# Patient Record
Sex: FEMALE | Race: WHITE | NOT HISPANIC OR LATINO | Employment: UNEMPLOYED | URBAN - METROPOLITAN AREA
[De-identification: names, ages, dates, MRNs, and addresses within clinical notes are randomized per-mention and may not be internally consistent; named-entity substitution may affect disease eponyms.]

---

## 2017-04-13 ENCOUNTER — OFFICE VISIT (OUTPATIENT)
Dept: MEDICAL GROUP | Facility: MEDICAL CENTER | Age: 32
End: 2017-04-13
Attending: FAMILY MEDICINE
Payer: MEDICAID

## 2017-04-13 VITALS
HEART RATE: 100 BPM | WEIGHT: 105 LBS | OXYGEN SATURATION: 99 % | DIASTOLIC BLOOD PRESSURE: 88 MMHG | RESPIRATION RATE: 14 BRPM | SYSTOLIC BLOOD PRESSURE: 128 MMHG | TEMPERATURE: 98.1 F | BODY MASS INDEX: 16.88 KG/M2 | HEIGHT: 66 IN

## 2017-04-13 DIAGNOSIS — F41.9 ANXIETY: ICD-10-CM

## 2017-04-13 DIAGNOSIS — R55 SYNCOPE, UNSPECIFIED SYNCOPE TYPE: ICD-10-CM

## 2017-04-13 DIAGNOSIS — M25.50 ARTHRALGIA, UNSPECIFIED JOINT: ICD-10-CM

## 2017-04-13 DIAGNOSIS — R63.4 WEIGHT LOSS: ICD-10-CM

## 2017-04-13 DIAGNOSIS — K85.90 PANCREATITIS, UNSPECIFIED PANCREATITIS TYPE: ICD-10-CM

## 2017-04-13 DIAGNOSIS — G89.4 CHRONIC PAIN SYNDROME: ICD-10-CM

## 2017-04-13 DIAGNOSIS — F10.29 ALCOHOL DEPENDENCE WITH UNSPECIFIED ALCOHOL-INDUCED DISORDER (HCC): ICD-10-CM

## 2017-04-13 PROBLEM — F10.20 ALCOHOL DEPENDENCE (HCC): Status: ACTIVE | Noted: 2017-04-13

## 2017-04-13 PROCEDURE — 99214 OFFICE O/P EST MOD 30 MIN: CPT | Performed by: FAMILY MEDICINE

## 2017-04-13 PROCEDURE — 99204 OFFICE O/P NEW MOD 45 MIN: CPT | Performed by: FAMILY MEDICINE

## 2017-04-13 RX ORDER — FLUOXETINE 10 MG/1
10 CAPSULE ORAL DAILY
Qty: 30 CAP | Refills: 3 | Status: SHIPPED | OUTPATIENT
Start: 2017-04-13 | End: 2017-11-22

## 2017-04-13 RX ORDER — GABAPENTIN 100 MG/1
100 CAPSULE ORAL 3 TIMES DAILY
Qty: 90 CAP | Refills: 3 | Status: SHIPPED | OUTPATIENT
Start: 2017-04-13 | End: 2017-10-16 | Stop reason: SDUPTHER

## 2017-04-13 ASSESSMENT — ENCOUNTER SYMPTOMS
NAUSEA: 0
FEVER: 0
ABDOMINAL PAIN: 0
HEADACHES: 0
CHILLS: 0
SHORTNESS OF BREATH: 0
PALPITATIONS: 0
VOMITING: 0

## 2017-04-13 ASSESSMENT — PAIN SCALES - GENERAL: PAINLEVEL: 4=SLIGHT-MODERATE PAIN

## 2017-04-13 NOTE — PROGRESS NOTES
Subjective:      Rhiannon Tracey is a 31 y.o. female who presents with [facility]            HPI Comments: Patient here to establish with the clinic today, she has a history of pancreatitis, alcohol dependence, anxiety disorder, syncopal episodes and chronic pain.    She reports having episodes of syncope and passing out for 5 to 10 mins at a time. Prior to losing consciousness, She states she hears ringing in her ears and everything starts going black. She had been working as a  prior to having to stop working due to the syncopal events becoming more frequent. She states the most recent syncopal event happened about a week ago. She does not have any period of confusion following regaining her consciousness. She states that she has not been seen at the emergency room for these episodes, and may have hit her head while passing out. She is not having any headaches or dizziness any changes in vision or other neurologic changes when she is not having her syncopal episodes. To further assess her syncopal episodes will refer to cardiology for a possible event monitor as well as further evaluation of her heart, an MRI of her brain and also recommendation to go to the emergency room for a further evaluation if the event should recur.    She has episodes of severe anxiety, she states when she walks into any medical facility. She also has difficulty being around people sometimes. She has in the past been on Prozac for her issues which she states may have helped her with her problem with anxiety. We'll have her restart the Prozac at 10 mg and a referral to a psychiatrist as well as a psychologist will be made. She does not have any urges to harm herself or others. She has been advised if her symptoms become severe or she has urges to harm herself or others she should once again go to the emergency room for a further assessment as well as management.    She has multiple episodes of pancreatitis that has resulted  in hospitalization for 3 days. She also reports having issues with alcohol use drinking on a daily basis. Patient states that she is drinking less alcohol now, since her bout with pancreatitis. We will order blood work to further assess her pancreas as well as her liver and gallbladder. Since she is also been having issues with losing weight, will order a chest and abdomen CT scan as well for a further evaluation. Will also check a complete metabolic panel once again to check her liver as well as kidney function. We'll also check a thyroid function to see if she is having any problems with hyperthyroidism. She has also been prescribed nutritional supplements to use with each meal to assist in weight gain. We'll continue to follow.    She also has problems with pains in all of her joints. The pains do come and go and she has not had any recent trauma that she is aware of. We'll have her try Neurontin to see if it will help alleviate some of the joint pains she is suffering from. We'll also order a sedimentation rate as well to further assess.    Her past surgical history includes multiple knee surgeries, and a tonsillectomy.    Her family medical history includes arthritis and mental health issues per patient.    She is currently  but in a relationship. She was working as a  but due to her episodes of syncope she was forced to stop working for now.    She drinks alcohol on a daily basis, smokes a pack a day but denies any other substance use.      Review of Systems   Constitutional: Negative for fever and chills.   HENT: Negative for hearing loss.    Respiratory: Negative for shortness of breath.    Cardiovascular: Negative for chest pain and palpitations.   Gastrointestinal: Negative for nausea, vomiting and abdominal pain.   Musculoskeletal: Positive for joint pain.   Neurological: Positive for dizziness, tingling and loss of consciousness. Negative for tremors, sensory change, speech change,  "focal weakness, seizures and headaches.   Psychiatric/Behavioral: Positive for depression. Negative for suicidal ideas, hallucinations and substance abuse. The patient is nervous/anxious and has insomnia.           Objective:     /88 mmHg  Pulse 100  Temp(Src) 36.7 °C (98.1 °F)  Resp 14  Ht 1.689 m (5' 6.5\")  Wt 47.628 kg (105 lb)  BMI 16.70 kg/m2  SpO2 99%  LMP 02/27/2017  Breastfeeding? No     Physical Exam   Constitutional:   BMI < 17   HENT:   Right Ear: External ear normal.   Left Ear: External ear normal.   Nose: Nose normal.   Mouth/Throat: Oropharynx is clear and moist.   Eyes: EOM are normal. Pupils are equal, round, and reactive to light.   Neck: Normal range of motion.   Cardiovascular: Normal rate, regular rhythm and normal heart sounds.  Exam reveals no friction rub.    No murmur heard.  Pulmonary/Chest: Breath sounds normal. No respiratory distress. She has no wheezes. She has no rales.   Abdominal: Soft. Bowel sounds are normal.   Musculoskeletal:   Decreased ROM of affected extremities   Neurological: She is alert.   Skin: Skin is warm and dry.   Psychiatric: Her behavior is normal.               Assessment/Plan:     1. Anxiety  We'll restart her Prozac 10 mg daily, a referral to psychiatry and psychology have been made for patient we'll also check her thyroid function to see if she is hyperthyroid secondary to her uncontrolled anxiety as well as weight loss. She is not having any SI or HI symptoms, but ear precautions have been given to patient.  - fluoxetine (PROZAC) 10 MG Cap; Take 1 Cap by mouth every day.  Dispense: 30 Cap; Refill: 3  - REFERRAL TO PSYCHIATRY  - TSH WITH REFLEX TO FT4; Future  - COMP METABOLIC PANEL; Future  - CBC WITH DIFFERENTIAL; Future  - VITAMIN D,25 HYDROXY; Future  - REFERRAL TO PSYCHOLOGY    2. Alcohol dependence with unspecified alcohol-induced disorder (CMS-HCC)  Discussed her history of alcohol use, patient states that since she has had her pancreatitis " she has decreased her alcohol consumption although she still drinks on a daily basis. Discussed possibly tapering even further. Patient will be referred to psychiatry as well as psychology for additional assistance with her anxiety as well as her alcohol dependence.    3. Pancreatitis, unspecified pancreatitis type  She has not had any recent bouts of pancreatitis and has no current left upper quadrant pain. But due to her history will check blood work as well as a abdominal CT to further assess her pancreatitis as well as weight loss.  - COMP METABOLIC PANEL; Future    4. Chronic pain syndrome  Will have her start gabapentin at 100 mg 3 times a day. Will check a sedimentation rate as well as B12 and folate levels. We'll continue to follow.  - gabapentin (NEURONTIN) 100 MG Cap; Take 1 Cap by mouth 3 times a day.  Dispense: 90 Cap; Refill: 3  - LIPASE; Future  - VITAMIN B12; Future  - FOLATE; Future    5. Syncope, unspecified syncope type  Since she has bouts of loss of consciousness possible syncope, will refer to cardiology as well as get an MRI of her brain to look for any possible brain issues. She has been advised to go to the emergency room if she continues to have her syncopal episodes.  - MR-BRAIN-W/O; Future  - REFERRAL TO CARDIOLOGY    6. Weight loss  Due to her weight loss will have her start nutritional supplements along with trying to eat normal meals. Will order a CT abdomen as well as a CT chest to look for other potential etiologies for her weight loss. Blood work will also be ordered to further assess her metabolic function as well as thyroid function.  - CT-ABDOMEN W/O; Future  - CT-CHEST (THORAX) W/O; Future    7. Arthralgia, unspecified joint  See above plan.  - WESTERGREN SED RATE; Future

## 2017-04-13 NOTE — MR AVS SNAPSHOT
"        Rhiannon Batista Tracey   2017 1:10 PM   Office Visit   MRN: [id number]    [facility]   Dept Phone:  [phone]    Description:  Female : 1985   Provider:  Yvan Maxwell M.D.           Reason for Visit     Establish Care vomiting,shaking, pain in all joints       Allergies as of 2017     Allergen Noted Reactions    Promethazine Hcl 2008   Anxiety      You were diagnosed with     Anxiety   [266227]       Alcohol dependence with unspecified alcohol-induced disorder (CMS-HCC)   [2080928]       Pancreatitis, unspecified pancreatitis type   [0666676]       Chronic pain syndrome   [338.4.ICD-9-CM]       Syncope, unspecified syncope type   [1786169]       Weight loss   [923129]       Arthralgia, unspecified joint   [4297706]         Vital Signs     Blood Pressure Pulse Temperature Respirations Height Weight    128/88 mmHg 100 36.7 °C (98.1 °F) 14 1.689 m (5' 6.5\") 47.628 kg (105 lb)    Body Mass Index Oxygen Saturation Last Menstrual Period Breastfeeding? Smoking Status       16.70 kg/m2 99% 2017 No Current Every Day Smoker       Basic Information     Date Of Birth Sex Race Ethnicity Preferred Language    1985 Female White Non- English      Your appointments     2017 12:50 PM   Established Patient with Yvan Maxwell M.D.   [facility] ([facility]    [address] [place] [id number]   [phone]           You will be receiving a confirmation call a few days before your appointment from our automated call confirmation system.            May 03, 2017 11:00 AM   MR BRAIN W/O 30 with [facility]   [facility]    [address] [facility]  [place] [id number] [phone]            May 03, 2017 12:00 PM   CT GEN60 with VISTA CT 1   [facility]    [address] [facility] [id number] [phone]              Problem List              ICD-10-CM Priority Class Noted - Resolved    Hypertrophy of tonsils alone J35.1   2013 " - Present    Anxiety F41.9   4/13/2017 - Present    Alcohol dependence (CMS-[facility]) F10.20   4/13/2017 - Present    Pancreatitis K85.90   4/13/2017 - Present    Syncope R55   4/13/2017 - Present      Health Maintenance        Date Due Completion Dates    IMM DTaP/Tdap/Td Vaccine (1 - Tdap) 4/30/2006 ---    IMM PNEUMOCOCCAL 19-64 (ADULT) MEDIUM RISK SERIES (1 of 1 - PPSV23) 4/30/2006 ---    PAP SMEAR 4/30/2008 ---            Current Immunizations     No immunizations on file.      Below and/or attached are the medications your provider expects you to take. Review all of your home medications and newly ordered medications with your provider and/or pharmacist. Follow medication instructions as directed by your provider and/or pharmacist. Please keep your medication list with you and share with your provider. Update the information when medications are discontinued, doses are changed, or new medications (including over-the-counter products) are added; and carry medication information at all times in the event of emergency situations     Allergies:  PROMETHAZINE HCL - Anxiety               Medications  Valid as of: April 17, 2017 -  7:46 AM    Generic Name Brand Name Tablet Size Instructions for use    FLUoxetine HCl (Cap) PROZAC 10 MG Take 1 Cap by mouth every day.        Gabapentin (Cap) NEURONTIN 100 MG Take 1 Cap by mouth 3 times a day.        Misc. Devices (Misc) Misc. Devices  Nutritional supplements for weight gain # 90        .                 Medicines prescribed today were sent to:     [id number] [address]    Phone: [phone] Fax: [phone]    Open 24 Hours?: No      Medication refill instructions:       If your prescription bottle indicates you have medication refills left, it is not necessary to call your provider’s office. Please contact your pharmacy and they will refill your medication.    If your prescription bottle  indicates you do not have any refills left, you may request refills at any time through one of the following ways: The online [employer] system (except Urgent Care), by calling your provider’s office, or by asking your pharmacy to contact your provider’s office with a refill request. Medication refills are processed only during regular business hours and may not be available until the next business day. Your provider may request additional information or to have a follow-up visit with you prior to refilling your medication.   *Please Note: Medication refills are assigned a new Rx number when refilled electronically. Your pharmacy may indicate that no refills were authorized even though a new prescription for the same medication is available at the pharmacy. Please request the medicine by name with the pharmacy before contacting your provider for a refill.        Your To Do List     Future Labs/Procedures Complete By Expires    CBC WITH DIFFERENTIAL  As directed 4/13/2018    COMP METABOLIC PANEL  As directed 4/13/2018    CT-CHEST (THORAX) W/O  As directed 4/13/2018    CT-CHEST AND ABDOMEN W/O  As directed 4/14/2018    FOLATE  As directed 4/13/2018    LIPASE  As directed 4/13/2018    MR-BRAIN-W/O  As directed 4/13/2018    TSH WITH REFLEX TO FT4  As directed 4/13/2018    VITAMIN B12  As directed 4/13/2018    VITAMIN D,25 HYDROXY  As directed 4/13/2018    WESTERGREN SED RATE  As directed 4/14/2018      Referral     A referral request has been sent to our patient care coordination department. Please allow 3-5 business days for us to process this request and contact you either by phone or mail. If you do not hear from us by the 5th business day, please call us at [phone].        Instructions    Pt advised to go to the er for worsened sxs.             MyChart Status: Patient Declined        Quit Tobacco Information     Do you want to quit using tobacco?    Quitting tobacco decreases risks of cancer, heart and lung  disease, increases life expectancy, improves sense of taste and smell, and increases spending money, among other benefits.    If you are thinking about quitting, we can help.  • [facility] Quit Tobacco Program: 729.560.1699  o Program occurs weekly for four weeks and includes pharmacist consultation on products to support quitting smoking or chewing tobacco. A provider referral is needed for pharmacist consultation.  • Tobacco Users Help Hotline: 6-662-QUIT-NOW (107-3531) or https://[place].quitlogix.org/  o Free, confidential telephone and online coaching for [place] residents. Sessions are designed on a schedule that is convenient for you. Eligible clients receive free nicotine replacement therapy.  • Nationally: www.smokefree.gov  o Information and professional assistance to support both immediate and long-term needs as you become, and remain, a non-smoker. Smokefree.gov allows you to choose the help that best fits your needs.

## 2017-04-14 ASSESSMENT — ENCOUNTER SYMPTOMS
DIZZINESS: 1
NERVOUS/ANXIOUS: 1
SENSORY CHANGE: 0
DEPRESSION: 1
SEIZURES: 0
LOSS OF CONSCIOUSNESS: 1
SPEECH CHANGE: 0
FOCAL WEAKNESS: 0
HALLUCINATIONS: 0
TREMORS: 0
INSOMNIA: 1
TINGLING: 1

## 2017-04-14 ASSESSMENT — LIFESTYLE VARIABLES: SUBSTANCE_ABUSE: 0

## 2017-05-17 ENCOUNTER — APPOINTMENT (OUTPATIENT)
Dept: RADIOLOGY | Facility: IMAGING CENTER | Age: 32
End: 2017-05-17
Attending: PHYSICIAN ASSISTANT
Payer: MEDICAID

## 2017-05-17 ENCOUNTER — OFFICE VISIT (OUTPATIENT)
Dept: URGENT CARE | Facility: PHYSICIAN GROUP | Age: 32
End: 2017-05-17
Payer: MEDICAID

## 2017-05-17 VITALS
RESPIRATION RATE: 14 BRPM | HEART RATE: 90 BPM | BODY MASS INDEX: 17.33 KG/M2 | HEIGHT: 67 IN | DIASTOLIC BLOOD PRESSURE: 62 MMHG | SYSTOLIC BLOOD PRESSURE: 114 MMHG | OXYGEN SATURATION: 99 % | TEMPERATURE: 97.5 F | WEIGHT: 110.4 LBS

## 2017-05-17 DIAGNOSIS — S39.92XA BACK INJURY, INITIAL ENCOUNTER: ICD-10-CM

## 2017-05-17 PROCEDURE — 99203 OFFICE O/P NEW LOW 30 MIN: CPT | Performed by: PHYSICIAN ASSISTANT

## 2017-05-17 PROCEDURE — 72080 X-RAY EXAM THORACOLMB 2/> VW: CPT | Performed by: EMERGENCY MEDICINE

## 2017-05-17 RX ORDER — TRAMADOL HYDROCHLORIDE 50 MG/1
50 TABLET ORAL EVERY 4 HOURS PRN
Qty: 15 TAB | Refills: 0 | Status: SHIPPED | OUTPATIENT
Start: 2017-05-17 | End: 2017-09-11

## 2017-05-17 ASSESSMENT — PAIN SCALES - GENERAL: PAINLEVEL: 7=MODERATE-SEVERE PAIN

## 2017-05-17 NOTE — MR AVS SNAPSHOT
"Rhiannon Batista Tracey   2017 4:35 PM   Office Visit   MRN: [id number]    Department:  [facility]   Dept Phone:  [phone]    Description:  Female : 1985   Provider:  Abdiaziz Seth PA-C           Reason for Visit     Back Pain ffell in bath, vertigo      Allergies as of 2017     Allergen Noted Reactions    Promethazine Hcl 2008   Anxiety      You were diagnosed with     Back injury, initial encounter   [375241]         Vital Signs     Blood Pressure Pulse Temperature Respirations Height Weight    114/62 mmHg 90 36.4 °C (97.5 °F) 14 1.689 m (5' 6.5\") 50.077 kg (110 lb 6.4 oz)    Body Mass Index Oxygen Saturation Smoking Status             17.55 kg/m2 99% Current Every Day Smoker         Basic Information     Date Of Birth Sex Race Ethnicity Preferred Language    1985 Female White Non- English      Problem List              ICD-10-CM Priority Class Noted - Resolved    Hypertrophy of tonsils alone J35.1   2013 - Present    Anxiety F41.9   2017 - Present    Alcohol dependence (CMS-[facility]) F10.20   2017 - Present    Pancreatitis K85.90   2017 - Present    Syncope R55   2017 - Present      Health Maintenance        Date Due Completion Dates    IMM DTaP/Tdap/Td Vaccine (1 - Tdap) 2006 ---    IMM PNEUMOCOCCAL 19-64 (ADULT) MEDIUM RISK SERIES (1 of 1 - PPSV23) 2006 ---    PAP SMEAR 2008 ---            Current Immunizations     No immunizations on file.      Below and/or attached are the medications your provider expects you to take. Review all of your home medications and newly ordered medications with your provider and/or pharmacist. Follow medication instructions as directed by your provider and/or pharmacist. Please keep your medication list with you and share with your provider. Update the information when medications are discontinued, doses are changed, or new medications (including over-the-counter products) are added; and carry " medication information at all times in the event of emergency situations     Allergies:  PROMETHAZINE HCL - Anxiety               Medications  Valid as of: May 17, 2017 -  5:32 PM    Generic Name Brand Name Tablet Size Instructions for use    FLUoxetine HCl (Cap) PROZAC 10 MG Take 1 Cap by mouth every day.        Gabapentin (Cap) NEURONTIN 100 MG Take 1 Cap by mouth 3 times a day.        Misc. Devices (Misc) Misc. Devices  Nutritional supplements for weight gain # 90        TraMADol HCl (Tab) ULTRAM 50 MG Take 1 Tab by mouth every four hours as needed.        .                 Medicines prescribed today were sent to:     [id number] - [place], [place] - [address]    [address] [place] [address]    Phone: [phone] Fax: [phone]    Open 24 Hours?: No      Medication refill instructions:       If your prescription bottle indicates you have medication refills left, it is not necessary to call your provider’s office. Please contact your pharmacy and they will refill your medication.    If your prescription bottle indicates you do not have any refills left, you may request refills at any time through one of the following ways: The online [employer] system (except Urgent Care), by calling your provider’s office, or by asking your pharmacy to contact your provider’s office with a refill request. Medication refills are processed only during regular business hours and may not be available until the next business day. Your provider may request additional information or to have a follow-up visit with you prior to refilling your medication.   *Please Note: Medication refills are assigned a new Rx number when refilled electronically. Your pharmacy may indicate that no refills were authorized even though a new prescription for the same medication is available at the pharmacy. Please request the medicine by name with the pharmacy before contacting your provider for a refill.        Your To Do List        Future Labs/Procedures Complete By Expires    DX-THORACOLUMBAR SPINE-2 VIEWS  As directed 5/17/2018         Nict Status: Patient Declined        Quit Tobacco Information     Do you want to quit using tobacco?    Quitting tobacco decreases risks of cancer, heart and lung disease, increases life expectancy, improves sense of taste and smell, and increases spending money, among other benefits.    If you are thinking about quitting, we can help.  • [facility] Quit Tobacco Program: [phone]  o Program occurs weekly for four weeks and includes pharmacist consultation on products to support quitting smoking or chewing tobacco. A provider referral is needed for pharmacist consultation.  • Tobacco Users Help Hotline: 7-097-QUIT-NOW (794-7808) or https://[place].quitlogix.org/  o Free, confidential telephone and online coaching for [place] residents. Sessions are designed on a schedule that is convenient for you. Eligible clients receive free nicotine replacement therapy.  • Nationally: www.smokefree.gov  o Information and professional assistance to support both immediate and long-term needs as you become, and remain, a non-smoker. Smokefree.gov allows you to choose the help that best fits your needs.

## 2017-05-18 NOTE — PROGRESS NOTES
Chief Complaint   Patient presents with   • Back Pain     ffell in bath, vertigo       HISTORY OF PRESENT ILLNESS: Patient is a 32 y.o. female who presents today because she fell in the bathtub today, causing pain to her middle back. This just happened a couple hours ago. She has not been taking any specific medications for her. She denies any distal paresthesias. She is currently being evaluated by her primary care provider for chronic dizziness, syncopal episodes. She has just gotten out of the hospital a few weeks ago, was in there for several weeks for the same and to include electrolyte imbalances as well. Nonetheless, she has middle thoracolumbar back pain    Patient Active Problem List    Diagnosis Date Noted   • Anxiety 04/13/2017   • Alcohol dependence (CMS-HCC) 04/13/2017   • Pancreatitis 04/13/2017   • Syncope 04/13/2017   • Hypertrophy of tonsils alone 04/11/2013       Allergies:Promethazine hcl    Current Outpatient Prescriptions Ordered in [facility]   Medication Sig Dispense Refill   • tramadol (ULTRAM) 50 MG Tab Take 1 Tab by mouth every four hours as needed. 15 Tab 0   • gabapentin (NEURONTIN) 100 MG Cap Take 1 Cap by mouth 3 times a day. 90 Cap 3   • fluoxetine (PROZAC) 10 MG Cap Take 1 Cap by mouth every day. 30 Cap 3   • Misc. Devices Misc Nutritional supplements for weight gain # 90 90 Device 11     No current [facility]-ordered facility-administered medications on file.       Past Medical History   Diagnosis Date   • Hernia of unspecified site of abdominal cavity without mention of obstruction or gangrene    • Heart burn    • Indigestion    • Cold    • Pneumonia 2011   • Bronchitis 8/2012   • Alcohol dependence (CMS-HCC) 4/13/2017   • Pancreatitis        Social History   Substance Use Topics   • Smoking status: Current Every Day Smoker -- 0.75 packs/day     Types: Cigarettes   • Smokeless tobacco: None   • Alcohol Use: Yes      Comment: daily       Family Status   Relation Status Death Age   • Mother Alive  "   • Father Alive    • Maternal Grandfather       Family History   Problem Relation Age of Onset   • Cancer Neg Hx    • Diabetes Neg Hx    • Hypertension Neg Hx    • Psychiatry Mother    • Stroke Mother    • Arthritis Mother    • Heart Disease Maternal Grandfather        ROS:  Review of Systems   Constitutional: Negative for fever, chills, weight loss and malaise/fatigue.   HENT: Negative for ear pain, nosebleeds, congestion, sore throat and neck pain.    Eyes: Negative for blurred vision.   Respiratory: Negative for cough, sputum production, shortness of breath and wheezing.    Cardiovascular: Negative for chest pain, palpitations, orthopnea and leg swelling.   Gastrointestinal: Negative for heartburn, nausea, vomiting and abdominal pain.       Exam:  Blood pressure 114/62, pulse 90, temperature 36.4 °C (97.5 °F), resp. rate 14, height 1.689 m (5' 6.5\"), weight 50.077 kg (110 lb 6.4 oz), SpO2 99 %.  General:  Well nourished, well developed female in NAD  Head:Normocephalic, atraumatic  Eyes: PERRLA, EOM within normal limits, no conjunctival injection, no scleral icterus, visual fields and acuity grossly intact.  Extremities: no clubbing, cyanosis, or edema.  Musculoskeletal: Thoracolumbar area has some visible superficial abrasion without any bleeding, just left of midline and she has tenderness in the area of the same as well as midline tenderness of the thoracolumbar vertebrae    Thoracolumbar spine by radiology interpretation:    No fracture or malalignment of the thoracolumbar spine    Please note that this dictation was created using voice recognition software. I have made every reasonable attempt to correct obvious errors, but I expect that there are errors of grammar and possibly content that I did not discover before finalizing the note.    Assessment/Plan:  1. Back injury, initial encounter  DX-THORACOLUMBAR SPINE-2 VIEWS    tramadol (ULTRAM) 50 MG Tab    recommended over-the-counter Tylenol or " ibuprofen, if not significantly improving go to the emergency room.    Followup with primary care in the next 7-10 days if not significantly improving, return to the urgent care or go to the emergency room sooner for any worsening of symptoms.

## 2017-09-11 ENCOUNTER — OFFICE VISIT (OUTPATIENT)
Dept: URGENT CARE | Facility: PHYSICIAN GROUP | Age: 32
End: 2017-09-11
Payer: MEDICAID

## 2017-09-11 VITALS
BODY MASS INDEX: 17.89 KG/M2 | TEMPERATURE: 98.4 F | OXYGEN SATURATION: 94 % | WEIGHT: 114 LBS | SYSTOLIC BLOOD PRESSURE: 110 MMHG | HEART RATE: 98 BPM | DIASTOLIC BLOOD PRESSURE: 80 MMHG | HEIGHT: 67 IN | RESPIRATION RATE: 18 BRPM

## 2017-09-11 DIAGNOSIS — Z87.19 HISTORY OF PANCREATITIS: ICD-10-CM

## 2017-09-11 DIAGNOSIS — R10.10 PAIN OF UPPER ABDOMEN: ICD-10-CM

## 2017-09-11 PROCEDURE — 99214 OFFICE O/P EST MOD 30 MIN: CPT | Performed by: PHYSICIAN ASSISTANT

## 2017-09-11 PROCEDURE — 99999 PR NO CHARGE: CPT | Performed by: PHYSICIAN ASSISTANT

## 2017-09-11 RX ORDER — ONDANSETRON 4 MG/1
4 TABLET, ORALLY DISINTEGRATING ORAL ONCE
Status: COMPLETED | OUTPATIENT
Start: 2017-09-11 | End: 2017-09-11

## 2017-09-11 RX ORDER — ONDANSETRON 4 MG/1
4 TABLET, ORALLY DISINTEGRATING ORAL EVERY 8 HOURS PRN
Qty: 10 TAB | Refills: 0 | Status: SHIPPED | OUTPATIENT
Start: 2017-09-11 | End: 2017-10-16 | Stop reason: SDUPTHER

## 2017-09-11 RX ORDER — TRAMADOL HYDROCHLORIDE 50 MG/1
50-100 TABLET ORAL EVERY 4 HOURS PRN
Qty: 20 TAB | Refills: 0 | Status: SHIPPED | OUTPATIENT
Start: 2017-09-11 | End: 2017-10-16 | Stop reason: SDUPTHER

## 2017-09-11 RX ORDER — KETOROLAC TROMETHAMINE 30 MG/ML
30 INJECTION, SOLUTION INTRAMUSCULAR; INTRAVENOUS ONCE
Status: COMPLETED | OUTPATIENT
Start: 2017-09-11 | End: 2017-09-11

## 2017-09-11 RX ORDER — HYDROCODONE BITARTRATE AND ACETAMINOPHEN 5; 325 MG/1; MG/1
1-2 TABLET ORAL EVERY 6 HOURS PRN
Qty: 12 TAB | Refills: 0 | Status: SHIPPED | OUTPATIENT
Start: 2017-09-11 | End: 2017-09-11

## 2017-09-11 RX ADMIN — ONDANSETRON 4 MG: 4 TABLET, ORALLY DISINTEGRATING ORAL at 16:49

## 2017-09-11 RX ADMIN — KETOROLAC TROMETHAMINE 30 MG: 30 INJECTION, SOLUTION INTRAMUSCULAR; INTRAVENOUS at 16:48

## 2017-09-11 ASSESSMENT — ENCOUNTER SYMPTOMS
NAUSEA: 1
VOMITING: 1
CONSTIPATION: 0
DIARRHEA: 1
ANOREXIA: 1
ABDOMINAL PAIN: 1
CHILLS: 0
FEVER: 0

## 2017-09-11 NOTE — PROGRESS NOTES
Subjective:      Rhiannon Tracey is a 32 y.o. female who presents with Abdominal Pain (Pt Hx of pancreatitis, Pt states she is having same Sx)            Severe abdominal pain for the last couple of days which seems to be worsening. Pain worse in the upper abdomen and consistent with prior episodes of pancreatitis. She reports that she has had pancreatitis multiple times in the last year, usually due to alcohol abuse. She has been sober for 23 days. She would like something to help with the pain and nausea to hold her over until her symptoms improve.       Abdominal Pain   This is a recurrent problem. The current episode started in the past 7 days. The problem occurs constantly. The problem has been gradually worsening. The pain is located in the LUQ, RUQ and epigastric region. The pain is severe. The quality of the pain is sharp. The abdominal pain does not radiate. Associated symptoms include anorexia, diarrhea, nausea and vomiting. Pertinent negatives include no constipation or fever. The pain is aggravated by eating. The pain is relieved by nothing. She has tried nothing for the symptoms. The treatment provided no relief. Her past medical history is significant for pancreatitis.       Review of Systems   Constitutional: Negative for chills and fever.   Gastrointestinal: Positive for abdominal pain, anorexia, diarrhea, nausea and vomiting. Negative for constipation.     Allergies:Lorazepam and Promethazine hcl    Current Outpatient Prescriptions Ordered in [facility]   Medication Sig Dispense Refill   • ondansetron (ZOFRAN ODT) 4 MG TABLET DISPERSIBLE Take 1 Tab by mouth every 8 hours as needed for Nausea/Vomiting. 10 Tab 0   • tramadol (ULTRAM) 50 MG Tab Take 1-2 Tabs by mouth every four hours as needed for Moderate Pain. 20 Tab 0   • gabapentin (NEURONTIN) 100 MG Cap Take 1 Cap by mouth 3 times a day. 90 Cap 3   • fluoxetine (PROZAC) 10 MG Cap Take 1 Cap by mouth every day. 30 Cap 3   • Misc. Devices Misc  "Nutritional supplements for weight gain # 90 90 Device 11     Current Facility-Administered Medications Ordered in Epic   Medication Dose Route Frequency Provider Last Rate Last Dose   • ketorolac (TORADOL) injection 30 mg  30 mg Intramuscular Once Kwasi Pagan, P.A.       • ondansetron (ZOFRAN ODT) dispertab 4 mg  4 mg Oral Once Kwasi Pagan, P.A.           Past Medical History:   Diagnosis Date   • Alcohol dependence (CMS-HCC) 2017   • Bronchitis 2012   • Pneumonia    • Cold    • Heart burn    • Hernia of unspecified site of abdominal cavity without mention of obstruction or gangrene    • Indigestion    • Pancreatitis        Social History   Substance Use Topics   • Smoking status: Current Every Day Smoker     Packs/day: 0.75     Types: Cigarettes   • Smokeless tobacco: Never Used   • Alcohol use No      Comment: Sober since        Family Status   Relation Status   • Mother Alive   • Father Alive   • Maternal Grandfather    • Neg Hx      Family History   Problem Relation Age of Onset   • Psychiatry Mother    • Stroke Mother    • Arthritis Mother    • Heart Disease Maternal Grandfather    • Cancer Neg Hx    • Diabetes Neg Hx    • Hypertension Neg Hx           Objective:     /80   Pulse 98   Temp 36.9 °C (98.4 °F)   Resp 18   Ht 1.689 m (5' 6.5\")   Wt 51.7 kg (114 lb)   SpO2 94%   BMI 18.12 kg/m²      Physical Exam   Constitutional: She is oriented to person, place, and time. She appears well-developed and well-nourished.   Appears quite uncomfortable   HENT:   Head: Normocephalic and atraumatic.   Eyes: Right eye exhibits no discharge. Left eye exhibits no discharge.   Neck: Normal range of motion. Neck supple.   Cardiovascular: Normal rate and regular rhythm.    Pulmonary/Chest: Effort normal and breath sounds normal. She has no wheezes. She has no rales.   Abdominal: Soft. There is tenderness (diffuse, worse in the upper abdomen).   Neurological: She is alert and oriented to " person, place, and time.   Skin: Skin is warm and dry. She is not diaphoretic.   Psychiatric: She has a normal mood and affect. Her behavior is normal. Judgment and thought content normal.   Nursing note and vitals reviewed.              Assessment/Plan:     1. Pain of upper abdomen  ondansetron (ZOFRAN ODT) 4 MG TABLET DISPERSIBLE    ketorolac (TORADOL) injection 30 mg    ondansetron (ZOFRAN ODT) dispertab 4 mg    tramadol (ULTRAM) 50 MG Tab    DISCONTINUED: hydrocodone-acetaminophen (NORCO) 5-325 MG Tab per tablet    Ongoing for several days and worsening. History of pancreatitis. Discussed options. Given Toradol, tramadol, and Zofran. Follow up PCP. ER if worsening   2. History of pancreatitis  ondansetron (ZOFRAN ODT) 4 MG TABLET DISPERSIBLE    ketorolac (TORADOL) injection 30 mg    ondansetron (ZOFRAN ODT) dispertab 4 mg    tramadol (ULTRAM) 50 MG Tab    DISCONTINUED: hydrocodone-acetaminophen (NORCO) 5-325 MG Tab per tablet    Chronic problem with frequent exacerbations.  follow-up with PCP. ER if worsening       PMDP reviewed. No narcotics on report.    [employer] Patient Education given:Abdominal pain, pancreatitis    Please note that this dictation was created using voice recognition software. I have made every reasonable attempt to correct obvious errors, but I expect that there are errors of grammar and possibly content that I did not discover before finalizing the note.

## 2017-09-11 NOTE — PATIENT INSTRUCTIONS
Abdominal Pain (Nonspecific)  Your exam might not show the exact reason you have abdominal pain. Since there are many different causes of abdominal pain, another checkup and more tests may be needed. It is very important to follow up for lasting (persistent) or worsening symptoms. A possible cause of abdominal pain in any person who still has his or her appendix is acute appendicitis. Appendicitis is often hard to diagnose. Normal blood tests, urine tests, ultrasound, and CT scans do not completely rule out early appendicitis or other causes of abdominal pain. Sometimes, only the changes that happen over time will allow appendicitis and other causes of abdominal pain to be determined. Other potential problems that may require surgery may also take time to become more apparent. Because of this, it is important that you follow all of the instructions below.  HOME CARE INSTRUCTIONS   · Rest as much as possible.   · Do not eat solid food until your pain is gone.   · While adults or children have pain: A diet of water, weak decaffeinated tea, broth or bouillon, gelatin, oral rehydration solutions (ORS), frozen ice pops, or ice chips may be helpful.   · When pain is gone in adults or children: Start a light diet (dry toast, crackers, applesauce, or white rice). Increase the diet slowly as long as it does not bother you. Eat no dairy products (including cheese and eggs) and no spicy, fatty, fried, or high-fiber foods.   · Use no alcohol, caffeine, or cigarettes.   · Take your regular medicines unless your caregiver told you not to.   · Take any prescribed medicine as directed.   · Only take over-the-counter or prescription medicines for pain, discomfort, or fever as directed by your caregiver. Do not give aspirin to children.   If your caregiver has given you a follow-up appointment, it is very important to keep that appointment. Not keeping the appointment could result in a permanent injury and/or lasting (chronic) pain  and/or disability. If there is any problem keeping the appointment, you must call to reschedule.   SEEK IMMEDIATE MEDICAL CARE IF:   · Your pain is not gone in 24 hours.   · Your pain becomes worse, changes location, or feels different.   · You or your child has an oral temperature above 102° F (38.9° C), not controlled by medicine.   · Your baby is older than 3 months with a rectal temperature of 102° F (38.9° C) or higher.   · Your baby is 3 months old or younger with a rectal temperature of 100.4° F (38° C) or higher.   · You have shaking chills.   · You keep throwing up (vomiting) or cannot drink liquids.   · There is blood in your vomit or you see blood in your bowel movements.   · Your bowel movements become dark or black.   · You have frequent bowel movements.   · Your bowel movements stop (become blocked) or you cannot pass gas.   · You have bloody, frequent, or painful urination.   · You have yellow discoloration in the skin or whites of the eyes.   · Your stomach becomes bloated or bigger.   · You have dizziness or fainting.   · You have chest or back pain.   MAKE SURE YOU:   · Understand these instructions.   · Will watch your condition.   · Will get help right away if you are not doing well or get worse.   Document Released: 12/18/2006 Document Revised: 03/11/2013 Document Reviewed: 11/15/2010  ExitCare® Patient Information ©2013 [employer].    Acute Pancreatitis  Acute pancreatitis is a disease in which the pancreas becomes suddenly inflamed. The pancreas is a large gland located behind your stomach. The pancreas produces enzymes that help digest food. The pancreas also releases the hormones glucagon and insulin that help regulate blood sugar. Damage to the pancreas occurs when the digestive enzymes from the pancreas are activated and begin attacking the pancreas before being released into the intestine. Most acute attacks last a couple of days and can cause serious complications. Some people become  dehydrated and develop low blood pressure. In severe cases, bleeding into the pancreas can lead to shock and can be life-threatening. The lungs, heart, and kidneys may fail.  CAUSES   Pancreatitis can happen to anyone. In some cases, the cause is unknown. Most cases are caused by:  · Alcohol abuse.  · Gallstones.  Other less common causes are:  · Certain medicines.  · Exposure to certain chemicals.  · Infection.  · Damage caused by an accident (trauma).  · Abdominal surgery.  SYMPTOMS   · Pain in the upper abdomen that may radiate to the back.  · Tenderness and swelling of the abdomen.  · Nausea and vomiting.  DIAGNOSIS   Your caregiver will perform a physical exam. Blood and stool tests may be done to confirm the diagnosis. Imaging tests may also be done, such as X-rays, CT scans, or an ultrasound of the abdomen.  TREATMENT   Treatment usually requires a stay in the hospital. Treatment may include:  · Pain medicine.  · Fluid replacement through an intravenous line (IV).  · Placing a tube in the stomach to remove stomach contents and control vomiting.  · Not eating for 3 or 4 days. This gives your pancreas a rest, because enzymes are not being produced that can cause further damage.  · Antibiotic medicines if your condition is caused by an infection.  · Surgery of the pancreas or gallbladder.  HOME CARE INSTRUCTIONS   · Follow the diet advised by your caregiver. This may involve avoiding alcohol and decreasing the amount of fat in your diet.  · Eat smaller, more frequent meals. This reduces the amount of digestive juices the pancreas produces.  · Drink enough fluids to keep your urine clear or pale yellow.  · Only take over-the-counter or prescription medicines as directed by your caregiver.  · Avoid drinking alcohol if it caused your condition.  · Do not smoke.  · Get plenty of rest.  · Check your blood sugar at home as directed by your caregiver.  · Keep all follow-up appointments as directed by your  caregiver.  SEEK MEDICAL CARE IF:   · You do not recover as quickly as expected.  · You develop new or worsening symptoms.  · You have persistent pain, weakness, or nausea.  · You recover and then have another episode of pain.  SEEK IMMEDIATE MEDICAL CARE IF:   · You are unable to eat or keep fluids down.  · Your pain becomes severe.  · You have a fever or persistent symptoms for more than 2 to 3 days.  · You have a fever and your symptoms suddenly get worse.  · Your skin or the white part of your eyes turn yellow (jaundice).  · You develop vomiting.  · You feel dizzy, or you faint.  · Your blood sugar is high (over 300 mg/dL).  MAKE SURE YOU:   · Understand these instructions.  · Will watch your condition.  · Will get help right away if you are not doing well or get worse.     This information is not intended to replace advice given to you by your health care provider. Make sure you discuss any questions you have with your health care provider.     Document Released: 12/18/2006 Document Revised: 06/18/2013 Document Reviewed: 03/28/2013  [employer] Interactive Patient Education ©2016 [employer].      Smoking Cessation, Tips for Success  If you are ready to quit smoking, congratulations! You have chosen to help yourself be healthier. Cigarettes bring nicotine, tar, carbon monoxide, and other irritants into your body. Your lungs, heart, and blood vessels will be able to work better without these poisons. There are many different ways to quit smoking. Nicotine gum, nicotine patches, a nicotine inhaler, or nicotine nasal spray can help with physical craving. Hypnosis, support groups, and medicines help break the habit of smoking.  WHAT THINGS CAN I DO TO MAKE QUITTING EASIER?   Here are some tips to help you quit for good:  · Pick a date when you will quit smoking completely. Tell all of your friends and family about your plan to quit on that date.  · Do not try to slowly cut down on the number of cigarettes you are  "smoking. Pick a quit date and quit smoking completely starting on that day.  · Throw away all cigarettes.    · Clean and remove all ashtrays from your home, work, and car.  · On a card, write down your reasons for quitting. Carry the card with you and read it when you get the urge to smoke.  · Cleanse your body of nicotine. Drink enough water and fluids to keep your urine clear or pale yellow. Do this after quitting to flush the nicotine from your body.  · Learn to predict your moods. Do not let a bad situation be your excuse to have a cigarette. Some situations in your life might tempt you into wanting a cigarette.  · Never have \"just one\" cigarette. It leads to wanting another and another. Remind yourself of your decision to quit.  · Change habits associated with smoking. If you smoked while driving or when feeling stressed, try other activities to replace smoking. Stand up when drinking your coffee. Brush your teeth after eating. Sit in a different chair when you read the paper. Avoid alcohol while trying to quit, and try to drink fewer caffeinated beverages. Alcohol and caffeine may urge you to smoke.  · Avoid foods and drinks that can trigger a desire to smoke, such as sugary or spicy foods and alcohol.  · Ask people who smoke not to smoke around you.  · Have something planned to do right after eating or having a cup of coffee. For example, plan to take a walk or exercise.  · Try a relaxation exercise to calm you down and decrease your stress. Remember, you may be tense and nervous for the first 2 weeks after you quit, but this will pass.  · Find new activities to keep your hands busy. Play with a pen, coin, or rubber band. Doodle or draw things on paper.  · Brush your teeth right after eating. This will help cut down on the craving for the taste of tobacco after meals. You can also try mouthwash.    · Use oral substitutes in place of cigarettes. Try using lemon drops, carrots, cinnamon sticks, or chewing gum. " "Keep them handy so they are available when you have the urge to smoke.  · When you have the urge to smoke, try deep breathing.  · Designate your home as a nonsmoking area.  · If you are a heavy smoker, ask your health care provider about a prescription for nicotine chewing gum. It can ease your withdrawal from nicotine.  · Reward yourself. Set aside the cigarette money you save and buy yourself something nice.  · Look for support from others. Join a support group or smoking cessation program. Ask someone at home or at work to help you with your plan to quit smoking.  · Always ask yourself, \"Do I need this cigarette or is this just a reflex?\" Tell yourself, \"Today, I choose not to smoke,\" or \"I do not want to smoke.\" You are reminding yourself of your decision to quit.  · Do not replace cigarette smoking with electronic cigarettes (commonly called e-cigarettes). The safety of e-cigarettes is unknown, and some may contain harmful chemicals.  · If you relapse, do not give up! Plan ahead and think about what you will do the next time you get the urge to smoke.  HOW WILL I FEEL WHEN I QUIT SMOKING?  You may have symptoms of withdrawal because your body is used to nicotine (the addictive substance in cigarettes). You may crave cigarettes, be irritable, feel very hungry, cough often, get headaches, or have difficulty concentrating. The withdrawal symptoms are only temporary. They are strongest when you first quit but will go away within 10-14 days. When withdrawal symptoms occur, stay in control. Think about your reasons for quitting. Remind yourself that these are signs that your body is healing and getting used to being without cigarettes. Remember that withdrawal symptoms are easier to treat than the major diseases that smoking can cause.   Even after the withdrawal is over, expect periodic urges to smoke. However, these cravings are generally short lived and will go away whether you smoke or not. Do not smoke!  WHAT " RESOURCES ARE AVAILABLE TO HELP ME QUIT SMOKING?  Your health care provider can direct you to community resources or hospitals for support, which may include:  · Group support.  · Education.  · Hypnosis.  · Therapy.     This information is not intended to replace advice given to you by your health care provider. Make sure you discuss any questions you have with your health care provider.     Document Released: 09/15/2005 Document Revised: 01/08/2016 Document Reviewed: 06/05/2014  Elsevier Interactive Patient Education ©2016 [employer] Inc.

## 2017-10-16 ENCOUNTER — OFFICE VISIT (OUTPATIENT)
Dept: MEDICAL GROUP | Facility: MEDICAL CENTER | Age: 32
End: 2017-10-16
Attending: FAMILY MEDICINE
Payer: MEDICAID

## 2017-10-16 VITALS
HEIGHT: 66 IN | DIASTOLIC BLOOD PRESSURE: 70 MMHG | SYSTOLIC BLOOD PRESSURE: 132 MMHG | HEART RATE: 104 BPM | RESPIRATION RATE: 16 BRPM | OXYGEN SATURATION: 100 % | WEIGHT: 121 LBS | BODY MASS INDEX: 19.44 KG/M2 | TEMPERATURE: 97.7 F

## 2017-10-16 DIAGNOSIS — G47.00 INSOMNIA, UNSPECIFIED TYPE: ICD-10-CM

## 2017-10-16 DIAGNOSIS — Z87.19 HISTORY OF PANCREATITIS: ICD-10-CM

## 2017-10-16 DIAGNOSIS — G89.4 CHRONIC PAIN SYNDROME: ICD-10-CM

## 2017-10-16 DIAGNOSIS — R10.10 PAIN OF UPPER ABDOMEN: ICD-10-CM

## 2017-10-16 PROCEDURE — 99214 OFFICE O/P EST MOD 30 MIN: CPT | Performed by: FAMILY MEDICINE

## 2017-10-16 PROCEDURE — 99213 OFFICE O/P EST LOW 20 MIN: CPT | Performed by: FAMILY MEDICINE

## 2017-10-16 RX ORDER — GABAPENTIN 300 MG/1
300 CAPSULE ORAL 3 TIMES DAILY
Qty: 90 CAP | Refills: 3 | Status: SHIPPED | OUTPATIENT
Start: 2017-10-16 | End: 2017-11-22

## 2017-10-16 RX ORDER — ONDANSETRON 4 MG/1
4 TABLET, ORALLY DISINTEGRATING ORAL EVERY 8 HOURS PRN
Qty: 10 TAB | Refills: 0 | Status: SHIPPED | OUTPATIENT
Start: 2017-10-16 | End: 2019-01-22

## 2017-10-16 RX ORDER — ONDANSETRON 4 MG/1
4 TABLET, ORALLY DISINTEGRATING ORAL EVERY 8 HOURS PRN
Qty: 10 TAB | Refills: 0 | Status: SHIPPED | OUTPATIENT
Start: 2017-10-16 | End: 2017-10-16 | Stop reason: SDUPTHER

## 2017-10-16 RX ORDER — GABAPENTIN 100 MG/1
100 CAPSULE ORAL 3 TIMES DAILY
Qty: 90 CAP | Refills: 3 | Status: SHIPPED | OUTPATIENT
Start: 2017-10-16 | End: 2017-10-16

## 2017-10-16 RX ORDER — TRAMADOL HYDROCHLORIDE 50 MG/1
50-100 TABLET ORAL EVERY 4 HOURS PRN
Qty: 40 TAB | Refills: 0 | Status: SHIPPED | OUTPATIENT
Start: 2017-10-16 | End: 2017-10-25 | Stop reason: SDUPTHER

## 2017-10-16 RX ORDER — GABAPENTIN 300 MG/1
300 CAPSULE ORAL 3 TIMES DAILY
Qty: 90 CAP | Refills: 3 | Status: SHIPPED | OUTPATIENT
Start: 2017-10-16 | End: 2017-10-16 | Stop reason: SDUPTHER

## 2017-10-16 RX ORDER — ONDANSETRON 4 MG/1
4 TABLET, ORALLY DISINTEGRATING ORAL EVERY 8 HOURS PRN
Qty: 10 TAB | Refills: 0 | Status: SHIPPED | OUTPATIENT
Start: 2017-10-16 | End: 2017-10-16

## 2017-10-16 RX ORDER — TRAZODONE HYDROCHLORIDE 50 MG/1
50 TABLET ORAL NIGHTLY PRN
Qty: 30 TAB | Refills: 3 | Status: SHIPPED | OUTPATIENT
Start: 2017-10-16 | End: 2017-11-22

## 2017-10-16 ASSESSMENT — ENCOUNTER SYMPTOMS
PALPITATIONS: 0
BACK PAIN: 0
WEIGHT LOSS: 0
TREMORS: 0
ANOREXIA: 0
COUGH: 0
SPUTUM PRODUCTION: 0
HEMATOCHEZIA: 0
SHORTNESS OF BREATH: 0
FEVER: 0
BELCHING: 0
ABDOMINAL PAIN: 1
TINGLING: 0
CHILLS: 0
VOMITING: 0
HEADACHES: 0
ARTHRALGIAS: 1
MYALGIAS: 0
CONSTIPATION: 0
DIARRHEA: 0
NECK PAIN: 0
NAUSEA: 1
DIZZINESS: 0
FLATUS: 0

## 2017-10-16 ASSESSMENT — PAIN SCALES - GENERAL: PAINLEVEL: 7=MODERATE-SEVERE PAIN

## 2017-10-16 NOTE — PROGRESS NOTES
Subjective:      Rhiannon Marrero is a 32 y.o. female who presents with GI Problem            Will have her continue to take her medications as directed for her anxiety/depression. Discussed continuing to follow up with her mental health counselor as recommended. She is also having trouble falling asleep at night, so will have her try trazodone for sleep as needed. She is not having any urges to harm herself or others. She has also been abstinent of alcohol for over a month now and has no intention of drinking again.       LUQ Pain   This is a recurrent problem. The current episode started more than 1 month ago. The onset quality is undetermined. The problem occurs intermittently. The problem has been unchanged. The pain is located in the LUQ. The quality of the pain is colicky and sharp. The abdominal pain does not radiate. Associated symptoms include arthralgias and nausea. Pertinent negatives include no anorexia, belching, constipation, diarrhea, dysuria, fever, flatus, frequency, headaches, hematochezia, hematuria, melena, myalgias, vomiting or weight loss. The pain is aggravated by certain positions, eating, palpation and deep breathing. The pain is relieved by nothing. Treatments tried: will have her continue to use her pain medications and antiemetics as directed. Prior workup: will need to get records from [facility].       Review of Systems   Constitutional: Negative for chills, fever and weight loss.   HENT: Negative for hearing loss.    Respiratory: Negative for cough, sputum production and shortness of breath.    Cardiovascular: Negative for chest pain and palpitations.   Gastrointestinal: Positive for abdominal pain and nausea. Negative for anorexia, constipation, diarrhea, flatus, hematochezia, melena and vomiting.   Genitourinary: Negative for dysuria, frequency and hematuria.   Musculoskeletal: Positive for arthralgias and joint pain. Negative for back pain, myalgias and neck pain.   Neurological:  "Negative for dizziness, tingling, tremors and headaches.          Objective:     /70   Pulse (!) 104   Temp 36.5 °C (97.7 °F)   Resp 16   Ht 1.689 m (5' 6.5\")   Wt 54.9 kg (121 lb)   SpO2 100%   BMI 19.24 kg/m²      Physical Exam   Constitutional: She is oriented to person, place, and time. She appears well-developed and well-nourished.   HENT:   Head: Normocephalic and atraumatic.   Cardiovascular: Normal rate, regular rhythm and normal heart sounds.  Exam reveals no friction rub.    No murmur heard.  Pulmonary/Chest: Effort normal and breath sounds normal. No respiratory distress. She has no wheezes. She has no rales.   Abdominal: Soft. She exhibits no distension. There is tenderness.   Neurological: She is alert and oriented to person, place, and time.   Skin: Skin is warm and dry.   Psychiatric: Her behavior is normal.   tearful   Nursing note and vitals reviewed.              Assessment/Plan:     1. Pain of upper abdomen  Will have her continue to use her medications as directed. Will have her get blood work and an ultrasound done. ER precautions have been given to pt.   - tramadol (ULTRAM) 50 MG Tab; Take 1-2 Tabs by mouth every four hours as needed for Moderate Pain.  Dispense: 40 Tab; Refill: 0  - ondansetron (ZOFRAN ODT) 4 MG TABLET DISPERSIBLE; Take 1 Tab by mouth every 8 hours as needed for Nausea/Vomiting.  Dispense: 10 Tab; Refill: 0    2. History of pancreatitis  Will have her see GI for a further evaluation and assistance in management of her pancreatitis. Advised pt to not drink alcohol, she has not drank alcohol in > 1month.   - tramadol (ULTRAM) 50 MG Tab; Take 1-2 Tabs by mouth every four hours as needed for Moderate Pain.  Dispense: 40 Tab; Refill: 0  - ondansetron (ZOFRAN ODT) 4 MG TABLET DISPERSIBLE; Take 1 Tab by mouth every 8 hours as needed for Nausea/Vomiting.  Dispense: 10 Tab; Refill: 0    3. Chronic pain syndrome  See above plan.    4. Insomnia, unspecified type  Will have " her continue to take her antidepressant medication as directed and will add trazodone. Will continue to follow.   - US-ABDOMEN COMPLETE SURVEY; Future

## 2017-10-17 ENCOUNTER — HOSPITAL ENCOUNTER (OUTPATIENT)
Dept: LAB | Facility: MEDICAL CENTER | Age: 32
End: 2017-10-17
Attending: FAMILY MEDICINE
Payer: MEDICAID

## 2017-10-17 DIAGNOSIS — F41.9 ANXIETY: ICD-10-CM

## 2017-10-17 DIAGNOSIS — M25.50 ARTHRALGIA, UNSPECIFIED JOINT: ICD-10-CM

## 2017-10-17 DIAGNOSIS — K85.90 PANCREATITIS, UNSPECIFIED PANCREATITIS TYPE: ICD-10-CM

## 2017-10-17 DIAGNOSIS — G89.4 CHRONIC PAIN SYNDROME: ICD-10-CM

## 2017-10-17 LAB
25(OH)D3 SERPL-MCNC: 23 NG/ML (ref 30–100)
ALBUMIN SERPL BCP-MCNC: 3.8 G/DL (ref 3.2–4.9)
ALBUMIN/GLOB SERPL: 1.4 G/DL
ALP SERPL-CCNC: 61 U/L (ref 30–99)
ALT SERPL-CCNC: 10 U/L (ref 2–50)
ANION GAP SERPL CALC-SCNC: 7 MMOL/L (ref 0–11.9)
AST SERPL-CCNC: 15 U/L (ref 12–45)
BASOPHILS # BLD AUTO: 0.8 % (ref 0–1.8)
BASOPHILS # BLD: 0.07 K/UL (ref 0–0.12)
BILIRUB SERPL-MCNC: 0.8 MG/DL (ref 0.1–1.5)
BUN SERPL-MCNC: 3 MG/DL (ref 8–22)
CALCIUM SERPL-MCNC: 9.2 MG/DL (ref 8.5–10.5)
CHLORIDE SERPL-SCNC: 102 MMOL/L (ref 96–112)
CO2 SERPL-SCNC: 27 MMOL/L (ref 20–33)
CREAT SERPL-MCNC: 0.6 MG/DL (ref 0.5–1.4)
EOSINOPHIL # BLD AUTO: 0.29 K/UL (ref 0–0.51)
EOSINOPHIL NFR BLD: 3.5 % (ref 0–6.9)
ERYTHROCYTE [DISTWIDTH] IN BLOOD BY AUTOMATED COUNT: 54.4 FL (ref 35.9–50)
ERYTHROCYTE [SEDIMENTATION RATE] IN BLOOD BY WESTERGREN METHOD: 23 MM/HOUR (ref 0–20)
FOLATE SERPL-MCNC: 8.8 NG/ML
GFR SERPL CREATININE-BSD FRML MDRD: >60 ML/MIN/1.73 M 2
GLOBULIN SER CALC-MCNC: 2.8 G/DL (ref 1.9–3.5)
GLUCOSE SERPL-MCNC: 85 MG/DL (ref 65–99)
HCT VFR BLD AUTO: 30.9 % (ref 37–47)
HGB BLD-MCNC: 9.8 G/DL (ref 12–16)
IMM GRANULOCYTES # BLD AUTO: 0.02 K/UL (ref 0–0.11)
IMM GRANULOCYTES NFR BLD AUTO: 0.2 % (ref 0–0.9)
LIPASE SERPL-CCNC: 12 U/L (ref 11–82)
LYMPHOCYTES # BLD AUTO: 1.19 K/UL (ref 1–4.8)
LYMPHOCYTES NFR BLD: 14.4 % (ref 22–41)
MCH RBC QN AUTO: 26.8 PG (ref 27–33)
MCHC RBC AUTO-ENTMCNC: 31.7 G/DL (ref 33.6–35)
MCV RBC AUTO: 84.4 FL (ref 81.4–97.8)
MONOCYTES # BLD AUTO: 0.83 K/UL (ref 0–0.85)
MONOCYTES NFR BLD AUTO: 10 % (ref 0–13.4)
NEUTROPHILS # BLD AUTO: 5.87 K/UL (ref 2–7.15)
NEUTROPHILS NFR BLD: 71.1 % (ref 44–72)
NRBC # BLD AUTO: 0 K/UL
NRBC BLD AUTO-RTO: 0 /100 WBC
PLATELET # BLD AUTO: 311 K/UL (ref 164–446)
PMV BLD AUTO: 10.5 FL (ref 9–12.9)
POTASSIUM SERPL-SCNC: 4.2 MMOL/L (ref 3.6–5.5)
PROT SERPL-MCNC: 6.6 G/DL (ref 6–8.2)
RBC # BLD AUTO: 3.66 M/UL (ref 4.2–5.4)
SODIUM SERPL-SCNC: 136 MMOL/L (ref 135–145)
TSH SERPL DL<=0.005 MIU/L-ACNC: 1.8 UIU/ML (ref 0.3–3.7)
VIT B12 SERPL-MCNC: 178 PG/ML (ref 211–911)
WBC # BLD AUTO: 8.3 K/UL (ref 4.8–10.8)

## 2017-10-17 PROCEDURE — 82306 VITAMIN D 25 HYDROXY: CPT

## 2017-10-17 PROCEDURE — 82746 ASSAY OF FOLIC ACID SERUM: CPT

## 2017-10-17 PROCEDURE — 84443 ASSAY THYROID STIM HORMONE: CPT

## 2017-10-17 PROCEDURE — 80053 COMPREHEN METABOLIC PANEL: CPT

## 2017-10-17 PROCEDURE — 83690 ASSAY OF LIPASE: CPT

## 2017-10-17 PROCEDURE — 85025 COMPLETE CBC W/AUTO DIFF WBC: CPT

## 2017-10-17 PROCEDURE — 36415 COLL VENOUS BLD VENIPUNCTURE: CPT

## 2017-10-17 PROCEDURE — 85652 RBC SED RATE AUTOMATED: CPT

## 2017-10-17 PROCEDURE — 82607 VITAMIN B-12: CPT

## 2017-10-25 DIAGNOSIS — Z87.19 HISTORY OF PANCREATITIS: ICD-10-CM

## 2017-10-25 DIAGNOSIS — R10.10 PAIN OF UPPER ABDOMEN: ICD-10-CM

## 2017-10-25 RX ORDER — TRAMADOL HYDROCHLORIDE 50 MG/1
50-100 TABLET ORAL EVERY 4 HOURS PRN
Qty: 40 TAB | Refills: 0 | Status: SHIPPED | OUTPATIENT
Start: 2017-10-25 | End: 2017-11-01 | Stop reason: SDUPTHER

## 2017-10-25 NOTE — TELEPHONE ENCOUNTER
Was the patient seen in the last year in this department? Yes     Does patient have an active prescription for medications requested? Yes     Received Request Via: Pharmacy         Phone Number Called: [phone] (home)     Message: patient requesting lab results. Please advise    Left Message for patient to call back: no

## 2017-11-01 DIAGNOSIS — Z87.19 HISTORY OF PANCREATITIS: ICD-10-CM

## 2017-11-01 DIAGNOSIS — R10.10 PAIN OF UPPER ABDOMEN: ICD-10-CM

## 2017-11-01 RX ORDER — TRAMADOL HYDROCHLORIDE 50 MG/1
50-100 TABLET ORAL EVERY 4 HOURS PRN
Qty: 40 TAB | Refills: 0 | Status: SHIPPED | OUTPATIENT
Start: 2017-11-01 | End: 2017-11-06 | Stop reason: SDUPTHER

## 2017-11-01 NOTE — TELEPHONE ENCOUNTER
Prescription faxed to:    [id number] - [place], [place] - [address] AT [facility] [address]  [address] [address]  Phone: [phone] Fax: [phone]  .

## 2017-11-06 DIAGNOSIS — Z87.19 HISTORY OF PANCREATITIS: ICD-10-CM

## 2017-11-06 DIAGNOSIS — R10.10 PAIN OF UPPER ABDOMEN: ICD-10-CM

## 2017-11-06 RX ORDER — TRAMADOL HYDROCHLORIDE 50 MG/1
50-100 TABLET ORAL EVERY 4 HOURS PRN
Qty: 40 TAB | Refills: 0 | Status: SHIPPED | OUTPATIENT
Start: 2017-11-06 | End: 2017-11-13 | Stop reason: SDUPTHER

## 2017-11-06 NOTE — TELEPHONE ENCOUNTER
Was the patient seen in the last year in this department? Yes     Does patient have an active prescription for medications requested? Yes     Received Request Via: Patient   Future Appointments       Provider Department [place]    11/9/2017 11:00 AM [facility]    11/15/2017 12:50 PM Yvan Maxwell M.D. [facility]

## 2017-11-13 DIAGNOSIS — R10.10 PAIN OF UPPER ABDOMEN: ICD-10-CM

## 2017-11-13 DIAGNOSIS — Z87.19 HISTORY OF PANCREATITIS: ICD-10-CM

## 2017-11-13 RX ORDER — TRAMADOL HYDROCHLORIDE 50 MG/1
50-100 TABLET ORAL EVERY 4 HOURS PRN
Qty: 40 TAB | Refills: 0 | Status: SHIPPED | OUTPATIENT
Start: 2017-11-13 | End: 2017-11-16 | Stop reason: SDUPTHER

## 2017-11-13 NOTE — TELEPHONE ENCOUNTER
Was the patient seen in the last year in this department? Yes     Does patient have an active prescription for medications requested? Yes     Received Request Via: Patient   Future Appointments       Provider Department [place]    11/15/2017 12:50 PM Yvan Maxwell M.D. [facility]

## 2017-11-16 DIAGNOSIS — Z87.19 HISTORY OF PANCREATITIS: ICD-10-CM

## 2017-11-16 DIAGNOSIS — R10.10 PAIN OF UPPER ABDOMEN: ICD-10-CM

## 2017-11-16 RX ORDER — TRAMADOL HYDROCHLORIDE 50 MG/1
50-100 TABLET ORAL EVERY 4 HOURS PRN
Qty: 40 TAB | Refills: 0 | Status: SHIPPED | OUTPATIENT
Start: 2017-11-16 | End: 2017-11-22 | Stop reason: SDUPTHER

## 2017-11-17 NOTE — TELEPHONE ENCOUNTER
Was the patient seen in the last year in this department? Yes     Does patient have an active prescription for medications requested? Yes     Received Request Via: Patient   Future Appointments       Provider Department [place]    11/22/2017 10:10 AM Yvan Maxwell M.D. [facility]

## 2017-11-22 ENCOUNTER — OFFICE VISIT (OUTPATIENT)
Dept: MEDICAL GROUP | Facility: MEDICAL CENTER | Age: 32
End: 2017-11-22
Attending: FAMILY MEDICINE
Payer: MEDICAID

## 2017-11-22 VITALS
DIASTOLIC BLOOD PRESSURE: 70 MMHG | HEART RATE: 88 BPM | BODY MASS INDEX: 19.61 KG/M2 | OXYGEN SATURATION: 95 % | TEMPERATURE: 97.5 F | WEIGHT: 122 LBS | HEIGHT: 66 IN | SYSTOLIC BLOOD PRESSURE: 115 MMHG | RESPIRATION RATE: 20 BRPM

## 2017-11-22 DIAGNOSIS — F41.9 ANXIETY: ICD-10-CM

## 2017-11-22 DIAGNOSIS — G89.4 CHRONIC PAIN SYNDROME: ICD-10-CM

## 2017-11-22 DIAGNOSIS — M25.50 ARTHRALGIA, UNSPECIFIED JOINT: ICD-10-CM

## 2017-11-22 PROCEDURE — 99213 OFFICE O/P EST LOW 20 MIN: CPT | Performed by: FAMILY MEDICINE

## 2017-11-22 PROCEDURE — 99214 OFFICE O/P EST MOD 30 MIN: CPT | Performed by: FAMILY MEDICINE

## 2017-11-22 RX ORDER — TRAMADOL HYDROCHLORIDE 50 MG/1
50-100 TABLET ORAL EVERY 4 HOURS PRN
Qty: 40 TAB | Refills: 0 | Status: SHIPPED | OUTPATIENT
Start: 2017-11-22 | End: 2017-11-29 | Stop reason: SDUPTHER

## 2017-11-22 RX ORDER — GABAPENTIN 400 MG/1
400 CAPSULE ORAL 3 TIMES DAILY
Qty: 90 CAP | Refills: 3 | Status: SHIPPED | OUTPATIENT
Start: 2017-11-22 | End: 2018-01-02 | Stop reason: SDUPTHER

## 2017-11-22 RX ORDER — FLUOXETINE HYDROCHLORIDE 20 MG/1
20 CAPSULE ORAL DAILY
Qty: 30 CAP | Refills: 3 | Status: SHIPPED | OUTPATIENT
Start: 2017-11-22 | End: 2018-01-22

## 2017-11-22 ASSESSMENT — ENCOUNTER SYMPTOMS
TINGLING: 1
BACK PAIN: 1
FEVER: 0
HALLUCINATIONS: 0
ABDOMINAL PAIN: 1
NERVOUS/ANXIOUS: 1
DEPRESSION: 0
SHORTNESS OF BREATH: 0
SPUTUM PRODUCTION: 0
DEPRESSION: 1
COUGH: 0
ROS GI COMMENTS: DIFFUSE ABDOMINAL PAIN
NAUSEA: 0
PALPITATIONS: 0
VOMITING: 0
CHILLS: 0

## 2017-11-22 ASSESSMENT — LIFESTYLE VARIABLES
HISTORY_ALCOHOL_USE: 1
SUBSTANCE_ABUSE: 0

## 2017-11-22 ASSESSMENT — PAIN SCALES - GENERAL: PAINLEVEL: 7=MODERATE-SEVERE PAIN

## 2017-11-22 NOTE — PROGRESS NOTES
Subjective:      Rhiannon Marrero is a 32 y.o. female who presents with Medication Refill              Chronic pain recheck:   Last dose of controlled substance: yesterday  Chronic pain treated with tramadol taken 1-2 times a day    She  reports that she does not drink alcohol.  She  reports that she does not use drugs.    Consequences of Chronic Opiate therapy:  (5 A's)  Analgesia: Compared to no treatment or prior treatment, pain is currently improved  Activity: improved  Adverse Events: She denies constipation, dry mouth, itchy skin, nausea and sedation  Aberrant Behaviors: She reports she is taking medication as prescribed and is not veering from agreed treatment regimen. There have been no inappropriate refills or lost/stolen meds reported.   Affect/Mood: Pain is not impacting patient's mood.  Patient denies depression/anxiety.    Nonnarcotic treatments that are being used: Gabapentin increased to 400 mg tid and will also increase her prozac to 20 mg qd. Will start oral vitamin B12 since her levels are slightly low.   Treatment goals discussed.    Opioid Risk Score: 5    Interpretation of Opioid Risk Score   Score 0-3 = Low risk of abuse. Do UDS at least once per year.  Score 4-7 = Moderate risk of abuse. Do UDS 1-4 times per year.  Score 8+ = High risk of abuse. Refer to specialist.    No order of CONTROLLED SUBSTANCE TREATMENT AGREEMENT is found.  UDS Summary     Patient has no health maintenance due at this time      UDS obtained today    I have reviewed the medical records, the Prescription Monitoring Program and I have determined that controlled substance treatment is medically indicated.            Review of Systems   Constitutional: Negative for chills and fever.   HENT: Negative for hearing loss and tinnitus.    Respiratory: Negative for cough, sputum production and shortness of breath.    Cardiovascular: Negative for chest pain and palpitations.   Gastrointestinal: Positive for abdominal pain.  "Negative for nausea and vomiting.        Diffuse abdominal pain   Musculoskeletal: Positive for back pain and joint pain.   Skin: Negative for rash.   Neurological: Positive for tingling.   Psychiatric/Behavioral: Negative for depression, hallucinations, substance abuse and suicidal ideas. The patient is nervous/anxious.           Objective:     /70   Pulse 88   Temp 36.4 °C (97.5 °F)   Resp 20   Ht 1.676 m (5' 6\")   Wt 55.3 kg (122 lb)   SpO2 95%   BMI 19.69 kg/m²      Physical Exam   Constitutional: She appears well-developed and well-nourished.   HENT:   Head: Normocephalic and atraumatic.   Cardiovascular: Normal rate, regular rhythm and normal heart sounds.  Exam reveals no friction rub.    No murmur heard.  Pulmonary/Chest: Effort normal and breath sounds normal. No respiratory distress. She has no wheezes.   Abdominal: Soft. Bowel sounds are normal. She exhibits no distension. There is no tenderness. There is no guarding.   Musculoskeletal:   Decreased ROM of affected extremities in flexion   Skin: Skin is warm and dry.   Psychiatric: She has a normal mood and affect. Her behavior is normal.   Nursing note and vitals reviewed.              Assessment/Plan:     1. Chronic pain syndrome  Will have her increase her neurontin to 400 mg tid, will have her continue to use her other medications as directed. A referral to pain management was also made today. Will continue to follow.  - gabapentin (NEURONTIN) 400 MG Cap; Take 1 Cap by mouth 3 times a day.  Dispense: 90 Cap; Refill: 3  - REFERRAL TO RHEUMATOLOGY  - REFERRAL TO PAIN CLINIC    2. Arthralgia, unspecified joint  Will have her referred to rheumatology, she did have an increased sed rate. Will continue to follow.  - gabapentin (NEURONTIN) 400 MG Cap; Take 1 Cap by mouth 3 times a day.  Dispense: 90 Cap; Refill: 3  - REFERRAL TO RHEUMATOLOGY  - REFERRAL TO PAIN CLINIC    3. Anxiety  Will have her increase her prozac to 20 mg and continue to " follow up with mental health. Will continue to follow.

## 2017-11-29 RX ORDER — TRAMADOL HYDROCHLORIDE 50 MG/1
50-100 TABLET ORAL EVERY 4 HOURS PRN
Qty: 40 TAB | Refills: 0 | Status: SHIPPED | OUTPATIENT
Start: 2017-11-29 | End: 2017-12-04 | Stop reason: SDUPTHER

## 2017-11-30 NOTE — TELEPHONE ENCOUNTER
Was the patient seen in the last year in this department? Yes     Does patient have an active prescription for medications requested? Yes     Received Request Via: Patient

## 2017-12-04 RX ORDER — TRAMADOL HYDROCHLORIDE 50 MG/1
50-100 TABLET ORAL EVERY 4 HOURS PRN
Qty: 40 TAB | Refills: 0 | Status: SHIPPED | OUTPATIENT
Start: 2017-12-04 | End: 2017-12-11 | Stop reason: SDUPTHER

## 2017-12-11 RX ORDER — TRAMADOL HYDROCHLORIDE 50 MG/1
50-100 TABLET ORAL EVERY 4 HOURS PRN
Qty: 40 TAB | Refills: 0 | Status: SHIPPED | OUTPATIENT
Start: 2017-12-11 | End: 2017-12-14 | Stop reason: SDUPTHER

## 2017-12-14 DIAGNOSIS — G89.29 OTHER CHRONIC PAIN: ICD-10-CM

## 2017-12-14 RX ORDER — TRAMADOL HYDROCHLORIDE 50 MG/1
50-100 TABLET ORAL EVERY 4 HOURS PRN
Qty: 40 TAB | Refills: 0 | Status: SHIPPED | OUTPATIENT
Start: 2017-12-14 | End: 2017-12-21 | Stop reason: SDUPTHER

## 2017-12-21 ENCOUNTER — TELEPHONE (OUTPATIENT)
Dept: MEDICAL GROUP | Facility: MEDICAL CENTER | Age: 32
End: 2017-12-21

## 2017-12-21 DIAGNOSIS — G89.29 OTHER CHRONIC PAIN: ICD-10-CM

## 2017-12-21 RX ORDER — TRAMADOL HYDROCHLORIDE 50 MG/1
50-100 TABLET ORAL EVERY 4 HOURS PRN
Qty: 40 TAB | Refills: 0 | Status: SHIPPED | OUTPATIENT
Start: 2017-12-21 | End: 2017-12-26 | Stop reason: SDUPTHER

## 2017-12-21 NOTE — TELEPHONE ENCOUNTER
MEDICATION PRIOR AUTHORIZATION NEEDED:    1. Name of Medication: tramadol    2. Requested By (Name of Pharmacy): [facility]     3. Is insurance on file current? yes    4. What is the name & phone number of the 3rd party payor?

## 2017-12-26 DIAGNOSIS — G89.29 OTHER CHRONIC PAIN: ICD-10-CM

## 2017-12-27 RX ORDER — TRAMADOL HYDROCHLORIDE 50 MG/1
50-100 TABLET ORAL EVERY 4 HOURS PRN
Qty: 40 TAB | Refills: 0 | Status: SHIPPED | OUTPATIENT
Start: 2017-12-27 | End: 2018-01-02 | Stop reason: SDUPTHER

## 2018-01-02 DIAGNOSIS — G89.4 CHRONIC PAIN SYNDROME: ICD-10-CM

## 2018-01-02 DIAGNOSIS — M25.50 ARTHRALGIA, UNSPECIFIED JOINT: ICD-10-CM

## 2018-01-02 DIAGNOSIS — G89.29 OTHER CHRONIC PAIN: ICD-10-CM

## 2018-01-03 DIAGNOSIS — M25.50 ARTHRALGIA, UNSPECIFIED JOINT: ICD-10-CM

## 2018-01-03 DIAGNOSIS — G89.4 CHRONIC PAIN SYNDROME: ICD-10-CM

## 2018-01-03 RX ORDER — GABAPENTIN 400 MG/1
400 CAPSULE ORAL 3 TIMES DAILY
Qty: 90 CAP | Refills: 3 | Status: SHIPPED | OUTPATIENT
Start: 2018-01-03 | End: 2018-01-03 | Stop reason: SDUPTHER

## 2018-01-03 RX ORDER — TRAMADOL HYDROCHLORIDE 50 MG/1
50-100 TABLET ORAL EVERY 4 HOURS PRN
Qty: 40 TAB | Refills: 0 | Status: SHIPPED | OUTPATIENT
Start: 2018-01-03 | End: 2018-01-08 | Stop reason: SDUPTHER

## 2018-01-03 RX ORDER — GABAPENTIN 400 MG/1
400 CAPSULE ORAL 3 TIMES DAILY
Qty: 90 CAP | Refills: 3 | Status: ON HOLD | OUTPATIENT
Start: 2018-01-03 | End: 2018-10-11

## 2018-01-08 DIAGNOSIS — G89.29 OTHER CHRONIC PAIN: ICD-10-CM

## 2018-01-08 RX ORDER — TRAMADOL HYDROCHLORIDE 50 MG/1
50-100 TABLET ORAL EVERY 4 HOURS PRN
Qty: 40 TAB | Refills: 0 | Status: SHIPPED | OUTPATIENT
Start: 2018-01-08 | End: 2018-01-11 | Stop reason: SDUPTHER

## 2018-01-11 ENCOUNTER — TELEPHONE (OUTPATIENT)
Dept: MEDICAL GROUP | Facility: MEDICAL CENTER | Age: 33
End: 2018-01-11

## 2018-01-11 DIAGNOSIS — G89.29 OTHER CHRONIC PAIN: ICD-10-CM

## 2018-01-11 RX ORDER — TRAMADOL HYDROCHLORIDE 50 MG/1
50-100 TABLET ORAL EVERY 4 HOURS PRN
Qty: 40 TAB | Refills: 0 | Status: SHIPPED | OUTPATIENT
Start: 2018-01-11 | End: 2018-01-11

## 2018-01-11 RX ORDER — TRAMADOL HYDROCHLORIDE 50 MG/1
50-100 TABLET ORAL EVERY 8 HOURS PRN
Qty: 40 TAB | Refills: 0 | Status: SHIPPED | OUTPATIENT
Start: 2018-01-11 | End: 2018-01-22 | Stop reason: SDUPTHER

## 2018-01-22 ENCOUNTER — HOSPITAL ENCOUNTER (OUTPATIENT)
Facility: MEDICAL CENTER | Age: 33
End: 2018-01-22
Attending: FAMILY MEDICINE
Payer: MEDICAID

## 2018-01-22 ENCOUNTER — OFFICE VISIT (OUTPATIENT)
Dept: MEDICAL GROUP | Facility: MEDICAL CENTER | Age: 33
End: 2018-01-22
Attending: FAMILY MEDICINE
Payer: MEDICAID

## 2018-01-22 VITALS
BODY MASS INDEX: 19.61 KG/M2 | RESPIRATION RATE: 16 BRPM | DIASTOLIC BLOOD PRESSURE: 80 MMHG | SYSTOLIC BLOOD PRESSURE: 120 MMHG | HEART RATE: 80 BPM | HEIGHT: 66 IN | WEIGHT: 122 LBS | TEMPERATURE: 97.1 F | OXYGEN SATURATION: 100 %

## 2018-01-22 DIAGNOSIS — G89.29 OTHER CHRONIC PAIN: ICD-10-CM

## 2018-01-22 DIAGNOSIS — F41.9 ANXIETY: ICD-10-CM

## 2018-01-22 PROCEDURE — 80307 DRUG TEST PRSMV CHEM ANLYZR: CPT

## 2018-01-22 PROCEDURE — 99213 OFFICE O/P EST LOW 20 MIN: CPT | Performed by: FAMILY MEDICINE

## 2018-01-22 PROCEDURE — 80299 QUANTITATIVE ASSAY DRUG: CPT

## 2018-01-22 PROCEDURE — 99214 OFFICE O/P EST MOD 30 MIN: CPT | Performed by: FAMILY MEDICINE

## 2018-01-22 RX ORDER — TRAMADOL HYDROCHLORIDE 50 MG/1
50-100 TABLET ORAL EVERY 8 HOURS PRN
Qty: 40 TAB | Refills: 0 | Status: SHIPPED | OUTPATIENT
Start: 2018-01-22 | End: 2018-01-25 | Stop reason: SDUPTHER

## 2018-01-22 RX ORDER — TRAZODONE HYDROCHLORIDE 50 MG/1
50 TABLET ORAL NIGHTLY PRN
Qty: 30 TAB | Refills: 3 | Status: SHIPPED | OUTPATIENT
Start: 2018-01-22 | End: 2019-01-08

## 2018-01-22 RX ORDER — FLUOXETINE HYDROCHLORIDE 40 MG/1
40 CAPSULE ORAL DAILY
Qty: 30 CAP | Refills: 3 | Status: ON HOLD | OUTPATIENT
Start: 2018-01-22 | End: 2018-10-11

## 2018-01-22 ASSESSMENT — ENCOUNTER SYMPTOMS
TREMORS: 0
NECK PAIN: 1
NAUSEA: 0
SENSORY CHANGE: 0
CHILLS: 0
TINGLING: 1
SHORTNESS OF BREATH: 0
VOMITING: 0
FOCAL WEAKNESS: 0
PALPITATIONS: 0
ABDOMINAL PAIN: 0
MYALGIAS: 1
COUGH: 0
SPEECH CHANGE: 0
FEVER: 0
BACK PAIN: 1
SPUTUM PRODUCTION: 0

## 2018-01-22 NOTE — PROGRESS NOTES
Subjective:      Rhiannon Marrero is a 32 y.o. female who presents with Medication Refill            Chronic pain recheck:   Last dose of controlled substance: yesterday  Chronic pain treated with tramadol taken 1-2 times a day    She  reports that she does not drink alcohol.  She  reports that she does not use drugs.    Consequences of Chronic Opiate therapy:  (5 A's)  Analgesia: Compared to no treatment or prior treatment, pain is currently improved  Activity: improved  Adverse Events: She denies constipation, dry mouth, itchy skin, nausea and sedation  Aberrant Behaviors: She reports she is taking medication as prescribed and is not veering from agreed treatment regimen. There have been no inappropriate refills or lost/stolen meds reported.   Affect/Mood: Pain is not impacting patient's mood.  Patient denies depression/anxiety.    Nonnarcotic treatments that are being used: Gabapentin. Pt also states that prozac seems to help some of her chronic pain. She will increase her prozac to 40 mg and discussed potential adverse effects of the medication. She has not scheduled an appt with pain management yet due to office in [place]. Will have her continue to follow up with rheumatology and if necessary will refer to psych.  Treatment goals discussed.    Opioid Risk Score: 5    Interpretation of Opioid Risk Score   Score 0-3 = Low risk of abuse. Do UDS at least once per year.  Score 4-7 = Moderate risk of abuse. Do UDS 1-4 times per year.  Score 8+ = High risk of abuse. Refer to specialist.    No order of CONTROLLED SUBSTANCE TREATMENT AGREEMENT is found.  UDS Summary     Patient has no health maintenance due at this time      UDS ordered today.    I have reviewed the medical records, the Prescription Monitoring Program and I have determined that controlled substance treatment is medically indicated.            Review of Systems   Constitutional: Negative for chills and fever.   HENT: Negative for hearing loss and  "tinnitus.    Respiratory: Negative for cough, sputum production and shortness of breath.    Cardiovascular: Negative for chest pain and palpitations.   Gastrointestinal: Negative for abdominal pain, nausea and vomiting.   Musculoskeletal: Positive for back pain, joint pain, myalgias and neck pain.   Neurological: Positive for tingling. Negative for tremors, sensory change, speech change and focal weakness.          Objective:     /80   Pulse 80   Temp 36.2 °C (97.1 °F)   Resp 16   Ht 1.676 m (5' 6\")   Wt 55.3 kg (122 lb)   SpO2 100%   BMI 19.69 kg/m²      Physical Exam   Constitutional: She is oriented to person, place, and time. She appears well-developed and well-nourished.   HENT:   Head: Normocephalic and atraumatic.   Cardiovascular: Normal rate, regular rhythm and normal heart sounds.  Exam reveals no friction rub.    No murmur heard.  Pulmonary/Chest: Effort normal and breath sounds normal. No respiratory distress. She has no wheezes. She has no rales.   Abdominal: Soft. Bowel sounds are normal. She exhibits no distension. There is tenderness.   Musculoskeletal:   Decreased ROM of affected extremities   Neurological: She is alert and oriented to person, place, and time.   Skin: Skin is warm and dry.   Psychiatric: She has a normal mood and affect. Her behavior is normal.   Nursing note and vitals reviewed.              Assessment/Plan:     1. Other chronic pain  Will have her continue to take her medication as directed. Will have her schedule with pain management when she is able to. Will continue to follow.  - tramadol (ULTRAM) 50 MG Tab; Take 1-2 Tabs by mouth every 8 hours as needed for Moderate Pain for up to 15 days.  Dispense: 40 Tab; Refill: 0  - CONSENT FOR OPIATE PRESCRIPTION    2. Anxiety  Will increase her prozac to 40 mg and will continue to follow. Discussed psych if she continues to have worsened sxs. Will continue to follow. ER precautions given to pt.         "

## 2018-01-24 LAB
AMPHET CTO UR CFM-MCNC: NEGATIVE NG/ML
BARBITURATES CTO UR CFM-MCNC: NEGATIVE NG/ML
BENZODIAZ CTO UR CFM-MCNC: NEGATIVE NG/ML
BUPRENORPHINE UR-MCNC: NEGATIVE NG/ML
CANNABINOIDS CTO UR CFM-MCNC: NEGATIVE NG/ML
CARISOPRODOL UR-MCNC: NEGATIVE NG/ML
COCAINE CTO UR CFM-MCNC: NEGATIVE NG/ML
DRUG SCREEN COMMENT UR-IMP: NORMAL
ETHYL GLUCURONIDE UR QL SCN: NEGATIVE NG/ML
FENTANYL UR-MCNC: NEGATIVE NG/ML
MEPERIDINE CTO UR SCN-MCNC: NEGATIVE NG/ML
METHADONE CTO UR CFM-MCNC: NEGATIVE NG/ML
OPIATES UR QL SCN: NEGATIVE NG/ML
OXYCDOXYM URSCRN 2005102: NEGATIVE NG/ML
PCP CTO UR CFM-MCNC: NEGATIVE NG/ML
PROPOXYPH CTO UR CFM-MCNC: NEGATIVE NG/ML
TAPENTADOL UR-MCNC: NEGATIVE NG/ML
TRAMADOL CTO UR SCN-MCNC: POSITIVE NG/ML
ZOLPIDEM UR-MCNC: NEGATIVE NG/ML

## 2018-01-25 ENCOUNTER — TELEPHONE (OUTPATIENT)
Dept: MEDICAL GROUP | Facility: MEDICAL CENTER | Age: 33
End: 2018-01-25

## 2018-01-25 DIAGNOSIS — G89.4 CHRONIC PAIN SYNDROME: ICD-10-CM

## 2018-01-25 PROBLEM — R55 SYNCOPE: Status: RESOLVED | Noted: 2017-04-13 | Resolved: 2018-01-25

## 2018-01-25 PROBLEM — F10.20 ALCOHOL DEPENDENCE (HCC): Status: RESOLVED | Noted: 2017-04-13 | Resolved: 2018-01-25

## 2018-01-25 PROBLEM — K85.90 PANCREATITIS: Status: RESOLVED | Noted: 2017-04-13 | Resolved: 2018-01-25

## 2018-01-25 RX ORDER — TRAMADOL HYDROCHLORIDE 50 MG/1
50-100 TABLET ORAL EVERY 8 HOURS PRN
Qty: 40 TAB | Refills: 0 | Status: SHIPPED | OUTPATIENT
Start: 2018-01-25 | End: 2018-02-22 | Stop reason: SDUPTHER

## 2018-01-26 LAB
NORTRAMADOL UR-MCNC: POSITIVE UG/ML
TRAMADOL UR-MCNC: >5000 NG/ML

## 2018-02-16 ENCOUNTER — HOSPITAL ENCOUNTER (OUTPATIENT)
Facility: MEDICAL CENTER | Age: 33
End: 2018-02-16
Attending: PHYSICIAN ASSISTANT
Payer: MEDICAID

## 2018-02-16 ENCOUNTER — OFFICE VISIT (OUTPATIENT)
Dept: URGENT CARE | Facility: PHYSICIAN GROUP | Age: 33
End: 2018-02-16
Payer: MEDICAID

## 2018-02-16 VITALS
DIASTOLIC BLOOD PRESSURE: 66 MMHG | TEMPERATURE: 99 F | OXYGEN SATURATION: 96 % | RESPIRATION RATE: 16 BRPM | HEART RATE: 95 BPM | SYSTOLIC BLOOD PRESSURE: 118 MMHG | HEIGHT: 66 IN | WEIGHT: 102 LBS | BODY MASS INDEX: 16.39 KG/M2

## 2018-02-16 DIAGNOSIS — B00.1 HERPES LABIALIS: ICD-10-CM

## 2018-02-16 DIAGNOSIS — R10.2 VAGINAL PAIN: ICD-10-CM

## 2018-02-16 PROCEDURE — 87529 HSV DNA AMP PROBE: CPT

## 2018-02-16 PROCEDURE — 99214 OFFICE O/P EST MOD 30 MIN: CPT | Performed by: PHYSICIAN ASSISTANT

## 2018-02-16 RX ORDER — VALACYCLOVIR HYDROCHLORIDE 1 G/1
1000 TABLET, FILM COATED ORAL 2 TIMES DAILY
Qty: 20 TAB | Refills: 0 | Status: ON HOLD | OUTPATIENT
Start: 2018-02-16 | End: 2018-10-11

## 2018-02-16 RX ORDER — LIDOCAINE 50 MG/G
1 OINTMENT TOPICAL EVERY 4 HOURS PRN
Qty: 1 TUBE | Refills: 0 | Status: ON HOLD | OUTPATIENT
Start: 2018-02-16 | End: 2018-10-11

## 2018-02-16 NOTE — PROGRESS NOTES
Chief Complaint   Patient presents with   • Vaginal Pain     x1wk Pt states she was seen at [place] x2days with sores in groin with no pain       HISTORY OF PRESENT ILLNESS: Patient is a 32 y.o. female who presents today because she was washing her genitalia Several days ago, noticed a nodule that was very tender and sore and has had several . She went to a local emergency room 2 days ago, she states that she was told to follow-up with an OB/GYN, but she has not.    Patient Active Problem List    Diagnosis Date Noted   • Chronic pain syndrome 01/25/2018   • Anxiety 04/13/2017   • Hypertrophy of tonsils alone 04/11/2013       Allergies:Lorazepam and Promethazine hcl    Current Outpatient Prescriptions Ordered in [facility]   Medication Sig Dispense Refill   • valacyclovir (VALTREX) 1 GM Tab Take 1 Tab by mouth 2 times a day. 20 Tab 0   • lidocaine (XYLOCAINE) 5 % Ointment Apply 1 g to affected area(s) every four hours as needed. 1 Tube 0   • trazodone (DESYREL) 50 MG Tab Take 1 Tab by mouth at bedtime as needed for Sleep. 30 Tab 3   • fluoxetine (PROZAC) 40 MG capsule Take 1 Cap by mouth every day. 30 Cap 3   • gabapentin (NEURONTIN) 400 MG Cap Take 1 Cap by mouth 3 times a day. 90 Cap 3   • cyanocobalamin (VITAMIN B12) 1000 MCG Tab Take 1 Tab by mouth every day. 30 Tab 3   • ondansetron (ZOFRAN ODT) 4 MG TABLET DISPERSIBLE Take 1 Tab by mouth every 8 hours as needed for Nausea/Vomiting. 10 Tab 0   • Misc. Devices Misc Nutritional supplements for weight gain # 90 90 Device 11     No current [facility]-ordered facility-administered medications on file.        Past Medical History:   Diagnosis Date   • Alcohol dependence ([facility]) 4/13/2017   • Bronchitis 8/2012   • Cold    • Heart burn    • Hernia of unspecified site of abdominal cavity without mention of obstruction or gangrene    • Indigestion    • Pancreatitis    • Pneumonia 2011       Social History   Substance Use Topics   • Smoking status: Current Every Day Smoker      "Packs/day: 0.75     Types: Cigarettes   • Smokeless tobacco: Never Used   • Alcohol use No      Comment: Sober since [id number]       Family Status   Relation Status   • Mother Alive   • Father Alive   • Maternal Grandfather    • Neg Hx      Family History   Problem Relation Age of Onset   • Psychiatry Mother    • Stroke Mother    • Arthritis Mother    • Heart Disease Maternal Grandfather    • Cancer Neg Hx    • Diabetes Neg Hx    • Hypertension Neg Hx        ROS:  Review of Systems   Constitutional: Negative for fever, chills, weight loss and malaise/fatigue.   HENT: Negative for ear pain, nosebleeds, congestion, sore throat and neck pain.    Eyes: Negative for blurred vision.   Respiratory: Negative for cough, sputum production, shortness of breath and wheezing.    Cardiovascular: Negative for chest pain, palpitations, orthopnea and leg swelling.   Gastrointestinal: Negative for heartburn, nausea, vomiting and abdominal pain.   Genitourinary: Negative for dysuria, urgency and frequency. . Positive for genital complaints and as needed.    Exam:  Blood pressure 118/66, pulse 95, temperature 37.2 °C (99 °F), resp. rate 16, height 1.676 m (5' 5.98\"), weight 46.3 kg (102 lb), SpO2 96 %, not currently breastfeeding.  General:  Well nourished, well developed female in NAD  Head:Normocephalic, atraumatic  Eyes: PERRLA, EOM within normal limits, no conjunctival injection, no scleral icterus, visual fields and acuity grossly intact.  Extremities: no clubbing, cyanosis, or edema.  Genitalia: Examination done with medical assistant Desire in the room, chaperone. External genitalia reveals small ulcerated lesions in the inferior portion of the left labia majora. Viral culture obtained    Please note that this dictation was created using voice recognition software. I have made every reasonable attempt to correct obvious errors, but I expect that there are errors of grammar and possibly content that I did not discover " before finalizing the note.    Assessment/Plan:  1. Vaginal pain  HERPES SCREEN VIRAL CULTURE   2. Herpes labialis  valacyclovir (VALTREX) 1 GM Tab    lidocaine (XYLOCAINE) 5 % Ointment    HERPES SCREEN VIRAL CULTURE       Followup with primary care in the next 7-10 days if not significantly improving, return to the urgent care or go to the emergency room sooner for any worsening of symptoms.

## 2018-02-16 NOTE — PATIENT INSTRUCTIONS
"Smoking Cessation, Tips for Success  If you are ready to quit smoking, congratulations! You have chosen to help yourself be healthier. Cigarettes bring nicotine, tar, carbon monoxide, and other irritants into your body. Your lungs, heart, and blood vessels will be able to work better without these poisons. There are many different ways to quit smoking. Nicotine gum, nicotine patches, a nicotine inhaler, or nicotine nasal spray can help with physical craving. Hypnosis, support groups, and medicines help break the habit of smoking.  WHAT THINGS CAN I DO TO MAKE QUITTING EASIER?   Here are some tips to help you quit for good:  · Pick a date when you will quit smoking completely. Tell all of your friends and family about your plan to quit on that date.  · Do not try to slowly cut down on the number of cigarettes you are smoking. Pick a quit date and quit smoking completely starting on that day.  · Throw away all cigarettes.    · Clean and remove all ashtrays from your home, work, and car.  · On a card, write down your reasons for quitting. Carry the card with you and read it when you get the urge to smoke.  · Cleanse your body of nicotine. Drink enough water and fluids to keep your urine clear or pale yellow. Do this after quitting to flush the nicotine from your body.  · Learn to predict your moods. Do not let a bad situation be your excuse to have a cigarette. Some situations in your life might tempt you into wanting a cigarette.  · Never have \"just one\" cigarette. It leads to wanting another and another. Remind yourself of your decision to quit.  · Change habits associated with smoking. If you smoked while driving or when feeling stressed, try other activities to replace smoking. Stand up when drinking your coffee. Brush your teeth after eating. Sit in a different chair when you read the paper. Avoid alcohol while trying to quit, and try to drink fewer caffeinated beverages. Alcohol and caffeine may urge you to " "smoke.  · Avoid foods and drinks that can trigger a desire to smoke, such as sugary or spicy foods and alcohol.  · Ask people who smoke not to smoke around you.  · Have something planned to do right after eating or having a cup of coffee. For example, plan to take a walk or exercise.  · Try a relaxation exercise to calm you down and decrease your stress. Remember, you may be tense and nervous for the first 2 weeks after you quit, but this will pass.  · Find new activities to keep your hands busy. Play with a pen, coin, or rubber band. Doodle or draw things on paper.  · Brush your teeth right after eating. This will help cut down on the craving for the taste of tobacco after meals. You can also try mouthwash.    · Use oral substitutes in place of cigarettes. Try using lemon drops, carrots, cinnamon sticks, or chewing gum. Keep them handy so they are available when you have the urge to smoke.  · When you have the urge to smoke, try deep breathing.  · Designate your home as a nonsmoking area.  · If you are a heavy smoker, ask your health care provider about a prescription for nicotine chewing gum. It can ease your withdrawal from nicotine.  · Reward yourself. Set aside the cigarette money you save and buy yourself something nice.  · Look for support from others. Join a support group or smoking cessation program. Ask someone at home or at work to help you with your plan to quit smoking.  · Always ask yourself, \"Do I need this cigarette or is this just a reflex?\" Tell yourself, \"Today, I choose not to smoke,\" or \"I do not want to smoke.\" You are reminding yourself of your decision to quit.  · Do not replace cigarette smoking with electronic cigarettes (commonly called e-cigarettes). The safety of e-cigarettes is unknown, and some may contain harmful chemicals.  · If you relapse, do not give up! Plan ahead and think about what you will do the next time you get the urge to smoke.  HOW WILL I FEEL WHEN I QUIT SMOKING?  You " may have symptoms of withdrawal because your body is used to nicotine (the addictive substance in cigarettes). You may crave cigarettes, be irritable, feel very hungry, cough often, get headaches, or have difficulty concentrating. The withdrawal symptoms are only temporary. They are strongest when you first quit but will go away within 10-14 days. When withdrawal symptoms occur, stay in control. Think about your reasons for quitting. Remind yourself that these are signs that your body is healing and getting used to being without cigarettes. Remember that withdrawal symptoms are easier to treat than the major diseases that smoking can cause.   Even after the withdrawal is over, expect periodic urges to smoke. However, these cravings are generally short lived and will go away whether you smoke or not. Do not smoke!  WHAT RESOURCES ARE AVAILABLE TO HELP ME QUIT SMOKING?  Your health care provider can direct you to community resources or hospitals for support, which may include:  · Group support.  · Education.  · Hypnosis.  · Therapy.     This information is not intended to replace advice given to you by your health care provider. Make sure you discuss any questions you have with your health care provider.     Document Released: 09/15/2005 Document Revised: 01/08/2016 Document Reviewed: 06/05/2014  [employer] Interactive Patient Education ©2016 [employer]

## 2018-02-18 LAB
HSV1+2 DNA SPEC QL NAA+PROBE: NORMAL
SIGNIFICANT IND 70042: NORMAL
SITE SITE: NORMAL
SOURCE SOURCE: NORMAL

## 2018-02-20 ENCOUNTER — TELEPHONE (OUTPATIENT)
Dept: URGENT CARE | Facility: PHYSICIAN GROUP | Age: 33
End: 2018-02-20

## 2018-02-22 DIAGNOSIS — G89.4 CHRONIC PAIN SYNDROME: ICD-10-CM

## 2018-02-22 RX ORDER — TRAMADOL HYDROCHLORIDE 50 MG/1
50-100 TABLET ORAL EVERY 8 HOURS PRN
Qty: 40 TAB | Refills: 0 | Status: SHIPPED | OUTPATIENT
Start: 2018-02-22 | End: 2018-02-27 | Stop reason: SDUPTHER

## 2018-02-22 NOTE — TELEPHONE ENCOUNTER
Was the patient seen in the last year in this department? Yes     Does patient have an active prescription for medications requested? Yes     Received Request Via: Patient   Future Appointments       Provider Department [place]    2/27/2018 12:50 PM Yvan Maxwell M.D. [facility]

## 2018-02-27 ENCOUNTER — TELEPHONE (OUTPATIENT)
Dept: MEDICAL GROUP | Facility: MEDICAL CENTER | Age: 33
End: 2018-02-27

## 2018-02-27 DIAGNOSIS — G89.4 CHRONIC PAIN SYNDROME: ICD-10-CM

## 2018-02-27 RX ORDER — TRAMADOL HYDROCHLORIDE 50 MG/1
50-100 TABLET ORAL EVERY 8 HOURS PRN
Qty: 80 TAB | Refills: 0 | Status: SHIPPED | OUTPATIENT
Start: 2018-02-27 | End: 2018-04-10 | Stop reason: SDUPTHER

## 2018-04-10 DIAGNOSIS — G89.4 CHRONIC PAIN SYNDROME: ICD-10-CM

## 2018-04-10 RX ORDER — TRAMADOL HYDROCHLORIDE 50 MG/1
50-100 TABLET ORAL EVERY 8 HOURS PRN
Qty: 80 TAB | Refills: 0 | Status: SHIPPED | OUTPATIENT
Start: 2018-04-10 | End: 2018-05-10

## 2018-05-09 ENCOUNTER — TELEPHONE (OUTPATIENT)
Dept: MEDICAL GROUP | Facility: MEDICAL CENTER | Age: 33
End: 2018-05-09

## 2018-05-09 DIAGNOSIS — G89.4 CHRONIC PAIN SYNDROME: ICD-10-CM

## 2018-05-09 RX ORDER — TRAMADOL HYDROCHLORIDE 50 MG/1
50-100 TABLET ORAL EVERY 8 HOURS PRN
Qty: 80 TAB | Refills: 0 | OUTPATIENT
Start: 2018-05-09 | End: 2018-06-08

## 2018-05-09 NOTE — TELEPHONE ENCOUNTER
Pt called and stated she [facility] [place], it went to [facility]. I called and cancelled the rx at [facility] and [facility]'s request

## 2018-05-10 ENCOUNTER — TELEPHONE (OUTPATIENT)
Dept: MEDICAL GROUP | Facility: MEDICAL CENTER | Age: 33
End: 2018-05-10

## 2018-05-10 NOTE — TELEPHONE ENCOUNTER
1. Caller Name: kamran                                         Call Back Number: [phone] (home)         Patient approves a detailed voicemail message: N\A    Patient is requesting a refill on tramadol. She states that her insurance has been canceled and she will not be able to come in for a month until she qualifies again for medicaid.

## 2018-05-15 ENCOUNTER — TELEPHONE (OUTPATIENT)
Dept: MEDICAL GROUP | Facility: MEDICAL CENTER | Age: 33
End: 2018-05-15

## 2018-05-15 DIAGNOSIS — G89.29 OTHER CHRONIC PAIN: ICD-10-CM

## 2018-05-15 RX ORDER — TRAMADOL HYDROCHLORIDE 50 MG/1
50-100 TABLET ORAL EVERY 8 HOURS PRN
Qty: 40 TAB | Refills: 0 | Status: SHIPPED | OUTPATIENT
Start: 2018-05-15 | End: 2018-05-30

## 2018-05-30 DIAGNOSIS — G89.29 OTHER CHRONIC PAIN: ICD-10-CM

## 2018-05-30 RX ORDER — TRAMADOL HYDROCHLORIDE 50 MG/1
50-100 TABLET ORAL EVERY 8 HOURS PRN
Qty: 40 TAB | Refills: 0 | OUTPATIENT
Start: 2018-05-30 | End: 2018-06-14

## 2018-05-30 NOTE — TELEPHONE ENCOUNTER
Was the patient seen in the last year in this department? Yes     Does patient have an active prescription for medications requested? Yes     Received Request Via: Pharmacy   Future Appointments       Provider Department [place]    5/30/2018 4:30 PM Yvan Maxwell M.D. [facility]

## 2018-06-06 ENCOUNTER — TELEPHONE (OUTPATIENT)
Dept: MEDICAL GROUP | Facility: MEDICAL CENTER | Age: 33
End: 2018-06-06

## 2018-06-06 NOTE — TELEPHONE ENCOUNTER
1. Caller Name: Rhiannon                                         Call Back Number: [phone] (home)         Patient approves a detailed voicemail message: N\A    Patient called requesting refill on tramadol. I attempted to call her back twice to inform her that Dr. Maxwell would like her to make an appointment with him before he refills rx. I was unable to leave a voicemail because her mailbox was full.

## 2018-09-06 ENCOUNTER — OFFICE VISIT (OUTPATIENT)
Dept: URGENT CARE | Facility: PHYSICIAN GROUP | Age: 33
End: 2018-09-06
Payer: MEDICAID

## 2018-09-06 VITALS
WEIGHT: 129 LBS | DIASTOLIC BLOOD PRESSURE: 68 MMHG | OXYGEN SATURATION: 98 % | SYSTOLIC BLOOD PRESSURE: 122 MMHG | RESPIRATION RATE: 16 BRPM | BODY MASS INDEX: 20.83 KG/M2 | HEART RATE: 104 BPM | TEMPERATURE: 98.7 F

## 2018-09-06 DIAGNOSIS — R05.9 COUGH: ICD-10-CM

## 2018-09-06 DIAGNOSIS — R06.2 WHEEZE: ICD-10-CM

## 2018-09-06 DIAGNOSIS — J01.90 ACUTE BACTERIAL SINUSITIS: ICD-10-CM

## 2018-09-06 DIAGNOSIS — B96.89 ACUTE BACTERIAL SINUSITIS: ICD-10-CM

## 2018-09-06 PROCEDURE — 99214 OFFICE O/P EST MOD 30 MIN: CPT | Performed by: PHYSICIAN ASSISTANT

## 2018-09-06 RX ORDER — DOXYCYCLINE HYCLATE 100 MG
100 TABLET ORAL 2 TIMES DAILY
Qty: 20 TAB | Refills: 0 | Status: SHIPPED | OUTPATIENT
Start: 2018-09-06 | End: 2018-09-16

## 2018-09-06 RX ORDER — BENZONATATE 200 MG/1
200 CAPSULE ORAL 3 TIMES DAILY PRN
Qty: 60 CAP | Refills: 0 | Status: ON HOLD | OUTPATIENT
Start: 2018-09-06 | End: 2018-10-11

## 2018-09-06 RX ORDER — TRAMADOL HYDROCHLORIDE 50 MG/1
50 TABLET ORAL EVERY 4 HOURS PRN
COMMUNITY
End: 2019-01-08

## 2018-09-06 RX ORDER — ALBUTEROL SULFATE 90 UG/1
2 AEROSOL, METERED RESPIRATORY (INHALATION) EVERY 4 HOURS PRN
Qty: 1 INHALER | Refills: 0 | Status: ON HOLD | OUTPATIENT
Start: 2018-09-06 | End: 2018-10-11

## 2018-09-06 NOTE — PROGRESS NOTES
Chief Complaint   Patient presents with   • URI       HISTORY OF PRESENT ILLNESS: Patient is a 33 y.o. female who presents today because she has a one-week history of worsening cough, nasal congestion, sinus pain and pressure and drainage, sore throat.  She has tried some over-the-counter medications and allergy medications without improvement.    Patient Active Problem List    Diagnosis Date Noted   • Chronic pain syndrome 01/25/2018   • Anxiety 04/13/2017   • Hypertrophy of tonsils alone 04/11/2013       Allergies:Lorazepam and Promethazine hcl    Current Outpatient Prescriptions Ordered in [facility]   Medication Sig Dispense Refill   • tramadol (ULTRAM) 50 MG Tab Take 50 mg by mouth every four hours as needed.     • benzonatate (TESSALON) 200 MG capsule Take 1 Cap by mouth 3 times a day as needed for Cough. 60 Cap 0   • doxycycline (VIBRAMYCIN) 100 MG Tab Take 1 Tab by mouth 2 times a day for 10 days. 20 Tab 0   • albuterol 108 (90 Base) MCG/ACT Aero Soln inhalation aerosol Inhale 2 Puffs by mouth every four hours as needed. 1 Inhaler 0   • valacyclovir (VALTREX) 1 GM Tab Take 1 Tab by mouth 2 times a day. 20 Tab 0   • lidocaine (XYLOCAINE) 5 % Ointment Apply 1 g to affected area(s) every four hours as needed. 1 Tube 0   • trazodone (DESYREL) 50 MG Tab Take 1 Tab by mouth at bedtime as needed for Sleep. 30 Tab 3   • fluoxetine (PROZAC) 40 MG capsule Take 1 Cap by mouth every day. 30 Cap 3   • gabapentin (NEURONTIN) 400 MG Cap Take 1 Cap by mouth 3 times a day. 90 Cap 3   • cyanocobalamin (VITAMIN B12) 1000 MCG Tab Take 1 Tab by mouth every day. 30 Tab 3   • ondansetron (ZOFRAN ODT) 4 MG TABLET DISPERSIBLE Take 1 Tab by mouth every 8 hours as needed for Nausea/Vomiting. 10 Tab 0   • Misc. Devices Misc Nutritional supplements for weight gain # 90 90 Device 11     No current [facility]-ordered facility-administered medications on file.        Past Medical History:   Diagnosis Date   • Alcohol dependence (HCC) 4/13/2017   •  Bronchitis 2012   • Cold    • Heart burn    • Hernia of unspecified site of abdominal cavity without mention of obstruction or gangrene    • Indigestion    • Pancreatitis    • Pneumonia        Social History   Substance Use Topics   • Smoking status: Current Every Day Smoker     Packs/day: 0.75     Types: Cigarettes   • Smokeless tobacco: Never Used   • Alcohol use No      Comment: Sober since [id number]       Family Status   Relation Status   • Mo Alive   • Fa Alive   • MGFa    • Neg Hx (Not Specified)     Family History   Problem Relation Age of Onset   • Psychiatry Mother    • Stroke Mother    • Arthritis Mother    • Heart Disease Maternal Grandfather    • Cancer Neg Hx    • Diabetes Neg Hx    • Hypertension Neg Hx        ROS:  Review of Systems   Constitutional: Negative for fever, chills, weight loss and positive for malaise and fatigue  HENT: Negative for ear pain, nosebleeds, positive for nasal and sinus pain, pressure, drainage and congestion, sore throat and no neck pain.    Eyes: Negative for blurred vision.   Respiratory: Positive for frequent dry cough, no sputum production, shortness of breath and wheezing.    Cardiovascular: Negative for chest pain, palpitations, orthopnea and leg swelling.   Gastrointestinal: Negative for heartburn, nausea, vomiting and abdominal pain.   Genitourinary: Negative for dysuria, urgency and frequency.     Exam:  Blood pressure 122/68, pulse (!) 104, temperature 37.1 °C (98.7 °F), resp. rate 16, weight 58.5 kg (129 lb), SpO2 98 %.  General:  Well nourished, well developed female in NAD nasal vocal tone frequent dry cough,  Head:Normocephalic, atraumatic  Eyes: PERRLA, EOM within normal limits, no conjunctival injection, no scleral icterus, visual fields and acuity grossly intact.  Ears: Normal shape and symmetry, no tenderness, no discharge. External canals are without any significant edema or erythema. Tympanic membranes are without any inflammation, no  effusion. Gross auditory acuity is intact  Nose: Symmetrical without tenderness, no discharge.  Nasal mucosa is edematous and erythematous on the right with posterior nasal cavity exudate  Mouth: reasonable hygiene, no erythema exudates or tonsillar enlargement.  Neck: no masses, range of motion within normal limits, no tracheal deviation. No obvious thyroid enlargement.  Pulmonary: chest is symmetrical with respiration, rare wheeze, no rales or rhonchi   cardiovascular: regular rate and rhythm without murmurs, rubs, or gallops.  Extremities: no clubbing, cyanosis, or edema.    Please note that this dictation was created using voice recognition software. I have made every reasonable attempt to correct obvious errors, but I expect that there are errors of grammar and possibly content that I did not discover before finalizing the note.    Assessment/Plan:  1. Acute bacterial sinusitis  doxycycline (VIBRAMYCIN) 100 MG Tab   2. Cough  benzonatate (TESSALON) 200 MG capsule   3. Wheeze  albuterol 108 (90 Base) MCG/ACT Aero Soln inhalation aerosol   Over-the-counter decongestants as tolerated     Followup with primary care in the next 7-10 days if not significantly improving, return to the urgent care or go to the emergency room sooner for any worsening of symptoms.

## 2018-09-28 ENCOUNTER — HOSPITAL ENCOUNTER (OUTPATIENT)
Dept: LAB | Facility: MEDICAL CENTER | Age: 33
End: 2018-09-28
Attending: SPECIALIST
Payer: MEDICAID

## 2018-09-28 LAB
ALBUMIN SERPL BCP-MCNC: 4.4 G/DL (ref 3.2–4.9)
ALBUMIN/GLOB SERPL: 1.6 G/DL
ALP SERPL-CCNC: 65 U/L (ref 30–99)
ALT SERPL-CCNC: 26 U/L (ref 2–50)
ANION GAP SERPL CALC-SCNC: 9 MMOL/L (ref 0–11.9)
APTT PPP: 30.1 SEC (ref 24.7–36)
AST SERPL-CCNC: 20 U/L (ref 12–45)
BASOPHILS # BLD AUTO: 1.3 % (ref 0–1.8)
BASOPHILS # BLD: 0.1 K/UL (ref 0–0.12)
BILIRUB SERPL-MCNC: 0.7 MG/DL (ref 0.1–1.5)
BUN SERPL-MCNC: 13 MG/DL (ref 8–22)
CALCIUM SERPL-MCNC: 9.2 MG/DL (ref 8.5–10.5)
CHLORIDE SERPL-SCNC: 106 MMOL/L (ref 96–112)
CO2 SERPL-SCNC: 23 MMOL/L (ref 20–33)
CREAT SERPL-MCNC: 0.75 MG/DL (ref 0.5–1.4)
EOSINOPHIL # BLD AUTO: 0.69 K/UL (ref 0–0.51)
EOSINOPHIL NFR BLD: 8.9 % (ref 0–6.9)
ERYTHROCYTE [DISTWIDTH] IN BLOOD BY AUTOMATED COUNT: 51.1 FL (ref 35.9–50)
GLOBULIN SER CALC-MCNC: 2.8 G/DL (ref 1.9–3.5)
GLUCOSE SERPL-MCNC: 100 MG/DL (ref 65–99)
HCG SERPL QL: NEGATIVE
HCT VFR BLD AUTO: 43.2 % (ref 37–47)
HGB BLD-MCNC: 13.9 G/DL (ref 12–16)
IMM GRANULOCYTES # BLD AUTO: 0.01 K/UL (ref 0–0.11)
IMM GRANULOCYTES NFR BLD AUTO: 0.1 % (ref 0–0.9)
INR PPP: 1.05 (ref 0.87–1.13)
LYMPHOCYTES # BLD AUTO: 2.32 K/UL (ref 1–4.8)
LYMPHOCYTES NFR BLD: 30.1 % (ref 22–41)
MCH RBC QN AUTO: 26.9 PG (ref 27–33)
MCHC RBC AUTO-ENTMCNC: 32.2 G/DL (ref 33.6–35)
MCV RBC AUTO: 83.7 FL (ref 81.4–97.8)
MONOCYTES # BLD AUTO: 0.5 K/UL (ref 0–0.85)
MONOCYTES NFR BLD AUTO: 6.5 % (ref 0–13.4)
NEUTROPHILS # BLD AUTO: 4.1 K/UL (ref 2–7.15)
NEUTROPHILS NFR BLD: 53.1 % (ref 44–72)
NRBC # BLD AUTO: 0 K/UL
NRBC BLD-RTO: 0 /100 WBC
PLATELET # BLD AUTO: 344 K/UL (ref 164–446)
PMV BLD AUTO: 9.6 FL (ref 9–12.9)
POTASSIUM SERPL-SCNC: 4.1 MMOL/L (ref 3.6–5.5)
PROT SERPL-MCNC: 7.2 G/DL (ref 6–8.2)
PROTHROMBIN TIME: 13.8 SEC (ref 12–14.6)
RBC # BLD AUTO: 5.16 M/UL (ref 4.2–5.4)
SODIUM SERPL-SCNC: 138 MMOL/L (ref 135–145)
WBC # BLD AUTO: 7.7 K/UL (ref 4.8–10.8)

## 2018-09-28 PROCEDURE — 36415 COLL VENOUS BLD VENIPUNCTURE: CPT

## 2018-09-28 PROCEDURE — 85025 COMPLETE CBC W/AUTO DIFF WBC: CPT

## 2018-09-28 PROCEDURE — 85610 PROTHROMBIN TIME: CPT

## 2018-09-28 PROCEDURE — 84703 CHORIONIC GONADOTROPIN ASSAY: CPT

## 2018-09-28 PROCEDURE — 85730 THROMBOPLASTIN TIME PARTIAL: CPT

## 2018-09-28 PROCEDURE — 86900 BLOOD TYPING SEROLOGIC ABO: CPT

## 2018-09-28 PROCEDURE — 80053 COMPREHEN METABOLIC PANEL: CPT

## 2018-09-28 PROCEDURE — 86901 BLOOD TYPING SEROLOGIC RH(D): CPT

## 2018-09-28 PROCEDURE — 86850 RBC ANTIBODY SCREEN: CPT

## 2018-10-01 LAB
ABO GROUP BLD: NORMAL
BLD GP AB SCN SERPL QL: NORMAL
RH BLD: NORMAL

## 2018-10-01 PROCEDURE — 86900 BLOOD TYPING SEROLOGIC ABO: CPT

## 2018-10-01 PROCEDURE — 86850 RBC ANTIBODY SCREEN: CPT

## 2018-10-01 PROCEDURE — 86901 BLOOD TYPING SEROLOGIC RH(D): CPT

## 2018-10-11 ENCOUNTER — HOSPITAL ENCOUNTER (OUTPATIENT)
Facility: MEDICAL CENTER | Age: 33
End: 2018-10-11
Attending: SPECIALIST | Admitting: SPECIALIST
Payer: MEDICAID

## 2018-10-11 VITALS
HEART RATE: 90 BPM | BODY MASS INDEX: 20.12 KG/M2 | WEIGHT: 125.22 LBS | RESPIRATION RATE: 14 BRPM | TEMPERATURE: 99.5 F | SYSTOLIC BLOOD PRESSURE: 117 MMHG | OXYGEN SATURATION: 93 % | HEIGHT: 66 IN | DIASTOLIC BLOOD PRESSURE: 75 MMHG

## 2018-10-11 DIAGNOSIS — G89.18 POST-OP PAIN: ICD-10-CM

## 2018-10-11 LAB
ABO GROUP BLD: NORMAL
BLD GP AB SCN SERPL QL: NORMAL
HCG UR QL: NEGATIVE
PATHOLOGY CONSULT NOTE: NORMAL
RH BLD: NORMAL
SP GR UR REFRACTOMETRY: <=1.005

## 2018-10-11 PROCEDURE — 700102 HCHG RX REV CODE 250 W/ 637 OVERRIDE(OP): Performed by: ANESTHESIOLOGY

## 2018-10-11 PROCEDURE — 700111 HCHG RX REV CODE 636 W/ 250 OVERRIDE (IP): Performed by: SPECIALIST

## 2018-10-11 PROCEDURE — 160047 HCHG PACU  - EA ADDL 30 MINS PHASE II: Performed by: SPECIALIST

## 2018-10-11 PROCEDURE — 501838 HCHG SUTURE GENERAL: Performed by: SPECIALIST

## 2018-10-11 PROCEDURE — 700102 HCHG RX REV CODE 250 W/ 637 OVERRIDE(OP): Performed by: SPECIALIST

## 2018-10-11 PROCEDURE — 81025 URINE PREGNANCY TEST: CPT

## 2018-10-11 PROCEDURE — 160002 HCHG RECOVERY MINUTES (STAT): Performed by: SPECIALIST

## 2018-10-11 PROCEDURE — 501572 HCHG TROCAR, SHIELD OBTU 5X100: Performed by: SPECIALIST

## 2018-10-11 PROCEDURE — 500864 HCHG NEEDLE, SPINAL 18G: Performed by: SPECIALIST

## 2018-10-11 PROCEDURE — 700101 HCHG RX REV CODE 250: Performed by: SPECIALIST

## 2018-10-11 PROCEDURE — 160048 HCHG OR STATISTICAL LEVEL 1-5: Performed by: SPECIALIST

## 2018-10-11 PROCEDURE — 86901 BLOOD TYPING SEROLOGIC RH(D): CPT

## 2018-10-11 PROCEDURE — 700101 HCHG RX REV CODE 250

## 2018-10-11 PROCEDURE — 502779 HCHG SUTURE, QUILL: Performed by: SPECIALIST

## 2018-10-11 PROCEDURE — 700111 HCHG RX REV CODE 636 W/ 250 OVERRIDE (IP): Performed by: ANESTHESIOLOGY

## 2018-10-11 PROCEDURE — 160009 HCHG ANES TIME/MIN: Performed by: SPECIALIST

## 2018-10-11 PROCEDURE — 502714 HCHG ROBOTIC SURGERY SERVICES: Performed by: SPECIALIST

## 2018-10-11 PROCEDURE — 160035 HCHG PACU - 1ST 60 MINS PHASE I: Performed by: SPECIALIST

## 2018-10-11 PROCEDURE — 500854 HCHG NEEDLE, INSUFFLATION FOR STEP: Performed by: SPECIALIST

## 2018-10-11 PROCEDURE — 501399 HCHG SPECIMAN BAG, ENDO CATC: Performed by: SPECIALIST

## 2018-10-11 PROCEDURE — 160036 HCHG PACU - EA ADDL 30 MINS PHASE I: Performed by: SPECIALIST

## 2018-10-11 PROCEDURE — A9270 NON-COVERED ITEM OR SERVICE: HCPCS | Performed by: SPECIALIST

## 2018-10-11 PROCEDURE — 160046 HCHG PACU - 1ST 60 MINS PHASE II: Performed by: SPECIALIST

## 2018-10-11 PROCEDURE — 700111 HCHG RX REV CODE 636 W/ 250 OVERRIDE (IP)

## 2018-10-11 PROCEDURE — 501582 HCHG TROCAR, THRD BLADED: Performed by: SPECIALIST

## 2018-10-11 PROCEDURE — A9270 NON-COVERED ITEM OR SERVICE: HCPCS | Performed by: ANESTHESIOLOGY

## 2018-10-11 PROCEDURE — 86850 RBC ANTIBODY SCREEN: CPT

## 2018-10-11 PROCEDURE — 160042 HCHG SURGERY MINUTES - EA ADDL 1 MIN LEVEL 5: Performed by: SPECIALIST

## 2018-10-11 PROCEDURE — 88307 TISSUE EXAM BY PATHOLOGIST: CPT

## 2018-10-11 PROCEDURE — 160031 HCHG SURGERY MINUTES - 1ST 30 MINS LEVEL 5: Performed by: SPECIALIST

## 2018-10-11 PROCEDURE — 160025 RECOVERY II MINUTES (STATS): Performed by: SPECIALIST

## 2018-10-11 PROCEDURE — 86900 BLOOD TYPING SEROLOGIC ABO: CPT

## 2018-10-11 PROCEDURE — 700105 HCHG RX REV CODE 258: Performed by: SPECIALIST

## 2018-10-11 RX ORDER — OXYCODONE HCL 5 MG/5 ML
5 SOLUTION, ORAL ORAL
Status: COMPLETED | OUTPATIENT
Start: 2018-10-11 | End: 2018-10-11

## 2018-10-11 RX ORDER — OXYCODONE AND ACETAMINOPHEN 7.5; 325 MG/1; MG/1
1 TABLET ORAL EVERY 6 HOURS PRN
Qty: 56 TAB | Refills: 0 | Status: SHIPPED | OUTPATIENT
Start: 2018-10-11 | End: 2018-10-25

## 2018-10-11 RX ORDER — SODIUM CHLORIDE, SODIUM LACTATE, POTASSIUM CHLORIDE, CALCIUM CHLORIDE 600; 310; 30; 20 MG/100ML; MG/100ML; MG/100ML; MG/100ML
INJECTION, SOLUTION INTRAVENOUS ONCE
Status: COMPLETED | OUTPATIENT
Start: 2018-10-11 | End: 2018-10-11

## 2018-10-11 RX ORDER — IPRATROPIUM BROMIDE AND ALBUTEROL SULFATE 2.5; .5 MG/3ML; MG/3ML
3 SOLUTION RESPIRATORY (INHALATION)
Status: DISCONTINUED | OUTPATIENT
Start: 2018-10-11 | End: 2018-10-11 | Stop reason: HOSPADM

## 2018-10-11 RX ORDER — HALOPERIDOL 5 MG/ML
1 INJECTION INTRAMUSCULAR
Status: DISCONTINUED | OUTPATIENT
Start: 2018-10-11 | End: 2018-10-11 | Stop reason: HOSPADM

## 2018-10-11 RX ORDER — MIDAZOLAM HYDROCHLORIDE 1 MG/ML
1 INJECTION INTRAMUSCULAR; INTRAVENOUS
Status: DISCONTINUED | OUTPATIENT
Start: 2018-10-11 | End: 2018-10-11 | Stop reason: HOSPADM

## 2018-10-11 RX ORDER — MEPERIDINE HYDROCHLORIDE 25 MG/ML
12.5 INJECTION INTRAMUSCULAR; INTRAVENOUS; SUBCUTANEOUS
Status: DISCONTINUED | OUTPATIENT
Start: 2018-10-11 | End: 2018-10-11 | Stop reason: HOSPADM

## 2018-10-11 RX ORDER — OXYCODONE HYDROCHLORIDE 5 MG/1
10 TABLET ORAL ONCE
Status: DISCONTINUED | OUTPATIENT
Start: 2018-10-11 | End: 2018-10-12 | Stop reason: HOSPADM

## 2018-10-11 RX ORDER — SODIUM CHLORIDE, SODIUM GLUCONATE, SODIUM ACETATE, POTASSIUM CHLORIDE AND MAGNESIUM CHLORIDE 526; 502; 368; 37; 30 MG/100ML; MG/100ML; MG/100ML; MG/100ML; MG/100ML
500 INJECTION, SOLUTION INTRAVENOUS CONTINUOUS
Status: DISCONTINUED | OUTPATIENT
Start: 2018-10-11 | End: 2018-10-11 | Stop reason: HOSPADM

## 2018-10-11 RX ORDER — OXYCODONE HYDROCHLORIDE AND ACETAMINOPHEN 5; 325 MG/1; MG/1
1 TABLET ORAL EVERY 6 HOURS PRN
Status: DISCONTINUED | OUTPATIENT
Start: 2018-10-11 | End: 2018-10-12 | Stop reason: HOSPADM

## 2018-10-11 RX ORDER — LIDOCAINE HYDROCHLORIDE 10 MG/ML
INJECTION, SOLUTION INFILTRATION; PERINEURAL
Status: COMPLETED
Start: 2018-10-11 | End: 2018-10-11

## 2018-10-11 RX ORDER — ONDANSETRON 2 MG/ML
4 INJECTION INTRAMUSCULAR; INTRAVENOUS EVERY 6 HOURS PRN
Status: DISCONTINUED | OUTPATIENT
Start: 2018-10-11 | End: 2018-10-11

## 2018-10-11 RX ORDER — OXYCODONE HYDROCHLORIDE AND ACETAMINOPHEN 5; 325 MG/1; MG/1
1 TABLET ORAL EVERY 6 HOURS PRN
Qty: 56 TAB | Refills: 0 | Status: SHIPPED | OUTPATIENT
Start: 2018-10-11 | End: 2019-01-08 | Stop reason: SDUPTHER

## 2018-10-11 RX ORDER — BUPIVACAINE HYDROCHLORIDE AND EPINEPHRINE 2.5; 5 MG/ML; UG/ML
INJECTION, SOLUTION EPIDURAL; INFILTRATION; INTRACAUDAL; PERINEURAL
Status: DISCONTINUED | OUTPATIENT
Start: 2018-10-11 | End: 2018-10-11 | Stop reason: HOSPADM

## 2018-10-11 RX ORDER — OXYCODONE HYDROCHLORIDE 5 MG/1
10 TABLET ORAL
Status: DISCONTINUED | OUTPATIENT
Start: 2018-10-11 | End: 2018-10-11 | Stop reason: HOSPADM

## 2018-10-11 RX ORDER — METOCLOPRAMIDE HYDROCHLORIDE 5 MG/ML
10 INJECTION INTRAMUSCULAR; INTRAVENOUS EVERY 6 HOURS
Status: DISCONTINUED | OUTPATIENT
Start: 2018-10-11 | End: 2018-10-12 | Stop reason: HOSPADM

## 2018-10-11 RX ORDER — ONDANSETRON 2 MG/ML
4 INJECTION INTRAMUSCULAR; INTRAVENOUS
Status: DISCONTINUED | OUTPATIENT
Start: 2018-10-11 | End: 2018-10-11 | Stop reason: HOSPADM

## 2018-10-11 RX ORDER — MORPHINE SULFATE 4 MG/ML
2 INJECTION, SOLUTION INTRAMUSCULAR; INTRAVENOUS
Status: DISCONTINUED | OUTPATIENT
Start: 2018-10-11 | End: 2018-10-12 | Stop reason: HOSPADM

## 2018-10-11 RX ORDER — HYDROMORPHONE HYDROCHLORIDE 2 MG/ML
0.1 INJECTION, SOLUTION INTRAMUSCULAR; INTRAVENOUS; SUBCUTANEOUS
Status: DISCONTINUED | OUTPATIENT
Start: 2018-10-11 | End: 2018-10-11 | Stop reason: HOSPADM

## 2018-10-11 RX ORDER — TRAMADOL HYDROCHLORIDE 50 MG/1
50 TABLET ORAL ONCE
Status: COMPLETED | OUTPATIENT
Start: 2018-10-11 | End: 2018-10-11

## 2018-10-11 RX ORDER — DIPHENHYDRAMINE HYDROCHLORIDE 50 MG/ML
12.5 INJECTION INTRAMUSCULAR; INTRAVENOUS
Status: DISCONTINUED | OUTPATIENT
Start: 2018-10-11 | End: 2018-10-11 | Stop reason: HOSPADM

## 2018-10-11 RX ORDER — ONDANSETRON 2 MG/ML
4 INJECTION INTRAMUSCULAR; INTRAVENOUS EVERY 6 HOURS PRN
Status: DISCONTINUED | OUTPATIENT
Start: 2018-10-11 | End: 2018-10-12 | Stop reason: HOSPADM

## 2018-10-11 RX ORDER — OXYCODONE HYDROCHLORIDE 5 MG/1
5 TABLET ORAL
Status: DISCONTINUED | OUTPATIENT
Start: 2018-10-11 | End: 2018-10-11 | Stop reason: HOSPADM

## 2018-10-11 RX ORDER — HYDROMORPHONE HYDROCHLORIDE 2 MG/ML
0.4 INJECTION, SOLUTION INTRAMUSCULAR; INTRAVENOUS; SUBCUTANEOUS
Status: DISCONTINUED | OUTPATIENT
Start: 2018-10-11 | End: 2018-10-11 | Stop reason: HOSPADM

## 2018-10-11 RX ORDER — MIDAZOLAM HYDROCHLORIDE 1 MG/ML
INJECTION INTRAMUSCULAR; INTRAVENOUS
Status: COMPLETED
Start: 2018-10-11 | End: 2018-10-11

## 2018-10-11 RX ORDER — OXYCODONE AND ACETAMINOPHEN 7.5; 325 MG/1; MG/1
1-2 TABLET ORAL EVERY 6 HOURS PRN
Status: DISCONTINUED | OUTPATIENT
Start: 2018-10-11 | End: 2018-10-12 | Stop reason: HOSPADM

## 2018-10-11 RX ORDER — ONDANSETRON 4 MG/1
4 TABLET, FILM COATED ORAL EVERY 6 HOURS PRN
Qty: 30 TAB | Refills: 0 | Status: ON HOLD | OUTPATIENT
Start: 2018-10-11 | End: 2019-04-24

## 2018-10-11 RX ORDER — KETOROLAC TROMETHAMINE 30 MG/ML
15 INJECTION, SOLUTION INTRAMUSCULAR; INTRAVENOUS EVERY 6 HOURS PRN
Status: DISCONTINUED | OUTPATIENT
Start: 2018-10-11 | End: 2018-10-12 | Stop reason: HOSPADM

## 2018-10-11 RX ORDER — OXYCODONE HCL 5 MG/5 ML
10 SOLUTION, ORAL ORAL
Status: COMPLETED | OUTPATIENT
Start: 2018-10-11 | End: 2018-10-11

## 2018-10-11 RX ORDER — HYDROMORPHONE HYDROCHLORIDE 2 MG/ML
0.2 INJECTION, SOLUTION INTRAMUSCULAR; INTRAVENOUS; SUBCUTANEOUS
Status: DISCONTINUED | OUTPATIENT
Start: 2018-10-11 | End: 2018-10-11 | Stop reason: HOSPADM

## 2018-10-11 RX ADMIN — ATROPA BELLADONNA AND OPIUM 30 MG: 16.2; 3 SUPPOSITORY RECTAL at 14:20

## 2018-10-11 RX ADMIN — HYDROMORPHONE HYDROCHLORIDE 0.4 MG: 2 INJECTION INTRAMUSCULAR; INTRAVENOUS; SUBCUTANEOUS at 11:29

## 2018-10-11 RX ADMIN — Medication 0.5 ML: at 06:40

## 2018-10-11 RX ADMIN — FENTANYL CITRATE 50 MCG: 50 INJECTION, SOLUTION INTRAMUSCULAR; INTRAVENOUS at 10:55

## 2018-10-11 RX ADMIN — MIDAZOLAM HYDROCHLORIDE 1 MG: 1 INJECTION INTRAMUSCULAR; INTRAVENOUS at 10:36

## 2018-10-11 RX ADMIN — FENTANYL CITRATE 50 MCG: 50 INJECTION, SOLUTION INTRAMUSCULAR; INTRAVENOUS at 10:42

## 2018-10-11 RX ADMIN — HYDROMORPHONE HYDROCHLORIDE 0.4 MG: 2 INJECTION INTRAMUSCULAR; INTRAVENOUS; SUBCUTANEOUS at 11:43

## 2018-10-11 RX ADMIN — LIDOCAINE HYDROCHLORIDE 0.5 ML: 10 INJECTION, SOLUTION INFILTRATION; PERINEURAL at 06:40

## 2018-10-11 RX ADMIN — TRAMADOL HYDROCHLORIDE 50 MG: 50 TABLET, FILM COATED ORAL at 14:44

## 2018-10-11 RX ADMIN — MORPHINE SULFATE 2 MG: 4 INJECTION INTRAVENOUS at 13:22

## 2018-10-11 RX ADMIN — MIDAZOLAM 1 MG: 1 INJECTION INTRAMUSCULAR; INTRAVENOUS at 10:36

## 2018-10-11 RX ADMIN — SODIUM CHLORIDE, SODIUM LACTATE, POTASSIUM CHLORIDE, CALCIUM CHLORIDE: 600; 310; 30; 20 INJECTION, SOLUTION INTRAVENOUS at 06:39

## 2018-10-11 RX ADMIN — MIDAZOLAM HYDROCHLORIDE 1 MG: 1 INJECTION INTRAMUSCULAR; INTRAVENOUS at 10:54

## 2018-10-11 RX ADMIN — OXYCODONE HYDROCHLORIDE 10 MG: 5 SOLUTION ORAL at 11:10

## 2018-10-11 RX ADMIN — MIDAZOLAM 1 MG: 1 INJECTION INTRAMUSCULAR; INTRAVENOUS at 10:54

## 2018-10-11 RX ADMIN — HYDROMORPHONE HYDROCHLORIDE 0.2 MG: 2 INJECTION, SOLUTION INTRAMUSCULAR; INTRAVENOUS; SUBCUTANEOUS at 12:06

## 2018-10-11 ASSESSMENT — PAIN SCALES - GENERAL
PAINLEVEL_OUTOF10: 7
PAINLEVEL_OUTOF10: 8
PAINLEVEL_OUTOF10: 8
PAINLEVEL_OUTOF10: ASSUMED PAIN PRESENT
PAINLEVEL_OUTOF10: 8
PAINLEVEL_OUTOF10: 7
PAINLEVEL_OUTOF10: 8
PAINLEVEL_OUTOF10: 7
PAINLEVEL_OUTOF10: 8
PAINLEVEL_OUTOF10: 10
PAINLEVEL_OUTOF10: 9
PAINLEVEL_OUTOF10: 7
PAINLEVEL_OUTOF10: 8
PAINLEVEL_OUTOF10: 7
PAINLEVEL_OUTOF10: 8

## 2018-10-11 NOTE — OR NURSING
Discharge instructions were given to pt and SO. SO verbalized understanding and f/u care. SO states that pt is stable and safe to go home, he will be with her. Dr. Reeder saw pt again prior to discharge. MICAH Rainey also came to evaluate pt.

## 2018-10-11 NOTE — OR NURSING
SO expressed c/o about pain management for pt and expressed he was upset he was not brought into PACU phase 1 to be with pt. RN explained privacy and not our practice to have family in PACU, with exception of pediatric patients. He requested that RN notify manager and he wanted to speak with manager.

## 2018-10-11 NOTE — OR NURSING
Pt dc'd to phase 2 pacu, pt rating pain 7/10 much pain medication-see mar-spoke w/anesthesia md and he states we have done every thing we can do and be safe sending pt home, rr regular,even,spontaneous, vss, abdominal incisions clean dry and intact, a&0x4

## 2018-10-11 NOTE — PROGRESS NOTES
Gyn Onc    Rx to d/c home only Percocet 7.5/325 and rx for Percocet 5/325 shredded and pt given higher dose of Percocet for pain to d/c home. Pt vitals stable and able to verbalize complaints and denies chronic narcotic usage however has been on narcotics for two years for pancreatitis and  reviewed and noted to have  norco on 10/1/18 and percocet on 9/4/18 tramadol on 10/5/18 tramadol on 9/4/18, 8/20/18, 7/31/18, 7/23/18, 7/16/18, 7/9/18, Norco 6/22/18, Norco 6/17/18, Tramadol 5/15/18, Norco 5/14/18 and pls see [facility] for further dates. Discussed the risk of abuse and that her body and to alternate with Ibuprofen and pt also has Tramadol as well at home.    Case d/w Dr. Reeder

## 2018-10-11 NOTE — OR NURSING
Discharge instructions were given to pt and SO. SO verbalized understanding and f/u care. SO states that pt is stable and safe to go home, he will be with her.

## 2018-10-11 NOTE — OR NURSING
Pt arrived to phase 2. VSS. Pt tearful. Emotional support given. SO to bedside. Discussed POC for phase 2 and discharge. Micheline CDI. Ice pack to abd. IS in use.

## 2018-10-11 NOTE — DISCHARGE INSTRUCTIONS
ACTIVITY: Rest and take it easy for the first 24 hours.  A responsible adult is recommended to remain with you during that time.  It is normal to feel sleepy.  We encourage you to not do anything that requires balance, judgment or coordination.    MILD FLU-LIKE SYMPTOMS ARE NORMAL. YOU MAY EXPERIENCE GENERALIZED MUSCLE ACHES, THROAT IRRITATION, HEADACHE AND/OR SOME NAUSEA.    FOR 24 HOURS DO NOT:  Drive, operate machinery or run household appliances.  Drink beer or alcoholic beverages.   Make important decisions or sign legal documents.    SPECIAL INSTRUCTIONS:   1. No heavy lifting greater than 10 pounds for minimum 6 weeks  2. Nothing in vagina (ie no tampons, douching, intercourse) for minimum of 6 weeks  3. No driving while taking narcotics   4. Return to our office as directed and call to confirm appointment  Call our office [phone] if you develop any fevers, chills, nausea/vomiting, heavy vaginal bleeding, or redness, tenderness, and/or drainage from your wound, if you have persistent watery discharge while ambulating or stool draining from the vagina .  5. Showering is ok after shower make sure wound is dry.   6. You may keep the wound dressing and change everyday. After 2 weeks from surgery you may keep the wound dressing off.   7. You may have vaginal spotting or light bleeding which is normal.  8. If you have not had a bowel movement for 2 days, please take over the counter Milk of Magnesium, 1 tablespoon every 4 hours. After 4 doses and if you still have not had a bowel movement, please call your doctor.   9. You may eat soft diet, such as soup, liquid, for day #1 and if tolerating you may resume your regular diet.    DIET: To avoid nausea, slowly advance diet as tolerated, avoiding spicy or greasy foods for the first day.  Add more substantial food to your diet according to your physician's instructions. INCREASE FLUIDS AND FIBER TO AVOID CONSTIPATION.    SURGICAL DRESSING/BATHING:   Showering is  ok after shower make sure wound is dry.   You may keep the wound dressing and change everyday. After 2 weeks from surgery you may keep the wound dressing off.     FOLLOW-UP APPOINTMENT:  A follow-up appointment should be arranged with your doctor in 1-2 weeks; call to schedule.    You should CALL YOUR PHYSICIAN if you develop:  Fever greater than 101 degrees F.  Pain not relieved by medication, or persistent nausea or vomiting.  Excessive bleeding (blood soaking through dressing) or unexpected drainage from the wound.  Extreme redness or swelling around the incision site, drainage of pus or foul smelling drainage.  Inability to urinate or empty your bladder within 8 hours.  Problems with breathing or chest pain.    You should call 911 if you develop problems with breathing or chest pain.  If you are unable to contact your doctor or surgical center, you should go to the nearest emergency room or urgent care center.  Physician's telephone #: Dr. Reeder [phone]    If any questions arise, call your doctor.  If your doctor is not available, please feel free to call the Surgical Center at [phone].  The Center is open Monday through Friday from 7AM to 7PM.  You can also call the [employer] HOTLINE open 24 hours/day, 7 days/week and speak to a nurse at [phone], or toll free at [phone].    A registered nurse may call you a few days after your surgery to see how you are doing after your procedure.    MEDICATIONS: Resume taking daily medication.  Take prescribed pain medication with food.  If no medication is prescribed, you may take non-aspirin pain medication if needed.  PAIN MEDICATION CAN BE VERY CONSTIPATING.  Take a stool softener or laxative such as senokot, pericolace, or milk of magnesia if needed.    Prescription given for Zofran (nausea), Percocet (pain).  Last pain medication given at 11:10 am. Next dose can be taken at 5:10pm.   If your physician has prescribed pain medication that includes  Acetaminophen (Tylenol), do not take additional Acetaminophen (Tylenol) while taking the prescribed medication.    Depression / Suicide Risk    As you are discharged from this [facility] facility, it is important to learn how to keep safe from harming yourself.    Recognize the warning signs:  · Abrupt changes in personality, positive or negative- including increase in energy   · Giving away possessions  · Change in eating patterns- significant weight changes-  positive or negative  · Change in sleeping patterns- unable to sleep or sleeping all the time   · Unwillingness or inability to communicate  · Depression  · Unusual sadness, discouragement and loneliness  · Talk of wanting to die  · Neglect of personal appearance   · Rebelliousness- reckless behavior  · Withdrawal from people/activities they love  · Confusion- inability to concentrate     If you or a loved one observes any of these behaviors or has concerns about self-harm, here's what you can do:  · Talk about it- your feelings and reasons for harming yourself  · Remove any means that you might use to hurt yourself (examples: pills, rope, extension cords, firearm)  · Get professional help from the community (Mental Health, Substance Abuse, psychological counseling)  · Do not be alone:Call your Safe Contact- someone whom you trust who will be there for you.  · Call your local CRISIS HOTLINE 585-3691 or 980-764-1657  · Call your local Children's Mobile Crisis Response Team [place] (466) 369-0358 or www.[employer]  · Call the toll free National Suicide Prevention Hotlines   · National Suicide Prevention Lifeline 548-122-ZCZN (2649)  · National Hope Line Network 800-SUICIDE (128-7289)

## 2018-10-11 NOTE — PROGRESS NOTES
"Called to evaluate for inadequate pain control. Pt is now about 4 hours post RH/BSO/right ureterolysis for 16 weeks size fibroid uterus and endometriosis.    Per the nurse she is complaining of pain \"10/10\". Patient seen and she is lying in bed appears completely comfortable. She is alert and awake and oriented. She does not appear distressed.  VSS pulse in 70's /60-70's RR 16     Meds reviewed. She has received   Roxicodone 10mg po @ 1110.  100 ug Fetanyl @ 1042   Dilaudid 1mg @ 1130  Toradol 30 mg  In OR  Morphine 2 mg @ 1322.  02 sat     She was seen by me in Stage II.     My clinical impression is she is not in acute pain distress.   I have told her that she is 4 hours post surgery and it is expected that she will have some pain. I think she is requiring more pain meds than usual bc of her prolong hx of pain meds use.     I will order tramdol 50 mg for her to take now and she is to take percocet as prescribed and alternate with tramadol.   "

## 2018-10-11 NOTE — OR NURSING
Dr. Reeder was just at bedside discussing care with pt and SO. Per Dr. Reeder, do not give oxycodone. New order for tramadol received.

## 2018-10-11 NOTE — OR NURSING
Consulted with pharmacy regarding active medications. Patient 10/10 pain. Morphine was given with no relief, no change. Called and spoke with Dr. Lockett who is also in a case with Dr. Reeder at this time. Received new orders for B&O suppository and roxicodone 10mg from Dr. Lockett. Requested providers come and reassess pt. Received a call back after taking orders. Dr. Reeder requested that nursing wait to give the roxicodone until they are able to reassess pt.

## 2018-10-12 NOTE — OR SURGEON
Immediate Post OP Note    PreOp Diagnosis: dysmenorrhea, 16 weeks fibroid uterus    PostOp Diagnosis: same, endometriosis stage II right ureteral fibrosis    Procedure(s):  HYSTERECTOMY ROBOTIC XI- RIGHT URETEROLYSIS - Wound Class: Clean Contaminated  SALPINGECTOMY - Wound Class: Clean Contaminated    Surgeon(s):  Marquis Reeder M.D.    Anesthesiologist/Type of Anesthesia:  Anesthesiologist: Paramjit Lockett III, M.D./General    Surgical Staff:  Assistant: OWEN Ingram  Circulator: Alison Payton RYOHANA; Ana M Lopez RYOHANA  Relief Circulator: John Simeon RYOHANA  Scrub Person: Gina Chris; Margarita Guzman    Specimens removed if any:  ID Type Source Tests Collected by Time Destination   A : uterus, bilateral tubes, cervix Tissue Vaginal PATHOLOGY SPECIMEN Marquis Reeder M.D. 10/11/2018  8:57 AM        Estimated Blood Loss: 100cc    Findings: enlarged fibroid uterus , endometriosis with puckering of right ureter     Complications: none        10/11/2018 5:54 PM Marquis Reeder M.D.

## 2018-10-12 NOTE — OR NURSING
Pt c/o 10/10 pain, has received multiple medications in PACU, see MAR. Attempted to encourage pt to reposition, use ice, distraction with music or TV, food and drink and consider ambulation. Pt declines all alternative avenues of pain management. Pt remains emotional. Vital signs all WNL. Encouraged deep breathing and SO provide support.

## 2018-10-12 NOTE — OP REPORT
DATE OF SERVICE:  10/11/2018    PREOPERATIVE DIAGNOSES:  1.  A 16 weeks size symptomatic leiomyomatous uterus.  2.  Menometrorrhagia  3.  Dysmenorrhea.    POSTOPERATIVE DIAGNOSES:  1.  A 16 weeks size symptomatic leiomyomatous uterus.  2.  Menometrorrhagia  3.  Dysmenorrhea.  4.  Stage III endometriosis.    PROCEDURES PERFORMED:  1.  Robotic-assisted hysterectomy with bilateral salpingo-oophorectomy.  2.  Right ureterolysis.  3.  Robotic-assisted right ureterolysis.    SURGEON:  Marquis Reeder MD    ASSISTANT:  Brigid Bowen PA-C    ANESTHESIA:  General.    ANESTHESIOLOGIST:  Paramjit Lockett MD    ESTIMATED BLOOD LOSS:  50 mL    FLUIDS:  Per Dr. Lockett.    URINE OUTPUT:  As per Dr. Lockett.    COMPLICATIONS:  None.    COUNTS:  Final sponge and needle counts correct.    INDICATIONS FOR SURGERY:  The patient is a 33-year-old female with prolonged   history of chronic pain and on significant narcotics in the past.  The patient   was referred to me by Dr. Cesar because of an enlarged uterus with severe   dysmenorrhea.  She was referred to me for consideration for robotic   hysterectomy with bilateral salpingectomy.  Patient does not desire future   fertility.  Patient has been on fairly substantial narcotics while prior to   seeing me for nearly 2 years.  She has received multiple Percocet and tramadol   prior to her surgery.  Because of the severe dysmenorrhea and her desires for   no further fertility in the future, patient was advised to undergo   hysterectomy.  Risks, benefits, and rationale of the procedures were reviewed   with the patient in detail.  Patient is understanding of these risks and   wished to proceed with the surgery as planned.    INTRAOPERATIVE FINDINGS:  1.  No evidence of ascites.  2.  Normal right and left diaphragm.  Liver capsule smooth.  Stomach appeared   grossly normal.  Abdominal peritoneal surfaces were unremarkable.  Omentum   appeared grossly normal.  3.  In the pelvis, uterus  was approximately 16 weeks in size.  It was about   the size of a general size ____ and filled the entire cul-de-sac cavity.    There was also endometriosis noted in the right uterosacral ligament resulting   in a puckering of the right ureter necessitating a ureterolysis.  Radical   dissection of the parametria was performed on the right side in order to   safely resect all the disease.  We did also take the right pelvic sidewall   peritoneum where there was endometriosis implants to ensure that she will be   rendered with no evidence of endometriosis.  The ovaries did not contain   endometriosis.  There was no other endometriosis implant on the right   uterosacral ligament.  There was ureteral stricture as a result of the   scarring.    PROCEDURE NOTE:  Patient was given IV antibiotics prior to procedure.  Patient   was prepped and draped and placed in modified dorsal lithotomy position.  We   proceeded along with the robotic portion of the procedure.    A 1 cm supraumbilical incision was made.  A Veress needle was inserted without   difficulty.  Pneumoperitoneum was achieved to the abdominal pressure of 15   mmHg.  A 12 mm trocar was inserted without difficulty.  After completion of   this, a 12 mm trocar was placed in the left lower quadrant two fingerbreadths   medial to the anterior superior iliac spine under direct laparoscopic   visualization.  After completion of this, a laparoscope was then placed in the   left lower quadrant port to assist in the placement of the remainder of the   da Kevon ports.  Two 8 mm ports were placed in the right upper quadrant 8 cm   apart while one 8 mm port was placed in the left upper quadrant 8 cm apart.    After completion of this, the patient was placed in steep Trendelenburg   position.  The robotic system was then docked and after docking the robotic   system, the instrumentation was inserted under direct laparoscopic   visualization to insure that there was no injury to  the abdominal contents.    Once this was completed, the robotic camera was then docked.  We then   proceeded with our da Kevon portion of the procedure.    I proceeded initially on with bilateral salpingectomy.  The fallopian tubes   were suspended.  Mesosalpinx was exposed.  Bipolar cautery was used to   cauterize and coagulate the mesosalpinx to achieve hemostasis.  This was then   divided.  The right and left uteroovarian ligament was then cauterized and the   ovary was detached from the uteroovarian ligament.  The ovaries were   preserved.  I then divided the right and left round ligament followed by the   anterior broad leaf ligament.  The dissection was somewhat difficult secondary   to the enlarged uterus and there was minimal space working.  Using the uterus   provided in the countertraction to the left region I was able to expose the   right parametria.  The endometriosis was causing the ureter to be pulled into,   thus I opened up the right posterior broad leaf ligament.  The right ureter   was identified above the pelvic brim.  The course of the ureter was followed   into the ureteric tunnel.  The endometriosis was involving the right ureter   again resulting in a puckering of the ureter, I had to skeletonize and isolate   the superior vesicle artery along with the uterine artery and vein along with   the deep uterine vessels.  This was skeletonized and isolated lateral to   where the ureter at its origin.  Hem-o-Ethan clips were applied along here to   secure the vessels.  After completion of this, I then follow the course of the   ureter into the ureteric tunnel.  The anterior uterovesical ligament was   unroofed.  After this was unroofed I then medial to where the course of the   ureter was, the uterine vessels were skeletonized and isolated, and bipolar   cautery was used to cauterize the uterine vessels followed by resection of the   right pelvic sidewall peritoneum where the endometriosis implant down  to   level of the uterosacral ligament.  The bladder peritoneum was subsequently   taken down without any difficulty.  After completion of this, the left round   ligament was divided.  The left posterior broad leaf ligament was incised   followed by the left anterior broad leaf ligament.  The uterine vessels were   then subsequently skeletonized and isolated in the left side while providing   countertraction of the enlarged uterus on the right pelvic sidewall.  This   exposed left parametrial cardinal ligament.  Uterine vessel was skeletonized   and isolated juxtapositioned to the fundus of the uterus and bipolar cautery   was used to cauterize the uterine vessels down to level of the uterosacral   ligament.  After completion of this, I then used a sponge stick to push the   cervix up cephalad pushing the bladder away.  I further mobilized the bladder   and dissected the bladder away until we were assured that the bladder was well   clear from our anterior colpotomy.  An anterior colpotomy was then made.    Vagina was circumferentially incised to complete the hysterectomy with   bilateral salpingectomy.  After completion of this, a 15 cm Endobag was then   delivered through the vaginal canal.  The entire uterus was subsequently   placed in the Endobag and zip locked.  This was then delivered through the   vaginal canal.  I then turned my focus towards the perineal phase.    Initially, I placed the long weighted speculum within the Endobag.  The cervix   was brought to view.  I then went ahead and debulked the enlarged uterus.    The first initially the cervix was grasped at 3 and 9 o'clock position with   double tooth Batista tenaculum.  The cervix was then bivalved.  After   completion of this, I then wedged the uterus and decompress the cyst all   within the Endobag to ensure that there was no evidence of cells being   flickered intraperitoneally.  After the appropriate decompression of the   enlarged uterus, which  took us approximately about 45 minutes to decompress,   entire uterus was removed within the Endobag without spillage.  After   completion of this, I then closed the vaginal cuff with 0 Quill PDS suture in   a 2-layer standard closure fashion.  After completion of this, the pelvis was   then copiously irrigated with water.  I then monitored the ureter.  The right   ureter had been completely dissected out.  The ureter did not appear   compromised, thus, I did not like to place a ureteral stent.  The left ureter   was clearly identified.  The left ureteric tunnel was not compromised.  After   completion of this, we counted for sponges, needles and instrument counts.    Once this was accounted for, robotic instrumentation was removed.  Robotic   system was then de-docked.  Pneumoperitoneum was allowed to escape through the   8 mm port.  Subcutaneous fat was copiously irrigated with water.  The skin   was reapproximated with 3-0 Monocryl sutures.    Patient tolerated the procedure well without any difficulties and was   subsequently transferred to the PACU in stable condition, extubated.       ____________________________________     MCKENNA CEJA MD    PCL / NTS    DD:  10/12/2018 11:43:05  DT:  10/12/2018 12:39:52    D#:  [id number]  Job#:  [id number]    cc: FRANCISCO JAVIER CASTILLO MD, RICHARD SHIN MD

## 2019-01-08 ENCOUNTER — OFFICE VISIT (OUTPATIENT)
Dept: MEDICAL GROUP | Facility: MEDICAL CENTER | Age: 34
End: 2019-01-08
Attending: FAMILY MEDICINE
Payer: MEDICAID

## 2019-01-08 VITALS
TEMPERATURE: 98.8 F | HEIGHT: 66 IN | WEIGHT: 122 LBS | HEART RATE: 76 BPM | RESPIRATION RATE: 14 BRPM | DIASTOLIC BLOOD PRESSURE: 80 MMHG | SYSTOLIC BLOOD PRESSURE: 118 MMHG | BODY MASS INDEX: 19.61 KG/M2 | OXYGEN SATURATION: 99 %

## 2019-01-08 DIAGNOSIS — Z90.710 S/P HYSTERECTOMY: ICD-10-CM

## 2019-01-08 DIAGNOSIS — N94.19 DYSPAREUNIA DUE TO MEDICAL CONDITION IN FEMALE: ICD-10-CM

## 2019-01-08 DIAGNOSIS — G89.4 CHRONIC PAIN SYNDROME: ICD-10-CM

## 2019-01-08 DIAGNOSIS — M25.50 ARTHRALGIA, UNSPECIFIED JOINT: ICD-10-CM

## 2019-01-08 DIAGNOSIS — M54.40 LOW BACK PAIN WITH SCIATICA, SCIATICA LATERALITY UNSPECIFIED, UNSPECIFIED BACK PAIN LATERALITY, UNSPECIFIED CHRONICITY: ICD-10-CM

## 2019-01-08 PROBLEM — G89.18 POST-OP PAIN: Status: ACTIVE | Noted: 2019-01-08

## 2019-01-08 PROCEDURE — 99214 OFFICE O/P EST MOD 30 MIN: CPT | Performed by: FAMILY MEDICINE

## 2019-01-08 PROCEDURE — 99213 OFFICE O/P EST LOW 20 MIN: CPT | Performed by: FAMILY MEDICINE

## 2019-01-08 RX ORDER — OXYCODONE HYDROCHLORIDE AND ACETAMINOPHEN 5; 325 MG/1; MG/1
1 TABLET ORAL EVERY 6 HOURS PRN
Qty: 20 TAB | Refills: 0 | Status: SHIPPED | OUTPATIENT
Start: 2019-01-15 | End: 2019-01-22 | Stop reason: SDUPTHER

## 2019-01-08 RX ORDER — OXYCODONE HYDROCHLORIDE AND ACETAMINOPHEN 5; 325 MG/1; MG/1
1 TABLET ORAL EVERY 6 HOURS PRN
Qty: 40 TAB | Refills: 0 | Status: SHIPPED | OUTPATIENT
Start: 2019-01-08 | End: 2019-01-08 | Stop reason: SDUPTHER

## 2019-01-08 RX ORDER — DULOXETIN HYDROCHLORIDE 20 MG/1
20 CAPSULE, DELAYED RELEASE ORAL DAILY
Qty: 30 CAP | Refills: 3 | Status: SHIPPED | OUTPATIENT
Start: 2019-01-08 | End: 2019-01-22 | Stop reason: SDUPTHER

## 2019-01-08 RX ORDER — OXYCODONE HYDROCHLORIDE AND ACETAMINOPHEN 5; 325 MG/1; MG/1
1 TABLET ORAL EVERY 6 HOURS PRN
Qty: 20 TAB | Refills: 0 | Status: SHIPPED | OUTPATIENT
Start: 2019-01-08 | End: 2019-01-08 | Stop reason: SDUPTHER

## 2019-01-08 ASSESSMENT — ENCOUNTER SYMPTOMS
DEPRESSION: 1
SPEECH CHANGE: 0
TINGLING: 1
BACK PAIN: 1
SENSORY CHANGE: 0
INSOMNIA: 0
VOMITING: 0
SPUTUM PRODUCTION: 0
CHILLS: 0
TREMORS: 0
EYES NEGATIVE: 1
COUGH: 0
FEVER: 0
HALLUCINATIONS: 0
ABDOMINAL PAIN: 1
SHORTNESS OF BREATH: 0
FOCAL WEAKNESS: 0
HEADACHES: 0
NAUSEA: 1
PALPITATIONS: 0
NECK PAIN: 0
NERVOUS/ANXIOUS: 1

## 2019-01-08 ASSESSMENT — LIFESTYLE VARIABLES: SUBSTANCE_ABUSE: 0

## 2019-01-08 NOTE — PROGRESS NOTES
Subjective:      Rhiannon Marrero is a 33 y.o. female who presents with GI Problem            Acute pain   This is a recurrent problem.   Patient is complaining of pain x several year(s) located in her abdomen, back and legs.  Pain is constant, described as aching, throbbing, cramping and a 9/10 on the pain scale.   Treatments tried include:gabapentin, referred to pain management and pain psychology     Is the pain medication improving the patient's symptoms: Yes  Any adverse effects: Yes  Alcohol or illicit drug use:   She  reports that she does not drink alcohol.  She  reports that she does not use drugs.     History of controlled substance used in a way other than prescribed? No     Any early refills of a controlled substance: No  History of lost or stolen controlled substance prescription: No  Any aberrant behavior or intoxication while on a controlled substance: No  Has the patient self-modified their dose or frequency of the medication :No  Compliant with treatment recommendations and plan: Yes  Any major health change to the patient: Yes recently had hysterectomy  Concerns for misuse, abuse or addiction: No  /NarxCheck report reviewed: Yes  History of abnormal drug screening: No  I have assessed the patient's risk for abuse, dependency, and addiction using the validated Opioid Risk Tool.     Opioid Risk Score: 5       Interpretation of Opioid Risk Score   Score 0-3 = Low risk of abuse. Do UDS at least once per year.  Score 4-7 = Moderate risk of abuse. Do UDS 1-4 times per year.  Score 8+ = High risk of abuse. Refer to specialist.     I have conducted a physical exam and documented findings.     I certify that I have obtained and reviewed her medical history. I have also made a good nick effort to obtain applicable records from other providers who have treated the patient.  I have reviewed the patient's prescription history as maintained by the [place] Prescription Monitoring Program.      Given the  "above, I believe the benefits of controlled substance therapy outweigh the risks. The reasons for prescribing controlled substances include non-narcotic, oral analgesic alternatives have been inadequate for pain control. Accordingly, I have discussed the risk and benefits, treatment plan, and alternative therapies with the patient. Patient was advised that this medicine is intended for short term (no more than 14 days)/intermittent use only and not intended to be an ongoing prescription.        Review of Systems   Constitutional: Negative for chills and fever.   HENT: Negative for hearing loss and tinnitus.    Eyes: Negative.    Respiratory: Negative for cough, sputum production and shortness of breath.    Cardiovascular: Negative for chest pain and palpitations.   Gastrointestinal: Positive for abdominal pain and nausea. Negative for vomiting.   Genitourinary: Negative.    Musculoskeletal: Positive for back pain and joint pain. Negative for neck pain.   Skin: Negative for rash.   Neurological: Positive for tingling. Negative for tremors, sensory change, speech change, focal weakness and headaches.   Psychiatric/Behavioral: Positive for depression. Negative for hallucinations, substance abuse and suicidal ideas. The patient is nervous/anxious. The patient does not have insomnia.           Objective:     /80 (BP Location: Left arm, Patient Position: Sitting)   Pulse 76   Temp 37.1 °C (98.8 °F)   Resp 14   Ht 1.676 m (5' 6\")   Wt 55.3 kg (122 lb)   SpO2 99%   BMI 19.69 kg/m²      Physical Exam   Constitutional: She is oriented to person, place, and time. She appears well-developed and well-nourished.   HENT:   Head: Normocephalic and atraumatic.   Cardiovascular: Normal rate, regular rhythm and normal heart sounds.  Exam reveals no friction rub.    No murmur heard.  Pulmonary/Chest: Effort normal and breath sounds normal. No respiratory distress. She has no wheezes. She has no rales.   Abdominal: Soft. " Bowel sounds are normal. She exhibits no distension. There is tenderness. There is no guarding.   Musculoskeletal: She exhibits tenderness. She exhibits no edema.   Decreased ROM of back in flexion   Neurological: She is alert and oriented to person, place, and time.   Skin: Skin is warm and dry.   Psychiatric: She has a normal mood and affect. Her behavior is normal.   Nursing note and vitals reviewed.              Assessment/Plan:     1. Dyspareunia due to medical condition in female  We will have her continue to use her medications as previously directed.  We will increase her Neurontin to 600 mg 3 times daily and a referral to pain management has also been made.  We will also refer to pain psychology for additional assistance in management of her anxiety as well as depression.  We will also start Cymbalta at 20 mg daily.  ER precautions also given to patient today.  - REFERRAL TO PAIN CLINIC  - Consent for Opiate Prescription  - REFERRAL TO PSYCHOLOGY  - oxyCODONE-acetaminophen (PERCOCET) 5-325 MG Tab; Take 1 Tab by mouth every 6 hours as needed for up to 7 days.  Dispense: 20 Tab; Refill: 0    2. Low back pain with sciatica, sciatica laterality unspecified, unspecified back pain laterality, unspecified chronicity  See above plan.  - REFERRAL TO PAIN CLINIC  - Consent for Opiate Prescription  - REFERRAL TO PSYCHOLOGY  - oxyCODONE-acetaminophen (PERCOCET) 5-325 MG Tab; Take 1 Tab by mouth every 6 hours as needed for up to 7 days.  Dispense: 20 Tab; Refill: 0    3. S/P hysterectomy  See above plan.    4. Chronic pain syndrome  See above plan.  - Consent for Opiate Prescription  - REFERRAL TO PSYCHOLOGY  - oxyCODONE-acetaminophen (PERCOCET) 5-325 MG Tab; Take 1 Tab by mouth every 6 hours as needed for up to 7 days.  Dispense: 20 Tab; Refill: 0    5. Arthralgia, unspecified joint  See above plan.  - Consent for Opiate Prescription  - oxyCODONE-acetaminophen (PERCOCET) 5-325 MG Tab; Take 1 Tab by mouth every 6 hours  as needed for up to 7 days.  Dispense: 20 Tab; Refill: 0

## 2019-01-22 ENCOUNTER — OFFICE VISIT (OUTPATIENT)
Dept: MEDICAL GROUP | Facility: MEDICAL CENTER | Age: 34
End: 2019-01-22
Attending: FAMILY MEDICINE
Payer: MEDICAID

## 2019-01-22 VITALS
RESPIRATION RATE: 14 BRPM | DIASTOLIC BLOOD PRESSURE: 80 MMHG | OXYGEN SATURATION: 95 % | SYSTOLIC BLOOD PRESSURE: 112 MMHG | WEIGHT: 120 LBS | BODY MASS INDEX: 19.99 KG/M2 | HEIGHT: 65 IN | TEMPERATURE: 98 F | HEART RATE: 88 BPM

## 2019-01-22 DIAGNOSIS — N94.19 DYSPAREUNIA DUE TO MEDICAL CONDITION IN FEMALE: ICD-10-CM

## 2019-01-22 DIAGNOSIS — M54.40 LOW BACK PAIN WITH SCIATICA, SCIATICA LATERALITY UNSPECIFIED, UNSPECIFIED BACK PAIN LATERALITY, UNSPECIFIED CHRONICITY: ICD-10-CM

## 2019-01-22 DIAGNOSIS — G89.4 CHRONIC PAIN SYNDROME: ICD-10-CM

## 2019-01-22 DIAGNOSIS — M25.50 ARTHRALGIA, UNSPECIFIED JOINT: ICD-10-CM

## 2019-01-22 PROCEDURE — 99213 OFFICE O/P EST LOW 20 MIN: CPT | Performed by: FAMILY MEDICINE

## 2019-01-22 PROCEDURE — 99214 OFFICE O/P EST MOD 30 MIN: CPT | Performed by: FAMILY MEDICINE

## 2019-01-22 RX ORDER — DULOXETIN HYDROCHLORIDE 20 MG/1
20 CAPSULE, DELAYED RELEASE ORAL DAILY
Qty: 30 CAP | Refills: 3 | Status: SHIPPED | OUTPATIENT
Start: 2019-01-22 | End: 2019-01-22

## 2019-01-22 RX ORDER — OXYCODONE HYDROCHLORIDE AND ACETAMINOPHEN 5; 325 MG/1; MG/1
1 TABLET ORAL EVERY 6 HOURS PRN
Qty: 20 TAB | Refills: 0 | Status: SHIPPED | OUTPATIENT
Start: 2019-01-22 | End: 2019-01-22 | Stop reason: SDUPTHER

## 2019-01-22 RX ORDER — OXYCODONE HYDROCHLORIDE AND ACETAMINOPHEN 5; 325 MG/1; MG/1
1 TABLET ORAL EVERY 6 HOURS PRN
Qty: 20 TAB | Refills: 0 | Status: SHIPPED | OUTPATIENT
Start: 2019-01-29 | End: 2019-01-22 | Stop reason: SDUPTHER

## 2019-01-22 RX ORDER — GABAPENTIN 400 MG/1
400 CAPSULE ORAL 3 TIMES DAILY
Qty: 90 CAP | Refills: 3 | Status: SHIPPED | OUTPATIENT
Start: 2019-01-22 | End: 2019-02-13

## 2019-01-22 RX ORDER — DULOXETIN HYDROCHLORIDE 30 MG/1
30 CAPSULE, DELAYED RELEASE ORAL DAILY
Qty: 30 CAP | Refills: 3 | Status: SHIPPED | OUTPATIENT
Start: 2019-01-22 | End: 2019-04-02

## 2019-01-22 RX ORDER — OXYCODONE HYDROCHLORIDE AND ACETAMINOPHEN 5; 325 MG/1; MG/1
1 TABLET ORAL EVERY 6 HOURS PRN
Qty: 20 TAB | Refills: 0 | Status: SHIPPED | OUTPATIENT
Start: 2019-02-05 | End: 2019-02-13 | Stop reason: SDUPTHER

## 2019-01-22 ASSESSMENT — ENCOUNTER SYMPTOMS
COUGH: 0
PALPITATIONS: 0
CHILLS: 0
ABDOMINAL PAIN: 1
NAUSEA: 1
DEPRESSION: 1
VOMITING: 0
BACK PAIN: 1
SHORTNESS OF BREATH: 0
INSOMNIA: 0
NERVOUS/ANXIOUS: 1
NEUROLOGICAL NEGATIVE: 1
NECK PAIN: 0
FEVER: 0
HALLUCINATIONS: 0
SPUTUM PRODUCTION: 0

## 2019-01-22 ASSESSMENT — PATIENT HEALTH QUESTIONNAIRE - PHQ9: CLINICAL INTERPRETATION OF PHQ2 SCORE: 0

## 2019-01-22 ASSESSMENT — LIFESTYLE VARIABLES: SUBSTANCE_ABUSE: 0

## 2019-01-22 NOTE — PROGRESS NOTES
Subjective:      Rhiannon Marrero is a 33 y.o. female who presents with Follow-Up (referrals) and Medication Refill            Chronic pain recheck for: abominal and pelvic  Last dose of controlled substance: yesterday  Chronic pain treated with oxycodone taken 1-2 times a day    She  reports that she does not drink alcohol.  She  reports that she does not use drugs.    Interval history: same  Any major change in health since last appointment? No    Consequences of Chronic Opiate therapy:  (5 A's)  Analgesia: Compared to no treatment or prior treatment, pain is currently improved  Activity: improved  Adverse Events: She denies constipation, dry mouth, itchy skin, nausea and sedation  Aberrant Behaviors: She reports she is taking medication as prescribed and is not veering from agreed treatment regimen or provider recommendations. There have been no inappropriate refills or lost/stolen meds reported.  Affect/Mood: Pain is not impacting patient's mood.  Patient reports depression/anxiety. Will be seeing a therapist    Nonnarcotic treatments that are being used: Gabapentin. Referral made to pain management.    Last imagin/17    Opioid Risk Score: 5    Interpretation of Opioid Risk Score   Score 0-3 = Low risk of abuse. Do UDS at least once per year.  Score 4-7 = Moderate risk of abuse. Do UDS 1-4 times per year.  Score 8+ = High risk of abuse. Refer to specialist.    No order of CONTROLLED SUBSTANCE TREATMENT AGREEMENT is found.  UDS Summary     Patient has no health maintenance due at this time      Most recent UDS reviewed today and is consistent with prescribed medications.     I have reviewed the medical records, the Prescription Monitoring Program and I have determined that controlled substance treatment is medically indicated.           Review of Systems   Constitutional: Negative for chills and fever.   HENT: Negative for hearing loss and tinnitus.    Respiratory: Negative for cough, sputum production and  "shortness of breath.    Cardiovascular: Negative for chest pain and palpitations.   Gastrointestinal: Positive for abdominal pain and nausea. Negative for vomiting.   Musculoskeletal: Positive for back pain and joint pain. Negative for neck pain.   Skin: Negative for rash.   Neurological: Negative.    Psychiatric/Behavioral: Positive for depression. Negative for hallucinations, substance abuse and suicidal ideas. The patient is nervous/anxious. The patient does not have insomnia.           Objective:     /80 (BP Location: Left arm, Patient Position: Sitting)   Pulse 88   Temp 36.7 °C (98 °F)   Resp 14   Ht 1.651 m (5' 5\")   Wt 54.4 kg (120 lb)   SpO2 95%   BMI 19.97 kg/m²      Physical Exam   Constitutional: She is oriented to person, place, and time. She appears well-developed and well-nourished.   HENT:   Head: Normocephalic and atraumatic.   Cardiovascular: Normal rate, regular rhythm and normal heart sounds.  Exam reveals no friction rub.    No murmur heard.  Pulmonary/Chest: Effort normal and breath sounds normal. No respiratory distress. She has no wheezes. She has no rales.   Abdominal: She exhibits no distension. There is tenderness. There is no guarding.   Musculoskeletal: Normal range of motion.   Neurological: She is alert and oriented to person, place, and time.   Skin: Skin is warm and dry.   Psychiatric: She has a normal mood and affect. Her behavior is normal.   Nursing note and vitals reviewed.              Assessment/Plan:     1. Chronic pain syndrome  We will have her continue to use her medications as previously directed.  She has also been referred to pain management as well as pain psychology.  She has any worsened signs or symptoms she has been advised to go to the emergency room for further assist in management.  - gabapentin (NEURONTIN) 400 MG Cap; Take 1 Cap by mouth 3 times a day.  Dispense: 90 Cap; Refill: 3  - Consent for Opiate Prescription  - oxyCODONE-acetaminophen " (PERCOCET) 5-325 MG Tab; Take 1 Tab by mouth every 6 hours as needed for up to 7 days.  Dispense: 20 Tab; Refill: 0    2. Arthralgia, unspecified joint  See above plan.  - gabapentin (NEURONTIN) 400 MG Cap; Take 1 Cap by mouth 3 times a day.  Dispense: 90 Cap; Refill: 3  - Consent for Opiate Prescription  - oxyCODONE-acetaminophen (PERCOCET) 5-325 MG Tab; Take 1 Tab by mouth every 6 hours as needed for up to 7 days.  Dispense: 20 Tab; Refill: 0    3. Dyspareunia due to medical condition in female  A referral to GYN has been made for a further evaluation of her pain with intercourse.  We will continue to follow.  - oxyCODONE-acetaminophen (PERCOCET) 5-325 MG Tab; Take 1 Tab by mouth every 6 hours as needed for up to 7 days.  Dispense: 20 Tab; Refill: 0  - REFERRAL TO GYNECOLOGY    4. Low back pain with sciatica, sciatica laterality unspecified, unspecified back pain laterality, unspecified chronicity  See above plan.  - Consent for Opiate Prescription  - oxyCODONE-acetaminophen (PERCOCET) 5-325 MG Tab; Take 1 Tab by mouth every 6 hours as needed for up to 7 days.  Dispense: 20 Tab; Refill: 0

## 2019-02-13 ENCOUNTER — OFFICE VISIT (OUTPATIENT)
Dept: MEDICAL GROUP | Facility: MEDICAL CENTER | Age: 34
End: 2019-02-13
Attending: FAMILY MEDICINE
Payer: MEDICAID

## 2019-02-13 ENCOUNTER — HOSPITAL ENCOUNTER (OUTPATIENT)
Facility: MEDICAL CENTER | Age: 34
End: 2019-02-13
Attending: FAMILY MEDICINE
Payer: MEDICAID

## 2019-02-13 VITALS
WEIGHT: 118 LBS | TEMPERATURE: 99.8 F | HEART RATE: 100 BPM | SYSTOLIC BLOOD PRESSURE: 118 MMHG | RESPIRATION RATE: 14 BRPM | BODY MASS INDEX: 18.96 KG/M2 | DIASTOLIC BLOOD PRESSURE: 80 MMHG | OXYGEN SATURATION: 98 % | HEIGHT: 66 IN

## 2019-02-13 DIAGNOSIS — M25.50 ARTHRALGIA, UNSPECIFIED JOINT: ICD-10-CM

## 2019-02-13 DIAGNOSIS — N94.19 DYSPAREUNIA DUE TO MEDICAL CONDITION IN FEMALE: ICD-10-CM

## 2019-02-13 DIAGNOSIS — M54.40 LOW BACK PAIN WITH SCIATICA, SCIATICA LATERALITY UNSPECIFIED, UNSPECIFIED BACK PAIN LATERALITY, UNSPECIFIED CHRONICITY: ICD-10-CM

## 2019-02-13 DIAGNOSIS — Z90.710 S/P HYSTERECTOMY: ICD-10-CM

## 2019-02-13 DIAGNOSIS — N94.10 DYSPAREUNIA, FEMALE: ICD-10-CM

## 2019-02-13 DIAGNOSIS — G89.4 CHRONIC PAIN SYNDROME: ICD-10-CM

## 2019-02-13 DIAGNOSIS — R10.2 PELVIC PAIN: ICD-10-CM

## 2019-02-13 PROCEDURE — G0480 DRUG TEST DEF 1-7 CLASSES: HCPCS

## 2019-02-13 PROCEDURE — 99213 OFFICE O/P EST LOW 20 MIN: CPT | Performed by: FAMILY MEDICINE

## 2019-02-13 PROCEDURE — 80307 DRUG TEST PRSMV CHEM ANLYZR: CPT

## 2019-02-13 PROCEDURE — 99214 OFFICE O/P EST MOD 30 MIN: CPT | Performed by: FAMILY MEDICINE

## 2019-02-13 RX ORDER — OXYCODONE HYDROCHLORIDE AND ACETAMINOPHEN 5; 325 MG/1; MG/1
1 TABLET ORAL EVERY 6 HOURS PRN
Qty: 20 TAB | Refills: 0 | Status: SHIPPED | OUTPATIENT
Start: 2019-02-13 | End: 2019-02-13 | Stop reason: SDUPTHER

## 2019-02-13 RX ORDER — OXYCODONE HYDROCHLORIDE AND ACETAMINOPHEN 5; 325 MG/1; MG/1
1 TABLET ORAL EVERY 6 HOURS PRN
Qty: 20 TAB | Refills: 0 | Status: SHIPPED | OUTPATIENT
Start: 2019-02-20 | End: 2019-02-13 | Stop reason: SDUPTHER

## 2019-02-13 RX ORDER — GABAPENTIN 600 MG/1
600 TABLET ORAL 3 TIMES DAILY
Qty: 90 TAB | Refills: 3 | Status: ON HOLD | OUTPATIENT
Start: 2019-02-13 | End: 2019-04-24

## 2019-02-13 RX ORDER — FLUOXETINE HYDROCHLORIDE 40 MG/1
CAPSULE ORAL
Refills: 2 | COMMUNITY
Start: 2018-11-20 | End: 2019-02-13

## 2019-02-13 RX ORDER — OXYCODONE HYDROCHLORIDE AND ACETAMINOPHEN 5; 325 MG/1; MG/1
1 TABLET ORAL EVERY 6 HOURS PRN
Qty: 20 TAB | Refills: 0 | Status: SHIPPED | OUTPATIENT
Start: 2019-02-27 | End: 2019-03-05 | Stop reason: SDUPTHER

## 2019-02-13 ASSESSMENT — ENCOUNTER SYMPTOMS
SHORTNESS OF BREATH: 0
CHILLS: 0
HALLUCINATIONS: 0
BACK PAIN: 1
ABDOMINAL PAIN: 1
COUGH: 0
VOMITING: 0
SENSORY CHANGE: 0
INSOMNIA: 0
SPEECH CHANGE: 0
HEADACHES: 0
NERVOUS/ANXIOUS: 1
FOCAL WEAKNESS: 0
TREMORS: 0
SPUTUM PRODUCTION: 0
DEPRESSION: 1
TINGLING: 1
PALPITATIONS: 0
NAUSEA: 0
EYES NEGATIVE: 1
FEVER: 0

## 2019-02-13 ASSESSMENT — LIFESTYLE VARIABLES: SUBSTANCE_ABUSE: 0

## 2019-02-13 NOTE — PROGRESS NOTES
Subjective:      Rhiannon Marrero is a 33 y.o. female who presents with Follow-Up (referrals)            Chronic pain recheck for: Pelvic pain  Last dose of controlled substance: Yesterday  Chronic pain treated with oxycodone taken 1-2 times a day as needed    She  reports that she does not drink alcohol.  She  reports that she does not use drugs.    Interval history: Persistent pelvic pain despite medications, she has not yet scheduled an appointment with pain management due to her case still being reviewed by them.  We will have her schedule an appointment with psychology for possible counseling in regards to her pelvic pain as well as other chronic pain issues  Any major change in health since last appointment? No    Consequences of Chronic Opiate therapy:  (5 A's)  Analgesia: Compared to no treatment or prior treatment, pain is currently improved  Activity: improved  Adverse Events: She denies constipation, dry mouth, itchy skin, nausea and sedation  Aberrant Behaviors: She reports she is taking medication as prescribed and is not veering from agreed treatment regimen or provider recommendations. There have been no inappropriate refills or lost/stolen meds reported.  Affect/Mood: Pain is not impacting patient's mood.  Patient denies depression/anxiety.    Nonnarcotic treatments that are being used: Gabapentin.  Her Neurontin has been increased to 600 mg 3 times daily to be used along with her NSAIDs and pain medication.    Last imagin/17    Opioid Risk Score: 5    Interpretation of Opioid Risk Score   Score 0-3 = Low risk of abuse. Do UDS at least once per year.  Score 4-7 = Moderate risk of abuse. Do UDS 1-4 times per year.  Score 8+ = High risk of abuse. Refer to specialist.    No order of CONTROLLED SUBSTANCE TREATMENT AGREEMENT is found.  UDS Summary     Patient has no health maintenance due at this time      Most recent UDS reviewed today and is consistent with prescribed medications.     I have  "reviewed the medical records, the Prescription Monitoring Program and I have determined that controlled substance treatment is medically indicated.           Review of Systems   Constitutional: Negative for chills and fever.   HENT: Negative for hearing loss and tinnitus.    Eyes: Negative.    Respiratory: Negative for cough, sputum production and shortness of breath.    Cardiovascular: Negative for chest pain and palpitations.   Gastrointestinal: Positive for abdominal pain. Negative for nausea and vomiting.   Genitourinary: Negative.    Musculoskeletal: Positive for back pain and joint pain.   Skin: Negative for rash.   Neurological: Positive for tingling. Negative for tremors, sensory change, speech change, focal weakness and headaches.   Psychiatric/Behavioral: Positive for depression. Negative for hallucinations, substance abuse and suicidal ideas. The patient is nervous/anxious. The patient does not have insomnia.           Objective:     /80 (BP Location: Left arm, Patient Position: Sitting)   Pulse 100   Temp 37.7 °C (99.8 °F)   Resp 14   Ht 1.676 m (5' 6\")   Wt 53.5 kg (118 lb)   SpO2 98%   BMI 19.05 kg/m²      Physical Exam   Constitutional: She is oriented to person, place, and time. She appears well-developed and well-nourished.   HENT:   Head: Normocephalic and atraumatic.   Nose: Nose normal.   Mouth/Throat: Oropharynx is clear and moist.   Cardiovascular: Normal rate, regular rhythm and normal heart sounds.  Exam reveals no friction rub.    No murmur heard.  Pulmonary/Chest: Effort normal and breath sounds normal. No respiratory distress. She has no wheezes. She has no rales.   Abdominal: Soft. Bowel sounds are normal. She exhibits no distension and no mass. There is tenderness. There is no guarding.   Musculoskeletal: She exhibits tenderness. She exhibits no edema.   Neurological: She is alert and oriented to person, place, and time.   Skin: Skin is warm and dry.   Psychiatric: She has a " normal mood and affect. Her behavior is normal.   Nursing note and vitals reviewed.              Assessment/Plan:     1. Dyspareunia, female  Patient information given on dyspareunia and bladder pain syndromes.  She has been referred to gynecology as well as urology for further assessment of her chronic pelvic pain.  She will also try to schedule an appointment with psychology for possible pain psychology management as well as a pain specialist for additional assistance in managing her chronic pain issues.  We will continue to follow.    2. S/P hysterectomy  See above plan.    3. Chronic pain syndrome  See above plan.  - REFERRAL TO UROLOGY  - oxyCODONE-acetaminophen (PERCOCET) 5-325 MG Tab; Take 1 Tab by mouth every 6 hours as needed for up to 7 days.  Dispense: 20 Tab; Refill: 0  - Consent for Opiate Prescription    4. Pelvic pain  See above plan.  - REFERRAL TO UROLOGY    5. Dyspareunia due to medical condition in female  See above plan.  - oxyCODONE-acetaminophen (PERCOCET) 5-325 MG Tab; Take 1 Tab by mouth every 6 hours as needed for up to 7 days.  Dispense: 20 Tab; Refill: 0  - Consent for Opiate Prescription    6. Low back pain with sciatica, sciatica laterality unspecified, unspecified back pain laterality, unspecified chronicity  See above plan.  - oxyCODONE-acetaminophen (PERCOCET) 5-325 MG Tab; Take 1 Tab by mouth every 6 hours as needed for up to 7 days.  Dispense: 20 Tab; Refill: 0  - Consent for Opiate Prescription    7. Arthralgia, unspecified joint  See above plan.   - oxyCODONE-acetaminophen (PERCOCET) 5-325 MG Tab; Take 1 Tab by mouth every 6 hours as needed for up to 7 days.  Dispense: 20 Tab; Refill: 0  - Consent for Opiate Prescription

## 2019-02-15 LAB
AMPHET CTO UR CFM-MCNC: NEGATIVE NG/ML
BARBITURATES CTO UR CFM-MCNC: NEGATIVE NG/ML
BENZODIAZ CTO UR CFM-MCNC: NEGATIVE NG/ML
BUPRENORPHINE UR-MCNC: NEGATIVE NG/ML
CANNABINOIDS CTO UR CFM-MCNC: POSITIVE NG/ML
CARISOPRODOL UR-MCNC: NEGATIVE NG/ML
COCAINE CTO UR CFM-MCNC: NEGATIVE NG/ML
DRUG SCREEN COMMENT UR-IMP: NORMAL
ETHYL GLUCURONIDE UR QL SCN: NEGATIVE NG/ML
FENTANYL UR-MCNC: NEGATIVE NG/ML
MEPERIDINE CTO UR SCN-MCNC: NEGATIVE NG/ML
METHADONE CTO UR CFM-MCNC: NEGATIVE NG/ML
OPIATES UR QL SCN: POSITIVE NG/ML
OXYCDOXYM URSCRN 2005102: POSITIVE NG/ML
PCP CTO UR CFM-MCNC: NEGATIVE NG/ML
PROPOXYPH CTO UR CFM-MCNC: NEGATIVE NG/ML
TAPENTADOL UR-MCNC: NEGATIVE NG/ML
TRAMADOL CTO UR SCN-MCNC: NEGATIVE NG/ML
ZOLPIDEM UR-MCNC: POSITIVE NG/ML

## 2019-02-18 LAB
6MAM UR CFM-MCNC: <10 NG/ML
CODEINE UR CFM-MCNC: <20 NG/ML
HYDROCODONE UR CFM-MCNC: 151 NG/ML
HYDROMORPHONE UR CFM-MCNC: <20 NG/ML
MORPHINE UR CFM-MCNC: <20 NG/ML
NORHYDROCODONE UR CFM-MCNC: 660 NG/ML
NOROXYCODONE UR CFM-MCNC: 798 NG/ML
OPIATES UR NOROXYM Q0836: 57 NG/ML
OXYCODONE UR CFM-MCNC: 69 NG/ML
OXYMORPHONE UR CFM-MCNC: <20 NG/ML
ZOLPIDEM UR CFM-MCNC: <20 NG/ML

## 2019-02-19 LAB — THC UR CFM-MCNC: 50 NG/ML

## 2019-02-21 ENCOUNTER — TELEPHONE (OUTPATIENT)
Dept: MEDICAL GROUP | Facility: MEDICAL CENTER | Age: 34
End: 2019-02-21

## 2019-03-05 DIAGNOSIS — M54.40 LOW BACK PAIN WITH SCIATICA, SCIATICA LATERALITY UNSPECIFIED, UNSPECIFIED BACK PAIN LATERALITY, UNSPECIFIED CHRONICITY: ICD-10-CM

## 2019-03-05 DIAGNOSIS — N94.19 DYSPAREUNIA DUE TO MEDICAL CONDITION IN FEMALE: ICD-10-CM

## 2019-03-05 DIAGNOSIS — G89.4 CHRONIC PAIN SYNDROME: ICD-10-CM

## 2019-03-05 DIAGNOSIS — M25.50 ARTHRALGIA, UNSPECIFIED JOINT: ICD-10-CM

## 2019-03-06 RX ORDER — OXYCODONE HYDROCHLORIDE AND ACETAMINOPHEN 5; 325 MG/1; MG/1
1 TABLET ORAL EVERY 6 HOURS PRN
Qty: 20 TAB | Refills: 0 | Status: SHIPPED | OUTPATIENT
Start: 2019-03-06 | End: 2019-03-13 | Stop reason: SDUPTHER

## 2019-03-06 NOTE — TELEPHONE ENCOUNTER
Called patient to inform her Rx is ready for . No answer, and VM not set up. Could not leave a message.    Rx for Oxycodone at .

## 2019-03-13 ENCOUNTER — OFFICE VISIT (OUTPATIENT)
Dept: MEDICAL GROUP | Facility: MEDICAL CENTER | Age: 34
End: 2019-03-13
Attending: FAMILY MEDICINE
Payer: MEDICAID

## 2019-03-13 VITALS
HEART RATE: 96 BPM | WEIGHT: 124 LBS | HEIGHT: 66 IN | TEMPERATURE: 98.2 F | SYSTOLIC BLOOD PRESSURE: 120 MMHG | DIASTOLIC BLOOD PRESSURE: 80 MMHG | OXYGEN SATURATION: 99 % | RESPIRATION RATE: 14 BRPM | BODY MASS INDEX: 19.93 KG/M2

## 2019-03-13 DIAGNOSIS — M25.50 ARTHRALGIA, UNSPECIFIED JOINT: ICD-10-CM

## 2019-03-13 DIAGNOSIS — N94.19 DYSPAREUNIA DUE TO MEDICAL CONDITION IN FEMALE: ICD-10-CM

## 2019-03-13 DIAGNOSIS — M54.40 LOW BACK PAIN WITH SCIATICA, SCIATICA LATERALITY UNSPECIFIED, UNSPECIFIED BACK PAIN LATERALITY, UNSPECIFIED CHRONICITY: ICD-10-CM

## 2019-03-13 DIAGNOSIS — G89.4 CHRONIC PAIN SYNDROME: ICD-10-CM

## 2019-03-13 PROCEDURE — 99212 OFFICE O/P EST SF 10 MIN: CPT | Performed by: FAMILY MEDICINE

## 2019-03-13 PROCEDURE — 99214 OFFICE O/P EST MOD 30 MIN: CPT | Performed by: FAMILY MEDICINE

## 2019-03-13 RX ORDER — OXYCODONE HYDROCHLORIDE AND ACETAMINOPHEN 5; 325 MG/1; MG/1
1 TABLET ORAL EVERY 6 HOURS PRN
Qty: 20 TAB | Refills: 0 | Status: SHIPPED | OUTPATIENT
Start: 2019-03-13 | End: 2019-03-13 | Stop reason: SDUPTHER

## 2019-03-13 RX ORDER — OXYCODONE HYDROCHLORIDE AND ACETAMINOPHEN 5; 325 MG/1; MG/1
1 TABLET ORAL EVERY 6 HOURS PRN
Qty: 20 TAB | Refills: 0 | Status: SHIPPED | OUTPATIENT
Start: 2019-03-20 | End: 2019-03-13 | Stop reason: SDUPTHER

## 2019-03-13 RX ORDER — OXYCODONE HYDROCHLORIDE AND ACETAMINOPHEN 5; 325 MG/1; MG/1
1 TABLET ORAL EVERY 6 HOURS PRN
Qty: 20 TAB | Refills: 0 | Status: SHIPPED | OUTPATIENT
Start: 2019-03-27 | End: 2019-04-02 | Stop reason: SDUPTHER

## 2019-03-13 ASSESSMENT — ENCOUNTER SYMPTOMS
DEPRESSION: 0
VOMITING: 0
TINGLING: 1
ROS GI COMMENTS: PELVIC
FEVER: 0
ABDOMINAL PAIN: 1
NAUSEA: 0
NERVOUS/ANXIOUS: 1
PALPITATIONS: 0
BACK PAIN: 1
NECK PAIN: 1
CHILLS: 0
SPUTUM PRODUCTION: 0
SHORTNESS OF BREATH: 0
COUGH: 0

## 2019-03-13 NOTE — PROGRESS NOTES
Subjective:      Rhiannon Marrero is a 33 y.o. female who presents with Medication Refill and Follow-Up (referrals)            Chronic pain recheck for: hip, pelvis and low back  Last dose of controlled substance: yesterday  Chronic pain treated with oxycodone taken 1-2 times a day as needed    She  reports that she does not drink alcohol.  She  reports that she does not use drugs.    Interval history: about the same  Any major change in health since last appointment? no    Consequences of Chronic Opiate therapy:  (5 A's)  Analgesia: Compared to no treatment or prior treatment, pain is currently improved  Activity: improved  Adverse Events: She denies constipation, dry mouth, itchy skin, nausea and sedation  Aberrant Behaviors: She reports she is taking medication as prescribed and is not veering from agreed treatment regimen or provider recommendations. There have been no inappropriate refills or lost/stolen meds reported.  Affect/Mood: Pain is not impacting patient's mood.  Patient denies depression/anxiety.    Nonnarcotic treatments that are being used: Gabapentin 600 tid.     Last imagin/17, lumbar xray ordered today    Opioid Risk Score: 5    Interpretation of Opioid Risk Score   Score 0-3 = Low risk of abuse. Do UDS at least once per year.  Score 4-7 = Moderate risk of abuse. Do UDS 1-4 times per year.  Score 8+ = High risk of abuse. Refer to specialist.    No order of CONTROLLED SUBSTANCE TREATMENT AGREEMENT is found.  UDS Summary     Patient has no health maintenance due at this time      Most recent UDS reviewed today and is consistent with prescribed medications.     I have reviewed the medical records, the Prescription Monitoring Program and I have determined that controlled substance treatment is medically indicated.         Review of Systems   Constitutional: Negative for chills and fever.   HENT: Negative for hearing loss and tinnitus.    Respiratory: Negative for cough, sputum production and  "shortness of breath.    Cardiovascular: Negative for chest pain and palpitations.   Gastrointestinal: Positive for abdominal pain. Negative for nausea and vomiting.        Pelvic   Musculoskeletal: Positive for back pain, joint pain and neck pain.   Skin: Negative for rash.   Neurological: Positive for tingling.   Psychiatric/Behavioral: Negative for depression. The patient is nervous/anxious.           Objective:     /80 (BP Location: Left arm, Patient Position: Sitting)   Pulse 96   Temp 36.8 °C (98.2 °F)   Resp 14   Ht 1.676 m (5' 6\")   Wt 56.2 kg (124 lb)   SpO2 99%   BMI 20.01 kg/m²      Physical Exam   Constitutional: She is oriented to person, place, and time. She appears well-developed and well-nourished.   HENT:   Head: Normocephalic and atraumatic.   Cardiovascular: Normal rate, regular rhythm and normal heart sounds.  Exam reveals no friction rub.    No murmur heard.  Pulmonary/Chest: Effort normal and breath sounds normal. No respiratory distress. She has no wheezes. She has no rales.   Abdominal: Soft. Bowel sounds are normal. She exhibits no distension. There is no tenderness.   Neurological: She is alert and oriented to person, place, and time.   Skin: Skin is warm and dry.   Psychiatric: She has a normal mood and affect. Her behavior is normal.   Nursing note and vitals reviewed.              Assessment/Plan:     1. Dyspareunia due to medical condition in female  Will refer to urology for additional assistance in management. Pt has been seen by gyn, will continue to follow.  - Consent for Opiate Prescription  - oxyCODONE-acetaminophen (PERCOCET) 5-325 MG Tab; Take 1 Tab by mouth every 6 hours as needed for up to 7 days.  Dispense: 20 Tab; Refill: 0    2. Low back pain with sciatica, sciatica laterality unspecified, unspecified back pain laterality, unspecified chronicity  Will get an xray of her back and will have her continue to use her medications as directed.   - Consent for Opiate " Prescription  - oxyCODONE-acetaminophen (PERCOCET) 5-325 MG Tab; Take 1 Tab by mouth every 6 hours as needed for up to 7 days.  Dispense: 20 Tab; Refill: 0  - DX-LUMBAR SPINE-2 OR 3 VIEWS; Future  - DX-THORACIC SPINE-2 VIEWS; Future    3. Chronic pain syndrome  See above plan.  - Consent for Opiate Prescription  - oxyCODONE-acetaminophen (PERCOCET) 5-325 MG Tab; Take 1 Tab by mouth every 6 hours as needed for up to 7 days.  Dispense: 20 Tab; Refill: 0    4. Arthralgia, unspecified joint  See above plan.   - Consent for Opiate Prescription  - oxyCODONE-acetaminophen (PERCOCET) 5-325 MG Tab; Take 1 Tab by mouth every 6 hours as needed for up to 7 days.  Dispense: 20 Tab; Refill: 0

## 2019-04-02 ENCOUNTER — OFFICE VISIT (OUTPATIENT)
Dept: MEDICAL GROUP | Facility: MEDICAL CENTER | Age: 34
End: 2019-04-02
Attending: FAMILY MEDICINE
Payer: MEDICAID

## 2019-04-02 VITALS
RESPIRATION RATE: 14 BRPM | WEIGHT: 120 LBS | TEMPERATURE: 98.5 F | DIASTOLIC BLOOD PRESSURE: 80 MMHG | SYSTOLIC BLOOD PRESSURE: 120 MMHG | BODY MASS INDEX: 19.99 KG/M2 | HEART RATE: 80 BPM | HEIGHT: 65 IN | OXYGEN SATURATION: 97 %

## 2019-04-02 DIAGNOSIS — M25.552 HIP PAIN, LEFT: ICD-10-CM

## 2019-04-02 DIAGNOSIS — N94.19 DYSPAREUNIA DUE TO MEDICAL CONDITION IN FEMALE: ICD-10-CM

## 2019-04-02 DIAGNOSIS — F39 MOOD DISORDER (HCC): ICD-10-CM

## 2019-04-02 DIAGNOSIS — M54.40 LOW BACK PAIN WITH SCIATICA, SCIATICA LATERALITY UNSPECIFIED, UNSPECIFIED BACK PAIN LATERALITY, UNSPECIFIED CHRONICITY: ICD-10-CM

## 2019-04-02 DIAGNOSIS — M79.7 FIBROMYALGIA: ICD-10-CM

## 2019-04-02 DIAGNOSIS — N30.10 CYSTITIS, INTERSTITIAL: ICD-10-CM

## 2019-04-02 DIAGNOSIS — R10.2 PELVIC PAIN: ICD-10-CM

## 2019-04-02 PROCEDURE — 99214 OFFICE O/P EST MOD 30 MIN: CPT | Performed by: FAMILY MEDICINE

## 2019-04-02 PROCEDURE — 99213 OFFICE O/P EST LOW 20 MIN: CPT | Performed by: FAMILY MEDICINE

## 2019-04-02 RX ORDER — MELOXICAM 15 MG/1
15 TABLET ORAL DAILY
Status: ON HOLD | COMMUNITY
Start: 2019-03-25 | End: 2019-04-24

## 2019-04-02 RX ORDER — TIZANIDINE 4 MG/1
2-4 TABLET ORAL EVERY 6 HOURS PRN
Qty: 30 TAB | Refills: 3 | Status: SHIPPED | OUTPATIENT
Start: 2019-04-02 | End: 2019-04-04 | Stop reason: SINTOL

## 2019-04-02 RX ORDER — OXYCODONE HYDROCHLORIDE AND ACETAMINOPHEN 5; 325 MG/1; MG/1
1 TABLET ORAL EVERY 6 HOURS PRN
Qty: 20 TAB | Refills: 0 | Status: SHIPPED | OUTPATIENT
Start: 2019-04-16 | End: 2019-04-23

## 2019-04-02 RX ORDER — OXYCODONE HYDROCHLORIDE AND ACETAMINOPHEN 5; 325 MG/1; MG/1
1 TABLET ORAL EVERY 6 HOURS PRN
Qty: 20 TAB | Refills: 0 | Status: SHIPPED | OUTPATIENT
Start: 2019-04-09 | End: 2019-04-02 | Stop reason: SDUPTHER

## 2019-04-02 RX ORDER — PENTOSAN POLYSULFATE SODIUM 100 MG/1
100 CAPSULE, GELATIN COATED ORAL 3 TIMES DAILY
COMMUNITY
Start: 2019-03-27 | End: 2019-11-26 | Stop reason: SDUPTHER

## 2019-04-02 RX ORDER — OXYCODONE HYDROCHLORIDE AND ACETAMINOPHEN 5; 325 MG/1; MG/1
1 TABLET ORAL EVERY 6 HOURS PRN
Qty: 20 TAB | Refills: 0 | Status: SHIPPED | OUTPATIENT
Start: 2019-04-02 | End: 2019-04-02 | Stop reason: SDUPTHER

## 2019-04-02 RX ORDER — METHOCARBAMOL 500 MG/1
TABLET, FILM COATED ORAL
COMMUNITY
Start: 2019-03-25 | End: 2019-04-02

## 2019-04-02 RX ORDER — DULOXETIN HYDROCHLORIDE 60 MG/1
60 CAPSULE, DELAYED RELEASE ORAL DAILY
Qty: 30 CAP | Refills: 2 | Status: SHIPPED | OUTPATIENT
Start: 2019-04-02 | End: 2019-06-11

## 2019-04-02 ASSESSMENT — ENCOUNTER SYMPTOMS
SPUTUM PRODUCTION: 0
HALLUCINATIONS: 0
DIARRHEA: 0
TREMORS: 0
FEVER: 0
SEIZURES: 0
COUGH: 0
NAUSEA: 1
DEPRESSION: 1
FOCAL WEAKNESS: 0
VOMITING: 0
ABDOMINAL PAIN: 1
SHORTNESS OF BREATH: 0
PALPITATIONS: 0
BACK PAIN: 1
CHILLS: 0
NERVOUS/ANXIOUS: 1
INSOMNIA: 0
NECK PAIN: 0
SENSORY CHANGE: 0
HEADACHES: 0
SPEECH CHANGE: 0
MYALGIAS: 1
TINGLING: 1

## 2019-04-02 ASSESSMENT — LIFESTYLE VARIABLES: SUBSTANCE_ABUSE: 0

## 2019-04-02 NOTE — PROGRESS NOTES
Subjective:      Rhiannon Marrero is a 33 y.o. female who presents with Spasms (muscle)            Chronic pain recheck for: intersitial cystitis, fibromyalgia, back pain with sciatica, hip and pelvic pain.  Last dose of controlled substance: yesterday  Chronic pain treated with percocet taken 1-2 times a day    She  reports that she does not drink alcohol.  She  reports that she does not use drugs.    Interval history: recently saw urology and found out that she has interstitial cystitis that may be contributing to some of her pelvic pain.  Any major change in health since last appointment? No    Consequences of Chronic Opiate therapy:  (5 A's)  Analgesia: Compared to no treatment or prior treatment, pain is currently improved  Activity: improved  Adverse Events: She denies constipation, dry mouth, itchy skin, nausea and sedation  Aberrant Behaviors: She reports she is taking medication as prescribed and is not veering from agreed treatment regimen or provider recommendations. There have been no inappropriate refills or lost/stolen meds reported.  Affect/Mood: Pain is not impacting patient's mood.  Patient reports depression/anxiety.    Nonnarcotic treatments that are being used: NSAIDs/VASQUEZ-2, Gabapentin, ice and heat.  We will also have her increase her Cymbalta to 60 mg daily and a referral to psychiatry has also been made for additional assistance in managing her underlying depression and anxiety.  We will also add a muscle relaxer to see if we can relax some of the muscle spasms she is also been experiencing.  Pain management has also been referred.  We will continue to follow.    Last imaging: May 2017    Opioid Risk Score: 5    Interpretation of Opioid Risk Score   Score 0-3 = Low risk of abuse. Do UDS at least once per year.  Score 4-7 = Moderate risk of abuse. Do UDS 1-4 times per year.  Score 8+ = High risk of abuse. Refer to specialist.    No order of CONTROLLED SUBSTANCE TREATMENT AGREEMENT is  "found.  UDS Summary     Patient has no health maintenance due at this time      Most recent UDS reviewed today and is consistent with prescribed medications.     I have reviewed the medical records, the Prescription Monitoring Program and I have determined that controlled substance treatment is medically indicated.           Review of Systems   Constitutional: Negative for chills and fever.   HENT: Negative for hearing loss and tinnitus.    Respiratory: Negative for cough, sputum production and shortness of breath.    Cardiovascular: Negative for chest pain and palpitations.   Gastrointestinal: Positive for abdominal pain and nausea. Negative for diarrhea and vomiting.   Musculoskeletal: Positive for back pain and myalgias. Negative for joint pain and neck pain.   Skin: Negative for rash.   Neurological: Positive for tingling. Negative for tremors, sensory change, speech change, focal weakness, seizures and headaches.   Psychiatric/Behavioral: Positive for depression. Negative for hallucinations, substance abuse and suicidal ideas. The patient is nervous/anxious. The patient does not have insomnia.           Objective:     /80 (BP Location: Left arm, Patient Position: Sitting)   Pulse 80   Temp 36.9 °C (98.5 °F)   Resp 14   Ht 1.651 m (5' 5\")   Wt 54.4 kg (120 lb)   SpO2 97%   BMI 19.97 kg/m²      Physical Exam   Constitutional: She is oriented to person, place, and time.   BMI 19.97   HENT:   Head: Normocephalic and atraumatic.   Neck: Normal range of motion. Neck supple.   Cardiovascular: Normal rate, regular rhythm and normal heart sounds.  Exam reveals no friction rub.    No murmur heard.  Pulmonary/Chest: Effort normal and breath sounds normal. No respiratory distress. She has no wheezes. She has no rales.   Abdominal: Soft. She exhibits no distension. There is tenderness.   Musculoskeletal: She exhibits tenderness. She exhibits no edema.   Decreased range of flexion and back and affected " extremities   Neurological: She is alert and oriented to person, place, and time.   Skin: Skin is warm and dry.   Psychiatric: Her behavior is normal.   Patient appears distressed by her symptoms   Nursing note and vitals reviewed.              Assessment/Plan:     1. Fibromyalgia  We will have her continue to use her medications as previously directed.  She will also be referred to pain management again since the previous pain specialist she had been referred to was not excepting new patients.  We will continue to follow.  - REFERRAL TO PAIN CLINIC  - DX-HIP-UNILATERAL-W/O PELVIS-2/3 VIEWS LEFT; Future    2. Low back pain with sciatica, sciatica laterality unspecified, unspecified back pain laterality, unspecified chronicity  See above plan.  - REFERRAL TO PAIN CLINIC  - DX-HIP-UNILATERAL-W/O PELVIS-2/3 VIEWS LEFT; Future  - Consent for Opiate Prescription  - oxyCODONE-acetaminophen (PERCOCET) 5-325 MG Tab; Take 1 Tab by mouth every 6 hours as needed for up to 7 days.  Dispense: 20 Tab; Refill: 0    3. Cystitis, interstitial  We will have her continue to use her medications as directed and follow-up with her urologist as directed.  We will continue to follow.  - REFERRAL TO PAIN CLINIC    4. Pelvic pain  See above plan.  - REFERRAL TO PAIN CLINIC    5. Hip pain, left  We will have her get an x-ray of her hip as well as continue to take her medications as directed.  We will continue to follow.    6. Mood disorder (HCC)  We will have her increase her Cymbalta to 60 mg.  We will also refer to psychiatry for additional assistance in management.  We will continue to follow.  - REFERRAL TO PSYCHIATRY    7. Dyspareunia due to medical condition in female  See above plan.  - Consent for Opiate Prescription  - oxyCODONE-acetaminophen (PERCOCET) 5-325 MG Tab; Take 1 Tab by mouth every 6 hours as needed for up to 7 days.  Dispense: 20 Tab; Refill: 0

## 2019-04-04 ENCOUNTER — TELEPHONE (OUTPATIENT)
Dept: MEDICAL GROUP | Facility: MEDICAL CENTER | Age: 34
End: 2019-04-04

## 2019-04-04 RX ORDER — BACLOFEN 10 MG/1
10 TABLET ORAL 3 TIMES DAILY PRN
Qty: 90 TAB | Refills: 3 | Status: ON HOLD | OUTPATIENT
Start: 2019-04-04 | End: 2019-04-24

## 2019-04-04 NOTE — TELEPHONE ENCOUNTER
1. Caller Name: Rhiannon Marrero                                           Call Back Number: [phone] (home)         Patient approves a detailed voicemail message: N\A    Patient states the muscle relaxer that was prescribed is causing gi pain and is requesting a different rx be sent. Please advise.

## 2019-04-23 ENCOUNTER — TELEPHONE (OUTPATIENT)
Dept: MEDICAL GROUP | Facility: MEDICAL CENTER | Age: 34
End: 2019-04-23

## 2019-04-23 NOTE — TELEPHONE ENCOUNTER
Patient answered and then hung up on me.  We will send letter about first no show to appointment today 4/23/19.

## 2019-04-24 ENCOUNTER — APPOINTMENT (OUTPATIENT)
Dept: RADIOLOGY | Facility: MEDICAL CENTER | Age: 34
End: 2019-04-24
Attending: ORTHOPAEDIC SURGERY
Payer: MEDICAID

## 2019-04-24 ENCOUNTER — ANESTHESIA (OUTPATIENT)
Dept: SURGERY | Facility: MEDICAL CENTER | Age: 34
End: 2019-04-24
Payer: MEDICAID

## 2019-04-24 ENCOUNTER — ANESTHESIA EVENT (OUTPATIENT)
Dept: SURGERY | Facility: MEDICAL CENTER | Age: 34
End: 2019-04-24
Payer: MEDICAID

## 2019-04-24 ENCOUNTER — HOSPITAL ENCOUNTER (OUTPATIENT)
Facility: MEDICAL CENTER | Age: 34
End: 2019-04-24
Attending: ORTHOPAEDIC SURGERY | Admitting: ORTHOPAEDIC SURGERY
Payer: MEDICAID

## 2019-04-24 VITALS
SYSTOLIC BLOOD PRESSURE: 111 MMHG | HEART RATE: 84 BPM | WEIGHT: 127.21 LBS | BODY MASS INDEX: 20.44 KG/M2 | TEMPERATURE: 98.5 F | HEIGHT: 66 IN | OXYGEN SATURATION: 96 % | RESPIRATION RATE: 17 BRPM | DIASTOLIC BLOOD PRESSURE: 66 MMHG

## 2019-04-24 DIAGNOSIS — G89.18 POST-OP PAIN: ICD-10-CM

## 2019-04-24 LAB
HCG UR QL: NEGATIVE
SP GR UR REFRACTOMETRY: 1.01

## 2019-04-24 PROCEDURE — 160046 HCHG PACU - 1ST 60 MINS PHASE II: Performed by: ORTHOPAEDIC SURGERY

## 2019-04-24 PROCEDURE — 160036 HCHG PACU - EA ADDL 30 MINS PHASE I: Performed by: ORTHOPAEDIC SURGERY

## 2019-04-24 PROCEDURE — 160035 HCHG PACU - 1ST 60 MINS PHASE I: Performed by: ORTHOPAEDIC SURGERY

## 2019-04-24 PROCEDURE — 160028 HCHG SURGERY MINUTES - 1ST 30 MINS LEVEL 3: Performed by: ORTHOPAEDIC SURGERY

## 2019-04-24 PROCEDURE — 700101 HCHG RX REV CODE 250: Performed by: ANESTHESIOLOGY

## 2019-04-24 PROCEDURE — 160025 RECOVERY II MINUTES (STATS): Performed by: ORTHOPAEDIC SURGERY

## 2019-04-24 PROCEDURE — 700111 HCHG RX REV CODE 636 W/ 250 OVERRIDE (IP): Performed by: ANESTHESIOLOGY

## 2019-04-24 PROCEDURE — C1713 ANCHOR/SCREW BN/BN,TIS/BN: HCPCS | Performed by: ORTHOPAEDIC SURGERY

## 2019-04-24 PROCEDURE — 500881 HCHG PACK, EXTREMITY: Performed by: ORTHOPAEDIC SURGERY

## 2019-04-24 PROCEDURE — A9270 NON-COVERED ITEM OR SERVICE: HCPCS | Performed by: ANESTHESIOLOGY

## 2019-04-24 PROCEDURE — 160039 HCHG SURGERY MINUTES - EA ADDL 1 MIN LEVEL 3: Performed by: ORTHOPAEDIC SURGERY

## 2019-04-24 PROCEDURE — 700102 HCHG RX REV CODE 250 W/ 637 OVERRIDE(OP): Performed by: ANESTHESIOLOGY

## 2019-04-24 PROCEDURE — 700105 HCHG RX REV CODE 258: Performed by: ANESTHESIOLOGY

## 2019-04-24 PROCEDURE — 501838 HCHG SUTURE GENERAL: Performed by: ORTHOPAEDIC SURGERY

## 2019-04-24 PROCEDURE — 160048 HCHG OR STATISTICAL LEVEL 1-5: Performed by: ORTHOPAEDIC SURGERY

## 2019-04-24 PROCEDURE — 700105 HCHG RX REV CODE 258: Performed by: ORTHOPAEDIC SURGERY

## 2019-04-24 PROCEDURE — 160002 HCHG RECOVERY MINUTES (STAT): Performed by: ORTHOPAEDIC SURGERY

## 2019-04-24 PROCEDURE — 73100 X-RAY EXAM OF WRIST: CPT | Mod: RT

## 2019-04-24 PROCEDURE — 81025 URINE PREGNANCY TEST: CPT

## 2019-04-24 PROCEDURE — 160009 HCHG ANES TIME/MIN: Performed by: ORTHOPAEDIC SURGERY

## 2019-04-24 DEVICE — SCREW 3.5 MM NON-LOCKING TI X 12MM LONG (6TX8+2TX5=58): Type: IMPLANTABLE DEVICE | Site: WRIST | Status: FUNCTIONAL

## 2019-04-24 DEVICE — SCREW 2.5 MM LOCKING TI X 18MM LONG (6TX8=48): Type: IMPLANTABLE DEVICE | Site: WRIST | Status: FUNCTIONAL

## 2019-04-24 DEVICE — SCREW 3.5 MM LOCKING TI X 12MM LONG (6TX8+2TX5=58): Type: IMPLANTABLE DEVICE | Site: WRIST | Status: FUNCTIONAL

## 2019-04-24 DEVICE — SCREW 3.5 MM LOCKING TI X 14MM LONG (6TX8+2TX5=58): Type: IMPLANTABLE DEVICE | Site: WRIST | Status: FUNCTIONAL

## 2019-04-24 DEVICE — SCREW 2.5 MM LOCKING TI X 14MM LONG (6TX8=48): Type: IMPLANTABLE DEVICE | Site: WRIST | Status: FUNCTIONAL

## 2019-04-24 DEVICE — PLATE VOLAR NARROW RIGHT 3HX20X44MM (2TX2=4): Type: IMPLANTABLE DEVICE | Site: WRIST | Status: FUNCTIONAL

## 2019-04-24 RX ORDER — DIPHENHYDRAMINE HYDROCHLORIDE 50 MG/ML
12.5 INJECTION INTRAMUSCULAR; INTRAVENOUS
Status: DISCONTINUED | OUTPATIENT
Start: 2019-04-24 | End: 2019-04-24 | Stop reason: HOSPADM

## 2019-04-24 RX ORDER — HYDROMORPHONE HYDROCHLORIDE 1 MG/ML
0.1 INJECTION, SOLUTION INTRAMUSCULAR; INTRAVENOUS; SUBCUTANEOUS
Status: DISCONTINUED | OUTPATIENT
Start: 2019-04-24 | End: 2019-04-24 | Stop reason: HOSPADM

## 2019-04-24 RX ORDER — BUPIVACAINE HYDROCHLORIDE AND EPINEPHRINE 5; 5 MG/ML; UG/ML
INJECTION, SOLUTION EPIDURAL; INTRACAUDAL; PERINEURAL
Status: DISCONTINUED | OUTPATIENT
Start: 2019-04-24 | End: 2019-04-24 | Stop reason: HOSPADM

## 2019-04-24 RX ORDER — MIDAZOLAM HYDROCHLORIDE 1 MG/ML
1 INJECTION INTRAMUSCULAR; INTRAVENOUS
Status: DISCONTINUED | OUTPATIENT
Start: 2019-04-24 | End: 2019-04-24 | Stop reason: HOSPADM

## 2019-04-24 RX ORDER — OXYCODONE HYDROCHLORIDE 5 MG/1
5 TABLET ORAL EVERY 4 HOURS PRN
COMMUNITY
End: 2019-06-11 | Stop reason: SDUPTHER

## 2019-04-24 RX ORDER — MAGNESIUM HYDROXIDE 1200 MG/15ML
LIQUID ORAL
Status: COMPLETED | OUTPATIENT
Start: 2019-04-24 | End: 2019-04-24

## 2019-04-24 RX ORDER — PREGABALIN 150 MG/1
150 CAPSULE ORAL 3 TIMES DAILY
COMMUNITY
End: 2019-06-11

## 2019-04-24 RX ORDER — CELECOXIB 200 MG/1
200 CAPSULE ORAL ONCE
Status: COMPLETED | OUTPATIENT
Start: 2019-04-24 | End: 2019-04-24

## 2019-04-24 RX ORDER — ONDANSETRON 2 MG/ML
4 INJECTION INTRAMUSCULAR; INTRAVENOUS
Status: DISCONTINUED | OUTPATIENT
Start: 2019-04-24 | End: 2019-04-24 | Stop reason: HOSPADM

## 2019-04-24 RX ORDER — HYDROCODONE BITARTRATE AND ACETAMINOPHEN 10; 325 MG/1; MG/1
1-2 TABLET ORAL EVERY 6 HOURS PRN
Qty: 20 TAB | Refills: 0 | Status: SHIPPED | OUTPATIENT
Start: 2019-04-24 | End: 2019-04-29

## 2019-04-24 RX ORDER — M-VIT,TX,IRON,MINS/CALC/FOLIC 27MG-0.4MG
1 TABLET ORAL DAILY
COMMUNITY
End: 2020-07-13

## 2019-04-24 RX ORDER — SODIUM CHLORIDE, SODIUM LACTATE, POTASSIUM CHLORIDE, CALCIUM CHLORIDE 600; 310; 30; 20 MG/100ML; MG/100ML; MG/100ML; MG/100ML
INJECTION, SOLUTION INTRAVENOUS CONTINUOUS
Status: DISCONTINUED | OUTPATIENT
Start: 2019-04-24 | End: 2019-04-24 | Stop reason: HOSPADM

## 2019-04-24 RX ORDER — MEPERIDINE HYDROCHLORIDE 25 MG/ML
12.5 INJECTION INTRAMUSCULAR; INTRAVENOUS; SUBCUTANEOUS
Status: DISCONTINUED | OUTPATIENT
Start: 2019-04-24 | End: 2019-04-24 | Stop reason: HOSPADM

## 2019-04-24 RX ORDER — HYDROMORPHONE HYDROCHLORIDE 2 MG/ML
INJECTION, SOLUTION INTRAMUSCULAR; INTRAVENOUS; SUBCUTANEOUS PRN
Status: DISCONTINUED | OUTPATIENT
Start: 2019-04-24 | End: 2019-04-24 | Stop reason: SURG

## 2019-04-24 RX ORDER — GABAPENTIN 300 MG/1
300 CAPSULE ORAL ONCE
Status: COMPLETED | OUTPATIENT
Start: 2019-04-24 | End: 2019-04-24

## 2019-04-24 RX ORDER — OXYCODONE HCL 5 MG/5 ML
5 SOLUTION, ORAL ORAL
Status: COMPLETED | OUTPATIENT
Start: 2019-04-24 | End: 2019-04-24

## 2019-04-24 RX ORDER — DEXAMETHASONE SODIUM PHOSPHATE 4 MG/ML
INJECTION, SOLUTION INTRA-ARTICULAR; INTRALESIONAL; INTRAMUSCULAR; INTRAVENOUS; SOFT TISSUE PRN
Status: DISCONTINUED | OUTPATIENT
Start: 2019-04-24 | End: 2019-04-24 | Stop reason: SURG

## 2019-04-24 RX ORDER — ACETAMINOPHEN 500 MG
1000 TABLET ORAL ONCE
Status: COMPLETED | OUTPATIENT
Start: 2019-04-24 | End: 2019-04-24

## 2019-04-24 RX ORDER — HYDROMORPHONE HYDROCHLORIDE 1 MG/ML
0.2 INJECTION, SOLUTION INTRAMUSCULAR; INTRAVENOUS; SUBCUTANEOUS
Status: DISCONTINUED | OUTPATIENT
Start: 2019-04-24 | End: 2019-04-24 | Stop reason: HOSPADM

## 2019-04-24 RX ORDER — HYDROMORPHONE HYDROCHLORIDE 1 MG/ML
0.4 INJECTION, SOLUTION INTRAMUSCULAR; INTRAVENOUS; SUBCUTANEOUS
Status: DISCONTINUED | OUTPATIENT
Start: 2019-04-24 | End: 2019-04-24 | Stop reason: HOSPADM

## 2019-04-24 RX ORDER — SODIUM CHLORIDE, SODIUM LACTATE, POTASSIUM CHLORIDE, CALCIUM CHLORIDE 600; 310; 30; 20 MG/100ML; MG/100ML; MG/100ML; MG/100ML
INJECTION, SOLUTION INTRAVENOUS
Status: DISCONTINUED | OUTPATIENT
Start: 2019-04-24 | End: 2019-04-24 | Stop reason: SURG

## 2019-04-24 RX ORDER — BACLOFEN 10 MG/1
10 TABLET ORAL 3 TIMES DAILY PRN
COMMUNITY
End: 2019-07-16 | Stop reason: SDUPTHER

## 2019-04-24 RX ORDER — OXYCODONE HYDROCHLORIDE 5 MG/1
5 TABLET ORAL
Status: COMPLETED | OUTPATIENT
Start: 2019-04-24 | End: 2019-04-24

## 2019-04-24 RX ORDER — OXYCODONE HCL 5 MG/5 ML
10 SOLUTION, ORAL ORAL
Status: COMPLETED | OUTPATIENT
Start: 2019-04-24 | End: 2019-04-24

## 2019-04-24 RX ORDER — CEFAZOLIN SODIUM 1 G/3ML
INJECTION, POWDER, FOR SOLUTION INTRAMUSCULAR; INTRAVENOUS PRN
Status: DISCONTINUED | OUTPATIENT
Start: 2019-04-24 | End: 2019-04-24 | Stop reason: SURG

## 2019-04-24 RX ORDER — IBUPROFEN 200 MG
800 TABLET ORAL EVERY 6 HOURS PRN
COMMUNITY
End: 2020-07-13

## 2019-04-24 RX ADMIN — HYDROMORPHONE HYDROCHLORIDE 0.4 MG: 1 INJECTION, SOLUTION INTRAMUSCULAR; INTRAVENOUS; SUBCUTANEOUS at 16:59

## 2019-04-24 RX ADMIN — CELECOXIB 200 MG: 200 CAPSULE ORAL at 14:29

## 2019-04-24 RX ADMIN — FENTANYL CITRATE 25 MCG: 50 INJECTION, SOLUTION INTRAMUSCULAR; INTRAVENOUS at 16:17

## 2019-04-24 RX ADMIN — PROPOFOL 200 MG: 10 INJECTION, EMULSION INTRAVENOUS at 15:25

## 2019-04-24 RX ADMIN — MIDAZOLAM HYDROCHLORIDE 1 MG: 1 INJECTION, SOLUTION INTRAMUSCULAR; INTRAVENOUS at 16:19

## 2019-04-24 RX ADMIN — LIDOCAINE HYDROCHLORIDE 100 MG: 20 INJECTION, SOLUTION INTRAVENOUS at 15:25

## 2019-04-24 RX ADMIN — OXYCODONE HYDROCHLORIDE 10 MG: 5 SOLUTION ORAL at 16:13

## 2019-04-24 RX ADMIN — FENTANYL CITRATE 25 MCG: 50 INJECTION, SOLUTION INTRAMUSCULAR; INTRAVENOUS at 16:39

## 2019-04-24 RX ADMIN — MIDAZOLAM HYDROCHLORIDE 1 MG: 1 INJECTION, SOLUTION INTRAMUSCULAR; INTRAVENOUS at 16:38

## 2019-04-24 RX ADMIN — GABAPENTIN 300 MG: 300 CAPSULE ORAL at 14:30

## 2019-04-24 RX ADMIN — CEFAZOLIN 2 G: 330 INJECTION, POWDER, FOR SOLUTION INTRAMUSCULAR; INTRAVENOUS at 15:21

## 2019-04-24 RX ADMIN — OXYCODONE HYDROCHLORIDE 5 MG: 5 TABLET ORAL at 14:52

## 2019-04-24 RX ADMIN — PROPOFOL 100 MG: 10 INJECTION, EMULSION INTRAVENOUS at 15:34

## 2019-04-24 RX ADMIN — FENTANYL CITRATE 25 MCG: 50 INJECTION, SOLUTION INTRAMUSCULAR; INTRAVENOUS at 16:12

## 2019-04-24 RX ADMIN — FENTANYL CITRATE 25 MCG: 50 INJECTION, SOLUTION INTRAMUSCULAR; INTRAVENOUS at 16:34

## 2019-04-24 RX ADMIN — ACETAMINOPHEN 1000 MG: 500 TABLET ORAL at 14:30

## 2019-04-24 RX ADMIN — HYDROMORPHONE HYDROCHLORIDE 2 MG: 2 INJECTION, SOLUTION INTRAMUSCULAR; INTRAVENOUS; SUBCUTANEOUS at 15:35

## 2019-04-24 RX ADMIN — MIDAZOLAM HYDROCHLORIDE 2 MG: 1 INJECTION, SOLUTION INTRAMUSCULAR; INTRAVENOUS at 15:22

## 2019-04-24 RX ADMIN — SODIUM CHLORIDE, POTASSIUM CHLORIDE, SODIUM LACTATE AND CALCIUM CHLORIDE: 600; 310; 30; 20 INJECTION, SOLUTION INTRAVENOUS at 14:29

## 2019-04-24 RX ADMIN — DEXAMETHASONE SODIUM PHOSPHATE 8 MG: 4 INJECTION, SOLUTION INTRA-ARTICULAR; INTRALESIONAL; INTRAMUSCULAR; INTRAVENOUS; SOFT TISSUE at 15:22

## 2019-04-24 RX ADMIN — SODIUM CHLORIDE, POTASSIUM CHLORIDE, SODIUM LACTATE AND CALCIUM CHLORIDE: 600; 310; 30; 20 INJECTION, SOLUTION INTRAVENOUS at 15:21

## 2019-04-24 ASSESSMENT — PAIN SCALES - GENERAL: PAIN_LEVEL: 9

## 2019-04-24 NOTE — ANESTHESIA QCDR
2019 [facility] Clinical Data Registry (for Quality Improvement)     Postoperative nausea/vomiting risk protocol (Adult = 18 yrs and Pediatric 3-17 yrs)- (430 and 463)  General inhalation anesthetic (NOT TIVA) with PONV risk factors: Yes  Provision of anti-emetic therapy with at least 2 different classes of agents: Yes   Patient DID NOT receive anti-emetic therapy and reason is documented in Medical Record:  N/A    Multimodal Pain Management- (AQI59)  Patient undergoing Elective Surgery (i.e. Outpatient, or ASC, or Prescheduled Surgery prior to Hospital Admission): Yes  Use of Multimodal Pain Management, two or more drugs and/or interventions, NOT including systemic opioids: Yes   Exception: Documented allergy to multiple classes of analgesics:  N/A    PACU assessment of acute postoperative pain prior to Anesthesia Care End- Applies to Patients Age = 18- (ABG7)  Initial PACU pain score is which of the following: >= 7/10  Patient unable to report pain score: N/A    Post-anesthetic transfer of care checklist/protocol to PACU/ICU- (426 and 427)  Upon conclusion of case, patient transferred to which of the following locations: PACU/Non-ICU  Use of transfer checklist/protocol: Yes  Exclusion: Service Performed in Patient Hospital Room (and thus did not require transfer): N/A    PACU Reintubation- (AQI31)  General anesthesia requiring endotracheal intubation (ETT) along with subsequent extubation in OR or PACU: No  Required reintubation in the PACU: N/A  Extubation was a planned trial documented in the medical record prior to removal of the original airway device: N/A    Unplanned admission to ICU related to anesthesia service up through end of PACU care- (MD51)  Unplanned admission to ICU (not initially anticipated at anesthesia start time): No

## 2019-04-24 NOTE — ANESTHESIA POSTPROCEDURE EVALUATION
Patient: Rhiannon Marrero    Procedure Summary     Date:  04/24/19 Room / Location:  [place] [address] / SURGERY [facility]    Anesthesia Start:  1521 Anesthesia Stop:  1600    Procedure:  ORIF, WRIST (Right Wrist) Diagnosis:  (right distal radius fracture )    Surgeon:  Marquis Bell M.D. Responsible Provider:  Tobey Gansert, M.D.    Anesthesia Type:  general ASA Status:  1          Final Anesthesia Type: general  Last vitals  BP   Blood Pressure: 111/66, NIBP: 100/49    Temp   36.2 °C (97.2 °F)    Pulse   Pulse: 86, Heart Rate (Monitored): 87   Resp   (!) 25    SpO2   97 %      Anesthesia Post Evaluation    Patient location during evaluation: PACU  Patient participation: complete - patient participated  Level of consciousness: awake and alert  Pain score: 9  Chronic pain, opioid dependence and surgery  Airway patency: patent  Anesthetic complications: no  Cardiovascular status: hemodynamically stable  Respiratory status: acceptable  Hydration status: euvolemic    PONV: none

## 2019-04-24 NOTE — ANESTHESIA PREPROCEDURE EVALUATION
Relevant Problems   No relevant active problems       Physical Exam    Airway   Mallampati: II  TM distance: >3 FB  Neck ROM: full       Cardiovascular - normal exam  Rhythm: regular  Rate: normal  (-) murmur     Dental - normal exam         Pulmonary - normal exam  Breath sounds clear to auscultation     Abdominal    Neurological - normal exam                 Anesthesia Plan    ASA 1       Plan - general       Airway plan will be LMA        Induction: intravenous    Postoperative Plan: Postoperative administration of opioids is intended.    Pertinent diagnostic labs and testing reviewed    Informed Consent:    Anesthetic plan and risks discussed with patient.

## 2019-04-24 NOTE — ANESTHESIA TIME REPORT
Anesthesia Start and Stop Event Times     Date Time Event    4/24/2019 1521 Anesthesia Start     1600 Anesthesia Stop        Responsible Staff  04/24/19    Name Role Begin End    Tobey Gansert, M.D. Anesth 1521 1600        Preop Diagnosis (Free Text):  Pre-op Diagnosis     right distal radius fracture         Preop Diagnosis (Codes):  Diagnosis Information     Diagnosis Code(s):         Post op Diagnosis  Wrist fracture      Premium Reason  A. 3PM - 7AM    Comments:

## 2019-04-24 NOTE — ANESTHESIA PROCEDURE NOTES
Airway  Date/Time: 4/24/2019 3:28 PM  Performed by: GANSERT, TOBEY B  Authorized by: GANSERT, TOBEY B     Location:  OR  Urgency:  Elective  Indications for Airway Management:  Anesthesia  Spontaneous Ventilation: absent    Sedation Level:  Deep  Preoxygenated: Yes    Final Airway Type:  Supraglottic airway  Final Supraglottic Airway:  Standard LMA  SGA Size:  3  Number of Attempts at Approach:  1
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
## 2019-04-24 NOTE — OR NURSING
"When asked if pt has had recent thoughts of harming herself or others, pt states \"I do not wish to be dead, I am not suicidal. I do self harm to take away the pain that I have in my pelvis.\" Pt also states that cutting helps her cope with the pain that she has. Self harm cutting marks seen on left forearm. Asked pt if she wishes to speak to a  or other services about this, and she stated that she has plans and meetings set up with a psychiatrist after this surgery already.      "
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
## 2019-04-24 NOTE — OR NURSING
1558: received to PACU via gurney with LMA. Respirations even and unlabored. Dressing to right wrist CDI. Elevated and iced.  1605: patient awaken. LMA dc'd without problem or difficulty.  1612: c/o pain. Medicated. See MAR  1617: still c/o pain. Medicated. See MAR  1634: pain scale 7/10. Medicated. See MAR  1639: still c/o pain. Medicated. See MAR.  1659: pain scale 6/10. Medicated. See MAR  1715: pain scale 4/10. Tolerable. Patient very emotional. Wanting to see family in the discharge area as soon as possible.  1752: report called to Fabienne NELSON  1754: transported via gurney to PACU 2. Stable.

## 2019-04-25 NOTE — DISCHARGE INSTRUCTIONS
"  ACTIVITY: Rest and take it easy for the first 24 hours.  A responsible adult is recommended to remain with you during that time.  It is normal to feel sleepy.  We encourage you to not do anything that requires balance, judgment or coordination.    MILD FLU-LIKE SYMPTOMS ARE NORMAL. YOU MAY EXPERIENCE GENERALIZED MUSCLE ACHES, THROAT IRRITATION, HEADACHE AND/OR SOME NAUSEA.    FOR 24 HOURS DO NOT:  Drive, operate machinery or run household appliances.  Drink beer or alcoholic beverages.   Make important decisions or sign legal documents.    SPECIAL INSTRUCTIONS:     Peripheral Nerve Block Discharge Instructions from Same Day Surgery and Inpatient :    What to Expect - Upper Extremity  · You may experience numbness and weakness in arm  on the same side as your surgery  · This is normal. For some people, this may be an unpleasant sensation. Be very careful with your numb limb  · Ask for help when you need it  Shoulder Surgery Side Effects  · In addition to numbness and weakness you may experience other symptoms  · Other nerves that are close to those nerves injected can also be affected by local anesthesia  · You may experience a hoarseness in your voice  · Your breathing may feel different  · You may also notice drooping of your eyelid, pupil constriction, and decreased sweating, on the side of your surgery  · All of these side effects are normal and will resolve when the local anesthetic wears off   Prevent Injury  · Protect the limb like a baby  · Beware of exposing your limb to extreme heat or cold or trauma  · The limb may be injured without you noticing because it is numb  · Keep the limb elevated whenever possible  · Do not sleep on the limb  · Change the position of the limb regularly  · Avoid putting pressure on your surgical limb  Pain Control  · The initial block on the day of surgery will make your extremity feel \"numb\"  · Any consecutive injection including prior to discharge from the hospital will make " "your extremity feel \"numb\"  · You may feel an aching or burning when the local anesthesia starts to wear off  · Take pain pills as prescribed by your surgeon  · Call your surgeon or anesthesiologist if you do not have adequate pain control      DIET: To avoid nausea, slowly advance diet as tolerated, avoiding spicy or greasy foods for the first day.  Add more substantial food to your diet according to your physician's instructions.  Babies can be fed formula or breast milk as soon as they are hungry.  INCREASE FLUIDS AND FIBER TO AVOID CONSTIPATION.    SURGICAL DRESSING/BATHING: Keep dressing clean and dry. Follow MD's instructions.    FOLLOW-UP APPOINTMENT:  A follow-up appointment should be arranged with your doctor in 2 weeks ; call to schedule.    You should CALL YOUR PHYSICIAN if you develop:  Fever greater than 101 degrees F.  Pain not relieved by medication, or persistent nausea or vomiting.  Excessive bleeding (blood soaking through dressing) or unexpected drainage from the wound.  Extreme redness or swelling around the incision site, drainage of pus or foul smelling drainage.  Inability to urinate or empty your bladder within 8 hours.  Problems with breathing or chest pain.    You should call 911 if you develop problems with breathing or chest pain.  If you are unable to contact your doctor or surgical center, you should go to the nearest emergency room or urgent care center.  Physician's telephone #: [phone]    If any questions arise, call your doctor.  If your doctor is not available, please feel free to call the Surgical Center at [phone].  The Center is open Monday through Friday from 7AM to 7PM.  You can also call the Rapid Pathogen Screening HOTLINE open 24 hours/day, 7 days/week and speak to a nurse at [phone], or toll free at [phone].    A registered nurse may call you a few days after your surgery to see how you are doing after your procedure.    MEDICATIONS: Resume taking daily medication.  " Take prescribed pain medication with food.  If no medication is prescribed, you may take non-aspirin pain medication if needed.  PAIN MEDICATION CAN BE VERY CONSTIPATING.  Take a stool softener or laxative such as senokot, pericolace, or milk of magnesia if needed.    Prescription given for Norco.  Last pain medication given at 4:13 PM.    If your physician has prescribed pain medication that includes Acetaminophen (Tylenol), do not take additional Acetaminophen (Tylenol) while taking the prescribed medication.    Depression / Suicide Risk    As you are discharged from this [facility] facility, it is important to learn how to keep safe from harming yourself.    Recognize the warning signs:  · Abrupt changes in personality, positive or negative- including increase in energy   · Giving away possessions  · Change in eating patterns- significant weight changes-  positive or negative  · Change in sleeping patterns- unable to sleep or sleeping all the time   · Unwillingness or inability to communicate  · Depression  · Unusual sadness, discouragement and loneliness  · Talk of wanting to die  · Neglect of personal appearance   · Rebelliousness- reckless behavior  · Withdrawal from people/activities they love  · Confusion- inability to concentrate     If you or a loved one observes any of these behaviors or has concerns about self-harm, here's what you can do:  · Talk about it- your feelings and reasons for harming yourself  · Remove any means that you might use to hurt yourself (examples: pills, rope, extension cords, firearm)  · Get professional help from the community (Mental Health, Substance Abuse, psychological counseling)  · Do not be alone:Call your Safe Contact- someone whom you trust who will be there for you.  · Call your local CRISIS HOTLINE 090-0561 or 389-348-0872  · Call your local Children's Mobile Crisis Response Team [place] (165) 540-0750 or www.[employer]  · Call the toll free Anytime DD  Suicide Prevention Hotlines   · National Suicide Prevention Lifeline 105-688-VRGP (7999)  · National Hope Line Network 800-SUICIDE (552-2580)

## 2019-04-25 NOTE — OR NURSING
1756: Assumed care of patient from PACU, see flow sheets for vital signs. Patient alert and oriented times four. Assessment completed. Surgical incision assessed, dressing clean, dry and intact. Pain is controlled and tolerable for patient. Patient updated of plan of care for discharge. Family/friend at bedside with patient. Discharge instructions reviewed and all questions answered. All needs met, patient dressed and safe to discharge home.   Patient tolerating fluids and food with no nausea.  Awaiting sling from OR.    1830: Patient ready to go, last vital signs in flow sheet. PIV removed. Patient taken to car in wheelchair. Patient safe to discharge.

## 2019-05-08 ENCOUNTER — APPOINTMENT (OUTPATIENT)
Dept: SCHEDULING | Facility: IMAGING CENTER | Age: 34
End: 2019-05-08
Payer: MEDICAID

## 2019-05-08 ENCOUNTER — HOSPITAL ENCOUNTER (OUTPATIENT)
Facility: MEDICAL CENTER | Age: 34
End: 2019-05-08
Attending: PSYCHIATRY & NEUROLOGY
Payer: MEDICAID

## 2019-05-08 PROCEDURE — 99244 OFF/OP CNSLTJ NEW/EST MOD 40: CPT | Performed by: PSYCHIATRY & NEUROLOGY

## 2019-05-08 ASSESSMENT — ENCOUNTER SYMPTOMS
DEPRESSION: 1
EYES NEGATIVE: 1
RESPIRATORY NEGATIVE: 1
CARDIOVASCULAR NEGATIVE: 1
CONSTITUTIONAL NEGATIVE: 1
MUSCULOSKELETAL NEGATIVE: 1
NERVOUS/ANXIOUS: 1
NEUROLOGICAL NEGATIVE: 1
GASTROINTESTINAL NEGATIVE: 1

## 2019-05-08 ASSESSMENT — LIFESTYLE VARIABLES: SUBSTANCE_ABUSE: 1
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
## 2019-06-11 ENCOUNTER — OFFICE VISIT (OUTPATIENT)
Dept: MEDICAL GROUP | Facility: MEDICAL CENTER | Age: 34
End: 2019-06-11
Attending: FAMILY MEDICINE
Payer: MEDICAID

## 2019-06-11 VITALS
BODY MASS INDEX: 20.57 KG/M2 | SYSTOLIC BLOOD PRESSURE: 120 MMHG | RESPIRATION RATE: 16 BRPM | HEIGHT: 66 IN | TEMPERATURE: 98.5 F | DIASTOLIC BLOOD PRESSURE: 80 MMHG | OXYGEN SATURATION: 99 % | HEART RATE: 80 BPM | WEIGHT: 128 LBS

## 2019-06-11 DIAGNOSIS — M25.50 ARTHRALGIA, UNSPECIFIED JOINT: ICD-10-CM

## 2019-06-11 DIAGNOSIS — F31.0 BIPOLAR AFFECTIVE DISORDER, CURRENT EPISODE HYPOMANIC (HCC): ICD-10-CM

## 2019-06-11 DIAGNOSIS — N30.10 IC (INTERSTITIAL CYSTITIS): ICD-10-CM

## 2019-06-11 DIAGNOSIS — R10.822 LEFT UPPER QUADRANT ABDOMINAL TENDERNESS WITH REBOUND TENDERNESS: ICD-10-CM

## 2019-06-11 DIAGNOSIS — G89.4 CHRONIC PAIN SYNDROME: ICD-10-CM

## 2019-06-11 PROCEDURE — 99214 OFFICE O/P EST MOD 30 MIN: CPT | Performed by: FAMILY MEDICINE

## 2019-06-11 PROCEDURE — 99213 OFFICE O/P EST LOW 20 MIN: CPT | Performed by: FAMILY MEDICINE

## 2019-06-11 RX ORDER — GABAPENTIN 400 MG/1
400 CAPSULE ORAL 3 TIMES DAILY
Qty: 90 CAP | Refills: 3 | Status: SHIPPED | OUTPATIENT
Start: 2019-06-11 | End: 2019-06-20

## 2019-06-11 RX ORDER — QUETIAPINE FUMARATE 25 MG/1
25 TABLET, FILM COATED ORAL 2 TIMES DAILY
Qty: 60 TAB | Refills: 3 | Status: SHIPPED | OUTPATIENT
Start: 2019-06-11 | End: 2019-07-09

## 2019-06-11 RX ORDER — OXYCODONE HYDROCHLORIDE 5 MG/1
5-10 TABLET ORAL EVERY 6 HOURS PRN
Qty: 30 TAB | Refills: 0 | Status: SHIPPED | OUTPATIENT
Start: 2019-06-11 | End: 2019-06-19 | Stop reason: SDUPTHER

## 2019-06-11 RX ORDER — ESCITALOPRAM OXALATE 20 MG/1
20 TABLET ORAL DAILY
Qty: 30 TAB | Refills: 3 | Status: SHIPPED | OUTPATIENT
Start: 2019-06-11 | End: 2019-07-09

## 2019-06-11 ASSESSMENT — ENCOUNTER SYMPTOMS
VOMITING: 0
DIARRHEA: 0
CONSTIPATION: 0
NAUSEA: 1
ANOREXIA: 1
FLATUS: 0
HEMATOCHEZIA: 0
HEADACHES: 0
WEIGHT LOSS: 0
ARTHRALGIAS: 0
MYALGIAS: 0
FEVER: 0
BELCHING: 0
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
## 2019-06-11 NOTE — LETTER
[facility]  Yvan Maxwell M.D.  [address]  [place] [address]  Fax: [phone]   Authorization for Release/Disclosure of   Protected Health Information   Name: RHIANNON MARRERO : 1985 SSN: [id number]   Address: [address] [place] [address] Phone:    [phone] (home)    I authorize the entity listed below to release/disclose the PHI below to:   [facility]/Yvan Maxwell M.D. and Yvan Maxwell M.D.   Provider or Entity Name: [facility]     Address   City, State, [facility]   Phone:      Fax:     Reason for request: continuity of care   Information to be released:    [  ] LAST COLONOSCOPY,  including any PATH REPORT and follow-up  [  ] LAST FIT/COLOGUARD RESULT [  ] LAST DEXA  [  ] LAST MAMMOGRAM  [  ] LAST PAP  [  ] LAST LABS [  ] RETINA EXAM REPORT  [  ] IMMUNIZATION RECORDS  [ x ] Release all info      [  ] Check here and initial the line next to each item to release ALL health information INCLUDING  _____ Care and treatment for drug and / or alcohol abuse  _____ HIV testing, infection status, or AIDS  _____ Genetic Testing    DATES OF SERVICE OR TIME PERIOD TO BE DISCLOSED: _____________  I understand and acknowledge that:  * This Authorization may be revoked at any time by you in writing, except if your health information has already been used or disclosed.  * Your health information that will be used or disclosed as a result of you signing this authorization could be re-disclosed by the recipient. If this occurs, your re-disclosed health information may no longer be protected by State or Federal laws.  * You may refuse to sign this Authorization. Your refusal will not affect your ability to obtain treatment.  * This Authorization becomes effective upon signing and will  on (date) __________.      If no date is indicated, this Authorization will  one (1) year from the signature date.    Name: Rhiannon Marrero    Signature:   Date:     2019       PLEASE FAX REQUESTED  RECORDS BACK TO: [phone]

## 2019-06-13 ENCOUNTER — HOSPITAL ENCOUNTER (OUTPATIENT)
Dept: LAB | Facility: MEDICAL CENTER | Age: 34
End: 2019-06-13
Attending: FAMILY MEDICINE
Payer: MEDICAID

## 2019-06-13 DIAGNOSIS — R10.822 LEFT UPPER QUADRANT ABDOMINAL TENDERNESS WITH REBOUND TENDERNESS: ICD-10-CM

## 2019-06-13 LAB
ALBUMIN SERPL BCP-MCNC: 4 G/DL (ref 3.2–4.9)
ALBUMIN/GLOB SERPL: 1.3 G/DL
ALP SERPL-CCNC: 64 U/L (ref 30–99)
ALT SERPL-CCNC: 11 U/L (ref 2–50)
ANION GAP SERPL CALC-SCNC: 10 MMOL/L (ref 0–11.9)
AST SERPL-CCNC: 19 U/L (ref 12–45)
BASOPHILS # BLD AUTO: 1.6 % (ref 0–1.8)
BASOPHILS # BLD: 0.12 K/UL (ref 0–0.12)
BILIRUB SERPL-MCNC: 0.4 MG/DL (ref 0.1–1.5)
BUN SERPL-MCNC: 6 MG/DL (ref 8–22)
CALCIUM SERPL-MCNC: 9 MG/DL (ref 8.5–10.5)
CHLORIDE SERPL-SCNC: 103 MMOL/L (ref 96–112)
CO2 SERPL-SCNC: 28 MMOL/L (ref 20–33)
CREAT SERPL-MCNC: 0.92 MG/DL (ref 0.5–1.4)
EOSINOPHIL # BLD AUTO: 0.25 K/UL (ref 0–0.51)
EOSINOPHIL NFR BLD: 3.3 % (ref 0–6.9)
ERYTHROCYTE [DISTWIDTH] IN BLOOD BY AUTOMATED COUNT: 49.6 FL (ref 35.9–50)
GLOBULIN SER CALC-MCNC: 3 G/DL (ref 1.9–3.5)
GLUCOSE SERPL-MCNC: 88 MG/DL (ref 65–99)
HCT VFR BLD AUTO: 45.7 % (ref 37–47)
HGB BLD-MCNC: 14.3 G/DL (ref 12–16)
IMM GRANULOCYTES # BLD AUTO: 0.01 K/UL (ref 0–0.11)
IMM GRANULOCYTES NFR BLD AUTO: 0.1 % (ref 0–0.9)
LIPASE SERPL-CCNC: 65 U/L (ref 11–82)
LYMPHOCYTES # BLD AUTO: 2.85 K/UL (ref 1–4.8)
LYMPHOCYTES NFR BLD: 37.6 % (ref 22–41)
MCH RBC QN AUTO: 30.4 PG (ref 27–33)
MCHC RBC AUTO-ENTMCNC: 31.3 G/DL (ref 33.6–35)
MCV RBC AUTO: 97 FL (ref 81.4–97.8)
MONOCYTES # BLD AUTO: 0.68 K/UL (ref 0–0.85)
MONOCYTES NFR BLD AUTO: 9 % (ref 0–13.4)
NEUTROPHILS # BLD AUTO: 3.66 K/UL (ref 2–7.15)
NEUTROPHILS NFR BLD: 48.4 % (ref 44–72)
NRBC # BLD AUTO: 0 K/UL
NRBC BLD-RTO: 0 /100 WBC
PLATELET # BLD AUTO: 398 K/UL (ref 164–446)
PMV BLD AUTO: 9.8 FL (ref 9–12.9)
POTASSIUM SERPL-SCNC: 4.5 MMOL/L (ref 3.6–5.5)
PROT SERPL-MCNC: 7 G/DL (ref 6–8.2)
RBC # BLD AUTO: 4.71 M/UL (ref 4.2–5.4)
SODIUM SERPL-SCNC: 141 MMOL/L (ref 135–145)
WBC # BLD AUTO: 7.6 K/UL (ref 4.8–10.8)

## 2019-06-13 PROCEDURE — 83690 ASSAY OF LIPASE: CPT

## 2019-06-13 PROCEDURE — 85025 COMPLETE CBC W/AUTO DIFF WBC: CPT

## 2019-06-13 PROCEDURE — 80053 COMPREHEN METABOLIC PANEL: CPT

## 2019-06-13 PROCEDURE — 36415 COLL VENOUS BLD VENIPUNCTURE: CPT

## 2019-06-19 DIAGNOSIS — F31.0 BIPOLAR AFFECTIVE DISORDER, CURRENT EPISODE HYPOMANIC (HCC): ICD-10-CM

## 2019-06-19 DIAGNOSIS — G89.4 CHRONIC PAIN SYNDROME: ICD-10-CM

## 2019-06-19 DIAGNOSIS — N30.10 IC (INTERSTITIAL CYSTITIS): ICD-10-CM

## 2019-06-19 RX ORDER — OXYCODONE HYDROCHLORIDE 5 MG/1
5-10 TABLET ORAL EVERY 6 HOURS PRN
Qty: 30 TAB | Refills: 0 | Status: SHIPPED | OUTPATIENT
Start: 2019-06-19 | End: 2019-06-20 | Stop reason: SDUPTHER

## 2019-06-20 ENCOUNTER — OFFICE VISIT (OUTPATIENT)
Dept: MEDICAL GROUP | Facility: MEDICAL CENTER | Age: 34
End: 2019-06-20
Attending: FAMILY MEDICINE
Payer: MEDICAID

## 2019-06-20 VITALS
HEIGHT: 66 IN | SYSTOLIC BLOOD PRESSURE: 120 MMHG | RESPIRATION RATE: 14 BRPM | OXYGEN SATURATION: 100 % | BODY MASS INDEX: 20.89 KG/M2 | DIASTOLIC BLOOD PRESSURE: 80 MMHG | WEIGHT: 130 LBS | TEMPERATURE: 98.8 F | HEART RATE: 96 BPM

## 2019-06-20 DIAGNOSIS — F31.0 BIPOLAR AFFECTIVE DISORDER, CURRENT EPISODE HYPOMANIC (HCC): ICD-10-CM

## 2019-06-20 DIAGNOSIS — N30.10 IC (INTERSTITIAL CYSTITIS): ICD-10-CM

## 2019-06-20 DIAGNOSIS — G89.4 CHRONIC PAIN SYNDROME: ICD-10-CM

## 2019-06-20 PROCEDURE — 99214 OFFICE O/P EST MOD 30 MIN: CPT | Performed by: FAMILY MEDICINE

## 2019-06-20 PROCEDURE — 99212 OFFICE O/P EST SF 10 MIN: CPT | Performed by: FAMILY MEDICINE

## 2019-06-20 RX ORDER — DISULFIRAM 250 MG/1
TABLET ORAL
Refills: 0 | COMMUNITY
Start: 2019-06-13 | End: 2020-07-13

## 2019-06-20 RX ORDER — OXYCODONE HYDROCHLORIDE AND ACETAMINOPHEN 5; 325 MG/1; MG/1
1-2 TABLET ORAL EVERY 6 HOURS PRN
Qty: 30 TAB | Refills: 0 | Status: SHIPPED | OUTPATIENT
Start: 2019-07-04 | End: 2019-07-09 | Stop reason: SDUPTHER

## 2019-06-20 RX ORDER — GABAPENTIN 600 MG/1
600 TABLET ORAL 3 TIMES DAILY
Qty: 270 TAB | Refills: 1 | Status: SHIPPED | OUTPATIENT
Start: 2019-06-20 | End: 2019-11-26 | Stop reason: SDUPTHER

## 2019-06-20 RX ORDER — OXYCODONE HYDROCHLORIDE 5 MG/1
5-10 TABLET ORAL EVERY 6 HOURS PRN
Qty: 30 TAB | Refills: 0 | Status: SHIPPED | OUTPATIENT
Start: 2019-06-27 | End: 2019-06-20

## 2019-06-20 ASSESSMENT — ENCOUNTER SYMPTOMS
INSOMNIA: 1
DEPRESSION: 1
TINGLING: 1
FEVER: 0
PALPITATIONS: 0
BACK PAIN: 1
TREMORS: 0
NECK PAIN: 0
HEADACHES: 0
SPUTUM PRODUCTION: 0
HALLUCINATIONS: 0
NERVOUS/ANXIOUS: 1
ABDOMINAL PAIN: 1
CHILLS: 0
SPEECH CHANGE: 0
COUGH: 0
SENSORY CHANGE: 0
SHORTNESS OF BREATH: 0

## 2019-06-20 ASSESSMENT — LIFESTYLE VARIABLES: SUBSTANCE_ABUSE: 0

## 2019-06-20 NOTE — PROGRESS NOTES
Subjective:      Rhiannon Marrero is a 34 y.o. female who presents with Results (labs)            Chronic pain recheck for: interstitial cystitis and chronic pain  Last dose of controlled substance: 2 days ago  Chronic pain treated with percocet taken 1-2 times a day as needed    She  reports that she does not drink alcohol.  She  reports that she does not use drugs.    Interval history: about the same  Any major change in health since last appointment? No    Consequences of Chronic Opiate therapy:  (5 A's)  Analgesia: Compared to no treatment or prior treatment, pain is currently improved  Activity: improved  Adverse Events: She denies constipation, dry mouth, itchy skin, nausea and sedation  Aberrant Behaviors: She reports she is taking medication as prescribed and is not veering from agreed treatment regimen or provider recommendations. There have been no inappropriate refills or lost/stolen meds reported.  Affect/Mood: Pain is not impacting patient's mood.  Patient reports depression/anxiety.    Nonnarcotic treatments that are being used: Tylenol, NSAIDs/VASQUEZ-2 and Gabapentin.     Last imaging: Wrist x-ray on 4/24    Opioid Risk Score: 6    Interpretation of Opioid Risk Score   Score 0-3 = Low risk of abuse. Do UDS at least once per year.  Score 4-7 = Moderate risk of abuse. Do UDS 1-4 times per year.  Score 8+ = High risk of abuse. Refer to specialist.    No order of CONTROLLED SUBSTANCE TREATMENT AGREEMENT is found.  UDS Summary     Patient has no health maintenance due at this time      Most recent UDS reviewed today and is consistent with prescribed medications.     I have reviewed the medical records, the Prescription Monitoring Program and I have determined that controlled substance treatment is medically indicated.           Review of Systems   Constitutional: Negative for chills and fever.   HENT: Negative for hearing loss and tinnitus.    Respiratory: Negative for cough, sputum production and shortness  "of breath.    Cardiovascular: Negative for chest pain and palpitations.   Gastrointestinal: Positive for abdominal pain.   Genitourinary:        Pelvic pain   Musculoskeletal: Positive for back pain and joint pain. Negative for neck pain.   Neurological: Positive for tingling. Negative for tremors, sensory change, speech change and headaches.   Psychiatric/Behavioral: Positive for depression. Negative for hallucinations, substance abuse and suicidal ideas. The patient is nervous/anxious and has insomnia.           Objective:     /80 (BP Location: Left arm, Patient Position: Sitting)   Pulse 96   Temp 37.1 °C (98.8 °F)   Resp 14   Ht 1.676 m (5' 6\")   Wt 59 kg (130 lb)   LMP 10/30/2018   SpO2 100%   BMI 20.98 kg/m²      Physical Exam   Constitutional: She is oriented to person, place, and time. She appears well-developed and well-nourished.   HENT:   Head: Normocephalic and atraumatic.   Nose: Nose normal.   Mouth/Throat: Oropharynx is clear and moist.   Neck: Normal range of motion. Neck supple.   Cardiovascular: Normal rate, regular rhythm and normal heart sounds.  Exam reveals no friction rub.    No murmur heard.  Pulmonary/Chest: Effort normal and breath sounds normal. No respiratory distress. She has no wheezes. She has no rales.   Abdominal: Soft. Bowel sounds are normal. She exhibits no distension. There is no tenderness.   Musculoskeletal: She exhibits tenderness. She exhibits no edema.   Decreased range of motion in flexion   Neurological: She is alert and oriented to person, place, and time.   Skin: Skin is warm and dry.   Psychiatric: She has a normal mood and affect. Her behavior is normal.   Nursing note and vitals reviewed.              Assessment/Plan:     1. Bipolar affective disorder, current episode hypomanic (HCC)  We will have her continue to use her medication as previously directed.  She will continue to follow-up with her therapist as needed.  We will continue to follow.    2. IC " (interstitial cystitis)  We will have her continue to follow-up with urology as needed.  We will have her continue her medications as directed.  We will continue to follow.    3. Chronic pain syndrome  We will have her continue her medications as needed.  Discussed pain management.  AMANUEL X check done.  We will continue to follow.

## 2019-07-02 ENCOUNTER — HOSPITAL ENCOUNTER (OUTPATIENT)
Dept: RADIOLOGY | Facility: MEDICAL CENTER | Age: 34
End: 2019-07-02
Attending: FAMILY MEDICINE
Payer: MEDICAID

## 2019-07-02 DIAGNOSIS — R10.822 LEFT UPPER QUADRANT ABDOMINAL TENDERNESS WITH REBOUND TENDERNESS: ICD-10-CM

## 2019-07-02 PROCEDURE — 76700 US EXAM ABDOM COMPLETE: CPT

## 2019-07-09 ENCOUNTER — OFFICE VISIT (OUTPATIENT)
Dept: MEDICAL GROUP | Facility: MEDICAL CENTER | Age: 34
End: 2019-07-09
Attending: FAMILY MEDICINE
Payer: MEDICAID

## 2019-07-09 VITALS
HEIGHT: 66 IN | SYSTOLIC BLOOD PRESSURE: 120 MMHG | DIASTOLIC BLOOD PRESSURE: 70 MMHG | HEART RATE: 86 BPM | TEMPERATURE: 99 F | OXYGEN SATURATION: 95 % | RESPIRATION RATE: 16 BRPM | WEIGHT: 130 LBS | BODY MASS INDEX: 20.89 KG/M2

## 2019-07-09 DIAGNOSIS — N30.10 IC (INTERSTITIAL CYSTITIS): ICD-10-CM

## 2019-07-09 DIAGNOSIS — F41.9 ANXIETY: ICD-10-CM

## 2019-07-09 DIAGNOSIS — F32.1 CURRENT MODERATE EPISODE OF MAJOR DEPRESSIVE DISORDER WITHOUT PRIOR EPISODE (HCC): ICD-10-CM

## 2019-07-09 DIAGNOSIS — G89.4 CHRONIC PAIN SYNDROME: ICD-10-CM

## 2019-07-09 PROCEDURE — 99214 OFFICE O/P EST MOD 30 MIN: CPT | Performed by: FAMILY MEDICINE

## 2019-07-09 RX ORDER — HYDROXYZINE HYDROCHLORIDE 25 MG/1
25 TABLET, FILM COATED ORAL 3 TIMES DAILY PRN
Qty: 60 TAB | Refills: 0 | Status: SHIPPED | OUTPATIENT
Start: 2019-07-09 | End: 2019-11-26 | Stop reason: SDUPTHER

## 2019-07-09 RX ORDER — OXYCODONE HYDROCHLORIDE AND ACETAMINOPHEN 5; 325 MG/1; MG/1
1-2 TABLET ORAL EVERY 6 HOURS PRN
Qty: 30 TAB | Refills: 0 | Status: SHIPPED | OUTPATIENT
Start: 2019-07-09 | End: 2019-07-16 | Stop reason: SDUPTHER

## 2019-07-09 RX ORDER — VENLAFAXINE HYDROCHLORIDE 37.5 MG/1
37.5 CAPSULE, EXTENDED RELEASE ORAL DAILY
Qty: 30 CAP | Refills: 3 | Status: SHIPPED | OUTPATIENT
Start: 2019-07-09 | End: 2019-07-16

## 2019-07-09 RX ORDER — TRIAMCINOLONE ACETONIDE 1 MG/G
1 CREAM TOPICAL 2 TIMES DAILY PRN
Qty: 1 TUBE | Refills: 3 | Status: SHIPPED | OUTPATIENT
Start: 2019-07-09 | End: 2020-07-13

## 2019-07-09 RX ORDER — CYCLOBENZAPRINE HCL 10 MG
5-10 TABLET ORAL 3 TIMES DAILY PRN
Qty: 60 TAB | Refills: 0 | Status: SHIPPED | OUTPATIENT
Start: 2019-07-09 | End: 2019-08-08 | Stop reason: SDUPTHER

## 2019-07-09 RX ORDER — OXYCODONE HYDROCHLORIDE 5 MG/1
TABLET ORAL
Refills: 0 | COMMUNITY
Start: 2019-06-26 | End: 2019-08-12

## 2019-07-09 RX ORDER — AMMONIUM LACTATE 12 G/100G
1 LOTION TOPICAL PRN
Qty: 1 BOTTLE | Refills: 3 | Status: SHIPPED | OUTPATIENT
Start: 2019-07-09 | End: 2020-07-13

## 2019-07-09 ASSESSMENT — ENCOUNTER SYMPTOMS
HEADACHES: 0
FEVER: 0
CHILLS: 0
SPUTUM PRODUCTION: 0
DEPRESSION: 1
DOUBLE VISION: 0
ABDOMINAL PAIN: 1
DIARRHEA: 0
NAIL CHANGES: 0
RHINORRHEA: 0
EYE PAIN: 0
INSOMNIA: 1
TINGLING: 1
PALPITATIONS: 0
BACK PAIN: 1
NERVOUS/ANXIOUS: 1
SHORTNESS OF BREATH: 0
ANOREXIA: 0
COUGH: 0
PHOTOPHOBIA: 0
HALLUCINATIONS: 0
VOMITING: 0
SPEECH CHANGE: 0
NAUSEA: 0
FOCAL WEAKNESS: 0
FATIGUE: 0
SORE THROAT: 0
TREMORS: 0
SENSORY CHANGE: 0

## 2019-07-09 ASSESSMENT — LIFESTYLE VARIABLES: SUBSTANCE_ABUSE: 0

## 2019-07-09 NOTE — PROGRESS NOTES
Subjective:      Rhiannon Marrero is a 34 y.o. female who presents with Results (us) and Medication Reaction (seriquil- dizziness, tremmors, spots in vision; bacofen- not working)            Patient 34-year-old female here for follow-up of her depression and anxiety, and also a rash on her right thigh that has been present for the last 2 days.    She is currently on Seroquel at 25 mg twice daily, but she feels that her medications have not been working for her and she has been having tremors as well as worsening anxiety and depression.  Patient would like to start getting off of the medication, because she feels medications may be causing her worsening symptoms.  We will have her start tapering down her Seroquel from twice daily to once daily, then to half a pill and eventually stopping her medication.  In place of her cervical we will have her start Effexor at 37.5 mg.  She has an appointment coming up with a psychologist in the next 2 days.  From there she will be referred to psychiatry for additional assistance in management of her anxiety and depression symptoms.  She is not having any urges to harm self or others.  But she is very tearful and shaking today.  If she has any sudden worsening of her current symptoms we will have her go to the emergency room for further assistance and management.    Chronic pain recheck for: Interstitial cystitis as well as chronic abdominal pain  Last dose of controlled substance: Yesterday  Chronic pain treated with oxycodone taken 1-2 times a day as needed    She  reports that she does not drink alcohol.  She  reports that she does not use drugs.    Interval history: About the same  Any major change in health since last appointment? No    Consequences of Chronic Opiate therapy:  (5 A's)  Analgesia: Compared to no treatment or prior treatment, pain is currently improved  Activity: improved  Adverse Events: She denies constipation, dry mouth, itchy skin, nausea and  sedation  Aberrant Behaviors: She reports she is taking medication as prescribed and is not veering from agreed treatment regimen or provider recommendations. There have been no inappropriate refills or lost/stolen meds reported.  Affect/Mood: Pain is impacting patient's mood.  Patient reports depression/anxiety.    Nonnarcotic treatments that are being used: Gabapentin, muscle relaxers and ibuprofen as directed.     Last imaging: Ultrasound in July 2019    Opioid Risk Score: 6    Interpretation of Opioid Risk Score   Score 0-3 = Low risk of abuse. Do UDS at least once per year.  Score 4-7 = Moderate risk of abuse. Do UDS 1-4 times per year.  Score 8+ = High risk of abuse. Refer to specialist.    No order of CONTROLLED SUBSTANCE TREATMENT AGREEMENT is found.  UDS Summary     Patient has no health maintenance due at this time      Most recent UDS reviewed today and is consistent with prescribed medications.     I have reviewed the medical records, the Prescription Monitoring Program and I have determined that controlled substance treatment is medically indicated.         Rash   This is a new problem. The current episode started in the past 7 days. The problem has been gradually worsening since onset. The affected locations include the right upper leg. The rash is characterized by redness, itchiness and dryness. She was exposed to nothing. Pertinent negatives include no anorexia, congestion, cough, diarrhea, eye pain, facial edema, fatigue, fever, joint pain, nail changes, rhinorrhea, shortness of breath, sore throat or vomiting. Past treatments include nothing (We will have her start using a moisturizer, along with a topical steroid as needed.  We will also have her use an antihistamine as needed for her itching.  We will continue to follow.).       Review of Systems   Constitutional: Negative for chills, fatigue and fever.   HENT: Negative for congestion, hearing loss, rhinorrhea, sore throat and tinnitus.    Eyes:  "Negative for double vision, photophobia and pain.   Respiratory: Negative for cough, sputum production and shortness of breath.    Cardiovascular: Negative for chest pain and palpitations.   Gastrointestinal: Positive for abdominal pain. Negative for anorexia, diarrhea, nausea and vomiting.   Musculoskeletal: Positive for back pain. Negative for joint pain.   Skin: Positive for itching and rash. Negative for nail changes.   Neurological: Positive for tingling. Negative for tremors, sensory change, speech change, focal weakness and headaches.   Psychiatric/Behavioral: Positive for depression. Negative for hallucinations, substance abuse and suicidal ideas. The patient is nervous/anxious and has insomnia.           Objective:     /70 (BP Location: Left arm, Patient Position: Sitting, BP Cuff Size: Adult)   Pulse 86   Temp 37.2 °C (99 °F) (Temporal)   Resp 16   Ht 1.676 m (5' 6\")   Wt 59 kg (130 lb)   LMP 10/30/2018   SpO2 95%   BMI 20.98 kg/m²      Physical Exam   Constitutional: She is oriented to person, place, and time. She appears well-developed and well-nourished.   HENT:   Head: Normocephalic and atraumatic.   Nose: Nose normal.   Mouth/Throat: Oropharynx is clear and moist.   Neck: Normal range of motion. Neck supple.   Cardiovascular: Normal rate, regular rhythm and normal heart sounds.  Exam reveals no friction rub.    No murmur heard.  Pulmonary/Chest: Effort normal and breath sounds normal. No respiratory distress. She has no wheezes. She has no rales.   Abdominal: Soft. Bowel sounds are normal. She exhibits no distension. There is no tenderness.   Neurological: She is alert and oriented to person, place, and time.   Skin: Skin is warm and dry. Rash noted. There is erythema.   Linear rash on right anterior thigh   Psychiatric: Her behavior is normal.   Nursing note and vitals reviewed.              Assessment/Plan:     1. Anxiety  We will have patient wean herself off of Seroquel and start " Effexor in place of her Seroquel.  She has an appointment with a psychologist coming up in 2 days.  She is not having any urges to harm himself or others.  But she has been advised if she does have any sudden worsening or the urge to harm himself or others she should go to the emergency room for further assistance and management.  - oxyCODONE immediate-release (ROXICODONE) 5 MG Tab; TAKE 1 TO 2 TS PO Q 6 H PRF SEVERE PAIN.  OK TO FILL 6/26/19; Refill: 0  - venlafaxine XR (EFFEXOR XR) 37.5 MG CAPSULE SR 24 HR; Take 1 Cap by mouth every day.  Dispense: 30 Cap; Refill: 3    2. Current moderate episode of major depressive disorder without prior episode (HCC)  See above plan.  - oxyCODONE immediate-release (ROXICODONE) 5 MG Tab; TAKE 1 TO 2 TS PO Q 6 H PRF SEVERE PAIN.  OK TO FILL 6/26/19; Refill: 0  - venlafaxine XR (EFFEXOR XR) 37.5 MG CAPSULE SR 24 HR; Take 1 Cap by mouth every day.  Dispense: 30 Cap; Refill: 3    3. IC (interstitial cystitis)  We will have her continue to take her medications as previously directed.  She will continue follow-up with urology as directed.  We will continue to follow.  - oxyCODONE-acetaminophen (PERCOCET) 5-325 MG Tab; Take 1-2 Tabs by mouth every 6 hours as needed for up to 7 days.  Dispense: 30 Tab; Refill: 0    4. Chronic pain syndrome  We will have her continue to take her medication as previously directed.  Discussed pain management if she has any continued worsening of her chronic pain issues.  We will continue to follow.  - oxyCODONE-acetaminophen (PERCOCET) 5-325 MG Tab; Take 1-2 Tabs by mouth every 6 hours as needed for up to 7 days.  Dispense: 30 Tab; Refill: 0

## 2019-07-16 ENCOUNTER — OFFICE VISIT (OUTPATIENT)
Dept: MEDICAL GROUP | Facility: MEDICAL CENTER | Age: 34
End: 2019-07-16
Attending: FAMILY MEDICINE
Payer: MEDICAID

## 2019-07-16 VITALS
RESPIRATION RATE: 16 BRPM | WEIGHT: 142 LBS | SYSTOLIC BLOOD PRESSURE: 100 MMHG | TEMPERATURE: 97.7 F | HEART RATE: 78 BPM | DIASTOLIC BLOOD PRESSURE: 60 MMHG | OXYGEN SATURATION: 96 % | BODY MASS INDEX: 22.82 KG/M2 | HEIGHT: 66 IN

## 2019-07-16 DIAGNOSIS — F41.9 ANXIETY: ICD-10-CM

## 2019-07-16 DIAGNOSIS — G89.4 CHRONIC PAIN SYNDROME: ICD-10-CM

## 2019-07-16 DIAGNOSIS — N30.10 IC (INTERSTITIAL CYSTITIS): ICD-10-CM

## 2019-07-16 DIAGNOSIS — F31.0 BIPOLAR AFFECTIVE DISORDER, CURRENT EPISODE HYPOMANIC (HCC): ICD-10-CM

## 2019-07-16 PROCEDURE — 99214 OFFICE O/P EST MOD 30 MIN: CPT | Performed by: FAMILY MEDICINE

## 2019-07-16 PROCEDURE — 99212 OFFICE O/P EST SF 10 MIN: CPT | Performed by: FAMILY MEDICINE

## 2019-07-16 RX ORDER — VENLAFAXINE HYDROCHLORIDE 75 MG/1
75 CAPSULE, EXTENDED RELEASE ORAL DAILY
Qty: 30 CAP | Refills: 3 | Status: SHIPPED | OUTPATIENT
Start: 2019-07-16 | End: 2019-08-26

## 2019-07-16 RX ORDER — BACLOFEN 10 MG/1
10 TABLET ORAL 3 TIMES DAILY PRN
Qty: 90 TAB | Refills: 2 | Status: SHIPPED | OUTPATIENT
Start: 2019-07-16 | End: 2019-08-08

## 2019-07-16 RX ORDER — OXYCODONE HYDROCHLORIDE AND ACETAMINOPHEN 5; 325 MG/1; MG/1
1-2 TABLET ORAL EVERY 6 HOURS PRN
Qty: 30 TAB | Refills: 0 | Status: SHIPPED | OUTPATIENT
Start: 2019-07-23 | End: 2019-07-29 | Stop reason: SDUPTHER

## 2019-07-16 RX ORDER — OXYCODONE HYDROCHLORIDE AND ACETAMINOPHEN 5; 325 MG/1; MG/1
1-2 TABLET ORAL EVERY 6 HOURS PRN
Qty: 30 TAB | Refills: 0 | Status: SHIPPED | OUTPATIENT
Start: 2019-07-16 | End: 2019-07-16 | Stop reason: SDUPTHER

## 2019-07-16 ASSESSMENT — ENCOUNTER SYMPTOMS
HALLUCINATIONS: 0
HEADACHES: 0
SPEECH CHANGE: 0
BACK PAIN: 1
TINGLING: 1
SPUTUM PRODUCTION: 0
SHORTNESS OF BREATH: 0
COUGH: 0
SENSORY CHANGE: 0
VOMITING: 0
INSOMNIA: 0
NERVOUS/ANXIOUS: 1
NAUSEA: 1
CHILLS: 0
PALPITATIONS: 0
TREMORS: 0
FOCAL WEAKNESS: 0
NECK PAIN: 0
FEVER: 0
ABDOMINAL PAIN: 1
DEPRESSION: 1

## 2019-07-16 ASSESSMENT — LIFESTYLE VARIABLES: SUBSTANCE_ABUSE: 0

## 2019-07-16 NOTE — PROGRESS NOTES
Subjective:      Rhiannon Marrero is a 34 y.o. female who presents with Medication Management (follow up )            Patient 34-year-old female here for follow-up of her depression, anxiety and chronic pain issues.    She is following up on a recent medication change to Effexor.  She had been placed on a 37.5 mg dose of Effexor, and has been doing well with that dose.  She feels that she is still having some breakthrough anxiety as well as depression so we will increase her Effexor dose to 75 mg daily.  On the 24th of this month she has a tele-conference with psychiatry, so will see if she is able to be further managed by them for her depression and anxiety.  Otherwise she has been seeing a psychologist who is also working with her on her anxiety and depression issues.  She is not having any urges to harm himself or others.  If she has any sudden worsening of her current symptoms she has been advised to go to the emergency room for further assistance and management.  We will continue to follow.    She will need her pain medication refilled today.  She is still having difficulty getting into a pain management office.  I will contact the referral department to see if they can forward her information to a pain management specialist so she can establish.  We will continue to follow.    Chronic pain recheck for: Abdomen and back  Last dose of controlled substance: Yesterday  Chronic pain treated with oxycodone taken 1-2 times a day as needed    She  reports that she does not drink alcohol.  She  reports that she does not use drugs.    Interval history: Doing about the same  Any major change in health since last appointment? No    Consequences of Chronic Opiate therapy:  (5 A's)  Analgesia: Compared to no treatment or prior treatment, pain is currently improved  Activity: improved  Adverse Events: She denies constipation, dry mouth, itchy skin, nausea and sedation  Aberrant Behaviors: She reports she is taking  medication as prescribed and is not veering from agreed treatment regimen or provider recommendations. There have been no inappropriate refills or lost/stolen meds reported.  Affect/Mood: Pain is not impacting patient's mood.  Patient reports depression/anxiety.    Nonnarcotic treatments that are being used: Gabapentin.     Last imaging: Ultrasound on 7/2/2019    Opioid Risk Score: 6    Interpretation of Opioid Risk Score   Score 0-3 = Low risk of abuse. Do UDS at least once per year.  Score 4-7 = Moderate risk of abuse. Do UDS 1-4 times per year.  Score 8+ = High risk of abuse. Refer to specialist.    No order of CONTROLLED SUBSTANCE TREATMENT AGREEMENT is found.  UDS Summary     Patient has no health maintenance due at this time      Most recent UDS reviewed today and is consistent with prescribed medications.     I have reviewed the medical records, the Prescription Monitoring Program and I have determined that controlled substance treatment is medically indicated.           Review of Systems   Constitutional: Negative for chills and fever.   HENT: Negative for hearing loss and tinnitus.    Respiratory: Negative for cough, sputum production and shortness of breath.    Cardiovascular: Negative for chest pain and palpitations.   Gastrointestinal: Positive for abdominal pain and nausea. Negative for vomiting.   Genitourinary: Positive for dysuria.        Pelvic pain   Musculoskeletal: Positive for back pain. Negative for joint pain and neck pain.   Neurological: Positive for tingling. Negative for tremors, sensory change, speech change, focal weakness and headaches.   Psychiatric/Behavioral: Positive for depression. Negative for hallucinations, substance abuse and suicidal ideas. The patient is nervous/anxious. The patient does not have insomnia.           Objective:     /60 (BP Location: Left arm, Patient Position: Sitting, BP Cuff Size: Adult)   Pulse 78   Temp 36.5 °C (97.7 °F) (Temporal)   Resp 16   Ht  "1.676 m (5' 6\")   Wt 64.4 kg (142 lb)   LMP 10/30/2018   SpO2 96%   BMI 22.92 kg/m²      Physical Exam   Constitutional: She is oriented to person, place, and time.   BMI 22.92   HENT:   Head: Normocephalic and atraumatic.   Neck: Normal range of motion. Neck supple.   Cardiovascular: Normal rate, regular rhythm and normal heart sounds.  Exam reveals no friction rub.    No murmur heard.  Pulmonary/Chest: Effort normal and breath sounds normal. No respiratory distress. She has no wheezes. She has no rales.   Abdominal: Soft. Bowel sounds are normal. She exhibits no distension. There is tenderness. There is no guarding.   Musculoskeletal:   Decreased range of motion in flexion of back   Neurological: She is alert and oriented to person, place, and time.   Skin: Skin is dry.   Psychiatric: She has a normal mood and affect. Her behavior is normal.   Nursing note and vitals reviewed.              Assessment/Plan:     1. Anxiety  We will have her continue to use her Effexor as directed.  We will increase to 75 mg daily.  She will be setting up soon with psychiatry for additional evaluations as well as additional assistance in management of her medication.  ER precautions have been given to patient.    2. Bipolar affective disorder, current episode hypomanic (HCC)  See above plan.    3. IC (interstitial cystitis)  She will continue to follow-up with urology, as in the process of looking for a pain specialist.  We will have her continue taking her medications as directed.  We will continue to follow.  - oxyCODONE-acetaminophen (PERCOCET) 5-325 MG Tab; Take 1-2 Tabs by mouth every 6 hours as needed for up to 7 days.  Dispense: 30 Tab; Refill: 0    4. Chronic pain syndrome  See above plan.  - oxyCODONE-acetaminophen (PERCOCET) 5-325 MG Tab; Take 1-2 Tabs by mouth every 6 hours as needed for up to 7 days.  Dispense: 30 Tab; Refill: 0      "

## 2019-07-29 ENCOUNTER — OFFICE VISIT (OUTPATIENT)
Dept: MEDICAL GROUP | Facility: MEDICAL CENTER | Age: 34
End: 2019-07-29
Attending: FAMILY MEDICINE
Payer: MEDICAID

## 2019-07-29 VITALS
TEMPERATURE: 98.5 F | OXYGEN SATURATION: 98 % | DIASTOLIC BLOOD PRESSURE: 60 MMHG | HEIGHT: 66 IN | SYSTOLIC BLOOD PRESSURE: 112 MMHG | HEART RATE: 80 BPM | WEIGHT: 143 LBS | BODY MASS INDEX: 22.98 KG/M2 | RESPIRATION RATE: 16 BRPM

## 2019-07-29 DIAGNOSIS — J01.00 ACUTE MAXILLARY SINUSITIS, RECURRENCE NOT SPECIFIED: ICD-10-CM

## 2019-07-29 DIAGNOSIS — G89.4 CHRONIC PAIN SYNDROME: ICD-10-CM

## 2019-07-29 PROCEDURE — 99214 OFFICE O/P EST MOD 30 MIN: CPT | Performed by: FAMILY MEDICINE

## 2019-07-29 RX ORDER — OXYCODONE HYDROCHLORIDE AND ACETAMINOPHEN 5; 325 MG/1; MG/1
1-2 TABLET ORAL EVERY 6 HOURS PRN
Qty: 30 TAB | Refills: 0 | Status: SHIPPED | OUTPATIENT
Start: 2019-08-05 | End: 2019-08-12 | Stop reason: SDUPTHER

## 2019-07-29 RX ORDER — AMOXICILLIN 500 MG/1
500 CAPSULE ORAL 3 TIMES DAILY
Qty: 30 CAP | Refills: 1 | Status: SHIPPED | OUTPATIENT
Start: 2019-07-29 | End: 2019-08-12

## 2019-07-29 RX ORDER — OXYCODONE HYDROCHLORIDE AND ACETAMINOPHEN 5; 325 MG/1; MG/1
1-2 TABLET ORAL EVERY 6 HOURS PRN
Qty: 30 TAB | Refills: 0 | Status: SHIPPED | OUTPATIENT
Start: 2019-07-29 | End: 2019-07-29 | Stop reason: SDUPTHER

## 2019-07-29 RX ORDER — FLUTICASONE PROPIONATE 50 MCG
2 SPRAY, SUSPENSION (ML) NASAL DAILY
Qty: 16 G | Refills: 11 | Status: SHIPPED | OUTPATIENT
Start: 2019-07-29 | End: 2020-07-13

## 2019-07-29 ASSESSMENT — ENCOUNTER SYMPTOMS
NECK PAIN: 0
CHILLS: 0
SORE THROAT: 1
COUGH: 1
VOMITING: 0
RHINORRHEA: 1
FEVER: 0
HEADACHES: 1
ABDOMINAL PAIN: 0
DIARRHEA: 0

## 2019-07-29 NOTE — PROGRESS NOTES
"Subjective:      Rhiannon Marrero is a 34 y.o. female who presents with Otalgia (sinus issues, pain in right ear)            Otalgia    There is pain in both ears. This is a new problem. The current episode started in the past 7 days. The problem occurs constantly. The problem has been gradually worsening. Maximum temperature: feels feverish. Associated symptoms include coughing, headaches, rhinorrhea and a sore throat. Pertinent negatives include no abdominal pain, diarrhea, ear discharge, hearing loss, neck pain, rash or vomiting. She has tried nothing (will have her start a nasal steroid and saline, if worsend sxs in 7-10 days will have her start amoxicillin) for the symptoms.       Review of Systems   Constitutional: Negative for chills and fever.   HENT: Positive for ear pain, rhinorrhea and sore throat. Negative for ear discharge and hearing loss.    Respiratory: Positive for cough.    Gastrointestinal: Negative for abdominal pain, diarrhea and vomiting.   Musculoskeletal: Negative for neck pain.   Skin: Negative for rash.   Neurological: Positive for headaches.          Objective:     /60 (BP Location: Left arm, Patient Position: Sitting, BP Cuff Size: Adult)   Pulse 80   Temp 36.9 °C (98.5 °F) (Temporal)   Resp 16   Ht 1.676 m (5' 6\")   Wt 64.9 kg (143 lb)   LMP 10/30/2018   SpO2 98%   BMI 23.08 kg/m²      Physical Exam   Constitutional: She is oriented to person, place, and time.   BMI 23.08   HENT:   Head: Normocephalic and atraumatic.   Tenderness over maxillary sinus bilaterally, dull TMs bilaterally   Cardiovascular: Normal rate, regular rhythm and normal heart sounds.  Exam reveals no friction rub.    No murmur heard.  Pulmonary/Chest: Effort normal and breath sounds normal. No respiratory distress. She has no wheezes. She has no rales.   Abdominal: Soft. Bowel sounds are normal. She exhibits no distension. There is no tenderness.   Neurological: She is alert and oriented to person, " place, and time.   Skin: Skin is warm and dry.   Psychiatric: She has a normal mood and affect. Her behavior is normal.   Nursing note and vitals reviewed.              Assessment/Plan:     1. Acute maxillary sinusitis, recurrence not specified  We will have her continue to use her nasal steroid spray as well as nasal saline as directed.  We will also have her start amoxicillin if her symptoms persist.  We will continue to follow.    2. Chronic pain syndrome  We will have her continue to use her medication as previously directed.  Discussed eventually referring her to pain management if her symptoms continue to persist or worsen.  She will also continue to follow-up with urology for her interstitial cystitis.  A AMANUEL X check has been made today.  We will continue to follow.  - oxyCODONE-acetaminophen (PERCOCET) 5-325 MG Tab; Take 1-2 Tabs by mouth every 6 hours as needed for up to 7 days.  Dispense: 30 Tab; Refill: 0

## 2019-08-08 DIAGNOSIS — M54.40 LOW BACK PAIN WITH SCIATICA, SCIATICA LATERALITY UNSPECIFIED, UNSPECIFIED BACK PAIN LATERALITY, UNSPECIFIED CHRONICITY: ICD-10-CM

## 2019-08-08 RX ORDER — CYCLOBENZAPRINE HCL 10 MG
5-10 TABLET ORAL 3 TIMES DAILY PRN
Qty: 60 TAB | Refills: 0 | Status: SHIPPED | OUTPATIENT
Start: 2019-08-08 | End: 2019-08-21 | Stop reason: SDUPTHER

## 2019-08-12 ENCOUNTER — OFFICE VISIT (OUTPATIENT)
Dept: MEDICAL GROUP | Facility: MEDICAL CENTER | Age: 34
End: 2019-08-12
Attending: FAMILY MEDICINE
Payer: MEDICAID

## 2019-08-12 VITALS
DIASTOLIC BLOOD PRESSURE: 68 MMHG | SYSTOLIC BLOOD PRESSURE: 108 MMHG | OXYGEN SATURATION: 98 % | HEIGHT: 66 IN | RESPIRATION RATE: 16 BRPM | BODY MASS INDEX: 23.63 KG/M2 | HEART RATE: 140 BPM | TEMPERATURE: 98.7 F | WEIGHT: 147 LBS

## 2019-08-12 DIAGNOSIS — G89.4 CHRONIC PAIN SYNDROME: ICD-10-CM

## 2019-08-12 DIAGNOSIS — J30.2 SEASONAL ALLERGIC RHINITIS, UNSPECIFIED TRIGGER: ICD-10-CM

## 2019-08-12 DIAGNOSIS — Z72.0 TOBACCO ABUSE: ICD-10-CM

## 2019-08-12 DIAGNOSIS — N30.10 PAIN DUE TO INTERSTITIAL CYSTITIS: ICD-10-CM

## 2019-08-12 PROCEDURE — 99214 OFFICE O/P EST MOD 30 MIN: CPT | Performed by: FAMILY MEDICINE

## 2019-08-12 RX ORDER — TIZANIDINE 4 MG/1
TABLET ORAL
COMMUNITY
Start: 2019-06-27 | End: 2019-09-09 | Stop reason: SDUPTHER

## 2019-08-12 RX ORDER — OXYCODONE HYDROCHLORIDE AND ACETAMINOPHEN 5; 325 MG/1; MG/1
1-2 TABLET ORAL EVERY 6 HOURS PRN
Qty: 30 TAB | Refills: 0 | Status: SHIPPED | OUTPATIENT
Start: 2019-08-12 | End: 2019-08-12 | Stop reason: SDUPTHER

## 2019-08-12 RX ORDER — ALBUTEROL SULFATE 90 MCG
HFA AEROSOL WITH ADAPTER (GRAM) INHALATION
COMMUNITY
Start: 2019-05-27 | End: 2020-07-13

## 2019-08-12 RX ORDER — LORATADINE 10 MG/1
10 TABLET ORAL DAILY
Qty: 30 TAB | Refills: 3 | Status: SHIPPED | OUTPATIENT
Start: 2019-08-12 | End: 2020-07-13

## 2019-08-12 RX ORDER — OXYCODONE HYDROCHLORIDE AND ACETAMINOPHEN 5; 325 MG/1; MG/1
1-2 TABLET ORAL EVERY 6 HOURS PRN
Qty: 30 TAB | Refills: 0 | Status: SHIPPED | OUTPATIENT
Start: 2019-08-19 | End: 2019-08-26 | Stop reason: SDUPTHER

## 2019-08-12 RX ORDER — DOXYCYCLINE HYCLATE 100 MG/1
CAPSULE ORAL
COMMUNITY
Start: 2019-05-27 | End: 2019-08-12

## 2019-08-12 RX ORDER — NICOTINE 21 MG/24HR
1 PATCH, TRANSDERMAL 24 HOURS TRANSDERMAL EVERY 24 HOURS
Qty: 30 PATCH | Refills: 3 | Status: SHIPPED | OUTPATIENT
Start: 2019-08-12 | End: 2020-07-13

## 2019-08-12 ASSESSMENT — ENCOUNTER SYMPTOMS
SEIZURES: 0
SENSORY CHANGE: 0
BACK PAIN: 1
FOCAL WEAKNESS: 0
HEADACHES: 0
CHILLS: 0
VOMITING: 0
INSOMNIA: 0
SPUTUM PRODUCTION: 0
COUGH: 0
SORE THROAT: 0
SINUS PAIN: 0
NERVOUS/ANXIOUS: 1
DEPRESSION: 1
NECK PAIN: 0
HALLUCINATIONS: 0
PALPITATIONS: 0
WEAKNESS: 0
TINGLING: 1
NAUSEA: 0
SHORTNESS OF BREATH: 0
TREMORS: 1
ABDOMINAL PAIN: 1
FEVER: 0
SPEECH CHANGE: 0

## 2019-08-12 ASSESSMENT — LIFESTYLE VARIABLES: SUBSTANCE_ABUSE: 0

## 2019-08-12 NOTE — PROGRESS NOTES
Subjective:      Rhiannon Marrero is a 34 y.o. female who presents with Otalgia (f/u)            Patient 34-year-old female here for follow-up of her chronic pain, recent otalgia which has since resolved, but patient now has congestion that has been worsening, and anxiety.    She had recently been treated for an otalgia and had been given amoxicillin since taking the antibiotic her ear pain has resolved.  But she is still having some sinus issues with primarily rhinitis.  She has been using her Flonase which has helped alleviate some of her symptoms, but she is requesting an antihistamine today.  We will have her try Claritin as directed.  We will also have her use nasal saline in addition to her nasal steroid.  I will continue to follow.    She has also been taking her antidepressant medication as directed and has been doing well with it.  She states that she has been less anxious but still has occasional panic attacks.  Today she states prior to coming to the clinic she drank a red bull and has been having some anxiety today.  Upon examination her heart rate is approximately 140.  Although she is not having any chest pain or shortness of breath we will order an EKG to further assess her tachycardia.  Patient states that often after drinking red bull she does have this degree of tachycardia.  ER precautions have been given if she develops any syncope, chest pain, shortness of breath or other cardiac symptoms.    She also is interested in quitting smoking.  We will have her try nicotine patches at 14 mg.  She states she is currently also using a vape which has also helped her cut her smoking back to approximately 10 cigarettes a day.  We will continue to follow.    Chronic pain recheck for: Pelvic and interstitial cystitis pain.  Last dose of controlled substance: Yesterday.  Chronic pain treated with Percocet taken 1-2 times a day as needed    She  reports that she does not drink alcohol.  She  reports that she  does not use drugs.    Interval history: NoDoing about the same  Any major change in health since last appointment? No    Consequences of Chronic Opiate therapy:  (5 A's)  Analgesia: Compared to no treatment or prior treatment, pain is currently improved  Activity: improved  Adverse Events: She denies constipation, dry mouth, itchy skin, nausea and sedation  Aberrant Behaviors: She reports she is taking medication as prescribed and is not veering from agreed treatment regimen or provider recommendations. There have been no inappropriate refills or lost/stolen meds reported.  Affect/Mood: Pain is not impacting patient's mood.  Patient reports depression/anxiety.    Nonnarcotic treatments that are being used: NSAIDs/VASQUEZ-2 and Gabapentin.     Last imaging: She is being followed by urology with cystoscopies    Opioid Risk Score: 6    Interpretation of Opioid Risk Score   Score 0-3 = Low risk of abuse. Do UDS at least once per year.  Score 4-7 = Moderate risk of abuse. Do UDS 1-4 times per year.  Score 8+ = High risk of abuse. Refer to specialist.    No order of CONTROLLED SUBSTANCE TREATMENT AGREEMENT is found.  UDS Summary     Patient has no health maintenance due at this time      Most recent UDS reviewed today and is consistent with prescribed medications.     I have reviewed the medical records, the Prescription Monitoring Program and I have determined that controlled substance treatment is medically indicated.       Review of Systems   Constitutional: Negative for chills and fever.   HENT: Positive for congestion. Negative for ear pain, hearing loss, sinus pain, sore throat and tinnitus.    Respiratory: Negative for cough, sputum production and shortness of breath.    Cardiovascular: Negative for chest pain, palpitations and leg swelling.   Gastrointestinal: Positive for abdominal pain. Negative for nausea and vomiting.   Genitourinary:        Pelvic pain   Musculoskeletal: Positive for back pain. Negative for  "joint pain and neck pain.   Skin: Negative for rash.   Neurological: Positive for tingling and tremors. Negative for sensory change, speech change, focal weakness, seizures, weakness and headaches.   Psychiatric/Behavioral: Positive for depression. Negative for hallucinations, substance abuse and suicidal ideas. The patient is nervous/anxious. The patient does not have insomnia.           Objective:     /68 (BP Location: Left arm, Patient Position: Sitting, BP Cuff Size: Adult)   Pulse (!) 140   Temp 37.1 °C (98.7 °F) (Temporal)   Resp 16   Ht 1.676 m (5' 6\")   Wt 66.7 kg (147 lb)   LMP 10/30/2018   SpO2 98%   BMI 23.73 kg/m²      Physical Exam   Constitutional: She is oriented to person, place, and time.   BMI 23.73   HENT:   Head: Normocephalic and atraumatic.   Cardiovascular: Normal rate, regular rhythm and normal heart sounds. Exam reveals no friction rub.   No murmur heard.  Pulmonary/Chest: Effort normal and breath sounds normal. No stridor. No respiratory distress. She has no wheezes.   Abdominal: Soft. Bowel sounds are normal. She exhibits no distension. There is no tenderness.   Neurological: She is alert and oriented to person, place, and time.   Skin: Skin is warm and dry.   Psychiatric: She has a normal mood and affect. Her behavior is normal.   Nursing note and vitals reviewed.              Assessment/Plan:     1. Seasonal allergic rhinitis, unspecified trigger  We will have her continue using her Flonase as directed we will also recommend using nasal saline.  We will have her try Claritin as directed.  We will continue to follow.  - loratadine (CLARITIN) 10 MG Tab; Take 1 Tab by mouth every day.  Dispense: 30 Tab; Refill: 3    2. Chronic pain syndrome  We will have her continue to use her medication as directed until she is able to be seen by pain management.  And AMANUEL X check has been made.  We will continue to follow.  - oxyCODONE-acetaminophen (PERCOCET) 5-325 MG Tab; Take 1-2 Tabs by " mouth every 6 hours as needed for up to 7 days.  Dispense: 30 Tab; Refill: 0    3. Pain due to interstitial cystitis  She will continue to follow-up with urology for continued management of her pain due to interstitial cystitis.  We will continue to follow.    4. Tobacco abuse  We will have her try nicotine patches and continue using her vape as directed.  Also recommended smoking cessation support groups and classes in the community.

## 2019-08-21 DIAGNOSIS — M54.40 LOW BACK PAIN WITH SCIATICA, SCIATICA LATERALITY UNSPECIFIED, UNSPECIFIED BACK PAIN LATERALITY, UNSPECIFIED CHRONICITY: ICD-10-CM

## 2019-08-26 ENCOUNTER — OFFICE VISIT (OUTPATIENT)
Dept: MEDICAL GROUP | Facility: MEDICAL CENTER | Age: 34
End: 2019-08-26
Attending: FAMILY MEDICINE
Payer: MEDICAID

## 2019-08-26 VITALS
HEIGHT: 66 IN | TEMPERATURE: 98.4 F | HEART RATE: 136 BPM | OXYGEN SATURATION: 97 % | DIASTOLIC BLOOD PRESSURE: 70 MMHG | RESPIRATION RATE: 20 BRPM | BODY MASS INDEX: 22.98 KG/M2 | SYSTOLIC BLOOD PRESSURE: 130 MMHG | WEIGHT: 143 LBS

## 2019-08-26 DIAGNOSIS — F41.9 ANXIETY: ICD-10-CM

## 2019-08-26 DIAGNOSIS — G47.00 INSOMNIA, UNSPECIFIED TYPE: ICD-10-CM

## 2019-08-26 DIAGNOSIS — M54.40 LOW BACK PAIN WITH SCIATICA, SCIATICA LATERALITY UNSPECIFIED, UNSPECIFIED BACK PAIN LATERALITY, UNSPECIFIED CHRONICITY: ICD-10-CM

## 2019-08-26 DIAGNOSIS — F32.1 CURRENT MODERATE EPISODE OF MAJOR DEPRESSIVE DISORDER WITHOUT PRIOR EPISODE (HCC): ICD-10-CM

## 2019-08-26 DIAGNOSIS — G89.4 CHRONIC PAIN SYNDROME: ICD-10-CM

## 2019-08-26 PROCEDURE — 99213 OFFICE O/P EST LOW 20 MIN: CPT | Performed by: FAMILY MEDICINE

## 2019-08-26 PROCEDURE — 99214 OFFICE O/P EST MOD 30 MIN: CPT | Performed by: FAMILY MEDICINE

## 2019-08-26 RX ORDER — CYCLOBENZAPRINE HCL 10 MG
TABLET ORAL
Qty: 60 TAB | Refills: 0 | Status: SHIPPED | OUTPATIENT
Start: 2019-08-26 | End: 2019-08-26 | Stop reason: SDUPTHER

## 2019-08-26 RX ORDER — ZOLPIDEM TARTRATE 5 MG/1
5 TABLET ORAL NIGHTLY PRN
Qty: 30 TAB | Refills: 0 | Status: SHIPPED | OUTPATIENT
Start: 2019-08-26 | End: 2019-09-25

## 2019-08-26 RX ORDER — VENLAFAXINE HYDROCHLORIDE 37.5 MG/1
37.5 CAPSULE, EXTENDED RELEASE ORAL DAILY
Qty: 30 CAP | Refills: 3 | Status: SHIPPED | OUTPATIENT
Start: 2019-08-26 | End: 2020-07-13

## 2019-08-26 RX ORDER — OXYCODONE HYDROCHLORIDE AND ACETAMINOPHEN 5; 325 MG/1; MG/1
1-2 TABLET ORAL EVERY 6 HOURS PRN
Qty: 30 TAB | Refills: 0 | Status: SHIPPED | OUTPATIENT
Start: 2019-08-26 | End: 2019-09-04 | Stop reason: SDUPTHER

## 2019-08-26 RX ORDER — VENLAFAXINE HYDROCHLORIDE 150 MG/1
150 CAPSULE, EXTENDED RELEASE ORAL DAILY
Qty: 30 CAP | Refills: 3 | Status: SHIPPED | OUTPATIENT
Start: 2019-08-26 | End: 2019-08-26

## 2019-08-26 RX ORDER — CYCLOBENZAPRINE HCL 10 MG
TABLET ORAL
Qty: 60 TAB | Refills: 1 | Status: SHIPPED | OUTPATIENT
Start: 2019-08-26 | End: 2019-09-09

## 2019-08-26 RX ORDER — CLONIDINE HYDROCHLORIDE 0.1 MG/1
0.1 TABLET ORAL 2 TIMES DAILY
Qty: 60 TAB | Refills: 2 | Status: SHIPPED | OUTPATIENT
Start: 2019-08-26 | End: 2020-01-13

## 2019-08-26 ASSESSMENT — ENCOUNTER SYMPTOMS
NECK PAIN: 0
TREMORS: 0
SENSORY CHANGE: 0
VOMITING: 0
PALPITATIONS: 0
NAUSEA: 1
SPEECH CHANGE: 0
SPUTUM PRODUCTION: 0
NERVOUS/ANXIOUS: 1
WEAKNESS: 0
BACK PAIN: 1
SHORTNESS OF BREATH: 0
FEVER: 0
HEADACHES: 0
COUGH: 0
CHILLS: 0
FOCAL WEAKNESS: 0
TINGLING: 1
ABDOMINAL PAIN: 1

## 2019-08-26 NOTE — PROGRESS NOTES
Subjective:      Rhiannon Marrero is a 34 y.o. female who presents with Anxiety; Allergic Reaction (Allergies); and Follow-Up            Patient 34-year-old female here for follow-up of her anxiety, chronic pain and insomnia.    She has been taking her Effexor as directed, but has been having a persistent tachycardia.  Her tachycardia may have started after her elevated dose of Effexor so we will decrease her Effexor dose down to 37.5.  She feels that her Effexor does help but she has been very anxious recently despite taking her Effexor as directed.  Since she has persistent tachycardia a referral to cardiology will also be made for possible Holter monitor if her tachycardia should be persistent even after lowering her Effexor dose.  She has been following up with a psychologist for her anxiety issues.  She also has an upcoming appointment with psychiatry for additional assistance and management of her anxiety.  She is not having any urges to harm himself or others.  We will continue to follow.    Chronic pain recheck for: Pelvic and back pain  Last dose of controlled substance: Yesterday  Chronic pain treated with hydrocodone taken 1-2 times a day as needed    She  reports that she does not drink alcohol.  She  reports that she does not use drugs.    Interval history: Having more pelvic discomfort is radiating down her thighs  Any major change in health since last appointment? No    Consequences of Chronic Opiate therapy:  (5 A's)  Analgesia: Compared to no treatment or prior treatment, pain is currently improved  Activity: improved  Adverse Events: She denies constipation, dry mouth, itchy skin, nausea and sedation  Aberrant Behaviors: She reports she is taking medication as prescribed and is not veering from agreed treatment regimen or provider recommendations. There have been no inappropriate refills or lost/stolen meds reported.  Affect/Mood: Pain is not impacting patient's mood.  Patient reports  "depression/anxiety.    Nonnarcotic treatments that are being used: Gabapentin.     Last imagin19 ultrasound of her abdomen    Opioid Risk Score: 6    Interpretation of Opioid Risk Score   Score 0-3 = Low risk of abuse. Do UDS at least once per year.  Score 4-7 = Moderate risk of abuse. Do UDS 1-4 times per year.  Score 8+ = High risk of abuse. Refer to specialist.    No order of CONTROLLED SUBSTANCE TREATMENT AGREEMENT is found.  UDS Summary     Patient has no health maintenance due at this time      Most recent UDS reviewed today and is consistent with prescribed medications.     I have reviewed the medical records, the Prescription Monitoring Program and I have determined that controlled substance treatment is medically indicated.       Review of Systems   Constitutional: Negative for chills and fever.   HENT: Negative for hearing loss and tinnitus.    Respiratory: Negative for cough, sputum production and shortness of breath.    Cardiovascular: Negative for chest pain and palpitations.   Gastrointestinal: Positive for abdominal pain and nausea. Negative for vomiting.   Musculoskeletal: Positive for back pain. Negative for joint pain and neck pain.   Skin: Negative for rash.   Neurological: Positive for tingling. Negative for tremors, sensory change, speech change, focal weakness, weakness and headaches.   Psychiatric/Behavioral: The patient is nervous/anxious.           Objective:     /70 (BP Location: Left arm, Patient Position: Sitting, BP Cuff Size: Adult)   Pulse (!) 136   Temp 36.9 °C (98.4 °F) (Temporal)   Resp 20   Ht 1.676 m (5' 6\")   Wt 64.9 kg (143 lb)   LMP 10/30/2018   SpO2 97%   Breastfeeding? No   BMI 23.08 kg/m²      Physical Exam   Constitutional: She is oriented to person, place, and time.   BMI 23.08   HENT:   Head: Normocephalic and atraumatic.   Cardiovascular: Regular rhythm and normal heart sounds. Exam reveals no friction rub.   No murmur heard.  Tachycardia "   Pulmonary/Chest: Effort normal and breath sounds normal. No stridor. No respiratory distress. She has no wheezes.   Abdominal: Soft. Bowel sounds are normal. She exhibits no distension. There is tenderness. There is no guarding.   Neurological: She is alert and oriented to person, place, and time.   Skin: Skin is warm and dry.   Psychiatric: Her behavior is normal.   Very anxious appearance   Nursing note and vitals reviewed.              Assessment/Plan:     1. Insomnia, unspecified type  We will have her try Ambien at 5 mg nightly as needed.  She will also continue working with her psychologist for her anxiety issues which is probably worsening her insomnia.  We will continue to follow.  - zolpidem (AMBIEN) 5 MG Tab; Take 1 Tab by mouth at bedtime as needed for Sleep for up to 30 days.  Dispense: 30 Tab; Refill: 0    2. Low back pain with sciatica, sciatica laterality unspecified, unspecified back pain laterality, unspecified chronicity  We will also have her continue to use her medications as directed.  Discussed having her establish with pain management for additional assistance in management of her chronic pain issues.  We will continue to follow.  - cyclobenzaprine (FLEXERIL) 10 MG Tab; TAKE 1/2 TO 1 TABLET BY MOUTH THREE TIMES DAILY AS NEEDED  Dispense: 60 Tab; Refill: 1    3. Anxiety  We will have her continue her Effexor, but her dose will be decreased to 37.5 mg daily due to her persistent tachycardia.  She has been referred to a cardiologist for a possible Holter or event monitor and also further evaluation of her tachycardia.  We will continue to follow.  - REFERRAL TO CARDIOLOGY  - venlafaxine XR (EFFEXOR XR) 37.5 MG CAPSULE SR 24 HR; Take 1 Cap by mouth every day.  Dispense: 30 Cap; Refill: 3    4. Current moderate episode of major depressive disorder without prior episode (HCC)  See above plan.  - venlafaxine XR (EFFEXOR XR) 37.5 MG CAPSULE SR 24 HR; Take 1 Cap by mouth every day.  Dispense: 30 Cap;  Refill: 3    5. Chronic pain syndrome  See above plan.  - oxyCODONE-acetaminophen (PERCOCET) 5-325 MG Tab; Take 1-2 Tabs by mouth every 6 hours as needed for up to 7 days.  Dispense: 30 Tab; Refill: 0

## 2019-08-31 ENCOUNTER — OFFICE VISIT (OUTPATIENT)
Dept: URGENT CARE | Facility: PHYSICIAN GROUP | Age: 34
End: 2019-08-31
Payer: MEDICAID

## 2019-08-31 VITALS
HEIGHT: 66 IN | OXYGEN SATURATION: 97 % | SYSTOLIC BLOOD PRESSURE: 118 MMHG | BODY MASS INDEX: 22.98 KG/M2 | HEART RATE: 84 BPM | WEIGHT: 143 LBS | DIASTOLIC BLOOD PRESSURE: 56 MMHG | TEMPERATURE: 97.5 F | RESPIRATION RATE: 14 BRPM

## 2019-08-31 DIAGNOSIS — H01.113 ALLERGIC BLEPHARITIS, RIGHT: Primary | ICD-10-CM

## 2019-08-31 DIAGNOSIS — R51.9 NONINTRACTABLE HEADACHE, UNSPECIFIED CHRONICITY PATTERN, UNSPECIFIED HEADACHE TYPE: ICD-10-CM

## 2019-08-31 PROCEDURE — 99214 OFFICE O/P EST MOD 30 MIN: CPT | Performed by: PHYSICIAN ASSISTANT

## 2019-08-31 RX ORDER — IBUPROFEN 800 MG/1
800 TABLET ORAL EVERY 8 HOURS PRN
Qty: 30 TAB | Refills: 0 | Status: SHIPPED | OUTPATIENT
Start: 2019-08-31 | End: 2019-11-26

## 2019-08-31 RX ORDER — METHYLPREDNISOLONE 4 MG/1
4 TABLET ORAL SEE ADMIN INSTRUCTIONS
Qty: 21 TAB | Refills: 0 | Status: SHIPPED | OUTPATIENT
Start: 2019-08-31 | End: 2020-07-13

## 2019-08-31 RX ORDER — CETIRIZINE HYDROCHLORIDE 10 MG/1
10 TABLET ORAL DAILY
Qty: 30 TAB | Refills: 0 | Status: SHIPPED | OUTPATIENT
Start: 2019-08-31 | End: 2020-07-13

## 2019-08-31 NOTE — PATIENT INSTRUCTIONS
Steps to Quit Smoking  Smoking tobacco can be harmful to your health and can affect almost every organ in your body. Smoking puts you, and those around you, at risk for developing many serious chronic diseases. Quitting smoking is difficult, but it is one of the best things that you can do for your health. It is never too late to quit.  What are the benefits of quitting smoking?  When you quit smoking, you lower your risk of developing serious diseases and conditions, such as:  · Lung cancer or lung disease, such as COPD.  · Heart disease.  · Stroke.  · Heart attack.  · Infertility.  · Osteoporosis and bone fractures.  Additionally, symptoms such as coughing, wheezing, and shortness of breath may get better when you quit. You may also find that you get sick less often because your body is stronger at fighting off colds and infections. If you are pregnant, quitting smoking can help to reduce your chances of having a baby of low birth weight.  How do I get ready to quit?  When you decide to quit smoking, create a plan to make sure that you are successful. Before you quit:  · Pick a date to quit. Set a date within the next two weeks to give you time to prepare.  · Write down the reasons why you are quitting. Keep this list in places where you will see it often, such as on your bathroom mirror or in your car or wallet.  · Identify the people, places, things, and activities that make you want to smoke (triggers) and avoid them. Make sure to take these actions:  ¨ Throw away all cigarettes at home, at work, and in your car.  ¨ Throw away smoking accessories, such as ashtrays and lighters.  ¨ Clean your car and make sure to empty the ashtray.  ¨ Clean your home, including curtains and carpets.  · Tell your family, friends, and coworkers that you are quitting. Support from your loved ones can make quitting easier.  · Talk with your health care provider about your options for quitting smoking.  · Find out what treatment  options are covered by your health insurance.  What strategies can I use to quit smoking?  Talk with your healthcare provider about different strategies to quit smoking. Some strategies include:  · Quitting smoking altogether instead of gradually lessening how much you smoke over a period of time. Research shows that quitting “cold turkey” is more successful than gradually quitting.  · Attending in-person counseling to help you build problem-solving skills. You are more likely to have success in quitting if you attend several counseling sessions. Even short sessions of 10 minutes can be effective.  · Finding resources and support systems that can help you to quit smoking and remain smoke-free after you quit. These resources are most helpful when you use them often. They can include:  ¨ Online chats with a counselor.  ¨ Telephone quitlines.  ¨ Printed self-help materials.  ¨ Support groups or group counseling.  ¨ Text messaging programs.  ¨ Mobile phone applications.  · Taking medicines to help you quit smoking. (If you are pregnant or breastfeeding, talk with your health care provider first.) Some medicines contain nicotine and some do not. Both types of medicines help with cravings, but the medicines that include nicotine help to relieve withdrawal symptoms. Your health care provider may recommend:  ¨ Nicotine patches, gum, or lozenges.  ¨ Nicotine inhalers or sprays.  ¨ Non-nicotine medicine that is taken by mouth.  Talk with your health care provider about combining strategies, such as taking medicines while you are also receiving in-person counseling. Using these two strategies together makes you more likely to succeed in quitting than if you used either strategy on its own.  If you are pregnant or breastfeeding, talk with your health care provider about finding counseling or other support strategies to quit smoking. Do not take medicine to help you quit smoking unless told to do so by your health care  provider.  What things can I do to make it easier to quit?  Quitting smoking might feel overwhelming at first, but there is a lot that you can do to make it easier. Take these important actions:  · Reach out to your family and friends and ask that they support and encourage you during this time. Call telephone quitlines, reach out to support groups, or work with a counselor for support.  · Ask people who smoke to avoid smoking around you.  · Avoid places that trigger you to smoke, such as bars, parties, or smoke-break areas at work.  · Spend time around people who do not smoke.  · Lessen stress in your life, because stress can be a smoking trigger for some people. To lessen stress, try:  ¨ Exercising regularly.  ¨ Deep-breathing exercises.  ¨ Yoga.  ¨ Meditating.  ¨ Performing a body scan. This involves closing your eyes, scanning your body from head to toe, and noticing which parts of your body are particularly tense. Purposefully relax the muscles in those areas.  · Download or purchase mobile phone or tablet apps (applications) that can help you stick to your quit plan by providing reminders, tips, and encouragement. There are many free apps, such as QuitGuide from the CDC (Centers for Disease Control and Prevention). You can find other support for quitting smoking (smoking cessation) through smokefree.gov and other websites.  How will I feel when I quit smoking?  Within the first 24 hours of quitting smoking, you may start to feel some withdrawal symptoms. These symptoms are usually most noticeable 2-3 days after quitting, but they usually do not last beyond 2-3 weeks. Changes or symptoms that you might experience include:  · Mood swings.  · Restlessness, anxiety, or irritation.  · Difficulty concentrating.  · Dizziness.  · Strong cravings for sugary foods in addition to nicotine.  · Mild weight gain.  · Constipation.  · Nausea.  · Coughing or a sore throat.  · Changes in how your medicines work in your  body.  · A depressed mood.  · Difficulty sleeping (insomnia).  After the first 2-3 weeks of quitting, you may start to notice more positive results, such as:  · Improved sense of smell and taste.  · Decreased coughing and sore throat.  · Slower heart rate.  · Lower blood pressure.  · Clearer skin.  · The ability to breathe more easily.  · Fewer sick days.  Quitting smoking is very challenging for most people. Do not get discouraged if you are not successful the first time. Some people need to make many attempts to quit before they achieve long-term success. Do your best to stick to your quit plan, and talk with your health care provider if you have any questions or concerns.  This information is not intended to replace advice given to you by your health care provider. Make sure you discuss any questions you have with your health care provider.  Document Released: 12/12/2002 Document Revised: 08/15/2017 Document Reviewed: 05/03/2016  [employer] Interactive Patient Education © 2017 [employer]

## 2019-08-31 NOTE — PROGRESS NOTES
Chief Complaint   Patient presents with   • Eye Swelling       HISTORY OF PRESENT ILLNESS: Patient is a 34 y.o. female who presents today because Her eyes are very itchy yesterday and she woke up this morning with a very swollen right upper lid.  She has not been taking any medications for her symptoms.  She is complaining of a headache.    Patient Active Problem List    Diagnosis Date Noted   • Pain due to interstitial cystitis 08/12/2019   • Seasonal allergic rhinitis 08/12/2019   • Tobacco abuse 08/12/2019   • Bipolar affective disorder, current episode hypomanic (HCC) 06/11/2019   • Low back pain with sciatica 01/08/2019   • S/P hysterectomy 01/08/2019   • Post-op pain 01/08/2019   • Chronic pain syndrome 01/25/2018   • Anxiety 04/13/2017       Allergies:Lorazepam and Promethazine hcl    Current Outpatient Medications Ordered in Epic   Medication Sig Dispense Refill   • ibuprofen (MOTRIN) 800 MG Tab Take 1 Tab by mouth every 8 hours as needed. 30 Tab 0   • methylPREDNISolone (MEDROL DOSEPAK) 4 MG Tablet Therapy Pack Take 1 Tab by mouth See Admin Instructions. 21 Tab 0   • cetirizine (ZYRTEC ALLERGY) 10 MG Tab Take 1 Tab by mouth every day. 30 Tab 0   • cloNIDine (CATAPRES) 0.1 MG Tab Take 1 Tab by mouth 2 times a day. 60 Tab 2   • zolpidem (AMBIEN) 5 MG Tab Take 1 Tab by mouth at bedtime as needed for Sleep for up to 30 days. 30 Tab 0   • cyclobenzaprine (FLEXERIL) 10 MG Tab TAKE 1/2 TO 1 TABLET BY MOUTH THREE TIMES DAILY AS NEEDED 60 Tab 1   • venlafaxine XR (EFFEXOR XR) 37.5 MG CAPSULE SR 24 HR Take 1 Cap by mouth every day. 30 Cap 3   • oxyCODONE-acetaminophen (PERCOCET) 5-325 MG Tab Take 1-2 Tabs by mouth every 6 hours as needed for up to 7 days. 30 Tab 0   • PROVENTIL  (90 Base) MCG/ACT Aero Soln inhalation aerosol      • tizanidine (ZANAFLEX) 4 MG Tab      • nicotine (NICODERM) 14 MG/24HR PATCH 24 HR Apply 1 Patch to skin as directed every 24 hours. 30 Patch 3   • loratadine (CLARITIN) 10 MG Tab  Take 1 Tab by mouth every day. 30 Tab 3   • fluticasone (FLONASE) 50 MCG/ACT nasal spray Spray 2 Sprays in nose every day. 16 g 11   • hydrOXYzine HCl (ATARAX) 25 MG Tab Take 1 Tab by mouth 3 times a day as needed for Itching. 60 Tab 0   • ammonium lactate (LAC-HYDRIN) 12 % Lotion Apply 1 Applicator to affected area(s) as needed. 1 Bottle 3   • triamcinolone acetonide (KENALOG) 0.1 % Cream Apply 1 Application to affected area(s) 2 times a day as needed. 1 Tube 3   • gabapentin (NEURONTIN) 600 MG tablet Take 1 Tab by mouth 3 times a day. 270 Tab 1   • disulfiram (ANTABUSE) 250 MG Tab TK 1 T PO QD  0   • therapeutic multivitamin-minerals (THERAGRAN-M) Tab Take 1 Tab by mouth every day.     • ibuprofen (MOTRIN) 200 MG Tab Take 800 mg by mouth every 6 hours as needed for Mild Pain.     • ELMIRON 100 MG Cap Take 100 mg by mouth 3 times a day.       No current [facility]-ordered facility-administered medications on file.        Past Medical History:   Diagnosis Date   • Alcohol dependence (HCC) 2017   • Bronchitis 2012   • Cold     2018   • Heart burn    • Hernia of unspecified site of abdominal cavity without mention of obstruction or gangrene    • Indigestion    • Pancreatitis    • Pneumonia        Social History     Tobacco Use   • Smoking status: Current Every Day Smoker     Packs/day: 0.75     Years: 10.00     Pack years: 7.50     Types: Cigarettes   • Smokeless tobacco: Never Used   Substance Use Topics   • Alcohol use: No     Comment: Sober since    • Drug use: No       Family Status   Relation Name Status   • Mo  Alive   • Fa alcohol Alive   • MGFa     • Neg Hx  (Not Specified)     Family History   Problem Relation Age of Onset   • Psychiatric Illness Mother    • Stroke Mother    • Arthritis Mother    • Heart Disease Maternal Grandfather    • Cancer Neg Hx    • Diabetes Neg Hx    • Hypertension Neg Hx        ROS:  Review of Systems   Constitutional: Negative for fever, chills, weight loss  "and malaise/fatigue.   HENT: Negative for ear pain, nosebleeds, congestion, sore throat and neck pain.    Eyes: Negative for blurred vision.  Positive for eye complaints as in HPI  Respiratory: Negative for cough, sputum production, shortness of breath and wheezing.    Cardiovascular: Negative for chest pain, palpitations, orthopnea and leg swelling.   Gastrointestinal: Negative for heartburn, nausea, vomiting and abdominal pain.   Genitourinary: Negative for dysuria, urgency and frequency.     Exam:  /56 (BP Location: Right arm, Patient Position: Sitting, BP Cuff Size: Adult)   Pulse 84   Temp 36.4 °C (97.5 °F) (Temporal)   Resp 14   Ht 1.676 m (5' 6\")   Wt 64.9 kg (143 lb)   SpO2 97%   General:  Well nourished, well developed female in NAD  Head:Normocephalic, atraumatic  Eyes: PERRLA, EOM within normal limits, no conjunctival injection, no scleral icterus, visual fields and acuity grossly intact.  Right upper lid is nonerythematous but very edematous.  Ears: Normal shape and symmetry, no tenderness, no discharge. External canals are without any significant edema or erythema. Tympanic membranes are without any inflammation, no effusion. Gross auditory acuity is intact  Nose: Symmetrical without tenderness, no discharge.  Mouth: reasonable hygiene, no erythema exudates or tonsillar enlargement.  Neck: no masses, range of motion within normal limits, no tracheal deviation. No obvious thyroid enlargement.  Extremities: no clubbing, cyanosis, or edema.    Please note that this dictation was created using voice recognition software. I have made every reasonable attempt to correct obvious errors, but I expect that there are errors of grammar and possibly content that I did not discover before finalizing the note.    Assessment/Plan:  1. Allergic blepharitis, right  methylPREDNISolone (MEDROL DOSEPAK) 4 MG Tablet Therapy Pack    cetirizine (ZYRTEC ALLERGY) 10 MG Tab   2. Nonintractable headache, unspecified " chronicity pattern, unspecified headache type  ibuprofen (MOTRIN) 800 MG Tab       Followup with primary care in the next 7-10 days if not significantly improving, return to the urgent care or go to the emergency room sooner for any worsening of symptoms.

## 2019-09-03 ENCOUNTER — TELEPHONE (OUTPATIENT)
Dept: MEDICAL GROUP | Facility: MEDICAL CENTER | Age: 34
End: 2019-09-03

## 2019-09-04 DIAGNOSIS — G89.4 CHRONIC PAIN SYNDROME: ICD-10-CM

## 2019-09-04 RX ORDER — OXYCODONE HYDROCHLORIDE AND ACETAMINOPHEN 5; 325 MG/1; MG/1
1-2 TABLET ORAL EVERY 6 HOURS PRN
Qty: 30 TAB | Refills: 0 | Status: SHIPPED | OUTPATIENT
Start: 2019-09-04 | End: 2019-09-09 | Stop reason: SDUPTHER

## 2019-09-04 NOTE — TELEPHONE ENCOUNTER
1. Name: Rhiannon Marrero  Call Back Number: [phone] (home)     Patient approves a detailed voicemail message: yes    2. Which medication(s) is being requested? Percocet     3. What is the preferred Pharmacy?     Patient was informed they may receive a return phone call from our office with any additional questions before processing this request.    Patient requesting written RX for percocet, please call when ready for .

## 2019-09-04 NOTE — TELEPHONE ENCOUNTER
Patient called and asked about her medication refill. I alerted patient that as soon as the refill was put through she would get a call back alerting her. Patient requested to be transferred over to the MA for her PCP. Patient was made aware that the MA may not be at her desk but she can leave a voicemail and will get a call back.

## 2019-09-09 ENCOUNTER — OFFICE VISIT (OUTPATIENT)
Dept: MEDICAL GROUP | Facility: MEDICAL CENTER | Age: 34
End: 2019-09-09
Attending: FAMILY MEDICINE
Payer: MEDICAID

## 2019-09-09 VITALS
TEMPERATURE: 97.7 F | BODY MASS INDEX: 23.95 KG/M2 | HEIGHT: 66 IN | DIASTOLIC BLOOD PRESSURE: 60 MMHG | HEART RATE: 75 BPM | WEIGHT: 149 LBS | SYSTOLIC BLOOD PRESSURE: 110 MMHG | OXYGEN SATURATION: 98 % | RESPIRATION RATE: 18 BRPM

## 2019-09-09 DIAGNOSIS — G89.4 CHRONIC PAIN SYNDROME: ICD-10-CM

## 2019-09-09 DIAGNOSIS — K21.9 GASTROESOPHAGEAL REFLUX DISEASE, ESOPHAGITIS PRESENCE NOT SPECIFIED: ICD-10-CM

## 2019-09-09 DIAGNOSIS — S89.91XD INJURY OF RIGHT KNEE, SUBSEQUENT ENCOUNTER: ICD-10-CM

## 2019-09-09 DIAGNOSIS — T14.8XXA ABRASION: ICD-10-CM

## 2019-09-09 PROCEDURE — 99212 OFFICE O/P EST SF 10 MIN: CPT | Performed by: FAMILY MEDICINE

## 2019-09-09 PROCEDURE — 99214 OFFICE O/P EST MOD 30 MIN: CPT | Performed by: FAMILY MEDICINE

## 2019-09-09 RX ORDER — LIDOCAINE 50 MG/G
1-2 OINTMENT TOPICAL PRN
Qty: 1 TUBE | Refills: 0 | Status: SHIPPED | OUTPATIENT
Start: 2019-09-09 | End: 2020-07-13

## 2019-09-09 RX ORDER — OMEPRAZOLE 20 MG/1
20 CAPSULE, DELAYED RELEASE ORAL DAILY
Qty: 30 CAP | Refills: 3 | Status: SHIPPED | OUTPATIENT
Start: 2019-09-09 | End: 2020-07-13

## 2019-09-09 RX ORDER — OXYCODONE HYDROCHLORIDE AND ACETAMINOPHEN 5; 325 MG/1; MG/1
1-2 TABLET ORAL EVERY 6 HOURS PRN
Qty: 30 TAB | Refills: 0 | Status: SHIPPED | OUTPATIENT
Start: 2019-09-09 | End: 2019-09-09 | Stop reason: SDUPTHER

## 2019-09-09 RX ORDER — TIZANIDINE 4 MG/1
4 TABLET ORAL EVERY 6 HOURS PRN
Qty: 30 TAB | Refills: 3 | Status: SHIPPED | OUTPATIENT
Start: 2019-09-09 | End: 2020-02-14

## 2019-09-09 RX ORDER — OXYCODONE HYDROCHLORIDE AND ACETAMINOPHEN 5; 325 MG/1; MG/1
1-2 TABLET ORAL EVERY 6 HOURS PRN
Qty: 30 TAB | Refills: 0 | Status: SHIPPED | OUTPATIENT
Start: 2019-09-16 | End: 2019-09-23 | Stop reason: SDUPTHER

## 2019-09-09 ASSESSMENT — ENCOUNTER SYMPTOMS
COUGH: 0
ABDOMINAL PAIN: 1
BACK PAIN: 1
TINGLING: 1
FEVER: 0
WEAKNESS: 0
HEADACHES: 0
SPUTUM PRODUCTION: 0
MYALGIAS: 1
NAUSEA: 1
SPEECH CHANGE: 0
SENSORY CHANGE: 0
VOMITING: 0
TREMORS: 0
CHILLS: 0
SHORTNESS OF BREATH: 0
PALPITATIONS: 0
FOCAL WEAKNESS: 0
NERVOUS/ANXIOUS: 1
NECK PAIN: 0

## 2019-09-10 NOTE — PROGRESS NOTES
Subjective:      Rhiannon Marrero is a 34 y.o. female who presents with Follow-Up            Chronic pain recheck for: abdominal and pelvic pain, recently fell on right knee secondary to trip and fall.  Having swelling and tenderness over the anterior aspect of her knee.  Last dose of controlled substance: yesterday  Chronic pain treated with oxycodone taken 1-2 times a day as needed    She  reports that she does not drink alcohol.  She  reports that she does not use drugs.    Interval history: doing about the same  Any major change in health since last appointment? No    Consequences of Chronic Opiate therapy:  (5 A's)  Analgesia: Compared to no treatment or prior treatment, pain is currently improved  Activity: improved  Adverse Events: She denies constipation, dry mouth, itchy skin, nausea and sedation  Aberrant Behaviors: She reports she is taking medication as prescribed and is not veering from agreed treatment regimen or provider recommendations. There have been no inappropriate refills or lost/stolen meds reported.  Affect/Mood: Pain is not impacting patient's mood.  Patient reports depression/anxiety.  She is currently on Effexor as well as seeing a psychologist and has an appointment with psychiatry coming up this week.    Nonnarcotic treatments that are being used: Tylenol, NSAIDs/VASQUEZ-2 and Gabapentin.     Last imaging: ultrasound abdomen on 7/2/19 an x-ray of her injury knee has also been ordered.    Opioid Risk Score: 6    Interpretation of Opioid Risk Score   Score 0-3 = Low risk of abuse. Do UDS at least once per year.  Score 4-7 = Moderate risk of abuse. Do UDS 1-4 times per year.  Score 8+ = High risk of abuse. Refer to specialist.    No order of CONTROLLED SUBSTANCE TREATMENT AGREEMENT is found.  UDS Summary     Patient has no health maintenance due at this time      Most recent UDS reviewed today and is consistent with prescribed medications.     I have reviewed the medical records, the  "Prescription Monitoring Program and I have determined that controlled substance treatment is medically indicated.         Review of Systems   Constitutional: Negative for chills and fever.   HENT: Negative for hearing loss and tinnitus.    Respiratory: Negative for cough, sputum production and shortness of breath.    Cardiovascular: Negative for chest pain and palpitations.   Gastrointestinal: Positive for abdominal pain and nausea. Negative for vomiting.   Musculoskeletal: Positive for back pain and myalgias. Negative for joint pain and neck pain.   Skin: Negative for rash.   Neurological: Positive for tingling. Negative for tremors, sensory change, speech change, focal weakness, weakness and headaches.   Psychiatric/Behavioral: The patient is nervous/anxious.           Objective:     /60   Pulse 75   Temp 36.5 °C (97.7 °F) (Temporal)   Resp 18   Ht 1.676 m (5' 6\")   Wt 67.6 kg (149 lb)   LMP 10/30/2018   SpO2 98%   BMI 24.05 kg/m²      Physical Exam   Constitutional: She is oriented to person, place, and time.   BMI 24.05   HENT:   Head: Normocephalic and atraumatic.   Cardiovascular: Normal rate, regular rhythm and normal heart sounds. Exam reveals no friction rub.   No murmur heard.  Pulmonary/Chest: Effort normal and breath sounds normal. No stridor. No respiratory distress. She has no wheezes.   Abdominal: Soft. Bowel sounds are normal. She exhibits no distension. There is no tenderness.   Musculoskeletal: She exhibits tenderness. She exhibits no edema.   R knee swelling with abrasion to anterior aspect, no erythema   Neurological: She is alert and oriented to person, place, and time.   Skin: Skin is warm and dry.   Psychiatric: Her behavior is normal.   Anxious appearance   Nursing note and vitals reviewed.              Assessment/Plan:     1. Chronic pain syndrome  We will have her continue to use her medications as directed.  Once again discussed pain management, and also discussed the " possibility that her anxiety may be exacerbating her chronic pain.  She has an appointment with a psychiatrist coming up this week.  We will have her continue using her Effexor as well.  We will continue to follow.  - oxyCODONE-acetaminophen (PERCOCET) 5-325 MG Tab; Take 1-2 Tabs by mouth every 6 hours as needed for up to 7 days.  Dispense: 30 Tab; Refill: 0    2. Abrasion  She has sensitivity over her recent abrasion on her knee secondary to fall her knee.  We will have her use a lidocaine ointment to help dull her discomfort.  We will continue to follow.  - lidocaine (XYLOCAINE) 5 % Ointment; Apply 1-2 g to affected area(s) as needed.  Dispense: 1 Tube; Refill: 0    3. Gastroesophageal reflux disease, esophagitis presence not specified  We will have her use Prilosec as directed for her gastroesophageal reflux disease.  - omeprazole (PRILOSEC) 20 MG delayed-release capsule; Take 1 Cap by mouth every day.  Dispense: 30 Cap; Refill: 3    4. Injury of right knee, subsequent encounter  Will order an x-ray of her knee.  Discussed physical therapy if her knee continues to bother her.  We will continue to follow.  - DX-KNEE 3 VIEWS RIGHT; Future

## 2019-09-23 ENCOUNTER — OFFICE VISIT (OUTPATIENT)
Dept: MEDICAL GROUP | Facility: MEDICAL CENTER | Age: 34
End: 2019-09-23
Attending: FAMILY MEDICINE
Payer: MEDICAID

## 2019-09-23 VITALS
OXYGEN SATURATION: 96 % | BODY MASS INDEX: 23.45 KG/M2 | SYSTOLIC BLOOD PRESSURE: 118 MMHG | TEMPERATURE: 97.9 F | HEIGHT: 66 IN | DIASTOLIC BLOOD PRESSURE: 68 MMHG | HEART RATE: 121 BPM | WEIGHT: 145.9 LBS

## 2019-09-23 DIAGNOSIS — G89.4 CHRONIC PAIN SYNDROME: ICD-10-CM

## 2019-09-23 DIAGNOSIS — R55 TRANSIENT LOC (LOSS OF CONSCIOUSNESS): ICD-10-CM

## 2019-09-23 PROCEDURE — 99213 OFFICE O/P EST LOW 20 MIN: CPT | Performed by: FAMILY MEDICINE

## 2019-09-23 PROCEDURE — 99214 OFFICE O/P EST MOD 30 MIN: CPT | Performed by: FAMILY MEDICINE

## 2019-09-23 RX ORDER — OXYCODONE HYDROCHLORIDE AND ACETAMINOPHEN 5; 325 MG/1; MG/1
1-2 TABLET ORAL EVERY 6 HOURS PRN
Qty: 30 TAB | Refills: 0 | Status: SHIPPED | OUTPATIENT
Start: 2019-09-30 | End: 2019-11-26 | Stop reason: SDUPTHER

## 2019-09-23 RX ORDER — OXYCODONE HYDROCHLORIDE AND ACETAMINOPHEN 5; 325 MG/1; MG/1
1-2 TABLET ORAL EVERY 6 HOURS PRN
Qty: 30 TAB | Refills: 0 | Status: SHIPPED | OUTPATIENT
Start: 2019-09-23 | End: 2019-09-30

## 2019-09-23 RX ORDER — OXYCODONE HYDROCHLORIDE AND ACETAMINOPHEN 5; 325 MG/1; MG/1
1-2 TABLET ORAL EVERY 6 HOURS PRN
Qty: 30 TAB | Refills: 0 | Status: SHIPPED | OUTPATIENT
Start: 2019-09-30 | End: 2019-09-23 | Stop reason: SDUPTHER

## 2019-09-23 RX ORDER — OXYCODONE HYDROCHLORIDE AND ACETAMINOPHEN 5; 325 MG/1; MG/1
1-2 TABLET ORAL EVERY 6 HOURS PRN
Qty: 30 TAB | Refills: 0 | Status: SHIPPED | OUTPATIENT
Start: 2019-10-14 | End: 2019-09-23 | Stop reason: SDUPTHER

## 2019-09-23 RX ORDER — OXYCODONE HYDROCHLORIDE AND ACETAMINOPHEN 5; 325 MG/1; MG/1
1-2 TABLET ORAL EVERY 6 HOURS PRN
Qty: 30 TAB | Refills: 0 | Status: SHIPPED | OUTPATIENT
Start: 2019-10-07 | End: 2019-09-23 | Stop reason: SDUPTHER

## 2019-09-23 ASSESSMENT — ENCOUNTER SYMPTOMS
HALLUCINATIONS: 0
TINGLING: 1
VOMITING: 0
FOCAL WEAKNESS: 0
DEPRESSION: 1
HEADACHES: 0
FEVER: 0
NERVOUS/ANXIOUS: 1
TREMORS: 0
BACK PAIN: 1
SPUTUM PRODUCTION: 0
CHILLS: 0
SPEECH CHANGE: 0
NECK PAIN: 0
PALPITATIONS: 0
ABDOMINAL PAIN: 1
SHORTNESS OF BREATH: 0
COUGH: 0
NAUSEA: 1
SENSORY CHANGE: 0

## 2019-09-23 ASSESSMENT — LIFESTYLE VARIABLES: SUBSTANCE_ABUSE: 0

## 2019-09-23 NOTE — PROGRESS NOTES
Subjective:      Rhiannon Marrero is a 34 y.o. female who presents with Pain (Follow up) and Seizure (Discuss Fainted)            Chronic pain recheck for: Chronic abdominal pelvic pain  Last dose of controlled substance: Yesterday  Chronic pain treated with oxycodone taken 1-2 times a day as needed    She  reports that she does not drink alcohol.  She  reports that she does not use drugs.    Interval history: doing about the same  Any major change in health since last appointment? No    Consequences of Chronic Opiate therapy:  (5 A's)  Analgesia: Compared to no treatment or prior treatment, pain is currently improved  Activity: improved  Adverse Events: She denies constipation, dry mouth, itchy skin, nausea and sedation  Aberrant Behaviors: She reports she is taking medication as prescribed and is not veering from agreed treatment regimen or provider recommendations. There have been no inappropriate refills or lost/stolen meds reported.  Affect/Mood: Pain is not impacting patient's mood.  Patient reports depression/anxiety.  She has been following up with mental health.    Nonnarcotic treatments that are being used: Gabapentin and NSAIDs.    Last imaging: Renal ultrasound was done on 7/2/2019    Opioid Risk Score: 6    Interpretation of Opioid Risk Score   Score 0-3 = Low risk of abuse. Do UDS at least once per year.  Score 4-7 = Moderate risk of abuse. Do UDS 1-4 times per year.  Score 8+ = High risk of abuse. Refer to specialist.    Last order of CONTROLLED SUBSTANCE TREATMENT AGREEMENT was found on 9/23/2019 from Office Visit on 9/23/2019  UDS Summary     Patient has no health maintenance due at this time      Most recent UDS reviewed today and is consistent with prescribed medications.     I have reviewed the medical records, the Prescription Monitoring Program and I have determined that controlled substance treatment is medically indicated.     She also recently had a possible fainting spell or seizure, and  "had been seen at [facility].  She states that she had a work-up but did not see a neurologist and did not have a CT or MRI of her brain done.  She was told that it was more likely a syncopal episode.  We will have her follow-up with cardiology for a further evaluation, but a referral to neurology will also be made in case she does have possible seizures.  ER precautions once again have been given to patient if she should develop any further episodes similar to this last episode.  We will continue to follow.     Current medications, allergies, and problem list reviewed with patient and updated in EPIC.        Review of Systems   Constitutional: Negative for chills and fever.   HENT: Negative for hearing loss and tinnitus.    Respiratory: Negative for cough, sputum production and shortness of breath.    Cardiovascular: Negative for chest pain and palpitations.   Gastrointestinal: Positive for abdominal pain and nausea. Negative for vomiting.   Musculoskeletal: Positive for back pain. Negative for joint pain and neck pain.   Neurological: Positive for tingling. Negative for tremors, sensory change, speech change, focal weakness and headaches.   Psychiatric/Behavioral: Positive for depression. Negative for hallucinations, substance abuse and suicidal ideas. The patient is nervous/anxious.           Objective:     /68 (BP Location: Right arm, Patient Position: Sitting, BP Cuff Size: Adult)   Pulse (!) 121   Temp 36.6 °C (97.9 °F) (Temporal)   Ht 1.676 m (5' 6\")   Wt 66.2 kg (145 lb 14.4 oz)   LMP 10/30/2018   SpO2 96%   BMI 23.55 kg/m²      Physical Exam   Constitutional: She is oriented to person, place, and time.   BMI 23.55   HENT:   Head: Normocephalic and atraumatic.   Nose: Nose normal.   Mouth/Throat: Oropharynx is clear and moist.   Eyes: Pupils are equal, round, and reactive to light. Conjunctivae and EOM are normal.   Neck: Normal range of motion. Neck supple.   Cardiovascular: Normal rate, " regular rhythm and normal heart sounds. Exam reveals no friction rub.   No murmur heard.  Pulmonary/Chest: Effort normal and breath sounds normal. No stridor. No respiratory distress. She has no wheezes.   Abdominal: Soft. Bowel sounds are normal. She exhibits no distension. There is tenderness.   Lower abdomen and pelvis   Musculoskeletal:   Decreased ROM of back in flexion   Neurological: She is alert and oriented to person, place, and time.   Skin: Skin is warm and dry.   Psychiatric: She has a normal mood and affect. Her behavior is normal.   Nursing note and vitals reviewed.              Assessment/Plan:     1. Transient LOC (loss of consciousness)  Patient recently had a transient episode of loss of consciousness and had gone to the emergency room for it.  She states that not much of a work-up had been done, so she would like to see a neurologist in case she may be having seizure activity.  Neurology referral has been made.  We will also have her contact cardiology to set up an appointment for possible work-up of syncope.  ER precautions have been given to patient as well.  - REFERRAL TO NEUROLOGY    2. Chronic pain syndrome  We will have her continue using her pain medications as directed.  Postdated scripts have been written for patient so she has them on file at her pharmacy.  We will have her continue using them as directed until she is able to follow-up with her next primary care provider.  We will continue to follow.  A pain agreement was also we printed for patient today.  And AMANUEL X was also done.  - oxyCODONE-acetaminophen (PERCOCET) 5-325 MG Tab; Take 1-2 Tabs by mouth every 6 hours as needed for up to 7 days.  Dispense: 30 Tab; Refill: 0  - Controlled Substance Treatment Agreement  - oxyCODONE-acetaminophen (PERCOCET) 5-325 MG Tab; Take 1-2 Tabs by mouth every 6 hours as needed for up to 7 days.  Dispense: 30 Tab; Refill: 0

## 2019-11-26 ENCOUNTER — OFFICE VISIT (OUTPATIENT)
Dept: MEDICAL GROUP | Facility: MEDICAL CENTER | Age: 34
End: 2019-11-26
Attending: FAMILY MEDICINE
Payer: MEDICAID

## 2019-11-26 VITALS
HEART RATE: 98 BPM | HEIGHT: 66 IN | OXYGEN SATURATION: 100 % | RESPIRATION RATE: 16 BRPM | DIASTOLIC BLOOD PRESSURE: 82 MMHG | WEIGHT: 135 LBS | TEMPERATURE: 99.6 F | BODY MASS INDEX: 21.69 KG/M2 | SYSTOLIC BLOOD PRESSURE: 122 MMHG

## 2019-11-26 DIAGNOSIS — F31.0 BIPOLAR AFFECTIVE DISORDER, CURRENT EPISODE HYPOMANIC (HCC): ICD-10-CM

## 2019-11-26 DIAGNOSIS — F10.10 ALCOHOL ABUSE: ICD-10-CM

## 2019-11-26 DIAGNOSIS — G89.4 CHRONIC PAIN SYNDROME: ICD-10-CM

## 2019-11-26 DIAGNOSIS — N30.10 PAIN DUE TO INTERSTITIAL CYSTITIS: ICD-10-CM

## 2019-11-26 PROCEDURE — 99214 OFFICE O/P EST MOD 30 MIN: CPT | Performed by: FAMILY MEDICINE

## 2019-11-26 PROCEDURE — 99213 OFFICE O/P EST LOW 20 MIN: CPT | Performed by: FAMILY MEDICINE

## 2019-11-26 RX ORDER — GABAPENTIN 600 MG/1
600 TABLET ORAL 3 TIMES DAILY
Qty: 270 TAB | Refills: 6 | Status: SHIPPED | OUTPATIENT
Start: 2019-11-26 | End: 2021-01-18

## 2019-11-26 RX ORDER — PENTOSAN POLYSULFATE SODIUM 100 MG/1
100 CAPSULE, GELATIN COATED ORAL 3 TIMES DAILY
Qty: 90 CAP | Refills: 6 | Status: SHIPPED | OUTPATIENT
Start: 2019-11-26 | End: 2020-08-18

## 2019-11-26 RX ORDER — HYDROXYZINE HYDROCHLORIDE 25 MG/1
25 TABLET, FILM COATED ORAL 3 TIMES DAILY PRN
Qty: 60 TAB | Refills: 6 | Status: SHIPPED | OUTPATIENT
Start: 2019-11-26 | End: 2020-06-29

## 2019-11-26 RX ORDER — OXYCODONE HYDROCHLORIDE AND ACETAMINOPHEN 5; 325 MG/1; MG/1
1 TABLET ORAL EVERY 6 HOURS PRN
Qty: 120 TAB | Refills: 0 | Status: SHIPPED | OUTPATIENT
Start: 2019-11-26 | End: 2019-12-26 | Stop reason: SDUPTHER

## 2019-11-26 NOTE — ASSESSMENT & PLAN NOTE
Patient reports current pain generators are a severe sharp burning pain from her pelvis presumably the interstitial cystitis, and scattered myalgias over her trunk and extremities associated with her fibromyalgia.  Denies any focal rashes or fever

## 2019-11-26 NOTE — ASSESSMENT & PLAN NOTE
Patient is also seen apparently by psychiatry.  She denies current suicidal ideation but is extremely frustrated by her multiple pain issues and recent lack of urology follow-up.  She is taking Effexor 37.5 mg.

## 2019-11-26 NOTE — ASSESSMENT & PLAN NOTE
Patient is transferring care after her previous PCP has moved away.  Patient reports she was diagnosed with interstitial cystitis approximately April 2018.  She was started on Elmiron.  She reports that she was seen at [facility] [place] and that they were unable to get authorization for cystoscopy or instillation of intravesical therapy.  It is unclear when this occurred and I cannot find any urology notes in patient's epic chart.  Patient reports daily severe pelvic pain worse with urination and frequent urination.  She is not reporting hematuria.  She reports rare urinary incontinence when just out walking.  She also reports severe dyspareunia actually preventing intercourse due to pelvic pain.  Denies any vaginal discharge.

## 2019-11-26 NOTE — ASSESSMENT & PLAN NOTE
Patient reports a history of repeated periods of alcohol abuse.  She most recently quit drinking 2 weeks ago.  She currently is abstinent.  She denies any history of seizures or jaundice.

## 2019-11-27 NOTE — PROGRESS NOTES
Chief Complaint   Patient presents with   • Establish Care       HISTORY OF PRESENT ILLNESS: Patient is a 34 y.o. female established patient who presents today to transfer care and discuss the problems below        Pain due to interstitial cystitis  Patient is transferring care after her previous PCP has moved away.  Patient reports she was diagnosed with interstitial cystitis approximately April 2018.  She was started on Elmiron.  She reports that she was seen at [facility] [place] and that they were unable to get authorization for cystoscopy or instillation of intravesical therapy.  It is unclear when this occurred and I cannot find any urology notes in patient's epic chart.  Patient reports daily severe pelvic pain worse with urination and frequent urination.  She is not reporting hematuria.  She reports rare urinary incontinence when just out walking.  She also reports severe dyspareunia actually preventing intercourse due to pelvic pain.  Denies any vaginal discharge.    Bipolar affective disorder, current episode hypomanic (HCC)  Patient is also seen apparently by psychiatry.  She denies current suicidal ideation but is extremely frustrated by her multiple pain issues and recent lack of urology follow-up.  She is taking Effexor 37.5 mg.    Alcohol abuse  Patient reports a history of repeated periods of alcohol abuse.  She most recently quit drinking 2 weeks ago.  She currently is abstinent.  She denies any history of seizures or jaundice.    Chronic pain syndrome  Patient reports current pain generators are a severe sharp burning pain from her pelvis presumably the interstitial cystitis, and scattered myalgias over her trunk and extremities associated with her fibromyalgia.  Denies any focal rashes or fever      Patient Active Problem List    Diagnosis Date Noted   • Alcohol abuse 11/26/2019   • Pain due to interstitial cystitis 08/12/2019   • Seasonal allergic rhinitis 08/12/2019   • Tobacco abuse 08/12/2019    • Bipolar affective disorder, current episode hypomanic (HCC) 06/11/2019   • Low back pain with sciatica 01/08/2019   • S/P hysterectomy 01/08/2019   • Post-op pain 01/08/2019   • Chronic pain syndrome 01/25/2018   • Anxiety 04/13/2017       Allergies:Lorazepam and Promethazine hcl    Current Outpatient Medications   Medication Sig Dispense Refill   • gabapentin (NEURONTIN) 600 MG tablet Take 1 Tab by mouth 3 times a day. 270 Tab 6   • ELMIRON 100 MG Cap Take 1 Cap by mouth 3 times a day. 90 Cap 6   • hydrOXYzine HCl (ATARAX) 25 MG Tab Take 1 Tab by mouth 3 times a day as needed for Itching. 60 Tab 6   • oxyCODONE-acetaminophen (PERCOCET) 5-325 MG Tab Take 1 Tab by mouth every 6 hours as needed for up to 30 days. 120 Tab 0   • tizanidine (ZANAFLEX) 4 MG Tab Take 1 Tab by mouth every 6 hours as needed. 30 Tab 3   • cetirizine (ZYRTEC ALLERGY) 10 MG Tab Take 1 Tab by mouth every day. 30 Tab 0   • cloNIDine (CATAPRES) 0.1 MG Tab Take 1 Tab by mouth 2 times a day. 60 Tab 2   • lidocaine (XYLOCAINE) 5 % Ointment Apply 1-2 g to affected area(s) as needed. 1 Tube 0   • omeprazole (PRILOSEC) 20 MG delayed-release capsule Take 1 Cap by mouth every day. 30 Cap 3   • methylPREDNISolone (MEDROL DOSEPAK) 4 MG Tablet Therapy Pack Take 1 Tab by mouth See Admin Instructions. 21 Tab 0   • venlafaxine XR (EFFEXOR XR) 37.5 MG CAPSULE SR 24 HR Take 1 Cap by mouth every day. 30 Cap 3   • PROVENTIL  (90 Base) MCG/ACT Aero Soln inhalation aerosol      • nicotine (NICODERM) 14 MG/24HR PATCH 24 HR Apply 1 Patch to skin as directed every 24 hours. (Patient not taking: Reported on 9/23/2019) 30 Patch 3   • loratadine (CLARITIN) 10 MG Tab Take 1 Tab by mouth every day. 30 Tab 3   • fluticasone (FLONASE) 50 MCG/ACT nasal spray Spray 2 Sprays in nose every day. 16 g 11   • ammonium lactate (LAC-HYDRIN) 12 % Lotion Apply 1 Applicator to affected area(s) as needed. 1 Bottle 3   • triamcinolone acetonide (KENALOG) 0.1 % Cream Apply 1  "Application to affected area(s) 2 times a day as needed. (Patient not taking: Reported on 11/26/2019) 1 Tube 3   • disulfiram (ANTABUSE) 250 MG Tab TK 1 T PO QD  0   • therapeutic multivitamin-minerals (THERAGRAN-M) Tab Take 1 Tab by mouth every day.     • ibuprofen (MOTRIN) 200 MG Tab Take 800 mg by mouth every 6 hours as needed for Mild Pain.       No current facility-administered medications for this visit.        Social History     Tobacco Use   • Smoking status: Current Every Day Smoker     Packs/day: 0.75     Years: 10.00     Pack years: 7.50     Types: Cigarettes   • Smokeless tobacco: Never Used   Substance Use Topics   • Alcohol use: No     Comment: Sober since [id number]   • Drug use: Yes     Types: Marijuana     Comment: edibles       Family History   Problem Relation Age of Onset   • Psychiatric Illness Mother    • Stroke Mother    • Arthritis Mother    • Heart Disease Maternal Grandfather    • Cancer Neg Hx    • Diabetes Neg Hx    • Hypertension Neg Hx        ROS:  Review of Systems   Constitutional: Negative for fever, chills, weight loss and malaise/fatigue.   Eyes: Negative for blurred vision.   Respiratory: Negative for cough, sputum production, shortness of breath and wheezing.    Cardiovascular: Negative for chest pain, palpitations, orthopnea and leg swelling.   Gastrointestinal: Negative for heartburn, nausea, vomiting and abdominal pain.   Endo/Heme/Allergies: Does not bruise/bleed easily.               Exam:  /82 (BP Location: Left arm, Patient Position: Sitting, BP Cuff Size: Adult)   Pulse 98   Temp 37.6 °C (99.6 °F) (Temporal)   Resp 16   Ht 1.676 m (5' 5.98\")   Wt 61.2 kg (135 lb)   SpO2 100%   General:  Well nourished, well developed female in NAD  Head is grossly normal.  Neck: Supple without JVD or bruit. Thyroid is not enlarged. Trachea is midline.  Pulmonary: Clear to ausculation .  Normal effort. No rales, ronchi, or wheezing.  Cardiovascular: Regular rate and rhythm without " murmur.  Abdomen-Abdomen is soft, normal bowel sounds, no masses, guarding, ororganomegaly, or tenderness.  Psych-normal affect with good eye contact. Normal grooming. Oriented x4.        Please note that this dictation was created using voice recognition software. I have made every reasonable attempt to correct obvious errors, but I expect that there are errors of grammar and possibly content that I did not discover before finalizing the note.    Assessment/Plan:  1. Chronic pain syndrome  REFERRAL TO UROLOGY    oxyCODONE-acetaminophen (PERCOCET) 5-325 MG Tab   2. Pain due to interstitial cystitis     3. Bipolar affective disorder, current episode hypomanic (HCC)     4. Alcohol abuse       Plan: 1.  Update urology referral for further assistance managing her interstitial cystitis  2.  Renew low-dose Percocet 5/325 4 times daily-1 month prescription written  3.  Renew Elmiron, gabapentin, hydroxyzine  4.  Revisit 1 month

## 2019-12-26 DIAGNOSIS — G89.4 CHRONIC PAIN SYNDROME: ICD-10-CM

## 2019-12-26 RX ORDER — OXYCODONE HYDROCHLORIDE AND ACETAMINOPHEN 5; 325 MG/1; MG/1
1 TABLET ORAL EVERY 6 HOURS PRN
Qty: 120 TAB | Refills: 0 | Status: SHIPPED | OUTPATIENT
Start: 2019-12-26 | End: 2019-12-31 | Stop reason: SDUPTHER

## 2019-12-31 ENCOUNTER — OFFICE VISIT (OUTPATIENT)
Dept: MEDICAL GROUP | Facility: MEDICAL CENTER | Age: 34
End: 2019-12-31
Attending: FAMILY MEDICINE
Payer: MEDICAID

## 2019-12-31 VITALS
DIASTOLIC BLOOD PRESSURE: 76 MMHG | SYSTOLIC BLOOD PRESSURE: 118 MMHG | HEART RATE: 110 BPM | TEMPERATURE: 99.2 F | HEIGHT: 66 IN | OXYGEN SATURATION: 97 % | WEIGHT: 145 LBS | BODY MASS INDEX: 23.3 KG/M2 | RESPIRATION RATE: 16 BRPM

## 2019-12-31 DIAGNOSIS — N30.10 INTERSTITIAL CYSTITIS (CHRONIC) WITHOUT HEMATURIA: ICD-10-CM

## 2019-12-31 DIAGNOSIS — G89.4 CHRONIC PAIN SYNDROME: ICD-10-CM

## 2019-12-31 PROCEDURE — 99213 OFFICE O/P EST LOW 20 MIN: CPT | Performed by: FAMILY MEDICINE

## 2019-12-31 RX ORDER — CLONIDINE 0.1 MG/24H
PATCH, EXTENDED RELEASE TRANSDERMAL
COMMUNITY
End: 2020-01-31

## 2019-12-31 RX ORDER — LEVETIRACETAM 500 MG/1
TABLET ORAL
COMMUNITY
Start: 2019-12-26 | End: 2020-03-13

## 2019-12-31 RX ORDER — OXYCODONE HYDROCHLORIDE AND ACETAMINOPHEN 5; 325 MG/1; MG/1
1 TABLET ORAL EVERY 6 HOURS PRN
Qty: 120 TAB | Refills: 0 | Status: SHIPPED | OUTPATIENT
Start: 2019-12-31 | End: 2020-01-31 | Stop reason: SDUPTHER

## 2019-12-31 RX ORDER — ESCITALOPRAM OXALATE 20 MG/1
TABLET ORAL
COMMUNITY
End: 2020-01-31

## 2019-12-31 RX ORDER — FAMOTIDINE 20 MG/1
TABLET, FILM COATED ORAL
COMMUNITY
End: 2020-01-31

## 2019-12-31 RX ORDER — AMITRIPTYLINE HYDROCHLORIDE 25 MG/1
25 TABLET, FILM COATED ORAL NIGHTLY PRN
Qty: 30 TAB | Refills: 11 | Status: SHIPPED | OUTPATIENT
Start: 2019-12-31 | End: 2020-07-13

## 2019-12-31 RX ORDER — DULOXETIN HYDROCHLORIDE 60 MG/1
CAPSULE, DELAYED RELEASE ORAL
COMMUNITY
End: 2020-01-31

## 2019-12-31 RX ORDER — DULOXETIN HYDROCHLORIDE 20 MG/1
CAPSULE, DELAYED RELEASE ORAL
COMMUNITY
End: 2020-01-31

## 2019-12-31 RX ORDER — CYCLOBENZAPRINE HCL 10 MG
TABLET ORAL
COMMUNITY
End: 2020-07-13

## 2019-12-31 NOTE — PROGRESS NOTES
"Subjective:      Rhiannon Marrero is a 34 y.o. female who presents with Cystitis (interstitial )            HPI 1.  Interstitial cystitis-patient underwent confirmatory cystoscopy on 12/26/2019 with [facility] [place].  They reported that she did have reddened irritated bladder wall consistent with IC.  Patient is already been started on Elmiron which she reports is partially helpful.  She did have a physical therapy consult a few months back for pelvic floor strengthening which she tried and reported that it dramatically worsened her pain so she only did that on a single occasion.  She has not done any subsequent pelvic floor exercises.  Patient has been taking Percocet 5/325 4 times daily to help her manage pain.    ROS negative for current gross hematuria, fever.  Positive for mild urinary frequency       Objective:     /76 (BP Location: Left arm, Patient Position: Sitting, BP Cuff Size: Adult)   Pulse (!) 110   Temp 37.3 °C (99.2 °F) (Temporal)   Resp 16   Ht 1.676 m (5' 5.98\")   Wt 65.8 kg (145 lb)   LMP 10/30/2018   SpO2 97%   BMI 23.41 kg/m²      Physical Exam  Gen.- alert, cooperative, in no acute distress  Neck- midline trachea, thyroid not enlarged or tender,supple, no cervical adenopathy  Chest-clear to auscultation and percussion with normal breath sounds. No retractions. Chest wall nontender  Cardiac- regular rhythm and rate. No murmur, thrill, or heave  Back-nontender to palpation over the bony midline and paraspinous areas  Abdomen- normal bowel sounds, soft without guarding. Liver and spleen not enlarged, no palpable masses.  1-2+ tender broadly over the suprapubic area          Assessment/Plan:       1. Chronic pain syndrome    - oxyCODONE-acetaminophen (PERCOCET) 5-325 MG Tab; Take 1 Tab by mouth every 6 hours as needed for up to 30 days.  Dispense: 120 Tab; Refill: 0    2. Interstitial cystitis (chronic) without hematuria    Plan: 1.  Continue Elmiron 100 mg 3 times daily  2.  Continue " gabapentin  3.  Trial of amitriptyline 25 mg at bedtime  4.  Renew Percocet 5/325 2-4 times daily  5.  Revisit 1 month, sent for previous urology records

## 2019-12-31 NOTE — LETTER
[facility]  Mamadou Leigh M.D.  [address]  [place] [address]  Fax: [phone]   Authorization for Release/Disclosure of   Protected Health Information   Name: JAKE MARRERO : 1985 SSN: [id number]   Address: [address] [place], [place] [address]  [facility] [address] Phone:    [phone] (home)    I authorize the entity listed below to release/disclose the PHI below to:   [facility]/Mamadou Leigh M.D. and Mamadou Leigh M.D.   Provider or Entity Name:     Address   City, State, [facility]   Phone:      Fax:     Reason for request: continuity of care   Information to be released:    [  ] LAST COLONOSCOPY,  including any PATH REPORT and follow-up  [  ] LAST FIT/COLOGUARD RESULT [  ] LAST DEXA  [  ] LAST MAMMOGRAM  [  ] LAST PAP  [  ] LAST LABS [  ] RETINA EXAM REPORT  [  ] IMMUNIZATION RECORDS  [  ] Release all info      [  ] Check here and initial the line next to each item to release ALL health information INCLUDING  _____ Care and treatment for drug and / or alcohol abuse  _____ HIV testing, infection status, or AIDS  _____ Genetic Testing    DATES OF SERVICE OR TIME PERIOD TO BE DISCLOSED: _____________  I understand and acknowledge that:  * This Authorization may be revoked at any time by you in writing, except if your health information has already been used or disclosed.  * Your health information that will be used or disclosed as a result of you signing this authorization could be re-disclosed by the recipient. If this occurs, your re-disclosed health information may no longer be protected by State or Federal laws.  * You may refuse to sign this Authorization. Your refusal will not affect your ability to obtain treatment.  * This Authorization becomes effective upon signing and will  on (date) __________.      If no date is indicated, this Authorization will  one (1) year from the signature date.    Name: Jake Marrero    Signature:   Date:     2019       PLEASE FAX REQUESTED  RECORDS BACK TO: [phone]

## 2020-01-13 RX ORDER — CLONIDINE HYDROCHLORIDE 0.1 MG/1
0.1 TABLET ORAL 2 TIMES DAILY
Qty: 60 TAB | Refills: 2 | Status: SHIPPED | OUTPATIENT
Start: 2020-01-13 | End: 2020-04-09

## 2020-01-31 ENCOUNTER — OFFICE VISIT (OUTPATIENT)
Dept: MEDICAL GROUP | Facility: MEDICAL CENTER | Age: 35
End: 2020-01-31
Attending: FAMILY MEDICINE
Payer: MEDICAID

## 2020-01-31 VITALS
OXYGEN SATURATION: 98 % | HEIGHT: 66 IN | DIASTOLIC BLOOD PRESSURE: 80 MMHG | RESPIRATION RATE: 16 BRPM | HEART RATE: 128 BPM | TEMPERATURE: 98.2 F | BODY MASS INDEX: 23.46 KG/M2 | WEIGHT: 146 LBS | SYSTOLIC BLOOD PRESSURE: 140 MMHG

## 2020-01-31 DIAGNOSIS — R55 SYNCOPE, UNSPECIFIED SYNCOPE TYPE: ICD-10-CM

## 2020-01-31 DIAGNOSIS — F32.1 CURRENT MODERATE EPISODE OF MAJOR DEPRESSIVE DISORDER WITHOUT PRIOR EPISODE (HCC): ICD-10-CM

## 2020-01-31 DIAGNOSIS — G89.4 CHRONIC PAIN SYNDROME: ICD-10-CM

## 2020-01-31 DIAGNOSIS — N30.10 INTERSTITIAL CYSTITIS (CHRONIC) WITHOUT HEMATURIA: ICD-10-CM

## 2020-01-31 PROCEDURE — 99214 OFFICE O/P EST MOD 30 MIN: CPT | Performed by: FAMILY MEDICINE

## 2020-01-31 PROCEDURE — 99213 OFFICE O/P EST LOW 20 MIN: CPT | Performed by: FAMILY MEDICINE

## 2020-01-31 RX ORDER — NALOXONE HYDROCHLORIDE 4 MG/.1ML
SPRAY NASAL
Qty: 1 PACKAGE | Refills: 0 | Status: SHIPPED | OUTPATIENT
Start: 2020-01-31 | End: 2020-07-13

## 2020-01-31 RX ORDER — CYCLOBENZAPRINE HCL 10 MG
10 TABLET ORAL 3 TIMES DAILY PRN
Qty: 90 TAB | Refills: 6 | Status: SHIPPED | OUTPATIENT
Start: 2020-01-31 | End: 2021-01-18

## 2020-01-31 RX ORDER — OXYCODONE HYDROCHLORIDE AND ACETAMINOPHEN 5; 325 MG/1; MG/1
1 TABLET ORAL EVERY 6 HOURS PRN
Qty: 120 TAB | Refills: 0 | Status: SHIPPED | OUTPATIENT
Start: 2020-01-31 | End: 2020-02-28 | Stop reason: SDUPTHER

## 2020-01-31 NOTE — PROGRESS NOTES
Subjective:      Rhiannon Marrero is a 34 y.o. female who presents with No chief complaint on file.            HPI 1.  Chronic pain/interstitial cystitis-patient reports she continues to get only partial relief taking the Percocet 5/325 4 times daily, and the EOMI run 3 times daily.  She is not reporting any current bloody urine.  She notes significant pelvic pain.  No current emesis.  2.  Syncopal episode-patient reports she had a syncopal episode possibly accompanied by tonic-clonic movements witnessed by her boyfriend on 12/25/2019.  She was evaluated after the event at [place] emergency room.  She was offered to start taking Keppra 500 mg twice daily which she has done.  She has not had any further syncope or seizure episodes.  She has no prior history of seizure disorder.  She reports that she had not slept for about 2 days prior to that syncopal episode.  3.  Anxiety/depression-patient reports she has been followed by a psychiatrist over at [facility].  She currently is having difficulty getting into [place] to see that provider on a regular basis.  She anticipates moving back into [place] in about 1 month.  She has been recently switched to Vraylar 4.5 mg (previously failed Cymbalta, Prozac).  She feels that this has been moderately effective and like to continue that medication.  Patient ports sleep has been more solid approximately 6 hours per night since starting the at bedtime amitriptyline  ROS negative for current fever, urinary incontinence, abdominal bloating  Social history-, working ([employer])  Current medication-  Prior to Admission medications    Medication Sig Start Date End Date Taking? Authorizing Provider   Cariprazine HCl (VRAYLAR) 4.5 MG Cap Take 1 Cap by mouth every day. 1/31/20  Yes Mamadou Leigh M.D.   cyclobenzaprine (FLEXERIL) 10 MG Tab Take 1 Tab by mouth 3 times a day as needed. 1/31/20  Yes Mamadou Leigh M.D.   oxyCODONE-acetaminophen (PERCOCET) 5-325 MG Tab Take 1 Tab by  mouth every 6 hours as needed for up to 30 days. 1/31/20 3/1/20 Yes Mamadou Leigh M.D.   Naloxone (NARCAN) 4 MG/0.1ML Liquid One spray in one nostril for overdose and call 911. 1/31/20  Yes Mamadou Leigh M.D.   cloNIDine (CATAPRES) 0.1 MG Tab Take 1 Tab by mouth 2 times a day. 1/13/20   Mamadou Leigh M.D.   amitriptyline (ELAVIL) 25 MG Tab Take 1 Tab by mouth at bedtime as needed. 12/31/19   Mamadou Leigh M.D.   Cariprazine HCl (VRAYLAR PO) Vraylar    Physician Outpatient   cyclobenzaprine (FLEXERIL) 10 MG Tab cyclobenzaprine 10 mg tablet    Physician Outpatient   levETIRAcetam (KEPPRA) 500 MG Tab TK 1 T PO BID FOR 30 DAYS 12/26/19   Physician Outpatient   baclofen (LIORESAL) 10 MG Tab TAKE 1 TABLET BY MOUTH THREE TIMES DAILY AS NEEDED 12/23/19   Mamadou Leigh M.D.   gabapentin (NEURONTIN) 600 MG tablet Take 1 Tab by mouth 3 times a day. 11/26/19   Mamadou Leigh M.D.   ELMIRON 100 MG Cap Take 1 Cap by mouth 3 times a day. 11/26/19   Mamadou Leigh M.D.   hydrOXYzine HCl (ATARAX) 25 MG Tab Take 1 Tab by mouth 3 times a day as needed for Itching. 11/26/19   Mamadou Leigh M.D.   tizanidine (ZANAFLEX) 4 MG Tab Take 1 Tab by mouth every 6 hours as needed. 9/9/19   Yvan Maxwell M.D.   lidocaine (XYLOCAINE) 5 % Ointment Apply 1-2 g to affected area(s) as needed. 9/9/19   Yvan Maxwell M.D.   omeprazole (PRILOSEC) 20 MG delayed-release capsule Take 1 Cap by mouth every day. 9/9/19   Yvan Maxwell M.D.   methylPREDNISolone (MEDROL DOSEPAK) 4 MG Tablet Therapy Pack Take 1 Tab by mouth See Admin Instructions. 8/31/19   Abdiaziz Seth P.A.-C.   cetirizine (ZYRTEC ALLERGY) 10 MG Tab Take 1 Tab by mouth every day. 8/31/19   Abdiaziz Seth P.A.-C.   venlafaxine XR (EFFEXOR XR) 37.5 MG CAPSULE SR 24 HR Take 1 Cap by mouth every day. 8/26/19   Yvan Maxwell M.D.   PROVENTIL  (90 Base) MCG/ACT Aero Soln inhalation aerosol  5/27/19   Physician Outpatient   nicotine (NICODERM) 14 MG/24HR PATCH  "24 HR Apply 1 Patch to skin as directed every 24 hours.  Patient not taking: Reported on 9/23/2019 8/12/19   Yvan Maxwell M.D.   loratadine (CLARITIN) 10 MG Tab Take 1 Tab by mouth every day. 8/12/19   Yvan Maxwell M.D.   fluticasone (FLONASE) 50 MCG/ACT nasal spray Spray 2 Sprays in nose every day. 7/29/19   Yvan Maxwell M.D.   ammonium lactate (LAC-HYDRIN) 12 % Lotion Apply 1 Applicator to affected area(s) as needed. 7/9/19   Yvan aMxwell M.D.   triamcinolone acetonide (KENALOG) 0.1 % Cream Apply 1 Application to affected area(s) 2 times a day as needed.  Patient not taking: Reported on 11/26/2019 7/9/19   Yvan Maxwell M.D.   disulfiram (ANTABUSE) 250 MG Tab TK 1 T PO QD 6/13/19   Physician Outpatient   therapeutic multivitamin-minerals (THERAGRAN-M) Tab Take 1 Tab by mouth every day.    Physician Outpatient   ibuprofen (MOTRIN) 200 MG Tab Take 800 mg by mouth every 6 hours as needed for Mild Pain.    Physician Outpatient          Objective:     /80 (BP Location: Left arm, Patient Position: Sitting, BP Cuff Size: Adult)   Pulse (!) 128   Temp 36.8 °C (98.2 °F) (Temporal)   Resp 16   Ht 1.676 m (5' 5.98\")   Wt 66.2 kg (146 lb)   LMP 10/30/2018   SpO2 98%   BMI 23.58 kg/m²      Physical Exam  Gen.- alert, cooperative, in no acute distress  Neck- midline trachea, thyroid not enlarged or tender,supple, no cervical adenopathy  Chest-clear to auscultation and percussion with normal breath sounds. No retractions. Chest wall nontender  Cardiac- regular rhythm and rate. No murmur, thrill, or heave  Abdomen-2+ tender over the suprapubic area.  Normal bowel sounds.  CVAs nontender    Psych-normal affect with good eye contact. Normal grooming. Oriented x4.         Assessment/Plan:       1. Chronic pain syndrome      2. Interstitial cystitis (chronic) without hematuria      3. Current moderate episode of major depressive disorder without prior episode (HCC)    4.  Syncopal episode  Plan: 1.  Patient may go ahead and " taper off the Keppra down to 1/day x 7 days and then discontinue  2.  Will revisit with me in a month and we will collect a EEG at that time  3.  Renew Percocet  4.  We will temporarily provide her with 1 to 2 months of her antidepressant  5.  Continue bedtime amitriptyline  6.  Will substitute Flexeril up to 3 times daily in place of baclofen at patient's request

## 2020-02-28 ENCOUNTER — OFFICE VISIT (OUTPATIENT)
Dept: MEDICAL GROUP | Facility: MEDICAL CENTER | Age: 35
End: 2020-02-28
Attending: FAMILY MEDICINE
Payer: MEDICAID

## 2020-02-28 VITALS
DIASTOLIC BLOOD PRESSURE: 62 MMHG | BODY MASS INDEX: 23.63 KG/M2 | HEART RATE: 108 BPM | WEIGHT: 147 LBS | OXYGEN SATURATION: 99 % | SYSTOLIC BLOOD PRESSURE: 110 MMHG | RESPIRATION RATE: 16 BRPM | TEMPERATURE: 98.3 F | HEIGHT: 66 IN

## 2020-02-28 DIAGNOSIS — G89.4 CHRONIC PAIN SYNDROME: ICD-10-CM

## 2020-02-28 DIAGNOSIS — F31.0 BIPOLAR AFFECTIVE DISORDER, CURRENT EPISODE HYPOMANIC (HCC): ICD-10-CM

## 2020-02-28 DIAGNOSIS — R56.9 SEIZURES (HCC): ICD-10-CM

## 2020-02-28 DIAGNOSIS — F41.0 PANIC ATTACK: ICD-10-CM

## 2020-02-28 PROCEDURE — 99213 OFFICE O/P EST LOW 20 MIN: CPT | Performed by: FAMILY MEDICINE

## 2020-02-28 PROCEDURE — 99214 OFFICE O/P EST MOD 30 MIN: CPT | Performed by: FAMILY MEDICINE

## 2020-02-28 RX ORDER — ALPRAZOLAM 0.25 MG/1
0.25 TABLET ORAL NIGHTLY PRN
Qty: 30 TAB | Refills: 0 | Status: SHIPPED | OUTPATIENT
Start: 2020-02-28 | End: 2020-03-29

## 2020-02-28 RX ORDER — OXYCODONE HYDROCHLORIDE AND ACETAMINOPHEN 5; 325 MG/1; MG/1
1 TABLET ORAL EVERY 6 HOURS PRN
Qty: 120 TAB | Refills: 0 | Status: SHIPPED | OUTPATIENT
Start: 2020-02-28 | End: 2020-03-29

## 2020-02-28 ASSESSMENT — FIBROSIS 4 INDEX: FIB4 SCORE: 0.46

## 2020-02-28 NOTE — PROGRESS NOTES
"Subjective:      Rhiannon Marrero is a 34 y.o. female who presents with No chief complaint on file.            HPI 1.  Seizure disorder-patient had an initial tonic-clonic syncopal episode on 12/25/2019.  At that time she was started empirically on Keppra 500 mg twice daily.  After discussing pros and cons of the medication we decided to let her taper.  After taking a single pill for 3 days she discontinued the medication entirely.  About 5 days after that she had a reported seizure episode at work in [place].  She was taken to [facility] for a ER encounter.  She was started back on Keppra 500 mg twice daily now about 2 weeks ago.  She has not had any further episodes.  She also has not felt \"twitchy\" which she reports she felt the day of both of her probable seizure episodes.  She also had been started on Vraylar replacing citalopram and Prozac previously, which is much more helpful for her with regard to depression.    2.  Panic episodes-patient reports that she has had 3 separate 45-minute panic attacks in the past 10 days.  They have just moved back into [place].  She is now no longer working as a  in [place].  She does note increased current levels of anxiety.  She has taken up to 50 mg of hydroxyzine to treat these with no benefit.  ROS negative for current palpitations, chest pain, chest pressure  Social history-, not currently working  Current medications-  Prior to Admission medications    Medication Sig Start Date End Date Taking? Authorizing Provider   ALPRAZolam (XANAX) 0.25 MG Tab Take 1 Tab by mouth at bedtime as needed for Sleep for up to 30 days. 2/28/20 3/29/20 Yes Mamadou Leigh M.D.   oxyCODONE-acetaminophen (PERCOCET) 5-325 MG Tab Take 1 Tab by mouth every 6 hours as needed for up to 30 days. 2/28/20 3/29/20 Yes Mamadou Leigh M.D.   tizanidine (ZANAFLEX) 4 MG Tab TAKE 1 TABLET BY MOUTH EVERY 6 HOURS AS NEEDED 2/14/20   Mamadou Leigh M.D.   Cariprazine HCl (VRAYLAR) " 4.5 MG Cap Take 1 Cap by mouth every day. 1/31/20   Mamadou Leigh M.D.   cyclobenzaprine (FLEXERIL) 10 MG Tab Take 1 Tab by mouth 3 times a day as needed. 1/31/20   Mamadou Leigh M.D.   Naloxone (NARCAN) 4 MG/0.1ML Liquid One spray in one nostril for overdose and call 911. 1/31/20   Mamadou Leigh M.D.   cloNIDine (CATAPRES) 0.1 MG Tab Take 1 Tab by mouth 2 times a day. 1/13/20   Mamadou Leigh M.D.   amitriptyline (ELAVIL) 25 MG Tab Take 1 Tab by mouth at bedtime as needed. 12/31/19   Mamadou Leigh M.D.   Cariprazine HCl (VRAYLAR PO) Vraylar    Physician Outpatient   cyclobenzaprine (FLEXERIL) 10 MG Tab cyclobenzaprine 10 mg tablet    Physician Outpatient   levETIRAcetam (KEPPRA) 500 MG Tab TK 1 T PO BID FOR 30 DAYS 12/26/19   Physician Outpatient   baclofen (LIORESAL) 10 MG Tab TAKE 1 TABLET BY MOUTH THREE TIMES DAILY AS NEEDED 12/23/19   Mamadou Leigh M.D.   gabapentin (NEURONTIN) 600 MG tablet Take 1 Tab by mouth 3 times a day. 11/26/19   Mamadou Leigh M.D.   ELMIRON 100 MG Cap Take 1 Cap by mouth 3 times a day. 11/26/19   Mamadou Leigh M.D.   hydrOXYzine HCl (ATARAX) 25 MG Tab Take 1 Tab by mouth 3 times a day as needed for Itching. 11/26/19   Mamadou Leigh M.D.   lidocaine (XYLOCAINE) 5 % Ointment Apply 1-2 g to affected area(s) as needed. 9/9/19   Yvan Maxwell M.D.   omeprazole (PRILOSEC) 20 MG delayed-release capsule Take 1 Cap by mouth every day. 9/9/19   Yvan Maxwell M.D.   methylPREDNISolone (MEDROL DOSEPAK) 4 MG Tablet Therapy Pack Take 1 Tab by mouth See Admin Instructions. 8/31/19   OWEN Carlson-C.   cetirizine (ZYRTEC ALLERGY) 10 MG Tab Take 1 Tab by mouth every day. 8/31/19   Abdiaziz Seth P.A.-C.   venlafaxine XR (EFFEXOR XR) 37.5 MG CAPSULE SR 24 HR Take 1 Cap by mouth every day. 8/26/19   Yvan Maxwell M.D.   PROVENTIL  (90 Base) MCG/ACT Aero Soln inhalation aerosol  5/27/19   Physician Outpatient   nicotine (NICODERM) 14 MG/24HR PATCH 24  "HR Apply 1 Patch to skin as directed every 24 hours.  Patient not taking: Reported on 9/23/2019 8/12/19   Yvan Maxwell M.D.   loratadine (CLARITIN) 10 MG Tab Take 1 Tab by mouth every day. 8/12/19   Yvan Maxwell M.D.   fluticasone (FLONASE) 50 MCG/ACT nasal spray Spray 2 Sprays in nose every day. 7/29/19   Yvan Maxwell M.D.   ammonium lactate (LAC-HYDRIN) 12 % Lotion Apply 1 Applicator to affected area(s) as needed. 7/9/19   Yvan Maxwell M.D.   triamcinolone acetonide (KENALOG) 0.1 % Cream Apply 1 Application to affected area(s) 2 times a day as needed.  Patient not taking: Reported on 11/26/2019 7/9/19   Yvan Maxwell M.D.   disulfiram (ANTABUSE) 250 MG Tab TK 1 T PO QD 6/13/19   Physician Outpatient   therapeutic multivitamin-minerals (THERAGRAN-M) Tab Take 1 Tab by mouth every day.    Physician Outpatient   ibuprofen (MOTRIN) 200 MG Tab Take 800 mg by mouth every 6 hours as needed for Mild Pain.    Physician Outpatient          Objective:     /62 (BP Location: Left arm, Patient Position: Sitting, BP Cuff Size: Adult)   Pulse (!) 108   Temp 36.8 °C (98.3 °F) (Temporal)   Resp 16   Ht 1.676 m (5' 5.98\")   Wt 66.7 kg (147 lb)   LMP 10/30/2018   SpO2 99%   BMI 23.74 kg/m²      Physical Exam  Gen.- alert, cooperative, in no acute distress  Neck- midline trachea, thyroid not enlarged or tender,supple, no cervical adenopathy  Chest-clear to auscultation and percussion with normal breath sounds. No retractions. Chest wall nontender  Cardiac- regular rhythm and rate. No murmur, thrill, or heave  Neuro- intact light touch. Intact strength bilaterally. Normal gait. No tremor. Normal speech  Psych-normal affect with good eye contact. Normal grooming. Oriented x4.            Assessment/Plan:       1. Seizures (HCC)      2. Panic attack    Plan: 1.  Limited use of Xanax 0.25 mg as needed for an acute panic episode  2.  New psychiatry referral (patient would like to try someone other than who she saw at [facility]  3.  " Patient is reminded that she needs to avoid driving for 3 months after her most recent seizure  4.  Neurology referral  5.  Patient is counseled to avoid direct combination of low-dose Xanax and her low-dose Percocet within 4 to 6 hours of one another  6.  Follow-up with me in 1 month

## 2020-02-28 NOTE — LETTER
[facility]  Mamadou Leigh M.D.  [address]  [place] [address]  Fax: [phone]   Authorization for Release/Disclosure of   Protected Health Information   Name: JAKE MARRERO : 1985 SSN: [id number]   Address: [address] [place] [address] [address]  [place] [address] Phone:    [phone] (home)    I authorize the entity listed below to release/disclose the PHI below to:   [facility]/Mamdaou Leigh M.D. and Mamadou Leigh M.D.   Provider or Entity Name:     Address   City, State, Zip   Phone:      Fax:     Reason for request: continuity of care   Information to be released:    [  ] LAST COLONOSCOPY,  including any PATH REPORT and follow-up  [  ] LAST FIT/COLOGUARD RESULT [  ] LAST DEXA  [  ] LAST MAMMOGRAM  [  ] LAST PAP  [  ] LAST LABS [  ] RETINA EXAM REPORT  [  ] IMMUNIZATION RECORDS  [  ] Release all info      [  ] Check here and initial the line next to each item to release ALL health information INCLUDING  _____ Care and treatment for drug and / or alcohol abuse  _____ HIV testing, infection status, or AIDS  _____ Genetic Testing    DATES OF SERVICE OR TIME PERIOD TO BE DISCLOSED: _____________  I understand and acknowledge that:  * This Authorization may be revoked at any time by you in writing, except if your health information has already been used or disclosed.  * Your health information that will be used or disclosed as a result of you signing this authorization could be re-disclosed by the recipient. If this occurs, your re-disclosed health information may no longer be protected by State or Federal laws.  * You may refuse to sign this Authorization. Your refusal will not affect your ability to obtain treatment.  * This Authorization becomes effective upon signing and will  on (date) __________.      If no date is indicated, this Authorization will  one (1) year from the signature date.    Name: Jake Marrero    Signature:   Date:     2020       PLEASE FAX  REQUESTED RECORDS BACK TO: [phone]

## 2020-03-09 NOTE — PROGRESS NOTES
No chief complaint on file.      Problem List Items Addressed This Visit     None          History of present illness:  Rhiannon Marrero 34 y.o. female presents today for ***      Seizure onset: ***    Seizure semiology: ***    Seizure types: ***    Last seizure: ***    Past ASM’s: ***    Current ASM regimen: ***    Prior neurologists: ***    Prior EEG’s: ***    Prior brain imaging: ***    Driving status: ***    School/work status: ***      Past medical history:   Past Medical History:   Diagnosis Date   • Alcohol dependence (HCC) 4/13/2017   • Bronchitis 8/2012   • Cold     sept 2018   • Heart burn    • Hernia of unspecified site of abdominal cavity without mention of obstruction or gangrene    • Indigestion    • Pancreatitis    • Pneumonia 2011       Past surgical history:   Past Surgical History:   Procedure Laterality Date   • WRIST ORIF Right 4/24/2019    Procedure: ORIF, WRIST;  Surgeon: Marquis Bell M.D.;  Location: SURGERY [facility];  Service: Orthopedics   • HYSTERECTOMY ROBOTIC XI  10/11/2018    Procedure: HYSTERECTOMY ROBOTIC XI- RIGHT URETEROLYSIS;  Surgeon: Marquis Reeder M.D.;  Location: SURGERY [facility];  Service: Gynecology   • SALPINGECTOMY Bilateral 10/11/2018    Procedure: SALPINGECTOMY;  Surgeon: Marquis Reeder M.D.;  Location: SURGERY [facility];  Service: Gynecology   • TONSILLECTOMY  4/11/2013    Performed by Rock Rothman M.D. at SURGERY SAME DAY [facility]   • ARTHROSCOPY, KNEE     • GYN SURGERY      miscarriage May 2006   • OTHER ORTHOPEDIC SURGERY      knee surgeries       Family history:   Family History   Problem Relation Age of Onset   • Psychiatric Illness Mother    • Stroke Mother    • Arthritis Mother    • Heart Disease Maternal Grandfather    • Cancer Neg Hx    • Diabetes Neg Hx    • Hypertension Neg Hx        Social history:   Social History     Socioeconomic History   • Marital status:      Spouse name: Not on file   • Number of children: Not on  file   • Years of education: Not on file   • Highest education level: Not on file   Occupational History   • Not on file   Social Needs   • Financial resource strain: Not on file   • Food insecurity     Worry: Not on file     Inability: Not on file   • Transportation needs     Medical: Not on file     Non-medical: Not on file   Tobacco Use   • Smoking status: Current Every Day Smoker     Packs/day: 0.75     Years: 10.00     Pack years: 7.50     Types: Cigarettes   • Smokeless tobacco: Never Used   Substance and Sexual Activity   • Alcohol use: No     Comment: Sober since [id number]   • Drug use: Yes     Types: Marijuana     Comment: edibles   • Sexual activity: Not on file   Lifestyle   • Physical activity     Days per week: Not on file     Minutes per session: Not on file   • Stress: Not on file   Relationships   • Social connections     Talks on phone: Not on file     Gets together: Not on file     Attends Holiness service: Not on file     Active member of club or organization: Not on file     Attends meetings of clubs or organizations: Not on file     Relationship status: Not on file   • Intimate partner violence     Fear of current or ex partner: Not on file     Emotionally abused: Not on file     Physically abused: Not on file     Forced sexual activity: Not on file   Other Topics Concern   • Not on file   Social History Narrative   • Not on file       Current medications:   Current Outpatient Medications   Medication   • ALPRAZolam (XANAX) 0.25 MG Tab   • oxyCODONE-acetaminophen (PERCOCET) 5-325 MG Tab   • tizanidine (ZANAFLEX) 4 MG Tab   • Cariprazine HCl (VRAYLAR) 4.5 MG Cap   • cyclobenzaprine (FLEXERIL) 10 MG Tab   • Naloxone (NARCAN) 4 MG/0.1ML Liquid   • cloNIDine (CATAPRES) 0.1 MG Tab   • amitriptyline (ELAVIL) 25 MG Tab   • Cariprazine HCl (VRAYLAR PO)   • cyclobenzaprine (FLEXERIL) 10 MG Tab   • levETIRAcetam (KEPPRA) 500 MG Tab   • baclofen (LIORESAL) 10 MG Tab   • gabapentin (NEURONTIN) 600 MG tablet    • ELMIRON 100 MG Cap   • hydrOXYzine HCl (ATARAX) 25 MG Tab   • lidocaine (XYLOCAINE) 5 % Ointment   • omeprazole (PRILOSEC) 20 MG delayed-release capsule   • methylPREDNISolone (MEDROL DOSEPAK) 4 MG Tablet Therapy Pack   • cetirizine (ZYRTEC ALLERGY) 10 MG Tab   • venlafaxine XR (EFFEXOR XR) 37.5 MG CAPSULE SR 24 HR   • PROVENTIL  (90 Base) MCG/ACT Aero Soln inhalation aerosol   • nicotine (NICODERM) 14 MG/24HR PATCH 24 HR   • loratadine (CLARITIN) 10 MG Tab   • fluticasone (FLONASE) 50 MCG/ACT nasal spray   • ammonium lactate (LAC-HYDRIN) 12 % Lotion   • triamcinolone acetonide (KENALOG) 0.1 % Cream   • disulfiram (ANTABUSE) 250 MG Tab   • therapeutic multivitamin-minerals (THERAGRAN-M) Tab   • ibuprofen (MOTRIN) 200 MG Tab     No current facility-administered medications for this visit.        Medication Allergy:  Allergies   Allergen Reactions   • Lorazepam Anxiety   • Promethazine Hcl Anxiety         Review of systems:     General: Denies fevers or chills, or nightsweats, or generalized fatigue.    Head: Denies headaches or dizziness or lightheadedness  EENT: Denies vision changes, vision loss or pain, nasal secretion, nasal bleeding, difficulty swallowing, hearing loss, tinnitus, vertigo, ear pain  Endocdrinologic: Denies sweating, cold or heat intolerance. No polyuria or polydipsia.   Respiratory: Denies shortness of breath, cough, sputum, or wheezing  Cardiac: Denies chest pain, palpitations, edema or syncope  Gastrointestinal: Denies nausea, vomiting, no abdominal pain or change in bowel habits, no melena or hematochezia  Urinary: Denies dysuria, frequency, hesitancy, or incontinence.  Dermatologic:  Denies new rash  Musculoskeletal: Denies muscle pain or swelling, no atrophy, no neck and back pain or stiffness.   Neurologic: Denies facial droopiness, muscle weakness (focal or generalized), paresthesias, ataxia, change in speech or language, memory loss, abnormal movements, seizures, loss of  consciousness, or episodes of confusion.   Psychiatric: Denies anxiety, depression, mood swings, suicidal or homicidal thoughts       Physical examination:   There were no vitals filed for this visit.  General: Patient in no acute distress, pleasant and cooperative.  HEENT: Normocephalic, no signs of acute trauma.   moist conjunctivae. Nares are patent. Oropharynx clear without lesions and normal  hard and soft palates.   Neck: Supple. There is normal range of motion.   Resp: clear to auscultation bilaterally. No wheezes or crackles.   CV: RRR, no murmurs.   Abdomen: normoactive bowel sounds, soft, non distended or tender.   Skin: no signs of acute rashes or trauma.   Musculoskeletal: joints exhibit full range of motion, without any pain to palpation. There are no signs of joint or muscle swelling. There is no tenderness to deep palpation of muscles.   Psychiatric: No hallucinatory behavior. No symptoms of depression or suicidal ideation. Mood and affect appear normal on exam.     NEUROLOGICAL EXAM:   Mental status, orientation: Awake, alert and fully oriented.   Speech and language: speech is clear and fluent. The patient is able to name, repeat and comprehend.   Memory: There is intact recollection of recent and remote events.   Cranial nerve exam:   CN I: Not examined   CN II: PERRL. Fundoscopic exam was unremarkable.  CN III, IV, VI: EOMI; no nystagmus   CN V: Facial sensation intact bilaterally   CN VII: face symmetric   CN VIII: hearing intact to finger rub bilaterally   CN IX, X: palate elevates symmetrically   CN XI: Symmetric shoulder shrug  CN XII: tongue midline. No signs of tongue biting or fasciculations   Motor exam: Strength is 5/5 in all extremities. Tone is normal. No abnormal movements were seen on exam.   Sensory exam reveals normal sense of light touch, proprioception, vibration and pinprick in all extremities.   Deep tendon reflexes:  2+ throughout. Plantar responses are flexor. There is no  clonus.   Coordination: shows a normal finger-nose-finger. Normal rapidly alternating movements.   Gait: The patient was able to get up from seated position on first attempt without requiring assistance. Found to be steady when walking. Movements were fluid with normal arm swing. The patient was able to turn without difficulties or tendency to fall. Romberg exam ***      ANCILLARY DATA REVIEWED:       Lab Data Review:  Reviewed in chart. No results found for this or any previous visit (from the past 24 hour(s)).      Records reviewed:   Reviewed in chart.    Imaging:   N/A    EEG:  N/A        ASSESSMENT AND PLAN:    There are no diagnoses linked to this encounter.        CLINICAL DISCUSSION:    Mood is ***. No suicidal or homicidal thoughts. Pt referred to psychiatrist or psychologist as requested.     Past ASM's:    Current ASM's:      Plan:  -  - Discussed avoidance of spell/sz triggers: alcohol, sleep deprivation, and stress.    - Discussed Vit D supplementation. Recommended taking 2000-5000u daily.    - Discussed driving restrictions. Pt/family aware of no driving for at least 3 months after a spell per [place] law. Will need to be released to drive by a medical provider or a neurologist. Pt is cleared to drive on     - Discussed driving restrictions. Okay to drive but aware to stop driving immediately if a spell occurs and report to us.      -Labs to be checked for next appointment:               FOLLOW-UP:   No follow-ups on file.      EDUCATION AND COUNSELING:  -Education was provided to the patient and/or family regarding diagnosis and prognosis. The chronic and unpredictable nature of the condition were discussed. There is increased risk for additional events, which may carry potential for significant injuries and death. Discussed frequent seizure triggers: sleep deprivation, medication non-compliance, use of illegal drugs/alcohol, stress, and others.   -We reviewed in detail the current antiepileptic regimen.  Potential side effects of antiepileptics were discussed at length, including but no limited to: hypersensitivity reactions (rash and others, some of which can be fatal), visual field changes (some of which may be irreversible), glaucoma, diplopia, kidney stones, osteopenia/osteoporosis/bone fractures, hyperthermia/anhydrosis, hyponatremia, tremors/abnormal movements, ataxia, dizziness, fatigue, increased risk for falls, risk for cardiac arrhythmias/syncope, gastrointestinal side effects(hepatitis, pancreatitis, gastritis, ulcers), gingival hypertrophy/bleeding, drowsiness, sedation, anxiety/nervousness, increased risk for suicide, increased risk for depression, and psychosis.   -We also reviewed drug-drug interactions and their potential effect on seizure control and medication side effects.    -We also discussed in detail potential effects of seizures, epilepsy, and medications during pregnancy, including but not limited to fetal malformations, child developmental/intellectual disability, fetal/ risk for hemorrhages, stillbirth, maternal death, premature birth, and others. The patient/family aware that pregnancy should be avoided, unless desired, in which case we recommend discussing with us at least a year prior to planned conception. To avoid undesired pregnancy while on antiepileptics, we recommend dual contraception.   -Folic acid 2 mg is recommended for all females in childbearing age (12-44 years of age).   -Recommend chronic vitamin D supplementation and regular exercise (if not contraindicated).   -Patient/family educated on risk for SUDEP (Sudden Death in Epilepsy). Counseling was provided on the importance of strict medication and follow up compliance. The patient/family understand the risks associated with non-adherence with the medical plan as outlined, including but not limited to an increased risk for breakthrough seizures, which may contribute to injuries, disability, status epilepticus, and  even death.   -Counseling was also provided on potential effects of alcohol and other drugs, which may lower seizure threshold and/or affect the metabolism of antiepileptic drugs. We recommend avoidance of alcohol and illegal drugs.  -Avoid sleep deprivation.   -We extensively discussed the aspects related to safety in drivers who suffer from epilepsy. The patient is encourage to report to the Division of Motor Vehicles of any condition and/or spells related to confusion, disorientation, and/or loss of awareness and/or loss of consciousness; as these may pose a safety issue if they occur while operating a motor vehicle. The patient and/or family are ultimately responsible for exercising caution and abiding to regulations in place.   -Other seizure precautions were discussed at length, including no diving, no skydiving, no climbing or exposure to unprotected heights, no unsupervised swimming, no Jacuzzi or bathing in bathtubs or deep bodies of water. The patient/family have been advised about risks for operating any machinery while suffering from seizures / syncope / epilepsy and/or while taking antiepileptic drugs.   -The patient understands and agrees that due to the complexity of his/her diagnosis, results of any testing and further recommendations will typically be discussed/made during a face to face encounter in my office. The patient and/or family further understands it is their responsibility to keep proper follow up.     Patient/family agree with plan, as outlined.         Doris Ny, MSN, APRN, FNP-C  [facility]  Office: [phone]  Fax: [phone]

## 2020-03-18 ENCOUNTER — APPOINTMENT (OUTPATIENT)
Dept: NEUROLOGY | Facility: MEDICAL CENTER | Age: 35
End: 2020-03-18

## 2020-03-26 ENCOUNTER — OFFICE VISIT (OUTPATIENT)
Dept: MEDICAL GROUP | Facility: MEDICAL CENTER | Age: 35
End: 2020-03-26
Attending: FAMILY MEDICINE
Payer: MEDICAID

## 2020-03-26 VITALS
RESPIRATION RATE: 16 BRPM | SYSTOLIC BLOOD PRESSURE: 108 MMHG | HEART RATE: 112 BPM | HEIGHT: 68 IN | OXYGEN SATURATION: 98 % | BODY MASS INDEX: 23.9 KG/M2 | TEMPERATURE: 98.2 F | DIASTOLIC BLOOD PRESSURE: 66 MMHG | WEIGHT: 157.7 LBS

## 2020-03-26 DIAGNOSIS — N30.10 INTERSTITIAL CYSTITIS: ICD-10-CM

## 2020-03-26 DIAGNOSIS — F41.9 ANXIETY: ICD-10-CM

## 2020-03-26 PROCEDURE — 99213 OFFICE O/P EST LOW 20 MIN: CPT | Performed by: FAMILY MEDICINE

## 2020-03-26 RX ORDER — OXYCODONE AND ACETAMINOPHEN 7.5; 325 MG/1; MG/1
1 TABLET ORAL EVERY 4 HOURS PRN
Qty: 120 TAB | Refills: 0 | Status: SHIPPED | OUTPATIENT
Start: 2020-03-26 | End: 2020-04-24 | Stop reason: SDUPTHER

## 2020-03-26 ASSESSMENT — FIBROSIS 4 INDEX: FIB4 SCORE: 0.46

## 2020-03-26 NOTE — PROGRESS NOTES
"Subjective:      Rhiannon Marrero is a 34 y.o. female who presents with No chief complaint on file.            HPI 1.  Anxiety-patient reports chronic history of anxiety that continues to be active.  She did try our cautious suggestion of alprazolam 0.25 mg and reports that it was really ineffective and she discontinued after 1 or 2 doses.  She has not continued to take any benzodiazepine at this time.  Psychiatry referral is pending with an appointment scheduled for next week.  Notes a resting tachycardia but no chest pain or diaphoresis.  2.  Interstitial cystitis-approximately 3-year history of interstitial cystitis with pelvic pain.  Patient reports that she is gradually lost all benefit for taking the Percocet 5/325 4 times daily.  Negative for constipation, hematuria.    ROS negative for suicidal ideation, urinary incontinence, flank pain       Objective:     /66 (BP Location: Left arm, Patient Position: Sitting, BP Cuff Size: Adult)   Pulse (!) 112   Temp 36.8 °C (98.2 °F) (Temporal)   Resp 16   Ht 1.715 m (5' 7.5\")   Wt 71.5 kg (157 lb 11.2 oz)   LMP 10/30/2018   SpO2 98%   BMI 24.33 kg/m²      Physical Exam  Gen.- alert, cooperative, in no acute distress  Neck- midline trachea, thyroid not enlarged or tender,supple, no cervical adenopathy  Chest-clear to auscultation and percussion with normal breath sounds. No retractions. Chest wall nontender  Cardiac- regular rhythm and rate. No murmur, thrill, or heave  Abdomen-1+ tenderness over the suprapubic area          Assessment/Plan:       1. Interstitial cystitis    - oxyCODONE-acetaminophen (PERCOCET) 7.5-325 MG per tablet; Take 1 Tab by mouth every four hours as needed for up to 30 days.  Dispense: 120 Tab; Refill: 0    2. Anxiety    Plan: 1.  Will increase strength of Percocet to 7.5/325 4 times daily, #120  2.  Await psychiatry evaluation and recommendations-patient is cautioned to avoid all benzodiazepines  3.  Revisit with me in 1 month  "

## 2020-04-09 ENCOUNTER — TELEPHONE (OUTPATIENT)
Dept: MEDICAL GROUP | Facility: MEDICAL CENTER | Age: 35
End: 2020-04-09

## 2020-04-09 DIAGNOSIS — G47.9 SLEEP DISORDER: ICD-10-CM

## 2020-04-09 RX ORDER — TRAZODONE HYDROCHLORIDE 50 MG/1
50 TABLET ORAL
Qty: 30 TAB | Refills: 3 | Status: SHIPPED | OUTPATIENT
Start: 2020-04-09 | End: 2020-07-13

## 2020-04-09 NOTE — TELEPHONE ENCOUNTER
1. Caller Name: Rhiannon                        Call Back Number: [phone] (home)       How would the patient prefer to be contacted with a response: Phone call OK to leave a detailed message    2. What are the patient's symptoms (location & severity)? Pt called stating she is having increased anxiety, panic attacks and trouble sleeping. She has a rapid pulse and is requesting medication for sleep and something to help with the anxiety and panic attacks    3. Is this a new symptom Yes    4. When did it start? Last few weeks    5. Action taken per Active Symptom Guide: Pt would like a medication for increased anxiety and panic attacks, and something to help with sleep    6. Patient agrees to recommended action per Active Symptom Escalation Protocol.

## 2020-04-09 NOTE — TELEPHONE ENCOUNTER
Please let patient note that current events are generating high levels of anxiety in many people especially people that have had some dealings with anxiety in the past.  Will write a prescription for trazodone 50 mg at bedtime.  Could take 2 at bedtime if needed to assist with sleep.  Previous trial of Xanax was not effective which also was a benzodiazepine and is contraindicated with the use of the opiates for the bladder pain.

## 2020-04-24 ENCOUNTER — OFFICE VISIT (OUTPATIENT)
Dept: MEDICAL GROUP | Facility: MEDICAL CENTER | Age: 35
End: 2020-04-24
Attending: FAMILY MEDICINE
Payer: MEDICAID

## 2020-04-24 VITALS
BODY MASS INDEX: 23.49 KG/M2 | HEIGHT: 68 IN | TEMPERATURE: 98.3 F | WEIGHT: 155 LBS | DIASTOLIC BLOOD PRESSURE: 62 MMHG | HEART RATE: 140 BPM | SYSTOLIC BLOOD PRESSURE: 114 MMHG | RESPIRATION RATE: 16 BRPM | OXYGEN SATURATION: 96 %

## 2020-04-24 DIAGNOSIS — F31.0 BIPOLAR AFFECTIVE DISORDER, CURRENT EPISODE HYPOMANIC (HCC): ICD-10-CM

## 2020-04-24 DIAGNOSIS — N30.10 INTERSTITIAL CYSTITIS: ICD-10-CM

## 2020-04-24 DIAGNOSIS — F41.9 ANXIETY: ICD-10-CM

## 2020-04-24 DIAGNOSIS — G47.9 SLEEP DISORDER: ICD-10-CM

## 2020-04-24 PROCEDURE — 99214 OFFICE O/P EST MOD 30 MIN: CPT | Performed by: FAMILY MEDICINE

## 2020-04-24 PROCEDURE — 99213 OFFICE O/P EST LOW 20 MIN: CPT | Performed by: FAMILY MEDICINE

## 2020-04-24 RX ORDER — OXYCODONE AND ACETAMINOPHEN 7.5; 325 MG/1; MG/1
1 TABLET ORAL EVERY 4 HOURS PRN
Qty: 120 TAB | Refills: 0 | Status: SHIPPED | OUTPATIENT
Start: 2020-04-25 | End: 2020-04-24 | Stop reason: SDUPTHER

## 2020-04-24 RX ORDER — BUPROPION HYDROCHLORIDE 200 MG/1
200 TABLET, EXTENDED RELEASE ORAL 2 TIMES DAILY
COMMUNITY
End: 2022-05-23

## 2020-04-24 RX ORDER — LEVETIRACETAM 500 MG/5ML
INJECTION, SOLUTION, CONCENTRATE INTRAVENOUS
COMMUNITY
Start: 2020-02-05 | End: 2020-07-13

## 2020-04-24 RX ORDER — OXYCODONE AND ACETAMINOPHEN 7.5; 325 MG/1; MG/1
1 TABLET ORAL EVERY 4 HOURS PRN
Qty: 120 TAB | Refills: 0 | Status: SHIPPED | OUTPATIENT
Start: 2020-05-25 | End: 2020-06-25 | Stop reason: SDUPTHER

## 2020-04-24 RX ORDER — HYDROXYZINE PAMOATE 25 MG/1
CAPSULE ORAL
COMMUNITY
Start: 2020-04-20 | End: 2020-07-13

## 2020-04-24 RX ORDER — OXYBUTYNIN CHLORIDE 5 MG/1
TABLET ORAL DAILY
COMMUNITY
Start: 2020-03-27 | End: 2020-07-13

## 2020-04-24 RX ORDER — LORAZEPAM 1 MG/1
1 TABLET ORAL
COMMUNITY
End: 2022-05-23

## 2020-04-24 RX ORDER — AMITRIPTYLINE HYDROCHLORIDE 75 MG/1
TABLET ORAL
COMMUNITY
Start: 2020-03-17 | End: 2020-07-13

## 2020-04-24 ASSESSMENT — FIBROSIS 4 INDEX: FIB4 SCORE: 0.46

## 2020-04-24 NOTE — PROGRESS NOTES
Subjective:      Rhiannon Marrero is a 34 y.o. female who presents with Cystitis            HPI 1.  Interstitial cystitis-patient notes ongoing anterior pelvic pain.  She reports significant improvement in her level of pain control for increasing Percocet from 5mg to 7.5/325 mg 4 times daily.  She is not reporting any constipation, gross hematuria  2.  Anxiety-patient is being followed by psychiatry.  She reports that her psychiatrist recently about a week ago discontinued the Vraylar, and said he was going to put her on bupropion but she thinks she actually got put on buspirone possibly accidentally.  She also was placed back on Ativan 1 mg twice daily which is helped with her intense daily levels of anxiety and rapid heart rate.  3.  Sleep disorder-patient reports she is in bed for 5 to 7 hours per night but has restless fragmented sleep.  Does not feel rested during the day.  She has failed trazodone in the past and reports hydroxyzine is not effective.  She has taken Ambien with better results in the past.  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 Authorizing Provider   oxyCODONE-acetaminophen (PERCOCET) 7.5-325 MG per tablet Take 1 Tab by mouth every four hours as needed for up to 30 days. 5/25/20 6/24/20 Yes Mamadou Leigh M.D.   cloNIDine (CATAPRES) 0.1 MG Tab TAKE 1 TABLET BY MOUTH TWICE DAILY 4/9/20   Mamadou Leigh M.D.   traZODone (DESYREL) 50 MG Tab Take 1 Tab by mouth at bedtime as needed for Sleep. 4/9/20   Mamadou Leigh M.D.   levETIRAcetam (KEPPRA) 500 MG Tab TAKE 1 TABLET BY MOUTH TWICE DAILY FOR 30 DAYS 3/13/20   Mamadou Leigh M.D.   tizanidine (ZANAFLEX) 4 MG Tab TAKE 1 TABLET BY MOUTH EVERY 6 HOURS AS NEEDED 2/14/20   Mamadou Leigh M.D.   cyclobenzaprine (FLEXERIL) 10 MG Tab Take 1 Tab by mouth 3 times a day as  needed. 1/31/20   Mamadou Leigh M.D.   Naloxone (NARCAN) 4 MG/0.1ML Liquid One spray in one nostril for overdose and call 911. 1/31/20   Mamadou Leigh M.D.   amitriptyline (ELAVIL) 25 MG Tab Take 1 Tab by mouth at bedtime as needed. 12/31/19   Mamadou Leigh M.D.   Cariprazine HCl (VRAYLAR PO) Vraylar    Physician Outpatient   cyclobenzaprine (FLEXERIL) 10 MG Tab cyclobenzaprine 10 mg tablet    Physician Outpatient   baclofen (LIORESAL) 10 MG Tab TAKE 1 TABLET BY MOUTH THREE TIMES DAILY AS NEEDED 12/23/19   Mamadou Leigh M.D.   gabapentin (NEURONTIN) 600 MG tablet Take 1 Tab by mouth 3 times a day. 11/26/19   Mamadou Leigh M.D.   ELMIRON 100 MG Cap Take 1 Cap by mouth 3 times a day. 11/26/19   Mamadou Leigh M.D.   hydrOXYzine HCl (ATARAX) 25 MG Tab Take 1 Tab by mouth 3 times a day as needed for Itching. 11/26/19   Mamadou Leigh M.D.   lidocaine (XYLOCAINE) 5 % Ointment Apply 1-2 g to affected area(s) as needed. 9/9/19   Yvan Maxwell M.D.   omeprazole (PRILOSEC) 20 MG delayed-release capsule Take 1 Cap by mouth every day. 9/9/19   Yvan Maxwell M.D.   methylPREDNISolone (MEDROL DOSEPAK) 4 MG Tablet Therapy Pack Take 1 Tab by mouth See Admin Instructions. 8/31/19   Abdiaziz Seth P.A.-C.   cetirizine (ZYRTEC ALLERGY) 10 MG Tab Take 1 Tab by mouth every day. 8/31/19   JEREL Carlson M.D.   PROVENTIL  (90 Base) MCG/ACT Aero Soln inhalation aerosol  5/27/19   Physician Outpatient   nicotine (NICODERM) 14 MG/24HR PATCH 24 HR Apply 1 Patch to skin as directed every 24 hours.  Patient not taking: Reported on 9/23/2019 8/12/19   Yvan Maxwell M.D.   loratadine (CLARITIN) 10 MG Tab Take 1 Tab by mouth every day. 8/12/19   Yvan Maxwell M.D.   fluticasone (FLONASE) 50 MCG/ACT nasal spray Spray 2 Sprays in nose every day. 7/29/19   Yvan Maxwell M.D.   ammonium lactate (LAC-HYDRIN) 12 % Lotion Apply 1 Applicator to affected area(s) as needed. 7/9/19   Yvan Maxwell  "M.D.   triamcinolone acetonide (KENALOG) 0.1 % Cream Apply 1 Application to affected area(s) 2 times a day as needed.  Patient not taking: Reported on 11/26/2019 7/9/19   Yvan Maxwell M.D.        Physician Outpatient   therapeutic multivitamin-minerals (THERAGRAN-M) Tab Take 1 Tab by mouth every day.    Physician Outpatient   ibuprofen (MOTRIN) 200 MG Tab Take 800 mg by mouth every 6 hours as needed for Mild Pain.    Physician Outpatient          Objective:     /62 (BP Location: Left arm, Patient Position: Sitting, BP Cuff Size: Adult)   Pulse (!) 140   Temp 36.8 °C (98.3 °F) (Temporal)   Resp 16   Ht 1.715 m (5' 7.52\")   Wt 70.3 kg (155 lb)   LMP 10/30/2018   SpO2 96%   BMI 23.90 kg/m²      Physical Exam  Gen.- alert, cooperative, in no acute distress  Neck- midline trachea, thyroid not enlarged or tender,supple, no cervical adenopathy  Chest-clear to auscultation and percussion with normal breath sounds. No retractions. Chest wall nontender  Cardiac- regular rhythm and rate.  (120 on recheck) no murmur, thrill, or heave  Abdomen-1+ tender in the suprapubic area.  Upper abdomen nontender to palpation.  No palpable masses    Psych-normal affect with good eye contact. Normal grooming. Oriented x4.         Assessment/Plan:       1. Interstitial cystitis-reviewed with patient the combined risk of taking opiates and benzodiazepines together with regard to respiratory depression and possible death.    - oxyCODONE-acetaminophen (PERCOCET) 7.5-325 MG per tablet; Take 1 Tab by mouth every four hours as needed for up to 30 days.  Dispense: 120 Tab; Refill: 0    2. Bipolar affective disorder, current episode hypomanic (HCC)      3. Sleep disorder patient encouraged to see if her psychiatry provider will be comfortable adding Ambien to her current medication regimen      4. Anxiety partially improved but still active    Plan: 1 renew Percocet 7.5/325, #120, 2 months written  2.  Follow-up with me in 2 months or " sooner if needed

## 2020-05-14 ENCOUNTER — OFFICE VISIT (OUTPATIENT)
Dept: URGENT CARE | Facility: CLINIC | Age: 35
End: 2020-05-14
Payer: MEDICAID

## 2020-05-14 ENCOUNTER — HOSPITAL ENCOUNTER (OUTPATIENT)
Facility: MEDICAL CENTER | Age: 35
End: 2020-05-14
Attending: PHYSICIAN ASSISTANT
Payer: MEDICAID

## 2020-05-14 VITALS
RESPIRATION RATE: 18 BRPM | BODY MASS INDEX: 24.75 KG/M2 | SYSTOLIC BLOOD PRESSURE: 116 MMHG | TEMPERATURE: 97.6 F | HEIGHT: 66 IN | OXYGEN SATURATION: 100 % | WEIGHT: 154 LBS | DIASTOLIC BLOOD PRESSURE: 78 MMHG | HEART RATE: 94 BPM

## 2020-05-14 DIAGNOSIS — R30.0 DYSURIA: ICD-10-CM

## 2020-05-14 DIAGNOSIS — R35.0 URINARY FREQUENCY: ICD-10-CM

## 2020-05-14 PROCEDURE — 87086 URINE CULTURE/COLONY COUNT: CPT

## 2020-05-14 PROCEDURE — 99214 OFFICE O/P EST MOD 30 MIN: CPT | Performed by: PHYSICIAN ASSISTANT

## 2020-05-14 RX ORDER — SULFAMETHOXAZOLE AND TRIMETHOPRIM 800; 160 MG/1; MG/1
1 TABLET ORAL 2 TIMES DAILY
Qty: 10 TAB | Refills: 0 | Status: SHIPPED | OUTPATIENT
Start: 2020-05-14 | End: 2020-05-19

## 2020-05-14 ASSESSMENT — ENCOUNTER SYMPTOMS
ABDOMINAL PAIN: 1
CHILLS: 0
FLANK PAIN: 0
BACK PAIN: 0
DIARRHEA: 0
VOMITING: 0
CARDIOVASCULAR NEGATIVE: 1
FEVER: 0
NEUROLOGICAL NEGATIVE: 1

## 2020-05-14 ASSESSMENT — FIBROSIS 4 INDEX: FIB4 SCORE: 0.47

## 2020-05-14 NOTE — LETTER
[facility]  [address]  [place] [address]     May 14, 2020    Patient: Rhiannon Marrero   YOB: 1985   Date of Visit: 5/14/2020       To Whom It May Concern:    Rhiannon Marrero was seen and treated in our department on 5/14/2020.   Please excuse from work today.     Sincerely,     Darrick Andrews P.A.-C.
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
## 2020-05-17 ENCOUNTER — TELEPHONE (OUTPATIENT)
Dept: URGENT CARE | Facility: CLINIC | Age: 35
End: 2020-05-17

## 2020-05-17 LAB
BACTERIA UR CULT: NORMAL
SIGNIFICANT IND 70042: NORMAL
SITE SITE: NORMAL
SOURCE SOURCE: NORMAL

## 2020-05-17 NOTE — TELEPHONE ENCOUNTER
Attempted to call patient regarding urine culture results and to check on patient's symptoms.  Left voicemail that we have results back.  Instructed to call [facility] today otherwise, I will call back later today or tomorrow morning.

## 2020-05-27 ENCOUNTER — TELEPHONE (OUTPATIENT)
Dept: URGENT CARE | Facility: CLINIC | Age: 35
End: 2020-05-27

## 2020-05-27 NOTE — TELEPHONE ENCOUNTER
Alexsander Hollingsworth  Patient came in to check on her results and said she still has symptoms that the problem has not resolve.    Please Advice

## 2020-05-28 NOTE — TELEPHONE ENCOUNTER
provider Cathy Hamman  reviewed lab results and patient was notified negative results for Urine culture, also provider recommended for patient if still having symptoms to get re-evaluated again, patient was notified and she made a reservation to come in tomorrow.

## 2020-06-25 ENCOUNTER — OFFICE VISIT (OUTPATIENT)
Dept: MEDICAL GROUP | Facility: MEDICAL CENTER | Age: 35
End: 2020-06-25
Attending: FAMILY MEDICINE
Payer: MEDICAID

## 2020-06-25 VITALS
SYSTOLIC BLOOD PRESSURE: 110 MMHG | WEIGHT: 152.7 LBS | TEMPERATURE: 98.5 F | RESPIRATION RATE: 16 BRPM | HEIGHT: 66 IN | HEART RATE: 112 BPM | BODY MASS INDEX: 24.54 KG/M2 | DIASTOLIC BLOOD PRESSURE: 62 MMHG | OXYGEN SATURATION: 93 %

## 2020-06-25 DIAGNOSIS — N30.10 INTERSTITIAL CYSTITIS: ICD-10-CM

## 2020-06-25 DIAGNOSIS — M54.12 CERVICAL RADICULOPATHY AT C6: ICD-10-CM

## 2020-06-25 PROCEDURE — 99213 OFFICE O/P EST LOW 20 MIN: CPT | Performed by: FAMILY MEDICINE

## 2020-06-25 RX ORDER — OXYCODONE AND ACETAMINOPHEN 7.5; 325 MG/1; MG/1
1 TABLET ORAL EVERY 4 HOURS PRN
Qty: 120 TAB | Refills: 0 | Status: SHIPPED | OUTPATIENT
Start: 2020-06-25 | End: 2020-06-25 | Stop reason: SDUPTHER

## 2020-06-25 RX ORDER — OXYCODONE AND ACETAMINOPHEN 7.5; 325 MG/1; MG/1
1 TABLET ORAL EVERY 6 HOURS PRN
Qty: 120 TAB | Refills: 0 | Status: SHIPPED | OUTPATIENT
Start: 2020-07-25 | End: 2020-08-24

## 2020-06-25 ASSESSMENT — FIBROSIS 4 INDEX: FIB4 SCORE: 0.47
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
## 2020-06-29 ENCOUNTER — TELEPHONE (OUTPATIENT)
Dept: MEDICAL GROUP | Facility: MEDICAL CENTER | Age: 35
End: 2020-06-29

## 2020-06-29 NOTE — TELEPHONE ENCOUNTER
DOCUMENTATION OF PAR STATUS:    1. Name of Medication & Dose: percocet 7.5-325mg tabs     2. Name of Prescription Coverage Company & phone #: medicaid ffs    3. Date Prior Auth Submitted: 06/29/2020    4. What information was given to obtain insurance decision? [email or web address] form     5. Prior Auth Status? Pending    6. Patient Notified: yes

## 2020-07-12 ENCOUNTER — HOSPITAL ENCOUNTER (EMERGENCY)
Facility: MEDICAL CENTER | Age: 35
End: 2020-07-14
Attending: EMERGENCY MEDICINE
Payer: MEDICAID

## 2020-07-12 DIAGNOSIS — R45.851 SUICIDAL IDEATION: ICD-10-CM

## 2020-07-12 DIAGNOSIS — F10.10 ALCOHOL ABUSE: ICD-10-CM

## 2020-07-12 LAB
AMPHET UR QL SCN: NEGATIVE
BARBITURATES UR QL SCN: NEGATIVE
BENZODIAZ UR QL SCN: NEGATIVE
BZE UR QL SCN: NEGATIVE
CANNABINOIDS UR QL SCN: NEGATIVE
METHADONE UR QL SCN: NEGATIVE
OPIATES UR QL SCN: NEGATIVE
OXYCODONE UR QL SCN: POSITIVE
PCP UR QL SCN: NEGATIVE
POC BREATHALIZER: 0.31 PERCENT (ref 0–0.01)
PROPOXYPH UR QL SCN: NEGATIVE

## 2020-07-12 PROCEDURE — 80307 DRUG TEST PRSMV CHEM ANLYZR: CPT

## 2020-07-12 PROCEDURE — 90471 IMMUNIZATION ADMIN: CPT

## 2020-07-12 PROCEDURE — 90715 TDAP VACCINE 7 YRS/> IM: CPT | Performed by: EMERGENCY MEDICINE

## 2020-07-12 PROCEDURE — 700101 HCHG RX REV CODE 250: Performed by: EMERGENCY MEDICINE

## 2020-07-12 PROCEDURE — 99285 EMERGENCY DEPT VISIT HI MDM: CPT

## 2020-07-12 PROCEDURE — 304999 HCHG REPAIR-SIMPLE/INTERMED LEVEL 1

## 2020-07-12 PROCEDURE — 700111 HCHG RX REV CODE 636 W/ 250 OVERRIDE (IP): Performed by: EMERGENCY MEDICINE

## 2020-07-12 PROCEDURE — 302970 POC BREATHALIZER: Performed by: EMERGENCY MEDICINE

## 2020-07-12 RX ADMIN — TETRACAINE HCL 3 ML: 10 INJECTION SUBARACHNOID at 21:15

## 2020-07-12 RX ADMIN — CLOSTRIDIUM TETANI TOXOID ANTIGEN (FORMALDEHYDE INACTIVATED), CORYNEBACTERIUM DIPHTHERIAE TOXOID ANTIGEN (FORMALDEHYDE INACTIVATED), BORDETELLA PERTUSSIS TOXOID ANTIGEN (GLUTARALDEHYDE INACTIVATED), BORDETELLA PERTUSSIS FILAMENTOUS HEMAGGLUTININ ANTIGEN (FORMALDEHYDE INACTIVATED), BORDETELLA PERTUSSIS PERTACTIN ANTIGEN, AND BORDETELLA PERTUSSIS FIMBRIAE 2/3 ANTIGEN 0.5 ML: 5; 2; 2.5; 5; 3; 5 INJECTION, SUSPENSION INTRAMUSCULAR at 21:32

## 2020-07-12 ASSESSMENT — FIBROSIS 4 INDEX: FIB4 SCORE: 0.47

## 2020-07-13 LAB — POC BREATHALIZER: 0.23 PERCENT (ref 0–0.01)

## 2020-07-13 PROCEDURE — A9270 NON-COVERED ITEM OR SERVICE: HCPCS | Performed by: EMERGENCY MEDICINE

## 2020-07-13 PROCEDURE — 700102 HCHG RX REV CODE 250 W/ 637 OVERRIDE(OP): Performed by: EMERGENCY MEDICINE

## 2020-07-13 PROCEDURE — 302970 POC BREATHALIZER: Performed by: EMERGENCY MEDICINE

## 2020-07-13 PROCEDURE — 90791 PSYCH DIAGNOSTIC EVALUATION: CPT

## 2020-07-13 RX ORDER — CYCLOBENZAPRINE HCL 10 MG
10 TABLET ORAL 3 TIMES DAILY PRN
Status: DISCONTINUED | OUTPATIENT
Start: 2020-07-13 | End: 2020-07-14 | Stop reason: HOSPADM

## 2020-07-13 RX ORDER — ZOLPIDEM TARTRATE 5 MG/1
10 TABLET ORAL
Status: DISCONTINUED | OUTPATIENT
Start: 2020-07-13 | End: 2020-07-14 | Stop reason: HOSPADM

## 2020-07-13 RX ORDER — HYDROXYZINE HYDROCHLORIDE 25 MG/1
25 TABLET, FILM COATED ORAL 3 TIMES DAILY PRN
Status: DISCONTINUED | OUTPATIENT
Start: 2020-07-13 | End: 2020-07-14 | Stop reason: HOSPADM

## 2020-07-13 RX ORDER — NICOTINE 21 MG/24HR
1 PATCH, TRANSDERMAL 24 HOURS TRANSDERMAL ONCE
Status: COMPLETED | OUTPATIENT
Start: 2020-07-13 | End: 2020-07-14

## 2020-07-13 RX ORDER — GABAPENTIN 300 MG/1
600 CAPSULE ORAL 3 TIMES DAILY
Status: DISCONTINUED | OUTPATIENT
Start: 2020-07-13 | End: 2020-07-14 | Stop reason: HOSPADM

## 2020-07-13 RX ORDER — OXYCODONE HYDROCHLORIDE AND ACETAMINOPHEN 5; 325 MG/1; MG/1
1 TABLET ORAL EVERY 6 HOURS PRN
Status: DISCONTINUED | OUTPATIENT
Start: 2020-07-13 | End: 2020-07-14 | Stop reason: HOSPADM

## 2020-07-13 RX ORDER — LEVETIRACETAM 500 MG/1
500 TABLET ORAL 2 TIMES DAILY
Status: DISCONTINUED | OUTPATIENT
Start: 2020-07-13 | End: 2020-07-14 | Stop reason: HOSPADM

## 2020-07-13 RX ORDER — BUPROPION HYDROCHLORIDE 100 MG/1
200 TABLET, EXTENDED RELEASE ORAL 2 TIMES DAILY
Status: DISCONTINUED | OUTPATIENT
Start: 2020-07-13 | End: 2020-07-14 | Stop reason: HOSPADM

## 2020-07-13 RX ORDER — CLONIDINE HYDROCHLORIDE 0.1 MG/1
0.1 TABLET ORAL TWICE DAILY
Status: DISCONTINUED | OUTPATIENT
Start: 2020-07-13 | End: 2020-07-14 | Stop reason: HOSPADM

## 2020-07-13 RX ORDER — LORAZEPAM 1 MG/1
1 TABLET ORAL
Status: DISCONTINUED | OUTPATIENT
Start: 2020-07-13 | End: 2020-07-14 | Stop reason: HOSPADM

## 2020-07-13 RX ORDER — TIZANIDINE 4 MG/1
4 TABLET ORAL EVERY 6 HOURS PRN
Status: DISCONTINUED | OUTPATIENT
Start: 2020-07-13 | End: 2020-07-14 | Stop reason: HOSPADM

## 2020-07-13 RX ORDER — ZOLPIDEM TARTRATE 10 MG/1
10 TABLET ORAL
Status: SHIPPED | COMMUNITY
End: 2021-09-16

## 2020-07-13 RX ADMIN — GABAPENTIN 600 MG: 300 CAPSULE ORAL at 18:59

## 2020-07-13 RX ADMIN — CLONIDINE HYDROCHLORIDE 0.1 MG: 0.1 TABLET ORAL at 15:41

## 2020-07-13 RX ADMIN — BUPROPION HYDROCHLORIDE 200 MG: 100 TABLET, EXTENDED RELEASE ORAL at 18:58

## 2020-07-13 RX ADMIN — PENTOSAN POLYSULFATE SODIUM 100 MG: 100 CAPSULE, GELATIN COATED ORAL at 15:42

## 2020-07-13 RX ADMIN — LORAZEPAM 1 MG: 1 TABLET ORAL at 15:47

## 2020-07-13 RX ADMIN — GABAPENTIN 600 MG: 300 CAPSULE ORAL at 15:42

## 2020-07-13 RX ADMIN — LEVETIRACETAM 500 MG: 500 TABLET, FILM COATED ORAL at 18:59

## 2020-07-13 RX ADMIN — PENTOSAN POLYSULFATE SODIUM 100 MG: 100 CAPSULE, GELATIN COATED ORAL at 18:59

## 2020-07-13 RX ADMIN — OXYCODONE AND ACETAMINOPHEN 1 TABLET: 5; 325 TABLET ORAL at 16:23

## 2020-07-13 RX ADMIN — NICOTINE 14 MG: 14 PATCH TRANSDERMAL at 19:45
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
## 2020-07-13 NOTE — ED TRIAGE NOTES
Pt BIB PD on legal hold for verbalizing SI and attempting suicide by cutting wrist with a knife.  Pt uncooperative upon arrival requiring security at bedside.

## 2020-07-13 NOTE — ED NOTES
RN assist: Pt resting on gurney; pt in NAD, pt respirations are equal and non labored.     1:1 sitter in place

## 2020-07-13 NOTE — ED NOTES
Scheduled medications given per MAR along with PRN Ativan for anxiety.  Patient offered boxed lunch, declined at this time.  Updated on POC.

## 2020-07-13 NOTE — DISCHARGE PLANNING
LATE ENTRY:    LSW received copy of legal hold from Alert Team. To complete the referral, pt is pending completion of pregnancy test results and psych assessment note. Once obtained, LSW will fax referral to mental health facilities.

## 2020-07-13 NOTE — ED NOTES
Report from night shift RN, patient resting in bed with eyes closed even and unlabored respirations, awakens easily to voice, denies SI at this time, 1:1 sitter in place.
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
## 2020-07-13 NOTE — ED NOTES
Pt not cooperating given up her belongings after thorough explanation given.  Security contacted to be at bedside for belongings.  Pt ripped off clothes and threw them without any contact from staff.  Pt given gown, socks, underwear, and blanket.  Pt reluctant to put gown on
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
## 2020-07-13 NOTE — ED PROVIDER NOTES
ED Provider Note    Patient signed out to me awaiting medical clearance.  Upon my reassessment patient is clinically sober, patient attempted suicide by lacerating her left forearm.  Laceration was repaired.  Patient legal hold was completed by Dr. Coles.  Patient will be transferred to psychiatric facility when available and will be evaluated by our alert team later this morning.

## 2020-07-13 NOTE — ED NOTES
Patient continues resting in bed with eyes closed, visible rise and fall of chest, awakens easily to voice, breathalyzer 0.109, 1:1 sitter remains in place.

## 2020-07-13 NOTE — ED NOTES
Med Rec completed per patient and home pharmacy ([facility])    Allergies reviewed  No ORAL antibiotics in last 14 days

## 2020-07-14 VITALS
BODY MASS INDEX: 24.11 KG/M2 | SYSTOLIC BLOOD PRESSURE: 102 MMHG | HEIGHT: 66 IN | HEART RATE: 79 BPM | TEMPERATURE: 98.9 F | WEIGHT: 150 LBS | RESPIRATION RATE: 16 BRPM | DIASTOLIC BLOOD PRESSURE: 75 MMHG | OXYGEN SATURATION: 96 %

## 2020-07-14 LAB
APPEARANCE UR: ABNORMAL
COLOR UR AUTO: YELLOW
GLUCOSE UR QL STRIP.AUTO: NEGATIVE MG/DL
HCG UR QL: NEGATIVE
KETONES UR QL STRIP.AUTO: NEGATIVE MG/DL
LEUKOCYTE ESTERASE UR QL STRIP.AUTO: ABNORMAL
NITRITE UR QL STRIP.AUTO: NEGATIVE
PH UR STRIP.AUTO: 6 [PH] (ref 5–8)
PROT UR QL STRIP: 30 MG/DL
RBC UR QL AUTO: NEGATIVE
SP GR UR STRIP.AUTO: 1.02 (ref 1–1.03)

## 2020-07-14 PROCEDURE — 700102 HCHG RX REV CODE 250 W/ 637 OVERRIDE(OP): Performed by: EMERGENCY MEDICINE

## 2020-07-14 PROCEDURE — 81025 URINE PREGNANCY TEST: CPT

## 2020-07-14 PROCEDURE — A9270 NON-COVERED ITEM OR SERVICE: HCPCS | Performed by: EMERGENCY MEDICINE

## 2020-07-14 PROCEDURE — 81002 URINALYSIS NONAUTO W/O SCOPE: CPT

## 2020-07-14 RX ORDER — IBUPROFEN 200 MG
400 TABLET ORAL ONCE
Status: COMPLETED | OUTPATIENT
Start: 2020-07-14 | End: 2020-07-14

## 2020-07-14 RX ORDER — NITROFURANTOIN 25; 75 MG/1; MG/1
100 CAPSULE ORAL 2 TIMES DAILY WITH MEALS
Status: DISCONTINUED | OUTPATIENT
Start: 2020-07-14 | End: 2020-07-14 | Stop reason: HOSPADM

## 2020-07-14 RX ADMIN — OXYCODONE AND ACETAMINOPHEN 1 TABLET: 5; 325 TABLET ORAL at 06:13

## 2020-07-14 RX ADMIN — LEVETIRACETAM 500 MG: 500 TABLET, FILM COATED ORAL at 06:00

## 2020-07-14 RX ADMIN — BUPROPION HYDROCHLORIDE 200 MG: 100 TABLET, EXTENDED RELEASE ORAL at 09:21

## 2020-07-14 RX ADMIN — LORAZEPAM 1 MG: 1 TABLET ORAL at 09:22

## 2020-07-14 RX ADMIN — GABAPENTIN 600 MG: 300 CAPSULE ORAL at 06:00

## 2020-07-14 RX ADMIN — CLONIDINE HYDROCHLORIDE 0.1 MG: 0.1 TABLET ORAL at 06:00

## 2020-07-14 RX ADMIN — IBUPROFEN 400 MG: 200 TABLET, FILM COATED ORAL at 09:29

## 2020-07-14 RX ADMIN — PENTOSAN POLYSULFATE SODIUM 100 MG: 100 CAPSULE, GELATIN COATED ORAL at 09:20

## 2020-07-14 NOTE — ED NOTES
Assumed pt care. Pt has 1:1 sitter at bedside. NAD noted. Pt is resting with visible rise and fall of chest noted. All medical equipment has been removed from room at this time.

## 2020-07-14 NOTE — DISCHARGE PLANNING
Medical Social Work    Referral: Legal Hold    Intervention: Legal Hold Paperwork given to SW by [employer] [facility] RN: Josefina     Legal Hold Initiated: Date: 07/12/2020  Time: 2000    Legal Hold faxed: Date: 07/14/2020  Time: 0134    Patient’s Insurance Listed on Face Sheet: Medicaid FFS    Referrals sent to: [facility], [facility] and [facility]    Plan: Patient will transfer to mental health facility once acceptance is obtained.

## 2020-07-14 NOTE — DISCHARGE PLANNING
Pt accepted at [facility], accepting MD is Dr. BALA MENDOZA form completed and faxed    Called [facility] to obtain authorization for transport     1200 transport time confirmed with Enrique at [facility]    Transfer packet completed. Original hold placed in packet and left on pt's chart

## 2020-07-14 NOTE — DISCHARGE PLANNING
Medical Social Work    Unable to send mental health referrals as pregnancy test was not completed; no order.  Bedside RN aware.    SW will send referrals once pregnancy test is resulted.

## 2020-07-14 NOTE — DISCHARGE PLANNING
Medical Social Work    MSW received a call from Lydia with [facility] who states that they received pt's referral; however, they do not have a bed for pt at this time.  Lydia states that they should have discharges this morning and will follow up if they are able to take pt.

## 2020-07-14 NOTE — ED NOTES
Patient resting comfortably on gurney with eyes closed, awakens to voice, pain relief reported, sitter remains in place.

## 2020-07-14 NOTE — ED PROVIDER NOTES
ED Provider Note    Patient was reassessed.  She had no immediate complaints, but states that she has dysuria which is chronic and has been evaluated multiple times.  She requested ibuprofen.  I ordered a urinalysis.  This is currently pending.  She denies vaginal discharge or abnormal bleeding.  Denies abdominal pain, nausea or vomiting.  Her vital signs been stable.  We will proceed with plan per the psychiatry team.

## 2020-07-14 NOTE — ED PROVIDER NOTES
ER Physician Addendum    6:06 AM - Care of patient assumed, suicidal, on legal hold.  Seen at the bedside with patient's bedside nurse. Was intoxicated now clinically sober no medical complaints.  Pending transfer to [facility] facility.  Vital signs stable medically cleared will be under constant observation still she may safely be transferred to mental health facility.    FINAL IMPRESSION  1. Suicidal ideation    2. Alcohol abuse          -Pending transfer to psychiatric facility for higher level of mental health care-      Electronically signed by Hao Sousa M.D. on 7/14/2020 at 5:01 PM.

## 2020-07-22 ENCOUNTER — TELEPHONE (OUTPATIENT)
Dept: MEDICAL GROUP | Facility: MEDICAL CENTER | Age: 35
End: 2020-07-22

## 2020-07-23 NOTE — TELEPHONE ENCOUNTER
[facility] pharmacist called and states that pt Rx:  oxyCODONE-acetaminophen (PERCOCET) 7.5-325 MG per tablet, needs to change for start day to 07/24/2020. They are close on 07/25/2020, they did not need Rx paper, just need approval from Doc and call to pharmacy.

## 2020-08-15 DIAGNOSIS — N30.10 INTERSTITIAL CYSTITIS (CHRONIC) WITHOUT HEMATURIA: ICD-10-CM

## 2020-08-18 RX ORDER — PENTOSAN POLYSULFATE SODIUM 100 MG/1
CAPSULE, GELATIN COATED ORAL
Qty: 90 CAP | Refills: 6 | Status: SHIPPED | OUTPATIENT
Start: 2020-08-18 | End: 2021-06-10 | Stop reason: SDUPTHER

## 2020-08-21 ENCOUNTER — HOSPITAL ENCOUNTER (OUTPATIENT)
Facility: MEDICAL CENTER | Age: 35
End: 2020-08-21
Attending: FAMILY MEDICINE
Payer: MEDICAID

## 2020-08-21 ENCOUNTER — OFFICE VISIT (OUTPATIENT)
Dept: MEDICAL GROUP | Facility: MEDICAL CENTER | Age: 35
End: 2020-08-21
Attending: FAMILY MEDICINE
Payer: MEDICAID

## 2020-08-21 VITALS
WEIGHT: 153.6 LBS | RESPIRATION RATE: 16 BRPM | SYSTOLIC BLOOD PRESSURE: 120 MMHG | TEMPERATURE: 97 F | HEART RATE: 97 BPM | BODY MASS INDEX: 24.68 KG/M2 | OXYGEN SATURATION: 98 % | HEIGHT: 66 IN | DIASTOLIC BLOOD PRESSURE: 88 MMHG

## 2020-08-21 DIAGNOSIS — N30.10 INTERSTITIAL CYSTITIS: ICD-10-CM

## 2020-08-21 DIAGNOSIS — F11.20 UNCOMPLICATED OPIOID DEPENDENCE (HCC): ICD-10-CM

## 2020-08-21 DIAGNOSIS — Z76.0 PRESCRIPTION REFILL: ICD-10-CM

## 2020-08-21 PROCEDURE — 99214 OFFICE O/P EST MOD 30 MIN: CPT | Performed by: FAMILY MEDICINE

## 2020-08-21 PROCEDURE — 99213 OFFICE O/P EST LOW 20 MIN: CPT | Performed by: FAMILY MEDICINE

## 2020-08-21 PROCEDURE — 80307 DRUG TEST PRSMV CHEM ANLYZR: CPT

## 2020-08-21 RX ORDER — OXYCODONE AND ACETAMINOPHEN 7.5; 325 MG/1; MG/1
1 TABLET ORAL EVERY 4 HOURS PRN
Qty: 60 TAB | Refills: 0 | Status: SHIPPED | OUTPATIENT
Start: 2020-08-21 | End: 2020-09-09 | Stop reason: SDUPTHER

## 2020-08-21 ASSESSMENT — FIBROSIS 4 INDEX: FIB4 SCORE: 0.47

## 2020-08-21 NOTE — PROGRESS NOTES
Chief Complaint:   Chief Complaint   Patient presents with   • Follow-Up       HPI: Established patient of Dr Reese Pennosman Marrero is a 35 y.o. female who presents for refill of her pain medication           Uncomplicated opioid dependence     Patient on chronic pain medication use for treatment of pain related to interstitial interstitial cystitis.  She is requesting her pain medications to be refilled today, denies side effects from medications,  reviewed today.  .[email or web address]    Past medical history, family history, social history and medications reviewed and updated in the record. Today   Current medications, problem list and allergies reviewed in Epic today   Health maintenance topics are reviewed and updated.    Patient Active Problem List    Diagnosis Date Noted   • Interstitial cystitis (chronic) without hematuria 01/31/2020   • Current moderate episode of major depressive disorder without prior episode (HCC) 01/31/2020   • Alcohol abuse 11/26/2019   • Pain due to interstitial cystitis 08/12/2019   • Seasonal allergic rhinitis 08/12/2019   • Tobacco abuse 08/12/2019   • Bipolar affective disorder, current episode hypomanic (HCC) 06/11/2019   • Low back pain with sciatica 01/08/2019   • S/P hysterectomy 01/08/2019   • Post-op pain 01/08/2019   • Chronic pain syndrome 01/25/2018   • Anxiety 04/13/2017   • Syncope 04/13/2017     Family History   Problem Relation Age of Onset   • Psychiatric Illness Mother    • Stroke Mother    • Arthritis Mother    • Heart Disease Maternal Grandfather    • Cancer Neg Hx    • Diabetes Neg Hx    • Hypertension Neg Hx      Social History     Socioeconomic History   • Marital status:      Spouse name: Not on file   • Number of children: Not on file   • Years of education: Not on file   • Highest education level: Not on file   Occupational History   • Not on file   Social Needs   • Financial resource strain: Not on file   • Food insecurity     Worry: Not on file      Inability: Not on file   • Transportation needs     Medical: Not on file     Non-medical: Not on file   Tobacco Use   • Smoking status: Current Every Day Smoker     Packs/day: 0.75     Years: 10.00     Pack years: 7.50     Types: Cigarettes   • Smokeless tobacco: Never Used   Substance and Sexual Activity   • Alcohol use: No     Comment: Sober since [id number]   • Drug use: Yes     Types: Marijuana     Comment: edibles   • Sexual activity: Not on file   Lifestyle   • Physical activity     Days per week: Not on file     Minutes per session: Not on file   • Stress: Not on file   Relationships   • Social connections     Talks on phone: Not on file     Gets together: Not on file     Attends [employer] service: Not on file     Active member of club or organization: Not on file     Attends meetings of clubs or organizations: Not on file     Relationship status: Not on file   • Intimate partner violence     Fear of current or ex partner: Not on file     Emotionally abused: Not on file     Physically abused: Not on file     Forced sexual activity: Not on file   Other Topics Concern   • Not on file   Social History Narrative   • Not on file     Current Outpatient Medications   Medication Sig Dispense Refill   • oxyCODONE-acetaminophen (PERCOCET) 7.5-325 MG per tablet Take 1 Tab by mouth every four hours as needed for up to 15 days. 60 Tab 0   • ELMIRON 100 MG Cap TAKE 1 CAPSULE BY MOUTH THREE TIMES DAILY 90 Cap 6   • traZODone (DESYREL) 50 MG Tab TAKE 1 TABLET BY MOUTH EVERY NIGHT AT BEDTIME AS NEEDED FOR SLEEP 30 Tab 5   • zolpidem (AMBIEN) 10 MG Tab Take 10 mg by mouth every bedtime.     • hydrOXYzine HCl (ATARAX) 25 MG Tab TAKE 1 TABLET BY MOUTH THREE TIMES DAILY AS NEEDED FOR ITCHING 60 Tab 6   • cloNIDine (CATAPRES) 0.1 MG Tab TAKE 1 TABLET BY MOUTH TWICE DAILY 60 Tab 2   • oxyCODONE-acetaminophen (PERCOCET) 7.5-325 MG per tablet Take 1 Tab by mouth every 6 hours as needed for up to 30 days. 120 Tab 0   • buPROPion  "(WELLBUTRIN SR) 200 MG SR tablet Take 200 mg by mouth 2 times a day.     • LORazepam (ATIVAN) 1 MG Tab Take 1 mg by mouth 1 time daily as needed.     • levETIRAcetam (KEPPRA) 500 MG Tab TAKE 1 TABLET BY MOUTH TWICE DAILY FOR 30 DAYS 60 Tab 6   • tizanidine (ZANAFLEX) 4 MG Tab TAKE 1 TABLET BY MOUTH EVERY 6 HOURS AS NEEDED 30 Tab 3   • cyclobenzaprine (FLEXERIL) 10 MG Tab Take 1 Tab by mouth 3 times a day as needed. 90 Tab 6   • gabapentin (NEURONTIN) 600 MG tablet Take 1 Tab by mouth 3 times a day. 270 Tab 6     No current facility-administered medications for this visit.              Review Of Systems  As documented in HPI above  PHYSICAL EXAMINATION:    /88 (BP Location: Left arm, Patient Position: Sitting, BP Cuff Size: Adult)   Pulse 97   Temp 36.1 °C (97 °F) (Temporal)   Resp 16   Ht 1.676 m (5' 6\")   Wt 69.7 kg (153 lb 9.6 oz)   LMP 10/30/2018   SpO2 98%   BMI 24.79 kg/m²   Gen.: Well-developed, well-nourished, no apparent distress, pleasant and cooperative with the examination  HEENT: Normocephalic/atraumatic,   Neck: No JVD or bruits, no adenopathy  Cor: Regular rate and rhythm without murmur gallop or rub    Extremities: No cyanosis, clubbing or edema          ASSESSMENT/Plan:  1. Interstitial cystitis   chronic problem ,   patient follows up with urology and gets pain medication from Dr. Troy, she is for refill today.  oxyCODONE-acetaminophen (PERCOCET) 7.5-325 MG per tablet    PAIN MANAGEMENT SCRN, UR   2. Prescription refill  PAIN MANAGEMENT SCRN, UR   3. Uncomplicated opioid dependence (HCC)   refilled patient medication today, patient is clinically stable no concerns about side effects.     Obtained and reviewed patient utilization report from state pharmacy data base on .08/21/2020., and Based on assessment  Of report , the Prescription for Percocet 7.5/325 mg ., is medically necessary .  Please note that this dictation was created using voice recognition software. I have made every " reasonable attempt to correct obvious errors but there may be errors of grammar and content that I may have overlooked prior to finalization of this note.

## 2020-08-25 LAB
6MAM UR QL: NOT DETECTED
7AMINOCLONAZEPAM UR QL: NOT DETECTED
A-OH ALPRAZ UR QL: NOT DETECTED
ALPRAZ UR QL: NOT DETECTED
AMPHET UR QL SCN: NOT DETECTED
ANNOTATION COMMENT IMP: NORMAL
ANNOTATION COMMENT IMP: NORMAL
BARBITURATES UR QL: NOT DETECTED
BUPRENORPHINE UR QL: NOT DETECTED
BZE UR QL: NOT DETECTED
CARBOXYTHC UR QL: PRESENT
CARISOPRODOL UR QL: NOT DETECTED
CLONAZEPAM UR QL: NOT DETECTED
CODEINE UR QL: NOT DETECTED
DIAZEPAM UR QL: NOT DETECTED
ETHYL GLUCURONIDE UR QL: NOT DETECTED
FENTANYL UR QL: NOT DETECTED
HYDROCODONE UR QL: NOT DETECTED
HYDROMORPHONE UR QL: NOT DETECTED
LORAZEPAM UR QL: NOT DETECTED
MDA UR QL: NOT DETECTED
MDEA UR QL: NOT DETECTED
MDMA UR QL: NOT DETECTED
MEPERIDINE UR QL: NOT DETECTED
METHADONE UR QL: NOT DETECTED
METHAMPHET UR QL: NOT DETECTED
MIDAZOLAM UR QL SCN: NOT DETECTED
MORPHINE UR QL: NOT DETECTED
NORBUPRENORPHINE UR QL CFM: NOT DETECTED
NORDIAZEPAM UR QL: NOT DETECTED
NORFENTANYL UR QL: NOT DETECTED
NORHYDROCODONE UR QL CFM: NOT DETECTED
NOROXYCODONE UR QL CFM: PRESENT
NOROXYMORPH CO100 Q0458: NOT DETECTED
OXAZEPAM UR QL: NOT DETECTED
OXYCODONE UR QL: PRESENT
OXYMORPHONE UR QL: NOT DETECTED
PATHOLOGY STUDY: NORMAL
PCP UR QL: NOT DETECTED
PHENTERMINE UR QL: NOT DETECTED
PPAA UR QL: NOT DETECTED
PROPOXYPH UR QL: NOT DETECTED
SERVICE CMNT-IMP: NORMAL
TAPENADOL OSULF CO200 Q0473: NOT DETECTED
TAPENTADOL UR QL SCN: NOT DETECTED
TEMAZEPAM UR QL: NOT DETECTED
TRAMADOL UR QL: NOT DETECTED
ZOLPIDEM UR QL: NOT DETECTED

## 2020-09-09 DIAGNOSIS — N30.10 INTERSTITIAL CYSTITIS: ICD-10-CM

## 2020-09-10 RX ORDER — OXYCODONE AND ACETAMINOPHEN 7.5; 325 MG/1; MG/1
1 TABLET ORAL EVERY 6 HOURS PRN
Qty: 120 TAB | Refills: 0 | Status: SHIPPED | OUTPATIENT
Start: 2020-09-10 | End: 2020-10-10

## 2020-09-14 ENCOUNTER — TELEMEDICINE (OUTPATIENT)
Dept: MEDICAL GROUP | Facility: MEDICAL CENTER | Age: 35
End: 2020-09-14
Attending: FAMILY MEDICINE
Payer: MEDICAID

## 2020-09-14 DIAGNOSIS — R56.9 SEIZURES (HCC): ICD-10-CM

## 2020-09-14 DIAGNOSIS — N30.10 INTERSTITIAL CYSTITIS (CHRONIC) WITHOUT HEMATURIA: ICD-10-CM

## 2020-09-14 DIAGNOSIS — G89.4 CHRONIC PAIN SYNDROME: ICD-10-CM

## 2020-09-14 PROCEDURE — 99213 OFFICE O/P EST LOW 20 MIN: CPT | Mod: CR | Performed by: FAMILY MEDICINE

## 2020-09-14 RX ORDER — METHOCARBAMOL 750 MG/1
750 TABLET, FILM COATED ORAL 3 TIMES DAILY
Qty: 90 TAB | Refills: 6 | Status: SHIPPED | OUTPATIENT
Start: 2020-09-14 | End: 2021-01-18

## 2020-09-14 NOTE — PROGRESS NOTES
Subjective:      Rhiannon Marrero is a 35 y.o. female who presents with No chief complaint on file.            HPI 1.  Seizures-patient regional experienced onset of seizures in December 2019.  She was evaluated at [facility] in [facility].  She reports she had an unremarkable CT scan at that time.  Was started on Keppra 500 mg twice daily.  She had at least 1 seizure episode in February 2020.  10 days ago patient reported a witnessed grand mal seizure while at a coffee shop.  He had lasted about 1 minute and then she was initially extremely confused over the next 10 minutes consistent with postictal period.  She reports she has been compliant taking her Keppra.  She has remained on that dose over the past 10 days.  2.  Chronic pain/interstitial cystitis-patient is currently taking Percocet 4 times daily.  She is interested in a new pain clinic evaluation possibly utilizing something like Suboxone if it would give her greater relief.  She currently has severe pain on a daily basis.    ROS       Objective:     [facility] 10/30/2018      Physical Exam            Assessment/Plan:        1. Seizures (HCC)    - KEPPRA; Future  - REFERRAL TO NEUROLOGY  - MR-BRAIN-WITH; Future    2. Interstitial cystitis (chronic) without hematuria    - methocarbamol (ROBAXIN) 750 MG Tab; Take 1 Tab by mouth 3 times a day.  Dispense: 90 Tab; Refill: 6  - REFERRAL TO PAIN CLINIC    3. Chronic pain syndrome    - REFERRAL TO PAIN CLINIC

## 2020-09-14 NOTE — PROGRESS NOTES
Telemedicine Video Visit: Established Patient   This Remote Face to Face encounter was conducted via Zoom. Given the importance of social distancing and other strategies recommended to reduce the risk of COVID-19 transmission, I am providing medical care to this patient via audio/video visit in place of an in person visit at the request of the patient. Verbal consent to telehealth, risks, benefits, and consequences were discussed. Patient retains the right to withdraw at any time. All existing confidentiality protections apply. The patient has access to all transmitted medical information. No dissemination of any patient images or information to other entities without further written consent.  Subjective:   No chief complaint on file.      Rhiannon Marrero is a 35 y.o. female presenting for evaluation and management of:    1.  1.  Seizure disorder-patient was initially diagnosed with grand mal seizures in December 2019.  She was evaluated at [facility] in [place], had an unremarkable CT scan of the head at that time.  She was started on Keppra 500 mg twice daily.  She had a recurrent single seizure approximately February and then did well until 10 days ago.  That morning while in a coffee shop she had grand mal seizure including rigidity, shaking, neck extended eyes rolled back.  That lasted about 1 minute then patient was quite confused for 5 minutes and still moderately confused for another 5 minutes consistent with a post ictal period.  Has continued on her twice daily dose of Keppra and has not had any further seizure episodes.  2.  Interstitial cystitis-patient is having very poor control of her pain levels with her interstitial cystitis.  She currently is taking short acting Percocet 7.5/325.  She is interested in a pain management referral (previously did not get along with the provider at [facility] on an initial visit) to see if Suboxone might be an option.  He has had urology evaluation and is on  Kartik on.  Her insurance will not pay for bladder instillation of medication.    ROS   Denies any recent fevers or chills. No nausea or vomiting. No chest pains or shortness of breath.     Allergies   Allergen Reactions   • Promethazine Hcl Anxiety       Current medicines (including changes today)  Current Outpatient Medications   Medication Sig Dispense Refill   • methocarbamol (ROBAXIN) 750 MG Tab Take 1 Tab by mouth 3 times a day. 90 Tab 6   • oxyCODONE-acetaminophen (PERCOCET) 7.5-325 MG per tablet Take 1 Tab by mouth every 6 hours as needed for up to 30 days. 120 Tab 0   • ELMIRON 100 MG Cap TAKE 1 CAPSULE BY MOUTH THREE TIMES DAILY 90 Cap 6   • traZODone (DESYREL) 50 MG Tab TAKE 1 TABLET BY MOUTH EVERY NIGHT AT BEDTIME AS NEEDED FOR SLEEP 30 Tab 5   • zolpidem (AMBIEN) 10 MG Tab Take 10 mg by mouth every bedtime.     • hydrOXYzine HCl (ATARAX) 25 MG Tab TAKE 1 TABLET BY MOUTH THREE TIMES DAILY AS NEEDED FOR ITCHING 60 Tab 6   • cloNIDine (CATAPRES) 0.1 MG Tab TAKE 1 TABLET BY MOUTH TWICE DAILY 60 Tab 2   • buPROPion (WELLBUTRIN SR) 200 MG SR tablet Take 200 mg by mouth 2 times a day.     • LORazepam (ATIVAN) 1 MG Tab Take 1 mg by mouth 1 time daily as needed.     • levETIRAcetam (KEPPRA) 500 MG Tab TAKE 1 TABLET BY MOUTH TWICE DAILY FOR 30 DAYS 60 Tab 6   • tizanidine (ZANAFLEX) 4 MG Tab TAKE 1 TABLET BY MOUTH EVERY 6 HOURS AS NEEDED 30 Tab 3   • cyclobenzaprine (FLEXERIL) 10 MG Tab Take 1 Tab by mouth 3 times a day as needed. 90 Tab 6   • gabapentin (NEURONTIN) 600 MG tablet Take 1 Tab by mouth 3 times a day. 270 Tab 6     No current facility-administered medications for this visit.        Patient Active Problem List    Diagnosis Date Noted   • Interstitial cystitis (chronic) without hematuria 01/31/2020   • Current moderate episode of major depressive disorder without prior episode ([facility]) 01/31/2020   • Alcohol abuse 11/26/2019   • Pain due to interstitial cystitis 08/12/2019   • Seasonal allergic rhinitis  08/12/2019   • Tobacco abuse 08/12/2019   • Bipolar affective disorder, current episode hypomanic (HCC) 06/11/2019   • Low back pain with sciatica 01/08/2019   • S/P hysterectomy 01/08/2019   • Post-op pain 01/08/2019   • Chronic pain syndrome 01/25/2018   • Anxiety 04/13/2017   • Syncope 04/13/2017       Family History   Problem Relation Age of Onset   • Psychiatric Illness Mother    • Stroke Mother    • Arthritis Mother    • Heart Disease Maternal Grandfather    • Cancer Neg Hx    • Diabetes Neg Hx    • Hypertension Neg Hx        She  has a past medical history of Alcohol dependence (HCC) (4/13/2017), Bronchitis (8/2012), Cold, Heart burn, Hernia of unspecified site of abdominal cavity without mention of obstruction or gangrene, Indigestion, Pancreatitis, and Pneumonia (2011). She also has no past medical history of CAD (coronary artery disease), COPD, Liver disease, or Seizure disorder (HCC).  She  has a past surgical history that includes other orthopedic surgery; gyn surgery; tonsillectomy (4/11/2013); arthroscopy, knee; hysterectomy robotic xi (10/11/2018); salpingectomy (Bilateral, 10/11/2018); and wrist orif (Right, 4/24/2019).       Objective:   Vitals obtained by patient:  LMP 10/30/2018     Physical Exam:  Constitutional: Alert, no distress, well-groomed.  Skin: No rashes in visible areas.  Eye: Round. Conjunctiva clear, lids normal. No icterus.   ENMT: Lips pink without lesions, good dentition, moist mucous membranes. Phonation normal.  Neck: No masses, no thyromegaly. Moves freely without pain.  CV: Pulse as reported by patient  Respiratory: Unlabored respiratory effort, no cough or audible wheeze  Psych: Alert and oriented x3, normal affect and mood.       Assessment and Plan:   The following treatment plan was discussed:     1. Seizures (HCC)  - KEPPRA; Future  - REFERRAL TO NEUROLOGY  - MR-BRAIN-WITH; Future    2. Interstitial cystitis (chronic) without hematuria  - methocarbamol (ROBAXIN) 750 MG  Tab; Take 1 Tab by mouth 3 times a day.  Dispense: 90 Tab; Refill: 6  - REFERRAL TO PAIN CLINIC    3. Chronic pain syndrome  - REFERRAL TO PAIN CLINIC        Follow-up: 1.  New pain clinic referral  2.  Trial of Robaxin 750 mg 3 times daily for bladder spasms  3.  Neurology referral for review of seizure disorder  4.  Keppra serum level  5.  MRI of the brain with contrast to evaluate seizure disorder    Face to Face Video Visit:   I spent 20 minutes with patient/guardian and I conducted this visit with audio and video present.  Mamadou Leigh M.D.

## 2020-09-18 DIAGNOSIS — F41.9 ANXIETY: ICD-10-CM

## 2020-09-20 RX ORDER — CLONIDINE HYDROCHLORIDE 0.1 MG/1
TABLET ORAL
Qty: 60 TAB | Refills: 2 | Status: SHIPPED | OUTPATIENT
Start: 2020-09-20 | End: 2020-12-14 | Stop reason: SDUPTHER

## 2020-09-21 ENCOUNTER — TELEPHONE (OUTPATIENT)
Dept: MEDICAL GROUP | Facility: MEDICAL CENTER | Age: 35
End: 2020-09-21

## 2020-09-21 DIAGNOSIS — G40.419: ICD-10-CM

## 2020-10-07 ENCOUNTER — HOSPITAL ENCOUNTER (OUTPATIENT)
Facility: MEDICAL CENTER | Age: 35
End: 2020-10-07
Attending: UROLOGY
Payer: COMMERCIAL

## 2020-10-07 PROCEDURE — 87086 URINE CULTURE/COLONY COUNT: CPT

## 2020-10-07 PROCEDURE — 87077 CULTURE AEROBIC IDENTIFY: CPT

## 2020-10-07 PROCEDURE — 87186 SC STD MICRODIL/AGAR DIL: CPT

## 2020-10-08 LAB
AMBIGUOUS DTTM AMBI4: NORMAL
SIGNIFICANT IND 70042: NORMAL
SITE SITE: NORMAL
SOURCE SOURCE: NORMAL

## 2020-10-10 LAB
BACTERIA UR CULT: ABNORMAL
BACTERIA UR CULT: ABNORMAL
SIGNIFICANT IND 70042: ABNORMAL
SITE SITE: ABNORMAL
SOURCE SOURCE: ABNORMAL

## 2020-11-03 ENCOUNTER — PATIENT MESSAGE (OUTPATIENT)
Dept: MEDICAL GROUP | Facility: MEDICAL CENTER | Age: 35
End: 2020-11-03

## 2020-11-03 DIAGNOSIS — N30.10 INTERSTITIAL CYSTITIS (CHRONIC) WITHOUT HEMATURIA: ICD-10-CM

## 2020-11-04 ENCOUNTER — HOSPITAL ENCOUNTER (OUTPATIENT)
Dept: LAB | Facility: MEDICAL CENTER | Age: 35
End: 2020-11-04
Attending: FAMILY MEDICINE
Payer: COMMERCIAL

## 2020-11-04 ENCOUNTER — HOSPITAL ENCOUNTER (OUTPATIENT)
Dept: LAB | Facility: MEDICAL CENTER | Age: 35
End: 2020-11-04
Attending: UROLOGY
Payer: COMMERCIAL

## 2020-11-04 DIAGNOSIS — R56.9 SEIZURES (HCC): ICD-10-CM

## 2020-11-04 PROCEDURE — 87086 URINE CULTURE/COLONY COUNT: CPT

## 2020-11-04 PROCEDURE — 80177 DRUG SCRN QUAN LEVETIRACETAM: CPT

## 2020-11-04 PROCEDURE — 36415 COLL VENOUS BLD VENIPUNCTURE: CPT

## 2020-11-05 RX ORDER — OXYCODONE AND ACETAMINOPHEN 7.5; 325 MG/1; MG/1
1 TABLET ORAL EVERY 8 HOURS PRN
Qty: 90 TAB | Refills: 0 | Status: SHIPPED | OUTPATIENT
Start: 2020-11-05 | End: 2020-12-05

## 2020-11-06 LAB — LEVETIRACETAM SERPL-MCNC: 9 UG/ML (ref 12–46)

## 2020-11-07 LAB
BACTERIA UR CULT: NORMAL
SIGNIFICANT IND 70042: NORMAL
SITE SITE: NORMAL
SOURCE SOURCE: NORMAL

## 2020-11-17 ENCOUNTER — TELEPHONE (OUTPATIENT)
Dept: MEDICAL GROUP | Facility: MEDICAL CENTER | Age: 35
End: 2020-11-17

## 2020-11-17 NOTE — TELEPHONE ENCOUNTER
Phone Number Called: [phone] (home)       Call outcome: Left detailed message for patient. Informed to call back with any additional questions.    Message: Left vm for pt to call us back to go over results

## 2020-11-17 NOTE — TELEPHONE ENCOUNTER
----- Message from Mamadou Leigh M.D. sent at 11/14/2020  1:04 PM PST -----  Keppra level collected on November 6 did return low at 9 (range is 12-46).  Is patient currently taking 500 mg twice daily?

## 2020-12-14 DIAGNOSIS — F41.9 ANXIETY: ICD-10-CM

## 2020-12-14 RX ORDER — CLONIDINE HYDROCHLORIDE 0.1 MG/1
TABLET ORAL
Qty: 60 TAB | Refills: 2 | Status: SHIPPED | OUTPATIENT
Start: 2020-12-14 | End: 2021-06-10 | Stop reason: SDUPTHER

## 2020-12-15 ENCOUNTER — PATIENT MESSAGE (OUTPATIENT)
Dept: MEDICAL GROUP | Facility: MEDICAL CENTER | Age: 35
End: 2020-12-15

## 2020-12-16 ENCOUNTER — PATIENT MESSAGE (OUTPATIENT)
Dept: MEDICAL GROUP | Facility: MEDICAL CENTER | Age: 35
End: 2020-12-16

## 2020-12-16 DIAGNOSIS — F41.9 ANXIETY: ICD-10-CM

## 2020-12-17 ENCOUNTER — PATIENT MESSAGE (OUTPATIENT)
Dept: MEDICAL GROUP | Facility: MEDICAL CENTER | Age: 35
End: 2020-12-17

## 2020-12-17 DIAGNOSIS — F10.10 ALCOHOL ABUSE: ICD-10-CM

## 2020-12-17 RX ORDER — HYDROXYZINE HYDROCHLORIDE 25 MG/1
TABLET, FILM COATED ORAL
Qty: 60 TAB | Refills: 2 | Status: SHIPPED | OUTPATIENT
Start: 2020-12-17 | End: 2021-06-10 | Stop reason: SDUPTHER

## 2020-12-17 RX ORDER — NALTREXONE HYDROCHLORIDE 50 MG/1
50 TABLET, FILM COATED ORAL DAILY
Qty: 30 TAB | Refills: 6 | Status: SHIPPED | OUTPATIENT
Start: 2020-12-17 | End: 2021-11-29

## 2020-12-17 NOTE — TELEPHONE ENCOUNTER
From: Rhiannon Marrero  To: aMmadou Leigh M.D.  Sent: 12/16/2020 11:52 AM PST  Subject: Prescription Question    Thank you. Do you think a refill of hydroxizine and prescription of a nitro-to line would be possible? Just trying to think of anything that’s helped even a little. If you feel these would not be inline with my treatment then I will not ask again. Thank you for your time.    P.S. [facility] if approved      ----- Message -----   From:Mamadou Leigh M.D.   Sent:12/15/2020 5:19 PM PST   To:Rhiannon Batista Elida   Subject:RE: Prescription Question    Rhiannon, I think your strategy is the correct one, Dr. Hadley      ----- Message -----   From:Rhiannon Batista Elida   Sent:12/15/2020 1:51 PM PST   To:Mamadou Leigh M.D.   Subject:Prescription Question    Good afternoon, I hope that you are well. I just wanted to explain why I cancelled my appointment concerning the Percocet. When I was on it before my tolerance was very elevated so it didn’t really make me feel too altered. Going back to it this last time I realized a few days in that it was getting me quite high. I made the earliest appointment at the Suboxone clinic to change back. As you know I am a recovering alcoholic and the high feeling from the Percocet wasn’t good for my sobriety. I really appreciate your willing ness to help me give this another try. The pain is pretty rough without the pain killer, but nothing is really worth endangering my sobriety. Thank you again!

## 2020-12-18 NOTE — TELEPHONE ENCOUNTER
From: Rhiannon Marrero  To: Mamadou Leigh M.D.  Sent: 12/17/2020 5:13 PM PST  Subject: Prescription Question    Absolutely. Did the other things get called in? Just so I know if I can pick them.      ----- Message -----   From:Mamadou Leigh M.D.   Sent:12/17/2020 4:45 PM PST   To:Rhiannon Marrero   Subject:RE: Prescription Question    Vivitrol is once a month injected naltrexone.  You also can take naltrexone 50 mg by mouth on a daily basis. That drug tends to decrease cravings for alcohol. Do you think you would be compliant taking the pill on a daily basis.?      ----- Message -----   From:Rhiannon Sloano   Sent:12/17/2020 2:49 PM PST   To:Mamadou Leigh M.D.   Subject:Prescription Question    So sorry I should have checked the spelling it’s, Amitriptyline. We were using it in conjunction with the Percocet. If any of these are not appropriate for my treatment I will very much understand. The last I wanted to ask about was the use of vivitrol in recovering alcoholics to help when the craving hits or you feel your sobriety falling. I heard this from a girl at  though so I’m not sure if it’s something worth trying or not, but I figured you were the right person to ask. Thank you for your time. Sorry if I’m being irritating!      ----- Message -----   From:Mamadou Leigh M.D.   Sent:12/17/2020 8:42 AM PST   To:Rhiannon Sloano   Subject:RE: Prescription Question    Rhiannon, can easily refill the hydroxyzine. Can you retyped the name of the second drug you had a question about. I do not recognize what was written. Dr. Hadley      ----- Message -----   From:Rhiannonyennifer Beardengeovanni Marrero   Sent:12/16/2020 11:52 AM PST   To:Mamadou Leigh M.D.   Subject:Prescription Question    Thank you. Do you think a refill of hydroxizine and prescription of a nitro-to line would be possible? Just trying to think of anything that’s helped even a little. If you feel these would not be inline with my treatment then I will not  ask again. Thank you for your time.    [facility] if approved      ----- Message -----   From:Mamadou Leigh M.D.   Sent:12/15/2020 5:19 PM PST   To:Rhiannon Marrero   Subject:RE: Prescription Question    Rhiannon, I think your strategy is the correct one, Dr. Hadley      ----- Message -----   From:Rhiannon Marrero   Sent:12/15/2020 1:51 PM PST   To:Mamadou Leigh M.D.   Subject:Prescription Question    Good afternoon, I hope that you are well. I just wanted to explain why I cancelled my appointment concerning the Percocet. When I was on it before my tolerance was very elevated so it didn’t really make me feel too altered. Going back to it this last time I realized a few days in that it was getting me quite high. I made the earliest appointment at the Suboxone clinic to change back. As you know I am a recovering alcoholic and the high feeling from the Percocet wasn’t good for my sobriety. I really appreciate your willing ness to help me give this another try. The pain is pretty rough without the pain killer, but nothing is really worth endangering my sobriety. Thank you again!

## 2020-12-18 NOTE — TELEPHONE ENCOUNTER
From: Rhiannon Marrero  To: Mamadou Leigh M.D.  Sent: 12/17/2020 2:49 PM PST  Subject: Prescription Question    So sorry I should have checked the spelling it’s, Amitriptyline. We were using it in conjunction with the Percocet. If any of these are not appropriate for my treatment I will very much understand. The last I wanted to ask about was the use of vivitrol in recovering alcoholics to help when the craving hits or you feel your sobriety falling. I heard this from a girl at  though so I’m not sure if it’s something worth trying or not, but I figured you were the right person to ask. Thank you for your time. Sorry if I’m being irritating!      ----- Message -----   From:Mamadou Leigh M.D.   Sent:12/17/2020 8:42 AM PST   To:Rhiannon Marrero   Subject:RE: Prescription Question    Rhiannon, can easily refill the hydroxyzine. Can you retyped the name of the second drug you had a question about. I do not recognize what was written. Dr. Hadley      ----- Message -----   From:Rhiannon Marrero   Sent:12/16/2020 11:52 AM PST   To:Mamadou Leigh M.D.   Subject:Prescription Question    Thank you. Do you think a refill of hydroxizine and prescription of a nitro-to line would be possible? Just trying to think of anything that’s helped even a little. If you feel these would not be inline with my treatment then I will not ask again. Thank you for your time.    P.S. [facility] if approved      ----- Message -----   From:Mamadou Leigh M.D.   Sent:12/15/2020 5:19 PM PST   To:Rhiannon Marrero   Subject:RE: Prescription Question    Rhiannon, I think your strategy is the correct one, Dr. Hadley      ----- Message -----   From:Rhiannon Marrero   Sent:12/15/2020 1:51 PM PST   To:Mamadou Leigh M.D.   Subject:Prescription Question    Good afternoon, I hope that you are well. I just wanted to explain why I cancelled my appointment concerning the Percocet. When I was on it before my tolerance was very elevated so it  didn’t really make me feel too altered. Going back to it this last time I realized a few days in that it was getting me quite high. I made the earliest appointment at the Suboxone clinic to change back. As you know I am a recovering alcoholic and the high feeling from the Percocet wasn’t good for my sobriety. I really appreciate your willing ness to help me give this another try. The pain is pretty rough without the pain killer, but nothing is really worth endangering my sobriety. Thank you again!

## 2020-12-21 ENCOUNTER — PATIENT MESSAGE (OUTPATIENT)
Dept: MEDICAL GROUP | Facility: MEDICAL CENTER | Age: 35
End: 2020-12-21

## 2020-12-21 DIAGNOSIS — N30.10 INTERSTITIAL CYSTITIS (CHRONIC) WITHOUT HEMATURIA: ICD-10-CM

## 2020-12-22 RX ORDER — AMITRIPTYLINE HYDROCHLORIDE 10 MG/1
10 TABLET, FILM COATED ORAL
Qty: 30 TAB | Refills: 6 | Status: SHIPPED | OUTPATIENT
Start: 2020-12-22 | End: 2021-11-29

## 2020-12-22 NOTE — TELEPHONE ENCOUNTER
From: Rhiannon Marrero  To: Mamadou Leigh M.D.  Sent: 12/21/2020 9:38 PM PST  Subject: Prescription Question    Thank you! Is it possible to do the amnitriptolene? Or if you can think of anything else that will help the pain I would be so grateful. The burning, the cramping, the UTI feelings, on top of the difficulty urinating and yet also experiencing increasing episodes of incontinence... it’s overwhelming. I am seeing a psychiatrist but there is not much that can be done about this until I get surgery. I will do whatever you think will help at this point, change up my muscle relaxer... anything. I am taking Ibuprofen and Tylenol. The Suboxone doesn’t cover like the meds do, but the Percocet just isn’t worth it. Especially when my anxiety is high and my depression has me low. Sorry I know I babble when feeling nervous. Thank you in advance.      ----- Message -----   From:Mamadou Leigh M.D.   Sent:12/21/2020 5:58 PM PST   To:Rhiannon Marrero   Subject:RE: Prescription Question    Rhiannon, your absolutely right. The naltrexone would completely blunt the effect of any narcotic that you are taking including the Suboxone.      ----- Message -----   From:Rhiannon Marrero   Sent:12/17/2020 11:16 PM PST   To:Mamadou Leigh M.D.   Subject:Prescription Question    Hi again. So it turns out that vivitrol should not be used with Suboxone. I’m sorry I wouldn’t have asked for it if I had known. I’m not sure if you have called it in or not yet, but if not feel free to cancel it. If possible instead can we just do the amnitriptoline and steer clear of medications I hear from AA friends. So sorry, and thank you so much!      ----- Message -----   From:Mamadou Leigh M.D.   Sent:12/17/2020 5:24 PM PST   To:Rhiannon Marrero   Subject:RE: Prescription Question    Rhiannon, the hydroxyzine has been called in and I will call in the naltrexone. I would hold off on adding the amitriptyline and on top of that so were not  using too much medication. I would like to have a visit with you in 4 weeks to monitor your progress. Dr. Hadley      ----- Message -----   From:Rhiannon Marrero   Sent:12/17/2020 5:13 PM PST   To:Mamadou Leigh M.D.   Subject:Prescription Question    Absolutely. Did the other things get called in? Just so I know if I can pick them.      ----- Message -----   From:Mamadou Leigh M.D.   Sent:12/17/2020 4:45 PM PST   To:Rhiannon Marrero   Subject:RE: Prescription Question    Vivitrol is once a month injected naltrexone. You also can take naltrexone 50 mg by mouth on a daily basis. That drug tends to decrease cravings for alcohol. Do you think you would be compliant taking the pill on a daily basis.?      ----- Message -----   From:Rhiannon Marrero   Sent:12/17/2020 2:49 PM PST   To:Mamadou Leigh M.D.   Subject:Prescription Question    So sorry I should have checked the spelling it’s, Amitriptyline. We were using it in conjunction with the Percocet. If any of these are not appropriate for my treatment I will very much understand. The last I wanted to ask about was the use of vivitrol in recovering alcoholics to help when the craving hits or you feel your sobriety falling. I heard this from a girl at  though so I’m not sure if it’s something worth trying or not, but I figured you were the right person to ask. Thank you for your time. Sorry if I’m being irritating!      ----- Message -----   From:Mamadou Leigh M.D.   Sent:12/17/2020 8:42 AM PST   To:Rhiannon Marrero   Subject:RE: Prescription Question    Rhiannon, can easily refill the hydroxyzine. Can you retyped the name of the second drug you had a question about. I do not recognize what was written. Dr. Hadley      ----- Message -----   From:Rhiannon Marrero   Sent:12/16/2020 11:52 AM PST   To:Mamadou Leigh M.D.   Subject:Prescription Question    Thank you. Do you think a refill of hydroxizine and prescription of a nitro-to line would be possible?  Just trying to think of anything that’s helped even a little. If you feel these would not be inline with my treatment then I will not ask again. Thank you for your time.    .[facility] if approved      ----- Message -----   From:Mamadou Leigh M.D.   Sent:12/15/2020 5:19 PM PST   To:Rhiannon Marrero   Subject:RE: Prescription Question    Rhiannon, I think your strategy is the correct one, Dr. Hadley      ----- Message -----   From:Rhiannon Marrero   Sent:12/15/2020 1:51 PM PST   To:Mamadou Leigh M.D.   Subject:Prescription Question    Good afternoon, I hope that you are well. I just wanted to explain why I cancelled my appointment concerning the Percocet. When I was on it before my tolerance was very elevated so it didn’t really make me feel too altered. Going back to it this last time I realized a few days in that it was getting me quite high. I made the earliest appointment at the Suboxone clinic to change back. As you know I am a recovering alcoholic and the high feeling from the Percocet wasn’t good for my sobriety. I really appreciate your willing ness to help me give this another try. The pain is pretty rough without the pain killer, but nothing is really worth endangering my sobriety. Thank you again!

## 2020-12-22 NOTE — TELEPHONE ENCOUNTER
From: Rhiannon Marrero  To: Mamadou Leigh M.D.  Sent: 12/17/2020 11:16 PM PST  Subject: Prescription Question    Hi again. So it turns out that vivitrol should not be used with Suboxone. I’m sorry I wouldn’t have asked for it if I had known. I’m not sure if you have called it in or not yet, but if not feel free to cancel it. If possible instead can we just do the amnitriptoline and steer clear of medications I hear from AA friends. So sorry, and thank you so much!      ----- Message -----   From:Mamadou Leigh M.D.   Sent:12/17/2020 5:24 PM PST   To:Rhiannon Pennosman Marrero   Subject:RE: Prescription Question    Rhiannon, the hydroxyzine has been called in and I will call in the naltrexone. I would hold off on adding the amitriptyline and on top of that so were not using too much medication. I would like to have a visit with you in 4 weeks to monitor your progress. Dr. Hadley      ----- Message -----   From:Rhiannon Marrero   Sent:12/17/2020 5:13 PM PST   To:Mamadou Leigh M.D.   Subject:Prescription Question    Absolutely. Did the other things get called in? Just so I know if I can pick them.      ----- Message -----   From:Mamadou Leigh M.D.   Sent:12/17/2020 4:45 PM PST   To:Rhiannon Marrero   Subject:RE: Prescription Question    Vivitrol is once a month injected naltrexone. You also can take naltrexone 50 mg by mouth on a daily basis. That drug tends to decrease cravings for alcohol. Do you think you would be compliant taking the pill on a daily basis.?      ----- Message -----   From:Rhiannon Marrero   Sent:12/17/2020 2:49 PM PST   To:Mamadou Leigh M.D.   Subject:Prescription Question    So sorry I should have checked the spelling it’s, Amitriptyline. We were using it in conjunction with the Percocet. If any of these are not appropriate for my treatment I will very much understand. The last I wanted to ask about was the use of vivitrol in recovering alcoholics to help when the craving hits or you  feel your sobriety falling. I heard this from a girl at  though so I’m not sure if it’s something worth trying or not, but I figured you were the right person to ask. Thank you for your time. Sorry if I’m being irritating!      ----- Message -----   From:Mamadou Leigh M.D.   Sent:12/17/2020 8:42 AM PST   To:Rhiannon Marrero   Subject:RE: Prescription Question    Rhiannon, can easily refill the hydroxyzine. Can you retyped the name of the second drug you had a question about. I do not recognize what was written. Dr. Hadley      ----- Message -----   From:Rhiannon Marrero   Sent:12/16/2020 11:52 AM PST   To:Mamadou Leigh M.D.   Subject:Prescription Question    Thank you. Do you think a refill of hydroxizine and prescription of a nitro-to line would be possible? Just trying to think of anything that’s helped even a little. If you feel these would not be inline with my treatment then I will not ask again. Thank you for your time.    P.S. [facility] if approved      ----- Message -----   From:Mamadou Leigh M.D.   Sent:12/15/2020 5:19 PM PST   To:Rhiannon Marrero   Subject:RE: Prescription Question    Rhiannon, I think your strategy is the correct one, Dr. Hadley      ----- Message -----   From:Rhinanon Marrero   Sent:12/15/2020 1:51 PM PST   To:Mamadou Leigh M.D.   Subject:Prescription Question    Good afternoon, I hope that you are well. I just wanted to explain why I cancelled my appointment concerning the Percocet. When I was on it before my tolerance was very elevated so it didn’t really make me feel too altered. Going back to it this last time I realized a few days in that it was getting me quite high. I made the earliest appointment at the Suboxone clinic to change back. As you know I am a recovering alcoholic and the high feeling from the Percocet wasn’t good for my sobriety. I really appreciate your willing ness to help me give this another try. The pain is pretty rough without the pain killer,  but nothing is really worth endangering my sobriety. Thank you again!

## 2020-12-24 ENCOUNTER — TELEPHONE (OUTPATIENT)
Dept: MEDICAL GROUP | Facility: MEDICAL CENTER | Age: 35
End: 2020-12-24

## 2021-01-18 ENCOUNTER — OFFICE VISIT (OUTPATIENT)
Dept: MEDICAL GROUP | Facility: MEDICAL CENTER | Age: 36
End: 2021-01-18
Attending: FAMILY MEDICINE
Payer: COMMERCIAL

## 2021-01-18 VITALS
BODY MASS INDEX: 26.52 KG/M2 | SYSTOLIC BLOOD PRESSURE: 102 MMHG | OXYGEN SATURATION: 96 % | WEIGHT: 165 LBS | HEIGHT: 66 IN | TEMPERATURE: 97.9 F | HEART RATE: 92 BPM | DIASTOLIC BLOOD PRESSURE: 66 MMHG | RESPIRATION RATE: 16 BRPM

## 2021-01-18 DIAGNOSIS — N30.10 INTERSTITIAL CYSTITIS (CHRONIC) WITHOUT HEMATURIA: ICD-10-CM

## 2021-01-18 DIAGNOSIS — R56.9 SEIZURES (HCC): ICD-10-CM

## 2021-01-18 PROCEDURE — 99213 OFFICE O/P EST LOW 20 MIN: CPT | Performed by: FAMILY MEDICINE

## 2021-01-18 PROCEDURE — 99214 OFFICE O/P EST MOD 30 MIN: CPT | Performed by: FAMILY MEDICINE

## 2021-01-18 RX ORDER — CARISOPRODOL 350 MG/1
350 TABLET ORAL 2 TIMES DAILY PRN
Qty: 60 TAB | Refills: 0 | Status: SHIPPED | OUTPATIENT
Start: 2021-01-18 | End: 2021-02-17

## 2021-01-18 RX ORDER — LEVETIRACETAM 1000 MG/1
1000 TABLET ORAL 2 TIMES DAILY
Qty: 60 TAB | Refills: 1 | Status: SHIPPED | OUTPATIENT
Start: 2021-01-18 | End: 2022-07-08 | Stop reason: SDUPTHER

## 2021-01-18 ASSESSMENT — FIBROSIS 4 INDEX: FIB4 SCORE: 0.47

## 2021-01-19 NOTE — PROGRESS NOTES
"Subjective:      Rhiannon Marrero is a 35 y.o. female who presents with Follow-Up            HPI 1.  Breakthrough seizure-patient reports she had a tonic-clonic seizure on 1/2/2021.  Previous seizure had been in October 2020.  She had been taking Keppra 750 mg twice a day.  Preceding her recent seizure she had gone about 19 hours from her previous dose of Keppra.  She also had a Keppra blood level collected on 11/4/2020 which was diminished at 9.  Patient has been formally taking at 1000 mg of Keppra twice daily since January 2/seizure.  She has not had any further seizure activity.  She is followed by Dr. Ashley, [facility] neurology and sees him in 1 week.  Original onset of her seizure disorder was December 2019.  2.  Interstitial cystitis-patient reports that she has taken herself off Percocet because she felt that it was making her feel \"high\", and she is trying to avoid that feeling and behavior.  She is now tapering down off Suboxone decreasing from 24 mg recently to 4 mg currently.  She has failed Flexeril, Robaxin, Zanaflex tried for bladder spasms and pelvic pain.  She wonders if Soma might be helpful.  She has never used this medication in the past.  Patient does remain currently on Elmiron.  Denies any current hematuria  ROS negative for altered gait, positive for mild disfluency in speaking recently per patient.  Negative for tremor, double vision  Social history-single, lives with her boyfriend, not working  Current medication-  Prior to Admission medications    Medication Sig Start Date End Date Taking? Authorizing Provider   levetiracetam (KEPPRA) 1000 MG tablet Take 1 Tab by mouth 2 Times a Day. 1/18/21  Yes Mamadou Leigh M.D.   carisoprodol (SOMA) 350 MG Tab Take 1 Tab by mouth 2 times a day as needed for Muscle Spasms for up to 30 days. 1/18/21 2/17/21 Yes Mamadou Leigh M.D.   amitriptyline (ELAVIL) 10 MG Tab Take 1 Tab by mouth every bedtime. 12/22/20   Mamadou Leigh M.D. " "  hydrOXYzine HCl (ATARAX) 25 MG Tab TAKE 1 TABLET BY MOUTH THREE TIMES DAILY AS NEEDED FOR ITCHING 12/17/20   Mamadou Leigh M.D.   naltrexone (DEPADE) 50 MG Tab Take 1 Tab by mouth every day. 12/17/20   Mamadou Leigh M.D.   cloNIDine (CATAPRES) 0.1 MG Tab TAKE 1 TABLET BY MOUTH TWICE DAILY 12/14/20   Mamadou Leigh M.D.   methocarbamol (ROBAXIN) 750 MG Tab Take 1 Tab by mouth 3 times a day. 9/14/20   Mamadou Leigh M.D.   ELMIRON 100 MG Cap TAKE 1 CAPSULE BY MOUTH THREE TIMES DAILY 8/18/20   Lauren Can P.A.-C.   traZODone (DESYREL) 50 MG Tab TAKE 1 TABLET BY MOUTH EVERY NIGHT AT BEDTIME AS NEEDED FOR SLEEP 8/3/20   Mamadou Leigh M.D.   zolpidem (AMBIEN) 10 MG Tab Take 10 mg by mouth every bedtime.    Physician Outpatient   buPROPion (WELLBUTRIN SR) 200 MG SR tablet Take 200 mg by mouth 2 times a day.    Physician Outpatient   LORazepam (ATIVAN) 1 MG Tab Take 1 mg by mouth 1 time daily as needed.    Physician Outpatient                           gabapentin (NEURONTIN) 600 MG tablet Take 1 Tab by mouth 3 times a day. 11/26/19   Mamadou Leigh M.D.          Objective:     /66 (BP Location: Left arm, Patient Position: Sitting, BP Cuff Size: Adult)   Pulse 92   Temp 36.6 °C (97.9 °F) (Temporal)   Resp 16   Ht 1.676 m (5' 5.98\")   Wt 74.8 kg (165 lb)   LMP 10/30/2018   SpO2 96%   BMI 26.64 kg/m²      Physical Exam  Gen.- alert, cooperative, in no acute distress  Neck- midline trachea, thyroid not enlarged or tender,supple, no cervical adenopathy  Chest-clear to auscultation and percussion with normal breath sounds. No retractions. Chest wall nontender  Cardiac- regular rhythm and rate. No murmur, thrill, or heave  Abdomen-normal contour, 1+ tender in the suprapubic midline area to palpation.  No palpable masses.  Normal bowel sounds    Psych-normal affect with good eye contact. Normal grooming. Oriented x4.         Assessment/Plan:        1. Seizures (HCC)    - levetiracetam " (KEPPRA) 1000 MG tablet; Take 1 Tab by mouth 2 Times a Day.  Dispense: 60 Tab; Refill: 1  - carisoprodol (SOMA) 350 MG Tab; Take 1 Tab by mouth 2 times a day as needed for Muscle Spasms for up to 30 days.  Dispense: 60 Tab; Refill: 0    2. Interstitial cystitis (chronic) without hematuria    Plan: 1.  Keppra prescription increased to 1000 mg twice daily until she consults with her neurologist next week  2.  Trial of Soma 350 mg once or twice daily  3.  Revisit in 1 month

## 2021-02-04 ENCOUNTER — PATIENT MESSAGE (OUTPATIENT)
Dept: MEDICAL GROUP | Facility: MEDICAL CENTER | Age: 36
End: 2021-02-04

## 2021-02-04 DIAGNOSIS — M62.838 MUSCLE SPASM: ICD-10-CM

## 2021-02-04 RX ORDER — CARISOPRODOL 350 MG/1
350 TABLET ORAL EVERY 8 HOURS PRN
Qty: 90 TAB | Refills: 0 | Status: SHIPPED | OUTPATIENT
Start: 2021-02-04 | End: 2021-03-06

## 2021-02-05 ENCOUNTER — TELEPHONE (OUTPATIENT)
Dept: MEDICAL GROUP | Facility: MEDICAL CENTER | Age: 36
End: 2021-02-05

## 2021-02-05 NOTE — TELEPHONE ENCOUNTER
DOCUMENTATION OF PAR STATUS:    1. Name of Medication & Dose: CRISOPRADOL     2. Name of Prescription Coverage Company & phone #: [email or web address]    3. Date Prior Auth Submitted: 2/5/2021    4. What information was given to obtain insurance decision? [id number]    5. Prior Auth Status? Pending    6. Patient Notified: N\A

## 2021-02-13 NOTE — TELEPHONE ENCOUNTER
PA denied: Member must try drug alternatives for a specific timeframe per criteria number [2,3]: baclofen, cyclobenzaprine, tizanidine, methocarbamol.

## 2021-02-19 ENCOUNTER — TELEPHONE (OUTPATIENT)
Dept: MEDICAL GROUP | Facility: MEDICAL CENTER | Age: 36
End: 2021-02-19

## 2021-02-19 NOTE — TELEPHONE ENCOUNTER
DOCUMENTATION OF PAR STATUS:    1. Name of Medication & Dose: carisoprodol 350 mg     2. Name of Prescription Coverage Company & phone #: [employer]    3. Date Prior Auth Submitted: 02/19/2021    4. What information was given to obtain insurance decision? Chart notes, dx codes, med history    5. Prior Auth Status? Pending    6. Patient Notified: yes

## 2021-02-23 ENCOUNTER — OFFICE VISIT (OUTPATIENT)
Dept: MEDICAL GROUP | Facility: MEDICAL CENTER | Age: 36
End: 2021-02-23
Attending: FAMILY MEDICINE
Payer: COMMERCIAL

## 2021-02-23 VITALS
OXYGEN SATURATION: 98 % | SYSTOLIC BLOOD PRESSURE: 108 MMHG | HEART RATE: 105 BPM | WEIGHT: 164.1 LBS | BODY MASS INDEX: 26.37 KG/M2 | RESPIRATION RATE: 24 BRPM | TEMPERATURE: 97.2 F | HEIGHT: 66 IN | DIASTOLIC BLOOD PRESSURE: 72 MMHG

## 2021-02-23 DIAGNOSIS — R56.9 SEIZURES (HCC): ICD-10-CM

## 2021-02-23 DIAGNOSIS — N30.10 INTERSTITIAL CYSTITIS: ICD-10-CM

## 2021-02-23 PROCEDURE — 99213 OFFICE O/P EST LOW 20 MIN: CPT | Performed by: FAMILY MEDICINE

## 2021-02-23 PROCEDURE — 99214 OFFICE O/P EST MOD 30 MIN: CPT | Performed by: FAMILY MEDICINE

## 2021-02-23 RX ORDER — HYDROXYZINE PAMOATE 25 MG/1
CAPSULE ORAL
COMMUNITY
End: 2022-05-23

## 2021-02-23 RX ORDER — AMITRIPTYLINE HYDROCHLORIDE 25 MG/1
TABLET, FILM COATED ORAL
COMMUNITY
End: 2022-05-23

## 2021-02-23 RX ORDER — ESCITALOPRAM OXALATE 20 MG/1
TABLET ORAL
COMMUNITY
Start: 2021-02-19 | End: 2021-09-16

## 2021-02-23 RX ORDER — BUPRENORPHINE AND NALOXONE 8; 2 MG/1; MG/1
FILM, SOLUBLE BUCCAL; SUBLINGUAL
COMMUNITY
Start: 2020-12-29 | End: 2021-02-23

## 2021-02-23 RX ORDER — CARIPRAZINE 4.5 MG/1
CAPSULE, GELATIN COATED ORAL
COMMUNITY
End: 2022-05-23

## 2021-02-23 RX ORDER — CETIRIZINE HYDROCHLORIDE 10 MG/1
TABLET ORAL
COMMUNITY
End: 2022-05-23

## 2021-02-23 RX ORDER — LEVETIRACETAM 500 MG/5ML
INJECTION, SOLUTION, CONCENTRATE INTRAVENOUS
COMMUNITY
End: 2021-03-23

## 2021-02-23 RX ORDER — SULFAMETHOXAZOLE AND TRIMETHOPRIM 800; 160 MG/1; MG/1
TABLET ORAL
COMMUNITY
End: 2021-03-23

## 2021-02-23 RX ORDER — OXYCODONE AND ACETAMINOPHEN 7.5; 325 MG/1; MG/1
1 TABLET ORAL EVERY 6 HOURS PRN
Qty: 120 TABLET | Refills: 0 | Status: SHIPPED | OUTPATIENT
Start: 2021-02-23 | End: 2021-03-23 | Stop reason: SDUPTHER

## 2021-02-23 RX ORDER — SODIUM CHLORIDE, SODIUM LACTATE, POTASSIUM CHLORIDE, CALCIUM CHLORIDE 600; 310; 30; 20 MG/100ML; MG/100ML; MG/100ML; MG/100ML
INJECTION, SOLUTION INTRAVENOUS
COMMUNITY
End: 2021-03-23

## 2021-02-23 RX ORDER — CLONIDINE HYDROCHLORIDE 0.2 MG/1
TABLET ORAL
COMMUNITY
Start: 2021-02-22 | End: 2022-05-23

## 2021-02-23 RX ORDER — BUPROPION HYDROCHLORIDE 100 MG/1
TABLET, EXTENDED RELEASE ORAL
COMMUNITY
End: 2021-09-16

## 2021-02-23 RX ORDER — AZITHROMYCIN 250 MG/1
TABLET, FILM COATED ORAL
COMMUNITY
End: 2021-02-23

## 2021-02-23 RX ORDER — KETOROLAC TROMETHAMINE 30 MG/ML
INJECTION, SOLUTION INTRAMUSCULAR; INTRAVENOUS
COMMUNITY
End: 2021-09-16

## 2021-02-23 RX ORDER — MORPHINE SULFATE 4 MG/ML
INJECTION, SOLUTION INTRAMUSCULAR; INTRAVENOUS
COMMUNITY
End: 2021-03-23

## 2021-02-23 RX ORDER — CLOSTRIDIUM TETANI TOXOID ANTIGEN (FORMALDEHYDE INACTIVATED), CORYNEBACTERIUM DIPHTHERIAE TOXOID ANTIGEN (FORMALDEHYDE INACTIVATED), BORDETELLA PERTUSSIS TOXOID ANTIGEN (GLUTARALDEHYDE INACTIVATED), BORDETELLA PERTUSSIS FILAMENTOUS HEMAGGLUTININ ANTIGEN (FORMALDEHYDE INACTIVATED), BORDETELLA PERTUSSIS PERTACTIN ANTIGEN, AND BORDETELLA PERTUSSIS FIMBRIAE 2/3 ANTIGEN 5; 2; 2.5; 5; 3; 5 [LF]/.5ML; [LF]/.5ML; UG/.5ML; UG/.5ML; UG/.5ML; UG/.5ML
INJECTION, SUSPENSION INTRAMUSCULAR
COMMUNITY
End: 2021-03-23

## 2021-02-23 RX ORDER — LEVETIRACETAM 500 MG/1
500 TABLET ORAL
COMMUNITY
Start: 2021-01-25 | End: 2022-05-23

## 2021-02-23 ASSESSMENT — FIBROSIS 4 INDEX: FIB4 SCORE: 0.47

## 2021-02-24 NOTE — PROGRESS NOTES
Subjective:      Rhiannon Marrero is a 35 y.o. female who presents with Follow-Up            HPI 1.  Interstitial cystitis-patient reports that she is experiencing severe anterior pelvic pain associated with her IC.  She would like to restart Percocet 7.5 mg 4 times daily.  She has been tapered off Suboxone which was not very effective for her pelvic pain.  She also has tried Soma along with Robaxin, tizanidine, and Flexeril for the spasm component of pain in the pelvic area.  Flexeril was perhaps the most effective but still only mildly beneficial.  She is being followed and treated by urology as well.  They are petitioning her insurance to allow installation of a steroid/lidocaine solution inside her bladder but so far that has been denied.  Patient has used Percocet carefully without lost prescriptions or any evidence of abuse when she was on that medication last year.  Not reporting any fevers, gross hematuria  2.  Seizure disorder-patient was asked by Dr. Ashley, her neurologist to switch from 1000 mg of Keppra twice daily to 5 mg 3 times daily.  This was about 1 month ago and within about 5 to 6 days she reported she had another generalized seizure.  She returned back to 1000 mg of Keppra twice daily and has been seizure-free subsequently.  She denies any severe sedation, nausea or other side effects on the Keppra.  She does have a brain MRI scheduled for tomorrow with a follow-up visit with neurology in several weeks.  ROS negative for diarrhea, nausea, dysphagia, chest pain  Social history-single, not working  Current medication-  Prior to Admission medications    Medication Sig Start Date End Date Taking? Authorizing Provider   Cariprazine HCl (VRAYLAR) 4.5 MG Cap Vraylar 4.5 mg capsule   Yes Physician Outpatient   cetirizine (ZYRTEC) 10 MG Tab cetirizine 10 mg tablet   Yes Physician Outpatient   cloNIDine (CATAPRES) 0.2 MG Tab  2/22/21  Yes Physician Outpatient   escitalopram (LEXAPRO) 20 MG tablet TAKE 1  TABLET BY MOUTH ONCE A DAY START ON THE MORNING OF 12/30/20 2/19/21  Yes Physician Outpatient   hydrOXYzine pamoate (VISTARIL) 25 MG Cap hydroxyzine pamoate 25 mg capsule   Yes Physician Outpatient   ketorolac (TORADOL) 30 MG/ML Solution ketorolac 30 mg/mL (1 mL) injection solution   Yes Physician Outpatient   Lactated Ringers (LR) Solution lactated Ringers intravenous solution   Yes Physician Outpatient   levETIRAcetam (KEPPRA) 500 MG Tab Take 500 mg by mouth. 1/25/21  Yes Physician Outpatient   morphine 4 MG/ML injection morphine 4 mg/mL intravenous solution   Yes Physician Outpatient   sulfamethoxazole-trimethoprim (BACTRIM DS) 800-160 MG tablet sulfamethoxazole 800 mg-trimethoprim 160 mg tablet   Yes Physician Outpatient   tetanus-dipth-acell pertussis (ADACEL) 5-2-15.5 LF-MCG/0.5 Suspension Adacel (Tdap Adolesn/Adult)(PF)2 Lf-(2.5-5-3-5)-5 Lf/0.5 mL IM syringe   Yes Physician Outpatient   amitriptyline (ELAVIL) 25 MG Tab amitriptyline 25 mg tablet   Yes Physician Outpatient   buPROPion SR (WELLBUTRIN-SR) 100 MG TABLET SR 12 HR bupropion HCl  mg tablet,12 hr sustained-release   Yes Physician Outpatient   levETIRAcetam (KEPPRA) 500 MG/5ML Solution levetiracetam 500 mg/5 mL intravenous solution   Yes Physician Outpatient   pentosan (ELMIRON) 100 MG Cap Elmiron 100 mg capsule   Yes Physician Outpatient   oxyCODONE-acetaminophen (PERCOCET) 7.5-325 MG per tablet Take 1 tablet by mouth every 6 hours as needed for up to 30 days. 2/23/21 3/25/21 Yes Mamadou Leigh M.D.   levetiracetam (KEPPRA) 1000 MG tablet Take 1 Tab by mouth 2 Times a Day. 1/18/21  Yes Mamadou Leigh M.D.   amitriptyline (ELAVIL) 10 MG Tab Take 1 Tab by mouth every bedtime. 12/22/20  Yes Mamadou Leigh M.D.   hydrOXYzine HCl (ATARAX) 25 MG Tab TAKE 1 TABLET BY MOUTH THREE TIMES DAILY AS NEEDED FOR ITCHING 12/17/20  Yes Mamadou Leigh M.D.   naltrexone (DEPADE) 50 MG Tab Take 1 Tab by mouth every day. 12/17/20  Yes Mamadou SOLO  "MERI Leigh   cloNIDine (CATAPRES) 0.1 MG Tab TAKE 1 TABLET BY MOUTH TWICE DAILY 12/14/20  Yes Mamadou Leigh M.D.   ELMIRON 100 MG Cap TAKE 1 CAPSULE BY MOUTH THREE TIMES DAILY 8/18/20  Yes Lauren Can P.A.-C.   traZODone (DESYREL) 50 MG Tab TAKE 1 TABLET BY MOUTH EVERY NIGHT AT BEDTIME AS NEEDED FOR SLEEP 8/3/20  Yes Mamadou eLigh M.D.   zolpidem (AMBIEN) 10 MG Tab Take 10 mg by mouth every bedtime.   Yes Physician Outpatient   buPROPion (WELLBUTRIN SR) 200 MG SR tablet Take 200 mg by mouth 2 times a day.   Yes Physician Outpatient   LORazepam (ATIVAN) 1 MG Tab Take 1 mg by mouth 1 time daily as needed.   Yes Physician Outpatient   carisoprodol (SOMA) 350 MG Tab Take 1 Tab by mouth every 8 hours as needed for Muscle Spasms for up to 30 days.  Patient not taking: Reported on 2/23/2021 2/4/21 3/6/21  Mamadou Leigh M.D.          Objective:     /72 (BP Location: Left arm, Patient Position: Sitting, BP Cuff Size: Adult)   Pulse (!) 105   Temp 36.2 °C (97.2 °F) (Temporal)   Resp (!) 24   Ht 1.676 m (5' 6\")   Wt 74.4 kg (164 lb 1.6 oz)   LMP 10/30/2018   SpO2 98%   BMI 26.49 kg/m²      Physical Exam  Gen.- alert, cooperative, in no acute distress  Neck- midline trachea, thyroid not enlarged or tender,supple, no cervical adenopathy  Chest-clear to auscultation and percussion with normal breath sounds. No retractions. Chest wall nontender  Cardiac- regular rhythm and rate. No murmur, thrill, or heave  Abdomen- normal bowel sounds, soft without guarding. Liver and spleen not enlarged, no palpable masses.  1+ suprapubic tenderness in the midline    Psych-normal affect with good eye contact. Normal grooming. Oriented x4.         Assessment/Plan:        1. Interstitial cystitis    - oxyCODONE-acetaminophen (PERCOCET) 7.5-325 MG per tablet; Take 1 tablet by mouth every 6 hours as needed for up to 30 days.  Dispense: 120 tablet; Refill: 0    2. Seizures (HCC)    - KEPPRA; Future  - REFERRAL TO " NEUROLOGY  Plan: 1.  Obtain serum Keppra level on 1000 mg twice daily  2.  Patient requests new neurology provider referral  3.  Restart Percocet 7.5 mg, 4 times daily due to lack of effect with other treatments outlined above  4.  Continue urology evaluation and treatment  5.  Revisit 1 month

## 2021-03-01 ENCOUNTER — PATIENT MESSAGE (OUTPATIENT)
Dept: MEDICAL GROUP | Facility: MEDICAL CENTER | Age: 36
End: 2021-03-01

## 2021-03-01 DIAGNOSIS — K04.7 DENTAL ABSCESS: ICD-10-CM

## 2021-03-01 RX ORDER — OXYCODONE HYDROCHLORIDE AND ACETAMINOPHEN 5; 325 MG/1; MG/1
1 TABLET ORAL EVERY 8 HOURS PRN
Qty: 21 TABLET | Refills: 0 | Status: SHIPPED | OUTPATIENT
Start: 2021-03-01 | End: 2021-03-08

## 2021-03-08 ENCOUNTER — PATIENT MESSAGE (OUTPATIENT)
Dept: MEDICAL GROUP | Facility: MEDICAL CENTER | Age: 36
End: 2021-03-08

## 2021-03-08 DIAGNOSIS — K04.7 DENTAL ABSCESS: ICD-10-CM

## 2021-03-15 ENCOUNTER — PATIENT MESSAGE (OUTPATIENT)
Dept: MEDICAL GROUP | Facility: MEDICAL CENTER | Age: 36
End: 2021-03-15

## 2021-03-15 DIAGNOSIS — K04.7 DENTAL ABSCESS: ICD-10-CM

## 2021-03-15 RX ORDER — OXYCODONE HYDROCHLORIDE AND ACETAMINOPHEN 5; 325 MG/1; MG/1
1 TABLET ORAL EVERY 8 HOURS PRN
Qty: 21 TABLET | Refills: 0 | Status: SHIPPED | OUTPATIENT
Start: 2021-03-15 | End: 2021-03-22

## 2021-03-15 RX ORDER — OXYCODONE HYDROCHLORIDE AND ACETAMINOPHEN 5; 325 MG/1; MG/1
1 TABLET ORAL EVERY 8 HOURS PRN
Qty: 21 TABLET | Refills: 0 | Status: SHIPPED | OUTPATIENT
Start: 2021-03-15 | End: 2021-03-15 | Stop reason: SDUPTHER

## 2021-03-15 NOTE — TELEPHONE ENCOUNTER
From: Rhiannon Marrero  To: Physician Mamadou Leigh  Sent: 3/8/2021 8:43 PM PST  Subject: Prescription Question    Part One of two: Sorry to bother you sir but I just wanted to update you. I was able to finally get into a dentist and be seen; however, he will not be able to do any work on my teeth until 3/26. He is continuing to treat both dental abscesses with antibiotics, and it is going on two weeks now. He said because I get pain medicine from my pcp that I need to continue to do so. I have attached the dentists plan, that includes pulling 5 teeth on the next visit, then the rest of my teeth on the the following visit. I hate to ask this, but I would really appreciate some additional pain medication. I have had to take more then was prescribed due to the pain cont'd      ----- Message -----   From:Physician Mamadou Leigh    Sent:3/1/2021 5:17 PM PST   To:Rhiannon Marrero   Subject:RE: Prescription Question    Rhiannon, an additional prescription of Percocet will likely have to be purchased privately, since I doubt that your insurance will cover it because of their current coverage for the 7.5 mg dose of Percocet. We have concerns about your total narcotic dose daily, and a supplemental prescription of Percocet 5/325, if you obtain it, could be blended with your current medication for 7 days. The definitive answer is in getting dental or oral surgeon attention, and I realize that is in limited supply on Medicaid. We will send a 7-day supplemental Percocet prescription to your drugstore. Dr Hadley      ----- Message -----   From:Rhiannon Marrero   Sent:3/1/2021 1:52 PM PST   To:Physician Mamadou Leigh   Subject:Prescription Question    Part 2 of 2:The ER doctor said that if I start a fever that will mean that I am septic & can go to [facility] where they will have a specialist who can fix me. I have called around looking for a dentist who accepts Medicaid & found [facility] & I am waiting for a call back.  With this much pain I have had to take more of my pain killers than prescribed. If you look through my history with Percocet you will see that I have never asked for more. You can confirm with the ER that I was seen for a large abscess. I was wondering if it would be possible to get a second script. It would have to be either the 5mg or 10 mg to not interfere with my current script. Otherwise I won’t have enough for the month. Thank you so much for your time. I’m sorry.

## 2021-03-23 ENCOUNTER — OFFICE VISIT (OUTPATIENT)
Dept: MEDICAL GROUP | Facility: MEDICAL CENTER | Age: 36
End: 2021-03-23
Attending: FAMILY MEDICINE
Payer: COMMERCIAL

## 2021-03-23 VITALS
HEART RATE: 100 BPM | HEIGHT: 66 IN | BODY MASS INDEX: 26.36 KG/M2 | OXYGEN SATURATION: 98 % | SYSTOLIC BLOOD PRESSURE: 118 MMHG | DIASTOLIC BLOOD PRESSURE: 60 MMHG | TEMPERATURE: 97.5 F | WEIGHT: 164 LBS | RESPIRATION RATE: 16 BRPM

## 2021-03-23 DIAGNOSIS — K08.89 PAIN, DENTAL: ICD-10-CM

## 2021-03-23 DIAGNOSIS — N30.10 INTERSTITIAL CYSTITIS: ICD-10-CM

## 2021-03-23 PROCEDURE — 99213 OFFICE O/P EST LOW 20 MIN: CPT | Performed by: FAMILY MEDICINE

## 2021-03-23 RX ORDER — OXYCODONE AND ACETAMINOPHEN 7.5; 325 MG/1; MG/1
1 TABLET ORAL
Qty: 150 TABLET | Refills: 0 | Status: SHIPPED | OUTPATIENT
Start: 2021-03-23 | End: 2021-04-22 | Stop reason: SDUPTHER

## 2021-03-23 RX ORDER — ZONISAMIDE 25 MG/1
CAPSULE ORAL
COMMUNITY
Start: 2021-03-15 | End: 2022-05-23

## 2021-03-23 ASSESSMENT — FIBROSIS 4 INDEX: FIB4 SCORE: 0.47

## 2021-03-24 NOTE — PROGRESS NOTES
"Subjective:      Rhiannon Marrero is a 35 y.o. female who presents with Seizure            HPI 1.  Dental pain-patient is scheduled in 3 days to have 5 damaged teeth near the back of her lower jaw bilaterally extracted at [facility].  In 7 to 10 days after that initial extraction she is going to have all of her uppers removed in anticipation of having a full upper denture.  Patient has routinely been taking Percocet 7.5 mg for chronic pelvic pain associated with interstitial cystitis.  We are anticipating that she will have an increased need for pain medication for approximately 7 to 10 days through these 2 procedures and will expand her monthly prescription from 120 tablets, 7.5 mg to 150 for the next 30 days.  Prescription is due for refill at this time.  Patient reports she will purchase it privately if it gets hung up in a prior authorization.  She has had no Percocet for about 36 hours and is starting to notice feelings of withdrawal-clammy and tremulous.  Patient has been on antibiotics for the past 3 weeks initially amoxicillin and then switched at the emergency room to clindamycin and now switched back by her dentist to amoxicillin 500 mg 4 times daily    ROS positive for anterior pelvic pain, negative for gross hematuria, negative for recent fever       Objective:     /60 (BP Location: Left arm, Patient Position: Sitting, BP Cuff Size: Adult)   Pulse 100   Temp 36.4 °C (97.5 °F) (Temporal)   Resp 16   Ht 1.676 m (5' 5.98\")   Wt 74.4 kg (164 lb)   LMP 10/30/2018   SpO2 98%   BMI 26.48 kg/m²      Physical Exam  General-alert cooperative female in no acute distress  Oropharynx-several moderately fragmented teeth are notable on the posterior lower jaw bilaterally.  No obvious current swelling or purulent discharge is seen.  Tongue is midline.   Abdomen- normal bowel sounds, soft without guarding. Liver and spleen not enlarged, no palpable masses.  1-2+ tender over the midline suprapubic " area       Assessment/Plan:        1. Interstitial cystitis    - oxyCODONE-acetaminophen (PERCOCET) 7.5-325 MG per tablet; Take 1 tablet by mouth 5 Times a Day for 30 days.  Dispense: 150 tablet; Refill: 0    2. Pain, dental    Plan: 1.  Rx Percocet 7.5 mg, #150 to last the next 30 days  2.  Proceed with the 2-stage dental extractions outlined above  3.  Follow-up with me in 1 month

## 2021-04-14 ENCOUNTER — PATIENT MESSAGE (OUTPATIENT)
Dept: MEDICAL GROUP | Facility: MEDICAL CENTER | Age: 36
End: 2021-04-14

## 2021-04-14 DIAGNOSIS — M62.838 MUSCLE SPASM: ICD-10-CM

## 2021-04-15 ENCOUNTER — HOSPITAL ENCOUNTER (EMERGENCY)
Facility: MEDICAL CENTER | Age: 36
End: 2021-04-15
Attending: EMERGENCY MEDICINE | Admitting: EMERGENCY MEDICINE
Payer: COMMERCIAL

## 2021-04-15 VITALS
DIASTOLIC BLOOD PRESSURE: 80 MMHG | BODY MASS INDEX: 29.75 KG/M2 | TEMPERATURE: 98.6 F | WEIGHT: 178.57 LBS | HEIGHT: 65 IN | HEART RATE: 99 BPM | OXYGEN SATURATION: 99 % | SYSTOLIC BLOOD PRESSURE: 115 MMHG | RESPIRATION RATE: 14 BRPM

## 2021-04-15 DIAGNOSIS — S02.5XXA CLOSED FRACTURE OF TOOTH, INITIAL ENCOUNTER: ICD-10-CM

## 2021-04-15 DIAGNOSIS — Z86.59 HISTORY OF BIPOLAR DISORDER: ICD-10-CM

## 2021-04-15 DIAGNOSIS — Z90.710 S/P HYSTERECTOMY: ICD-10-CM

## 2021-04-15 DIAGNOSIS — K08.9 POOR DENTITION: ICD-10-CM

## 2021-04-15 DIAGNOSIS — G89.4 CHRONIC PAIN SYNDROME: ICD-10-CM

## 2021-04-15 PROCEDURE — 99283 EMERGENCY DEPT VISIT LOW MDM: CPT

## 2021-04-15 PROCEDURE — 700111 HCHG RX REV CODE 636 W/ 250 OVERRIDE (IP): Performed by: EMERGENCY MEDICINE

## 2021-04-15 PROCEDURE — 96372 THER/PROPH/DIAG INJ SC/IM: CPT | Mod: XU

## 2021-04-15 PROCEDURE — 64400 NJX AA&/STRD TRIGEMINAL NRV: CPT

## 2021-04-15 RX ORDER — BUPIVACAINE HYDROCHLORIDE 2.5 MG/ML
10 INJECTION, SOLUTION EPIDURAL; INFILTRATION; INTRACAUDAL ONCE
Status: COMPLETED | OUTPATIENT
Start: 2021-04-15 | End: 2021-04-15

## 2021-04-15 RX ORDER — HYDROMORPHONE HYDROCHLORIDE 1 MG/ML
1 INJECTION, SOLUTION INTRAMUSCULAR; INTRAVENOUS; SUBCUTANEOUS ONCE
Status: COMPLETED | OUTPATIENT
Start: 2021-04-15 | End: 2021-04-15

## 2021-04-15 RX ORDER — TIZANIDINE 4 MG/1
4 TABLET ORAL 3 TIMES DAILY
Qty: 90 TABLET | Refills: 2 | Status: SHIPPED | OUTPATIENT
Start: 2021-04-15 | End: 2021-06-10 | Stop reason: SDUPTHER

## 2021-04-15 RX ADMIN — HYDROMORPHONE HYDROCHLORIDE 1 MG: 1 INJECTION, SOLUTION INTRAMUSCULAR; INTRAVENOUS; SUBCUTANEOUS at 20:54

## 2021-04-15 RX ADMIN — BUPIVACAINE HYDROCHLORIDE 10 ML: 2.5 INJECTION, SOLUTION EPIDURAL; INFILTRATION; INTRACAUDAL; PERINEURAL at 21:00

## 2021-04-15 ASSESSMENT — FIBROSIS 4 INDEX: FIB4 SCORE: 0.47

## 2021-04-16 NOTE — ED TRIAGE NOTES
"Rhiannon Marrero   35 y.o. female   Chief Complaint   Patient presents with   • Tooth Abscess     left lower side, has an abscess, is supposd to get her teeth removed but cannot get sedation per her dentist. no facial swelling noted      Pt amb to triage with steady gait for above complaint.      Pt is alert and oriented, speaking in full sentences, follows commands and responds appropriately to questions. NAD. Resp are even and unlabored.   Pt placed in lobby. Pt educated on triage process. Pt encouraged to alert staff for any changes.    /74   Pulse 91   Temp 36.7 °C (98.1 °F) (Temporal)   Resp 16   Ht 1.651 m (5' 5\")   Wt 81 kg (178 lb 9.2 oz)   LMP 10/30/2018   SpO2 96%   BMI 29.72 kg/m²     "

## 2021-04-16 NOTE — DISCHARGE INSTRUCTIONS
You were seen and evaluated in the Emergency Department at [facility] for:     Dental pain and fracture.    You had the following tests and studies:    Thankfully vital signs are reassuring today we do not see an obvious drainable abscess yet but you are at risk for this.  Continue the Augmentin you started today for the next week.    You received the following medications:    Hydromorphone injection for pain, bupivacaine injection for dental block.    You received the following prescriptions:    Continue Augmentin and home pain medications and be sure to take at least 400 mg of ibuprofen 3 times per day for the next 3 days.  ----------------------------    Please make sure to follow up with:    Your dentist as soon as possible for recheck and definitive care, return to the ER for any new or worsening symptoms.    Good luck, we hope you get better soon!  ----------------------------    We always encourage patients to return IMMEDIATELY if they have:  Increased or changing pain, passing out, fevers over 100.4 (taken in your mouth or rectally) for more than 2 days, redness or swelling of skin or tissues, feeling like your heart is beating fast, chest pain that is new or worsening, trouble breathing, feeling like your throat is closing up and can not breath, inability to walk, weakness of any part of your body, new dizziness, severe bleeding that won't stop from any part of your body, if you can't eat or drink, or if you have any other concerns.   If you feel worse, please know that you can always return with any questions, concerns, worse symptoms, or you are feeling unsafe. We certainly cannot say for sure that we have ruled out every illness or dangerous disease, but we feel that at this specific time, your exam, tests, and vital signs like heart rate and blood pressure are safe for discharge.

## 2021-04-16 NOTE — ED PROVIDER NOTES
ED Provider Note    CHIEF COMPLAINT  Chief Complaint   Patient presents with   • Tooth Abscess     left lower side, has an abscess, is supposd to get her teeth removed but cannot get sedation per her dentist. no facial swelling noted       HPI    Primary care provider: Mamadou Leigh M.D.  Means of arrival: POV, walk-in  History obtained from: Patient  History limited by: Nothing    Rhiannon Marrero is a 35 y.o. female who presents with left lower posterior dental pain.  Recently she had a dental abscess on the right side and had several teeth removed with her dentist.  She has several other poorly eroding left lower inferior molars that require removal soon, she has been referred to an oral surgeon and is pending this referral to go through.  She has been unable to follow-up with oral surgeon.  Friend is a nurse practitioner who started her on Augmentin she has had 1 dose today, because she started having worsening pain and she felt that her tooth fractured further, no obvious swelling no difficulty swallowing or breathing.  No fevers or chills.  No vomiting or dyspnea or chest pain or cough or known close Covid contact.  No actual falls or injuries or trauma.  No facial swelling.  No neck masses.  She takes chronic opioids for chronic pain, but these have not been controlling her pain lately.  Pain is now severe 10 out of 10 nonradiating sharp and throbbing isolated to her left lower posterior jaw.  Denies chance of pregnancy.    REVIEW OF SYSTEMS  Constitutional: Negative for fever or chills.   HENT: Positive for left lower dental pain, no difficulty swallowing.  No voice changes.  Eyes: Negative for vision changes or discharge.   Respiratory: Negative for cough or shortness of breath.    Cardiovascular: Negative for chest pain or palpitations.   Gastrointestinal: Negative for nausea, vomiting, or abdominal pain.   Genitourinary: Negative for dysuria or flank pain.   Musculoskeletal: Negative for back pain  or joint pain.   Skin: Negative for itching or rash.   Neurological: Negative for sensory or motor changes.     PAST MEDICAL HISTORY   has a past medical history of Alcohol dependence (HCC) (4/13/2017), Bronchitis (8/2012), Cold, Heart burn, Hernia of unspecified site of abdominal cavity without mention of obstruction or gangrene, Indigestion, Pancreatitis, and Pneumonia (2011).    PAST FAMILY HISTORY  Family History   Problem Relation Age of Onset   • Psychiatric Illness Mother    • Stroke Mother    • Arthritis Mother    • Heart Disease Maternal Grandfather    • Cancer Neg Hx    • Diabetes Neg Hx    • Hypertension Neg Hx        SOCIAL HISTORY  Social History     Tobacco Use   • Smoking status: Current Every Day Smoker     Packs/day: 0.75     Years: 10.00     Pack years: 7.50     Types: Cigarettes   • Smokeless tobacco: Never Used   Substance and Sexual Activity   • Alcohol use: No     Comment: Sober since [id number]   • Drug use: Yes     Types: Marijuana     Comment: edibles   • Sexual activity: Not on file       SURGICAL HISTORY   has a past surgical history that includes other orthopedic surgery; gyn surgery; tonsillectomy (4/11/2013); arthroscopy, knee; hysterectomy robotic xi (10/11/2018); salpingectomy (Bilateral, 10/11/2018); and wrist orif (Right, 4/24/2019).    CURRENT MEDICATIONS  Home Medications     Reviewed by Chandrakant Bonilla R.N. (Registered Nurse) on 04/15/21 at 1732  Med List Status: <None>   Medication Last Dose Status   amitriptyline (ELAVIL) 10 MG Tab  Active   amitriptyline (ELAVIL) 25 MG Tab  Active   buPROPion (WELLBUTRIN SR) 200 MG SR tablet  Active   buPROPion SR (WELLBUTRIN-SR) 100 MG TABLET SR 12 HR  Active   Cariprazine HCl (VRAYLAR) 4.5 MG Cap  Active   cetirizine (ZYRTEC) 10 MG Tab  Active   cloNIDine (CATAPRES) 0.1 MG Tab  Active   cloNIDine (CATAPRES) 0.2 MG Tab  Active   ELMIRON 100 MG Cap  Active   escitalopram (LEXAPRO) 20 MG tablet  Active   hydrOXYzine HCl (ATARAX) 25 MG Tab   "Active   hydrOXYzine pamoate (VISTARIL) 25 MG Cap  Active   ketorolac (TORADOL) 30 MG/ML Solution  Active   levetiracetam (KEPPRA) 1000 MG tablet  Active   levETIRAcetam (KEPPRA) 500 MG Tab  Active   LORazepam (ATIVAN) 1 MG Tab  Active   naltrexone (DEPADE) 50 MG Tab  Active   oxyCODONE-acetaminophen (PERCOCET) 7.5-325 MG per tablet  Active   pentosan (ELMIRON) 100 MG Cap  Active   tizanidine (ZANAFLEX) 4 MG Tab  Active   traZODone (DESYREL) 50 MG Tab  Active   zolpidem (AMBIEN) 10 MG Tab  Active   zonisamide (ZONEGRAN) 25 MG capsule  Active                ALLERGIES  Allergies   Allergen Reactions   • Promethazine Hcl Anxiety       PHYSICAL EXAM  VITAL SIGNS: /80   Pulse 99   Temp 37 °C (98.6 °F) (Temporal)   Resp 14   Ht 1.651 m (5' 5\")   Wt 81 kg (178 lb 9.2 oz)   LMP 10/30/2018   SpO2 99%   BMI 29.72 kg/m²    Pulse ox interpretation: On room air, I interpret this pulse ox as normal.  Constitutional: Well-developed, well-nourished. Sitting up in moderate distress.   HEENT: Normocephalic, atraumatic. Posterior pharynx clear, mucous membranes dry, poor dentition throughout but the left 3 posterior inferior molars are all severely eroded and cracked, no surrounding erythema no fluctuance no buccal swelling floor of the mouth soft uvula midline tolerating secretions normal voice no stridor..  Eyes:  EOMI. Normal sclerae.  Neck: Supple, nontender.  Chest/Pulmonary: Clear to ausculation bilaterally, no wheezes or rhonchi.  Cardiovascular: Regular rate and rhythm, no murmur.   Abdomen: Soft, nontender; no rebound, guarding, or masses.  Back: No CVA or midline tenderness.   Musculoskeletal: No deformity or edema.  Neuro: Alert, no focal weakness or asymmetry.  Psych: Distressed but cooperative.  Skin: No rashes, warm and dry.      DIAGNOSTIC STUDIES / PROCEDURES      RADIOLOGY  No orders to display       PROCEDURES  Dental Block Procedure Note  Indication: Dental pain  Consent: Risks, benefits, and " alternatives were discussed and the patient consented verbally  Location: Left inferior alveolar nerve  Anesthesia: 3 mLs of 0.25% bupivacaine without epinephrine was injected with a 25-gauge short needle into the anatomic region of the left inferior alveolar nerve at the back of the left of the mouth.  Toleration: the patient tolerated well, no immediate complications or bleeding  Total procedure time: 5 minutes      COURSE & MEDICAL DECISION MAKING    This is a 35 y.o. female who presents with acute on chronic dental pain and fracture, started Augmentin today.  Has plans for oral surgery follow-up.    Differential Diagnosis includes but is not limited to:  Dental caries, dental fracture, dental abscess, Foster's angina, chronic pain    ED Course:  35-year-old female with above complaints.    The patient's exam is reassuring at this time, and include the following findings:  -Uvula midline, no obvious masses doubt PTA  -Normal voice, tolerating secretions, doubt epiglottitis  -No trismus, doubt RPA  -Floor of mouth soft, no bulging neck masses doubt Foster's Angina  -Normal vitals, doubt Lemierre Syndrome  -No obvious exudate, masses, ulcers or eschars, doubt severe bacterial or fungal infection or malignancy    Plan pain control with subcutaneous hydromorphone and offered inferior alveolar dental block which the patient would gladly accept.  Patient tolerated well see procedure note.  Pain well controlled she has Augmentin prescribed for a week advised she continue this and follow-up with her dentist and oral surgeon as soon as possible, return immediately for any new or worsening symptoms.    Medications   HYDROmorphone (Dilaudid) injection 1 mg (1 mg Subcutaneous Given 4/15/21 2054)   bupivacaine 0.25% (SENSORCAINE-MARCAINE) pf injection 10 mL (10 mL Injection Given 4/15/21 2100)       FINAL IMPRESSION  1. Closed fracture of tooth, initial encounter    2. Chronic pain syndrome    3. Poor dentition    4. S/P  hysterectomy    5. History of bipolar disorder        PRESCRIPTIONS  Discharge Medication List as of 4/15/2021  8:59 PM          FOLLOW UP  Mamadou Leigh M.D.  [address]  [address]  [facility] [address]  [phone]    Schedule an appointment as soon as possible for a visit in 1 week      Your dentist    Go in 2 days      [facility], Emergency Dept  [address] [facility] [id number] [phone]  Today  If you have ANY new or worse symptoms!      -DISCHARGE-       Exam findings, clinical impression, presumed diagnosis, treatment options, and strict return precautions were discussed with the patient, and they verbalized understanding, agreed with, and appreciated the plan of care.    I personally reviewed and verified the patient's nursing notes, as well as past medical, surgical, and social history.     The patient is referred to a primary physician for blood pressure management, diabetic screening, and for all other preventative health concerns.     Portions of this record were made with voice recognition software.  Despite my review, spelling/grammar/context errors may still remain.  Interpretation of this chart should be taken in this context.    Electronically signed by Hao Sousa M.D. on 4/16/2021 at 12:58 AM.

## 2021-04-21 ENCOUNTER — PATIENT MESSAGE (OUTPATIENT)
Dept: MEDICAL GROUP | Facility: MEDICAL CENTER | Age: 36
End: 2021-04-21

## 2021-04-21 DIAGNOSIS — N30.10 INTERSTITIAL CYSTITIS: ICD-10-CM

## 2021-04-22 RX ORDER — OXYCODONE AND ACETAMINOPHEN 7.5; 325 MG/1; MG/1
1 TABLET ORAL
Qty: 150 TABLET | Refills: 0 | Status: SHIPPED | OUTPATIENT
Start: 2021-04-23 | End: 2021-05-23

## 2021-04-22 NOTE — PATIENT COMMUNICATION
Patient came in to office to follow up on her refill. Please contact her if theres an issue or questions.

## 2021-04-22 NOTE — TELEPHONE ENCOUNTER
From: Rhiannon Pennosman Marrero  To: Physician Mamadou eLigh  Sent: 4/21/2021 11:27 PM PDT  Subject: Prescription Question    Hello Dr. Leigh, this is Rhiannon Marrero. My prescription for pain medication is due tomorrow the 22nd but there wasn't an appointment time until the 26th. I was wondering if you could please call in a refill tomorrow. An update on dental progress, eight teeth removed thus far. Thank you very much for your help.

## 2021-05-28 ENCOUNTER — PATIENT MESSAGE (OUTPATIENT)
Dept: MEDICAL GROUP | Facility: MEDICAL CENTER | Age: 36
End: 2021-05-28

## 2021-05-28 DIAGNOSIS — N30.10 INTERSTITIAL CYSTITIS: ICD-10-CM

## 2021-06-01 ENCOUNTER — PATIENT MESSAGE (OUTPATIENT)
Dept: MEDICAL GROUP | Facility: MEDICAL CENTER | Age: 36
End: 2021-06-01

## 2021-06-01 DIAGNOSIS — N30.10 INTERSTITIAL CYSTITIS: ICD-10-CM

## 2021-06-01 RX ORDER — OXYCODONE AND ACETAMINOPHEN 7.5; 325 MG/1; MG/1
1 TABLET ORAL EVERY 4 HOURS PRN
Qty: 150 TABLET | Refills: 0 | Status: SHIPPED | OUTPATIENT
Start: 2021-06-01 | End: 2021-07-01

## 2021-06-10 DIAGNOSIS — N30.10 INTERSTITIAL CYSTITIS (CHRONIC) WITHOUT HEMATURIA: ICD-10-CM

## 2021-06-10 DIAGNOSIS — F41.9 ANXIETY: ICD-10-CM

## 2021-06-10 RX ORDER — PENTOSAN POLYSULFATE SODIUM 100 MG/1
100 CAPSULE, GELATIN COATED ORAL 3 TIMES DAILY
Qty: 90 CAPSULE | Refills: 6 | Status: SHIPPED | OUTPATIENT
Start: 2021-06-10 | End: 2021-10-28

## 2021-06-10 RX ORDER — HYDROXYZINE HYDROCHLORIDE 25 MG/1
TABLET, FILM COATED ORAL
Qty: 60 TABLET | Refills: 2 | Status: SHIPPED | OUTPATIENT
Start: 2021-06-10 | End: 2021-11-29

## 2021-06-10 RX ORDER — CLONIDINE HYDROCHLORIDE 0.1 MG/1
TABLET ORAL
Qty: 60 TABLET | Refills: 2 | Status: SHIPPED | OUTPATIENT
Start: 2021-06-10 | End: 2021-11-29

## 2021-06-24 ENCOUNTER — TELEPHONE (OUTPATIENT)
Dept: MEDICAL GROUP | Facility: MEDICAL CENTER | Age: 36
End: 2021-06-24

## 2021-06-24 NOTE — TELEPHONE ENCOUNTER
VOICEMAIL  1. Caller Name: [facility]                      Call Back Number: [phone]    2. Message: The pharmacy just wanted to let you know that Rhiannon is back on Suboxone. If you have any questions you can call them at [phone]    3. Patient approves office to leave a detailed voicemail/MyChart message: N\A

## 2021-08-16 ENCOUNTER — OFFICE VISIT (OUTPATIENT)
Dept: MEDICAL GROUP | Facility: MEDICAL CENTER | Age: 36
End: 2021-08-16
Attending: FAMILY MEDICINE
Payer: COMMERCIAL

## 2021-08-16 VITALS
DIASTOLIC BLOOD PRESSURE: 68 MMHG | HEART RATE: 120 BPM | RESPIRATION RATE: 16 BRPM | WEIGHT: 182 LBS | OXYGEN SATURATION: 91 % | SYSTOLIC BLOOD PRESSURE: 126 MMHG | BODY MASS INDEX: 29.25 KG/M2 | TEMPERATURE: 97.9 F | HEIGHT: 66 IN

## 2021-08-16 DIAGNOSIS — N30.10 INTERSTITIAL CYSTITIS: ICD-10-CM

## 2021-08-16 DIAGNOSIS — R56.9 SEIZURES (HCC): ICD-10-CM

## 2021-08-16 DIAGNOSIS — F31.31 BIPOLAR AFFECTIVE DISORDER, CURRENTLY DEPRESSED, MILD (HCC): ICD-10-CM

## 2021-08-16 PROCEDURE — 99213 OFFICE O/P EST LOW 20 MIN: CPT | Performed by: FAMILY MEDICINE

## 2021-08-16 PROCEDURE — 99214 OFFICE O/P EST MOD 30 MIN: CPT | Performed by: FAMILY MEDICINE

## 2021-08-16 RX ORDER — QUETIAPINE 200 MG/1
200 TABLET, FILM COATED, EXTENDED RELEASE ORAL DAILY
Qty: 30 TABLET | Refills: 3 | Status: SHIPPED | OUTPATIENT
Start: 2021-08-16 | End: 2022-05-23

## 2021-08-16 RX ORDER — ZONISAMIDE 25 MG/1
50 CAPSULE ORAL DAILY
Qty: 60 CAPSULE | Refills: 5 | Status: SHIPPED | OUTPATIENT
Start: 2021-08-16 | End: 2022-07-08

## 2021-08-16 RX ORDER — CARISOPRODOL 350 MG/1
350 TABLET ORAL 4 TIMES DAILY
Qty: 90 TABLET | Refills: 2 | Status: SHIPPED | OUTPATIENT
Start: 2021-08-16 | End: 2021-08-16 | Stop reason: SDUPTHER

## 2021-08-16 RX ORDER — LEVETIRACETAM 1000 MG/1
1000 TABLET ORAL 2 TIMES DAILY
Qty: 60 TABLET | Refills: 5 | Status: SHIPPED | OUTPATIENT
Start: 2021-08-16 | End: 2021-09-15

## 2021-08-16 RX ORDER — CARISOPRODOL 350 MG/1
350 TABLET ORAL EVERY 8 HOURS PRN
Qty: 90 TABLET | Refills: 3 | Status: SHIPPED | OUTPATIENT
Start: 2021-08-16 | End: 2021-09-15

## 2021-08-16 NOTE — PROGRESS NOTES
Subjective     Rhiannon Marrero is a 36 y.o. female who presents with Seizure            HPI 1.  Interstitial cystitis-patient reports he has just been approved for installation of a steroid/lidocaine solution via intermittent catheterization.  She reports that when she has been catheterized before that it creates severe spasm type pelvic pain in conjunction with her interstitial cystitis.  She wonders if she could restart Soma 3 times a day.  She is currently on 16 mg of Subutex.  2.  Seizure disorder-patient reports that she has been seizure-free for approximately 1 year now taking zonisamide 25 mg, 2 at bedtime along with Keppra 1000 mg twice daily.  She was followed by [facility] and has had difficulty getting contact with them or having them reschedule her antiseizure medication.  3.  Bipolar mood disorder-patient had been seen a provider in telemedicine at [facility].  She reports that he seemed to forget her she was even during the visit and she has terminated that relationship.  She has been taking Wellbutrin has been off it now for about 3 months and reports nothing is better or worse since stopping that medication.  She reports that she will be at times very fatigued and spent 5 days on her couch and at other times be unusually irritable.  Outside events do not seem to alter these cycles.  She is suspicious she may have bipolar type II mood disorder she has never been treated with atypical antipsychotics.  She denies any confusion or hallucinations.    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 Authorizing Provider   carisoprodol (SOMA) 350 MG Tab Take 1 Tablet by mouth 4 times a day for 30 days. 8/16/21 9/15/21 Yes Mamadou Leigh M.D.   zonisamide (ZONEGRAN) 25 MG capsule Take 2 Capsules by mouth  every day. 8/16/21  Yes Mamadou Leigh M.D.   levetiracetam (KEPPRA) 1000 MG tablet Take 1 Tablet by mouth 2 times a day for 30 days. 8/16/21 9/15/21 Yes Mamadou Leigh M.D.   quetiapine (SEROQUEL XR) 200 MG XR tablet Take 1 Tablet by mouth every day. 8/16/21  Yes Mamadou Leigh M.D.   ELMIRON 100 MG Cap Take 1 capsule by mouth 3 times a day. 6/10/21   Mamadou Leigh M.D.   cloNIDine (CATAPRES) 0.1 MG Tab TAKE 1 TABLET BY MOUTH TWICE DAILY 6/10/21   Mamadou Leigh M.D.   hydrOXYzine HCl (ATARAX) 25 MG Tab TAKE 1 TABLET BY MOUTH THREE TIMES DAILY AS NEEDED FOR ITCHING 6/10/21   Mamadou Leigh M.D.   zonisamide (ZONEGRAN) 25 MG capsule TAKE 2 CAPSULES BY MOUTH MOUTH NIGHTLY 3/15/21   Physician Outpatient   Cariprazine HCl (VRAYLAR) 4.5 MG Cap Vraylar 4.5 mg capsule    Physician Outpatient   cetirizine (ZYRTEC) 10 MG Tab cetirizine 10 mg tablet    Physician Outpatient   cloNIDine (CATAPRES) 0.2 MG Tab  2/22/21   Physician Outpatient   escitalopram (LEXAPRO) 20 MG tablet TAKE 1 TABLET BY MOUTH ONCE A DAY START ON THE MORNING OF 12/30/20 2/19/21   Physician Outpatient   hydrOXYzine pamoate (VISTARIL) 25 MG Cap hydroxyzine pamoate 25 mg capsule    Physician Outpatient   ketorolac (TORADOL) 30 MG/ML Solution ketorolac 30 mg/mL (1 mL) injection solution    Physician Outpatient   levETIRAcetam (KEPPRA) 500 MG Tab Take 500 mg by mouth. 1/25/21   Physician Outpatient   amitriptyline (ELAVIL) 25 MG Tab amitriptyline 25 mg tablet    Physician Outpatient   buPROPion SR (WELLBUTRIN-SR) 100 MG TABLET SR 12 HR bupropion HCl  mg tablet,12 hr sustained-release    Physician Outpatient   pentosan (ELMIRON) 100 MG Cap Elmiron 100 mg capsule    Physician Outpatient   levetiracetam (KEPPRA) 1000 MG tablet Take 1 Tab by mouth 2 Times a Day. 1/18/21   Mamadou Leigh M.D.   amitriptyline (ELAVIL) 10 MG Tab Take 1 Tab by mouth every bedtime. 12/22/20   Mamadou Leigh M.D.   naltrexone (DEPADE) 50 MG Tab  "Take 1 Tab by mouth every day. 12/17/20   Mamadou Leigh M.D.   traZODone (DESYREL) 50 MG Tab TAKE 1 TABLET BY MOUTH EVERY NIGHT AT BEDTIME AS NEEDED FOR SLEEP 8/3/20   Mamadou Leigh M.D.   zolpidem (AMBIEN) 10 MG Tab Take 10 mg by mouth every bedtime.    Physician Outpatient   buPROPion (WELLBUTRIN SR) 200 MG SR tablet Take 200 mg by mouth 2 times a day.    Physician Outpatient   LORazepam (ATIVAN) 1 MG Tab Take 1 mg by mouth 1 time daily as needed.    Physician Outpatient           Objective     /68   Pulse (!) 120   Temp 36.6 °C (97.9 °F) (Temporal)   Resp 16   Ht 1.676 m (5' 5.98\")   Wt 82.6 kg (182 lb)   LMP 10/30/2018   SpO2 91%   BMI 29.39 kg/m²      Physical Exam      Gen.- alert, cooperative, in no acute distress  Neck- midline trachea, thyroid not enlarged or tender,supple, no cervical adenopathy  Chest-clear to auscultation and percussion with normal breath sounds. No retractions. Chest wall nontender  Cardiac- regular rhythm and rate. No murmur, thrill, or heave  Abdomen-slightly distended contour, 1+ suprapubic tenderness without palpable mass.  No rebound tenderness.  Normal pitched bowel sounds                   Assessment & Plan        1. Interstitial cystitis    - carisoprodol (SOMA) 350 MG Tab; Take 1 Tablet by mouth 3 times a day for 30 days.  Dispense: 90 Tablet; Refill: 2    2. Bipolar affective disorder, currently depressed, mild (HCC)    - quetiapine (SEROQUEL XR) 200 MG XR tablet; Take 1 Tablet by mouth every day.  Dispense: 30 Tablet; Refill: 3    3. Seizures (HCC)    - zonisamide (ZONEGRAN) 25 MG capsule; Take 2 Capsules by mouth every day.  Dispense: 60 Capsule; Refill: 5  - levetiracetam (KEPPRA) 1000 MG tablet; Take 1 Tablet by mouth 2 times a day for 30 days.  Dispense: 60 Tablet; Refill: 5    Plan: 1.  Trial of Soma 350 mg 3 times daily   2.  Trial of Seroquel extended release 200 mg nightly  3.  Revisit with me in 1 month  4.  We will take over prescribing of " her zonisamide 25 mg, and Keppra 1000 mg

## 2021-09-13 ENCOUNTER — PATIENT MESSAGE (OUTPATIENT)
Dept: MEDICAL GROUP | Facility: MEDICAL CENTER | Age: 36
End: 2021-09-13

## 2021-09-13 DIAGNOSIS — K08.409 S/P TOOTH EXTRACTION: ICD-10-CM

## 2021-09-13 RX ORDER — OXYCODONE AND ACETAMINOPHEN 7.5; 325 MG/1; MG/1
1 TABLET ORAL EVERY 6 HOURS PRN
Qty: 28 TABLET | Refills: 0 | Status: SHIPPED | OUTPATIENT
Start: 2021-09-13 | End: 2021-11-29 | Stop reason: SDUPTHER

## 2021-09-16 ENCOUNTER — OFFICE VISIT (OUTPATIENT)
Dept: MEDICAL GROUP | Facility: MEDICAL CENTER | Age: 36
End: 2021-09-16
Attending: FAMILY MEDICINE
Payer: COMMERCIAL

## 2021-09-16 VITALS
BODY MASS INDEX: 28.75 KG/M2 | WEIGHT: 178.9 LBS | HEIGHT: 66 IN | OXYGEN SATURATION: 92 % | HEART RATE: 132 BPM | DIASTOLIC BLOOD PRESSURE: 68 MMHG | SYSTOLIC BLOOD PRESSURE: 112 MMHG | TEMPERATURE: 97.6 F | RESPIRATION RATE: 16 BRPM

## 2021-09-16 DIAGNOSIS — F31.0 BIPOLAR AFFECTIVE DISORDER, CURRENT EPISODE HYPOMANIC (HCC): ICD-10-CM

## 2021-09-16 DIAGNOSIS — R00.0 TACHYCARDIA: ICD-10-CM

## 2021-09-16 PROCEDURE — 99213 OFFICE O/P EST LOW 20 MIN: CPT | Performed by: FAMILY MEDICINE

## 2021-09-16 NOTE — PROGRESS NOTES
"Subjective     Rhiannon Marrero is a 36 y.o. female who presents with Follow-Up            HPI 1.  Bipolar affective disorder-patient reports that she feels much improved since being started on 200 mg extended release of Seroquel.  She reports that emotionally she feels much more stabilized without has frequent ups and downs.  Reports that sleep is only fair because of 13 teeth extracted 1 week ago.  2.  Tachycardia-patient notes a persistent tendency towards elevated heart rates.  Not reporting palpitations or chest pain    ROS negative for near syncope, lower extremity edema, headache           Objective     /68   Pulse (!) 132   Temp 36.4 °C (97.6 °F) (Temporal)   Resp 16   Ht 1.676 m (5' 5.98\")   Wt 81.1 kg (178 lb 14.4 oz)   LMP 10/30/2018   SpO2 92%   BMI 28.89 kg/m²      Physical Exam  Gen.- alert, cooperative, in no acute distress  Neck- midline trachea, thyroid not enlarged or tender,supple, no cervical adenopathy  Chest-clear to auscultation and percussion with normal breath sounds. No retractions. Chest wall nontender  Cardiac-rapid regular rhythm, rate 128. No murmur, thrill, or heave  EKG-sinus tachycardia question of left atrial enlargement, without acute ischemic changes                      Assessment & Plan        1. Bipolar affective disorder, current episode hypomanic (HCC)      2. Tachycardia    Plan: 1.  Continue on Seroquel  2.  Follow-up with me in 6 weeks-if tachycardia is persistent we will pursue cardiology opinion              "

## 2021-10-28 ENCOUNTER — OFFICE VISIT (OUTPATIENT)
Dept: MEDICAL GROUP | Facility: MEDICAL CENTER | Age: 36
End: 2021-10-28
Attending: FAMILY MEDICINE
Payer: COMMERCIAL

## 2021-10-28 VITALS
DIASTOLIC BLOOD PRESSURE: 66 MMHG | OXYGEN SATURATION: 93 % | HEIGHT: 66 IN | SYSTOLIC BLOOD PRESSURE: 114 MMHG | TEMPERATURE: 97.3 F | WEIGHT: 169 LBS | RESPIRATION RATE: 16 BRPM | HEART RATE: 84 BPM | BODY MASS INDEX: 27.16 KG/M2

## 2021-10-28 DIAGNOSIS — F41.9 ANXIETY: ICD-10-CM

## 2021-10-28 DIAGNOSIS — N30.10 INTERSTITIAL CYSTITIS: ICD-10-CM

## 2021-10-28 PROCEDURE — 99213 OFFICE O/P EST LOW 20 MIN: CPT | Performed by: FAMILY MEDICINE

## 2021-10-28 NOTE — PROGRESS NOTES
"Subjective     Rhiannon Marrero is a 36 y.o. female who presents with No chief complaint on file.            HPI 1.  Anxiety-patient reports extremely elevated levels of anxiety associated mainly with school stress and also contributed 2 by her to early teenage children.  She would like to go back on lorazepam which she had used back in 2020 and found that to be very effective.  She currently has been switched from oxycodone over to buprenorphine.  She is receiving bladder installations once a week for an additional 5 weeks.  She reports she would gladly go off the buprenorphine in order to be free of narcotic influence if that would allow us to restart her lorazepam without worrying about respiratory depression.  2.  Interstitial cystitis-patient is being followed by urology.  They have advised her to go ahead and discontinue Elmiron due to lack of effect concerned about visual effects.  Patient reports that her vision has undergone some changes.  She is started on bladder instillation a course of 6 separate treatments had one about 3 days ago.  Reports that just due to placing the catheter in your urethra it was an uncomfortable experience.  ROS negative for current hematuria, syncope, confusion           Objective     /66   Pulse 84   Temp 36.3 °C (97.3 °F) (Temporal)   Resp 16   Ht 1.676 m (5' 5.98\")   Wt 81.2 kg (179 lb)   LMP 10/30/2018   SpO2 93%   BMI 28.91 kg/m²      Physical Exam      Gen.- alert, cooperative, in no acute distress  Neck- midline trachea, thyroid not enlarged or tender,supple, no cervical adenopathy  Chest-clear to auscultation and percussion with normal breath sounds. No retractions. Chest wall nontender  Cardiac- regular rhythm and rate. No murmur, thrill, or heave                     Assessment & Plan        1. Interstitial cystitis      2. Anxiety      Plan: 1.  Patient reports that currently she is taking 1 or 2 doses of 8 mg buprenorphine per day recently.  To avoid " withdrawal we will have her take 1 single 8 mg tablet per day for 3 days then 1/2 tablet/day for 3 days and then try Pletal he discontinuing buprenorphine.  2.  Patient is asked to confirm with me via MyChart in the next 10 days that she is completely off buprenorphine-we can arrange for a UDS at that time  3.  Consider restarting lorazepam 1 mg once or twice daily if needed in approximately 2 weeks after she tapers off the opiates  4.  Follow-up with me in 1 month

## 2021-11-09 ENCOUNTER — HOSPITAL ENCOUNTER (OUTPATIENT)
Facility: MEDICAL CENTER | Age: 36
End: 2021-11-09
Attending: FAMILY MEDICINE
Payer: COMMERCIAL

## 2021-11-09 ENCOUNTER — PATIENT MESSAGE (OUTPATIENT)
Dept: MEDICAL GROUP | Facility: MEDICAL CENTER | Age: 36
End: 2021-11-09

## 2021-11-09 DIAGNOSIS — Z79.899 HIGH RISK MEDICATION USE: ICD-10-CM

## 2021-11-09 PROCEDURE — 80307 DRUG TEST PRSMV CHEM ANLYZR: CPT

## 2021-11-10 ENCOUNTER — PATIENT MESSAGE (OUTPATIENT)
Dept: MEDICAL GROUP | Facility: MEDICAL CENTER | Age: 36
End: 2021-11-10

## 2021-11-10 DIAGNOSIS — Z79.899 HIGH RISK MEDICATION USE: ICD-10-CM

## 2021-11-12 LAB
AMPHET CTO UR CFM-MCNC: NEGATIVE NG/ML
BARBITURATES CTO UR CFM-MCNC: NEGATIVE NG/ML
BENZODIAZ CTO UR CFM-MCNC: NEGATIVE NG/ML
CANNABINOIDS CTO UR CFM-MCNC: POSITIVE NG/ML
COCAINE CTO UR CFM-MCNC: NEGATIVE NG/ML
DRUG COMMENT 753798: NORMAL
METHADONE CTO UR CFM-MCNC: NEGATIVE NG/ML
OPIATES CTO UR CFM-MCNC: NEGATIVE NG/ML
PCP CTO UR CFM-MCNC: NEGATIVE NG/ML
PROPOXYPH CTO UR CFM-MCNC: NEGATIVE NG/ML

## 2021-11-14 LAB — THC UR CFM-MCNC: 101 NG/ML

## 2021-11-15 ENCOUNTER — PATIENT MESSAGE (OUTPATIENT)
Dept: MEDICAL GROUP | Facility: MEDICAL CENTER | Age: 36
End: 2021-11-15

## 2021-11-15 DIAGNOSIS — F41.9 ANXIETY: ICD-10-CM

## 2021-11-15 RX ORDER — LORAZEPAM 1 MG/1
1 TABLET ORAL 2 TIMES DAILY PRN
Qty: 45 TABLET | Refills: 1 | Status: SHIPPED | OUTPATIENT
Start: 2021-11-15 | End: 2021-11-29

## 2021-11-15 NOTE — TELEPHONE ENCOUNTER
From: Rhiannon Marrero  To: Physician aMmadou Leigh  Sent: 11/15/2021 11:49 AM PST  Subject: Anxiety Medication    Hello,    I see that the results are back from the lab. It says positive for THC, which I've been taking for the anxiety and pain. We have discussed that before though. It doesn't say anything about the Subutex though, so I'm hoping that means we are good to go through with prescribing the anxiety medication? I have another bladder installation today, and would just love to kill some of this anxiety and make the process better if possible. Thanks for everything you do!

## 2021-11-29 ENCOUNTER — OFFICE VISIT (OUTPATIENT)
Dept: MEDICAL GROUP | Facility: MEDICAL CENTER | Age: 36
End: 2021-11-29
Attending: FAMILY MEDICINE
Payer: COMMERCIAL

## 2021-11-29 VITALS
HEIGHT: 66 IN | WEIGHT: 158.9 LBS | RESPIRATION RATE: 16 BRPM | HEART RATE: 96 BPM | OXYGEN SATURATION: 98 % | DIASTOLIC BLOOD PRESSURE: 74 MMHG | SYSTOLIC BLOOD PRESSURE: 112 MMHG | BODY MASS INDEX: 25.54 KG/M2 | TEMPERATURE: 98.8 F

## 2021-11-29 DIAGNOSIS — N30.10 INTERSTITIAL CYSTITIS: ICD-10-CM

## 2021-11-29 PROCEDURE — 99213 OFFICE O/P EST LOW 20 MIN: CPT | Performed by: FAMILY MEDICINE

## 2021-11-29 RX ORDER — OXYCODONE AND ACETAMINOPHEN 7.5; 325 MG/1; MG/1
1 TABLET ORAL EVERY 6 HOURS PRN
Qty: 120 TABLET | Refills: 0 | Status: SHIPPED | OUTPATIENT
Start: 2021-11-29 | End: 2021-12-27 | Stop reason: SDUPTHER

## 2021-11-30 NOTE — PROGRESS NOTES
"Subjective     Rhiannon Marrero is a 36 y.o. female who presents with Follow-Up            HPI 1.  Interstitial cystitis/chronic pain-patient is in the midst of her weekly instillations of Elmiron, lidocaine, heparin, and a steroid.  Most recent installation was today which is causing her to have increased pelvic pain.  She has had 5 of 6 planned treatments without significant improvement thus far.  The attempts at having her take lorazepam to see if that helped with overall relaxation failed.  Patient became anxious, confused and extremely irritable.  She actually ended up being admitted to [facility] for 5 days.  She reports that while there she was treated with Roxicodone 10 mg 4 times daily which gave her significant bladder pain relief.    ROS negative for current fever, recent emesis, dyspnea           Objective     /74 (BP Location: Left arm, Patient Position: Sitting, BP Cuff Size: Adult)   Pulse 96   Temp 37.1 °C (98.8 °F) (Temporal)   Resp 16   Ht 1.676 m (5' 5.98\")   Wt 72.1 kg (158 lb 14.4 oz)   LMP 10/30/2018   SpO2 98%   BMI 25.66 kg/m²      Physical Exam      Gen.- alert, cooperative, in no acute distress  Neck- midline trachea, thyroid not enlarged or tender,supple, no cervical adenopathy  Chest-clear to auscultation and percussion with normal breath sounds. No retractions. Chest wall nontender  Cardiac- regular rhythm and rate. No murmur, thrill, or heave  Abdomen-normal contour.  1+ tender broadly in the central suprapubic area and lower left and right lower quadrant regions.                   Assessment & Plan        1. Interstitial cystitis    Plan: 1.  Resume Percocet 7.5/325 4 times daily, #120  2.  Revisit with me in 1 month  3.  Continue urology follow-up              "

## 2021-12-27 ENCOUNTER — PATIENT MESSAGE (OUTPATIENT)
Dept: MEDICAL GROUP | Facility: MEDICAL CENTER | Age: 36
End: 2021-12-27

## 2021-12-27 DIAGNOSIS — N30.10 INTERSTITIAL CYSTITIS: ICD-10-CM

## 2021-12-27 RX ORDER — OXYCODONE AND ACETAMINOPHEN 7.5; 325 MG/1; MG/1
1 TABLET ORAL EVERY 6 HOURS PRN
Qty: 120 TABLET | Refills: 0 | Status: SHIPPED | OUTPATIENT
Start: 2021-12-29 | End: 2022-01-28

## 2022-05-22 LAB
ALBUMIN SERPL BCP-MCNC: 5 G/DL (ref 3.2–4.9)
ALBUMIN/GLOB SERPL: 2 G/DL
ALP SERPL-CCNC: 116 U/L (ref 30–99)
ALT SERPL-CCNC: 345 U/L (ref 2–50)
ANION GAP SERPL CALC-SCNC: 18 MMOL/L (ref 7–16)
AST SERPL-CCNC: 468 U/L (ref 12–45)
BASOPHILS # BLD AUTO: 0.8 % (ref 0–1.8)
BASOPHILS # BLD: 0.05 K/UL (ref 0–0.12)
BILIRUB SERPL-MCNC: 0.6 MG/DL (ref 0.1–1.5)
BUN SERPL-MCNC: 10 MG/DL (ref 8–22)
CALCIUM SERPL-MCNC: 9.2 MG/DL (ref 8.5–10.5)
CHLORIDE SERPL-SCNC: 102 MMOL/L (ref 96–112)
CO2 SERPL-SCNC: 20 MMOL/L (ref 20–33)
CREAT SERPL-MCNC: 0.74 MG/DL (ref 0.5–1.4)
EOSINOPHIL # BLD AUTO: 0.05 K/UL (ref 0–0.51)
EOSINOPHIL NFR BLD: 0.8 % (ref 0–6.9)
ERYTHROCYTE [DISTWIDTH] IN BLOOD BY AUTOMATED COUNT: 50.1 FL (ref 35.9–50)
GFR SERPLBLD CREATININE-BSD FMLA CKD-EPI: 108 ML/MIN/1.73 M 2
GLOBULIN SER CALC-MCNC: 2.5 G/DL (ref 1.9–3.5)
GLUCOSE SERPL-MCNC: 125 MG/DL (ref 65–99)
HCT VFR BLD AUTO: 48.4 % (ref 37–47)
HGB BLD-MCNC: 17.4 G/DL (ref 12–16)
IMM GRANULOCYTES # BLD AUTO: 0.01 K/UL (ref 0–0.11)
IMM GRANULOCYTES NFR BLD AUTO: 0.2 % (ref 0–0.9)
LYMPHOCYTES # BLD AUTO: 2.34 K/UL (ref 1–4.8)
LYMPHOCYTES NFR BLD: 35.9 % (ref 22–41)
MCH RBC QN AUTO: 31.1 PG (ref 27–33)
MCHC RBC AUTO-ENTMCNC: 36 G/DL (ref 33.6–35)
MCV RBC AUTO: 86.6 FL (ref 81.4–97.8)
MONOCYTES # BLD AUTO: 0.66 K/UL (ref 0–0.85)
MONOCYTES NFR BLD AUTO: 10.1 % (ref 0–13.4)
NEUTROPHILS # BLD AUTO: 3.4 K/UL (ref 2–7.15)
NEUTROPHILS NFR BLD: 52.2 % (ref 44–72)
NRBC # BLD AUTO: 0 K/UL
NRBC BLD-RTO: 0 /100 WBC
PLATELET # BLD AUTO: 206 K/UL (ref 164–446)
PMV BLD AUTO: 9.8 FL (ref 9–12.9)
POTASSIUM SERPL-SCNC: 3.7 MMOL/L (ref 3.6–5.5)
PROT SERPL-MCNC: 7.5 G/DL (ref 6–8.2)
RBC # BLD AUTO: 5.59 M/UL (ref 4.2–5.4)
SODIUM SERPL-SCNC: 140 MMOL/L (ref 135–145)
WBC # BLD AUTO: 6.5 K/UL (ref 4.8–10.8)

## 2022-05-22 PROCEDURE — 99285 EMERGENCY DEPT VISIT HI MDM: CPT

## 2022-05-22 PROCEDURE — 93005 ELECTROCARDIOGRAM TRACING: CPT | Performed by: STUDENT IN AN ORGANIZED HEALTH CARE EDUCATION/TRAINING PROGRAM

## 2022-05-22 PROCEDURE — 80074 ACUTE HEPATITIS PANEL: CPT

## 2022-05-22 PROCEDURE — 93005 ELECTROCARDIOGRAM TRACING: CPT

## 2022-05-22 PROCEDURE — 94760 N-INVAS EAR/PLS OXIMETRY 1: CPT

## 2022-05-22 PROCEDURE — 36415 COLL VENOUS BLD VENIPUNCTURE: CPT

## 2022-05-22 PROCEDURE — 80053 COMPREHEN METABOLIC PANEL: CPT

## 2022-05-22 PROCEDURE — 83690 ASSAY OF LIPASE: CPT

## 2022-05-22 PROCEDURE — 82077 ASSAY SPEC XCP UR&BREATH IA: CPT

## 2022-05-22 PROCEDURE — 84703 CHORIONIC GONADOTROPIN ASSAY: CPT

## 2022-05-22 PROCEDURE — 85025 COMPLETE CBC W/AUTO DIFF WBC: CPT

## 2022-05-23 ENCOUNTER — APPOINTMENT (OUTPATIENT)
Dept: RADIOLOGY | Facility: MEDICAL CENTER | Age: 37
DRG: 440 | End: 2022-05-23
Attending: STUDENT IN AN ORGANIZED HEALTH CARE EDUCATION/TRAINING PROGRAM
Payer: COMMERCIAL

## 2022-05-23 ENCOUNTER — HOSPITAL ENCOUNTER (INPATIENT)
Facility: MEDICAL CENTER | Age: 37
LOS: 4 days | DRG: 440 | End: 2022-05-27
Attending: STUDENT IN AN ORGANIZED HEALTH CARE EDUCATION/TRAINING PROGRAM | Admitting: INTERNAL MEDICINE
Payer: COMMERCIAL

## 2022-05-23 DIAGNOSIS — F10.930 ALCOHOL WITHDRAWAL SYNDROME WITHOUT COMPLICATION (HCC): ICD-10-CM

## 2022-05-23 DIAGNOSIS — R06.02 SHORTNESS OF BREATH: ICD-10-CM

## 2022-05-23 DIAGNOSIS — K85.20 ALCOHOL-INDUCED ACUTE PANCREATITIS, UNSPECIFIED COMPLICATION STATUS: ICD-10-CM

## 2022-05-23 DIAGNOSIS — S01.312A LACERATION OF LEFT EAR CANAL, INITIAL ENCOUNTER: ICD-10-CM

## 2022-05-23 PROBLEM — R56.9 SEIZURE (HCC): Status: ACTIVE | Noted: 2022-05-23

## 2022-05-23 LAB
AMPHET UR QL SCN: NEGATIVE
APPEARANCE UR: CLEAR
BARBITURATES UR QL SCN: POSITIVE
BENZODIAZ UR QL SCN: POSITIVE
BILIRUB UR QL STRIP.AUTO: NEGATIVE
BZE UR QL SCN: NEGATIVE
CANNABINOIDS UR QL SCN: POSITIVE
COLOR UR: YELLOW
D DIMER PPP IA.FEU-MCNC: 0.7 UG/ML (FEU) (ref 0–0.5)
EKG IMPRESSION: NORMAL
ETHANOL BLD-MCNC: 216.2 MG/DL
FLUAV RNA SPEC QL NAA+PROBE: NEGATIVE
FLUBV RNA SPEC QL NAA+PROBE: NEGATIVE
GLUCOSE UR STRIP.AUTO-MCNC: NEGATIVE MG/DL
HAV IGM SERPL QL IA: NORMAL
HBV CORE IGM SER QL: NORMAL
HBV SURFACE AG SER QL: NORMAL
HCG SERPL QL: NEGATIVE
HCV AB SER QL: NORMAL
KETONES UR STRIP.AUTO-MCNC: NEGATIVE MG/DL
LEUKOCYTE ESTERASE UR QL STRIP.AUTO: NEGATIVE
LIPASE SERPL-CCNC: 232 U/L (ref 11–82)
METHADONE UR QL SCN: NEGATIVE
MICRO URNS: NORMAL
NITRITE UR QL STRIP.AUTO: NEGATIVE
OPIATES UR QL SCN: POSITIVE
OXYCODONE UR QL SCN: NEGATIVE
PCP UR QL SCN: NEGATIVE
PH UR STRIP.AUTO: 7.5 [PH] (ref 5–8)
PROPOXYPH UR QL SCN: NEGATIVE
PROT UR QL STRIP: NEGATIVE MG/DL
RBC UR QL AUTO: NEGATIVE
RSV RNA SPEC QL NAA+PROBE: NEGATIVE
SARS-COV-2 RNA RESP QL NAA+PROBE: NOTDETECTED
SP GR UR STRIP.AUTO: 1.01
SPECIMEN SOURCE: NORMAL
UROBILINOGEN UR STRIP.AUTO-MCNC: 0.2 MG/DL

## 2022-05-23 PROCEDURE — 80307 DRUG TEST PRSMV CHEM ANLYZR: CPT

## 2022-05-23 PROCEDURE — 76705 ECHO EXAM OF ABDOMEN: CPT

## 2022-05-23 PROCEDURE — 85379 FIBRIN DEGRADATION QUANT: CPT

## 2022-05-23 PROCEDURE — 770006 HCHG ROOM/CARE - MED/SURG/GYN SEMI*

## 2022-05-23 PROCEDURE — 99406 BEHAV CHNG SMOKING 3-10 MIN: CPT

## 2022-05-23 PROCEDURE — 36415 COLL VENOUS BLD VENIPUNCTURE: CPT

## 2022-05-23 PROCEDURE — A9270 NON-COVERED ITEM OR SERVICE: HCPCS | Performed by: INTERNAL MEDICINE

## 2022-05-23 PROCEDURE — 700102 HCHG RX REV CODE 250 W/ 637 OVERRIDE(OP): Performed by: STUDENT IN AN ORGANIZED HEALTH CARE EDUCATION/TRAINING PROGRAM

## 2022-05-23 PROCEDURE — 700105 HCHG RX REV CODE 258: Performed by: INTERNAL MEDICINE

## 2022-05-23 PROCEDURE — 700111 HCHG RX REV CODE 636 W/ 250 OVERRIDE (IP): Performed by: STUDENT IN AN ORGANIZED HEALTH CARE EDUCATION/TRAINING PROGRAM

## 2022-05-23 PROCEDURE — C9803 HOPD COVID-19 SPEC COLLECT: HCPCS | Performed by: STUDENT IN AN ORGANIZED HEALTH CARE EDUCATION/TRAINING PROGRAM

## 2022-05-23 PROCEDURE — 81003 URINALYSIS AUTO W/O SCOPE: CPT

## 2022-05-23 PROCEDURE — 99406 BEHAV CHNG SMOKING 3-10 MIN: CPT | Performed by: STUDENT IN AN ORGANIZED HEALTH CARE EDUCATION/TRAINING PROGRAM

## 2022-05-23 PROCEDURE — 700105 HCHG RX REV CODE 258: Performed by: STUDENT IN AN ORGANIZED HEALTH CARE EDUCATION/TRAINING PROGRAM

## 2022-05-23 PROCEDURE — 0241U HCHG SARS-COV-2 COVID-19 NFCT DS RESP RNA 4 TRGT MIC: CPT

## 2022-05-23 PROCEDURE — 96365 THER/PROPH/DIAG IV INF INIT: CPT

## 2022-05-23 PROCEDURE — 96376 TX/PRO/DX INJ SAME DRUG ADON: CPT

## 2022-05-23 PROCEDURE — 99223 1ST HOSP IP/OBS HIGH 75: CPT | Mod: 25 | Performed by: INTERNAL MEDICINE

## 2022-05-23 PROCEDURE — 700102 HCHG RX REV CODE 250 W/ 637 OVERRIDE(OP): Performed by: INTERNAL MEDICINE

## 2022-05-23 PROCEDURE — 71045 X-RAY EXAM CHEST 1 VIEW: CPT

## 2022-05-23 PROCEDURE — A9270 NON-COVERED ITEM OR SERVICE: HCPCS | Performed by: STUDENT IN AN ORGANIZED HEALTH CARE EDUCATION/TRAINING PROGRAM

## 2022-05-23 PROCEDURE — A9270 NON-COVERED ITEM OR SERVICE: HCPCS

## 2022-05-23 PROCEDURE — 96366 THER/PROPH/DIAG IV INF ADDON: CPT

## 2022-05-23 PROCEDURE — 700101 HCHG RX REV CODE 250: Performed by: INTERNAL MEDICINE

## 2022-05-23 PROCEDURE — 96375 TX/PRO/DX INJ NEW DRUG ADDON: CPT

## 2022-05-23 PROCEDURE — 700102 HCHG RX REV CODE 250 W/ 637 OVERRIDE(OP)

## 2022-05-23 PROCEDURE — 700111 HCHG RX REV CODE 636 W/ 250 OVERRIDE (IP): Performed by: INTERNAL MEDICINE

## 2022-05-23 RX ORDER — AMOXICILLIN 250 MG
2 CAPSULE ORAL 2 TIMES DAILY
Status: DISCONTINUED | OUTPATIENT
Start: 2022-05-23 | End: 2022-05-27 | Stop reason: HOSPADM

## 2022-05-23 RX ORDER — SODIUM CHLORIDE, SODIUM LACTATE, POTASSIUM CHLORIDE, CALCIUM CHLORIDE 600; 310; 30; 20 MG/100ML; MG/100ML; MG/100ML; MG/100ML
1000 INJECTION, SOLUTION INTRAVENOUS ONCE
Status: ACTIVE | OUTPATIENT
Start: 2022-05-23 | End: 2022-05-24

## 2022-05-23 RX ORDER — LORAZEPAM 0.5 MG/1
0.5 TABLET ORAL EVERY 4 HOURS PRN
Status: DISCONTINUED | OUTPATIENT
Start: 2022-05-23 | End: 2022-05-27 | Stop reason: HOSPADM

## 2022-05-23 RX ORDER — BISACODYL 10 MG
10 SUPPOSITORY, RECTAL RECTAL
Status: DISCONTINUED | OUTPATIENT
Start: 2022-05-23 | End: 2022-05-27 | Stop reason: HOSPADM

## 2022-05-23 RX ORDER — DIAZEPAM 5 MG/1
5 TABLET ORAL EVERY 6 HOURS
Status: COMPLETED | OUTPATIENT
Start: 2022-05-24 | End: 2022-05-25

## 2022-05-23 RX ORDER — MORPHINE SULFATE 4 MG/ML
4 INJECTION INTRAVENOUS EVERY 4 HOURS PRN
Status: DISCONTINUED | OUTPATIENT
Start: 2022-05-23 | End: 2022-05-25

## 2022-05-23 RX ORDER — ZONISAMIDE 50 MG/1
50 CAPSULE ORAL DAILY
Refills: 5 | Status: DISCONTINUED | OUTPATIENT
Start: 2022-05-23 | End: 2022-05-27 | Stop reason: HOSPADM

## 2022-05-23 RX ORDER — GAUZE BANDAGE 2" X 2"
100 BANDAGE TOPICAL DAILY
Status: COMPLETED | OUTPATIENT
Start: 2022-05-24 | End: 2022-05-27

## 2022-05-23 RX ORDER — ONDANSETRON 2 MG/ML
4 INJECTION INTRAMUSCULAR; INTRAVENOUS EVERY 4 HOURS PRN
Status: DISCONTINUED | OUTPATIENT
Start: 2022-05-23 | End: 2022-05-27 | Stop reason: HOSPADM

## 2022-05-23 RX ORDER — PROCHLORPERAZINE EDISYLATE 5 MG/ML
5-10 INJECTION INTRAMUSCULAR; INTRAVENOUS EVERY 4 HOURS PRN
Status: DISCONTINUED | OUTPATIENT
Start: 2022-05-23 | End: 2022-05-27 | Stop reason: HOSPADM

## 2022-05-23 RX ORDER — FOLIC ACID 1 MG/1
1 TABLET ORAL DAILY
Status: COMPLETED | OUTPATIENT
Start: 2022-05-24 | End: 2022-05-27

## 2022-05-23 RX ORDER — DEXTROSE AND SODIUM CHLORIDE 5; .45 G/100ML; G/100ML
INJECTION, SOLUTION INTRAVENOUS CONTINUOUS
Status: DISCONTINUED | OUTPATIENT
Start: 2022-05-23 | End: 2022-05-24

## 2022-05-23 RX ORDER — HYDRALAZINE HYDROCHLORIDE 20 MG/ML
10 INJECTION INTRAMUSCULAR; INTRAVENOUS EVERY 4 HOURS PRN
Status: DISCONTINUED | OUTPATIENT
Start: 2022-05-23 | End: 2022-05-27 | Stop reason: HOSPADM

## 2022-05-23 RX ORDER — LEVETIRACETAM 500 MG/1
1000 TABLET ORAL 2 TIMES DAILY
Status: DISCONTINUED | OUTPATIENT
Start: 2022-05-23 | End: 2022-05-27 | Stop reason: HOSPADM

## 2022-05-23 RX ORDER — BUTALBITAL, ACETAMINOPHEN AND CAFFEINE 50; 325; 40 MG/1; MG/1; MG/1
1 TABLET ORAL EVERY 6 HOURS PRN
Status: DISCONTINUED | OUTPATIENT
Start: 2022-05-23 | End: 2022-05-27 | Stop reason: HOSPADM

## 2022-05-23 RX ORDER — DIPHENHYDRAMINE HCL 25 MG
25 TABLET ORAL EVERY 8 HOURS PRN
Status: DISCONTINUED | OUTPATIENT
Start: 2022-05-23 | End: 2022-05-27 | Stop reason: HOSPADM

## 2022-05-23 RX ORDER — PROMETHAZINE HYDROCHLORIDE 12.5 MG/1
12.5-25 SUPPOSITORY RECTAL EVERY 4 HOURS PRN
Status: DISCONTINUED | OUTPATIENT
Start: 2022-05-23 | End: 2022-05-27 | Stop reason: HOSPADM

## 2022-05-23 RX ORDER — LORAZEPAM 2 MG/ML
0.5 INJECTION INTRAMUSCULAR EVERY 4 HOURS PRN
Status: DISCONTINUED | OUTPATIENT
Start: 2022-05-23 | End: 2022-05-27 | Stop reason: HOSPADM

## 2022-05-23 RX ORDER — DIAZEPAM 5 MG/1
10 TABLET ORAL EVERY 6 HOURS
Status: COMPLETED | OUTPATIENT
Start: 2022-05-23 | End: 2022-05-23

## 2022-05-23 RX ORDER — LORAZEPAM 2 MG/1
2 TABLET ORAL
Status: DISCONTINUED | OUTPATIENT
Start: 2022-05-23 | End: 2022-05-27 | Stop reason: HOSPADM

## 2022-05-23 RX ORDER — LORAZEPAM 1 MG/1
1 TABLET ORAL EVERY 4 HOURS PRN
Status: DISCONTINUED | OUTPATIENT
Start: 2022-05-23 | End: 2022-05-27 | Stop reason: HOSPADM

## 2022-05-23 RX ORDER — LORAZEPAM 2 MG/ML
1 INJECTION INTRAMUSCULAR
Status: DISCONTINUED | OUTPATIENT
Start: 2022-05-23 | End: 2022-05-27 | Stop reason: HOSPADM

## 2022-05-23 RX ORDER — OMEPRAZOLE 20 MG/1
20 CAPSULE, DELAYED RELEASE ORAL DAILY
Status: DISCONTINUED | OUTPATIENT
Start: 2022-05-23 | End: 2022-05-27 | Stop reason: HOSPADM

## 2022-05-23 RX ORDER — LORAZEPAM 2 MG/ML
2 INJECTION INTRAMUSCULAR
Status: DISCONTINUED | OUTPATIENT
Start: 2022-05-23 | End: 2022-05-27 | Stop reason: HOSPADM

## 2022-05-23 RX ORDER — POLYETHYLENE GLYCOL 3350 17 G/17G
1 POWDER, FOR SOLUTION ORAL
Status: DISCONTINUED | OUTPATIENT
Start: 2022-05-23 | End: 2022-05-27 | Stop reason: HOSPADM

## 2022-05-23 RX ORDER — LORAZEPAM 2 MG/1
4 TABLET ORAL
Status: DISCONTINUED | OUTPATIENT
Start: 2022-05-23 | End: 2022-05-27 | Stop reason: HOSPADM

## 2022-05-23 RX ORDER — ONDANSETRON 4 MG/1
4 TABLET, ORALLY DISINTEGRATING ORAL EVERY 4 HOURS PRN
Status: DISCONTINUED | OUTPATIENT
Start: 2022-05-23 | End: 2022-05-27 | Stop reason: HOSPADM

## 2022-05-23 RX ORDER — PROMETHAZINE HYDROCHLORIDE 25 MG/1
12.5-25 TABLET ORAL EVERY 4 HOURS PRN
Status: DISCONTINUED | OUTPATIENT
Start: 2022-05-23 | End: 2022-05-27 | Stop reason: HOSPADM

## 2022-05-23 RX ORDER — NICOTINE 21 MG/24HR
14 PATCH, TRANSDERMAL 24 HOURS TRANSDERMAL
Status: DISCONTINUED | OUTPATIENT
Start: 2022-05-23 | End: 2022-05-27 | Stop reason: HOSPADM

## 2022-05-23 RX ORDER — SODIUM CHLORIDE 9 MG/ML
1000 INJECTION, SOLUTION INTRAVENOUS ONCE
Status: COMPLETED | OUTPATIENT
Start: 2022-05-23 | End: 2022-05-23

## 2022-05-23 RX ORDER — LORAZEPAM 2 MG/ML
1.5 INJECTION INTRAMUSCULAR
Status: DISCONTINUED | OUTPATIENT
Start: 2022-05-23 | End: 2022-05-27 | Stop reason: HOSPADM

## 2022-05-23 RX ADMIN — DEXTROSE AND SODIUM CHLORIDE: 5; 450 INJECTION, SOLUTION INTRAVENOUS at 12:12

## 2022-05-23 RX ADMIN — ZONISAMIDE 50 MG: 50 CAPSULE ORAL at 07:55

## 2022-05-23 RX ADMIN — DIAZEPAM 10 MG: 5 TABLET ORAL at 03:45

## 2022-05-23 RX ADMIN — DIAZEPAM 10 MG: 5 TABLET ORAL at 14:35

## 2022-05-23 RX ADMIN — MORPHINE SULFATE 4 MG: 4 INJECTION INTRAVENOUS at 17:32

## 2022-05-23 RX ADMIN — LORAZEPAM 0.5 MG: 1 TABLET ORAL at 06:30

## 2022-05-23 RX ADMIN — SODIUM CHLORIDE 1000 ML: 9 INJECTION, SOLUTION INTRAVENOUS at 01:59

## 2022-05-23 RX ADMIN — LORAZEPAM 0.5 MG: 1 TABLET ORAL at 16:54

## 2022-05-23 RX ADMIN — LORAZEPAM 1 MG: 2 INJECTION INTRAMUSCULAR; INTRAVENOUS at 20:56

## 2022-05-23 RX ADMIN — LORAZEPAM 0.5 MG: 2 INJECTION INTRAMUSCULAR; INTRAVENOUS at 12:18

## 2022-05-23 RX ADMIN — MORPHINE SULFATE 4 MG: 4 INJECTION INTRAVENOUS at 21:40

## 2022-05-23 RX ADMIN — SENNOSIDES AND DOCUSATE SODIUM 2 TABLET: 50; 8.6 TABLET ORAL at 17:32

## 2022-05-23 RX ADMIN — LORAZEPAM 0.5 MG: 1 TABLET ORAL at 02:34

## 2022-05-23 RX ADMIN — NICOTINE 14 MG: 14 PATCH TRANSDERMAL at 16:54

## 2022-05-23 RX ADMIN — LEVETIRACETAM 1000 MG: 500 TABLET, FILM COATED ORAL at 17:31

## 2022-05-23 RX ADMIN — BUTALBITAL, ACETAMINOPHEN, AND CAFFEINE 1 TABLET: 50; 325; 40 TABLET ORAL at 14:33

## 2022-05-23 RX ADMIN — MORPHINE SULFATE 4 MG: 4 INJECTION INTRAVENOUS at 09:33

## 2022-05-23 RX ADMIN — LEVETIRACETAM 1000 MG: 500 TABLET, FILM COATED ORAL at 07:55

## 2022-05-23 RX ADMIN — DEXTROSE AND SODIUM CHLORIDE: 5; 450 INJECTION, SOLUTION INTRAVENOUS at 19:51

## 2022-05-23 RX ADMIN — RIVAROXABAN 10 MG: 10 TABLET, FILM COATED ORAL at 17:32

## 2022-05-23 RX ADMIN — DIAZEPAM 10 MG: 5 TABLET ORAL at 09:32

## 2022-05-23 RX ADMIN — DIAZEPAM 10 MG: 5 TABLET ORAL at 22:06

## 2022-05-23 RX ADMIN — DIPHENHYDRAMINE HYDROCHLORIDE 25 MG: 25 TABLET ORAL at 21:40

## 2022-05-23 RX ADMIN — MORPHINE SULFATE 4 MG: 4 INJECTION INTRAVENOUS at 04:15

## 2022-05-23 RX ADMIN — THIAMINE HYDROCHLORIDE: 100 INJECTION, SOLUTION INTRAMUSCULAR; INTRAVENOUS at 03:44

## 2022-05-23 RX ADMIN — MORPHINE SULFATE 4 MG: 4 INJECTION INTRAVENOUS at 13:28

## 2022-05-23 RX ADMIN — OMEPRAZOLE 20 MG: 20 CAPSULE, DELAYED RELEASE ORAL at 06:30

## 2022-05-23 ASSESSMENT — LIFESTYLE VARIABLES
EVER HAD A DRINK FIRST THING IN THE MORNING TO STEADY YOUR NERVES TO GET RID OF A HANGOVER: YES
ORIENTATION AND CLOUDING OF SENSORIUM: ORIENTED AND CAN DO SERIAL ADDITIONS
AVERAGE NUMBER OF DAYS PER WEEK YOU HAVE A DRINK CONTAINING ALCOHOL: 7
NAUSEA AND VOMITING: MILD NAUSEA WITH NO VOMITING
ANXIETY: MILDLY ANXIOUS
ANXIETY: MODERATELY ANXIOUS OR GUARDED, SO ANXIETY IS INFERRED
ANXIETY: *
ANXIETY: *
HEADACHE, FULLNESS IN HEAD: MODERATELY SEVERE
HAVE YOU EVER FELT YOU SHOULD CUT DOWN ON YOUR DRINKING: YES
ORIENTATION AND CLOUDING OF SENSORIUM: ORIENTED AND CAN DO SERIAL ADDITIONS
VISUAL DISTURBANCES: NOT PRESENT
TREMOR: *
HEADACHE, FULLNESS IN HEAD: MODERATE
HEADACHE, FULLNESS IN HEAD: NOT PRESENT
VISUAL DISTURBANCES: NOT PRESENT
ANXIETY: *
ORIENTATION AND CLOUDING OF SENSORIUM: ORIENTED AND CAN DO SERIAL ADDITIONS
TREMOR: TREMOR NOT VISIBLE BUT CAN BE FELT, FINGERTIP TO FINGERTIP
TOTAL SCORE: 7
AUDITORY DISTURBANCES: NOT PRESENT
TREMOR: TREMOR NOT VISIBLE BUT CAN BE FELT, FINGERTIP TO FINGERTIP
ANXIETY: *
VISUAL DISTURBANCES: NOT PRESENT
EVER FELT BAD OR GUILTY ABOUT YOUR DRINKING: YES
VISUAL DISTURBANCES: NOT PRESENT
AGITATION: SOMEWHAT MORE THAN NORMAL ACTIVITY
HEADACHE, FULLNESS IN HEAD: MODERATELY SEVERE
ORIENTATION AND CLOUDING OF SENSORIUM: ORIENTED AND CAN DO SERIAL ADDITIONS
AGITATION: SOMEWHAT MORE THAN NORMAL ACTIVITY
TOTAL SCORE: 4
TACTILE DISTURBANCES: MILD ITCHING, PINS AND NEEDLES SENSATION, BURNING OR NUMBNESS
HAVE PEOPLE ANNOYED YOU BY CRITICIZING YOUR DRINKING: YES
ORIENTATION AND CLOUDING OF SENSORIUM: ORIENTED AND CAN DO SERIAL ADDITIONS
ORIENTATION AND CLOUDING OF SENSORIUM: ORIENTED AND CAN DO SERIAL ADDITIONS
NAUSEA AND VOMITING: NO NAUSEA AND NO VOMITING
VISUAL DISTURBANCES: NOT PRESENT
TOTAL SCORE: 10
PAROXYSMAL SWEATS: NO SWEAT VISIBLE
ANXIETY: *
NAUSEA AND VOMITING: MILD NAUSEA WITH NO VOMITING
NAUSEA AND VOMITING: NO NAUSEA AND NO VOMITING
TREMOR: *
AUDITORY DISTURBANCES: NOT PRESENT
PAROXYSMAL SWEATS: BARELY PERCEPTIBLE SWEATING. PALMS MOIST
TOTAL SCORE: 11
NAUSEA AND VOMITING: MILD NAUSEA WITH NO VOMITING
TREMOR: *
HEADACHE, FULLNESS IN HEAD: NOT PRESENT
PAROXYSMAL SWEATS: BARELY PERCEPTIBLE SWEATING. PALMS MOIST
TOTAL SCORE: 10
AUDITORY DISTURBANCES: NOT PRESENT
TOTAL SCORE: 7
AUDITORY DISTURBANCES: NOT PRESENT
NAUSEA AND VOMITING: *
AGITATION: NORMAL ACTIVITY
TOTAL SCORE: 4
VISUAL DISTURBANCES: NOT PRESENT
VISUAL DISTURBANCES: NOT PRESENT
DOES PATIENT WANT TO TALK TO SOMEONE ABOUT QUITTING: YES
HOW MANY TIMES IN THE PAST YEAR HAVE YOU HAD 5 OR MORE DRINKS IN A DAY: 7
TREMOR: TREMOR NOT VISIBLE BUT CAN BE FELT, FINGERTIP TO FINGERTIP
PAROXYSMAL SWEATS: NO SWEAT VISIBLE
TREMOR: *
TOTAL SCORE: 4
AGITATION: NORMAL ACTIVITY
PAROXYSMAL SWEATS: BARELY PERCEPTIBLE SWEATING. PALMS MOIST
DOES PATIENT WANT TO STOP DRINKING: YES
ON A TYPICAL DAY WHEN YOU DRINK ALCOHOL HOW MANY DRINKS DO YOU HAVE: 8
CONSUMPTION TOTAL: POSITIVE
AGITATION: NORMAL ACTIVITY
AUDITORY DISTURBANCES: NOT PRESENT
AGITATION: SOMEWHAT MORE THAN NORMAL ACTIVITY
NAUSEA AND VOMITING: MILD NAUSEA WITH NO VOMITING
TOTAL SCORE: 7
TOTAL SCORE: 10
AUDITORY DISTURBANCES: NOT PRESENT
TACTILE DISTURBANCES: MILD ITCHING, PINS AND NEEDLES SENSATION, BURNING OR NUMBNESS
AUDITORY DISTURBANCES: NOT PRESENT
AGITATION: SOMEWHAT MORE THAN NORMAL ACTIVITY
PAROXYSMAL SWEATS: BARELY PERCEPTIBLE SWEATING. PALMS MOIST
PAROXYSMAL SWEATS: *
HEADACHE, FULLNESS IN HEAD: NOT PRESENT
ORIENTATION AND CLOUDING OF SENSORIUM: ORIENTED AND CAN DO SERIAL ADDITIONS
HEADACHE, FULLNESS IN HEAD: MODERATE

## 2022-05-23 ASSESSMENT — ENCOUNTER SYMPTOMS
PALPITATIONS: 0
NAUSEA: 1
DIZZINESS: 0
COUGH: 0
NECK PAIN: 0
SHORTNESS OF BREATH: 1
MYALGIAS: 0
BRUISES/BLEEDS EASILY: 0
BLURRED VISION: 0
DOUBLE VISION: 0
DIARRHEA: 0
FLANK PAIN: 0
FEVER: 0
ABDOMINAL PAIN: 1
POLYDIPSIA: 0
VOMITING: 1
HEADACHES: 0
CHILLS: 0

## 2022-05-23 ASSESSMENT — COGNITIVE AND FUNCTIONAL STATUS - GENERAL
SUGGESTED CMS G CODE MODIFIER DAILY ACTIVITY: CH
SUGGESTED CMS G CODE MODIFIER MOBILITY: CH
DAILY ACTIVITIY SCORE: 24
MOBILITY SCORE: 24

## 2022-05-23 ASSESSMENT — PAIN DESCRIPTION - PAIN TYPE
TYPE: ACUTE PAIN

## 2022-05-23 ASSESSMENT — PATIENT HEALTH QUESTIONNAIRE - PHQ9
SUM OF ALL RESPONSES TO PHQ QUESTIONS 1-9: 3
SUM OF ALL RESPONSES TO PHQ9 QUESTIONS 1 AND 2: 2
8. MOVING OR SPEAKING SO SLOWLY THAT OTHER PEOPLE COULD HAVE NOTICED. OR THE OPPOSITE, BEING SO FIGETY OR RESTLESS THAT YOU HAVE BEEN MOVING AROUND A LOT MORE THAN USUAL: NOT AT ALL
7. TROUBLE CONCENTRATING ON THINGS, SUCH AS READING THE NEWSPAPER OR WATCHING TELEVISION: NOT AT ALL
9. THOUGHTS THAT YOU WOULD BE BETTER OFF DEAD, OR OF HURTING YOURSELF: NOT AT ALL
5. POOR APPETITE OR OVEREATING: NOT AT ALL
3. TROUBLE FALLING OR STAYING ASLEEP OR SLEEPING TOO MUCH: SEVERAL DAYS
1. LITTLE INTEREST OR PLEASURE IN DOING THINGS: SEVERAL DAYS
4. FEELING TIRED OR HAVING LITTLE ENERGY: NOT AT ALL
6. FEELING BAD ABOUT YOURSELF - OR THAT YOU ARE A FAILURE OR HAVE LET YOURSELF OR YOUR FAMILY DOWN: NOT AL ALL
2. FEELING DOWN, DEPRESSED, IRRITABLE, OR HOPELESS: SEVERAL DAYS

## 2022-05-23 ASSESSMENT — FIBROSIS 4 INDEX: FIB4 SCORE: 4.28

## 2022-05-23 NOTE — ASSESSMENT & PLAN NOTE
have placed patient on CIWA scale with coverage with Ativan  Also have started long-acting diazepam  Patient to be given rally bag with multivitamins  Would benefit from long-term outpatient detox treatment. CM consulted.

## 2022-05-23 NOTE — PROGRESS NOTES
4 Eyes Skin Assessment Completed by LESLIE Pierre and LESLIE Garcia.     Head WNL  Ears WNL  Nose WNL  Mouth WNL  Neck WNL  Breast/Chest WNL  Shoulder Blades WNL  Spine WNL  (R) Arm/Elbow/Hand WNL  (L) Arm/Elbow/Hand WNL  Abdomen WNL  Groin WNL  Scrotum/Coccyx/Buttocks WNL  (R) Leg WNL  (L) Leg WNL  (R) Heel/Foot/Toe WNL  (L) Heel/Foot/Toe WNL        Devices In Place NONE        Interventions In Place N/A     Possible Skin Injury NO     Pictures Uploaded Into Epic N/A  Wound Consult Placed N/A  RN Wound Prevention Protocol Ordered N/A

## 2022-05-23 NOTE — ASSESSMENT & PLAN NOTE
Being treated on the outpatient basis with carisoprodol  Asymptomatic at the present time will not initiate any treatment

## 2022-05-23 NOTE — ED TRIAGE NOTES
"Rhiannon Lynne Elida  37 y.o.  Chief Complaint   Patient presents with   • Shortness of Breath     Starting 2 weeks ago, worsening since   • N/V     Started 2 days ago   • Ear Pain     Started 5 days ago, pt reporting attempting to clean ear with q tip \"and it keeps coming out bloody.\"     Ambulatory to lobby for above, pt reporting SOB starting 2 weeks ago. Pt continues to smoke, currently has nicotine patch \"because I couldn't make it to the door.\" Pt endorses N/V, no diarrhea, starting 2 days ago, states \"I can't keep anything down.\" No emesis noted in triage. Pt also reporting L ear pain that started approximately 5 days ago and reports blood on q tip when attempting to clean. A&Ox4, protocol placed.  "

## 2022-05-23 NOTE — PROGRESS NOTES
Report called to L[facility]. Pt will be going to [id number]. Pt to floor per transport with belongings.

## 2022-05-23 NOTE — ASSESSMENT & PLAN NOTE
Secondary to alcohol binge  Patient still is pretty nauseous and has abdominal pain we will give his Zofran and pain control  Advance diet as tolerated  IV fluids  Pain control

## 2022-05-23 NOTE — ED PROVIDER NOTES
"ED Provider Note    CHIEF COMPLAINT  Chief Complaint   Patient presents with   • Shortness of Breath     Starting 2 weeks ago, worsening since   • N/V     Started 2 days ago   • Ear Pain     Started 5 days ago, pt reporting attempting to clean ear with q tip \"and it keeps coming out bloody.\"       HPI  Rhiannon Marrero is a 37 y.o. female PMH ETOH abuse, pancreatitis, seizure, bipolar not on meds who presents with multiple complaints.  Patient made to come to emergency department by her mother.  They report shortness of breath going on for 2 weeks, slowly worsening.  Per patient she was evaluated by EMS last week and her oxygen was low in the 80s but she refused to come to the emergency department.  She denies history of asthma or chronic lung disease.  She reports fevers early in the illness although she cannot tell me how high but states they were for \"a few days\".  She states that they have now resolved.  She feels her shortness of breath has continued to get worse.  Reports tingling sensation in her hands and feet from time to time.  She also reports nausea and vomiting over the last 2 days.  Denies abdominal pain but does report tenderness diffusely about her abdomen is palpated.  She reports chronic dysuria.  She denies SI or HI.  She has a history of tobacco use.  Denies history of DVT or PE.  Denies any leg pain or swelling.  There is a history of hysterectomy in the past.  No other abdominal surgeries.  She states she last tried to drink alcohol earlier today for her nausea and vomiting.  Separately she also reports left ear pain and blood when cleaning with a Q-tip.  Patient reports heavy alcohol use over the last week with multiple shots daily.  States she had quit drinking but then began drinking again in January.  Denies drinking today however her alcohol level is elevated.    REVIEW OF SYSTEMS  See HPI for further details. All other systems are negative.     PAST MEDICAL HISTORY   has a past medical " history of Alcohol dependence (HCC) (4/13/2017), Bronchitis (8/2012), Cold, Heart burn, Hernia of unspecified site of abdominal cavity without mention of obstruction or gangrene, Indigestion, Pancreatitis, and Pneumonia (2011).    SOCIAL HISTORY  Social History     Tobacco Use   • Smoking status: Current Every Day Smoker     Packs/day: 0.75     Years: 10.00     Pack years: 7.50     Types: Cigarettes   • Smokeless tobacco: Never Used   Vaping Use   • Vaping Use: Never used   Substance and Sexual Activity   • Alcohol use: Yes     Comment: recreationally   • Drug use: Yes     Types: Marijuana     Comment: edibles   • Sexual activity: Not on file       SURGICAL HISTORY   has a past surgical history that includes other orthopedic surgery; gyn surgery; tonsillectomy (4/11/2013); arthroscopy, knee; hysterectomy robotic xi (10/11/2018); salpingectomy (Bilateral, 10/11/2018); and orif, wrist (Right, 4/24/2019).    CURRENT MEDICATIONS  Home Medications     Reviewed by Suzy Yao R.N. (Registered Nurse) on 05/22/22 at 2152  Med List Status: Not Addressed   Medication Last Dose Status   amitriptyline (ELAVIL) 25 MG Tab  Active   buPROPion (WELLBUTRIN SR) 200 MG SR tablet  Active   Cariprazine HCl (VRAYLAR) 4.5 MG Cap  Active   cetirizine (ZYRTEC) 10 MG Tab  Active   cloNIDine (CATAPRES) 0.2 MG Tab  Active   hydrOXYzine pamoate (VISTARIL) 25 MG Cap  Active   levetiracetam (KEPPRA) 1000 MG tablet  Active   levETIRAcetam (KEPPRA) 500 MG Tab  Active   LORazepam (ATIVAN) 1 MG Tab  Active   quetiapine (SEROQUEL XR) 200 MG XR tablet  Active   traZODone (DESYREL) 50 MG Tab  Active   zonisamide (ZONEGRAN) 25 MG capsule  Active   zonisamide (ZONEGRAN) 25 MG capsule  Active                ALLERGIES  Allergies   Allergen Reactions   • Lorazepam Anxiety and Unspecified     Severe irritability and irrationality   • Promethazine Hcl Anxiety       PHYSICAL EXAM  VITAL SIGNS: /86   Pulse 98   Temp 36.9 °C (98.5 °F)  "(Temporal)   Resp 18   Ht 1.676 m (5' 6\")   Wt 68 kg (150 lb)   LMP 10/30/2018   SpO2 98%   BMI 24.21 kg/m²     Pulse ox interpretation: I interpret this pulse ox as normal.  Constitutional: Alert, slightly disheveled, appears very anxious  HENT: No signs of trauma, Bilateral external ears normal, Nose normal.  Left ear canal with scrape and area of recent bleeding, TM is normal with no perforation.  Right canal and TM are normal.  Eyes: Pupils are 3 mm bilaterally, equal, minimally reactive, Conjunctiva normal, Non-icteric.   Neck: Normal range of motion, No tenderness, Supple, No stridor.   Lymphatic: No lymphadenopathy noted.   Cardiovascular: Regular rate and rhythm, no murmurs.   Thorax & Lungs: Normal breath sounds, no wheezing, tachypneic, shallow breathing, no retractions or increased work of breathing  Abdomen: Diffuse abdominal tenderness with guarding, no masses, multiple well-healed surgical incisions  Skin: Warm, Dry, No erythema, No rash.   Extremities: Intact distal pulses, No edema, No tenderness, No cyanosis.  Musculoskeletal: Good range of motion in all major joints. No major deformities noted.   Neurologic: Alert , Normal motor function, No focal deficits noted.   Psychiatric: Appears slightly anxious, slightly pressured speech      DIAGNOSTIC STUDIES / PROCEDURES    LABS  Results for orders placed or performed during the hospital encounter of 05/23/22   CBC with Differential   Result Value Ref Range    WBC 6.5 4.8 - 10.8 K/uL    RBC 5.59 (H) 4.20 - 5.40 M/uL    Hemoglobin 17.4 (H) 12.0 - 16.0 g/dL    Hematocrit 48.4 (H) 37.0 - 47.0 %    MCV 86.6 81.4 - 97.8 fL    MCH 31.1 27.0 - 33.0 pg    MCHC 36.0 (H) 33.6 - 35.0 g/dL    RDW 50.1 (H) 35.9 - 50.0 fL    Platelet Count 206 164 - 446 K/uL    MPV 9.8 9.0 - 12.9 fL    Neutrophils-Polys 52.20 44.00 - 72.00 %    Lymphocytes 35.90 22.00 - 41.00 %    Monocytes 10.10 0.00 - 13.40 %    Eosinophils 0.80 0.00 - 6.90 %    Basophils 0.80 0.00 - 1.80 %    " Immature Granulocytes 0.20 0.00 - 0.90 %    Nucleated RBC 0.00 /100 WBC    Neutrophils (Absolute) 3.40 2.00 - 7.15 K/uL    Lymphs (Absolute) 2.34 1.00 - 4.80 K/uL    Monos (Absolute) 0.66 0.00 - 0.85 K/uL    Eos (Absolute) 0.05 0.00 - 0.51 K/uL    Baso (Absolute) 0.05 0.00 - 0.12 K/uL    Immature Granulocytes (abs) 0.01 0.00 - 0.11 K/uL    NRBC (Absolute) 0.00 K/uL   Comp Metabolic Panel   Result Value Ref Range    Sodium 140 135 - 145 mmol/L    Potassium 3.7 3.6 - 5.5 mmol/L    Chloride 102 96 - 112 mmol/L    Co2 20 20 - 33 mmol/L    Anion Gap 18.0 (H) 7.0 - 16.0    Glucose 125 (H) 65 - 99 mg/dL    Bun 10 8 - 22 mg/dL    Creatinine 0.74 0.50 - 1.40 mg/dL    Calcium 9.2 8.5 - 10.5 mg/dL    AST(SGOT) 468 (H) 12 - 45 U/L    ALT(SGPT) 345 (H) 2 - 50 U/L    Alkaline Phosphatase 116 (H) 30 - 99 U/L    Total Bilirubin 0.6 0.1 - 1.5 mg/dL    Albumin 5.0 (H) 3.2 - 4.9 g/dL    Total Protein 7.5 6.0 - 8.2 g/dL    Globulin 2.5 1.9 - 3.5 g/dL    A-G Ratio 2.0 g/dL   ESTIMATED GFR   Result Value Ref Range    GFR (CKD-EPI) 108 >60 mL/min/1.73 m 2   HCG QUAL SERUM   Result Value Ref Range    Beta-Hcg Qualitative Serum Negative Negative   CoV-2, FLU A/B, and RSV by PCR (2-4 Hours [facility]) : Collect NP swab in VTM    Specimen: Respirate   Result Value Ref Range    SARS-CoV-2 Source NP Swab    DIAGNOSTIC ALCOHOL   Result Value Ref Range    Diagnostic Alcohol 216.2 (H) <10.1 mg/dL   LIPASE   Result Value Ref Range    Lipase 232 (H) 11 - 82 U/L   EKG (NOW)   Result Value Ref Range    Report       [facility]    Test Date:  2022  Pt Name:    JAKE LOPEZ                    Department: ER  MRN:        [id number]                      Room:  Gender:     Female                       Technician: [id number]  :        1985                   Requested By:ER TRIAGE PROTOCOL  Order #:    [id number]                    Archana MD: Soledad Rojas    Measurements  Intervals                                 Axis  Rate:       111                          P:          64  MN:         180                          QRS:        82  QRSD:       84                           T:          7  QT:         344  QTc:        468    Interpretive Statements  Sinus tachycardia rate of 111, normal axis, normal intervals, no ST  elevations,  depressions, or T wave inversions  Electronically Signed On 5- 2:20:31 PDT by Soledad Rojas           RADIOLOGY  DX-CHEST-PORTABLE (1 VIEW)   Final Result         1.  No acute cardiopulmonary disease.      US-RUQ   Final Result         1.  Hepatomegaly and echogenic liver, compatible with fatty change versus fibrosis.   2.  Otherwise unremarkable right upper quadrant sonogram            COURSE & MEDICAL DECISION MAKING  Pertinent Labs & Imaging studies reviewed. (See chart for details)    2:32 AM  Accepted for admission by hospitalist.  Patient updated on plan for admission and is agreeable.    37-year-old he female with history of alcohol abuse and bipolar now relapsed presenting with 2 weeks of increasing shortness of breath.  Patient is not hypoxic, has clear breath sounds, no history of underlying lung disease and no risk factors for PE/DVT.  She is however tachycardic and reports hypoxemia with an EMS evaluation a week ago.  Sent COVID test which is pending as well as D-dimer.  Pending at time of disposition.  Her labs are remarkable for markedly elevated liver enzymes and lipase consistent with alcoholic pancreatitis.  Ultrasound of the right upper quadrant shows no gallstones or obvious obstruction.  Chest x-ray is clear and EKG shows no evidence of ischemia, pericarditis/myocarditis.  Her CIWA score was elevated and she is at high risk for alcohol withdrawal.  She was admitted to hospitalist for further management of her alcoholic pancreatitis and her anticipated withdrawal.  In addition she was found to have a small laceration inside her left ear canal however the tympanic membrane is  intact and there are no signs of infection at this time.       FINAL IMPRESSION  1. Alcohol-induced acute pancreatitis, unspecified complication status     2. Alcohol withdrawal syndrome without complication (HCC)     3. Laceration of left ear canal, initial encounter     4. Shortness of breath           Electronically signed by: Soledad Rojas M.D., 5/23/2022 1:12 AM

## 2022-05-23 NOTE — H&P
"Hospital Medicine History & Physical Note    Date of Service  5/23/2022    Primary Care Physician  No primary care provider on file.    Consultants  None      Code Status  Full Code    Chief Complaint  Chief Complaint   Patient presents with   • Shortness of Breath     Starting 2 weeks ago, worsening since   • N/V     Started 2 days ago   • Ear Pain     Started 5 days ago, pt reporting attempting to clean ear with q tip \"and it keeps coming out bloody.\"       History of Presenting Illness  Rhiannon Marrero is a 37 y.o. female who presented 5/23/2022 with abdominal pain.  Patient has has been drinking on and off since she was pretty young she has been able to maintain sobriety for a couple of years in between.  Patient states that she was doing well but started drinking again around 2 weeks ago.  When she starts drinking she is unable to stop.  She has been drinking 7-8 drinks for the last 2 weeks.  Over the last couple of days she started having abdominal pain.  Enslin in the left nausea and started vomiting over the last 2 days.  She has not been able to keep anything down including alcohol today.  Patient also complains of shortness of breath she states she had called EMS last week she was seen by them but she had refused to be brought to the hospital for further evaluation.  Last week also sometimes she complains of fever which has spontaneously resolved.  This episode is similar to many of her previous episodes.        Review of Systems  Review of Systems   Constitutional: Negative for chills and fever.   HENT: Negative for hearing loss and tinnitus.    Eyes: Negative for blurred vision and double vision.   Respiratory: Positive for shortness of breath. Negative for cough.    Cardiovascular: Negative for chest pain and palpitations.   Gastrointestinal: Positive for abdominal pain, nausea and vomiting. Negative for diarrhea.   Genitourinary: Negative for dysuria and flank pain.   Musculoskeletal: Negative for " myalgias and neck pain.   Skin: Negative for itching and rash.   Neurological: Negative for dizziness and headaches.   Endo/Heme/Allergies: Negative for polydipsia. Does not bruise/bleed easily.       Past Medical History   has a past medical history of Alcohol dependence (HCC) (4/13/2017), Bronchitis (8/2012), Cold, Heart burn, Hernia of unspecified site of abdominal cavity without mention of obstruction or gangrene, Indigestion, Pancreatitis, and Pneumonia (2011).    Surgical History   has a past surgical history that includes other orthopedic surgery; gyn surgery; tonsillectomy (4/11/2013); arthroscopy, knee; hysterectomy robotic xi (10/11/2018); salpingectomy (Bilateral, 10/11/2018); and orif, wrist (Right, 4/24/2019).     Family History  family history includes Arthritis in her mother; Heart Disease in her maternal grandfather; Psychiatric Illness in her mother; Stroke in her mother.   Family history reviewed with patient. There is no family history that is pertinent to the chief complaint.     Social History   reports that she has been smoking cigarettes. She has a 7.50 pack-year smoking history. She has never used smokeless tobacco. She reports current alcohol use. She reports current drug use. Drug: Marijuana.    Allergies  Allergies   Allergen Reactions   • Lorazepam Anxiety and Unspecified     Severe irritability and irrationality   • Promethazine Hcl Anxiety       Medications  Prior to Admission Medications   Prescriptions Last Dose Informant Patient Reported? Taking?   levetiracetam (KEPPRA) 1000 MG tablet >2 days at [facility] Patient No No   Sig: Take 1 Tab by mouth 2 Times a Day.   zonisamide (ZONEGRAN) 25 MG capsule >2 days at [facility] Patient No No   Sig: Take 2 Capsules by mouth every day.      Facility-Administered Medications: None       Physical Exam  Temp:  [36.9 °C (98.5 °F)] 36.9 °C (98.5 °F)  Pulse:  [] 98  Resp:  [18-20] 18  BP: (113-125)/(76-86) 125/86  SpO2:  [96 %-98 %] 98 %  Blood Pressure:  125/86   Temperature: 36.9 °C (98.5 °F)   Pulse: 98   Respiration: 18   Pulse Oximetry: 98 %       Physical Exam  Vitals and nursing note reviewed.   Constitutional:       Appearance: Normal appearance.   HENT:      Head: Normocephalic and atraumatic.      Mouth/Throat:      Mouth: Mucous membranes are moist.      Pharynx: Oropharynx is clear.   Eyes:      Extraocular Movements: Extraocular movements intact.      Pupils: Pupils are equal, round, and reactive to light.   Cardiovascular:      Rate and Rhythm: Normal rate and regular rhythm.      Pulses: Normal pulses.      Heart sounds: Normal heart sounds.   Pulmonary:      Effort: Pulmonary effort is normal.      Breath sounds: Normal breath sounds.   Abdominal:      General: Abdomen is flat.      Tenderness: There is abdominal tenderness (Generalized). There is guarding.   Musculoskeletal:         General: Normal range of motion.      Cervical back: Normal range of motion and neck supple.   Skin:     General: Skin is warm and dry.   Neurological:      General: No focal deficit present.      Mental Status: She is alert and oriented to person, place, and time.         Laboratory:  Recent Labs     05/22/22  2244   WBC 6.5   RBC 5.59*   HEMOGLOBIN 17.4*   HEMATOCRIT 48.4*   MCV 86.6   MCH 31.1   MCHC 36.0*   RDW 50.1*   PLATELETCT 206   MPV 9.8     Recent Labs     05/22/22  2244   SODIUM 140   POTASSIUM 3.7   CHLORIDE 102   CO2 20   GLUCOSE 125*   BUN 10   CREATININE 0.74   CALCIUM 9.2     Recent Labs     05/22/22  2244   ALTSGPT 345*   ASTSGOT 468*   ALKPHOSPHAT 116*   TBILIRUBIN 0.6   LIPASE 232*   GLUCOSE 125*           Imaging:  DX-CHEST-PORTABLE (1 VIEW)   Final Result         1.  No acute cardiopulmonary disease.      US-RUQ   Final Result         1.  Hepatomegaly and echogenic liver, compatible with fatty change versus fibrosis.   2.  Otherwise unremarkable right upper quadrant sonogram          EKG:  My impression is: Normal sinus rhythm sinus  tachycardia    Assessment/Plan:  Justification for Admission Status  I anticipate this patient will require at least two midnights for appropriate medical management, necessitating inpatient admission because For the treatment of acute pancreatitis    * Pancreatitis, recurrent- (present on admission)  Assessment & Plan  Secondary to alcohol binge  Patient still is pretty nauseous and has abdominal pain we will give his Zofran and pain control  Advance diet as tolerated  IV fluids    Seizure (HCC)  Assessment & Plan  No episodes in the recent time  We will continue Keppra and zonisamide    Interstitial cystitis (chronic) without hematuria- (present on admission)  Assessment & Plan  Being treated on the outpatient basis with carisoprodol  Asymptomatic at the present time will not initiate any treatment    Bipolar affective disorder, current episode hypomanic (HCC)- (present on admission)  Assessment & Plan  Patient appears pretty anxious  Used to be on Seroquel as an outpatient  Once pancreatitis is better can be restarted    Alcohol dependence (HCC)  Assessment & Plan  have placed patient on CIWA scale with coverage with Ativan  Also have started long-acting diazepam  Patient to be given rally bag with multivitamins  Would benefit from long-term outpatient detox treatment      VTE prophylaxis: SCDs/TEDs and Xarelto 10 mg daily as prophylaxis     I discussed the plan of care with patient and family.

## 2022-05-23 NOTE — PROGRESS NOTES
Patient was admitted earlier today. Please refer H&P for details.     38 yo F presented with abdominal pain. Hx of alcohol abuse and pancreatitis. She has been drinking 7-8 drinks for the last 2 weeks. Over the last couple of days she started having abdominal pain.   Noted anion gap 18, AST//345. Lipase 232. Diagnostic alcohol level 216. US abd shows hepatomegaly.   Patient is also a smoker    Plan   1. IVF resuscitation  2. Npo now as patient still having abdominal pain, nausea, will advance to clear liuquid once pain improving and able to tolerate diet  3. [facility] protocol. Alcohol cessation counseling.   4. Smoking cessation: 5 minutes on tobacco cessation counseling provided including nicotine patches, gum, and dangers of smoking. Discussed the risks of smoking including increased risk of heart disease, stroke, cancer and COPD. Discussed the benefits of quitting smoking.   5. Continue monitoring LFT.   6. Check hepatitis panel

## 2022-05-23 NOTE — ASSESSMENT & PLAN NOTE
Patient appears pretty anxious  Used to be on Seroquel as an outpatient  Once pancreatitis is better can be restarted

## 2022-05-24 PROBLEM — R74.01 TRANSAMINITIS: Status: ACTIVE | Noted: 2022-05-24

## 2022-05-24 PROBLEM — E87.6 HYPOKALEMIA: Status: ACTIVE | Noted: 2022-05-24

## 2022-05-24 LAB
ALBUMIN SERPL BCP-MCNC: 3.7 G/DL (ref 3.2–4.9)
ALBUMIN/GLOB SERPL: 1.5 G/DL
ALP SERPL-CCNC: 107 U/L (ref 30–99)
ALT SERPL-CCNC: 189 U/L (ref 2–50)
ANION GAP SERPL CALC-SCNC: 12 MMOL/L (ref 7–16)
AST SERPL-CCNC: 176 U/L (ref 12–45)
BILIRUB SERPL-MCNC: 1 MG/DL (ref 0.1–1.5)
BUN SERPL-MCNC: 3 MG/DL (ref 8–22)
CALCIUM SERPL-MCNC: 8.4 MG/DL (ref 8.5–10.5)
CHLORIDE SERPL-SCNC: 103 MMOL/L (ref 96–112)
CO2 SERPL-SCNC: 18 MMOL/L (ref 20–33)
CREAT SERPL-MCNC: 0.4 MG/DL (ref 0.5–1.4)
ERYTHROCYTE [DISTWIDTH] IN BLOOD BY AUTOMATED COUNT: 49 FL (ref 35.9–50)
GFR SERPLBLD CREATININE-BSD FMLA CKD-EPI: 132 ML/MIN/1.73 M 2
GLOBULIN SER CALC-MCNC: 2.4 G/DL (ref 1.9–3.5)
GLUCOSE SERPL-MCNC: 123 MG/DL (ref 65–99)
HCT VFR BLD AUTO: 40.4 % (ref 37–47)
HGB BLD-MCNC: 14.3 G/DL (ref 12–16)
LIPASE SERPL-CCNC: 447 U/L (ref 11–82)
MAGNESIUM SERPL-MCNC: 1.8 MG/DL (ref 1.5–2.5)
MCH RBC QN AUTO: 31 PG (ref 27–33)
MCHC RBC AUTO-ENTMCNC: 35.4 G/DL (ref 33.6–35)
MCV RBC AUTO: 87.4 FL (ref 81.4–97.8)
PHOSPHATE SERPL-MCNC: 2.6 MG/DL (ref 2.5–4.5)
PLATELET # BLD AUTO: 133 K/UL (ref 164–446)
PMV BLD AUTO: 10.2 FL (ref 9–12.9)
POTASSIUM SERPL-SCNC: 3 MMOL/L (ref 3.6–5.5)
PROT SERPL-MCNC: 6.1 G/DL (ref 6–8.2)
RBC # BLD AUTO: 4.62 M/UL (ref 4.2–5.4)
SODIUM SERPL-SCNC: 133 MMOL/L (ref 135–145)
WBC # BLD AUTO: 5.4 K/UL (ref 4.8–10.8)

## 2022-05-24 PROCEDURE — 700102 HCHG RX REV CODE 250 W/ 637 OVERRIDE(OP): Performed by: STUDENT IN AN ORGANIZED HEALTH CARE EDUCATION/TRAINING PROGRAM

## 2022-05-24 PROCEDURE — 85027 COMPLETE CBC AUTOMATED: CPT

## 2022-05-24 PROCEDURE — 80053 COMPREHEN METABOLIC PANEL: CPT

## 2022-05-24 PROCEDURE — 700101 HCHG RX REV CODE 250: Performed by: STUDENT IN AN ORGANIZED HEALTH CARE EDUCATION/TRAINING PROGRAM

## 2022-05-24 PROCEDURE — 700111 HCHG RX REV CODE 636 W/ 250 OVERRIDE (IP): Performed by: INTERNAL MEDICINE

## 2022-05-24 PROCEDURE — 84100 ASSAY OF PHOSPHORUS: CPT

## 2022-05-24 PROCEDURE — 83690 ASSAY OF LIPASE: CPT

## 2022-05-24 PROCEDURE — A9270 NON-COVERED ITEM OR SERVICE: HCPCS | Performed by: STUDENT IN AN ORGANIZED HEALTH CARE EDUCATION/TRAINING PROGRAM

## 2022-05-24 PROCEDURE — 700111 HCHG RX REV CODE 636 W/ 250 OVERRIDE (IP): Performed by: STUDENT IN AN ORGANIZED HEALTH CARE EDUCATION/TRAINING PROGRAM

## 2022-05-24 PROCEDURE — A9270 NON-COVERED ITEM OR SERVICE: HCPCS | Performed by: INTERNAL MEDICINE

## 2022-05-24 PROCEDURE — 36415 COLL VENOUS BLD VENIPUNCTURE: CPT

## 2022-05-24 PROCEDURE — 83735 ASSAY OF MAGNESIUM: CPT

## 2022-05-24 PROCEDURE — 700102 HCHG RX REV CODE 250 W/ 637 OVERRIDE(OP): Performed by: INTERNAL MEDICINE

## 2022-05-24 PROCEDURE — 700105 HCHG RX REV CODE 258: Performed by: INTERNAL MEDICINE

## 2022-05-24 PROCEDURE — 99233 SBSQ HOSP IP/OBS HIGH 50: CPT | Performed by: STUDENT IN AN ORGANIZED HEALTH CARE EDUCATION/TRAINING PROGRAM

## 2022-05-24 PROCEDURE — 770006 HCHG ROOM/CARE - MED/SURG/GYN SEMI*

## 2022-05-24 RX ORDER — DEXTROSE MONOHYDRATE, SODIUM CHLORIDE, AND POTASSIUM CHLORIDE 50; 2.98; 9 G/1000ML; G/1000ML; G/1000ML
INJECTION, SOLUTION INTRAVENOUS CONTINUOUS
Status: DISCONTINUED | OUTPATIENT
Start: 2022-05-24 | End: 2022-05-26

## 2022-05-24 RX ORDER — POTASSIUM CHLORIDE 20 MEQ/1
40 TABLET, EXTENDED RELEASE ORAL ONCE
Status: COMPLETED | OUTPATIENT
Start: 2022-05-24 | End: 2022-05-24

## 2022-05-24 RX ADMIN — LORAZEPAM 2 MG: 2 TABLET ORAL at 14:03

## 2022-05-24 RX ADMIN — MORPHINE SULFATE 4 MG: 4 INJECTION INTRAVENOUS at 06:23

## 2022-05-24 RX ADMIN — MORPHINE SULFATE 4 MG: 4 INJECTION INTRAVENOUS at 01:44

## 2022-05-24 RX ADMIN — NICOTINE 14 MG: 14 PATCH TRANSDERMAL at 06:23

## 2022-05-24 RX ADMIN — FOLIC ACID 1 MG: 1 TABLET ORAL at 06:23

## 2022-05-24 RX ADMIN — MORPHINE SULFATE 4 MG: 4 INJECTION INTRAVENOUS at 15:15

## 2022-05-24 RX ADMIN — ZONISAMIDE 50 MG: 50 CAPSULE ORAL at 06:22

## 2022-05-24 RX ADMIN — SENNOSIDES AND DOCUSATE SODIUM 2 TABLET: 50; 8.6 TABLET ORAL at 06:22

## 2022-05-24 RX ADMIN — LORAZEPAM 1 MG: 1 TABLET ORAL at 04:32

## 2022-05-24 RX ADMIN — POTASSIUM CHLORIDE 40 MEQ: 1500 TABLET, EXTENDED RELEASE ORAL at 08:15

## 2022-05-24 RX ADMIN — SENNOSIDES AND DOCUSATE SODIUM 2 TABLET: 50; 8.6 TABLET ORAL at 17:10

## 2022-05-24 RX ADMIN — Medication 100 MG: at 06:22

## 2022-05-24 RX ADMIN — LORAZEPAM 2 MG: 2 TABLET ORAL at 20:25

## 2022-05-24 RX ADMIN — DEXTROSE AND SODIUM CHLORIDE: 5; 450 INJECTION, SOLUTION INTRAVENOUS at 02:48

## 2022-05-24 RX ADMIN — POTASSIUM CHLORIDE, DEXTROSE MONOHYDRATE AND SODIUM CHLORIDE: 300; 5; 900 INJECTION, SOLUTION INTRAVENOUS at 08:19

## 2022-05-24 RX ADMIN — MORPHINE SULFATE 4 MG: 4 INJECTION INTRAVENOUS at 21:13

## 2022-05-24 RX ADMIN — DIAZEPAM 5 MG: 5 TABLET ORAL at 03:51

## 2022-05-24 RX ADMIN — LEVETIRACETAM 1000 MG: 500 TABLET, FILM COATED ORAL at 17:10

## 2022-05-24 RX ADMIN — DIAZEPAM 5 MG: 5 TABLET ORAL at 20:26

## 2022-05-24 RX ADMIN — POTASSIUM CHLORIDE, DEXTROSE MONOHYDRATE AND SODIUM CHLORIDE: 300; 5; 900 INJECTION, SOLUTION INTRAVENOUS at 20:28

## 2022-05-24 RX ADMIN — LORAZEPAM 1 MG: 2 INJECTION INTRAMUSCULAR; INTRAVENOUS at 01:06

## 2022-05-24 RX ADMIN — DIAZEPAM 5 MG: 5 TABLET ORAL at 15:01

## 2022-05-24 RX ADMIN — MORPHINE SULFATE 4 MG: 4 INJECTION INTRAVENOUS at 10:32

## 2022-05-24 RX ADMIN — LORAZEPAM 1 MG: 2 INJECTION INTRAMUSCULAR; INTRAVENOUS at 09:39

## 2022-05-24 RX ADMIN — BUTALBITAL, ACETAMINOPHEN, AND CAFFEINE 1 TABLET: 50; 325; 40 TABLET ORAL at 20:26

## 2022-05-24 RX ADMIN — RIVAROXABAN 10 MG: 10 TABLET, FILM COATED ORAL at 17:10

## 2022-05-24 RX ADMIN — DIAZEPAM 5 MG: 5 TABLET ORAL at 08:15

## 2022-05-24 RX ADMIN — OMEPRAZOLE 20 MG: 20 CAPSULE, DELAYED RELEASE ORAL at 06:22

## 2022-05-24 RX ADMIN — LEVETIRACETAM 1000 MG: 500 TABLET, FILM COATED ORAL at 06:23

## 2022-05-24 RX ADMIN — THERA TABS 1 TABLET: TAB at 06:22

## 2022-05-24 ASSESSMENT — LIFESTYLE VARIABLES
NAUSEA AND VOMITING: *
PAROXYSMAL SWEATS: NO SWEAT VISIBLE
NAUSEA AND VOMITING: MILD NAUSEA WITH NO VOMITING
VISUAL DISTURBANCES: NOT PRESENT
TOTAL SCORE: 0
AGITATION: SOMEWHAT MORE THAN NORMAL ACTIVITY
PAROXYSMAL SWEATS: *
ANXIETY: MILDLY ANXIOUS
TOTAL SCORE: 12
PAROXYSMAL SWEATS: *
ANXIETY: *
ORIENTATION AND CLOUDING OF SENSORIUM: ORIENTED AND CAN DO SERIAL ADDITIONS
AUDITORY DISTURBANCES: NOT PRESENT
TOTAL SCORE: 11
NAUSEA AND VOMITING: MILD NAUSEA WITH NO VOMITING
AGITATION: SOMEWHAT MORE THAN NORMAL ACTIVITY
TREMOR: *
HEADACHE, FULLNESS IN HEAD: MODERATE
ANXIETY: *
ORIENTATION AND CLOUDING OF SENSORIUM: ORIENTED AND CAN DO SERIAL ADDITIONS
PAROXYSMAL SWEATS: BARELY PERCEPTIBLE SWEATING. PALMS MOIST
NAUSEA AND VOMITING: MILD NAUSEA WITH NO VOMITING
AGITATION: SOMEWHAT MORE THAN NORMAL ACTIVITY
HEADACHE, FULLNESS IN HEAD: MODERATELY SEVERE
TREMOR: NO TREMOR
VISUAL DISTURBANCES: NOT PRESENT
AGITATION: SOMEWHAT MORE THAN NORMAL ACTIVITY
ORIENTATION AND CLOUDING OF SENSORIUM: ORIENTED AND CAN DO SERIAL ADDITIONS
ANXIETY: *
HEADACHE, FULLNESS IN HEAD: MILD
TREMOR: *
AUDITORY DISTURBANCES: NOT PRESENT
HEADACHE, FULLNESS IN HEAD: NOT PRESENT
TOTAL SCORE: VERY MILD ITCHING, PINS AND NEEDLES SENSATION, BURNING OR NUMBNESS
TREMOR: *
AUDITORY DISTURBANCES: NOT PRESENT
PAROXYSMAL SWEATS: BARELY PERCEPTIBLE SWEATING. PALMS MOIST
AGITATION: SOMEWHAT MORE THAN NORMAL ACTIVITY
AUDITORY DISTURBANCES: NOT PRESENT
NAUSEA AND VOMITING: NO NAUSEA AND NO VOMITING
AGITATION: NORMAL ACTIVITY
TOTAL SCORE: 8
AUDITORY DISTURBANCES: NOT PRESENT
VISUAL DISTURBANCES: NOT PRESENT
ORIENTATION AND CLOUDING OF SENSORIUM: ORIENTED AND CAN DO SERIAL ADDITIONS
VISUAL DISTURBANCES: NOT PRESENT
TOTAL SCORE: 13
TREMOR: *
AUDITORY DISTURBANCES: NOT PRESENT
NAUSEA AND VOMITING: MILD NAUSEA WITH NO VOMITING
ANXIETY: MILDLY ANXIOUS
ANXIETY: NO ANXIETY (AT EASE)
NAUSEA AND VOMITING: *
AUDITORY DISTURBANCES: NOT PRESENT
PAROXYSMAL SWEATS: *
VISUAL DISTURBANCES: NOT PRESENT
ORIENTATION AND CLOUDING OF SENSORIUM: ORIENTED AND CAN DO SERIAL ADDITIONS
HEADACHE, FULLNESS IN HEAD: MODERATE
VISUAL DISTURBANCES: NOT PRESENT
TREMOR: *
AGITATION: SOMEWHAT MORE THAN NORMAL ACTIVITY
TOTAL SCORE: 13
HEADACHE, FULLNESS IN HEAD: MODERATELY SEVERE
PAROXYSMAL SWEATS: NO SWEAT VISIBLE
ORIENTATION AND CLOUDING OF SENSORIUM: ORIENTED AND CAN DO SERIAL ADDITIONS
ORIENTATION AND CLOUDING OF SENSORIUM: ORIENTED AND CAN DO SERIAL ADDITIONS
TOTAL SCORE: 10
VISUAL DISTURBANCES: NOT PRESENT
TREMOR: *
HEADACHE, FULLNESS IN HEAD: MODERATE
ANXIETY: MILDLY ANXIOUS

## 2022-05-24 ASSESSMENT — ENCOUNTER SYMPTOMS
VOMITING: 1
NAUSEA: 1
ABDOMINAL PAIN: 1

## 2022-05-24 ASSESSMENT — PAIN DESCRIPTION - PAIN TYPE
TYPE: ACUTE PAIN
TYPE: ACUTE PAIN

## 2022-05-24 NOTE — CARE PLAN
The patient is Stable - Low risk of patient condition declining or worsening    Shift Goals  Clinical Goals: pain control  Patient Goals: pain control  Family Goals: FANTASMA    Progress made toward(s) clinical / shift goals:    Problem: Pain - Standard  Goal: Alleviation of pain or a reduction in pain to the patient’s comfort goal  Outcome: Progressing     Problem: Knowledge Deficit - Standard  Goal: Patient and family/care givers will demonstrate understanding of plan of care, disease process/condition, diagnostic tests and medications  Outcome: Progressing     Problem: Optimal Care for Alcohol Withdrawal  Goal: Optimal Care for the alcohol withdrawal patient  Outcome: Progressing     Problem: Seizure Precautions  Goal: Implementation of seizure precautions  Outcome: Progressing     Problem: Lifestyle Changes  Goal: Patient's ability to identify lifestyle changes and available resources to help reduce recurrence of condition will improve  Outcome: Progressing     Problem: Psychosocial  Goal: Patient's level of anxiety will decrease  Outcome: Progressing  Goal: Spiritual and cultural needs incorporated into hospitalization  Outcome: Progressing     Problem: Risk for Aspiration  Goal: Patient's risk for aspiration will be absent or decrease  Outcome: Progressing       Patient is not progressing towards the following goals:

## 2022-05-24 NOTE — PROGRESS NOTES
Hospital Medicine Daily Progress Note    Date of Service  5/24/2022    Chief Complaint  Rhiannon Marrero is a 37 y.o. female admitted 5/23/2022 with nausea, vomiting and abdominal pain    Hospital Course  38 yo F presented with abdominal pain. Hx of alcohol abuse and pancreatitis. She has been drinking 7-8 drinks for the last 2 weeks. Over the last couple of days she started having abdominal pain.   Noted anion gap 18, AST//345. Lipase 232. Diagnostic alcohol level 216. US abd shows hepatomegaly.   Patient is also a smoker  She is on IVF and ciwa protocol.     Interval Problem Update  NO acute overnight events.   Still having nausea, abdominal pain slightly improving. On IVF.   Cont NPO, will consider clear liquid if nausea improving.   K 3, replaced and change fluid to NS-D5 with KCL  Lipase 447.  On ciwa protocol  CM to give resources.     I have personally seen and examined the patient at bedside. I discussed the plan of care with patient, bedside RN, charge RN,  and pharmacy.    Consultants/Specialty  na    Code Status  Full Code    Disposition  Patient is not medically cleared for discharge.   Anticipate discharge to to home with close outpatient follow-up.  I have placed the appropriate orders for post-discharge needs.    Review of Systems  Review of Systems   Gastrointestinal: Positive for abdominal pain, nausea and vomiting.   All other systems reviewed and are negative.       Physical Exam  Temp:  [36.1 °C (97 °F)-36.7 °C (98.1 °F)] 36.1 °C (97 °F)  Pulse:  [68-90] 90  Resp:  [16-18] 16  BP: (131-157)/(91-98) 131/94  SpO2:  [96 %-98 %] 97 %    Physical Exam  Vitals reviewed.   Constitutional:       General: She is not in acute distress.     Appearance: Normal appearance. She is ill-appearing.   HENT:      Head: Normocephalic and atraumatic.      Nose: Nose normal.      Mouth/Throat:      Mouth: Mucous membranes are dry.      Pharynx: Oropharynx is clear.   Eyes:      Extraocular  Movements: Extraocular movements intact.      Conjunctiva/sclera: Conjunctivae normal.      Pupils: Pupils are equal, round, and reactive to light.   Cardiovascular:      Rate and Rhythm: Normal rate and regular rhythm.      Pulses: Normal pulses.      Heart sounds: Normal heart sounds.   Pulmonary:      Effort: Pulmonary effort is normal.      Breath sounds: Normal breath sounds.   Abdominal:      General: Abdomen is flat. Bowel sounds are normal.      Palpations: Abdomen is soft.      Tenderness: There is abdominal tenderness (diffuse).   Musculoskeletal:         General: Normal range of motion.      Cervical back: Normal range of motion and neck supple.   Skin:     General: Skin is warm and dry.   Neurological:      General: No focal deficit present.      Mental Status: She is alert and oriented to person, place, and time. Mental status is at baseline.   Psychiatric:         Mood and Affect: Mood normal.         Behavior: Behavior normal.         Fluids    Intake/Output Summary (Last 24 hours) at 5/24/2022 1237  Last data filed at 5/23/2022 1420  Gross per 24 hour   Intake 0 ml   Output --   Net 0 ml       Laboratory  Recent Labs     05/22/22 2244 05/24/22  0404   WBC 6.5 5.4   RBC 5.59* 4.62   HEMOGLOBIN 17.4* 14.3   HEMATOCRIT 48.4* 40.4   MCV 86.6 87.4   MCH 31.1 31.0   MCHC 36.0* 35.4*   RDW 50.1* 49.0   PLATELETCT 206 133*   MPV 9.8 10.2     Recent Labs     05/22/22 2244 05/24/22  0404   SODIUM 140 133*   POTASSIUM 3.7 3.0*   CHLORIDE 102 103   CO2 20 18*   GLUCOSE 125* 123*   BUN 10 3*   CREATININE 0.74 0.40*   CALCIUM 9.2 8.4*                   Imaging  DX-CHEST-PORTABLE (1 VIEW)   Final Result         1.  No acute cardiopulmonary disease.      US-RUQ   Final Result         1.  Hepatomegaly and echogenic liver, compatible with fatty change versus fibrosis.   2.  Otherwise unremarkable right upper quadrant sonogram           Assessment/Plan  * Pancreatitis, recurrent- (present on admission)  Assessment &  Plan  Secondary to alcohol binge  Patient still is pretty nauseous and has abdominal pain we will give his Zofran and pain control  Advance diet as tolerated  IV fluids    Transaminitis  Assessment & Plan  Sec to alcohol abuse  Hepatitis panel negative  Cont to monitor    Hypokalemia  Assessment & Plan  Replace as indicated    Seizure (HCC)  Assessment & Plan  No episodes in the recent time  We will continue Keppra and zonisamide    Interstitial cystitis (chronic) without hematuria- (present on admission)  Assessment & Plan  Being treated on the outpatient basis with carisoprodol  Asymptomatic at the present time will not initiate any treatment    Tobacco abuse- (present on admission)  Assessment & Plan  Smoking cessation counseled.     Bipolar affective disorder, current episode hypomanic (HCC)- (present on admission)  Assessment & Plan  Outpatient psychiatry follow up recommended  She is currently on ciwa protocol     Alcohol dependence (HCC)  Assessment & Plan  have placed patient on CIWA scale with coverage with Ativan  Also have started long-acting diazepam  Patient to be given rally bag with multivitamins  Would benefit from long-term outpatient detox treatment. CM consulted.        VTE prophylaxis: Xarelto 10 mg daily as prophylaxis    I have performed a physical exam and reviewed and updated ROS and Plan today (5/24/2022). In review of yesterday's note (5/23/2022), there are no changes except as documented above.

## 2022-05-24 NOTE — PROGRESS NOTES
Received bedside shift report from LESLIE Pierre on day shift.   Assumed care of the patient at shift change.   Pt is A&Ox4, VS are stable, on RA, no visible skin issues.   Pt is NPO due to nausea and vomiting; pt is currently still experiencing N/V'ing and is showing signs of pain and physical discomfort.   Pt is not a fall risk, call light within reach, bed in the lowest/locked position.

## 2022-05-24 NOTE — CARE PLAN
The patient is Stable - Low risk of patient condition declining or worsening    Shift Goals  Clinical Goals: CIWA monitoring/pain control  Patient Goals: pain control/rest  Family Goals: FANTASMA    Progress made toward(s) clinical / shift goals:    Problem: Pain - Standard  Goal: Alleviation of pain or a reduction in pain to the patient’s comfort goal  Outcome: Progressing     Problem: Knowledge Deficit - Standard  Goal: Patient and family/care givers will demonstrate understanding of plan of care, disease process/condition, diagnostic tests and medications  Outcome: Progressing     Problem: Optimal Care for Alcohol Withdrawal  Goal: Optimal Care for the alcohol withdrawal patient  Outcome: Progressing     Problem: Seizure Precautions  Goal: Implementation of seizure precautions  Outcome: Progressing     Problem: Lifestyle Changes  Goal: Patient's ability to identify lifestyle changes and available resources to help reduce recurrence of condition will improve  Outcome: Progressing       Patient is not progressing towards the following goals:

## 2022-05-24 NOTE — DISCHARGE PLANNING
Care Transition Team Assessment    Patient is requesting resources as she does not want to drink anymore.  Patient stated she just started drinking after at least 8 months or more of sobriety. SW asked if she knows what caused her to relapse.  Patient stated that her primary doctor told her he can not see her anymore due to her insurance.  SW asked if her doctor was a strong support for her.  Patient stated yes, SW asked if anything else has happened.  Patient stated that her fiancee of 4 years ended the relationship.  Patient stated that she has two children, 13 and 15 who are with her mother at this time.  That she needs to get sober and remain sober for them.  SW provided information for rehab and contact information for [facility]     Information Source  Orientation Level: Oriented X4  Information Given By: Patient  Informant's Name:  (Rhiannon)  Who is responsible for making decisions for patient? : Patient    Readmission Evaluation  Is this a readmission?: No    Elopement Risk  Legal Hold: No  Ambulatory or Self Mobile in Wheelchair: Yes  Disoriented: No  Psychiatric Symptoms: None  History of Wandering: No  Elopement this Admit: No  Vocalizing Wanting to Leave: No  Displays Behaviors, Body Language Wanting to Leave: No-Not at Risk for Elopement  Elopement Risk: Not at Risk for Elopement    Interdisciplinary Discharge Planning  Patient or legal guardian wants to designate a caregiver: No    Discharge Preparedness  What is your plan after discharge?: Home with help  What are your discharge supports?: Parent  Prior Functional Level: Ambulatory, Drives Self, Independent with Activities of Daily Living, Independent with Medication Management  Difficulity with ADLs: None  Difficulity with IADLs: None    Functional Assesment  Prior Functional Level: Ambulatory, Drives Self, Independent with Activities of Daily Living, Independent with Medication Management    Finances  Financial Barriers to Discharge: No, works as a  dianna at the [employer]  Prescription Coverage: Yes    Vision / Hearing Impairment  Vision Impairment : No  Hearing Impairment : No              Domestic Abuse  Have you ever been the victim of abuse or violence?: No  Physical Abuse or Sexual Abuse: No  Verbal Abuse or Emotional Abuse: No  Possible Abuse/Neglect Reported to:: Not Applicable    Psychological Assessment  History of Substance Abuse: Alcohol  Date Last Used - Alcohol:  (day before admit)  History of Psychiatric Problems: Yes    Discharge Risks or Barriers  Discharge risks or barriers?: No PCP, Substance abuse, Mental health  Patient risk factors: No PCP, Substance abuse    Anticipated Discharge Information  Discharge Disposition: Discharged to home/self care (01)

## 2022-05-24 NOTE — PROGRESS NOTES
Received bedside report from REMI Purdy RN.   Assumed care of patient when transport arrived.   Assessment complete and POC discussed with patient and pertinent medical team.   Patient is A&Ox4, VS stabl, on RA.   Patient is complaining of abd pain, and does show signs of physical distress.   Patient's affect is WNL. Does seem interested in discussion regarding transition to next level of care and goals for the day.  Does follow commands appropriately.    Patient NPO.  Patient did not require Q2 turns to offload.  Skin care performed.   Bed is in lowest/locked position. Pt is not a fall risk.  Call light and belongings are within reach.   All needs addressed and patient has no complaints.

## 2022-05-25 LAB
ALBUMIN SERPL BCP-MCNC: 3.8 G/DL (ref 3.2–4.9)
ALBUMIN/GLOB SERPL: 1.4 G/DL
ALP SERPL-CCNC: 133 U/L (ref 30–99)
ALT SERPL-CCNC: 175 U/L (ref 2–50)
ANION GAP SERPL CALC-SCNC: 11 MMOL/L (ref 7–16)
AST SERPL-CCNC: 190 U/L (ref 12–45)
BILIRUB SERPL-MCNC: 1 MG/DL (ref 0.1–1.5)
BUN SERPL-MCNC: 3 MG/DL (ref 8–22)
CALCIUM SERPL-MCNC: 8.9 MG/DL (ref 8.5–10.5)
CHLORIDE SERPL-SCNC: 107 MMOL/L (ref 96–112)
CO2 SERPL-SCNC: 19 MMOL/L (ref 20–33)
CREAT SERPL-MCNC: 0.55 MG/DL (ref 0.5–1.4)
ERYTHROCYTE [DISTWIDTH] IN BLOOD BY AUTOMATED COUNT: 50.6 FL (ref 35.9–50)
GFR SERPLBLD CREATININE-BSD FMLA CKD-EPI: 122 ML/MIN/1.73 M 2
GLOBULIN SER CALC-MCNC: 2.7 G/DL (ref 1.9–3.5)
GLUCOSE SERPL-MCNC: 104 MG/DL (ref 65–99)
HCT VFR BLD AUTO: 42.4 % (ref 37–47)
HGB BLD-MCNC: 15.1 G/DL (ref 12–16)
LIPASE SERPL-CCNC: 212 U/L (ref 11–82)
MCH RBC QN AUTO: 32 PG (ref 27–33)
MCHC RBC AUTO-ENTMCNC: 35.6 G/DL (ref 33.6–35)
MCV RBC AUTO: 89.8 FL (ref 81.4–97.8)
PLATELET # BLD AUTO: 129 K/UL (ref 164–446)
PMV BLD AUTO: 10.5 FL (ref 9–12.9)
POTASSIUM SERPL-SCNC: 4 MMOL/L (ref 3.6–5.5)
PROT SERPL-MCNC: 6.5 G/DL (ref 6–8.2)
RBC # BLD AUTO: 4.72 M/UL (ref 4.2–5.4)
SODIUM SERPL-SCNC: 137 MMOL/L (ref 135–145)
WBC # BLD AUTO: 4.3 K/UL (ref 4.8–10.8)

## 2022-05-25 PROCEDURE — 99232 SBSQ HOSP IP/OBS MODERATE 35: CPT | Performed by: STUDENT IN AN ORGANIZED HEALTH CARE EDUCATION/TRAINING PROGRAM

## 2022-05-25 PROCEDURE — 700102 HCHG RX REV CODE 250 W/ 637 OVERRIDE(OP): Performed by: STUDENT IN AN ORGANIZED HEALTH CARE EDUCATION/TRAINING PROGRAM

## 2022-05-25 PROCEDURE — A9270 NON-COVERED ITEM OR SERVICE: HCPCS | Performed by: STUDENT IN AN ORGANIZED HEALTH CARE EDUCATION/TRAINING PROGRAM

## 2022-05-25 PROCEDURE — 700101 HCHG RX REV CODE 250: Performed by: STUDENT IN AN ORGANIZED HEALTH CARE EDUCATION/TRAINING PROGRAM

## 2022-05-25 PROCEDURE — 700111 HCHG RX REV CODE 636 W/ 250 OVERRIDE (IP): Performed by: INTERNAL MEDICINE

## 2022-05-25 PROCEDURE — 36415 COLL VENOUS BLD VENIPUNCTURE: CPT

## 2022-05-25 PROCEDURE — 770006 HCHG ROOM/CARE - MED/SURG/GYN SEMI*

## 2022-05-25 PROCEDURE — A9270 NON-COVERED ITEM OR SERVICE: HCPCS | Performed by: INTERNAL MEDICINE

## 2022-05-25 PROCEDURE — 85027 COMPLETE CBC AUTOMATED: CPT

## 2022-05-25 PROCEDURE — 83690 ASSAY OF LIPASE: CPT

## 2022-05-25 PROCEDURE — 700111 HCHG RX REV CODE 636 W/ 250 OVERRIDE (IP): Performed by: STUDENT IN AN ORGANIZED HEALTH CARE EDUCATION/TRAINING PROGRAM

## 2022-05-25 PROCEDURE — 80053 COMPREHEN METABOLIC PANEL: CPT

## 2022-05-25 PROCEDURE — 700111 HCHG RX REV CODE 636 W/ 250 OVERRIDE (IP): Performed by: NURSE PRACTITIONER

## 2022-05-25 PROCEDURE — 700102 HCHG RX REV CODE 250 W/ 637 OVERRIDE(OP): Performed by: INTERNAL MEDICINE

## 2022-05-25 RX ORDER — MORPHINE SULFATE 4 MG/ML
4 INJECTION INTRAVENOUS EVERY 4 HOURS PRN
Status: DISCONTINUED | OUTPATIENT
Start: 2022-05-25 | End: 2022-05-26

## 2022-05-25 RX ORDER — KETOROLAC TROMETHAMINE 30 MG/ML
15 INJECTION, SOLUTION INTRAMUSCULAR; INTRAVENOUS EVERY 6 HOURS PRN
Status: DISCONTINUED | OUTPATIENT
Start: 2022-05-25 | End: 2022-05-26

## 2022-05-25 RX ORDER — KETOROLAC TROMETHAMINE 30 MG/ML
30 INJECTION, SOLUTION INTRAMUSCULAR; INTRAVENOUS EVERY 6 HOURS PRN
Status: DISCONTINUED | OUTPATIENT
Start: 2022-05-25 | End: 2022-05-25

## 2022-05-25 RX ORDER — HYDROMORPHONE HYDROCHLORIDE 1 MG/ML
0.5 INJECTION, SOLUTION INTRAMUSCULAR; INTRAVENOUS; SUBCUTANEOUS ONCE
Status: COMPLETED | OUTPATIENT
Start: 2022-05-25 | End: 2022-05-25

## 2022-05-25 RX ADMIN — DIAZEPAM 5 MG: 5 TABLET ORAL at 08:37

## 2022-05-25 RX ADMIN — MORPHINE SULFATE 4 MG: 4 INJECTION INTRAVENOUS at 01:40

## 2022-05-25 RX ADMIN — MORPHINE SULFATE 4 MG: 4 INJECTION INTRAVENOUS at 14:44

## 2022-05-25 RX ADMIN — BUTALBITAL, ACETAMINOPHEN, AND CAFFEINE 1 TABLET: 50; 325; 40 TABLET ORAL at 20:55

## 2022-05-25 RX ADMIN — HYDROMORPHONE HYDROCHLORIDE 0.5 MG: 1 INJECTION, SOLUTION INTRAMUSCULAR; INTRAVENOUS; SUBCUTANEOUS at 03:25

## 2022-05-25 RX ADMIN — LORAZEPAM 1 MG: 1 TABLET ORAL at 00:03

## 2022-05-25 RX ADMIN — RIVAROXABAN 10 MG: 10 TABLET, FILM COATED ORAL at 18:09

## 2022-05-25 RX ADMIN — POTASSIUM CHLORIDE, DEXTROSE MONOHYDRATE AND SODIUM CHLORIDE: 300; 5; 900 INJECTION, SOLUTION INTRAVENOUS at 22:59

## 2022-05-25 RX ADMIN — POTASSIUM CHLORIDE, DEXTROSE MONOHYDRATE AND SODIUM CHLORIDE: 300; 5; 900 INJECTION, SOLUTION INTRAVENOUS at 13:01

## 2022-05-25 RX ADMIN — MORPHINE SULFATE 4 MG: 4 INJECTION INTRAVENOUS at 23:36

## 2022-05-25 RX ADMIN — Medication 100 MG: at 04:23

## 2022-05-25 RX ADMIN — SENNOSIDES AND DOCUSATE SODIUM 2 TABLET: 50; 8.6 TABLET ORAL at 04:24

## 2022-05-25 RX ADMIN — POTASSIUM CHLORIDE, DEXTROSE MONOHYDRATE AND SODIUM CHLORIDE: 300; 5; 900 INJECTION, SOLUTION INTRAVENOUS at 03:50

## 2022-05-25 RX ADMIN — DIAZEPAM 5 MG: 5 TABLET ORAL at 03:05

## 2022-05-25 RX ADMIN — MORPHINE SULFATE 4 MG: 4 INJECTION INTRAVENOUS at 10:10

## 2022-05-25 RX ADMIN — MORPHINE SULFATE 4 MG: 4 INJECTION INTRAVENOUS at 19:20

## 2022-05-25 RX ADMIN — DIAZEPAM 5 MG: 5 TABLET ORAL at 14:44

## 2022-05-25 RX ADMIN — NICOTINE 14 MG: 14 PATCH TRANSDERMAL at 04:24

## 2022-05-25 RX ADMIN — LEVETIRACETAM 1000 MG: 500 TABLET, FILM COATED ORAL at 04:23

## 2022-05-25 RX ADMIN — LORAZEPAM 2 MG: 2 TABLET ORAL at 04:30

## 2022-05-25 RX ADMIN — THERA TABS 1 TABLET: TAB at 04:23

## 2022-05-25 RX ADMIN — DIAZEPAM 5 MG: 5 TABLET ORAL at 20:55

## 2022-05-25 RX ADMIN — FOLIC ACID 1 MG: 1 TABLET ORAL at 04:23

## 2022-05-25 RX ADMIN — OMEPRAZOLE 20 MG: 20 CAPSULE, DELAYED RELEASE ORAL at 04:23

## 2022-05-25 RX ADMIN — MORPHINE SULFATE 4 MG: 4 INJECTION INTRAVENOUS at 05:55

## 2022-05-25 RX ADMIN — LEVETIRACETAM 1000 MG: 500 TABLET, FILM COATED ORAL at 18:09

## 2022-05-25 RX ADMIN — ZONISAMIDE 50 MG: 50 CAPSULE ORAL at 04:23

## 2022-05-25 ASSESSMENT — LIFESTYLE VARIABLES
ORIENTATION AND CLOUDING OF SENSORIUM: DISORIENTED FOR PLACE AND / OR PERSON
AGITATION: NORMAL ACTIVITY
AGITATION: SOMEWHAT MORE THAN NORMAL ACTIVITY
TOTAL SCORE: 4
ORIENTATION AND CLOUDING OF SENSORIUM: ORIENTED AND CAN DO SERIAL ADDITIONS
HEADACHE, FULLNESS IN HEAD: NOT PRESENT
PAROXYSMAL SWEATS: NO SWEAT VISIBLE
PAROXYSMAL SWEATS: NO SWEAT VISIBLE
TOTAL SCORE: 3
AUDITORY DISTURBANCES: NOT PRESENT
HEADACHE, FULLNESS IN HEAD: NOT PRESENT
AGITATION: NORMAL ACTIVITY
TOTAL SCORE: 11
VISUAL DISTURBANCES: NOT PRESENT
NAUSEA AND VOMITING: NO NAUSEA AND NO VOMITING
AUDITORY DISTURBANCES: NOT PRESENT
VISUAL DISTURBANCES: NOT PRESENT
ORIENTATION AND CLOUDING OF SENSORIUM: ORIENTED AND CAN DO SERIAL ADDITIONS
ANXIETY: *
AUDITORY DISTURBANCES: NOT PRESENT
ORIENTATION AND CLOUDING OF SENSORIUM: ORIENTED AND CAN DO SERIAL ADDITIONS
NAUSEA AND VOMITING: NO NAUSEA AND NO VOMITING
TOTAL SCORE: 4
PAROXYSMAL SWEATS: NO SWEAT VISIBLE
ANXIETY: *
TREMOR: NO TREMOR
HEADACHE, FULLNESS IN HEAD: MILD
ANXIETY: MILDLY ANXIOUS
AGITATION: NORMAL ACTIVITY
NAUSEA AND VOMITING: MILD NAUSEA WITH NO VOMITING
HEADACHE, FULLNESS IN HEAD: NOT PRESENT
TREMOR: *
ANXIETY: NO ANXIETY (AT EASE)
TREMOR: *
ORIENTATION AND CLOUDING OF SENSORIUM: ORIENTED AND CAN DO SERIAL ADDITIONS
TOTAL SCORE: 4
VISUAL DISTURBANCES: NOT PRESENT
AUDITORY DISTURBANCES: NOT PRESENT
VISUAL DISTURBANCES: NOT PRESENT
AUDITORY DISTURBANCES: NOT PRESENT
TREMOR: NO TREMOR
HEADACHE, FULLNESS IN HEAD: MILD
AGITATION: NORMAL ACTIVITY
VISUAL DISTURBANCES: NOT PRESENT
TREMOR: *
AUDITORY DISTURBANCES: NOT PRESENT
NAUSEA AND VOMITING: NO NAUSEA AND NO VOMITING
AGITATION: NORMAL ACTIVITY
ANXIETY: MILDLY ANXIOUS
PAROXYSMAL SWEATS: NO SWEAT VISIBLE
HEADACHE, FULLNESS IN HEAD: NOT PRESENT
VISUAL DISTURBANCES: NOT PRESENT
ANXIETY: MILDLY ANXIOUS
PAROXYSMAL SWEATS: NO SWEAT VISIBLE
TREMOR: *
ORIENTATION AND CLOUDING OF SENSORIUM: ORIENTED AND CAN DO SERIAL ADDITIONS
PAROXYSMAL SWEATS: NO SWEAT VISIBLE
TOTAL SCORE: 0
NAUSEA AND VOMITING: MILD NAUSEA WITH NO VOMITING
NAUSEA AND VOMITING: NO NAUSEA AND NO VOMITING

## 2022-05-25 ASSESSMENT — PAIN DESCRIPTION - PAIN TYPE
TYPE: ACUTE PAIN

## 2022-05-25 ASSESSMENT — ENCOUNTER SYMPTOMS
NAUSEA: 1
VOMITING: 1
ABDOMINAL PAIN: 1

## 2022-05-25 NOTE — PROGRESS NOTES
Alert and able to let her needs known, bed alarm placed for moderate fall risk; on CIWA and medicate as ordered to protocol. Cont plan of care,call light within reach    C/o ineffective pain relief, one time order received fromBRENDAN  Woke up confused upon pain assessment and ciwa assessment

## 2022-05-25 NOTE — PROGRESS NOTES
Hospital Medicine Daily Progress Note    Date of Service  5/25/2022    Chief Complaint  Rhiannon Marrero is a 37 y.o. female admitted 5/23/2022 with nausea, vomiting and abdominal pain    Hospital Course  36 yo F presented with abdominal pain. Hx of alcohol abuse and pancreatitis. She has been drinking 7-8 drinks for the last 2 weeks. Over the last couple of days she started having abdominal pain.   Noted anion gap 18, AST//345. Lipase 232. Diagnostic alcohol level 216. US abd shows hepatomegaly.   Patient is also a smoker  She is on IVF and ciwa protocol.     Interval Problem Update  Seen at bedside.   Still having nausea, only able to drink a little bit water. Start clear liquid to see if she is tolerating diet.   Adjust IVF to 100cc to total 1bag.   Add toradol prn for pain control along with morphine.   Lipase trending down  On ciwa protocol  CM to give resources.     I have personally seen and examined the patient at bedside. I discussed the plan of care with patient, bedside RN, charge RN,  and pharmacy.    Consultants/Specialty  na    Code Status  Full Code    Disposition  Patient is not medically cleared for discharge.   Anticipate discharge to to home with close outpatient follow-up.  I have placed the appropriate orders for post-discharge needs.    Review of Systems  Review of Systems   Gastrointestinal: Positive for abdominal pain, nausea and vomiting.   All other systems reviewed and are negative.       Physical Exam  Temp:  [36.4 °C (97.5 °F)-37.1 °C (98.7 °F)] 36.5 °C (97.7 °F)  Pulse:  [] 77  Resp:  [16-18] 16  BP: (121-142)/(75-96) 129/93  SpO2:  [94 %-99 %] 99 %    Physical Exam  Vitals reviewed.   Constitutional:       General: She is not in acute distress.     Appearance: Normal appearance. She is ill-appearing.   HENT:      Head: Normocephalic and atraumatic.      Nose: Nose normal.      Mouth/Throat:      Mouth: Mucous membranes are dry.      Pharynx: Oropharynx is  clear.   Eyes:      Extraocular Movements: Extraocular movements intact.      Conjunctiva/sclera: Conjunctivae normal.      Pupils: Pupils are equal, round, and reactive to light.   Cardiovascular:      Rate and Rhythm: Normal rate and regular rhythm.      Pulses: Normal pulses.      Heart sounds: Normal heart sounds.   Pulmonary:      Effort: Pulmonary effort is normal.      Breath sounds: Normal breath sounds.   Abdominal:      General: Abdomen is flat. Bowel sounds are normal.      Palpations: Abdomen is soft.      Tenderness: There is abdominal tenderness (diffuse).   Musculoskeletal:         General: Normal range of motion.      Cervical back: Normal range of motion and neck supple.   Skin:     General: Skin is warm and dry.   Neurological:      General: No focal deficit present.      Mental Status: She is alert and oriented to person, place, and time. Mental status is at baseline.   Psychiatric:         Mood and Affect: Mood normal.         Behavior: Behavior normal.         Fluids    Intake/Output Summary (Last 24 hours) at 5/25/2022 1256  Last data filed at 5/25/2022 0807  Gross per 24 hour   Intake 1340 ml   Output --   Net 1340 ml       Laboratory  Recent Labs     05/22/22 2244 05/24/22 0404 05/25/22  0347   WBC 6.5 5.4 4.3*   RBC 5.59* 4.62 4.72   HEMOGLOBIN 17.4* 14.3 15.1   HEMATOCRIT 48.4* 40.4 42.4   MCV 86.6 87.4 89.8   MCH 31.1 31.0 32.0   MCHC 36.0* 35.4* 35.6*   RDW 50.1* 49.0 50.6*   PLATELETCT 206 133* 129*   MPV 9.8 10.2 10.5     Recent Labs     05/22/22 2244 05/24/22 0404 05/25/22  0347   SODIUM 140 133* 137   POTASSIUM 3.7 3.0* 4.0   CHLORIDE 102 103 107   CO2 20 18* 19*   GLUCOSE 125* 123* 104*   BUN 10 3* 3*   CREATININE 0.74 0.40* 0.55   CALCIUM 9.2 8.4* 8.9                   Imaging  DX-CHEST-PORTABLE (1 VIEW)   Final Result         1.  No acute cardiopulmonary disease.      US-RUQ   Final Result         1.  Hepatomegaly and echogenic liver, compatible with fatty change versus  fibrosis.   2.  Otherwise unremarkable right upper quadrant sonogram           Assessment/Plan  * Pancreatitis, recurrent- (present on admission)  Assessment & Plan  Secondary to alcohol binge  Patient still is pretty nauseous and has abdominal pain we will give his Zofran and pain control  Advance diet as tolerated  IV fluids  Pain control    Transaminitis  Assessment & Plan  Sec to alcohol abuse  Hepatitis panel negative  Cont to monitor    Hypokalemia  Assessment & Plan  Replace as indicated    Seizure (HCC)  Assessment & Plan  No episodes in the recent time  We will continue Keppra and zonisamide    Interstitial cystitis (chronic) without hematuria- (present on admission)  Assessment & Plan  Being treated on the outpatient basis with carisoprodol  Asymptomatic at the present time will not initiate any treatment    Tobacco abuse- (present on admission)  Assessment & Plan  Smoking cessation counseled.     Bipolar affective disorder, current episode hypomanic (HCC)- (present on admission)  Assessment & Plan  Outpatient psychiatry follow up recommended  She is currently on ciwa protocol     Alcohol dependence (HCC)  Assessment & Plan  have placed patient on CIWA scale with coverage with Ativan  Also have started long-acting diazepam  Patient to be given rally bag with multivitamins  Would benefit from long-term outpatient detox treatment. CM consulted.        VTE prophylaxis: Xarelto 10 mg daily as prophylaxis    I have performed a physical exam and reviewed and updated ROS and Plan today (5/25/2022). In review of yesterday's note (5/24/2022), there are no changes except as documented above.

## 2022-05-25 NOTE — CARE PLAN
The patient is Watcher - Medium risk of patient condition declining or worsening    Shift Goals  Clinical Goals: monitor pain levels and medicate as requested  Patient Goals: pain control/rest  Family Goals: FANTASMA    Progress made toward(s) clinical / shift goals:  medicate as requested    Patient is not progressing towards the following goals:

## 2022-05-25 NOTE — CARE PLAN
The patient is Stable - Low risk of patient condition declining or worsening    Shift Goals  Clinical Goals: comfort  Patient Goals: comfort  Family Goals: FANTASMA    Progress made toward(s) clinical / shift goals:    Problem: Pain - Standard  Goal: Alleviation of pain or a reduction in pain to the patient’s comfort goal  Outcome: Progressing     Problem: Knowledge Deficit - Standard  Goal: Patient and family/care givers will demonstrate understanding of plan of care, disease process/condition, diagnostic tests and medications  Outcome: Progressing     Problem: Optimal Care for Alcohol Withdrawal  Goal: Optimal Care for the alcohol withdrawal patient  Outcome: Progressing     Problem: Seizure Precautions  Goal: Implementation of seizure precautions  Outcome: Progressing       Patient is not progressing towards the following goals:

## 2022-05-25 NOTE — PROGRESS NOTES
Received bedside report from LESLIE Kwon on night shift.   Assumed care of patient when transport arrived.   Assessment complete and POC discussed with patient and pertinent medical team.   Patient is A&Ox4, VS stable, on RA.   Patient is complaining of abd pain, and does show signs of physical distress.   Patient's affect is WNL. Does seem interested in discussion regarding transition to next level of care and goals for the day.  Does follow commands appropriately.    Patient NPO with sips with meds.  Patient did not require Q2 turns to offload.  Skin care performed.   Bed is in lowest/locked position. Pt is not a fall risk.  Call light and belongings are within reach.   All needs addressed and patient has no complaints.

## 2022-05-26 PROCEDURE — A9270 NON-COVERED ITEM OR SERVICE: HCPCS | Performed by: STUDENT IN AN ORGANIZED HEALTH CARE EDUCATION/TRAINING PROGRAM

## 2022-05-26 PROCEDURE — 700102 HCHG RX REV CODE 250 W/ 637 OVERRIDE(OP): Performed by: INTERNAL MEDICINE

## 2022-05-26 PROCEDURE — 700111 HCHG RX REV CODE 636 W/ 250 OVERRIDE (IP): Performed by: NURSE PRACTITIONER

## 2022-05-26 PROCEDURE — 700111 HCHG RX REV CODE 636 W/ 250 OVERRIDE (IP): Performed by: STUDENT IN AN ORGANIZED HEALTH CARE EDUCATION/TRAINING PROGRAM

## 2022-05-26 PROCEDURE — 700102 HCHG RX REV CODE 250 W/ 637 OVERRIDE(OP): Performed by: STUDENT IN AN ORGANIZED HEALTH CARE EDUCATION/TRAINING PROGRAM

## 2022-05-26 PROCEDURE — 770006 HCHG ROOM/CARE - MED/SURG/GYN SEMI*

## 2022-05-26 PROCEDURE — 700111 HCHG RX REV CODE 636 W/ 250 OVERRIDE (IP): Performed by: INTERNAL MEDICINE

## 2022-05-26 PROCEDURE — 99232 SBSQ HOSP IP/OBS MODERATE 35: CPT | Performed by: STUDENT IN AN ORGANIZED HEALTH CARE EDUCATION/TRAINING PROGRAM

## 2022-05-26 PROCEDURE — A9270 NON-COVERED ITEM OR SERVICE: HCPCS | Performed by: INTERNAL MEDICINE

## 2022-05-26 RX ORDER — LORAZEPAM 2 MG/ML
0.5 INJECTION INTRAMUSCULAR ONCE
Status: COMPLETED | OUTPATIENT
Start: 2022-05-26 | End: 2022-05-26

## 2022-05-26 RX ORDER — OXYCODONE HYDROCHLORIDE 5 MG/1
5 TABLET ORAL EVERY 4 HOURS PRN
Status: DISCONTINUED | OUTPATIENT
Start: 2022-05-26 | End: 2022-05-27 | Stop reason: HOSPADM

## 2022-05-26 RX ORDER — MORPHINE SULFATE 4 MG/ML
2 INJECTION INTRAVENOUS EVERY 6 HOURS PRN
Status: DISCONTINUED | OUTPATIENT
Start: 2022-05-26 | End: 2022-05-27

## 2022-05-26 RX ORDER — KETOROLAC TROMETHAMINE 30 MG/ML
15 INJECTION, SOLUTION INTRAMUSCULAR; INTRAVENOUS EVERY 6 HOURS PRN
Status: DISCONTINUED | OUTPATIENT
Start: 2022-05-26 | End: 2022-05-27 | Stop reason: HOSPADM

## 2022-05-26 RX ADMIN — ZONISAMIDE 50 MG: 50 CAPSULE ORAL at 04:38

## 2022-05-26 RX ADMIN — KETOROLAC TROMETHAMINE 15 MG: 30 INJECTION, SOLUTION INTRAMUSCULAR at 15:51

## 2022-05-26 RX ADMIN — MORPHINE SULFATE 2 MG: 4 INJECTION INTRAVENOUS at 20:43

## 2022-05-26 RX ADMIN — MORPHINE SULFATE 2 MG: 4 INJECTION INTRAVENOUS at 14:29

## 2022-05-26 RX ADMIN — OXYCODONE 5 MG: 5 TABLET ORAL at 18:08

## 2022-05-26 RX ADMIN — NICOTINE 14 MG: 14 PATCH TRANSDERMAL at 04:38

## 2022-05-26 RX ADMIN — LEVETIRACETAM 1000 MG: 500 TABLET, FILM COATED ORAL at 18:08

## 2022-05-26 RX ADMIN — MORPHINE SULFATE 4 MG: 4 INJECTION INTRAVENOUS at 08:13

## 2022-05-26 RX ADMIN — THERA TABS 1 TABLET: TAB at 04:38

## 2022-05-26 RX ADMIN — Medication 100 MG: at 04:38

## 2022-05-26 RX ADMIN — OXYCODONE 5 MG: 5 TABLET ORAL at 12:40

## 2022-05-26 RX ADMIN — RIVAROXABAN 10 MG: 10 TABLET, FILM COATED ORAL at 18:08

## 2022-05-26 RX ADMIN — LEVETIRACETAM 1000 MG: 500 TABLET, FILM COATED ORAL at 04:38

## 2022-05-26 RX ADMIN — LORAZEPAM 0.5 MG: 2 INJECTION INTRAMUSCULAR; INTRAVENOUS at 22:21

## 2022-05-26 RX ADMIN — KETOROLAC TROMETHAMINE 15 MG: 30 INJECTION, SOLUTION INTRAMUSCULAR at 22:21

## 2022-05-26 RX ADMIN — LORAZEPAM 0.5 MG: 2 INJECTION INTRAMUSCULAR; INTRAVENOUS at 01:44

## 2022-05-26 RX ADMIN — FOLIC ACID 1 MG: 1 TABLET ORAL at 04:38

## 2022-05-26 RX ADMIN — OXYCODONE 5 MG: 5 TABLET ORAL at 23:00

## 2022-05-26 RX ADMIN — ONDANSETRON 4 MG: 4 TABLET, ORALLY DISINTEGRATING ORAL at 14:49

## 2022-05-26 RX ADMIN — OMEPRAZOLE 20 MG: 20 CAPSULE, DELAYED RELEASE ORAL at 04:38

## 2022-05-26 RX ADMIN — MORPHINE SULFATE 4 MG: 4 INJECTION INTRAVENOUS at 03:44

## 2022-05-26 RX ADMIN — KETOROLAC TROMETHAMINE 15 MG: 30 INJECTION, SOLUTION INTRAMUSCULAR at 02:34

## 2022-05-26 ASSESSMENT — LIFESTYLE VARIABLES
TREMOR: NO TREMOR
ANXIETY: MILDLY ANXIOUS
AGITATION: NORMAL ACTIVITY
NAUSEA AND VOMITING: NO NAUSEA AND NO VOMITING
TREMOR: NO TREMOR
ANXIETY: NO ANXIETY (AT EASE)
HEADACHE, FULLNESS IN HEAD: NOT PRESENT
ANXIETY: NO ANXIETY (AT EASE)
ORIENTATION AND CLOUDING OF SENSORIUM: ORIENTED AND CAN DO SERIAL ADDITIONS
ORIENTATION AND CLOUDING OF SENSORIUM: ORIENTED AND CAN DO SERIAL ADDITIONS
TREMOR: NO TREMOR
NAUSEA AND VOMITING: NO NAUSEA AND NO VOMITING
TOTAL SCORE: 4
HEADACHE, FULLNESS IN HEAD: NOT PRESENT
AUDITORY DISTURBANCES: NOT PRESENT
HEADACHE, FULLNESS IN HEAD: NOT PRESENT
TREMOR: TREMOR NOT VISIBLE BUT CAN BE FELT, FINGERTIP TO FINGERTIP
TOTAL SCORE: MILD ITCHING, PINS AND NEEDLES SENSATION, BURNING OR NUMBNESS
AGITATION: NORMAL ACTIVITY
ORIENTATION AND CLOUDING OF SENSORIUM: ORIENTED AND CAN DO SERIAL ADDITIONS
AUDITORY DISTURBANCES: NOT PRESENT
HEADACHE, FULLNESS IN HEAD: NOT PRESENT
PAROXYSMAL SWEATS: NO SWEAT VISIBLE
ORIENTATION AND CLOUDING OF SENSORIUM: ORIENTED AND CAN DO SERIAL ADDITIONS
TREMOR: NO TREMOR
VISUAL DISTURBANCES: NOT PRESENT
AGITATION: *
NAUSEA AND VOMITING: NO NAUSEA AND NO VOMITING
ORIENTATION AND CLOUDING OF SENSORIUM: ORIENTED AND CAN DO SERIAL ADDITIONS
TOTAL SCORE: 1
NAUSEA AND VOMITING: NO NAUSEA AND NO VOMITING
ANXIETY: NO ANXIETY (AT EASE)
PAROXYSMAL SWEATS: NO SWEAT VISIBLE
PAROXYSMAL SWEATS: NO SWEAT VISIBLE
HEADACHE, FULLNESS IN HEAD: NOT PRESENT
VISUAL DISTURBANCES: NOT PRESENT
AUDITORY DISTURBANCES: NOT PRESENT
AUDITORY DISTURBANCES: NOT PRESENT
ORIENTATION AND CLOUDING OF SENSORIUM: ORIENTED AND CAN DO SERIAL ADDITIONS
ORIENTATION AND CLOUDING OF SENSORIUM: ORIENTED AND CAN DO SERIAL ADDITIONS
AGITATION: NORMAL ACTIVITY
VISUAL DISTURBANCES: NOT PRESENT
VISUAL DISTURBANCES: NOT PRESENT
AGITATION: *
NAUSEA AND VOMITING: *
AUDITORY DISTURBANCES: NOT PRESENT
ANXIETY: MILDLY ANXIOUS
VISUAL DISTURBANCES: NOT PRESENT
ORIENTATION AND CLOUDING OF SENSORIUM: ORIENTED AND CAN DO SERIAL ADDITIONS
TOTAL SCORE: 0
HEADACHE, FULLNESS IN HEAD: VERY MILD
AGITATION: NORMAL ACTIVITY
NAUSEA AND VOMITING: NO NAUSEA AND NO VOMITING
VISUAL DISTURBANCES: NOT PRESENT
TOTAL SCORE: 2
TOTAL SCORE: 1
TOTAL SCORE: 10
HEADACHE, FULLNESS IN HEAD: VERY MILD
NAUSEA AND VOMITING: NO NAUSEA AND NO VOMITING
TREMOR: TREMOR NOT VISIBLE BUT CAN BE FELT, FINGERTIP TO FINGERTIP
AUDITORY DISTURBANCES: NOT PRESENT
PAROXYSMAL SWEATS: NO SWEAT VISIBLE
AUDITORY DISTURBANCES: NOT PRESENT
TREMOR: TREMOR NOT VISIBLE BUT CAN BE FELT, FINGERTIP TO FINGERTIP
AGITATION: NORMAL ACTIVITY
AGITATION: NORMAL ACTIVITY
NAUSEA AND VOMITING: MILD NAUSEA WITH NO VOMITING
TOTAL SCORE: 1
PAROXYSMAL SWEATS: NO SWEAT VISIBLE
VISUAL DISTURBANCES: NOT PRESENT
PAROXYSMAL SWEATS: NO SWEAT VISIBLE
ANXIETY: *
TOTAL SCORE: 3
ANXIETY: NO ANXIETY (AT EASE)
TREMOR: TREMOR NOT VISIBLE BUT CAN BE FELT, FINGERTIP TO FINGERTIP
AUDITORY DISTURBANCES: NOT PRESENT
HEADACHE, FULLNESS IN HEAD: MILD
PAROXYSMAL SWEATS: NO SWEAT VISIBLE
ANXIETY: MILDLY ANXIOUS
PAROXYSMAL SWEATS: NO SWEAT VISIBLE
VISUAL DISTURBANCES: NOT PRESENT

## 2022-05-26 ASSESSMENT — PAIN DESCRIPTION - PAIN TYPE
TYPE: ACUTE PAIN

## 2022-05-26 ASSESSMENT — ENCOUNTER SYMPTOMS
NAUSEA: 1
VOMITING: 1
ABDOMINAL PAIN: 1

## 2022-05-26 NOTE — PROGRESS NOTES
Pt seen lying in bed, watching TV. Given 1 dose of ativan 0.5mg thru IV for anxiety. Pt left lying in bed and watching TV.

## 2022-05-26 NOTE — CARE PLAN
The patient is Stable - Low risk of patient condition declining or worsening    Shift Goals  Clinical Goals: decrease level of anxiety  Patient Goals: pt will be able to calm down and sleep comfortably  Family Goals: FANTASMA    Progress made toward(s) clinical / shift goals:      Patient is not progressing towards the following goals:

## 2022-05-26 NOTE — PROGRESS NOTES
Pt is complaining that this RN erased the name in the board so that she wont know the name and cant complain. Explained to pt, every start of shift, this RN is doing bedside report and introducing self. Explained also to pt that this RN didn't erase anything in the board and offered to write down again the name of this RN. This RN introduced self again to pt.

## 2022-05-26 NOTE — PROGRESS NOTES
Received bedside report and assumed care of pt at change of shift. Pt is resting in bed states 10/10 abd pain and medicated per MAR. A&O4, on RA. Discussed POC with pt and states having no further needs. CIWA protocol continued q4 at this time. Bed Is locked and in lowest position with call light within reach.

## 2022-05-26 NOTE — CARE PLAN
The patient is Stable - Low risk of patient condition declining or worsening    Shift Goals  Clinical Goals: pain control  Patient Goals: pain control  Family Goals: FANTASMA    Progress made toward(s) clinical / shift goals:  pt states pain has been well managed with use of IV pain regimen. Discussed with pt in detail the side effects of narcotics and encouraged alternative use of pain management within the windows where IV medication can not be given yet    Patient is not progressing towards the following goals:

## 2022-05-26 NOTE — PROGRESS NOTES
Hospital Medicine Daily Progress Note    Date of Service  5/26/2022    Chief Complaint  Rhiannon Marrero is a 37 y.o. female admitted 5/23/2022 with nausea, vomiting and abdominal pain    Hospital Course  38 yo F presented with abdominal pain. Hx of alcohol abuse and pancreatitis. She has been drinking 7-8 drinks for the last 2 weeks. Over the last couple of days she started having abdominal pain.   Noted anion gap 18, AST//345. Lipase 232. Diagnostic alcohol level 216. US abd shows hepatomegaly.   Patient is also a smoker  She is on IVF and ciwa protocol.     Interval Problem Update  Seen at bedside.   Still having severe abdominal pain, able to drink clear liquid diet, will advance diet.   Adjusted pain medications.   IVF. Patient pulled out peripheral iv line last night, unclear how much she has been on IVF, will continue it.     I have personally seen and examined the patient at bedside. I discussed the plan of care with patient, bedside RN, charge RN,  and pharmacy.    Consultants/Specialty  na    Code Status  Full Code    Disposition  Patient is not medically cleared for discharge.   Anticipate discharge to to home with close outpatient follow-up.  I have placed the appropriate orders for post-discharge needs.    Review of Systems  Review of Systems   Gastrointestinal: Positive for abdominal pain, nausea and vomiting.   All other systems reviewed and are negative.       Physical Exam  Temp:  [36 °C (96.8 °F)-36.9 °C (98.4 °F)] 36.5 °C (97.7 °F)  Pulse:  [76-91] 76  Resp:  [17-19] 18  BP: (110-135)/(56-81) 114/69  SpO2:  [96 %-98 %] 98 %    Physical Exam  Vitals reviewed.   Constitutional:       General: She is not in acute distress.     Appearance: Normal appearance. She is ill-appearing.   HENT:      Head: Normocephalic and atraumatic.      Nose: Nose normal.      Mouth/Throat:      Mouth: Mucous membranes are dry.      Pharynx: Oropharynx is clear.   Eyes:      Extraocular Movements:  Extraocular movements intact.      Conjunctiva/sclera: Conjunctivae normal.      Pupils: Pupils are equal, round, and reactive to light.   Cardiovascular:      Rate and Rhythm: Normal rate and regular rhythm.      Pulses: Normal pulses.      Heart sounds: Normal heart sounds.   Pulmonary:      Effort: Pulmonary effort is normal.      Breath sounds: Normal breath sounds.   Abdominal:      General: Abdomen is flat. Bowel sounds are normal.      Palpations: Abdomen is soft.      Tenderness: There is abdominal tenderness (diffuse).   Musculoskeletal:         General: Normal range of motion.      Cervical back: Normal range of motion and neck supple.   Skin:     General: Skin is warm and dry.   Neurological:      General: No focal deficit present.      Mental Status: She is alert and oriented to person, place, and time. Mental status is at baseline.   Psychiatric:         Mood and Affect: Mood normal.         Behavior: Behavior normal.         Fluids    Intake/Output Summary (Last 24 hours) at 5/26/2022 1029  Last data filed at 5/25/2022 2000  Gross per 24 hour   Intake 220 ml   Output --   Net 220 ml       Laboratory  Recent Labs     05/24/22  0404 05/25/22  0347   WBC 5.4 4.3*   RBC 4.62 4.72   HEMOGLOBIN 14.3 15.1   HEMATOCRIT 40.4 42.4   MCV 87.4 89.8   MCH 31.0 32.0   MCHC 35.4* 35.6*   RDW 49.0 50.6*   PLATELETCT 133* 129*   MPV 10.2 10.5     Recent Labs     05/24/22  0404 05/25/22  0347   SODIUM 133* 137   POTASSIUM 3.0* 4.0   CHLORIDE 103 107   CO2 18* 19*   GLUCOSE 123* 104*   BUN 3* 3*   CREATININE 0.40* 0.55   CALCIUM 8.4* 8.9                   Imaging  DX-CHEST-PORTABLE (1 VIEW)   Final Result         1.  No acute cardiopulmonary disease.      US-RUQ   Final Result         1.  Hepatomegaly and echogenic liver, compatible with fatty change versus fibrosis.   2.  Otherwise unremarkable right upper quadrant sonogram           Assessment/Plan  * Pancreatitis, recurrent- (present on admission)  Assessment &  Plan  Secondary to alcohol binge  Patient still is pretty nauseous and has abdominal pain we will give his Zofran and pain control  Advance diet as tolerated  IV fluids  Pain control    Transaminitis  Assessment & Plan  Sec to alcohol abuse  Hepatitis panel negative  Cont to monitor    Hypokalemia  Assessment & Plan  Replace as indicated    Seizure (HCC)  Assessment & Plan  No episodes in the recent time  We will continue Keppra and zonisamide    Interstitial cystitis (chronic) without hematuria- (present on admission)  Assessment & Plan  Being treated on the outpatient basis with carisoprodol  Asymptomatic at the present time will not initiate any treatment    Tobacco abuse- (present on admission)  Assessment & Plan  Smoking cessation counseled.     Bipolar affective disorder, current episode hypomanic (HCC)- (present on admission)  Assessment & Plan  Outpatient psychiatry follow up recommended  She is currently on ciwa protocol     Alcohol dependence (HCC)  Assessment & Plan  have placed patient on CIWA scale with coverage with Ativan  Also have started long-acting diazepam  Patient to be given rally bag with multivitamins  Would benefit from long-term outpatient detox treatment. CM consulted.        VTE prophylaxis: Xarelto 10 mg daily as prophylaxis    I have performed a physical exam and reviewed and updated ROS and Plan today (5/26/2022). In review of yesterday's note (5/25/2022), there are no changes except as documented above.

## 2022-05-26 NOTE — PROGRESS NOTES
Pt arrived to unit. Oriented to room. Abdominal lap sites x3 CDI. No vaginal bleeding noted. Peripheral IV CDI. Denies pain and N/V at this time. VSS on RA. SCDs placed on. Bed low, locked. Side rails up x2. Instructed to call for mobility/toileting. Call light within reach. Will continue to monitor.   [facility] hospitalist. Spoke to Evie Panchal and updated with the situation. Informed the pt that this RN called on call hospitalist already, aware of the situation and waiting for their order.

## 2022-05-26 NOTE — PROGRESS NOTES
At 2330, pt is complaining of abdominal pain with score of 8/10, given PRN morphine 4mg thru IV. Then pt was assessed for CIWA scoring. Latest CIWA = 2. At 12AM pt complains of anxiety and asking for medication, explained to pt that she was scored low in CIWA, no PRN available according to her score. Safety of medication provided, pt was told having morphine and another dose of ativan with low score is unsafe for her. Pt acknowledge explanation . Pt asked for water and blanket. Water given and blanket. At 0040, pt called  and requesting for change of nurse. Explained to charge nurse regarding situation, charge nurse went to talk to pt.

## 2022-05-27 VITALS
HEART RATE: 96 BPM | SYSTOLIC BLOOD PRESSURE: 128 MMHG | TEMPERATURE: 97.8 F | OXYGEN SATURATION: 97 % | HEIGHT: 66 IN | WEIGHT: 124.78 LBS | RESPIRATION RATE: 17 BRPM | BODY MASS INDEX: 20.05 KG/M2 | DIASTOLIC BLOOD PRESSURE: 97 MMHG

## 2022-05-27 LAB
ALBUMIN SERPL BCP-MCNC: 4.2 G/DL (ref 3.2–4.9)
ALBUMIN/GLOB SERPL: 1.4 G/DL
ALP SERPL-CCNC: 157 U/L (ref 30–99)
ALT SERPL-CCNC: 159 U/L (ref 2–50)
ANION GAP SERPL CALC-SCNC: 16 MMOL/L (ref 7–16)
AST SERPL-CCNC: 134 U/L (ref 12–45)
BILIRUB SERPL-MCNC: 0.7 MG/DL (ref 0.1–1.5)
BUN SERPL-MCNC: 4 MG/DL (ref 8–22)
CALCIUM SERPL-MCNC: 9.6 MG/DL (ref 8.5–10.5)
CHLORIDE SERPL-SCNC: 100 MMOL/L (ref 96–112)
CO2 SERPL-SCNC: 20 MMOL/L (ref 20–33)
CREAT SERPL-MCNC: 0.58 MG/DL (ref 0.5–1.4)
ERYTHROCYTE [DISTWIDTH] IN BLOOD BY AUTOMATED COUNT: 50.9 FL (ref 35.9–50)
GFR SERPLBLD CREATININE-BSD FMLA CKD-EPI: 121 ML/MIN/1.73 M 2
GLOBULIN SER CALC-MCNC: 3.1 G/DL (ref 1.9–3.5)
GLUCOSE SERPL-MCNC: 102 MG/DL (ref 65–99)
HCT VFR BLD AUTO: 41.2 % (ref 37–47)
HGB BLD-MCNC: 14.7 G/DL (ref 12–16)
LIPASE SERPL-CCNC: 124 U/L (ref 11–82)
MAGNESIUM SERPL-MCNC: 1.8 MG/DL (ref 1.5–2.5)
MCH RBC QN AUTO: 31.5 PG (ref 27–33)
MCHC RBC AUTO-ENTMCNC: 35.7 G/DL (ref 33.6–35)
MCV RBC AUTO: 88.2 FL (ref 81.4–97.8)
PHOSPHATE SERPL-MCNC: 4.2 MG/DL (ref 2.5–4.5)
PLATELET # BLD AUTO: 160 K/UL (ref 164–446)
PMV BLD AUTO: 10.7 FL (ref 9–12.9)
POTASSIUM SERPL-SCNC: 3.9 MMOL/L (ref 3.6–5.5)
PROT SERPL-MCNC: 7.3 G/DL (ref 6–8.2)
RBC # BLD AUTO: 4.67 M/UL (ref 4.2–5.4)
SODIUM SERPL-SCNC: 136 MMOL/L (ref 135–145)
WBC # BLD AUTO: 5.6 K/UL (ref 4.8–10.8)

## 2022-05-27 PROCEDURE — A9270 NON-COVERED ITEM OR SERVICE: HCPCS | Performed by: STUDENT IN AN ORGANIZED HEALTH CARE EDUCATION/TRAINING PROGRAM

## 2022-05-27 PROCEDURE — 85027 COMPLETE CBC AUTOMATED: CPT

## 2022-05-27 PROCEDURE — 80053 COMPREHEN METABOLIC PANEL: CPT

## 2022-05-27 PROCEDURE — 700102 HCHG RX REV CODE 250 W/ 637 OVERRIDE(OP): Performed by: STUDENT IN AN ORGANIZED HEALTH CARE EDUCATION/TRAINING PROGRAM

## 2022-05-27 PROCEDURE — 83690 ASSAY OF LIPASE: CPT

## 2022-05-27 PROCEDURE — 700111 HCHG RX REV CODE 636 W/ 250 OVERRIDE (IP): Performed by: STUDENT IN AN ORGANIZED HEALTH CARE EDUCATION/TRAINING PROGRAM

## 2022-05-27 PROCEDURE — 700102 HCHG RX REV CODE 250 W/ 637 OVERRIDE(OP): Performed by: INTERNAL MEDICINE

## 2022-05-27 PROCEDURE — A9270 NON-COVERED ITEM OR SERVICE: HCPCS | Performed by: INTERNAL MEDICINE

## 2022-05-27 PROCEDURE — 99239 HOSP IP/OBS DSCHRG MGMT >30: CPT | Performed by: STUDENT IN AN ORGANIZED HEALTH CARE EDUCATION/TRAINING PROGRAM

## 2022-05-27 PROCEDURE — 84100 ASSAY OF PHOSPHORUS: CPT

## 2022-05-27 PROCEDURE — 83735 ASSAY OF MAGNESIUM: CPT

## 2022-05-27 RX ORDER — OXYCODONE HYDROCHLORIDE 5 MG/1
5 TABLET ORAL EVERY 8 HOURS PRN
Qty: 8 TABLET | Refills: 0 | Status: SHIPPED | OUTPATIENT
Start: 2022-05-27 | End: 2022-05-30

## 2022-05-27 RX ADMIN — MORPHINE SULFATE 2 MG: 4 INJECTION INTRAVENOUS at 02:52

## 2022-05-27 RX ADMIN — OXYCODONE 5 MG: 5 TABLET ORAL at 09:07

## 2022-05-27 RX ADMIN — Medication 100 MG: at 04:31

## 2022-05-27 RX ADMIN — NICOTINE 14 MG: 14 PATCH TRANSDERMAL at 04:31

## 2022-05-27 RX ADMIN — LEVETIRACETAM 1000 MG: 500 TABLET, FILM COATED ORAL at 04:31

## 2022-05-27 RX ADMIN — SENNOSIDES AND DOCUSATE SODIUM 2 TABLET: 50; 8.6 TABLET ORAL at 04:31

## 2022-05-27 RX ADMIN — THERA TABS 1 TABLET: TAB at 04:31

## 2022-05-27 RX ADMIN — OMEPRAZOLE 20 MG: 20 CAPSULE, DELAYED RELEASE ORAL at 04:31

## 2022-05-27 RX ADMIN — OXYCODONE 5 MG: 5 TABLET ORAL at 04:30

## 2022-05-27 RX ADMIN — FOLIC ACID 1 MG: 1 TABLET ORAL at 04:31

## 2022-05-27 RX ADMIN — ZONISAMIDE 50 MG: 50 CAPSULE ORAL at 04:31

## 2022-05-27 ASSESSMENT — LIFESTYLE VARIABLES
NAUSEA AND VOMITING: NO NAUSEA AND NO VOMITING
TREMOR: NO TREMOR

## 2022-05-27 ASSESSMENT — PAIN DESCRIPTION - PAIN TYPE: TYPE: ACUTE PAIN

## 2022-05-27 NOTE — CARE PLAN
Problem: Pain - Standard  Goal: Alleviation of pain or a reduction in pain to the patient’s comfort goal  Outcome: Progressing     Problem: Optimal Care for Alcohol Withdrawal  Goal: Optimal Care for the alcohol withdrawal patient  Outcome: Progressing     Problem: Fall Risk  Goal: Patient will remain free from falls  Outcome: Progressing     The patient is Stable - Low risk of patient condition declining or worsening    Shift Goals  Clinical Goals: pain management  Patient Goals: pain control  Family Goals: FANTASMA    Progress made toward(s) clinical / shift goals:  yes    Patient is not progressing towards the following goals:

## 2022-05-27 NOTE — DISCHARGE INSTRUCTIONS
Discharge Instructions per Farzana Falk M.D.    Alcohol abstinence is very important! Smoking cessation is strongly advised.   Please follow-up with PCP as outpatient.    DIET: soft diet    ACTIVITY: As tolerated    DIAGNOSIS: pancreatitis, alcohol abuse, elevated liver enzyme     Return to ER in the event of new or worsening symptoms. Please note importance of compliance and the patient has agreed to proceed with all medical recommendations and follow up plan indicated above. All medications come with benefits and risks. Risks may include permanent injury or death and these risks can be minimized with close reassessment and monitoring. Please make it to your scheduled   follow ups with PCP    Discharge Instructions    Discharged to home by car with relative. Discharged via wheelchair, hospital escort: Yes.  Special equipment needed: Not Applicable    Be sure to schedule a follow-up appointment with your primary care doctor or any specialists as instructed.     Discharge Plan:   Diet Plan: Discussed  Activity Level: Discussed  Confirmed Follow up Appointment: Patient to Call and Schedule Appointment  Confirmed Symptoms Management: Discussed  Medication Reconciliation Updated: Yes    I understand that a diet low in cholesterol, fat, and sodium is recommended for good health. Unless I have been given specific instructions below for another diet, I accept this instruction as my diet prescription.   Other diet: as tolerated    Special Instructions: None    Is patient discharged on Warfarin / Coumadin?   No     Acute Pancreatitis    Acute pancreatitis happens when the pancreas gets swollen. The pancreas is a large gland in the body that helps to control blood sugar. It also makes enzymes that help to digest food.  This condition can last a few days and cause serious problems. The lungs, heart, and kidneys may stop working.  What are the causes?  Causes include:  Alcohol abuse.  Drug abuse.  Gallstones.  A tumor in the  pancreas.  Other causes include:  Some medicines.  Some chemicals.  Diabetes.  An infection.  Damage caused by an accident.  The poison (venom) from a scorpion bite.  Belly (abdominal) surgery.  The body's defense system (immune system) attacking the pancreas (autoimmune pancreatitis).  Genes that are passed from parent to child (inherited).  In some cases, the cause is not known.  What are the signs or symptoms?  Pain in the upper belly that may be felt in the back. The pain may be very bad.  Swelling of the belly.  Feeling sick to your stomach (nauseous) and throwing up (vomiting).  Fever.  How is this treated?  You will likely have to stay in the hospital. Treatment may include:  Pain medicine.  Fluid through an IV tube.  Placing a tube in the stomach to take out the stomach contents. This may help you stop throwing up.  Not eating for 3-4 days.  Antibiotic medicines, if you have an infection.  Treating any other problems that may be the cause.  Steroid medicines, if your problem is caused by your defense system attacking your body's own tissues.  Surgery.  Follow these instructions at home:  Eating and drinking    Follow instructions from your doctor about what to eat and drink.  Eat foods that do not have a lot of fat in them.  Eat small meals often. Do not eat big meals.  Drink enough fluid to keep your pee (urine) pale yellow.  Do not drink alcohol if it caused your condition.  Medicines  Take over-the-counter and prescription medicines only as told by your doctor.  Ask your doctor if the medicine prescribed to you:  Requires you to avoid driving or using heavy machinery.  Can cause trouble pooping (constipation). You may need to take steps to prevent or treat trouble pooping:  Take over-the-counter or prescription medicines.  Eat foods that are high in fiber. These include beans, whole grains, and fresh fruits and vegetables.  Limit foods that are high in fat and sugar. These include fried or sweet  foods.  General instructions  Do not use any products that contain nicotine or tobacco, such as cigarettes, e-cigarettes, and chewing tobacco. If you need help quitting, ask your doctor.  Get plenty of rest.  Check your blood sugar at home as told by your doctor.  Keep all follow-up visits as told by your doctor. This is important.  Contact a doctor if:  You do not get better as quickly as expected.  You have new symptoms.  Your symptoms get worse.  You have pain or weakness that lasts a long time.  You keep feeling sick to your stomach.  You get better and then you have pain again.  You have a fever.  Get help right away if:  You cannot eat or keep fluids down.  Your pain gets very bad.  Your skin or the white part of your eyes turns yellow.  You have sudden swelling in your belly.  You throw up.  You feel dizzy or you pass out (faint).  Your blood sugar is high (over 300 mg/dL).  Summary  Acute pancreatitis happens when the pancreas gets swollen.  This condition is often caused by alcohol abuse, drug abuse, or gallstones.  You will likely have to stay in the hospital for treatment.  This information is not intended to replace advice given to you by your health care provider. Make sure you discuss any questions you have with your health care provider.  Document Released: 06/05/2009 Document Revised: 10/07/2019 Document Reviewed: 10/07/2019  [employer] Patient Education © 2020 [employer]  Pancreatitis Eating Plan  Pancreatitis is when your pancreas becomes irritated and swollen (inflamed). The pancreas is a small organ located behind your stomach. It helps your body digest food and regulate your blood sugar. Pancreatitis can affect how your body digests food, especially foods with fat. You may also have other symptoms such as abdominal pain or nausea.  When you have pancreatitis, following a low-fat eating plan may help you manage symptoms and recover more quickly. Work with your health care provider or a diet and  nutrition specialist (dietitian) to create an eating plan that is right for you.  What are tips for following this plan?  Reading food labels  Use the information on food labels to help keep track of how much fat you eat:  Check the serving size.  Look for the amount of total fat in grams (g) in one serving.  Low-fat foods have 3 g of fat or less per serving.  Fat-free foods have 0.5 g of fat or less per serving.  Keep track of how much fat you eat based on how many servings you eat.  For example, if you eat two servings, the amount of fat you eat will be two times what is listed on the label.  Shopping    Buy low-fat or nonfat foods, such as:  Fresh, frozen, or canned fruits and vegetables.  Grains, including pasta, bread, and rice.  Lean meat, poultry, fish, and other protein foods.  Low-fat or nonfat dairy.  Avoid buying bakery products and other sweets made with whole milk, butter, and eggs.  Avoid buying snack foods with added fat, such as anything with butter or cheese flavoring.  Cooking  Remove skin from poultry, and remove extra fat from meat.  Limit the amount of fat and oil you use to 6 teaspoons or less per day.  Cook using low-fat methods, such as boiling, broiling, grilling, steaming, or baking.  Use spray oil to cook. Add fat-free chicken broth to add flavor and moisture.  Avoid adding cream to thicken soups or sauces. Use other thickeners such as corn starch or tomato paste.  Meal planning    Eat a low-fat diet as told by your dietitian. For most people, this means having no more than 55-65 grams of fat each day.  Eat small, frequent meals throughout the day. For example, you may have 5-6 small meals instead of 3 large meals.  Drink enough fluid to keep your urine pale yellow.  Do not drink alcohol. Talk to your health care provider if you need help stopping.  Limit how much caffeine you have, including black coffee, black and green tea, caffeinated soft drinks, and energy drinks.  General  information  Let your health care provider or dietitian know if you have unplanned weight loss on this eating plan.  You may be instructed to follow a clear liquid diet during a flare of symptoms. Talk with your health care provider about how to manage your diet during symptoms of a flare.  Take any vitamins or supplements as told by your health care provider.  Work with a dietitian, especially if you have other conditions such as obesity or diabetes mellitus.  What foods should I avoid?  Fruits  Fried fruits. Fruits served with butter or cream.  Vegetables  Fried vegetables. Vegetables cooked with butter, cheese, or cream.  Grains  Biscuits, waffles, donuts, pastries, and croissants. Pies and cookies. Butter-flavored popcorn. Regular crackers.  Meats and other protein foods  Fatty cuts of meat. Poultry with skin. Organ meats. Guerra, sausage, and cold cuts. Whole eggs. Nuts and nut butters.  Dairy  Whole and 2% milk. Whole milk yogurt. Whole milk ice cream. Cream and half-and-half. Cream cheese. Sour cream. Cheese.  Beverages  Wine, beer, and liquor.  The items listed above may not be a complete list of foods and beverages to avoid. Contact a dietitian for more information.  Summary  Pancreatitis can affect how your body digests food, especially foods with fat.  When you have pancreatitis, it is recommended that you follow a low-fat eating plan to help you recover more quickly and manage symptoms. For most people, this means limiting fat to no more than 55-65 grams per day.  Do not drink alcohol. Limit the amount of caffeine you have, and drink enough fluid to keep your urine pale yellow.  This information is not intended to replace advice given to you by your health care provider. Make sure you discuss any questions you have with your health care provider.  Document Released: 03/26/2019 Document Revised: 04/09/2020 Document Reviewed: 03/26/2019  Elsevier Patient Education © 2020 [employer]      Depression /  Suicide Risk    As you are discharged from this [facility] facility, it is important to learn how to keep safe from harming yourself.    Recognize the warning signs:  Abrupt changes in personality, positive or negative- including increase in energy   Giving away possessions  Change in eating patterns- significant weight changes-  positive or negative  Change in sleeping patterns- unable to sleep or sleeping all the time   Unwillingness or inability to communicate  Depression  Unusual sadness, discouragement and loneliness  Talk of wanting to die  Neglect of personal appearance   Rebelliousness- reckless behavior  Withdrawal from people/activities they love  Confusion- inability to concentrate     If you or a loved one observes any of these behaviors or has concerns about self-harm, here's what you can do:  Talk about it- your feelings and reasons for harming yourself  Remove any means that you might use to hurt yourself (examples: pills, rope, extension cords, firearm)  Get professional help from the community (Mental Health, Substance Abuse, psychological counseling)  Do not be alone:Call your Safe Contact- someone whom you trust who will be there for you.  Call your local CRISIS HOTLINE 372-5001 or 219-153-9578  Call your local Children's Mobile Crisis Response Team [place] (004) 948-4688 or www.[employer]  Call the toll free National Suicide Prevention Hotlines   National Suicide Prevention Lifeline 901-135-WCRU (7407)  National Hope Line Network 800-SUICIDE (443-6736)

## 2022-05-27 NOTE — PROGRESS NOTES
Received bedside report from night shift RN.   Assumed care of patient at change of shift.   Assessment complete and POC discussed.   STATUS: Patient reports 5/10 pain.   Patient is sitting up in bed for breakfast.   Bed alarm set, call light in reach.  Bed is in lowest/locked position.   Call light and belongings are within reach.   Orders to discharge this morning.

## 2022-05-27 NOTE — DISCHARGE SUMMARY
"Discharge Summary    CHIEF COMPLAINT ON ADMISSION  Chief Complaint   Patient presents with   • Shortness of Breath     Starting 2 weeks ago, worsening since   • N/V     Started 2 days ago   • Ear Pain     Started 5 days ago, pt reporting attempting to clean ear with q tip \"and it keeps coming out bloody.\"       Reason for Admission  Shortness of Breath     Admission Date  5/23/2022    CODE STATUS  Full Code    HPI & HOSPITAL COURSE  This is a 36 yo F presented with abdominal pain. Hx of alcohol abuse and pancreatitis. She has been drinking 7-8 drinks for the last 2 weeks. Over the last couple of days she started having abdominal pain.   Noted anion gap 18, AST//345. Lipase 232. Diagnostic alcohol level 216. US abd shows hepatomegaly. Patient is also a smoker.  She was admitted and was given IVF and bowel rest and ciwa protocol. Patient was started with clear liquid diet on 5/26 and has been gradually advanced to soft diet this morning prior discharge. She reports abdominal pain improving and has been tolerating diet. Lipase was 124 on the day of discharge.   She is noted to have elevated LFT. Normal total bilirubin. Likely sec to alcohol abuse. Acute hepatitis panel negative. LFT has been trending down.   Resources for alcohol abuse has been provided to patient. She is educated multiple times about alcohol abstinence. Patient voiced understanding. Smoking cessation counseling provided as well.   Therefore, she is discharged in fair and stable condition to home with close outpatient follow-up.    The patient met 2-midnight criteria for an inpatient stay at the time of discharge.    Discharge Date  5/27/2022    FOLLOW UP ITEMS POST DISCHARGE  PCP    DISCHARGE DIAGNOSES  Principal Problem:    Pancreatitis, recurrent POA: Yes  Active Problems:    Alcohol dependence (HCC) POA: Unknown    Bipolar affective disorder, current episode hypomanic (HCC) POA: Yes    Tobacco abuse POA: Yes    Interstitial cystitis " (chronic) without hematuria POA: Yes    Seizure (HCC) POA: Unknown    Hypokalemia POA: Unknown    Transaminitis POA: Unknown  Resolved Problems:    * No resolved hospital problems. *      FOLLOW UP  No future appointments.  [facility] ([facility]  [address] [place] [id number]  [phone]  Follow up in 1 week(s)        MEDICATIONS ON DISCHARGE     Medication List      START taking these medications      Instructions   oxyCODONE immediate-release 5 MG Tabs  Commonly known as: ROXICODONE   Take 1 Tablet by mouth every 8 hours as needed for Severe Pain for up to 3 days.  Dose: 5 mg        CONTINUE taking these medications      Instructions   levetiracetam 1000 MG tablet  Commonly known as: KEPPRA   Take 1 Tab by mouth 2 Times a Day.  Dose: 1,000 mg     zonisamide 25 MG capsule  Commonly known as: ZONEGRAN   Take 2 Capsules by mouth every day.  Dose: 50 mg            Allergies  Allergies   Allergen Reactions   • Lorazepam Anxiety and Unspecified     Severe irritability and irrationality    5/23/22 Pt states this is not an allergy and that she has been tolerating fine.    • Promethazine Hcl Anxiety       DIET  Orders Placed This Encounter   Procedures   • Diet Order Diet: Full Liquid     Standing Status:   Standing     Number of Occurrences:   1     Order Specific Question:   Diet:     Answer:   Full Liquid [11]       ACTIVITY  As tolerated.  Weight bearing as tolerated    CONSULTATIONS  na    PROCEDURES  na    LABORATORY  Lab Results   Component Value Date    SODIUM 136 05/27/2022    POTASSIUM 3.9 05/27/2022    CHLORIDE 100 05/27/2022    CO2 20 05/27/2022    GLUCOSE 102 (H) 05/27/2022    BUN 4 (L) 05/27/2022    CREATININE 0.58 05/27/2022    CREATININE 0.7 06/10/2008        Lab Results   Component Value Date    WBC 5.6 05/27/2022    HEMOGLOBIN 14.7 05/27/2022    HEMATOCRIT 41.2 05/27/2022    PLATELETCT 160 (L) 05/27/2022      DX-CHEST-PORTABLE (1 VIEW)   Final Result         1.  No  acute cardiopulmonary disease.      US-RUQ   Final Result         1.  Hepatomegaly and echogenic liver, compatible with fatty change versus fibrosis.   2.  Otherwise unremarkable right upper quadrant sonogram            Total time of the discharge process exceeds 31 minutes.

## 2022-06-30 ENCOUNTER — HOSPITAL ENCOUNTER (INPATIENT)
Facility: MEDICAL CENTER | Age: 37
LOS: 3 days | DRG: 439 | End: 2022-07-04
Attending: EMERGENCY MEDICINE | Admitting: STUDENT IN AN ORGANIZED HEALTH CARE EDUCATION/TRAINING PROGRAM
Payer: COMMERCIAL

## 2022-06-30 DIAGNOSIS — E87.6 HYPOKALEMIA: ICD-10-CM

## 2022-06-30 DIAGNOSIS — E87.29 ALCOHOLIC KETOACIDOSIS: ICD-10-CM

## 2022-06-30 DIAGNOSIS — R11.2 INTRACTABLE VOMITING WITH NAUSEA, UNSPECIFIED VOMITING TYPE: ICD-10-CM

## 2022-06-30 DIAGNOSIS — E86.0 DEHYDRATION: ICD-10-CM

## 2022-06-30 DIAGNOSIS — F10.29 ALCOHOL DEPENDENCE WITH UNSPECIFIED ALCOHOL-INDUCED DISORDER (HCC): ICD-10-CM

## 2022-06-30 DIAGNOSIS — E87.20 LACTIC ACIDOSIS: Primary | ICD-10-CM

## 2022-06-30 PROCEDURE — 99285 EMERGENCY DEPT VISIT HI MDM: CPT

## 2022-07-01 ENCOUNTER — APPOINTMENT (OUTPATIENT)
Dept: RADIOLOGY | Facility: MEDICAL CENTER | Age: 37
DRG: 439 | End: 2022-07-01
Attending: EMERGENCY MEDICINE
Payer: COMMERCIAL

## 2022-07-01 PROBLEM — E87.29 HIGH ANION GAP METABOLIC ACIDOSIS: Status: ACTIVE | Noted: 2022-07-01

## 2022-07-01 PROBLEM — K85.90 PANCREATITIS, UNSPECIFIED PANCREATITIS TYPE: Status: ACTIVE | Noted: 2022-07-01

## 2022-07-01 PROBLEM — E86.0 ACUTE DEHYDRATION: Status: ACTIVE | Noted: 2022-07-01

## 2022-07-01 PROBLEM — K85.90 PANCREATITIS, UNSPECIFIED PANCREATITIS TYPE: Status: RESOLVED | Noted: 2022-07-01 | Resolved: 2022-07-01

## 2022-07-01 PROBLEM — E87.20 LACTIC ACIDOSIS: Status: ACTIVE | Noted: 2022-07-01

## 2022-07-01 PROBLEM — T73.0XXA STARVATION KETOACIDOSIS: Status: ACTIVE | Noted: 2022-07-01

## 2022-07-01 PROBLEM — F10.239 ALCOHOL DEPENDENCE WITH WITHDRAWAL (HCC): Status: ACTIVE | Noted: 2019-11-26

## 2022-07-01 PROBLEM — E83.51 HYPOCALCEMIA: Status: ACTIVE | Noted: 2022-07-01

## 2022-07-01 PROBLEM — F10.20 ALCOHOL DEPENDENCE (HCC): Status: RESOLVED | Noted: 2017-04-13 | Resolved: 2022-07-01

## 2022-07-01 PROBLEM — E87.29 STARVATION KETOACIDOSIS: Status: ACTIVE | Noted: 2022-07-01

## 2022-07-01 LAB
ALBUMIN SERPL BCP-MCNC: 4.8 G/DL (ref 3.2–4.9)
ALBUMIN/GLOB SERPL: 1.6 G/DL
ALP SERPL-CCNC: 126 U/L (ref 30–99)
ALT SERPL-CCNC: 40 U/L (ref 2–50)
ANION GAP SERPL CALC-SCNC: 10 MMOL/L (ref 7–16)
ANION GAP SERPL CALC-SCNC: 27 MMOL/L (ref 7–16)
ANION GAP SERPL CALC-SCNC: 9 MMOL/L (ref 7–16)
APPEARANCE UR: CLEAR
APTT PPP: 26.2 SEC (ref 24.7–36)
AST SERPL-CCNC: 75 U/L (ref 12–45)
B-OH-BUTYR SERPL-MCNC: 0.51 MMOL/L (ref 0.02–0.27)
BACTERIA #/AREA URNS HPF: ABNORMAL /HPF
BASOPHILS # BLD AUTO: 0.2 % (ref 0–1.8)
BASOPHILS # BLD: 0.01 K/UL (ref 0–0.12)
BILIRUB SERPL-MCNC: 0.9 MG/DL (ref 0.1–1.5)
BILIRUB UR QL STRIP.AUTO: NEGATIVE
BUN SERPL-MCNC: 10 MG/DL (ref 8–22)
BUN SERPL-MCNC: 12 MG/DL (ref 8–22)
BUN SERPL-MCNC: 14 MG/DL (ref 8–22)
CA-I SERPL-SCNC: 1 MMOL/L (ref 1.1–1.3)
CALCIUM SERPL-MCNC: 6.3 MG/DL (ref 8.5–10.5)
CALCIUM SERPL-MCNC: 8.3 MG/DL (ref 8.5–10.5)
CALCIUM SERPL-MCNC: 8.7 MG/DL (ref 8.5–10.5)
CHLORIDE SERPL-SCNC: 103 MMOL/L (ref 96–112)
CHLORIDE SERPL-SCNC: 109 MMOL/L (ref 96–112)
CHLORIDE SERPL-SCNC: 92 MMOL/L (ref 96–112)
CO2 SERPL-SCNC: 22 MMOL/L (ref 20–33)
CO2 SERPL-SCNC: 22 MMOL/L (ref 20–33)
CO2 SERPL-SCNC: 24 MMOL/L (ref 20–33)
COLOR UR: YELLOW
CREAT SERPL-MCNC: 0.46 MG/DL (ref 0.5–1.4)
CREAT SERPL-MCNC: 0.6 MG/DL (ref 0.5–1.4)
CREAT SERPL-MCNC: 0.7 MG/DL (ref 0.5–1.4)
EKG IMPRESSION: NORMAL
EOSINOPHIL # BLD AUTO: 0 K/UL (ref 0–0.51)
EOSINOPHIL NFR BLD: 0 % (ref 0–6.9)
EPI CELLS #/AREA URNS HPF: ABNORMAL /HPF
ERYTHROCYTE [DISTWIDTH] IN BLOOD BY AUTOMATED COUNT: 45.2 FL (ref 35.9–50)
ETHANOL BLD-MCNC: 198.6 MG/DL
GFR SERPLBLD CREATININE-BSD FMLA CKD-EPI: 115 ML/MIN/1.73 M 2
GFR SERPLBLD CREATININE-BSD FMLA CKD-EPI: 120 ML/MIN/1.73 M 2
GFR SERPLBLD CREATININE-BSD FMLA CKD-EPI: 128 ML/MIN/1.73 M 2
GLOBULIN SER CALC-MCNC: 3 G/DL (ref 1.9–3.5)
GLUCOSE SERPL-MCNC: 102 MG/DL (ref 65–99)
GLUCOSE SERPL-MCNC: 119 MG/DL (ref 65–99)
GLUCOSE SERPL-MCNC: 99 MG/DL (ref 65–99)
GLUCOSE UR STRIP.AUTO-MCNC: NEGATIVE MG/DL
HCT VFR BLD AUTO: 50.5 % (ref 37–47)
HGB BLD-MCNC: 18.5 G/DL (ref 12–16)
HYALINE CASTS #/AREA URNS LPF: ABNORMAL /LPF
IMM GRANULOCYTES # BLD AUTO: 0.01 K/UL (ref 0–0.11)
IMM GRANULOCYTES NFR BLD AUTO: 0.2 % (ref 0–0.9)
INR PPP: 1.14 (ref 0.87–1.13)
KETONES UR STRIP.AUTO-MCNC: 15 MG/DL
LACTATE SERPL-SCNC: 1.4 MMOL/L (ref 0.5–2)
LACTATE SERPL-SCNC: 3.8 MMOL/L (ref 0.5–2)
LACTATE SERPL-SCNC: 7.2 MMOL/L (ref 0.5–2)
LACTATE SERPL-SCNC: 8.9 MMOL/L (ref 0.5–2)
LEUKOCYTE ESTERASE UR QL STRIP.AUTO: NEGATIVE
LIPASE SERPL-CCNC: 137 U/L (ref 11–82)
LYMPHOCYTES # BLD AUTO: 1.8 K/UL (ref 1–4.8)
LYMPHOCYTES NFR BLD: 34 % (ref 22–41)
MAGNESIUM SERPL-MCNC: 2.6 MG/DL (ref 1.5–2.5)
MCH RBC QN AUTO: 32.2 PG (ref 27–33)
MCHC RBC AUTO-ENTMCNC: 36.6 G/DL (ref 33.6–35)
MCV RBC AUTO: 88 FL (ref 81.4–97.8)
MICRO URNS: ABNORMAL
MONOCYTES # BLD AUTO: 0.34 K/UL (ref 0–0.85)
MONOCYTES NFR BLD AUTO: 6.4 % (ref 0–13.4)
NEUTROPHILS # BLD AUTO: 3.13 K/UL (ref 2–7.15)
NEUTROPHILS NFR BLD: 59.2 % (ref 44–72)
NITRITE UR QL STRIP.AUTO: NEGATIVE
NRBC # BLD AUTO: 0 K/UL
NRBC BLD-RTO: 0 /100 WBC
PH UR STRIP.AUTO: 5.5 [PH] (ref 5–8)
PHOSPHATE SERPL-MCNC: 2.9 MG/DL (ref 2.5–4.5)
PLATELET # BLD AUTO: 187 K/UL (ref 164–446)
PMV BLD AUTO: 10.3 FL (ref 9–12.9)
POTASSIUM SERPL-SCNC: 3 MMOL/L (ref 3.6–5.5)
POTASSIUM SERPL-SCNC: 3.7 MMOL/L (ref 3.6–5.5)
POTASSIUM SERPL-SCNC: 4.1 MMOL/L (ref 3.6–5.5)
PROT SERPL-MCNC: 7.8 G/DL (ref 6–8.2)
PROT UR QL STRIP: 100 MG/DL
PROTHROMBIN TIME: 14.5 SEC (ref 12–14.6)
RBC # BLD AUTO: 5.74 M/UL (ref 4.2–5.4)
RBC # URNS HPF: ABNORMAL /HPF
RBC UR QL AUTO: ABNORMAL
SODIUM SERPL-SCNC: 136 MMOL/L (ref 135–145)
SODIUM SERPL-SCNC: 141 MMOL/L (ref 135–145)
SODIUM SERPL-SCNC: 141 MMOL/L (ref 135–145)
SP GR UR STRIP.AUTO: >=1.03
UROBILINOGEN UR STRIP.AUTO-MCNC: 0.2 MG/DL
WBC # BLD AUTO: 5.3 K/UL (ref 4.8–10.8)
WBC #/AREA URNS HPF: ABNORMAL /HPF

## 2022-07-01 PROCEDURE — 82010 KETONE BODYS QUAN: CPT

## 2022-07-01 PROCEDURE — 83605 ASSAY OF LACTIC ACID: CPT

## 2022-07-01 PROCEDURE — 99999 PR NO CHARGE: CPT | Performed by: STUDENT IN AN ORGANIZED HEALTH CARE EDUCATION/TRAINING PROGRAM

## 2022-07-01 PROCEDURE — 85610 PROTHROMBIN TIME: CPT

## 2022-07-01 PROCEDURE — 96375 TX/PRO/DX INJ NEW DRUG ADDON: CPT

## 2022-07-01 PROCEDURE — 700102 HCHG RX REV CODE 250 W/ 637 OVERRIDE(OP): Performed by: STUDENT IN AN ORGANIZED HEALTH CARE EDUCATION/TRAINING PROGRAM

## 2022-07-01 PROCEDURE — 700105 HCHG RX REV CODE 258: Performed by: EMERGENCY MEDICINE

## 2022-07-01 PROCEDURE — 700111 HCHG RX REV CODE 636 W/ 250 OVERRIDE (IP): Performed by: STUDENT IN AN ORGANIZED HEALTH CARE EDUCATION/TRAINING PROGRAM

## 2022-07-01 PROCEDURE — 82077 ASSAY SPEC XCP UR&BREATH IA: CPT

## 2022-07-01 PROCEDURE — 770020 HCHG ROOM/CARE - TELE (206)

## 2022-07-01 PROCEDURE — 96368 THER/DIAG CONCURRENT INF: CPT

## 2022-07-01 PROCEDURE — 85025 COMPLETE CBC W/AUTO DIFF WBC: CPT

## 2022-07-01 PROCEDURE — 700102 HCHG RX REV CODE 250 W/ 637 OVERRIDE(OP): Performed by: EMERGENCY MEDICINE

## 2022-07-01 PROCEDURE — 83735 ASSAY OF MAGNESIUM: CPT

## 2022-07-01 PROCEDURE — 700105 HCHG RX REV CODE 258: Performed by: STUDENT IN AN ORGANIZED HEALTH CARE EDUCATION/TRAINING PROGRAM

## 2022-07-01 PROCEDURE — 96365 THER/PROPH/DIAG IV INF INIT: CPT

## 2022-07-01 PROCEDURE — 99223 1ST HOSP IP/OBS HIGH 75: CPT | Performed by: STUDENT IN AN ORGANIZED HEALTH CARE EDUCATION/TRAINING PROGRAM

## 2022-07-01 PROCEDURE — A9270 NON-COVERED ITEM OR SERVICE: HCPCS | Performed by: STUDENT IN AN ORGANIZED HEALTH CARE EDUCATION/TRAINING PROGRAM

## 2022-07-01 PROCEDURE — 87040 BLOOD CULTURE FOR BACTERIA: CPT

## 2022-07-01 PROCEDURE — 700111 HCHG RX REV CODE 636 W/ 250 OVERRIDE (IP): Performed by: EMERGENCY MEDICINE

## 2022-07-01 PROCEDURE — 93005 ELECTROCARDIOGRAM TRACING: CPT | Performed by: EMERGENCY MEDICINE

## 2022-07-01 PROCEDURE — HZ2ZZZZ DETOXIFICATION SERVICES FOR SUBSTANCE ABUSE TREATMENT: ICD-10-PCS | Performed by: STUDENT IN AN ORGANIZED HEALTH CARE EDUCATION/TRAINING PROGRAM

## 2022-07-01 PROCEDURE — 80048 BASIC METABOLIC PNL TOTAL CA: CPT

## 2022-07-01 PROCEDURE — 81001 URINALYSIS AUTO W/SCOPE: CPT

## 2022-07-01 PROCEDURE — 96372 THER/PROPH/DIAG INJ SC/IM: CPT

## 2022-07-01 PROCEDURE — 36415 COLL VENOUS BLD VENIPUNCTURE: CPT

## 2022-07-01 PROCEDURE — 94760 N-INVAS EAR/PLS OXIMETRY 1: CPT

## 2022-07-01 PROCEDURE — 83690 ASSAY OF LIPASE: CPT

## 2022-07-01 PROCEDURE — 96376 TX/PRO/DX INJ SAME DRUG ADON: CPT

## 2022-07-01 PROCEDURE — 84100 ASSAY OF PHOSPHORUS: CPT

## 2022-07-01 PROCEDURE — A9270 NON-COVERED ITEM OR SERVICE: HCPCS | Performed by: EMERGENCY MEDICINE

## 2022-07-01 PROCEDURE — 82330 ASSAY OF CALCIUM: CPT

## 2022-07-01 PROCEDURE — 700101 HCHG RX REV CODE 250: Performed by: EMERGENCY MEDICINE

## 2022-07-01 PROCEDURE — 74176 CT ABD & PELVIS W/O CONTRAST: CPT

## 2022-07-01 PROCEDURE — 85730 THROMBOPLASTIN TIME PARTIAL: CPT

## 2022-07-01 PROCEDURE — 96366 THER/PROPH/DIAG IV INF ADDON: CPT

## 2022-07-01 PROCEDURE — 80053 COMPREHEN METABOLIC PANEL: CPT

## 2022-07-01 RX ORDER — ONDANSETRON 2 MG/ML
4 INJECTION INTRAMUSCULAR; INTRAVENOUS ONCE
Status: COMPLETED | OUTPATIENT
Start: 2022-07-01 | End: 2022-07-01

## 2022-07-01 RX ORDER — POLYETHYLENE GLYCOL 3350 17 G/17G
1 POWDER, FOR SOLUTION ORAL DAILY
Status: DISCONTINUED | OUTPATIENT
Start: 2022-07-01 | End: 2022-07-04 | Stop reason: HOSPADM

## 2022-07-01 RX ORDER — ACETAMINOPHEN 500 MG
1000 TABLET ORAL EVERY 6 HOURS PRN
Status: DISCONTINUED | OUTPATIENT
Start: 2022-07-06 | End: 2022-07-04 | Stop reason: HOSPADM

## 2022-07-01 RX ORDER — OXYCODONE HYDROCHLORIDE 10 MG/1
10 TABLET ORAL
Status: DISCONTINUED | OUTPATIENT
Start: 2022-07-01 | End: 2022-07-03

## 2022-07-01 RX ORDER — LORAZEPAM 2 MG/ML
1.5 INJECTION INTRAMUSCULAR
Status: DISCONTINUED | OUTPATIENT
Start: 2022-07-01 | End: 2022-07-04 | Stop reason: HOSPADM

## 2022-07-01 RX ORDER — PROCHLORPERAZINE EDISYLATE 5 MG/ML
5-10 INJECTION INTRAMUSCULAR; INTRAVENOUS EVERY 4 HOURS PRN
Status: DISCONTINUED | OUTPATIENT
Start: 2022-07-01 | End: 2022-07-04 | Stop reason: HOSPADM

## 2022-07-01 RX ORDER — PROMETHAZINE HYDROCHLORIDE 25 MG/1
12.5-25 TABLET ORAL EVERY 4 HOURS PRN
Status: DISCONTINUED | OUTPATIENT
Start: 2022-07-01 | End: 2022-07-01

## 2022-07-01 RX ORDER — ENOXAPARIN SODIUM 100 MG/ML
40 INJECTION SUBCUTANEOUS DAILY
Status: DISCONTINUED | OUTPATIENT
Start: 2022-07-01 | End: 2022-07-04 | Stop reason: HOSPADM

## 2022-07-01 RX ORDER — SODIUM CHLORIDE 9 MG/ML
1000 INJECTION, SOLUTION INTRAVENOUS ONCE
Status: COMPLETED | OUTPATIENT
Start: 2022-07-01 | End: 2022-07-01

## 2022-07-01 RX ORDER — GAUZE BANDAGE 2" X 2"
100 BANDAGE TOPICAL DAILY
Status: DISCONTINUED | OUTPATIENT
Start: 2022-07-02 | End: 2022-07-04 | Stop reason: HOSPADM

## 2022-07-01 RX ORDER — DEXTROSE AND SODIUM CHLORIDE 5; .9 G/100ML; G/100ML
INJECTION, SOLUTION INTRAVENOUS CONTINUOUS
Status: DISCONTINUED | OUTPATIENT
Start: 2022-07-01 | End: 2022-07-01

## 2022-07-01 RX ORDER — ACETAMINOPHEN 500 MG
1000 TABLET ORAL EVERY 6 HOURS
Status: DISCONTINUED | OUTPATIENT
Start: 2022-07-01 | End: 2022-07-04 | Stop reason: HOSPADM

## 2022-07-01 RX ORDER — LORAZEPAM 2 MG/ML
1 INJECTION INTRAMUSCULAR
Status: DISCONTINUED | OUTPATIENT
Start: 2022-07-01 | End: 2022-07-04 | Stop reason: HOSPADM

## 2022-07-01 RX ORDER — POTASSIUM CHLORIDE 20 MEQ/1
40 TABLET, EXTENDED RELEASE ORAL ONCE
Status: COMPLETED | OUTPATIENT
Start: 2022-07-01 | End: 2022-07-01

## 2022-07-01 RX ORDER — ONDANSETRON 2 MG/ML
4 INJECTION INTRAMUSCULAR; INTRAVENOUS EVERY 4 HOURS PRN
Status: DISCONTINUED | OUTPATIENT
Start: 2022-07-01 | End: 2022-07-04 | Stop reason: HOSPADM

## 2022-07-01 RX ORDER — LORAZEPAM 2 MG/1
4 TABLET ORAL
Status: DISCONTINUED | OUTPATIENT
Start: 2022-07-01 | End: 2022-07-04 | Stop reason: HOSPADM

## 2022-07-01 RX ORDER — GABAPENTIN 100 MG/1
200 CAPSULE ORAL 3 TIMES DAILY
Status: DISCONTINUED | OUTPATIENT
Start: 2022-07-01 | End: 2022-07-02

## 2022-07-01 RX ORDER — MORPHINE SULFATE 4 MG/ML
4 INJECTION INTRAVENOUS ONCE
Status: COMPLETED | OUTPATIENT
Start: 2022-07-01 | End: 2022-07-01

## 2022-07-01 RX ORDER — LORAZEPAM 0.5 MG/1
0.5 TABLET ORAL EVERY 4 HOURS PRN
Status: DISCONTINUED | OUTPATIENT
Start: 2022-07-01 | End: 2022-07-04 | Stop reason: HOSPADM

## 2022-07-01 RX ORDER — FOLIC ACID 1 MG/1
1 TABLET ORAL DAILY
Status: DISCONTINUED | OUTPATIENT
Start: 2022-07-02 | End: 2022-07-04 | Stop reason: HOSPADM

## 2022-07-01 RX ORDER — DEXTROSE AND SODIUM CHLORIDE 5; .9 G/100ML; G/100ML
INJECTION, SOLUTION INTRAVENOUS CONTINUOUS
Status: ACTIVE | OUTPATIENT
Start: 2022-07-01 | End: 2022-07-01

## 2022-07-01 RX ORDER — LORAZEPAM 2 MG/ML
2 INJECTION INTRAMUSCULAR
Status: DISCONTINUED | OUTPATIENT
Start: 2022-07-01 | End: 2022-07-04 | Stop reason: HOSPADM

## 2022-07-01 RX ORDER — POTASSIUM CHLORIDE 20 MEQ/1
40 TABLET, EXTENDED RELEASE ORAL EVERY 4 HOURS
Status: DISCONTINUED | OUTPATIENT
Start: 2022-07-01 | End: 2022-07-01

## 2022-07-01 RX ORDER — IBUPROFEN 800 MG/1
800 TABLET ORAL 3 TIMES DAILY PRN
Status: DISCONTINUED | OUTPATIENT
Start: 2022-07-04 | End: 2022-07-02

## 2022-07-01 RX ORDER — MORPHINE SULFATE 4 MG/ML
1 INJECTION INTRAVENOUS EVERY 4 HOURS PRN
Status: DISCONTINUED | OUTPATIENT
Start: 2022-07-01 | End: 2022-07-01

## 2022-07-01 RX ORDER — OXYCODONE HYDROCHLORIDE 5 MG/1
5 TABLET ORAL
Status: DISCONTINUED | OUTPATIENT
Start: 2022-07-01 | End: 2022-07-03

## 2022-07-01 RX ORDER — VALPROIC ACID 250 MG/1
250 CAPSULE, LIQUID FILLED ORAL EVERY 8 HOURS
Status: DISCONTINUED | OUTPATIENT
Start: 2022-07-01 | End: 2022-07-04 | Stop reason: HOSPADM

## 2022-07-01 RX ORDER — ONDANSETRON 4 MG/1
4 TABLET, ORALLY DISINTEGRATING ORAL EVERY 4 HOURS PRN
Status: DISCONTINUED | OUTPATIENT
Start: 2022-07-01 | End: 2022-07-04 | Stop reason: HOSPADM

## 2022-07-01 RX ORDER — LORAZEPAM 2 MG/1
2 TABLET ORAL
Status: DISCONTINUED | OUTPATIENT
Start: 2022-07-01 | End: 2022-07-04 | Stop reason: HOSPADM

## 2022-07-01 RX ORDER — LORAZEPAM 1 MG/1
1 TABLET ORAL EVERY 4 HOURS PRN
Status: DISCONTINUED | OUTPATIENT
Start: 2022-07-01 | End: 2022-07-04 | Stop reason: HOSPADM

## 2022-07-01 RX ORDER — ZONISAMIDE 50 MG/1
50 CAPSULE ORAL DAILY
Status: DISCONTINUED | OUTPATIENT
Start: 2022-07-01 | End: 2022-07-04 | Stop reason: HOSPADM

## 2022-07-01 RX ORDER — SODIUM CHLORIDE, SODIUM LACTATE, POTASSIUM CHLORIDE, CALCIUM CHLORIDE 600; 310; 30; 20 MG/100ML; MG/100ML; MG/100ML; MG/100ML
INJECTION, SOLUTION INTRAVENOUS CONTINUOUS
Status: ACTIVE | OUTPATIENT
Start: 2022-07-01 | End: 2022-07-02

## 2022-07-01 RX ORDER — PROMETHAZINE HYDROCHLORIDE 25 MG/1
12.5-25 SUPPOSITORY RECTAL EVERY 4 HOURS PRN
Status: DISCONTINUED | OUTPATIENT
Start: 2022-07-01 | End: 2022-07-01

## 2022-07-01 RX ORDER — DOCUSATE SODIUM 100 MG/1
100 CAPSULE, LIQUID FILLED ORAL 2 TIMES DAILY
Status: DISCONTINUED | OUTPATIENT
Start: 2022-07-01 | End: 2022-07-04 | Stop reason: HOSPADM

## 2022-07-01 RX ORDER — SODIUM CHLORIDE AND POTASSIUM CHLORIDE 300; 900 MG/100ML; MG/100ML
INJECTION, SOLUTION INTRAVENOUS CONTINUOUS
Status: DISCONTINUED | OUTPATIENT
Start: 2022-07-01 | End: 2022-07-01

## 2022-07-01 RX ORDER — KETOROLAC TROMETHAMINE 30 MG/ML
30 INJECTION, SOLUTION INTRAMUSCULAR; INTRAVENOUS EVERY 6 HOURS
Status: DISCONTINUED | OUTPATIENT
Start: 2022-07-01 | End: 2022-07-02

## 2022-07-01 RX ORDER — MORPHINE SULFATE 4 MG/ML
4 INJECTION INTRAVENOUS
Status: DISCONTINUED | OUTPATIENT
Start: 2022-07-01 | End: 2022-07-03

## 2022-07-01 RX ORDER — LEVETIRACETAM 500 MG/1
1000 TABLET ORAL 2 TIMES DAILY
Status: DISCONTINUED | OUTPATIENT
Start: 2022-07-01 | End: 2022-07-04 | Stop reason: HOSPADM

## 2022-07-01 RX ORDER — LORAZEPAM 2 MG/ML
0.5 INJECTION INTRAMUSCULAR EVERY 4 HOURS PRN
Status: DISCONTINUED | OUTPATIENT
Start: 2022-07-01 | End: 2022-07-04 | Stop reason: HOSPADM

## 2022-07-01 RX ADMIN — OXYCODONE HYDROCHLORIDE 10 MG: 10 TABLET ORAL at 10:24

## 2022-07-01 RX ADMIN — MORPHINE SULFATE 4 MG: 4 INJECTION INTRAVENOUS at 22:30

## 2022-07-01 RX ADMIN — ACETAMINOPHEN 1000 MG: 500 TABLET, FILM COATED ORAL at 16:55

## 2022-07-01 RX ADMIN — LORAZEPAM 1.5 MG: 2 INJECTION INTRAMUSCULAR; INTRAVENOUS at 05:55

## 2022-07-01 RX ADMIN — LORAZEPAM 1.5 MG: 2 INJECTION INTRAMUSCULAR; INTRAVENOUS at 15:52

## 2022-07-01 RX ADMIN — MORPHINE SULFATE 4 MG: 4 INJECTION INTRAVENOUS at 15:56

## 2022-07-01 RX ADMIN — POTASSIUM CHLORIDE 40 MEQ: 1500 TABLET, EXTENDED RELEASE ORAL at 04:09

## 2022-07-01 RX ADMIN — SODIUM CHLORIDE 1000 ML: 9 INJECTION, SOLUTION INTRAVENOUS at 00:38

## 2022-07-01 RX ADMIN — LORAZEPAM 1 MG: 1 TABLET ORAL at 14:49

## 2022-07-01 RX ADMIN — ENOXAPARIN SODIUM 40 MG: 40 INJECTION SUBCUTANEOUS at 03:46

## 2022-07-01 RX ADMIN — VALPROIC ACID 250 MG: 250 CAPSULE, LIQUID FILLED ORAL at 21:31

## 2022-07-01 RX ADMIN — LORAZEPAM 2 MG: 2 TABLET ORAL at 16:54

## 2022-07-01 RX ADMIN — VALPROIC ACID 250 MG: 250 CAPSULE, LIQUID FILLED ORAL at 18:02

## 2022-07-01 RX ADMIN — DEXTROSE AND SODIUM CHLORIDE: 5; 900 INJECTION, SOLUTION INTRAVENOUS at 10:29

## 2022-07-01 RX ADMIN — ZONISAMIDE 50 MG: 50 CAPSULE ORAL at 05:40

## 2022-07-01 RX ADMIN — CALCIUM CHLORIDE 1000 MG: 100 INJECTION, SOLUTION INTRAVENOUS at 09:37

## 2022-07-01 RX ADMIN — KETOROLAC TROMETHAMINE 30 MG: 30 INJECTION, SOLUTION INTRAMUSCULAR; INTRAVENOUS at 11:49

## 2022-07-01 RX ADMIN — NYSTATIN 500000 UNITS: 100000 SUSPENSION ORAL at 09:00

## 2022-07-01 RX ADMIN — MORPHINE SULFATE 4 MG: 4 INJECTION INTRAVENOUS at 03:46

## 2022-07-01 RX ADMIN — ONDANSETRON 4 MG: 2 INJECTION INTRAMUSCULAR; INTRAVENOUS at 00:37

## 2022-07-01 RX ADMIN — POLYETHYLENE GLYCOL 3350 1 PACKET: 17 POWDER, FOR SOLUTION ORAL at 10:29

## 2022-07-01 RX ADMIN — ONDANSETRON 4 MG: 2 INJECTION INTRAMUSCULAR; INTRAVENOUS at 03:46

## 2022-07-01 RX ADMIN — POTASSIUM CHLORIDE AND SODIUM CHLORIDE: 900; 300 INJECTION, SOLUTION INTRAVENOUS at 02:33

## 2022-07-01 RX ADMIN — MORPHINE SULFATE 1 MG: 4 INJECTION INTRAVENOUS at 07:46

## 2022-07-01 RX ADMIN — POTASSIUM CHLORIDE AND SODIUM CHLORIDE: 900; 300 INJECTION, SOLUTION INTRAVENOUS at 07:57

## 2022-07-01 RX ADMIN — THERA TABS 1 TABLET: TAB at 05:57

## 2022-07-01 RX ADMIN — LEVETIRACETAM 1000 MG: 500 TABLET, FILM COATED ORAL at 16:55

## 2022-07-01 RX ADMIN — ACETAMINOPHEN 1000 MG: 500 TABLET, FILM COATED ORAL at 11:49

## 2022-07-01 RX ADMIN — THIAMINE HYDROCHLORIDE: 100 INJECTION, SOLUTION INTRAMUSCULAR; INTRAVENOUS at 03:49

## 2022-07-01 RX ADMIN — MORPHINE SULFATE 4 MG: 4 INJECTION INTRAVENOUS at 00:37

## 2022-07-01 RX ADMIN — GABAPENTIN 200 MG: 100 CAPSULE ORAL at 18:02

## 2022-07-01 RX ADMIN — LORAZEPAM 1 MG: 1 TABLET ORAL at 07:52

## 2022-07-01 RX ADMIN — NYSTATIN 500000 UNITS: 100000 SUSPENSION ORAL at 16:52

## 2022-07-01 RX ADMIN — SODIUM CHLORIDE, POTASSIUM CHLORIDE, SODIUM LACTATE AND CALCIUM CHLORIDE: 600; 310; 30; 20 INJECTION, SOLUTION INTRAVENOUS at 18:02

## 2022-07-01 RX ADMIN — KETOROLAC TROMETHAMINE 30 MG: 30 INJECTION, SOLUTION INTRAMUSCULAR; INTRAVENOUS at 18:01

## 2022-07-01 RX ADMIN — MORPHINE SULFATE 4 MG: 4 INJECTION INTRAVENOUS at 19:34

## 2022-07-01 RX ADMIN — ENOXAPARIN SODIUM 40 MG: 40 INJECTION SUBCUTANEOUS at 16:52

## 2022-07-01 RX ADMIN — KETOROLAC TROMETHAMINE 30 MG: 30 INJECTION, SOLUTION INTRAMUSCULAR; INTRAVENOUS at 23:45

## 2022-07-01 RX ADMIN — NYSTATIN 500000 UNITS: 100000 SUSPENSION ORAL at 21:32

## 2022-07-01 RX ADMIN — MORPHINE SULFATE 4 MG: 4 INJECTION INTRAVENOUS at 11:49

## 2022-07-01 RX ADMIN — LORAZEPAM 2 MG: 2 TABLET ORAL at 23:45

## 2022-07-01 RX ADMIN — LEVETIRACETAM 1000 MG: 500 TABLET, FILM COATED ORAL at 05:56

## 2022-07-01 RX ADMIN — LORAZEPAM 2 MG: 2 TABLET ORAL at 19:34

## 2022-07-01 RX ADMIN — SODIUM CHLORIDE 1000 ML: 9 INJECTION, SOLUTION INTRAVENOUS at 04:10

## 2022-07-01 RX ADMIN — LORAZEPAM 2 MG: 2 TABLET ORAL at 21:31

## 2022-07-01 RX ADMIN — LORAZEPAM 2 MG: 2 TABLET ORAL at 16:53

## 2022-07-01 RX ADMIN — LIDOCAINE HYDROCHLORIDE 30 ML: 20 SOLUTION OROPHARYNGEAL at 02:33

## 2022-07-01 RX ADMIN — OXYCODONE HYDROCHLORIDE 10 MG: 10 TABLET ORAL at 14:24

## 2022-07-01 RX ADMIN — ACETAMINOPHEN 1000 MG: 500 TABLET, FILM COATED ORAL at 23:44

## 2022-07-01 ASSESSMENT — LIFESTYLE VARIABLES
AGITATION: *
AUDITORY DISTURBANCES: NOT PRESENT
AVERAGE NUMBER OF DAYS PER WEEK YOU HAVE A DRINK CONTAINING ALCOHOL: 2
ORIENTATION AND CLOUDING OF SENSORIUM: ORIENTED AND CAN DO SERIAL ADDITIONS
VISUAL DISTURBANCES: NOT PRESENT
NAUSEA AND VOMITING: *
ORIENTATION AND CLOUDING OF SENSORIUM: ORIENTED AND CAN DO SERIAL ADDITIONS
HEADACHE, FULLNESS IN HEAD: VERY MILD
AUDITORY DISTURBANCES: NOT PRESENT
TREMOR: *
ORIENTATION AND CLOUDING OF SENSORIUM: ORIENTED AND CAN DO SERIAL ADDITIONS
VISUAL DISTURBANCES: NOT PRESENT
TOTAL SCORE: 13
HEADACHE, FULLNESS IN HEAD: NOT PRESENT
AGITATION: SOMEWHAT MORE THAN NORMAL ACTIVITY
TOTAL SCORE: MILD ITCHING, PINS AND NEEDLES SENSATION, BURNING OR NUMBNESS
EVER HAD A DRINK FIRST THING IN THE MORNING TO STEADY YOUR NERVES TO GET RID OF A HANGOVER: YES
TREMOR: *
HEADACHE, FULLNESS IN HEAD: MILD
NAUSEA AND VOMITING: *
ALCOHOL_USE: YES
AUDITORY DISTURBANCES: NOT PRESENT
HEADACHE, FULLNESS IN HEAD: VERY MILD
TOTAL SCORE: 3
VISUAL DISTURBANCES: NOT PRESENT
PAROXYSMAL SWEATS: NO SWEAT VISIBLE
AGITATION: SOMEWHAT MORE THAN NORMAL ACTIVITY
TOTAL SCORE: 11
TOTAL SCORE: 11
TOTAL SCORE: 12
TOTAL SCORE: VERY MILD ITCHING, PINS AND NEEDLES SENSATION, BURNING OR NUMBNESS
ANXIETY: *
AGITATION: SOMEWHAT MORE THAN NORMAL ACTIVITY
NAUSEA AND VOMITING: *
DOES PATIENT WANT TO STOP DRINKING: YES
DOES PATIENT WANT TO TALK TO SOMEONE ABOUT QUITTING: NO
VISUAL DISTURBANCES: NOT PRESENT
TOTAL SCORE: 3
TOTAL SCORE: MODERATE ITCHING, PINS AND NEEDLES SENSATION, BURNING OR NUMBNESS
HAVE PEOPLE ANNOYED YOU BY CRITICIZING YOUR DRINKING: NO
TOTAL SCORE: 11
HEADACHE, FULLNESS IN HEAD: NOT PRESENT
AUDITORY DISTURBANCES: NOT PRESENT
TREMOR: *
AGITATION: SOMEWHAT MORE THAN NORMAL ACTIVITY
NAUSEA AND VOMITING: *
HEADACHE, FULLNESS IN HEAD: VERY MILD
HAVE YOU EVER FELT YOU SHOULD CUT DOWN ON YOUR DRINKING: YES
AGITATION: NORMAL ACTIVITY
PAROXYSMAL SWEATS: *
VISUAL DISTURBANCES: NOT PRESENT
PAROXYSMAL SWEATS: NO SWEAT VISIBLE
VISUAL DISTURBANCES: VERY MILD SENSITIVITY
AUDITORY DISTURBANCES: NOT PRESENT
ANXIETY: *
ANXIETY: *
ORIENTATION AND CLOUDING OF SENSORIUM: ORIENTED AND CAN DO SERIAL ADDITIONS
ORIENTATION AND CLOUDING OF SENSORIUM: ORIENTED AND CAN DO SERIAL ADDITIONS
ANXIETY: *
PAROXYSMAL SWEATS: NO SWEAT VISIBLE
PAROXYSMAL SWEATS: BARELY PERCEPTIBLE SWEATING. PALMS MOIST
SUBSTANCE_ABUSE: 1
ANXIETY: *
HOW MANY TIMES IN THE PAST YEAR HAVE YOU HAD 5 OR MORE DRINKS IN A DAY: 3
TREMOR: MODERATE TREMOR WITH ARMS EXTENDED
TREMOR: MODERATE TREMOR WITH ARMS EXTENDED
VISUAL DISTURBANCES: NOT PRESENT
HEADACHE, FULLNESS IN HEAD: MILD
PAROXYSMAL SWEATS: BARELY PERCEPTIBLE SWEATING. PALMS MOIST
TOTAL SCORE: 16
ANXIETY: *
EVER FELT BAD OR GUILTY ABOUT YOUR DRINKING: YES
HEADACHE, FULLNESS IN HEAD: VERY MILD
TOTAL SCORE: 3
TREMOR: *
TOTAL SCORE: VERY MILD ITCHING, PINS AND NEEDLES SENSATION, BURNING OR NUMBNESS
ORIENTATION AND CLOUDING OF SENSORIUM: ORIENTED AND CAN DO SERIAL ADDITIONS
AGITATION: SOMEWHAT MORE THAN NORMAL ACTIVITY
TREMOR: *
NAUSEA AND VOMITING: *
NAUSEA AND VOMITING: *
TOTAL SCORE: VERY MILD ITCHING, PINS AND NEEDLES SENSATION, BURNING OR NUMBNESS
ANXIETY: *
TOTAL SCORE: 15
PAROXYSMAL SWEATS: NO SWEAT VISIBLE
VISUAL DISTURBANCES: NOT PRESENT
PAROXYSMAL SWEATS: BARELY PERCEPTIBLE SWEATING. PALMS MOIST
ANXIETY: *
ORIENTATION AND CLOUDING OF SENSORIUM: ORIENTED AND CAN DO SERIAL ADDITIONS
AUDITORY DISTURBANCES: NOT PRESENT
CONSUMPTION TOTAL: INCOMPLETE
AGITATION: SOMEWHAT MORE THAN NORMAL ACTIVITY
NAUSEA AND VOMITING: MILD NAUSEA WITH NO VOMITING
ORIENTATION AND CLOUDING OF SENSORIUM: ORIENTED AND CAN DO SERIAL ADDITIONS
AGITATION: SOMEWHAT MORE THAN NORMAL ACTIVITY
TOTAL SCORE: 10
PAROXYSMAL SWEATS: BARELY PERCEPTIBLE SWEATING. PALMS MOIST
VISUAL DISTURBANCES: NOT PRESENT
HEADACHE, FULLNESS IN HEAD: VERY MILD
TOTAL SCORE: 10
ANXIETY: *
TREMOR: *
NAUSEA AND VOMITING: *
NAUSEA AND VOMITING: *
AUDITORY DISTURBANCES: NOT PRESENT
ORIENTATION AND CLOUDING OF SENSORIUM: ORIENTED AND CAN DO SERIAL ADDITIONS
TREMOR: *
AUDITORY DISTURBANCES: NOT PRESENT
AUDITORY DISTURBANCES: NOT PRESENT

## 2022-07-01 ASSESSMENT — ENCOUNTER SYMPTOMS
CONSTIPATION: 0
DIZZINESS: 0
COUGH: 0
VOMITING: 1
HEADACHES: 1
DIZZINESS: 1
HEMOPTYSIS: 0
BRUISES/BLEEDS EASILY: 0
PALPITATIONS: 1
MYALGIAS: 1
HEARTBURN: 0
FEVER: 0
MYALGIAS: 0
DEPRESSION: 1
DEPRESSION: 0
NERVOUS/ANXIOUS: 1
BLURRED VISION: 0
DOUBLE VISION: 0
NAUSEA: 1
ABDOMINAL PAIN: 1
CHILLS: 0
NECK PAIN: 0
PALPITATIONS: 0
DIARRHEA: 0
HEADACHES: 0

## 2022-07-01 ASSESSMENT — COGNITIVE AND FUNCTIONAL STATUS - GENERAL
SUGGESTED CMS G CODE MODIFIER DAILY ACTIVITY: CJ
MOBILITY SCORE: 23
DRESSING REGULAR LOWER BODY CLOTHING: A LITTLE
TOILETING: A LITTLE
SUGGESTED CMS G CODE MODIFIER MOBILITY: CI
HELP NEEDED FOR BATHING: A LITTLE
DAILY ACTIVITIY SCORE: 20
DRESSING REGULAR UPPER BODY CLOTHING: A LITTLE
STANDING UP FROM CHAIR USING ARMS: A LITTLE

## 2022-07-01 ASSESSMENT — PATIENT HEALTH QUESTIONNAIRE - PHQ9
5. POOR APPETITE OR OVEREATING: SEVERAL DAYS
SUM OF ALL RESPONSES TO PHQ9 QUESTIONS 1 AND 2: 0
2. FEELING DOWN, DEPRESSED, IRRITABLE, OR HOPELESS: NOT AT ALL
1. LITTLE INTEREST OR PLEASURE IN DOING THINGS: NOT AT ALL

## 2022-07-01 ASSESSMENT — FIBROSIS 4 INDEX
FIB4 SCORE: 2.22
FIB4 SCORE: 2.22
FIB4 SCORE: 2.32

## 2022-07-01 ASSESSMENT — PAIN DESCRIPTION - PAIN TYPE
TYPE: ACUTE PAIN

## 2022-07-01 ASSESSMENT — PAIN SCALES - WONG BAKER: WONGBAKER_NUMERICALRESPONSE: HURTS A LITTLE MORE

## 2022-07-01 NOTE — ASSESSMENT & PLAN NOTE
Drinking 1 bottle of vodka daily.  CIWA scores of 15 requiring Ativan administration.    Start low-dose gabapentin and valproic acid as per [facility] protocol  Continue CIWA protocol  Telemetry monitoring  Monitor for withdrawal  Thiamine/folic acid/multivitamins  Counseled cessation and referred to Alcoholics Anonymous  Check urine drug screen

## 2022-07-01 NOTE — ED PROVIDER NOTES
ED Provider Note    Scribed for Kwasi Doss by Rachael Bustamante. 7/1/2022  12:15 AM    Primary care provider: None noted  Means of arrival: EMS  History obtained from: Patient  History limited by: None    CHIEF COMPLAINT  Chief Complaint   Patient presents with   • Abdominal Pain     BIBA for ABD pain X5 days. Pt complains of N/V and abd pain. Pt states she has a hx of pancreatitis and has been drinking for a few days.      HPI  Rhiannon Marrero is a 37 y.o. female, with a history of Bipolar disorder and alcohol induced pancreatitis, who presents to the Emergency Department via EMS to bedside for evaluation of worsening, generalized abdominal pain onset 5 days ago. She is experiencing associated nausea and vomiting, but no fevers or diarrhea. Her current abdominal pain parallels the pain she feels during pancreatitis flares.The patient's history of alcohol abuse is intermittent and occurs primarily when she is in her manic phases. She states that she is currently in a manic phase, and her last drink was 3 hours ago, and she vomited afterwards.  She also has a history of Epilepsy and prior surgical history of hysterectomy. She did not have any seizures today. She has her gallbladder and appendix still in place.     Quality:sharp  Duration: 5 days  Severity: moderate to severe  Associated sx: nausea and vomiting    REVIEW OF SYSTEMS  As above, all other systems reviewed and are negative.   See HPI for further details.     PAST MEDICAL HISTORY   has a past medical history of Alcohol dependence (HCC) (4/13/2017), Bronchitis (8/2012), Cold, Heart burn, Hernia of unspecified site of abdominal cavity without mention of obstruction or gangrene, Indigestion, Pancreatitis, and Pneumonia (2011).  SURGICAL HISTORY   has a past surgical history that includes other orthopedic surgery; gyn surgery; tonsillectomy (4/11/2013); arthroscopy, knee; hysterectomy robotic xi (10/11/2018); salpingectomy (Bilateral, 10/11/2018); and  "orif, wrist (Right, 4/24/2019).  SOCIAL HISTORY  Social History     Tobacco Use   • Smoking status: Current Every Day Smoker     Packs/day: 0.75     Years: 10.00     Pack years: 7.50     Types: Cigarettes   • Smokeless tobacco: Never Used   Vaping Use   • Vaping Use: Never used   Substance Use Topics   • Alcohol use: Yes     Comment: recreationally   • Drug use: Yes     Types: Marijuana     Comment: edibles      Social History     Substance and Sexual Activity   Drug Use Yes   • Types: Marijuana    Comment: edibles     FAMILY HISTORY  Family History   Problem Relation Age of Onset   • Psychiatric Illness Mother    • Stroke Mother    • Arthritis Mother    • Heart Disease Maternal Grandfather    • Cancer Neg Hx    • Diabetes Neg Hx    • Hypertension Neg Hx      CURRENT MEDICATIONS  Current Outpatient Medications   Medication Instructions   • levetiracetam (KEPPRA) 1,000 mg, Oral, 2 TIMES DAILY   • zonisamide (ZONEGRAN) 50 mg, Oral, DAILY     ALLERGIES  Allergies   Allergen Reactions   • Promethazine Hcl Anxiety       PHYSICAL EXAM    VITAL SIGNS:   Vitals:    07/01/22 0007 07/01/22 0010 07/01/22 0042 07/01/22 0043   BP:  127/79 121/74    Pulse:  (!) 119 (!) 120    Resp:  16 16    Temp:  36.7 °C (98.1 °F)     TempSrc:  Temporal     SpO2:  93% 90% 93%   Weight: 56.7 kg (125 lb)      Height: 1.676 m (5' 6\")          Vitals: My interpretation: normotensive, tachycardic, afebrile, not hypoxic    Reinterpretation of vitals: Normotensive, persistent tachycardia, no hypoxia    Cardiac Monitor Interpretation: The cardiac monitor revealed normal Sinus Rhythm with tachycardia as interpreted by me. The cardiac monitor was ordered secondary to the patient's history of tachycardia and to monitor for dysrhythmia and/or tachycardia.    PE:   Constitutional: Mildly cachectic. Unwell appearing  HENT: Normocephalic, Atraumatic, Bilateral external ears normal, Oropharynx is clear mucous membranes are moist. No oral exudates or nasal " discharge.   Eyes: Pupils are equal round and reactive, EOMI, Conjunctiva normal, No discharge.   Neck: Normal range of motion, No tenderness, Supple, No stridor. No meningismus.  Lymphatic: No lymphadenopathy noted.   Cardiovascular: Tachycardic rate and Regular rhythm without murmur rub or gallop.  Thorax & Lungs: Clear breath sounds bilaterally without wheezes, rhonchi or rales. There is no chest wall tenderness.   Abdomen: Diffuse generalized tenderness to all quadrants. Soft. non-distended. There is no rebound or guarding. No organomegaly is appreciated. Bowel sounds are normal.  Skin: Normal without rash.   Back: No CVA or spinal tenderness.   Extremities: Intact distal pulses, No edema, No tenderness, No cyanosis, No clubbing. Capillary refill is less than 2 seconds.  Musculoskeletal: Good range of motion in all major joints. No tenderness to palpation or major deformities noted.   Neurologic: Alert & oriented x 3, Normal motor function, Normal sensory function, No focal deficits noted. Reflexes are normal.  Psychiatric: Affect normal, Judgment normal, Mood normal. There is no suicidal ideation or patient reported hallucinations.     DIAGNOSTIC STUDIES / PROCEDURES    LABS  Results for orders placed or performed during the hospital encounter of 06/30/22   CBC WITH DIFFERENTIAL   Result Value Ref Range    WBC 5.3 4.8 - 10.8 K/uL    RBC 5.74 (H) 4.20 - 5.40 M/uL    Hemoglobin 18.5 (H) 12.0 - 16.0 g/dL    Hematocrit 50.5 (H) 37.0 - 47.0 %    MCV 88.0 81.4 - 97.8 fL    MCH 32.2 27.0 - 33.0 pg    MCHC 36.6 (H) 33.6 - 35.0 g/dL    RDW 45.2 35.9 - 50.0 fL    Platelet Count 187 164 - 446 K/uL    MPV 10.3 9.0 - 12.9 fL    Neutrophils-Polys 59.20 44.00 - 72.00 %    Lymphocytes 34.00 22.00 - 41.00 %    Monocytes 6.40 0.00 - 13.40 %    Eosinophils 0.00 0.00 - 6.90 %    Basophils 0.20 0.00 - 1.80 %    Immature Granulocytes 0.20 0.00 - 0.90 %    Nucleated RBC 0.00 /100 WBC    Neutrophils (Absolute) 3.13 2.00 - 7.15 K/uL     Lymphs (Absolute) 1.80 1.00 - 4.80 K/uL    Monos (Absolute) 0.34 0.00 - 0.85 K/uL    Eos (Absolute) 0.00 0.00 - 0.51 K/uL    Baso (Absolute) 0.01 0.00 - 0.12 K/uL    Immature Granulocytes (abs) 0.01 0.00 - 0.11 K/uL    NRBC (Absolute) 0.00 K/uL   COMP METABOLIC PANEL   Result Value Ref Range    Sodium 141 135 - 145 mmol/L    Potassium 3.0 (L) 3.6 - 5.5 mmol/L    Chloride 92 (L) 96 - 112 mmol/L    Co2 22 20 - 33 mmol/L    Anion Gap 27.0 (H) 7.0 - 16.0    Glucose 119 (H) 65 - 99 mg/dL    Bun 14 8 - 22 mg/dL    Creatinine 0.70 0.50 - 1.40 mg/dL    Calcium 8.7 8.5 - 10.5 mg/dL    AST(SGOT) 75 (H) 12 - 45 U/L    ALT(SGPT) 40 2 - 50 U/L    Alkaline Phosphatase 126 (H) 30 - 99 U/L    Total Bilirubin 0.9 0.1 - 1.5 mg/dL    Albumin 4.8 3.2 - 4.9 g/dL    Total Protein 7.8 6.0 - 8.2 g/dL    Globulin 3.0 1.9 - 3.5 g/dL    A-G Ratio 1.6 g/dL   LIPASE   Result Value Ref Range    Lipase 137 (H) 11 - 82 U/L   LACTIC ACID   Result Value Ref Range    Lactic Acid 8.9 (HH) 0.5 - 2.0 mmol/L   PROTHROMBIN TIME (INR)   Result Value Ref Range    PT 14.5 12.0 - 14.6 sec    INR 1.14 (H) 0.87 - 1.13   APTT   Result Value Ref Range    APTT 26.2 24.7 - 36.0 sec   ESTIMATED GFR   Result Value Ref Range    GFR (CKD-EPI) 115 >60 mL/min/1.73 m 2   LACTIC ACID   Result Value Ref Range    Lactic Acid 7.2 (HH) 0.5 - 2.0 mmol/L   EKG (NOW)   Result Value Ref Range    Report       [facility] Emergency Dept.    Test Date:  2022  Pt Name:    JAKE LOPEZ                    Department: ER  MRN:        [id number]                      Room:       BL 18  Gender:     Female                       Technician: [id number]  :        1985                   Requested By:REGINA HERNANDEZ  Order #:    [id number]                    Reading MD: Regina Hernandez    Measurements  Intervals                                Axis  Rate:       101                          P:          60  KS:         160                          QRS:        76  QRSD:        100                          T:          51  QT:         372  QTc:        483    Interpretive Statements  SINUS TACHYCARDIA  PROBABLE LEFT ATRIAL ABNORMALITY  BORDERLINE PROLONGED QT INTERVAL  Compared to ECG 05/22/2022 21:47:12  No significant changes  Electronically Signed On 7-1-2022 2:48:58 PDT by Kwasi Doss        All labs reviewed by me. Significant for Nolex ptosis, hemoconcentration on H&H, electrolytes show hypokalemia but otherwise normal, elevated anion gap of 27, normal renal function, mild AST predominant transaminitis, lipase of 137, initial lactic acid of 8.9, repeat of 7.2, PT/INR normal    RADIOLOGY  CT-ABDOMEN-PELVIS W/O   Final Result         1.  Hepatomegaly and diffuse hepatic steatosis   2.  The appendix is not definitively identified limiting evaluation for appendicitis.        The radiologist's interpretation of all radiological studies have been reviewed by me.    COURSE & MEDICAL DECISION MAKING  Nursing notes, VS, PMSFHx, labs, imaging, EKG reviewed in chart.    MDM: 12:15 AM Rhiannon Marrero is a 37 y.o. female who presented with likely alcohol induced ketoacidosis with dehydration, tachycardia and persistent nausea and vomiting.  Heavy history of alcohol dependence and abuse.  Partner brought her in tonight as she was unable to tolerate oral intake and cannot tolerate any more alcohol without vomiting.  Vital signs show tachycardia but otherwise unremarkable she denies a history of fever.  Her abdomen is moderately tender throughout, no point tenderness.  CT of the abdomen though is unremarkable.  Her labs are concerning for severe lactic acidosis of 8.9 which I thought might be erroneous and was repeated and was 7.2.  She has an anion gap as well but her glucose is normal, mild transaminitis, no leukocytosis and hemoconcentrated H&H.  She was started on large volume resuscitation, replete hypokalemia.  She was started on D5 half-normal.  She is given multiple boluses of IV  fluids.  Discussed with ICU attending who thinks the patient is appropriate this time for telemetry and patient was admitted to the hospitalist for continued monitoring.  Pain medication given in the ED to the patient.  She is resting comfortably, her vital signs improving at time of admission.    3:12 AM I discussed the patient's case and the above findings with Dr. Portillo (hospitalist) who agrees to hospitalize the patient and take over the plan of care moving forward.     HYDRATION: Based on the patient's presentation of Acute Vomiting the patient was given IV fluids. IV Hydration was used because oral hydration was not adequate alone. Upon recheck following hydration, the patient was mildly improved.    CRITICAL CARE TIME 37 minutes  There was a very real possibility of deterioration of the patient's condition.  This patient required the highest level of care.  I provided critical care services which included: review of the medical record, treatment orders, ordering and reviewing test results, frequent reevaluation of the patient's condition and response to treatment, as well as discussing the case with appropriate personnel and various consultants. The critical care time associated with the care of this patient is exclusive of any procedures or specific interventions.      DISPOSITION:  Patient will be hospitalized by Dr. Portillo in critical condition.     FINAL IMPRESSION  1. Lactic acidosis Acute   2. Intractable vomiting with nausea, unspecified vomiting type Acute   3. Dehydration Acute   4. Alcohol dependence with unspecified alcohol-induced disorder (HCC) Acute   5. Hypokalemia Acute   6. Alcoholic ketoacidosis Acute       Rachael RASHEED), am scribing for, and in the presence of, Kwasi Doss.    Electronically signed by: Rachael Bustamante ([place]), 7/1/2022    Kwasi RASHEED personally performed the services described in this documentation, as scribed by Rachael Bustamante in my  presence, and it is both accurate and complete.    The note accurately reflects work and decisions made by me.  Kwasi Doss  7/1/2022  3:18 AM

## 2022-07-01 NOTE — PROGRESS NOTES
Hospital Medicine Daily Progress Note    Date of Service  7/1/2022    Chief Complaint  Rhiannon Marrero is a 37 y.o. female admitted 6/30/2022 with abdominal pain.    Hospital Course  Rhiannon Marrero is a 37-year-old woman, presented 6/30/2022 with abdominal pain associated with nausea and vomiting for the past 5 days.  This is a pleasant woman with a history of seizure disorder on antiepileptics, pancreatitis, and alcohol dependence.    In the emergency room, patient had a potassium of 3.0 with high anion gap of 27 as well as a lactic acid of 3.9.   Her beta hydroxybutyrate was elevated at 0.51.  Lipase was mildly elevated 137.  She has significant abdominal pain however.  CT scan of the abdomen showed hepatomegaly with diffuse hepatic steatosis.  Pancreas was noted to be normal.  However, patient continued to be in severe abdominal pain although her lipase was not significantly elevated from her baseline.  Patient reported history of recent sobriety but then started drinking about 10 days ago.  She reported drinking 1 bottle of vodka daily.    Patient was admitted for her metabolic derangements as well as concern for alcohol withdrawal.      Interval Problem Update  7/1: Complaining of worsening abdominal pain.  With persistent nausea and vomiting.  CIWA scores increasing to 15 requiring high doses of Ativan.  Starting on low-dose valproic acid and gabapentin.  Calcium of 6.3 being replaced.    I have discussed this patient's plan of care and discharge plan at IDT rounds today with Case Management, Nursing, Nursing leadership, and other members of the IDT team.    Consultants/Specialty      Code Status  Full Code    Disposition  Patient is not medically cleared for discharge.   Anticipate discharge to to home with close outpatient follow-up.  I have placed the appropriate orders for post-discharge needs.    Review of Systems  Review of Systems   Constitutional: Negative for chills and fever.   Respiratory:  Negative for cough.    Cardiovascular: Positive for chest pain and palpitations.   Gastrointestinal: Positive for abdominal pain, nausea and vomiting. Negative for constipation and diarrhea.   Genitourinary: Negative for dysuria and frequency.   Musculoskeletal: Positive for joint pain and myalgias.   Neurological: Positive for dizziness and headaches.   Psychiatric/Behavioral: Positive for depression and substance abuse. The patient is nervous/anxious.         Physical Exam  Temp:  [36.4 °C (97.6 °F)-37 °C (98.6 °F)] 36.4 °C (97.6 °F)  Pulse:  [] 74  Resp:  [12-21] 20  BP: (112-148)/(66-94) 148/88  SpO2:  [88 %-100 %] 88 %    Physical Exam  Vitals and nursing note reviewed.   Constitutional:       General: She is not in acute distress.     Appearance: She is ill-appearing. She is not toxic-appearing or diaphoretic.   HENT:      Head: Normocephalic and atraumatic.      Nose: Nose normal.      Mouth/Throat:      Mouth: Mucous membranes are moist.      Pharynx: No oropharyngeal exudate or posterior oropharyngeal erythema.   Eyes:      General:         Right eye: No discharge.         Left eye: No discharge.      Conjunctiva/sclera: Conjunctivae normal.      Pupils: Pupils are equal, round, and reactive to light.   Cardiovascular:      Rate and Rhythm: Regular rhythm. Tachycardia present.      Pulses: Normal pulses.      Heart sounds: Normal heart sounds. No murmur heard.    No gallop.   Pulmonary:      Effort: Pulmonary effort is normal.      Breath sounds: No wheezing or rhonchi.   Abdominal:      General: Abdomen is flat. Bowel sounds are normal.      Palpations: Abdomen is soft.      Tenderness: There is no abdominal tenderness.   Musculoskeletal:         General: No tenderness.      Cervical back: Neck supple. No tenderness.      Right lower leg: No edema.      Left lower leg: No edema.   Lymphadenopathy:      Cervical: No cervical adenopathy.   Skin:     General: Skin is warm and dry.      Coloration: Skin  is pale.      Findings: No erythema.   Neurological:      Mental Status: She is alert and oriented to person, place, and time.      Sensory: No sensory deficit.      Motor: No weakness.   Psychiatric:         Attention and Perception: Attention normal.         Mood and Affect: Affect is blunt and flat.         Speech: Speech normal.         Behavior: Behavior normal. Behavior is cooperative.         Thought Content: Thought content normal.         Cognition and Memory: Cognition normal.         Judgment: Judgment normal.         Fluids    Intake/Output Summary (Last 24 hours) at 7/1/2022 1714  Last data filed at 7/1/2022 0558  Gross per 24 hour   Intake 3000 ml   Output no documentation   Net 3000 ml       Laboratory  Recent Labs     07/01/22  0001   WBC 5.3   RBC 5.74*   HEMOGLOBIN 18.5*   HEMATOCRIT 50.5*   MCV 88.0   MCH 32.2   MCHC 36.6*   RDW 45.2   PLATELETCT 187   MPV 10.3     Recent Labs     07/01/22  0001 07/01/22  0800   SODIUM 141 141   POTASSIUM 3.0* 3.7   CHLORIDE 92* 109   CO2 22 22   GLUCOSE 119* 99   BUN 14 10   CREATININE 0.70 0.46*   CALCIUM 8.7 6.3*     Recent Labs     07/01/22  0001   APTT 26.2   INR 1.14*               Imaging  CT-ABDOMEN-PELVIS W/O   Final Result         1.  Hepatomegaly and diffuse hepatic steatosis   2.  The appendix is not definitively identified limiting evaluation for appendicitis.           Assessment/Plan  * Alcohol dependence with withdrawal (HCC)- (present on admission)  Assessment & Plan  Drinking 1 bottle of vodka daily.  CIWA scores of 15 requiring Ativan administration.    Start low-dose gabapentin and valproic acid as per [place] protocol  Continue CIWA protocol  Telemetry monitoring  Monitor for withdrawal  Thiamine/folic acid/multivitamins  Counseled cessation and referred to Alcoholics Anonymous  Check urine drug screen    Hypokalemia due to excessive gastrointestinal loss of potassium- (present on admission)  Assessment & Plan  Due to severe nausea and  vomiting.    Replace for goal greater than 4    Hypocalcemia- (present on admission)  Assessment & Plan  Patient was given calcium chloride 1 g.    Continue to monitor and replace    Starvation ketoacidosis- (present on admission)  Assessment & Plan  Mildly elevated beta hydroxybutyrate of 0.51.    Advance diet as tolerated    Acute dehydration- (present on admission)  Assessment & Plan  Secondary to poor oral intake.    Continue IV fluid resuscitation    Lactic acidosis- (present on admission)  Assessment & Plan  Secondary to acute dehydration.  Lactic acid improving with IV fluids.    Continue IV fluids      High anion gap metabolic acidosis  Assessment & Plan  Likely secondary to alcohol  Aggressive IV hydration    Transaminitis- (present on admission)  Assessment & Plan  Mild transaminitis likely due to alcohol use  Hepatitis panel on last visit negative  CT abdomen pelvis showing hepatomegaly and diffuse hepatic steatosis from alcohol abuse  Monitor    Continue to monitor    Pancreatitis, recurrent- (present on admission)  Assessment & Plan  Acute pancreatitis recurrent has been ruled out.   Her lipase is within her baseline.  There is no radiographic sign of pancreatic inflammation.  However, patient is asking for more pain medications.    Continue pain control.  However, consider de-escalation  Continue IV fluids    Tobacco abuse- (present on admission)  Assessment & Plan  As per history.     cessation       VTE prophylaxis: SCDs/TEDs and enoxaparin ppx    I have performed a physical exam and reviewed and updated ROS and Plan today (7/1/2022). In review of yesterday's note (6/30/2022), there are no changes except as documented above.

## 2022-07-01 NOTE — H&P
Hospital Medicine History & Physical Note    Date of Service  7/1/2022    Primary Care Physician  No primary care provider on file.    Consultants  critical care    Specialist Names: Dr. Gonda     Code Status  Full Code    Chief Complaint  Chief Complaint   Patient presents with   • Abdominal Pain     BIBA for ABD pain X5 days. Pt complains of N/V and abd pain. Pt states she has a hx of pancreatitis and has been drinking for a few days.        History of Presenting Illness  Rhiannon Marrero is a 37 y.o. female past medical history of seizure disorder on antiepileptics, pancreatitis, alcohol abuse who presented 6/30/2022 with abdominal pain for the last 5 days associated with nausea and vomiting.  Patient reports  binging alcohol.  Denies any fever or chills.  Denies any recreational drugs.  Reports compliance with her seizure medications.  Patient was admitted to the hospital and discharged on 5/27/2022 with similar picture.  Hepatitis panel at that time was negative.    In the ER, she was noted to have metabolic derangements including potassium of 3.0, anion gap of 27, glucose 119, AST 75, alk phos 126, lactate of 3.9 which trended down to 7.2.  Lactic acidosis likely secondary to starvation ketosis and alcohol.  Given severe elevation in lactic acid I have requested ER physician to consult critical care.  Critical care stated patient is stable for telemetry floor at this time.  We will continue with aggressive IV hydration and pain control for acute on chronic pancreatitis.    I discussed the plan of care with patient.    Review of Systems  Review of Systems   Constitutional: Negative for chills and fever.   HENT: Negative for hearing loss and tinnitus.    Eyes: Negative for blurred vision and double vision.   Respiratory: Negative for cough and hemoptysis.    Cardiovascular: Negative for chest pain and palpitations.   Gastrointestinal: Positive for abdominal pain, nausea and vomiting. Negative for heartburn.    Genitourinary: Negative for dysuria and urgency.   Musculoskeletal: Negative for myalgias and neck pain.   Skin: Negative for itching and rash.   Neurological: Negative for dizziness and headaches.   Endo/Heme/Allergies: Does not bruise/bleed easily.   Psychiatric/Behavioral: Positive for substance abuse. Negative for depression and suicidal ideas.       Past Medical History   has a past medical history of Alcohol dependence (HCC) (4/13/2017), Bronchitis (8/2012), Cold, Heart burn, Hernia of unspecified site of abdominal cavity without mention of obstruction or gangrene, Indigestion, Pancreatitis, and Pneumonia (2011).    Surgical History   has a past surgical history that includes other orthopedic surgery; gyn surgery; tonsillectomy (4/11/2013); arthroscopy, knee; hysterectomy robotic xi (10/11/2018); salpingectomy (Bilateral, 10/11/2018); and orif, wrist (Right, 4/24/2019).     Family History  family history includes Arthritis in her mother; Heart Disease in her maternal grandfather; Psychiatric Illness in her mother; Stroke in her mother.   Family history reviewed with patient. There is no family history that is pertinent to the chief complaint.     Social History   reports that she has been smoking cigarettes. She has a 7.50 pack-year smoking history. She has never used smokeless tobacco. She reports current alcohol use. She reports current drug use. Drug: Marijuana.    Allergies  Allergies   Allergen Reactions   • Promethazine Hcl Anxiety       Medications  Prior to Admission Medications   Prescriptions Last Dose Informant Patient Reported? Taking?   levetiracetam (KEPPRA) 1000 MG tablet 6/30/2022 at 2300 Patient No No   Sig: Take 1 Tab by mouth 2 Times a Day.   zonisamide (ZONEGRAN) 25 MG capsule 6/30/2022 at 2300 Patient No No   Sig: Take 2 Capsules by mouth every day.      Facility-Administered Medications: None       Physical Exam  Temp:  [36.7 °C (98.1 °F)] 36.7 °C (98.1 °F)  Pulse:  [119-120] 120  Resp:   [16] 16  BP: (121-127)/(74-79) 121/74  SpO2:  [90 %-93 %] 93 %  Blood Pressure: 121/74   Temperature: 36.7 °C (98.1 °F)   Pulse: (!) 120   Respiration: 16   Pulse Oximetry: 93 %       Physical Exam  Vitals and nursing note reviewed.   Constitutional:       General: She is not in acute distress.     Appearance: Normal appearance. She is not ill-appearing.   HENT:      Head: Normocephalic and atraumatic.      Right Ear: Tympanic membrane normal.      Left Ear: Tympanic membrane normal.      Nose: Nose normal.      Mouth/Throat:      Mouth: Mucous membranes are dry.      Pharynx: Oropharynx is clear.      Comments: Very dry oral mucosa with whitish residue on tongue  Eyes:      Extraocular Movements: Extraocular movements intact.      Pupils: Pupils are equal, round, and reactive to light.   Cardiovascular:      Rate and Rhythm: Regular rhythm. Tachycardia present.      Pulses: Normal pulses.      Heart sounds: Normal heart sounds.   Pulmonary:      Effort: Pulmonary effort is normal.      Breath sounds: Normal breath sounds.   Abdominal:      General: Bowel sounds are normal. There is no distension.      Palpations: Abdomen is soft. There is no mass.      Tenderness: There is abdominal tenderness.      Hernia: No hernia is present.   Musculoskeletal:         General: No swelling or tenderness. Normal range of motion.      Cervical back: Neck supple.   Skin:     General: Skin is warm.      Capillary Refill: Capillary refill takes less than 2 seconds.      Coloration: Skin is not jaundiced or pale.   Neurological:      General: No focal deficit present.      Mental Status: She is alert and oriented to person, place, and time. Mental status is at baseline.      Cranial Nerves: No cranial nerve deficit.      Sensory: No sensory deficit.   Psychiatric:         Mood and Affect: Mood normal.         Behavior: Behavior normal.         Laboratory:  Recent Labs     07/01/22  0001   WBC 5.3   RBC 5.74*   HEMOGLOBIN 18.5*    HEMATOCRIT 50.5*   MCV 88.0   MCH 32.2   MCHC 36.6*   RDW 45.2   PLATELETCT 187   MPV 10.3     Recent Labs     07/01/22  0001   SODIUM 141   POTASSIUM 3.0*   CHLORIDE 92*   CO2 22   GLUCOSE 119*   BUN 14   CREATININE 0.70   CALCIUM 8.7     Recent Labs     07/01/22 0001   ALTSGPT 40   ASTSGOT 75*   ALKPHOSPHAT 126*   TBILIRUBIN 0.9   LIPASE 137*   GLUCOSE 119*     Recent Labs     07/01/22 0001   APTT 26.2   INR 1.14*     No results for input(s): NTPROBNP in the last 72 hours.      No results for input(s): TROPONINT in the last 72 hours.    Imaging:  CT-ABDOMEN-PELVIS W/O   Final Result         1.  Hepatomegaly and diffuse hepatic steatosis   2.  The appendix is not definitively identified limiting evaluation for appendicitis.          no X-Ray or EKG requiring interpretation    Assessment/Plan:  Justification for Admission Status  I anticipate this patient will require at least two midnights for appropriate medical management, necessitating inpatient admission because Acute pancreatitis requiring serial abdominal exams, IV fluids and IV pain medications.    Lactic acidosis  Assessment & Plan  Likely from alcohol clearly may be component of starvation ketosis.  Lactic is trending down.  Check beta hydroxybutyrate  Critical care has been consulted recommended telemetry floor.  IV fluids      High anion gap metabolic acidosis  Assessment & Plan  Likely secondary to alcohol  Aggressive IV hydration    Transaminitis- (present on admission)  Assessment & Plan  Likely secondary to alcoholic abuse  Hepatitis panel on last visit negative  CT abdomen pelvis showing hepatomegaly and diffuse hepatic steatosis from alcohol abuse  Monitor    Hypokalemia- (present on admission)  Assessment & Plan  Supplemented  Check magnesium    Pancreatitis, recurrent- (present on admission)  Assessment & Plan  Acute on chronic pancreatitis  Advance diet as tolerated  IV fluids  IV anti emetic   Pain control     Alcohol abuse- (present on  admission)  Assessment & Plan  CIWA protocol  Telemetry monitoring  Monitor for withdrawal  Thiamine/folic acid/multivitamins  Advance diet as tolerated      VTE prophylaxis: enoxaparin ppx

## 2022-07-01 NOTE — PROGRESS NOTES
Patient complains of abdominal pain; rates pain 8.5/10; CIWA=10; Ativan and oxycodone given per CIWA protocol and prn pain meds; monitor CIWA q 4 hrs.

## 2022-07-01 NOTE — PROGRESS NOTES
Patient received from ER in stable condition; A/O x4; able to make all needs known; IV in R AC 18g; L IV 20g; calm at this time; monitor

## 2022-07-01 NOTE — ED NOTES
Lab called with critical result of LA 7.5 at 0614. Critical lab result read back to lab.   Dr. Portillo notified of critical lab result at 0615.  Critical lab result read back by Dr. Portillo.

## 2022-07-01 NOTE — ASSESSMENT & PLAN NOTE
Mild transaminitis likely due to alcohol use  Hepatitis panel on last visit negative  CT abdomen pelvis showing hepatomegaly and diffuse hepatic steatosis from alcohol abuse  Monitor    Continue to monitor

## 2022-07-01 NOTE — ED TRIAGE NOTES
BIBA for ABD pain X5 days. Pt complains of N/V and abd pain. Pt states she has a hx of pancreatitis and has been drinking for a few days.

## 2022-07-01 NOTE — ASSESSMENT & PLAN NOTE
Acute pancreatitis recurrent has been ruled out.   Her lipase is within her baseline.  There is no radiographic sign of pancreatic inflammation.  However, patient is asking for more pain medications.    Continue pain control.  However, consider de-escalation  Continue IV fluids

## 2022-07-02 LAB
25(OH)D3 SERPL-MCNC: 21 NG/ML (ref 30–100)
ALBUMIN SERPL BCP-MCNC: 3.5 G/DL (ref 3.2–4.9)
ALBUMIN/GLOB SERPL: 1.6 G/DL
ALP SERPL-CCNC: 86 U/L (ref 30–99)
ALT SERPL-CCNC: 47 U/L (ref 2–50)
ANION GAP SERPL CALC-SCNC: 10 MMOL/L (ref 7–16)
AST SERPL-CCNC: 129 U/L (ref 12–45)
BASOPHILS # BLD AUTO: 0.4 % (ref 0–1.8)
BASOPHILS # BLD: 0.02 K/UL (ref 0–0.12)
BILIRUB SERPL-MCNC: 1.3 MG/DL (ref 0.1–1.5)
BUN SERPL-MCNC: 7 MG/DL (ref 8–22)
CALCIUM SERPL-MCNC: 8.3 MG/DL (ref 8.5–10.5)
CHLORIDE SERPL-SCNC: 102 MMOL/L (ref 96–112)
CO2 SERPL-SCNC: 22 MMOL/L (ref 20–33)
CREAT SERPL-MCNC: 0.5 MG/DL (ref 0.5–1.4)
EOSINOPHIL # BLD AUTO: 0.07 K/UL (ref 0–0.51)
EOSINOPHIL NFR BLD: 1.6 % (ref 0–6.9)
ERYTHROCYTE [DISTWIDTH] IN BLOOD BY AUTOMATED COUNT: 47.3 FL (ref 35.9–50)
GFR SERPLBLD CREATININE-BSD FMLA CKD-EPI: 125 ML/MIN/1.73 M 2
GLOBULIN SER CALC-MCNC: 2.2 G/DL (ref 1.9–3.5)
GLUCOSE SERPL-MCNC: 88 MG/DL (ref 65–99)
HCT VFR BLD AUTO: 37.5 % (ref 37–47)
HGB BLD-MCNC: 13.1 G/DL (ref 12–16)
IMM GRANULOCYTES # BLD AUTO: 0.01 K/UL (ref 0–0.11)
IMM GRANULOCYTES NFR BLD AUTO: 0.2 % (ref 0–0.9)
LYMPHOCYTES # BLD AUTO: 1.47 K/UL (ref 1–4.8)
LYMPHOCYTES NFR BLD: 32.7 % (ref 22–41)
MAGNESIUM SERPL-MCNC: 2 MG/DL (ref 1.5–2.5)
MCH RBC QN AUTO: 32.5 PG (ref 27–33)
MCHC RBC AUTO-ENTMCNC: 34.9 G/DL (ref 33.6–35)
MCV RBC AUTO: 93.1 FL (ref 81.4–97.8)
MONOCYTES # BLD AUTO: 0.19 K/UL (ref 0–0.85)
MONOCYTES NFR BLD AUTO: 4.2 % (ref 0–13.4)
NEUTROPHILS # BLD AUTO: 2.73 K/UL (ref 2–7.15)
NEUTROPHILS NFR BLD: 60.9 % (ref 44–72)
NRBC # BLD AUTO: 0.02 K/UL
NRBC BLD-RTO: 0.4 /100 WBC
PLATELET # BLD AUTO: 95 K/UL (ref 164–446)
PMV BLD AUTO: 10 FL (ref 9–12.9)
POTASSIUM SERPL-SCNC: 3.8 MMOL/L (ref 3.6–5.5)
PROT SERPL-MCNC: 5.7 G/DL (ref 6–8.2)
RBC # BLD AUTO: 4.03 M/UL (ref 4.2–5.4)
SODIUM SERPL-SCNC: 134 MMOL/L (ref 135–145)
WBC # BLD AUTO: 4.5 K/UL (ref 4.8–10.8)

## 2022-07-02 PROCEDURE — 770020 HCHG ROOM/CARE - TELE (206)

## 2022-07-02 PROCEDURE — A9270 NON-COVERED ITEM OR SERVICE: HCPCS | Performed by: STUDENT IN AN ORGANIZED HEALTH CARE EDUCATION/TRAINING PROGRAM

## 2022-07-02 PROCEDURE — 80053 COMPREHEN METABOLIC PANEL: CPT

## 2022-07-02 PROCEDURE — 82306 VITAMIN D 25 HYDROXY: CPT

## 2022-07-02 PROCEDURE — 700102 HCHG RX REV CODE 250 W/ 637 OVERRIDE(OP): Performed by: STUDENT IN AN ORGANIZED HEALTH CARE EDUCATION/TRAINING PROGRAM

## 2022-07-02 PROCEDURE — 83735 ASSAY OF MAGNESIUM: CPT

## 2022-07-02 PROCEDURE — 700111 HCHG RX REV CODE 636 W/ 250 OVERRIDE (IP): Performed by: STUDENT IN AN ORGANIZED HEALTH CARE EDUCATION/TRAINING PROGRAM

## 2022-07-02 PROCEDURE — 85025 COMPLETE CBC W/AUTO DIFF WBC: CPT

## 2022-07-02 PROCEDURE — A9270 NON-COVERED ITEM OR SERVICE: HCPCS | Performed by: HOSPITALIST

## 2022-07-02 PROCEDURE — 99232 SBSQ HOSP IP/OBS MODERATE 35: CPT | Mod: GC | Performed by: HOSPITALIST

## 2022-07-02 PROCEDURE — 700102 HCHG RX REV CODE 250 W/ 637 OVERRIDE(OP): Performed by: HOSPITALIST

## 2022-07-02 PROCEDURE — 36415 COLL VENOUS BLD VENIPUNCTURE: CPT

## 2022-07-02 PROCEDURE — 700105 HCHG RX REV CODE 258: Performed by: STUDENT IN AN ORGANIZED HEALTH CARE EDUCATION/TRAINING PROGRAM

## 2022-07-02 RX ORDER — VITAMIN B COMPLEX
1000 TABLET ORAL DAILY
Status: DISCONTINUED | OUTPATIENT
Start: 2022-07-02 | End: 2022-07-04 | Stop reason: HOSPADM

## 2022-07-02 RX ORDER — GABAPENTIN 300 MG/1
300 CAPSULE ORAL 3 TIMES DAILY
Status: DISCONTINUED | OUTPATIENT
Start: 2022-07-02 | End: 2022-07-04 | Stop reason: HOSPADM

## 2022-07-02 RX ADMIN — VALPROIC ACID 250 MG: 250 CAPSULE, LIQUID FILLED ORAL at 06:06

## 2022-07-02 RX ADMIN — LEVETIRACETAM 1000 MG: 500 TABLET, FILM COATED ORAL at 17:54

## 2022-07-02 RX ADMIN — NYSTATIN 500000 UNITS: 100000 SUSPENSION ORAL at 09:43

## 2022-07-02 RX ADMIN — MORPHINE SULFATE 4 MG: 4 INJECTION INTRAVENOUS at 06:05

## 2022-07-02 RX ADMIN — LEVETIRACETAM 1000 MG: 500 TABLET, FILM COATED ORAL at 06:06

## 2022-07-02 RX ADMIN — NYSTATIN 500000 UNITS: 100000 SUSPENSION ORAL at 21:00

## 2022-07-02 RX ADMIN — ACETAMINOPHEN 1000 MG: 500 TABLET, FILM COATED ORAL at 22:46

## 2022-07-02 RX ADMIN — MORPHINE SULFATE 4 MG: 4 INJECTION INTRAVENOUS at 12:54

## 2022-07-02 RX ADMIN — Medication 1000 UNITS: at 17:54

## 2022-07-02 RX ADMIN — VALPROIC ACID 250 MG: 250 CAPSULE, LIQUID FILLED ORAL at 22:46

## 2022-07-02 RX ADMIN — MORPHINE SULFATE 4 MG: 4 INJECTION INTRAVENOUS at 09:43

## 2022-07-02 RX ADMIN — ACETAMINOPHEN 1000 MG: 500 TABLET, FILM COATED ORAL at 17:54

## 2022-07-02 RX ADMIN — KETOROLAC TROMETHAMINE 30 MG: 30 INJECTION, SOLUTION INTRAMUSCULAR; INTRAVENOUS at 06:40

## 2022-07-02 RX ADMIN — MORPHINE SULFATE 4 MG: 4 INJECTION INTRAVENOUS at 02:21

## 2022-07-02 RX ADMIN — NYSTATIN 500000 UNITS: 100000 SUSPENSION ORAL at 12:05

## 2022-07-02 RX ADMIN — VALPROIC ACID 250 MG: 250 CAPSULE, LIQUID FILLED ORAL at 16:18

## 2022-07-02 RX ADMIN — LORAZEPAM 1 MG: 1 TABLET ORAL at 20:36

## 2022-07-02 RX ADMIN — ENOXAPARIN SODIUM 40 MG: 40 INJECTION SUBCUTANEOUS at 17:54

## 2022-07-02 RX ADMIN — ACETAMINOPHEN 1000 MG: 500 TABLET, FILM COATED ORAL at 06:06

## 2022-07-02 RX ADMIN — POLYETHYLENE GLYCOL 3350 1 PACKET: 17 POWDER, FOR SOLUTION ORAL at 06:06

## 2022-07-02 RX ADMIN — GABAPENTIN 300 MG: 300 CAPSULE ORAL at 12:06

## 2022-07-02 RX ADMIN — MORPHINE SULFATE 4 MG: 4 INJECTION INTRAVENOUS at 16:56

## 2022-07-02 RX ADMIN — DOCUSATE SODIUM 100 MG: 100 CAPSULE, LIQUID FILLED ORAL at 06:06

## 2022-07-02 RX ADMIN — LORAZEPAM 2 MG: 2 TABLET ORAL at 22:46

## 2022-07-02 RX ADMIN — GABAPENTIN 200 MG: 100 CAPSULE ORAL at 06:05

## 2022-07-02 RX ADMIN — LORAZEPAM 1 MG: 1 TABLET ORAL at 07:57

## 2022-07-02 RX ADMIN — MORPHINE SULFATE 4 MG: 4 INJECTION INTRAVENOUS at 23:35

## 2022-07-02 RX ADMIN — ZONISAMIDE 50 MG: 50 CAPSULE ORAL at 06:05

## 2022-07-02 RX ADMIN — ACETAMINOPHEN 1000 MG: 500 TABLET, FILM COATED ORAL at 12:05

## 2022-07-02 RX ADMIN — Medication 100 MG: at 06:06

## 2022-07-02 RX ADMIN — LORAZEPAM 2 MG: 2 TABLET ORAL at 02:21

## 2022-07-02 RX ADMIN — DOCUSATE SODIUM 100 MG: 100 CAPSULE, LIQUID FILLED ORAL at 17:56

## 2022-07-02 RX ADMIN — THERA TABS 1 TABLET: TAB at 06:06

## 2022-07-02 RX ADMIN — GABAPENTIN 300 MG: 300 CAPSULE ORAL at 17:54

## 2022-07-02 RX ADMIN — NYSTATIN 500000 UNITS: 100000 SUSPENSION ORAL at 17:54

## 2022-07-02 RX ADMIN — MORPHINE SULFATE 4 MG: 4 INJECTION INTRAVENOUS at 20:36

## 2022-07-02 RX ADMIN — FOLIC ACID 1 MG: 1 TABLET ORAL at 06:06

## 2022-07-02 RX ADMIN — SODIUM CHLORIDE, POTASSIUM CHLORIDE, SODIUM LACTATE AND CALCIUM CHLORIDE: 600; 310; 30; 20 INJECTION, SOLUTION INTRAVENOUS at 04:11

## 2022-07-02 RX ADMIN — LORAZEPAM 1 MG: 1 TABLET ORAL at 12:11

## 2022-07-02 RX ADMIN — LORAZEPAM 2 MG: 2 TABLET ORAL at 04:10

## 2022-07-02 ASSESSMENT — ENCOUNTER SYMPTOMS
BLURRED VISION: 0
VOMITING: 1
TREMORS: 0
ABDOMINAL PAIN: 1
NAUSEA: 1
SHORTNESS OF BREATH: 0
SEIZURES: 0
PALPITATIONS: 0
COUGH: 0
HEMOPTYSIS: 0
ORTHOPNEA: 0

## 2022-07-02 ASSESSMENT — LIFESTYLE VARIABLES
HEADACHE, FULLNESS IN HEAD: VERY MILD
TOTAL SCORE: 13
TOTAL SCORE: 5
TOTAL SCORE: MILD ITCHING, PINS AND NEEDLES SENSATION, BURNING OR NUMBNESS
AUDITORY DISTURBANCES: NOT PRESENT
VISUAL DISTURBANCES: NOT PRESENT
TREMOR: *
AUDITORY DISTURBANCES: NOT PRESENT
TOTAL SCORE: 9
TOTAL SCORE: 9
AGITATION: SOMEWHAT MORE THAN NORMAL ACTIVITY
PAROXYSMAL SWEATS: NO SWEAT VISIBLE
NAUSEA AND VOMITING: *
VISUAL DISTURBANCES: NOT PRESENT
AUDITORY DISTURBANCES: NOT PRESENT
HEADACHE, FULLNESS IN HEAD: VERY MILD
ANXIETY: *
TOTAL SCORE: 8
VISUAL DISTURBANCES: NOT PRESENT
ORIENTATION AND CLOUDING OF SENSORIUM: ORIENTED AND CAN DO SERIAL ADDITIONS
ORIENTATION AND CLOUDING OF SENSORIUM: CANNOT DO SERIAL ADDITIONS OR IS UNCERTAIN ABOUT DATE
AGITATION: *
TREMOR: NO TREMOR
HEADACHE, FULLNESS IN HEAD: NOT PRESENT
AGITATION: *
ANXIETY: *
ORIENTATION AND CLOUDING OF SENSORIUM: CANNOT DO SERIAL ADDITIONS OR IS UNCERTAIN ABOUT DATE
HEADACHE, FULLNESS IN HEAD: NOT PRESENT
NAUSEA AND VOMITING: *
VISUAL DISTURBANCES: NOT PRESENT
ANXIETY: *
ORIENTATION AND CLOUDING OF SENSORIUM: ORIENTED AND CAN DO SERIAL ADDITIONS
NAUSEA AND VOMITING: *
TREMOR: *
TREMOR: TREMOR NOT VISIBLE BUT CAN BE FELT, FINGERTIP TO FINGERTIP
ANXIETY: *
AGITATION: SOMEWHAT MORE THAN NORMAL ACTIVITY
VISUAL DISTURBANCES: NOT PRESENT
PAROXYSMAL SWEATS: NO SWEAT VISIBLE
TOTAL SCORE: MILD ITCHING, PINS AND NEEDLES SENSATION, BURNING OR NUMBNESS
VISUAL DISTURBANCES: NOT PRESENT
NAUSEA AND VOMITING: *
AGITATION: SOMEWHAT MORE THAN NORMAL ACTIVITY
NAUSEA AND VOMITING: *
TOTAL SCORE: 11
TREMOR: TREMOR NOT VISIBLE BUT CAN BE FELT, FINGERTIP TO FINGERTIP
TOTAL SCORE: MILD ITCHING, PINS AND NEEDLES SENSATION, BURNING OR NUMBNESS
AUDITORY DISTURBANCES: NOT PRESENT
ANXIETY: *
ANXIETY: *
AUDITORY DISTURBANCES: NOT PRESENT
ORIENTATION AND CLOUDING OF SENSORIUM: ORIENTED AND CAN DO SERIAL ADDITIONS
ORIENTATION AND CLOUDING OF SENSORIUM: CANNOT DO SERIAL ADDITIONS OR IS UNCERTAIN ABOUT DATE
NAUSEA AND VOMITING: *
PAROXYSMAL SWEATS: NO SWEAT VISIBLE
PAROXYSMAL SWEATS: NO SWEAT VISIBLE
HEADACHE, FULLNESS IN HEAD: MILD
PAROXYSMAL SWEATS: NO SWEAT VISIBLE
AGITATION: SOMEWHAT MORE THAN NORMAL ACTIVITY
SUBSTANCE_ABUSE: 1
AGITATION: SOMEWHAT MORE THAN NORMAL ACTIVITY
AUDITORY DISTURBANCES: NOT PRESENT
NAUSEA AND VOMITING: *
TOTAL SCORE: MILD ITCHING, PINS AND NEEDLES SENSATION, BURNING OR NUMBNESS
ORIENTATION AND CLOUDING OF SENSORIUM: ORIENTED AND CAN DO SERIAL ADDITIONS
AUDITORY DISTURBANCES: NOT PRESENT
HEADACHE, FULLNESS IN HEAD: NOT PRESENT
PAROXYSMAL SWEATS: NO SWEAT VISIBLE
VISUAL DISTURBANCES: NOT PRESENT
HEADACHE, FULLNESS IN HEAD: MILD
TOTAL SCORE: 11
TREMOR: TREMOR NOT VISIBLE BUT CAN BE FELT, FINGERTIP TO FINGERTIP
PAROXYSMAL SWEATS: NO SWEAT VISIBLE
TREMOR: NO TREMOR
ANXIETY: *

## 2022-07-02 ASSESSMENT — PAIN DESCRIPTION - PAIN TYPE
TYPE: ACUTE PAIN
TYPE: ACUTE PAIN

## 2022-07-02 ASSESSMENT — FIBROSIS 4 INDEX: FIB4 SCORE: 6.93

## 2022-07-02 NOTE — PROGRESS NOTES
"Daily Progress Note:     Date of Service: 7/2/2022  Primary Team: [facility] Team   Attending: Benjamin Lee M.D.   Senior Resident: Dr. Maikol Nuñez  Intern: Dr. Darvin Shirley   Contact:  [phone]    Chief Complaint:   Abdominal pain, vomiting x5 days following alcohol binge, history of alcohol abuse and pancreatitis, last admission for pancreatitis in late May 2022, discharged 5/27/2022    HPI:  36 y/o F with PMHx seizure disorder on Keppra 1000mg BID, pancreatitis, alcohol abuse, bipolar depression and suicidality, presented on 6/30/2022 with abdominal pain, nausea, vomiting for 5 days. She had been binge drinking alcohol. On admission denied fever, chills, other recreational substance use. Patient was admitted to hospital for similar complaint and discharged on 5/27/2022, hepatitis panel was performed at the time and was negative.     In ED she was noted to have K of 3.0, AG of 27, AST 75, alk phos 126, lactate of 3.9.    Subjective:  Patient was seen in the morning, said she was in substantial pain and needed to curl her body up to cope with the pain. Endorsed significant fatigue. Reported continuous nausea and multiple bouts of emesis over the past few hours.     Patient reports that she has not been taking her psychiatric medications including Seroquel and an antidepressant for multiple months and has been going through a difficult phase recently with her moods. States that she had scheduled a meeting with a psychologist for alcohol counseling on 7/8/2022, says she wants to get her life back on track but sometimes feels out of control. Stated that she was feeling manic over the week prior to admission.    Patient works as a , has daughter age 15 and son age 13 who are currently staying with different relatives (daughter with cousin in [place], son with friends in [place]). Has a boyfriend who she says is supportive but also described as a \"sociopath\" without much further clarification, " though denies domestic violence or abuse.    Consultants/Specialty:  Critical care    Review of Systems:    Review of Systems   Constitutional: Positive for malaise/fatigue.   Eyes: Negative for blurred vision.   Respiratory: Negative for cough, hemoptysis and shortness of breath.    Cardiovascular: Negative for chest pain, palpitations, orthopnea and leg swelling.   Gastrointestinal: Positive for abdominal pain, nausea and vomiting.   Genitourinary: Negative for dysuria.   Neurological: Negative for tremors and seizures.   Psychiatric/Behavioral: Positive for substance abuse. Negative for suicidal ideas.       Objective Data:   Physical Exam:   Vitals:   Temp:  [36.2 °C (97.1 °F)-37 °C (98.6 °F)] 36.7 °C (98 °F)  Pulse:  [] 98  Resp:  [12-20] 18  BP: (112-148)/(66-96) 139/90  SpO2:  [88 %-99 %] 99 %    Physical Exam  Constitutional:       Appearance: She is ill-appearing.      Comments: Somnolent but arousable   HENT:      Head: Normocephalic and atraumatic.      Mouth/Throat:      Comments: Oral thrush present  Eyes:      Conjunctiva/sclera: Conjunctivae normal.   Cardiovascular:      Rate and Rhythm: Normal rate and regular rhythm.      Pulses: Normal pulses.   Pulmonary:      Effort: No respiratory distress.      Breath sounds: Normal breath sounds.   Abdominal:      General: Abdomen is flat.      Tenderness: There is abdominal tenderness. There is guarding.   Musculoskeletal:      Right lower leg: No edema.      Left lower leg: No edema.   Skin:     General: Skin is warm and dry.      Findings: No bruising or rash.   Neurological:      General: No focal deficit present.      Mental Status: She is oriented to person, place, and time.       Labs:   Recent Labs     07/01/22  0001 07/02/22  1005   WBC 5.3 4.5*   RBC 5.74* 4.03*   HEMOGLOBIN 18.5* 13.1   HEMATOCRIT 50.5* 37.5   MCV 88.0 93.1   MCH 32.2 32.5   RDW 45.2 47.3   PLATELETCT 187 95*   MPV 10.3 10.0   NEUTSPOLYS 59.20 60.90   LYMPHOCYTES 34.00 32.70    MONOCYTES 6.40 4.20   EOSINOPHILS 0.00 1.60   BASOPHILS 0.20 0.40     Recent Labs     07/01/22  0800 07/01/22  1745 07/02/22  1005   SODIUM 141 136 134*   POTASSIUM 3.7 4.1 3.8   CHLORIDE 109 103 102   CO2 22 24 22   GLUCOSE 99 102* 88   BUN 10 12 7*       Imaging:    CT abdomen and pelvis performed 7/1/2022 1:47AM:  Impression: Hepatomegaly and diffuse hepatic steatosis. Appendix not definitively visualized. Pancreas unremarkable.    Problem Representation:    36 y/o F with PMHx of recurrent pancreatitis, alcohol abuse, presented on 6/30/2022 with nausea, vomiting, abdominal pain following an alcohol binge in the setting of possible hypomanic episode, symptoms similar to previous episodes of pancreatitis.     #Pancreatitis  -IV fluids and prn pain control  -Compazine prn for nausea  -Lipid panel ordered to assess for alternative causes of recurrent pancreatitis  -While her hgb decreased from 18.5 to 13.1 it is likely that the initial value was elevated due to hemoconcentration and that the newer value is within normal limits for her. Lower concern given stable vital signs but will continue to monitor closely for changes    #Alcohol withdrawal  -[facility] protocol for symptoms of withdrawal  -Gabapentin increased to 300mg TID from BID    #Lactic acidosis  -Her elevated lactate which was noted in the ED setting has been normalizing  -Will continue to monitor for recurrent acid-base disturbances  -Control nausea/vomiting with Zofran to limit acid loss    #Unspecified seizure disorder  -Will continue home regimen of Keppra 1000mg BID    #Oral thrush  -Nystatin swish and spit    #Mood disorder, bipolar type II  -Will discuss with patient to see if she is amenable to restarting her medications  -Encourage close outpatient follow-up    #Migraines  -Will continue home regimen, zonisamide 50mg qd

## 2022-07-02 NOTE — CARE PLAN
The patient is Watcher - Medium risk of patient condition declining or worsening    Shift Goals  Clinical Goals: Hemodynamically stable, CIWA, Pain management, Safety  Patient Goals: Comfort  Family Goals: FANTASMA    Progress made toward(s) clinical / shift goals:    Problem: Pain - Standard  Goal: Alleviation of pain or a reduction in pain to the patient’s comfort goal  Outcome: Progressing     Problem: Knowledge Deficit - Standard  Goal: Patient and family/care givers will demonstrate understanding of plan of care, disease process/condition, diagnostic tests and medications  Outcome: Progressing     Problem: Seizure Precautions  Goal: Implementation of seizure precautions  Outcome: Progressing     Problem: Lifestyle Changes  Goal: Patient's ability to identify lifestyle changes and available resources to help reduce recurrence of condition will improve  Outcome: Progressing     Problem: Psychosocial  Goal: Patient's level of anxiety will decrease  Outcome: Progressing     Problem: Risk for Aspiration  Goal: Patient's risk for aspiration will be absent or decrease  Outcome: Progressing     Problem: Fall Risk  Goal: Patient will remain free from falls  Outcome: Progressing

## 2022-07-02 NOTE — DISCHARGE PLANNING
Case Management Discharge Planning    Admission Date: 6/30/2022  GMLOS: 3.1  ALOS: 1    6-Clicks ADL Score: 20  6-Clicks Mobility Score: 23      Anticipated Discharge Dispo:  Pending     DME Needed: No    Action(s) Taken: Pt was discussed in IDR. Pt presented with ETOH w/drawal, acute pancreatitis. CIWA between 11-13. Cont to monitor.   Unable to determine DC needs at this time.     1640 - Spoke with pt's mother Castro ([phone]). States that she would like for the CM/SW to provide the pt with resources for ETOH abuse. She states that pt might have recently lost her job and started binge drinking. CM provided the pt and mother with substance abuse resources. Pt requested for SW to speak with her whenever available.     Escalations Completed: None    Medically Clear: No    Next Steps: cont to f/u with med team for any DC needs.     Barriers to Discharge: Medical clearance    Is the patient up for discharge tomorrow: No

## 2022-07-02 NOTE — HOSPITAL COURSE
Rhiannon Marrero is a 37-year-old woman, presented 6/30/2022 with abdominal pain associated with nausea and vomiting for the past 5 days.  This is a pleasant woman with a history of seizure disorder on antiepileptics, pancreatitis, and alcohol dependence.    In the emergency room, patient had a potassium of 3.0 with high anion gap of 27 as well as a lactic acid of 3.9.   Her beta hydroxybutyrate was elevated at 0.51.  Lipase was mildly elevated 137.  She has significant abdominal pain however.  CT scan of the abdomen showed hepatomegaly with diffuse hepatic steatosis.  Pancreas was noted to be normal.  However, patient continued to be in severe abdominal pain although her lipase was not significantly elevated from her baseline.  Patient reported history of recent sobriety but then started drinking about 10 days ago.  She reported drinking 1 bottle of vodka daily.    Patient was admitted for her metabolic derangements as well as concern for alcohol withdrawal.

## 2022-07-02 NOTE — PROGRESS NOTES
"Oxycodone offered to pt to help with pain, Pt refused oxycodone and stated, \"it made me to nauseous earlier.\" Morphine given for pain   "

## 2022-07-02 NOTE — PROGRESS NOTES
Patient is a 38 yo with polysubstance abuse, bipolar disorder, PTSD, Suicidal  Ideation admitted for acute pancreatitis secondary to ETOH.    #Acute pancreatitis  Pain control and saline. Has ongoing nausea with emesis  No gallstones or dilated common bile duct. Lipid levels pending tomorrow to eval for triglyceredemia induced pancreatitis. No new meds and no trauma/procedures. Calcium normal    #Bipolar disorder, Affective disorder  Patient has been off of medications for 3-6 months. Poor adharence. Has an appointment this month to address restarted medications. Denies any current SI/HI    Documented med list for 11/21/2021  -Zonisamide 50 mg 1 capsule daily For migraines. Soma 250 mg twice daily for muscle spasms. Levetiracetam 500 mg twice daily for seizure disorder. Lorazepam 1 mg twice daily as needed anxiety. Oxycodone acetaminophen 7.5 mg - 3 mg every 4 hours as needed pain. Phenazopyridine 200 mg for bladder condition and pain. Quetiapine 100 mg at bedtime for mood stabilization and insomnia.      Benjamin Lee MD

## 2022-07-03 PROBLEM — H93.13 TINNITUS OF BOTH EARS: Status: ACTIVE | Noted: 2022-07-03

## 2022-07-03 PROBLEM — G43.909 MIGRAINE: Chronic | Status: ACTIVE | Noted: 2022-07-03

## 2022-07-03 PROBLEM — B37.0 ORAL THRUSH: Status: ACTIVE | Noted: 2022-07-01

## 2022-07-03 PROBLEM — H93.13 TINNITUS OF BOTH EARS: Chronic | Status: ACTIVE | Noted: 2022-07-03

## 2022-07-03 PROBLEM — G43.909 MIGRAINE: Status: ACTIVE | Noted: 2022-07-03

## 2022-07-03 LAB
CHOLEST SERPL-MCNC: 143 MG/DL (ref 100–199)
HDLC SERPL-MCNC: 69 MG/DL
LDLC SERPL CALC-MCNC: 53 MG/DL
TRIGL SERPL-MCNC: 104 MG/DL (ref 0–149)

## 2022-07-03 PROCEDURE — A9270 NON-COVERED ITEM OR SERVICE: HCPCS | Performed by: HOSPITALIST

## 2022-07-03 PROCEDURE — 700111 HCHG RX REV CODE 636 W/ 250 OVERRIDE (IP): Performed by: STUDENT IN AN ORGANIZED HEALTH CARE EDUCATION/TRAINING PROGRAM

## 2022-07-03 PROCEDURE — A9270 NON-COVERED ITEM OR SERVICE: HCPCS | Performed by: STUDENT IN AN ORGANIZED HEALTH CARE EDUCATION/TRAINING PROGRAM

## 2022-07-03 PROCEDURE — 80061 LIPID PANEL: CPT

## 2022-07-03 PROCEDURE — 700102 HCHG RX REV CODE 250 W/ 637 OVERRIDE(OP): Performed by: HOSPITALIST

## 2022-07-03 PROCEDURE — 770020 HCHG ROOM/CARE - TELE (206)

## 2022-07-03 PROCEDURE — 700102 HCHG RX REV CODE 250 W/ 637 OVERRIDE(OP): Performed by: STUDENT IN AN ORGANIZED HEALTH CARE EDUCATION/TRAINING PROGRAM

## 2022-07-03 PROCEDURE — 99232 SBSQ HOSP IP/OBS MODERATE 35: CPT | Mod: GC | Performed by: HOSPITALIST

## 2022-07-03 RX ORDER — MORPHINE SULFATE 4 MG/ML
4 INJECTION INTRAVENOUS
Status: DISCONTINUED | OUTPATIENT
Start: 2022-07-03 | End: 2022-07-04

## 2022-07-03 RX ORDER — PHENAZOPYRIDINE HYDROCHLORIDE 200 MG/1
200 TABLET, FILM COATED ORAL
Status: DISCONTINUED | OUTPATIENT
Start: 2022-07-03 | End: 2022-07-03

## 2022-07-03 RX ORDER — OXYCODONE HYDROCHLORIDE 10 MG/1
10 TABLET ORAL
Status: CANCELLED | OUTPATIENT
Start: 2022-07-03

## 2022-07-03 RX ORDER — OXYCODONE HYDROCHLORIDE 5 MG/1
5 TABLET ORAL
Status: CANCELLED | OUTPATIENT
Start: 2022-07-03

## 2022-07-03 RX ORDER — MORPHINE SULFATE 4 MG/ML
4 INJECTION INTRAVENOUS
Status: CANCELLED | OUTPATIENT
Start: 2022-07-03

## 2022-07-03 RX ADMIN — GABAPENTIN 300 MG: 300 CAPSULE ORAL at 12:06

## 2022-07-03 RX ADMIN — Medication 1000 UNITS: at 05:13

## 2022-07-03 RX ADMIN — ACETAMINOPHEN 1000 MG: 500 TABLET, FILM COATED ORAL at 12:05

## 2022-07-03 RX ADMIN — MORPHINE SULFATE 4 MG: 4 INJECTION INTRAVENOUS at 21:34

## 2022-07-03 RX ADMIN — LORAZEPAM 1 MG: 1 TABLET ORAL at 05:13

## 2022-07-03 RX ADMIN — FOLIC ACID 1 MG: 1 TABLET ORAL at 05:13

## 2022-07-03 RX ADMIN — MORPHINE SULFATE 4 MG: 4 INJECTION INTRAVENOUS at 14:45

## 2022-07-03 RX ADMIN — MORPHINE SULFATE 4 MG: 4 INJECTION INTRAVENOUS at 05:14

## 2022-07-03 RX ADMIN — NYSTATIN 500000 UNITS: 100000 SUSPENSION ORAL at 21:26

## 2022-07-03 RX ADMIN — MORPHINE SULFATE 4 MG: 4 INJECTION INTRAVENOUS at 17:48

## 2022-07-03 RX ADMIN — ACETAMINOPHEN 1000 MG: 500 TABLET, FILM COATED ORAL at 17:48

## 2022-07-03 RX ADMIN — VALPROIC ACID 250 MG: 250 CAPSULE, LIQUID FILLED ORAL at 05:15

## 2022-07-03 RX ADMIN — VALPROIC ACID 250 MG: 250 CAPSULE, LIQUID FILLED ORAL at 14:45

## 2022-07-03 RX ADMIN — DOCUSATE SODIUM 100 MG: 100 CAPSULE, LIQUID FILLED ORAL at 17:49

## 2022-07-03 RX ADMIN — ENOXAPARIN SODIUM 40 MG: 40 INJECTION SUBCUTANEOUS at 17:47

## 2022-07-03 RX ADMIN — MORPHINE SULFATE 4 MG: 4 INJECTION INTRAVENOUS at 08:10

## 2022-07-03 RX ADMIN — ZONISAMIDE 50 MG: 50 CAPSULE ORAL at 05:17

## 2022-07-03 RX ADMIN — NYSTATIN 500000 UNITS: 100000 SUSPENSION ORAL at 17:49

## 2022-07-03 RX ADMIN — LEVETIRACETAM 1000 MG: 500 TABLET, FILM COATED ORAL at 17:48

## 2022-07-03 RX ADMIN — THERA TABS 1 TABLET: TAB at 05:13

## 2022-07-03 RX ADMIN — ACETAMINOPHEN 1000 MG: 500 TABLET, FILM COATED ORAL at 05:12

## 2022-07-03 RX ADMIN — GABAPENTIN 300 MG: 300 CAPSULE ORAL at 17:48

## 2022-07-03 RX ADMIN — LEVETIRACETAM 1000 MG: 500 TABLET, FILM COATED ORAL at 05:12

## 2022-07-03 RX ADMIN — VALPROIC ACID 250 MG: 250 CAPSULE, LIQUID FILLED ORAL at 21:26

## 2022-07-03 RX ADMIN — GABAPENTIN 300 MG: 300 CAPSULE ORAL at 05:13

## 2022-07-03 RX ADMIN — ONDANSETRON 4 MG: 2 INJECTION INTRAMUSCULAR; INTRAVENOUS at 21:26

## 2022-07-03 RX ADMIN — MORPHINE SULFATE 4 MG: 4 INJECTION INTRAVENOUS at 12:05

## 2022-07-03 RX ADMIN — NYSTATIN 500000 UNITS: 100000 SUSPENSION ORAL at 08:10

## 2022-07-03 RX ADMIN — NYSTATIN 500000 UNITS: 100000 SUSPENSION ORAL at 12:05

## 2022-07-03 RX ADMIN — Medication 100 MG: at 05:12

## 2022-07-03 RX ADMIN — LORAZEPAM 2 MG: 2 TABLET ORAL at 01:49

## 2022-07-03 ASSESSMENT — LIFESTYLE VARIABLES
AUDITORY DISTURBANCES: NOT PRESENT
TOTAL SCORE: 10
VISUAL DISTURBANCES: NOT PRESENT
PAROXYSMAL SWEATS: NO SWEAT VISIBLE
AGITATION: SOMEWHAT MORE THAN NORMAL ACTIVITY
TOTAL SCORE: 2
TOTAL SCORE: 11
PAROXYSMAL SWEATS: NO SWEAT VISIBLE
NAUSEA AND VOMITING: MILD NAUSEA WITH NO VOMITING
VISUAL DISTURBANCES: NOT PRESENT
AUDITORY DISTURBANCES: NOT PRESENT
PAROXYSMAL SWEATS: NO SWEAT VISIBLE
ANXIETY: *
ANXIETY: *
TOTAL SCORE: 2
ANXIETY: NO ANXIETY (AT EASE)
HEADACHE, FULLNESS IN HEAD: MILD
ORIENTATION AND CLOUDING OF SENSORIUM: CANNOT DO SERIAL ADDITIONS OR IS UNCERTAIN ABOUT DATE
VISUAL DISTURBANCES: NOT PRESENT
ORIENTATION AND CLOUDING OF SENSORIUM: ORIENTED AND CAN DO SERIAL ADDITIONS
NAUSEA AND VOMITING: *
ORIENTATION AND CLOUDING OF SENSORIUM: ORIENTED AND CAN DO SERIAL ADDITIONS
TREMOR: NO TREMOR
NAUSEA AND VOMITING: MILD NAUSEA WITH NO VOMITING
PAROXYSMAL SWEATS: NO SWEAT VISIBLE
TREMOR: NO TREMOR
ANXIETY: *
AGITATION: SOMEWHAT MORE THAN NORMAL ACTIVITY
VISUAL DISTURBANCES: NOT PRESENT
AGITATION: SOMEWHAT MORE THAN NORMAL ACTIVITY
AUDITORY DISTURBANCES: NOT PRESENT
NAUSEA AND VOMITING: MILD NAUSEA WITH NO VOMITING
AGITATION: SOMEWHAT MORE THAN NORMAL ACTIVITY
PAROXYSMAL SWEATS: BARELY PERCEPTIBLE SWEATING. PALMS MOIST
VISUAL DISTURBANCES: NOT PRESENT
HEADACHE, FULLNESS IN HEAD: MILD
AGITATION: SOMEWHAT MORE THAN NORMAL ACTIVITY
NAUSEA AND VOMITING: *
AUDITORY DISTURBANCES: NOT PRESENT
NAUSEA AND VOMITING: MILD NAUSEA WITH NO VOMITING
TREMOR: TREMOR NOT VISIBLE BUT CAN BE FELT, FINGERTIP TO FINGERTIP
TREMOR: NO TREMOR
ANXIETY: NO ANXIETY (AT EASE)
AGITATION: NORMAL ACTIVITY
HEADACHE, FULLNESS IN HEAD: MILD
AUDITORY DISTURBANCES: NOT PRESENT
HEADACHE, FULLNESS IN HEAD: NOT PRESENT
ANXIETY: NO ANXIETY (AT EASE)
AUDITORY DISTURBANCES: NOT PRESENT
TOTAL SCORE: 6
TOTAL SCORE: 2
TREMOR: TREMOR NOT VISIBLE BUT CAN BE FELT, FINGERTIP TO FINGERTIP
ORIENTATION AND CLOUDING OF SENSORIUM: ORIENTED AND CAN DO SERIAL ADDITIONS
HEADACHE, FULLNESS IN HEAD: NOT PRESENT
HEADACHE, FULLNESS IN HEAD: NOT PRESENT
ORIENTATION AND CLOUDING OF SENSORIUM: ORIENTED AND CAN DO SERIAL ADDITIONS
VISUAL DISTURBANCES: NOT PRESENT
ORIENTATION AND CLOUDING OF SENSORIUM: CANNOT DO SERIAL ADDITIONS OR IS UNCERTAIN ABOUT DATE
TREMOR: NO TREMOR
PAROXYSMAL SWEATS: BARELY PERCEPTIBLE SWEATING. PALMS MOIST

## 2022-07-03 ASSESSMENT — ENCOUNTER SYMPTOMS
DIARRHEA: 0
NAUSEA: 1
BLURRED VISION: 0
PALPITATIONS: 0
COUGH: 0
CONSTIPATION: 0
SHORTNESS OF BREATH: 0
FEVER: 0
SINUS PAIN: 0
DIZZINESS: 1
HEADACHES: 1
DOUBLE VISION: 0
CHILLS: 0

## 2022-07-03 ASSESSMENT — PAIN DESCRIPTION - PAIN TYPE
TYPE: ACUTE PAIN

## 2022-07-03 ASSESSMENT — FIBROSIS 4 INDEX: FIB4 SCORE: 6.93

## 2022-07-03 NOTE — PROGRESS NOTES
"[facility] Internal Medicine Daily Progress Note    Date of Service  7/3/2022    R Team: R  Orange Team   Attending: Benjamin Lee M.d.  Senior Resident: Dr. Maikol Nuñez  Intern:  Dr. Darvin Shirley  Contact Number: [phone]    Chief Complaint  Rhiannon Marrero is a 37 y.o. female admitted 6/30/2022 with abdominal pain, vomiting x5 days following alcohol binge, history of alcohol abuse and pancreatitis, last admission for pancreatitis in late May 2022, discharged 5/27/2022    Hospital Course  38 y/o F with PMHx seizure disorder on Keppra 1000mg BID, pancreatitis, alcohol abuse, bipolar depression and suicidality, presented on 6/30/2022 with abdominal pain, nausea, vomiting for 5 days. She had been binge drinking alcohol. On admission denied fever, chills, other recreational substance use. Patient was admitted to hospital for similar complaint and discharged on 5/27/2022, hepatitis panel was performed at the time and was negative.      Patient reports that she has not been taking her psychiatric medications including Seroquel and an antidepressant for multiple months and has been going through a difficult phase recently with her moods. States that she had scheduled a meeting with a psychologist for alcohol counseling on 7/8/2022, says she wants to get her life back on track but sometimes feels out of control. Stated that she was feeling manic over the week prior to admission.     Patient works as a , has daughter age 15 and son age 13 who are currently staying with different relatives (daughter with cousin in [place], son with friends in [place]). Has a boyfriend who she says is supportive but also described as a \"sociopath\" without much further clarification, though denies domestic violence or abuse.     In ED she was noted to have K of 3.0, AG of 27, AST 75, alk phos 126, lactate of 3.9.    7/2/2022: HOANG. Gabapentin increased from 300mg BID to TID      Interval Problem Update  7/3/2022: HOANG, " VSS. Patient felt that abdominal pain was improved today, as well as her nausea. Patient slept well overnight. Continues to have episodic headaches consistent with history of migraines, also notes associated tinnitus, non-pulsatile, like a ringing bell in both ears, that come with her headaches. States that this started 2 weeks ago and worsened with alcohol consumption.     I have discussed this patient's plan of care and discharge plan at IDT rounds today with Case Management, Nursing, Nursing leadership, and other members of the IDT team.    Consultants/Specialty  None    Code Status  Full Code    Disposition  Patient is not medically cleared for discharge.   Anticipate discharge to to home with close outpatient follow-up.    Review of Systems  Review of Systems   Constitutional: Negative for chills and fever.   HENT: Positive for tinnitus. Negative for congestion, ear discharge, ear pain, hearing loss and sinus pain.    Eyes: Negative for blurred vision and double vision.   Respiratory: Negative for cough and shortness of breath.    Cardiovascular: Negative for chest pain, palpitations and leg swelling.   Gastrointestinal: Positive for nausea. Negative for constipation and diarrhea.   Genitourinary: Positive for dysuria.   Skin: Negative for rash.   Neurological: Positive for dizziness and headaches.        Physical Exam  Temp:  [36.2 °C (97.2 °F)-36.9 °C (98.5 °F)] 36.8 °C (98.2 °F)  Pulse:  [83-93] 83  Resp:  [16-18] 16  BP: (130-146)/(93-99) 130/93  SpO2:  [93 %-100 %] 97 %    Physical Exam  Constitutional:       General: She is not in acute distress.     Comments: Tired-appearing   HENT:      Head: Normocephalic and atraumatic.      Right Ear: Ear canal and external ear normal.      Left Ear: Ear canal and external ear normal.      Mouth/Throat:      Mouth: Mucous membranes are moist.      Comments: Oral thrush present  Cardiovascular:      Rate and Rhythm: Normal rate and regular rhythm.      Pulses: Normal  "pulses.      Heart sounds: No murmur heard.  Pulmonary:      Effort: No respiratory distress.      Breath sounds: Normal breath sounds. No wheezing.   Abdominal:      General: Abdomen is flat.      Palpations: Abdomen is soft.      Tenderness: There is abdominal tenderness.   Musculoskeletal:         General: No swelling.      Cervical back: Neck supple. No tenderness.   Lymphadenopathy:      Cervical: No cervical adenopathy.   Skin:     General: Skin is warm.      Capillary Refill: Capillary refill takes less than 2 seconds.      Coloration: Skin is not jaundiced.      Findings: No bruising or rash.   Neurological:      Mental Status: She is alert and oriented to person, place, and time.   Psychiatric:      Comments: Patient states she doesn't feel like herself, feels like she is \"in the wrong headspace\" and wants to feel better         Fluids    Intake/Output Summary (Last 24 hours) at 7/3/2022 1345  Last data filed at 7/3/2022 0900  Gross per 24 hour   Intake 240 ml   Output --   Net 240 ml       Laboratory  Recent Labs     07/01/22  0001 07/02/22  1005   WBC 5.3 4.5*   RBC 5.74* 4.03*   HEMOGLOBIN 18.5* 13.1   HEMATOCRIT 50.5* 37.5   MCV 88.0 93.1   MCH 32.2 32.5   MCHC 36.6* 34.9   RDW 45.2 47.3   PLATELETCT 187 95*   MPV 10.3 10.0     Recent Labs     07/01/22  0800 07/01/22  1745 07/02/22  1005   SODIUM 141 136 134*   POTASSIUM 3.7 4.1 3.8   CHLORIDE 109 103 102   CO2 22 24 22   GLUCOSE 99 102* 88   BUN 10 12 7*   CREATININE 0.46* 0.60 0.50   CALCIUM 6.3* 8.3* 8.3*     Recent Labs     07/01/22  0001   APTT 26.2   INR 1.14*         Recent Labs     07/03/22  0054   TRIGLYCERIDE 104   HDL 69   LDL 53       Imaging  CT-ABDOMEN-PELVIS W/O   Final Result         1.  Hepatomegaly and diffuse hepatic steatosis   2.  The appendix is not definitively identified limiting evaluation for appendicitis.       Pancreas unremarkable on CT    Assessment/Plan  Problem Representation:  38 y/o F with PMHx of recurrent " pancreatitis, alcohol abuse, bipolar disorder off medications for 3-6 months prior to admission, presented on 6/30/2022 with nausea, vomiting, abdominal pain following an alcohol binge in the setting of possible hypomanic episode, symptoms similar to previous episodes of pancreatitis.     * Pancreatitis, recurrent- (present on admission)  Assessment & Plan  Patient's lipase is elevated but not to 3 times normal limit and has an unremarkable appearance of pancreas on CT so technically does not meet criteria for acute pancreatitis, however given patient's history of recurrent pancreatitis this may be acute on chronic pancreatitis in which case lipase may not be as significantly elevated. Patient's lipid panel today not showing any significant hyperlipidemia.   -Continue IV NS and pain control prn with tylenol, morphine, oxycodone      Alcohol dependence with withdrawal (HCC)- (present on admission)  Assessment & Plan  History of alcohol abuse disorder with last drink on 6/30/2022.  -CIWA protocol per withdrawal symptoms  -Zofran prn for symptomatic relief of nausea  -Gabapentin 300mg TID and valproic acid 250mg TID as adjunctive therapy    Oral thrush  Assessment & Plan  Patient was noted on exam to have white exudate covering tongue likely representing oral thrush. Patient states not bothering her at this time  -Nystatin swish and spit prn     Transaminitis- (present on admission)  Assessment & Plan  Transaminitis with AST to 125 and ALT of 47, negative hepatitis panel on last admission in late May 2022. Likely represents alcoholic liver disease, non-acute process  -Monitor.    Seizure (HCC)- (present on admission)  Assessment & Plan  Patient has an unspecified seizure disorder for which she takes Keppra 1000mg BID in the outpatient setting  · Continue home medication    Tinnitus of both ears- (present on admission)  Assessment & Plan  Patient reports episodic tinnitus present for 2 weeks associated with her  "migraines, worsened by EtOH intake. Non-pulsatile, mild to moderate intensity, high pitch \"like a bell\". No exam findings in either ear. Patient reports that it is tolerable, does not bother her much at this time.  -Valproic acid is known to cause tinnitus as a side effect. While patient's tinnitus was present for weeks prior to this admission, would be reasonable to discontinue valproate if patient's tinnitus symptoms worsen.     Migraine- (present on admission)  Assessment & Plan  Patient has history of chronic migraines, for which she takes zonisamide 50mg daily when outpatient  -Continue with outpatient medication regimen for management.    Pain due to interstitial cystitis- (present on admission)  Assessment & Plan  Patient notes a history of chronic interstitial cystitis, states that it is not really bothering her now but that she does have uncomfortable sensations when she urinates.   -Consider restarting her prior regimen of phenazopyridine 200mg if symptoms worsen    Bipolar affective disorder, current episode hypomanic (HCC)- (present on admission)  Assessment & Plan  Patient has been off her medications for several months. Originally planned for outpatient follow up on 7/8/2022 for alcohol dependence and potentially restarting medications. Not suicidal/homicidal at this time.  -Valproic acid given as adjunct for alcohol withdrawal may offer some benefit for her mood disorder  -Plan for close outpatient follow-up       VTE prophylaxis: SCDs/TEDs    I have performed a physical exam and reviewed and updated ROS and Plan today (7/3/2022). In review of yesterday's note (7/2/2022), there are no changes except as documented above.        "

## 2022-07-03 NOTE — ASSESSMENT & PLAN NOTE
Transaminitis with AST to 125 and ALT of 47, negative hepatitis panel on last admission in late May 2022. Likely represents alcoholic liver disease, non-acute process  -Monitor.

## 2022-07-03 NOTE — ASSESSMENT & PLAN NOTE
"Patient reports episodic tinnitus present for 2 weeks associated with her migraines, worsened by EtOH intake. Non-pulsatile, mild to moderate intensity, high pitch \"like a bell\". No exam findings in either ear. Patient reports that it is tolerable, does not bother her much at this time.  -Valproic acid is known to cause tinnitus as a side effect. While patient's tinnitus was present for weeks prior to this admission, would be reasonable to discontinue valproate if patient's tinnitus symptoms worsen.   "

## 2022-07-03 NOTE — ASSESSMENT & PLAN NOTE
Patient was noted on exam to have white exudate covering tongue likely representing oral thrush. Patient states not bothering her at this time  -Nystatin swish and spit prn

## 2022-07-03 NOTE — ASSESSMENT & PLAN NOTE
Patient has been off her medications for several months. Originally planned for outpatient follow up on 7/8/2022 for alcohol dependence and potentially restarting medications. Not suicidal/homicidal at this time.  -Valproic acid given as adjunct for alcohol withdrawal may offer some benefit for her mood disorder  -Plan for close outpatient follow-up

## 2022-07-03 NOTE — ASSESSMENT & PLAN NOTE
Patient's lipase is elevated but not to 3 times normal limit and has an unremarkable appearance of pancreas on CT so technically does not meet criteria for acute pancreatitis, however given patient's history of recurrent pancreatitis this may be acute on chronic pancreatitis in which case lipase may not be as significantly elevated. Patient's lipid panel today not showing any significant hyperlipidemia.   -Continue IV NS and pain control prn with tylenol, morphine, oxycodone

## 2022-07-03 NOTE — HOSPITAL COURSE
"36 y/o F with PMHx seizure disorder on Keppra, hx of pancreatitis, alcohol abuse, bipolar depression and suicidal ideation admitted for acute pancreatitis secondary to alcohol abuse and treated for alcohol withdrawal syndrome.     Other notable history per patient is that she had no been taking psychiatric medications including Seroquel and an unspecified antidepressant for multiple months and has been going through a difficult phase recently with her moods. States that she had scheduled an appointment for alcohol counseling and to address restarting her medications for 7/8/2022, says she wants to get her life back on track but sometimes feels out of control. Stated that she was feeling manic over the week prior to admission.     Patient works as a , has daughter age 15 and son age 13 who are currently staying with different relatives (daughter with cousin in [place], son with friends in [place]). Has a boyfriend who she says is supportive but also described as a \"sociopath\" without much further clarification, though denies domestic violence or abuse.     #Acute pancreatitis  -Resolved    #Alcohol use disorder  -Patient intending to continue alcohol cessation on discharge  -Has follow up with PCP outpatient for ongoing alcohol cessation counseling on 7/8/22    #Seizure disorder  -Continue Keppra    #Migraines  -Has associated tinnitus with migraines  -Continue Zonisamide    #Bipolar disorder  #Affective disorder  -Follow up with outpatient PCP on restarting medication management    "

## 2022-07-03 NOTE — ASSESSMENT & PLAN NOTE
Patient notes a history of chronic interstitial cystitis, states that it is not really bothering her now but that she does have uncomfortable sensations when she urinates.   -Consider restarting her prior regimen of phenazopyridine 200mg if symptoms worsen

## 2022-07-03 NOTE — ASSESSMENT & PLAN NOTE
History of alcohol abuse disorder with last drink on 6/30/2022.  -MICHAWA protocol per withdrawal symptoms  -Zofran prn for symptomatic relief of nausea  -Gabapentin 300mg TID and valproic acid 250mg TID as adjunctive therapy

## 2022-07-03 NOTE — ASSESSMENT & PLAN NOTE
Patient has an unspecified seizure disorder for which she takes Keppra 1000mg BID in the outpatient setting  · Continue home medication

## 2022-07-03 NOTE — ASSESSMENT & PLAN NOTE
Patient has history of chronic migraines, for which she takes zonisamide 50mg daily when outpatient  -Continue with outpatient medication regimen for management.

## 2022-07-04 VITALS
HEIGHT: 66 IN | BODY MASS INDEX: 17.86 KG/M2 | RESPIRATION RATE: 17 BRPM | OXYGEN SATURATION: 93 % | DIASTOLIC BLOOD PRESSURE: 87 MMHG | WEIGHT: 111.11 LBS | SYSTOLIC BLOOD PRESSURE: 135 MMHG | HEART RATE: 89 BPM | TEMPERATURE: 98 F

## 2022-07-04 LAB
ANION GAP SERPL CALC-SCNC: 10 MMOL/L (ref 7–16)
BASOPHILS # BLD AUTO: 0.3 % (ref 0–1.8)
BASOPHILS # BLD: 0.01 K/UL (ref 0–0.12)
BUN SERPL-MCNC: 7 MG/DL (ref 8–22)
CALCIUM SERPL-MCNC: 9.3 MG/DL (ref 8.5–10.5)
CHLORIDE SERPL-SCNC: 101 MMOL/L (ref 96–112)
CO2 SERPL-SCNC: 26 MMOL/L (ref 20–33)
CREAT SERPL-MCNC: 0.59 MG/DL (ref 0.5–1.4)
EOSINOPHIL # BLD AUTO: 0.09 K/UL (ref 0–0.51)
EOSINOPHIL NFR BLD: 2.9 % (ref 0–6.9)
ERYTHROCYTE [DISTWIDTH] IN BLOOD BY AUTOMATED COUNT: 45.3 FL (ref 35.9–50)
GFR SERPLBLD CREATININE-BSD FMLA CKD-EPI: 120 ML/MIN/1.73 M 2
GLUCOSE SERPL-MCNC: 94 MG/DL (ref 65–99)
HCT VFR BLD AUTO: 40.5 % (ref 37–47)
HGB BLD-MCNC: 14.4 G/DL (ref 12–16)
IMM GRANULOCYTES # BLD AUTO: 0.01 K/UL (ref 0–0.11)
IMM GRANULOCYTES NFR BLD AUTO: 0.3 % (ref 0–0.9)
LYMPHOCYTES # BLD AUTO: 1.11 K/UL (ref 1–4.8)
LYMPHOCYTES NFR BLD: 36.3 % (ref 22–41)
MAGNESIUM SERPL-MCNC: 2.1 MG/DL (ref 1.5–2.5)
MCH RBC QN AUTO: 32.4 PG (ref 27–33)
MCHC RBC AUTO-ENTMCNC: 35.6 G/DL (ref 33.6–35)
MCV RBC AUTO: 91.2 FL (ref 81.4–97.8)
MONOCYTES # BLD AUTO: 0.26 K/UL (ref 0–0.85)
MONOCYTES NFR BLD AUTO: 8.5 % (ref 0–13.4)
NEUTROPHILS # BLD AUTO: 1.58 K/UL (ref 2–7.15)
NEUTROPHILS NFR BLD: 51.7 % (ref 44–72)
NRBC # BLD AUTO: 0 K/UL
NRBC BLD-RTO: 0 /100 WBC
PLATELET # BLD AUTO: 115 K/UL (ref 164–446)
PMV BLD AUTO: 10.4 FL (ref 9–12.9)
POTASSIUM SERPL-SCNC: 4.4 MMOL/L (ref 3.6–5.5)
RBC # BLD AUTO: 4.44 M/UL (ref 4.2–5.4)
SODIUM SERPL-SCNC: 137 MMOL/L (ref 135–145)
WBC # BLD AUTO: 3.1 K/UL (ref 4.8–10.8)

## 2022-07-04 PROCEDURE — 700102 HCHG RX REV CODE 250 W/ 637 OVERRIDE(OP): Performed by: STUDENT IN AN ORGANIZED HEALTH CARE EDUCATION/TRAINING PROGRAM

## 2022-07-04 PROCEDURE — A9270 NON-COVERED ITEM OR SERVICE: HCPCS | Performed by: HOSPITALIST

## 2022-07-04 PROCEDURE — 99238 HOSP IP/OBS DSCHRG MGMT 30/<: CPT | Mod: GC | Performed by: HOSPITALIST

## 2022-07-04 PROCEDURE — 700111 HCHG RX REV CODE 636 W/ 250 OVERRIDE (IP): Performed by: HOSPITALIST

## 2022-07-04 PROCEDURE — A9270 NON-COVERED ITEM OR SERVICE: HCPCS | Performed by: STUDENT IN AN ORGANIZED HEALTH CARE EDUCATION/TRAINING PROGRAM

## 2022-07-04 PROCEDURE — 85025 COMPLETE CBC W/AUTO DIFF WBC: CPT

## 2022-07-04 PROCEDURE — 83735 ASSAY OF MAGNESIUM: CPT

## 2022-07-04 PROCEDURE — 700102 HCHG RX REV CODE 250 W/ 637 OVERRIDE(OP): Performed by: HOSPITALIST

## 2022-07-04 PROCEDURE — 80048 BASIC METABOLIC PNL TOTAL CA: CPT

## 2022-07-04 PROCEDURE — 700111 HCHG RX REV CODE 636 W/ 250 OVERRIDE (IP): Performed by: STUDENT IN AN ORGANIZED HEALTH CARE EDUCATION/TRAINING PROGRAM

## 2022-07-04 RX ORDER — OXYCODONE HYDROCHLORIDE 10 MG/1
10 TABLET ORAL EVERY 6 HOURS PRN
Status: DISCONTINUED | OUTPATIENT
Start: 2022-07-04 | End: 2022-07-04

## 2022-07-04 RX ORDER — MORPHINE SULFATE 4 MG/ML
4 INJECTION INTRAVENOUS
Status: DISCONTINUED | OUTPATIENT
Start: 2022-07-04 | End: 2022-07-04 | Stop reason: HOSPADM

## 2022-07-04 RX ORDER — OXYCODONE HYDROCHLORIDE 5 MG/1
5 TABLET ORAL EVERY 6 HOURS PRN
Status: DISCONTINUED | OUTPATIENT
Start: 2022-07-04 | End: 2022-07-04

## 2022-07-04 RX ORDER — OXYCODONE HYDROCHLORIDE 5 MG/1
5 TABLET ORAL
Status: DISCONTINUED | OUTPATIENT
Start: 2022-07-04 | End: 2022-07-04 | Stop reason: HOSPADM

## 2022-07-04 RX ORDER — OXYCODONE HYDROCHLORIDE 10 MG/1
10 TABLET ORAL
Status: DISCONTINUED | OUTPATIENT
Start: 2022-07-04 | End: 2022-07-04

## 2022-07-04 RX ADMIN — Medication 100 MG: at 05:14

## 2022-07-04 RX ADMIN — OXYCODONE HYDROCHLORIDE 10 MG: 10 TABLET ORAL at 00:59

## 2022-07-04 RX ADMIN — MORPHINE SULFATE 4 MG: 4 INJECTION INTRAVENOUS at 10:17

## 2022-07-04 RX ADMIN — THERA TABS 1 TABLET: TAB at 05:13

## 2022-07-04 RX ADMIN — NYSTATIN 500000 UNITS: 100000 SUSPENSION ORAL at 09:00

## 2022-07-04 RX ADMIN — ACETAMINOPHEN 1000 MG: 500 TABLET, FILM COATED ORAL at 05:13

## 2022-07-04 RX ADMIN — MORPHINE SULFATE 4 MG: 4 INJECTION INTRAVENOUS at 07:18

## 2022-07-04 RX ADMIN — OXYCODONE HYDROCHLORIDE 10 MG: 10 TABLET ORAL at 05:15

## 2022-07-04 RX ADMIN — ACETAMINOPHEN 1000 MG: 500 TABLET, FILM COATED ORAL at 00:59

## 2022-07-04 RX ADMIN — FOLIC ACID 1 MG: 1 TABLET ORAL at 05:13

## 2022-07-04 RX ADMIN — GABAPENTIN 300 MG: 300 CAPSULE ORAL at 05:13

## 2022-07-04 RX ADMIN — Medication 1000 UNITS: at 05:13

## 2022-07-04 RX ADMIN — ZONISAMIDE 50 MG: 50 CAPSULE ORAL at 05:13

## 2022-07-04 RX ADMIN — LEVETIRACETAM 1000 MG: 500 TABLET, FILM COATED ORAL at 05:14

## 2022-07-04 RX ADMIN — MORPHINE SULFATE 4 MG: 4 INJECTION INTRAVENOUS at 02:03

## 2022-07-04 RX ADMIN — VALPROIC ACID 250 MG: 250 CAPSULE, LIQUID FILLED ORAL at 05:14

## 2022-07-04 ASSESSMENT — LIFESTYLE VARIABLES
PAROXYSMAL SWEATS: NO SWEAT VISIBLE
HEADACHE, FULLNESS IN HEAD: NOT PRESENT
ANXIETY: MILDLY ANXIOUS
TOTAL SCORE: 1
NAUSEA AND VOMITING: NO NAUSEA AND NO VOMITING
AUDITORY DISTURBANCES: NOT PRESENT
TREMOR: NO TREMOR
ORIENTATION AND CLOUDING OF SENSORIUM: ORIENTED AND CAN DO SERIAL ADDITIONS
AGITATION: NORMAL ACTIVITY
VISUAL DISTURBANCES: NOT PRESENT

## 2022-07-04 ASSESSMENT — PAIN DESCRIPTION - PAIN TYPE
TYPE: ACUTE PAIN
TYPE: ACUTE PAIN

## 2022-07-04 NOTE — PROGRESS NOTES
Received bedside report from LESLIE De Luna, pt care assumed. No signs of acute distress noted at this time. Bed in lowest position. Pt educated on fall risk and use of call light.

## 2022-07-04 NOTE — PROGRESS NOTES
"Pt complains of pruritis when taking oxycodone.   I had a long discussion with the pt, and explained that the goal is to control her pain with oral medication and reserving IV morphine as last resort.   We explored other pain regimens she has used in the past; per patient, hydrocodone causes worse pruritis than oxycodone, and that her pruritis with oxycodone is tolerable. For some reason, oxycodone therapy \"fell off\"; I have reinstated the therapy as below:    - oxycodone 5 mg q3h for moderate pain PRN  - oxycodone 10 mg q3h for severe pain PRN  - IV morphine is also available should PO medication fail to alleviate pain, and shall be given 1 hour after PO medication was administered and failed to allaviate Sx.  "

## 2022-07-04 NOTE — CARE PLAN
The patient is Stable - Low risk of patient condition declining or worsening    Shift Goals  Clinical Goals: pain control  Patient Goals: rest, comfort  Family Goals: n/a    Progress made toward(s) clinical / shift goals:  pt's pain being controlled via medication    Patient is not progressing towards the following goals:      Problem: Pain - Standard  Goal: Alleviation of pain or a reduction in pain to the patient’s comfort goal  Outcome: Progressing     Problem: Knowledge Deficit - Standard  Goal: Patient and family/care givers will demonstrate understanding of plan of care, disease process/condition, diagnostic tests and medications  Outcome: Progressing     Problem: Optimal Care for Alcohol Withdrawal  Goal: Optimal Care for the alcohol withdrawal patient  Outcome: Progressing     Problem: Fall Risk  Goal: Patient will remain free from falls  Outcome: Progressing

## 2022-07-04 NOTE — PROGRESS NOTES
Arrive to dc lounge via wheelchair. A/O, dc instructions reviewed w/ pt, verbalized understanding.

## 2022-07-04 NOTE — DISCHARGE SUMMARY
"[facility] Internal Medicine Discharge Summary    Attending: Dr. Lee  Senior Resident: Dr. Nuñez  Intern:  Dr. Shirley  Contact Number: [phone]    CHIEF COMPLAINT ON ADMISSION  Chief Complaint   Patient presents with   • Abdominal Pain     BIBA for ABD pain X5 days. Pt complains of N/V and abd pain. Pt states she has a hx of pancreatitis and has been drinking for a few days.        Reason for Admission  EMS     Admission Date  6/30/2022    CODE STATUS  Full Code    HPI & HOSPITAL COURSE  36 y/o F with PMHx seizure disorder on Keppra, hx of pancreatitis, alcohol abuse, bipolar depression and suicidal ideation admitted for acute pancreatitis secondary to alcohol abuse and treated for alcohol withdrawal syndrome.     Other notable history per patient is that she had no been taking psychiatric medications including Seroquel and an unspecified antidepressant for multiple months and has been going through a difficult phase recently with her moods. States that she had scheduled an appointment for alcohol counseling and to address restarting her medications for 7/8/2022, says she wants to get her life back on track but sometimes feels out of control. Stated that she was feeling manic over the week prior to admission.     Patient works as a , has daughter age 15 and son age 13 who are currently staying with different relatives (daughter with cousin in [place], son with friends in [place]). Has a boyfriend who she says is supportive but also described as a \"sociopath\" without much further clarification, though denies domestic violence or abuse.     #Acute pancreatitis  -Resolved    #Alcohol use disorder  -Patient intending to continue alcohol cessation on discharge  -Has follow up with PCP outpatient for ongoing alcohol cessation counseling on 7/8/22    #Seizure disorder  -Continue Keppra    #Migraines  -Has associated tinnitus with migraines  -Continue Zonisamide    #Bipolar disorder  #Affective " disorder  -Follow up with outpatient PCP on restarting medication management        Therefore, she is discharged in fair and stable condition to home with close outpatient follow-up.    The patient met 2-midnight criteria for an inpatient stay at the time of discharge.    Discharge Date  7/4/2022    Physical Exam on Day of Discharge  Gen: Not in acute distress  HENT: Normocephalic, atraumatic, mucus membranes moist  CVR: Regular rate and rhythm, no murmurs  Resp: No respiratory distress, clear to auscultation bilaterally  Abd: No abdominal tenderness, no guarding, flat and soft  MSK: Able to ambulate without assistance  Skin: Warm, dry, no rashes  Neuro: Alert and oriented x 3  Psych: Answering questions appropriately.     FOLLOW UP ITEMS POST DISCHARGE  Fol    DISCHARGE DIAGNOSES  Principal Problem:    Pancreatitis, recurrent POA: Yes  Active Problems:    Bipolar affective disorder, current episode hypomanic (HCC) POA: Yes    Pain due to interstitial cystitis POA: Yes    Alcohol dependence with withdrawal ([facility]) POA: Yes    Seizure ([facility]) POA: Yes    Transaminitis POA: Yes    Oral thrush POA: Unknown    Migraine POA: Yes    Tinnitus of both ears POA: Yes  Resolved Problems:    Alcohol dependence ([facility]) POA: Yes    Pancreatitis, unspecified pancreatitis type POA: Yes      FOLLOW UP  Future Appointments   Date Time Provider Department Center   7/8/2022  8:30 AM [facility]  [address] [facility] [address]  [phone]    Follow up on 7/8/2022  08:30 AM      MEDICATIONS ON DISCHARGE     Medication List      CONTINUE taking these medications      Instructions   levetiracetam 1000 MG tablet  Commonly known as: KEPPRA   Take 1 Tab by mouth 2 Times a Day.  Dose: 1,000 mg     zonisamide 25 MG capsule  Commonly known as: ZONEGRAN   Take 2 Capsules by mouth every day.  Dose: 50 mg            Allergies  Allergies   Allergen Reactions   • Promethazine Hcl Anxiety        DIET  Orders Placed This Encounter   Procedures   • Diet Order Diet: Clear Liquid     Standing Status:   Standing     Number of Occurrences:   1     Order Specific Question:   Diet:     Answer:   Clear Liquid [10]       ACTIVITY  As tolerated.  Weight bearing as tolerated    CONSULTATIONS  N/A    PROCEDURES  N/A    LABORATORY  Lab Results   Component Value Date    SODIUM 137 07/04/2022    POTASSIUM 4.4 07/04/2022    CHLORIDE 101 07/04/2022    CO2 26 07/04/2022    GLUCOSE 94 07/04/2022    BUN 7 (L) 07/04/2022    CREATININE 0.59 07/04/2022    CREATININE 0.7 06/10/2008        Lab Results   Component Value Date    WBC 3.1 (L) 07/04/2022    HEMOGLOBIN 14.4 07/04/2022    HEMATOCRIT 40.5 07/04/2022    PLATELETCT 115 (L) 07/04/2022        Total time of the discharge process exceeds 35 minutes.

## 2022-07-06 LAB
BACTERIA BLD CULT: NORMAL
BACTERIA BLD CULT: NORMAL
SIGNIFICANT IND 70042: NORMAL
SIGNIFICANT IND 70042: NORMAL
SITE SITE: NORMAL
SITE SITE: NORMAL
SOURCE SOURCE: NORMAL
SOURCE SOURCE: NORMAL

## 2022-07-08 ENCOUNTER — OFFICE VISIT (OUTPATIENT)
Dept: INTERNAL MEDICINE | Facility: OTHER | Age: 37
End: 2022-07-08
Payer: COMMERCIAL

## 2022-07-08 VITALS
HEART RATE: 92 BPM | SYSTOLIC BLOOD PRESSURE: 113 MMHG | WEIGHT: 116.2 LBS | OXYGEN SATURATION: 98 % | TEMPERATURE: 97 F | DIASTOLIC BLOOD PRESSURE: 69 MMHG | BODY MASS INDEX: 18.24 KG/M2 | HEIGHT: 67 IN

## 2022-07-08 DIAGNOSIS — D70.9 NEUTROPENIA, UNSPECIFIED TYPE (HCC): ICD-10-CM

## 2022-07-08 DIAGNOSIS — F31.9 BIPOLAR DEPRESSION (HCC): ICD-10-CM

## 2022-07-08 DIAGNOSIS — R56.9 SEIZURES (HCC): ICD-10-CM

## 2022-07-08 PROCEDURE — 99214 OFFICE O/P EST MOD 30 MIN: CPT | Mod: GC | Performed by: INTERNAL MEDICINE

## 2022-07-08 RX ORDER — LEVETIRACETAM 1000 MG/1
1000 TABLET ORAL 2 TIMES DAILY
Qty: 60 TABLET | Refills: 1 | Status: SHIPPED | OUTPATIENT
Start: 2022-07-08 | End: 2022-08-01

## 2022-07-08 RX ORDER — ZONISAMIDE 50 MG/1
CAPSULE ORAL
COMMUNITY
Start: 2022-05-31 | End: 2022-07-08

## 2022-07-08 RX ORDER — BUPRENORPHINE 8 MG/1
TABLET SUBLINGUAL
COMMUNITY
Start: 2022-04-13 | End: 2022-07-08

## 2022-07-08 RX ORDER — QUETIAPINE FUMARATE 50 MG/1
50-150 TABLET, FILM COATED ORAL NIGHTLY
Qty: 90 TABLET | Refills: 1 | Status: SHIPPED | OUTPATIENT
Start: 2022-07-08 | End: 2022-07-20

## 2022-07-08 RX ORDER — ZONISAMIDE 50 MG/1
50 CAPSULE ORAL DAILY
Qty: 90 CAPSULE | Refills: 1 | Status: SHIPPED | OUTPATIENT
Start: 2022-07-08 | End: 2022-07-20

## 2022-07-08 RX ORDER — ZONISAMIDE 50 MG/1
50 CAPSULE ORAL DAILY
Qty: 30 CAPSULE | Refills: 1 | Status: CANCELLED | OUTPATIENT
Start: 2022-07-08

## 2022-07-08 ASSESSMENT — FIBROSIS 4 INDEX: FIB4 SCORE: 5.73

## 2022-07-08 NOTE — PATIENT INSTRUCTIONS
Thank you for visiting today!    Please follow-up in 2 weeks     Please follow-up on referrals and schedule an appointment with psychiatry     Please get lab work done at least 5 days before next visit.    A new medicine called seroquel has been started, please start by taking 1 tablet every night, after 3 nights increase to 2 tablets and then after 3 nights increase the dose to 3 tablets every night to help with the depression symptoms as well as sleep     Please try and eat healthy, get at least 30 minutes of cardiovascular exercise a day to help keep your health as best as it can be.    If you have any questions or concerns please feel free to contact us at [phone].    If you feel like you are experiencing a medical emergency please seek immediate medical attention at an urgent care or in the emergency department.

## 2022-07-09 NOTE — PROGRESS NOTES
"Rhiannon Marrero is a 37 y.o. female who presents today with the following:    CC: Establish Care with Primary Care Physician     ANGEL:  Patient is a 38 yo female with PMH of bipolar type II, seizure disorder (per patient tonic clonic seizures), interstitial cystitis, alcohol abuse and with recurrent episodes of acute pancreatitis 2/2 alcohol use who presents today for a follow up visit after being discharged form the hospital for an acute episode of alcoholic pancreatitis.    Bipolar II  Describes he was doing very well when she went into a spiral of depression about a month ago. Describes that she \"crashed\". Her life is the most stable that it has ever been and describes that this depression and crash phase is totally non-situational for her. She was previously following up with psych but hasn't seen them in about a year, initially because she was feeling very good and then because she was just too \"down with low energy\" to go see them.  Has been off of her bipolar meds for \"months\"  Endorses decreased appetite, describes she has lost a lot of weight since the end of last year. Describes weight was around 180 lbs and is now down to 116 lbs.   Insomnia  Describes it is very difficult for her to shut her mind down, she hasn't been able to get a restful sleep in 2 weeks-1 month atleast    Alcohol abuse   Acute pancreatitis- Recurrent episodes   Was previously clean since January this year when she relapsed again in may. Describes she always relapses when she goes into her depressive phase. Initially starts as a couple of shorts of vodka/day but quickly progresses to a bottle of vodka daily that almost always leads to an episode of pancreatitis     Seizure disorder  Tonic clonic seizure  Chronic per patient. Was previously f/u with neurology but hasn't seen them in a year or so. Her meds were recently refilled at her hosp discharge       ROS:       General: No fevers, chills, night sweats, weight loss or gain  HEENT: " No hearing changes, vision changes, eye pain, ear pain, nasal discharge, sore throat  Neck: No swelling in neck  Pulmonary: No shortness of breath, cough, sputum, or hemoptysis  Cardiovascular: No chest pain, palpitations, or LE swelling  GI: No nausea, vomiting, diarrhea, constipation, abdominal pain, hematochezia or melena. Endorses decreased appetite  : No dysuria or frequency  Neuro: No focal weakness, no general weakness  Psych: +Depression +Insomnia     Past Medical History:   Diagnosis Date   • Alcohol dependence (HCC) 4/13/2017   • Bronchitis 8/2012   • Cold     sept 2018   • Heart burn    • Hernia of unspecified site of abdominal cavity without mention of obstruction or gangrene    • Indigestion    • Pancreatitis    • Pneumonia 2011       Past Surgical History:   Procedure Laterality Date   • ORIF, WRIST Right 4/24/2019    Procedure: ORIF, WRIST;  Surgeon: Marquis Bell M.D.;  Location: SURGERY [facility];  Service: Orthopedics   • HYSTERECTOMY ROBOTIC XI  10/11/2018    Procedure: HYSTERECTOMY ROBOTIC XI- RIGHT URETEROLYSIS;  Surgeon: Marquis Reeder M.D.;  Location: SURGERY [facility];  Service: Gynecology   • SALPINGECTOMY Bilateral 10/11/2018    Procedure: SALPINGECTOMY;  Surgeon: Marquis Reeder M.D.;  Location: SURGERY [facility];  Service: Gynecology   • TONSILLECTOMY  4/11/2013    Performed by Rock Rothman M.D. at SURGERY SAME DAY [facility]   • ARTHROSCOPY, KNEE     • GYN SURGERY      miscarriage May 2006   • OTHER ORTHOPEDIC SURGERY      knee surgeries       Family History   Problem Relation Age of Onset   • Psychiatric Illness Mother    • Stroke Mother    • Arthritis Mother    • Heart Disease Maternal Grandfather    • Cancer Neg Hx    • Diabetes Neg Hx    • Hypertension Neg Hx        Social History     Tobacco Use   • Smoking status: Former Smoker     Packs/day: 0.75     Years: 10.00     Pack years: 7.50     Types: Cigarettes   • Smokeless tobacco: Never Used   Vaping Use   •  "Vaping Use: Former   Substance Use Topics   • Alcohol use: Not Currently     Comment: recreationally   • Drug use: Yes     Types: Marijuana     Comment: edibles       Current Outpatient Medications   Medication Sig Dispense Refill   • levetiracetam (KEPPRA) 1000 MG tablet Take 1 Tablet by mouth 2 times a day. Indications: Seizure 60 Tablet 1   • QUEtiapine (SEROQUEL) 50 MG tablet Take 1-3 Tablets by mouth every evening. 1 tablet every night for 3 days then increase to 2 tablets every night for 3 days then increase to 3 tablets every night 90 Tablet 1   • zonisamide (ZONEGRAN) 50 MG capsule Take 1 Capsule by mouth every day. 90 Capsule 1     No current facility-administered medications for this visit.       Physical Exam:  /69 (BP Location: Right arm, Patient Position: Sitting, BP Cuff Size: Adult)   Pulse 92   Temp 36.1 °C (97 °F) (Temporal)   Ht 1.702 m (5' 7\")   Wt 52.7 kg (116 lb 3.2 oz)   LMP 10/30/2018   SpO2 98%   BMI 18.20 kg/m²   General: Well developed, well nourished female, in no distress.  HEENT: NC/AT, PERRL, EOMI, no scleral icterus or conjunctival pallor, fair dentition/denture in, no nasal discharge or oral erythema or exudates.   Neck: Supple, No cervical or supraclavicular LAD  CV:RRR, no murmurs gallops or Rubs, no JVD  Pulm: LCAB, no crackles, rales, rhonchi, or wheezing  GI: Normal bowel sounds, abdomen soft, nontender, nondistended to deep or light palpation in all 4 quadrants, no HSM.  MSK: Radial and dorsalis pedis pulses 2+ and equal bilaterally, respectively.  Strength 5 out of 5 in upper and lower extremities.  No lower extremity edema  Neuro: Patient is alert and oriented x3, no focal deficits  Psych: Depressed and tearful, No SI, or HI       Assessment and Plan:     Seizures (HCC)  -Cont keppra and zonisamide  -F/U with neurology   - levetiracetam (KEPPRA) 1000 MG tablet; Take 1 Tablet by mouth 2 times a day. Indications: Seizure  Dispense: 60 Tablet; Refill: 1  - CBC " WITHOUT DIFFERENTIAL; Future      Bipolar type II  Bipolar depression (HCC)  Insomnia  -Start seroquel  50 mg initially to be gradually increased to 150 mg Qday in a week.   -Early f/u in 2 weeks to assess response and adjust dose accordingly   -Repeat thyroid labs to r/o thyroid problems as a cause of her symptoms     - TSH WITH REFLEX TO FT4; Future  - Referral to Psychiatry    3. Neutropenia, unspecified type (HCC)  As evident on labs from inpatient stay. Denies any fevers/chills, however does endorse decreased appetite and several lbs unintentional weight loss   Plan to repeat labs including HIV panel and f/u in 2 weeks   - CBC WITHOUT DIFFERENTIAL; Future  - TSH WITH REFLEX TO FT4; Future  - HIV AG/AB COMBO ASSAY SCREENING; Future      Return in about 2 weeks (around 7/22/2022).    Patient Instructions   Thank you for visiting today!    Please follow-up in 2 weeks     Please follow-up on referrals and schedule an appointment with psychiatry     Please get lab work done at least 5 days before next visit.    A new medicine called seroquel has been started, please start by taking 1 tablet every night, after 3 nights increase to 2 tablets and then after 3 nights increase the dose to 3 tablets every night to help with the depression symptoms as well as sleep     Please try and eat healthy, get at least 30 minutes of cardiovascular exercise a day to help keep your health as best as it can be.    If you have any questions or concerns please feel free to contact us at [phone].    If you feel like you are experiencing a medical emergency please seek immediate medical attention at an urgent care or in the emergency department.        Signed by: Ivy Adams M.D.

## 2022-07-13 ENCOUNTER — TELEPHONE (OUTPATIENT)
Dept: INTERNAL MEDICINE | Facility: OTHER | Age: 37
End: 2022-07-13
Payer: COMMERCIAL

## 2022-07-13 ENCOUNTER — APPOINTMENT (OUTPATIENT)
Dept: RADIOLOGY | Facility: MEDICAL CENTER | Age: 37
End: 2022-07-13
Attending: EMERGENCY MEDICINE
Payer: COMMERCIAL

## 2022-07-13 ENCOUNTER — HOSPITAL ENCOUNTER (EMERGENCY)
Facility: MEDICAL CENTER | Age: 37
End: 2022-07-13
Attending: EMERGENCY MEDICINE
Payer: COMMERCIAL

## 2022-07-13 VITALS
SYSTOLIC BLOOD PRESSURE: 122 MMHG | BODY MASS INDEX: 18.05 KG/M2 | HEART RATE: 90 BPM | HEIGHT: 67 IN | WEIGHT: 115 LBS | OXYGEN SATURATION: 96 % | TEMPERATURE: 98.5 F | DIASTOLIC BLOOD PRESSURE: 83 MMHG | RESPIRATION RATE: 15 BRPM

## 2022-07-13 DIAGNOSIS — R10.13 EPIGASTRIC PAIN: ICD-10-CM

## 2022-07-13 DIAGNOSIS — R46.89 SPELL OF ABNORMAL BEHAVIOR: ICD-10-CM

## 2022-07-13 DIAGNOSIS — F10.10 ALCOHOL ABUSE: ICD-10-CM

## 2022-07-13 LAB
ALBUMIN SERPL BCP-MCNC: 4.4 G/DL (ref 3.2–4.9)
ALBUMIN/GLOB SERPL: 1.7 G/DL
ALP SERPL-CCNC: 106 U/L (ref 30–99)
ALT SERPL-CCNC: 70 U/L (ref 2–50)
ANION GAP SERPL CALC-SCNC: 11 MMOL/L (ref 7–16)
APPEARANCE UR: CLEAR
AST SERPL-CCNC: 34 U/L (ref 12–45)
BASOPHILS # BLD AUTO: 1.2 % (ref 0–1.8)
BASOPHILS # BLD: 0.06 K/UL (ref 0–0.12)
BILIRUB SERPL-MCNC: 0.5 MG/DL (ref 0.1–1.5)
BILIRUB UR QL STRIP.AUTO: NEGATIVE
BUN SERPL-MCNC: 6 MG/DL (ref 8–22)
CALCIUM SERPL-MCNC: 9.1 MG/DL (ref 8.5–10.5)
CHLORIDE SERPL-SCNC: 109 MMOL/L (ref 96–112)
CO2 SERPL-SCNC: 25 MMOL/L (ref 20–33)
COLOR UR: YELLOW
CREAT SERPL-MCNC: 0.62 MG/DL (ref 0.5–1.4)
EOSINOPHIL # BLD AUTO: 0.07 K/UL (ref 0–0.51)
EOSINOPHIL NFR BLD: 1.4 % (ref 0–6.9)
ERYTHROCYTE [DISTWIDTH] IN BLOOD BY AUTOMATED COUNT: 51.7 FL (ref 35.9–50)
ETHANOL BLD-MCNC: 192.1 MG/DL
GFR SERPLBLD CREATININE-BSD FMLA CKD-EPI: 119 ML/MIN/1.73 M 2
GLOBULIN SER CALC-MCNC: 2.6 G/DL (ref 1.9–3.5)
GLUCOSE SERPL-MCNC: 107 MG/DL (ref 65–99)
GLUCOSE UR STRIP.AUTO-MCNC: NEGATIVE MG/DL
HCT VFR BLD AUTO: 41.6 % (ref 37–47)
HGB BLD-MCNC: 14.2 G/DL (ref 12–16)
IMM GRANULOCYTES # BLD AUTO: 0.01 K/UL (ref 0–0.11)
IMM GRANULOCYTES NFR BLD AUTO: 0.2 % (ref 0–0.9)
KETONES UR STRIP.AUTO-MCNC: NEGATIVE MG/DL
LEUKOCYTE ESTERASE UR QL STRIP.AUTO: NEGATIVE
LIPASE SERPL-CCNC: 16 U/L (ref 11–82)
LYMPHOCYTES # BLD AUTO: 2.37 K/UL (ref 1–4.8)
LYMPHOCYTES NFR BLD: 46.4 % (ref 22–41)
MCH RBC QN AUTO: 32.5 PG (ref 27–33)
MCHC RBC AUTO-ENTMCNC: 34.1 G/DL (ref 33.6–35)
MCV RBC AUTO: 95.2 FL (ref 81.4–97.8)
MICRO URNS: NORMAL
MONOCYTES # BLD AUTO: 0.51 K/UL (ref 0–0.85)
MONOCYTES NFR BLD AUTO: 10 % (ref 0–13.4)
NEUTROPHILS # BLD AUTO: 2.09 K/UL (ref 2–7.15)
NEUTROPHILS NFR BLD: 40.8 % (ref 44–72)
NITRITE UR QL STRIP.AUTO: NEGATIVE
NRBC # BLD AUTO: 0 K/UL
NRBC BLD-RTO: 0 /100 WBC
PH UR STRIP.AUTO: 7.5 [PH] (ref 5–8)
PLATELET # BLD AUTO: 384 K/UL (ref 164–446)
PMV BLD AUTO: 9.2 FL (ref 9–12.9)
POTASSIUM SERPL-SCNC: 3.6 MMOL/L (ref 3.6–5.5)
PROT SERPL-MCNC: 7 G/DL (ref 6–8.2)
PROT UR QL STRIP: NEGATIVE MG/DL
RBC # BLD AUTO: 4.37 M/UL (ref 4.2–5.4)
RBC UR QL AUTO: NEGATIVE
SODIUM SERPL-SCNC: 145 MMOL/L (ref 135–145)
SP GR UR STRIP.AUTO: 1.01
UROBILINOGEN UR STRIP.AUTO-MCNC: 1 MG/DL
WBC # BLD AUTO: 5.1 K/UL (ref 4.8–10.8)

## 2022-07-13 PROCEDURE — 700102 HCHG RX REV CODE 250 W/ 637 OVERRIDE(OP): Performed by: EMERGENCY MEDICINE

## 2022-07-13 PROCEDURE — 82077 ASSAY SPEC XCP UR&BREATH IA: CPT

## 2022-07-13 PROCEDURE — 700105 HCHG RX REV CODE 258: Performed by: EMERGENCY MEDICINE

## 2022-07-13 PROCEDURE — 700111 HCHG RX REV CODE 636 W/ 250 OVERRIDE (IP): Mod: JW | Performed by: EMERGENCY MEDICINE

## 2022-07-13 PROCEDURE — 36415 COLL VENOUS BLD VENIPUNCTURE: CPT

## 2022-07-13 PROCEDURE — 81003 URINALYSIS AUTO W/O SCOPE: CPT

## 2022-07-13 PROCEDURE — 80053 COMPREHEN METABOLIC PANEL: CPT

## 2022-07-13 PROCEDURE — 99285 EMERGENCY DEPT VISIT HI MDM: CPT

## 2022-07-13 PROCEDURE — 85025 COMPLETE CBC W/AUTO DIFF WBC: CPT

## 2022-07-13 PROCEDURE — 83690 ASSAY OF LIPASE: CPT

## 2022-07-13 PROCEDURE — A9270 NON-COVERED ITEM OR SERVICE: HCPCS | Performed by: EMERGENCY MEDICINE

## 2022-07-13 PROCEDURE — 96374 THER/PROPH/DIAG INJ IV PUSH: CPT

## 2022-07-13 PROCEDURE — 76705 ECHO EXAM OF ABDOMEN: CPT

## 2022-07-13 PROCEDURE — 96375 TX/PRO/DX INJ NEW DRUG ADDON: CPT

## 2022-07-13 RX ORDER — KETOROLAC TROMETHAMINE 30 MG/ML
15 INJECTION, SOLUTION INTRAMUSCULAR; INTRAVENOUS ONCE
Status: COMPLETED | OUTPATIENT
Start: 2022-07-13 | End: 2022-07-13

## 2022-07-13 RX ORDER — ALUMINA, MAGNESIA, AND SIMETHICONE 2400; 2400; 240 MG/30ML; MG/30ML; MG/30ML
20 SUSPENSION ORAL ONCE
Status: COMPLETED | OUTPATIENT
Start: 2022-07-13 | End: 2022-07-13

## 2022-07-13 RX ORDER — HALOPERIDOL 5 MG/ML
2 INJECTION INTRAMUSCULAR ONCE
Status: COMPLETED | OUTPATIENT
Start: 2022-07-13 | End: 2022-07-13

## 2022-07-13 RX ORDER — ONDANSETRON 4 MG/1
4 TABLET, ORALLY DISINTEGRATING ORAL EVERY 8 HOURS PRN
Qty: 10 TABLET | Refills: 0 | Status: SHIPPED | OUTPATIENT
Start: 2022-07-13 | End: 2022-07-18 | Stop reason: SDUPTHER

## 2022-07-13 RX ORDER — FAMOTIDINE 20 MG/1
20 TABLET, FILM COATED ORAL ONCE
Status: COMPLETED | OUTPATIENT
Start: 2022-07-13 | End: 2022-07-13

## 2022-07-13 RX ORDER — HYDROCODONE BITARTRATE AND ACETAMINOPHEN 5; 325 MG/1; MG/1
1 TABLET ORAL ONCE
Status: COMPLETED | OUTPATIENT
Start: 2022-07-13 | End: 2022-07-13

## 2022-07-13 RX ORDER — SODIUM CHLORIDE 9 MG/ML
1000 INJECTION, SOLUTION INTRAVENOUS ONCE
Status: COMPLETED | OUTPATIENT
Start: 2022-07-13 | End: 2022-07-13

## 2022-07-13 RX ORDER — ACETAMINOPHEN 325 MG/1
650 TABLET ORAL ONCE
Status: COMPLETED | OUTPATIENT
Start: 2022-07-13 | End: 2022-07-13

## 2022-07-13 RX ADMIN — ACETAMINOPHEN 650 MG: 325 TABLET, FILM COATED ORAL at 04:26

## 2022-07-13 RX ADMIN — KETOROLAC TROMETHAMINE 15 MG: 30 INJECTION, SOLUTION INTRAMUSCULAR; INTRAVENOUS at 03:38

## 2022-07-13 RX ADMIN — ALUMINUM HYDROXIDE, MAGNESIUM HYDROXIDE, AND DIMETHICONE 20 ML: 400; 400; 40 SUSPENSION ORAL at 04:26

## 2022-07-13 RX ADMIN — HYDROCODONE BITARTRATE AND ACETAMINOPHEN 1 TABLET: 5; 325 TABLET ORAL at 04:26

## 2022-07-13 RX ADMIN — HALOPERIDOL LACTATE 2 MG: 5 INJECTION, SOLUTION INTRAMUSCULAR at 03:38

## 2022-07-13 RX ADMIN — SODIUM CHLORIDE 1000 ML: 9 INJECTION, SOLUTION INTRAVENOUS at 03:20

## 2022-07-13 RX ADMIN — FAMOTIDINE 20 MG: 20 TABLET, FILM COATED ORAL at 04:26

## 2022-07-13 ASSESSMENT — FIBROSIS 4 INDEX: FIB4 SCORE: 5.73

## 2022-07-13 ASSESSMENT — PAIN DESCRIPTION - PAIN TYPE: TYPE: ACUTE PAIN

## 2022-07-13 NOTE — ED TRIAGE NOTES
"Chief Complaint   Patient presents with   • Headache     Hx epilepsy, bipolar, pt is followed by Dr. Rober Ashley and has an appointment 7/13. Pt reports \"zoning out and erratic behavior\"     • Abdominal Pain     Hx of pancreatitis, pt admits to drinking 5 shots tonight. +n/v 8/10 pain     Pt ambulatory to triage for above complaint. Pt states she is having erratic behavior. Pt has scratches on right arm that she said she doesn't even remember doing. Denies SI/HI. Pt pupils are pinpoint in triage. Denies drug use.  Pt states she thinks had a seizure 2 days ago. Pt is a&ox4, answering questions appropriately. Protocols ordered.     /85   Pulse (!) 118   Temp 36.2 °C (97.2 °F) (Temporal)   Resp 18   Ht 1.702 m (5' 7\")   Wt 52.2 kg (115 lb)   LMP 10/30/2018   SpO2 99% Comment: Room air  BMI 18.01 kg/m²     "

## 2022-07-13 NOTE — ED NOTES
"Pt describes headache that is \"like white hot fire burning my head\". Pt also reports photophobia and states \"its not that my eyes are bothered by the light, my eyes can hear and its like the light is loud.\"   "

## 2022-07-13 NOTE — ED NOTES
Urine collected, labelled with appropriate pt identifiers and sent to lab. Pt medicated for pain per MAR.

## 2022-07-13 NOTE — TELEPHONE ENCOUNTER
Patient visited the ED today due to complaints of abdominal pain and increased frequency of seizures was found to have an elevated level of alcohol      -Patient describes she is not feeling great, she just started drinking alcohol again, however is working towards quitting    -Encouraged her to go to neurology appointment.  Patient describes her appointment is at 1 PM today and she will make it to the appointment    -Reminded patient for the follow-up appointment at our clinic 7/22, describes she will attend this appt as well.     -Encouraged her to call us or text us anytime with any questions or concerns

## 2022-07-13 NOTE — DISCHARGE INSTRUCTIONS
You were seen in the emergency department for abdominal pain. Your labs and physical exam were reassuring. Your imaging was also reassuring.  At this time, your pain does not appear to be dangerous.     Please follow-up with your neurologist.    Your alcohol level was markedly elevated in the emergency department.  This is likely contributing to your symptoms as it does cause irritation of the stomach and can cause inflammation of the pancreas.  We strongly recommend seeking treatment for your alcohol use.    Please follow up with your regular doctor.    Please seek medical care if your symptoms do not improve in 24 hours, if you develop worsening pain, ongoing vomiting, tarry or bloody stools, or for any other new or concerning findings.

## 2022-07-13 NOTE — ED PROVIDER NOTES
"ED Provider Note    Scribed for Lex Resendez M.D. by Ruddy Schulte. 7/13/2022,  2:47 AM.    Means of Arrival: Walk-In  History obtained from: Patient  History limited by: None noted    CHIEF COMPLAINT  Chief Complaint   Patient presents with   • Headache     Hx epilepsy, bipolar, pt is followed by Dr. Rober Ashley and has an appointment 7/13. Pt reports \"zoning out and erratic behavior\"     • Abdominal Pain     Hx of pancreatitis, pt admits to drinking 5 shots tonight. +n/v 8/10 pain       HPI  Rhiannon Marrero is a 37 y.o. female with a history of epilepsy, interstitial cystitis, bipolar disorder, and pancreatitis who presents to the Emergency Department for evaluation of 8/10 burning abdominal pain onset tonight. The patient states she also has nausea, vomiting, diarrhea, increasing frequency of seizures, bilateral hand and foot tingling, memory loss, and erratic behavior, but denies any fever, bloody urine, blood in vomit, new cough, suicidal ideation, or homicidal ideation. She describes drinking 5 shots of alcohol tonight prior to arrival. She is followed by Dr. Rober Ashley (Neurology) and has an appointment in the morning. She states that \"my brain is white hot and my eyes can hear\" over the last few days. She notes that she regularly takes anti-epileptic medications, but has been noting an increase in frequency of seizures over the past few weeks. Her most recent seizure was 2 days ago. She states that others have described her \"doing things I am not really doing, like my person is not a person\" including loss of impulse control. Per nursing, she has scratches on her right arm that she does not remember doing, and has pinpoint pupils. She notes that she started drinking again a couple days ago, but is unsure why she began doing so other than attempting to relieve the \"white hot\" sensation. Her daily medications include Seroquel, Zonisamide, and Keppra. She is unsure of the date of her last tetanus shot. She " is allergic to Phenergan. She denies any recreational drug use.    REVIEW OF SYSTEMS  CONSTITUTIONAL:  No fever.  CARDIOVASCULAR:  No chest discomfort.  RESPIRATORY:  No pleuritic chest pain or new cough.  GASTROINTESTINAL:  Burning abdominal pain. Nausea, Vomiting, Diarrhea. No blood in vomit.  GENITOURINARY:   No dysuria. No blood in urine.  NEUROLOGIC:   No headache. Increased seizure frequency. Bilateral hand and foot tingling. Memory Loss. Erratic Behavior.  PSYCHIATRIC: No suicidal ideation or homicidal ideation.  See HPI for further details.   All other systems are negative.     PAST MEDICAL HISTORY  Past Medical History:   Diagnosis Date   • Alcohol dependence (HCC) 4/13/2017   • Bronchitis 8/2012   • Cold     sept 2018   • Heart burn    • Hernia of unspecified site of abdominal cavity without mention of obstruction or gangrene    • Indigestion    • Pancreatitis    • Pneumonia 2011       FAMILY HISTORY  Family History   Problem Relation Age of Onset   • Psychiatric Illness Mother    • Stroke Mother    • Arthritis Mother    • Heart Disease Maternal Grandfather    • Cancer Neg Hx    • Diabetes Neg Hx    • Hypertension Neg Hx        SOCIAL HISTORY   reports that she has quit smoking. Her smoking use included cigarettes. She has a 7.50 pack-year smoking history. She has never used smokeless tobacco. She reports previous alcohol use. She reports current drug use. Drug: Marijuana.    SURGICAL HISTORY  Past Surgical History:   Procedure Laterality Date   • ORIF, WRIST Right 4/24/2019    Procedure: ORIF, WRIST;  Surgeon: Marquis Bell M.D.;  Location: [facility];  Service: Orthopedics   • HYSTERECTOMY ROBOTIC XI  10/11/2018    Procedure: HYSTERECTOMY ROBOTIC XI- RIGHT URETEROLYSIS;  Surgeon: Marquis Reeder M.D.;  Location: [facility];  Service: Gynecology   • SALPINGECTOMY Bilateral 10/11/2018    Procedure: SALPINGECTOMY;  Surgeon: Marquis Reeder M.D.;  Location: [facility];   "Service: Gynecology   • TONSILLECTOMY  4/11/2013    Performed by Rock Rothman M.D. at SURGERY SAME DAY [facility]   • ARTHROSCOPY, KNEE     • GYN SURGERY      miscarriage May 2006   • OTHER ORTHOPEDIC SURGERY      knee surgeries       CURRENT MEDICATIONS  Home Medications     Reviewed by Wendi Khanna R.N. (Registered Nurse) on 07/13/22 at 0224  Med List Status: Not Addressed   Medication Last Dose Status   levetiracetam (KEPPRA) 1000 MG tablet  Active   QUEtiapine (SEROQUEL) 50 MG tablet  Active   zonisamide (ZONEGRAN) 50 MG capsule  Active                ALLERGIES  Allergies   Allergen Reactions   • Promethazine Hcl Anxiety       PHYSICAL EXAM  VITAL SIGNS: /85   Pulse (!) 118   Temp 36.2 °C (97.2 °F) (Temporal)   Resp 18   Ht 1.702 m (5' 7\")   Wt 52.2 kg (115 lb)   LMP 10/30/2018   SpO2 99% Comment: Room air  BMI 18.01 kg/m²        Gen: Alert, no acute distress  HEENT: ATNC  Eyes: PERRL, EOMI, normal conjunctiva.   Neck: trachea midline  Resp: no respiratory distress, clear to auscultation bilaterally  CV: No JVD, regular rate and rhythm, no murmurs, rubs, gallops  Abd: non-distended. Mild diffuse abdominal tenderness worse in the epigastric region. No rebound or guarding.  Ext: No deformities. No pedal edema. Superficial scratches to the right forearm with no active bleeding.  Psych: normal mood  Neuro: speech fluent. Cranial Nerves II - XII intact. Moves all extremities.    DIAGNOSTIC STUDIES / PROCEDURES     LABS  Labs Reviewed   CBC WITH DIFFERENTIAL - Abnormal; Notable for the following components:       Result Value    RDW 51.7 (*)     Neutrophils-Polys 40.80 (*)     Lymphocytes 46.40 (*)     All other components within normal limits   COMP METABOLIC PANEL - Abnormal; Notable for the following components:    Glucose 107 (*)     Bun 6 (*)     ALT(SGPT) 70 (*)     Alkaline Phosphatase 106 (*)     All other components within normal limits   DIAGNOSTIC ALCOHOL - Abnormal; Notable for the " following components:    Diagnostic Alcohol 192.1 (*)     All other components within normal limits   LIPASE   URINALYSIS   ESTIMATED GFR     All labs reviewed by me.    RADIOLOGY  US-RUQ   Final Result      1.  Hepatic steatosis and hepatomegaly.   2.  Dilated main portal vein, suggestive of portal hypertension.        The radiologist’s interpretation of all radiology studies have been reviewed by me.    COURSE & MEDICAL DECISION MAKING  Pertinent Labs & Imaging studies reviewed. (See chart for details)    2:47 AM Patient seen and examined at bedside. Ordered for labs and imaging to evaluate. Patient will be treated with NS infusion 1000 mL for her symptoms. Encouraged the patient to follow through with her Neurology appointment in the morning, and explained how her erratic behavior is not typically consistent with seizures. The patient was given an opportunity to ask questions. Patient verbalizes understanding and agreement to this plan of care.    HYDRATION: Based on the patient's presentation of Acute Vomiting and Dehydration the patient was given IV fluids. IV Hydration was used because oral hydration was not adequate alone. Upon recheck following hydration, the patient was improved.    Medical Decision Making:  Records review indicates the patient was recently admitting following alcohol withdrawal and acute pancreatitis. She was supposed to restart her medications 5 days ago.    Patient presents with episodes of abnormal behaviors that she is describing as seizures.  Based on the description of throwing a key that she does not remember, this does not fit with true seizure activity.  Unclear etiology for these episodes, however unlikely primary neurologic etiology.  The patient demonstrates no focal neurologic deficits.  She does report abdominal pain and tenderness, however her lipase is now normalized from her recent pancreatitis, no LFT abnormalities.  Her labs are reassuring.  Ultrasound evaluated for  possible gallstone etiology.  Patient reported no relief with haloperidol, Toradol.  Maalox, Pepcid, and additional pain medications provided.  Regarding the patient's head symptoms    Patient does have an elevated alcohol level, which is likely contributing to her symptoms.    At this time, no evidence for acute medical process.  She does have follow-up later today with her neurologist and is encouraged to make that appointment.    Patient requested discharge prior to results of the ultrasound.  We discussed that there is possibly diagnoses that may result from this and I will contact her if there are any emergency findings, otherwise patient should follow-up with her primary care physician.    I was able to verbally notified of the patient's incidental findings on the ultrasound advised her to follow-up with her primary care physician.    The patient was given return precautions, anticipatory guidance, and the opportunity to ask questions prior to discharge.       DISPOSITION:  Patient will be discharged home in stable condition.    FOLLOW UP:  Ivy Adams M.D.  [address] [address]  [phone]    Schedule an appointment as soon as possible for a visit       [facility], Emergency Dept  [address] [address]  [phone]    If symptoms worsen      OUTPATIENT MEDICATIONS:  New Prescriptions    ONDANSETRON (ZOFRAN ODT) 4 MG TABLET DISPERSIBLE    Take 1 Tablet by mouth every 8 hours as needed for Nausea.       FINAL IMPRESSION  1. Epigastric pain    2. Spell of abnormal behavior    3. Alcohol abuse            Ruddy RASHEED (Aman), am scribing for, and in the presence of, Lex Resendez M.D..    Electronically signed by: Ruddy Schulte (Aman), 7/13/2022    Lex RASHEED M.D. personally performed the services described in this documentation, as scribed by Ruddy Schulte in my presence, and it is both accurate and complete. C.    The note accurately  reflects work and decisions made by me.  Lex Resendez M.D.  7/13/2022  4:26 AM      This dictation was created using voice recognition software. The accuracy of the dictation is limited to the abilities of the software. I expect there may be some errors of grammar and possibly content. The nursing notes were reviewed and certain aspects of this information were incorporated into this note.

## 2022-07-13 NOTE — ED NOTES
Pt stated she wants to leave. RN educated pt that not all test results were back yet and advised pt top wait for ERP to confirm discharge is safe at this time. Pt verbalized understanding and declined to stay. Dr. Resendez aware. Provided pt DC teaching and paperwork, pt verbalized understanding of teaching. Pt ambulatory out of ED with steady gait with all personal belongings.

## 2022-07-13 NOTE — ED NOTES
Pt from the lobby to red 5 via wheelchair. Pt changed into gown and placed on continuous cardiac, BP and O2 monitors. Initial assessment complete. ERP to see.

## 2022-07-18 ENCOUNTER — HOSPITAL ENCOUNTER (EMERGENCY)
Facility: MEDICAL CENTER | Age: 37
End: 2022-07-18
Attending: EMERGENCY MEDICINE
Payer: COMMERCIAL

## 2022-07-18 VITALS
DIASTOLIC BLOOD PRESSURE: 70 MMHG | RESPIRATION RATE: 16 BRPM | HEIGHT: 67 IN | OXYGEN SATURATION: 96 % | TEMPERATURE: 98 F | HEART RATE: 100 BPM | WEIGHT: 115 LBS | SYSTOLIC BLOOD PRESSURE: 110 MMHG | BODY MASS INDEX: 18.05 KG/M2

## 2022-07-18 DIAGNOSIS — R11.2 NON-INTRACTABLE VOMITING WITH NAUSEA, UNSPECIFIED VOMITING TYPE: ICD-10-CM

## 2022-07-18 DIAGNOSIS — R10.13 EPIGASTRIC PAIN: ICD-10-CM

## 2022-07-18 DIAGNOSIS — F10.929 ALCOHOLIC INTOXICATION WITH COMPLICATION (HCC): ICD-10-CM

## 2022-07-18 LAB
ALBUMIN SERPL BCP-MCNC: 4.6 G/DL (ref 3.2–4.9)
ALBUMIN/GLOB SERPL: 1.6 G/DL
ALP SERPL-CCNC: 129 U/L (ref 30–99)
ALT SERPL-CCNC: 39 U/L (ref 2–50)
ANION GAP SERPL CALC-SCNC: 21 MMOL/L (ref 7–16)
AST SERPL-CCNC: 40 U/L (ref 12–45)
BASOPHILS # BLD AUTO: 0.9 % (ref 0–1.8)
BASOPHILS # BLD: 0.07 K/UL (ref 0–0.12)
BILIRUB SERPL-MCNC: 0.5 MG/DL (ref 0.1–1.5)
BUN SERPL-MCNC: 10 MG/DL (ref 8–22)
CALCIUM SERPL-MCNC: 8.8 MG/DL (ref 8.5–10.5)
CHLORIDE SERPL-SCNC: 106 MMOL/L (ref 96–112)
CO2 SERPL-SCNC: 17 MMOL/L (ref 20–33)
CREAT SERPL-MCNC: 0.69 MG/DL (ref 0.5–1.4)
EOSINOPHIL # BLD AUTO: 0.02 K/UL (ref 0–0.51)
EOSINOPHIL NFR BLD: 0.3 % (ref 0–6.9)
ERYTHROCYTE [DISTWIDTH] IN BLOOD BY AUTOMATED COUNT: 49.7 FL (ref 35.9–50)
ETHANOL BLD-MCNC: 234.3 MG/DL
GFR SERPLBLD CREATININE-BSD FMLA CKD-EPI: 116 ML/MIN/1.73 M 2
GLOBULIN SER CALC-MCNC: 2.8 G/DL (ref 1.9–3.5)
GLUCOSE SERPL-MCNC: 87 MG/DL (ref 65–99)
HCT VFR BLD AUTO: 46.4 % (ref 37–47)
HGB BLD-MCNC: 16 G/DL (ref 12–16)
IMM GRANULOCYTES # BLD AUTO: 0.02 K/UL (ref 0–0.11)
IMM GRANULOCYTES NFR BLD AUTO: 0.3 % (ref 0–0.9)
LIPASE SERPL-CCNC: 22 U/L (ref 11–82)
LYMPHOCYTES # BLD AUTO: 1.52 K/UL (ref 1–4.8)
LYMPHOCYTES NFR BLD: 20 % (ref 22–41)
MCH RBC QN AUTO: 32.6 PG (ref 27–33)
MCHC RBC AUTO-ENTMCNC: 34.5 G/DL (ref 33.6–35)
MCV RBC AUTO: 94.5 FL (ref 81.4–97.8)
MONOCYTES # BLD AUTO: 0.22 K/UL (ref 0–0.85)
MONOCYTES NFR BLD AUTO: 2.9 % (ref 0–13.4)
NEUTROPHILS # BLD AUTO: 5.74 K/UL (ref 2–7.15)
NEUTROPHILS NFR BLD: 75.6 % (ref 44–72)
NRBC # BLD AUTO: 0 K/UL
NRBC BLD-RTO: 0 /100 WBC
PLATELET # BLD AUTO: 455 K/UL (ref 164–446)
PMV BLD AUTO: 9.4 FL (ref 9–12.9)
POTASSIUM SERPL-SCNC: 4 MMOL/L (ref 3.6–5.5)
PROT SERPL-MCNC: 7.4 G/DL (ref 6–8.2)
RBC # BLD AUTO: 4.91 M/UL (ref 4.2–5.4)
SODIUM SERPL-SCNC: 144 MMOL/L (ref 135–145)
WBC # BLD AUTO: 7.6 K/UL (ref 4.8–10.8)

## 2022-07-18 PROCEDURE — 80053 COMPREHEN METABOLIC PANEL: CPT

## 2022-07-18 PROCEDURE — 700111 HCHG RX REV CODE 636 W/ 250 OVERRIDE (IP): Performed by: EMERGENCY MEDICINE

## 2022-07-18 PROCEDURE — 700105 HCHG RX REV CODE 258: Performed by: EMERGENCY MEDICINE

## 2022-07-18 PROCEDURE — 36415 COLL VENOUS BLD VENIPUNCTURE: CPT

## 2022-07-18 PROCEDURE — 85025 COMPLETE CBC W/AUTO DIFF WBC: CPT

## 2022-07-18 PROCEDURE — A9270 NON-COVERED ITEM OR SERVICE: HCPCS | Performed by: EMERGENCY MEDICINE

## 2022-07-18 PROCEDURE — 83690 ASSAY OF LIPASE: CPT

## 2022-07-18 PROCEDURE — 96375 TX/PRO/DX INJ NEW DRUG ADDON: CPT

## 2022-07-18 PROCEDURE — 99285 EMERGENCY DEPT VISIT HI MDM: CPT

## 2022-07-18 PROCEDURE — 96374 THER/PROPH/DIAG INJ IV PUSH: CPT

## 2022-07-18 PROCEDURE — 82077 ASSAY SPEC XCP UR&BREATH IA: CPT

## 2022-07-18 PROCEDURE — 700102 HCHG RX REV CODE 250 W/ 637 OVERRIDE(OP): Performed by: EMERGENCY MEDICINE

## 2022-07-18 RX ORDER — HALOPERIDOL 5 MG/ML
2 INJECTION INTRAMUSCULAR ONCE
Status: COMPLETED | OUTPATIENT
Start: 2022-07-18 | End: 2022-07-18

## 2022-07-18 RX ORDER — SODIUM CHLORIDE, SODIUM LACTATE, POTASSIUM CHLORIDE, CALCIUM CHLORIDE 600; 310; 30; 20 MG/100ML; MG/100ML; MG/100ML; MG/100ML
1000 INJECTION, SOLUTION INTRAVENOUS ONCE
Status: COMPLETED | OUTPATIENT
Start: 2022-07-18 | End: 2022-07-18

## 2022-07-18 RX ORDER — HYDROCODONE BITARTRATE AND ACETAMINOPHEN 5; 325 MG/1; MG/1
1 TABLET ORAL ONCE
Status: COMPLETED | OUTPATIENT
Start: 2022-07-18 | End: 2022-07-18

## 2022-07-18 RX ORDER — FAMOTIDINE 20 MG/1
20 TABLET, FILM COATED ORAL ONCE
Status: COMPLETED | OUTPATIENT
Start: 2022-07-18 | End: 2022-07-18

## 2022-07-18 RX ORDER — KETOROLAC TROMETHAMINE 30 MG/ML
15 INJECTION, SOLUTION INTRAMUSCULAR; INTRAVENOUS ONCE
Status: COMPLETED | OUTPATIENT
Start: 2022-07-18 | End: 2022-07-18

## 2022-07-18 RX ORDER — ONDANSETRON 2 MG/ML
4 INJECTION INTRAMUSCULAR; INTRAVENOUS ONCE
Status: DISCONTINUED | OUTPATIENT
Start: 2022-07-18 | End: 2022-07-18

## 2022-07-18 RX ORDER — FAMOTIDINE 20 MG/1
20 TABLET, FILM COATED ORAL DAILY
Qty: 30 TABLET | Refills: 0 | Status: SHIPPED | OUTPATIENT
Start: 2022-07-18 | End: 2022-08-01

## 2022-07-18 RX ORDER — ONDANSETRON 4 MG/1
4 TABLET, ORALLY DISINTEGRATING ORAL EVERY 8 HOURS PRN
Qty: 10 TABLET | Refills: 0 | Status: SHIPPED | OUTPATIENT
Start: 2022-07-18 | End: 2022-08-01

## 2022-07-18 RX ADMIN — HYDROCODONE BITARTRATE AND ACETAMINOPHEN 1 TABLET: 5; 325 TABLET ORAL at 05:30

## 2022-07-18 RX ADMIN — HALOPERIDOL LACTATE 2 MG: 5 INJECTION, SOLUTION INTRAMUSCULAR at 04:36

## 2022-07-18 RX ADMIN — FAMOTIDINE 20 MG: 20 TABLET, FILM COATED ORAL at 04:30

## 2022-07-18 RX ADMIN — SODIUM CHLORIDE, POTASSIUM CHLORIDE, SODIUM LACTATE AND CALCIUM CHLORIDE 1000 ML: 600; 310; 30; 20 INJECTION, SOLUTION INTRAVENOUS at 04:31

## 2022-07-18 RX ADMIN — KETOROLAC TROMETHAMINE 15 MG: 30 INJECTION, SOLUTION INTRAMUSCULAR; INTRAVENOUS at 04:38

## 2022-07-18 ASSESSMENT — FIBROSIS 4 INDEX: FIB4 SCORE: 0.37

## 2022-07-18 ASSESSMENT — PAIN DESCRIPTION - PAIN TYPE: TYPE: ACUTE PAIN

## 2022-07-18 NOTE — DISCHARGE INSTRUCTIONS
You were seen in the emergency department.  At this point, your symptoms do not appear to be dangerous, however your alcohol use is likely contributing to your symptoms.  You are being prescribed medications to help reduce stomach acid secretion and prevent vomiting.    We have a bed available for you to get detox treatment for your alcohol use.  Please proceed directly to their.    Please return to the emergency department or seek medical attention if you develop:  Ongoing vomiting despite nausea medicine, black stools, vomiting blood, fevers, any other new or concerning findings

## 2022-07-18 NOTE — ED PROVIDER NOTES
ED Provider Note    Scribed for Lex Resendez M.D. by Salas Vivas. 7/18/2022,  4:18 AM.    Means of Arrival: Walk in  History obtained from: Patient  History limited by: None    CHIEF COMPLAINT  Chief Complaint   Patient presents with   • Abdominal Pain     2 days, hx of pancreatitis        HPI  Rhiannon Marrero is a 37 y.o. female who presents to the Emergency Department for evaluation of moderate abdominal pain onset 2 days ago. The patient was seen in the ED approximately 1 week ago for identical symptoms and notes that her symptoms have not improved since being discharged. Today she was prompted to visit the ED for continued abdominal pain and vomiting. Associated symptoms include vomiting, decreased PO intake, chronic dysuria, and back pain. The patient localizes her abdominal pain to the upper abdomen and reports that it has been radiating into her back. She describes her vomit as consisting entirely of bile. She denies any diarrhea, hematuria, or hematemesis. Patient has a history of interstitial cystitis and pancreatitis. No alleviating or exacerbating factors noted. She admits to alcohol consumption in order to avoid withdrawal but has been unable to tolerate anything PO.     REVIEW OF SYSTEMS  CONSTITUTIONAL:  No fever.  CARDIOVASCULAR:  No chest discomfort.  RESPIRATORY:  No pleuritic chest pain.  GASTROINTESTINAL:  Abdominal pain.  GENITOURINARY:  Chronic dysuria.  MUSCULOSKELETAL:  No arthralgia.  See HPI for further details.   All other systems are negative.     PAST MEDICAL HISTORY  Past Medical History:   Diagnosis Date   • Alcohol dependence (HCC) 4/13/2017   • Bronchitis 8/2012   • Cold     sept 2018   • Heart burn    • Hernia of unspecified site of abdominal cavity without mention of obstruction or gangrene    • Indigestion    • Pancreatitis    • Pneumonia 2011       FAMILY HISTORY  Family History   Problem Relation Age of Onset   • Psychiatric Illness Mother    • Stroke Mother    • Arthritis  "Mother    • Heart Disease Maternal Grandfather    • Cancer Neg Hx    • Diabetes Neg Hx    • Hypertension Neg Hx        SOCIAL HISTORY   reports that she has quit smoking. Her smoking use included cigarettes. She has a 7.50 pack-year smoking history. She has never used smokeless tobacco. She reports previous alcohol use. She reports current drug use. Drug: Marijuana.    SURGICAL HISTORY  Past Surgical History:   Procedure Laterality Date   • ORIF, WRIST Right 4/24/2019    Procedure: ORIF, WRIST;  Surgeon: Marquis Bell M.D.;  Location: SURGERY [facility];  Service: Orthopedics   • HYSTERECTOMY ROBOTIC XI  10/11/2018    Procedure: HYSTERECTOMY ROBOTIC XI- RIGHT URETEROLYSIS;  Surgeon: Marquis Reeder M.D.;  Location: SURGERY [facility];  Service: Gynecology   • SALPINGECTOMY Bilateral 10/11/2018    Procedure: SALPINGECTOMY;  Surgeon: Marquis Reeder M.D.;  Location: SURGERY [facility];  Service: Gynecology   • TONSILLECTOMY  4/11/2013    Performed by Rock Rothman M.D. at SURGERY SAME DAY [facility]   • ARTHROSCOPY, KNEE     • GYN SURGERY      miscarriage May 2006   • OTHER ORTHOPEDIC SURGERY      knee surgeries       CURRENT MEDICATIONS  Home Medications     Reviewed by Reji Bryant R.N. (Registered Nurse) on 07/18/22 at 0347  Med List Status: Partial   Medication Last Dose Status   levetiracetam (KEPPRA) 1000 MG tablet  Active   ondansetron (ZOFRAN ODT) 4 MG TABLET DISPERSIBLE  Active   QUEtiapine (SEROQUEL) 50 MG tablet  Active   zonisamide (ZONEGRAN) 50 MG capsule  Active                ALLERGIES  Allergies   Allergen Reactions   • Promethazine Hcl Anxiety       PHYSICAL EXAM  VITAL SIGNS: /65   Pulse 97   Temp 36.7 °C (98.1 °F) (Temporal)   Resp 20   Ht 1.702 m (5' 7\")   Wt 52.2 kg (115 lb)   LMP 10/30/2018   SpO2 93%   BMI 18.01 kg/m²    Gen: Alert, uncomfortable appearing  HEENT: ATNC  Eyes: PERRL, EOMI, normal conjunctiva.   Neck: trachea midline  Resp: no respiratory " distress  CV: No JVD  Abd: non-distended, soft, diffuse epigastric tenderness without rebound or guarding.  Ext: No deformities  Psych: normal mood  Neuro: speech fluent     DIAGNOSTIC STUDIES / PROCEDURES     LABS  Labs Reviewed   CBC WITH DIFFERENTIAL - Abnormal; Notable for the following components:       Result Value    Platelet Count 455 (*)     Neutrophils-Polys 75.60 (*)     Lymphocytes 20.00 (*)     All other components within normal limits   COMP METABOLIC PANEL - Abnormal; Notable for the following components:    Co2 17 (*)     Anion Gap 21.0 (*)     Alkaline Phosphatase 129 (*)     All other components within normal limits   DIAGNOSTIC ALCOHOL - Abnormal; Notable for the following components:    Diagnostic Alcohol 234.3 (*)     All other components within normal limits   LIPASE   ESTIMATED GFR   URINALYSIS     All labs reviewed by me.    COURSE & MEDICAL DECISION MAKING  Pertinent Labs & Imaging studies reviewed. (See chart for details)    4:18 AM Patient seen and examined at bedside. Ordered for labs to evaluate. Patient will be treated with Pepcid 20 mg, LR infusion (continuous), Toradol 15 mg, and Haldol 2 mg for her symptoms. Discussed utilizing labs to further evaluate the patient, they are amenable to the plan of care.     5:24 AM - Ordered Norco 5-325 mg 1 tablet to treat the patient.     5:36 AM - Patient was reevaluated at bedside, she reports feeling moderately improved following the administration of medication. She expressed an interest to enter a program for alcohol detoxification. Discussed lab results with the patient and informed them that they are reassuring overall.     5:48 AM - Patient was reevaluated at bedside. Discussed plan for discharge; I advised the patient to follow-up with Dr. Adams as soon as possible, and to return to the [facility] ED with any new or worsening symptoms. Patient was given the opportunity for questions. I addressed all questions or concerns at this time and they  verbalize agreement to the plan of care.     HYDRATION: Based on the patient's presentation of Acute Vomiting and Dehydration the patient was given IV fluids. IV Hydration was used because oral hydration was not adequate alone. Upon recheck following hydration, the patient was improved.      Medical Decision Making:  Review of medical records demonstrates the patient was seen by myself with a reassuring work-up approximately 5 days ago for similar symptoms.    Patient returns with ongoing report of vomiting.  She has an emesis bag with a small amount of saliva with potential scant bile in it.  She reports significant abdominal pain and is curled up at initial presentation.  She does report abdominal tenderness.  Labs again reassuring, no signs of acute pancreatitis, acute hepatitis, leukocytosis.  The patient's is able to tolerate oral intake in the emergency department.  She does have a markedly elevated alcohol level, and when discussed, she is interested in detox.  The patient was arranged to have a room for detox and will be discharged to go to alcohol detox.    The patient will return for new or worsening symptoms and is stable at the time of discharge.    The patient is referred to a primary physician for blood pressure management, diabetic screening, and for all other preventative health concerns.    DISPOSITION:  Patient will be discharged home in stable condition.    FOLLOW UP:  Ivy Adams M.D.  [address] [facility] [address] [phone]    Schedule an appointment as soon as possible for a visit       [facility], Emergency Dept  [address] [facility] [address] [address] [phone]    If symptoms worsen      OUTPATIENT MEDICATIONS:  New Prescriptions    FAMOTIDINE (PEPCID) 20 MG TAB    Take 1 Tablet by mouth every day.         FINAL IMPRESSION  1. Epigastric pain    2. Non-intractable vomiting with nausea, unspecified vomiting type    3. Alcoholic intoxication with complication  ([facility])            ISalas (Scribe), am scribing for, and in the presence of, Lex Resendez M.D..    Electronically signed by: Salas Vivas (Scribe), 7/18/2022    Lex RASHEED M.D. personally performed the services described in this documentation, as scribed by Salas Vivas in my presence, and it is both accurate and complete.    The note accurately reflects work and decisions made by me.  Lex Resendez M.D.  7/18/2022  5:54 AM      This dictation was created using voice recognition software. The accuracy of the dictation is limited to the abilities of the software. I expect there may be some errors of grammar and possibly content. The nursing notes were reviewed and certain aspects of this information were incorporated into this note.

## 2022-07-18 NOTE — DISCHARGE PLANNING
Alert Team:    Patient seeking to continue alcohol detox at [facility]; taxi voucher # [phone] provided for transport upon discharge.

## 2022-07-18 NOTE — ED TRIAGE NOTES
Rhiannon Marrero  Chief Complaint   Patient presents with   • Abdominal Pain     2 days, hx of pancreatitis      Pt BIBA for above CC, pt has had abdominal pain as well as N/V for approximately 2 days per pt. Pt has hx of pancreatitis.  Pt unable to keep any fluids or food down for last two days as well. Pt in gown, on monitor, chart up for ERP

## 2022-07-18 NOTE — ED NOTES
Pt given discharge instructions and voiced understanding  Pt ambulatory to lobby with steady gait

## 2022-07-20 ENCOUNTER — HOSPITAL ENCOUNTER (INPATIENT)
Facility: MEDICAL CENTER | Age: 37
LOS: 3 days | DRG: 439 | End: 2022-07-24
Attending: EMERGENCY MEDICINE | Admitting: INTERNAL MEDICINE
Payer: COMMERCIAL

## 2022-07-20 DIAGNOSIS — K85.90 PANCREATITIS, UNSPECIFIED PANCREATITIS TYPE: ICD-10-CM

## 2022-07-20 DIAGNOSIS — K85.20 ALCOHOL-INDUCED ACUTE PANCREATITIS, UNSPECIFIED COMPLICATION STATUS: ICD-10-CM

## 2022-07-20 DIAGNOSIS — R11.2 INTRACTABLE VOMITING WITH NAUSEA, UNSPECIFIED VOMITING TYPE: ICD-10-CM

## 2022-07-20 LAB
ALBUMIN SERPL BCP-MCNC: 4.3 G/DL (ref 3.2–4.9)
ALBUMIN/GLOB SERPL: 1.3 G/DL
ALP SERPL-CCNC: 126 U/L (ref 30–99)
ALT SERPL-CCNC: 23 U/L (ref 2–50)
ANION GAP SERPL CALC-SCNC: 15 MMOL/L (ref 7–16)
AST SERPL-CCNC: 22 U/L (ref 12–45)
BASOPHILS # BLD AUTO: 0.3 % (ref 0–1.8)
BASOPHILS # BLD: 0.02 K/UL (ref 0–0.12)
BILIRUB SERPL-MCNC: 0.8 MG/DL (ref 0.1–1.5)
BUN SERPL-MCNC: 9 MG/DL (ref 8–22)
CALCIUM SERPL-MCNC: 9.4 MG/DL (ref 8.4–10.2)
CHLORIDE SERPL-SCNC: 100 MMOL/L (ref 96–112)
CO2 SERPL-SCNC: 20 MMOL/L (ref 20–33)
CREAT SERPL-MCNC: 0.66 MG/DL (ref 0.5–1.4)
EOSINOPHIL # BLD AUTO: 0.06 K/UL (ref 0–0.51)
EOSINOPHIL NFR BLD: 0.9 % (ref 0–6.9)
ERYTHROCYTE [DISTWIDTH] IN BLOOD BY AUTOMATED COUNT: 49 FL (ref 35.9–50)
ETHANOL BLD-MCNC: <10.1 MG/DL
GFR SERPLBLD CREATININE-BSD FMLA CKD-EPI: 117 ML/MIN/1.73 M 2
GLOBULIN SER CALC-MCNC: 3.3 G/DL (ref 1.9–3.5)
GLUCOSE SERPL-MCNC: 102 MG/DL (ref 65–99)
HCT VFR BLD AUTO: 43.8 % (ref 37–47)
HGB BLD-MCNC: 15.3 G/DL (ref 12–16)
IMM GRANULOCYTES # BLD AUTO: 0.03 K/UL (ref 0–0.11)
IMM GRANULOCYTES NFR BLD AUTO: 0.4 % (ref 0–0.9)
LIPASE SERPL-CCNC: 1596 U/L (ref 7–58)
LYMPHOCYTES # BLD AUTO: 0.76 K/UL (ref 1–4.8)
LYMPHOCYTES NFR BLD: 11.3 % (ref 22–41)
MCH RBC QN AUTO: 32.9 PG (ref 27–33)
MCHC RBC AUTO-ENTMCNC: 34.9 G/DL (ref 33.6–35)
MCV RBC AUTO: 94.2 FL (ref 81.4–97.8)
MONOCYTES # BLD AUTO: 0.45 K/UL (ref 0–0.85)
MONOCYTES NFR BLD AUTO: 6.7 % (ref 0–13.4)
NEUTROPHILS # BLD AUTO: 5.39 K/UL (ref 2–7.15)
NEUTROPHILS NFR BLD: 80.4 % (ref 44–72)
NRBC # BLD AUTO: 0 K/UL
NRBC BLD-RTO: 0 /100 WBC
PLATELET # BLD AUTO: 273 K/UL (ref 164–446)
PMV BLD AUTO: 9.5 FL (ref 9–12.9)
POTASSIUM SERPL-SCNC: 4 MMOL/L (ref 3.6–5.5)
PROT SERPL-MCNC: 7.6 G/DL (ref 6–8.2)
RBC # BLD AUTO: 4.65 M/UL (ref 4.2–5.4)
SODIUM SERPL-SCNC: 135 MMOL/L (ref 135–145)
WBC # BLD AUTO: 6.7 K/UL (ref 4.8–10.8)

## 2022-07-20 PROCEDURE — 96374 THER/PROPH/DIAG INJ IV PUSH: CPT

## 2022-07-20 PROCEDURE — 700111 HCHG RX REV CODE 636 W/ 250 OVERRIDE (IP): Performed by: HOSPITALIST

## 2022-07-20 PROCEDURE — 96375 TX/PRO/DX INJ NEW DRUG ADDON: CPT

## 2022-07-20 PROCEDURE — 85025 COMPLETE CBC W/AUTO DIFF WBC: CPT

## 2022-07-20 PROCEDURE — A9270 NON-COVERED ITEM OR SERVICE: HCPCS | Performed by: HOSPITALIST

## 2022-07-20 PROCEDURE — 700101 HCHG RX REV CODE 250: Performed by: FAMILY MEDICINE

## 2022-07-20 PROCEDURE — G0378 HOSPITAL OBSERVATION PER HR: HCPCS

## 2022-07-20 PROCEDURE — A9270 NON-COVERED ITEM OR SERVICE: HCPCS | Performed by: EMERGENCY MEDICINE

## 2022-07-20 PROCEDURE — 96376 TX/PRO/DX INJ SAME DRUG ADON: CPT

## 2022-07-20 PROCEDURE — 700105 HCHG RX REV CODE 258: Performed by: EMERGENCY MEDICINE

## 2022-07-20 PROCEDURE — 93005 ELECTROCARDIOGRAM TRACING: CPT | Performed by: HOSPITALIST

## 2022-07-20 PROCEDURE — 700111 HCHG RX REV CODE 636 W/ 250 OVERRIDE (IP): Performed by: FAMILY MEDICINE

## 2022-07-20 PROCEDURE — 700105 HCHG RX REV CODE 258: Performed by: FAMILY MEDICINE

## 2022-07-20 PROCEDURE — 82077 ASSAY SPEC XCP UR&BREATH IA: CPT

## 2022-07-20 PROCEDURE — 80053 COMPREHEN METABOLIC PANEL: CPT

## 2022-07-20 PROCEDURE — 36415 COLL VENOUS BLD VENIPUNCTURE: CPT

## 2022-07-20 PROCEDURE — 700111 HCHG RX REV CODE 636 W/ 250 OVERRIDE (IP): Performed by: EMERGENCY MEDICINE

## 2022-07-20 PROCEDURE — A9270 NON-COVERED ITEM OR SERVICE: HCPCS | Performed by: FAMILY MEDICINE

## 2022-07-20 PROCEDURE — 700102 HCHG RX REV CODE 250 W/ 637 OVERRIDE(OP): Performed by: FAMILY MEDICINE

## 2022-07-20 PROCEDURE — 700102 HCHG RX REV CODE 250 W/ 637 OVERRIDE(OP): Performed by: EMERGENCY MEDICINE

## 2022-07-20 PROCEDURE — 83690 ASSAY OF LIPASE: CPT

## 2022-07-20 PROCEDURE — 99285 EMERGENCY DEPT VISIT HI MDM: CPT

## 2022-07-20 PROCEDURE — 700102 HCHG RX REV CODE 250 W/ 637 OVERRIDE(OP): Performed by: HOSPITALIST

## 2022-07-20 PROCEDURE — 96372 THER/PROPH/DIAG INJ SC/IM: CPT | Mod: XU

## 2022-07-20 PROCEDURE — 99220 PR INITIAL OBSERVATION CARE,LEVL III: CPT | Performed by: FAMILY MEDICINE

## 2022-07-20 RX ORDER — HYDROMORPHONE HYDROCHLORIDE 1 MG/ML
0.5 INJECTION, SOLUTION INTRAMUSCULAR; INTRAVENOUS; SUBCUTANEOUS ONCE
Status: COMPLETED | OUTPATIENT
Start: 2022-07-20 | End: 2022-07-20

## 2022-07-20 RX ORDER — ONDANSETRON 2 MG/ML
4 INJECTION INTRAMUSCULAR; INTRAVENOUS ONCE
Status: COMPLETED | OUTPATIENT
Start: 2022-07-20 | End: 2022-07-20

## 2022-07-20 RX ORDER — PROCHLORPERAZINE EDISYLATE 5 MG/ML
5-10 INJECTION INTRAMUSCULAR; INTRAVENOUS EVERY 4 HOURS PRN
Status: DISCONTINUED | OUTPATIENT
Start: 2022-07-20 | End: 2022-07-24 | Stop reason: HOSPADM

## 2022-07-20 RX ORDER — AMOXICILLIN 250 MG
2 CAPSULE ORAL 2 TIMES DAILY
Status: DISCONTINUED | OUTPATIENT
Start: 2022-07-20 | End: 2022-07-24 | Stop reason: HOSPADM

## 2022-07-20 RX ORDER — ONDANSETRON 2 MG/ML
4 INJECTION INTRAMUSCULAR; INTRAVENOUS EVERY 4 HOURS PRN
Status: DISCONTINUED | OUTPATIENT
Start: 2022-07-20 | End: 2022-07-24 | Stop reason: HOSPADM

## 2022-07-20 RX ORDER — LORAZEPAM 1 MG/1
3 TABLET ORAL
Status: DISCONTINUED | OUTPATIENT
Start: 2022-07-20 | End: 2022-07-24 | Stop reason: HOSPADM

## 2022-07-20 RX ORDER — LORAZEPAM 1 MG/1
4 TABLET ORAL
Status: DISCONTINUED | OUTPATIENT
Start: 2022-07-20 | End: 2022-07-24 | Stop reason: HOSPADM

## 2022-07-20 RX ORDER — TRAZODONE HYDROCHLORIDE 50 MG/1
25 TABLET ORAL NIGHTLY PRN
Status: COMPLETED | OUTPATIENT
Start: 2022-07-20 | End: 2022-07-20

## 2022-07-20 RX ORDER — LORAZEPAM 1 MG/1
2 TABLET ORAL
Status: DISCONTINUED | OUTPATIENT
Start: 2022-07-20 | End: 2022-07-24 | Stop reason: HOSPADM

## 2022-07-20 RX ORDER — ACETAMINOPHEN 500 MG
500-1000 TABLET ORAL EVERY 6 HOURS PRN
COMMUNITY
End: 2022-08-01

## 2022-07-20 RX ORDER — LORAZEPAM 0.5 MG/1
0.5 TABLET ORAL EVERY 4 HOURS PRN
Status: DISCONTINUED | OUTPATIENT
Start: 2022-07-20 | End: 2022-07-24 | Stop reason: HOSPADM

## 2022-07-20 RX ORDER — OXYCODONE HYDROCHLORIDE 5 MG/1
5 TABLET ORAL
Status: DISCONTINUED | OUTPATIENT
Start: 2022-07-20 | End: 2022-07-24 | Stop reason: HOSPADM

## 2022-07-20 RX ORDER — SODIUM CHLORIDE, SODIUM LACTATE, POTASSIUM CHLORIDE, CALCIUM CHLORIDE 600; 310; 30; 20 MG/100ML; MG/100ML; MG/100ML; MG/100ML
INJECTION, SOLUTION INTRAVENOUS CONTINUOUS
Status: ACTIVE | OUTPATIENT
Start: 2022-07-20 | End: 2022-07-21

## 2022-07-20 RX ORDER — QUETIAPINE FUMARATE 50 MG/1
150 TABLET, FILM COATED ORAL
COMMUNITY
End: 2022-08-18 | Stop reason: SDUPTHER

## 2022-07-20 RX ORDER — LORAZEPAM 2 MG/ML
2 INJECTION INTRAMUSCULAR
Status: DISCONTINUED | OUTPATIENT
Start: 2022-07-20 | End: 2022-07-24 | Stop reason: HOSPADM

## 2022-07-20 RX ORDER — ONDANSETRON 4 MG/1
4 TABLET, ORALLY DISINTEGRATING ORAL EVERY 4 HOURS PRN
Status: DISCONTINUED | OUTPATIENT
Start: 2022-07-20 | End: 2022-07-24 | Stop reason: HOSPADM

## 2022-07-20 RX ORDER — MORPHINE SULFATE 4 MG/ML
4 INJECTION INTRAVENOUS ONCE
Status: COMPLETED | OUTPATIENT
Start: 2022-07-20 | End: 2022-07-20

## 2022-07-20 RX ORDER — ZONISAMIDE 50 MG/1
300 CAPSULE ORAL
COMMUNITY
End: 2022-08-01

## 2022-07-20 RX ORDER — GAUZE BANDAGE 2" X 2"
100 BANDAGE TOPICAL DAILY
Status: COMPLETED | OUTPATIENT
Start: 2022-07-21 | End: 2022-07-24

## 2022-07-20 RX ORDER — BISACODYL 10 MG
10 SUPPOSITORY, RECTAL RECTAL
Status: DISCONTINUED | OUTPATIENT
Start: 2022-07-20 | End: 2022-07-24 | Stop reason: HOSPADM

## 2022-07-20 RX ORDER — POLYETHYLENE GLYCOL 3350 17 G/17G
1 POWDER, FOR SOLUTION ORAL
Status: DISCONTINUED | OUTPATIENT
Start: 2022-07-20 | End: 2022-07-24 | Stop reason: HOSPADM

## 2022-07-20 RX ORDER — FOLIC ACID 1 MG/1
1 TABLET ORAL DAILY
Status: COMPLETED | OUTPATIENT
Start: 2022-07-21 | End: 2022-07-24

## 2022-07-20 RX ORDER — HYDROMORPHONE HYDROCHLORIDE 1 MG/ML
0.5 INJECTION, SOLUTION INTRAMUSCULAR; INTRAVENOUS; SUBCUTANEOUS
Status: DISCONTINUED | OUTPATIENT
Start: 2022-07-20 | End: 2022-07-23

## 2022-07-20 RX ORDER — SODIUM CHLORIDE, SODIUM LACTATE, POTASSIUM CHLORIDE, CALCIUM CHLORIDE 600; 310; 30; 20 MG/100ML; MG/100ML; MG/100ML; MG/100ML
2000 INJECTION, SOLUTION INTRAVENOUS ONCE
Status: COMPLETED | OUTPATIENT
Start: 2022-07-20 | End: 2022-07-20

## 2022-07-20 RX ORDER — ZONISAMIDE 50 MG/1
300 CAPSULE ORAL
Status: DISCONTINUED | OUTPATIENT
Start: 2022-07-20 | End: 2022-07-24 | Stop reason: HOSPADM

## 2022-07-20 RX ORDER — OXYCODONE HYDROCHLORIDE 10 MG/1
10 TABLET ORAL
Status: DISCONTINUED | OUTPATIENT
Start: 2022-07-20 | End: 2022-07-24 | Stop reason: HOSPADM

## 2022-07-20 RX ORDER — LABETALOL HYDROCHLORIDE 5 MG/ML
10 INJECTION, SOLUTION INTRAVENOUS EVERY 4 HOURS PRN
Status: DISCONTINUED | OUTPATIENT
Start: 2022-07-20 | End: 2022-07-24 | Stop reason: HOSPADM

## 2022-07-20 RX ORDER — LORAZEPAM 1 MG/1
1 TABLET ORAL EVERY 4 HOURS PRN
Status: DISCONTINUED | OUTPATIENT
Start: 2022-07-20 | End: 2022-07-24 | Stop reason: HOSPADM

## 2022-07-20 RX ORDER — LEVETIRACETAM 500 MG/1
1000 TABLET ORAL 2 TIMES DAILY
Refills: 1 | Status: DISCONTINUED | OUTPATIENT
Start: 2022-07-20 | End: 2022-07-24 | Stop reason: HOSPADM

## 2022-07-20 RX ORDER — SODIUM CHLORIDE, SODIUM LACTATE, POTASSIUM CHLORIDE, CALCIUM CHLORIDE 600; 310; 30; 20 MG/100ML; MG/100ML; MG/100ML; MG/100ML
INJECTION, SOLUTION INTRAVENOUS CONTINUOUS
Status: DISCONTINUED | OUTPATIENT
Start: 2022-07-21 | End: 2022-07-24

## 2022-07-20 RX ORDER — ACETAMINOPHEN 325 MG/1
650 TABLET ORAL EVERY 6 HOURS PRN
Status: DISCONTINUED | OUTPATIENT
Start: 2022-07-20 | End: 2022-07-24 | Stop reason: HOSPADM

## 2022-07-20 RX ORDER — ENOXAPARIN SODIUM 100 MG/ML
40 INJECTION SUBCUTANEOUS DAILY
Status: DISCONTINUED | OUTPATIENT
Start: 2022-07-20 | End: 2022-07-24 | Stop reason: HOSPADM

## 2022-07-20 RX ORDER — OMEPRAZOLE 20 MG/1
20 CAPSULE, DELAYED RELEASE ORAL DAILY
Status: DISCONTINUED | OUTPATIENT
Start: 2022-07-20 | End: 2022-07-24 | Stop reason: HOSPADM

## 2022-07-20 RX ADMIN — MORPHINE SULFATE 4 MG: 4 INJECTION INTRAVENOUS at 15:38

## 2022-07-20 RX ADMIN — HYDROMORPHONE HYDROCHLORIDE 0.5 MG: 1 INJECTION, SOLUTION INTRAMUSCULAR; INTRAVENOUS; SUBCUTANEOUS at 17:26

## 2022-07-20 RX ADMIN — QUETIAPINE FUMARATE 150 MG: 100 TABLET ORAL at 21:33

## 2022-07-20 RX ADMIN — THIAMINE HYDROCHLORIDE: 100 INJECTION, SOLUTION INTRAMUSCULAR; INTRAVENOUS at 20:56

## 2022-07-20 RX ADMIN — LEVETIRACETAM 1000 MG: 500 TABLET, FILM COATED ORAL at 18:26

## 2022-07-20 RX ADMIN — ENOXAPARIN SODIUM 40 MG: 40 INJECTION SUBCUTANEOUS at 18:29

## 2022-07-20 RX ADMIN — OMEPRAZOLE 20 MG: 20 CAPSULE, DELAYED RELEASE ORAL at 18:26

## 2022-07-20 RX ADMIN — HYDROMORPHONE HYDROCHLORIDE 0.5 MG: 1 INJECTION, SOLUTION INTRAMUSCULAR; INTRAVENOUS; SUBCUTANEOUS at 20:56

## 2022-07-20 RX ADMIN — ZONISAMIDE 300 MG: 50 CAPSULE ORAL at 21:32

## 2022-07-20 RX ADMIN — MORPHINE SULFATE 4 MG: 4 INJECTION INTRAVENOUS at 14:49

## 2022-07-20 RX ADMIN — OXYCODONE HYDROCHLORIDE 10 MG: 10 TABLET ORAL at 21:33

## 2022-07-20 RX ADMIN — SODIUM CHLORIDE, POTASSIUM CHLORIDE, SODIUM LACTATE AND CALCIUM CHLORIDE 2000 ML: 600; 310; 30; 20 INJECTION, SOLUTION INTRAVENOUS at 13:47

## 2022-07-20 RX ADMIN — SODIUM CHLORIDE, POTASSIUM CHLORIDE, SODIUM LACTATE AND CALCIUM CHLORIDE: 600; 310; 30; 20 INJECTION, SOLUTION INTRAVENOUS at 18:33

## 2022-07-20 RX ADMIN — ONDANSETRON 4 MG: 2 INJECTION INTRAMUSCULAR; INTRAVENOUS at 13:42

## 2022-07-20 RX ADMIN — HYDROMORPHONE HYDROCHLORIDE 0.5 MG: 1 INJECTION, SOLUTION INTRAMUSCULAR; INTRAVENOUS; SUBCUTANEOUS at 22:31

## 2022-07-20 RX ADMIN — LIDOCAINE HYDROCHLORIDE 30 ML: 20 SOLUTION OROPHARYNGEAL at 15:13

## 2022-07-20 RX ADMIN — TRAZODONE HYDROCHLORIDE 25 MG: 50 TABLET ORAL at 21:33

## 2022-07-20 RX ADMIN — MORPHINE SULFATE 4 MG: 4 INJECTION INTRAVENOUS at 13:44

## 2022-07-20 RX ADMIN — SODIUM CHLORIDE, POTASSIUM CHLORIDE, SODIUM LACTATE AND CALCIUM CHLORIDE: 600; 310; 30; 20 INJECTION, SOLUTION INTRAVENOUS at 22:20

## 2022-07-20 RX ADMIN — OXYCODONE HYDROCHLORIDE 10 MG: 10 TABLET ORAL at 18:26

## 2022-07-20 ASSESSMENT — LIFESTYLE VARIABLES
ON A TYPICAL DAY WHEN YOU DRINK ALCOHOL HOW MANY DRINKS DO YOU HAVE: 4
TOTAL SCORE: 4
CONSUMPTION TOTAL: POSITIVE
HAVE YOU EVER FELT YOU SHOULD CUT DOWN ON YOUR DRINKING: YES
EVER FELT BAD OR GUILTY ABOUT YOUR DRINKING: YES
AVERAGE NUMBER OF DAYS PER WEEK YOU HAVE A DRINK CONTAINING ALCOHOL: 6
ALCOHOL_USE: YES
HOW MANY TIMES IN THE PAST YEAR HAVE YOU HAD 5 OR MORE DRINKS IN A DAY: 300
TOTAL SCORE: 4
TOTAL SCORE: 4
DOES PATIENT WANT TO TALK TO SOMEONE ABOUT QUITTING: NO
HAVE PEOPLE ANNOYED YOU BY CRITICIZING YOUR DRINKING: YES
DOES PATIENT WANT TO STOP DRINKING: YES
EVER HAD A DRINK FIRST THING IN THE MORNING TO STEADY YOUR NERVES TO GET RID OF A HANGOVER: YES

## 2022-07-20 ASSESSMENT — COGNITIVE AND FUNCTIONAL STATUS - GENERAL
SUGGESTED CMS G CODE MODIFIER MOBILITY: CH
DAILY ACTIVITIY SCORE: 24
MOBILITY SCORE: 24
SUGGESTED CMS G CODE MODIFIER DAILY ACTIVITY: CH

## 2022-07-20 ASSESSMENT — ENCOUNTER SYMPTOMS
CHILLS: 0
FEVER: 0
COUGH: 0
VOMITING: 1
ABDOMINAL PAIN: 1
SHORTNESS OF BREATH: 0
NAUSEA: 1

## 2022-07-20 ASSESSMENT — PAIN DESCRIPTION - PAIN TYPE
TYPE: ACUTE PAIN

## 2022-07-20 ASSESSMENT — FIBROSIS 4 INDEX
FIB4 SCORE: 0.59
FIB4 SCORE: 0.49

## 2022-07-20 ASSESSMENT — PATIENT HEALTH QUESTIONNAIRE - PHQ9
1. LITTLE INTEREST OR PLEASURE IN DOING THINGS: NOT AT ALL
2. FEELING DOWN, DEPRESSED, IRRITABLE, OR HOPELESS: NOT AT ALL
SUM OF ALL RESPONSES TO PHQ9 QUESTIONS 1 AND 2: 0

## 2022-07-20 NOTE — ED NOTES
IVF infusing slowly, pt needs o be reminded to keep her arm straight so the fluids can infuse. Medicated pr order.

## 2022-07-20 NOTE — DISCHARGE PLANNING
Met with pt who reports that she is independent with ADLs and IADLs. Pt said she has her belongings at [facility] and requested to have a portable phone. Phone given to pt and tech notified. Pt said her BF can retrieve her belongings at [facility].    (Provide chemical dependency community resources for pt.)    Pt said she has a PCP, Dr. Ivy Adams at [facility] internal medicine. The MD might change since they are students. Pharmacy is at [facility].     Care Transition Team Assessment    Information Source  Orientation Level: Oriented X4  Information Given By: Patient  Who is responsible for making decisions for patient? : Patient    Readmission Evaluation  Is this a readmission?: No    Elopement Risk  Legal Hold: No  Ambulatory or Self Mobile in Wheelchair: No-Not an Elopement Risk    Interdisciplinary Discharge Planning  Does Admitting Nurse Feel This Could be a Complex Discharge?: No  Primary Care Physician: vIy Adams MD  Lives with - Patient's Self Care Capacity: Alone and Able to Care For Self  Patient or legal guardian wants to designate a caregiver: No  Support Systems: Spouse / Significant Other  Housing / Facility: 2 Story Apartment / Condo  Do You Take your Prescribed Medications Regularly: Yes  Able to Return to Previous ADL's: Yes  Mobility Issues: No  Prior Services: Home-Independent  Patient Prefers to be Discharged to:: Detox Center  Assistance Needed: No  Durable Medical Equipment: Not Applicable    Discharge Preparedness  What is your plan after discharge?: Home with help, Other (comment) (substance abuse detox center)  What are your discharge supports?: Parent, Other (comment) (boyfriend)  Prior Functional Level: Ambulatory, Drives Self, Independent with Activities of Daily Living, Independent with Medication Management  Difficulity with ADLs: None  Difficulity with IADLs: None    Functional Assesment  Prior Functional Level: Ambulatory, Drives Self, Independent with Activities of Daily Living, Independent with  Medication Management    Finances  Financial Barriers to Discharge: No  Prescription Coverage: Yes    Vision / Hearing Impairment  Vision Impairment : No  Hearing Impairment : No    Values / Beliefs / Concerns  Values / Beliefs Concerns : No    Advance Directive  Advance Directive?: None    Domestic Abuse  Have you ever been the victim of abuse or violence?: No         Discharge Risks or Barriers  Discharge risks or barriers?: Substance abuse  Patient risk factors: Substance abuse    Anticipated Discharge Information  Discharge Disposition: D/T to another type of health care inst. (07)

## 2022-07-20 NOTE — ASSESSMENT & PLAN NOTE
- EtOH pancreatitis,  -Aggressive IV fluid resuscitation, pain control  improving  - No s/s of infection, no need for CT at that time   - NPO today with orders to advance diet as tolerated to low fat, GI soft  - RUQ u/s done on the 18th without evidence of gallstones  - changed to morphine from dilaudid

## 2022-07-20 NOTE — ED TRIAGE NOTES
Pt BIB [facility] with c/o epigastric pain that radiates to her back with N/V. Pt has hx of pancreatitis. Pt has been at [facility] for detox.

## 2022-07-20 NOTE — ED NOTES
Med rec updated and complete, per pt   Allergies reviewed, per pt   Pt reports that she takes 6 capsules of her ZONISAMIDE 50MG every evening   Pt reports that she has not picked up her FAMOTIDINE 20MG or ONDANSETRON 4MG from the pharmacy yet

## 2022-07-20 NOTE — ASSESSMENT & PLAN NOTE
- Pt states last EtOH was 5 days ago, had a positive EtOH level on the 18th  - Was in [facility] for detox, plans to go to a 28d facility after dc from hospital but does not know where she is going  -  consult placed for help with resources  - CIWA protocol ordered  - EtOH level ordered  - Seizure precautions - pt with underlying seizure d/o, states increased frequency when withdrawing.

## 2022-07-20 NOTE — ED NOTES
Post water consumption pt reports her pain is back up to 8/10. Additional morphine provided per MAR order.

## 2022-07-20 NOTE — ED NOTES
"PIV and blood to the lab is complete. IVF infusing and medicated per order. Pt stats she is in the position of comfort, \"It feels good to be curled up\", he pain is a 6.75/10 after meds.  "

## 2022-07-20 NOTE — H&P
Hospital Medicine History & Physical Note    Date of Service  7/20/2022    Primary Care Physician  Iyv Adams M.D.    Consultants  None    Specialist Names: none    Code Status  Prior    Chief Complaint  Chief Complaint   Patient presents with   • Epigastric Pain   • Back Pain   • N/V       History of Presenting Illness  Rhiannon Marrero is a 37 y.o. female who presented 7/20/2022 with pancreatitis. This is a female with a history of alcohol abuse, seizure disorder, bipolar disorder and multiple admissions for pancreatitis who has presented to our ER multiple times this month with abdominal pain. She was recently discharged 7/4 with alcoholic pancreatitis, and was subsequently seen in our ER on the 13th and 18th, at which times her lipase values were normal and her EtOH levels were positive. After the visit on the 18th, she went to alcohol rehab. She presented to hospital today from [facility]  with complaints of recurrent epigastric pain that radiates to her back with nausea and vomiting. She states she has been unable to keep anything down all day. She denies fever.  States her last drink was 5d ago but did have a positive EtOH level on the 18th, does attest more seizure activity while withdrawing. In the ER, she was tachycardic to 106, labs remarkable only for a lipase of 1596.     I discussed the plan of care with patient and ERP.    Review of Systems  Review of Systems   Constitutional: Negative for chills and fever.   Respiratory: Negative for cough and shortness of breath.    Cardiovascular: Negative for chest pain and leg swelling.   Gastrointestinal: Positive for abdominal pain, nausea and vomiting.   All other systems reviewed and are negative.      Past Medical History   has a past medical history of Alcohol dependence (HCC) (4/13/2017), Bronchitis (8/2012), Cold, Heart burn, Hernia of unspecified site of abdominal cavity without mention of obstruction or gangrene, Indigestion, Pancreatitis, and Pneumonia  (2011).    Surgical History   has a past surgical history that includes other orthopedic surgery; gyn surgery; tonsillectomy (4/11/2013); arthroscopy, knee; hysterectomy robotic xi (10/11/2018); salpingectomy (Bilateral, 10/11/2018); and orif, wrist (Right, 4/24/2019).     Family History  family history includes Arthritis in her mother; Heart Disease in her maternal grandfather; Psychiatric Illness in her mother; Stroke in her mother.   Family history reviewed with patient. There is no family history that is pertinent to the chief complaint.     Social History   reports that she has quit smoking. Her smoking use included cigarettes. She has a 7.50 pack-year smoking history. She has never used smokeless tobacco. She reports previous alcohol use. She reports current drug use. Drug: Marijuana.    Allergies  Allergies   Allergen Reactions   • Promethazine Hcl Anxiety       Medications  Prior to Admission Medications   Prescriptions Last Dose Informant Patient Reported? Taking?   QUEtiapine (SEROQUEL) 50 MG tablet 7/19/2022 at 2000 Patient Yes Yes   Sig: Take 150 mg by mouth at bedtime.   acetaminophen (TYLENOL) 500 MG Tab 7/19/2022 at 2000 Patient Yes Yes   Sig: Take 500-1,000 mg by mouth every 6 hours as needed for Moderate Pain.   famotidine (PEPCID) 20 MG Tab NEW RX Patient No No   Sig: Take 1 Tablet by mouth every day.   levetiracetam (KEPPRA) 1000 MG tablet 7/19/2022 at 2000 Patient No No   Sig: Take 1 Tablet by mouth 2 times a day. Indications: Seizure   ondansetron (ZOFRAN ODT) 4 MG TABLET DISPERSIBLE NEW RX Patient No No   Sig: Take 1 Tablet by mouth every 8 hours as needed for Nausea.   zonisamide (ZONEGRAN) 50 MG capsule 7/19/2022 at 2000 Patient Yes Yes   Sig: Take 300 mg by mouth at bedtime.      Facility-Administered Medications: None       Physical Exam  Temp:  [36.9 °C (98.4 °F)] 36.9 °C (98.4 °F)  Pulse:  [106] 106  Resp:  [18] 18  BP: (141)/(102) 141/102  SpO2:  [96 %] 96 %  Blood Pressure: (!) 141/102    Temperature: 36.9 °C (98.4 °F)   Pulse: (!) 106   Respiration: 18   Pulse Oximetry: 96 %       Physical Exam  Vitals and nursing note reviewed.   Constitutional:       Appearance: She is ill-appearing. She is not toxic-appearing.   HENT:      Head: Normocephalic and atraumatic.   Cardiovascular:      Rate and Rhythm: Regular rhythm. Tachycardia present.      Heart sounds: No murmur heard.  Pulmonary:      Effort: Pulmonary effort is normal. No respiratory distress.      Breath sounds: Normal breath sounds. No wheezing, rhonchi or rales.   Abdominal:      General: Abdomen is flat. Bowel sounds are normal. There is no distension.      Palpations: Abdomen is soft.      Tenderness: Tenderness: Epigastric, LUQ.   Musculoskeletal:         General: No swelling.   Skin:     General: Skin is warm and dry.      Coloration: Skin is not pale.   Neurological:      General: No focal deficit present.      Mental Status: She is alert and oriented to person, place, and time.   Psychiatric:         Mood and Affect: Mood normal.         Behavior: Behavior normal.         Laboratory:  Recent Labs     07/18/22  0434 07/20/22  1332   WBC 7.6 6.7   RBC 4.91 4.65   HEMOGLOBIN 16.0 15.3   HEMATOCRIT 46.4 43.8   MCV 94.5 94.2   MCH 32.6 32.9   MCHC 34.5 34.9   RDW 49.7 49.0   PLATELETCT 455* 273   MPV 9.4 9.5     Recent Labs     07/18/22  0434 07/20/22  1332   SODIUM 144 135   POTASSIUM 4.0 4.0   CHLORIDE 106 100   CO2 17* 20   GLUCOSE 87 102*   BUN 10 9   CREATININE 0.69 0.66   CALCIUM 8.8 9.4     Recent Labs     07/18/22  0434 07/20/22  1332   ALTSGPT 39 23   ASTSGOT 40 22   ALKPHOSPHAT 129* 126*   TBILIRUBIN 0.5 0.8   LIPASE 22 1596*   GLUCOSE 87 102*         No results for input(s): NTPROBNP in the last 72 hours.      No results for input(s): TROPONINT in the last 72 hours.    Imaging:  No orders to display       no X-Ray or EKG requiring interpretation    Assessment/Plan:  Justification for Admission Status  I anticipate this patient  is appropriate for observation status at this time because Highly rated fluids, pain control, slow introduction of foods    Patient will need a  bed on EMERGENCY service .  The need is secondary to acute pancreatitis.    * Acute pancreatitis without infection or necrosis- (present on admission)  Assessment & Plan  - EtOH pancreatitis, will give IVFs, pain control  - No s/s of infection, no need for CT at that time   - NPO today with orders to advance diet as tolerated to low fat, GI soft  - RUQ u/s done on the 18th without evidence of gallstones  - Lipid panel on the 3rd without hypertriglyceridemia      Seizure (HCC)- (present on admission)  Assessment & Plan  - Pt with seizure d/o, follows with Dr Ashley  - Resume home Keppra and Zonisamide       Alcohol dependence with withdrawal (HCC)- (present on admission)  Assessment & Plan  - Pt states last EtOH was 5 days ago, had a positive EtOH level on the 18th  - Was in [facility] for detox, plans to go to a 28d facility after dc from hospital but does not know where she is going  -  consult placed for help with resources  - CIWA protocol ordered  - EtOH level ordered  - Seizure precautions - pt with underlying seizure d/o, states increased frequency when withdrawing.    Bipolar affective disorder, current episode hypomanic (HCC)- (present on admission)  Assessment & Plan  - Resume home Seroquel       VTE prophylaxis: SCDs/TEDs and enoxaparin ppx

## 2022-07-20 NOTE — ED PROVIDER NOTES
ED Provider Note    CHIEF COMPLAINT  Chief Complaint   Patient presents with   • Epigastric Pain   • Back Pain   • N/V        HPI  Rhiannon Marrero is a 37 y.o. female who presents to the emergency department for complaints of nausea vomiting abdominal pain.  The patient is an alcoholic she states that her last drink was a couple of days ago and she was seen in the emergency department for that at that time.  The patient then went to a detox facility she states that she was there for a day or 2 then last night she started feeling extreme increasing abdominal pain a progressively worse to where it feels like a sharp mid central abdominal pain with radiation to her back.  Patient has had pancreatitis in the past she states that it feels the same.  Then she started to vomit and has been unable to keep any fluids down all day today.  Patient denies any overt fevers does describe a 10/10 type pain located in epigastric mid center aspect of the abdomen.  She denies diarrhea fevers chills does describe some nausea vomiting.  Denies any other symptoms.    REVIEW OF SYSTEMS  See HPI for further details. All other systems are negative.     PAST MEDICAL HISTORY  Past Medical History:   Diagnosis Date   • Alcohol dependence (HCC) 4/13/2017   • Bronchitis 8/2012   • Cold     sept 2018   • Heart burn    • Hernia of unspecified site of abdominal cavity without mention of obstruction or gangrene    • Indigestion    • Pancreatitis    • Pneumonia 2011       FAMILY HISTORY  Family History   Problem Relation Age of Onset   • Psychiatric Illness Mother    • Stroke Mother    • Arthritis Mother    • Heart Disease Maternal Grandfather    • Cancer Neg Hx    • Diabetes Neg Hx    • Hypertension Neg Hx        SOCIAL HISTORY  Social History     Socioeconomic History   • Marital status:    Tobacco Use   • Smoking status: Former Smoker     Packs/day: 0.75     Years: 10.00     Pack years: 7.50     Types: Cigarettes   • Smokeless tobacco:  Never Used   Vaping Use   • Vaping Use: Former   Substance and Sexual Activity   • Alcohol use: Not Currently     Comment: 5 shots, binges. just got out of detox on 7/20   • Drug use: Yes     Types: Marijuana     Comment: dora Adams M.D.      SURGICAL HISTORY  Past Surgical History:   Procedure Laterality Date   • ORIF, WRIST Right 4/24/2019    Procedure: ORIF, WRIST;  Surgeon: Marquis Bell M.D.;  Location: SURGERY [facility];  Service: Orthopedics   • HYSTERECTOMY ROBOTIC XI  10/11/2018    Procedure: HYSTERECTOMY ROBOTIC XI- RIGHT URETEROLYSIS;  Surgeon: Marquis Reeder M.D.;  Location: SURGERY [facility];  Service: Gynecology   • SALPINGECTOMY Bilateral 10/11/2018    Procedure: SALPINGECTOMY;  Surgeon: Marquis Reeder M.D.;  Location: SURGERY [facility];  Service: Gynecology   • TONSILLECTOMY  4/11/2013    Performed by Rock Rothman M.D. at SURGERY SAME DAY [facility]   • ARTHROSCOPY, KNEE     • GYN SURGERY      miscarriage May 2006   • OTHER ORTHOPEDIC SURGERY      knee surgeries       CURRENT MEDICATIONS  Home Medications     Reviewed by Sravani Hoover PhT (Pharmacy Tech) on 07/20/22 at 1521  Med List Status: Complete   Medication Last Dose Status   acetaminophen (TYLENOL) 500 MG Tab 7/19/2022 Active   famotidine (PEPCID) 20 MG Tab NEW RX Active   levetiracetam (KEPPRA) 1000 MG tablet 7/19/2022 Active   ondansetron (ZOFRAN ODT) 4 MG TABLET DISPERSIBLE NEW RX Active   QUEtiapine (SEROQUEL) 50 MG tablet 7/19/2022 Active   zonisamide (ZONEGRAN) 50 MG capsule 7/19/2022 Active                ALLERGIES  Allergies   Allergen Reactions   • Promethazine Hcl Anxiety       PHYSICAL EXAM  VITAL SIGNS: BP (!) 141/102   Pulse (!) 106   Temp 36.9 °C (98.4 °F) (Temporal)   Resp 18   Wt 49.9 kg (110 lb)   LMP 10/30/2018   SpO2 96%   BMI 17.23 kg/m²    Pulse Oximetry was obtained. It showed a reading of Pulse Oximetry: 96 %.  I interpreted this as nonhypoxic.     Constitutional: She is  sitting with her knees up appears to be in painful discomfort but no acute distress  HENT: Normocephalic, Atraumatic, Bilateral external ears normal, bilateral tympanic membranes normal, Oropharynx dry mucous membranes, No oral exudates, Nose normal.   Eyes:  conjunctiva is normal, there are no signs of exudate.   Neck: Supple, no cervical lymphadenopathy, no meningeal signs..   Lymphatic: No lymphadenopathy noted.   Cardiovascular: Tachycardic rate and rhythm without murmurs gallops or rubs.   Thorax & Lungs: Lungs are clear to auscultation bilaterally, there are no wheezes no rales. Chest wall is nontender.  Abdomen: Soft, tender in the epigastric region no rebound tenderness bowel sounds are present  Skin: Warm, Dry, No erythema,   Back: No tenderness, No CVA tenderness.  Musculoskeletal: Good range of motion in all major joints. No tenderness to palpation or major deformities noted. Intact distal pulses, no clubbing, no cyanosis, no edema   Neurologic: Alert & oriented x 3, Normal motor function, Normal sensory function, No focal deficits noted.   Psychiatric: Affect normal, Judgment normal, Mood normal.       RADIOLOGY/PROCEDURES  No orders to display       Results for orders placed or performed during the hospital encounter of 07/20/22   CBC WITH DIFFERENTIAL   Result Value Ref Range    WBC 6.7 4.8 - 10.8 K/uL    RBC 4.65 4.20 - 5.40 M/uL    Hemoglobin 15.3 12.0 - 16.0 g/dL    Hematocrit 43.8 37.0 - 47.0 %    MCV 94.2 81.4 - 97.8 fL    MCH 32.9 27.0 - 33.0 pg    MCHC 34.9 33.6 - 35.0 g/dL    RDW 49.0 35.9 - 50.0 fL    Platelet Count 273 164 - 446 K/uL    MPV 9.5 9.0 - 12.9 fL    Neutrophils-Polys 80.40 (H) 44.00 - 72.00 %    Lymphocytes 11.30 (L) 22.00 - 41.00 %    Monocytes 6.70 0.00 - 13.40 %    Eosinophils 0.90 0.00 - 6.90 %    Basophils 0.30 0.00 - 1.80 %    Immature Granulocytes 0.40 0.00 - 0.90 %    Nucleated RBC 0.00 /100 WBC    Neutrophils (Absolute) 5.39 2.00 - 7.15 K/uL    Lymphs (Absolute) 0.76 (L)  1.00 - 4.80 K/uL    Monos (Absolute) 0.45 0.00 - 0.85 K/uL    Eos (Absolute) 0.06 0.00 - 0.51 K/uL    Baso (Absolute) 0.02 0.00 - 0.12 K/uL    Immature Granulocytes (abs) 0.03 0.00 - 0.11 K/uL    NRBC (Absolute) 0.00 K/uL   COMP METABOLIC PANEL   Result Value Ref Range    Sodium 135 135 - 145 mmol/L    Potassium 4.0 3.6 - 5.5 mmol/L    Chloride 100 96 - 112 mmol/L    Co2 20 20 - 33 mmol/L    Anion Gap 15.0 7.0 - 16.0    Glucose 102 (H) 65 - 99 mg/dL    Bun 9 8 - 22 mg/dL    Creatinine 0.66 0.50 - 1.40 mg/dL    Calcium 9.4 8.4 - 10.2 mg/dL    AST(SGOT) 22 12 - 45 U/L    ALT(SGPT) 23 2 - 50 U/L    Alkaline Phosphatase 126 (H) 30 - 99 U/L    Total Bilirubin 0.8 0.1 - 1.5 mg/dL    Albumin 4.3 3.2 - 4.9 g/dL    Total Protein 7.6 6.0 - 8.2 g/dL    Globulin 3.3 1.9 - 3.5 g/dL    A-G Ratio 1.3 g/dL   LIPASE   Result Value Ref Range    Lipase 1596 (H) 7 - 58 U/L   ESTIMATED GFR   Result Value Ref Range    GFR (CKD-EPI) 117 >60 mL/min/1.73 m 2         COURSE & MEDICAL DECISION MAKING  Pertinent Labs & Imaging studies reviewed. (See chart for details)  Patient presents to the ED for evaluation.  An IV was established and because of her intractable nausea vomiting I did start her with 2 L boluses of lactated Ringer's.  I initially started the patient with a dose of 4 mg of Zofran and morphine upon reevaluation she was still in significant amount of pain and discomfort so I did repeat the morphine again.  As she was on her second liter of fluid she was feeling improved so I did try an oral challenge and she still complained of severe discomfort and did not want to continue drinking because of the increasing pain.  I then gave her another dose of morphine and a GI cocktail.  Upon reevaluation she is improving however at this point she does not feel comfortable being discharged I do agree and at this point I do feel that she has alcoholic pancreatitis.  She was a seen here on the 14th had a CT scan as well as ultrasound and there  is no signs of biliary obstruction or other causes for pancreatitis I do feel that it this is alcohol related.  At this point the patient will be admitted for further fluid hydration pain control.  At this point the patient is n.p.o. status until she is improving.    FINAL IMPRESSION  1. Alcohol-induced acute pancreatitis, unspecified complication status     2. Intractable vomiting with nausea, unspecified vomiting type                Electronically signed by: Ariel Ronquillo M.D., 7/20/2022 1:37 PM

## 2022-07-21 PROBLEM — K85.90 PANCREATITIS, UNSPECIFIED PANCREATITIS TYPE: Status: ACTIVE | Noted: 2022-07-21

## 2022-07-21 LAB
ALBUMIN SERPL BCP-MCNC: 3.1 G/DL (ref 3.2–4.9)
ALBUMIN/GLOB SERPL: 1.3 G/DL
ALP SERPL-CCNC: 84 U/L (ref 30–99)
ALT SERPL-CCNC: 14 U/L (ref 2–50)
ANION GAP SERPL CALC-SCNC: 10 MMOL/L (ref 7–16)
AST SERPL-CCNC: 15 U/L (ref 12–45)
BILIRUB SERPL-MCNC: 0.6 MG/DL (ref 0.1–1.5)
BUN SERPL-MCNC: 5 MG/DL (ref 8–22)
CALCIUM SERPL-MCNC: 8 MG/DL (ref 8.4–10.2)
CHLORIDE SERPL-SCNC: 107 MMOL/L (ref 96–112)
CO2 SERPL-SCNC: 21 MMOL/L (ref 20–33)
CREAT SERPL-MCNC: 0.54 MG/DL (ref 0.5–1.4)
EKG IMPRESSION: NORMAL
ERYTHROCYTE [DISTWIDTH] IN BLOOD BY AUTOMATED COUNT: 50.2 FL (ref 35.9–50)
GFR SERPLBLD CREATININE-BSD FMLA CKD-EPI: 123 ML/MIN/1.73 M 2
GLOBULIN SER CALC-MCNC: 2.4 G/DL (ref 1.9–3.5)
GLUCOSE SERPL-MCNC: 74 MG/DL (ref 65–99)
HCT VFR BLD AUTO: 34.4 % (ref 37–47)
HGB BLD-MCNC: 11.6 G/DL (ref 12–16)
MAGNESIUM SERPL-MCNC: 2.1 MG/DL (ref 1.5–2.5)
MCH RBC QN AUTO: 32.8 PG (ref 27–33)
MCHC RBC AUTO-ENTMCNC: 33.7 G/DL (ref 33.6–35)
MCV RBC AUTO: 97.2 FL (ref 81.4–97.8)
PHOSPHATE SERPL-MCNC: 1.9 MG/DL (ref 2.5–4.5)
PLATELET # BLD AUTO: 187 K/UL (ref 164–446)
PMV BLD AUTO: 10.4 FL (ref 9–12.9)
POTASSIUM SERPL-SCNC: 3.3 MMOL/L (ref 3.6–5.5)
PROT SERPL-MCNC: 5.5 G/DL (ref 6–8.2)
RBC # BLD AUTO: 3.54 M/UL (ref 4.2–5.4)
SODIUM SERPL-SCNC: 138 MMOL/L (ref 135–145)
WBC # BLD AUTO: 4.4 K/UL (ref 4.8–10.8)

## 2022-07-21 PROCEDURE — 770006 HCHG ROOM/CARE - MED/SURG/GYN SEMI*

## 2022-07-21 PROCEDURE — 96375 TX/PRO/DX INJ NEW DRUG ADDON: CPT

## 2022-07-21 PROCEDURE — 700102 HCHG RX REV CODE 250 W/ 637 OVERRIDE(OP): Performed by: FAMILY MEDICINE

## 2022-07-21 PROCEDURE — 85027 COMPLETE CBC AUTOMATED: CPT

## 2022-07-21 PROCEDURE — 700105 HCHG RX REV CODE 258: Performed by: FAMILY MEDICINE

## 2022-07-21 PROCEDURE — 84100 ASSAY OF PHOSPHORUS: CPT

## 2022-07-21 PROCEDURE — 700105 HCHG RX REV CODE 258: Performed by: INTERNAL MEDICINE

## 2022-07-21 PROCEDURE — 700111 HCHG RX REV CODE 636 W/ 250 OVERRIDE (IP): Performed by: FAMILY MEDICINE

## 2022-07-21 PROCEDURE — 93010 ELECTROCARDIOGRAM REPORT: CPT | Performed by: INTERNAL MEDICINE

## 2022-07-21 PROCEDURE — 700111 HCHG RX REV CODE 636 W/ 250 OVERRIDE (IP): Performed by: HOSPITALIST

## 2022-07-21 PROCEDURE — 99233 SBSQ HOSP IP/OBS HIGH 50: CPT | Performed by: INTERNAL MEDICINE

## 2022-07-21 PROCEDURE — 83735 ASSAY OF MAGNESIUM: CPT

## 2022-07-21 PROCEDURE — 96376 TX/PRO/DX INJ SAME DRUG ADON: CPT

## 2022-07-21 PROCEDURE — A9270 NON-COVERED ITEM OR SERVICE: HCPCS | Performed by: FAMILY MEDICINE

## 2022-07-21 PROCEDURE — 96372 THER/PROPH/DIAG INJ SC/IM: CPT

## 2022-07-21 PROCEDURE — 80053 COMPREHEN METABOLIC PANEL: CPT

## 2022-07-21 RX ORDER — HYDROMORPHONE HYDROCHLORIDE 1 MG/ML
0.5 INJECTION, SOLUTION INTRAMUSCULAR; INTRAVENOUS; SUBCUTANEOUS ONCE
Status: COMPLETED | OUTPATIENT
Start: 2022-07-21 | End: 2022-07-21

## 2022-07-21 RX ADMIN — THIAMINE HCL TAB 100 MG 100 MG: 100 TAB at 06:40

## 2022-07-21 RX ADMIN — FOLIC ACID 1 MG: 1 TABLET ORAL at 06:40

## 2022-07-21 RX ADMIN — LEVETIRACETAM 1000 MG: 500 TABLET, FILM COATED ORAL at 16:44

## 2022-07-21 RX ADMIN — THERA TABS 1 TABLET: TAB at 06:40

## 2022-07-21 RX ADMIN — OMEPRAZOLE 20 MG: 20 CAPSULE, DELAYED RELEASE ORAL at 06:40

## 2022-07-21 RX ADMIN — HYDROMORPHONE HYDROCHLORIDE 0.5 MG: 1 INJECTION, SOLUTION INTRAMUSCULAR; INTRAVENOUS; SUBCUTANEOUS at 16:44

## 2022-07-21 RX ADMIN — OXYCODONE HYDROCHLORIDE 10 MG: 10 TABLET ORAL at 00:35

## 2022-07-21 RX ADMIN — OXYCODONE HYDROCHLORIDE 10 MG: 10 TABLET ORAL at 09:41

## 2022-07-21 RX ADMIN — OXYCODONE HYDROCHLORIDE 10 MG: 10 TABLET ORAL at 19:32

## 2022-07-21 RX ADMIN — OXYCODONE HYDROCHLORIDE 10 MG: 10 TABLET ORAL at 22:46

## 2022-07-21 RX ADMIN — OXYCODONE HYDROCHLORIDE 10 MG: 10 TABLET ORAL at 06:39

## 2022-07-21 RX ADMIN — SODIUM CHLORIDE, POTASSIUM CHLORIDE, SODIUM LACTATE AND CALCIUM CHLORIDE: 600; 310; 30; 20 INJECTION, SOLUTION INTRAVENOUS at 21:58

## 2022-07-21 RX ADMIN — HYDROMORPHONE HYDROCHLORIDE 0.5 MG: 1 INJECTION, SOLUTION INTRAMUSCULAR; INTRAVENOUS; SUBCUTANEOUS at 07:45

## 2022-07-21 RX ADMIN — HYDROMORPHONE HYDROCHLORIDE 0.5 MG: 1 INJECTION, SOLUTION INTRAMUSCULAR; INTRAVENOUS; SUBCUTANEOUS at 01:36

## 2022-07-21 RX ADMIN — ENOXAPARIN SODIUM 40 MG: 40 INJECTION SUBCUTANEOUS at 16:45

## 2022-07-21 RX ADMIN — HYDROMORPHONE HYDROCHLORIDE 0.5 MG: 1 INJECTION, SOLUTION INTRAMUSCULAR; INTRAVENOUS; SUBCUTANEOUS at 23:55

## 2022-07-21 RX ADMIN — OXYCODONE HYDROCHLORIDE 10 MG: 10 TABLET ORAL at 12:52

## 2022-07-21 RX ADMIN — LEVETIRACETAM 1000 MG: 500 TABLET, FILM COATED ORAL at 06:39

## 2022-07-21 RX ADMIN — HYDROMORPHONE HYDROCHLORIDE 0.5 MG: 1 INJECTION, SOLUTION INTRAMUSCULAR; INTRAVENOUS; SUBCUTANEOUS at 14:04

## 2022-07-21 RX ADMIN — OXYCODONE HYDROCHLORIDE 10 MG: 10 TABLET ORAL at 03:39

## 2022-07-21 RX ADMIN — HYDROMORPHONE HYDROCHLORIDE 0.5 MG: 1 INJECTION, SOLUTION INTRAMUSCULAR; INTRAVENOUS; SUBCUTANEOUS at 11:09

## 2022-07-21 RX ADMIN — ZONISAMIDE 300 MG: 50 CAPSULE ORAL at 20:50

## 2022-07-21 RX ADMIN — SODIUM CHLORIDE, POTASSIUM CHLORIDE, SODIUM LACTATE AND CALCIUM CHLORIDE: 600; 310; 30; 20 INJECTION, SOLUTION INTRAVENOUS at 03:40

## 2022-07-21 RX ADMIN — QUETIAPINE FUMARATE 150 MG: 100 TABLET ORAL at 20:50

## 2022-07-21 RX ADMIN — HYDROMORPHONE HYDROCHLORIDE 0.5 MG: 1 INJECTION, SOLUTION INTRAMUSCULAR; INTRAVENOUS; SUBCUTANEOUS at 20:42

## 2022-07-21 RX ADMIN — HYDROMORPHONE HYDROCHLORIDE 0.5 MG: 1 INJECTION, SOLUTION INTRAMUSCULAR; INTRAVENOUS; SUBCUTANEOUS at 21:24

## 2022-07-21 RX ADMIN — ONDANSETRON 4 MG: 2 INJECTION INTRAMUSCULAR; INTRAVENOUS at 11:24

## 2022-07-21 RX ADMIN — ONDANSETRON 4 MG: 2 INJECTION INTRAMUSCULAR; INTRAVENOUS at 16:52

## 2022-07-21 RX ADMIN — OXYCODONE HYDROCHLORIDE 10 MG: 10 TABLET ORAL at 15:51

## 2022-07-21 ASSESSMENT — PAIN DESCRIPTION - PAIN TYPE
TYPE: ACUTE PAIN

## 2022-07-21 ASSESSMENT — LIFESTYLE VARIABLES
ORIENTATION AND CLOUDING OF SENSORIUM: ORIENTED AND CAN DO SERIAL ADDITIONS
AGITATION: NORMAL ACTIVITY
ANXIETY: MILDLY ANXIOUS
PAROXYSMAL SWEATS: NO SWEAT VISIBLE
HEADACHE, FULLNESS IN HEAD: NOT PRESENT
TREMOR: NO TREMOR
NAUSEA AND VOMITING: NO NAUSEA AND NO VOMITING
ANXIETY: MILDLY ANXIOUS
HEADACHE, FULLNESS IN HEAD: NOT PRESENT
VISUAL DISTURBANCES: NOT PRESENT
VISUAL DISTURBANCES: NOT PRESENT
PAROXYSMAL SWEATS: NO SWEAT VISIBLE
AGITATION: NORMAL ACTIVITY
AUDITORY DISTURBANCES: NOT PRESENT
PAROXYSMAL SWEATS: NO SWEAT VISIBLE
NAUSEA AND VOMITING: MILD NAUSEA WITH NO VOMITING
TREMOR: NO TREMOR
AGITATION: NORMAL ACTIVITY
ANXIETY: MILDLY ANXIOUS
VISUAL DISTURBANCES: NOT PRESENT
AUDITORY DISTURBANCES: NOT PRESENT
VISUAL DISTURBANCES: NOT PRESENT
HEADACHE, FULLNESS IN HEAD: NOT PRESENT
ORIENTATION AND CLOUDING OF SENSORIUM: ORIENTED AND CAN DO SERIAL ADDITIONS
NAUSEA AND VOMITING: NO NAUSEA AND NO VOMITING
AGITATION: NORMAL ACTIVITY
TOTAL SCORE: 0
TREMOR: NO TREMOR
TOTAL SCORE: 1
AUDITORY DISTURBANCES: NOT PRESENT
ORIENTATION AND CLOUDING OF SENSORIUM: ORIENTED AND CAN DO SERIAL ADDITIONS
NAUSEA AND VOMITING: NO NAUSEA AND NO VOMITING
TREMOR: NO TREMOR
PAROXYSMAL SWEATS: NO SWEAT VISIBLE
TOTAL SCORE: 1
VISUAL DISTURBANCES: NOT PRESENT
AUDITORY DISTURBANCES: NOT PRESENT
ANXIETY: MILDLY ANXIOUS
NAUSEA AND VOMITING: NO NAUSEA AND NO VOMITING
ANXIETY: NO ANXIETY (AT EASE)
ORIENTATION AND CLOUDING OF SENSORIUM: ORIENTED AND CAN DO SERIAL ADDITIONS
PAROXYSMAL SWEATS: NO SWEAT VISIBLE
AGITATION: NORMAL ACTIVITY
ORIENTATION AND CLOUDING OF SENSORIUM: ORIENTED AND CAN DO SERIAL ADDITIONS
TOTAL SCORE: 2
HEADACHE, FULLNESS IN HEAD: NOT PRESENT
AUDITORY DISTURBANCES: NOT PRESENT
TREMOR: NO TREMOR
TOTAL SCORE: 1
HEADACHE, FULLNESS IN HEAD: NOT PRESENT

## 2022-07-21 ASSESSMENT — ENCOUNTER SYMPTOMS
BLURRED VISION: 0
EYE DISCHARGE: 0
DEPRESSION: 0
SORE THROAT: 0
FEVER: 0
SEIZURES: 0
WHEEZING: 0
STRIDOR: 0
CONSTIPATION: 0
INSOMNIA: 0
HEARTBURN: 0
ABDOMINAL PAIN: 1
PALPITATIONS: 0
BACK PAIN: 0
CLAUDICATION: 0
BLOOD IN STOOL: 0
SINUS PAIN: 0
HEADACHES: 0
EYE REDNESS: 0
WEIGHT LOSS: 0
NAUSEA: 0
FOCAL WEAKNESS: 0
NECK PAIN: 0
COUGH: 0
ORTHOPNEA: 0
EYE PAIN: 0
VOMITING: 0
CHILLS: 0
MYALGIAS: 0
DIARRHEA: 0
DIZZINESS: 0
NERVOUS/ANXIOUS: 0
SHORTNESS OF BREATH: 0
SPUTUM PRODUCTION: 0

## 2022-07-21 ASSESSMENT — FIBROSIS 4 INDEX: FIB4 SCORE: 0.59

## 2022-07-21 NOTE — PROGRESS NOTES
4 Eyes Skin Assessment Completed by Glen Armando, RN and Gabi Schmidt, LESLIE.    Head WDL  Ears WDL  Nose WDL  Mouth WDL  Neck WDL  Breast/Chest WDL  Shoulder Blades WDL  Spine WDL  (R) Arm/Elbow/Hand Abrasion  (L) Arm/Elbow/Hand WDL  Abdomen WDL  Groin WDL  Scrotum/Coccyx/Buttocks WDL  (R) Leg Abrasion  (L) Leg WDL  (R) Heel/Foot/Toe WDL  (L) Heel/Foot/Toe WDL          Devices In Places Tele Box      Interventions In Place N/A    Possible Skin Injury No    Pictures Uploaded Into Epic No, needs to be completed  Wound Consult Placed N/A  RN Wound Prevention Protocol Ordered No

## 2022-07-21 NOTE — DISCHARGE PLANNING
Case Management Discharge Planning    Admission Date: 7/20/2022  GMLOS:    ALOS: 0    6-Clicks ADL Score: 24  6-Clicks Mobility Score: 24      Anticipated Discharge Dispo: Discharge Disposition: D/T to another type of health care inst. (70)    DME Needed: No    Action(s) Taken: Spoke to pt at bedside. Provided community resource list as requested.     Escalations Completed: None    Medically Clear: No    Next Steps:  f/u with medical team to discuss DC needs and barriers    Barriers to Discharge: Medical clearance    Is the patient up for discharge tomorrow: No

## 2022-07-21 NOTE — PROGRESS NOTES
Hospital Medicine Daily Progress Note    Date of Service  7/21/2022    Chief Complaint  Rhiannon Marrero is a 37 y.o. female admitted 7/20/2022 with abdominal pain    Hospital Course  Rhiannon Marrero is a 37 y.o. female who presented 7/20/2022 with pancreatitis. This is a female with a history of alcohol abuse, seizure disorder, bipolar disorder and multiple admissions for pancreatitis who has presented to our ER multiple times this month with abdominal pain. She was recently discharged 7/4 with alcoholic pancreatitis, and was subsequently seen in our ER on the 13th and 18th, at which times her lipase values were normal and her EtOH levels were positive. After the visit on the 18th, she went to alcohol rehab. She presented to hospital today from [facility]  with complaints of recurrent epigastric pain that radiates to her back with nausea and vomiting. She states she has been unable to keep anything down all day. She denies fever.  States her last drink was 5d ago but did have a positive EtOH level on the 18th, does attest more seizure activity while withdrawing. In the ER, she was tachycardic to 106, labs remarkable only for a lipase of 1596.    Interval Problem Update  Still having abdominal pain.  Alcohol cessation education was given and she stated that she has appointment with alcohol cessation program after discharge.  Continue to monitor for CIWA.  Supportive care    I have discussed this patient's plan of care and discharge plan at IDT rounds today with Case Management, Nursing, Nursing leadership, and other members of the IDT team.    Consultants/Specialty      Code Status  Full Code    Disposition  Patient is not medically cleared for discharge.   Anticipate discharge to to home with close outpatient follow-up.  I have placed the appropriate orders for post-discharge needs.    Review of Systems  Review of Systems   Constitutional: Negative for chills, fever and weight loss.   HENT: Negative for congestion,  hearing loss, nosebleeds, sinus pain and sore throat.    Eyes: Negative for blurred vision, pain, discharge and redness.   Respiratory: Negative for cough, sputum production, shortness of breath, wheezing and stridor.    Cardiovascular: Negative for chest pain, palpitations, orthopnea and claudication.   Gastrointestinal: Positive for abdominal pain. Negative for blood in stool, constipation, diarrhea, heartburn, nausea and vomiting.   Genitourinary: Negative for dysuria, frequency, hematuria and urgency.   Musculoskeletal: Negative for back pain, myalgias and neck pain.   Skin: Negative for itching and rash.   Neurological: Negative for dizziness, focal weakness, seizures and headaches.   Psychiatric/Behavioral: Negative for depression. The patient is not nervous/anxious and does not have insomnia.         Physical Exam  Temp:  [36.3 °C (97.4 °F)-37.3 °C (99.2 °F)] 36.5 °C (97.7 °F)  Pulse:  [] 80  Resp:  [16-18] 18  BP: (100-147)/() 100/70  SpO2:  [96 %-100 %] 99 %    Physical Exam  Vitals reviewed.   Constitutional:       General: She is not in acute distress.     Appearance: Normal appearance. She is normal weight. She is not ill-appearing, toxic-appearing or diaphoretic.   HENT:      Head: Normocephalic and atraumatic.      Right Ear: Tympanic membrane, ear canal and external ear normal.      Left Ear: Tympanic membrane, ear canal and external ear normal.      Nose: No congestion or rhinorrhea.   Eyes:      Extraocular Movements: Extraocular movements intact.      Conjunctiva/sclera: Conjunctivae normal.      Pupils: Pupils are equal, round, and reactive to light.   Cardiovascular:      Rate and Rhythm: Normal rate and regular rhythm.      Pulses: Normal pulses.      Heart sounds: Normal heart sounds. No murmur heard.    No friction rub. No gallop.   Pulmonary:      Effort: Pulmonary effort is normal. No respiratory distress.      Breath sounds: Normal breath sounds. No stridor. No wheezing or  rhonchi.   Abdominal:      General: Bowel sounds are normal. There is no distension.      Palpations: Abdomen is soft. There is no mass.      Tenderness: There is abdominal tenderness. There is no guarding or rebound.      Hernia: No hernia is present.   Musculoskeletal:         General: No swelling or tenderness.      Cervical back: Normal range of motion and neck supple.   Skin:     General: Skin is warm.      Findings: No erythema.   Neurological:      General: No focal deficit present.      Mental Status: She is alert and oriented to person, place, and time.         Fluids    Intake/Output Summary (Last 24 hours) at 7/21/2022 1126  Last data filed at 7/20/2022 2000  Gross per 24 hour   Intake 362.5 ml   Output --   Net 362.5 ml       Laboratory  Recent Labs     07/20/22  1332 07/21/22  0208   WBC 6.7 4.4*   RBC 4.65 3.54*   HEMOGLOBIN 15.3 11.6*   HEMATOCRIT 43.8 34.4*   MCV 94.2 97.2   MCH 32.9 32.8   MCHC 34.9 33.7   RDW 49.0 50.2*   PLATELETCT 273 187   MPV 9.5 10.4     Recent Labs     07/20/22  1332 07/21/22  0208   SODIUM 135 138   POTASSIUM 4.0 3.3*   CHLORIDE 100 107   CO2 20 21   GLUCOSE 102* 74   BUN 9 5*   CREATININE 0.66 0.54   CALCIUM 9.4 8.0*                   Imaging  No orders to display        Assessment/Plan  * Acute pancreatitis without infection or necrosis- (present on admission)  Assessment & Plan  - EtOH pancreatitis,  -Aggressive IV fluid resuscitation, pain control  - No s/s of infection, no need for CT at that time   - NPO today with orders to advance diet as tolerated to low fat, GI soft  - RUQ u/s done on the 18th without evidence of gallstones  - Lipid panel on the 3rd without hypertriglyceridemia      Seizure (HCC)- (present on admission)  Assessment & Plan  - Pt with seizure d/o, follows with Dr Ashley  - [place] home Keppra and Zonisamide       Alcohol dependence with withdrawal (HCC)- (present on admission)  Assessment & Plan  - Pt states last EtOH was 5 days ago, had a positive EtOH  level on the 18th  - Was in [facility] for detox, plans to go to a 28d facility after dc from hospital but does not know where she is going  -  consult placed for help with resources  - CIWA protocol ordered  - EtOH level ordered  - Seizure precautions - pt with underlying seizure d/o, states increased frequency when withdrawing.    Bipolar affective disorder, current episode hypomanic (HCC)- (present on admission)  Assessment & Plan  - Resume home Seroquel        VTE prophylaxis: enoxaparin ppx    I have performed a physical exam and reviewed and updated ROS and Plan today (7/21/2022). In review of yesterday's note (7/20/2022), there are no changes except as documented above.

## 2022-07-21 NOTE — CARE PLAN
The patient is Watcher - Medium risk of patient condition declining or worsening    Shift Goals  Clinical Goals: pain managment  Patient Goals: pain control, rest    Progress made toward(s) clinical / shift goals:    Problem: Pain - Standard  Goal: Alleviation of pain or a reduction in pain to the patient’s comfort goal  Outcome: Progressing  Note: Prn pain medications effective in keeping patient's pain more controlled at this time.     Problem: Knowledge Deficit - Standard  Goal: Patient and family/care givers will demonstrate understanding of plan of care, disease process/condition, diagnostic tests and medications  Outcome: Progressing     Problem: Psychosocial  Goal: Patient's level of anxiety will decrease  Outcome: Progressing       Patient is not progressing towards the following goals:

## 2022-07-22 LAB
ALBUMIN SERPL BCP-MCNC: 3 G/DL (ref 3.2–4.9)
ALBUMIN/GLOB SERPL: 1.3 G/DL
ALP SERPL-CCNC: 84 U/L (ref 30–99)
ALT SERPL-CCNC: 13 U/L (ref 2–50)
ANION GAP SERPL CALC-SCNC: 9 MMOL/L (ref 7–16)
AST SERPL-CCNC: 15 U/L (ref 12–45)
BILIRUB SERPL-MCNC: 0.5 MG/DL (ref 0.1–1.5)
BUN SERPL-MCNC: 2 MG/DL (ref 8–22)
CALCIUM SERPL-MCNC: 8.1 MG/DL (ref 8.4–10.2)
CHLORIDE SERPL-SCNC: 109 MMOL/L (ref 96–112)
CO2 SERPL-SCNC: 21 MMOL/L (ref 20–33)
CREAT SERPL-MCNC: 0.51 MG/DL (ref 0.5–1.4)
GFR SERPLBLD CREATININE-BSD FMLA CKD-EPI: 125 ML/MIN/1.73 M 2
GLOBULIN SER CALC-MCNC: 2.3 G/DL (ref 1.9–3.5)
GLUCOSE SERPL-MCNC: 78 MG/DL (ref 65–99)
POTASSIUM SERPL-SCNC: 3.3 MMOL/L (ref 3.6–5.5)
PROT SERPL-MCNC: 5.3 G/DL (ref 6–8.2)
SODIUM SERPL-SCNC: 139 MMOL/L (ref 135–145)

## 2022-07-22 PROCEDURE — 80053 COMPREHEN METABOLIC PANEL: CPT

## 2022-07-22 PROCEDURE — 770006 HCHG ROOM/CARE - MED/SURG/GYN SEMI*

## 2022-07-22 PROCEDURE — 700105 HCHG RX REV CODE 258: Performed by: INTERNAL MEDICINE

## 2022-07-22 PROCEDURE — 700111 HCHG RX REV CODE 636 W/ 250 OVERRIDE (IP): Performed by: FAMILY MEDICINE

## 2022-07-22 PROCEDURE — 94760 N-INVAS EAR/PLS OXIMETRY 1: CPT

## 2022-07-22 PROCEDURE — 700102 HCHG RX REV CODE 250 W/ 637 OVERRIDE(OP): Performed by: FAMILY MEDICINE

## 2022-07-22 PROCEDURE — 99233 SBSQ HOSP IP/OBS HIGH 50: CPT | Performed by: INTERNAL MEDICINE

## 2022-07-22 PROCEDURE — A9270 NON-COVERED ITEM OR SERVICE: HCPCS | Performed by: FAMILY MEDICINE

## 2022-07-22 RX ADMIN — SODIUM CHLORIDE, POTASSIUM CHLORIDE, SODIUM LACTATE AND CALCIUM CHLORIDE: 600; 310; 30; 20 INJECTION, SOLUTION INTRAVENOUS at 11:23

## 2022-07-22 RX ADMIN — THIAMINE HCL TAB 100 MG 100 MG: 100 TAB at 06:00

## 2022-07-22 RX ADMIN — HYDROMORPHONE HYDROCHLORIDE 0.5 MG: 1 INJECTION, SOLUTION INTRAMUSCULAR; INTRAVENOUS; SUBCUTANEOUS at 15:25

## 2022-07-22 RX ADMIN — SODIUM CHLORIDE, POTASSIUM CHLORIDE, SODIUM LACTATE AND CALCIUM CHLORIDE: 600; 310; 30; 20 INJECTION, SOLUTION INTRAVENOUS at 01:52

## 2022-07-22 RX ADMIN — HYDROMORPHONE HYDROCHLORIDE 0.5 MG: 1 INJECTION, SOLUTION INTRAMUSCULAR; INTRAVENOUS; SUBCUTANEOUS at 21:30

## 2022-07-22 RX ADMIN — OMEPRAZOLE 20 MG: 20 CAPSULE, DELAYED RELEASE ORAL at 05:26

## 2022-07-22 RX ADMIN — HYDROMORPHONE HYDROCHLORIDE 0.5 MG: 1 INJECTION, SOLUTION INTRAMUSCULAR; INTRAVENOUS; SUBCUTANEOUS at 18:34

## 2022-07-22 RX ADMIN — LEVETIRACETAM 1000 MG: 500 TABLET, FILM COATED ORAL at 05:26

## 2022-07-22 RX ADMIN — OXYCODONE HYDROCHLORIDE 10 MG: 10 TABLET ORAL at 02:17

## 2022-07-22 RX ADMIN — LEVETIRACETAM 1000 MG: 500 TABLET, FILM COATED ORAL at 16:37

## 2022-07-22 RX ADMIN — HYDROMORPHONE HYDROCHLORIDE 0.5 MG: 1 INJECTION, SOLUTION INTRAMUSCULAR; INTRAVENOUS; SUBCUTANEOUS at 06:25

## 2022-07-22 RX ADMIN — THERA TABS 1 TABLET: TAB at 05:25

## 2022-07-22 RX ADMIN — OXYCODONE HYDROCHLORIDE 10 MG: 10 TABLET ORAL at 17:14

## 2022-07-22 RX ADMIN — OXYCODONE HYDROCHLORIDE 10 MG: 10 TABLET ORAL at 20:22

## 2022-07-22 RX ADMIN — ONDANSETRON 4 MG: 2 INJECTION INTRAMUSCULAR; INTRAVENOUS at 12:25

## 2022-07-22 RX ADMIN — FOLIC ACID 1 MG: 1 TABLET ORAL at 05:26

## 2022-07-22 RX ADMIN — ZONISAMIDE 300 MG: 50 CAPSULE ORAL at 20:23

## 2022-07-22 RX ADMIN — OXYCODONE HYDROCHLORIDE 10 MG: 10 TABLET ORAL at 11:18

## 2022-07-22 RX ADMIN — OXYCODONE HYDROCHLORIDE 10 MG: 10 TABLET ORAL at 05:26

## 2022-07-22 RX ADMIN — OXYCODONE HYDROCHLORIDE 10 MG: 10 TABLET ORAL at 14:24

## 2022-07-22 RX ADMIN — HYDROMORPHONE HYDROCHLORIDE 0.5 MG: 1 INJECTION, SOLUTION INTRAMUSCULAR; INTRAVENOUS; SUBCUTANEOUS at 12:21

## 2022-07-22 RX ADMIN — HYDROMORPHONE HYDROCHLORIDE 0.5 MG: 1 INJECTION, SOLUTION INTRAMUSCULAR; INTRAVENOUS; SUBCUTANEOUS at 09:29

## 2022-07-22 RX ADMIN — OXYCODONE HYDROCHLORIDE 10 MG: 10 TABLET ORAL at 08:20

## 2022-07-22 RX ADMIN — QUETIAPINE FUMARATE 150 MG: 100 TABLET ORAL at 20:23

## 2022-07-22 RX ADMIN — ONDANSETRON 4 MG: 2 INJECTION INTRAMUSCULAR; INTRAVENOUS at 12:23

## 2022-07-22 RX ADMIN — SODIUM CHLORIDE, POTASSIUM CHLORIDE, SODIUM LACTATE AND CALCIUM CHLORIDE: 600; 310; 30; 20 INJECTION, SOLUTION INTRAVENOUS at 06:26

## 2022-07-22 ASSESSMENT — ENCOUNTER SYMPTOMS
BACK PAIN: 0
FOCAL WEAKNESS: 0
EYE DISCHARGE: 0
CHILLS: 0
STRIDOR: 0
CLAUDICATION: 0
EYE PAIN: 0
DIZZINESS: 0
CONSTIPATION: 0
BLURRED VISION: 0
NERVOUS/ANXIOUS: 0
DEPRESSION: 0
ABDOMINAL PAIN: 1
HEADACHES: 0
EYE REDNESS: 0
BLOOD IN STOOL: 0
NAUSEA: 0
ORTHOPNEA: 0
COUGH: 0
SHORTNESS OF BREATH: 0
NECK PAIN: 0
MYALGIAS: 0
SINUS PAIN: 0
VOMITING: 0
WEIGHT LOSS: 0
FEVER: 0
SPUTUM PRODUCTION: 0
PALPITATIONS: 0
SEIZURES: 0
DIARRHEA: 0

## 2022-07-22 ASSESSMENT — LIFESTYLE VARIABLES
HEADACHE, FULLNESS IN HEAD: NOT PRESENT
AUDITORY DISTURBANCES: NOT PRESENT
TREMOR: NO TREMOR
VISUAL DISTURBANCES: NOT PRESENT
TREMOR: NO TREMOR
AGITATION: NORMAL ACTIVITY
ANXIETY: MILDLY ANXIOUS
NAUSEA AND VOMITING: NO NAUSEA AND NO VOMITING
TOTAL SCORE: 1
HEADACHE, FULLNESS IN HEAD: NOT PRESENT
NAUSEA AND VOMITING: NO NAUSEA AND NO VOMITING
AGITATION: NORMAL ACTIVITY
ORIENTATION AND CLOUDING OF SENSORIUM: ORIENTED AND CAN DO SERIAL ADDITIONS
PAROXYSMAL SWEATS: NO SWEAT VISIBLE
PAROXYSMAL SWEATS: NO SWEAT VISIBLE
ANXIETY: MILDLY ANXIOUS
TOTAL SCORE: 1
AUDITORY DISTURBANCES: NOT PRESENT
VISUAL DISTURBANCES: NOT PRESENT
ORIENTATION AND CLOUDING OF SENSORIUM: ORIENTED AND CAN DO SERIAL ADDITIONS

## 2022-07-22 ASSESSMENT — PAIN DESCRIPTION - PAIN TYPE
TYPE: ACUTE PAIN

## 2022-07-22 ASSESSMENT — FIBROSIS 4 INDEX: FIB4 SCORE: 0.78

## 2022-07-22 NOTE — CARE PLAN
The patient is Stable - Low risk of patient condition declining or worsening    Shift Goals  Clinical Goals: manage pain, IVF therapy  Patient Goals: sleep comfortably    Progress made toward(s) clinical / shift goals:      Problem: Pain - Standard  Goal: Alleviation of pain or a reduction in pain to the patient’s comfort goal  Outcome: Progressing     Problem: Knowledge Deficit - Standard  Goal: Patient and family/care givers will demonstrate understanding of plan of care, disease process/condition, diagnostic tests and medications  Outcome: Progressing     Problem: Optimal Care for Alcohol Withdrawal  Goal: Optimal Care for the alcohol withdrawal patient  Outcome: Progressing     Problem: Seizure Precautions  Goal: Implementation of seizure precautions  Outcome: Progressing     Problem: Lifestyle Changes  Goal: Patient's ability to identify lifestyle changes and available resources to help reduce recurrence of condition will improve  Outcome: Progressing  Note: Pt verbalizes desire to stop drinking     Problem: Psychosocial  Goal: Patient's level of anxiety will decrease  Outcome: Progressing       Patient is not progressing towards the following goals:

## 2022-07-22 NOTE — PROGRESS NOTES
Hospital Medicine Daily Progress Note    Date of Service  7/22/2022    Chief Complaint  Rhiannon Marrero is a 37 y.o. female admitted 7/20/2022 with abdominal pain    Hospital Course  Rhiannon Marrero is a 37 y.o. female who presented 7/20/2022 with pancreatitis. This is a female with a history of alcohol abuse, seizure disorder, bipolar disorder and multiple admissions for pancreatitis who has presented to our ER multiple times this month with abdominal pain. She was recently discharged 7/4 with alcoholic pancreatitis, and was subsequently seen in our ER on the 13th and 18th, at which times her lipase values were normal and her EtOH levels were positive. After the visit on the 18th, she went to alcohol rehab. She presented to hospital today from [facility]  with complaints of recurrent epigastric pain that radiates to her back with nausea and vomiting. She states she has been unable to keep anything down all day. She denies fever.  States her last drink was 5d ago but did have a positive EtOH level on the 18th, does attest more seizure activity while withdrawing. In the ER, she was tachycardic to 106, labs remarkable only for a lipase of 1596.    Interval Problem Update  Still having abdominal pain.  Alcohol cessation education was given and she stated that she has appointment with alcohol cessation program after discharge.  Continue to monitor for CIWA.  Supportive care    7/22 complaining about abdominal pain.  No bowel movement yet.  Alcohol withdrawal symptom is improving.  Continue supportive care.  I have discussed this patient's plan of care and discharge plan at IDT rounds today with Case Management, Nursing, Nursing leadership, and other members of the IDT team.    Consultants/Specialty      Code Status  Full Code    Disposition  Patient is not medically cleared for discharge.   Anticipate discharge to to home with close outpatient follow-up.  I have placed the appropriate orders for post-discharge  needs.    Review of Systems  Review of Systems   Constitutional: Negative for chills, fever and weight loss.   HENT: Negative for congestion, hearing loss, nosebleeds and sinus pain.    Eyes: Negative for blurred vision, pain, discharge and redness.   Respiratory: Negative for cough, sputum production, shortness of breath and stridor.    Cardiovascular: Negative for chest pain, palpitations, orthopnea and claudication.   Gastrointestinal: Positive for abdominal pain. Negative for blood in stool, constipation, diarrhea, nausea and vomiting.   Genitourinary: Negative for dysuria, frequency, hematuria and urgency.   Musculoskeletal: Negative for back pain, myalgias and neck pain.   Skin: Negative for itching and rash.   Neurological: Negative for dizziness, focal weakness, seizures and headaches.   Psychiatric/Behavioral: Negative for depression. The patient is not nervous/anxious.         Physical Exam  Temp:  [36.2 °C (97.1 °F)-36.9 °C (98.5 °F)] 36.9 °C (98.5 °F)  Pulse:  [78-97] 94  Resp:  [16-18] 16  BP: (100-117)/(64-78) 117/78  SpO2:  [95 %-100 %] 100 %    Physical Exam  Vitals reviewed.   Constitutional:       General: She is not in acute distress.     Appearance: Normal appearance. She is normal weight. She is not ill-appearing, toxic-appearing or diaphoretic.   HENT:      Head: Normocephalic and atraumatic.      Right Ear: Tympanic membrane and ear canal normal.      Left Ear: Tympanic membrane and ear canal normal.      Nose: No congestion or rhinorrhea.   Eyes:      Extraocular Movements: Extraocular movements intact.      Pupils: Pupils are equal, round, and reactive to light.   Cardiovascular:      Rate and Rhythm: Normal rate and regular rhythm.      Pulses: Normal pulses.      Heart sounds: Normal heart sounds. No murmur heard.    No friction rub. No gallop.   Pulmonary:      Effort: Pulmonary effort is normal. No respiratory distress.      Breath sounds: Normal breath sounds. No stridor. No wheezing or  rhonchi.   Abdominal:      General: Bowel sounds are normal. There is no distension.      Palpations: Abdomen is soft. There is no mass.      Tenderness: There is abdominal tenderness. There is no guarding or rebound.      Hernia: No hernia is present.   Musculoskeletal:         General: No swelling or tenderness.      Cervical back: Normal range of motion and neck supple.   Skin:     General: Skin is warm.      Findings: No erythema.   Neurological:      General: No focal deficit present.      Mental Status: She is alert.         Fluids    Intake/Output Summary (Last 24 hours) at 7/22/2022 1032  Last data filed at 7/22/2022 0150  Gross per 24 hour   Intake --   Output 300 ml   Net -300 ml       Laboratory  Recent Labs     07/20/22  1332 07/21/22  0208   WBC 6.7 4.4*   RBC 4.65 3.54*   HEMOGLOBIN 15.3 11.6*   HEMATOCRIT 43.8 34.4*   MCV 94.2 97.2   MCH 32.9 32.8   MCHC 34.9 33.7   RDW 49.0 50.2*   PLATELETCT 273 187   MPV 9.5 10.4     Recent Labs     07/20/22  1332 07/21/22  0208 07/22/22  0137   SODIUM 135 138 139   POTASSIUM 4.0 3.3* 3.3*   CHLORIDE 100 107 109   CO2 20 21 21   GLUCOSE 102* 74 78   BUN 9 5* 2*   CREATININE 0.66 0.54 0.51   CALCIUM 9.4 8.0* 8.1*                   Imaging  No orders to display        Assessment/Plan  * Acute pancreatitis without infection or necrosis- (present on admission)  Assessment & Plan  - EtOH pancreatitis,  -Aggressive IV fluid resuscitation, pain control  improving  - No s/s of infection, no need for CT at that time   - NPO today with orders to advance diet as tolerated to low fat, GI soft  - RUQ u/s done on the 18th without evidence of gallstones        Seizure (HCC)- (present on admission)  Assessment & Plan  - Pt with seizure d/o, follows with Dr Ashley  - Resume home Keppra and Zonisamide       Alcohol dependence with withdrawal (HCC)- (present on admission)  Assessment & Plan  - Pt states last EtOH was 5 days ago, had a positive EtOH level on the 18th  - Was in [facility] for  detox, plans to go to a 28d facility after dc from hospital but does not know where she is going  - SW consult placed for help with resources  - CIWA protocol ordered  - EtOH level ordered  - Seizure precautions - pt with underlying seizure d/o, states increased frequency when withdrawing.    Bipolar affective disorder, current episode hypomanic (HCC)- (present on admission)  Assessment & Plan  - Resume home Seroquel        VTE prophylaxis: enoxaparin ppx    I have performed a physical exam and reviewed and updated ROS and Plan today (7/22/2022). In review of yesterday's note (7/21/2022), there are no changes except as documented above.

## 2022-07-22 NOTE — PROGRESS NOTES
Telemetry Shift Summary     Rhythm SR/ ST  HR Range   Ectopy R PVC  Measurements 0.20/ 0.08/ 0.36    Per strip printed 0400     Normal Values  Rhythm SR  HR Range    Measurements 0.12-0.20 / 0.06-0.10  / 0.30-0.52

## 2022-07-22 NOTE — PROGRESS NOTES
RN received call from janae in the lab for a critical potassium value of 2.7. MD Dr. Mattson notified as per protocol, awaiting further orders.

## 2022-07-22 NOTE — PROGRESS NOTES
Telemetry Shift Summary    Rhythm NSR  HR Range 81-87  Ectopy rPVC  Measurements 0.20/0.10/0.40        Normal Values  Rhythm SR  HR Range    Measurements 0.12-0.20 / 0.06-0.10  / 0.30-0.52

## 2022-07-23 PROCEDURE — 94760 N-INVAS EAR/PLS OXIMETRY 1: CPT

## 2022-07-23 PROCEDURE — 99233 SBSQ HOSP IP/OBS HIGH 50: CPT | Performed by: INTERNAL MEDICINE

## 2022-07-23 PROCEDURE — 700102 HCHG RX REV CODE 250 W/ 637 OVERRIDE(OP): Performed by: FAMILY MEDICINE

## 2022-07-23 PROCEDURE — 700111 HCHG RX REV CODE 636 W/ 250 OVERRIDE (IP): Performed by: FAMILY MEDICINE

## 2022-07-23 PROCEDURE — 700111 HCHG RX REV CODE 636 W/ 250 OVERRIDE (IP): Performed by: INTERNAL MEDICINE

## 2022-07-23 PROCEDURE — 700105 HCHG RX REV CODE 258: Performed by: INTERNAL MEDICINE

## 2022-07-23 PROCEDURE — A9270 NON-COVERED ITEM OR SERVICE: HCPCS | Performed by: FAMILY MEDICINE

## 2022-07-23 PROCEDURE — 770006 HCHG ROOM/CARE - MED/SURG/GYN SEMI*

## 2022-07-23 RX ORDER — MORPHINE SULFATE 4 MG/ML
2 INJECTION INTRAVENOUS EVERY 4 HOURS PRN
Status: DISCONTINUED | OUTPATIENT
Start: 2022-07-23 | End: 2022-07-24 | Stop reason: HOSPADM

## 2022-07-23 RX ADMIN — THERA TABS 1 TABLET: TAB at 05:41

## 2022-07-23 RX ADMIN — MORPHINE SULFATE 2 MG: 4 INJECTION INTRAVENOUS at 20:42

## 2022-07-23 RX ADMIN — ZONISAMIDE 300 MG: 50 CAPSULE ORAL at 20:51

## 2022-07-23 RX ADMIN — OXYCODONE HYDROCHLORIDE 10 MG: 10 TABLET ORAL at 04:41

## 2022-07-23 RX ADMIN — MORPHINE SULFATE 2 MG: 4 INJECTION INTRAVENOUS at 14:06

## 2022-07-23 RX ADMIN — OXYCODONE HYDROCHLORIDE 10 MG: 10 TABLET ORAL at 12:57

## 2022-07-23 RX ADMIN — MORPHINE SULFATE 2 MG: 4 INJECTION INTRAVENOUS at 10:19

## 2022-07-23 RX ADMIN — OXYCODONE HYDROCHLORIDE 10 MG: 10 TABLET ORAL at 16:07

## 2022-07-23 RX ADMIN — OXYCODONE HYDROCHLORIDE 10 MG: 10 TABLET ORAL at 09:04

## 2022-07-23 RX ADMIN — SODIUM CHLORIDE, POTASSIUM CHLORIDE, SODIUM LACTATE AND CALCIUM CHLORIDE: 600; 310; 30; 20 INJECTION, SOLUTION INTRAVENOUS at 20:49

## 2022-07-23 RX ADMIN — THIAMINE HCL TAB 100 MG 100 MG: 100 TAB at 05:41

## 2022-07-23 RX ADMIN — HYDROMORPHONE HYDROCHLORIDE 0.5 MG: 1 INJECTION, SOLUTION INTRAMUSCULAR; INTRAVENOUS; SUBCUTANEOUS at 05:40

## 2022-07-23 RX ADMIN — OXYCODONE HYDROCHLORIDE 10 MG: 10 TABLET ORAL at 22:46

## 2022-07-23 RX ADMIN — OXYCODONE HYDROCHLORIDE 10 MG: 10 TABLET ORAL at 19:09

## 2022-07-23 RX ADMIN — LEVETIRACETAM 1000 MG: 500 TABLET, FILM COATED ORAL at 05:41

## 2022-07-23 RX ADMIN — SODIUM CHLORIDE, POTASSIUM CHLORIDE, SODIUM LACTATE AND CALCIUM CHLORIDE: 600; 310; 30; 20 INJECTION, SOLUTION INTRAVENOUS at 13:08

## 2022-07-23 RX ADMIN — ONDANSETRON 4 MG: 2 INJECTION INTRAMUSCULAR; INTRAVENOUS at 17:19

## 2022-07-23 RX ADMIN — MORPHINE SULFATE 2 MG: 4 INJECTION INTRAVENOUS at 17:13

## 2022-07-23 RX ADMIN — QUETIAPINE FUMARATE 150 MG: 100 TABLET ORAL at 20:45

## 2022-07-23 RX ADMIN — FOLIC ACID 1 MG: 1 TABLET ORAL at 05:41

## 2022-07-23 RX ADMIN — OMEPRAZOLE 20 MG: 20 CAPSULE, DELAYED RELEASE ORAL at 05:41

## 2022-07-23 RX ADMIN — LEVETIRACETAM 1000 MG: 500 TABLET, FILM COATED ORAL at 17:13

## 2022-07-23 ASSESSMENT — ENCOUNTER SYMPTOMS
FEVER: 0
EYE REDNESS: 0
SEIZURES: 0
EYE PAIN: 0
CONSTIPATION: 0
HEADACHES: 0
CHILLS: 0
WEIGHT LOSS: 0
DIARRHEA: 0
EYE DISCHARGE: 0
COUGH: 0
NERVOUS/ANXIOUS: 0
ORTHOPNEA: 0
BACK PAIN: 0
CLAUDICATION: 0
PALPITATIONS: 0
SPUTUM PRODUCTION: 0
FOCAL WEAKNESS: 0
MYALGIAS: 0
BLURRED VISION: 0
SHORTNESS OF BREATH: 0
BLOOD IN STOOL: 0
VOMITING: 0
DIZZINESS: 0
NECK PAIN: 0
STRIDOR: 0
SINUS PAIN: 0
ABDOMINAL PAIN: 1
NAUSEA: 0
DEPRESSION: 0

## 2022-07-23 ASSESSMENT — PAIN DESCRIPTION - PAIN TYPE
TYPE: ACUTE PAIN

## 2022-07-23 NOTE — CARE PLAN
The patient is Stable - Low risk of patient condition declining or worsening    Shift Goals  Clinical Goals: manage pain, IVF therapy  Patient Goals: manage pain, sleep    Progress made toward(s) clinical / shift goals:      Problem: Pain - Standard  Goal: Alleviation of pain or a reduction in pain to the patient’s comfort goal  Outcome: Progressing     Problem: Knowledge Deficit - Standard  Goal: Patient and family/care givers will demonstrate understanding of plan of care, disease process/condition, diagnostic tests and medications  Outcome: Progressing     Problem: Optimal Care for Alcohol Withdrawal  Goal: Optimal Care for the alcohol withdrawal patient  Outcome: Progressing     Problem: Psychosocial  Goal: Patient's level of anxiety will decrease  Outcome: Progressing       Patient is not progressing towards the following goals:

## 2022-07-23 NOTE — PROGRESS NOTES
Received bedside report from LESLIE Prasad, pt care assumed. VSS, pt assessment complete. Pt A&Ox4, no c/o pain at this time. POC discussed with pt and verbalizes no questions. Pt denies any additional needs at this time. Bed locked and in lowest position. Pt educated on fall risk and verbalized understanding, call light within reach, hourly rounding initiated.

## 2022-07-23 NOTE — PROGRESS NOTES
Telemetry Shift Summary     Rhythm SR  HR Range 79-96  Ectopy R PVC  Measurements 0.20/ 0.08/ 0.36         Normal Values  Rhythm SR  HR Range    Measurements 0.12-0.20 / 0.06-0.10  / 0.30-0.52

## 2022-07-23 NOTE — PROGRESS NOTES
Hospital Medicine Daily Progress Note    Date of Service  7/23/2022    Chief Complaint  Rhiannon Marrero is a 37 y.o. female admitted 7/20/2022 with abdominal pain    Hospital Course  Rhiannon Marrero is a 37 y.o. female who presented 7/20/2022 with pancreatitis. This is a female with a history of alcohol abuse, seizure disorder, bipolar disorder and multiple admissions for pancreatitis who has presented to our ER multiple times this month with abdominal pain. She was recently discharged 7/4 with alcoholic pancreatitis, and was subsequently seen in our ER on the 13th and 18th, at which times her lipase values were normal and her EtOH levels were positive. After the visit on the 18th, she went to alcohol rehab. She presented to hospital today from [facility]  with complaints of recurrent epigastric pain that radiates to her back with nausea and vomiting. She states she has been unable to keep anything down all day. She denies fever.  States her last drink was 5d ago but did have a positive EtOH level on the 18th, does attest more seizure activity while withdrawing. In the ER, she was tachycardic to 106, labs remarkable only for a lipase of 1596.    Interval Problem Update  Still having abdominal pain.  Alcohol cessation education was given and she stated that she has appointment with alcohol cessation program after discharge.  Continue to monitor for CIWA.  Supportive care    7/22 complaining about abdominal pain.  No bowel movement yet.  Alcohol withdrawal symptom is improving.  Continue supportive care.    7/23 still complaint about pain. Will adjust some meds for her.    I have discussed this patient's plan of care and discharge plan at IDT rounds today with Case Management, Nursing, Nursing leadership, and other members of the IDT team.    Consultants/Specialty      Code Status  Full Code    Disposition  Patient is not medically cleared for discharge.   Anticipate discharge to to home with close outpatient  follow-up.  I have placed the appropriate orders for post-discharge needs.    Review of Systems  Review of Systems   Constitutional: Negative for chills, fever and weight loss.   HENT: Negative for congestion, hearing loss, nosebleeds and sinus pain.    Eyes: Negative for blurred vision, pain, discharge and redness.   Respiratory: Negative for cough, sputum production, shortness of breath and stridor.    Cardiovascular: Negative for chest pain, palpitations, orthopnea and claudication.   Gastrointestinal: Positive for abdominal pain. Negative for blood in stool, constipation, diarrhea, nausea and vomiting.   Genitourinary: Negative for dysuria, frequency, hematuria and urgency.   Musculoskeletal: Negative for back pain, myalgias and neck pain.   Skin: Negative for itching and rash.   Neurological: Negative for dizziness, focal weakness, seizures and headaches.   Psychiatric/Behavioral: Negative for depression. The patient is not nervous/anxious.         Physical Exam  Temp:  [36.5 °C (97.7 °F)-36.9 °C (98.5 °F)] 36.5 °C (97.7 °F)  Pulse:  [83-93] 86  Resp:  [16-18] 16  BP: (109-138)/(61-88) 125/69  SpO2:  [95 %-100 %] 95 %    Physical Exam  Vitals reviewed.   Constitutional:       General: She is not in acute distress.     Appearance: Normal appearance. She is normal weight. She is not ill-appearing, toxic-appearing or diaphoretic.   HENT:      Head: Normocephalic and atraumatic.      Right Ear: Tympanic membrane and ear canal normal.      Left Ear: Tympanic membrane and ear canal normal.      Nose: No congestion or rhinorrhea.   Eyes:      Extraocular Movements: Extraocular movements intact.      Pupils: Pupils are equal, round, and reactive to light.   Cardiovascular:      Rate and Rhythm: Normal rate and regular rhythm.      Pulses: Normal pulses.      Heart sounds: Normal heart sounds. No murmur heard.    No friction rub. No gallop.   Pulmonary:      Effort: Pulmonary effort is normal. No respiratory distress.       Breath sounds: Normal breath sounds. No stridor. No wheezing or rhonchi.   Abdominal:      General: Bowel sounds are normal. There is no distension.      Palpations: Abdomen is soft. There is no mass.      Tenderness: There is abdominal tenderness. There is no guarding or rebound.      Hernia: No hernia is present.   Musculoskeletal:         General: No swelling or tenderness.      Cervical back: Normal range of motion and neck supple.   Skin:     General: Skin is warm.      Findings: No erythema.   Neurological:      General: No focal deficit present.      Mental Status: She is alert.         Fluids    Intake/Output Summary (Last 24 hours) at 7/23/2022 1007  Last data filed at 7/23/2022 0800  Gross per 24 hour   Intake 120 ml   Output --   Net 120 ml       Laboratory  Recent Labs     07/20/22  1332 07/21/22  0208   WBC 6.7 4.4*   RBC 4.65 3.54*   HEMOGLOBIN 15.3 11.6*   HEMATOCRIT 43.8 34.4*   MCV 94.2 97.2   MCH 32.9 32.8   MCHC 34.9 33.7   RDW 49.0 50.2*   PLATELETCT 273 187   MPV 9.5 10.4     Recent Labs     07/20/22  1332 07/21/22  0208 07/22/22  0137   SODIUM 135 138 139   POTASSIUM 4.0 3.3* 3.3*   CHLORIDE 100 107 109   CO2 20 21 21   GLUCOSE 102* 74 78   BUN 9 5* 2*   CREATININE 0.66 0.54 0.51   CALCIUM 9.4 8.0* 8.1*                   Imaging  No orders to display        Assessment/Plan  * Acute pancreatitis without infection or necrosis- (present on admission)  Assessment & Plan  - EtOH pancreatitis,  -Aggressive IV fluid resuscitation, pain control  improving  - No s/s of infection, no need for CT at that time   - NPO today with orders to advance diet as tolerated to low fat, GI soft  - RUQ u/s done on the 18th without evidence of gallstones  - changed to morphine from dilaudid        Seizure (HCC)- (present on admission)  Assessment & Plan  - Pt with seizure d/o, follows with Dr Ashely  - Resume home Keppra and Zonisamide       Alcohol dependence with withdrawal (HCC)- (present on  admission)  Assessment & Plan  - Pt states last EtOH was 5 days ago, had a positive EtOH level on the 18th  - Was in [facility] for detox, plans to go to a 28d facility after dc from hospital but does not know where she is going  -  consult placed for help with resources  - CIWA protocol ordered  - EtOH level ordered  - Seizure precautions - pt with underlying seizure d/o, states increased frequency when withdrawing.    Bipolar affective disorder, current episode hypomanic (HCC)- (present on admission)  Assessment & Plan  - Resume home Seroquel        VTE prophylaxis: enoxaparin ppx    I have performed a physical exam and reviewed and updated ROS and Plan today (7/23/2022). In review of yesterday's note (7/22/2022), there are no changes except as documented above.

## 2022-07-23 NOTE — CARE PLAN
The patient is Stable - Low risk of patient condition declining or worsening    Shift Goals  Clinical Goals: Manage pain, IVF therapy.  Patient Goals: Pain control    Progress made toward(s) clinical / shift goals:    Problem: Pain - Standard  Goal: Alleviation of pain or a reduction in pain to the patient’s comfort goal  Outcome: Progressing  Note: Pt pain is treated with medication and emotional support.       Problem: Lifestyle Changes  Goal: Patient's ability to identify lifestyle changes and available resources to help reduce recurrence of condition will improve  Outcome: Progressing  Note: Pt is undergoing lifestyle changes as evident by AA book at bedside.        Patient is not progressing towards the following goals:

## 2022-07-24 VITALS
SYSTOLIC BLOOD PRESSURE: 145 MMHG | HEIGHT: 67 IN | WEIGHT: 129.19 LBS | RESPIRATION RATE: 19 BRPM | BODY MASS INDEX: 20.28 KG/M2 | DIASTOLIC BLOOD PRESSURE: 84 MMHG | TEMPERATURE: 98.2 F | HEART RATE: 86 BPM | OXYGEN SATURATION: 98 %

## 2022-07-24 LAB
ANION GAP SERPL CALC-SCNC: 11 MMOL/L (ref 7–16)
BUN SERPL-MCNC: <2 MG/DL (ref 8–22)
CALCIUM SERPL-MCNC: 8.4 MG/DL (ref 8.4–10.2)
CHLORIDE SERPL-SCNC: 106 MMOL/L (ref 96–112)
CO2 SERPL-SCNC: 22 MMOL/L (ref 20–33)
CREAT SERPL-MCNC: 0.58 MG/DL (ref 0.5–1.4)
GFR SERPLBLD CREATININE-BSD FMLA CKD-EPI: 121 ML/MIN/1.73 M 2
GLUCOSE SERPL-MCNC: 118 MG/DL (ref 65–99)
LIPASE SERPL-CCNC: 23 U/L (ref 7–58)
POTASSIUM SERPL-SCNC: 3.4 MMOL/L (ref 3.6–5.5)
SODIUM SERPL-SCNC: 139 MMOL/L (ref 135–145)

## 2022-07-24 PROCEDURE — 80048 BASIC METABOLIC PNL TOTAL CA: CPT

## 2022-07-24 PROCEDURE — 700105 HCHG RX REV CODE 258: Performed by: INTERNAL MEDICINE

## 2022-07-24 PROCEDURE — 700102 HCHG RX REV CODE 250 W/ 637 OVERRIDE(OP): Performed by: FAMILY MEDICINE

## 2022-07-24 PROCEDURE — 700111 HCHG RX REV CODE 636 W/ 250 OVERRIDE (IP): Performed by: INTERNAL MEDICINE

## 2022-07-24 PROCEDURE — 99239 HOSP IP/OBS DSCHRG MGMT >30: CPT | Performed by: INTERNAL MEDICINE

## 2022-07-24 PROCEDURE — A9270 NON-COVERED ITEM OR SERVICE: HCPCS | Performed by: FAMILY MEDICINE

## 2022-07-24 PROCEDURE — 83690 ASSAY OF LIPASE: CPT

## 2022-07-24 RX ORDER — OXYCODONE HYDROCHLORIDE 5 MG/1
5 TABLET ORAL
Qty: 12 TABLET | Refills: 0 | Status: SHIPPED | OUTPATIENT
Start: 2022-07-24 | End: 2022-07-27

## 2022-07-24 RX ORDER — OXYCODONE HYDROCHLORIDE 5 MG/1
5 TABLET ORAL
Qty: 12 TABLET | Refills: 0 | Status: SHIPPED | OUTPATIENT
Start: 2022-07-24 | End: 2022-07-24 | Stop reason: SDUPTHER

## 2022-07-24 RX ADMIN — SODIUM CHLORIDE, POTASSIUM CHLORIDE, SODIUM LACTATE AND CALCIUM CHLORIDE: 600; 310; 30; 20 INJECTION, SOLUTION INTRAVENOUS at 05:18

## 2022-07-24 RX ADMIN — MORPHINE SULFATE 2 MG: 4 INJECTION INTRAVENOUS at 07:18

## 2022-07-24 RX ADMIN — OMEPRAZOLE 20 MG: 20 CAPSULE, DELAYED RELEASE ORAL at 05:12

## 2022-07-24 RX ADMIN — FOLIC ACID 1 MG: 1 TABLET ORAL at 05:15

## 2022-07-24 RX ADMIN — OXYCODONE HYDROCHLORIDE 10 MG: 10 TABLET ORAL at 05:15

## 2022-07-24 RX ADMIN — MORPHINE SULFATE 2 MG: 4 INJECTION INTRAVENOUS at 00:12

## 2022-07-24 RX ADMIN — SENNOSIDES AND DOCUSATE SODIUM 2 TABLET: 50; 8.6 TABLET ORAL at 05:14

## 2022-07-24 RX ADMIN — THERA TABS 1 TABLET: TAB at 05:12

## 2022-07-24 RX ADMIN — THIAMINE HCL TAB 100 MG 100 MG: 100 TAB at 05:15

## 2022-07-24 RX ADMIN — OXYCODONE HYDROCHLORIDE 10 MG: 10 TABLET ORAL at 09:21

## 2022-07-24 RX ADMIN — LEVETIRACETAM 1000 MG: 500 TABLET, FILM COATED ORAL at 05:13

## 2022-07-24 ASSESSMENT — FIBROSIS 4 INDEX: FIB4 SCORE: 0.78

## 2022-07-24 ASSESSMENT — PAIN DESCRIPTION - PAIN TYPE
TYPE: ACUTE PAIN
TYPE: ACUTE PAIN

## 2022-07-24 ASSESSMENT — PAIN SCALES - WONG BAKER: WONGBAKER_NUMERICALRESPONSE: HURTS JUST A LITTLE BIT

## 2022-07-24 NOTE — PROGRESS NOTES
Discharged to home by car with friend. Discharged via wheelchair, hospital escort: Yes.  Special equipment needed: Not Applicable    Be sure to schedule a follow-up appointment with your primary care doctor or any specialists as instructed.     Discharge Plan:        I understand that a diet low in cholesterol, fat, and sodium is recommended for good health. Unless I have been given specific instructions below for another diet, I accept this instruction as my diet prescription.   Other diet: Clear liquids then advance to regular food as tolerated    Special Instructions: None    -Is this patient being discharged with medication to prevent blood clots?  No    · Is patient discharged on Warfarin / Coumadin?   No

## 2022-07-24 NOTE — CARE PLAN
The patient is Stable - Low risk of patient condition declining or worsening    Shift Goals  Clinical Goals: iv hydration  Patient Goals: pain control,tolerate liquid diet    Progress made toward(s) clinical / shift goals:  seizure precautions on place,  pt will verbalize a tolerable pain at comfort level    Patient is not progressing towards the following goals:      Problem: Pain - Standard  Goal: Alleviation of pain or a reduction in pain to the patient’s comfort goal  Outcome: Progressing     Problem: Psychosocial  Goal: Patient's level of anxiety will decrease  Outcome: Progressing     Problem: Seizure Precautions  Goal: Implementation of seizure precautions  Outcome: Progressing

## 2022-07-24 NOTE — DISCHARGE INSTRUCTIONS
Discharge Instructions    Discharged to home by car with friend. Discharged via wheelchair, hospital escort: Yes.  Special equipment needed: Not Applicable    Be sure to schedule a follow-up appointment with your primary care doctor or any specialists as instructed.     Discharge Plan:        I understand that a diet low in cholesterol, fat, and sodium is recommended for good health. Unless I have been given specific instructions below for another diet, I accept this instruction as my diet prescription.   Other diet: Clear liquids then advance to regular food as tolerated    Special Instructions: None    -Is this patient being discharged with medication to prevent blood clots?  No    Is patient discharged on Warfarin / Coumadin?   No

## 2022-07-24 NOTE — CARE PLAN
The patient is Stable - Low risk of patient condition declining or worsening    Shift Goals  Clinical Goals: Pain control, IV hydration  Patient Goals: Pain control    Progress made toward(s) clinical / shift goals:    Problem: Pain - Standard  Goal: Alleviation of pain or a reduction in pain to the patient’s comfort goal  Outcome: Progressing     Problem: Seizure Precautions  Goal: Implementation of seizure precautions  Outcome: Progressing       Patient is not progressing towards the following goals:

## 2022-07-24 NOTE — DISCHARGE SUMMARY
Discharge Summary    CHIEF COMPLAINT ON ADMISSION  Chief Complaint   Patient presents with   • Epigastric Pain   • Back Pain   • N/V       Reason for Admission  EMS     Admission Date  7/20/2022    CODE STATUS  Full Code    HPI & HOSPITAL COURSE  Rhiannon Marrero is a 37 y.o. female who presented 7/20/2022 with pancreatitis. This is a female with a history of alcohol abuse, seizure disorder, bipolar disorder and multiple admissions for pancreatitis who has presented to our ER multiple times this month with abdominal pain. She was recently discharged 7/4 with alcoholic pancreatitis, and was subsequently seen in our ER on the 13th and 18th, at which times her lipase values were normal and her EtOH levels were positive. After the visit on the 18th, she went to alcohol rehab. She presented to hospital today from [facility]  with complaints of recurrent epigastric pain that radiates to her back with nausea and vomiting. She states she has been unable to keep anything down all day. She denies fever.  States her last drink was 5d ago but did have a positive EtOH level on the 18th, does attest more seizure activity while withdrawing. In the ER, she was tachycardic to 106, labs remarkable only for a lipase of 1596.    She was treated supportively for her alcoholic pancreatitis with aggressive IV fluid.  Her symptoms continue to improve slowly.  Her repeat lipase today showed normal.  According to the patient her symptom is improving and she stated that she wanted to be discharged home today  She was given education about alcohol cessation and she stated that she has signed up for a program to help her with that.  She was also educated that she continue to eat very soft and liquidy food over the next several days or weeks and advance diet as tolerated and she need to make sure he drink plenty of water a day at least about 2 L a day.  Please call [phone] to schedule PCP appointment for patient.    Required specialty  appointments include:     As below  Therefore, she is discharged in fair and stable condition to home with close outpatient follow-up.    The patient met 2-midnight criteria for an inpatient stay at the time of discharge.    Discharge Date  07/24/22      FOLLOW UP ITEMS POST DISCHARGE      DISCHARGE DIAGNOSES  Principal Problem:    Acute pancreatitis without infection or necrosis POA: Yes  Active Problems:    Bipolar affective disorder, current episode hypomanic (HCC) POA: Yes    Alcohol dependence with withdrawal (HCC) POA: Yes    Seizure (HCC) POA: Yes    Pancreatitis, unspecified pancreatitis type POA: Yes  Resolved Problems:    * No resolved hospital problems. *      FOLLOW UP  Future Appointments   Date Time Provider Department Center   8/1/2022  9:00 AM Khushboo Bullock M.D. [facility] UNREINALDO Adams M.D.  [address] [facility] [id number]  [phone]    Follow up in 1 week(s)        MEDICATIONS ON DISCHARGE     Medication List      START taking these medications      Instructions   oxyCODONE immediate-release 5 MG Tabs  Commonly known as: ROXICODONE   Take 1 Tablet by mouth every 3 hours as needed for Severe Pain for up to 3 days.  Dose: 5 mg        CONTINUE taking these medications      Instructions   acetaminophen 500 MG Tabs  Commonly known as: TYLENOL   Take 500-1,000 mg by mouth every 6 hours as needed for Moderate Pain.  Dose: 500-1,000 mg     famotidine 20 MG Tabs  Commonly known as: PEPCID   Take 1 Tablet by mouth every day.  Dose: 20 mg     levetiracetam 1000 MG tablet  Commonly known as: KEPPRA   Take 1 Tablet by mouth 2 times a day. Indications: Seizure  Dose: 1,000 mg     ondansetron 4 MG Tbdp  Commonly known as: ZOFRAN ODT   Take 1 Tablet by mouth every 8 hours as needed for Nausea.  Dose: 4 mg     SEROquel 50 MG tablet  Generic drug: QUEtiapine   Take 150 mg by mouth at bedtime.  Dose: 150 mg     zonisamide 50 MG capsule  Commonly known as: ZONEGRAN   Take 300 mg by mouth at  bedtime.  Dose: 300 mg            Allergies  Allergies   Allergen Reactions   • Promethazine Hcl Anxiety       DIET  Orders Placed This Encounter   Procedures   • Diet Order Diet: Clear Liquid     Standing Status:   Standing     Number of Occurrences:   1     Order Specific Question:   Diet:     Answer:   Clear Liquid [10]       ACTIVITY  As tolerated.  Weight bearing as tolerated    CONSULTATIONS      PROCEDURES      LABORATORY  Lab Results   Component Value Date    SODIUM 139 07/24/2022    POTASSIUM 3.4 (L) 07/24/2022    CHLORIDE 106 07/24/2022    CO2 22 07/24/2022    GLUCOSE 118 (H) 07/24/2022    BUN <2 (L) 07/24/2022    CREATININE 0.58 07/24/2022    CREATININE 0.7 06/10/2008        Lab Results   Component Value Date    WBC 4.4 (L) 07/21/2022    HEMOGLOBIN 11.6 (L) 07/21/2022    HEMATOCRIT 34.4 (L) 07/21/2022    PLATELETCT 187 07/21/2022        Total time of the discharge process exceeds 36 minutes.

## 2022-07-24 NOTE — PROGRESS NOTES
Received bedside report from Night RN. Awake and alert. Bed alarm not in use. LR at 125 ml/hr. She complained of 10/10 abdominal during report. Received morphine 2 mg for pain. LR infusion discontinued this am.

## 2022-07-29 ENCOUNTER — HOSPITAL ENCOUNTER (OUTPATIENT)
Dept: LAB | Facility: MEDICAL CENTER | Age: 37
End: 2022-07-29
Attending: INTERNAL MEDICINE
Payer: COMMERCIAL

## 2022-07-29 DIAGNOSIS — F31.9 BIPOLAR DEPRESSION (HCC): ICD-10-CM

## 2022-07-29 DIAGNOSIS — R56.9 SEIZURES (HCC): ICD-10-CM

## 2022-07-29 DIAGNOSIS — D70.9 NEUTROPENIA, UNSPECIFIED TYPE (HCC): ICD-10-CM

## 2022-07-29 LAB
ERYTHROCYTE [DISTWIDTH] IN BLOOD BY AUTOMATED COUNT: 51.8 FL (ref 35.9–50)
HCT VFR BLD AUTO: 39.2 % (ref 37–47)
HGB BLD-MCNC: 13.1 G/DL (ref 12–16)
HIV 1+2 AB+HIV1 P24 AG SERPL QL IA: NORMAL
MCH RBC QN AUTO: 32.5 PG (ref 27–33)
MCHC RBC AUTO-ENTMCNC: 33.4 G/DL (ref 33.6–35)
MCV RBC AUTO: 97.3 FL (ref 81.4–97.8)
PLATELET # BLD AUTO: 294 K/UL (ref 164–446)
PMV BLD AUTO: 10.4 FL (ref 9–12.9)
RBC # BLD AUTO: 4.03 M/UL (ref 4.2–5.4)
TSH SERPL DL<=0.005 MIU/L-ACNC: 1.43 UIU/ML (ref 0.38–5.33)
WBC # BLD AUTO: 7 K/UL (ref 4.8–10.8)

## 2022-07-29 PROCEDURE — 84443 ASSAY THYROID STIM HORMONE: CPT

## 2022-07-29 PROCEDURE — 85027 COMPLETE CBC AUTOMATED: CPT

## 2022-07-29 PROCEDURE — 36415 COLL VENOUS BLD VENIPUNCTURE: CPT

## 2022-07-29 PROCEDURE — 87389 HIV-1 AG W/HIV-1&-2 AB AG IA: CPT

## 2022-08-01 ENCOUNTER — OFFICE VISIT (OUTPATIENT)
Dept: INTERNAL MEDICINE | Facility: OTHER | Age: 37
End: 2022-08-01
Payer: COMMERCIAL

## 2022-08-01 VITALS
BODY MASS INDEX: 18.55 KG/M2 | HEIGHT: 67 IN | TEMPERATURE: 97.8 F | DIASTOLIC BLOOD PRESSURE: 65 MMHG | OXYGEN SATURATION: 99 % | SYSTOLIC BLOOD PRESSURE: 100 MMHG | HEART RATE: 93 BPM | WEIGHT: 118.2 LBS

## 2022-08-01 DIAGNOSIS — F31.0 BIPOLAR AFFECTIVE DISORDER, CURRENT EPISODE HYPOMANIC (HCC): Primary | ICD-10-CM

## 2022-08-01 DIAGNOSIS — B00.2 RECURRENT ORAL HERPES SIMPLEX: ICD-10-CM

## 2022-08-01 DIAGNOSIS — F10.21 ALCOHOL USE DISORDER, SEVERE, IN EARLY REMISSION, DEPENDENCE (HCC): ICD-10-CM

## 2022-08-01 DIAGNOSIS — E87.6 HYPOKALEMIA DUE TO EXCESSIVE GASTROINTESTINAL LOSS OF POTASSIUM: ICD-10-CM

## 2022-08-01 DIAGNOSIS — R56.9 SEIZURES (HCC): ICD-10-CM

## 2022-08-01 DIAGNOSIS — R56.9 SEIZURE (HCC): ICD-10-CM

## 2022-08-01 DIAGNOSIS — J30.2 SEASONAL ALLERGIC RHINITIS, UNSPECIFIED TRIGGER: ICD-10-CM

## 2022-08-01 PROBLEM — E83.51 HYPOCALCEMIA: Status: RESOLVED | Noted: 2022-07-01 | Resolved: 2022-08-01

## 2022-08-01 PROBLEM — K85.90 ACUTE PANCREATITIS WITHOUT INFECTION OR NECROSIS: Status: RESOLVED | Noted: 2022-07-20 | Resolved: 2022-08-01

## 2022-08-01 PROBLEM — B37.0 ORAL THRUSH: Status: RESOLVED | Noted: 2022-07-01 | Resolved: 2022-08-01

## 2022-08-01 PROBLEM — E87.29 HIGH ANION GAP METABOLIC ACIDOSIS: Status: RESOLVED | Noted: 2022-07-01 | Resolved: 2022-08-01

## 2022-08-01 PROBLEM — F10.239 ALCOHOL DEPENDENCE WITH WITHDRAWAL (HCC): Status: RESOLVED | Noted: 2019-11-26 | Resolved: 2022-08-01

## 2022-08-01 PROBLEM — F32.1 CURRENT MODERATE EPISODE OF MAJOR DEPRESSIVE DISORDER WITHOUT PRIOR EPISODE (HCC): Status: RESOLVED | Noted: 2020-01-31 | Resolved: 2022-08-01

## 2022-08-01 PROBLEM — E86.0 ACUTE DEHYDRATION: Status: RESOLVED | Noted: 2022-07-01 | Resolved: 2022-08-01

## 2022-08-01 PROCEDURE — 99214 OFFICE O/P EST MOD 30 MIN: CPT | Performed by: INTERNAL MEDICINE

## 2022-08-01 RX ORDER — ZONISAMIDE 100 MG/1
200 CAPSULE ORAL
Qty: 30 CAPSULE | COMMUNITY
Start: 2022-08-01 | End: 2022-08-15

## 2022-08-01 RX ORDER — CETIRIZINE HYDROCHLORIDE 10 MG/1
10 TABLET ORAL
COMMUNITY
Start: 2022-08-01 | End: 2022-08-15

## 2022-08-01 RX ORDER — LEVETIRACETAM 1000 MG/1
500 TABLET ORAL 2 TIMES DAILY
Qty: 60 TABLET | Refills: 1 | Status: ON HOLD
Start: 2022-08-01 | End: 2022-09-15 | Stop reason: SDUPTHER

## 2022-08-01 RX ORDER — VALACYCLOVIR HYDROCHLORIDE 1 G/1
2000 TABLET, FILM COATED ORAL 2 TIMES DAILY PRN
Qty: 32 TABLET | Refills: 1 | Status: SHIPPED | OUTPATIENT
Start: 2022-08-01 | End: 2022-08-15

## 2022-08-01 ASSESSMENT — FIBROSIS 4 INDEX: FIB4 SCORE: 0.5

## 2022-08-01 NOTE — ASSESSMENT & PLAN NOTE
Patient had a relapse of alcohol use with recurrent pancreatitis since her last visit.  She is currently following with [facility] as outpatient intensive therapy.  They are considering medication assisted treatment, which I would agree with.  Patient notes she has previously taken Antabuse however did not deter her from drinking alcohol while she was on it.  Congratulated her on her 14-day abstinence and encouraged her to continue to follow-up with her outpatient intensive therapy.

## 2022-08-01 NOTE — ASSESSMENT & PLAN NOTE
Patient is currently on zonisamide and levetiracetam.  She is following with neurology with plans to discontinue levetiracetam due to side effects related to cognition.

## 2022-08-01 NOTE — PROGRESS NOTES
"Subjective     Rhiannon Marrero is a 37 y.o. female who presents with Lab Results            Coming in for follow up.  I saw the patient while precepting with the resident a few weeks ago.  Still has visit she had a hospitalization for acute pancreatitis.  She had a relapse on her alcohol use.  She says that she relapses when her anxiety gets high.  She notes she is now been abstinent from alcohol for 14 days.  She is now enrolled in [facility] outpatient therapy, 4 days a week.  They plan to talk to her about medication assisted treatment with regard to her alcohol use.    She is followed up with neurology they have increased her zonisamide to 300 mg daily and decreased her Keppra from 1000 mg twice daily to 500 mg twice daily.  They are planning to completely discontinue the Keppra due to side effects related to cognition/behavioral health.  The patient is now on Seroquel 150 mg at night and feels that her bipolar symptoms are much better controlled.    She says that she is sleeping better and eating better.  She is plan to follow-up with psychiatry would like us to repeat her referral today.    She denies any current abdominal pain.  She continues to smoke nicotine is using the lowest nicotine concentration vape cartridge does not currently interested in completely cutting back.    She shares that she has a cold sore on her philtrum, the one that is currently present has been there for 4 days.  She tells me that it initially starts with tingling develops a pimple and then ulcerates.  Today it appears that she has a healing ulcer    It is worth noting she has dentures for her top of her teeth.      Review of Systems   All other systems reviewed and are negative.             Objective     /65 (BP Location: Left arm, Patient Position: Sitting, BP Cuff Size: Adult)   Pulse 93   Temp 36.6 °C (97.8 °F) (Temporal)   Ht 1.702 m (5' 7\")   Wt 53.6 kg (118 lb 3.2 oz)   LMP 10/30/2018   SpO2 99%   BMI 18.51 " kg/m²      Physical Exam  Constitutional:       Appearance: Normal appearance.   HENT:      Head:        Comments: Dentures for her top teeth no thrush  Cardiovascular:      Rate and Rhythm: Normal rate and regular rhythm.      Heart sounds: Normal heart sounds.   Pulmonary:      Effort: Pulmonary effort is normal.      Breath sounds: Normal breath sounds and air entry.   Abdominal:      General: Abdomen is flat.      Palpations: Abdomen is soft.      Tenderness: There is no abdominal tenderness. There is no right CVA tenderness or left CVA tenderness.   Neurological:      Mental Status: She is alert.   Psychiatric:         Attention and Perception: Attention and perception normal.         Mood and Affect: Mood and affect normal.         Speech: Speech normal.         Behavior: Behavior normal.                             Assessment & Plan   37-year-old female with past medical history of bipolar disorder, alcohol abuse disorder, tobacco use disorder, recurrent pancreatitis, seasonal allergies coming in for follow-up.  At her last visit we noted that she had a decreased hemoglobin and white blood cell count, on repeat labs these have improved and were reviewed with the patient today.  We otherwise reviewed her chronic medical problems and assessed a new problem, her recurrent oral herpes simplex.     Bipolar affective disorder, current episode hypomanic (HCC)  At her last visit we started on Seroquel 50 mg and patient uptitrated to 150 mg.  She is currently on this dose and her symptoms have improved.  She continues to deal with anxiety and was hoping to follow-up with psychiatry to start any medication.  The  will repeat her referral for psychiatry for her today.    Seizure (HCC)  Patient is currently on zonisamide and levetiracetam.  She is following with neurology with plans to discontinue levetiracetam issues with cognition.      Alcohol use disorder, severe, in early remission, dependence  (HCC)  Patient had a relapse of alcohol use with recurrent pancreatitis since her last visit.  She is currently following with [facility] as outpatient intensive therapy.  They are considering medication assisted treatment, which I would agree with.  Patient notes she has previously taken Antabuse however did not deter her from drinking alcohol while she was on it.  Congratulated her on her 14-day abstinence and encouraged her to continue to follow-up with her outpatient intensive therapy.    Recurrent oral herpes simplex  Provided prescription for valacyclovir 2 g every 12 hours for 1 day for the next time that she experiences a herpetic lesion.  Reviewed her renal and kidney function which at the last hospital stay were overall normal.    Hypokalemia due to excessive gastrointestinal loss of potassium  Continue to monitor potassium on next routine labs.    Encouraged the patient to exercise and discussed the benefits with regards to her mental and physical health.  Patient Instructions   1. You can use the medicine the next time you have a cold sore. Take 2 grams when you first start noticing the tingling, even before you may see a lesion. Then take another 2 grams 12 hours later  2. Keep up avoiding alcohol. Good luck with [facility]  3.

## 2022-08-01 NOTE — ASSESSMENT & PLAN NOTE
At her last visit we started on Seroquel 50 mg and patient uptitrated to 150 mg.  She is currently on this dose and her symptoms have improved.  She continues to deal with anxiety and was hoping to follow-up with psychiatry to start any medication.  The  will repeat her referral for psychiatry for her today.

## 2022-08-01 NOTE — ASSESSMENT & PLAN NOTE
Provided prescription for valacyclovir 2 g every 12 hours for 1 day for the next time that she experiences a herpetic lesion.  Reviewed her renal and kidney function which at the last hospital stay were overall normal.

## 2022-08-01 NOTE — PATIENT INSTRUCTIONS
You can use the medicine the next time you have a cold sore. Take 2 grams when you first start noticing the tingling, even before you may see a lesion. Then take another 2 grams 12 hours later  Keep up avoiding alcohol. Good luck with [facility]

## 2022-08-15 ENCOUNTER — APPOINTMENT (OUTPATIENT)
Dept: RADIOLOGY | Facility: MEDICAL CENTER | Age: 37
DRG: 440 | End: 2022-08-15
Attending: STUDENT IN AN ORGANIZED HEALTH CARE EDUCATION/TRAINING PROGRAM
Payer: COMMERCIAL

## 2022-08-15 ENCOUNTER — HOSPITAL ENCOUNTER (INPATIENT)
Facility: MEDICAL CENTER | Age: 37
LOS: 2 days | DRG: 440 | End: 2022-08-17
Attending: EMERGENCY MEDICINE | Admitting: STUDENT IN AN ORGANIZED HEALTH CARE EDUCATION/TRAINING PROGRAM
Payer: COMMERCIAL

## 2022-08-15 DIAGNOSIS — K85.20 ALCOHOL-INDUCED ACUTE PANCREATITIS, UNSPECIFIED COMPLICATION STATUS: ICD-10-CM

## 2022-08-15 PROBLEM — D72.829 LEUKOCYTOSIS: Status: ACTIVE | Noted: 2022-08-15

## 2022-08-15 LAB
ALBUMIN SERPL BCP-MCNC: 4.7 G/DL (ref 3.2–4.9)
ALBUMIN/GLOB SERPL: 1.5 G/DL
ALP SERPL-CCNC: 99 U/L (ref 30–99)
ALT SERPL-CCNC: 19 U/L (ref 2–50)
AMPHET UR QL SCN: NEGATIVE
ANION GAP SERPL CALC-SCNC: 18 MMOL/L (ref 7–16)
APPEARANCE UR: CLEAR
AST SERPL-CCNC: 25 U/L (ref 12–45)
BACTERIA #/AREA URNS HPF: ABNORMAL /HPF
BARBITURATES UR QL SCN: NEGATIVE
BASOPHILS # BLD AUTO: 0.4 % (ref 0–1.8)
BASOPHILS # BLD: 0.06 K/UL (ref 0–0.12)
BENZODIAZ UR QL SCN: NEGATIVE
BILIRUB SERPL-MCNC: 1.3 MG/DL (ref 0.1–1.5)
BILIRUB UR QL STRIP.AUTO: NEGATIVE
BUN SERPL-MCNC: 11 MG/DL (ref 8–22)
BZE UR QL SCN: NEGATIVE
CALCIUM SERPL-MCNC: 9.1 MG/DL (ref 8.4–10.2)
CANNABINOIDS UR QL SCN: POSITIVE
CHLORIDE SERPL-SCNC: 96 MMOL/L (ref 96–112)
CO2 SERPL-SCNC: 24 MMOL/L (ref 20–33)
COLOR UR: YELLOW
CREAT SERPL-MCNC: 0.79 MG/DL (ref 0.5–1.4)
CRP SERPL HS-MCNC: 1.23 MG/DL (ref 0–0.75)
EKG IMPRESSION: NORMAL
EOSINOPHIL # BLD AUTO: 0.01 K/UL (ref 0–0.51)
EOSINOPHIL NFR BLD: 0.1 % (ref 0–6.9)
EPI CELLS #/AREA URNS HPF: ABNORMAL /HPF
ERYTHROCYTE [DISTWIDTH] IN BLOOD BY AUTOMATED COUNT: 45.8 FL (ref 35.9–50)
GFR SERPLBLD CREATININE-BSD FMLA CKD-EPI: 100 ML/MIN/1.73 M 2
GLOBULIN SER CALC-MCNC: 3.1 G/DL (ref 1.9–3.5)
GLUCOSE SERPL-MCNC: 161 MG/DL (ref 65–99)
GLUCOSE UR STRIP.AUTO-MCNC: NEGATIVE MG/DL
HCG SERPL QL: NEGATIVE
HCT VFR BLD AUTO: 42.9 % (ref 37–47)
HGB BLD-MCNC: 14.6 G/DL (ref 12–16)
IMM GRANULOCYTES # BLD AUTO: 0.07 K/UL (ref 0–0.11)
IMM GRANULOCYTES NFR BLD AUTO: 0.5 % (ref 0–0.9)
KETONES UR STRIP.AUTO-MCNC: NEGATIVE MG/DL
LEUKOCYTE ESTERASE UR QL STRIP.AUTO: NEGATIVE
LIPASE SERPL-CCNC: 1815 U/L (ref 7–58)
LYMPHOCYTES # BLD AUTO: 0.74 K/UL (ref 1–4.8)
LYMPHOCYTES NFR BLD: 5.2 % (ref 22–41)
MAGNESIUM SERPL-MCNC: 1.7 MG/DL (ref 1.5–2.5)
MCH RBC QN AUTO: 32 PG (ref 27–33)
MCHC RBC AUTO-ENTMCNC: 34 G/DL (ref 33.6–35)
MCV RBC AUTO: 94.1 FL (ref 81.4–97.8)
METHADONE UR QL SCN: NEGATIVE
MICRO URNS: ABNORMAL
MONOCYTES # BLD AUTO: 0.78 K/UL (ref 0–0.85)
MONOCYTES NFR BLD AUTO: 5.5 % (ref 0–13.4)
MUCOUS THREADS #/AREA URNS HPF: ABNORMAL /HPF
NEUTROPHILS # BLD AUTO: 12.58 K/UL (ref 2–7.15)
NEUTROPHILS NFR BLD: 88.3 % (ref 44–72)
NITRITE UR QL STRIP.AUTO: NEGATIVE
NRBC # BLD AUTO: 0 K/UL
NRBC BLD-RTO: 0 /100 WBC
OPIATES UR QL SCN: NEGATIVE
OXYCODONE UR QL SCN: NEGATIVE
PCP UR QL SCN: NEGATIVE
PH UR STRIP.AUTO: 6.5 [PH] (ref 5–8)
PHOSPHATE SERPL-MCNC: 4 MG/DL (ref 2.5–4.5)
PLATELET # BLD AUTO: 218 K/UL (ref 164–446)
PMV BLD AUTO: 9.2 FL (ref 9–12.9)
POTASSIUM SERPL-SCNC: 4 MMOL/L (ref 3.6–5.5)
PROPOXYPH UR QL SCN: NEGATIVE
PROT SERPL-MCNC: 7.8 G/DL (ref 6–8.2)
PROT UR QL STRIP: 100 MG/DL
RBC # BLD AUTO: 4.56 M/UL (ref 4.2–5.4)
RBC # URNS HPF: ABNORMAL /HPF
RBC UR QL AUTO: ABNORMAL
SODIUM SERPL-SCNC: 138 MMOL/L (ref 135–145)
SP GR UR STRIP.AUTO: 1.02
WBC # BLD AUTO: 14.2 K/UL (ref 4.8–10.8)
WBC #/AREA URNS HPF: ABNORMAL /HPF

## 2022-08-15 PROCEDURE — 770006 HCHG ROOM/CARE - MED/SURG/GYN SEMI*

## 2022-08-15 PROCEDURE — 96376 TX/PRO/DX INJ SAME DRUG ADON: CPT

## 2022-08-15 PROCEDURE — 99285 EMERGENCY DEPT VISIT HI MDM: CPT | Mod: 25

## 2022-08-15 PROCEDURE — 700105 HCHG RX REV CODE 258: Performed by: STUDENT IN AN ORGANIZED HEALTH CARE EDUCATION/TRAINING PROGRAM

## 2022-08-15 PROCEDURE — 93005 ELECTROCARDIOGRAM TRACING: CPT

## 2022-08-15 PROCEDURE — 80307 DRUG TEST PRSMV CHEM ANLYZR: CPT

## 2022-08-15 PROCEDURE — 76705 ECHO EXAM OF ABDOMEN: CPT

## 2022-08-15 PROCEDURE — 700102 HCHG RX REV CODE 250 W/ 637 OVERRIDE(OP): Performed by: STUDENT IN AN ORGANIZED HEALTH CARE EDUCATION/TRAINING PROGRAM

## 2022-08-15 PROCEDURE — 84100 ASSAY OF PHOSPHORUS: CPT

## 2022-08-15 PROCEDURE — 700105 HCHG RX REV CODE 258: Performed by: EMERGENCY MEDICINE

## 2022-08-15 PROCEDURE — 99223 1ST HOSP IP/OBS HIGH 75: CPT | Mod: AI | Performed by: STUDENT IN AN ORGANIZED HEALTH CARE EDUCATION/TRAINING PROGRAM

## 2022-08-15 PROCEDURE — 83735 ASSAY OF MAGNESIUM: CPT

## 2022-08-15 PROCEDURE — 96375 TX/PRO/DX INJ NEW DRUG ADDON: CPT

## 2022-08-15 PROCEDURE — 80053 COMPREHEN METABOLIC PANEL: CPT

## 2022-08-15 PROCEDURE — 96374 THER/PROPH/DIAG INJ IV PUSH: CPT

## 2022-08-15 PROCEDURE — 81001 URINALYSIS AUTO W/SCOPE: CPT | Mod: XU

## 2022-08-15 PROCEDURE — 84703 CHORIONIC GONADOTROPIN ASSAY: CPT

## 2022-08-15 PROCEDURE — 83690 ASSAY OF LIPASE: CPT

## 2022-08-15 PROCEDURE — 74176 CT ABD & PELVIS W/O CONTRAST: CPT

## 2022-08-15 PROCEDURE — 36415 COLL VENOUS BLD VENIPUNCTURE: CPT

## 2022-08-15 PROCEDURE — 85025 COMPLETE CBC W/AUTO DIFF WBC: CPT

## 2022-08-15 PROCEDURE — 86140 C-REACTIVE PROTEIN: CPT

## 2022-08-15 PROCEDURE — 700101 HCHG RX REV CODE 250: Performed by: STUDENT IN AN ORGANIZED HEALTH CARE EDUCATION/TRAINING PROGRAM

## 2022-08-15 PROCEDURE — A9270 NON-COVERED ITEM OR SERVICE: HCPCS | Performed by: STUDENT IN AN ORGANIZED HEALTH CARE EDUCATION/TRAINING PROGRAM

## 2022-08-15 PROCEDURE — 700111 HCHG RX REV CODE 636 W/ 250 OVERRIDE (IP): Performed by: EMERGENCY MEDICINE

## 2022-08-15 PROCEDURE — 700111 HCHG RX REV CODE 636 W/ 250 OVERRIDE (IP): Performed by: STUDENT IN AN ORGANIZED HEALTH CARE EDUCATION/TRAINING PROGRAM

## 2022-08-15 RX ORDER — PROCHLORPERAZINE EDISYLATE 5 MG/ML
5-10 INJECTION INTRAMUSCULAR; INTRAVENOUS EVERY 4 HOURS PRN
Status: DISCONTINUED | OUTPATIENT
Start: 2022-08-15 | End: 2022-08-17 | Stop reason: HOSPADM

## 2022-08-15 RX ORDER — OXYCODONE HYDROCHLORIDE 5 MG/1
5 TABLET ORAL
Status: DISCONTINUED | OUTPATIENT
Start: 2022-08-15 | End: 2022-08-15

## 2022-08-15 RX ORDER — HYDROMORPHONE HYDROCHLORIDE 1 MG/ML
0.25 INJECTION, SOLUTION INTRAMUSCULAR; INTRAVENOUS; SUBCUTANEOUS
Status: DISCONTINUED | OUTPATIENT
Start: 2022-08-15 | End: 2022-08-15

## 2022-08-15 RX ORDER — ONDANSETRON 4 MG/1
4 TABLET, ORALLY DISINTEGRATING ORAL EVERY 4 HOURS PRN
Status: DISCONTINUED | OUTPATIENT
Start: 2022-08-15 | End: 2022-08-17 | Stop reason: HOSPADM

## 2022-08-15 RX ORDER — ACETAMINOPHEN 500 MG
1000 TABLET ORAL 3 TIMES DAILY
Status: DISCONTINUED | OUTPATIENT
Start: 2022-08-15 | End: 2022-08-17 | Stop reason: HOSPADM

## 2022-08-15 RX ORDER — SODIUM CHLORIDE, SODIUM LACTATE, POTASSIUM CHLORIDE, CALCIUM CHLORIDE 600; 310; 30; 20 MG/100ML; MG/100ML; MG/100ML; MG/100ML
INJECTION, SOLUTION INTRAVENOUS CONTINUOUS
Status: DISCONTINUED | OUTPATIENT
Start: 2022-08-15 | End: 2022-08-17 | Stop reason: HOSPADM

## 2022-08-15 RX ORDER — OXYCODONE HYDROCHLORIDE 5 MG/1
2.5 TABLET ORAL
Status: DISCONTINUED | OUTPATIENT
Start: 2022-08-15 | End: 2022-08-15

## 2022-08-15 RX ORDER — ONDANSETRON 2 MG/ML
4 INJECTION INTRAMUSCULAR; INTRAVENOUS EVERY 4 HOURS PRN
Status: DISCONTINUED | OUTPATIENT
Start: 2022-08-15 | End: 2022-08-17 | Stop reason: HOSPADM

## 2022-08-15 RX ORDER — HYDROMORPHONE HYDROCHLORIDE 1 MG/ML
0.5 INJECTION, SOLUTION INTRAMUSCULAR; INTRAVENOUS; SUBCUTANEOUS
Status: DISCONTINUED | OUTPATIENT
Start: 2022-08-15 | End: 2022-08-15

## 2022-08-15 RX ORDER — METOCLOPRAMIDE HYDROCHLORIDE 5 MG/ML
10 INJECTION INTRAMUSCULAR; INTRAVENOUS ONCE
Status: COMPLETED | OUTPATIENT
Start: 2022-08-15 | End: 2022-08-15

## 2022-08-15 RX ORDER — OXYCODONE HYDROCHLORIDE 5 MG/1
5 TABLET ORAL
Status: DISCONTINUED | OUTPATIENT
Start: 2022-08-15 | End: 2022-08-17 | Stop reason: HOSPADM

## 2022-08-15 RX ORDER — POLYETHYLENE GLYCOL 3350 17 G/17G
1 POWDER, FOR SOLUTION ORAL
Status: DISCONTINUED | OUTPATIENT
Start: 2022-08-15 | End: 2022-08-17 | Stop reason: HOSPADM

## 2022-08-15 RX ORDER — BISACODYL 10 MG
10 SUPPOSITORY, RECTAL RECTAL
Status: DISCONTINUED | OUTPATIENT
Start: 2022-08-15 | End: 2022-08-17 | Stop reason: HOSPADM

## 2022-08-15 RX ORDER — OXYCODONE HYDROCHLORIDE 5 MG/1
2.5 TABLET ORAL
Status: DISCONTINUED | OUTPATIENT
Start: 2022-08-15 | End: 2022-08-17 | Stop reason: HOSPADM

## 2022-08-15 RX ORDER — HEPARIN SODIUM 5000 [USP'U]/ML
5000 INJECTION, SOLUTION INTRAVENOUS; SUBCUTANEOUS EVERY 8 HOURS
Status: DISCONTINUED | OUTPATIENT
Start: 2022-08-15 | End: 2022-08-16

## 2022-08-15 RX ORDER — HYDROMORPHONE HYDROCHLORIDE 1 MG/ML
0.5 INJECTION, SOLUTION INTRAMUSCULAR; INTRAVENOUS; SUBCUTANEOUS
Status: DISCONTINUED | OUTPATIENT
Start: 2022-08-15 | End: 2022-08-17 | Stop reason: HOSPADM

## 2022-08-15 RX ORDER — ZONISAMIDE 100 MG/1
200 CAPSULE ORAL
COMMUNITY
End: 2022-11-23

## 2022-08-15 RX ORDER — SODIUM CHLORIDE 9 MG/ML
1000 INJECTION, SOLUTION INTRAVENOUS ONCE
Status: COMPLETED | OUTPATIENT
Start: 2022-08-15 | End: 2022-08-15

## 2022-08-15 RX ORDER — PROMETHAZINE HYDROCHLORIDE 25 MG/1
12.5-25 TABLET ORAL EVERY 4 HOURS PRN
Status: DISCONTINUED | OUTPATIENT
Start: 2022-08-15 | End: 2022-08-17 | Stop reason: HOSPADM

## 2022-08-15 RX ORDER — LEVETIRACETAM 500 MG/1
500 TABLET ORAL 2 TIMES DAILY
Status: DISCONTINUED | OUTPATIENT
Start: 2022-08-15 | End: 2022-08-17 | Stop reason: HOSPADM

## 2022-08-15 RX ORDER — FAMOTIDINE 20 MG/1
20 TABLET, FILM COATED ORAL 2 TIMES DAILY
Status: DISCONTINUED | OUTPATIENT
Start: 2022-08-15 | End: 2022-08-17 | Stop reason: HOSPADM

## 2022-08-15 RX ORDER — ZONISAMIDE 50 MG/1
200 CAPSULE ORAL
Status: DISCONTINUED | OUTPATIENT
Start: 2022-08-15 | End: 2022-08-17 | Stop reason: HOSPADM

## 2022-08-15 RX ORDER — ENOXAPARIN SODIUM 100 MG/ML
40 INJECTION SUBCUTANEOUS DAILY
Status: DISCONTINUED | OUTPATIENT
Start: 2022-08-15 | End: 2022-08-15

## 2022-08-15 RX ORDER — PROMETHAZINE HYDROCHLORIDE 25 MG/1
12.5-25 SUPPOSITORY RECTAL EVERY 4 HOURS PRN
Status: DISCONTINUED | OUTPATIENT
Start: 2022-08-15 | End: 2022-08-17 | Stop reason: HOSPADM

## 2022-08-15 RX ORDER — AMOXICILLIN 250 MG
2 CAPSULE ORAL 2 TIMES DAILY
Status: DISCONTINUED | OUTPATIENT
Start: 2022-08-15 | End: 2022-08-17 | Stop reason: HOSPADM

## 2022-08-15 RX ORDER — ACETAMINOPHEN 325 MG/1
650 TABLET ORAL EVERY 6 HOURS PRN
Status: DISCONTINUED | OUTPATIENT
Start: 2022-08-15 | End: 2022-08-15

## 2022-08-15 RX ORDER — HYDRALAZINE HYDROCHLORIDE 20 MG/ML
10 INJECTION INTRAMUSCULAR; INTRAVENOUS EVERY 4 HOURS PRN
Status: DISCONTINUED | OUTPATIENT
Start: 2022-08-15 | End: 2022-08-17 | Stop reason: HOSPADM

## 2022-08-15 RX ADMIN — ACETAMINOPHEN 1000 MG: 500 TABLET, FILM COATED ORAL at 21:02

## 2022-08-15 RX ADMIN — SODIUM CHLORIDE, POTASSIUM CHLORIDE, SODIUM LACTATE AND CALCIUM CHLORIDE: 600; 310; 30; 20 INJECTION, SOLUTION INTRAVENOUS at 13:22

## 2022-08-15 RX ADMIN — ZONISAMIDE 200 MG: 50 CAPSULE ORAL at 21:02

## 2022-08-15 RX ADMIN — ONDANSETRON 4 MG: 2 INJECTION INTRAMUSCULAR; INTRAVENOUS at 19:00

## 2022-08-15 RX ADMIN — THIAMINE HYDROCHLORIDE: 100 INJECTION, SOLUTION INTRAMUSCULAR; INTRAVENOUS at 17:52

## 2022-08-15 RX ADMIN — HYDROMORPHONE HYDROCHLORIDE 0.5 MG: 1 INJECTION, SOLUTION INTRAMUSCULAR; INTRAVENOUS; SUBCUTANEOUS at 08:04

## 2022-08-15 RX ADMIN — PROCHLORPERAZINE EDISYLATE 5 MG: 5 INJECTION, SOLUTION INTRAMUSCULAR; INTRAVENOUS at 13:18

## 2022-08-15 RX ADMIN — HYDROMORPHONE HYDROCHLORIDE 0.5 MG: 1 INJECTION, SOLUTION INTRAMUSCULAR; INTRAVENOUS; SUBCUTANEOUS at 22:16

## 2022-08-15 RX ADMIN — HYDROMORPHONE HYDROCHLORIDE 0.5 MG: 1 INJECTION, SOLUTION INTRAMUSCULAR; INTRAVENOUS; SUBCUTANEOUS at 12:28

## 2022-08-15 RX ADMIN — SODIUM CHLORIDE 1000 ML: 9 INJECTION, SOLUTION INTRAVENOUS at 08:04

## 2022-08-15 RX ADMIN — FAMOTIDINE 20 MG: 10 INJECTION, SOLUTION INTRAVENOUS at 13:00

## 2022-08-15 RX ADMIN — HYDROMORPHONE HYDROCHLORIDE 0.25 MG: 1 INJECTION, SOLUTION INTRAMUSCULAR; INTRAVENOUS; SUBCUTANEOUS at 10:33

## 2022-08-15 RX ADMIN — LEVETIRACETAM 500 MG: 500 TABLET, FILM COATED ORAL at 21:00

## 2022-08-15 RX ADMIN — OXYCODONE HYDROCHLORIDE 5 MG: 5 TABLET ORAL at 21:01

## 2022-08-15 RX ADMIN — QUETIAPINE FUMARATE 150 MG: 25 TABLET ORAL at 21:05

## 2022-08-15 RX ADMIN — HYDROMORPHONE HYDROCHLORIDE 0.5 MG: 1 INJECTION, SOLUTION INTRAMUSCULAR; INTRAVENOUS; SUBCUTANEOUS at 15:55

## 2022-08-15 RX ADMIN — HYDROMORPHONE HYDROCHLORIDE 0.5 MG: 1 INJECTION, SOLUTION INTRAMUSCULAR; INTRAVENOUS; SUBCUTANEOUS at 19:00

## 2022-08-15 RX ADMIN — METOCLOPRAMIDE 10 MG: 5 INJECTION, SOLUTION INTRAMUSCULAR; INTRAVENOUS at 08:04

## 2022-08-15 ASSESSMENT — LIFESTYLE VARIABLES
EVER FELT BAD OR GUILTY ABOUT YOUR DRINKING: YES
AVERAGE NUMBER OF DAYS PER WEEK YOU HAVE A DRINK CONTAINING ALCOHOL: 2
ALCOHOL_USE: YES
HOW MANY TIMES IN THE PAST YEAR HAVE YOU HAD 5 OR MORE DRINKS IN A DAY: 5
ON A TYPICAL DAY WHEN YOU DRINK ALCOHOL HOW MANY DRINKS DO YOU HAVE: 5
TOTAL SCORE: 4
CONSUMPTION TOTAL: POSITIVE
TOTAL SCORE: 4
EVER HAD A DRINK FIRST THING IN THE MORNING TO STEADY YOUR NERVES TO GET RID OF A HANGOVER: YES
HAVE PEOPLE ANNOYED YOU BY CRITICIZING YOUR DRINKING: YES
HAVE YOU EVER FELT YOU SHOULD CUT DOWN ON YOUR DRINKING: YES
TOTAL SCORE: 4

## 2022-08-15 ASSESSMENT — ENCOUNTER SYMPTOMS
NAUSEA: 1
FLANK PAIN: 1
RESPIRATORY NEGATIVE: 1
ABDOMINAL PAIN: 1
CARDIOVASCULAR NEGATIVE: 1
BLOOD IN STOOL: 0
EYES NEGATIVE: 1
WEAKNESS: 1
PSYCHIATRIC NEGATIVE: 1
VOMITING: 1

## 2022-08-15 ASSESSMENT — COGNITIVE AND FUNCTIONAL STATUS - GENERAL
MOBILITY SCORE: 24
SUGGESTED CMS G CODE MODIFIER MOBILITY: CH
SUGGESTED CMS G CODE MODIFIER DAILY ACTIVITY: CH
DAILY ACTIVITIY SCORE: 24

## 2022-08-15 ASSESSMENT — PATIENT HEALTH QUESTIONNAIRE - PHQ9
2. FEELING DOWN, DEPRESSED, IRRITABLE, OR HOPELESS: NOT AT ALL
1. LITTLE INTEREST OR PLEASURE IN DOING THINGS: NOT AT ALL
SUM OF ALL RESPONSES TO PHQ9 QUESTIONS 1 AND 2: 0

## 2022-08-15 ASSESSMENT — FIBROSIS 4 INDEX
FIB4 SCORE: 0.92
FIB4 SCORE: 0.5

## 2022-08-15 ASSESSMENT — PAIN DESCRIPTION - PAIN TYPE
TYPE: ACUTE PAIN

## 2022-08-15 NOTE — ASSESSMENT & PLAN NOTE
History of acute pancreatitis in the past secondary to alcohol use.  She does have multiple tiny gallstones, but which does not appear to be obstructive.  Admits to alcohol use last time she drank was 5 days ago  Lipase in the 1800, trended down to 570.  Still with epigastric abdominal pain, and p.o. intolerance.  Continue bowel rest with clear liquid diet for now.  Continue pain control with IV Dilaudid and oral oxycodone as needed.    Continue famotidine.  Continue aggressive IV fluids with LR at 200 cc/h.  Trend lipase. Monitor for clinical improvement.   Continue counseling on alcohol cessation.  Doubt that gallstones mainly contributing to pancreatitis, but she will need outpatient surgery referral to discuss possible cholecystectomy electively.

## 2022-08-15 NOTE — ED PROVIDER NOTES
ED Provider Note    CHIEF COMPLAINT  Chief Complaint   Patient presents with    N/V       HPI  Rhiannon Marrero is a 37 y.o. female who presents stating that for the last week or so and she has had significantly increasing epigastric abdominal pain that is radiating to her back that she thinks may be pancreatitis once again.  She is stopped drinking alcohol 5 days ago and is trying to stay sober.  She does have a history of pancreatitis without necrosis or pseudocyst.  Does have a history of GERD and heartburn.  Denies any fever, chills, sweats or active chest pain right now and has had episodic vomiting since the last couple of days    REVIEW OF SYSTEMS  See HPI for further details. All other systems are negative.     PAST MEDICAL HISTORY  Past Medical History:   Diagnosis Date    Acute pancreatitis without infection or necrosis 7/20/2022    Alcohol dependence (HCC) 4/13/2017    Bronchitis 8/2012    Cold     sept 2018    Current moderate episode of major depressive disorder without prior episode (HCC) 1/31/2020    Heart burn     Hernia of unspecified site of abdominal cavity without mention of obstruction or gangrene     High anion gap metabolic acidosis 7/1/2022    Indigestion     Pancreatitis     Pneumonia 2011       FAMILY HISTORY  Family History   Problem Relation Age of Onset    Psychiatric Illness Mother     Stroke Mother     Arthritis Mother     Heart Disease Maternal Grandfather     Cancer Neg Hx     Diabetes Neg Hx     Hypertension Neg Hx        SOCIAL HISTORY   reports that she has quit smoking. Her smoking use included cigarettes. She has a 7.50 pack-year smoking history. She has never used smokeless tobacco. She reports current alcohol use. She reports current drug use. Drug: Marijuana.    SURGICAL HISTORY  Past Surgical History:   Procedure Laterality Date    ORIF, WRIST Right 4/24/2019    Procedure: ORIF, WRIST;  Surgeon: Marquis Bell M.D.;  Location: SURGERY [facility];  Service:  "Orthopedics    HYSTERECTOMY ROBOTIC XI  10/11/2018    Procedure: HYSTERECTOMY ROBOTIC XI- RIGHT URETEROLYSIS;  Surgeon: Marquis Reeder M.D.;  Location: SURGERY [facility];  Service: Gynecology    SALPINGECTOMY Bilateral 10/11/2018    Procedure: SALPINGECTOMY;  Surgeon: Marquis Reeder M.D.;  Location: SURGERY [facility];  Service: Gynecology    TONSILLECTOMY  4/11/2013    Performed by Rock Rothman M.D. at SURGERY SAME DAY [facility]    ARTHROSCOPY, KNEE      GYN SURGERY      miscarriage May 2006    OTHER ORTHOPEDIC SURGERY      knee surgeries       CURRENT MEDICATIONS  Home Medications    **Home medications have not yet been reviewed for this encounter**         ALLERGIES  Allergies   Allergen Reactions    Promethazine Hcl Anxiety       PHYSICAL EXAM  VITAL SIGNS: BP (!) 133/91   Pulse 87   Temp 36.4 °C (97.6 °F) (Temporal)   Resp 18   Ht 1.676 m (5' 6\")   Wt 50.3 kg (110 lb 14.3 oz)   LMP 10/30/2018   SpO2 97%   BMI 17.90 kg/m²    Constitutional: Well developed, Well nourished, moderate pain distress, appears appearance.   HENT: Normocephalic, Atraumatic, Bilateral external ears normal, Oropharynx is clear mucous membranes are moist. No oral exudates or nasal discharge.   Eyes: Pupils are equal round and reactive, EOMI, Conjunctiva normal, No discharge.   Neck: Normal range of motion, No tenderness, Supple, No stridor. No meningismus.  Lymphatic: No lymphadenopathy noted.   Cardiovascular: Regular rate and rhythm without murmur rub or gallop.  Thorax & Lungs: Clear breath sounds bilaterally without wheezes, rhonchi or rales. There is no chest wall tenderness.   Abdomen: Soft with significant tenderness in the epigastrium, the abdomen is otherwise scaphoid and non-distended. There is no rebound or guarding. No organomegaly is appreciated. Bowel sounds are normal.  Skin: Normal without rash.   Back: No CVA or spinal tenderness.   Extremities: Intact distal pulses, No edema, No tenderness, No " cyanosis, No clubbing. Capillary refill is less than 2 seconds.  Musculoskeletal: Good range of motion in all major joints. No tenderness to palpation or major deformities noted.   Neurologic: Alert & oriented x 3, Normal motor function, Normal sensory function, No focal deficits noted. Reflexes are normal.  Psychiatric: Affect normal, Judgment normal, Mood normal. There is no suicidal ideation or patient reported hallucinations.     EKG  Results for orders placed or performed during the hospital encounter of 08/15/22   EKG   Result Value Ref Range    Report       [facility] Emergency Dept[facility]    Test Date:  2022-08-15  Pt Name:    JAKE LOPEZ                    Department: [facility]  MRN:        [id number]                      [facility]  Gender:     Female                       Technician: [id number]  :        [id number]                   Requested By:ER TRIAGE PROTOCOL  Order #:    [id number]                    Reading MD: JOS GARCIA MD    Measurements  Intervals                                Axis  Rate:       74                           P:          -53  MO:         136                          QRS:        89  QRSD:       82                           T:          74  QT:         408  QTc:        453    Interpretive Statements  SINUS OR ECTOPIC ATRIAL RHYTHM  CONSIDER LEFT VENTRICULAR HYPERTROPHY  Compared to ECG 2022 20:53:33  Ectopic atrial rhythm now present  Sinus rhythm no longer present  Electronically Signed On 8- 9:11:17 PDT by JOS GARCIA MD           COURSE & MEDICAL DECISION MAKING  Pertinent Labs & Imaging studies reviewed. (See chart for details)  I had a high suspicion for alcohol induced pancreatitis and the patient was pain controlled with Dilaudid and given Zofran for nausea.  She was kept n.p.o. and hydrated with normal saline wide open and I perform lab work after EKG was unremarkable    Laboratory analysis reveals leukocytosis at over 14,000 with 88% PMN  shift and an anion gap elevated at 18 with glucose at 161.  AST and ALT are normal.  Potassium is normal.  Lipase is elevated at 1815 and the patient will need admission to the hospital for pain control and continued hydration/n.p.o. status.    Spoke with hospitalist approximately 9:15 AM and she will be admitted and understands her plan of care and need for hospitalization    FINAL IMPRESSION  1. Alcohol-induced acute pancreatitis, unspecified complication status             Electronically signed by: Kamaljit Stout M.D., 8/15/2022 9:11 AM

## 2022-08-15 NOTE — ASSESSMENT & PLAN NOTE
Last drink 6 days ago. States he quit drinking for 22 days, and relapsed but only drank for 2-3 consecutive days.  No signs of alcohol withdrawal.  Low risk for withdrawal now as last drink was about 6 days ago.  Continue counseling on alcohol cessation

## 2022-08-15 NOTE — DISCHARGE PLANNING
ER CM met with pt at bedside.  She is AOX4 Nauseated and had limited conversation as she was off to to CT  Emesis bag in hand. She did report to MD ETOH usage. PCP confirmed Dr Adams. RX [facility]. She lives in  upstairs apartment. She notes mother and Significant other are his support people. Mona has [employer] Fulltime but she may have lost that job per old notes. She has per old notes 13, and 15 yo child with her mother. She has been to [facility] and received ETOH resources in the past.  Care Transition Team Assessment    Information Source  Orientation Level: Oriented X4  Information Given By: Patient  Informant's Name: Rhiannon  Who is responsible for making decisions for patient? : Patient         Elopement Risk  Legal Hold: No    Interdisciplinary Discharge Planning  Primary Care Physician: Dr Ivy Adams  Lives with - Patient's Self Care Capacity: Alone and Able to Care For Self  Support Systems: Spouse / Significant Other, Parent  Housing / Facility: 2 Story Apartment / Condo  Do You Take your Prescribed Medications Regularly: Yes  Mobility Issues: No  Assistance Needed: No    Discharge Preparedness  What is your plan after discharge?: Home with help  What are your discharge supports?: Parent, Other (comment)  Prior Functional Level: Ambulatory, Independent with Activities of Daily Living, Independent with Medication Management    Functional Assesment  Prior Functional Level: Ambulatory, Independent with Activities of Daily Living, Independent with Medication Management    Finances  Prescription Coverage: Yes ([employer])                        Psychological Assessment  History of Substance Abuse: Alcohol  Date Last Used - Alcohol: 5 days ago per report  History of Psychiatric Problems: Yes  Non-compliant with Treatment: Yes    Discharge Risks or Barriers  Discharge risks or barriers?: Substance abuse    Anticipated Discharge Information  Discharge Disposition: Discharged to home/self care (01)

## 2022-08-15 NOTE — H&P
Hospital Medicine History & Physical Note    Date of Service  8/15/2022    Primary Care Physician  Ivy Adams M.D.    Consultants  None    Code Status  Full Code    Chief Complaint  Chief Complaint   Patient presents with    N/V       History of Presenting Illness  37-year-old female with a past medical history of alcohol abuse, and migraine headaches presented to emergency department on 8/15/2022 with a history of worsening epigastric pain which radiates to her back.  Patient reports that     At the emergency department, signs with hypertension with SBP in the 130s.  Patient saturating well on room air.  CBC with leukocytosis at 14.  Chemistry without gross abnormalities.  Lipase in the 1800s.  Patient received a 1 L bolus and IV Dilaudid.  Patient was admitted for acute pancreatitis requiring bowel rest, fluid resuscitation and further evaluation.    I discussed the plan of care with patient.    Review of Systems  Review of Systems   Constitutional:  Positive for malaise/fatigue.   HENT: Negative.     Eyes: Negative.    Respiratory: Negative.     Cardiovascular: Negative.    Gastrointestinal:  Positive for abdominal pain, nausea and vomiting. Negative for blood in stool and melena.   Genitourinary:  Positive for dysuria, flank pain and frequency.   Skin: Negative.    Neurological:  Positive for weakness.   Endo/Heme/Allergies: Negative.    Psychiatric/Behavioral: Negative.       Past Medical History   has a past medical history of Acute pancreatitis without infection or necrosis (7/20/2022), Alcohol dependence ([facility]) (4/13/2017), Bronchitis (8/2012), Cold, Current moderate episode of major depressive disorder without prior episode ([facility]) (1/31/2020), Heart burn, Hernia of unspecified site of abdominal cavity without mention of obstruction or gangrene, High anion gap metabolic acidosis (7/1/2022), Indigestion, Pancreatitis, and Pneumonia (2011).    Surgical History   has a past surgical history that includes other  orthopedic surgery; gyn surgery; tonsillectomy (4/11/2013); arthroscopy, knee; hysterectomy robotic xi (10/11/2018); salpingectomy (Bilateral, 10/11/2018); and orif, wrist (Right, 4/24/2019).     Family History  family history includes Arthritis in her mother; Heart Disease in her maternal grandfather; Psychiatric Illness in her mother; Stroke in her mother.     Social History   reports that she has quit smoking. Her smoking use included cigarettes. She has a 7.50 pack-year smoking history. She has never used smokeless tobacco. She reports current alcohol use. She reports current drug use. Drug: Marijuana.    Allergies  Allergies   Allergen Reactions    Promethazine Hcl Anxiety       Medications  Prior to Admission Medications   Prescriptions Last Dose Informant Patient Reported? Taking?   QUEtiapine (SEROQUEL) 50 MG tablet 8/14/2022 at PM Patient Yes No   Sig: Take 150 mg by mouth at bedtime. 3 tablets = 150 mg   levetiracetam (KEPPRA) 1000 MG tablet 8/14/2022 at PM Patient No No   Sig: Take 0.5 Tablets by mouth 2 times a day. Indications: Seizure   zonisamide (ZONEGRAN) 100 MG Cap 8/14/2022 at PM Patient Yes Yes   Sig: Take 200 mg by mouth at bedtime. 2 tablets = 200 mg      Facility-Administered Medications: None       Physical Exam  Temp:  [36.4 °C (97.6 °F)] 36.4 °C (97.6 °F)  Pulse:  [87] 87  Resp:  [18] 18  BP: (133)/(91) 133/91  SpO2:  [97 %] 97 %  Blood Pressure: (!) 133/91   Temperature: 36.4 °C (97.6 °F)   Pulse: 87   Respiration: 18   Pulse Oximetry: 97 %       Physical Exam  Constitutional:       Appearance: Normal appearance. She is not ill-appearing.   HENT:      Head: Normocephalic and atraumatic.      Mouth/Throat:      Mouth: Mucous membranes are moist.   Eyes:      Extraocular Movements: Extraocular movements intact.      Pupils: Pupils are equal, round, and reactive to light.   Cardiovascular:      Rate and Rhythm: Normal rate and regular rhythm.      Pulses: Normal pulses.      Heart sounds:  Normal heart sounds.   Pulmonary:      Effort: Pulmonary effort is normal.      Breath sounds: Normal breath sounds.   Abdominal:      General: Bowel sounds are normal. There is no distension.      Palpations: Abdomen is soft.      Tenderness: There is abdominal tenderness. There is no guarding or rebound.   Musculoskeletal:         General: No swelling. Normal range of motion.      Cervical back: Normal range of motion and neck supple.      Right lower leg: No edema.      Left lower leg: No edema.   Skin:     General: Skin is warm.      Coloration: Skin is not jaundiced.   Neurological:      General: No focal deficit present.      Mental Status: She is alert and oriented to person, place, and time. Mental status is at baseline.      Cranial Nerves: No cranial nerve deficit.   Psychiatric:         Mood and Affect: Mood normal.         Behavior: Behavior normal.         Thought Content: Thought content normal.         Judgment: Judgment normal.       Laboratory:  Recent Labs     08/15/22  0749   WBC 14.2*   RBC 4.56   HEMOGLOBIN 14.6   HEMATOCRIT 42.9   MCV 94.1   MCH 32.0   MCHC 34.0   RDW 45.8   PLATELETCT 218   MPV 9.2     Recent Labs     08/15/22  0749   SODIUM 138   POTASSIUM 4.0   CHLORIDE 96   CO2 24   GLUCOSE 161*   BUN 11   CREATININE 0.79   CALCIUM 9.1     Recent Labs     08/15/22  0749   ALTSGPT 19   ASTSGOT 25   ALKPHOSPHAT 99   TBILIRUBIN 1.3   LIPASE 1815*   GLUCOSE 161*         No results for input(s): NTPROBNP in the last 72 hours.      No results for input(s): TROPONINT in the last 72 hours.    Imaging:  CT-ABDOMEN-PELVIS W/O    (Results Pending)     Assessment/Plan:  Justification for Admission Status  I anticipate this patient will require at least two midnights for appropriate medical management, necessitating inpatient admission because of below    * Alcohol-induced acute pancreatitis, unspecified complication status- (present on admission)  Assessment & Plan  History of acute pancreatitis in the  past secondary to alcohol use  Admits to alcohol use last time she drank was 5 days ago  Lipase in the 1800  Admit to Medicine  Clear liquid diet  Dehydration  As needed pain management  Pending HCG  Pending abdominal/pelvic CT  Labs on a.m.    Leukocytosis- (present on admission)  Assessment & Plan  Secondary to acute pancreatitis  IV hydration  Labs on a.m.    Seizure (HCC)- (present on admission)  Assessment & Plan  Continue home Keppra and Zonisamide    Alcohol use disorder, severe, in early remission, dependence (HCC)- (present on admission)  Assessment & Plan  Last drink 5 days ago  Rally bag  Counseling on alcohol cessation      VTE prophylaxis: heparin ppx

## 2022-08-15 NOTE — PROGRESS NOTES
4 Eyes Skin Assessment Completed by LESLIE Huynh and LESLIE Wilkerson.    Head WDL  Ears WDL  Nose WDL  Mouth WDL  Neck WDL  Breast/Chest WDL  Shoulder Blades WDL  Spine WDL  (R) Arm/Elbow/Hand WDL  (L) Arm/Elbow/Hand WDL  Abdomen WDL  Groin WDL  Scrotum/Coccyx/Buttocks WDL  (R) Leg WDL  (L) Leg WDL  (R) Heel/Foot/Toe WDL  (L) Heel/Foot/Toe WDL          Devices In Places Pulse Ox      Interventions In Place N/A    Possible Skin Injury No    Pictures Uploaded Into Epic N/A  Wound Consult Placed N/A  RN Wound Prevention Protocol Ordered No

## 2022-08-15 NOTE — ASSESSMENT & PLAN NOTE
Secondary to acute pancreatitis  No signs of infection.  No pancreatic necrosis, abscess, or pseudocyst on CT.  WBC has normalized.  Hold off on antibiotics.  Continue to trend WBC count.  Watch for fevers.

## 2022-08-15 NOTE — ED NOTES
All results back, chart up for MD for re evaluation. poc update given to pt. No further questions at this time. Further orders and dispo pending

## 2022-08-16 PROBLEM — G40.909 SEIZURE DISORDER (HCC): Status: ACTIVE | Noted: 2022-05-23

## 2022-08-16 PROBLEM — E87.6 HYPOKALEMIA: Status: ACTIVE | Noted: 2022-08-16

## 2022-08-16 LAB
ALBUMIN SERPL BCP-MCNC: 3.7 G/DL (ref 3.2–4.9)
ALBUMIN/GLOB SERPL: 1.4 G/DL
ALP SERPL-CCNC: 79 U/L (ref 30–99)
ALT SERPL-CCNC: 12 U/L (ref 2–50)
ANION GAP SERPL CALC-SCNC: 12 MMOL/L (ref 7–16)
AST SERPL-CCNC: 18 U/L (ref 12–45)
BASOPHILS # BLD AUTO: 0.4 % (ref 0–1.8)
BASOPHILS # BLD: 0.03 K/UL (ref 0–0.12)
BILIRUB SERPL-MCNC: 1 MG/DL (ref 0.1–1.5)
BUN SERPL-MCNC: 5 MG/DL (ref 8–22)
CALCIUM SERPL-MCNC: 8.6 MG/DL (ref 8.4–10.2)
CHLORIDE SERPL-SCNC: 105 MMOL/L (ref 96–112)
CO2 SERPL-SCNC: 25 MMOL/L (ref 20–33)
CREAT SERPL-MCNC: 0.47 MG/DL (ref 0.5–1.4)
EOSINOPHIL # BLD AUTO: 0.16 K/UL (ref 0–0.51)
EOSINOPHIL NFR BLD: 2.2 % (ref 0–6.9)
ERYTHROCYTE [DISTWIDTH] IN BLOOD BY AUTOMATED COUNT: 45.8 FL (ref 35.9–50)
GFR SERPLBLD CREATININE-BSD FMLA CKD-EPI: 127 ML/MIN/1.73 M 2
GLOBULIN SER CALC-MCNC: 2.6 G/DL (ref 1.9–3.5)
GLUCOSE SERPL-MCNC: 111 MG/DL (ref 65–99)
HCT VFR BLD AUTO: 37.4 % (ref 37–47)
HGB BLD-MCNC: 12.7 G/DL (ref 12–16)
IMM GRANULOCYTES # BLD AUTO: 0.02 K/UL (ref 0–0.11)
IMM GRANULOCYTES NFR BLD AUTO: 0.3 % (ref 0–0.9)
LIPASE SERPL-CCNC: 570 U/L (ref 7–58)
LYMPHOCYTES # BLD AUTO: 1.51 K/UL (ref 1–4.8)
LYMPHOCYTES NFR BLD: 21 % (ref 22–41)
MCH RBC QN AUTO: 31.9 PG (ref 27–33)
MCHC RBC AUTO-ENTMCNC: 34 G/DL (ref 33.6–35)
MCV RBC AUTO: 94 FL (ref 81.4–97.8)
MONOCYTES # BLD AUTO: 0.38 K/UL (ref 0–0.85)
MONOCYTES NFR BLD AUTO: 5.3 % (ref 0–13.4)
NEUTROPHILS # BLD AUTO: 5.08 K/UL (ref 2–7.15)
NEUTROPHILS NFR BLD: 70.8 % (ref 44–72)
NRBC # BLD AUTO: 0 K/UL
NRBC BLD-RTO: 0 /100 WBC
PLATELET # BLD AUTO: 169 K/UL (ref 164–446)
PMV BLD AUTO: 9.8 FL (ref 9–12.9)
POTASSIUM SERPL-SCNC: 3.2 MMOL/L (ref 3.6–5.5)
PROT SERPL-MCNC: 6.3 G/DL (ref 6–8.2)
RBC # BLD AUTO: 3.98 M/UL (ref 4.2–5.4)
SODIUM SERPL-SCNC: 142 MMOL/L (ref 135–145)
WBC # BLD AUTO: 7.2 K/UL (ref 4.8–10.8)

## 2022-08-16 PROCEDURE — 94760 N-INVAS EAR/PLS OXIMETRY 1: CPT

## 2022-08-16 PROCEDURE — 85025 COMPLETE CBC W/AUTO DIFF WBC: CPT

## 2022-08-16 PROCEDURE — 80053 COMPREHEN METABOLIC PANEL: CPT

## 2022-08-16 PROCEDURE — 700111 HCHG RX REV CODE 636 W/ 250 OVERRIDE (IP): Performed by: STUDENT IN AN ORGANIZED HEALTH CARE EDUCATION/TRAINING PROGRAM

## 2022-08-16 PROCEDURE — 700102 HCHG RX REV CODE 250 W/ 637 OVERRIDE(OP): Performed by: INTERNAL MEDICINE

## 2022-08-16 PROCEDURE — A9270 NON-COVERED ITEM OR SERVICE: HCPCS | Performed by: STUDENT IN AN ORGANIZED HEALTH CARE EDUCATION/TRAINING PROGRAM

## 2022-08-16 PROCEDURE — 99233 SBSQ HOSP IP/OBS HIGH 50: CPT | Performed by: INTERNAL MEDICINE

## 2022-08-16 PROCEDURE — 83690 ASSAY OF LIPASE: CPT

## 2022-08-16 PROCEDURE — 36415 COLL VENOUS BLD VENIPUNCTURE: CPT

## 2022-08-16 PROCEDURE — 700102 HCHG RX REV CODE 250 W/ 637 OVERRIDE(OP): Performed by: STUDENT IN AN ORGANIZED HEALTH CARE EDUCATION/TRAINING PROGRAM

## 2022-08-16 PROCEDURE — A9270 NON-COVERED ITEM OR SERVICE: HCPCS | Performed by: INTERNAL MEDICINE

## 2022-08-16 PROCEDURE — 770006 HCHG ROOM/CARE - MED/SURG/GYN SEMI*

## 2022-08-16 RX ORDER — POTASSIUM CHLORIDE 20 MEQ/1
40 TABLET, EXTENDED RELEASE ORAL ONCE
Status: COMPLETED | OUTPATIENT
Start: 2022-08-16 | End: 2022-08-16

## 2022-08-16 RX ORDER — ENOXAPARIN SODIUM 100 MG/ML
40 INJECTION SUBCUTANEOUS DAILY
Status: DISCONTINUED | OUTPATIENT
Start: 2022-08-16 | End: 2022-08-17 | Stop reason: HOSPADM

## 2022-08-16 RX ADMIN — HYDROMORPHONE HYDROCHLORIDE 0.5 MG: 1 INJECTION, SOLUTION INTRAMUSCULAR; INTRAVENOUS; SUBCUTANEOUS at 14:07

## 2022-08-16 RX ADMIN — HYDROMORPHONE HYDROCHLORIDE 0.5 MG: 1 INJECTION, SOLUTION INTRAMUSCULAR; INTRAVENOUS; SUBCUTANEOUS at 17:43

## 2022-08-16 RX ADMIN — LEVETIRACETAM 500 MG: 500 TABLET, FILM COATED ORAL at 21:08

## 2022-08-16 RX ADMIN — OXYCODONE HYDROCHLORIDE 5 MG: 5 TABLET ORAL at 23:36

## 2022-08-16 RX ADMIN — FAMOTIDINE 20 MG: 10 INJECTION, SOLUTION INTRAVENOUS at 17:43

## 2022-08-16 RX ADMIN — OXYCODONE HYDROCHLORIDE 5 MG: 5 TABLET ORAL at 08:59

## 2022-08-16 RX ADMIN — LEVETIRACETAM 500 MG: 500 TABLET, FILM COATED ORAL at 04:41

## 2022-08-16 RX ADMIN — ONDANSETRON 4 MG: 2 INJECTION INTRAMUSCULAR; INTRAVENOUS at 12:13

## 2022-08-16 RX ADMIN — PROCHLORPERAZINE EDISYLATE 10 MG: 5 INJECTION, SOLUTION INTRAMUSCULAR; INTRAVENOUS at 21:14

## 2022-08-16 RX ADMIN — OXYCODONE HYDROCHLORIDE 5 MG: 5 TABLET ORAL at 04:44

## 2022-08-16 RX ADMIN — ACETAMINOPHEN 1000 MG: 500 TABLET, FILM COATED ORAL at 08:59

## 2022-08-16 RX ADMIN — HYDROMORPHONE HYDROCHLORIDE 0.5 MG: 1 INJECTION, SOLUTION INTRAMUSCULAR; INTRAVENOUS; SUBCUTANEOUS at 10:17

## 2022-08-16 RX ADMIN — SENNOSIDES AND DOCUSATE SODIUM 2 TABLET: 50; 8.6 TABLET ORAL at 04:41

## 2022-08-16 RX ADMIN — ZONISAMIDE 200 MG: 50 CAPSULE ORAL at 21:09

## 2022-08-16 RX ADMIN — HYDROMORPHONE HYDROCHLORIDE 0.5 MG: 1 INJECTION, SOLUTION INTRAMUSCULAR; INTRAVENOUS; SUBCUTANEOUS at 21:06

## 2022-08-16 RX ADMIN — HYDROMORPHONE HYDROCHLORIDE 0.5 MG: 1 INJECTION, SOLUTION INTRAMUSCULAR; INTRAVENOUS; SUBCUTANEOUS at 05:59

## 2022-08-16 RX ADMIN — POTASSIUM CHLORIDE 40 MEQ: 1500 TABLET, EXTENDED RELEASE ORAL at 14:08

## 2022-08-16 RX ADMIN — ONDANSETRON 4 MG: 2 INJECTION INTRAMUSCULAR; INTRAVENOUS at 17:43

## 2022-08-16 RX ADMIN — QUETIAPINE FUMARATE 150 MG: 25 TABLET ORAL at 21:08

## 2022-08-16 RX ADMIN — ACETAMINOPHEN 1000 MG: 500 TABLET, FILM COATED ORAL at 21:08

## 2022-08-16 RX ADMIN — ACETAMINOPHEN 1000 MG: 500 TABLET, FILM COATED ORAL at 14:08

## 2022-08-16 ASSESSMENT — PAIN DESCRIPTION - PAIN TYPE
TYPE: ACUTE PAIN

## 2022-08-16 NOTE — PROGRESS NOTES
Hospital Medicine Daily Progress Note    Date of Service  8/16/2022    Chief Complaint  Nausea/vomiting    Hospital Course  Rhiannon Marrero is a 37 y.o. female with history of alcohol abuse, migraine headaches, history of pancreatitis, GERD and heartburn, without any fever, chills, or active chest pain, admitted 8/15/2022 with increasing epigastric abdominal pain that is radiating to the back, along with episodic vomiting for the past 2 days.  She reports stopping drinking alcohol 5 days prior to admission.  In the ED, vital signs were stable, and was not hypoxic.  She had leukocytosis of 14,000.  Chemistry was unremarkable.  Lipase was 1800s.  Patient was given IV fluids, and started on pain medications.  She was put on bowel rest.  She is admitted for acute pancreatitis.    Interval Problem Update  8/16/2022 - I reviewed the patient's chart. There were no significant overnight events. Remains hemodynamically stable and afebrile. Stable on RA.  Tachycardia has resolved.  WBC has normalized.  Potassium 3.2.  Sodium is normal.  Creatinine is normal.  CT A/P showed peripancreatic fat stranding and fluid collection consistent with pancreatitis, with possible multiple tiny gallstones.  Lipase still elevated but better at 570. UDS positive for cannabinoids.     > I have personally seen and examined the patient today.  She still continues to have epigastric pain, which increased when she took pills this morning.  She is passing gas, and she can hear bowel sounds from her stomach.  She has some nausea but no vomiting.  Denies any chest pain or shortness of breath.  No fevers or chills. No tremors or shakiness.       I have discussed this patient's plan of care and discharge plan at IDT rounds today with Case Management, Nursing, Nursing leadership, and other members of the IDT team.    Consultants/Specialty  None    Code Status  Full Code    Disposition  Patient is not medically cleared for discharge.   Anticipate  discharge to to home with close outpatient follow-up.  I have placed the appropriate orders for post-discharge needs.    Review of Systems  ROS     Pertinent positives/negatives as mentioned above.     A complete review of systems was personally done by me. All other systems were negative.       Physical Exam  Temp:  [36.4 °C (97.6 °F)-36.9 °C (98.5 °F)] 36.5 °C (97.7 °F)  Pulse:  [62-99] 86  Resp:  [14-23] 18  BP: (119-153)/(74-94) 119/82  SpO2:  [91 %-100 %] 97 %    Physical Exam  Vitals reviewed.   Constitutional:       General: She is not in acute distress.     Appearance: Normal appearance. She is normal weight. She is not ill-appearing or diaphoretic.   HENT:      Head: Normocephalic and atraumatic.      Right Ear: External ear normal.      Left Ear: External ear normal.      Mouth/Throat:      Mouth: Mucous membranes are moist.      Pharynx: No oropharyngeal exudate or posterior oropharyngeal erythema.   Eyes:      General: No scleral icterus.     Extraocular Movements: Extraocular movements intact.      Conjunctiva/sclera: Conjunctivae normal.      Pupils: Pupils are equal, round, and reactive to light.   Cardiovascular:      Rate and Rhythm: Normal rate and regular rhythm.      Heart sounds: Normal heart sounds. No murmur heard.  Pulmonary:      Effort: Pulmonary effort is normal. No respiratory distress.      Breath sounds: Normal breath sounds. No stridor. No wheezing, rhonchi or rales.   Chest:      Chest wall: No tenderness.   Abdominal:      General: Bowel sounds are normal. There is no distension.      Palpations: Abdomen is soft. There is no mass.      Tenderness: There is abdominal tenderness (epigastrium. No peritoneal signs). There is no guarding or rebound.   Musculoskeletal:         General: No swelling. Normal range of motion.      Cervical back: Normal range of motion and neck supple. No rigidity. No muscular tenderness.      Right lower leg: No edema.      Left lower leg: No edema.    Lymphadenopathy:      Cervical: No cervical adenopathy.   Skin:     General: Skin is warm and dry.      Coloration: Skin is not jaundiced.      Findings: No rash.   Neurological:      General: No focal deficit present.      Mental Status: She is alert and oriented to person, place, and time. Mental status is at baseline.      Cranial Nerves: No cranial nerve deficit.   Psychiatric:         Mood and Affect: Mood normal.         Behavior: Behavior normal.         Thought Content: Thought content normal.         Judgment: Judgment normal.       Fluids    Intake/Output Summary (Last 24 hours) at 8/16/2022 1020  Last data filed at 8/16/2022 0500  Gross per 24 hour   Intake 240 ml   Output 600 ml   Net -360 ml       Laboratory  Recent Labs     08/15/22  0749 08/16/22  0231   WBC 14.2* 7.2   RBC 4.56 3.98*   HEMOGLOBIN 14.6 12.7   HEMATOCRIT 42.9 37.4   MCV 94.1 94.0   MCH 32.0 31.9   MCHC 34.0 34.0   RDW 45.8 45.8   PLATELETCT 218 169   MPV 9.2 9.8     Recent Labs     08/15/22  0749 08/16/22  0231   SODIUM 138 142   POTASSIUM 4.0 3.2*   CHLORIDE 96 105   CO2 24 25   GLUCOSE 161* 111*   BUN 11 5*   CREATININE 0.79 0.47*   CALCIUM 9.1 8.6                   Imaging  US-RUQ   Final Result      1.  Mild hepatomegaly, nonspecific.   2.  Mildly distended gallbladder.   3.  No gallstone or significant biliary dilation.      CT-ABDOMEN-PELVIS W/O   Final Result      1.  Peripancreatic fat stranding and fluid collections consistent with pancreatitis.      2.  Possible multiple tiny gallstones.      3.  Hepatic steatosis.      4.  Small free fluid collections within the pelvis.           Assessment/Plan  * Alcohol-induced acute pancreatitis, unspecified complication status- (present on admission)  Assessment & Plan  History of acute pancreatitis in the past secondary to alcohol use.  She does have multiple tiny gallstones, but which does not appear to be obstructive.  Admits to alcohol use last time she drank was 5 days  ago  Lipase in the 1800, trended down to 570.  Still with epigastric abdominal pain, and p.o. intolerance.  Continue bowel rest with clear liquid diet for now.  Continue pain control with IV Dilaudid and oral oxycodone as needed.    Continue famotidine.  Continue aggressive IV fluids with LR at 200 cc/h.  Trend lipase. Monitor for clinical improvement.   Continue counseling on alcohol cessation.  Doubt that gallstones mainly contributing to pancreatitis, but she will need outpatient surgery referral to discuss possible cholecystectomy electively.    Hypokalemia  Assessment & Plan  Replace with 40 mEq of oral K-Dur.  Magnesium level is normal.  Repeat BMP in the morning.    Leukocytosis- (present on admission)  Assessment & Plan  Secondary to acute pancreatitis  No signs of infection.  No pancreatic necrosis, abscess, or pseudocyst on CT.  WBC has normalized.  Hold off on antibiotics.  Continue to trend WBC count.  Watch for fevers.    Seizure disorder (HCC)- (present on admission)  Assessment & Plan  Continue home Keppra and Zonisamide    Alcohol use disorder, severe, in early remission, dependence (HCC)- (present on admission)  Assessment & Plan  Last drink 6 days ago. States he quit drinking for 22 days, and relapsed but only drank for 2-3 consecutive days.  No signs of alcohol withdrawal.  Low risk for withdrawal now as last drink was about 6 days ago.  Continue counseling on alcohol cessation       VTE prophylaxis: enoxaparin ppx

## 2022-08-16 NOTE — CARE PLAN
The patient is Watcher - Medium risk of patient condition declining or worsening    Shift Goals  Clinical Goals: Manage pain, n/v managed  Patient Goals: pain management  Family Goals: n/a    Progress made toward(s) clinical / shift goals:  Pain managed with PRN medications. N/V managing with PRN medications, will continue to monitor.     Patient is not progressing towards the following goals:

## 2022-08-16 NOTE — PROGRESS NOTES
Pt is upright in bed. No complaints at this time. Verbalizes needs well and demonstrates use of call bell. Call bell in reach    Chart check completed

## 2022-08-16 NOTE — CARE PLAN
The patient is   Problem: Pain - Standard  Goal: Alleviation of pain or a reduction in pain to the patient’s comfort goal  Outcome: Progressing     Problem: Knowledge Deficit - Standard  Goal: Patient and family/care givers will demonstrate understanding of plan of care, disease process/condition, diagnostic tests and medications  Outcome: Progressing       Shift Goals  Clinical Goals: pain level at or below 4\10  Patient Goals: rest and comfort  Family Goals: n/a    Progress made toward(s) clinical / shift goals:  Medication given per scale    Patient is not progressing towards the following goals:

## 2022-08-16 NOTE — HOSPITAL COURSE
Rhiannon Marrero is a 37 y.o. female with history of alcohol abuse, migraine headaches, history of pancreatitis, GERD and heartburn, without any fever, chills, or active chest pain, admitted 8/15/2022 with increasing epigastric abdominal pain that is radiating to the back, along with episodic vomiting for the past 2 days.  She reports stopping drinking alcohol 5 days prior to admission.  In the ED, vital signs were stable, and was not hypoxic.  She had leukocytosis of 14,000.  Chemistry was unremarkable.  Lipase was 1800s.  Patient was given IV fluids, and started on pain medications.  She was put on bowel rest.  She is admitted for acute pancreatitis.

## 2022-08-16 NOTE — DISCHARGE PLANNING
Case Management Discharge Planning    Admission Date: 8/15/2022  GMLOS: 2.4  ALOS: 1    6-Clicks ADL Score: 24  6-Clicks Mobility Score: 24      Anticipated Discharge Dispo: Discharge Disposition: Discharged to home/self care (01)    DME Needed: No    Action(s) Taken: Updated Provider/Nurse on Discharge Plan    No anticipated DC needs at this time.     Escalations Completed: None    Medically Clear: No    Next Steps: Medical clearance    Barriers to Discharge: Medical clearance

## 2022-08-16 NOTE — DIETARY
"Nutrition services: Day 1 of admit.  Rhiannon Marrero is a 37 y.o. female with admitting DX of Alcohol-induced acute pancreatitis    Pt noted to have low BMI and report of poor PO PTA per nutrition screen (MST of 1).      Assessment:  Height: 167.6 cm (5' 6\")  Weight: 50.3 kg (110 lb 14.3 oz)  Body mass index is 17.9 kg/m²., BMI classification: underweight  Diet/Intake: clear liquid diet/0 at breakfast    Evaluation:   Pt presented w/ N/V. HX includes alcohol abuse.   Wt hx reviewed in Epic. Pt with significant wt loss of 32 lb (39%) x 1 year.   RD met with pt in her room. Pt disagreed with wt loss noted x 1 year. She said that she has lost 80 lb x 1 year. She weighed 189 lb last September. She said wt loss is due to diminished PO intake of <50% at times due to untreated bipolar. She said she has a hx of bipolar that was initially treated. She lost contact with her psychiatrist and has not been on meds. Her appointment is 2 months out. She said that she is on the list for a call if there is a cancellation. She said that she is currently on Seroquel but only a psychiatrist can order medications for bipolar.   Nutrition focused physical exam (NFPE) completed. Pt with moderate muscle loss noted in temple and severe fat loss noted in orbital region.   Pt is currently receiving a clear liquid diet. Appetite is poor. She agreed to addition of Boost Breeze supplement TID with meals for additional kcals and protein.    Malnutrition Risk: pt meets criteria for severe malnutrition in the context of social circumstances starvation related due to diminished PO intake due to untreated bipolar disorder AEB confirmed wt loss of 39% x 1 year, poor PO intake of <50% at times x 1 year, and moderate muscle loss (temple region) and severe fat loss (orbital region)     Recommendations/Plan:  Add Boost Breeze to meal trays TID  Advance diet beyond clears when medically feasible   Encourage intake of >50%  Document intake of all meals " and supplements as % taken in ADL's to provide interdisciplinary communication across all shifts.   Monitor weight.  Nutrition rep will continue to see patient for ongoing meal and snack preferences.         RD will follow

## 2022-08-17 VITALS
BODY MASS INDEX: 17.82 KG/M2 | OXYGEN SATURATION: 99 % | SYSTOLIC BLOOD PRESSURE: 136 MMHG | DIASTOLIC BLOOD PRESSURE: 89 MMHG | TEMPERATURE: 97.6 F | WEIGHT: 110.89 LBS | RESPIRATION RATE: 18 BRPM | HEART RATE: 75 BPM | HEIGHT: 66 IN

## 2022-08-17 LAB
ANION GAP SERPL CALC-SCNC: 11 MMOL/L (ref 7–16)
BUN SERPL-MCNC: 2 MG/DL (ref 8–22)
CALCIUM SERPL-MCNC: 8.4 MG/DL (ref 8.4–10.2)
CHLORIDE SERPL-SCNC: 109 MMOL/L (ref 96–112)
CO2 SERPL-SCNC: 21 MMOL/L (ref 20–33)
CREAT SERPL-MCNC: 0.46 MG/DL (ref 0.5–1.4)
ERYTHROCYTE [DISTWIDTH] IN BLOOD BY AUTOMATED COUNT: 45.5 FL (ref 35.9–50)
GFR SERPLBLD CREATININE-BSD FMLA CKD-EPI: 128 ML/MIN/1.73 M 2
GLUCOSE SERPL-MCNC: 99 MG/DL (ref 65–99)
HCT VFR BLD AUTO: 34.8 % (ref 37–47)
HGB BLD-MCNC: 11.6 G/DL (ref 12–16)
LIPASE SERPL-CCNC: 146 U/L (ref 7–58)
MCH RBC QN AUTO: 31.7 PG (ref 27–33)
MCHC RBC AUTO-ENTMCNC: 33.3 G/DL (ref 33.6–35)
MCV RBC AUTO: 95.1 FL (ref 81.4–97.8)
PLATELET # BLD AUTO: 132 K/UL (ref 164–446)
PMV BLD AUTO: 9.8 FL (ref 9–12.9)
POTASSIUM SERPL-SCNC: 3.1 MMOL/L (ref 3.6–5.5)
RBC # BLD AUTO: 3.66 M/UL (ref 4.2–5.4)
SODIUM SERPL-SCNC: 141 MMOL/L (ref 135–145)
WBC # BLD AUTO: 5.2 K/UL (ref 4.8–10.8)

## 2022-08-17 PROCEDURE — A9270 NON-COVERED ITEM OR SERVICE: HCPCS | Performed by: STUDENT IN AN ORGANIZED HEALTH CARE EDUCATION/TRAINING PROGRAM

## 2022-08-17 PROCEDURE — 700102 HCHG RX REV CODE 250 W/ 637 OVERRIDE(OP): Performed by: STUDENT IN AN ORGANIZED HEALTH CARE EDUCATION/TRAINING PROGRAM

## 2022-08-17 PROCEDURE — 83690 ASSAY OF LIPASE: CPT

## 2022-08-17 PROCEDURE — 700111 HCHG RX REV CODE 636 W/ 250 OVERRIDE (IP): Performed by: STUDENT IN AN ORGANIZED HEALTH CARE EDUCATION/TRAINING PROGRAM

## 2022-08-17 PROCEDURE — 36415 COLL VENOUS BLD VENIPUNCTURE: CPT

## 2022-08-17 PROCEDURE — 85027 COMPLETE CBC AUTOMATED: CPT

## 2022-08-17 PROCEDURE — 99239 HOSP IP/OBS DSCHRG MGMT >30: CPT | Performed by: INTERNAL MEDICINE

## 2022-08-17 PROCEDURE — 80048 BASIC METABOLIC PNL TOTAL CA: CPT

## 2022-08-17 RX ORDER — FAMOTIDINE 20 MG/1
20 TABLET, FILM COATED ORAL 2 TIMES DAILY
Qty: 28 TABLET | Refills: 0 | Status: SHIPPED | OUTPATIENT
Start: 2022-08-17 | End: 2022-08-31

## 2022-08-17 RX ORDER — OXYCODONE HYDROCHLORIDE 5 MG/1
5 TABLET ORAL EVERY 6 HOURS PRN
Qty: 20 TABLET | Refills: 0 | Status: SHIPPED | OUTPATIENT
Start: 2022-08-17 | End: 2022-08-22

## 2022-08-17 RX ORDER — ONDANSETRON 4 MG/1
4 TABLET, ORALLY DISINTEGRATING ORAL EVERY 4 HOURS PRN
Qty: 30 TABLET | Refills: 0 | Status: SHIPPED | OUTPATIENT
Start: 2022-08-17 | End: 2022-09-05

## 2022-08-17 RX ADMIN — LEVETIRACETAM 500 MG: 500 TABLET, FILM COATED ORAL at 05:40

## 2022-08-17 RX ADMIN — ONDANSETRON 4 MG: 2 INJECTION INTRAMUSCULAR; INTRAVENOUS at 05:51

## 2022-08-17 RX ADMIN — HYDROMORPHONE HYDROCHLORIDE 0.5 MG: 1 INJECTION, SOLUTION INTRAMUSCULAR; INTRAVENOUS; SUBCUTANEOUS at 02:31

## 2022-08-17 RX ADMIN — FAMOTIDINE 20 MG: 20 TABLET, FILM COATED ORAL at 05:40

## 2022-08-17 RX ADMIN — HYDROMORPHONE HYDROCHLORIDE 0.5 MG: 1 INJECTION, SOLUTION INTRAMUSCULAR; INTRAVENOUS; SUBCUTANEOUS at 05:38

## 2022-08-17 RX ADMIN — OXYCODONE HYDROCHLORIDE 5 MG: 5 TABLET ORAL at 08:49

## 2022-08-17 RX ADMIN — OXYCODONE HYDROCHLORIDE 5 MG: 5 TABLET ORAL at 03:41

## 2022-08-17 RX ADMIN — ACETAMINOPHEN 1000 MG: 500 TABLET, FILM COATED ORAL at 08:49

## 2022-08-17 ASSESSMENT — PAIN DESCRIPTION - PAIN TYPE
TYPE: ACUTE PAIN

## 2022-08-17 NOTE — CARE PLAN
The patient is Watcher - Medium risk of patient condition declining or worsening    Shift Goals  Clinical Goals: pain managment, free of N/V  Patient Goals: Resting well  Family Goals: n/a    Progress made toward(s) clinical / shift goals:  Pain managed with PRN medications, pt needed PRN medications for N/V. Will continue to monitor.     Patient is not progressing towards the following goals:

## 2022-08-17 NOTE — PROGRESS NOTES
Pt is awake in bed. VSS. Pt c/o 10/10 abdominal pain and n/v. Medicated per MAR. RN discussed all medications for the evening.  All questions answered. Fall precautions in place.

## 2022-08-17 NOTE — DISCHARGE INSTRUCTIONS
Discharge Instructions    Discharged to home by car with relative. Discharged via wheelchair, hospital escort: Yes.  Special equipment needed: Not Applicable    Be sure to schedule a follow-up appointment with your primary care doctor or any specialists as instructed.     Discharge Plan:   Diet Plan: Discussed  Activity Level: Discussed  Confirmed Follow up Appointment: Patient to Call and Schedule Appointment  Confirmed Symptoms Management: Discussed  Medication Reconciliation Updated: Yes    I understand that a diet low in cholesterol, fat, and sodium is recommended for good health. Unless I have been given specific instructions below for another diet, I accept this instruction as my diet prescription.   Other diet: Regular    Special Instructions: None    -Is this patient being discharged with medication to prevent blood clots?  No    Is patient discharged on Warfarin / Coumadin?   No     Oxycodone tablets or capsules  What is this medicine?  OXYCODONE (ox i KOE done) is a pain reliever. It is used to treat moderate to severe pain.  This medicine may be used for other purposes; ask your health care provider or pharmacist if you have questions.  COMMON BRAND NAME(S): Dazidox, Endocodone, Oxaydo, OXECTA, OxyIR, Percolone, Roxicodone, Roxybond  What should I tell my health care provider before I take this medicine?  They need to know if you have any of these conditions:  McWilliams's disease  brain tumor  head injury  heart disease  history of drug or alcohol abuse problem  if you often drink alcohol  kidney disease  liver disease  lung or breathing disease, like asthma  mental illness  pancreatic disease  seizures  thyroid disease  an unusual or allergic reaction to oxycodone, codeine, hydrocodone, morphine, other medicines, foods, dyes, or preservatives  pregnant or trying to get pregnant  breast-feeding  How should I use this medicine?  Take this medicine by mouth with a glass of water. Follow the directions on the  prescription label. You can take it with or without food. If it upsets your stomach, take it with food. Take your medicine at regular intervals. Do not take it more often than directed. Do not stop taking except on your doctor's advice.  Some brands of this medicine, like Oxecta, have special instructions. Ask your doctor or pharmacist if these directions are for you: Do not cut, crush or chew this medicine. Swallow only one tablet at a time. Do not wet, soak, or lick the tablet before you take it.  A special MedGuide will be given to you by the pharmacist with each prescription and refill. Be sure to read this information carefully each time.  Talk to your pediatrician regarding the use of this medicine in children. Special care may be needed.  Overdosage: If you think you have taken too much of this medicine contact a poison control center or emergency room at once.  NOTE: This medicine is only for you. Do not share this medicine with others.  What if I miss a dose?  If you miss a dose, take it as soon as you can. If it is almost time for your next dose, take only that dose. Do not take double or extra doses.  What may interact with this medicine?  This medicine may interact with the following medications:  alcohol  antihistamines for allergy, cough and cold  antiviral medicines for HIV or AIDS  atropine  certain antibiotics like clarithromycin, erythromycin, linezolid, rifampin  certain medicines for anxiety or sleep  certain medicines for bladder problems like oxybutynin, tolterodine  certain medicines for depression like amitriptyline, fluoxetine, sertraline  certain medicines for fungal infections like ketoconazole, itraconazole, voriconazole  certain medicines for migraine headache like almotriptan, eletriptan, frovatriptan, naratriptan, rizatriptan, sumatriptan, zolmitriptan  certain medicines for nausea or vomiting like dolasetron, ondansetron, palonosetron  certain medicines for Parkinson's disease like  benztropine, trihexyphenidyl  certain medicines for seizures like phenobarbital, phenytoin, primidone  certain medicines for stomach problems like dicyclomine, hyoscyamine  certain medicines for travel sickness like scopolamine  diuretics  general anesthetics like halothane, isoflurane, methoxyflurane, propofol  ipratropium  local anesthetics like lidocaine, pramoxine, tetracaine  MAOIs like Carbex, Eldepryl, Marplan, Nardil, and Parnate  medicines that relax muscles for surgery  methylene blue  nilotinib  other narcotic medicines for pain or cough  phenothiazines like chlorpromazine, mesoridazine, prochlorperazine, thioridazine  This list may not describe all possible interactions. Give your health care provider a list of all the medicines, herbs, non-prescription drugs, or dietary supplements you use. Also tell them if you smoke, drink alcohol, or use illegal drugs. Some items may interact with your medicine.  What should I watch for while using this medicine?  Tell your doctor or health care professional if your pain does not go away, if it gets worse, or if you have new or a different type of pain. You may develop tolerance to the medicine. Tolerance means that you will need a higher dose of the medicine for pain relief. Tolerance is normal and is expected if you take this medicine for a long time.  Do not suddenly stop taking your medicine because you may develop a severe reaction. Your body becomes used to the medicine. This does NOT mean you are addicted. Addiction is a behavior related to getting and using a drug for a non-medical reason. If you have pain, you have a medical reason to take pain medicine. Your doctor will tell you how much medicine to take. If your doctor wants you to stop the medicine, the dose will be slowly lowered over time to avoid any side effects.  There are different types of narcotic medicines (opiates). If you take more than one type at the same time or if you are taking another  medicine that also causes drowsiness, you may have more side effects. Give your health care provider a list of all medicines you use. Your doctor will tell you how much medicine to take. Do not take more medicine than directed. Call emergency for help if you have problems breathing or unusual sleepiness.  You may get drowsy or dizzy. Do not drive, use machinery, or do anything that needs mental alertness until you know how the medicine affects you. Do not stand or sit up quickly, especially if you are an older patient. This reduces the risk of dizzy or fainting spells. Alcohol may interfere with the effect of this medicine. Avoid alcoholic drinks.  This medicine will cause constipation. Try to have a bowel movement at least every 2 to 3 days. If you do not have a bowel movement for 3 days, call your doctor or health care professional.  Your mouth may get dry. Chewing sugarless gum or sucking hard candy, and drinking plenty of water may help. Contact your doctor if the problem does not go away or is severe.  What side effects may I notice from receiving this medicine?  Side effects that you should report to your doctor or health care professional as soon as possible:  allergic reactions like skin rash, itching or hives, swelling of the face, lips, or tongue  breathing problems  confusion  signs and symptoms of low blood pressure like dizziness; feeling faint or lightheaded, falls; unusually weak or tired  trouble passing urine or change in the amount of urine  trouble swallowing  Side effects that usually do not require medical attention (report to your doctor or health care professional if they continue or are bothersome):  constipation  dry mouth  nausea, vomiting  tiredness  This list may not describe all possible side effects. Call your doctor for medical advice about side effects. You may report side effects to FDA at 4-680-FDA-5954.  Where should I keep my medicine?  Keep out of the reach of children. This  medicine can be abused. Keep your medicine in a safe place to protect it from theft. Do not share this medicine with anyone. Selling or giving away this medicine is dangerous and against the law.  Store at room temperature between 15 and 30 degrees C (59 and 86 degrees F). Protect from light. Keep container tightly closed.  This medicine may cause harm and death if it is taken by other adults, children, or pets. Return medicine that has not been used to an official disposal site. Contact the [facility] at [phone] or your [facility] government to find a site. If you cannot return the medicine, flush it down the toilet. Do not use the medicine after the expiration date.  NOTE: This sheet is a summary. It may not cover all possible information. If you have questions about this medicine, talk to your doctor, pharmacist, or health care provider.  © 2020 [employer] (2018-04-24 16:13:10)

## 2022-08-17 NOTE — CARE PLAN
The patient is Stable - Low risk of patient condition declining or worsening    Shift Goals  Clinical Goals: pain managed, lipase decreasd  Patient Goals: pain managment and tolerting PO  Family Goals: N/A    Progress made toward(s) clinical / shift goals:      Pt required aggressive pain management overnight including ice packs, repositioning, heat packs, PRN dilaudid, and oxycodone. Pt states pain has been tolerable overnight, however still requiring frequent managent    Problem: Pain - Standard  Goal: Alleviation of pain or a reduction in pain to the patient’s comfort goal  Outcome: Progressing     Problem: Knowledge Deficit - Standard  Goal: Patient and family/care givers will demonstrate understanding of plan of care, disease process/condition, diagnostic tests and medications  Outcome: Progressing       Patient is not progressing towards the following goals:

## 2022-08-17 NOTE — DISCHARGE SUMMARY
Discharge Summary    CHIEF COMPLAINT ON ADMISSION  Chief Complaint   Patient presents with    N/V       Reason for Admission  Chest pain; Abdominal pain      Admission Date  8/15/2022    CODE STATUS  Full Code    HPI & HOSPITAL COURSE  Rhiannon Marrero is a 37 y.o. female with history of alcohol abuse, migraine headaches, history of pancreatitis, GERD and heartburn, without any fever, chills, or active chest pain, admitted 8/15/2022 with increasing epigastric abdominal pain that is radiating to the back, along with episodic vomiting for the past 2 days.  She reports stopping drinking alcohol 5 days prior to admission.  In the ED, vital signs were stable, and was not hypoxic.  She had leukocytosis of 14,000.  Chemistry was unremarkable.  Lipase was 1800s.  She is admitted for acute pancreatitis. Patient was given IV fluids, and started on pain medications.  She was put on bowel rest.      She has clinically improved.  Her lipase level trended down.  Her WBC count has normalized.  Her tachycardia has resolved.  Creatinine remains normal.  She did not exhibit signs of alcohol withdrawal.  She was able to tolerate oral fluid intake.    Patient expressed desire to be discharge.  She remained hemodynamically stable and afebrile, and with her clinical improvement she was deemed ready to discharge from the hospital.  She is counseled extensively against further alcohol intake, and to maintain low-fat diet for the next several days.  She is also counseled on advancing diet slowly.  She will be prescribed a limited supply of oxycodone for breakthrough pain, along with Pepcid, and as needed Zofran for nausea.    I have personally seen and examined the patient on the day of discharge. Patient felt comfortable going home. The discharge plan was discussed with the patient, with which she was agreeable to.     Therefore, she is discharged in good and stable condition to home with close outpatient follow-up.    The patient met  2-midnight criteria for an inpatient stay at the time of discharge.    Discharge Date  8/17/2022      FOLLOW UP ITEMS POST DISCHARGE  - advance diet slowly. Avoid alcohol. Stay on low fat diet for next several days.   - follow-up with PCP.   - counseled to seek immediate medical attention, or return to the ED for recurrent or worsening symptoms.      DISCHARGE DIAGNOSES  Principal Problem:    Alcohol-induced acute pancreatitis, unspecified complication status POA: Yes  Active Problems:    Hypokalemia POA: No    Alcohol use disorder, severe, in early remission, dependence (HCC) POA: Yes    Seizure disorder (HCC) POA: Yes    Leukocytosis POA: Yes  Resolved Problems:    * No resolved hospital problems. *      FOLLOW UP  Future Appointments   Date Time Provider Department Center   9/26/2022  9:00 AM MERI Des[facility]     No follow-up provider specified.    MEDICATIONS ON DISCHARGE     Medication List        START taking these medications        Instructions   famotidine 20 MG Tabs  Commonly known as: PEPCID   Take 1 Tablet by mouth 2 times a day for 14 days.  Dose: 20 mg     ondansetron 4 MG Tbdp  Commonly known as: ZOFRAN ODT   Take 1 Tablet by mouth every four hours as needed for Nausea.  Dose: 4 mg     oxyCODONE immediate-release 5 MG Tabs  Commonly known as: ROXICODONE   Take 1 Tablet by mouth every 6 hours as needed for Severe Pain for up to 5 days.  Dose: 5 mg            CONTINUE taking these medications        Instructions   levetiracetam 1000 MG tablet  Commonly known as: KEPPRA   Take 0.5 Tablets by mouth 2 times a day. Indications: Seizure  Dose: 500 mg     QUEtiapine 50 MG tablet  Commonly known as: Seroquel   Take 150 mg by mouth at bedtime. 3 tablets = 150 mg  Dose: 150 mg     zonisamide 100 MG Caps  Commonly known as: ZONEGRAN   Take 200 mg by mouth at bedtime. 2 tablets = 200 mg  Dose: 200 mg              Allergies  Allergies   Allergen Reactions    Promethazine Hcl Anxiety        DIET  Orders Placed This Encounter   Procedures    Diet Order Diet: Clear Liquid     Standing Status:   Standing     Number of Occurrences:   1     Order Specific Question:   Diet:     Answer:   Clear Liquid [10]       ACTIVITY  As tolerated.  Weight bearing as tolerated    CONSULTATIONS  None    PROCEDURES  As above    LABORATORY  Lab Results   Component Value Date    SODIUM 141 08/17/2022    POTASSIUM 3.1 (L) 08/17/2022    CHLORIDE 109 08/17/2022    CO2 21 08/17/2022    GLUCOSE 99 08/17/2022    BUN 2 (L) 08/17/2022    CREATININE 0.46 (L) 08/17/2022    CREATININE 0.7 06/10/2008        Lab Results   Component Value Date    WBC 5.2 08/17/2022    HEMOGLOBIN 11.6 (L) 08/17/2022    HEMATOCRIT 34.8 (L) 08/17/2022    PLATELETCT 132 (L) 08/17/2022        Total time of the discharge process = 35 minutes.

## 2022-08-17 NOTE — DISCHARGE PLANNING
Case Management Discharge Planning    Admission Date: 8/15/2022  GMLOS: 2.4  ALOS: 2    6-Clicks ADL Score: 24  6-Clicks Mobility Score: 24      Anticipated Discharge Dispo: Discharge Disposition: Discharged to home/self care (01)    DME Needed: No    Action(s) Taken: Updated Provider/Nurse on Discharge Plan    No DC needs at this time.     Escalations Completed: None    Medically Clear: Yes    Next Steps: DC home    Barriers to Discharge: None

## 2022-08-18 NOTE — TELEPHONE ENCOUNTER
Pt called and states she tried to  RX for her Seroquel and Valacyclovir but that the pharmacy told her the provider cancelled those RX. She is not sure why, she does need those ASAP as she is completely out. Please advise    Last seen: 8/1/22 by Dr. Tovar  Next appt: 9/26/22 with Dr. Adams    Was the patient seen in the last year in this department? Yes   Does patient have an active prescription for medications requested? No   Received Request Via: Pharmacy

## 2022-08-19 DIAGNOSIS — B00.2 RECURRENT ORAL HERPES SIMPLEX: ICD-10-CM

## 2022-08-19 RX ORDER — VALACYCLOVIR HYDROCHLORIDE 1 G/1
TABLET, FILM COATED ORAL
Qty: 10 TABLET | Refills: 0 | Status: SHIPPED | OUTPATIENT
Start: 2022-08-19 | End: 2022-09-05

## 2022-08-19 RX ORDER — QUETIAPINE FUMARATE 50 MG/1
150 TABLET, FILM COATED ORAL
Qty: 90 TABLET | Refills: 1 | Status: ON HOLD | OUTPATIENT
Start: 2022-08-19 | End: 2022-10-21

## 2022-09-05 ENCOUNTER — HOSPITAL ENCOUNTER (INPATIENT)
Facility: MEDICAL CENTER | Age: 37
LOS: 10 days | DRG: 896 | End: 2022-09-15
Attending: EMERGENCY MEDICINE | Admitting: HOSPITALIST
Payer: COMMERCIAL

## 2022-09-05 DIAGNOSIS — R10.84 GENERALIZED ABDOMINAL PAIN: ICD-10-CM

## 2022-09-05 DIAGNOSIS — F10.21 ALCOHOL USE DISORDER, SEVERE, IN EARLY REMISSION, DEPENDENCE (HCC): ICD-10-CM

## 2022-09-05 DIAGNOSIS — R56.9 SEIZURES (HCC): ICD-10-CM

## 2022-09-05 DIAGNOSIS — K85.20 ALCOHOL-INDUCED ACUTE PANCREATITIS, UNSPECIFIED COMPLICATION STATUS: ICD-10-CM

## 2022-09-05 PROBLEM — F10.239 ALCOHOL DEPENDENCE WITH WITHDRAWAL (HCC): Status: ACTIVE | Noted: 2022-09-05

## 2022-09-05 LAB
ALBUMIN SERPL BCP-MCNC: 4.9 G/DL (ref 3.2–4.9)
ALBUMIN/GLOB SERPL: 1.5 G/DL
ALP SERPL-CCNC: 128 U/L (ref 30–99)
ALT SERPL-CCNC: 105 U/L (ref 2–50)
ANION GAP SERPL CALC-SCNC: 25 MMOL/L (ref 7–16)
AST SERPL-CCNC: 169 U/L (ref 12–45)
BASOPHILS # BLD AUTO: 0.8 % (ref 0–1.8)
BASOPHILS # BLD: 0.04 K/UL (ref 0–0.12)
BILIRUB SERPL-MCNC: 0.7 MG/DL (ref 0.1–1.5)
BUN SERPL-MCNC: 8 MG/DL (ref 8–22)
CALCIUM SERPL-MCNC: 9.3 MG/DL (ref 8.4–10.2)
CHLORIDE SERPL-SCNC: 88 MMOL/L (ref 96–112)
CO2 SERPL-SCNC: 25 MMOL/L (ref 20–33)
CREAT SERPL-MCNC: 0.64 MG/DL (ref 0.5–1.4)
EOSINOPHIL # BLD AUTO: 0.06 K/UL (ref 0–0.51)
EOSINOPHIL NFR BLD: 1.2 % (ref 0–6.9)
ERYTHROCYTE [DISTWIDTH] IN BLOOD BY AUTOMATED COUNT: 46 FL (ref 35.9–50)
ETHANOL BLD-MCNC: 377.7 MG/DL
GFR SERPLBLD CREATININE-BSD FMLA CKD-EPI: 118 ML/MIN/1.73 M 2
GLOBULIN SER CALC-MCNC: 3.3 G/DL (ref 1.9–3.5)
GLUCOSE SERPL-MCNC: 158 MG/DL (ref 65–99)
HCT VFR BLD AUTO: 48.1 % (ref 37–47)
HGB BLD-MCNC: 17.1 G/DL (ref 12–16)
IMM GRANULOCYTES # BLD AUTO: 0.01 K/UL (ref 0–0.11)
IMM GRANULOCYTES NFR BLD AUTO: 0.2 % (ref 0–0.9)
LIPASE SERPL-CCNC: 118 U/L (ref 7–58)
LYMPHOCYTES # BLD AUTO: 2.65 K/UL (ref 1–4.8)
LYMPHOCYTES NFR BLD: 54 % (ref 22–41)
MCH RBC QN AUTO: 31.4 PG (ref 27–33)
MCHC RBC AUTO-ENTMCNC: 35.6 G/DL (ref 33.6–35)
MCV RBC AUTO: 88.3 FL (ref 81.4–97.8)
MONOCYTES # BLD AUTO: 0.39 K/UL (ref 0–0.85)
MONOCYTES NFR BLD AUTO: 7.9 % (ref 0–13.4)
NEUTROPHILS # BLD AUTO: 1.76 K/UL (ref 2–7.15)
NEUTROPHILS NFR BLD: 35.9 % (ref 44–72)
NRBC # BLD AUTO: 0 K/UL
NRBC BLD-RTO: 0 /100 WBC
PLATELET # BLD AUTO: 250 K/UL (ref 164–446)
PMV BLD AUTO: 9.1 FL (ref 9–12.9)
POTASSIUM SERPL-SCNC: 2.9 MMOL/L (ref 3.6–5.5)
PROT SERPL-MCNC: 8.2 G/DL (ref 6–8.2)
RBC # BLD AUTO: 5.45 M/UL (ref 4.2–5.4)
SODIUM SERPL-SCNC: 138 MMOL/L (ref 135–145)
WBC # BLD AUTO: 4.9 K/UL (ref 4.8–10.8)

## 2022-09-05 PROCEDURE — 770020 HCHG ROOM/CARE - TELE (206)

## 2022-09-05 PROCEDURE — 82077 ASSAY SPEC XCP UR&BREATH IA: CPT

## 2022-09-05 PROCEDURE — 96375 TX/PRO/DX INJ NEW DRUG ADDON: CPT

## 2022-09-05 PROCEDURE — 99223 1ST HOSP IP/OBS HIGH 75: CPT | Performed by: HOSPITALIST

## 2022-09-05 PROCEDURE — 96366 THER/PROPH/DIAG IV INF ADDON: CPT

## 2022-09-05 PROCEDURE — 700101 HCHG RX REV CODE 250: Performed by: HOSPITALIST

## 2022-09-05 PROCEDURE — 80053 COMPREHEN METABOLIC PANEL: CPT

## 2022-09-05 PROCEDURE — 85025 COMPLETE CBC W/AUTO DIFF WBC: CPT

## 2022-09-05 PROCEDURE — 700105 HCHG RX REV CODE 258: Performed by: EMERGENCY MEDICINE

## 2022-09-05 PROCEDURE — 700111 HCHG RX REV CODE 636 W/ 250 OVERRIDE (IP): Performed by: HOSPITALIST

## 2022-09-05 PROCEDURE — 700101 HCHG RX REV CODE 250: Performed by: EMERGENCY MEDICINE

## 2022-09-05 PROCEDURE — 99285 EMERGENCY DEPT VISIT HI MDM: CPT

## 2022-09-05 PROCEDURE — 36415 COLL VENOUS BLD VENIPUNCTURE: CPT

## 2022-09-05 PROCEDURE — A9270 NON-COVERED ITEM OR SERVICE: HCPCS | Performed by: HOSPITALIST

## 2022-09-05 PROCEDURE — HZ2ZZZZ DETOXIFICATION SERVICES FOR SUBSTANCE ABUSE TREATMENT: ICD-10-PCS | Performed by: HOSPITALIST

## 2022-09-05 PROCEDURE — 83690 ASSAY OF LIPASE: CPT

## 2022-09-05 PROCEDURE — 700111 HCHG RX REV CODE 636 W/ 250 OVERRIDE (IP): Performed by: EMERGENCY MEDICINE

## 2022-09-05 PROCEDURE — 700102 HCHG RX REV CODE 250 W/ 637 OVERRIDE(OP): Performed by: HOSPITALIST

## 2022-09-05 RX ORDER — BISACODYL 10 MG
10 SUPPOSITORY, RECTAL RECTAL
Status: DISCONTINUED | OUTPATIENT
Start: 2022-09-05 | End: 2022-09-15 | Stop reason: HOSPADM

## 2022-09-05 RX ORDER — SODIUM CHLORIDE 9 MG/ML
1000 INJECTION, SOLUTION INTRAVENOUS ONCE
Status: COMPLETED | OUTPATIENT
Start: 2022-09-05 | End: 2022-09-05

## 2022-09-05 RX ORDER — ZONISAMIDE 50 MG/1
200 CAPSULE ORAL
Status: DISCONTINUED | OUTPATIENT
Start: 2022-09-05 | End: 2022-09-15 | Stop reason: HOSPADM

## 2022-09-05 RX ORDER — POTASSIUM CHLORIDE 7.45 MG/ML
10 INJECTION INTRAVENOUS ONCE
Status: COMPLETED | OUTPATIENT
Start: 2022-09-05 | End: 2022-09-05

## 2022-09-05 RX ORDER — LORAZEPAM 2 MG/ML
.5-1 INJECTION INTRAMUSCULAR
Status: DISCONTINUED | OUTPATIENT
Start: 2022-09-05 | End: 2022-09-09

## 2022-09-05 RX ORDER — DIAZEPAM 5 MG/ML
5 INJECTION, SOLUTION INTRAMUSCULAR; INTRAVENOUS
Status: DISCONTINUED | OUTPATIENT
Start: 2022-09-05 | End: 2022-09-09

## 2022-09-05 RX ORDER — DIAZEPAM 5 MG/ML
5 INJECTION, SOLUTION INTRAMUSCULAR; INTRAVENOUS
Status: DISCONTINUED | OUTPATIENT
Start: 2022-09-05 | End: 2022-09-05

## 2022-09-05 RX ORDER — LORAZEPAM 1 MG/1
2 TABLET ORAL
Status: DISCONTINUED | OUTPATIENT
Start: 2022-09-05 | End: 2022-09-05

## 2022-09-05 RX ORDER — SODIUM CHLORIDE AND POTASSIUM CHLORIDE 300; 900 MG/100ML; MG/100ML
INJECTION, SOLUTION INTRAVENOUS CONTINUOUS
Status: DISCONTINUED | OUTPATIENT
Start: 2022-09-05 | End: 2022-09-07

## 2022-09-05 RX ORDER — POLYETHYLENE GLYCOL 3350 17 G/17G
1 POWDER, FOR SOLUTION ORAL
Status: DISCONTINUED | OUTPATIENT
Start: 2022-09-05 | End: 2022-09-15 | Stop reason: HOSPADM

## 2022-09-05 RX ORDER — FAMOTIDINE 20 MG/1
20 TABLET, FILM COATED ORAL
Status: ON HOLD | COMMUNITY
End: 2022-10-21

## 2022-09-05 RX ORDER — LORAZEPAM 1 MG/1
4 TABLET ORAL
Status: DISCONTINUED | OUTPATIENT
Start: 2022-09-05 | End: 2022-09-05

## 2022-09-05 RX ORDER — MORPHINE SULFATE 15 MG/1
7.5 TABLET ORAL EVERY 6 HOURS PRN
Status: DISCONTINUED | OUTPATIENT
Start: 2022-09-05 | End: 2022-09-09

## 2022-09-05 RX ORDER — AMOXICILLIN 250 MG
2 CAPSULE ORAL 2 TIMES DAILY
Status: DISCONTINUED | OUTPATIENT
Start: 2022-09-05 | End: 2022-09-15 | Stop reason: HOSPADM

## 2022-09-05 RX ORDER — LORAZEPAM 1 MG/1
3 TABLET ORAL
Status: DISCONTINUED | OUTPATIENT
Start: 2022-09-05 | End: 2022-09-09

## 2022-09-05 RX ORDER — LORAZEPAM 0.5 MG/1
0.5 TABLET ORAL EVERY 4 HOURS PRN
Status: DISCONTINUED | OUTPATIENT
Start: 2022-09-05 | End: 2022-09-09

## 2022-09-05 RX ORDER — MORPHINE SULFATE 4 MG/ML
2-4 INJECTION INTRAVENOUS
Status: DISCONTINUED | OUTPATIENT
Start: 2022-09-05 | End: 2022-09-10

## 2022-09-05 RX ORDER — M-VIT,TX,IRON,MINS/CALC/FOLIC 27MG-0.4MG
1 TABLET ORAL DAILY
Status: DISCONTINUED | OUTPATIENT
Start: 2022-09-06 | End: 2022-09-15 | Stop reason: HOSPADM

## 2022-09-05 RX ORDER — LORAZEPAM 1 MG/1
1 TABLET ORAL EVERY 4 HOURS PRN
Status: DISCONTINUED | OUTPATIENT
Start: 2022-09-05 | End: 2022-09-09

## 2022-09-05 RX ORDER — ACETAMINOPHEN 325 MG/1
650 TABLET ORAL EVERY 6 HOURS PRN
Status: DISCONTINUED | OUTPATIENT
Start: 2022-09-05 | End: 2022-09-15 | Stop reason: HOSPADM

## 2022-09-05 RX ORDER — LORAZEPAM 1 MG/1
3 TABLET ORAL
Status: DISCONTINUED | OUTPATIENT
Start: 2022-09-05 | End: 2022-09-05

## 2022-09-05 RX ORDER — LORAZEPAM 1 MG/1
1 TABLET ORAL EVERY 4 HOURS PRN
Status: DISCONTINUED | OUTPATIENT
Start: 2022-09-05 | End: 2022-09-05

## 2022-09-05 RX ORDER — ENOXAPARIN SODIUM 100 MG/ML
40 INJECTION SUBCUTANEOUS DAILY
Status: DISCONTINUED | OUTPATIENT
Start: 2022-09-06 | End: 2022-09-15 | Stop reason: HOSPADM

## 2022-09-05 RX ORDER — CHLORDIAZEPOXIDE HYDROCHLORIDE 25 MG/1
25 CAPSULE, GELATIN COATED ORAL EVERY 12 HOURS
Status: COMPLETED | OUTPATIENT
Start: 2022-09-06 | End: 2022-09-07

## 2022-09-05 RX ORDER — LEVETIRACETAM 500 MG/5ML
1500 INJECTION, SOLUTION, CONCENTRATE INTRAVENOUS ONCE
Status: COMPLETED | OUTPATIENT
Start: 2022-09-05 | End: 2022-09-05

## 2022-09-05 RX ORDER — FOLIC ACID 1 MG/1
1 TABLET ORAL DAILY
Status: DISCONTINUED | OUTPATIENT
Start: 2022-09-06 | End: 2022-09-15 | Stop reason: HOSPADM

## 2022-09-05 RX ORDER — LORAZEPAM 1 MG/1
4 TABLET ORAL
Status: DISCONTINUED | OUTPATIENT
Start: 2022-09-05 | End: 2022-09-09

## 2022-09-05 RX ORDER — ONDANSETRON 2 MG/ML
4 INJECTION INTRAMUSCULAR; INTRAVENOUS EVERY 4 HOURS PRN
Status: DISCONTINUED | OUTPATIENT
Start: 2022-09-05 | End: 2022-09-15 | Stop reason: HOSPADM

## 2022-09-05 RX ORDER — ONDANSETRON 2 MG/ML
4 INJECTION INTRAMUSCULAR; INTRAVENOUS ONCE
Status: COMPLETED | OUTPATIENT
Start: 2022-09-05 | End: 2022-09-05

## 2022-09-05 RX ORDER — LEVETIRACETAM 500 MG/1
500 TABLET ORAL 2 TIMES DAILY
Status: DISCONTINUED | OUTPATIENT
Start: 2022-09-06 | End: 2022-09-15 | Stop reason: HOSPADM

## 2022-09-05 RX ORDER — GAUZE BANDAGE 2" X 2"
100 BANDAGE TOPICAL DAILY
Status: DISCONTINUED | OUTPATIENT
Start: 2022-09-06 | End: 2022-09-15 | Stop reason: HOSPADM

## 2022-09-05 RX ORDER — LORAZEPAM 1 MG/1
2 TABLET ORAL
Status: DISCONTINUED | OUTPATIENT
Start: 2022-09-05 | End: 2022-09-09

## 2022-09-05 RX ORDER — CHLORDIAZEPOXIDE HYDROCHLORIDE 25 MG/1
25 CAPSULE, GELATIN COATED ORAL EVERY 8 HOURS
Status: COMPLETED | OUTPATIENT
Start: 2022-09-05 | End: 2022-09-06

## 2022-09-05 RX ORDER — LORAZEPAM 0.5 MG/1
0.5 TABLET ORAL EVERY 4 HOURS PRN
Status: DISCONTINUED | OUTPATIENT
Start: 2022-09-05 | End: 2022-09-05

## 2022-09-05 RX ADMIN — DOCUSATE SODIUM 50 MG AND SENNOSIDES 8.6 MG 2 TABLET: 8.6; 5 TABLET, FILM COATED ORAL at 21:50

## 2022-09-05 RX ADMIN — POTASSIUM CHLORIDE 10 MEQ: 7.46 INJECTION, SOLUTION INTRAVENOUS at 19:44

## 2022-09-05 RX ADMIN — LEVETIRACETAM 1500 MG: 100 INJECTION, SOLUTION INTRAVENOUS at 18:37

## 2022-09-05 RX ADMIN — ZONISAMIDE 200 MG: 50 CAPSULE ORAL at 21:50

## 2022-09-05 RX ADMIN — POTASSIUM CHLORIDE AND SODIUM CHLORIDE: 900; 300 INJECTION, SOLUTION INTRAVENOUS at 23:03

## 2022-09-05 RX ADMIN — MORPHINE SULFATE 4 MG: 4 INJECTION INTRAVENOUS at 20:39

## 2022-09-05 RX ADMIN — SODIUM CHLORIDE 1000 ML: 9 INJECTION, SOLUTION INTRAVENOUS at 18:36

## 2022-09-05 RX ADMIN — ONDANSETRON 4 MG: 2 INJECTION INTRAMUSCULAR; INTRAVENOUS at 18:37

## 2022-09-05 RX ADMIN — THIAMINE HYDROCHLORIDE: 100 INJECTION, SOLUTION INTRAMUSCULAR; INTRAVENOUS at 18:44

## 2022-09-05 RX ADMIN — CHLORDIAZEPOXIDE HYDROCHLORIDE 25 MG: 25 CAPSULE ORAL at 21:51

## 2022-09-05 RX ADMIN — SODIUM CHLORIDE 1000 ML: 9 INJECTION, SOLUTION INTRAVENOUS at 21:52

## 2022-09-05 ASSESSMENT — ENCOUNTER SYMPTOMS
FLANK PAIN: 0
VOMITING: 0
DIZZINESS: 1
STRIDOR: 0
EYE DISCHARGE: 0
ABDOMINAL PAIN: 1
BRUISES/BLEEDS EASILY: 0
NAUSEA: 1
NERVOUS/ANXIOUS: 0
EYE REDNESS: 0
CHILLS: 0
FOCAL WEAKNESS: 0
MYALGIAS: 0
TREMORS: 1
FEVER: 0
COUGH: 0
SHORTNESS OF BREATH: 0
HEADACHES: 1

## 2022-09-05 ASSESSMENT — LIFESTYLE VARIABLES
AUDITORY DISTURBANCES: NOT PRESENT
ANXIETY: NO ANXIETY (AT EASE)
PAROXYSMAL SWEATS: NO SWEAT VISIBLE
TOTAL SCORE: 2
DOES PATIENT WANT TO STOP DRINKING: YES
AVERAGE NUMBER OF DAYS PER WEEK YOU HAVE A DRINK CONTAINING ALCOHOL: 7
ORIENTATION AND CLOUDING OF SENSORIUM: ORIENTED AND CAN DO SERIAL ADDITIONS
NAUSEA AND VOMITING: MILD NAUSEA WITH NO VOMITING
VISUAL DISTURBANCES: NOT PRESENT
ALCOHOL_USE: YES
TOTAL SCORE: 4
TREMOR: NO TREMOR
CONSUMPTION TOTAL: POSITIVE
TOTAL SCORE: 4
HAVE YOU EVER FELT YOU SHOULD CUT DOWN ON YOUR DRINKING: YES
TOTAL SCORE: 4
TOTAL SCORE: VERY MILD ITCHING, PINS AND NEEDLES SENSATION, BURNING OR NUMBNESS
ON A TYPICAL DAY WHEN YOU DRINK ALCOHOL HOW MANY DRINKS DO YOU HAVE: 8
EVER FELT BAD OR GUILTY ABOUT YOUR DRINKING: YES
HAVE PEOPLE ANNOYED YOU BY CRITICIZING YOUR DRINKING: YES
HEADACHE, FULLNESS IN HEAD: NOT PRESENT
DOES PATIENT WANT TO TALK TO SOMEONE ABOUT QUITTING: YES
EVER HAD A DRINK FIRST THING IN THE MORNING TO STEADY YOUR NERVES TO GET RID OF A HANGOVER: YES
AGITATION: NORMAL ACTIVITY
HOW MANY TIMES IN THE PAST YEAR HAVE YOU HAD 5 OR MORE DRINKS IN A DAY: 50

## 2022-09-05 ASSESSMENT — PATIENT HEALTH QUESTIONNAIRE - PHQ9
1. LITTLE INTEREST OR PLEASURE IN DOING THINGS: NOT AT ALL
SUM OF ALL RESPONSES TO PHQ9 QUESTIONS 1 AND 2: 0
2. FEELING DOWN, DEPRESSED, IRRITABLE, OR HOPELESS: NOT AT ALL

## 2022-09-05 ASSESSMENT — PAIN DESCRIPTION - PAIN TYPE: TYPE: ACUTE PAIN

## 2022-09-05 ASSESSMENT — FIBROSIS 4 INDEX
FIB4 SCORE: 2.31
FIB4 SCORE: 2.31

## 2022-09-06 LAB
ALBUMIN SERPL BCP-MCNC: 3.7 G/DL (ref 3.2–4.9)
ALBUMIN/GLOB SERPL: 1.6 G/DL
ALP SERPL-CCNC: 87 U/L (ref 30–99)
ALT SERPL-CCNC: 67 U/L (ref 2–50)
ANION GAP SERPL CALC-SCNC: 17 MMOL/L (ref 7–16)
AST SERPL-CCNC: 94 U/L (ref 12–45)
BILIRUB SERPL-MCNC: 0.6 MG/DL (ref 0.1–1.5)
BUN SERPL-MCNC: 8 MG/DL (ref 8–22)
CALCIUM SERPL-MCNC: 7.8 MG/DL (ref 8.4–10.2)
CHLORIDE SERPL-SCNC: 98 MMOL/L (ref 96–112)
CO2 SERPL-SCNC: 23 MMOL/L (ref 20–33)
CREAT SERPL-MCNC: 0.54 MG/DL (ref 0.5–1.4)
ERYTHROCYTE [DISTWIDTH] IN BLOOD BY AUTOMATED COUNT: 48.7 FL (ref 35.9–50)
GFR SERPLBLD CREATININE-BSD FMLA CKD-EPI: 123 ML/MIN/1.73 M 2
GLOBULIN SER CALC-MCNC: 2.3 G/DL (ref 1.9–3.5)
GLUCOSE SERPL-MCNC: 102 MG/DL (ref 65–99)
HCT VFR BLD AUTO: 36.4 % (ref 37–47)
HGB BLD-MCNC: 12.6 G/DL (ref 12–16)
MAGNESIUM SERPL-MCNC: 1.9 MG/DL (ref 1.5–2.5)
MCH RBC QN AUTO: 31.7 PG (ref 27–33)
MCHC RBC AUTO-ENTMCNC: 34.6 G/DL (ref 33.6–35)
MCV RBC AUTO: 91.7 FL (ref 81.4–97.8)
PLATELET # BLD AUTO: 148 K/UL (ref 164–446)
PMV BLD AUTO: 9.6 FL (ref 9–12.9)
POTASSIUM SERPL-SCNC: 2.9 MMOL/L (ref 3.6–5.5)
PROT SERPL-MCNC: 6 G/DL (ref 6–8.2)
RBC # BLD AUTO: 3.97 M/UL (ref 4.2–5.4)
SODIUM SERPL-SCNC: 138 MMOL/L (ref 135–145)
WBC # BLD AUTO: 4.4 K/UL (ref 4.8–10.8)

## 2022-09-06 PROCEDURE — 36415 COLL VENOUS BLD VENIPUNCTURE: CPT

## 2022-09-06 PROCEDURE — 85027 COMPLETE CBC AUTOMATED: CPT

## 2022-09-06 PROCEDURE — 700111 HCHG RX REV CODE 636 W/ 250 OVERRIDE (IP): Performed by: HOSPITALIST

## 2022-09-06 PROCEDURE — 99233 SBSQ HOSP IP/OBS HIGH 50: CPT | Performed by: INTERNAL MEDICINE

## 2022-09-06 PROCEDURE — 700101 HCHG RX REV CODE 250: Performed by: HOSPITALIST

## 2022-09-06 PROCEDURE — 83735 ASSAY OF MAGNESIUM: CPT

## 2022-09-06 PROCEDURE — 700102 HCHG RX REV CODE 250 W/ 637 OVERRIDE(OP): Performed by: HOSPITALIST

## 2022-09-06 PROCEDURE — 700102 HCHG RX REV CODE 250 W/ 637 OVERRIDE(OP): Performed by: INTERNAL MEDICINE

## 2022-09-06 PROCEDURE — 80053 COMPREHEN METABOLIC PANEL: CPT

## 2022-09-06 PROCEDURE — 94760 N-INVAS EAR/PLS OXIMETRY 1: CPT

## 2022-09-06 PROCEDURE — A9270 NON-COVERED ITEM OR SERVICE: HCPCS | Performed by: HOSPITALIST

## 2022-09-06 PROCEDURE — A9270 NON-COVERED ITEM OR SERVICE: HCPCS | Performed by: INTERNAL MEDICINE

## 2022-09-06 PROCEDURE — 770020 HCHG ROOM/CARE - TELE (206)

## 2022-09-06 RX ORDER — CALCIUM CARBONATE 500(1250)
1000 TABLET ORAL 2 TIMES DAILY WITH MEALS
Status: DISCONTINUED | OUTPATIENT
Start: 2022-09-06 | End: 2022-09-08

## 2022-09-06 RX ORDER — POTASSIUM CHLORIDE 20 MEQ/1
40 TABLET, EXTENDED RELEASE ORAL 2 TIMES DAILY
Status: DISCONTINUED | OUTPATIENT
Start: 2022-09-06 | End: 2022-09-08

## 2022-09-06 RX ADMIN — MORPHINE SULFATE 4 MG: 4 INJECTION INTRAVENOUS at 13:30

## 2022-09-06 RX ADMIN — POTASSIUM CHLORIDE 40 MEQ: 1500 TABLET, EXTENDED RELEASE ORAL at 07:55

## 2022-09-06 RX ADMIN — LORAZEPAM 1 MG: 1 TABLET ORAL at 21:07

## 2022-09-06 RX ADMIN — MORPHINE SULFATE 4 MG: 4 INJECTION INTRAVENOUS at 09:32

## 2022-09-06 RX ADMIN — POTASSIUM CHLORIDE AND SODIUM CHLORIDE: 900; 300 INJECTION, SOLUTION INTRAVENOUS at 09:32

## 2022-09-06 RX ADMIN — ACETAMINOPHEN 650 MG: 325 TABLET, FILM COATED ORAL at 20:31

## 2022-09-06 RX ADMIN — MORPHINE SULFATE 4 MG: 4 INJECTION INTRAVENOUS at 06:09

## 2022-09-06 RX ADMIN — ONDANSETRON 4 MG: 2 INJECTION INTRAMUSCULAR; INTRAVENOUS at 14:37

## 2022-09-06 RX ADMIN — MORPHINE SULFATE 4 MG: 4 INJECTION INTRAVENOUS at 01:13

## 2022-09-06 RX ADMIN — LEVETIRACETAM 500 MG: 500 TABLET, FILM COATED ORAL at 17:57

## 2022-09-06 RX ADMIN — FOLIC ACID 1 MG: 1 TABLET ORAL at 06:14

## 2022-09-06 RX ADMIN — DOCUSATE SODIUM 50 MG AND SENNOSIDES 8.6 MG 2 TABLET: 8.6; 5 TABLET, FILM COATED ORAL at 06:14

## 2022-09-06 RX ADMIN — MORPHINE SULFATE 7.5 MG: 15 TABLET ORAL at 20:30

## 2022-09-06 RX ADMIN — ONDANSETRON 4 MG: 2 INJECTION INTRAMUSCULAR; INTRAVENOUS at 20:35

## 2022-09-06 RX ADMIN — LORAZEPAM 2 MG: 1 TABLET ORAL at 00:26

## 2022-09-06 RX ADMIN — ONDANSETRON 4 MG: 2 INJECTION INTRAMUSCULAR; INTRAVENOUS at 00:13

## 2022-09-06 RX ADMIN — DOCUSATE SODIUM 50 MG AND SENNOSIDES 8.6 MG 2 TABLET: 8.6; 5 TABLET, FILM COATED ORAL at 17:56

## 2022-09-06 RX ADMIN — LORAZEPAM 1 MG: 1 TABLET ORAL at 16:56

## 2022-09-06 RX ADMIN — CALCIUM 1000 MG: 500 TABLET ORAL at 16:56

## 2022-09-06 RX ADMIN — LORAZEPAM 1 MG: 1 TABLET ORAL at 02:11

## 2022-09-06 RX ADMIN — CHLORDIAZEPOXIDE HYDROCHLORIDE 25 MG: 25 CAPSULE ORAL at 06:13

## 2022-09-06 RX ADMIN — CALCIUM 1000 MG: 500 TABLET ORAL at 07:55

## 2022-09-06 RX ADMIN — LORAZEPAM 1 MG: 1 TABLET ORAL at 11:59

## 2022-09-06 RX ADMIN — POTASSIUM CHLORIDE 40 MEQ: 1500 TABLET, EXTENDED RELEASE ORAL at 17:57

## 2022-09-06 RX ADMIN — ZONISAMIDE 200 MG: 50 CAPSULE ORAL at 21:07

## 2022-09-06 RX ADMIN — CHLORDIAZEPOXIDE HYDROCHLORIDE 25 MG: 25 CAPSULE ORAL at 21:07

## 2022-09-06 RX ADMIN — CHLORDIAZEPOXIDE HYDROCHLORIDE 25 MG: 25 CAPSULE ORAL at 13:30

## 2022-09-06 RX ADMIN — POTASSIUM CHLORIDE AND SODIUM CHLORIDE: 900; 300 INJECTION, SOLUTION INTRAVENOUS at 19:05

## 2022-09-06 RX ADMIN — MORPHINE SULFATE 4 MG: 4 INJECTION INTRAVENOUS at 16:56

## 2022-09-06 RX ADMIN — LORAZEPAM 1 MG: 1 TABLET ORAL at 06:23

## 2022-09-06 RX ADMIN — LEVETIRACETAM 500 MG: 500 TABLET, FILM COATED ORAL at 06:14

## 2022-09-06 RX ADMIN — Medication 1 TABLET: at 06:14

## 2022-09-06 RX ADMIN — THIAMINE HCL TAB 100 MG 100 MG: 100 TAB at 06:15

## 2022-09-06 ASSESSMENT — LIFESTYLE VARIABLES
VISUAL DISTURBANCES: NOT PRESENT
TOTAL SCORE: 3
NAUSEA AND VOMITING: *
AGITATION: NORMAL ACTIVITY
TOTAL SCORE: MILD ITCHING, PINS AND NEEDLES SENSATION, BURNING OR NUMBNESS
HEADACHE, FULLNESS IN HEAD: MODERATE
TOTAL SCORE: 8
HEADACHE, FULLNESS IN HEAD: NOT PRESENT
SUBSTANCE_ABUSE: 1
ANXIETY: MILDLY ANXIOUS
VISUAL DISTURBANCES: NOT PRESENT
TOTAL SCORE: 4
HEADACHE, FULLNESS IN HEAD: NOT PRESENT
PAROXYSMAL SWEATS: NO SWEAT VISIBLE
ANXIETY: NO ANXIETY (AT EASE)
HEADACHE, FULLNESS IN HEAD: VERY MILD
ORIENTATION AND CLOUDING OF SENSORIUM: ORIENTED AND CAN DO SERIAL ADDITIONS
AUDITORY DISTURBANCES: NOT PRESENT
ANXIETY: MILDLY ANXIOUS
TREMOR: *
TOTAL SCORE: 10
AUDITORY DISTURBANCES: NOT PRESENT
ORIENTATION AND CLOUDING OF SENSORIUM: ORIENTED AND CAN DO SERIAL ADDITIONS
AGITATION: NORMAL ACTIVITY
TREMOR: *
VISUAL DISTURBANCES: NOT PRESENT
TREMOR: *
AGITATION: NORMAL ACTIVITY
AGITATION: SOMEWHAT MORE THAN NORMAL ACTIVITY
HEADACHE, FULLNESS IN HEAD: MODERATE
VISUAL DISTURBANCES: NOT PRESENT
ORIENTATION AND CLOUDING OF SENSORIUM: ORIENTED AND CAN DO SERIAL ADDITIONS
PAROXYSMAL SWEATS: BARELY PERCEPTIBLE SWEATING. PALMS MOIST
NAUSEA AND VOMITING: NO NAUSEA AND NO VOMITING
TOTAL SCORE: 10
PAROXYSMAL SWEATS: BARELY PERCEPTIBLE SWEATING. PALMS MOIST
ORIENTATION AND CLOUDING OF SENSORIUM: ORIENTED AND CAN DO SERIAL ADDITIONS
ORIENTATION AND CLOUDING OF SENSORIUM: ORIENTED AND CAN DO SERIAL ADDITIONS
VISUAL DISTURBANCES: NOT PRESENT
VISUAL DISTURBANCES: NOT PRESENT
AUDITORY DISTURBANCES: NOT PRESENT
PAROXYSMAL SWEATS: *
PAROXYSMAL SWEATS: NO SWEAT VISIBLE
TOTAL SCORE: 10
TOTAL SCORE: 14
PAROXYSMAL SWEATS: NO SWEAT VISIBLE
TOTAL SCORE: 8
AGITATION: NORMAL ACTIVITY
ORIENTATION AND CLOUDING OF SENSORIUM: ORIENTED AND CAN DO SERIAL ADDITIONS
ANXIETY: *
ORIENTATION AND CLOUDING OF SENSORIUM: ORIENTED AND CAN DO SERIAL ADDITIONS
TREMOR: MODERATE TREMOR WITH ARMS EXTENDED
TREMOR: *
AUDITORY DISTURBANCES: NOT PRESENT
NAUSEA AND VOMITING: NO NAUSEA AND NO VOMITING
AUDITORY DISTURBANCES: NOT PRESENT
HEADACHE, FULLNESS IN HEAD: NOT PRESENT
HEADACHE, FULLNESS IN HEAD: NOT PRESENT
TREMOR: *
AGITATION: NORMAL ACTIVITY
ANXIETY: MILDLY ANXIOUS
PAROXYSMAL SWEATS: NO SWEAT VISIBLE
ANXIETY: *
PAROXYSMAL SWEATS: *
ANXIETY: *
HEADACHE, FULLNESS IN HEAD: VERY MILD
AUDITORY DISTURBANCES: NOT PRESENT
AUDITORY DISTURBANCES: NOT PRESENT
AGITATION: *
ANXIETY: *
AUDITORY DISTURBANCES: NOT PRESENT
NAUSEA AND VOMITING: *
VISUAL DISTURBANCES: NOT PRESENT
NAUSEA AND VOMITING: NO NAUSEA AND NO VOMITING
VISUAL DISTURBANCES: NOT PRESENT
NAUSEA AND VOMITING: INTERMITTENT NAUSEA WITH DRY HEAVES
TOTAL SCORE: MILD ITCHING, PINS AND NEEDLES SENSATION, BURNING OR NUMBNESS
TOTAL SCORE: MILD ITCHING, PINS AND NEEDLES SENSATION, BURNING OR NUMBNESS
NAUSEA AND VOMITING: MILD NAUSEA WITH NO VOMITING
AGITATION: NORMAL ACTIVITY
NAUSEA AND VOMITING: *
ORIENTATION AND CLOUDING OF SENSORIUM: ORIENTED AND CAN DO SERIAL ADDITIONS

## 2022-09-06 ASSESSMENT — PAIN DESCRIPTION - PAIN TYPE
TYPE: ACUTE PAIN

## 2022-09-06 ASSESSMENT — COGNITIVE AND FUNCTIONAL STATUS - GENERAL
DAILY ACTIVITIY SCORE: 24
SUGGESTED CMS G CODE MODIFIER DAILY ACTIVITY: CH

## 2022-09-06 ASSESSMENT — ENCOUNTER SYMPTOMS
TREMORS: 1
EYE REDNESS: 0
CHILLS: 0
NAUSEA: 1
VOMITING: 1
SHORTNESS OF BREATH: 0
FEVER: 0
ABDOMINAL PAIN: 1

## 2022-09-06 NOTE — PROGRESS NOTES
Hospital Medicine Daily Progress Note    Date of Service  9/6/2022    Chief Complaint  Rhiannon Marrero is a 37 y.o. female admitted 9/5/2022 with ETOH intoxication and abdominal pain.    Hospital Course  37-year-old female with a history of alcohol dependence, seizure disorder, alcohol induced pancreatitis, bipolar disorder who presented on 9/5/2022 with weakness, headache, nausea/vomiting and abdominal pain as well as tremors.  Patient reports she drinks vodka daily.  She has seizures and is followed by neurology.  Last seizure was on day of admission.  On admission she was found to have potassium 2.9, elevated LFTs, lipase 118. She was started on CIWA.     Interval Problem Update  -Patient continues to have abdominal pain and nausea, no vomiting  - CIWA scores 14 overnight however now down to 10      I have discussed this patient's plan of care and discharge plan at IDT rounds today with Case Management, Nursing, Nursing leadership, and other members of the IDT team.    Consultants/Specialty  None    Code Status  Full Code    Disposition  Patient is not medically cleared for discharge.   Anticipate discharge to to home with close outpatient follow-up.  I have placed the appropriate orders for post-discharge needs.    Review of Systems  Review of Systems   Constitutional:  Negative for chills and fever.   HENT:  Negative for nosebleeds.    Eyes:  Negative for redness.   Respiratory:  Negative for shortness of breath.    Cardiovascular:  Negative for chest pain.   Gastrointestinal:  Positive for abdominal pain, nausea and vomiting.   Skin:  Negative for rash.   Neurological:  Positive for tremors.   Psychiatric/Behavioral:  Positive for substance abuse.       Physical Exam  Temp:  [36.7 °C (98 °F)-36.9 °C (98.4 °F)] 36.7 °C (98 °F)  Pulse:  [] 92  Resp:  [12-18] 18  BP: (113-129)/(72-93) 128/88  SpO2:  [91 %-99 %] 95 %    Physical Exam  Constitutional:       General: She is not in acute distress.      Appearance: She is not ill-appearing, toxic-appearing or diaphoretic.   HENT:      Nose: Nose normal.      Mouth/Throat:      Mouth: Mucous membranes are dry.   Eyes:      General: No scleral icterus.     Conjunctiva/sclera: Conjunctivae normal.   Cardiovascular:      Rate and Rhythm: Regular rhythm. Tachycardia present.      Heart sounds: No murmur heard.    No friction rub. No gallop.   Pulmonary:      Effort: Pulmonary effort is normal.      Breath sounds: Normal breath sounds.   Abdominal:      General: Bowel sounds are normal. There is no distension.      Palpations: Abdomen is soft.      Tenderness: There is generalized abdominal tenderness. There is no guarding or rebound.   Musculoskeletal:      Cervical back: Normal range of motion.      Right lower leg: No edema.      Left lower leg: No edema.   Skin:     Coloration: Skin is not jaundiced.      Findings: No rash.      Comments: Old self injurious scars on R forearm   Neurological:      Mental Status: She is alert and oriented to person, place, and time. Mental status is at baseline.   Psychiatric:         Mood and Affect: Mood normal.         Behavior: Behavior normal.       Fluids    Intake/Output Summary (Last 24 hours) at 9/6/2022 0930  Last data filed at 9/6/2022 0600  Gross per 24 hour   Intake 2975 ml   Output 1050 ml   Net 1925 ml       Laboratory  Recent Labs     09/05/22  1832 09/06/22  0305   WBC 4.9 4.4*   RBC 5.45* 3.97*   HEMOGLOBIN 17.1* 12.6   HEMATOCRIT 48.1* 36.4*   MCV 88.3 91.7   MCH 31.4 31.7   MCHC 35.6* 34.6   RDW 46.0 48.7   PLATELETCT 250 148*   MPV 9.1 9.6     Recent Labs     09/05/22  1832 09/06/22  0305   SODIUM 138 138   POTASSIUM 2.9* 2.9*   CHLORIDE 88* 98   CO2 25 23   GLUCOSE 158* 102*   BUN 8 8   CREATININE 0.64 0.54   CALCIUM 9.3 7.8*                   Imaging  No orders to display        Assessment/Plan  * Alcohol dependence with withdrawal (HCC)- (present on admission)  Assessment & Plan  [facility] protocol   Continuous  cardiac monitoring  Monitor electrolytes and replace accordingly, particularly magnesium, potassium and phosphorus  Fall, seizure, aspiration precautions.  Thiamine folic acid and multivitamin.   Counseling      Hypokalemia- (present on admission)  Assessment & Plan  Replacing, checking magnesium    Continue to monitor replace as needed     Transaminitis- (present on admission)  Assessment & Plan  In setting of alcohol  Improving  Bilirubin normal    Seizure disorder (HCC)- (present on admission)  Assessment & Plan  Resume home zonisamide and Keppra  As needed lorazepam for seizures       VTE prophylaxis: enoxaparin ppx    I have performed a physical exam and reviewed and updated ROS and Plan today (9/6/2022). In review of yesterday's note (9/5/2022), there are no changes except as documented above.

## 2022-09-06 NOTE — ED PROVIDER NOTES
ED Provider Note    CHIEF COMPLAINT  Chief Complaint   Patient presents with    ETOH Intoxication     Pt found at home by family drunk surrounded by etoh bottles. Recently lossed custody of kids and having behavioral health problems.        HPI  Rhiannon Marrero is a 37 y.o. female who presents brought in by family for severe alcohol intoxication.  Patient has a longstanding history of alcohol abuse and recurrent pancreatitis.  Patient's has been drinking around 2/5 of vodka a day, her boyfriend took these away from her yesterday, she went out and bought some more and drink these today.  She was found by family poorly responsive, and severely intoxicated therefore they brought her to the emergency department.  Patient complains of some abdominal pain, she has been vomiting.  Patient is significantly intoxicated and unable to provide any further history.    REVIEW OF SYSTEMS  ROS    See HPI for further details. All other systems are negative.     PAST MEDICAL HISTORY   has a past medical history of Acute pancreatitis without infection or necrosis (7/20/2022), Alcohol dependence (HCC) (4/13/2017), Bronchitis (8/2012), Cold, Current moderate episode of major depressive disorder without prior episode (HCC) (1/31/2020), Heart burn, Hernia of unspecified site of abdominal cavity without mention of obstruction or gangrene, High anion gap metabolic acidosis (7/1/2022), Indigestion, Pancreatitis, and Pneumonia (2011).    SOCIAL HISTORY  Social History     Tobacco Use    Smoking status: Former     Packs/day: 0.75     Years: 10.00     Pack years: 7.50     Types: Cigarettes    Smokeless tobacco: Never   Vaping Use    Vaping Use: Former   Substance and Sexual Activity    Alcohol use: Yes     Comment: 5 shots, binges. just got out of detox on 7/20    Drug use: Yes     Types: Marijuana     Comment: edibles    Sexual activity: Not on file       SURGICAL HISTORY   has a past surgical history that includes other orthopedic surgery;  gyn surgery; tonsillectomy (4/11/2013); arthroscopy, knee; hysterectomy robotic xi (10/11/2018); salpingectomy (Bilateral, 10/11/2018); and orif, wrist (Right, 4/24/2019).    CURRENT MEDICATIONS  Home Medications    **Home medications have not yet been reviewed for this encounter**         ALLERGIES  Allergies   Allergen Reactions    Promethazine Hcl Anxiety       PHYSICAL EXAM  Vitals:    09/05/22 1821   BP: (!) 113/93   Pulse: (!) 148   SpO2: 91%       Physical Exam  Constitutional:       Appearance: She is well-developed.      Comments: Smells of alcohol, significantly intoxicated   HENT:      Head: Normocephalic and atraumatic.      Comments: Very dry mucous membranes  Eyes:      Conjunctiva/sclera: Conjunctivae normal.   Cardiovascular:      Rate and Rhythm: Regular rhythm. Tachycardia present.   Pulmonary:      Effort: Pulmonary effort is normal.      Breath sounds: Normal breath sounds.   Abdominal:      General: Bowel sounds are normal. There is no distension.      Palpations: Abdomen is soft.      Tenderness: There is no abdominal tenderness. There is no rebound.   Musculoskeletal:      Cervical back: Normal range of motion and neck supple.   Skin:     General: Skin is warm and dry.      Findings: No rash.   Neurological:      Mental Status: She is alert and oriented to person, place, and time.   Psychiatric:         Behavior: Behavior normal.         DIAGNOSTIC STUDIES / PROCEDURES    LABS  Results for orders placed or performed during the hospital encounter of 09/05/22   CBC WITH DIFFERENTIAL   Result Value Ref Range    WBC 4.9 4.8 - 10.8 K/uL    RBC 5.45 (H) 4.20 - 5.40 M/uL    Hemoglobin 17.1 (H) 12.0 - 16.0 g/dL    Hematocrit 48.1 (H) 37.0 - 47.0 %    MCV 88.3 81.4 - 97.8 fL    MCH 31.4 27.0 - 33.0 pg    MCHC 35.6 (H) 33.6 - 35.0 g/dL    RDW 46.0 35.9 - 50.0 fL    Platelet Count 250 164 - 446 K/uL    MPV 9.1 9.0 - 12.9 fL    Neutrophils-Polys 35.90 (L) 44.00 - 72.00 %    Lymphocytes 54.00 (H) 22.00 -  41.00 %    Monocytes 7.90 0.00 - 13.40 %    Eosinophils 1.20 0.00 - 6.90 %    Basophils 0.80 0.00 - 1.80 %    Immature Granulocytes 0.20 0.00 - 0.90 %    Nucleated RBC 0.00 /100 WBC    Neutrophils (Absolute) 1.76 (L) 2.00 - 7.15 K/uL    Lymphs (Absolute) 2.65 1.00 - 4.80 K/uL    Monos (Absolute) 0.39 0.00 - 0.85 K/uL    Eos (Absolute) 0.06 0.00 - 0.51 K/uL    Baso (Absolute) 0.04 0.00 - 0.12 K/uL    Immature Granulocytes (abs) 0.01 0.00 - 0.11 K/uL    NRBC (Absolute) 0.00 K/uL   CMP   Result Value Ref Range    Sodium 138 135 - 145 mmol/L    Potassium 2.9 (L) 3.6 - 5.5 mmol/L    Chloride 88 (L) 96 - 112 mmol/L    Co2 25 20 - 33 mmol/L    Anion Gap 25.0 (H) 7.0 - 16.0    Glucose 158 (H) 65 - 99 mg/dL    Bun 8 8 - 22 mg/dL    Creatinine 0.64 0.50 - 1.40 mg/dL    Calcium 9.3 8.4 - 10.2 mg/dL    AST(SGOT) 169 (H) 12 - 45 U/L    ALT(SGPT) 105 (H) 2 - 50 U/L    Alkaline Phosphatase 128 (H) 30 - 99 U/L    Total Bilirubin 0.7 0.1 - 1.5 mg/dL    Albumin 4.9 3.2 - 4.9 g/dL    Total Protein 8.2 6.0 - 8.2 g/dL    Globulin 3.3 1.9 - 3.5 g/dL    A-G Ratio 1.5 g/dL   LIPASE   Result Value Ref Range    Lipase 118 (H) 7 - 58 U/L   ETHYL ALCOHOL (BLOOD)   Result Value Ref Range    Diagnostic Alcohol 377.7 (H) <10.1 mg/dL   ESTIMATED GFR   Result Value Ref Range    GFR (CKD-EPI) 118 >60 mL/min/1.73 m 2         RADIOLOGY  No orders to display           COURSE & MEDICAL DECISION MAKING  Pertinent Labs & Imaging studies reviewed. (See chart for details)    Patient here with presentation consistent with significant alcohol intoxication and severe dehydration.  Suspect likely malnutrition as well.  Patient will have basic labs checked, giving detox bag with D5 and thiamine.  Patient also loaded with Keppra as it does not believe that she has been taking this.  Patient labs are consistent with severe dehydration, alcoholic ketoacidosis, and severe hypokalemia.  We will continue to fluid resuscitate.  Patient's tachycardia has improved  significantly.  Her alcohol level was 370, despite this she is now clinically sober and able to provide full history.  She admits to drinking heavily over the last few days.  She is very eager and open to start alcoholic rehabilitation.  Patient given Zofran for nausea.  Lipase is elevated but patient's abdominal exam is reassuring.  Patient would likely alcoholic withdrawals, given that she is sober with a level at 370 and has had seizures in the past.  I have placed patient on [facility] protocol.  Given patient's severe electrolyte abnormalities and malnutrition will admit, I have discussed the case with hospitalist.  Family and patient are adamant that when she gets discharged she goes to a rehab facility.    FINAL IMPRESSION  1.  Alcohol intoxication, severe alcohol abuse, severe hyponatremia, alcoholic ketoacidosis, malnutrition, pancreatitis      Electronically signed by: Arnaldo Maldonado M.D., 9/5/2022 6:23 PM

## 2022-09-06 NOTE — ASSESSMENT & PLAN NOTE
Resolved    CIWA protocol   Continuous cardiac monitoring  Monitor electrolytes and replace accordingly, particularly magnesium, potassium and phosphorus  Fall, seizure, aspiration precautions.  Thiamine folic acid and multivitamin.   Counseling

## 2022-09-06 NOTE — HOSPITAL COURSE
37-year-old female with a history of alcohol dependence, seizure disorder, alcohol induced pancreatitis, bipolar disorder who presented on 9/5/2022 with weakness, headache, nausea/vomiting and abdominal pain as well as tremors.  Patient reports she drinks vodka daily.  She has seizures and is followed by neurology.  Last seizure was on day of admission.  On admission she was found to have potassium 2.9, elevated LFTs, lipase 118. She was started on CIWA.  She continued to have severe abdominal pain.  She was treated with pain medication and nausea medication.  Patient continued to have severe abdominal pain and then proceeded to have worsening transaminitis.  Renal ultrasound showed gallbladder sludge and dilated common bile duct.  MRCP showed mildly dilated common bile duct and distended gallbladder.  General surgery was consulted.
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
## 2022-09-06 NOTE — PROGRESS NOTES
4 Eyes Skin Assessment Completed by LESLIE Carrizales and LESLIE Alfredo.    Head WDL  Ears WDL  Nose WDL  Mouth Dry  Neck WDL  Breast/Chest WDL  Shoulder Blades WDL  Spine WDL  (R) Arm/Elbow/Hand WDL  (L) Arm/Elbow/Hand WDL  Abdomen WDL  Groin WDL  Scrotum/Coccyx/Buttocks WDL  (R) Leg scratch  (L) Leg scratch  (R) Heel/Foot/Toe WDL  (L) Heel/Foot/Toe WDL          Devices In Places Tele Box and SCD's      Interventions In Place N/A    Possible Skin Injury No    Pictures Uploaded Into Epic N/A  Wound Consult Placed N/A  RN Wound Prevention Protocol Ordered No

## 2022-09-06 NOTE — ED NOTES
Med Rec completed per patient   Allergies reviewed  Ok per patient to discuss medications with visitor present    Patient reported she completed a partial course of an unknown antibiotics in last 30 days for a UTI

## 2022-09-06 NOTE — DIETARY
Nutrition services: Day 1 of admit.  Rhiannon Marrero is a 37 y.o. female with admitting DX of Alcohol dependence with withdrawal    Consult received for MST of 5 on nutrition screen due to report of >/= 34 lb wt loss and poor PO PTA.     Assessment:  Weight: 48.6 kg (107 lb 2.3 oz)  Body mass index is 17.29 kg/m²., BMI classification: underweight  Diet/Intake: full liquids/25-50% at breakfast    Evaluation:   RD tried to visit pt to discuss wt loss and poor PO. Pt was tearful and said that it was not a good time to visit her. RD will follow up when able.   Pt with prior hospitalization. Based on Epic review, wt loss noted of 14% x ~ 3 1/2 months. Wt loss is significant. Based on RD note on 8/16 during prior admit pt met criteria for severe malnutrition due to diminished PO intake due to wt loss, poor PO intake, and moderate muscle loss (temple region) and severe fat loss (orbital region). Pt most likely continues to meet criteria since she has lost more weight since her last admit. RD was not able to check for muscle or fat loss because pt was tearful.  Pt may benefit from addition of Boost supplements for additional kcals and protein.     Malnutrition Risk: unable to determine due to pt tearful during RD visit and requesting follow-up later.     Recommendations/Plan:  Add Boost Plus to meals TID   Encourage intake of >50%  Document intake of all meals and supplements as % taken in ADL's to provide interdisciplinary communication across all shifts.   Monitor weight.  Nutrition rep will continue to see patient for ongoing meal and snack preferences.         RD will follow.

## 2022-09-06 NOTE — PROGRESS NOTES
Received bedside report from LESLIE Carrizales, pt care assumed. VSS, pt assessment complete. Pt A&Ox4, moderate c/o pancreatic/abdominal pain at this time. CIWA protocol in place per active orders. POC discussed with pt and verbalizes no questions. Pt denies any additional needs at this time. Bed locked and in lowest position, bed alarm is on. Pt educated on fall risk and verbalized understanding, call light within reach, hourly rounding initiated.

## 2022-09-06 NOTE — H&P
Hospital Medicine History & Physical Note    Date of Service  9/5/2022    Primary Care Physician  Ivy Adams M.D.    Consultants  None     Code Status  Full Code    Chief Complaint  Chief Complaint   Patient presents with    ETOH Intoxication     Pt found at home by family drunk surrounded by etoh bottles. Recently lossed custody of kids and having behavioral health problems.      History of Presenting Illness  Rhiannon Marrero is a 37 y.o. female with a past medical history of alcohol dependence, seizure disorder, alcohol induced pancreatitis who presented 9/5/2022 with generalized weakness, headache, nausea, abdominal pain and tremors.  Patient reports that she drinks vodka daily but decided to stop since then she started to feel unsteady, weak and having headaches.  She also reports having vomiting denies noticing any blood in her vomitus.  She denies noticing any changes in her stool.    I discussed the plan of care with emergency department physician, the patient, patient's father and mother present at bedside in the emergency room.    Review of Systems  Review of Systems   Constitutional:  Negative for chills and fever.   Eyes:  Negative for discharge and redness.   Respiratory:  Negative for cough, shortness of breath and stridor.    Cardiovascular:  Negative for chest pain and leg swelling.   Gastrointestinal:  Positive for abdominal pain and nausea. Negative for vomiting.   Genitourinary:  Negative for flank pain.   Musculoskeletal:  Negative for myalgias.   Skin: Negative.    Neurological:  Positive for dizziness, tremors and headaches. Negative for focal weakness.   Endo/Heme/Allergies:  Does not bruise/bleed easily.   Psychiatric/Behavioral:  The patient is not nervous/anxious.      Past Medical History   has a past medical history of Acute pancreatitis without infection or necrosis (7/20/2022), Alcohol dependence (HCC) (4/13/2017), Bronchitis (8/2012), Cold, Current moderate episode of major  depressive disorder without prior episode (HCC) (1/31/2020), Heart burn, Hernia of unspecified site of abdominal cavity without mention of obstruction or gangrene, High anion gap metabolic acidosis (7/1/2022), Indigestion, Pancreatitis, and Pneumonia (2011).    Surgical History   has a past surgical history that includes other orthopedic surgery; gyn surgery; tonsillectomy (4/11/2013); arthroscopy, knee; hysterectomy robotic xi (10/11/2018); salpingectomy (Bilateral, 10/11/2018); and orif, wrist (Right, 4/24/2019).     Family History  family history includes Arthritis in her mother; Heart Disease in her maternal grandfather; Psychiatric Illness in her mother; Stroke in her mother.      Social History   reports that she has quit smoking. Her smoking use included cigarettes. She has a 7.50 pack-year smoking history. She has never used smokeless tobacco. She reports current alcohol use. She reports current drug use. Drug: Marijuana.    Allergies  Allergies   Allergen Reactions    Promethazine Hcl Anxiety     Anxiety and makes her feels like skin coming off      Medications  Prior to Admission Medications   Prescriptions Last Dose Informant Patient Reported? Taking?   QUEtiapine (SEROQUEL) 50 MG tablet > 1 wk  No No   Sig: Take 3 Tablets by mouth at bedtime. 3 tablets = 150 mg   famotidine (PEPCID) 20 MG Tab > 1  wk  Yes Yes   Sig: Take 20 mg by mouth 1 time a day as needed (heartburn, acid reflux).   levetiracetam (KEPPRA) 1000 MG tablet > 1 wk  No No   Sig: Take 0.5 Tablets by mouth 2 times a day. Indications: Seizure   Patient taking differently: Take 1,000 mg by mouth every day. Indications: Seizure   zonisamide (ZONEGRAN) 100 MG Cap > 1 wk Patient Yes No   Sig: Take 200 mg by mouth at bedtime. 2 tablets = 200 mg      Facility-Administered Medications: None     Physical Exam  Temp:  [36.9 °C (98.4 °F)] 36.9 °C (98.4 °F)  Pulse:  [118-148] 118  Resp:  [12-14] 12  BP: (113)/(93) 113/93  SpO2:  [91 %-98 %] 98 %  Blood  Pressure: (!) 113/93   Temperature: 36.9 °C (98.4 °F)   Pulse: (!) 118   Respiration: 12   Pulse Oximetry: 98 %     Physical Exam  Constitutional:       General: She is not in acute distress.     Appearance: She is ill-appearing and diaphoretic.   HENT:      Head: Normocephalic and atraumatic.      Right Ear: External ear normal.      Left Ear: External ear normal.      Nose: No congestion or rhinorrhea.      Mouth/Throat:      Mouth: Mucous membranes are dry.      Pharynx: No oropharyngeal exudate or posterior oropharyngeal erythema.   Eyes:      General: No scleral icterus.        Right eye: No discharge.         Left eye: No discharge.      Conjunctiva/sclera: Conjunctivae normal.      Pupils: Pupils are equal, round, and reactive to light.   Cardiovascular:      Rate and Rhythm: Regular rhythm. Tachycardia present.      Heart sounds:     No friction rub. No gallop.   Pulmonary:      Effort: Pulmonary effort is normal.   Abdominal:      General: Abdomen is flat. There is no distension.      Tenderness: There is no guarding.   Musculoskeletal:         General: No swelling.      Cervical back: Neck supple. No rigidity. No muscular tenderness.      Right lower leg: No edema.      Left lower leg: No edema.   Skin:     Capillary Refill: Capillary refill takes 2 to 3 seconds.      Coloration: Skin is pale. Skin is not jaundiced.      Findings: No bruising or erythema.   Neurological:      Mental Status: She is alert and oriented to person, place, and time.   Psychiatric:         Mood and Affect: Mood normal.         Judgment: Judgment normal.       Laboratory:  Recent Labs     09/05/22  1832   WBC 4.9   RBC 5.45*   HEMOGLOBIN 17.1*   HEMATOCRIT 48.1*   MCV 88.3   MCH 31.4   MCHC 35.6*   RDW 46.0   PLATELETCT 250   MPV 9.1     Recent Labs     09/05/22  1832   SODIUM 138   POTASSIUM 2.9*   CHLORIDE 88*   CO2 25   GLUCOSE 158*   BUN 8   CREATININE 0.64   CALCIUM 9.3     Recent Labs     09/05/22  1832   ALTSGPT 105*    ASTSGOT 169*   ALKPHOSPHAT 128*   TBILIRUBIN 0.7   LIPASE 118*   GLUCOSE 158*         No results for input(s): NTPROBNP in the last 72 hours.      No results for input(s): TROPONINT in the last 72 hours.    Imaging:  No orders to display     Assessment/Plan:  Justification for Admission Status  I anticipate this patient will require at least two midnights for appropriate medical management, necessitating inpatient admission because patient presenting with alcohol withdrawal will require [facility] protocol, monitoring of electrolytes daily    Patient will need a bed on telemetry, hypokalemia    * Alcohol dependence with withdrawal (HCC)- (present on admission)  Assessment & Plan  CIWA protocol   Continuous cardiac monitoring  Monitor electrolytes and replace accordingly, particularly magnesium, potassium and phosphorus  Fall, seizure, aspiration precautions.  Thiamine folic acid and multivitamin.   Counseling      Hypokalemia- (present on admission)  Assessment & Plan  Replacing, checking magnesium    Continue to monitor replace as needed     Seizure disorder (HCC)- (present on admission)  Assessment & Plan  Resume home zonisamide and Keppra  As needed lorazepam for seizures    VTE prophylaxis: SCDs/TEDs and enoxaparin ppx

## 2022-09-06 NOTE — PROGRESS NOTES
Telemetry shift summary:  Rhythm: SR, ST     HR Range: 90s to 100s      Measurements from strip printed at 02:02:06   ND: 0.16   QRS 0.08   QT 0.35     Ectopies: None.

## 2022-09-06 NOTE — ED TRIAGE NOTES
Bib family from home    Chief Complaint   Patient presents with    ETOH Intoxication     Pt found at home by family drunk surrounded by etoh bottles. Recently lossed custody of kids and having behavioral health problems.      BP (!) 113/93   Pulse (!) 148   LMP 10/30/2018   SpO2 91%

## 2022-09-06 NOTE — ASSESSMENT & PLAN NOTE
Stable  US showed gallbladder sludge and biliary dilation which is new  MRCP, dilated CBD but no stone, distended gallbladder with sludge

## 2022-09-07 PROBLEM — R10.9 ABDOMINAL PAIN: Status: ACTIVE | Noted: 2022-09-07

## 2022-09-07 LAB
ALBUMIN SERPL BCP-MCNC: 3.5 G/DL (ref 3.2–4.9)
ALBUMIN/GLOB SERPL: 1.4 G/DL
ALP SERPL-CCNC: 99 U/L (ref 30–99)
ALT SERPL-CCNC: 61 U/L (ref 2–50)
ANION GAP SERPL CALC-SCNC: 10 MMOL/L (ref 7–16)
APPEARANCE UR: CLEAR
AST SERPL-CCNC: 100 U/L (ref 12–45)
BILIRUB SERPL-MCNC: 1.2 MG/DL (ref 0.1–1.5)
BILIRUB UR QL STRIP.AUTO: NEGATIVE
BUN SERPL-MCNC: 3 MG/DL (ref 8–22)
CALCIUM SERPL-MCNC: 9.1 MG/DL (ref 8.4–10.2)
CHLORIDE SERPL-SCNC: 104 MMOL/L (ref 96–112)
CO2 SERPL-SCNC: 24 MMOL/L (ref 20–33)
COLOR UR: YELLOW
CREAT SERPL-MCNC: 0.51 MG/DL (ref 0.5–1.4)
ERYTHROCYTE [DISTWIDTH] IN BLOOD BY AUTOMATED COUNT: 50.4 FL (ref 35.9–50)
GFR SERPLBLD CREATININE-BSD FMLA CKD-EPI: 124 ML/MIN/1.73 M 2
GLOBULIN SER CALC-MCNC: 2.5 G/DL (ref 1.9–3.5)
GLUCOSE SERPL-MCNC: 88 MG/DL (ref 65–99)
GLUCOSE UR STRIP.AUTO-MCNC: NEGATIVE MG/DL
HCT VFR BLD AUTO: 37.9 % (ref 37–47)
HGB BLD-MCNC: 12.9 G/DL (ref 12–16)
KETONES UR STRIP.AUTO-MCNC: NEGATIVE MG/DL
LEUKOCYTE ESTERASE UR QL STRIP.AUTO: NEGATIVE
MCH RBC QN AUTO: 32.3 PG (ref 27–33)
MCHC RBC AUTO-ENTMCNC: 34 G/DL (ref 33.6–35)
MCV RBC AUTO: 94.8 FL (ref 81.4–97.8)
MICRO URNS: NORMAL
NITRITE UR QL STRIP.AUTO: NEGATIVE
PH UR STRIP.AUTO: 8 [PH] (ref 5–8)
PLATELET # BLD AUTO: 118 K/UL (ref 164–446)
PMV BLD AUTO: 10.3 FL (ref 9–12.9)
POTASSIUM SERPL-SCNC: 4.4 MMOL/L (ref 3.6–5.5)
PROT SERPL-MCNC: 6 G/DL (ref 6–8.2)
PROT UR QL STRIP: NEGATIVE MG/DL
RBC # BLD AUTO: 4 M/UL (ref 4.2–5.4)
RBC UR QL AUTO: NEGATIVE
SODIUM SERPL-SCNC: 138 MMOL/L (ref 135–145)
SP GR UR STRIP.AUTO: 1.01
WBC # BLD AUTO: 4.5 K/UL (ref 4.8–10.8)

## 2022-09-07 PROCEDURE — 94760 N-INVAS EAR/PLS OXIMETRY 1: CPT

## 2022-09-07 PROCEDURE — 81003 URINALYSIS AUTO W/O SCOPE: CPT

## 2022-09-07 PROCEDURE — 99233 SBSQ HOSP IP/OBS HIGH 50: CPT | Performed by: INTERNAL MEDICINE

## 2022-09-07 PROCEDURE — 700105 HCHG RX REV CODE 258: Performed by: INTERNAL MEDICINE

## 2022-09-07 PROCEDURE — 700101 HCHG RX REV CODE 250: Performed by: HOSPITALIST

## 2022-09-07 PROCEDURE — 700102 HCHG RX REV CODE 250 W/ 637 OVERRIDE(OP): Performed by: INTERNAL MEDICINE

## 2022-09-07 PROCEDURE — 700111 HCHG RX REV CODE 636 W/ 250 OVERRIDE (IP): Performed by: HOSPITALIST

## 2022-09-07 PROCEDURE — 700102 HCHG RX REV CODE 250 W/ 637 OVERRIDE(OP): Performed by: HOSPITALIST

## 2022-09-07 PROCEDURE — 80053 COMPREHEN METABOLIC PANEL: CPT

## 2022-09-07 PROCEDURE — 770020 HCHG ROOM/CARE - TELE (206)

## 2022-09-07 PROCEDURE — A9270 NON-COVERED ITEM OR SERVICE: HCPCS | Performed by: HOSPITALIST

## 2022-09-07 PROCEDURE — A9270 NON-COVERED ITEM OR SERVICE: HCPCS | Performed by: INTERNAL MEDICINE

## 2022-09-07 PROCEDURE — 85027 COMPLETE CBC AUTOMATED: CPT

## 2022-09-07 PROCEDURE — 36415 COLL VENOUS BLD VENIPUNCTURE: CPT

## 2022-09-07 RX ORDER — SODIUM CHLORIDE, SODIUM LACTATE, POTASSIUM CHLORIDE, CALCIUM CHLORIDE 600; 310; 30; 20 MG/100ML; MG/100ML; MG/100ML; MG/100ML
INJECTION, SOLUTION INTRAVENOUS CONTINUOUS
Status: DISCONTINUED | OUTPATIENT
Start: 2022-09-07 | End: 2022-09-15 | Stop reason: HOSPADM

## 2022-09-07 RX ADMIN — FOLIC ACID 1 MG: 1 TABLET ORAL at 06:00

## 2022-09-07 RX ADMIN — MORPHINE SULFATE 4 MG: 4 INJECTION INTRAVENOUS at 13:50

## 2022-09-07 RX ADMIN — LEVETIRACETAM 500 MG: 500 TABLET, FILM COATED ORAL at 17:37

## 2022-09-07 RX ADMIN — CALCIUM 1000 MG: 500 TABLET ORAL at 08:01

## 2022-09-07 RX ADMIN — MORPHINE SULFATE 4 MG: 4 INJECTION INTRAVENOUS at 09:54

## 2022-09-07 RX ADMIN — MORPHINE SULFATE 4 MG: 4 INJECTION INTRAVENOUS at 21:05

## 2022-09-07 RX ADMIN — ENOXAPARIN SODIUM 40 MG: 100 INJECTION SUBCUTANEOUS at 17:37

## 2022-09-07 RX ADMIN — CALCIUM 1000 MG: 500 TABLET ORAL at 17:37

## 2022-09-07 RX ADMIN — ZONISAMIDE 200 MG: 50 CAPSULE ORAL at 20:27

## 2022-09-07 RX ADMIN — MORPHINE SULFATE 4 MG: 4 INJECTION INTRAVENOUS at 00:51

## 2022-09-07 RX ADMIN — LEVETIRACETAM 500 MG: 500 TABLET, FILM COATED ORAL at 06:00

## 2022-09-07 RX ADMIN — Medication 1 TABLET: at 06:00

## 2022-09-07 RX ADMIN — POTASSIUM CHLORIDE AND SODIUM CHLORIDE: 900; 300 INJECTION, SOLUTION INTRAVENOUS at 06:00

## 2022-09-07 RX ADMIN — CHLORDIAZEPOXIDE HYDROCHLORIDE 25 MG: 25 CAPSULE ORAL at 06:00

## 2022-09-07 RX ADMIN — MORPHINE SULFATE 4 MG: 4 INJECTION INTRAVENOUS at 17:44

## 2022-09-07 RX ADMIN — POTASSIUM CHLORIDE 40 MEQ: 1500 TABLET, EXTENDED RELEASE ORAL at 06:00

## 2022-09-07 RX ADMIN — POTASSIUM CHLORIDE 40 MEQ: 1500 TABLET, EXTENDED RELEASE ORAL at 17:37

## 2022-09-07 RX ADMIN — THIAMINE HCL TAB 100 MG 100 MG: 100 TAB at 06:00

## 2022-09-07 RX ADMIN — MORPHINE SULFATE 4 MG: 4 INJECTION INTRAVENOUS at 06:07

## 2022-09-07 RX ADMIN — ONDANSETRON 4 MG: 2 INJECTION INTRAMUSCULAR; INTRAVENOUS at 21:32

## 2022-09-07 RX ADMIN — SODIUM CHLORIDE, POTASSIUM CHLORIDE, SODIUM LACTATE AND CALCIUM CHLORIDE: 600; 310; 30; 20 INJECTION, SOLUTION INTRAVENOUS at 14:11

## 2022-09-07 ASSESSMENT — LIFESTYLE VARIABLES
AUDITORY DISTURBANCES: NOT PRESENT
HEADACHE, FULLNESS IN HEAD: NOT PRESENT
HEADACHE, FULLNESS IN HEAD: NOT PRESENT
ORIENTATION AND CLOUDING OF SENSORIUM: ORIENTED AND CAN DO SERIAL ADDITIONS
TOTAL SCORE: 5
TREMOR: NO TREMOR
PAROXYSMAL SWEATS: NO SWEAT VISIBLE
HEADACHE, FULLNESS IN HEAD: VERY MILD
ORIENTATION AND CLOUDING OF SENSORIUM: ORIENTED AND CAN DO SERIAL ADDITIONS
TOTAL SCORE: 5
TOTAL SCORE: 4
ORIENTATION AND CLOUDING OF SENSORIUM: ORIENTED AND CAN DO SERIAL ADDITIONS
PAROXYSMAL SWEATS: NO SWEAT VISIBLE
ORIENTATION AND CLOUDING OF SENSORIUM: ORIENTED AND CAN DO SERIAL ADDITIONS
NAUSEA AND VOMITING: *
HEADACHE, FULLNESS IN HEAD: VERY MILD
ANXIETY: NO ANXIETY (AT EASE)
ANXIETY: NO ANXIETY (AT EASE)
TREMOR: TREMOR NOT VISIBLE BUT CAN BE FELT, FINGERTIP TO FINGERTIP
NAUSEA AND VOMITING: MILD NAUSEA WITH NO VOMITING
AUDITORY DISTURBANCES: NOT PRESENT
VISUAL DISTURBANCES: NOT PRESENT
TREMOR: NO TREMOR
ANXIETY: *
VISUAL DISTURBANCES: NOT PRESENT
TOTAL SCORE: 4
ANXIETY: MILDLY ANXIOUS
AGITATION: NORMAL ACTIVITY
NAUSEA AND VOMITING: NO NAUSEA AND NO VOMITING
TOTAL SCORE: 4
NAUSEA AND VOMITING: MILD NAUSEA WITH NO VOMITING
TREMOR: TREMOR NOT VISIBLE BUT CAN BE FELT, FINGERTIP TO FINGERTIP
TREMOR: NO TREMOR
ORIENTATION AND CLOUDING OF SENSORIUM: ORIENTED AND CAN DO SERIAL ADDITIONS
NAUSEA AND VOMITING: *
VISUAL DISTURBANCES: NOT PRESENT
VISUAL DISTURBANCES: NOT PRESENT
PAROXYSMAL SWEATS: NO SWEAT VISIBLE
AUDITORY DISTURBANCES: NOT PRESENT
VISUAL DISTURBANCES: NOT PRESENT
AGITATION: *
HEADACHE, FULLNESS IN HEAD: NOT PRESENT
TREMOR: NO TREMOR
PAROXYSMAL SWEATS: NO SWEAT VISIBLE
TOTAL SCORE: 1
PAROXYSMAL SWEATS: NO SWEAT VISIBLE
AGITATION: NORMAL ACTIVITY
VISUAL DISTURBANCES: NOT PRESENT
NAUSEA AND VOMITING: MILD NAUSEA WITH NO VOMITING
HEADACHE, FULLNESS IN HEAD: NOT PRESENT
AGITATION: NORMAL ACTIVITY
ANXIETY: *
SUBSTANCE_ABUSE: 1
AUDITORY DISTURBANCES: NOT PRESENT
AGITATION: NORMAL ACTIVITY
ANXIETY: *
ORIENTATION AND CLOUDING OF SENSORIUM: ORIENTED AND CAN DO SERIAL ADDITIONS
AUDITORY DISTURBANCES: NOT PRESENT
AGITATION: NORMAL ACTIVITY
AUDITORY DISTURBANCES: NOT PRESENT
PAROXYSMAL SWEATS: NO SWEAT VISIBLE

## 2022-09-07 ASSESSMENT — ENCOUNTER SYMPTOMS
NAUSEA: 1
FEVER: 0
SHORTNESS OF BREATH: 0
TREMORS: 0
EYE REDNESS: 0
ABDOMINAL PAIN: 1
VOMITING: 0
CHILLS: 0

## 2022-09-07 ASSESSMENT — PAIN DESCRIPTION - PAIN TYPE
TYPE: ACUTE PAIN

## 2022-09-07 NOTE — CARE PLAN
The patient is Stable - Low risk of patient condition declining or worsening    Shift Goals  Clinical Goals: CIWA, pain managment  Patient Goals: to get pain medication  Family Goals: No family present    Progress made toward(s) clinical / shift goals:        Problem: Knowledge Deficit - Standard  Goal: Patient and family/care givers will demonstrate understanding of plan of care, disease process/condition, diagnostic tests and medications  Outcome: Progressing     Problem: Optimal Care for Alcohol Withdrawal  Goal: Optimal Care for the alcohol withdrawal patient  Outcome: Progressing     Problem: Pain - Standard  Goal: Alleviation of pain or a reduction in pain to the patient’s comfort goal  Outcome: Progressing       Patient is not progressing towards the following goals:      Problem: Lifestyle Changes  Goal: Patient's ability to identify lifestyle changes and available resources to help reduce recurrence of condition will improve  Outcome: Not Progressing

## 2022-09-07 NOTE — FLOWSHEET NOTE
Telemetry Shift Summary     Rhythm: SR  HR Range: 60-80  Ectopy: n/a  Measurements: 0.16/0.08/0.40              Normal Values  Rhythm SR  HR Range    Measurements 0.12-0.20 / 0.06-0.10  / 0.30-0.52

## 2022-09-07 NOTE — PROGRESS NOTES
Monitor Summary:    Rhythm:  SR-ST   Rate Range:    Ectopy: No ectopy    Measurements:  0.16/0.08/0.40

## 2022-09-07 NOTE — PROGRESS NOTES
Hospital Medicine Daily Progress Note    Date of Service  9/7/2022    Chief Complaint  Rhiannon Marrero is a 37 y.o. female admitted 9/5/2022 with ETOH intoxication and abdominal pain.    Hospital Course  37-year-old female with a history of alcohol dependence, seizure disorder, alcohol induced pancreatitis, bipolar disorder who presented on 9/5/2022 with weakness, headache, nausea/vomiting and abdominal pain as well as tremors.  Patient reports she drinks vodka daily.  She has seizures and is followed by neurology.  Last seizure was on day of admission.  On admission she was found to have potassium 2.9, elevated LFTs, lipase 118. She was started on CIWA.     Interval Problem Update  - CIWA scores downtrending  - still having abdominal pain and nausea, tolerating FLD but doesn't want it advanced        I have discussed this patient's plan of care and discharge plan at IDT rounds today with Case Management, Nursing, Nursing leadership, and other members of the IDT team.    Consultants/Specialty  None    Code Status  Full Code    Disposition  Patient is not medically cleared for discharge.   Anticipate discharge to to home with close outpatient follow-up.  I have placed the appropriate orders for post-discharge needs.    Review of Systems  Review of Systems   Constitutional:  Negative for chills and fever.   HENT:  Negative for nosebleeds.    Eyes:  Negative for redness.   Respiratory:  Negative for shortness of breath.    Cardiovascular:  Negative for chest pain.   Gastrointestinal:  Positive for abdominal pain and nausea. Negative for vomiting.   Genitourinary: Negative.    Skin:  Negative for rash.   Neurological:  Negative for tremors.   Psychiatric/Behavioral:  Positive for substance abuse.       Physical Exam  Temp:  [36.4 °C (97.5 °F)-36.6 °C (97.9 °F)] 36.4 °C (97.5 °F)  Pulse:  [59-94] 59  Resp:  [16-20] 20  BP: (111-137)/(72-84) 111/81  SpO2:  [96 %-99 %] 96 %    Physical Exam  Constitutional:        General: She is not in acute distress.     Appearance: She is not ill-appearing, toxic-appearing or diaphoretic.   HENT:      Nose: Nose normal.      Mouth/Throat:      Mouth: Mucous membranes are moist.   Eyes:      General: No scleral icterus.     Conjunctiva/sclera: Conjunctivae normal.   Cardiovascular:      Rate and Rhythm: Normal rate and regular rhythm.      Heart sounds: No murmur heard.    No friction rub. No gallop.   Pulmonary:      Effort: Pulmonary effort is normal.      Breath sounds: Normal breath sounds.   Abdominal:      General: Bowel sounds are normal. There is no distension.      Palpations: Abdomen is soft.      Tenderness: There is generalized abdominal tenderness. There is no guarding or rebound.   Musculoskeletal:      Cervical back: Normal range of motion.      Right lower leg: No edema.      Left lower leg: No edema.   Skin:     Coloration: Skin is not jaundiced.      Findings: No rash.      Comments: Old self injurious scars on R forearm   Neurological:      Mental Status: She is alert and oriented to person, place, and time. Mental status is at baseline.   Psychiatric:         Mood and Affect: Mood normal.         Behavior: Behavior normal.       Fluids    Intake/Output Summary (Last 24 hours) at 9/7/2022 0939  Last data filed at 9/7/2022 0600  Gross per 24 hour   Intake 150 ml   Output 5050 ml   Net -4900 ml         Laboratory  Recent Labs     09/05/22 1832 09/06/22  0305 09/07/22  0232   WBC 4.9 4.4* 4.5*   RBC 5.45* 3.97* 4.00*   HEMOGLOBIN 17.1* 12.6 12.9   HEMATOCRIT 48.1* 36.4* 37.9   MCV 88.3 91.7 94.8   MCH 31.4 31.7 32.3   MCHC 35.6* 34.6 34.0   RDW 46.0 48.7 50.4*   PLATELETCT 250 148* 118*   MPV 9.1 9.6 10.3       Recent Labs     09/05/22 1832 09/06/22  0305 09/07/22  0232   SODIUM 138 138 138   POTASSIUM 2.9* 2.9* 4.4   CHLORIDE 88* 98 104   CO2 25 23 24   GLUCOSE 158* 102* 88   BUN 8 8 3*   CREATININE 0.64 0.54 0.51   CALCIUM 9.3 7.8* 9.1           Recent Labs      09/05/22  1832 09/06/22  0305 09/07/22  0232   ASTSGOT 169* 94* 100*   ALTSGPT 105* 67* 61*   TBILIRUBIN 0.7 0.6 1.2   GLOBULIN 3.3 2.3 2.5                 Imaging  No orders to display          Assessment/Plan  * Alcohol dependence with withdrawal (HCC)- (present on admission)  Assessment & Plan  Resolving    CIWA protocol   Continuous cardiac monitoring  Monitor electrolytes and replace accordingly, particularly magnesium, potassium and phosphorus  Fall, seizure, aspiration precautions.  Thiamine folic acid and multivitamin.   Counseling      Abdominal pain  Assessment & Plan  Likely in setting chronic or recurrent pancreatitis vs gastritis in setting of etoh  Lipase mildly elevated  PPI  IVF  Pain mgmt  Nausea meds  If no improvement may need CT AP    Hypokalemia- (present on admission)  Assessment & Plan  Resolved  Replacing, checking magnesium    Continue to monitor replace as needed     Transaminitis- (present on admission)  Assessment & Plan  In setting of alcohol  Stable  Bilirubin slightly increased  CTM    Seizure disorder (HCC)- (present on admission)  Assessment & Plan  Resume home zonisamide and Keppra  As needed lorazepam for seizures       VTE prophylaxis: enoxaparin ppx    I have performed a physical exam and reviewed and updated ROS and Plan today (9/7/2022). In review of yesterday's note (9/6/2022), there are no changes except as documented above.

## 2022-09-07 NOTE — PROGRESS NOTES
Telemetry Shift Summary     Rhythm: SR  Rate: 60s-80s  Measurements: 0.16/0.08/0.40  Ectopy (reported by Monitor Tech): oPAC     Normal Values  Rhythm: Sinus  HR:   Measurements: 0.12-0.20/0.06-0.10/0.30-0.52

## 2022-09-07 NOTE — ASSESSMENT & PLAN NOTE
Persistent, severe  Likely in setting chronic or recurrent pancreatitis vs gastritis in setting of etoh vs etoh hepatitis   Lipase mildly elevated, recheck normal  PPI  IVF  Pain mgmt  Nausea meds  RUQ US showed gallbladder sludge and biliary dilation  MRCP reviewed: mildly distended CBD, no stone, distended gallbladder with sludge  Surgery consulted: did not think needed cholecystectomy  Advance diet to full liquids, if tolerating gi soft can dc home    Trial reglan

## 2022-09-07 NOTE — CARE PLAN
The patient is Stable - Low risk of patient condition declining or worsening    Shift Goals  Clinical Goals: CIWA, pain management  Patient Goals: Comfort  Family Goals: No family present    Progress made toward(s) clinical / shift goals:    Problem: Optimal Care for Alcohol Withdrawal  Goal: Optimal Care for the alcohol withdrawal patient  Outcome: Progressing  Note: CIWA scores are being done routinely, patient educated on importance of alcohol withdrawal     Problem: Pain - Standard  Goal: Alleviation of pain or a reduction in pain to the patient’s comfort goal  Outcome: Progressing  Flowsheets (Taken 9/7/2022 1055)  Pain Rating Scale (NPRS): 6  Note: Pain medications are being given per MAR, extra blankets, repositioning and distraction done to aid in pain management        Patient is not progressing towards the following goals:

## 2022-09-08 ENCOUNTER — APPOINTMENT (OUTPATIENT)
Dept: RADIOLOGY | Facility: MEDICAL CENTER | Age: 37
DRG: 896 | End: 2022-09-08
Attending: INTERNAL MEDICINE
Payer: COMMERCIAL

## 2022-09-08 LAB
ALBUMIN SERPL BCP-MCNC: 4.3 G/DL (ref 3.2–4.9)
ALBUMIN/GLOB SERPL: 1.4 G/DL
ALP SERPL-CCNC: 130 U/L (ref 30–99)
ALT SERPL-CCNC: 102 U/L (ref 2–50)
ANION GAP SERPL CALC-SCNC: 9 MMOL/L (ref 7–16)
AST SERPL-CCNC: 203 U/L (ref 12–45)
BILIRUB SERPL-MCNC: 1.1 MG/DL (ref 0.1–1.5)
BUN SERPL-MCNC: 2 MG/DL (ref 8–22)
CALCIUM SERPL-MCNC: 10.6 MG/DL (ref 8.4–10.2)
CHLORIDE SERPL-SCNC: 99 MMOL/L (ref 96–112)
CO2 SERPL-SCNC: 28 MMOL/L (ref 20–33)
CREAT SERPL-MCNC: 0.68 MG/DL (ref 0.5–1.4)
ERYTHROCYTE [DISTWIDTH] IN BLOOD BY AUTOMATED COUNT: 50.6 FL (ref 35.9–50)
GFR SERPLBLD CREATININE-BSD FMLA CKD-EPI: 116 ML/MIN/1.73 M 2
GLOBULIN SER CALC-MCNC: 3 G/DL (ref 1.9–3.5)
GLUCOSE SERPL-MCNC: 96 MG/DL (ref 65–99)
HCT VFR BLD AUTO: 43.9 % (ref 37–47)
HGB BLD-MCNC: 14.9 G/DL (ref 12–16)
MCH RBC QN AUTO: 32 PG (ref 27–33)
MCHC RBC AUTO-ENTMCNC: 33.9 G/DL (ref 33.6–35)
MCV RBC AUTO: 94.4 FL (ref 81.4–97.8)
PLATELET # BLD AUTO: 104 K/UL (ref 164–446)
PMV BLD AUTO: 10.1 FL (ref 9–12.9)
POTASSIUM SERPL-SCNC: 4.9 MMOL/L (ref 3.6–5.5)
PROT SERPL-MCNC: 7.3 G/DL (ref 6–8.2)
RBC # BLD AUTO: 4.65 M/UL (ref 4.2–5.4)
SODIUM SERPL-SCNC: 136 MMOL/L (ref 135–145)
WBC # BLD AUTO: 4.7 K/UL (ref 4.8–10.8)

## 2022-09-08 PROCEDURE — A9270 NON-COVERED ITEM OR SERVICE: HCPCS | Performed by: INTERNAL MEDICINE

## 2022-09-08 PROCEDURE — 700102 HCHG RX REV CODE 250 W/ 637 OVERRIDE(OP): Performed by: INTERNAL MEDICINE

## 2022-09-08 PROCEDURE — 700105 HCHG RX REV CODE 258: Performed by: INTERNAL MEDICINE

## 2022-09-08 PROCEDURE — 99407 BEHAV CHNG SMOKING > 10 MIN: CPT

## 2022-09-08 PROCEDURE — 36415 COLL VENOUS BLD VENIPUNCTURE: CPT

## 2022-09-08 PROCEDURE — 700111 HCHG RX REV CODE 636 W/ 250 OVERRIDE (IP): Performed by: HOSPITALIST

## 2022-09-08 PROCEDURE — 73562 X-RAY EXAM OF KNEE 3: CPT | Mod: RT

## 2022-09-08 PROCEDURE — 99233 SBSQ HOSP IP/OBS HIGH 50: CPT | Performed by: INTERNAL MEDICINE

## 2022-09-08 PROCEDURE — 700102 HCHG RX REV CODE 250 W/ 637 OVERRIDE(OP): Performed by: HOSPITALIST

## 2022-09-08 PROCEDURE — 770020 HCHG ROOM/CARE - TELE (206)

## 2022-09-08 PROCEDURE — 85027 COMPLETE CBC AUTOMATED: CPT

## 2022-09-08 PROCEDURE — A9270 NON-COVERED ITEM OR SERVICE: HCPCS | Performed by: HOSPITALIST

## 2022-09-08 PROCEDURE — 80053 COMPREHEN METABOLIC PANEL: CPT

## 2022-09-08 RX ORDER — CALCIUM CARBONATE 500(1250)
500 TABLET ORAL
Status: DISCONTINUED | OUTPATIENT
Start: 2022-09-08 | End: 2022-09-15 | Stop reason: HOSPADM

## 2022-09-08 RX ORDER — CALCIUM CARBONATE 500(1250)
500 TABLET ORAL 2 TIMES DAILY WITH MEALS
Status: DISCONTINUED | OUTPATIENT
Start: 2022-09-08 | End: 2022-09-08

## 2022-09-08 RX ORDER — POTASSIUM CHLORIDE 20 MEQ/1
40 TABLET, EXTENDED RELEASE ORAL DAILY
Status: DISCONTINUED | OUTPATIENT
Start: 2022-09-09 | End: 2022-09-15 | Stop reason: HOSPADM

## 2022-09-08 RX ADMIN — SODIUM CHLORIDE, POTASSIUM CHLORIDE, SODIUM LACTATE AND CALCIUM CHLORIDE: 600; 310; 30; 20 INJECTION, SOLUTION INTRAVENOUS at 00:20

## 2022-09-08 RX ADMIN — MORPHINE SULFATE 4 MG: 4 INJECTION INTRAVENOUS at 17:47

## 2022-09-08 RX ADMIN — ONDANSETRON 4 MG: 2 INJECTION INTRAMUSCULAR; INTRAVENOUS at 07:16

## 2022-09-08 RX ADMIN — Medication 1 TABLET: at 05:15

## 2022-09-08 RX ADMIN — LEVETIRACETAM 500 MG: 500 TABLET, FILM COATED ORAL at 05:15

## 2022-09-08 RX ADMIN — SODIUM CHLORIDE, POTASSIUM CHLORIDE, SODIUM LACTATE AND CALCIUM CHLORIDE: 600; 310; 30; 20 INJECTION, SOLUTION INTRAVENOUS at 21:20

## 2022-09-08 RX ADMIN — MORPHINE SULFATE 4 MG: 4 INJECTION INTRAVENOUS at 04:19

## 2022-09-08 RX ADMIN — SODIUM CHLORIDE, POTASSIUM CHLORIDE, SODIUM LACTATE AND CALCIUM CHLORIDE: 600; 310; 30; 20 INJECTION, SOLUTION INTRAVENOUS at 13:15

## 2022-09-08 RX ADMIN — MORPHINE SULFATE 7.5 MG: 15 TABLET ORAL at 13:05

## 2022-09-08 RX ADMIN — MORPHINE SULFATE 4 MG: 4 INJECTION INTRAVENOUS at 07:19

## 2022-09-08 RX ADMIN — MORPHINE SULFATE 4 MG: 4 INJECTION INTRAVENOUS at 00:14

## 2022-09-08 RX ADMIN — CALCIUM 500 MG: 500 TABLET ORAL at 08:21

## 2022-09-08 RX ADMIN — ZONISAMIDE 200 MG: 50 CAPSULE ORAL at 21:12

## 2022-09-08 RX ADMIN — ONDANSETRON 4 MG: 2 INJECTION INTRAMUSCULAR; INTRAVENOUS at 13:06

## 2022-09-08 RX ADMIN — THIAMINE HCL TAB 100 MG 100 MG: 100 TAB at 05:15

## 2022-09-08 RX ADMIN — LEVETIRACETAM 500 MG: 500 TABLET, FILM COATED ORAL at 17:47

## 2022-09-08 RX ADMIN — ONDANSETRON 4 MG: 2 INJECTION INTRAMUSCULAR; INTRAVENOUS at 19:28

## 2022-09-08 RX ADMIN — POTASSIUM CHLORIDE 40 MEQ: 1500 TABLET, EXTENDED RELEASE ORAL at 05:15

## 2022-09-08 RX ADMIN — MORPHINE SULFATE 4 MG: 4 INJECTION INTRAVENOUS at 14:22

## 2022-09-08 RX ADMIN — FOLIC ACID 1 MG: 1 TABLET ORAL at 05:15

## 2022-09-08 RX ADMIN — MORPHINE SULFATE 4 MG: 4 INJECTION INTRAVENOUS at 21:14

## 2022-09-08 ASSESSMENT — LIFESTYLE VARIABLES
HEADACHE, FULLNESS IN HEAD: VERY MILD
HEADACHE, FULLNESS IN HEAD: NOT PRESENT
NAUSEA AND VOMITING: *
AUDITORY DISTURBANCES: NOT PRESENT
TREMOR: NO TREMOR
AUDITORY DISTURBANCES: NOT PRESENT
AGITATION: NORMAL ACTIVITY
AGITATION: NORMAL ACTIVITY
TOTAL SCORE: 4
NAUSEA AND VOMITING: *
ANXIETY: *
PACK_YEARS: 10
AUDITORY DISTURBANCES: NOT PRESENT
VISUAL DISTURBANCES: NOT PRESENT
NAUSEA AND VOMITING: MILD NAUSEA WITH NO VOMITING
VISUAL DISTURBANCES: NOT PRESENT
TOTAL SCORE: 3
SUBSTANCE_ABUSE: 1
NAUSEA AND VOMITING: *
TREMOR: NO TREMOR
HEADACHE, FULLNESS IN HEAD: MILD
TOTAL SCORE: 2
ANXIETY: NO ANXIETY (AT EASE)
AGITATION: NORMAL ACTIVITY
ORIENTATION AND CLOUDING OF SENSORIUM: ORIENTED AND CAN DO SERIAL ADDITIONS
NAUSEA AND VOMITING: MILD NAUSEA WITH NO VOMITING
AGITATION: NORMAL ACTIVITY
ANXIETY: MILDLY ANXIOUS
ORIENTATION AND CLOUDING OF SENSORIUM: ORIENTED AND CAN DO SERIAL ADDITIONS
PAROXYSMAL SWEATS: NO SWEAT VISIBLE
PAROXYSMAL SWEATS: NO SWEAT VISIBLE
ANXIETY: MILDLY ANXIOUS
AUDITORY DISTURBANCES: NOT PRESENT
AUDITORY DISTURBANCES: NOT PRESENT
ANXIETY: MILDLY ANXIOUS
ORIENTATION AND CLOUDING OF SENSORIUM: ORIENTED AND CAN DO SERIAL ADDITIONS
HEADACHE, FULLNESS IN HEAD: NOT PRESENT
ORIENTATION AND CLOUDING OF SENSORIUM: ORIENTED AND CAN DO SERIAL ADDITIONS
PAROXYSMAL SWEATS: NO SWEAT VISIBLE
VISUAL DISTURBANCES: NOT PRESENT
AGITATION: NORMAL ACTIVITY
HEADACHE, FULLNESS IN HEAD: NOT PRESENT
PAROXYSMAL SWEATS: NO SWEAT VISIBLE
TREMOR: NO TREMOR
TOTAL SCORE: 4
VISUAL DISTURBANCES: NOT PRESENT
VISUAL DISTURBANCES: NOT PRESENT
TOTAL SCORE: 4
PAROXYSMAL SWEATS: NO SWEAT VISIBLE
ORIENTATION AND CLOUDING OF SENSORIUM: ORIENTED AND CAN DO SERIAL ADDITIONS

## 2022-09-08 ASSESSMENT — ENCOUNTER SYMPTOMS
ABDOMINAL PAIN: 1
TREMORS: 0
NAUSEA: 1
FEVER: 0
VOMITING: 0
EYE REDNESS: 0
SHORTNESS OF BREATH: 0
CHILLS: 0

## 2022-09-08 ASSESSMENT — PAIN DESCRIPTION - PAIN TYPE
TYPE: ACUTE PAIN

## 2022-09-08 NOTE — CARE PLAN
The patient is Stable - Low risk of patient condition declining or worsening    Shift Goals  Clinical Goals: CIWA  Patient Goals: Comfort, Pain Management  Family Goals: No family present    Progress made toward(s) clinical / shift goals:    Problem: Pain - Standard  Goal: Alleviation of pain or a reduction in pain to the patient’s comfort goal  Outcome: Progressing  Flowsheets (Taken 9/8/2022 0820)  Pain Rating Scale (NPRS): 6  Note: Pt is receiving pain medications per MAR, trialing oral medications for possible PO main management after discharge     Problem: Nutrition  Goal: Enteral nutrition will be maintained or improve  Outcome: Progressing  Note: Pt is transitioning to oral medications, antiemetics are being given PRN, advancing diet as tolerated       Patient is not progressing towards the following goals:

## 2022-09-08 NOTE — CARE PLAN
The patient is Stable - Low risk of patient condition declining or worsening    Shift Goals  Clinical Goals: Maintain low CIWA score, manage nausea, fall and seizure precautions in place, advance diet as tolerated  Patient Goals: Pain management to pts comfort goal  Family Goals:    Progress made toward(s) clinical / shift goals:  Pt maintains low CIWA score throughout shift, nausea medicated with Zofran, pain managed toward pt comfort goal medicated per MAR    Problem: Optimal Care for Alcohol Withdrawal  Goal: Optimal Care for the alcohol withdrawal patient  Outcome: Progressing  Outcome: Progressing  Problem: Pain - Standard  Goal: Alleviation of pain or a reduction in pain to the patient’s comfort goal  Outcome: Progressing  Outcome: Progressing     Patient is not progressing towards the following goals: pt provided with snacks at bedside, pt continues poor appetite with inadequate intake    Problem: Nutrition  Goal: Patient's nutritional and fluid intake will be adequate or improve  Outcome: Not Progressing

## 2022-09-08 NOTE — PROGRESS NOTES
Telemetry Shift Summary    Rhythm SR  HR Range 68-88  Ectopy rPAC, rPVC  Measurements 0.16/0.08/0.28        Normal Values  Rhythm SR  HR Range    Measurements 0.12-0.20 / 0.06-0.10  / 0.30-0.52

## 2022-09-08 NOTE — DISCHARGE PLANNING
Case Management Discharge Planning    Admission Date: 9/5/2022  GMLOS: 4.9  ALOS: 3    6-Clicks ADL Score: 24  6-Clicks Mobility Score: 24      Anticipated Discharge Dispo: Discharge Disposition: Still a Patient (30)    DME Needed: No    Action(s) Taken: OTHER, NATALIO RN received notification from MD pt wanting Rehab resources. Resources from [address] and packet given to pt. Pt asking admission process. NATALIO RN referred pt to [facility] contact information to discuss. Mother at bedside during discussion.     Escalations Completed: None    Medically Clear: No    Next Steps: NATALIO RN to follow up with any other needs    Barriers to Discharge: Medical clearance    Is the patient up for discharge tomorrow: No

## 2022-09-08 NOTE — RESPIRATORY CARE
"   COPD EDUCATION by COPD CLINICAL EDUCATOR  9/8/2022 at 11:53 AM by Karla Bartlett RRT     Patient reviewed by COPD education team. Patient does not have a history or diagnosis of COPD. Patient is a smoker, smoking cessation education done. A comprehensive packet with tips to quit and information on outpatient classes given to patient.    Smoking Cessation Intervention and education completed, 10 minutes spent on smoking cessation education with patient.  Provided smoking cessation packet with \"Tips to Quit\" and brochure for \"Free Smoking Cessation Classes\".      COPD Screen  COPD Risk Screening  Do you have a history of COPD?: No    COPD Assessment  COPD Clinical Specialists ONLY  COPD Education Initiated: Yes--Short Intervention (Smoking Cessation Education Provided as well as marijuana smoking)  DME Company: None  Physician Name: LILY LEE M.D.  Pulmonologist Name: none  Referrals Initiated: Yes  Pulmonary Rehab: N/A  Smoking Cessation: Yes  $ Smoking Cessation >10 Minutes: Symptomatic  Smoking Pack Years: 10  Hospice: N/A  Home Health Care: N/A  [facility] Outreach: N/A  [facility]: N/A  [facility]: [facility] In-Home Care Agency: N/A  Is this a COPD exacerbation patient?: No    PFT Results    No results found for: PFT    Meds to Beds  Would the patient like to opt in for Bedside Medication Delivery at Discharge?: Yes, interested     MY COPD ACTION PLAN     It is recommended that patients and physicians /healthcare providers complete this action plan together. This plan should be discussed at each physician visit and updated as needed.    The green, yellow and red zones show groups of symptoms of COPD. This list of symptoms is not comprehensive, and you may experience other symptoms. In the \"Actions\" column, your healthcare provider has recommended actions for you to take based on your symptoms.    Patient Name: Rhiannon Marrero   YOB: 1985   Last Updated " "on:     Green Zone:  I am doing well today Actions     Usual activitiy and exercise level   Take daily medications     Usual amounts of cough and phlegm/mucus   Use oxygen as prescribed     Sleep well at night   Continue regular exercise/diet plan     Appetite is good   At all times avoid cigarette smoke, inhaled irritants     Daily Medications (these medications are taken every day):                Yellow Zone:  I am having a bad day or a COPD flare Actions     More breathless than usual   Continue daily medications     I have less energy for my daily activities   Use quick relief inhaler as ordered     Increased or thicker phlegm/mucus   Use oxygen as prescribed     Using quick relief inhaler/nebulizer more often   Get plenty of rest     Swelling of ankles more than usual   Use pursed lip breathing     More coughing than usual   At all times avoid cigarette smoke, inhaled irritants     I feel like I have a \"chest cold\"     Poor sleep and my symptoms woke me up     My appetite is not good     My medicine is not helping      Call provider immediately if symptoms don’t improve     Continue daily medications, add rescue medications:               Medications to be used during a flare up, (as Discussed with Provider):              Red Zone:  I need urgent medical care Actions     Severe shortness of breath even at rest   Call 911 or seek medical care immediately     Not able to do any activity because of breathing      Fever or shaking chills      Feeling confused or very drowsy       Chest pains      Coughing up blood                  "

## 2022-09-08 NOTE — PROGRESS NOTES
Hospital Medicine Daily Progress Note    Date of Service  9/8/2022    Chief Complaint  Rhiannon Marrero is a 37 y.o. female admitted 9/5/2022 with ETOH intoxication and abdominal pain.    Hospital Course  37-year-old female with a history of alcohol dependence, seizure disorder, alcohol induced pancreatitis, bipolar disorder who presented on 9/5/2022 with weakness, headache, nausea/vomiting and abdominal pain as well as tremors.  Patient reports she drinks vodka daily.  She has seizures and is followed by neurology.  Last seizure was on day of admission.  On admission she was found to have potassium 2.9, elevated LFTs, lipase 118. She was started on CIWA.  She continued to have severe abdominal pain.  She was treated with pain medication and nausea medication.    Interval Problem Update  - Still having abd pain, tolerating PO but not eating much  - wanted more info regarding rehabs  - reporting knee pain, asking for xray    I have discussed this patient's plan of care and discharge plan at IDT rounds today with Case Management, Nursing, Nursing leadership, and other members of the IDT team.    Consultants/Specialty  None    Code Status  Full Code    Disposition  Patient is not medically cleared for discharge.   Anticipate discharge to to home with close outpatient follow-up.  I have placed the appropriate orders for post-discharge needs.    Review of Systems  Review of Systems   Constitutional:  Negative for chills and fever.   HENT:  Negative for nosebleeds.    Eyes:  Negative for redness.   Respiratory:  Negative for shortness of breath.    Cardiovascular:  Negative for chest pain.   Gastrointestinal:  Positive for abdominal pain and nausea. Negative for vomiting.   Genitourinary: Negative.    Musculoskeletal:  Positive for joint pain.   Skin:  Negative for rash.   Neurological:  Negative for tremors.   Psychiatric/Behavioral:  Positive for substance abuse.       Physical Exam  Temp:  [36.3 °C (97.4 °F)-36.8 °C  (98.2 °F)] 36.8 °C (98.2 °F)  Pulse:  [69-83] 75  Resp:  [16-18] 17  BP: (110-134)/(65-89) 110/70  SpO2:  [94 %-100 %] 98 %    Physical Exam  Constitutional:       General: She is not in acute distress.     Appearance: She is not ill-appearing, toxic-appearing or diaphoretic.   HENT:      Nose: Nose normal.      Mouth/Throat:      Mouth: Mucous membranes are moist.   Eyes:      General: No scleral icterus.     Conjunctiva/sclera: Conjunctivae normal.   Cardiovascular:      Rate and Rhythm: Normal rate and regular rhythm.      Heart sounds: No murmur heard.    No friction rub. No gallop.   Pulmonary:      Effort: Pulmonary effort is normal.      Comments: Decreased breath sounds d/t effort  Abdominal:      General: Bowel sounds are normal. There is no distension.      Palpations: Abdomen is soft.      Tenderness: There is generalized abdominal tenderness. There is no guarding or rebound.   Musculoskeletal:         General: No swelling.      Cervical back: Normal range of motion.      Right knee: No swelling or effusion. Tenderness present.      Right lower leg: No edema.      Left lower leg: No edema.   Skin:     Coloration: Skin is not jaundiced.      Findings: No rash.      Comments: Old self injurious scars on R forearm   Neurological:      Mental Status: She is alert and oriented to person, place, and time. Mental status is at baseline.   Psychiatric:         Mood and Affect: Mood normal.         Behavior: Behavior normal.       Fluids    Intake/Output Summary (Last 24 hours) at 9/8/2022 0919  Last data filed at 9/8/2022 0820  Gross per 24 hour   Intake 3838.34 ml   Output 950 ml   Net 2888.34 ml         Laboratory  Recent Labs     09/06/22  0305 09/07/22  0232 09/08/22  0508   WBC 4.4* 4.5* 4.7*   RBC 3.97* 4.00* 4.65   HEMOGLOBIN 12.6 12.9 14.9   HEMATOCRIT 36.4* 37.9 43.9   MCV 91.7 94.8 94.4   MCH 31.7 32.3 32.0   MCHC 34.6 34.0 33.9   RDW 48.7 50.4* 50.6*   PLATELETCT 148* 118* 104*   MPV 9.6 10.3 10.1        Recent Labs     09/06/22  0305 09/07/22  0232 09/08/22  0508   SODIUM 138 138 136   POTASSIUM 2.9* 4.4 4.9   CHLORIDE 98 104 99   CO2 23 24 28   GLUCOSE 102* 88 96   BUN 8 3* 2*   CREATININE 0.54 0.51 0.68   CALCIUM 7.8* 9.1 10.6*           Recent Labs     09/05/22  1832 09/06/22  0305 09/07/22  0232 09/08/22  0508   ASTSGOT 169* 94* 100* 203*   ALTSGPT 105* 67* 61* 102*   TBILIRUBIN 0.7 0.6 1.2 1.1   GLOBULIN 3.3 2.3 2.5 3.0                   Imaging  DX-KNEE 3 VIEWS RIGHT    (Results Pending)          Assessment/Plan  * Alcohol dependence with withdrawal (HCC)- (present on admission)  Assessment & Plan  Resolving    CIWA protocol   Continuous cardiac monitoring  Monitor electrolytes and replace accordingly, particularly magnesium, potassium and phosphorus  Fall, seizure, aspiration precautions.  Thiamine folic acid and multivitamin.   Counseling      Abdominal pain  Assessment & Plan  Likely in setting chronic or recurrent pancreatitis vs gastritis in setting of etoh  Lipase mildly elevated  PPI  IVF  Pain mgmt  Nausea meds  If no improvement may need CT AP    Hypokalemia- (present on admission)  Assessment & Plan  Resolved  Replacing, checking magnesium    Continue to monitor replace as needed     Transaminitis- (present on admission)  Assessment & Plan  Worsening, if continues may need  RUQ US vs CT AP  CTM    Seizure disorder (HCC)- (present on admission)  Assessment & Plan  Resume home zonisamide and Keppra  As needed lorazepam for seizures       VTE prophylaxis: enoxaparin ppx    I have performed a physical exam and reviewed and updated ROS and Plan today (9/8/2022). In review of yesterday's note (9/7/2022), there are no changes except as documented above.

## 2022-09-08 NOTE — PROGRESS NOTES
Telemetry Shift Summary     Rhythm: SR  Rate: 60s-80s  Measurements: 0.16/0.08/0.34  Ectopy (reported by Monitor Tech): rPAC, rPVC     Normal Values  Rhythm: Sinus  HR:   Measurements: 0.12-0.20/0.06-0.10/0.30-0.52

## 2022-09-08 NOTE — DIETARY
"Nutrition Services: Update   Day 3 of admit.  Rhiannon Marreor is a 37 y.o. female with admitting DX of Alcohol dependence with withdrawal (HCC) [F10.239]    Current Diet: Full liquids, Boost Plus TID w/ meals    Problem: Nutritional:  Goal: Achieve adequate nutritional intake  Description: Patient will consume >50% of meals and supplements  Outcome: Not progressing.    RD followed up w/ pt at bedside for nutrition-focused physical exam (NFPE) and PO hx interview. As noted by RD 9/6/22, pt w/ severe 14% wt loss in ~3.5 months; pt recently admitted ~3 weeks ago and diagnosed by RD w/ severe chronic malnutrition. Pt reports binge-drinks ~1 week each month; also reported intake of 2/5 vodka daily per ERP note. Pt states that she does not eat during binges. Outside of binge periods, pt states very poor appetite and that the thought of food makes her nauseous. Fluids tolerated, states drinks mostly juices, water, and sodas; eats a yogurt a few times a month. RD presented practical tips for pt to include more protein/nutrient-rich foods to pt's preferred/better tolerated liquid diet at home; pt states that \"it's not a matter of not knowing.\"     Pt not eating well on full liquids diet. Dislikes Boosts. Pt agreeable to fruit-based protein smoothies and chobani yogurts w/ meals instead of Boost supplements    Malnutrition Risk: Per RD note 8/16/22: \"pt meets criteria for severe malnutrition in the context of social circumstances starvation related due to diminished PO intake due to untreated bipolar disorder AEB confirmed wt loss of 39% x 1 year, poor PO intake of <50% at times x 1 year, and moderate muscle loss (temple region) and severe fat loss (orbital region).\"     Severe malnutrition is ongoing AEB PO intake <50% of nutrition needs per RD judgement of pt report of intake hx past 3 weeks since prior admit, and ongoing wt loss, severe at 14% in 3.5 months.    Recommendations/Plan:  Provide fruit-based protein smoothies " as noted above.   Encourage intake of meals >50%.  Document intake of all meals as % taken in ADL's to provide interdisciplinary communication across all shifts.   Monitor weight.    RD following

## 2022-09-09 ENCOUNTER — APPOINTMENT (OUTPATIENT)
Dept: RADIOLOGY | Facility: MEDICAL CENTER | Age: 37
DRG: 896 | End: 2022-09-09
Attending: INTERNAL MEDICINE
Payer: COMMERCIAL

## 2022-09-09 LAB
ALBUMIN SERPL BCP-MCNC: 4.1 G/DL (ref 3.2–4.9)
ALBUMIN/GLOB SERPL: 1.5 G/DL
ALP SERPL-CCNC: 133 U/L (ref 30–99)
ALT SERPL-CCNC: 143 U/L (ref 2–50)
ANION GAP SERPL CALC-SCNC: 11 MMOL/L (ref 7–16)
AST SERPL-CCNC: 245 U/L (ref 12–45)
BASOPHILS # BLD AUTO: 1 % (ref 0–1.8)
BASOPHILS # BLD: 0.04 K/UL (ref 0–0.12)
BILIRUB SERPL-MCNC: 1 MG/DL (ref 0.1–1.5)
BUN SERPL-MCNC: 2 MG/DL (ref 8–22)
CALCIUM SERPL-MCNC: 9.9 MG/DL (ref 8.4–10.2)
CHLORIDE SERPL-SCNC: 98 MMOL/L (ref 96–112)
CO2 SERPL-SCNC: 27 MMOL/L (ref 20–33)
CREAT SERPL-MCNC: 0.76 MG/DL (ref 0.5–1.4)
EOSINOPHIL # BLD AUTO: 0.18 K/UL (ref 0–0.51)
EOSINOPHIL NFR BLD: 4.6 % (ref 0–6.9)
ERYTHROCYTE [DISTWIDTH] IN BLOOD BY AUTOMATED COUNT: 51 FL (ref 35.9–50)
GFR SERPLBLD CREATININE-BSD FMLA CKD-EPI: 104 ML/MIN/1.73 M 2
GLOBULIN SER CALC-MCNC: 2.8 G/DL (ref 1.9–3.5)
GLUCOSE SERPL-MCNC: 85 MG/DL (ref 65–99)
HCT VFR BLD AUTO: 42.7 % (ref 37–47)
HGB BLD-MCNC: 14.4 G/DL (ref 12–16)
IMM GRANULOCYTES # BLD AUTO: 0.01 K/UL (ref 0–0.11)
IMM GRANULOCYTES NFR BLD AUTO: 0.3 % (ref 0–0.9)
LIPASE SERPL-CCNC: 10 U/L (ref 7–58)
LYMPHOCYTES # BLD AUTO: 1.86 K/UL (ref 1–4.8)
LYMPHOCYTES NFR BLD: 47.2 % (ref 22–41)
MCH RBC QN AUTO: 32.1 PG (ref 27–33)
MCHC RBC AUTO-ENTMCNC: 33.7 G/DL (ref 33.6–35)
MCV RBC AUTO: 95.1 FL (ref 81.4–97.8)
MONOCYTES # BLD AUTO: 0.24 K/UL (ref 0–0.85)
MONOCYTES NFR BLD AUTO: 6.1 % (ref 0–13.4)
NEUTROPHILS # BLD AUTO: 1.61 K/UL (ref 2–7.15)
NEUTROPHILS NFR BLD: 40.8 % (ref 44–72)
NRBC # BLD AUTO: 0 K/UL
NRBC BLD-RTO: 0 /100 WBC
PLATELET # BLD AUTO: 94 K/UL (ref 164–446)
PMV BLD AUTO: 11.1 FL (ref 9–12.9)
POTASSIUM SERPL-SCNC: 4.1 MMOL/L (ref 3.6–5.5)
PROT SERPL-MCNC: 6.9 G/DL (ref 6–8.2)
RBC # BLD AUTO: 4.49 M/UL (ref 4.2–5.4)
SODIUM SERPL-SCNC: 136 MMOL/L (ref 135–145)
WBC # BLD AUTO: 3.9 K/UL (ref 4.8–10.8)

## 2022-09-09 PROCEDURE — 700111 HCHG RX REV CODE 636 W/ 250 OVERRIDE (IP): Performed by: HOSPITALIST

## 2022-09-09 PROCEDURE — 85025 COMPLETE CBC W/AUTO DIFF WBC: CPT

## 2022-09-09 PROCEDURE — 700102 HCHG RX REV CODE 250 W/ 637 OVERRIDE(OP): Performed by: HOSPITALIST

## 2022-09-09 PROCEDURE — 99233 SBSQ HOSP IP/OBS HIGH 50: CPT | Performed by: INTERNAL MEDICINE

## 2022-09-09 PROCEDURE — 700105 HCHG RX REV CODE 258: Performed by: INTERNAL MEDICINE

## 2022-09-09 PROCEDURE — 76705 ECHO EXAM OF ABDOMEN: CPT

## 2022-09-09 PROCEDURE — 700102 HCHG RX REV CODE 250 W/ 637 OVERRIDE(OP): Performed by: INTERNAL MEDICINE

## 2022-09-09 PROCEDURE — 83690 ASSAY OF LIPASE: CPT

## 2022-09-09 PROCEDURE — 80053 COMPREHEN METABOLIC PANEL: CPT

## 2022-09-09 PROCEDURE — A9270 NON-COVERED ITEM OR SERVICE: HCPCS | Performed by: INTERNAL MEDICINE

## 2022-09-09 PROCEDURE — A9270 NON-COVERED ITEM OR SERVICE: HCPCS | Performed by: HOSPITALIST

## 2022-09-09 PROCEDURE — 36415 COLL VENOUS BLD VENIPUNCTURE: CPT

## 2022-09-09 PROCEDURE — 94760 N-INVAS EAR/PLS OXIMETRY 1: CPT

## 2022-09-09 PROCEDURE — 770020 HCHG ROOM/CARE - TELE (206)

## 2022-09-09 RX ORDER — MORPHINE SULFATE 15 MG/1
15 TABLET ORAL EVERY 6 HOURS PRN
Status: DISCONTINUED | OUTPATIENT
Start: 2022-09-09 | End: 2022-09-15 | Stop reason: HOSPADM

## 2022-09-09 RX ADMIN — POTASSIUM CHLORIDE 40 MEQ: 1500 TABLET, EXTENDED RELEASE ORAL at 05:41

## 2022-09-09 RX ADMIN — ONDANSETRON 4 MG: 2 INJECTION INTRAMUSCULAR; INTRAVENOUS at 16:24

## 2022-09-09 RX ADMIN — LEVETIRACETAM 500 MG: 500 TABLET, FILM COATED ORAL at 16:24

## 2022-09-09 RX ADMIN — MORPHINE SULFATE 4 MG: 4 INJECTION INTRAVENOUS at 11:59

## 2022-09-09 RX ADMIN — ONDANSETRON 4 MG: 2 INJECTION INTRAMUSCULAR; INTRAVENOUS at 07:47

## 2022-09-09 RX ADMIN — MORPHINE SULFATE 4 MG: 4 INJECTION INTRAVENOUS at 15:33

## 2022-09-09 RX ADMIN — MORPHINE SULFATE 4 MG: 4 INJECTION INTRAVENOUS at 07:47

## 2022-09-09 RX ADMIN — ONDANSETRON 4 MG: 2 INJECTION INTRAMUSCULAR; INTRAVENOUS at 01:00

## 2022-09-09 RX ADMIN — Medication 1 TABLET: at 05:41

## 2022-09-09 RX ADMIN — MORPHINE SULFATE 4 MG: 4 INJECTION INTRAVENOUS at 18:27

## 2022-09-09 RX ADMIN — ONDANSETRON 4 MG: 2 INJECTION INTRAMUSCULAR; INTRAVENOUS at 21:03

## 2022-09-09 RX ADMIN — LEVETIRACETAM 500 MG: 500 TABLET, FILM COATED ORAL at 05:42

## 2022-09-09 RX ADMIN — SODIUM CHLORIDE, POTASSIUM CHLORIDE, SODIUM LACTATE AND CALCIUM CHLORIDE: 600; 310; 30; 20 INJECTION, SOLUTION INTRAVENOUS at 05:44

## 2022-09-09 RX ADMIN — MORPHINE SULFATE 4 MG: 4 INJECTION INTRAVENOUS at 03:31

## 2022-09-09 RX ADMIN — CALCIUM 500 MG: 500 TABLET ORAL at 07:47

## 2022-09-09 RX ADMIN — MORPHINE SULFATE 4 MG: 4 INJECTION INTRAVENOUS at 22:25

## 2022-09-09 RX ADMIN — FOLIC ACID 1 MG: 1 TABLET ORAL at 05:42

## 2022-09-09 RX ADMIN — MORPHINE SULFATE 4 MG: 4 INJECTION INTRAVENOUS at 00:17

## 2022-09-09 RX ADMIN — MORPHINE SULFATE 15 MG: 15 TABLET ORAL at 21:00

## 2022-09-09 RX ADMIN — ONDANSETRON 4 MG: 2 INJECTION INTRAMUSCULAR; INTRAVENOUS at 12:00

## 2022-09-09 RX ADMIN — ZONISAMIDE 200 MG: 50 CAPSULE ORAL at 20:56

## 2022-09-09 RX ADMIN — SODIUM CHLORIDE, POTASSIUM CHLORIDE, SODIUM LACTATE AND CALCIUM CHLORIDE: 600; 310; 30; 20 INJECTION, SOLUTION INTRAVENOUS at 17:05

## 2022-09-09 RX ADMIN — THIAMINE HCL TAB 100 MG 100 MG: 100 TAB at 05:41

## 2022-09-09 ASSESSMENT — LIFESTYLE VARIABLES
PAROXYSMAL SWEATS: NO SWEAT VISIBLE
HEADACHE, FULLNESS IN HEAD: VERY MILD
NAUSEA AND VOMITING: *
VISUAL DISTURBANCES: NOT PRESENT
NAUSEA AND VOMITING: *
TREMOR: TREMOR NOT VISIBLE BUT CAN BE FELT, FINGERTIP TO FINGERTIP
TREMOR: NO TREMOR
TOTAL SCORE: 4
ORIENTATION AND CLOUDING OF SENSORIUM: ORIENTED AND CAN DO SERIAL ADDITIONS
PAROXYSMAL SWEATS: NO SWEAT VISIBLE
AGITATION: NORMAL ACTIVITY
TREMOR: TREMOR NOT VISIBLE BUT CAN BE FELT, FINGERTIP TO FINGERTIP
HEADACHE, FULLNESS IN HEAD: VERY MILD
HEADACHE, FULLNESS IN HEAD: NOT PRESENT
SUBSTANCE_ABUSE: 1
PAROXYSMAL SWEATS: NO SWEAT VISIBLE
TOTAL SCORE: 4
ORIENTATION AND CLOUDING OF SENSORIUM: ORIENTED AND CAN DO SERIAL ADDITIONS
NAUSEA AND VOMITING: *
AGITATION: NORMAL ACTIVITY
PAROXYSMAL SWEATS: NO SWEAT VISIBLE
AGITATION: NORMAL ACTIVITY
ORIENTATION AND CLOUDING OF SENSORIUM: ORIENTED AND CAN DO SERIAL ADDITIONS
AUDITORY DISTURBANCES: NOT PRESENT
AUDITORY DISTURBANCES: NOT PRESENT
VISUAL DISTURBANCES: NOT PRESENT
ANXIETY: NO ANXIETY (AT EASE)
AGITATION: NORMAL ACTIVITY
AGITATION: NORMAL ACTIVITY
TOTAL SCORE: 4
ANXIETY: MILDLY ANXIOUS
ORIENTATION AND CLOUDING OF SENSORIUM: ORIENTED AND CAN DO SERIAL ADDITIONS
ORIENTATION AND CLOUDING OF SENSORIUM: ORIENTED AND CAN DO SERIAL ADDITIONS
HEADACHE, FULLNESS IN HEAD: NOT PRESENT
ANXIETY: MILDLY ANXIOUS
VISUAL DISTURBANCES: NOT PRESENT
TREMOR: TREMOR NOT VISIBLE BUT CAN BE FELT, FINGERTIP TO FINGERTIP
AUDITORY DISTURBANCES: NOT PRESENT
HEADACHE, FULLNESS IN HEAD: NOT PRESENT
TOTAL SCORE: 3
VISUAL DISTURBANCES: NOT PRESENT
NAUSEA AND VOMITING: *
AUDITORY DISTURBANCES: NOT PRESENT
ANXIETY: NO ANXIETY (AT EASE)
TREMOR: NO TREMOR
NAUSEA AND VOMITING: *
AUDITORY DISTURBANCES: NOT PRESENT
ANXIETY: MILDLY ANXIOUS
PAROXYSMAL SWEATS: NO SWEAT VISIBLE
VISUAL DISTURBANCES: NOT PRESENT
TOTAL SCORE: 3

## 2022-09-09 ASSESSMENT — PAIN DESCRIPTION - PAIN TYPE
TYPE: ACUTE PAIN

## 2022-09-09 ASSESSMENT — ENCOUNTER SYMPTOMS
CHILLS: 0
FEVER: 0
SHORTNESS OF BREATH: 0
ABDOMINAL PAIN: 1
EYE REDNESS: 0
TREMORS: 0
VOMITING: 0
NAUSEA: 1

## 2022-09-09 NOTE — CARE PLAN
The patient is Watcher - Medium risk of patient condition declining or worsening    Shift Goals  Clinical Goals: Maintain low CIWA score, pain management  Patient Goals: Comfort  Family Goals: No family present    Progress made toward(s) clinical / shift goals: Low CIWA scores. Pain management continued, pt medicated per MAR. Seizure precautions in place, railings padded, bed at lowest position.     Problem: Optimal Care for Alcohol Withdrawal  Goal: Optimal Care for the alcohol withdrawal patient  Outcome: Progressing     Problem: Seizure Precautions  Goal: Implementation of seizure precautions  Outcome: Progressing     Problem: Pain - Standard  Goal: Alleviation of pain or a reduction in pain to the patient’s comfort goal  Outcome: Progressing       Patient is not progressing towards the following goals: Pt c/o nausea, medicated per MAR. Pt encouraged oral intake of jello and snacks. Oral intake poor. Pt continuously encouraged to advance oral intake as tolerated.     Problem: Nutrition  Goal: Patient's nutritional and fluid intake will be adequate or improve  Outcome: Not Progressing     Problem: Gastrointestinal Irritability  Goal: Nausea and vomiting will be absent or improve  Outcome: Not Progressing

## 2022-09-09 NOTE — CARE PLAN
The patient is Stable - Low risk of patient condition declining or worsening    Shift Goals  Clinical Goals: Maintain low CIWA, pain management.  Patient Goals: Pain control  Family Goals: No family present    Progress made toward(s) clinical / shift goals:    Problem: Knowledge Deficit - Standard  Goal: Patient and family/care givers will demonstrate understanding of plan of care, disease process/condition, diagnostic tests and medications  Outcome: Progressing  Note: Discussed alcohol cessation.      Problem: Optimal Care for Alcohol Withdrawal  Goal: Optimal Care for the alcohol withdrawal patient  Outcome: Progressing     Problem: Seizure Precautions  Goal: Implementation of seizure precautions  Outcome: Progressing  Note: Precautions in place.      Problem: Pain - Standard  Goal: Alleviation of pain or a reduction in pain to the patient’s comfort goal  Outcome: Progressing  Note: Pain managed with medication.        Patient is not progressing towards the following goals:

## 2022-09-09 NOTE — PROGRESS NOTES
Hospital Medicine Daily Progress Note    Date of Service  9/9/2022    Chief Complaint  Rhiannon Marrero is a 37 y.o. female admitted 9/5/2022 with ETOH intoxication and abdominal pain.    Hospital Course  37-year-old female with a history of alcohol dependence, seizure disorder, alcohol induced pancreatitis, bipolar disorder who presented on 9/5/2022 with weakness, headache, nausea/vomiting and abdominal pain as well as tremors.  Patient reports she drinks vodka daily.  She has seizures and is followed by neurology.  Last seizure was on day of admission.  On admission she was found to have potassium 2.9, elevated LFTs, lipase 118. She was started on CIWA.  She continued to have severe abdominal pain.  She was treated with pain medication and nausea medication.    Interval Problem Update  -Continues to have abdominal pain  -AST/ALT increasing, getting renal ultrasound today  -Tolerating p.o.    I have discussed this patient's plan of care and discharge plan at IDT rounds today with Case Management, Nursing, Nursing leadership, and other members of the IDT team.    Consultants/Specialty  None    Code Status  Full Code    Disposition  Patient is not medically cleared for discharge.   Anticipate discharge to to home with close outpatient follow-up.  I have placed the appropriate orders for post-discharge needs.    Review of Systems  Review of Systems   Constitutional:  Negative for chills and fever.   HENT:  Negative for nosebleeds.    Eyes:  Negative for redness.   Respiratory:  Negative for shortness of breath.    Cardiovascular:  Negative for chest pain.   Gastrointestinal:  Positive for abdominal pain and nausea. Negative for vomiting.   Genitourinary: Negative.    Musculoskeletal:  Positive for joint pain.   Skin:  Negative for rash.   Neurological:  Negative for tremors.   Psychiatric/Behavioral:  Positive for substance abuse.       Physical Exam  Temp:  [36.4 °C (97.6 °F)-37.1 °C (98.7 °F)] 36.7 °C (98  °F)  Pulse:  [64-93] 68  Resp:  [16-18] 16  BP: (104-116)/(65-85) 115/68  SpO2:  [94 %-99 %] 99 %    Physical Exam  Constitutional:       General: She is not in acute distress.     Appearance: She is not ill-appearing, toxic-appearing or diaphoretic.   HENT:      Nose: Nose normal.      Mouth/Throat:      Mouth: Mucous membranes are moist.   Eyes:      General: No scleral icterus.     Conjunctiva/sclera: Conjunctivae normal.   Cardiovascular:      Rate and Rhythm: Normal rate and regular rhythm.      Heart sounds: No murmur heard.    No friction rub. No gallop.   Pulmonary:      Effort: Pulmonary effort is normal.      Comments: Decreased breath sounds d/t effort  Abdominal:      General: Bowel sounds are normal. There is no distension.      Palpations: Abdomen is soft.      Tenderness: There is generalized abdominal tenderness. There is no guarding or rebound.   Musculoskeletal:         General: No swelling.      Cervical back: Normal range of motion.      Right lower leg: No edema.      Left lower leg: No edema.   Skin:     Coloration: Skin is not jaundiced.      Findings: No rash.      Comments: Old self injurious scars on R forearm   Neurological:      Mental Status: She is alert and oriented to person, place, and time. Mental status is at baseline.   Psychiatric:         Mood and Affect: Mood normal.         Behavior: Behavior normal.       Fluids    Intake/Output Summary (Last 24 hours) at 9/9/2022 1509  Last data filed at 9/8/2022 1830  Gross per 24 hour   Intake 2450 ml   Output --   Net 2450 ml         Laboratory  Recent Labs     09/07/22  0232 09/08/22  0508 09/09/22  0147   WBC 4.5* 4.7* 3.9*   RBC 4.00* 4.65 4.49   HEMOGLOBIN 12.9 14.9 14.4   HEMATOCRIT 37.9 43.9 42.7   MCV 94.8 94.4 95.1   MCH 32.3 32.0 32.1   MCHC 34.0 33.9 33.7   RDW 50.4* 50.6* 51.0*   PLATELETCT 118* 104* 94*   MPV 10.3 10.1 11.1       Recent Labs     09/07/22  0232 09/08/22  0508 09/09/22  0147   SODIUM 138 136 136   POTASSIUM 4.4  4.9 4.1   CHLORIDE 104 99 98   CO2 24 28 27   GLUCOSE 88 96 85   BUN 3* 2* 2*   CREATININE 0.51 0.68 0.76   CALCIUM 9.1 10.6* 9.9           Recent Labs     09/05/22  1832 09/06/22  0305 09/07/22  0232 09/08/22  0508 09/09/22  0147   ASTSGOT 169* 94* 100* 203* 245*   ALTSGPT 105* 67* 61* 102* 143*   TBILIRUBIN 0.7 0.6 1.2 1.1 1.0   GLOBULIN 3.3 2.3 2.5 3.0 2.8                   Imaging  DX-KNEE 3 VIEWS RIGHT   Final Result      Minimal degenerative change of the medial femorotibial articulation.      US-RUQ    (Results Pending)          Assessment/Plan  * Alcohol dependence with withdrawal (HCC)- (present on admission)  Assessment & Plan  Resolved    CIWA protocol   Continuous cardiac monitoring  Monitor electrolytes and replace accordingly, particularly magnesium, potassium and phosphorus  Fall, seizure, aspiration precautions.  Thiamine folic acid and multivitamin.   Counseling      Abdominal pain  Assessment & Plan  Persistent, severe  Likely in setting chronic or recurrent pancreatitis vs gastritis in setting of etoh  Lipase mildly elevated, recheck today  PPI  IVF  Pain mgmt  Nausea meds  RUQ US today    Hypokalemia- (present on admission)  Assessment & Plan  Resolved  Replacing, checking magnesium    Continue to monitor replace as needed     Transaminitis- (present on admission)  Assessment & Plan  Worsening, getting RUQ US today  CTM    Seizure disorder (HCC)- (present on admission)  Assessment & Plan  Resume home zonisamide and Keppra  As needed lorazepam for seizures       VTE prophylaxis: enoxaparin ppx    I have performed a physical exam and reviewed and updated ROS and Plan today (9/9/2022). In review of yesterday's note (9/8/2022), there are no changes except as documented above.

## 2022-09-09 NOTE — CARE PLAN
The patient is {Patient Stability:[id number]}    Shift Goals  Clinical Goals: Maintain low CIWA score, pain management  Patient Goals: Comfort  Family Goals: No family present    Progress made toward(s) clinical / shift goals:  ***    Patient is not progressing towards the following goals:      Problem: Nutrition  Goal: Patient's nutritional and fluid intake will be adequate or improve  Outcome: Not Progressing

## 2022-09-10 LAB
ALBUMIN SERPL BCP-MCNC: 4.3 G/DL (ref 3.2–4.9)
ALBUMIN/GLOB SERPL: 1.4 G/DL
ALP SERPL-CCNC: 145 U/L (ref 30–99)
ALT SERPL-CCNC: 190 U/L (ref 2–50)
ANION GAP SERPL CALC-SCNC: 12 MMOL/L (ref 7–16)
AST SERPL-CCNC: 274 U/L (ref 12–45)
BASOPHILS # BLD AUTO: 0.5 % (ref 0–1.8)
BASOPHILS # BLD: 0.02 K/UL (ref 0–0.12)
BILIRUB SERPL-MCNC: 0.9 MG/DL (ref 0.1–1.5)
BUN SERPL-MCNC: 3 MG/DL (ref 8–22)
CALCIUM SERPL-MCNC: 9.6 MG/DL (ref 8.4–10.2)
CHLORIDE SERPL-SCNC: 102 MMOL/L (ref 96–112)
CO2 SERPL-SCNC: 23 MMOL/L (ref 20–33)
CREAT SERPL-MCNC: 0.71 MG/DL (ref 0.5–1.4)
EOSINOPHIL # BLD AUTO: 0.15 K/UL (ref 0–0.51)
EOSINOPHIL NFR BLD: 3.9 % (ref 0–6.9)
ERYTHROCYTE [DISTWIDTH] IN BLOOD BY AUTOMATED COUNT: 51.1 FL (ref 35.9–50)
GFR SERPLBLD CREATININE-BSD FMLA CKD-EPI: 113 ML/MIN/1.73 M 2
GLOBULIN SER CALC-MCNC: 3 G/DL (ref 1.9–3.5)
GLUCOSE SERPL-MCNC: 101 MG/DL (ref 65–99)
HCT VFR BLD AUTO: 43.2 % (ref 37–47)
HGB BLD-MCNC: 14.7 G/DL (ref 12–16)
IMM GRANULOCYTES # BLD AUTO: 0 K/UL (ref 0–0.11)
IMM GRANULOCYTES NFR BLD AUTO: 0 % (ref 0–0.9)
LYMPHOCYTES # BLD AUTO: 2.16 K/UL (ref 1–4.8)
LYMPHOCYTES NFR BLD: 56.4 % (ref 22–41)
MCH RBC QN AUTO: 32.5 PG (ref 27–33)
MCHC RBC AUTO-ENTMCNC: 34 G/DL (ref 33.6–35)
MCV RBC AUTO: 95.4 FL (ref 81.4–97.8)
MONOCYTES # BLD AUTO: 0.38 K/UL (ref 0–0.85)
MONOCYTES NFR BLD AUTO: 9.9 % (ref 0–13.4)
NEUTROPHILS # BLD AUTO: 1.12 K/UL (ref 2–7.15)
NEUTROPHILS NFR BLD: 29.3 % (ref 44–72)
NRBC # BLD AUTO: 0 K/UL
NRBC BLD-RTO: 0 /100 WBC
PLATELET # BLD AUTO: 91 K/UL (ref 164–446)
PMV BLD AUTO: 11.2 FL (ref 9–12.9)
POTASSIUM SERPL-SCNC: 4.2 MMOL/L (ref 3.6–5.5)
PROT SERPL-MCNC: 7.3 G/DL (ref 6–8.2)
RBC # BLD AUTO: 4.53 M/UL (ref 4.2–5.4)
SODIUM SERPL-SCNC: 137 MMOL/L (ref 135–145)
WBC # BLD AUTO: 3.8 K/UL (ref 4.8–10.8)

## 2022-09-10 PROCEDURE — 85025 COMPLETE CBC W/AUTO DIFF WBC: CPT

## 2022-09-10 PROCEDURE — 700111 HCHG RX REV CODE 636 W/ 250 OVERRIDE (IP): Performed by: HOSPITALIST

## 2022-09-10 PROCEDURE — 94760 N-INVAS EAR/PLS OXIMETRY 1: CPT

## 2022-09-10 PROCEDURE — 36415 COLL VENOUS BLD VENIPUNCTURE: CPT

## 2022-09-10 PROCEDURE — 700105 HCHG RX REV CODE 258: Performed by: INTERNAL MEDICINE

## 2022-09-10 PROCEDURE — A9270 NON-COVERED ITEM OR SERVICE: HCPCS | Performed by: HOSPITALIST

## 2022-09-10 PROCEDURE — A9270 NON-COVERED ITEM OR SERVICE: HCPCS | Performed by: INTERNAL MEDICINE

## 2022-09-10 PROCEDURE — 770020 HCHG ROOM/CARE - TELE (206)

## 2022-09-10 PROCEDURE — 80053 COMPREHEN METABOLIC PANEL: CPT

## 2022-09-10 PROCEDURE — 700102 HCHG RX REV CODE 250 W/ 637 OVERRIDE(OP): Performed by: HOSPITALIST

## 2022-09-10 PROCEDURE — 700102 HCHG RX REV CODE 250 W/ 637 OVERRIDE(OP): Performed by: INTERNAL MEDICINE

## 2022-09-10 PROCEDURE — 99233 SBSQ HOSP IP/OBS HIGH 50: CPT | Performed by: INTERNAL MEDICINE

## 2022-09-10 PROCEDURE — 700111 HCHG RX REV CODE 636 W/ 250 OVERRIDE (IP): Performed by: INTERNAL MEDICINE

## 2022-09-10 RX ORDER — HYDROMORPHONE HYDROCHLORIDE 1 MG/ML
1 INJECTION, SOLUTION INTRAMUSCULAR; INTRAVENOUS; SUBCUTANEOUS
Status: DISCONTINUED | OUTPATIENT
Start: 2022-09-10 | End: 2022-09-15 | Stop reason: HOSPADM

## 2022-09-10 RX ORDER — DIPHENHYDRAMINE HYDROCHLORIDE 50 MG/ML
25 INJECTION INTRAMUSCULAR; INTRAVENOUS ONCE
Status: COMPLETED | OUTPATIENT
Start: 2022-09-10 | End: 2022-09-10

## 2022-09-10 RX ADMIN — DIPHENHYDRAMINE HYDROCHLORIDE 25 MG: 50 INJECTION INTRAMUSCULAR; INTRAVENOUS at 07:29

## 2022-09-10 RX ADMIN — LEVETIRACETAM 500 MG: 500 TABLET, FILM COATED ORAL at 05:32

## 2022-09-10 RX ADMIN — CALCIUM 500 MG: 500 TABLET ORAL at 07:29

## 2022-09-10 RX ADMIN — SODIUM CHLORIDE, POTASSIUM CHLORIDE, SODIUM LACTATE AND CALCIUM CHLORIDE: 600; 310; 30; 20 INJECTION, SOLUTION INTRAVENOUS at 01:27

## 2022-09-10 RX ADMIN — MORPHINE SULFATE 15 MG: 15 TABLET ORAL at 03:00

## 2022-09-10 RX ADMIN — HYDROMORPHONE HYDROCHLORIDE 1 MG: 1 INJECTION, SOLUTION INTRAMUSCULAR; INTRAVENOUS; SUBCUTANEOUS at 18:04

## 2022-09-10 RX ADMIN — ONDANSETRON 4 MG: 2 INJECTION INTRAMUSCULAR; INTRAVENOUS at 12:01

## 2022-09-10 RX ADMIN — ZONISAMIDE 200 MG: 50 CAPSULE ORAL at 21:02

## 2022-09-10 RX ADMIN — ONDANSETRON 4 MG: 2 INJECTION INTRAMUSCULAR; INTRAVENOUS at 16:28

## 2022-09-10 RX ADMIN — MORPHINE SULFATE 15 MG: 15 TABLET ORAL at 16:28

## 2022-09-10 RX ADMIN — SODIUM CHLORIDE, POTASSIUM CHLORIDE, SODIUM LACTATE AND CALCIUM CHLORIDE: 600; 310; 30; 20 INJECTION, SOLUTION INTRAVENOUS at 22:44

## 2022-09-10 RX ADMIN — POTASSIUM CHLORIDE 40 MEQ: 1500 TABLET, EXTENDED RELEASE ORAL at 05:32

## 2022-09-10 RX ADMIN — ONDANSETRON 4 MG: 2 INJECTION INTRAMUSCULAR; INTRAVENOUS at 21:13

## 2022-09-10 RX ADMIN — THIAMINE HCL TAB 100 MG 100 MG: 100 TAB at 05:31

## 2022-09-10 RX ADMIN — HYDROMORPHONE HYDROCHLORIDE 1 MG: 1 INJECTION, SOLUTION INTRAMUSCULAR; INTRAVENOUS; SUBCUTANEOUS at 12:00

## 2022-09-10 RX ADMIN — LEVETIRACETAM 500 MG: 500 TABLET, FILM COATED ORAL at 16:28

## 2022-09-10 RX ADMIN — MORPHINE SULFATE 15 MG: 15 TABLET ORAL at 09:47

## 2022-09-10 RX ADMIN — MORPHINE SULFATE 4 MG: 4 INJECTION INTRAVENOUS at 04:19

## 2022-09-10 RX ADMIN — Medication 1 TABLET: at 05:32

## 2022-09-10 RX ADMIN — HYDROMORPHONE HYDROCHLORIDE 1 MG: 1 INJECTION, SOLUTION INTRAMUSCULAR; INTRAVENOUS; SUBCUTANEOUS at 21:04

## 2022-09-10 RX ADMIN — MORPHINE SULFATE 15 MG: 15 TABLET ORAL at 22:44

## 2022-09-10 RX ADMIN — FOLIC ACID 1 MG: 1 TABLET ORAL at 05:32

## 2022-09-10 RX ADMIN — MORPHINE SULFATE 4 MG: 4 INJECTION INTRAVENOUS at 07:29

## 2022-09-10 RX ADMIN — ONDANSETRON 4 MG: 2 INJECTION INTRAMUSCULAR; INTRAVENOUS at 04:23

## 2022-09-10 RX ADMIN — HYDROMORPHONE HYDROCHLORIDE 1 MG: 1 INJECTION, SOLUTION INTRAMUSCULAR; INTRAVENOUS; SUBCUTANEOUS at 15:03

## 2022-09-10 ASSESSMENT — PAIN DESCRIPTION - PAIN TYPE
TYPE: ACUTE PAIN

## 2022-09-10 ASSESSMENT — LIFESTYLE VARIABLES
NAUSEA AND VOMITING: MILD NAUSEA WITH NO VOMITING
AGITATION: NORMAL ACTIVITY
VISUAL DISTURBANCES: NOT PRESENT
ORIENTATION AND CLOUDING OF SENSORIUM: ORIENTED AND CAN DO SERIAL ADDITIONS
AGITATION: NORMAL ACTIVITY
TOTAL SCORE: 3
NAUSEA AND VOMITING: *
TOTAL SCORE: 3
AGITATION: NORMAL ACTIVITY
HEADACHE, FULLNESS IN HEAD: NOT PRESENT
SUBSTANCE_ABUSE: 1
ANXIETY: MILDLY ANXIOUS
HEADACHE, FULLNESS IN HEAD: NOT PRESENT
TREMOR: TREMOR NOT VISIBLE BUT CAN BE FELT, FINGERTIP TO FINGERTIP
ORIENTATION AND CLOUDING OF SENSORIUM: ORIENTED AND CAN DO SERIAL ADDITIONS
ORIENTATION AND CLOUDING OF SENSORIUM: ORIENTED AND CAN DO SERIAL ADDITIONS
TREMOR: TREMOR NOT VISIBLE BUT CAN BE FELT, FINGERTIP TO FINGERTIP
ORIENTATION AND CLOUDING OF SENSORIUM: ORIENTED AND CAN DO SERIAL ADDITIONS
VISUAL DISTURBANCES: NOT PRESENT
TOTAL SCORE: 2
PAROXYSMAL SWEATS: NO SWEAT VISIBLE
AUDITORY DISTURBANCES: NOT PRESENT
PAROXYSMAL SWEATS: NO SWEAT VISIBLE
TREMOR: NO TREMOR
VISUAL DISTURBANCES: NOT PRESENT
PAROXYSMAL SWEATS: NO SWEAT VISIBLE
NAUSEA AND VOMITING: MILD NAUSEA WITH NO VOMITING
ANXIETY: MILDLY ANXIOUS
AUDITORY DISTURBANCES: NOT PRESENT
TOTAL SCORE: 3
AUDITORY DISTURBANCES: NOT PRESENT
AGITATION: NORMAL ACTIVITY
VISUAL DISTURBANCES: NOT PRESENT
HEADACHE, FULLNESS IN HEAD: NOT PRESENT
HEADACHE, FULLNESS IN HEAD: NOT PRESENT
ANXIETY: MILDLY ANXIOUS
ANXIETY: NO ANXIETY (AT EASE)
AUDITORY DISTURBANCES: NOT PRESENT
NAUSEA AND VOMITING: MILD NAUSEA WITH NO VOMITING
PAROXYSMAL SWEATS: NO SWEAT VISIBLE
TREMOR: TREMOR NOT VISIBLE BUT CAN BE FELT, FINGERTIP TO FINGERTIP

## 2022-09-10 ASSESSMENT — ENCOUNTER SYMPTOMS
TREMORS: 0
ABDOMINAL PAIN: 1
CHILLS: 0
VOMITING: 0
EYE REDNESS: 0
FEVER: 0
NAUSEA: 1
SHORTNESS OF BREATH: 0

## 2022-09-10 ASSESSMENT — FIBROSIS 4 INDEX: FIB4 SCORE: 7.63

## 2022-09-10 NOTE — DIETARY
Nutrition Services Brief Update:    Day 5 of admit.  Rhiannon Marrero is a 37 y.o. female with admitting DX of Alcohol dependence with withdrawal (HCC) [F10.239]    Current Diet: Full liquid, BID protein smoothies    Problem: Nutritional:  Goal: Achieve adequate nutritional intake  Description: Patient will consume >50% of meals/smoothies  Outcome: Progressing    PO intake trending up per ADLs: 0 - <25% 9/7-9/8. Recent intake per ADLs: 25-50% x 2 meals 9/9, 50-75% x1 meal today.    RD continues to follow.

## 2022-09-10 NOTE — CARE PLAN
The patient is Stable - Low risk of patient condition declining or worsening    Shift Goals  Clinical Goals: Pain management, MRI, CIWA scoring ending this afternoon if still under 5.  Patient Goals: Pain control  Family Goals: No family present    Progress made toward(s) clinical / shift goals:    Problem: Optimal Care for Alcohol Withdrawal  Goal: Optimal Care for the alcohol withdrawal patient  Outcome: Progressing     Problem: Seizure Precautions  Goal: Implementation of seizure precautions  Outcome: Progressing     Problem: Lifestyle Changes  Goal: Patient's ability to identify lifestyle changes and available resources to help reduce recurrence of condition will improve  Outcome: Progressing  Note: Pt reading AA book and reports need for alcohol rehab.      Problem: Psychosocial  Goal: Patient's level of anxiety will decrease  Outcome: Progressing  Note: Pt communicates anxiety.          Patient is not progressing towards the following goals:

## 2022-09-10 NOTE — CARE PLAN
The patient is Stable - Low risk of patient condition declining or worsening    Shift Goals  Clinical Goals: Maintain low CIWA scores, pain managment, trail PO pain medication  Patient Goals: Control pain  Family Goals: No family present    Progress made toward(s) clinical / shift goals:  Pt scoring low CIWA throughout shift. Pt educated on transitioning to oral pain medications, pt acknowledges and agrees. Pt able to tolerate oral morphine with no events of emesis through the night. Pt improved intake of ice chips, water, and soda.    Problem: Optimal Care for Alcohol Withdrawal  Goal: Optimal Care for the alcohol withdrawal patient  Outcome: Progressing     Problem: Seizure Precautions  Goal: Implementation of seizure precautions  Outcome: Progressing     Problem: Pain - Standard  Goal: Alleviation of pain or a reduction in pain to the patient’s comfort goal  Outcome: Progressing     Problem: Nutrition  Goal: Patient's nutritional and fluid intake will be adequate or improve  Outcome: Progressing     Patient is not progressing towards the following goals:

## 2022-09-10 NOTE — PROGRESS NOTES
Hospital Medicine Daily Progress Note    Date of Service  9/10/2022    Chief Complaint  Rhiannon Marrero is a 37 y.o. female admitted 9/5/2022 with ETOH intoxication and abdominal pain.    Hospital Course  37-year-old female with a history of alcohol dependence, seizure disorder, alcohol induced pancreatitis, bipolar disorder who presented on 9/5/2022 with weakness, headache, nausea/vomiting and abdominal pain as well as tremors.  Patient reports she drinks vodka daily.  She has seizures and is followed by neurology.  Last seizure was on day of admission.  On admission she was found to have potassium 2.9, elevated LFTs, lipase 118. She was started on CIWA.  She continued to have severe abdominal pain.  She was treated with pain medication and nausea medication.    Interval Problem Update  - RUQ US showed sludge in gallbladder and mild extrahepatic biliary dilation which is new from prior, getting MRCP  -Continues to have severe abdominal pain  -AST/ALT/alk phos increasing, bilirubin normal    I have discussed this patient's plan of care and discharge plan at IDT rounds today with Case Management, Nursing, Nursing leadership, and other members of the IDT team.    Consultants/Specialty  None    Code Status  Full Code    Disposition  Patient is not medically cleared for discharge.   Anticipate discharge to to home with close outpatient follow-up.  I have placed the appropriate orders for post-discharge needs.    Review of Systems  Review of Systems   Constitutional:  Negative for chills and fever.   HENT:  Negative for nosebleeds.    Eyes:  Negative for redness.   Respiratory:  Negative for shortness of breath.    Cardiovascular:  Negative for chest pain.   Gastrointestinal:  Positive for abdominal pain and nausea. Negative for vomiting.   Genitourinary: Negative.    Musculoskeletal:  Positive for joint pain.   Skin:  Negative for rash.   Neurological:  Negative for tremors.   Psychiatric/Behavioral:  Positive for  substance abuse.       Physical Exam  Temp:  [36.6 °C (97.8 °F)-36.8 °C (98.3 °F)] 36.7 °C (98 °F)  Pulse:  [64-79] 68  Resp:  [16-18] 18  BP: (101-126)/(64-71) 101/64  SpO2:  [95 %-100 %] 98 %    Physical Exam  Constitutional:       General: She is not in acute distress.     Appearance: She is not ill-appearing, toxic-appearing or diaphoretic.   HENT:      Nose: Nose normal.      Mouth/Throat:      Mouth: Mucous membranes are moist.   Eyes:      General: No scleral icterus.     Conjunctiva/sclera: Conjunctivae normal.   Cardiovascular:      Rate and Rhythm: Normal rate and regular rhythm.      Heart sounds: No murmur heard.    No friction rub. No gallop.   Pulmonary:      Effort: Pulmonary effort is normal.      Comments: Decreased breath sounds d/t effort  Abdominal:      General: Bowel sounds are normal. There is no distension.      Palpations: Abdomen is soft.      Tenderness: There is generalized abdominal tenderness. There is no guarding or rebound.   Musculoskeletal:         General: No swelling.      Cervical back: Normal range of motion.      Right lower leg: No edema.      Left lower leg: No edema.   Skin:     Coloration: Skin is not jaundiced.      Findings: No rash.      Comments: Old self injurious scars on R forearm   Neurological:      Mental Status: She is alert and oriented to person, place, and time. Mental status is at baseline.   Psychiatric:         Mood and Affect: Mood normal.         Behavior: Behavior normal.       Fluids    Intake/Output Summary (Last 24 hours) at 9/10/2022 1225  Last data filed at 9/10/2022 0900  Gross per 24 hour   Intake 600 ml   Output --   Net 600 ml         Laboratory  Recent Labs     09/08/22  0508 09/09/22  0147 09/10/22  0222   WBC 4.7* 3.9* 3.8*   RBC 4.65 4.49 4.53   HEMOGLOBIN 14.9 14.4 14.7   HEMATOCRIT 43.9 42.7 43.2   MCV 94.4 95.1 95.4   MCH 32.0 32.1 32.5   MCHC 33.9 33.7 34.0   RDW 50.6* 51.0* 51.1*   PLATELETCT 104* 94* 91*   MPV 10.1 11.1 11.2        Recent Labs     09/08/22  0508 09/09/22  0147 09/10/22  0222   SODIUM 136 136 137   POTASSIUM 4.9 4.1 4.2   CHLORIDE 99 98 102   CO2 28 27 23   GLUCOSE 96 85 101*   BUN 2* 2* 3*   CREATININE 0.68 0.76 0.71   CALCIUM 10.6* 9.9 9.6           Recent Labs     09/05/22  1832 09/06/22  0305 09/07/22  0232 09/08/22  0508 09/09/22  0147 09/10/22  0222   ASTSGOT 169* 94* 100* 203* 245* 274*   ALTSGPT 105* 67* 61* 102* 143* 190*   TBILIRUBIN 0.7 0.6 1.2 1.1 1.0 0.9   GLOBULIN 3.3 2.3 2.5 3.0 2.8 3.0                   Imaging  US-RUQ   Final Result      1.  Enlarged echogenic liver suggesting fatty infiltration.   2.  Sludge present in the gallbladder.   3.  Mild extrahepatic biliary dilation, new from prior.            DX-KNEE 3 VIEWS RIGHT   Final Result      Minimal degenerative change of the medial femorotibial articulation.      [id number]-E/O    (Results Pending)          Assessment/Plan  * Alcohol dependence with withdrawal (HCC)- (present on admission)  Assessment & Plan  Resolved    CIWA protocol   Continuous cardiac monitoring  Monitor electrolytes and replace accordingly, particularly magnesium, potassium and phosphorus  Fall, seizure, aspiration precautions.  Thiamine folic acid and multivitamin.   Counseling      Abdominal pain  Assessment & Plan  Persistent, severe  Likely in setting chronic or recurrent pancreatitis vs gastritis in setting of etoh  Lipase mildly elevated, recheck normal  PPI  IVF  Pain mgmt  Nausea meds  RUQ US showed gallbladder sludge and biliary dilation, MRCP today    Transaminitis- (present on admission)  Assessment & Plan  Worsening,  US showed gallbladder sludge and biliary dilation which is new  MRCP    Hypokalemia- (present on admission)  Assessment & Plan  Resolved  Replacing, checking magnesium    Continue to monitor replace as needed     Seizure disorder (HCC)- (present on admission)  Assessment & Plan  Resume home zonisamide and Keppra  As needed lorazepam for seizures        VTE prophylaxis: enoxaparin ppx    I have performed a physical exam and reviewed and updated ROS and Plan today (9/10/2022). In review of yesterday's note (9/9/2022), there are no changes except as documented above.

## 2022-09-11 ENCOUNTER — APPOINTMENT (OUTPATIENT)
Dept: RADIOLOGY | Facility: MEDICAL CENTER | Age: 37
DRG: 896 | End: 2022-09-11
Attending: INTERNAL MEDICINE
Payer: COMMERCIAL

## 2022-09-11 LAB
ALBUMIN SERPL BCP-MCNC: 4.4 G/DL (ref 3.2–4.9)
ALBUMIN/GLOB SERPL: 1.6 G/DL
ALP SERPL-CCNC: 139 U/L (ref 30–99)
ALT SERPL-CCNC: 188 U/L (ref 2–50)
ANION GAP SERPL CALC-SCNC: 11 MMOL/L (ref 7–16)
AST SERPL-CCNC: 224 U/L (ref 12–45)
BASOPHILS # BLD AUTO: 0.6 % (ref 0–1.8)
BASOPHILS # BLD: 0.03 K/UL (ref 0–0.12)
BILIRUB SERPL-MCNC: 0.7 MG/DL (ref 0.1–1.5)
BUN SERPL-MCNC: 2 MG/DL (ref 8–22)
CALCIUM SERPL-MCNC: 9.6 MG/DL (ref 8.4–10.2)
CHLORIDE SERPL-SCNC: 100 MMOL/L (ref 96–112)
CO2 SERPL-SCNC: 27 MMOL/L (ref 20–33)
CREAT SERPL-MCNC: 0.77 MG/DL (ref 0.5–1.4)
EOSINOPHIL # BLD AUTO: 0.12 K/UL (ref 0–0.51)
EOSINOPHIL NFR BLD: 2.5 % (ref 0–6.9)
ERYTHROCYTE [DISTWIDTH] IN BLOOD BY AUTOMATED COUNT: 53.1 FL (ref 35.9–50)
GFR SERPLBLD CREATININE-BSD FMLA CKD-EPI: 103 ML/MIN/1.73 M 2
GLOBULIN SER CALC-MCNC: 2.8 G/DL (ref 1.9–3.5)
GLUCOSE SERPL-MCNC: 82 MG/DL (ref 65–99)
HCT VFR BLD AUTO: 42.3 % (ref 37–47)
HGB BLD-MCNC: 14.2 G/DL (ref 12–16)
IMM GRANULOCYTES # BLD AUTO: 0 K/UL (ref 0–0.11)
IMM GRANULOCYTES NFR BLD AUTO: 0 % (ref 0–0.9)
LYMPHOCYTES # BLD AUTO: 2.9 K/UL (ref 1–4.8)
LYMPHOCYTES NFR BLD: 61.1 % (ref 22–41)
MAGNESIUM SERPL-MCNC: 1.9 MG/DL (ref 1.5–2.5)
MCH RBC QN AUTO: 32.6 PG (ref 27–33)
MCHC RBC AUTO-ENTMCNC: 33.6 G/DL (ref 33.6–35)
MCV RBC AUTO: 97 FL (ref 81.4–97.8)
MONOCYTES # BLD AUTO: 0.54 K/UL (ref 0–0.85)
MONOCYTES NFR BLD AUTO: 11.4 % (ref 0–13.4)
NEUTROPHILS # BLD AUTO: 1.16 K/UL (ref 2–7.15)
NEUTROPHILS NFR BLD: 24.4 % (ref 44–72)
NRBC # BLD AUTO: 0 K/UL
NRBC BLD-RTO: 0 /100 WBC
PHOSPHATE SERPL-MCNC: 3.3 MG/DL (ref 2.5–4.5)
PLATELET # BLD AUTO: 94 K/UL (ref 164–446)
PMV BLD AUTO: 10.7 FL (ref 9–12.9)
POTASSIUM SERPL-SCNC: 3.6 MMOL/L (ref 3.6–5.5)
PROT SERPL-MCNC: 7.2 G/DL (ref 6–8.2)
RBC # BLD AUTO: 4.36 M/UL (ref 4.2–5.4)
SODIUM SERPL-SCNC: 138 MMOL/L (ref 135–145)
WBC # BLD AUTO: 4.8 K/UL (ref 4.8–10.8)

## 2022-09-11 PROCEDURE — A9270 NON-COVERED ITEM OR SERVICE: HCPCS | Performed by: INTERNAL MEDICINE

## 2022-09-11 PROCEDURE — 700102 HCHG RX REV CODE 250 W/ 637 OVERRIDE(OP): Performed by: INTERNAL MEDICINE

## 2022-09-11 PROCEDURE — 700102 HCHG RX REV CODE 250 W/ 637 OVERRIDE(OP): Performed by: HOSPITALIST

## 2022-09-11 PROCEDURE — 83735 ASSAY OF MAGNESIUM: CPT

## 2022-09-11 PROCEDURE — 700105 HCHG RX REV CODE 258: Performed by: INTERNAL MEDICINE

## 2022-09-11 PROCEDURE — 700111 HCHG RX REV CODE 636 W/ 250 OVERRIDE (IP): Performed by: HOSPITALIST

## 2022-09-11 PROCEDURE — 94760 N-INVAS EAR/PLS OXIMETRY 1: CPT

## 2022-09-11 PROCEDURE — 74181 MRI ABDOMEN W/O CONTRAST: CPT

## 2022-09-11 PROCEDURE — 84100 ASSAY OF PHOSPHORUS: CPT

## 2022-09-11 PROCEDURE — A9270 NON-COVERED ITEM OR SERVICE: HCPCS | Performed by: HOSPITALIST

## 2022-09-11 PROCEDURE — 99233 SBSQ HOSP IP/OBS HIGH 50: CPT | Performed by: INTERNAL MEDICINE

## 2022-09-11 PROCEDURE — 700111 HCHG RX REV CODE 636 W/ 250 OVERRIDE (IP): Performed by: INTERNAL MEDICINE

## 2022-09-11 PROCEDURE — 80053 COMPREHEN METABOLIC PANEL: CPT

## 2022-09-11 PROCEDURE — 85025 COMPLETE CBC W/AUTO DIFF WBC: CPT

## 2022-09-11 PROCEDURE — 36415 COLL VENOUS BLD VENIPUNCTURE: CPT

## 2022-09-11 PROCEDURE — 770006 HCHG ROOM/CARE - MED/SURG/GYN SEMI*

## 2022-09-11 RX ADMIN — ONDANSETRON 4 MG: 2 INJECTION INTRAMUSCULAR; INTRAVENOUS at 12:25

## 2022-09-11 RX ADMIN — THIAMINE HCL TAB 100 MG 100 MG: 100 TAB at 05:05

## 2022-09-11 RX ADMIN — HYDROMORPHONE HYDROCHLORIDE 1 MG: 1 INJECTION, SOLUTION INTRAMUSCULAR; INTRAVENOUS; SUBCUTANEOUS at 03:36

## 2022-09-11 RX ADMIN — HYDROMORPHONE HYDROCHLORIDE 1 MG: 1 INJECTION, SOLUTION INTRAMUSCULAR; INTRAVENOUS; SUBCUTANEOUS at 15:45

## 2022-09-11 RX ADMIN — SODIUM CHLORIDE, POTASSIUM CHLORIDE, SODIUM LACTATE AND CALCIUM CHLORIDE: 600; 310; 30; 20 INJECTION, SOLUTION INTRAVENOUS at 20:21

## 2022-09-11 RX ADMIN — FOLIC ACID 1 MG: 1 TABLET ORAL at 05:05

## 2022-09-11 RX ADMIN — HYDROMORPHONE HYDROCHLORIDE 1 MG: 1 INJECTION, SOLUTION INTRAMUSCULAR; INTRAVENOUS; SUBCUTANEOUS at 18:47

## 2022-09-11 RX ADMIN — MORPHINE SULFATE 15 MG: 15 TABLET ORAL at 11:33

## 2022-09-11 RX ADMIN — ZONISAMIDE 200 MG: 50 CAPSULE ORAL at 20:34

## 2022-09-11 RX ADMIN — DOCUSATE SODIUM 50 MG AND SENNOSIDES 8.6 MG 2 TABLET: 8.6; 5 TABLET, FILM COATED ORAL at 05:07

## 2022-09-11 RX ADMIN — ONDANSETRON 4 MG: 2 INJECTION INTRAMUSCULAR; INTRAVENOUS at 22:47

## 2022-09-11 RX ADMIN — LEVETIRACETAM 500 MG: 500 TABLET, FILM COATED ORAL at 17:43

## 2022-09-11 RX ADMIN — HYDROMORPHONE HYDROCHLORIDE 1 MG: 1 INJECTION, SOLUTION INTRAMUSCULAR; INTRAVENOUS; SUBCUTANEOUS at 00:25

## 2022-09-11 RX ADMIN — ONDANSETRON 4 MG: 2 INJECTION INTRAMUSCULAR; INTRAVENOUS at 04:19

## 2022-09-11 RX ADMIN — MORPHINE SULFATE 15 MG: 15 TABLET ORAL at 23:47

## 2022-09-11 RX ADMIN — POTASSIUM CHLORIDE 40 MEQ: 1500 TABLET, EXTENDED RELEASE ORAL at 05:05

## 2022-09-11 RX ADMIN — HYDROMORPHONE HYDROCHLORIDE 1 MG: 1 INJECTION, SOLUTION INTRAMUSCULAR; INTRAVENOUS; SUBCUTANEOUS at 12:25

## 2022-09-11 RX ADMIN — LEVETIRACETAM 500 MG: 500 TABLET, FILM COATED ORAL at 05:05

## 2022-09-11 RX ADMIN — HYDROMORPHONE HYDROCHLORIDE 1 MG: 1 INJECTION, SOLUTION INTRAMUSCULAR; INTRAVENOUS; SUBCUTANEOUS at 08:28

## 2022-09-11 RX ADMIN — HYDROMORPHONE HYDROCHLORIDE 1 MG: 1 INJECTION, SOLUTION INTRAMUSCULAR; INTRAVENOUS; SUBCUTANEOUS at 22:40

## 2022-09-11 RX ADMIN — Medication 1 TABLET: at 05:05

## 2022-09-11 RX ADMIN — CALCIUM 500 MG: 500 TABLET ORAL at 08:11

## 2022-09-11 RX ADMIN — SODIUM CHLORIDE, POTASSIUM CHLORIDE, SODIUM LACTATE AND CALCIUM CHLORIDE: 600; 310; 30; 20 INJECTION, SOLUTION INTRAVENOUS at 08:34

## 2022-09-11 RX ADMIN — MORPHINE SULFATE 15 MG: 15 TABLET ORAL at 17:43

## 2022-09-11 RX ADMIN — MORPHINE SULFATE 15 MG: 15 TABLET ORAL at 05:05

## 2022-09-11 ASSESSMENT — PAIN DESCRIPTION - PAIN TYPE
TYPE: ACUTE PAIN

## 2022-09-11 ASSESSMENT — FIBROSIS 4 INDEX: FIB4 SCORE: 6.08

## 2022-09-11 ASSESSMENT — ENCOUNTER SYMPTOMS
FALLS: 0
CHILLS: 0
FEVER: 0
TREMORS: 0
SHORTNESS OF BREATH: 0
SEIZURES: 0
VOMITING: 0
ABDOMINAL PAIN: 1
EYE REDNESS: 0
NAUSEA: 1

## 2022-09-11 ASSESSMENT — LIFESTYLE VARIABLES: SUBSTANCE_ABUSE: 1

## 2022-09-11 NOTE — PROGRESS NOTES
Pt made NPO per hospitalist/surgery orders, pt still complaining of nausea and consistent breakthrough abdominal pain, provided pain medications per MAR, will continue to monitor pain.

## 2022-09-11 NOTE — PROGRESS NOTES
Telemetry Shift Summary     Rhythm: SR/ST  Rate: 80s-120s  Measurements: 0.16/0.08/0.32  Ectopy (reported by Monitor Tech): rPVC, rPAC     Normal Values  Rhythm: Sinus  HR:   Measurements: 0.12-0.20/0.06-0.10/0.30-0.52

## 2022-09-11 NOTE — CARE PLAN
The patient is Stable - Low risk of patient condition declining or worsening    Shift Goals  Clinical Goals: Manage pain  Patient Goals: Control pain  Family Goals: No fmaily present    Progress made toward(s) clinical / shift goals:    Problem: Knowledge Deficit - Standard  Goal: Patient and family/care givers will demonstrate understanding of plan of care, disease process/condition, diagnostic tests and medications  Outcome: Progressing     Problem: Pain - Standard  Goal: Alleviation of pain or a reduction in pain to the patient’s comfort goal  Outcome: Progressing  Note: Patient complains of pain 7-8/10 in abdomen area. Pain difficult to manage per MAR, additional support includes ice packs, and iced ginger ale       Patient is not progressing towards the following goals:

## 2022-09-11 NOTE — PROGRESS NOTES
Hospital Medicine Daily Progress Note    Date of Service  9/11/2022    Chief Complaint  Rhiannon Marrero is a 37 y.o. female admitted 9/5/2022 with ETOH intoxication and abdominal pain.    Hospital Course  37-year-old female with a history of alcohol dependence, seizure disorder, alcohol induced pancreatitis, bipolar disorder who presented on 9/5/2022 with weakness, headache, nausea/vomiting and abdominal pain as well as tremors.  Patient reports she drinks vodka daily.  She has seizures and is followed by neurology.  Last seizure was on day of admission.  On admission she was found to have potassium 2.9, elevated LFTs, lipase 118. She was started on CIWA.  She continued to have severe abdominal pain.  She was treated with pain medication and nausea medication.  Patient continued to have severe abdominal pain and then proceeded to have worsening transaminitis.  Renal ultrasound showed gallbladder sludge and dilated common bile duct.  MRCP showed mildly dilated common bile duct and distended gallbladder.  General surgery was consulted.    Interval Problem Update  -Patient continues to have severe abdominal pain  -MRCP showed mildly dilated common bile duct and distended gallbladder with sludge present, consulted surgery, n.p.o.    I have discussed this patient's plan of care and discharge plan at IDT rounds today with Case Management, Nursing, Nursing leadership, and other members of the IDT team.    Consultants/Specialty  general surgery    Code Status  Full Code    Disposition  Patient is not medically cleared for discharge.   Anticipate discharge to to home with close outpatient follow-up.  I have placed the appropriate orders for post-discharge needs.    Review of Systems  Review of Systems   Constitutional:  Negative for chills and fever.   HENT:  Negative for nosebleeds.    Eyes:  Negative for redness.   Respiratory:  Negative for shortness of breath.    Cardiovascular:  Negative for chest pain.    Gastrointestinal:  Positive for abdominal pain and nausea. Negative for vomiting.   Genitourinary: Negative.    Musculoskeletal:  Negative for falls.   Skin:  Negative for rash.   Neurological:  Negative for tremors and seizures.   Psychiatric/Behavioral:  Positive for substance abuse.       Physical Exam  Temp:  [36.4 °C (97.5 °F)-36.9 °C (98.5 °F)] 36.4 °C (97.5 °F)  Pulse:  [] 80  Resp:  [17-18] 18  BP: (102-148)/(64-91) 102/64  SpO2:  [92 %-97 %] 94 %    Physical Exam  Constitutional:       General: She is not in acute distress.     Appearance: She is not ill-appearing, toxic-appearing or diaphoretic.   HENT:      Nose: Nose normal.      Mouth/Throat:      Mouth: Mucous membranes are moist.   Eyes:      General: No scleral icterus.     Conjunctiva/sclera: Conjunctivae normal.   Cardiovascular:      Rate and Rhythm: Normal rate and regular rhythm.      Heart sounds: No murmur heard.    No friction rub. No gallop.   Pulmonary:      Effort: Pulmonary effort is normal.      Comments: Decreased breath sounds d/t effort  Abdominal:      General: Bowel sounds are normal. There is no distension.      Palpations: Abdomen is soft.      Tenderness: There is generalized abdominal tenderness. There is no guarding or rebound.   Musculoskeletal:         General: No swelling.      Cervical back: Normal range of motion.      Right lower leg: No edema.      Left lower leg: No edema.   Skin:     Coloration: Skin is not jaundiced.      Findings: No rash.      Comments: Old self injurious scars on R forearm   Neurological:      Mental Status: She is alert and oriented to person, place, and time. Mental status is at baseline.   Psychiatric:         Mood and Affect: Mood normal.         Behavior: Behavior normal.       Fluids    Intake/Output Summary (Last 24 hours) at 9/11/2022 1137  Last data filed at 9/11/2022 0900  Gross per 24 hour   Intake 600 ml   Output --   Net 600 ml         Laboratory  Recent Labs     09/09/22  0149  09/10/22  0222 09/11/22  0227   WBC 3.9* 3.8* 4.8   RBC 4.49 4.53 4.36   HEMOGLOBIN 14.4 14.7 14.2   HEMATOCRIT 42.7 43.2 42.3   MCV 95.1 95.4 97.0   MCH 32.1 32.5 32.6   MCHC 33.7 34.0 33.6   RDW 51.0* 51.1* 53.1*   PLATELETCT 94* 91* 94*   MPV 11.1 11.2 10.7       Recent Labs     09/09/22  0147 09/10/22  0222 09/11/22  0227   SODIUM 136 137 138   POTASSIUM 4.1 4.2 3.6   CHLORIDE 98 102 100   CO2 27 23 27   GLUCOSE 85 101* 82   BUN 2* 3* 2*   CREATININE 0.76 0.71 0.77   CALCIUM 9.9 9.6 9.6           Recent Labs     09/05/22  1832 09/06/22  0305 09/07/22  0232 09/08/22  0508 09/09/22  0147 09/10/22  0222 09/11/22  0227   ASTSGOT 169* 94* 100* 203* 245* 274* 224*   ALTSGPT 105* 67* 61* 102* 143* 190* 188*   TBILIRUBIN 0.7 0.6 1.2 1.1 1.0 0.9 0.7   GLOBULIN 3.3 2.3 2.5 3.0 2.8 3.0 2.8                   Imaging  [id number]-F/O   Final Result      1.  Mildly dilated common bile duct at 7 mm. No common duct stones seen.      2.  Distended gallbladder with small amount of sludge present. No gallstones seen.      3.  Hepatic enlargement.      US-RUQ   Final Result      1.  Enlarged echogenic liver suggesting fatty infiltration.   2.  Sludge present in the gallbladder.   3.  Mild extrahepatic biliary dilation, new from prior.            DX-KNEE 3 VIEWS RIGHT   Final Result      Minimal degenerative change of the medial femorotibial articulation.             Assessment/Plan  * Alcohol dependence with withdrawal (HCC)- (present on admission)  Assessment & Plan  Resolved    CIWA protocol   Continuous cardiac monitoring  Monitor electrolytes and replace accordingly, particularly magnesium, potassium and phosphorus  Fall, seizure, aspiration precautions.  Thiamine folic acid and multivitamin.   Counseling      Abdominal pain  Assessment & Plan  Persistent, severe  Likely in setting chronic or recurrent pancreatitis vs gastritis in setting of etoh  Lipase mildly elevated, recheck normal  PPI  IVF  Pain mgmt  Nausea meds  RUQ US  showed gallbladder sludge and biliary dilation  MRCP reviewed: mildly distended CBD, no stone, distended gallbladder with sludge  Consult surgery  NPO for now    Transaminitis- (present on admission)  Assessment & Plan  Stable  US showed gallbladder sludge and biliary dilation which is new  MRCP, dilated CBD but no stone, distended gallbladder with sludge    Hypokalemia- (present on admission)  Assessment & Plan  Resolved  Replacing, checking magnesium    Continue to monitor replace as needed     Seizure disorder (HCC)- (present on admission)  Assessment & Plan  Resume home zonisamide and Keppra  As needed lorazepam for seizures       VTE prophylaxis: pharmacologic prophylaxis contraindicated due to possible procedure, she's ambulatory    I have performed a physical exam and reviewed and updated ROS and Plan today (9/11/2022). In review of yesterday's note (9/10/2022), there are no changes except as documented above.

## 2022-09-11 NOTE — PROGRESS NOTES
"1920 RN to bedside. Patient tearfully complaining of 7-8/10 upper quadrant pain. Requested to RN for another dose of dilaudid stating that, \"Dr. Escoto said I could get another hit of dilaudid one hour after my initial dose if I'm still having pain. Can you message her asking for it?'    This RN informed patient that she received a dose of Dilaudid at 1804 and the next dose she could receive wouldn't be until 2104. Patient again tearful, requesting to this RN to still ask MD about receiving a one time dose of dilaudid for pain.     1956 Dr. Lugo notified about patient request. Continuing with POC.      2000 Patient given ice pack and ginger ale for nonpharmacologic pain management. Agreeable to wait till 2104 dose for medication.    "

## 2022-09-11 NOTE — CARE PLAN
The patient is Stable - Low risk of patient condition declining or worsening    Shift Goals  Clinical Goals: Control Pain  Patient Goals: Comfort  Family Goals: No fmaily present    Progress made toward(s) clinical / shift goals:    Problem: Psychosocial  Goal: Patient's level of anxiety will decrease  Outcome: Progressing  Flowsheets (Taken 9/11/2022 0815)  Patient Behaviors: Crying  Note: Building report with patient, answering any questions/concerns that arise from pt and mother     Problem: Pain - Standard  Goal: Alleviation of pain or a reduction in pain to the patient’s comfort goal  Outcome: Progressing  Flowsheets  Taken 9/11/2022 0810 by Willem Cartagena RALLYSSA.  Pain Rating Scale (NPRS): 8  Taken 9/7/2022 0151 by Evelina Kim RYOHANA  Non Verbal Scale:   Unlabored Breathing   Sleeping  Note: Pt currently on pain medications as needed, abdominal MRI ordered to assess progression of pain/pancreatitis      Problem: Psychosocial  Goal: Patient's level of anxiety will decrease  Outcome: Progressing  Flowsheets (Taken 9/11/2022 0815)  Patient Behaviors: Crying  Note: Building report with patient, answering any questions/concerns that arise from pt and mother       Patient is not progressing towards the following goals:

## 2022-09-11 NOTE — PROGRESS NOTES
Telemetry Shift Summary     Rhythm: SR-ST  HR:   Ectopy: N/A    Measurements: 0.16/0.08/0.36    Normal Values  Rhythm: SR  HR:   Measurements: 0.12-0.20/0.08-0.10/0.30-0.52

## 2022-09-12 LAB
ALBUMIN SERPL BCP-MCNC: 4.2 G/DL (ref 3.2–4.9)
ALBUMIN/GLOB SERPL: 1.4 G/DL
ALP SERPL-CCNC: 125 U/L (ref 30–99)
ALT SERPL-CCNC: 153 U/L (ref 2–50)
ANION GAP SERPL CALC-SCNC: 13 MMOL/L (ref 7–16)
AST SERPL-CCNC: 131 U/L (ref 12–45)
BILIRUB SERPL-MCNC: 0.7 MG/DL (ref 0.1–1.5)
BUN SERPL-MCNC: 2 MG/DL (ref 8–22)
CALCIUM SERPL-MCNC: 9.4 MG/DL (ref 8.4–10.2)
CHLORIDE SERPL-SCNC: 104 MMOL/L (ref 96–112)
CO2 SERPL-SCNC: 23 MMOL/L (ref 20–33)
CREAT SERPL-MCNC: 0.81 MG/DL (ref 0.5–1.4)
ERYTHROCYTE [DISTWIDTH] IN BLOOD BY AUTOMATED COUNT: 55.8 FL (ref 35.9–50)
GFR SERPLBLD CREATININE-BSD FMLA CKD-EPI: 97 ML/MIN/1.73 M 2
GLOBULIN SER CALC-MCNC: 2.9 G/DL (ref 1.9–3.5)
GLUCOSE SERPL-MCNC: 85 MG/DL (ref 65–99)
HCT VFR BLD AUTO: 40.7 % (ref 37–47)
HGB BLD-MCNC: 13.2 G/DL (ref 12–16)
MCH RBC QN AUTO: 32 PG (ref 27–33)
MCHC RBC AUTO-ENTMCNC: 32.4 G/DL (ref 33.6–35)
MCV RBC AUTO: 98.8 FL (ref 81.4–97.8)
PLATELET # BLD AUTO: 114 K/UL (ref 164–446)
PMV BLD AUTO: 10.7 FL (ref 9–12.9)
POTASSIUM SERPL-SCNC: 3.9 MMOL/L (ref 3.6–5.5)
PROT SERPL-MCNC: 7.1 G/DL (ref 6–8.2)
RBC # BLD AUTO: 4.12 M/UL (ref 4.2–5.4)
SODIUM SERPL-SCNC: 140 MMOL/L (ref 135–145)
WBC # BLD AUTO: 5.2 K/UL (ref 4.8–10.8)

## 2022-09-12 PROCEDURE — 700102 HCHG RX REV CODE 250 W/ 637 OVERRIDE(OP): Performed by: INTERNAL MEDICINE

## 2022-09-12 PROCEDURE — A9270 NON-COVERED ITEM OR SERVICE: HCPCS | Performed by: HOSPITALIST

## 2022-09-12 PROCEDURE — 99232 SBSQ HOSP IP/OBS MODERATE 35: CPT | Performed by: INTERNAL MEDICINE

## 2022-09-12 PROCEDURE — 700111 HCHG RX REV CODE 636 W/ 250 OVERRIDE (IP): Performed by: HOSPITALIST

## 2022-09-12 PROCEDURE — 700105 HCHG RX REV CODE 258: Performed by: INTERNAL MEDICINE

## 2022-09-12 PROCEDURE — 700111 HCHG RX REV CODE 636 W/ 250 OVERRIDE (IP): Performed by: INTERNAL MEDICINE

## 2022-09-12 PROCEDURE — 770006 HCHG ROOM/CARE - MED/SURG/GYN SEMI*

## 2022-09-12 PROCEDURE — 94760 N-INVAS EAR/PLS OXIMETRY 1: CPT

## 2022-09-12 PROCEDURE — 700102 HCHG RX REV CODE 250 W/ 637 OVERRIDE(OP): Performed by: HOSPITALIST

## 2022-09-12 PROCEDURE — 36415 COLL VENOUS BLD VENIPUNCTURE: CPT

## 2022-09-12 PROCEDURE — 80053 COMPREHEN METABOLIC PANEL: CPT

## 2022-09-12 PROCEDURE — A9270 NON-COVERED ITEM OR SERVICE: HCPCS | Performed by: INTERNAL MEDICINE

## 2022-09-12 PROCEDURE — 85027 COMPLETE CBC AUTOMATED: CPT

## 2022-09-12 RX ADMIN — SODIUM CHLORIDE, POTASSIUM CHLORIDE, SODIUM LACTATE AND CALCIUM CHLORIDE: 600; 310; 30; 20 INJECTION, SOLUTION INTRAVENOUS at 16:01

## 2022-09-12 RX ADMIN — FOLIC ACID 1 MG: 1 TABLET ORAL at 05:07

## 2022-09-12 RX ADMIN — HYDROMORPHONE HYDROCHLORIDE 1 MG: 1 INJECTION, SOLUTION INTRAMUSCULAR; INTRAVENOUS; SUBCUTANEOUS at 21:07

## 2022-09-12 RX ADMIN — LEVETIRACETAM 500 MG: 500 TABLET, FILM COATED ORAL at 17:29

## 2022-09-12 RX ADMIN — CALCIUM 500 MG: 500 TABLET ORAL at 07:53

## 2022-09-12 RX ADMIN — MORPHINE SULFATE 15 MG: 15 TABLET ORAL at 18:35

## 2022-09-12 RX ADMIN — ZONISAMIDE 200 MG: 50 CAPSULE ORAL at 21:07

## 2022-09-12 RX ADMIN — HYDROMORPHONE HYDROCHLORIDE 1 MG: 1 INJECTION, SOLUTION INTRAMUSCULAR; INTRAVENOUS; SUBCUTANEOUS at 16:40

## 2022-09-12 RX ADMIN — THIAMINE HCL TAB 100 MG 100 MG: 100 TAB at 05:07

## 2022-09-12 RX ADMIN — HYDROMORPHONE HYDROCHLORIDE 1 MG: 1 INJECTION, SOLUTION INTRAMUSCULAR; INTRAVENOUS; SUBCUTANEOUS at 09:40

## 2022-09-12 RX ADMIN — HYDROMORPHONE HYDROCHLORIDE 1 MG: 1 INJECTION, SOLUTION INTRAMUSCULAR; INTRAVENOUS; SUBCUTANEOUS at 13:27

## 2022-09-12 RX ADMIN — POTASSIUM CHLORIDE 40 MEQ: 1500 TABLET, EXTENDED RELEASE ORAL at 05:07

## 2022-09-12 RX ADMIN — HYDROMORPHONE HYDROCHLORIDE 1 MG: 1 INJECTION, SOLUTION INTRAMUSCULAR; INTRAVENOUS; SUBCUTANEOUS at 02:20

## 2022-09-12 RX ADMIN — HYDROMORPHONE HYDROCHLORIDE 1 MG: 1 INJECTION, SOLUTION INTRAMUSCULAR; INTRAVENOUS; SUBCUTANEOUS at 05:28

## 2022-09-12 RX ADMIN — SODIUM CHLORIDE, POTASSIUM CHLORIDE, SODIUM LACTATE AND CALCIUM CHLORIDE: 600; 310; 30; 20 INJECTION, SOLUTION INTRAVENOUS at 05:28

## 2022-09-12 RX ADMIN — LEVETIRACETAM 500 MG: 500 TABLET, FILM COATED ORAL at 05:07

## 2022-09-12 RX ADMIN — MORPHINE SULFATE 15 MG: 15 TABLET ORAL at 10:40

## 2022-09-12 RX ADMIN — DOCUSATE SODIUM 50 MG AND SENNOSIDES 8.6 MG 2 TABLET: 8.6; 5 TABLET, FILM COATED ORAL at 05:07

## 2022-09-12 RX ADMIN — Medication 1 TABLET: at 05:07

## 2022-09-12 RX ADMIN — ONDANSETRON 4 MG: 2 INJECTION INTRAMUSCULAR; INTRAVENOUS at 18:36

## 2022-09-12 ASSESSMENT — PAIN DESCRIPTION - PAIN TYPE
TYPE: ACUTE PAIN

## 2022-09-12 ASSESSMENT — ENCOUNTER SYMPTOMS
EYE REDNESS: 0
SEIZURES: 0
SHORTNESS OF BREATH: 0
FALLS: 0
ABDOMINAL PAIN: 1
FEVER: 0
VOMITING: 0
TREMORS: 0
CHILLS: 0
NAUSEA: 1

## 2022-09-12 ASSESSMENT — LIFESTYLE VARIABLES: SUBSTANCE_ABUSE: 1

## 2022-09-12 ASSESSMENT — FIBROSIS 4 INDEX: FIB4 SCORE: 3.25

## 2022-09-12 NOTE — DISCHARGE PLANNING
Case Management Discharge Planning    Admission Date: 9/5/2022  GMLOS: 4.9  ALOS: 7    6-Clicks ADL Score: 24  6-Clicks Mobility Score: 24      Anticipated Discharge Dispo: Discharge Disposition: Still a Patient (30)    DME Needed: No    Action(s) Taken: Participated in IDT with physician and beside RN. Patients diet was advanced and tolerating well.     Escalations Completed: None    Medically Clear: No    Next Steps: RNCM will continue to follow for any discharge planning needs.     Barriers to Discharge: Medical clearance    Is the patient up for discharge tomorrow: No

## 2022-09-12 NOTE — CARE PLAN
The patient is Stable - Low risk of patient condition declining or worsening    Shift Goals  Clinical Goals: Manage pain  Patient Goals: Control pain & sleep  Family Goals: No family present    Progress made toward(s) clinical / shift goals:    Problem: Knowledge Deficit - Standard  Goal: Patient and family/care givers will demonstrate understanding of plan of care, disease process/condition, diagnostic tests and medications  Outcome: Progressing     Problem: Pain - Standard  Goal: Alleviation of pain or a reduction in pain to the patient’s comfort goal  Outcome: Progressing       Patient is not progressing towards the following goals:

## 2022-09-12 NOTE — CARE PLAN
The patient is Stable - Low risk of patient condition declining or worsening    Shift Goals  Clinical Goals: Pain Management  Patient Goals: Comfort and Sleep  Family Goals: No family present    Progress made toward(s) clinical / shift goals:    Problem: Pain - Standard  Goal: Alleviation of pain or a reduction in pain to the patient’s comfort goal  Outcome: Progressing  Flowsheets  Taken 9/12/2022 1036 by Willem Cartagena R.N.  Pain Rating Scale (NPRS): 8  Taken 9/7/2022 0151 by Evelina Kim R.N.  Non Verbal Scale:   Unlabored Breathing   Sleeping  Note: Pt currently receiving PRN pain medications per MAR, currently weaning off IV medications and starting PO medications     Problem: Nutrition  Goal: Patient's nutritional and fluid intake will be adequate or improve  Outcome: Progressing  Flowsheets (Taken 9/11/2022 0900 by Liam Colvin, C.N.A.)  Oral Nutrition:   Breakfast   Less than 25% Consumed  Note: Pt has remained without nausea throughout the shift, advancing diet as tolerated        Patient is not progressing towards the following goals:

## 2022-09-12 NOTE — PROGRESS NOTES
Hospital Medicine Daily Progress Note    Date of Service  9/12/2022    Chief Complaint  Rhiannon Marrero is a 37 y.o. female admitted 9/5/2022 with ETOH intoxication and abdominal pain.    Hospital Course  37-year-old female with a history of alcohol dependence, seizure disorder, alcohol induced pancreatitis, bipolar disorder who presented on 9/5/2022 with weakness, headache, nausea/vomiting and abdominal pain as well as tremors.  Patient reports she drinks vodka daily.  She has seizures and is followed by neurology.  Last seizure was on day of admission.  On admission she was found to have potassium 2.9, elevated LFTs, lipase 118. She was started on CIWA.  She continued to have severe abdominal pain.  She was treated with pain medication and nausea medication.  Patient continued to have severe abdominal pain and then proceeded to have worsening transaminitis.  Renal ultrasound showed gallbladder sludge and dilated common bile duct.  MRCP showed mildly dilated common bile duct and distended gallbladder.  General surgery was consulted.    Interval Problem Update  - Surgery did not think any indication for cholecystectomy  - LFTs improving  - abd pain stable  - tolerating CLD    I have discussed this patient's plan of care and discharge plan at IDT rounds today with Case Management, Nursing, Nursing leadership, and other members of the IDT team.    Consultants/Specialty  general surgery    Code Status  Full Code    Disposition  Patient is not medically cleared for discharge.   Anticipate discharge to to home with close outpatient follow-up.  I have placed the appropriate orders for post-discharge needs.    Review of Systems  Review of Systems   Constitutional:  Negative for chills and fever.   HENT:  Negative for nosebleeds.    Eyes:  Negative for redness.   Respiratory:  Negative for shortness of breath.    Cardiovascular:  Negative for chest pain.   Gastrointestinal:  Positive for abdominal pain and nausea.  Negative for vomiting.   Genitourinary: Negative.    Musculoskeletal:  Negative for falls.   Skin:  Negative for rash.   Neurological:  Negative for tremors and seizures.   Psychiatric/Behavioral:  Positive for substance abuse.       Physical Exam  Temp:  [36.6 °C (97.8 °F)-36.9 °C (98.5 °F)] 36.6 °C (97.8 °F)  Pulse:  [75-91] 75  Resp:  [16-18] 17  BP: ()/(48-68) 98/48  SpO2:  [90 %-97 %] 94 %    Physical Exam  Constitutional:       General: She is not in acute distress.     Appearance: She is not ill-appearing, toxic-appearing or diaphoretic.   HENT:      Nose: Nose normal.      Mouth/Throat:      Mouth: Mucous membranes are moist.   Eyes:      General: No scleral icterus.     Conjunctiva/sclera: Conjunctivae normal.   Cardiovascular:      Rate and Rhythm: Normal rate and regular rhythm.      Heart sounds: No murmur heard.    No friction rub. No gallop.   Pulmonary:      Effort: Pulmonary effort is normal.      Breath sounds: Normal breath sounds.   Abdominal:      General: Bowel sounds are normal. There is no distension.      Palpations: Abdomen is soft.      Tenderness: There is generalized abdominal tenderness. There is no guarding or rebound.   Musculoskeletal:         General: No swelling.      Cervical back: Normal range of motion.      Right lower leg: No edema.      Left lower leg: No edema.   Skin:     Coloration: Skin is not jaundiced.      Findings: No rash.      Comments: Old self injurious scars on R forearm   Neurological:      Mental Status: She is alert and oriented to person, place, and time. Mental status is at baseline.   Psychiatric:         Mood and Affect: Mood normal.         Behavior: Behavior normal.       Fluids  No intake or output data in the 24 hours ending 09/12/22 1337      Laboratory  Recent Labs     09/10/22  0222 09/11/22  0227 09/12/22  0642   WBC 3.8* 4.8 5.2   RBC 4.53 4.36 4.12*   HEMOGLOBIN 14.7 14.2 13.2   HEMATOCRIT 43.2 42.3 40.7   MCV 95.4 97.0 98.8*   MCH 32.5 32.6  32.0   MCHC 34.0 33.6 32.4*   RDW 51.1* 53.1* 55.8*   PLATELETCT 91* 94* 114*   MPV 11.2 10.7 10.7       Recent Labs     09/10/22  0222 09/11/22 0227 09/12/22  0642   SODIUM 137 138 140   POTASSIUM 4.2 3.6 3.9   CHLORIDE 102 100 104   CO2 23 27 23   GLUCOSE 101* 82 85   BUN 3* 2* 2*   CREATININE 0.71 0.77 0.81   CALCIUM 9.6 9.6 9.4           Recent Labs     09/06/22  0305 09/07/22  0232 09/08/22  0508 09/09/22  0147 09/10/22  0222 09/11/22 0227 09/12/22  0642   ASTSGOT 94* 100* 203* 245* 274* 224* 131*   ALTSGPT 67* 61* 102* 143* 190* 188* 153*   TBILIRUBIN 0.6 1.2 1.1 1.0 0.9 0.7 0.7   GLOBULIN 2.3 2.5 3.0 2.8 3.0 2.8 2.9                   Imaging  [id number]   Final Result      1.  Mildly dilated common bile duct at 7 mm. No common duct stones seen.      2.  Distended gallbladder with small amount of sludge present. No gallstones seen.      3.  Hepatic enlargement.      US-RUQ   Final Result      1.  Enlarged echogenic liver suggesting fatty infiltration.   2.  Sludge present in the gallbladder.   3.  Mild extrahepatic biliary dilation, new from prior.            DX-KNEE 3 VIEWS RIGHT   Final Result      Minimal degenerative change of the medial femorotibial articulation.             Assessment/Plan  * Alcohol dependence with withdrawal (HCC)- (present on admission)  Assessment & Plan  Resolved    CIWA protocol   Continuous cardiac monitoring  Monitor electrolytes and replace accordingly, particularly magnesium, potassium and phosphorus  Fall, seizure, aspiration precautions.  Thiamine folic acid and multivitamin.   Counseling      Abdominal pain  Assessment & Plan  Persistent, severe  Likely in setting chronic or recurrent pancreatitis vs gastritis in setting of etoh vs etoh hepatitis   Lipase mildly elevated, recheck normal  PPI  IVF  Pain mgmt  Nausea meds  RUQ US showed gallbladder sludge and biliary dilation  MRCP reviewed: mildly distended CBD, no stone, distended gallbladder with sludge  Surgery  consulted: did not think needed cholecystectomy  CLD, advanced as tolerated    Transaminitis- (present on admission)  Assessment & Plan  Stable  US showed gallbladder sludge and biliary dilation which is new  MRCP, dilated CBD but no stone, distended gallbladder with sludge    Hypokalemia- (present on admission)  Assessment & Plan  Resolved  Replacing, checking magnesium    Continue to monitor replace as needed     Seizure disorder (HCC)- (present on admission)  Assessment & Plan  Resume home zonisamide and Keppra  As needed lorazepam for seizures       VTE prophylaxis: pharmacologic prophylaxis contraindicated due to possible procedure, she's ambulatory    I have performed a physical exam and reviewed and updated ROS and Plan today (9/12/2022). In review of yesterday's note (9/11/2022), there are no changes except as documented above.

## 2022-09-12 NOTE — PROGRESS NOTES
Surg Prog    Still with mid abdominal and right lower abdominal pain.  Tolerating liquids, not really hungry for more.    Vitals:    09/12/22 0713   BP: (!) 98/48   Pulse: 75   Resp: 17   Temp: 36.6 °C (97.8 °F)   SpO2: 94%     NAD  Abdomen flat, soft, mild TTP mid abdomen and RLQ  Ext WWP    A/P: abdominal pain, hepatitis, resolving  She does have gb sludge but no e/o of biliary origin of her pain, no e/o cholecystitis  Rec etoh cessation and treatment, no indication for surgical intervention  Will s/o, pls call with questions or concerns    Laura Jha M.D.  [facility]  [phone]

## 2022-09-12 NOTE — CONSULTS
ATSP by Dr Escoto for Abdominal Pain    HPI: 37y F who was admitted 6 days ago for EtOH withdrawal and seizures.  She has had multiple admissions for this and issues related to it including pancreatitis and seizures.  She most recently was admitted and treated for EtOH withdrawal.  She has had some ongoing upper abdominal pain as well which seems to be worse with eating.  Pain is present even without food as well.  She has not had similar pain before and past workup has net revealed any cholelithiasis.  She more recently had an US which showed a distended gallbladder, but no wall thickening or stones, some sludge was present.  CBD is dilated, MRCP did not show any obstruction or stones.  She is currently NPO, still having the same pain.      PMHx: Hepatic Steatosis; Acute Pancreatitis; EtOH Dependence; Bronchitis; Depression; GERD; Hernia; Pancreatitis; Pnemonia    PSHx: Tonsillectomy, Salpingectomy, hysterectomy, Knee Arthroscopy    Meds: see Med Rec, no anticoagulation    All: Promethazine    FamHx: family history includes Arthritis in her mother; Heart Disease in her maternal grandfather; Psychiatric Illness in her mother; Stroke in her mother.     SocHx: Former Smoker with 7.5pack/year hx; uses Marijuana but no street drugs; ongoing EtOH abuse as above    ROS: negative except as above    Consitutional- above  HEENT- no visual changes, no sneezing or runny nose  Skin- no rashes or itching  Cardiovascular- no chest pain or palpatations  Respiratory- no SOB or cough  GI- above  - no dysurea  Neuro- no weakness or syncope  Musculoskeletal- no muscle or joint pain  Heme- no bleeding or bruising  Lymphatic- no enlarged nodes or previous splenectomy  Endocrine- No sweating or heat/cold intolerance  Allergy- No asthma or hives  Psychiatric- no depression or anxiety        Physical Exam:   AFVSS  A@O x3, NAD  NCAT, no scleral icterus  Neck nontender, no lymphadenopathy  Normal respiratory effort, no chest wall  masses  RRR, 2+ pulses  Abdomen soft, no peritonitis, no masses, mild tenderness in the RUQ  Extremities warm and well perfused  No skin rashes or lesions    Labs: WBC 4.8, Hct 42, Plt 94  Lytes wnl  AST//188, , tbili 0.7    Radiology:     9/9: 1.  Enlarged echogenic liver suggesting fatty infiltration.  2.  Sludge present in the gallbladder.  3.  Mild extrahepatic biliary dilation, new from prior.    9/11: MRI: 1.  Mildly dilated common bile duct at 7 mm. No common duct stones seen.  2.  Distended gallbladder with small amount of sludge present. No gallstones seen.  3.  Hepatic enlargement.    A/P: 37y F with upper abdominal pain, difficult diagnostic situation.  While Acalculous cholecystitis is a possibility, normal WBC, no GB inflammation suggests against this, and she has a normal bilirubin and no obstruction on MRI ruling out choledocholithiasis as well.  Her inflamed liver does explain her symptoms and lab findings at this time.  I would suggests clears only for now and watchful waiting.  I explained to her that in this setting, surgery is higher risk then normal for bleeding and/or anesthesia-related issues.  Will follow along with primary team.

## 2022-09-13 LAB
ALBUMIN SERPL BCP-MCNC: 3.5 G/DL (ref 3.2–4.9)
ALBUMIN/GLOB SERPL: 1.5 G/DL
ALP SERPL-CCNC: 106 U/L (ref 30–99)
ALT SERPL-CCNC: 113 U/L (ref 2–50)
ANION GAP SERPL CALC-SCNC: 10 MMOL/L (ref 7–16)
AST SERPL-CCNC: 81 U/L (ref 12–45)
BILIRUB SERPL-MCNC: 0.5 MG/DL (ref 0.1–1.5)
BUN SERPL-MCNC: <2 MG/DL (ref 8–22)
CALCIUM SERPL-MCNC: 8.9 MG/DL (ref 8.4–10.2)
CHLORIDE SERPL-SCNC: 103 MMOL/L (ref 96–112)
CO2 SERPL-SCNC: 24 MMOL/L (ref 20–33)
CREAT SERPL-MCNC: 0.72 MG/DL (ref 0.5–1.4)
GFR SERPLBLD CREATININE-BSD FMLA CKD-EPI: 111 ML/MIN/1.73 M 2
GLOBULIN SER CALC-MCNC: 2.4 G/DL (ref 1.9–3.5)
GLUCOSE SERPL-MCNC: 87 MG/DL (ref 65–99)
POTASSIUM SERPL-SCNC: 3.5 MMOL/L (ref 3.6–5.5)
PROT SERPL-MCNC: 5.9 G/DL (ref 6–8.2)
SODIUM SERPL-SCNC: 137 MMOL/L (ref 135–145)

## 2022-09-13 PROCEDURE — 700102 HCHG RX REV CODE 250 W/ 637 OVERRIDE(OP): Performed by: INTERNAL MEDICINE

## 2022-09-13 PROCEDURE — 94760 N-INVAS EAR/PLS OXIMETRY 1: CPT

## 2022-09-13 PROCEDURE — A9270 NON-COVERED ITEM OR SERVICE: HCPCS | Performed by: INTERNAL MEDICINE

## 2022-09-13 PROCEDURE — 80053 COMPREHEN METABOLIC PANEL: CPT

## 2022-09-13 PROCEDURE — 700111 HCHG RX REV CODE 636 W/ 250 OVERRIDE (IP): Performed by: INTERNAL MEDICINE

## 2022-09-13 PROCEDURE — 700105 HCHG RX REV CODE 258: Performed by: INTERNAL MEDICINE

## 2022-09-13 PROCEDURE — 700102 HCHG RX REV CODE 250 W/ 637 OVERRIDE(OP): Performed by: HOSPITALIST

## 2022-09-13 PROCEDURE — A9270 NON-COVERED ITEM OR SERVICE: HCPCS | Performed by: HOSPITALIST

## 2022-09-13 PROCEDURE — 99232 SBSQ HOSP IP/OBS MODERATE 35: CPT | Performed by: INTERNAL MEDICINE

## 2022-09-13 PROCEDURE — 36415 COLL VENOUS BLD VENIPUNCTURE: CPT

## 2022-09-13 PROCEDURE — 700111 HCHG RX REV CODE 636 W/ 250 OVERRIDE (IP): Performed by: HOSPITALIST

## 2022-09-13 PROCEDURE — 770006 HCHG ROOM/CARE - MED/SURG/GYN SEMI*

## 2022-09-13 RX ORDER — POTASSIUM CHLORIDE 20 MEQ/1
40 TABLET, EXTENDED RELEASE ORAL ONCE
Status: COMPLETED | OUTPATIENT
Start: 2022-09-13 | End: 2022-09-13

## 2022-09-13 RX ADMIN — ONDANSETRON 4 MG: 2 INJECTION INTRAMUSCULAR; INTRAVENOUS at 14:41

## 2022-09-13 RX ADMIN — HYDROMORPHONE HYDROCHLORIDE 1 MG: 1 INJECTION, SOLUTION INTRAMUSCULAR; INTRAVENOUS; SUBCUTANEOUS at 12:23

## 2022-09-13 RX ADMIN — HYDROMORPHONE HYDROCHLORIDE 1 MG: 1 INJECTION, SOLUTION INTRAMUSCULAR; INTRAVENOUS; SUBCUTANEOUS at 05:30

## 2022-09-13 RX ADMIN — ONDANSETRON 4 MG: 2 INJECTION INTRAMUSCULAR; INTRAVENOUS at 08:13

## 2022-09-13 RX ADMIN — ONDANSETRON 4 MG: 2 INJECTION INTRAMUSCULAR; INTRAVENOUS at 20:29

## 2022-09-13 RX ADMIN — DOCUSATE SODIUM 50 MG AND SENNOSIDES 8.6 MG 2 TABLET: 8.6; 5 TABLET, FILM COATED ORAL at 05:30

## 2022-09-13 RX ADMIN — MORPHINE SULFATE 15 MG: 15 TABLET ORAL at 02:11

## 2022-09-13 RX ADMIN — POTASSIUM CHLORIDE 40 MEQ: 1500 TABLET, EXTENDED RELEASE ORAL at 08:14

## 2022-09-13 RX ADMIN — ZONISAMIDE 200 MG: 50 CAPSULE ORAL at 20:26

## 2022-09-13 RX ADMIN — THIAMINE HCL TAB 100 MG 100 MG: 100 TAB at 05:29

## 2022-09-13 RX ADMIN — FOLIC ACID 1 MG: 1 TABLET ORAL at 05:29

## 2022-09-13 RX ADMIN — HYDROMORPHONE HYDROCHLORIDE 1 MG: 1 INJECTION, SOLUTION INTRAMUSCULAR; INTRAVENOUS; SUBCUTANEOUS at 00:12

## 2022-09-13 RX ADMIN — DOCUSATE SODIUM 50 MG AND SENNOSIDES 8.6 MG 2 TABLET: 8.6; 5 TABLET, FILM COATED ORAL at 16:46

## 2022-09-13 RX ADMIN — Medication 1 TABLET: at 05:29

## 2022-09-13 RX ADMIN — POTASSIUM CHLORIDE 40 MEQ: 1500 TABLET, EXTENDED RELEASE ORAL at 05:29

## 2022-09-13 RX ADMIN — CALCIUM 500 MG: 500 TABLET ORAL at 08:14

## 2022-09-13 RX ADMIN — LEVETIRACETAM 500 MG: 500 TABLET, FILM COATED ORAL at 05:29

## 2022-09-13 RX ADMIN — HYDROMORPHONE HYDROCHLORIDE 1 MG: 1 INJECTION, SOLUTION INTRAMUSCULAR; INTRAVENOUS; SUBCUTANEOUS at 20:26

## 2022-09-13 RX ADMIN — SODIUM CHLORIDE, POTASSIUM CHLORIDE, SODIUM LACTATE AND CALCIUM CHLORIDE: 600; 310; 30; 20 INJECTION, SOLUTION INTRAVENOUS at 02:11

## 2022-09-13 RX ADMIN — MORPHINE SULFATE 15 MG: 15 TABLET ORAL at 14:37

## 2022-09-13 RX ADMIN — MORPHINE SULFATE 15 MG: 15 TABLET ORAL at 08:13

## 2022-09-13 RX ADMIN — SODIUM CHLORIDE, POTASSIUM CHLORIDE, SODIUM LACTATE AND CALCIUM CHLORIDE: 600; 310; 30; 20 INJECTION, SOLUTION INTRAVENOUS at 12:23

## 2022-09-13 RX ADMIN — LEVETIRACETAM 500 MG: 500 TABLET, FILM COATED ORAL at 16:46

## 2022-09-13 RX ADMIN — MORPHINE SULFATE 15 MG: 15 TABLET ORAL at 22:07

## 2022-09-13 ASSESSMENT — ENCOUNTER SYMPTOMS
SHORTNESS OF BREATH: 0
ABDOMINAL PAIN: 1
CHILLS: 0
FEVER: 0
VOMITING: 0
EYE REDNESS: 0
SEIZURES: 0
NAUSEA: 1
TREMORS: 0
FALLS: 0

## 2022-09-13 ASSESSMENT — PAIN DESCRIPTION - PAIN TYPE
TYPE: ACUTE PAIN

## 2022-09-13 ASSESSMENT — LIFESTYLE VARIABLES: SUBSTANCE_ABUSE: 1

## 2022-09-13 NOTE — PROGRESS NOTES
Patient declining advancement of diet due to ongoing nausea and pain. Patient wishes to remain as full liquid diet for tonight's dinner and then possibly advance to GI soft diet for breakfast. Pain and nausea controlled through pharmacologic agents at this time. Encouraged patient to trial more substantial food textures as able.

## 2022-09-13 NOTE — PROGRESS NOTES
Hospital Medicine Daily Progress Note    Date of Service  9/13/2022    Chief Complaint  Rhiannon Marrero is a 37 y.o. female admitted 9/5/2022 with ETOH intoxication and abdominal pain.    Hospital Course  37-year-old female with a history of alcohol dependence, seizure disorder, alcohol induced pancreatitis, bipolar disorder who presented on 9/5/2022 with weakness, headache, nausea/vomiting and abdominal pain as well as tremors.  Patient reports she drinks vodka daily.  She has seizures and is followed by neurology.  Last seizure was on day of admission.  On admission she was found to have potassium 2.9, elevated LFTs, lipase 118. She was started on CIWA.  She continued to have severe abdominal pain.  She was treated with pain medication and nausea medication.  Patient continued to have severe abdominal pain and then proceeded to have worsening transaminitis.  Renal ultrasound showed gallbladder sludge and dilated common bile duct.  MRCP showed mildly dilated common bile duct and distended gallbladder.  General surgery was consulted, did not recommend surgical intervention as no signs of acute infection.     Interval Problem Update  - Surgery did not think any indication for cholecystectomy  - LFTs improving  - abd pain stable  - tolerating CLD    9/13 advance diet to full liquid for now, can advance to gi soft as tolerated. Once tolerating a diet can dc on oral pain meds. Vitals stable. AST/ALT improving. Patient reports nausea and abdominal pain are unchanged.     I have discussed this patient's plan of care and discharge plan at IDT rounds today with Case Management, Nursing, Nursing leadership, and other members of the IDT team.    Consultants/Specialty  general surgery    Code Status  Full Code    Disposition  Patient is medically cleared pending tolerating diet  for discharge.   Anticipate discharge to to home with close outpatient follow-up.  I have placed the appropriate orders for post-discharge  needs.    Review of Systems  Review of Systems   Constitutional:  Negative for chills and fever.   HENT:  Negative for nosebleeds.    Eyes:  Negative for redness.   Respiratory:  Negative for shortness of breath.    Cardiovascular:  Negative for chest pain.   Gastrointestinal:  Positive for abdominal pain and nausea. Negative for vomiting.   Genitourinary: Negative.    Musculoskeletal:  Negative for falls.   Skin:  Negative for rash.   Neurological:  Negative for tremors and seizures.   Psychiatric/Behavioral:  Positive for substance abuse.       Physical Exam  Temp:  [36.3 °C (97.4 °F)-37.1 °C (98.8 °F)] 36.6 °C (97.9 °F)  Pulse:  [50-76] 60  Resp:  [16-18] 18  BP: (103-120)/(56-76) 103/56  SpO2:  [94 %-99 %] 95 %    Physical Exam  Constitutional:       General: She is not in acute distress.     Appearance: She is not ill-appearing.   HENT:      Head: Normocephalic.   Eyes:      Conjunctiva/sclera: Conjunctivae normal.   Cardiovascular:      Rate and Rhythm: Normal rate and regular rhythm.      Heart sounds: No murmur heard.    No friction rub. No gallop.   Pulmonary:      Effort: Pulmonary effort is normal.      Breath sounds: Normal breath sounds.   Abdominal:      General: Bowel sounds are normal. There is no distension.      Palpations: Abdomen is soft.      Tenderness: There is generalized abdominal tenderness. There is no guarding or rebound.   Musculoskeletal:      Cervical back: Normal range of motion.      Right lower leg: No edema.      Left lower leg: No edema.   Skin:     General: Skin is warm and dry.      Coloration: Skin is not jaundiced.      Findings: No rash.      Comments: Old self injurious scars on R forearm   Neurological:      Mental Status: She is alert and oriented to person, place, and time. Mental status is at baseline.      Motor: No weakness.   Psychiatric:         Mood and Affect: Mood normal.         Behavior: Behavior normal.       Fluids    Intake/Output Summary (Last 24 hours) at  9/13/2022 1221  Last data filed at 9/13/2022 0900  Gross per 24 hour   Intake 240 ml   Output --   Net 240 ml       Laboratory  Recent Labs     09/11/22 0227 09/12/22  0642   WBC 4.8 5.2   RBC 4.36 4.12*   HEMOGLOBIN 14.2 13.2   HEMATOCRIT 42.3 40.7   MCV 97.0 98.8*   MCH 32.6 32.0   MCHC 33.6 32.4*   RDW 53.1* 55.8*   PLATELETCT 94* 114*   MPV 10.7 10.7     Recent Labs     09/11/22 0227 09/12/22  0642 09/13/22  0156   SODIUM 138 140 137   POTASSIUM 3.6 3.9 3.5*   CHLORIDE 100 104 103   CO2 27 23 24   GLUCOSE 82 85 87   BUN 2* 2* <2*   CREATININE 0.77 0.81 0.72   CALCIUM 9.6 9.4 8.9         Recent Labs     09/07/22  0232 09/08/22  0508 09/09/22  0147 09/10/22  0222 09/11/22  0227 09/12/22  0642 09/13/22  0156   ASTSGOT 100* 203* 245* 274* 224* 131* 81*   ALTSGPT 61* 102* 143* 190* 188* 153* 113*   TBILIRUBIN 1.2 1.1 1.0 0.9 0.7 0.7 0.5   GLOBULIN 2.5 3.0 2.8 3.0 2.8 2.9 2.4                 Imaging  [id number]   Final Result      1.  Mildly dilated common bile duct at 7 mm. No common duct stones seen.      2.  Distended gallbladder with small amount of sludge present. No gallstones seen.      3.  Hepatic enlargement.      US-RUQ   Final Result      1.  Enlarged echogenic liver suggesting fatty infiltration.   2.  Sludge present in the gallbladder.   3.  Mild extrahepatic biliary dilation, new from prior.            DX-KNEE 3 VIEWS RIGHT   Final Result      Minimal degenerative change of the medial femorotibial articulation.             Assessment/Plan  * Alcohol dependence with withdrawal (HCC)- (present on admission)  Assessment & Plan  Resolved    CIWA protocol   Continuous cardiac monitoring  Monitor electrolytes and replace accordingly, particularly magnesium, potassium and phosphorus  Fall, seizure, aspiration precautions.  Thiamine folic acid and multivitamin.   Counseling      Abdominal pain  Assessment & Plan  Persistent, severe  Likely in setting chronic or recurrent pancreatitis vs gastritis in  setting of etoh vs etoh hepatitis   Lipase mildly elevated, recheck normal  PPI  IVF  Pain mgmt  Nausea meds  RUQ US showed gallbladder sludge and biliary dilation  MRCP reviewed: mildly distended CBD, no stone, distended gallbladder with sludge  Surgery consulted: did not think needed cholecystectomy  Advance diet to full liquids, if tolerating gi soft can dc home    Hypokalemia- (present on admission)  Assessment & Plan  Resolved  Replacing, checking magnesium    Continue to monitor replace as needed     Transaminitis- (present on admission)  Assessment & Plan  Stable  US showed gallbladder sludge and biliary dilation which is new  MRCP, dilated CBD but no stone, distended gallbladder with sludge    Seizure disorder (HCC)- (present on admission)  Assessment & Plan  Resume home zonisamide and Keppra  As needed lorazepam for seizures       VTE prophylaxis: SCDs/TEDs, refusing lovenox     I have performed a physical exam and reviewed and updated ROS and Plan today (9/13/2022). In review of yesterday's note (9/12/2022), there are no changes except as documented above.

## 2022-09-13 NOTE — DISCHARGE PLANNING
Case Management Discharge Planning    Admission Date: 9/5/2022  GMLOS: 4.9  ALOS: 8    6-Clicks ADL Score: 24  6-Clicks Mobility Score: 24      Anticipated Discharge Dispo: Discharge Disposition: Still a Patient (30)    DME Needed: No    Action(s) Taken: Participated in IDT rounds with physician and bedside RN. Discharge disposition pending diet advancement.     Escalations Completed: None    Medically Clear: Yes    Next Steps: RNCM will follow for any discharge planning needs.     Barriers to Discharge: None    Is the patient up for discharge tomorrow: No

## 2022-09-13 NOTE — CARE PLAN
The patient is Stable - Low risk of patient condition declining or worsening    Shift Goals  Clinical Goals: Upgrade diet  Patient Goals: Decrease nausea/pain  Family Goals: Visit with patient    Progress made toward(s) clinical / shift goals:    Problem: Psychosocial  Goal: Patient's ability to verbalize feelings about condition will improve  Outcome: Progressing  Flowsheets (Taken 9/13/2022 0837)  Condition Will Improve:   Discussed coping with medical condition and its effects   Encouraged patient participation in care   Encouraged acknowledgement of body changes and emotions   Screened for depression     Problem: Communication  Goal: The ability to communicate needs accurately and effectively will improve  Outcome: Progressing  Flowsheets (Taken 9/13/2022 0837)  Communication:   Assessed patient's ability to understand and communicate   Oriented patient to call light   Oriented family/support system to call light       Patient is not progressing towards the following goals:

## 2022-09-13 NOTE — CARE PLAN
The patient is Stable - Low risk of patient condition declining or worsening    Shift Goals  Clinical Goals: Monitor LFTs, manage pain  Patient Goals: Sleep  Family Goals: No family present    Progress made toward(s) clinical / shift goals:    Problem: Knowledge Deficit - Standard  Goal: Patient and family/care givers will demonstrate understanding of plan of care, disease process/condition, diagnostic tests and medications  Outcome: Progressing     Problem: Lifestyle Changes  Goal: Patient's ability to identify lifestyle changes and available resources to help reduce recurrence of condition will improve  Outcome: Progressing  Note: Patient expressed a desire to make lifestyle changes and is motivated to begin rehab once medically cleared.      Problem: Psychosocial  Goal: Patient's level of anxiety will decrease  Outcome: Progressing     Problem: Psychosocial  Goal: Patient's level of anxiety will decrease  Outcome: Progressing       Patient is not progressing towards the following goals:

## 2022-09-13 NOTE — DISCHARGE SUMMARY
Discharge Summary    CHIEF COMPLAINT ON ADMISSION  Chief Complaint   Patient presents with    ETOH Intoxication     Pt found at home by family drunk surrounded by etoh bottles. Recently lossed custody of kids and having behavioral health problems.        Reason for Admission  Other     Admission Date  9/5/2022    CODE STATUS  Full Code    HPI & HOSPITAL COURSE  This is a 37 y.o. female here with alcohol intoxication.     37-year-old female with a history of alcohol dependence, seizure disorder, alcohol induced pancreatitis, bipolar disorder who presented on 9/5/2022 with weakness, headache, nausea/vomiting and abdominal pain as well as tremors.  Patient reports she drinks vodka daily.  She has seizures and is followed by neurology.  Last seizure was on day of admission.  On admission she was found to have potassium 2.9, elevated LFTs, lipase 118. She was started on CIWA.  She continued to have severe abdominal pain.  She was treated with pain medication and nausea medication.  Patient continued to have severe abdominal pain and then proceeded to have worsening transaminitis.  Renal ultrasound showed gallbladder sludge and dilated common bile duct.  MRCP showed mildly dilated common bile duct and distended gallbladder.  General surgery was consulted did not recommend surgical intervention as no signs of acute infection. Patient continued to have pain with nausea, repeat CT showed nonspecific abnormality of the kidney, correlate for pyelonephritis. Patient without flank pain, UA negative for infection. Patient is tolerating a diet with the use of antiemetics and pain medications, vital signs stable and liver enzymes improving, she is stable to dc home with as needed medications. She will be given a script for opiate medications, she is high risk given her substance abuse problem. She has been counseled on risk of abuse and should only take as needed. She is to follow up with primary care in 1 week and return to the ER  if her condition worsens.     Therefore, she is discharged in good and stable condition to home with close outpatient follow-up.    The patient met 2-midnight criteria for an inpatient stay at the time of discharge.    Discharge Date  9/15/2022    FOLLOW UP ITEMS POST DISCHARGE  Follow up with primary care     DISCHARGE DIAGNOSES  Principal Problem:    Alcohol dependence with withdrawal (HCC) POA: Yes  Active Problems:    Seizure disorder (HCC) POA: Yes    Transaminitis POA: Yes    Hypokalemia POA: Yes    Abdominal pain POA: Unknown  Resolved Problems:    * No resolved hospital problems. *      FOLLOW UP  Future Appointments   Date Time Provider Department Center   9/26/2022  9:00 AM Ivy Adams M.D. [place] [facility] Gordy Adams M.D.  [address] [facility] [address]  [phone]    Follow up  Follow up with primary care in 1 week      MEDICATIONS ON DISCHARGE     Medication List        START taking these medications        Instructions   metoclopramide 10 MG Tabs  Commonly known as: REGLAN   Take 1 Tablet by mouth four times daily - before meals and nightly as needed (If zofran ineffective or unable to be given).  Dose: 10 mg     morphine 15 MG tablet  Commonly known as: MS IR   Take 1 Tablet by mouth every 6 hours as needed for Severe Pain for up to 5 days.  Dose: 15 mg     omeprazole 20 MG delayed-release capsule  Commonly known as: PRILOSEC   Take 1 Capsule by mouth every day.  Dose: 20 mg     ondansetron 4 MG Tbdp  Commonly known as: ZOFRAN ODT   Take 1 Tablet by mouth every 6 hours as needed for Nausea.  Dose: 4 mg            CONTINUE taking these medications        Instructions   famotidine 20 MG Tabs  Commonly known as: PEPCID   Take 20 mg by mouth 1 time a day as needed (heartburn, acid reflux).  Dose: 20 mg     levetiracetam 1000 MG tablet  Commonly known as: KEPPRA   Take 1 Tablet by mouth every day. Indications: Seizure  Dose: 1,000 mg     QUEtiapine 50 MG tablet  Commonly known as: Seroquel    Take 3 Tablets by mouth at bedtime. 3 tablets = 150 mg  Dose: 150 mg     zonisamide 100 MG Caps  Commonly known as: ZONEGRAN   Take 200 mg by mouth at bedtime. 2 tablets = 200 mg  Dose: 200 mg              Allergies  Allergies   Allergen Reactions    Promethazine Hcl Anxiety     Anxiety and makes her feels like skin coming off        DIET  Orders Placed This Encounter   Procedures    Diet Order Diet: Full Liquid     Standing Status:   Standing     Number of Occurrences:   1     Order Specific Question:   Diet:     Answer:   Full Liquid [11]       ACTIVITY  As tolerated.  Weight bearing as tolerated    CONSULTATIONS  General Surgery     PROCEDURES  N/A    LABORATORY  Lab Results   Component Value Date    SODIUM 139 09/14/2022    POTASSIUM 4.0 09/14/2022    CHLORIDE 104 09/14/2022    CO2 24 09/14/2022    GLUCOSE 99 09/14/2022    BUN <2 (L) 09/14/2022    CREATININE 0.63 09/14/2022    CREATININE 0.7 06/10/2008        Lab Results   Component Value Date    WBC 3.2 (L) 09/15/2022    HEMOGLOBIN 12.8 09/15/2022    HEMATOCRIT 39.6 09/15/2022    PLATELETCT 183 09/15/2022        Total time of the discharge process exceeds 38 minutes.

## 2022-09-14 LAB
ANION GAP SERPL CALC-SCNC: 11 MMOL/L (ref 7–16)
BUN SERPL-MCNC: <2 MG/DL (ref 8–22)
CALCIUM SERPL-MCNC: 8.8 MG/DL (ref 8.4–10.2)
CHLORIDE SERPL-SCNC: 104 MMOL/L (ref 96–112)
CO2 SERPL-SCNC: 24 MMOL/L (ref 20–33)
CREAT SERPL-MCNC: 0.63 MG/DL (ref 0.5–1.4)
GFR SERPLBLD CREATININE-BSD FMLA CKD-EPI: 118 ML/MIN/1.73 M 2
GLUCOSE SERPL-MCNC: 99 MG/DL (ref 65–99)
POTASSIUM SERPL-SCNC: 4 MMOL/L (ref 3.6–5.5)
SODIUM SERPL-SCNC: 139 MMOL/L (ref 135–145)

## 2022-09-14 PROCEDURE — 700102 HCHG RX REV CODE 250 W/ 637 OVERRIDE(OP): Performed by: INTERNAL MEDICINE

## 2022-09-14 PROCEDURE — A9270 NON-COVERED ITEM OR SERVICE: HCPCS | Performed by: INTERNAL MEDICINE

## 2022-09-14 PROCEDURE — 36415 COLL VENOUS BLD VENIPUNCTURE: CPT

## 2022-09-14 PROCEDURE — 80048 BASIC METABOLIC PNL TOTAL CA: CPT

## 2022-09-14 PROCEDURE — A9270 NON-COVERED ITEM OR SERVICE: HCPCS | Performed by: HOSPITALIST

## 2022-09-14 PROCEDURE — 700111 HCHG RX REV CODE 636 W/ 250 OVERRIDE (IP): Performed by: HOSPITALIST

## 2022-09-14 PROCEDURE — 700105 HCHG RX REV CODE 258: Performed by: INTERNAL MEDICINE

## 2022-09-14 PROCEDURE — 700111 HCHG RX REV CODE 636 W/ 250 OVERRIDE (IP): Performed by: INTERNAL MEDICINE

## 2022-09-14 PROCEDURE — 700102 HCHG RX REV CODE 250 W/ 637 OVERRIDE(OP): Performed by: HOSPITALIST

## 2022-09-14 PROCEDURE — 99232 SBSQ HOSP IP/OBS MODERATE 35: CPT | Performed by: INTERNAL MEDICINE

## 2022-09-14 PROCEDURE — 770006 HCHG ROOM/CARE - MED/SURG/GYN SEMI*

## 2022-09-14 PROCEDURE — 94760 N-INVAS EAR/PLS OXIMETRY 1: CPT

## 2022-09-14 RX ORDER — METOCLOPRAMIDE 10 MG/1
10 TABLET ORAL
Status: DISCONTINUED | OUTPATIENT
Start: 2022-09-14 | End: 2022-09-15 | Stop reason: HOSPADM

## 2022-09-14 RX ADMIN — FOLIC ACID 1 MG: 1 TABLET ORAL at 05:31

## 2022-09-14 RX ADMIN — METOCLOPRAMIDE 10 MG: 10 TABLET ORAL at 21:47

## 2022-09-14 RX ADMIN — METOCLOPRAMIDE 10 MG: 10 TABLET ORAL at 16:29

## 2022-09-14 RX ADMIN — HYDROMORPHONE HYDROCHLORIDE 1 MG: 1 INJECTION, SOLUTION INTRAMUSCULAR; INTRAVENOUS; SUBCUTANEOUS at 15:52

## 2022-09-14 RX ADMIN — MORPHINE SULFATE 15 MG: 15 TABLET ORAL at 14:32

## 2022-09-14 RX ADMIN — ONDANSETRON 4 MG: 2 INJECTION INTRAMUSCULAR; INTRAVENOUS at 05:30

## 2022-09-14 RX ADMIN — ONDANSETRON 4 MG: 2 INJECTION INTRAMUSCULAR; INTRAVENOUS at 19:42

## 2022-09-14 RX ADMIN — CALCIUM 500 MG: 500 TABLET ORAL at 07:51

## 2022-09-14 RX ADMIN — LEVETIRACETAM 500 MG: 500 TABLET, FILM COATED ORAL at 17:00

## 2022-09-14 RX ADMIN — LEVETIRACETAM 500 MG: 500 TABLET, FILM COATED ORAL at 05:31

## 2022-09-14 RX ADMIN — HYDROMORPHONE HYDROCHLORIDE 1 MG: 1 INJECTION, SOLUTION INTRAMUSCULAR; INTRAVENOUS; SUBCUTANEOUS at 21:55

## 2022-09-14 RX ADMIN — POTASSIUM CHLORIDE 40 MEQ: 1500 TABLET, EXTENDED RELEASE ORAL at 05:30

## 2022-09-14 RX ADMIN — HYDROMORPHONE HYDROCHLORIDE 1 MG: 1 INJECTION, SOLUTION INTRAMUSCULAR; INTRAVENOUS; SUBCUTANEOUS at 09:36

## 2022-09-14 RX ADMIN — SODIUM CHLORIDE, POTASSIUM CHLORIDE, SODIUM LACTATE AND CALCIUM CHLORIDE: 600; 310; 30; 20 INJECTION, SOLUTION INTRAVENOUS at 19:42

## 2022-09-14 RX ADMIN — Medication 1 TABLET: at 05:30

## 2022-09-14 RX ADMIN — HYDROMORPHONE HYDROCHLORIDE 1 MG: 1 INJECTION, SOLUTION INTRAMUSCULAR; INTRAVENOUS; SUBCUTANEOUS at 05:30

## 2022-09-14 RX ADMIN — METOCLOPRAMIDE 10 MG: 10 TABLET ORAL at 12:00

## 2022-09-14 RX ADMIN — MORPHINE SULFATE 15 MG: 15 TABLET ORAL at 06:13

## 2022-09-14 RX ADMIN — MORPHINE SULFATE 15 MG: 15 TABLET ORAL at 20:35

## 2022-09-14 RX ADMIN — ZONISAMIDE 200 MG: 50 CAPSULE ORAL at 20:33

## 2022-09-14 RX ADMIN — THIAMINE HCL TAB 100 MG 100 MG: 100 TAB at 05:30

## 2022-09-14 ASSESSMENT — ENCOUNTER SYMPTOMS
EYE REDNESS: 0
VOMITING: 0
FEVER: 0
ABDOMINAL PAIN: 1
CHILLS: 0
SHORTNESS OF BREATH: 0
SEIZURES: 0
TREMORS: 0
NAUSEA: 1
FALLS: 0

## 2022-09-14 ASSESSMENT — COGNITIVE AND FUNCTIONAL STATUS - GENERAL
SUGGESTED CMS G CODE MODIFIER MOBILITY: CH
MOBILITY SCORE: 24
DAILY ACTIVITIY SCORE: 24
SUGGESTED CMS G CODE MODIFIER DAILY ACTIVITY: CH

## 2022-09-14 ASSESSMENT — PAIN DESCRIPTION - PAIN TYPE
TYPE: ACUTE PAIN

## 2022-09-14 ASSESSMENT — LIFESTYLE VARIABLES: SUBSTANCE_ABUSE: 1

## 2022-09-14 NOTE — CARE PLAN
The patient is Stable - Low risk of patient condition declining or worsening    Shift Goals  Clinical Goals: Manage pain, advance diet as able, keep food down  Patient Goals: Manage abdominal pain  Family Goals: Visit with patient    Progress made toward(s) clinical / shift goals: Able to shower independently and kept food down without vomiting.     Patient is not progressing towards the following goals: decreased pain.

## 2022-09-14 NOTE — PROGRESS NOTES
Hospital Medicine Daily Progress Note    Date of Service  9/14/2022    Chief Complaint  Rhiannon Marrero is a 37 y.o. female admitted 9/5/2022 with ETOH intoxication and abdominal pain.    Hospital Course  37-year-old female with a history of alcohol dependence, seizure disorder, alcohol induced pancreatitis, bipolar disorder who presented on 9/5/2022 with weakness, headache, nausea/vomiting and abdominal pain as well as tremors.  Patient reports she drinks vodka daily.  She has seizures and is followed by neurology.  Last seizure was on day of admission.  On admission she was found to have potassium 2.9, elevated LFTs, lipase 118. She was started on CIWA.  She continued to have severe abdominal pain.  She was treated with pain medication and nausea medication.  Patient continued to have severe abdominal pain and then proceeded to have worsening transaminitis.  Renal ultrasound showed gallbladder sludge and dilated common bile duct.  MRCP showed mildly dilated common bile duct and distended gallbladder.  General surgery was consulted, did not recommend surgical intervention as no signs of acute infection.     Interval Problem Update  - Surgery did not think any indication for cholecystectomy  - LFTs improving  - abd pain stable  - tolerating CLD  9/13 advance diet to full liquid for now, can advance to gi soft as tolerated. Once tolerating a diet can dc on oral pain meds. Vitals stable. AST/ALT improving. Patient reports nausea and abdominal pain are unchanged.     9/14 Per night nurse patient still unable to tolerate any solid foods. Vitals stable. Crying on evaluation as she states she is very hungry but became extremely nauseous after her soup with dinner last night. Will trial reglan prior to meals to see if any improvement.     I have discussed this patient's plan of care and discharge plan at IDT rounds today with Case Management, Nursing, Nursing leadership, and other members of the IDT  team.    Consultants/Specialty  general surgery    Code Status  Full Code    Disposition  Patient is medically cleared pending tolerating diet  for discharge.   Anticipate discharge to to home with close outpatient follow-up.  I have placed the appropriate orders for post-discharge needs.    Review of Systems  Review of Systems   Constitutional:  Negative for chills and fever.   HENT:  Negative for nosebleeds.    Eyes:  Negative for redness.   Respiratory:  Negative for shortness of breath.    Cardiovascular:  Negative for chest pain.   Gastrointestinal:  Positive for abdominal pain and nausea. Negative for vomiting.   Genitourinary: Negative.    Musculoskeletal:  Negative for falls.   Skin:  Negative for rash.   Neurological:  Negative for tremors and seizures.   Psychiatric/Behavioral:  Positive for substance abuse.       Physical Exam  Temp:  [36.4 °C (97.6 °F)-36.8 °C (98.3 °F)] 36.6 °C (97.8 °F)  Pulse:  [60-73] 73  Resp:  [16-18] 18  BP: (100-134)/(54-78) 134/78  SpO2:  [95 %-99 %] 99 %    Physical Exam  Constitutional:       General: She is in acute distress (tearful).      Appearance: She is not ill-appearing.   HENT:      Head: Normocephalic.   Eyes:      Conjunctiva/sclera: Conjunctivae normal.   Cardiovascular:      Rate and Rhythm: Normal rate and regular rhythm.      Heart sounds: No murmur heard.    No friction rub. No gallop.   Pulmonary:      Effort: Pulmonary effort is normal.      Breath sounds: Normal breath sounds.   Abdominal:      General: Abdomen is flat. There is no distension.      Palpations: Abdomen is soft.      Tenderness: There is generalized abdominal tenderness. There is no guarding or rebound.   Musculoskeletal:      Cervical back: Normal range of motion.      Right lower leg: No edema.      Left lower leg: No edema.   Skin:     General: Skin is warm and dry.      Coloration: Skin is not jaundiced.      Findings: No rash.      Comments: Old self injurious scars on R forearm    Neurological:      Mental Status: She is alert and oriented to person, place, and time. Mental status is at baseline.      Motor: No weakness.   Psychiatric:         Mood and Affect: Mood is anxious. Affect is tearful.         Behavior: Behavior normal.       Fluids    Intake/Output Summary (Last 24 hours) at 9/14/2022 0942  Last data filed at 9/14/2022 0500  Gross per 24 hour   Intake 696 ml   Output --   Net 696 ml       Laboratory  Recent Labs     09/12/22  0642   WBC 5.2   RBC 4.12*   HEMOGLOBIN 13.2   HEMATOCRIT 40.7   MCV 98.8*   MCH 32.0   MCHC 32.4*   RDW 55.8*   PLATELETCT 114*   MPV 10.7     Recent Labs     09/12/22  0642 09/13/22  0156   SODIUM 140 137   POTASSIUM 3.9 3.5*   CHLORIDE 104 103   CO2 23 24   GLUCOSE 85 87   BUN 2* <2*   CREATININE 0.81 0.72   CALCIUM 9.4 8.9         Recent Labs     09/08/22  0508 09/09/22  0147 09/10/22  0222 09/11/22  0227 09/12/22  0642 09/13/22  0156   ASTSGOT 203* 245* 274* 224* 131* 81*   ALTSGPT 102* 143* 190* 188* 153* 113*   TBILIRUBIN 1.1 1.0 0.9 0.7 0.7 0.5   GLOBULIN 3.0 2.8 3.0 2.8 2.9 2.4                 Imaging  [id number]   Final Result      1.  Mildly dilated common bile duct at 7 mm. No common duct stones seen.      2.  Distended gallbladder with small amount of sludge present. No gallstones seen.      3.  Hepatic enlargement.      US-RUQ   Final Result      1.  Enlarged echogenic liver suggesting fatty infiltration.   2.  Sludge present in the gallbladder.   3.  Mild extrahepatic biliary dilation, new from prior.            DX-KNEE 3 VIEWS RIGHT   Final Result      Minimal degenerative change of the medial femorotibial articulation.             Assessment/Plan  * Alcohol dependence with withdrawal (HCC)- (present on admission)  Assessment & Plan  Resolved    [facility] protocol   Continuous cardiac monitoring  Monitor electrolytes and replace accordingly, particularly magnesium, potassium and phosphorus  Fall, seizure, aspiration precautions.  Thiamine  folic acid and multivitamin.   Counseling      Abdominal pain  Assessment & Plan  Persistent, severe  Likely in setting chronic or recurrent pancreatitis vs gastritis in setting of etoh vs etoh hepatitis   Lipase mildly elevated, recheck normal  PPI  IVF  Pain mgmt  Nausea meds  RUQ US showed gallbladder sludge and biliary dilation  MRCP reviewed: mildly distended CBD, no stone, distended gallbladder with sludge  Surgery consulted: did not think needed cholecystectomy  Advance diet to full liquids, if tolerating gi soft can dc home    Trial reglan     Hypokalemia- (present on admission)  Assessment & Plan  Resolved  Replacing, checking magnesium    Continue to monitor replace as needed     Transaminitis- (present on admission)  Assessment & Plan  Stable  US showed gallbladder sludge and biliary dilation which is new  MRCP, dilated CBD but no stone, distended gallbladder with sludge    Seizure disorder (HCC)- (present on admission)  Assessment & Plan  Resume home zonisamide and Keppra  As needed lorazepam for seizures       VTE prophylaxis: SCDs/TEDs, refusing lovenox     I have performed a physical exam and reviewed and updated ROS and Plan today (9/14/2022). In review of yesterday's note (9/13/2022), there are no changes except as documented above.

## 2022-09-14 NOTE — PROGRESS NOTES
Received report from MT LESLIE Adame. Pt admitted to room 213-1 at 1638 from [facility]. Pt is alert and oriented, on room air, c/o abdominal pain 7/10 at this time but declines intervention. Pt ambulated from wheelchair to bed with steady gait and no assistance. Assessment complete. IVF infusing. Safety precautions in place.

## 2022-09-14 NOTE — PROGRESS NOTES
"Education provided regarding plan of care, medications, falls and safety. Pt demonstrated proper us of call light and all questions answered.     Pt observed ambulating independently in room and states she will perform ADL's ad mirza; supplies at bedside.     Pt only complaint at this time is abdominal in nature. Her abdominal pain is difficult to manage and states she is trying to \"take less and less pain medication\". She states she is still unable to hold any solids and fluids and declining sustenance at this time.    No acute events overnight. However pt appeared to be in severe pain throughout the night. She continues to decline pain medication despite education.   "

## 2022-09-15 ENCOUNTER — APPOINTMENT (OUTPATIENT)
Dept: RADIOLOGY | Facility: MEDICAL CENTER | Age: 37
DRG: 896 | End: 2022-09-15
Attending: INTERNAL MEDICINE
Payer: COMMERCIAL

## 2022-09-15 VITALS
HEART RATE: 85 BPM | DIASTOLIC BLOOD PRESSURE: 79 MMHG | SYSTOLIC BLOOD PRESSURE: 130 MMHG | WEIGHT: 117.06 LBS | HEIGHT: 66 IN | BODY MASS INDEX: 18.81 KG/M2 | TEMPERATURE: 97.9 F | OXYGEN SATURATION: 100 % | RESPIRATION RATE: 18 BRPM

## 2022-09-15 LAB
APPEARANCE UR: CLEAR
BILIRUB UR QL STRIP.AUTO: NEGATIVE
COLOR UR: YELLOW
ERYTHROCYTE [DISTWIDTH] IN BLOOD BY AUTOMATED COUNT: 57.4 FL (ref 35.9–50)
GLUCOSE UR STRIP.AUTO-MCNC: NEGATIVE MG/DL
HCT VFR BLD AUTO: 39.6 % (ref 37–47)
HGB BLD-MCNC: 12.8 G/DL (ref 12–16)
KETONES UR STRIP.AUTO-MCNC: NEGATIVE MG/DL
LEUKOCYTE ESTERASE UR QL STRIP.AUTO: NEGATIVE
LIPASE SERPL-CCNC: 6 U/L (ref 7–58)
MCH RBC QN AUTO: 32.3 PG (ref 27–33)
MCHC RBC AUTO-ENTMCNC: 32.3 G/DL (ref 33.6–35)
MCV RBC AUTO: 100 FL (ref 81.4–97.8)
MICRO URNS: NORMAL
NITRITE UR QL STRIP.AUTO: NEGATIVE
PH UR STRIP.AUTO: 5.5 [PH] (ref 5–8)
PLATELET # BLD AUTO: 183 K/UL (ref 164–446)
PMV BLD AUTO: 10.5 FL (ref 9–12.9)
PROT UR QL STRIP: NEGATIVE MG/DL
RBC # BLD AUTO: 3.96 M/UL (ref 4.2–5.4)
RBC UR QL AUTO: NEGATIVE
SP GR UR STRIP.AUTO: <=1.005
WBC # BLD AUTO: 3.2 K/UL (ref 4.8–10.8)

## 2022-09-15 PROCEDURE — 700102 HCHG RX REV CODE 250 W/ 637 OVERRIDE(OP): Performed by: HOSPITALIST

## 2022-09-15 PROCEDURE — A9270 NON-COVERED ITEM OR SERVICE: HCPCS | Performed by: INTERNAL MEDICINE

## 2022-09-15 PROCEDURE — 74177 CT ABD & PELVIS W/CONTRAST: CPT

## 2022-09-15 PROCEDURE — 700117 HCHG RX CONTRAST REV CODE 255: Performed by: INTERNAL MEDICINE

## 2022-09-15 PROCEDURE — 700105 HCHG RX REV CODE 258: Performed by: INTERNAL MEDICINE

## 2022-09-15 PROCEDURE — 83690 ASSAY OF LIPASE: CPT

## 2022-09-15 PROCEDURE — 700102 HCHG RX REV CODE 250 W/ 637 OVERRIDE(OP): Performed by: INTERNAL MEDICINE

## 2022-09-15 PROCEDURE — 36415 COLL VENOUS BLD VENIPUNCTURE: CPT

## 2022-09-15 PROCEDURE — 85027 COMPLETE CBC AUTOMATED: CPT

## 2022-09-15 PROCEDURE — 700111 HCHG RX REV CODE 636 W/ 250 OVERRIDE (IP): Performed by: INTERNAL MEDICINE

## 2022-09-15 PROCEDURE — 99239 HOSP IP/OBS DSCHRG MGMT >30: CPT | Performed by: INTERNAL MEDICINE

## 2022-09-15 PROCEDURE — A9270 NON-COVERED ITEM OR SERVICE: HCPCS | Performed by: HOSPITALIST

## 2022-09-15 PROCEDURE — 700111 HCHG RX REV CODE 636 W/ 250 OVERRIDE (IP): Performed by: HOSPITALIST

## 2022-09-15 PROCEDURE — 81003 URINALYSIS AUTO W/O SCOPE: CPT

## 2022-09-15 RX ORDER — OMEPRAZOLE 20 MG/1
20 CAPSULE, DELAYED RELEASE ORAL 2 TIMES DAILY
Status: DISCONTINUED | OUTPATIENT
Start: 2022-09-15 | End: 2022-09-15 | Stop reason: HOSPADM

## 2022-09-15 RX ORDER — LEVETIRACETAM 1000 MG/1
1000 TABLET ORAL DAILY
Qty: 60 TABLET | Refills: 0
Start: 2022-09-15 | End: 2022-10-12

## 2022-09-15 RX ORDER — METOCLOPRAMIDE 10 MG/1
10 TABLET ORAL
Qty: 30 TABLET | Refills: 0 | Status: SHIPPED | OUTPATIENT
Start: 2022-09-15 | End: 2022-12-07

## 2022-09-15 RX ORDER — MORPHINE SULFATE 15 MG/1
15 TABLET ORAL EVERY 6 HOURS PRN
Qty: 20 TABLET | Refills: 0 | Status: SHIPPED | OUTPATIENT
Start: 2022-09-15 | End: 2022-09-20

## 2022-09-15 RX ORDER — ONDANSETRON 4 MG/1
4 TABLET, ORALLY DISINTEGRATING ORAL EVERY 6 HOURS PRN
Qty: 50 TABLET | Refills: 0 | Status: ON HOLD | OUTPATIENT
Start: 2022-09-15 | End: 2022-10-21 | Stop reason: SDUPTHER

## 2022-09-15 RX ORDER — OMEPRAZOLE 20 MG/1
20 CAPSULE, DELAYED RELEASE ORAL DAILY
Qty: 30 CAPSULE | Refills: 0 | Status: SHIPPED | OUTPATIENT
Start: 2022-09-15 | End: 2022-10-12

## 2022-09-15 RX ADMIN — SODIUM CHLORIDE, POTASSIUM CHLORIDE, SODIUM LACTATE AND CALCIUM CHLORIDE: 600; 310; 30; 20 INJECTION, SOLUTION INTRAVENOUS at 05:10

## 2022-09-15 RX ADMIN — METOCLOPRAMIDE 10 MG: 10 TABLET ORAL at 05:08

## 2022-09-15 RX ADMIN — IOHEXOL 80 ML: 350 INJECTION, SOLUTION INTRAVENOUS at 11:14

## 2022-09-15 RX ADMIN — Medication 1 TABLET: at 05:08

## 2022-09-15 RX ADMIN — MORPHINE SULFATE 15 MG: 15 TABLET ORAL at 11:48

## 2022-09-15 RX ADMIN — OMEPRAZOLE 20 MG: 20 CAPSULE, DELAYED RELEASE ORAL at 08:12

## 2022-09-15 RX ADMIN — ONDANSETRON 4 MG: 2 INJECTION INTRAMUSCULAR; INTRAVENOUS at 03:32

## 2022-09-15 RX ADMIN — DOCUSATE SODIUM 50 MG AND SENNOSIDES 8.6 MG 2 TABLET: 8.6; 5 TABLET, FILM COATED ORAL at 05:08

## 2022-09-15 RX ADMIN — HYDROMORPHONE HYDROCHLORIDE 1 MG: 1 INJECTION, SOLUTION INTRAMUSCULAR; INTRAVENOUS; SUBCUTANEOUS at 10:16

## 2022-09-15 RX ADMIN — CALCIUM 500 MG: 500 TABLET ORAL at 08:12

## 2022-09-15 RX ADMIN — FOLIC ACID 1 MG: 1 TABLET ORAL at 05:08

## 2022-09-15 RX ADMIN — POTASSIUM CHLORIDE 40 MEQ: 1500 TABLET, EXTENDED RELEASE ORAL at 05:08

## 2022-09-15 RX ADMIN — HYDROMORPHONE HYDROCHLORIDE 1 MG: 1 INJECTION, SOLUTION INTRAMUSCULAR; INTRAVENOUS; SUBCUTANEOUS at 05:07

## 2022-09-15 RX ADMIN — HYDROMORPHONE HYDROCHLORIDE 1 MG: 1 INJECTION, SOLUTION INTRAMUSCULAR; INTRAVENOUS; SUBCUTANEOUS at 16:21

## 2022-09-15 RX ADMIN — HYDROMORPHONE HYDROCHLORIDE 1 MG: 1 INJECTION, SOLUTION INTRAMUSCULAR; INTRAVENOUS; SUBCUTANEOUS at 13:13

## 2022-09-15 RX ADMIN — LEVETIRACETAM 500 MG: 500 TABLET, FILM COATED ORAL at 05:08

## 2022-09-15 RX ADMIN — MORPHINE SULFATE 15 MG: 15 TABLET ORAL at 03:32

## 2022-09-15 RX ADMIN — THIAMINE HCL TAB 100 MG 100 MG: 100 TAB at 05:08

## 2022-09-15 ASSESSMENT — PAIN DESCRIPTION - PAIN TYPE
TYPE: ACUTE PAIN

## 2022-09-15 NOTE — PROGRESS NOTES
Assumed care of pt at 1915. Received report from Christy NELSON. Pt resting in bed watching videos on her laptop. Pt stated her pain is at an 8/10, explained pain medication availability and that when medication was available this RN would bring it in. Pt stated she is nauseated at this time as well, medicated per MAR. Explained plan for the night including IV fluids, managing pain, and managing nausea. No other needs at this time, call light in reach, bed in lowest position.

## 2022-09-15 NOTE — CARE PLAN
The patient is Stable - Low risk of patient condition declining or worsening    Shift Goals  Clinical Goals: Pt pain will be tolerable AEB pt being able to sleep at least 5 hours overnight  Patient Goals: Manage abdominal pain  Family Goals: Visit with patient    Progress made toward(s) clinical / shift goals:  Pt was able to sleep from 2200- current time. Pt required PRN morphine and dilaudid    Patient is not progressing towards the following goals: n/a

## 2022-09-27 NOTE — DOCUMENTATION QUERY
"                                                                         [facility]                                                                       Query Response Note      PATIENT:               JAKE LOPEZ  ACCT #:                  [id number]  MRN:                     [id number]  :                      1985  ADMIT DATE:       2022 6:15 PM  DISCH DATE:        9/15/2022 4:41 PM  RESPONDING  PROVIDER #:        [id number]           QUERY TEXT:    Based on the clinical indicators noted, and your clinical judgement, can a diagnosis be provided for these findings?       NOTE:  If an appropriate response is not listed below, please respond with a new note.      The patient's Clinical Indicators include:  The dietary assessment for malnutrition in this clinical record noted:  \"pt meets criteria for severe malnutrition in the context of social circumstances starvation due to diminished PO intake due to untreated bipolar disorder, AEB confirmed wt loss of 39% x 1 yr, and noted moderate wt loss - temporal region and severe fat loss - orbital region and recent 14% wt loss (severe) in 3.5 months\".  BMI of  18.89.  Treatment included:  provide fruit based protein smoothies, encourage intake of > 50% of all meals, RD to follow for meals and snack selection.      Thank you,  Josefina Bhakta, RN, BSN  Clinical    [facility]  [employer]  X [phone]  [email or web address]  Options provided:   -- Severe protein calorie malnutrition   -- Malnutrition (mild or moderate -, please specify   -- Other explanation, Please specify   -- Unable to determine      Query created by: Josefina Bhakta on 2022 8:31 AM    RESPONSE TEXT:    Severe protein calorie malnutrition          Electronically signed by:  SORAYA REARDON MD 2022 8:25 AM              "

## 2022-10-12 ENCOUNTER — HOSPITAL ENCOUNTER (INPATIENT)
Facility: MEDICAL CENTER | Age: 37
LOS: 9 days | DRG: 439 | End: 2022-10-21
Attending: STUDENT IN AN ORGANIZED HEALTH CARE EDUCATION/TRAINING PROGRAM | Admitting: INTERNAL MEDICINE
Payer: COMMERCIAL

## 2022-10-12 ENCOUNTER — APPOINTMENT (OUTPATIENT)
Dept: RADIOLOGY | Facility: MEDICAL CENTER | Age: 37
DRG: 439 | End: 2022-10-12
Attending: STUDENT IN AN ORGANIZED HEALTH CARE EDUCATION/TRAINING PROGRAM
Payer: COMMERCIAL

## 2022-10-12 ENCOUNTER — APPOINTMENT (OUTPATIENT)
Dept: RADIOLOGY | Facility: MEDICAL CENTER | Age: 37
DRG: 439 | End: 2022-10-12
Attending: FAMILY MEDICINE
Payer: COMMERCIAL

## 2022-10-12 DIAGNOSIS — R10.84 GENERALIZED ABDOMINAL PAIN: ICD-10-CM

## 2022-10-12 DIAGNOSIS — F10.21 ALCOHOL USE DISORDER, SEVERE, IN EARLY REMISSION, DEPENDENCE (HCC): ICD-10-CM

## 2022-10-12 DIAGNOSIS — K85.90 ACUTE ON CHRONIC PANCREATITIS (HCC): ICD-10-CM

## 2022-10-12 DIAGNOSIS — K86.0 ALCOHOL-INDUCED CHRONIC PANCREATITIS (HCC): ICD-10-CM

## 2022-10-12 DIAGNOSIS — R79.0 ABNORMAL BLOOD LEVEL OF IRON: ICD-10-CM

## 2022-10-12 DIAGNOSIS — K86.1 ACUTE ON CHRONIC PANCREATITIS (HCC): ICD-10-CM

## 2022-10-12 DIAGNOSIS — F41.9 ANXIETY: ICD-10-CM

## 2022-10-12 DIAGNOSIS — F10.920 ALCOHOLIC INTOXICATION WITHOUT COMPLICATION (HCC): ICD-10-CM

## 2022-10-12 DIAGNOSIS — Z72.0 TOBACCO ABUSE: ICD-10-CM

## 2022-10-12 DIAGNOSIS — R45.851 SUICIDAL IDEATION: ICD-10-CM

## 2022-10-12 DIAGNOSIS — R11.2 INTRACTABLE NAUSEA AND VOMITING: ICD-10-CM

## 2022-10-12 DIAGNOSIS — D72.820 LYMPHOCYTOSIS: ICD-10-CM

## 2022-10-12 PROBLEM — E16.2 HYPOGLYCEMIA: Status: ACTIVE | Noted: 2022-10-12

## 2022-10-12 LAB
ALBUMIN SERPL BCP-MCNC: 5.1 G/DL (ref 3.2–4.9)
ALBUMIN/GLOB SERPL: 1.6 G/DL
ALP SERPL-CCNC: 92 U/L (ref 30–99)
ALT SERPL-CCNC: 49 U/L (ref 2–50)
AMPHET UR QL SCN: NEGATIVE
ANION GAP SERPL CALC-SCNC: 27 MMOL/L (ref 7–16)
APPEARANCE UR: CLEAR
AST SERPL-CCNC: 36 U/L (ref 12–45)
BARBITURATES UR QL SCN: NEGATIVE
BASOPHILS # BLD AUTO: 1 % (ref 0–1.8)
BASOPHILS # BLD: 0.11 K/UL (ref 0–0.12)
BENZODIAZ UR QL SCN: NEGATIVE
BILIRUB SERPL-MCNC: 0.8 MG/DL (ref 0.1–1.5)
BILIRUB UR QL STRIP.AUTO: NEGATIVE
BUN SERPL-MCNC: 13 MG/DL (ref 8–22)
BZE UR QL SCN: NEGATIVE
CALCIUM SERPL-MCNC: 8.9 MG/DL (ref 8.4–10.2)
CANNABINOIDS UR QL SCN: POSITIVE
CHLORIDE SERPL-SCNC: 99 MMOL/L (ref 96–112)
CO2 SERPL-SCNC: 14 MMOL/L (ref 20–33)
COLOR UR: YELLOW
CREAT SERPL-MCNC: 0.64 MG/DL (ref 0.5–1.4)
EOSINOPHIL # BLD AUTO: 0 K/UL (ref 0–0.51)
EOSINOPHIL NFR BLD: 0 % (ref 0–6.9)
ERYTHROCYTE [DISTWIDTH] IN BLOOD BY AUTOMATED COUNT: 53.2 FL (ref 35.9–50)
GFR SERPLBLD CREATININE-BSD FMLA CKD-EPI: 118 ML/MIN/1.73 M 2
GLOBULIN SER CALC-MCNC: 3.2 G/DL (ref 1.9–3.5)
GLUCOSE SERPL-MCNC: 56 MG/DL (ref 65–99)
GLUCOSE UR STRIP.AUTO-MCNC: NEGATIVE MG/DL
HCG SERPL QL: NEGATIVE
HCT VFR BLD AUTO: 46.4 % (ref 37–47)
HGB BLD-MCNC: 15.5 G/DL (ref 12–16)
KETONES UR STRIP.AUTO-MCNC: 40 MG/DL
LACTATE SERPL-SCNC: 1.4 MMOL/L (ref 0.5–2)
LACTATE SERPL-SCNC: 2.5 MMOL/L (ref 0.5–2)
LACTATE SERPL-SCNC: 8.2 MMOL/L (ref 0.5–2)
LEUKOCYTE ESTERASE UR QL STRIP.AUTO: NEGATIVE
LIPASE SERPL-CCNC: 18 U/L (ref 7–58)
LYMPHOCYTES # BLD AUTO: 1.68 K/UL (ref 1–4.8)
LYMPHOCYTES NFR BLD: 15 % (ref 22–41)
MANUAL DIFF BLD: NORMAL
MCH RBC QN AUTO: 31.8 PG (ref 27–33)
MCHC RBC AUTO-ENTMCNC: 33.4 G/DL (ref 33.6–35)
MCV RBC AUTO: 95.3 FL (ref 81.4–97.8)
METHADONE UR QL SCN: NEGATIVE
MICRO URNS: ABNORMAL
MONOCYTES # BLD AUTO: 0.34 K/UL (ref 0–0.85)
MONOCYTES NFR BLD AUTO: 3 % (ref 0–13.4)
NEUTROPHILS # BLD AUTO: 9.07 K/UL (ref 2–7.15)
NEUTROPHILS NFR BLD: 80 % (ref 44–72)
NEUTS BAND NFR BLD MANUAL: 1 % (ref 0–10)
NITRITE UR QL STRIP.AUTO: NEGATIVE
NRBC # BLD AUTO: 0 K/UL
NRBC BLD-RTO: 0 /100 WBC
OPIATES UR QL SCN: POSITIVE
OXYCODONE UR QL SCN: NEGATIVE
PCP UR QL SCN: NEGATIVE
PH UR STRIP.AUTO: 5.5 [PH] (ref 5–8)
PLATELET # BLD AUTO: 274 K/UL (ref 164–446)
PLATELET BLD QL SMEAR: NORMAL
PMV BLD AUTO: 9.4 FL (ref 9–12.9)
POTASSIUM SERPL-SCNC: 4.2 MMOL/L (ref 3.6–5.5)
PROCALCITONIN SERPL-MCNC: 0.03 NG/ML
PROPOXYPH UR QL SCN: NEGATIVE
PROT SERPL-MCNC: 8.3 G/DL (ref 6–8.2)
PROT UR QL STRIP: NEGATIVE MG/DL
RBC # BLD AUTO: 4.87 M/UL (ref 4.2–5.4)
RBC BLD AUTO: NORMAL
RBC UR QL AUTO: NEGATIVE
SODIUM SERPL-SCNC: 140 MMOL/L (ref 135–145)
SP GR UR STRIP.AUTO: 1.02
WBC # BLD AUTO: 11.2 K/UL (ref 4.8–10.8)

## 2022-10-12 PROCEDURE — 80074 ACUTE HEPATITIS PANEL: CPT

## 2022-10-12 PROCEDURE — HZ2ZZZZ DETOXIFICATION SERVICES FOR SUBSTANCE ABUSE TREATMENT: ICD-10-PCS | Performed by: FAMILY MEDICINE

## 2022-10-12 PROCEDURE — A9270 NON-COVERED ITEM OR SERVICE: HCPCS | Performed by: STUDENT IN AN ORGANIZED HEALTH CARE EDUCATION/TRAINING PROGRAM

## 2022-10-12 PROCEDURE — 700102 HCHG RX REV CODE 250 W/ 637 OVERRIDE(OP): Performed by: STUDENT IN AN ORGANIZED HEALTH CARE EDUCATION/TRAINING PROGRAM

## 2022-10-12 PROCEDURE — 700105 HCHG RX REV CODE 258: Performed by: INTERNAL MEDICINE

## 2022-10-12 PROCEDURE — 700102 HCHG RX REV CODE 250 W/ 637 OVERRIDE(OP): Performed by: INTERNAL MEDICINE

## 2022-10-12 PROCEDURE — 83690 ASSAY OF LIPASE: CPT

## 2022-10-12 PROCEDURE — 0241U HCHG SARS-COV-2 COVID-19 NFCT DS RESP RNA 4 TRGT MIC: CPT

## 2022-10-12 PROCEDURE — 80177 DRUG SCRN QUAN LEVETIRACETAM: CPT

## 2022-10-12 PROCEDURE — A9270 NON-COVERED ITEM OR SERVICE: HCPCS | Performed by: FAMILY MEDICINE

## 2022-10-12 PROCEDURE — 700111 HCHG RX REV CODE 636 W/ 250 OVERRIDE (IP): Performed by: STUDENT IN AN ORGANIZED HEALTH CARE EDUCATION/TRAINING PROGRAM

## 2022-10-12 PROCEDURE — 36415 COLL VENOUS BLD VENIPUNCTURE: CPT

## 2022-10-12 PROCEDURE — 94760 N-INVAS EAR/PLS OXIMETRY 1: CPT

## 2022-10-12 PROCEDURE — 84145 PROCALCITONIN (PCT): CPT

## 2022-10-12 PROCEDURE — 99223 1ST HOSP IP/OBS HIGH 75: CPT | Mod: AI | Performed by: INTERNAL MEDICINE

## 2022-10-12 PROCEDURE — A9270 NON-COVERED ITEM OR SERVICE: HCPCS | Performed by: INTERNAL MEDICINE

## 2022-10-12 PROCEDURE — 76705 ECHO EXAM OF ABDOMEN: CPT

## 2022-10-12 PROCEDURE — 87040 BLOOD CULTURE FOR BACTERIA: CPT | Mod: 91

## 2022-10-12 PROCEDURE — 700101 HCHG RX REV CODE 250: Performed by: FAMILY MEDICINE

## 2022-10-12 PROCEDURE — 85025 COMPLETE CBC W/AUTO DIFF WBC: CPT

## 2022-10-12 PROCEDURE — 80053 COMPREHEN METABOLIC PANEL: CPT

## 2022-10-12 PROCEDURE — 700117 HCHG RX CONTRAST REV CODE 255: Performed by: STUDENT IN AN ORGANIZED HEALTH CARE EDUCATION/TRAINING PROGRAM

## 2022-10-12 PROCEDURE — 71045 X-RAY EXAM CHEST 1 VIEW: CPT

## 2022-10-12 PROCEDURE — 96375 TX/PRO/DX INJ NEW DRUG ADDON: CPT

## 2022-10-12 PROCEDURE — 96376 TX/PRO/DX INJ SAME DRUG ADON: CPT

## 2022-10-12 PROCEDURE — 700105 HCHG RX REV CODE 258: Performed by: STUDENT IN AN ORGANIZED HEALTH CARE EDUCATION/TRAINING PROGRAM

## 2022-10-12 PROCEDURE — 770001 HCHG ROOM/CARE - MED/SURG/GYN PRIV*

## 2022-10-12 PROCEDURE — 80307 DRUG TEST PRSMV CHEM ANLYZR: CPT

## 2022-10-12 PROCEDURE — 700102 HCHG RX REV CODE 250 W/ 637 OVERRIDE(OP): Performed by: FAMILY MEDICINE

## 2022-10-12 PROCEDURE — 99285 EMERGENCY DEPT VISIT HI MDM: CPT

## 2022-10-12 PROCEDURE — 81003 URINALYSIS AUTO W/O SCOPE: CPT

## 2022-10-12 PROCEDURE — 85007 BL SMEAR W/DIFF WBC COUNT: CPT

## 2022-10-12 PROCEDURE — 74177 CT ABD & PELVIS W/CONTRAST: CPT

## 2022-10-12 PROCEDURE — 83605 ASSAY OF LACTIC ACID: CPT | Mod: 91

## 2022-10-12 PROCEDURE — 700111 HCHG RX REV CODE 636 W/ 250 OVERRIDE (IP): Performed by: INTERNAL MEDICINE

## 2022-10-12 PROCEDURE — 96374 THER/PROPH/DIAG INJ IV PUSH: CPT

## 2022-10-12 PROCEDURE — 84703 CHORIONIC GONADOTROPIN ASSAY: CPT

## 2022-10-12 RX ORDER — FOLIC ACID 1 MG/1
1 TABLET ORAL DAILY
Status: COMPLETED | OUTPATIENT
Start: 2022-10-13 | End: 2022-10-16

## 2022-10-12 RX ORDER — ACETAMINOPHEN 325 MG/1
650 TABLET ORAL EVERY 6 HOURS PRN
Status: DISCONTINUED | OUTPATIENT
Start: 2022-10-12 | End: 2022-10-21 | Stop reason: HOSPADM

## 2022-10-12 RX ORDER — LEVETIRACETAM 500 MG/1
500 TABLET ORAL EVERY 12 HOURS
Status: DISCONTINUED | OUTPATIENT
Start: 2022-10-12 | End: 2022-10-12

## 2022-10-12 RX ORDER — ONDANSETRON 2 MG/ML
4 INJECTION INTRAMUSCULAR; INTRAVENOUS EVERY 4 HOURS PRN
Status: DISCONTINUED | OUTPATIENT
Start: 2022-10-12 | End: 2022-10-21 | Stop reason: HOSPADM

## 2022-10-12 RX ORDER — LEVETIRACETAM 500 MG/1
1000 TABLET ORAL DAILY
Status: DISCONTINUED | OUTPATIENT
Start: 2022-10-12 | End: 2022-10-12

## 2022-10-12 RX ORDER — LEVETIRACETAM 1000 MG/1
1000 TABLET ORAL 2 TIMES DAILY
Status: ON HOLD | COMMUNITY
End: 2022-10-21

## 2022-10-12 RX ORDER — MORPHINE SULFATE 4 MG/ML
4 INJECTION INTRAVENOUS
Status: DISCONTINUED | OUTPATIENT
Start: 2022-10-12 | End: 2022-10-14

## 2022-10-12 RX ORDER — IBUPROFEN 800 MG/1
800 TABLET ORAL EVERY 8 HOURS PRN
Status: ON HOLD | COMMUNITY
End: 2022-10-21

## 2022-10-12 RX ORDER — BISACODYL 10 MG
10 SUPPOSITORY, RECTAL RECTAL
Status: DISCONTINUED | OUTPATIENT
Start: 2022-10-12 | End: 2022-10-21 | Stop reason: HOSPADM

## 2022-10-12 RX ORDER — GAUZE BANDAGE 2" X 2"
100 BANDAGE TOPICAL DAILY
Status: COMPLETED | OUTPATIENT
Start: 2022-10-13 | End: 2022-10-16

## 2022-10-12 RX ORDER — ZONISAMIDE 50 MG/1
200 CAPSULE ORAL
Status: DISCONTINUED | OUTPATIENT
Start: 2022-10-12 | End: 2022-10-21 | Stop reason: HOSPADM

## 2022-10-12 RX ORDER — ONDANSETRON 4 MG/1
4 TABLET, ORALLY DISINTEGRATING ORAL EVERY 4 HOURS PRN
Status: DISCONTINUED | OUTPATIENT
Start: 2022-10-12 | End: 2022-10-21 | Stop reason: HOSPADM

## 2022-10-12 RX ORDER — LEVETIRACETAM 500 MG/1
1000 TABLET ORAL EVERY 12 HOURS
Status: DISCONTINUED | OUTPATIENT
Start: 2022-10-12 | End: 2022-10-18

## 2022-10-12 RX ORDER — METOCLOPRAMIDE 10 MG/1
10 TABLET ORAL
Status: DISCONTINUED | OUTPATIENT
Start: 2022-10-12 | End: 2022-10-21 | Stop reason: HOSPADM

## 2022-10-12 RX ORDER — LORAZEPAM 0.5 MG/1
0.5 TABLET ORAL EVERY 4 HOURS PRN
Status: DISCONTINUED | OUTPATIENT
Start: 2022-10-12 | End: 2022-10-15

## 2022-10-12 RX ORDER — AMOXICILLIN 250 MG
2 CAPSULE ORAL 2 TIMES DAILY
Status: DISCONTINUED | OUTPATIENT
Start: 2022-10-12 | End: 2022-10-21 | Stop reason: HOSPADM

## 2022-10-12 RX ORDER — ONDANSETRON 2 MG/ML
4 INJECTION INTRAMUSCULAR; INTRAVENOUS ONCE
Status: COMPLETED | OUTPATIENT
Start: 2022-10-12 | End: 2022-10-12

## 2022-10-12 RX ORDER — DEXTROSE MONOHYDRATE 25 G/50ML
25 INJECTION, SOLUTION INTRAVENOUS
Status: DISCONTINUED | OUTPATIENT
Start: 2022-10-12 | End: 2022-10-21 | Stop reason: HOSPADM

## 2022-10-12 RX ORDER — POLYETHYLENE GLYCOL 3350 17 G/17G
1 POWDER, FOR SOLUTION ORAL
Status: DISCONTINUED | OUTPATIENT
Start: 2022-10-12 | End: 2022-10-21 | Stop reason: HOSPADM

## 2022-10-12 RX ORDER — LORAZEPAM 1 MG/1
3 TABLET ORAL
Status: DISCONTINUED | OUTPATIENT
Start: 2022-10-12 | End: 2022-10-15

## 2022-10-12 RX ORDER — METHOCARBAMOL 500 MG/1
750 TABLET, FILM COATED ORAL 3 TIMES DAILY
Status: DISCONTINUED | OUTPATIENT
Start: 2022-10-12 | End: 2022-10-12

## 2022-10-12 RX ORDER — LORAZEPAM 1 MG/1
1 TABLET ORAL EVERY 4 HOURS PRN
Status: DISCONTINUED | OUTPATIENT
Start: 2022-10-12 | End: 2022-10-15

## 2022-10-12 RX ORDER — LABETALOL HYDROCHLORIDE 5 MG/ML
10 INJECTION, SOLUTION INTRAVENOUS EVERY 4 HOURS PRN
Status: DISCONTINUED | OUTPATIENT
Start: 2022-10-12 | End: 2022-10-21 | Stop reason: HOSPADM

## 2022-10-12 RX ORDER — METHOCARBAMOL 500 MG/1
750 TABLET, FILM COATED ORAL 3 TIMES DAILY PRN
Status: DISCONTINUED | OUTPATIENT
Start: 2022-10-12 | End: 2022-10-14

## 2022-10-12 RX ORDER — OXYCODONE HYDROCHLORIDE 5 MG/1
5 TABLET ORAL
Status: DISCONTINUED | OUTPATIENT
Start: 2022-10-12 | End: 2022-10-14

## 2022-10-12 RX ORDER — SODIUM CHLORIDE 9 MG/ML
1000 INJECTION, SOLUTION INTRAVENOUS ONCE
Status: COMPLETED | OUTPATIENT
Start: 2022-10-12 | End: 2022-10-12

## 2022-10-12 RX ORDER — OXYCODONE HYDROCHLORIDE 10 MG/1
10 TABLET ORAL
Status: DISCONTINUED | OUTPATIENT
Start: 2022-10-12 | End: 2022-10-14

## 2022-10-12 RX ORDER — LORAZEPAM 1 MG/1
4 TABLET ORAL
Status: DISCONTINUED | OUTPATIENT
Start: 2022-10-12 | End: 2022-10-15

## 2022-10-12 RX ORDER — LORAZEPAM 1 MG/1
2 TABLET ORAL
Status: DISCONTINUED | OUTPATIENT
Start: 2022-10-12 | End: 2022-10-15

## 2022-10-12 RX ORDER — MORPHINE SULFATE 4 MG/ML
4 INJECTION INTRAVENOUS ONCE
Status: COMPLETED | OUTPATIENT
Start: 2022-10-12 | End: 2022-10-12

## 2022-10-12 RX ORDER — HALOPERIDOL 5 MG/ML
1 INJECTION INTRAMUSCULAR ONCE
Status: COMPLETED | OUTPATIENT
Start: 2022-10-12 | End: 2022-10-12

## 2022-10-12 RX ORDER — SODIUM CHLORIDE 9 MG/ML
INJECTION, SOLUTION INTRAVENOUS CONTINUOUS
Status: DISCONTINUED | OUTPATIENT
Start: 2022-10-12 | End: 2022-10-21 | Stop reason: HOSPADM

## 2022-10-12 RX ORDER — LORAZEPAM 2 MG/ML
0.5 INJECTION INTRAMUSCULAR EVERY 4 HOURS PRN
Status: DISCONTINUED | OUTPATIENT
Start: 2022-10-12 | End: 2022-10-15

## 2022-10-12 RX ORDER — LEVETIRACETAM 500 MG/1
500 TABLET ORAL ONCE
Status: COMPLETED | OUTPATIENT
Start: 2022-10-12 | End: 2022-10-12

## 2022-10-12 RX ORDER — LORAZEPAM 2 MG/ML
2 INJECTION INTRAMUSCULAR
Status: DISCONTINUED | OUTPATIENT
Start: 2022-10-12 | End: 2022-10-15

## 2022-10-12 RX ORDER — OMEPRAZOLE 20 MG/1
20 CAPSULE, DELAYED RELEASE ORAL DAILY
Status: DISCONTINUED | OUTPATIENT
Start: 2022-10-12 | End: 2022-10-13

## 2022-10-12 RX ORDER — PROCHLORPERAZINE EDISYLATE 5 MG/ML
5-10 INJECTION INTRAMUSCULAR; INTRAVENOUS EVERY 4 HOURS PRN
Status: DISCONTINUED | OUTPATIENT
Start: 2022-10-12 | End: 2022-10-21 | Stop reason: HOSPADM

## 2022-10-12 RX ADMIN — QUETIAPINE FUMARATE 150 MG: 25 TABLET ORAL at 20:05

## 2022-10-12 RX ADMIN — MORPHINE SULFATE 4 MG: 4 INJECTION INTRAVENOUS at 17:01

## 2022-10-12 RX ADMIN — IOHEXOL 100 ML: 350 INJECTION, SOLUTION INTRAVENOUS at 03:28

## 2022-10-12 RX ADMIN — OXYCODONE HYDROCHLORIDE 10 MG: 10 TABLET ORAL at 23:33

## 2022-10-12 RX ADMIN — OMEPRAZOLE 20 MG: 20 CAPSULE, DELAYED RELEASE ORAL at 07:26

## 2022-10-12 RX ADMIN — SODIUM CHLORIDE 1000 ML: 9 INJECTION, SOLUTION INTRAVENOUS at 01:04

## 2022-10-12 RX ADMIN — SODIUM CHLORIDE: 9 INJECTION, SOLUTION INTRAVENOUS at 06:46

## 2022-10-12 RX ADMIN — LEVETIRACETAM 500 MG: 500 TABLET, FILM COATED ORAL at 11:10

## 2022-10-12 RX ADMIN — MORPHINE SULFATE 4 MG: 4 INJECTION INTRAVENOUS at 13:10

## 2022-10-12 RX ADMIN — LORAZEPAM 3 MG: 1 TABLET ORAL at 19:05

## 2022-10-12 RX ADMIN — LORAZEPAM 2 MG: 1 TABLET ORAL at 22:07

## 2022-10-12 RX ADMIN — FAMOTIDINE 20 MG: 10 INJECTION, SOLUTION INTRAVENOUS at 02:30

## 2022-10-12 RX ADMIN — SENNOSIDES AND DOCUSATE SODIUM 2 TABLET: 50; 8.6 TABLET ORAL at 07:25

## 2022-10-12 RX ADMIN — RIVAROXABAN 10 MG: 10 TABLET, FILM COATED ORAL at 17:08

## 2022-10-12 RX ADMIN — MORPHINE SULFATE 4 MG: 4 INJECTION INTRAVENOUS at 09:34

## 2022-10-12 RX ADMIN — ONDANSETRON 4 MG: 2 INJECTION INTRAMUSCULAR; INTRAVENOUS at 17:01

## 2022-10-12 RX ADMIN — ONDANSETRON 4 MG: 2 INJECTION INTRAMUSCULAR; INTRAVENOUS at 09:34

## 2022-10-12 RX ADMIN — HALOPERIDOL LACTATE 1 MG: 5 INJECTION, SOLUTION INTRAMUSCULAR at 04:46

## 2022-10-12 RX ADMIN — LEVETIRACETAM 1000 MG: 500 TABLET ORAL at 17:08

## 2022-10-12 RX ADMIN — LORAZEPAM 3 MG: 1 TABLET ORAL at 21:11

## 2022-10-12 RX ADMIN — MORPHINE SULFATE 4 MG: 4 INJECTION INTRAVENOUS at 01:05

## 2022-10-12 RX ADMIN — MORPHINE SULFATE 4 MG: 4 INJECTION INTRAVENOUS at 21:12

## 2022-10-12 RX ADMIN — THIAMINE HYDROCHLORIDE: 100 INJECTION, SOLUTION INTRAMUSCULAR; INTRAVENOUS at 17:02

## 2022-10-12 RX ADMIN — ZONISAMIDE 200 MG: 50 CAPSULE ORAL at 20:05

## 2022-10-12 RX ADMIN — MORPHINE SULFATE 4 MG: 4 INJECTION INTRAVENOUS at 05:22

## 2022-10-12 RX ADMIN — OXYCODONE HYDROCHLORIDE 10 MG: 10 TABLET ORAL at 20:05

## 2022-10-12 RX ADMIN — ONDANSETRON 4 MG: 2 INJECTION INTRAMUSCULAR; INTRAVENOUS at 01:05

## 2022-10-12 RX ADMIN — LEVETIRACETAM 500 MG: 500 TABLET, FILM COATED ORAL at 07:26

## 2022-10-12 RX ADMIN — SODIUM CHLORIDE: 9 INJECTION, SOLUTION INTRAVENOUS at 13:11

## 2022-10-12 RX ADMIN — SODIUM CHLORIDE: 9 INJECTION, SOLUTION INTRAVENOUS at 23:34

## 2022-10-12 RX ADMIN — LIDOCAINE HYDROCHLORIDE 30 ML: 20 SOLUTION OROPHARYNGEAL at 02:42

## 2022-10-12 ASSESSMENT — LIFESTYLE VARIABLES
TREMOR: *
SUBSTANCE_ABUSE: 1
TREMOR: MODERATE TREMOR WITH ARMS EXTENDED
ANXIETY: MODERATELY ANXIOUS OR GUARDED, SO ANXIETY IS INFERRED
AUDITORY DISTURBANCES: NOT PRESENT
NAUSEA AND VOMITING: NO NAUSEA AND NO VOMITING
ANXIETY: MODERATELY ANXIOUS OR GUARDED, SO ANXIETY IS INFERRED
PAROXYSMAL SWEATS: *
TOTAL SCORE: 17
AUDITORY DISTURBANCES: NOT PRESENT
ORIENTATION AND CLOUDING OF SENSORIUM: ORIENTED AND CAN DO SERIAL ADDITIONS
TOTAL SCORE: VERY MILD ITCHING, PINS AND NEEDLES SENSATION, BURNING OR NUMBNESS
PAROXYSMAL SWEATS: BARELY PERCEPTIBLE SWEATING. PALMS MOIST
VISUAL DISTURBANCES: NOT PRESENT
ORIENTATION AND CLOUDING OF SENSORIUM: ORIENTED AND CAN DO SERIAL ADDITIONS
PAROXYSMAL SWEATS: BARELY PERCEPTIBLE SWEATING. PALMS MOIST
TOTAL SCORE: 17
VISUAL DISTURBANCES: NOT PRESENT
NAUSEA AND VOMITING: MILD NAUSEA WITH NO VOMITING
HEADACHE, FULLNESS IN HEAD: MILD
VISUAL DISTURBANCES: NOT PRESENT
AGITATION: *
TREMOR: MODERATE TREMOR WITH ARMS EXTENDED
ORIENTATION AND CLOUDING OF SENSORIUM: ORIENTED AND CAN DO SERIAL ADDITIONS
AUDITORY DISTURBANCES: NOT PRESENT
TREMOR: MODERATE TREMOR WITH ARMS EXTENDED
AGITATION: MODERATELY FIDGETY AND RESTLESS
ORIENTATION AND CLOUDING OF SENSORIUM: ORIENTED AND CAN DO SERIAL ADDITIONS
HEADACHE, FULLNESS IN HEAD: MILD
AGITATION: MODERATELY FIDGETY AND RESTLESS
NAUSEA AND VOMITING: MILD NAUSEA WITH NO VOMITING
TOTAL SCORE: VERY MILD ITCHING, PINS AND NEEDLES SENSATION, BURNING OR NUMBNESS
PAROXYSMAL SWEATS: BARELY PERCEPTIBLE SWEATING. PALMS MOIST
TOTAL SCORE: 13
HEADACHE, FULLNESS IN HEAD: MILD
VISUAL DISTURBANCES: NOT PRESENT
AGITATION: MODERATELY FIDGETY AND RESTLESS
HEADACHE, FULLNESS IN HEAD: MILD
AUDITORY DISTURBANCES: NOT PRESENT
TOTAL SCORE: 17
TOTAL SCORE: VERY MILD ITCHING, PINS AND NEEDLES SENSATION, BURNING OR NUMBNESS
NAUSEA AND VOMITING: MILD NAUSEA WITH NO VOMITING
ANXIETY: *
ANXIETY: MODERATELY ANXIOUS OR GUARDED, SO ANXIETY IS INFERRED
TOTAL SCORE: VERY MILD ITCHING, PINS AND NEEDLES SENSATION, BURNING OR NUMBNESS

## 2022-10-12 ASSESSMENT — PAIN DESCRIPTION - PAIN TYPE
TYPE: ACUTE PAIN

## 2022-10-12 ASSESSMENT — ENCOUNTER SYMPTOMS
WEAKNESS: 0
CHILLS: 0
SHORTNESS OF BREATH: 0
PALPITATIONS: 0
STRIDOR: 0
NAUSEA: 1
SPUTUM PRODUCTION: 0
FEVER: 0
COUGH: 0
LOSS OF CONSCIOUSNESS: 0
VOMITING: 0
MYALGIAS: 0
DIARRHEA: 0
TINGLING: 0
VOMITING: 1
DIZZINESS: 0
CONSTIPATION: 0
FALLS: 0
HEADACHES: 0
ABDOMINAL PAIN: 1
DEPRESSION: 0

## 2022-10-12 ASSESSMENT — FIBROSIS 4 INDEX
FIB4 SCORE: 0.66
FIB4 SCORE: 1.46

## 2022-10-12 NOTE — ED PROVIDER NOTES
ED Provider Note    CHIEF COMPLAINT  Chief Complaint   Patient presents with    Suicidal Ideation     Patient awake, alert and oriented on arrival to ED. Per EMS, patient was checking in to [facility] for thoughts of SI. EMS states that while in the waiting room at [facility], patient began complaining of severe abdominal pain and was reported to have and absence seizure x 2. Patient does report a hx of seizures that are more common when she drinks alcohol. Denies drug use. PMH pancreatitis. Patient states that this is the first day she drank alcohol in 34 days. Reporting SI without plan. Cooperative with care.        HPI  Rhiannon Marrero is a 37 y.o. female who presents with multiple complaints.  She states that she was sent here from [facility] for abdominal pain and witnessed seizure.  She says that her abdominal pain started this morning it is epigastric and severe.  She feels it is worsening. It is non radiating. She has a history of pancreatitis and says this feels the same.  She said she drank in order to ease the pain otherwise she denies any aggravating or alleviating factors.  She has not had any fevers or chills.  She has had multiple episodes of emesis and she feels nauseated.  She has not had any diarrhea or black or bloody stools.  She also had 2 witnessed seizures while checking into [facility].  Her mother says that these were not grand mal seizures that they were absence seizure's.  She was quite confused afterwards but is now back at her baseline.  She does have a known seizure disorder and takes Keppra daily.  She is followed by neurology and says its not abnormal for her to have breakthrough seizures.  She denies any other drug use.  She stopped drinking 34 days ago and drank today due to pain. She said she drank 'a lot'.  The patient actually denies any suicidal ideation to me and just states that she wants to die because 'the pain is so bad'.  She denies any vaginal bleeding, discharge or odor.  She   denies any dysuria, hematuria or urgency.    REVIEW OF SYSTEMS  As per HPI, otherwise a 10 point review of systems is negative    PAST MEDICAL HISTORY  Past Medical History:   Diagnosis Date    Acute pancreatitis without infection or necrosis 07/20/2022    Alcohol dependence (Formerly [facility]) 04/13/2017    Bronchitis 08/2012    Cold     sept 2018    Current moderate episode of major depressive disorder without prior episode (Formerly [facility]) 01/31/2020    Heart burn     Hernia of unspecified site of abdominal cavity without mention of obstruction or gangrene     High anion gap metabolic acidosis 07/01/2022    Indigestion     Pancreatitis     Pneumonia 2011    Seizure disorder (Formerly [facility]) 05/23/2022       SOCIAL HISTORY  Social History     Tobacco Use    Smoking status: Former     Packs/day: 0.75     Years: 10.00     Pack years: 7.50     Types: Cigarettes    Smokeless tobacco: Never   Vaping Use    Vaping Use: Former    Substances: Nicotine   Substance Use Topics    Alcohol use: Yes     Comment: 5 shots, binges. Was sober for 34 days until today 10/12    Drug use: Yes     Types: Marijuana     Comment: edibles       SURGICAL HISTORY  Past Surgical History:   Procedure Laterality Date    ORIF, WRIST Right 4/24/2019    Procedure: ORIF, WRIST;  Surgeon: Marquis Bell M.D.;  Location: SURGERY [facility];  Service: Orthopedics    HYSTERECTOMY ROBOTIC XI  10/11/2018    Procedure: HYSTERECTOMY ROBOTIC XI- RIGHT URETEROLYSIS;  Surgeon: Marquis Reeder M.D.;  Location: [facility];  Service: Gynecology    SALPINGECTOMY Bilateral 10/11/2018    Procedure: SALPINGECTOMY;  Surgeon: Marquis Reeder M.D.;  Location: [facility];  Service: Gynecology    TONSILLECTOMY  4/11/2013    Performed by Rock Rothman M.D. at SURGERY SAME DAY [facility]    ARTHROSCOPY, KNEE      GYN SURGERY      miscarriage May 2006    OTHER ORTHOPEDIC SURGERY      knee surgeries       CURRENT MEDICATIONS  Home Medications       Reviewed by Clara Woods,  BRITT (Registered Nurse) on 10/12/22 at 0020  Med List Status: Not Addressed     Medication Last Dose Status   famotidine (PEPCID) 20 MG Tab  Active   levetiracetam (KEPPRA) 1000 MG tablet  Active   methocarbamol (ROBAXIN) 750 MG Tab  Active   metoclopramide (REGLAN) 10 MG Tab  Active   nitrofurantoin (MACROBID) 100 MG Cap  Active   omeprazole (PRILOSEC) 20 MG delayed-release capsule  Active   ondansetron (ZOFRAN ODT) 4 MG TABLET DISPERSIBLE  Active   QUEtiapine (SEROQUEL) 50 MG tablet  Active   zonisamide (ZONEGRAN) 100 MG Cap  Active                    ALLERGIES  Allergies   Allergen Reactions    Promethazine Hcl Anxiety     Anxiety and makes her feels like skin coming off        PHYSICAL EXAM  VITAL SIGNS: /66   Pulse (!) 102   Temp 36.9 °C (98.5 °F) (Temporal)   Resp 19   Wt 53.1 kg (117 lb)   LMP 10/30/2018   SpO2 100%   BMI 18.88 kg/m²    Constitutional: Awake and alert . Uncomfortable appearing, non toxic  HENT: Normal inspection  dry mucous membranes  Eyes: Normal inspection  Neck: Grossly normal range of motion.  Cardiovascular: Tachycardic heart rate, Normal rhythm.  Symmetric peripheral pulses.   Thorax & Lungs: No respiratory distress, No wheezing, No rales, No rhonchi, No chest tenderness.   Abdomen: Soft, tender in epigastric region, nondistended, no mass  Skin: No obvious rash.  Back: No tenderness, No CVA tenderness.   Extremities: Warm, well perfused. No clubbing, cyanosis, edema,   Neurologic: Grossly normal   Psychiatric: SI    RADIOLOGY/PROCEDURES  CT-ABDOMEN-PELVIS WITH   Final Result      New mild pancreatic calcification compatible with chronic pancreatitis. Duct dilatation is not outside of normal limits and there is no adjacent fat stranding to confirm acute pancreatitis      Stable right renal lateral cortex hypodensity has volume loss indicating this reflects scarring from prior injury, infection. This is of no acute significance      Mild hepatomegaly      Resolved pelvic free  fluid           Imaging is interpreted by radiologist    Labs:  Results for orders placed or performed during the hospital encounter of 10/12/22   CBC WITH DIFFERENTIAL   Result Value Ref Range    WBC 11.2 (H) 4.8 - 10.8 K/uL    RBC 4.87 4.20 - 5.40 M/uL    Hemoglobin 15.5 12.0 - 16.0 g/dL    Hematocrit 46.4 37.0 - 47.0 %    MCV 95.3 81.4 - 97.8 fL    MCH 31.8 27.0 - 33.0 pg    MCHC 33.4 (L) 33.6 - 35.0 g/dL    RDW 53.2 (H) 35.9 - 50.0 fL    Platelet Count 274 164 - 446 K/uL    MPV 9.4 9.0 - 12.9 fL    Neutrophils-Polys 80.00 (H) 44.00 - 72.00 %    Lymphocytes 15.00 (L) 22.00 - 41.00 %    Monocytes 3.00 0.00 - 13.40 %    Eosinophils 0.00 0.00 - 6.90 %    Basophils 1.00 0.00 - 1.80 %    Nucleated RBC 0.00 /100 WBC    Neutrophils (Absolute) 9.07 (H) 2.00 - 7.15 K/uL    Lymphs (Absolute) 1.68 1.00 - 4.80 K/uL    Monos (Absolute) 0.34 0.00 - 0.85 K/uL    Eos (Absolute) 0.00 0.00 - 0.51 K/uL    Baso (Absolute) 0.11 0.00 - 0.12 K/uL    NRBC (Absolute) 0.00 K/uL   COMP METABOLIC PANEL   Result Value Ref Range    Sodium 140 135 - 145 mmol/L    Potassium 4.2 3.6 - 5.5 mmol/L    Chloride 99 96 - 112 mmol/L    Co2 14 (L) 20 - 33 mmol/L    Anion Gap 27.0 (H) 7.0 - 16.0    Glucose 56 (L) 65 - 99 mg/dL    Bun 13 8 - 22 mg/dL    Creatinine 0.64 0.50 - 1.40 mg/dL    Calcium 8.9 8.4 - 10.2 mg/dL    AST(SGOT) 36 12 - 45 U/L    ALT(SGPT) 49 2 - 50 U/L    Alkaline Phosphatase 92 30 - 99 U/L    Total Bilirubin 0.8 0.1 - 1.5 mg/dL    Albumin 5.1 (H) 3.2 - 4.9 g/dL    Total Protein 8.3 (H) 6.0 - 8.2 g/dL    Globulin 3.2 1.9 - 3.5 g/dL    A-G Ratio 1.6 g/dL   URINALYSIS (UA)    Specimen: Urine   Result Value Ref Range    Color Yellow     Character Clear     Specific Gravity 1.025 <1.035    Ph 5.5 5.0 - 8.0    Glucose Negative Negative mg/dL    Ketones 40 (A) Negative mg/dL    Protein Negative Negative mg/dL    Bilirubin Negative Negative    Nitrite Negative Negative    Leukocyte Esterase Negative Negative    Occult Blood Negative Negative     Micro Urine Req see below    URINE DRUG SCREEN   Result Value Ref Range    Amphetamines Urine Negative Negative    Barbiturates Negative Negative    Benzodiazepines Negative Negative    Cocaine Metabolite Negative Negative    Methadone Negative Negative    Opiates Positive (A) Negative    Oxycodone Negative Negative    Phencyclidine -Pcp Negative Negative    Propoxyphene Negative Negative    Cannabinoid Metab Positive (A) Negative   LIPASE   Result Value Ref Range    Lipase 18 7 - 58 U/L   HCG QUAL SERUM   Result Value Ref Range    Beta-Hcg Qualitative Serum Negative Negative   ESTIMATED GFR   Result Value Ref Range    GFR (CKD-EPI) 118 >60 mL/min/1.73 m 2   DIFFERENTIAL MANUAL   Result Value Ref Range    Bands-Stabs 1.00 0.00 - 10.00 %    Manual Diff Status PERFORMED    PLATELET ESTIMATE   Result Value Ref Range    Plt Estimation Normal    MORPHOLOGY   Result Value Ref Range    RBC Morphology Normal        Medications   levetiracetam (KEPPRA) TABS 1,000 mg (has no administration in time range)   methocarbamol (ROBAXIN) tablet 750 mg (has no administration in time range)   metoclopramide (REGLAN) tablet 10 mg (has no administration in time range)   omeprazole (PRILOSEC) capsule 20 mg (has no administration in time range)   QUEtiapine (Seroquel) tablet 150 mg (has no administration in time range)   zonisamide (ZONEGRAN) capsule 200 mg (has no administration in time range)   senna-docusate (PERICOLACE or SENOKOT S) 8.6-50 MG per tablet 2 Tablet (has no administration in time range)     And   polyethylene glycol/lytes (MIRALAX) PACKET 1 Packet (has no administration in time range)     And   magnesium hydroxide (MILK OF MAGNESIA) suspension 30 mL (has no administration in time range)     And   bisacodyl (DULCOLAX) suppository 10 mg (has no administration in time range)   NS infusion ( Intravenous New Bag 10/12/22 2672)   rivaroxaban (XARELTO) tablet 10 mg (has no administration in time range)   acetaminophen  (Tylenol) tablet 650 mg (has no administration in time range)   Pharmacy Consult Request ...Pain Management Review 1 Each (has no administration in time range)   oxyCODONE immediate-release (ROXICODONE) tablet 5 mg (has no administration in time range)     Or   oxyCODONE immediate release (ROXICODONE) tablet 10 mg (has no administration in time range)     Or   morphine 4 MG/ML injection 4 mg (has no administration in time range)   labetalol (NORMODYNE/TRANDATE) injection 10 mg (has no administration in time range)   ondansetron (ZOFRAN) syringe/vial injection 4 mg (has no administration in time range)   ondansetron (ZOFRAN ODT) dispertab 4 mg (has no administration in time range)   prochlorperazine (COMPAZINE) injection 5-10 mg (has no administration in time range)   dextrose 50% (D50W) injection 25 g (has no administration in time range)   NS infusion 1,000 mL (0 mL Intravenous Stopped 10/12/22 0613)   morphine 4 MG/ML injection 4 mg (4 mg Intravenous Given 10/12/22 0105)   ondansetron (ZOFRAN) syringe/vial injection 4 mg (4 mg Intravenous Given 10/12/22 0105)   famotidine (PEPCID) injection 20 mg (20 mg Intravenous Given 10/12/22 0230)   hyoscyamine-lidocaine-Maalox (GI Cocktail) oral susp cup 30 mL (30 mL Oral Given 10/12/22 0242)   iohexol (OMNIPAQUE) 350 mg/mL (IV) (100 mL Intravenous Given 10/12/22 0328)   haloperidol lactate (HALDOL) injection 1 mg (1 mg Intravenous Given 10/12/22 0446)   morphine 4 MG/ML injection 4 mg (4 mg Intravenous Given 10/12/22 0522)       Measures:  HYDRATION: Based on the patient's presentation of Acute Vomiting the patient was given IV fluids. IV Hydration was used because oral hydration was not adequate alone. Upon recheck following hydration, the patient was improved.    COURSE & MEDICAL DECISION MAKING  This is a 37-year-old who presents for evaluation of epigastric pain the setting of alcohol use. She arrives tachycardic but has a normal blood pressure and is afebrile.  She is  quite tender on exam but no signs of diffuse peritonitis.  Labs notable for mild leukocytosis, normal lipase, normal liver function test.  She does have an anion gap of 27 I suspect in the setting of alcohol use.  Urine without signs of infection.  CT scan obtained which was notable for signs to suggest chronic pancreatitis without definitive acute pancreatitis.  No other signs of small bowel obstruction, appendicitis, pyelonephritis, nephrolithiasis or other acute surgical process.  Regarding her seizures she is not postictal and has a nonfocal neurologic exam here in the emergency department.  She does have a history of seizure disorder and I suspect she had a breakthrough seizure, which she states is not abnormal for her.  This might have been triggered in the setting of underlying epigastric discomfort and/or alcohol use.  Patient given multiple different antiemetics and narcotics to help control the pain without significant relief.  Maalox and famotidine did not seem to have a significant improvement making gastritis less likely.  She will be admitted to the hospital for intractable nausea and vomiting likely due to chronic pancreatitis.  Of note patient says that she was feeling suicidal due to her degree of pain.  I placed her on a legal hold in an abundance of caution until she can be evaluated by our psychiatric team for that as an inpatient when she is more medically stable.    FINAL IMPRESSION  1. Intractable nausea and vomiting Acute       2. Suicidal ideation Acute       3. Alcoholic intoxication without complication (HCC) Acute       4. Alcohol-induced chronic pancreatitis (HCC)                This dictation was created using voice recognition software. The accuracy of the dictation is limited to the abilities of the software.  The nursing notes were reviewed and certain aspects of this information were incorporated into this note.      Electronically signed by: Evelina Chaparro M.D., 10/12/2022 12:38  AM

## 2022-10-12 NOTE — PROGRESS NOTES
Hospital Medicine Daily Progress Note    Date of Service  10/12/2022    Chief Complaint  Rhiannon Marrero is a 37 y.o. female admitted 10/12/2022 with question of seizure, abdominal pain    Hospital Course  37-year-old female with a history of alcohol dependence, seizure disorder, alcohol induced pancreatitis, bipolar disorder who presented to hospital from [facility] for abdominal pain and witnessed seizure. She states that she presented to [facility] early this morning, intoxicated, because she had binged drank for the first time in a month and didn't want to continue drinking. While at [facility] during assessment, there was question of absence seizure, but she states that her seizures are typically clonic and she may have less responsive as she was acutely intoxicated.  She does take Keppra but Pharmacy reports not having it filled since 7/2/22 but pt reports having excess prescriptions at home and taking it appropriately.     Interval Problem Update  10/12 - Pt with some tachycardia, mild white count elevation and metabolic acidosis with a bicarb of 14 and gap of 27. UA negative. She states this abdominal pain feels typical of her known pancreatitis and is central in her abdomen.  She has a gap acidosis with only mild ketones on UA - will check a lactic acid. Additionally, given her tachycardia, temp of 100.0 and elevated white count, will check RUQ u/s, COVID and CXR.    I have discussed this patient's plan of care and discharge plan at IDT rounds today with Case Management, Nursing, Nursing leadership, and other members of the IDT team.    Consultants/Specialty  none    Code Status  Full Code    Disposition  Patient is not medically cleared for discharge.   Anticipate discharge to to home with close outpatient follow-up.  I have placed the appropriate orders for post-discharge needs.    Review of Systems  Review of Systems   Constitutional:  Negative for chills and fever.   Respiratory:  Negative for cough and shortness of  breath.    Cardiovascular:  Negative for chest pain and leg swelling.   Gastrointestinal:  Positive for abdominal pain and nausea. Negative for vomiting.   All other systems reviewed and are negative.     Physical Exam  Temp:  [36.9 °C (98.5 °F)] 36.9 °C (98.5 °F)  Pulse:  [102-110] 102  Resp:  [19] 19  BP: (112-138)/(66-75) 112/66  SpO2:  [99 %-100 %] 100 %    Physical Exam    Fluids    Intake/Output Summary (Last 24 hours) at 10/12/2022 0920  Last data filed at 10/12/2022 0613  Gross per 24 hour   Intake 1000 ml   Output --   Net 1000 ml       Laboratory  Recent Labs     10/12/22  0056   WBC 11.2*   RBC 4.87   HEMOGLOBIN 15.5   HEMATOCRIT 46.4   MCV 95.3   MCH 31.8   MCHC 33.4*   RDW 53.2*   PLATELETCT 274   MPV 9.4     Recent Labs     10/12/22  0056   SODIUM 140   POTASSIUM 4.2   CHLORIDE 99   CO2 14*   GLUCOSE 56*   BUN 13   CREATININE 0.64   CALCIUM 8.9                   Imaging  CT-ABDOMEN-PELVIS WITH   Final Result      New mild pancreatic calcification compatible with chronic pancreatitis. Duct dilatation is not outside of normal limits and there is no adjacent fat stranding to confirm acute pancreatitis      Stable right renal lateral cortex hypodensity has volume loss indicating this reflects scarring from prior injury, infection. This is of no acute significance      Mild hepatomegaly      Resolved pelvic free fluid      US-RUQ    (Results Pending)   DX-CHEST-PORTABLE (1 VIEW)    (Results Pending)        Assessment/Plan  * Acute on chronic pancreatitis (HCC)- (present on admission)  Assessment & Plan  - Lipase is not elevated however she has had chronic pancreatitis long enough that she may no longer elevate her lipase, there is no adjacent fat stranding noted however due to her location and severity of her pain, associated with a significant alcohol intake, I do feel that she may have acutely inflamed her pancreas  - patient does have severe pain, because of this, I will make her n.p.o., will allow sips  with meds   -Patient understands she needs alcohol cessation, would like assistance after hospitalization is finished  - Check RUQ u/s again - last one had sludge, and now with temp of 100.0    Hypoglycemia- (present on admission)  Assessment & Plan  - Start hypoglycemia protocol  -If it persists, will start dextrose drip however at this point in time I want the drip running at a rapid rate and would prefer not to use that much dextrose  -Asymptomatic    Leukocytosis- (present on admission)  Assessment & Plan  - Temp to 100.0 this am - recheck RUQ u/s, COVID and CXR   -Repeat CBC in the morning    High anion gap metabolic acidosis- (present on admission)  Assessment & Plan  - Continue IVFs, check a lactate, check blood cultures  - Mild ketones in the urine      Seizure disorder (HCC)- (present on admission)  Assessment & Plan  - Patient does have a history of seizures, even has breakthrough seizures, there was no grand mal activity reported  -Okay to continue home Keppra dose without increase  -Start as needed Ativan  -Adjust as needed    Bipolar affective disorder, current episode hypomanic (HCC)- (present on admission)  Assessment & Plan  - Continue home meds    Alcohol dependence (HCC)- (present on admission)  Assessment & Plan  - Patient only started drinking yesterday, will institute CIWA given her concurrent tachycardia and underlying seizure d/o       VTE prophylaxis: SCDs/TEDs and Xarelto 10 mg daily as prophylaxis    I have performed a physical exam and reviewed and updated ROS and Plan today (10/12/2022). In review of yesterday's note (10/11/2022), there are no changes except as documented above.

## 2022-10-12 NOTE — DISCHARGE PLANNING
Met with pt who was teary eyed. She lives alone in her apartment. She is independent with ADLs and IADLs.     Per Dr. Coronado, ERP has initiated legal hold. Liz Rodriguez notified via Voalte. No answer for ER Alert team, LVM .         Care Transition Team Assessment    Information Source  Orientation Level: Oriented X4  Information Given By: Patient  Who is responsible for making decisions for patient? : Patient    Readmission Evaluation  Is this a readmission?: No    Elopement Risk  Personal Belongings: Hospital Clothing Only  Environmental Precautions: Sharp or Dangerous Items Removed, Dietary Notified for Plastic Utensils / Paperware Only    Interdisciplinary Discharge Planning  Does Admitting Nurse Feel This Could be a Complex Discharge?: No  Primary Care Physician: Dr Ivy Aadms  Lives with - Patient's Self Care Capacity: Alone and Able to Care For Self  Support Systems: Parent  Housing / Facility: 1 Story Apartment / Condo  Do You Take your Prescribed Medications Regularly: Yes  Able to Return to Previous ADL's: Yes  Mobility Issues: No  Prior Services: None, Home-Independent  Patient Prefers to be Discharged to:: [facility]  Assistance Needed: No  Durable Medical Equipment: Not Applicable    Discharge Preparedness  What is your plan after discharge?: Other (comment) (RB)  What are your discharge supports?: Parent  Prior Functional Level: Ambulatory, Drives Self, Independent with Activities of Daily Living, Independent with Medication Management  Difficulity with ADLs: None  Difficulity with IADLs: None    Functional Assesment  Prior Functional Level: Ambulatory, Drives Self, Independent with Activities of Daily Living, Independent with Medication Management    Finances  Financial Barriers to Discharge: No  Prescription Coverage: Yes    Vision / Hearing Impairment  Vision Impairment : No  Hearing Impairment : No    Values / Beliefs / Concerns  Values / Beliefs Concerns : No    Advance Directive  Advance Directive?:  Living Will    Domestic Abuse  Have you ever been the victim of abuse or violence?: No    Psychological Assessment  History of Substance Abuse: Alcohol  Date Last Used - Alcohol: 10/11/22    Discharge Risks or Barriers  Discharge risks or barriers?: Post-acute placement / services, Substance abuse, Mental health  Patient risk factors: Substance abuse, Vulnerable adult    Anticipated Discharge Information  Discharge Disposition: D/T to psych hosp or distinct part unit (65) (RB)

## 2022-10-12 NOTE — ASSESSMENT & PLAN NOTE
- Lipase is not elevated however she has had chronic pancreatitis long enough that she may no longer elevate her lipase, there is no adjacent fat stranding noted however due to her location and severity of her pain, associated with a significant alcohol intake, I do feel that she may have acutely inflamed her pancreas  -  Advance diet as tolerated  -Patient understands she needs alcohol cessation, would like assistance after hospitalization is finished  - RUQ u/s with gallbladder sludge without stones or biliary dilation, mild, unchanged biliary dilation  - MRCP negative, clinical presentation not consistent with cholecystitis  - Added MS Contin for pain control as she is not having much improvement on MS IR    10/20: Patient today states the pain is unbearable.  Unclear if patient truly is in significant pain, however the patient has only been on clear liquid for a week.  We did talk about nutrition and that there is concern for malnutrition and I spoke to her about the possibility of doing tube feedings, however she denied.  Because of the pain the patient mentions she is not hungry.  So we will increase the frequency of the Dilaudid to every 4 hours for breakthrough pain.  We will advance diet to full liquid as the patient would like to try that.  We will monitor for pain control and if needed will increase dose of MS Contin.

## 2022-10-12 NOTE — ED NOTES
IV line placed and patient medicated as ordered. Mom was agreeable to saying yelitza to patient and heading home. Mom and patient both cooperative with care.

## 2022-10-12 NOTE — CONSULTS
Pt admitted to hospital for medical stabilization. Alert Team consult not indicated. IP psychiatry appropriate when medically clear.

## 2022-10-12 NOTE — ED NOTES
Maria Alejandra from Lab called with critical result of Lactic from 0052 8.2 at 1415. Critical lab result read back to Maria Alejandra.   Dr. Coronado notified of critical lab result at 1418.  Critical lab result read back by Dr. Coronado.    Redraw completed. Pt resting in no distress in view of sitter.

## 2022-10-12 NOTE — H&P
Hospital Medicine History & Physical Note    Date of Service  10/12/2022    Primary Care Physician  Ivy Adams M.D.    Consultants  None    Specialist Names: None    Code Status  Full Code    Chief Complaint  Chief Complaint   Patient presents with    Abdominal pain     Patient awake, alert and oriented on arrival to ED. Per EMS, patient was checking in to [facility] for thoughts of SI. EMS states that while in the waiting room at [facility], patient began complaining of severe abdominal pain and was reported to have and absence seizure x 2. Patient does report a hx of seizures that are more common when she drinks alcohol. Denies drug use. PMH pancreatitis. Patient states that this is the first day she drank alcohol in 34 days. Reporting SI without plan. Cooperative with care.        History of Presenting Illness  Rhiannon Marrero is a 37 y.o. female who presented 10/12/2022 with abdominal pain.  Patient does have a history of chronic pancreatitis from alcohol abuse, had been sober for just over a month.  Yesterday she began drinking heavily, after hours of this, she began having nausea vomiting and epigastric pain.  Pain became severe, sharp in nature.  Patient initially went to [facility] to assist with quitting however her pain was so severe they sent her to the emergency department.  She also did have a seizure however she states its not uncommon for her, she does have a seizure disorder and is on Keppra, states she has been compliant however she was drinking.  There is mention of suicidal ideation in the chart however patient denies at this time, she is just in severe pain.  I did discuss the case including labs with the ER physician.    I discussed the plan of care with patient.    Review of Systems  Review of Systems   Constitutional:  Negative for chills, fever and malaise/fatigue.   HENT:  Negative for congestion.    Respiratory:  Negative for cough, sputum production, shortness of breath and stridor.     Cardiovascular:  Negative for chest pain, palpitations and leg swelling.   Gastrointestinal:  Positive for abdominal pain, nausea and vomiting. Negative for constipation and diarrhea.   Genitourinary:  Negative for dysuria and urgency.   Musculoskeletal:  Negative for falls and myalgias.   Neurological:  Negative for dizziness, tingling, loss of consciousness, weakness and headaches.   Psychiatric/Behavioral:  Positive for substance abuse. Negative for depression and suicidal ideas.    All other systems reviewed and are negative.    Past Medical History   has a past medical history of Acute pancreatitis without infection or necrosis (07/20/2022), Alcohol dependence ([facility]) (04/13/2017), Bronchitis (08/2012), Cold, Current moderate episode of major depressive disorder without prior episode ([facility]) (01/31/2020), Heart burn, Hernia of unspecified site of abdominal cavity without mention of obstruction or gangrene, High anion gap metabolic acidosis (07/01/2022), Indigestion, Pancreatitis, Pneumonia (2011), and Seizure disorder ([facility]) (05/23/2022).    Surgical History   has a past surgical history that includes other orthopedic surgery; gyn surgery; tonsillectomy (4/11/2013); arthroscopy, knee; hysterectomy robotic xi (10/11/2018); salpingectomy (Bilateral, 10/11/2018); and orif, wrist (Right, 4/24/2019).     Family History  family history includes Arthritis in her mother; Heart Disease in her maternal grandfather; Psychiatric Illness in her mother; Stroke in her mother.   Family history reviewed with patient. There is no family history that is pertinent to the chief complaint.     Social History   reports that she has quit smoking. Her smoking use included cigarettes. She has a 7.50 pack-year smoking history. She has never used smokeless tobacco. She reports current alcohol use. She reports current drug use. Drug: Marijuana.    Allergies  Allergies   Allergen Reactions    Promethazine Hcl Anxiety     Anxiety and makes her  feels like skin coming off        Medications  Prior to Admission Medications   Prescriptions Last Dose Informant Patient Reported? Taking?   QUEtiapine (SEROQUEL) 50 MG tablet   No No   Sig: Take 3 Tablets by mouth at bedtime. 3 tablets = 150 mg   famotidine (PEPCID) 20 MG Tab   Yes No   Sig: Take 20 mg by mouth 1 time a day as needed (heartburn, acid reflux).   levetiracetam (KEPPRA) 1000 MG tablet   No No   Sig: Take 1 Tablet by mouth every day. Indications: Seizure   methocarbamol (ROBAXIN) 750 MG Tab   Yes No   Sig: TAKE 2 TABLETS BY MOUTH THREE TIMES DAILY FOR 7 DAYS   metoclopramide (REGLAN) 10 MG Tab   No No   Sig: Take 1 Tablet by mouth four times daily - before meals and nightly as needed (If zofran ineffective or unable to be given).   nitrofurantoin (MACROBID) 100 MG Cap   Yes No   omeprazole (PRILOSEC) 20 MG delayed-release capsule   No No   Sig: Take 1 Capsule by mouth every day.   ondansetron (ZOFRAN ODT) 4 MG TABLET DISPERSIBLE   No No   Sig: Take 1 Tablet by mouth every 6 hours as needed for Nausea.   zonisamide (ZONEGRAN) 100 MG Cap  Patient Yes No   Sig: Take 200 mg by mouth at bedtime. 2 tablets = 200 mg      Facility-Administered Medications: None       Physical Exam  Temp:  [36.9 °C (98.5 °F)] 36.9 °C (98.5 °F)  Pulse:  [102-110] 102  Resp:  [19] 19  BP: (112-138)/(66-75) 112/66  SpO2:  [99 %-100 %] 100 %  Blood Pressure: 112/66   Temperature: 36.9 °C (98.5 °F)   Pulse: (!) 102   Respiration: 19   Pulse Oximetry: 100 %       Physical Exam  Vitals and nursing note reviewed.   Constitutional:       General: She is in acute distress (due to abdominal pain).      Appearance: She is well-developed. She is ill-appearing. She is not diaphoretic.   HENT:      Head: Normocephalic and atraumatic.      Right Ear: External ear normal.      Left Ear: External ear normal.      Nose: Nose normal. No congestion or rhinorrhea.      Mouth/Throat:      Mouth: Mucous membranes are dry.      Pharynx: No  oropharyngeal exudate.   Eyes:      General:         Right eye: No discharge.         Left eye: No discharge.   Neck:      Trachea: No tracheal deviation.   Cardiovascular:      Rate and Rhythm: Normal rate and regular rhythm.      Heart sounds: No murmur heard.    No friction rub. No gallop.   Pulmonary:      Effort: Pulmonary effort is normal. No respiratory distress.      Breath sounds: Normal breath sounds. No stridor. No wheezing or rales.   Chest:      Chest wall: No tenderness.   Abdominal:      General: Bowel sounds are normal. There is no distension.      Palpations: Abdomen is soft.      Tenderness: There is generalized abdominal tenderness.   Musculoskeletal:         General: No tenderness. Normal range of motion.      Cervical back: Neck supple.      Right lower leg: No edema.      Left lower leg: No edema.   Lymphadenopathy:      Cervical: No cervical adenopathy.   Skin:     General: Skin is warm and dry.      Findings: No erythema or rash.   Neurological:      Mental Status: She is alert and oriented to person, place, and time.      Cranial Nerves: No cranial nerve deficit.   Psychiatric:         Mood and Affect: Mood normal.         Behavior: Behavior normal.         Thought Content: Thought content normal.         Judgment: Judgment normal.       Laboratory:  Recent Labs     10/12/22  0056   WBC 11.2*   RBC 4.87   HEMOGLOBIN 15.5   HEMATOCRIT 46.4   MCV 95.3   MCH 31.8   MCHC 33.4*   RDW 53.2*   PLATELETCT 274   MPV 9.4     Recent Labs     10/12/22  0056   SODIUM 140   POTASSIUM 4.2   CHLORIDE 99   CO2 14*   GLUCOSE 56*   BUN 13   CREATININE 0.64   CALCIUM 8.9     Recent Labs     10/12/22  0056   ALTSGPT 49   ASTSGOT 36   ALKPHOSPHAT 92   TBILIRUBIN 0.8   LIPASE 18   GLUCOSE 56*         No results for input(s): NTPROBNP in the last 72 hours.      No results for input(s): TROPONINT in the last 72 hours.    Imaging:  CT-ABDOMEN-PELVIS WITH   Final Result      New mild pancreatic calcification  compatible with chronic pancreatitis. Duct dilatation is not outside of normal limits and there is no adjacent fat stranding to confirm acute pancreatitis      Stable right renal lateral cortex hypodensity has volume loss indicating this reflects scarring from prior injury, infection. This is of no acute significance      Mild hepatomegaly      Resolved pelvic free fluid          no X-Ray or EKG requiring interpretation    Assessment/Plan:  Justification for Admission Status  I anticipate this patient will require at least two midnights for appropriate medical management, necessitating inpatient admission because acute on chronic pancreatitis with severe pain    Patient will need a Med/Surg bed on MEDICAL service .  The need is secondary to acute on chronic pancreatitis with severe pain.    * Acute on chronic pancreatitis (HCC)- (present on admission)  Assessment & Plan  - Lipase is not elevated however she has had chronic pancreatitis long enough that she may no longer elevate her lipase, there is no adjacent fat stranding noted however due to her location and severity of her pain, associated with a significant alcohol intake, I do feel that she has acutely inflamed her pancreas  - patient does have severe pain, because of this, I will make her n.p.o., will allow sips with meds however if this makes her worse will need to transition to strict n.p.o.  -Patient understands she needs alcohol cessation, would like assistance after hospitalization is finished    Hypoglycemia- (present on admission)  Assessment & Plan  - Start hypoglycemia protocol  -If it persists, will start dextrose drip however at this point in time I want the drip running at a rapid rate and would prefer not to use that much dextrose  -Asymptomatic    Leukocytosis- (present on admission)  Assessment & Plan  - Reactive, no need for antibiotics  -Repeat CBC in the morning    High anion gap metabolic acidosis- (present on admission)  Assessment &  Plan  - Due to dehydration from inflammatory process  -No bacterial infection, no need for antibiotics  -start IV fluids at a rapid rate  -Repeat BMP in the morning    Seizure disorder (HCC)- (present on admission)  Assessment & Plan  - Patient does have a history of seizures, even has breakthrough seizures, there was no grand mal activity reported  -Okay to continue home Keppra dose without increase  -Start as needed Ativan  -Adjust as needed    Bipolar affective disorder, current episode hypomanic (HCC)- (present on admission)  Assessment & Plan  - Continue home meds    Alcohol dependence (HCC)- (present on admission)  Assessment & Plan  - Patient only started drinking yesterday, no risk of withdrawal, she had been sober for over a month      VTE prophylaxis: SCDs/TEDs and Xarelto 10 mg daily as prophylaxis

## 2022-10-12 NOTE — ASSESSMENT & PLAN NOTE
- Had good discussion with pt today - she feels that her bipolar is not adequately controlled on current medication and she feels like she is on an emotional rollercoaster  - Adamantly denies SI, intent, plan etc. She states she is Spiritism and would not injure herself for fear of what the afterlife would entail, and that she has two children to live for  - Has requested to speak to Psych to titrate medications and help seek care after discharge, consult has been placed

## 2022-10-12 NOTE — ED TRIAGE NOTES
Chief Complaint   Patient presents with    Suicidal Ideation     Patient awake, alert and oriented on arrival to ED. Per EMS, patient was checking in to [facility] for thoughts of SI. EMS states that while in the waiting room at [facility], patient began complaining of severe abdominal pain and was reported to have and absence seizure x 2. Patient does report a hx of seizures that are more common when she drinks alcohol. Denies drug use. PMH pancreatitis. Patient states that this is the first day she drank alcohol in 34 days. Reporting SI without plan. Cooperative with care.      /75   Pulse (!) 110   Temp 36.9 °C (98.5 °F) (Temporal)   Resp 19   Wt 53.1 kg (117 lb)   LMP 10/30/2018   SpO2 100%   BMI 18.88 kg/m²

## 2022-10-12 NOTE — ASSESSMENT & PLAN NOTE
- Patient does have a history of seizures, even has breakthrough seizures, there was no grand mal activity reported  -Okay to continue home Keppra dose without increase  -PRN Ativan  -Adjust as needed  - Seizure precautions    10/18: Have discontinued Keppra and added Depakote as per psychiatry recommendations.

## 2022-10-12 NOTE — ED NOTES
Patient remains on a continuous watch for safety. Awaiting inpatient bed assignment and psychiatric consult. Denies current SI. VSS. Abdominal pain decreased s/p dose of Morphine.

## 2022-10-12 NOTE — LETTER
2022    To Whom It May Concern:         This is confirmation that Ms Rhiannon Marrero,  1985, was under my care from 10/12/2022 until 10/21/2022 for medical illness at [facility]. She will require a 4 month long rehabilitation program until she is fit to work again and thus she is unable to maintain residency at your property at this time.        If you have any questions please do not hesitate to call me at the phone number listed below.    Sincerely,          Dajuan Pablo M.D.  [phone]

## 2022-10-12 NOTE — ED NOTES
Mom at the bedside at this time. Patient remains calm and cooperative with care. VSS. Placed a 1:1 watch for safety.

## 2022-10-12 NOTE — ED NOTES
Med rec updated and complete, per pt and [facility] [phone]  Allergies reviewed, per pt   Pt was not sure the dose of her KEPPRA, called [facility] @ [phone], pt last filled her KEPPRA 1000MG 1 tablet BID on 7/2/8/2022 for 30 day supply.  Per pt reports that she has a lot at home and last took this medication on 10/10/2022.  I informed the pharmacist.

## 2022-10-12 NOTE — ED NOTES
Dr Coronado updated that pt is now denying SI. Per Dr Coronado she will come evaluate pt later in the day to see if Legal for SI is still indicated. At this time legal still stands and sitter still required.     Pt medicated for pain per MAR orders.

## 2022-10-13 PROBLEM — D64.9 NORMOCYTIC ANEMIA: Status: ACTIVE | Noted: 2022-10-13

## 2022-10-13 PROBLEM — E83.39 HYPOPHOSPHATEMIA: Status: ACTIVE | Noted: 2022-10-13

## 2022-10-13 PROBLEM — R17 ELEVATED BILIRUBIN: Status: ACTIVE | Noted: 2022-10-13

## 2022-10-13 LAB
ALBUMIN SERPL BCP-MCNC: 4 G/DL (ref 3.2–4.9)
ALBUMIN/GLOB SERPL: 1.8 G/DL
ALP SERPL-CCNC: 65 U/L (ref 30–99)
ALT SERPL-CCNC: 30 U/L (ref 2–50)
ANION GAP SERPL CALC-SCNC: 12 MMOL/L (ref 7–16)
AST SERPL-CCNC: 20 U/L (ref 12–45)
BILIRUB SERPL-MCNC: 2.2 MG/DL (ref 0.1–1.5)
BUN SERPL-MCNC: 8 MG/DL (ref 8–22)
CALCIUM SERPL-MCNC: 8.3 MG/DL (ref 8.4–10.2)
CHLORIDE SERPL-SCNC: 100 MMOL/L (ref 96–112)
CO2 SERPL-SCNC: 22 MMOL/L (ref 20–33)
CREAT SERPL-MCNC: 0.51 MG/DL (ref 0.5–1.4)
ERYTHROCYTE [DISTWIDTH] IN BLOOD BY AUTOMATED COUNT: 49.6 FL (ref 35.9–50)
FERRITIN SERPL-MCNC: 324 NG/ML (ref 10–291)
FLUAV RNA SPEC QL NAA+PROBE: NEGATIVE
FLUBV RNA SPEC QL NAA+PROBE: NEGATIVE
GFR SERPLBLD CREATININE-BSD FMLA CKD-EPI: 124 ML/MIN/1.73 M 2
GLOBULIN SER CALC-MCNC: 2.2 G/DL (ref 1.9–3.5)
GLUCOSE SERPL-MCNC: 132 MG/DL (ref 65–99)
HCT VFR BLD AUTO: 34.8 % (ref 37–47)
HGB BLD-MCNC: 11.8 G/DL (ref 12–16)
IRON SATN MFR SERPL: 94 % (ref 15–55)
IRON SERPL-MCNC: 279 UG/DL (ref 40–170)
LACTATE SERPL-SCNC: 1 MMOL/L (ref 0.5–2)
LEVETIRACETAM SERPL-MCNC: <2 UG/ML (ref 10–40)
MAGNESIUM SERPL-MCNC: 2.1 MG/DL (ref 1.5–2.5)
MCH RBC QN AUTO: 31.6 PG (ref 27–33)
MCHC RBC AUTO-ENTMCNC: 33.9 G/DL (ref 33.6–35)
MCV RBC AUTO: 93 FL (ref 81.4–97.8)
PHOSPHATE SERPL-MCNC: 1.9 MG/DL (ref 2.5–4.5)
PHOSPHATE SERPL-MCNC: 1.9 MG/DL (ref 2.5–4.5)
PLATELET # BLD AUTO: 180 K/UL (ref 164–446)
PMV BLD AUTO: 8.9 FL (ref 9–12.9)
POTASSIUM SERPL-SCNC: 4.1 MMOL/L (ref 3.6–5.5)
PROT SERPL-MCNC: 6.2 G/DL (ref 6–8.2)
RBC # BLD AUTO: 3.74 M/UL (ref 4.2–5.4)
RSV RNA SPEC QL NAA+PROBE: NEGATIVE
SARS-COV-2 RNA RESP QL NAA+PROBE: NOTDETECTED
SODIUM SERPL-SCNC: 134 MMOL/L (ref 135–145)
SPECIMEN SOURCE: NORMAL
TIBC SERPL-MCNC: 296 UG/DL (ref 250–450)
UIBC SERPL-MCNC: <17 UG/DL (ref 110–370)
VIT B12 SERPL-MCNC: 377 PG/ML (ref 211–911)
WBC # BLD AUTO: 6 K/UL (ref 4.8–10.8)

## 2022-10-13 PROCEDURE — 700111 HCHG RX REV CODE 636 W/ 250 OVERRIDE (IP): Performed by: INTERNAL MEDICINE

## 2022-10-13 PROCEDURE — 770001 HCHG ROOM/CARE - MED/SURG/GYN PRIV*

## 2022-10-13 PROCEDURE — 700102 HCHG RX REV CODE 250 W/ 637 OVERRIDE(OP): Performed by: FAMILY MEDICINE

## 2022-10-13 PROCEDURE — 83550 IRON BINDING TEST: CPT

## 2022-10-13 PROCEDURE — 80053 COMPREHEN METABOLIC PANEL: CPT

## 2022-10-13 PROCEDURE — A9270 NON-COVERED ITEM OR SERVICE: HCPCS | Performed by: INTERNAL MEDICINE

## 2022-10-13 PROCEDURE — 83735 ASSAY OF MAGNESIUM: CPT

## 2022-10-13 PROCEDURE — 700105 HCHG RX REV CODE 258: Performed by: FAMILY MEDICINE

## 2022-10-13 PROCEDURE — 99233 SBSQ HOSP IP/OBS HIGH 50: CPT | Performed by: FAMILY MEDICINE

## 2022-10-13 PROCEDURE — 36415 COLL VENOUS BLD VENIPUNCTURE: CPT

## 2022-10-13 PROCEDURE — 85027 COMPLETE CBC AUTOMATED: CPT

## 2022-10-13 PROCEDURE — 700105 HCHG RX REV CODE 258: Performed by: INTERNAL MEDICINE

## 2022-10-13 PROCEDURE — A9270 NON-COVERED ITEM OR SERVICE: HCPCS | Performed by: FAMILY MEDICINE

## 2022-10-13 PROCEDURE — 83540 ASSAY OF IRON: CPT

## 2022-10-13 PROCEDURE — 82607 VITAMIN B-12: CPT

## 2022-10-13 PROCEDURE — 94760 N-INVAS EAR/PLS OXIMETRY 1: CPT

## 2022-10-13 PROCEDURE — 83605 ASSAY OF LACTIC ACID: CPT

## 2022-10-13 PROCEDURE — 700102 HCHG RX REV CODE 250 W/ 637 OVERRIDE(OP): Performed by: INTERNAL MEDICINE

## 2022-10-13 PROCEDURE — 84100 ASSAY OF PHOSPHORUS: CPT | Mod: 91

## 2022-10-13 PROCEDURE — 82728 ASSAY OF FERRITIN: CPT

## 2022-10-13 PROCEDURE — 700101 HCHG RX REV CODE 250: Performed by: FAMILY MEDICINE

## 2022-10-13 RX ORDER — OMEPRAZOLE 20 MG/1
20 CAPSULE, DELAYED RELEASE ORAL 2 TIMES DAILY
Status: DISCONTINUED | OUTPATIENT
Start: 2022-10-13 | End: 2022-10-21 | Stop reason: HOSPADM

## 2022-10-13 RX ORDER — SUCRALFATE ORAL 1 G/10ML
1 SUSPENSION ORAL EVERY 6 HOURS
Status: DISCONTINUED | OUTPATIENT
Start: 2022-10-13 | End: 2022-10-21 | Stop reason: HOSPADM

## 2022-10-13 RX ORDER — ENOXAPARIN SODIUM 100 MG/ML
40 INJECTION SUBCUTANEOUS DAILY
Status: DISCONTINUED | OUTPATIENT
Start: 2022-10-13 | End: 2022-10-21 | Stop reason: HOSPADM

## 2022-10-13 RX ADMIN — MORPHINE SULFATE 4 MG: 4 INJECTION INTRAVENOUS at 13:01

## 2022-10-13 RX ADMIN — SODIUM CHLORIDE: 9 INJECTION, SOLUTION INTRAVENOUS at 06:20

## 2022-10-13 RX ADMIN — OXYCODONE HYDROCHLORIDE 10 MG: 10 TABLET ORAL at 22:44

## 2022-10-13 RX ADMIN — LORAZEPAM 3 MG: 1 TABLET ORAL at 00:48

## 2022-10-13 RX ADMIN — QUETIAPINE FUMARATE 150 MG: 25 TABLET ORAL at 21:18

## 2022-10-13 RX ADMIN — SUCRALFATE 1 G: 1 SUSPENSION ORAL at 16:52

## 2022-10-13 RX ADMIN — ZONISAMIDE 200 MG: 50 CAPSULE ORAL at 21:18

## 2022-10-13 RX ADMIN — LORAZEPAM 1 MG: 1 TABLET ORAL at 06:17

## 2022-10-13 RX ADMIN — PROCHLORPERAZINE EDISYLATE 5 MG: 5 INJECTION, SOLUTION INTRAMUSCULAR; INTRAVENOUS at 22:11

## 2022-10-13 RX ADMIN — LORAZEPAM 2 MG: 1 TABLET ORAL at 16:51

## 2022-10-13 RX ADMIN — OXYCODONE HYDROCHLORIDE 10 MG: 10 TABLET ORAL at 11:33

## 2022-10-13 RX ADMIN — SENNOSIDES AND DOCUSATE SODIUM 2 TABLET: 50; 8.6 TABLET ORAL at 06:17

## 2022-10-13 RX ADMIN — LEVETIRACETAM 1000 MG: 500 TABLET ORAL at 16:51

## 2022-10-13 RX ADMIN — FOLIC ACID 1 MG: 1 TABLET ORAL at 06:17

## 2022-10-13 RX ADMIN — OMEPRAZOLE 20 MG: 20 CAPSULE, DELAYED RELEASE ORAL at 06:18

## 2022-10-13 RX ADMIN — MORPHINE SULFATE 4 MG: 4 INJECTION INTRAVENOUS at 09:37

## 2022-10-13 RX ADMIN — THERA TABS 1 TABLET: TAB at 06:18

## 2022-10-13 RX ADMIN — MORPHINE SULFATE 4 MG: 4 INJECTION INTRAVENOUS at 16:51

## 2022-10-13 RX ADMIN — SUCRALFATE 1 G: 1 SUSPENSION ORAL at 11:34

## 2022-10-13 RX ADMIN — ONDANSETRON 4 MG: 2 INJECTION INTRAMUSCULAR; INTRAVENOUS at 16:55

## 2022-10-13 RX ADMIN — SENNOSIDES AND DOCUSATE SODIUM 2 TABLET: 50; 8.6 TABLET ORAL at 16:52

## 2022-10-13 RX ADMIN — LORAZEPAM 1 MG: 1 TABLET ORAL at 11:55

## 2022-10-13 RX ADMIN — LORAZEPAM 2 MG: 1 TABLET ORAL at 09:44

## 2022-10-13 RX ADMIN — MORPHINE SULFATE 4 MG: 4 INJECTION INTRAVENOUS at 20:44

## 2022-10-13 RX ADMIN — ONDANSETRON 4 MG: 2 INJECTION INTRAMUSCULAR; INTRAVENOUS at 21:19

## 2022-10-13 RX ADMIN — SODIUM PHOSPHATE, MONOBASIC, MONOHYDRATE 30 MMOL: 276; 142 INJECTION, SOLUTION INTRAVENOUS at 08:19

## 2022-10-13 RX ADMIN — OXYCODONE HYDROCHLORIDE 10 MG: 10 TABLET ORAL at 08:27

## 2022-10-13 RX ADMIN — LORAZEPAM 1 MG: 1 TABLET ORAL at 19:42

## 2022-10-13 RX ADMIN — THIAMINE HCL TAB 100 MG 100 MG: 100 TAB at 06:18

## 2022-10-13 RX ADMIN — LEVETIRACETAM 1000 MG: 500 TABLET ORAL at 06:17

## 2022-10-13 RX ADMIN — OXYCODONE HYDROCHLORIDE 10 MG: 10 TABLET ORAL at 19:40

## 2022-10-13 RX ADMIN — OMEPRAZOLE 20 MG: 20 CAPSULE, DELAYED RELEASE ORAL at 18:00

## 2022-10-13 RX ADMIN — OXYCODONE HYDROCHLORIDE 10 MG: 10 TABLET ORAL at 15:46

## 2022-10-13 RX ADMIN — SODIUM CHLORIDE: 9 INJECTION, SOLUTION INTRAVENOUS at 19:45

## 2022-10-13 ASSESSMENT — LIFESTYLE VARIABLES
HEADACHE, FULLNESS IN HEAD: MILD
TREMOR: *
DOES PATIENT WANT TO TALK TO SOMEONE ABOUT QUITTING: YES
AUDITORY DISTURBANCES: NOT PRESENT
PAROXYSMAL SWEATS: NO SWEAT VISIBLE
ANXIETY: NO ANXIETY (AT EASE)
ANXIETY: *
ORIENTATION AND CLOUDING OF SENSORIUM: ORIENTED AND CAN DO SERIAL ADDITIONS
PAROXYSMAL SWEATS: NO SWEAT VISIBLE
NAUSEA AND VOMITING: NO NAUSEA AND NO VOMITING
TOTAL SCORE: 11
AUDITORY DISTURBANCES: NOT PRESENT
TOTAL SCORE: 2
NAUSEA AND VOMITING: NO NAUSEA AND NO VOMITING
HEADACHE, FULLNESS IN HEAD: MILD
TOTAL SCORE: 4
VISUAL DISTURBANCES: MILD SENSITIVITY
HEADACHE, FULLNESS IN HEAD: MODERATE
VISUAL DISTURBANCES: NOT PRESENT
TREMOR: *
AUDITORY DISTURBANCES: NOT PRESENT
EVER FELT BAD OR GUILTY ABOUT YOUR DRINKING: YES
PAROXYSMAL SWEATS: BARELY PERCEPTIBLE SWEATING. PALMS MOIST
ALCOHOL_USE: YES
ANXIETY: *
AGITATION: SOMEWHAT MORE THAN NORMAL ACTIVITY
HEADACHE, FULLNESS IN HEAD: VERY MILD
HAVE YOU EVER FELT YOU SHOULD CUT DOWN ON YOUR DRINKING: YES
VISUAL DISTURBANCES: NOT PRESENT
TOTAL SCORE: 9
VISUAL DISTURBANCES: VERY MILD SENSITIVITY
AVERAGE NUMBER OF DAYS PER WEEK YOU HAVE A DRINK CONTAINING ALCOHOL: 7
HEADACHE, FULLNESS IN HEAD: NOT PRESENT
AGITATION: NORMAL ACTIVITY
TOTAL SCORE: 17
HOW MANY TIMES IN THE PAST YEAR HAVE YOU HAD 5 OR MORE DRINKS IN A DAY: 50
TOTAL SCORE: MILD ITCHING, PINS AND NEEDLES SENSATION, BURNING OR NUMBNESS
ANXIETY: *
TOTAL SCORE: 2
PAROXYSMAL SWEATS: *
TREMOR: *
ORIENTATION AND CLOUDING OF SENSORIUM: ORIENTED AND CAN DO SERIAL ADDITIONS
ANXIETY: MILDLY ANXIOUS
ORIENTATION AND CLOUDING OF SENSORIUM: ORIENTED AND CAN DO SERIAL ADDITIONS
ANXIETY: MODERATELY ANXIOUS OR GUARDED, SO ANXIETY IS INFERRED
TOTAL SCORE: 14
TREMOR: TREMOR NOT VISIBLE BUT CAN BE FELT, FINGERTIP TO FINGERTIP
PAROXYSMAL SWEATS: NO SWEAT VISIBLE
CONSUMPTION TOTAL: POSITIVE
VISUAL DISTURBANCES: VERY MILD SENSITIVITY
NAUSEA AND VOMITING: NO NAUSEA AND NO VOMITING
AGITATION: NORMAL ACTIVITY
VISUAL DISTURBANCES: NOT PRESENT
AGITATION: SOMEWHAT MORE THAN NORMAL ACTIVITY
AUDITORY DISTURBANCES: VERY MILD HARSHNESS OR ABILITY TO FRIGHTEN
AGITATION: NORMAL ACTIVITY
NAUSEA AND VOMITING: *
NAUSEA AND VOMITING: MILD NAUSEA WITH NO VOMITING
NAUSEA AND VOMITING: MILD NAUSEA WITH NO VOMITING
AGITATION: SOMEWHAT MORE THAN NORMAL ACTIVITY
NAUSEA AND VOMITING: NO NAUSEA AND NO VOMITING
TOTAL SCORE: 4
PAROXYSMAL SWEATS: NO SWEAT VISIBLE
TOTAL SCORE: 4
ON A TYPICAL DAY WHEN YOU DRINK ALCOHOL HOW MANY DRINKS DO YOU HAVE: 8
DOES PATIENT WANT TO STOP DRINKING: YES
ANXIETY: NO ANXIETY (AT EASE)
ORIENTATION AND CLOUDING OF SENSORIUM: ORIENTED AND CAN DO SERIAL ADDITIONS
HEADACHE, FULLNESS IN HEAD: VERY MILD
TREMOR: NO TREMOR
NAUSEA AND VOMITING: *
PAROXYSMAL SWEATS: NO SWEAT VISIBLE
TOTAL SCORE: 10
TOTAL SCORE: VERY MILD ITCHING, PINS AND NEEDLES SENSATION, BURNING OR NUMBNESS
PAROXYSMAL SWEATS: NO SWEAT VISIBLE
AUDITORY DISTURBANCES: NOT PRESENT
AUDITORY DISTURBANCES: NOT PRESENT
ANXIETY: MODERATELY ANXIOUS OR GUARDED, SO ANXIETY IS INFERRED
HEADACHE, FULLNESS IN HEAD: MILD
HEADACHE, FULLNESS IN HEAD: MODERATE
ORIENTATION AND CLOUDING OF SENSORIUM: ORIENTED AND CAN DO SERIAL ADDITIONS
NAUSEA AND VOMITING: MILD NAUSEA WITH NO VOMITING
AGITATION: SOMEWHAT MORE THAN NORMAL ACTIVITY
ORIENTATION AND CLOUDING OF SENSORIUM: ORIENTED AND CAN DO SERIAL ADDITIONS
TOTAL SCORE: 4
AGITATION: SOMEWHAT MORE THAN NORMAL ACTIVITY
HAVE PEOPLE ANNOYED YOU BY CRITICIZING YOUR DRINKING: YES
EVER HAD A DRINK FIRST THING IN THE MORNING TO STEADY YOUR NERVES TO GET RID OF A HANGOVER: YES
AUDITORY DISTURBANCES: NOT PRESENT
PAROXYSMAL SWEATS: NO SWEAT VISIBLE
ORIENTATION AND CLOUDING OF SENSORIUM: ORIENTED AND CAN DO SERIAL ADDITIONS
TREMOR: *
TREMOR: TREMOR NOT VISIBLE BUT CAN BE FELT, FINGERTIP TO FINGERTIP
VISUAL DISTURBANCES: NOT PRESENT
ORIENTATION AND CLOUDING OF SENSORIUM: ORIENTED AND CAN DO SERIAL ADDITIONS
AGITATION: *
VISUAL DISTURBANCES: NOT PRESENT
TOTAL SCORE: 10
TREMOR: TREMOR NOT VISIBLE BUT CAN BE FELT, FINGERTIP TO FINGERTIP
HEADACHE, FULLNESS IN HEAD: MILD
ORIENTATION AND CLOUDING OF SENSORIUM: ORIENTED AND CAN DO SERIAL ADDITIONS
VISUAL DISTURBANCES: VERY MILD SENSITIVITY
AUDITORY DISTURBANCES: NOT PRESENT
TREMOR: MODERATE TREMOR WITH ARMS EXTENDED
ANXIETY: *
AUDITORY DISTURBANCES: NOT PRESENT

## 2022-10-13 ASSESSMENT — ENCOUNTER SYMPTOMS
NAUSEA: 1
FEVER: 0
VOMITING: 0
COUGH: 0
CHILLS: 0
ABDOMINAL PAIN: 1
SHORTNESS OF BREATH: 0

## 2022-10-13 ASSESSMENT — COGNITIVE AND FUNCTIONAL STATUS - GENERAL
CLIMB 3 TO 5 STEPS WITH RAILING: A LITTLE
SUGGESTED CMS G CODE MODIFIER MOBILITY: CI
DAILY ACTIVITIY SCORE: 24
SUGGESTED CMS G CODE MODIFIER DAILY ACTIVITY: CH
MOBILITY SCORE: 23

## 2022-10-13 ASSESSMENT — PAIN DESCRIPTION - PAIN TYPE
TYPE: ACUTE PAIN

## 2022-10-13 ASSESSMENT — PATIENT HEALTH QUESTIONNAIRE - PHQ9
SUM OF ALL RESPONSES TO PHQ9 QUESTIONS 1 AND 2: 0
1. LITTLE INTEREST OR PLEASURE IN DOING THINGS: NOT AT ALL
2. FEELING DOWN, DEPRESSED, IRRITABLE, OR HOPELESS: NOT AT ALL

## 2022-10-13 NOTE — PROGRESS NOTES
Tele Shift Summary     Rhythm: NSR/ST  Rate:   Ectopy: Rare PVC and PAC  Measurements: /0.16/0.08/0.36/

## 2022-10-13 NOTE — CARE PLAN
The patient is Watcher - Medium risk of patient condition declining or worsening    Shift Goals  Clinical Goals: Monitor CIWA this shift  Patient Goals: Pain will be a 5/10 or less this shift    Progress made toward(s) clinical / shift goals:        Patient is not progressing towards the following goals:      Problem: Pain - Standard  Goal: Alleviation of pain or a reduction in pain to the patient’s comfort goal  Outcome: Not Progressing     Problem: Optimal Care for Alcohol Withdrawal  Goal: Optimal Care for the alcohol withdrawal patient  Outcome: Not Progressing     Problem: Lifestyle Changes  Goal: Patient's ability to identify lifestyle changes and available resources to help reduce recurrence of condition will improve  Outcome: Not Progressing     Problem: Psychosocial  Goal: Patient's level of anxiety will decrease  Outcome: Not Progressing     Problem: Psychosocial  Goal: Patient's ability to identify and develop effective coping behaviors will improve  Outcome: Not Progressing  Goal: Patient's ability to identify and utilize available support systems will improve  Outcome: Not Progressing

## 2022-10-13 NOTE — ASSESSMENT & PLAN NOTE
- MRCP last month negative, CBD remains dilated  - MRCP negative again  - Elevated bilirubin resolved

## 2022-10-13 NOTE — PROGRESS NOTES
"Hospital Medicine Daily Progress Note    Date of Service  10/13/2022    Chief Complaint  Rhiannon Marrero is a 37 y.o. female admitted 10/12/2022 with question of seizure, abdominal pain    Hospital Course  37-year-old female with a history of alcohol dependence, seizure disorder, alcohol induced pancreatitis, bipolar disorder who presented to hospital from [facility] for abdominal pain and witnessed seizure. She states that she presented to [facility] early this morning, intoxicated, because she had binged drank for the first time in a month and didn't want to continue drinking. While at [facility] during assessment, there was question of absence seizure, but she states that her seizures are typically clonic and she may have less responsive as she was acutely intoxicated.  She does take Keppra but Pharmacy reports not having it filled since 7/2/22 but pt reports having excess prescriptions at home and taking it appropriately.     Interval Problem Update  10/12 - Pt with some tachycardia, mild white count elevation and metabolic acidosis with a bicarb of 14 and gap of 27. UA negative. She states this abdominal pain feels typical of her known pancreatitis and is central in her abdomen.  She has a gap acidosis with only mild ketones on UA - will check a lactic acid. Additionally, given her tachycardia, temp of 100.0 and elevated white count, will check RUQ u/s, COVID and CXR.    10/13 - Pt's bilirubin elevated, this does not appear to be chronic in nature - she had an MRCP last month that was negative. At that time, her CBD was mildly dilated and it is again today - will repeat MRCP given the new elevation in bilirubin. Replace phos.  Pt placed on legal hold by ERP - \"The patient actually denies any suicidal ideation to me and just states that she wants to die because 'the pain is so bad'. \" Discussed with the patient at bedside today, she denies any suicidal ideation, intent or overt depression. She was intoxicated on admission. She did " have a drop in hemoglobin overnight - with her pain being primarily epigastric as well as LUQ, she may have an alcoholic gastritis or PUD contributing as well. Will check occult blood as well as an anemia panel, increase PPI to BID and start carafate.    I have discussed this patient's plan of care and discharge plan at IDT rounds today with Case Management, Nursing, Nursing leadership, and other members of the IDT team.    Consultants/Specialty  none    Code Status  Full Code    Disposition  Patient is not medically cleared for discharge.   Anticipate discharge to to home with close outpatient follow-up.  I have placed the appropriate orders for post-discharge needs.    Review of Systems  Review of Systems   Constitutional:  Negative for chills and fever.   Respiratory:  Negative for cough and shortness of breath.    Cardiovascular:  Negative for chest pain and leg swelling.   Gastrointestinal:  Positive for abdominal pain and nausea. Negative for vomiting.   All other systems reviewed and are negative.     Physical Exam  Temp:  [36.4 °C (97.5 °F)-37.8 °C (100 °F)] 36.6 °C (97.9 °F)  Pulse:  [] 107  Resp:  [18-20] 18  BP: ()/() 102/71  SpO2:  [95 %-100 %] 96 %    Physical Exam  Vitals and nursing note reviewed.   Constitutional:       Appearance: She is ill-appearing. She is not toxic-appearing.   HENT:      Head: Normocephalic and atraumatic.   Cardiovascular:      Rate and Rhythm: Regular rhythm. Tachycardia present.      Heart sounds: No murmur heard.  Pulmonary:      Effort: Pulmonary effort is normal.      Breath sounds: Normal breath sounds.   Abdominal:      General: Abdomen is flat. Bowel sounds are normal. There is no distension.      Palpations: Abdomen is soft.      Tenderness: There is abdominal tenderness. There is no guarding.   Musculoskeletal:         General: No swelling.      Right lower leg: No edema.      Left lower leg: No edema.   Skin:     General: Skin is warm and dry.       Coloration: Skin is not jaundiced.   Neurological:      General: No focal deficit present.      Mental Status: She is alert and oriented to person, place, and time.   Psychiatric:         Mood and Affect: Mood normal.         Behavior: Behavior normal.       Fluids    Intake/Output Summary (Last 24 hours) at 10/13/2022 0654  Last data filed at 10/12/2022 2300  Gross per 24 hour   Intake --   Output 2 ml   Net -2 ml         Laboratory  Recent Labs     10/12/22  0056 10/13/22  0028   WBC 11.2* 6.0   RBC 4.87 3.74*   HEMOGLOBIN 15.5 11.8*   HEMATOCRIT 46.4 34.8*   MCV 95.3 93.0   MCH 31.8 31.6   MCHC 33.4* 33.9   RDW 53.2* 49.6   PLATELETCT 274 180   MPV 9.4 8.9*       Recent Labs     10/12/22  0056 10/13/22  0028   SODIUM 140 134*   POTASSIUM 4.2 4.1   CHLORIDE 99 100   CO2 14* 22   GLUCOSE 56* 132*   BUN 13 8   CREATININE 0.64 0.51   CALCIUM 8.9 8.3*                     Imaging  US-RUQ   Final Result      1.  Gallbladder sludge without stones or biliary dilation      2.  Mild, unchanged biliary dilation      DX-CHEST-PORTABLE (1 VIEW)   Final Result      Negative single view of the chest.      CT-ABDOMEN-PELVIS WITH   Final Result      New mild pancreatic calcification compatible with chronic pancreatitis. Duct dilatation is not outside of normal limits and there is no adjacent fat stranding to confirm acute pancreatitis      Stable right renal lateral cortex hypodensity has volume loss indicating this reflects scarring from prior injury, infection. This is of no acute significance      Mild hepatomegaly      Resolved pelvic free fluid             Assessment/Plan  * Acute on chronic pancreatitis (HCC)- (present on admission)  Assessment & Plan  - Lipase is not elevated however she has had chronic pancreatitis long enough that she may no longer elevate her lipase, there is no adjacent fat stranding noted however due to her location and severity of her pain, associated with a significant alcohol intake, I do feel that  she may have acutely inflamed her pancreas  -  n.p.o., will allow sips with meds   -Patient understands she needs alcohol cessation, would like assistance after hospitalization is finished  - RUQ u/s with gallbladder sludge without stones or biliary dilation, mild, unchanged biliary dilation  - Check MRCP     Hypophosphatemia  Assessment & Plan  - Jump    Elevated bilirubin  Assessment & Plan  - MRCP last month negative, CBD remains dilated  - Recheck MRCP as bilirubin now elevated     Hypoglycemia- (present on admission)  Assessment & Plan  - Resolved     Leukocytosis- (present on admission)  Assessment & Plan  - Resolved with IVFs     Lactic acidosis- (present on admission)  Assessment & Plan  - Secondary to EtOH   - Resolved    High anion gap metabolic acidosis- (present on admission)  Assessment & Plan  - Lactic acidosis, resolved - due to alcohol.      Seizure disorder (HCC)- (present on admission)  Assessment & Plan  - Patient does have a history of seizures, even has breakthrough seizures, there was no grand mal activity reported  -Okay to continue home Keppra dose without increase  -Start as needed Ativan  -Adjust as needed  - Seizure precautions      Bipolar affective disorder, current episode hypomanic (HCC)- (present on admission)  Assessment & Plan  - Continue home meds  - Pt denied SI to the ERP but was placed on legal hold as she said her pain was so bad she wanted to die  - Release legal hold - denies SI or intent to me    Alcohol dependence (HCC)- (present on admission)  Assessment & Plan  - Patient only started drinking yesterday, will institute CIWA given her concurrent tachycardia and underlying seizure d/o  - CIWA 10-17        VTE prophylaxis: SCDs/TEDs and enoxaparin ppx    I have performed a physical exam and reviewed and updated ROS and Plan today (10/13/2022). In review of yesterday's note (10/12/2022), there are no changes except as documented above.

## 2022-10-13 NOTE — ASSESSMENT & PLAN NOTE
- Stable without signs of blood loss   - Check occult blood - may have alcohol gastritis or PUD  - Increased Omeprazole to BID and added Carafate     10/20: Pending occult blood, no signs of overt bleeding

## 2022-10-14 ENCOUNTER — APPOINTMENT (OUTPATIENT)
Dept: RADIOLOGY | Facility: MEDICAL CENTER | Age: 37
DRG: 439 | End: 2022-10-14
Attending: FAMILY MEDICINE
Payer: COMMERCIAL

## 2022-10-14 PROBLEM — D72.820 LYMPHOCYTOSIS: Status: ACTIVE | Noted: 2022-10-14

## 2022-10-14 PROBLEM — R79.0 ABNORMAL IRON SATURATION: Status: ACTIVE | Noted: 2022-10-14

## 2022-10-14 LAB
ALBUMIN SERPL BCP-MCNC: 3.7 G/DL (ref 3.2–4.9)
ALBUMIN/GLOB SERPL: 1.8 G/DL
ALP SERPL-CCNC: 64 U/L (ref 30–99)
ALT SERPL-CCNC: 26 U/L (ref 2–50)
ANION GAP SERPL CALC-SCNC: 11 MMOL/L (ref 7–16)
AST SERPL-CCNC: 21 U/L (ref 12–45)
BASOPHILS # BLD AUTO: 0.6 % (ref 0–1.8)
BASOPHILS # BLD: 0.03 K/UL (ref 0–0.12)
BILIRUB SERPL-MCNC: 1.5 MG/DL (ref 0.1–1.5)
BUN SERPL-MCNC: 4 MG/DL (ref 8–22)
CALCIUM SERPL-MCNC: 8.5 MG/DL (ref 8.4–10.2)
CHLORIDE SERPL-SCNC: 102 MMOL/L (ref 96–112)
CO2 SERPL-SCNC: 24 MMOL/L (ref 20–33)
CREAT SERPL-MCNC: 0.46 MG/DL (ref 0.5–1.4)
EOSINOPHIL # BLD AUTO: 0.21 K/UL (ref 0–0.51)
EOSINOPHIL NFR BLD: 4.4 % (ref 0–6.9)
ERYTHROCYTE [DISTWIDTH] IN BLOOD BY AUTOMATED COUNT: 50.5 FL (ref 35.9–50)
GFR SERPLBLD CREATININE-BSD FMLA CKD-EPI: 128 ML/MIN/1.73 M 2
GLOBULIN SER CALC-MCNC: 2.1 G/DL (ref 1.9–3.5)
GLUCOSE SERPL-MCNC: 91 MG/DL (ref 65–99)
HAV IGM SERPL QL IA: NORMAL
HBV CORE IGM SER QL: NORMAL
HBV SURFACE AG SER QL: NORMAL
HCT VFR BLD AUTO: 33.5 % (ref 37–47)
HCV AB SER QL: NORMAL
HGB BLD-MCNC: 11.1 G/DL (ref 12–16)
IMM GRANULOCYTES # BLD AUTO: 0 K/UL (ref 0–0.11)
IMM GRANULOCYTES NFR BLD AUTO: 0 % (ref 0–0.9)
LYMPHOCYTES # BLD AUTO: 2.09 K/UL (ref 1–4.8)
LYMPHOCYTES NFR BLD: 43.8 % (ref 22–41)
MAGNESIUM SERPL-MCNC: 1.7 MG/DL (ref 1.5–2.5)
MCH RBC QN AUTO: 31.4 PG (ref 27–33)
MCHC RBC AUTO-ENTMCNC: 33.1 G/DL (ref 33.6–35)
MCV RBC AUTO: 94.6 FL (ref 81.4–97.8)
MONOCYTES # BLD AUTO: 0.34 K/UL (ref 0–0.85)
MONOCYTES NFR BLD AUTO: 7.1 % (ref 0–13.4)
NEUTROPHILS # BLD AUTO: 2.1 K/UL (ref 2–7.15)
NEUTROPHILS NFR BLD: 44.1 % (ref 44–72)
NRBC # BLD AUTO: 0 K/UL
NRBC BLD-RTO: 0 /100 WBC
PHOSPHATE SERPL-MCNC: 3.7 MG/DL (ref 2.5–4.5)
PLATELET # BLD AUTO: 153 K/UL (ref 164–446)
PMV BLD AUTO: 9.3 FL (ref 9–12.9)
POTASSIUM SERPL-SCNC: 3.4 MMOL/L (ref 3.6–5.5)
PROT SERPL-MCNC: 5.8 G/DL (ref 6–8.2)
RBC # BLD AUTO: 3.54 M/UL (ref 4.2–5.4)
SODIUM SERPL-SCNC: 137 MMOL/L (ref 135–145)
WBC # BLD AUTO: 4.8 K/UL (ref 4.8–10.8)

## 2022-10-14 PROCEDURE — 80053 COMPREHEN METABOLIC PANEL: CPT

## 2022-10-14 PROCEDURE — A9270 NON-COVERED ITEM OR SERVICE: HCPCS | Performed by: FAMILY MEDICINE

## 2022-10-14 PROCEDURE — 700111 HCHG RX REV CODE 636 W/ 250 OVERRIDE (IP): Performed by: FAMILY MEDICINE

## 2022-10-14 PROCEDURE — 83735 ASSAY OF MAGNESIUM: CPT

## 2022-10-14 PROCEDURE — A9270 NON-COVERED ITEM OR SERVICE: HCPCS | Performed by: INTERNAL MEDICINE

## 2022-10-14 PROCEDURE — 700102 HCHG RX REV CODE 250 W/ 637 OVERRIDE(OP): Performed by: FAMILY MEDICINE

## 2022-10-14 PROCEDURE — 84100 ASSAY OF PHOSPHORUS: CPT

## 2022-10-14 PROCEDURE — 85025 COMPLETE CBC W/AUTO DIFF WBC: CPT

## 2022-10-14 PROCEDURE — 94760 N-INVAS EAR/PLS OXIMETRY 1: CPT

## 2022-10-14 PROCEDURE — 74181 MRI ABDOMEN W/O CONTRAST: CPT

## 2022-10-14 PROCEDURE — 36415 COLL VENOUS BLD VENIPUNCTURE: CPT

## 2022-10-14 PROCEDURE — 700111 HCHG RX REV CODE 636 W/ 250 OVERRIDE (IP): Performed by: INTERNAL MEDICINE

## 2022-10-14 PROCEDURE — 700102 HCHG RX REV CODE 250 W/ 637 OVERRIDE(OP): Performed by: INTERNAL MEDICINE

## 2022-10-14 PROCEDURE — 700105 HCHG RX REV CODE 258: Performed by: INTERNAL MEDICINE

## 2022-10-14 PROCEDURE — 99232 SBSQ HOSP IP/OBS MODERATE 35: CPT | Performed by: FAMILY MEDICINE

## 2022-10-14 PROCEDURE — 770001 HCHG ROOM/CARE - MED/SURG/GYN PRIV*

## 2022-10-14 RX ORDER — MORPHINE SULFATE 4 MG/ML
4 INJECTION INTRAVENOUS
Status: DISCONTINUED | OUTPATIENT
Start: 2022-10-14 | End: 2022-10-14

## 2022-10-14 RX ORDER — HYDROMORPHONE HYDROCHLORIDE 1 MG/ML
1 INJECTION, SOLUTION INTRAMUSCULAR; INTRAVENOUS; SUBCUTANEOUS EVERY 4 HOURS PRN
Status: DISCONTINUED | OUTPATIENT
Start: 2022-10-14 | End: 2022-10-17

## 2022-10-14 RX ORDER — POTASSIUM CHLORIDE 20 MEQ/1
40 TABLET, EXTENDED RELEASE ORAL ONCE
Status: COMPLETED | OUTPATIENT
Start: 2022-10-14 | End: 2022-10-14

## 2022-10-14 RX ORDER — OXYCODONE HYDROCHLORIDE 10 MG/1
10 TABLET ORAL
Status: DISCONTINUED | OUTPATIENT
Start: 2022-10-14 | End: 2022-10-14

## 2022-10-14 RX ORDER — MORPHINE SULFATE 15 MG/1
7.5 TABLET ORAL EVERY 6 HOURS PRN
Status: DISCONTINUED | OUTPATIENT
Start: 2022-10-14 | End: 2022-10-18

## 2022-10-14 RX ORDER — MORPHINE SULFATE 15 MG/1
15 TABLET ORAL EVERY 6 HOURS PRN
Status: DISCONTINUED | OUTPATIENT
Start: 2022-10-14 | End: 2022-10-18

## 2022-10-14 RX ADMIN — THIAMINE HCL TAB 100 MG 100 MG: 100 TAB at 05:41

## 2022-10-14 RX ADMIN — OXYCODONE HYDROCHLORIDE 10 MG: 10 TABLET ORAL at 06:40

## 2022-10-14 RX ADMIN — QUETIAPINE FUMARATE 150 MG: 25 TABLET ORAL at 20:40

## 2022-10-14 RX ADMIN — SENNOSIDES AND DOCUSATE SODIUM 2 TABLET: 50; 8.6 TABLET ORAL at 05:41

## 2022-10-14 RX ADMIN — MORPHINE SULFATE 15 MG: 15 TABLET ORAL at 16:18

## 2022-10-14 RX ADMIN — ZONISAMIDE 200 MG: 50 CAPSULE ORAL at 20:40

## 2022-10-14 RX ADMIN — OMEPRAZOLE 20 MG: 20 CAPSULE, DELAYED RELEASE ORAL at 05:41

## 2022-10-14 RX ADMIN — SUCRALFATE 1 G: 1 SUSPENSION ORAL at 05:42

## 2022-10-14 RX ADMIN — SODIUM CHLORIDE: 9 INJECTION, SOLUTION INTRAVENOUS at 12:46

## 2022-10-14 RX ADMIN — THERA TABS 1 TABLET: TAB at 05:41

## 2022-10-14 RX ADMIN — LEVETIRACETAM 1000 MG: 500 TABLET ORAL at 17:23

## 2022-10-14 RX ADMIN — SODIUM CHLORIDE: 9 INJECTION, SOLUTION INTRAVENOUS at 03:52

## 2022-10-14 RX ADMIN — ONDANSETRON 4 MG: 2 INJECTION INTRAMUSCULAR; INTRAVENOUS at 11:07

## 2022-10-14 RX ADMIN — POTASSIUM CHLORIDE 40 MEQ: 1500 TABLET, EXTENDED RELEASE ORAL at 08:04

## 2022-10-14 RX ADMIN — HYDROMORPHONE HYDROCHLORIDE 1 MG: 1 INJECTION, SOLUTION INTRAMUSCULAR; INTRAVENOUS; SUBCUTANEOUS at 23:24

## 2022-10-14 RX ADMIN — LORAZEPAM 0.5 MG: 0.5 TABLET ORAL at 19:51

## 2022-10-14 RX ADMIN — MORPHINE SULFATE 4 MG: 4 INJECTION INTRAVENOUS at 08:04

## 2022-10-14 RX ADMIN — SENNOSIDES AND DOCUSATE SODIUM 2 TABLET: 50; 8.6 TABLET ORAL at 17:23

## 2022-10-14 RX ADMIN — MORPHINE SULFATE 4 MG: 4 INJECTION INTRAVENOUS at 12:26

## 2022-10-14 RX ADMIN — HYDROMORPHONE HYDROCHLORIDE 1 MG: 1 INJECTION, SOLUTION INTRAMUSCULAR; INTRAVENOUS; SUBCUTANEOUS at 17:24

## 2022-10-14 RX ADMIN — MORPHINE SULFATE 15 MG: 15 TABLET ORAL at 22:20

## 2022-10-14 RX ADMIN — LEVETIRACETAM 1000 MG: 500 TABLET ORAL at 05:41

## 2022-10-14 RX ADMIN — SUCRALFATE 1 G: 1 SUSPENSION ORAL at 17:23

## 2022-10-14 RX ADMIN — OMEPRAZOLE 20 MG: 20 CAPSULE, DELAYED RELEASE ORAL at 17:23

## 2022-10-14 RX ADMIN — OXYCODONE HYDROCHLORIDE 15 MG: 10 TABLET ORAL at 11:10

## 2022-10-14 RX ADMIN — SUCRALFATE 1 G: 1 SUSPENSION ORAL at 11:09

## 2022-10-14 RX ADMIN — SUCRALFATE 1 G: 1 SUSPENSION ORAL at 23:24

## 2022-10-14 RX ADMIN — FOLIC ACID 1 MG: 1 TABLET ORAL at 05:41

## 2022-10-14 ASSESSMENT — LIFESTYLE VARIABLES
PAROXYSMAL SWEATS: NO SWEAT VISIBLE
ANXIETY: MILDLY ANXIOUS
ORIENTATION AND CLOUDING OF SENSORIUM: ORIENTED AND CAN DO SERIAL ADDITIONS
AGITATION: NORMAL ACTIVITY
ORIENTATION AND CLOUDING OF SENSORIUM: ORIENTED AND CAN DO SERIAL ADDITIONS
AGITATION: NORMAL ACTIVITY
TREMOR: *
AGITATION: NORMAL ACTIVITY
ORIENTATION AND CLOUDING OF SENSORIUM: ORIENTED AND CAN DO SERIAL ADDITIONS
TREMOR: NO TREMOR
TOTAL SCORE: 4
ORIENTATION AND CLOUDING OF SENSORIUM: ORIENTED AND CAN DO SERIAL ADDITIONS
AGITATION: NORMAL ACTIVITY
NAUSEA AND VOMITING: MILD NAUSEA WITH NO VOMITING
HEADACHE, FULLNESS IN HEAD: NOT PRESENT
ANXIETY: MODERATELY ANXIOUS OR GUARDED, SO ANXIETY IS INFERRED
TREMOR: TREMOR NOT VISIBLE BUT CAN BE FELT, FINGERTIP TO FINGERTIP
VISUAL DISTURBANCES: NOT PRESENT
VISUAL DISTURBANCES: NOT PRESENT
AGITATION: NORMAL ACTIVITY
NAUSEA AND VOMITING: MILD NAUSEA WITH NO VOMITING
PAROXYSMAL SWEATS: NO SWEAT VISIBLE
TOTAL SCORE: 2
HEADACHE, FULLNESS IN HEAD: VERY MILD
PAROXYSMAL SWEATS: NO SWEAT VISIBLE
NAUSEA AND VOMITING: *
TOTAL SCORE: 6
NAUSEA AND VOMITING: NO NAUSEA AND NO VOMITING
VISUAL DISTURBANCES: NOT PRESENT
VISUAL DISTURBANCES: NOT PRESENT
TOTAL SCORE: 4
NAUSEA AND VOMITING: MILD NAUSEA WITH NO VOMITING
ORIENTATION AND CLOUDING OF SENSORIUM: ORIENTED AND CAN DO SERIAL ADDITIONS
HEADACHE, FULLNESS IN HEAD: MODERATE
PAROXYSMAL SWEATS: NO SWEAT VISIBLE
AUDITORY DISTURBANCES: NOT PRESENT
ANXIETY: MILDLY ANXIOUS
HEADACHE, FULLNESS IN HEAD: NOT PRESENT
TREMOR: NO TREMOR
AUDITORY DISTURBANCES: NOT PRESENT
AUDITORY DISTURBANCES: NOT PRESENT
HEADACHE, FULLNESS IN HEAD: NOT PRESENT
AUDITORY DISTURBANCES: NOT PRESENT
VISUAL DISTURBANCES: NOT PRESENT
PAROXYSMAL SWEATS: NO SWEAT VISIBLE
AUDITORY DISTURBANCES: NOT PRESENT
ANXIETY: NO ANXIETY (AT EASE)
TOTAL SCORE: 4
ANXIETY: MILDLY ANXIOUS
TREMOR: TREMOR NOT VISIBLE BUT CAN BE FELT, FINGERTIP TO FINGERTIP

## 2022-10-14 ASSESSMENT — ENCOUNTER SYMPTOMS
FEVER: 0
VOMITING: 0
NAUSEA: 1
CHILLS: 0
COUGH: 0
SHORTNESS OF BREATH: 0
ABDOMINAL PAIN: 1

## 2022-10-14 ASSESSMENT — PAIN DESCRIPTION - PAIN TYPE
TYPE: ACUTE PAIN

## 2022-10-14 NOTE — PROGRESS NOTES
Tele Shift Summary     Rhythm: NSR   Rate: 67-83  Ectopy: Rare-Occasional PVC and PAC  Measurements: /0.16/0.08/0.36/

## 2022-10-14 NOTE — PROGRESS NOTES
Received report from night RN, assumed care. POC discussed.   A&Ox4  Pain 8/10  Bed in lowest position with call light and belongings within reach.

## 2022-10-14 NOTE — PROGRESS NOTES
"Hospital Medicine Daily Progress Note    Date of Service  10/14/2022    Chief Complaint  Rhiannon Marrero is a 37 y.o. female admitted 10/12/2022 with question of seizure, abdominal pain    Hospital Course  37-year-old female with a history of alcohol dependence, seizure disorder, alcohol induced pancreatitis, bipolar disorder who presented to hospital from [facility] for abdominal pain and witnessed seizure. She states that she presented to [facility] early this morning, intoxicated, because she had binged drank for the first time in a month and didn't want to continue drinking. While at [facility] during assessment, there was question of absence seizure, but she states that her seizures are typically clonic and she may have less responsive as she was acutely intoxicated.  She does take Keppra but Pharmacy reports not having it filled since 7/2/22 but pt reports having excess prescriptions at home and taking it appropriately.     Interval Problem Update  10/12 - Pt with some tachycardia, mild white count elevation and metabolic acidosis with a bicarb of 14 and gap of 27. UA negative. She states this abdominal pain feels typical of her known pancreatitis and is central in her abdomen.  She has a gap acidosis with only mild ketones on UA - will check a lactic acid. Additionally, given her tachycardia, temp of 100.0 and elevated white count, will check RUQ u/s, COVID and CXR.    10/13 - Pt's bilirubin elevated, this does not appear to be chronic in nature - she had an MRCP last month that was negative. At that time, her CBD was mildly dilated and it is again today - will repeat MRCP given the new elevation in bilirubin. Replace phos.  Pt placed on legal hold by ERP - \"The patient actually denies any suicidal ideation to me and just states that she wants to die because 'the pain is so bad'. \" Discussed with the patient at bedside today, she denies any suicidal ideation, intent or overt depression. She was intoxicated on admission. She did " have a drop in hemoglobin overnight - with her pain being primarily epigastric as well as LUQ, she may have an alcoholic gastritis or PUD contributing as well. Will check occult blood as well as an anemia panel, increase PPI to BID and start carafate.    10/14 - Tachycardia improving, afebrile, bilirubin elevation resolved.  MRCP negative. Hemoglobin appears to be slowly equilibrating, FOB still pending. Pt has a lymphocytosis but recently tested negative for HIV - she was also negative for Hepatitis in May, but will recheck given her elevated bilirubin yesterday. Pt also with elevated iron and ferritin levels - will refer to Heme Onc at discharge for both lymphocytosis and abnormal iron studies, eval for hemachromatosis.     I have discussed this patient's plan of care and discharge plan at IDT rounds today with Case Management, Nursing, Nursing leadership, and other members of the IDT team.    Consultants/Specialty  none    Code Status  Full Code    Disposition  Patient is not medically cleared for discharge.   Anticipate discharge to to home with close outpatient follow-up.  I have placed the appropriate orders for post-discharge needs.    Review of Systems  Review of Systems   Constitutional:  Negative for chills and fever.   Respiratory:  Negative for cough and shortness of breath.    Cardiovascular:  Negative for chest pain and leg swelling.   Gastrointestinal:  Positive for abdominal pain and nausea. Negative for vomiting.   All other systems reviewed and are negative.     Physical Exam  Temp:  [36.6 °C (97.8 °F)-36.9 °C (98.4 °F)] 36.6 °C (97.8 °F)  Pulse:  [] 96  Resp:  [16-18] 16  BP: (105-144)/(44-86) 133/78  SpO2:  [92 %-97 %] 94 %    Physical Exam  Vitals and nursing note reviewed.   Constitutional:       Appearance: She is ill-appearing. She is not toxic-appearing.   HENT:      Head: Normocephalic and atraumatic.   Cardiovascular:      Rate and Rhythm: Regular rhythm. Tachycardia present.       Heart sounds: No murmur heard.  Pulmonary:      Effort: Pulmonary effort is normal.      Breath sounds: Normal breath sounds.   Abdominal:      General: Abdomen is flat. Bowel sounds are normal. There is no distension.      Palpations: Abdomen is soft.      Tenderness: There is abdominal tenderness. There is no guarding.   Musculoskeletal:         General: No swelling.      Right lower leg: No edema.      Left lower leg: No edema.   Skin:     General: Skin is warm and dry.      Coloration: Skin is not jaundiced.   Neurological:      General: No focal deficit present.      Mental Status: She is alert and oriented to person, place, and time.   Psychiatric:         Mood and Affect: Mood normal.         Behavior: Behavior normal.       Fluids    Intake/Output Summary (Last 24 hours) at 10/14/2022 0758  Last data filed at 10/14/2022 0352  Gross per 24 hour   Intake 6000 ml   Output --   Net 6000 ml         Laboratory  Recent Labs     10/12/22  0056 10/13/22  0028 10/14/22  0349   WBC 11.2* 6.0 4.8   RBC 4.87 3.74* 3.54*   HEMOGLOBIN 15.5 11.8* 11.1*   HEMATOCRIT 46.4 34.8* 33.5*   MCV 95.3 93.0 94.6   MCH 31.8 31.6 31.4   MCHC 33.4* 33.9 33.1*   RDW 53.2* 49.6 50.5*   PLATELETCT 274 180 153*   MPV 9.4 8.9* 9.3       Recent Labs     10/12/22  0056 10/13/22  0028 10/14/22  0349   SODIUM 140 134* 137   POTASSIUM 4.2 4.1 3.4*   CHLORIDE 99 100 102   CO2 14* 22 24   GLUCOSE 56* 132* 91   BUN 13 8 4*   CREATININE 0.64 0.51 0.46*   CALCIUM 8.9 8.3* 8.5                     Imaging  US-RUQ   Final Result      1.  Gallbladder sludge without stones or biliary dilation      2.  Mild, unchanged biliary dilation      DX-CHEST-PORTABLE (1 VIEW)   Final Result      Negative single view of the chest.      CT-ABDOMEN-PELVIS WITH   Final Result      New mild pancreatic calcification compatible with chronic pancreatitis. Duct dilatation is not outside of normal limits and there is no adjacent fat stranding to confirm acute pancreatitis       Stable right renal lateral cortex hypodensity has volume loss indicating this reflects scarring from prior injury, infection. This is of no acute significance      Mild hepatomegaly      Resolved pelvic free fluid      [id number]    (Results Pending)          Assessment/Plan  * Acute on chronic pancreatitis (HCC)- (present on admission)  Assessment & Plan  - Lipase is not elevated however she has had chronic pancreatitis long enough that she may no longer elevate her lipase, there is no adjacent fat stranding noted however due to her location and severity of her pain, associated with a significant alcohol intake, I do feel that she may have acutely inflamed her pancreas  -  Advance diet as tolerated  -Patient understands she needs alcohol cessation, would like assistance after hospitalization is finished  - RUQ u/s with gallbladder sludge without stones or biliary dilation, mild, unchanged biliary dilation  - MRCP negative, clinical presentation not consistent with cholecystitis    Lymphocytosis  Assessment & Plan  - Recheck hepatitis panel, HIV negative three months ago    Abnormal iron saturation  Assessment & Plan  - high iron and somewhat elevated ferritin (but also expected in pancreatitis) - not transfused, does not appear to be on iron supplementation  - Referral placed to Heme Onc     Normocytic anemia  Assessment & Plan  - Check indices  - Check occult blood - may have alcohol gastritis or PUD  - Increased Omeprazole to BID and added Carafate     Hypophosphatemia  Assessment & Plan  - Jump as needed     Elevated bilirubin  Assessment & Plan  - MRCP last month negative, CBD remains dilated  - MRCP negative again  - Elevated bilirubin resolved     Hypoglycemia- (present on admission)  Assessment & Plan  - Resolved     Leukocytosis- (present on admission)  Assessment & Plan  - Resolved with IVFs     Lactic acidosis- (present on admission)  Assessment & Plan  - Secondary to EtOH   - Resolved    High anion  gap metabolic acidosis- (present on admission)  Assessment & Plan  - Lactic acidosis, resolved - due to alcohol.      Seizure disorder (HCC)- (present on admission)  Assessment & Plan  - Patient does have a history of seizures, even has breakthrough seizures, there was no grand mal activity reported  -Okay to continue home Keppra dose without increase  -PRN Ativan  -Adjust as needed  - Seizure precautions      Bipolar affective disorder, current episode hypomanic (HCC)- (present on admission)  Assessment & Plan  - Continue home meds  - Pt denied SI to the ERP but was placed on legal hold as she said her pain was so bad she wanted to die  - Release legal hold - denies SI or intent to me. Asked pt again today about suicidal ideology or plan, again denies     Alcohol dependence (HCC)- (present on admission)  Assessment & Plan  - Patient only started drinking yesterday, will institute CIWA given her concurrent tachycardia and underlying seizure d/o  - CIWA 2-4       VTE prophylaxis: SCDs/TEDs and enoxaparin ppx    I have performed a physical exam and reviewed and updated ROS and Plan today (10/14/2022). In review of yesterday's note (10/13/2022), there are no changes except as documented above.

## 2022-10-14 NOTE — PROGRESS NOTES
Received bedside patient report from LESLIE Thibodeaux. Patient resting comfortably in bed, no complaints at this time. Safety precautions in place. Will continue to monitor.

## 2022-10-14 NOTE — ASSESSMENT & PLAN NOTE
- high iron and somewhat elevated ferritin (but also expected in pancreatitis) - not transfused, does not appear to be on iron supplementation  - Referral placed to Heme Onc as she also has concurrent lymphocytosis

## 2022-10-14 NOTE — ASSESSMENT & PLAN NOTE
- Recheck hepatitis panel negative, HIV negative, CMV and EBV pending  - Referral to Heme Onc in place    10/20: Lymphocytosis seems to have resolved. Monitor

## 2022-10-14 NOTE — CARE PLAN
The patient is Stable - Low risk of patient condition declining or worsening    Shift Goals  Clinical Goals: Free from falls or injuries, rest and sleep at least 4 hours, pain control to where patient is sleeping comfortably  Patient Goals: Free from falls or injuries, rest and sleep at least 4 hours, pain control to where patient is sleeping comfortably  Family Goals: Free from falls or injuries, rest and sleep at least 4 hours, pain control to where patient is sleeping comfortably    Progress made toward(s) clinical / shift goals: No falls or injuries, slept at least over 4 hours, pain controlled to where patient is sleeping comfortably    Patient is not progressing towards the following goals:

## 2022-10-15 LAB
ALBUMIN SERPL BCP-MCNC: 3.9 G/DL (ref 3.2–4.9)
ALBUMIN/GLOB SERPL: 1.6 G/DL
ALP SERPL-CCNC: 65 U/L (ref 30–99)
ALT SERPL-CCNC: 23 U/L (ref 2–50)
ANION GAP SERPL CALC-SCNC: 11 MMOL/L (ref 7–16)
AST SERPL-CCNC: 17 U/L (ref 12–45)
BASOPHILS # BLD AUTO: 0.9 % (ref 0–1.8)
BASOPHILS # BLD: 0.04 K/UL (ref 0–0.12)
BILIRUB SERPL-MCNC: 1 MG/DL (ref 0.1–1.5)
BUN SERPL-MCNC: 2 MG/DL (ref 8–22)
CALCIUM SERPL-MCNC: 8.9 MG/DL (ref 8.4–10.2)
CHLORIDE SERPL-SCNC: 104 MMOL/L (ref 96–112)
CO2 SERPL-SCNC: 24 MMOL/L (ref 20–33)
CREAT SERPL-MCNC: 0.42 MG/DL (ref 0.5–1.4)
EOSINOPHIL # BLD AUTO: 0.24 K/UL (ref 0–0.51)
EOSINOPHIL NFR BLD: 5.4 % (ref 0–6.9)
ERYTHROCYTE [DISTWIDTH] IN BLOOD BY AUTOMATED COUNT: 49 FL (ref 35.9–50)
GFR SERPLBLD CREATININE-BSD FMLA CKD-EPI: 130 ML/MIN/1.73 M 2
GLOBULIN SER CALC-MCNC: 2.5 G/DL (ref 1.9–3.5)
GLUCOSE SERPL-MCNC: 111 MG/DL (ref 65–99)
HCT VFR BLD AUTO: 35.7 % (ref 37–47)
HGB BLD-MCNC: 11.9 G/DL (ref 12–16)
IMM GRANULOCYTES # BLD AUTO: 0.01 K/UL (ref 0–0.11)
IMM GRANULOCYTES NFR BLD AUTO: 0.2 % (ref 0–0.9)
LYMPHOCYTES # BLD AUTO: 1.82 K/UL (ref 1–4.8)
LYMPHOCYTES NFR BLD: 41 % (ref 22–41)
MCH RBC QN AUTO: 31.6 PG (ref 27–33)
MCHC RBC AUTO-ENTMCNC: 33.3 G/DL (ref 33.6–35)
MCV RBC AUTO: 94.7 FL (ref 81.4–97.8)
MONOCYTES # BLD AUTO: 0.34 K/UL (ref 0–0.85)
MONOCYTES NFR BLD AUTO: 7.7 % (ref 0–13.4)
NEUTROPHILS # BLD AUTO: 1.99 K/UL (ref 2–7.15)
NEUTROPHILS NFR BLD: 44.8 % (ref 44–72)
NRBC # BLD AUTO: 0 K/UL
NRBC BLD-RTO: 0 /100 WBC
PLATELET # BLD AUTO: 147 K/UL (ref 164–446)
PMV BLD AUTO: 9.7 FL (ref 9–12.9)
POTASSIUM SERPL-SCNC: 3.4 MMOL/L (ref 3.6–5.5)
PROT SERPL-MCNC: 6.4 G/DL (ref 6–8.2)
RBC # BLD AUTO: 3.77 M/UL (ref 4.2–5.4)
SODIUM SERPL-SCNC: 139 MMOL/L (ref 135–145)
WBC # BLD AUTO: 4.4 K/UL (ref 4.8–10.8)

## 2022-10-15 PROCEDURE — A9270 NON-COVERED ITEM OR SERVICE: HCPCS | Performed by: INTERNAL MEDICINE

## 2022-10-15 PROCEDURE — 700102 HCHG RX REV CODE 250 W/ 637 OVERRIDE(OP): Performed by: FAMILY MEDICINE

## 2022-10-15 PROCEDURE — 94760 N-INVAS EAR/PLS OXIMETRY 1: CPT

## 2022-10-15 PROCEDURE — 99232 SBSQ HOSP IP/OBS MODERATE 35: CPT | Performed by: FAMILY MEDICINE

## 2022-10-15 PROCEDURE — 36415 COLL VENOUS BLD VENIPUNCTURE: CPT

## 2022-10-15 PROCEDURE — 85025 COMPLETE CBC W/AUTO DIFF WBC: CPT

## 2022-10-15 PROCEDURE — 700111 HCHG RX REV CODE 636 W/ 250 OVERRIDE (IP): Performed by: INTERNAL MEDICINE

## 2022-10-15 PROCEDURE — 770001 HCHG ROOM/CARE - MED/SURG/GYN PRIV*

## 2022-10-15 PROCEDURE — 700105 HCHG RX REV CODE 258: Performed by: INTERNAL MEDICINE

## 2022-10-15 PROCEDURE — A9270 NON-COVERED ITEM OR SERVICE: HCPCS | Performed by: FAMILY MEDICINE

## 2022-10-15 PROCEDURE — 80053 COMPREHEN METABOLIC PANEL: CPT

## 2022-10-15 PROCEDURE — 700111 HCHG RX REV CODE 636 W/ 250 OVERRIDE (IP): Performed by: FAMILY MEDICINE

## 2022-10-15 PROCEDURE — 700102 HCHG RX REV CODE 250 W/ 637 OVERRIDE(OP): Performed by: INTERNAL MEDICINE

## 2022-10-15 RX ORDER — POTASSIUM CHLORIDE 20 MEQ/1
40 TABLET, EXTENDED RELEASE ORAL ONCE
Status: COMPLETED | OUTPATIENT
Start: 2022-10-15 | End: 2022-10-15

## 2022-10-15 RX ORDER — CLONAZEPAM 0.5 MG/1
0.5 TABLET ORAL 2 TIMES DAILY PRN
Status: DISCONTINUED | OUTPATIENT
Start: 2022-10-15 | End: 2022-10-21 | Stop reason: HOSPADM

## 2022-10-15 RX ADMIN — QUETIAPINE FUMARATE 150 MG: 25 TABLET ORAL at 20:23

## 2022-10-15 RX ADMIN — SUCRALFATE 1 G: 1 SUSPENSION ORAL at 17:27

## 2022-10-15 RX ADMIN — SODIUM CHLORIDE: 9 INJECTION, SOLUTION INTRAVENOUS at 11:02

## 2022-10-15 RX ADMIN — POTASSIUM CHLORIDE 40 MEQ: 1500 TABLET, EXTENDED RELEASE ORAL at 08:21

## 2022-10-15 RX ADMIN — SUCRALFATE 1 G: 1 SUSPENSION ORAL at 04:35

## 2022-10-15 RX ADMIN — ONDANSETRON 4 MG: 2 INJECTION INTRAMUSCULAR; INTRAVENOUS at 07:58

## 2022-10-15 RX ADMIN — OMEPRAZOLE 20 MG: 20 CAPSULE, DELAYED RELEASE ORAL at 17:27

## 2022-10-15 RX ADMIN — HYDROMORPHONE HYDROCHLORIDE 1 MG: 1 INJECTION, SOLUTION INTRAMUSCULAR; INTRAVENOUS; SUBCUTANEOUS at 05:58

## 2022-10-15 RX ADMIN — ZONISAMIDE 200 MG: 50 CAPSULE ORAL at 20:22

## 2022-10-15 RX ADMIN — MORPHINE SULFATE 15 MG: 15 TABLET ORAL at 22:41

## 2022-10-15 RX ADMIN — HYDROMORPHONE HYDROCHLORIDE 1 MG: 1 INJECTION, SOLUTION INTRAMUSCULAR; INTRAVENOUS; SUBCUTANEOUS at 17:28

## 2022-10-15 RX ADMIN — MORPHINE SULFATE 15 MG: 15 TABLET ORAL at 04:36

## 2022-10-15 RX ADMIN — SODIUM CHLORIDE: 9 INJECTION, SOLUTION INTRAVENOUS at 18:18

## 2022-10-15 RX ADMIN — THERA TABS 1 TABLET: TAB at 04:36

## 2022-10-15 RX ADMIN — SUCRALFATE 1 G: 1 SUSPENSION ORAL at 23:02

## 2022-10-15 RX ADMIN — HYDROMORPHONE HYDROCHLORIDE 1 MG: 1 INJECTION, SOLUTION INTRAMUSCULAR; INTRAVENOUS; SUBCUTANEOUS at 11:50

## 2022-10-15 RX ADMIN — POLYETHYLENE GLYCOL 3350 1 PACKET: 17 POWDER, FOR SOLUTION ORAL at 04:36

## 2022-10-15 RX ADMIN — CLONAZEPAM 0.5 MG: 0.5 TABLET ORAL at 08:44

## 2022-10-15 RX ADMIN — LORAZEPAM 0.5 MG: 0.5 TABLET ORAL at 01:11

## 2022-10-15 RX ADMIN — LEVETIRACETAM 1000 MG: 500 TABLET ORAL at 17:27

## 2022-10-15 RX ADMIN — SENNOSIDES AND DOCUSATE SODIUM 2 TABLET: 50; 8.6 TABLET ORAL at 04:36

## 2022-10-15 RX ADMIN — MORPHINE SULFATE 15 MG: 15 TABLET ORAL at 10:37

## 2022-10-15 RX ADMIN — OMEPRAZOLE 20 MG: 20 CAPSULE, DELAYED RELEASE ORAL at 04:36

## 2022-10-15 RX ADMIN — FOLIC ACID 1 MG: 1 TABLET ORAL at 04:36

## 2022-10-15 RX ADMIN — MORPHINE SULFATE 15 MG: 15 TABLET ORAL at 16:37

## 2022-10-15 RX ADMIN — THIAMINE HCL TAB 100 MG 100 MG: 100 TAB at 04:36

## 2022-10-15 RX ADMIN — HYDROMORPHONE HYDROCHLORIDE 1 MG: 1 INJECTION, SOLUTION INTRAMUSCULAR; INTRAVENOUS; SUBCUTANEOUS at 23:31

## 2022-10-15 RX ADMIN — LEVETIRACETAM 1000 MG: 500 TABLET ORAL at 04:36

## 2022-10-15 RX ADMIN — SENNOSIDES AND DOCUSATE SODIUM 2 TABLET: 50; 8.6 TABLET ORAL at 17:27

## 2022-10-15 RX ADMIN — SUCRALFATE 1 G: 1 SUSPENSION ORAL at 11:50

## 2022-10-15 ASSESSMENT — LIFESTYLE VARIABLES
HEADACHE, FULLNESS IN HEAD: NOT PRESENT
TOTAL SCORE: VERY MILD ITCHING, PINS AND NEEDLES SENSATION, BURNING OR NUMBNESS
ANXIETY: MODERATELY ANXIOUS OR GUARDED, SO ANXIETY IS INFERRED
AUDITORY DISTURBANCES: NOT PRESENT
HEADACHE, FULLNESS IN HEAD: MODERATE
AGITATION: NORMAL ACTIVITY
PAROXYSMAL SWEATS: NO SWEAT VISIBLE
TOTAL SCORE: 5
VISUAL DISTURBANCES: NOT PRESENT
AGITATION: NORMAL ACTIVITY
PAROXYSMAL SWEATS: NO SWEAT VISIBLE
TOTAL SCORE: 10
VISUAL DISTURBANCES: NOT PRESENT
TREMOR: NO TREMOR
PAROXYSMAL SWEATS: NO SWEAT VISIBLE
TREMOR: TREMOR NOT VISIBLE BUT CAN BE FELT, FINGERTIP TO FINGERTIP
AUDITORY DISTURBANCES: NOT PRESENT
AGITATION: SOMEWHAT MORE THAN NORMAL ACTIVITY
TREMOR: NO TREMOR
ANXIETY: MILDLY ANXIOUS
VISUAL DISTURBANCES: NOT PRESENT
NAUSEA AND VOMITING: *
HEADACHE, FULLNESS IN HEAD: MILD
ORIENTATION AND CLOUDING OF SENSORIUM: ORIENTED AND CAN DO SERIAL ADDITIONS
NAUSEA AND VOMITING: NO NAUSEA AND NO VOMITING
ORIENTATION AND CLOUDING OF SENSORIUM: ORIENTED AND CAN DO SERIAL ADDITIONS
ANXIETY: MILDLY ANXIOUS
ORIENTATION AND CLOUDING OF SENSORIUM: ORIENTED AND CAN DO SERIAL ADDITIONS
TOTAL SCORE: 1
NAUSEA AND VOMITING: NO NAUSEA AND NO VOMITING
AUDITORY DISTURBANCES: NOT PRESENT

## 2022-10-15 ASSESSMENT — PAIN DESCRIPTION - PAIN TYPE
TYPE: ACUTE PAIN

## 2022-10-15 ASSESSMENT — ENCOUNTER SYMPTOMS
SHORTNESS OF BREATH: 0
FEVER: 0
NAUSEA: 1
COUGH: 0
CHILLS: 0
ABDOMINAL PAIN: 1
VOMITING: 0

## 2022-10-15 NOTE — PROGRESS NOTES
"Hospital Medicine Daily Progress Note    Date of Service  10/15/2022    Chief Complaint  Rhiannon Marrero is a 37 y.o. female admitted 10/12/2022 with question of seizure, abdominal pain    Hospital Course  37-year-old female with a history of alcohol dependence, seizure disorder, alcohol induced pancreatitis, bipolar disorder who presented to hospital from [facility] for abdominal pain and witnessed seizure. She states that she presented to [facility] early this morning, intoxicated, because she had binged drank for the first time in a month and didn't want to continue drinking. While at [facility] during assessment, there was question of absence seizure, but she states that her seizures are typically clonic and she may have less responsive as she was acutely intoxicated.  She does take Keppra but Pharmacy reports not having it filled since 7/2/22 but pt reports having excess prescriptions at home and taking it appropriately.     Interval Problem Update  10/12 - Pt with some tachycardia, mild white count elevation and metabolic acidosis with a bicarb of 14 and gap of 27. UA negative. She states this abdominal pain feels typical of her known pancreatitis and is central in her abdomen.  She has a gap acidosis with only mild ketones on UA - will check a lactic acid. Additionally, given her tachycardia, temp of 100.0 and elevated white count, will check RUQ u/s, COVID and CXR.    10/13 - Pt's bilirubin elevated, this does not appear to be chronic in nature - she had an MRCP last month that was negative. At that time, her CBD was mildly dilated and it is again today - will repeat MRCP given the new elevation in bilirubin. Replace phos.  Pt placed on legal hold by ERP - \"The patient actually denies any suicidal ideation to me and just states that she wants to die because 'the pain is so bad'. \" Discussed with the patient at bedside today, she denies any suicidal ideation, intent or overt depression. She was intoxicated on admission. She did " have a drop in hemoglobin overnight - with her pain being primarily epigastric as well as LUQ, she may have an alcoholic gastritis or PUD contributing as well. Will check occult blood as well as an anemia panel, increase PPI to BID and start carafate.    10/14 - Tachycardia improving, afebrile, bilirubin elevation resolved.  MRCP negative. Hemoglobin appears to be slowly equilibrating, FOB still pending. Pt has a lymphocytosis but recently tested negative for HIV - she was also negative for Hepatitis in May, but will recheck given her elevated bilirubin yesterday. Pt also with elevated iron and ferritin levels - will refer to Heme Onc at discharge for both lymphocytosis and abnormal iron studies, eval for hemachromatosis.    10/15 - Pt with sinus tachycardia this am - discussed with nursing, pt with a lot of anxiety and some pain, was given 0.5mg Klonopin, was calm when I came to see her.  Still denies SI, but after I left, nursing reported pt and family requesting to speak to Psych to evaluate if her underlying mental health issues are contributing to her uncontrolled pain. Will place consult. Labs remain stable, NPO for pancreatitis pain. I did attempt to call both pt's mother and significant other, but neither answered - I did a leave a message for both.     I have discussed this patient's plan of care and discharge plan at IDT rounds today with Case Management, Nursing, Nursing leadership, and other members of the IDT team.    Consultants/Specialty  none    Code Status  Full Code    Disposition  Patient is not medically cleared for discharge.   Anticipate discharge to to home with close outpatient follow-up.  I have placed the appropriate orders for post-discharge needs.    Review of Systems  Review of Systems   Constitutional:  Negative for chills and fever.   Respiratory:  Negative for cough and shortness of breath.    Cardiovascular:  Negative for chest pain and leg swelling.   Gastrointestinal:  Positive for  abdominal pain and nausea. Negative for vomiting.   All other systems reviewed and are negative.     Physical Exam  Temp:  [36.2 °C (97.2 °F)-36.7 °C (98.1 °F)] 36.2 °C (97.2 °F)  Pulse:  [] 141  Resp:  [16-18] 16  BP: (116-159)/(80-97) 159/94  SpO2:  [95 %-100 %] 99 %    Physical Exam  Vitals and nursing note reviewed.   Constitutional:       Appearance: She is ill-appearing. She is not toxic-appearing.   HENT:      Head: Normocephalic and atraumatic.   Cardiovascular:      Rate and Rhythm: Regular rhythm. Tachycardia present.      Heart sounds: No murmur heard.  Pulmonary:      Effort: Pulmonary effort is normal.      Breath sounds: Normal breath sounds.   Abdominal:      General: Abdomen is flat. Bowel sounds are normal. There is no distension.      Palpations: Abdomen is soft.      Tenderness: There is abdominal tenderness. There is no guarding.   Musculoskeletal:         General: No swelling.      Right lower leg: No edema.      Left lower leg: No edema.   Skin:     General: Skin is warm and dry.      Coloration: Skin is not jaundiced.   Neurological:      General: No focal deficit present.      Mental Status: She is alert and oriented to person, place, and time.   Psychiatric:         Mood and Affect: Mood normal.         Behavior: Behavior normal.       Fluids    Intake/Output Summary (Last 24 hours) at 10/15/2022 1223  Last data filed at 10/15/2022 0800  Gross per 24 hour   Intake 960 ml   Output 0 ml   Net 960 ml         Laboratory  Recent Labs     10/13/22  0028 10/14/22  0349 10/15/22  0427   WBC 6.0 4.8 4.4*   RBC 3.74* 3.54* 3.77*   HEMOGLOBIN 11.8* 11.1* 11.9*   HEMATOCRIT 34.8* 33.5* 35.7*   MCV 93.0 94.6 94.7   MCH 31.6 31.4 31.6   MCHC 33.9 33.1* 33.3*   RDW 49.6 50.5* 49.0   PLATELETCT 180 153* 147*   MPV 8.9* 9.3 9.7       Recent Labs     10/13/22  0028 10/14/22  0349 10/15/22  0427   SODIUM 134* 137 139   POTASSIUM 4.1 3.4* 3.4*   CHLORIDE 100 102 104   CO2 22 24 24   GLUCOSE 132* 91 111*    BUN 8 4* 2*   CREATININE 0.51 0.46* 0.42*   CALCIUM 8.3* 8.5 8.9                     Imaging  [id number]   Final Result      1.  There is no cholelithiasis. There is a trace of pericholecystic fluid.   2.  Common bile duct upper limits of normal at 6.3 mm. No evidence of choledocholithiasis.   3.  No change in hepatomegaly.      US-RUQ   Final Result      1.  Gallbladder sludge without stones or biliary dilation      2.  Mild, unchanged biliary dilation      DX-CHEST-PORTABLE (1 VIEW)   Final Result      Negative single view of the chest.      CT-ABDOMEN-PELVIS WITH   Final Result      New mild pancreatic calcification compatible with chronic pancreatitis. Duct dilatation is not outside of normal limits and there is no adjacent fat stranding to confirm acute pancreatitis      Stable right renal lateral cortex hypodensity has volume loss indicating this reflects scarring from prior injury, infection. This is of no acute significance      Mild hepatomegaly      Resolved pelvic free fluid             Assessment/Plan  * Acute on chronic pancreatitis (HCC)- (present on admission)  Assessment & Plan  - Lipase is not elevated however she has had chronic pancreatitis long enough that she may no longer elevate her lipase, there is no adjacent fat stranding noted however due to her location and severity of her pain, associated with a significant alcohol intake, I do feel that she may have acutely inflamed her pancreas  -  Advance diet as tolerated  -Patient understands she needs alcohol cessation, would like assistance after hospitalization is finished  - RUQ u/s with gallbladder sludge without stones or biliary dilation, mild, unchanged biliary dilation  - MRCP negative, clinical presentation not consistent with cholecystitis    Lymphocytosis  Assessment & Plan  - Recheck hepatitis panel negative, HIV negative three months ago    Abnormal iron saturation  Assessment & Plan  - high iron and somewhat elevated ferritin  (but also expected in pancreatitis) - not transfused, does not appear to be on iron supplementation  - Referral placed to Heme Onc     Normocytic anemia  Assessment & Plan  - Stable without signs of blood loss   - Check occult blood - may have alcohol gastritis or PUD  - Increased Omeprazole to BID and added Carafate     Hypophosphatemia  Assessment & Plan  - Jump as needed     Elevated bilirubin  Assessment & Plan  - MRCP last month negative, CBD remains dilated  - MRCP negative again  - Elevated bilirubin resolved     Hypoglycemia- (present on admission)  Assessment & Plan  - Resolved     Leukocytosis- (present on admission)  Assessment & Plan  - Resolved with IVFs     Lactic acidosis- (present on admission)  Assessment & Plan  - Secondary to EtOH   - Resolved    High anion gap metabolic acidosis- (present on admission)  Assessment & Plan  - Lactic acidosis, resolved - due to alcohol.      Seizure disorder (HCC)- (present on admission)  Assessment & Plan  - Patient does have a history of seizures, even has breakthrough seizures, there was no grand mal activity reported  -Okay to continue home Keppra dose without increase  -PRN Ativan  -Adjust as needed  - Seizure precautions      Bipolar affective disorder, current episode hypomanic (HCC)- (present on admission)  Assessment & Plan  - Continue home meds  - Pt denied SI to the ERP but was placed on legal hold as she said her pain was so bad she wanted to die  - Legal hold initiated in the ER, released on the floor as pt has denied SI or intent to me multiple times. Asked pt again today about suicidal ideology or plan, again denies   - Family requesting Psych consult, will place    Alcohol dependence (HCC)- (present on admission)  Assessment & Plan  - Patient only started drinking yesterday, will institute CIWA given her concurrent tachycardia and underlying seizure d/o  - CIWA <10 for >24 hours, stop CIWA protocol        VTE prophylaxis: SCDs/TEDs and enoxaparin  ppx    I have performed a physical exam and reviewed and updated ROS and Plan today (10/15/2022). In review of yesterday's note (10/14/2022), there are no changes except as documented above.

## 2022-10-15 NOTE — PROGRESS NOTES
Pt sitting upright in bed when received. Complains of 9\10 pain in upper abdomin, medication given per scale. CIWA score 6, ativan given as required. Verbalizes needs well and demonstrates use of call bell. Call bell in reach    Chart check completed    0443 Miralax given per protocol

## 2022-10-15 NOTE — PROGRESS NOTES
Telemetry Shift Summary     Rhythm: SR/ST  Rate: 66-99  Measurements: .18/.10/.42  Ectopy (reported by Monitor Tech): r PVC, r PAC     Normal Values  Rhythm: Sinus  HR:   Measurements: 0.12-0.20/0.06-0.10/0.30-0.52

## 2022-10-15 NOTE — CARE PLAN
The patient is Stable - Low risk of patient condition declining or worsening    Shift Goals  Clinical Goals: Pain decrease after interventions  Patient Goals: Reduce pain and anxiety  Family Goals: Free from falls or injuries, rest and sleep at least 4 hours, pain control to where patient is sleeping comfortably    Progress made toward(s) clinical / shift goals:  Pain has decreased after interventions. Pain and anxiety have been reported as decreased after interventions    Problem: Pain - Standard  Goal: Alleviation of pain or a reduction in pain to the patient’s comfort goal  Outcome: Progressing     Problem: Knowledge Deficit - Standard  Goal: Patient and family/care givers will demonstrate understanding of plan of care, disease process/condition, diagnostic tests and medications  Outcome: Progressing     Problem: Psychosocial  Goal: Patient's ability to identify and develop effective coping behaviors will improve  Outcome: Progressing       Patient is not progressing towards the following goals:

## 2022-10-15 NOTE — PROGRESS NOTES
Telemetry Shift Summary    Rhythm SR  HR Range 70s-90s  Ectopy R PAC/PVC  Measurements 0.16/0.08/0.40        Normal Values  Rhythm SR  HR Range    Measurements 0.12-0.20 / 0.06-0.10  / 0.30-0.52

## 2022-10-15 NOTE — PROGRESS NOTES
Tele strip at 1512 shows SR at 81.      Measurements from am strip were as follows:  RI=0.16  QRS=0.08  QT=0.36    Tele Shift Summary:    Rhythm : SR to ST  Rate :   Ectopy : Per CCT Christa, pt had rare PVCs and PACs.     Telemetry monitoring strips placed in pt's chart.

## 2022-10-15 NOTE — CARE PLAN
The patient is Stable - Low risk of patient condition declining or worsening    Shift Goals  Clinical Goals: Pain decrease with medication  Patient Goals: MRCP complete  Family Goals: Free from falls or injuries, rest and sleep at least 4 hours, pain control to where patient is sleeping comfortably    Progress made toward(s) clinical / shift goals:  PRN IV medication needed to decrease pain. MRCP completed this shift.     Problem: Pain - Standard  Goal: Alleviation of pain or a reduction in pain to the patient’s comfort goal  Outcome: Progressing     Problem: Knowledge Deficit - Standard  Goal: Patient and family/care givers will demonstrate understanding of plan of care, disease process/condition, diagnostic tests and medications  Outcome: Progressing     Problem: Optimal Care for Alcohol Withdrawal  Goal: Optimal Care for the alcohol withdrawal patient  Outcome: Progressing       Patient is not progressing towards the following goals:

## 2022-10-15 NOTE — CARE PLAN
The patient is Stable - Low risk of patient condition declining or worsening    Shift Goals  Clinical Goals: pain at tolerable level to patient  Patient Goals: rest and comfort  Family Goals: Free from falls or injuries, rest and sleep at least 4 hours, pain control to where patient is sleeping comfortably    Progress made toward(s) clinical / shift goals:    Problem: Pain - Standard  Goal: Alleviation of pain or a reduction in pain to the patient’s comfort goal  Outcome: Progressing       Patient is not progressing towards the following goals:

## 2022-10-16 PROBLEM — R00.0 TACHYCARDIA: Status: ACTIVE | Noted: 2022-10-16

## 2022-10-16 LAB
ALBUMIN SERPL BCP-MCNC: 4.2 G/DL (ref 3.2–4.9)
ALBUMIN/GLOB SERPL: 1.6 G/DL
ALP SERPL-CCNC: 71 U/L (ref 30–99)
ALT SERPL-CCNC: 23 U/L (ref 2–50)
ANION GAP SERPL CALC-SCNC: 13 MMOL/L (ref 7–16)
AST SERPL-CCNC: 20 U/L (ref 12–45)
BASOPHILS # BLD AUTO: 0.9 % (ref 0–1.8)
BASOPHILS # BLD: 0.04 K/UL (ref 0–0.12)
BILIRUB SERPL-MCNC: 1 MG/DL (ref 0.1–1.5)
BUN SERPL-MCNC: <2 MG/DL (ref 8–22)
CALCIUM SERPL-MCNC: 9.3 MG/DL (ref 8.4–10.2)
CHLORIDE SERPL-SCNC: 104 MMOL/L (ref 96–112)
CO2 SERPL-SCNC: 20 MMOL/L (ref 20–33)
CREAT SERPL-MCNC: 0.49 MG/DL (ref 0.5–1.4)
EOSINOPHIL # BLD AUTO: 0.16 K/UL (ref 0–0.51)
EOSINOPHIL NFR BLD: 3.8 % (ref 0–6.9)
ERYTHROCYTE [DISTWIDTH] IN BLOOD BY AUTOMATED COUNT: 47.5 FL (ref 35.9–50)
GFR SERPLBLD CREATININE-BSD FMLA CKD-EPI: 126 ML/MIN/1.73 M 2
GLOBULIN SER CALC-MCNC: 2.6 G/DL (ref 1.9–3.5)
GLUCOSE SERPL-MCNC: 123 MG/DL (ref 65–99)
HCT VFR BLD AUTO: 37 % (ref 37–47)
HGB BLD-MCNC: 12.3 G/DL (ref 12–16)
IMM GRANULOCYTES # BLD AUTO: 0.01 K/UL (ref 0–0.11)
IMM GRANULOCYTES NFR BLD AUTO: 0.2 % (ref 0–0.9)
LYMPHOCYTES # BLD AUTO: 2.1 K/UL (ref 1–4.8)
LYMPHOCYTES NFR BLD: 49.6 % (ref 22–41)
MAGNESIUM SERPL-MCNC: 1.6 MG/DL (ref 1.5–2.5)
MCH RBC QN AUTO: 31.5 PG (ref 27–33)
MCHC RBC AUTO-ENTMCNC: 33.2 G/DL (ref 33.6–35)
MCV RBC AUTO: 94.6 FL (ref 81.4–97.8)
MONOCYTES # BLD AUTO: 0.35 K/UL (ref 0–0.85)
MONOCYTES NFR BLD AUTO: 8.3 % (ref 0–13.4)
NEUTROPHILS # BLD AUTO: 1.57 K/UL (ref 2–7.15)
NEUTROPHILS NFR BLD: 37.2 % (ref 44–72)
NRBC # BLD AUTO: 0 K/UL
NRBC BLD-RTO: 0 /100 WBC
PHOSPHATE SERPL-MCNC: 3.7 MG/DL (ref 2.5–4.5)
PLATELET # BLD AUTO: 155 K/UL (ref 164–446)
PMV BLD AUTO: 9.7 FL (ref 9–12.9)
POTASSIUM SERPL-SCNC: 3.6 MMOL/L (ref 3.6–5.5)
PROT SERPL-MCNC: 6.8 G/DL (ref 6–8.2)
RBC # BLD AUTO: 3.91 M/UL (ref 4.2–5.4)
SODIUM SERPL-SCNC: 137 MMOL/L (ref 135–145)
WBC # BLD AUTO: 4.2 K/UL (ref 4.8–10.8)

## 2022-10-16 PROCEDURE — 700105 HCHG RX REV CODE 258: Performed by: INTERNAL MEDICINE

## 2022-10-16 PROCEDURE — 700102 HCHG RX REV CODE 250 W/ 637 OVERRIDE(OP): Performed by: INTERNAL MEDICINE

## 2022-10-16 PROCEDURE — A9270 NON-COVERED ITEM OR SERVICE: HCPCS | Performed by: FAMILY MEDICINE

## 2022-10-16 PROCEDURE — 90837 PSYTX W PT 60 MINUTES: CPT | Performed by: SOCIAL WORKER

## 2022-10-16 PROCEDURE — 85025 COMPLETE CBC W/AUTO DIFF WBC: CPT

## 2022-10-16 PROCEDURE — 87389 HIV-1 AG W/HIV-1&-2 AB AG IA: CPT

## 2022-10-16 PROCEDURE — 94760 N-INVAS EAR/PLS OXIMETRY 1: CPT

## 2022-10-16 PROCEDURE — 700111 HCHG RX REV CODE 636 W/ 250 OVERRIDE (IP): Performed by: FAMILY MEDICINE

## 2022-10-16 PROCEDURE — 36415 COLL VENOUS BLD VENIPUNCTURE: CPT

## 2022-10-16 PROCEDURE — 770020 HCHG ROOM/CARE - TELE (206)

## 2022-10-16 PROCEDURE — 700102 HCHG RX REV CODE 250 W/ 637 OVERRIDE(OP): Performed by: FAMILY MEDICINE

## 2022-10-16 PROCEDURE — 93005 ELECTROCARDIOGRAM TRACING: CPT | Performed by: FAMILY MEDICINE

## 2022-10-16 PROCEDURE — 83735 ASSAY OF MAGNESIUM: CPT

## 2022-10-16 PROCEDURE — 80053 COMPREHEN METABOLIC PANEL: CPT

## 2022-10-16 PROCEDURE — 84100 ASSAY OF PHOSPHORUS: CPT

## 2022-10-16 PROCEDURE — 99232 SBSQ HOSP IP/OBS MODERATE 35: CPT | Performed by: FAMILY MEDICINE

## 2022-10-16 PROCEDURE — 700111 HCHG RX REV CODE 636 W/ 250 OVERRIDE (IP): Performed by: INTERNAL MEDICINE

## 2022-10-16 PROCEDURE — A9270 NON-COVERED ITEM OR SERVICE: HCPCS | Performed by: INTERNAL MEDICINE

## 2022-10-16 RX ORDER — NICOTINE 21 MG/24HR
1 PATCH, TRANSDERMAL 24 HOURS TRANSDERMAL
Status: DISCONTINUED | OUTPATIENT
Start: 2022-10-16 | End: 2022-10-21 | Stop reason: HOSPADM

## 2022-10-16 RX ORDER — MAGNESIUM SULFATE HEPTAHYDRATE 40 MG/ML
4 INJECTION, SOLUTION INTRAVENOUS ONCE
Status: COMPLETED | OUTPATIENT
Start: 2022-10-16 | End: 2022-10-16

## 2022-10-16 RX ORDER — MORPHINE SULFATE 15 MG/1
15 TABLET, FILM COATED, EXTENDED RELEASE ORAL EVERY 12 HOURS
Status: DISCONTINUED | OUTPATIENT
Start: 2022-10-16 | End: 2022-10-21 | Stop reason: HOSPADM

## 2022-10-16 RX ORDER — POTASSIUM CHLORIDE 20 MEQ/1
40 TABLET, EXTENDED RELEASE ORAL ONCE
Status: COMPLETED | OUTPATIENT
Start: 2022-10-16 | End: 2022-10-16

## 2022-10-16 RX ADMIN — MORPHINE SULFATE 15 MG: 15 TABLET ORAL at 04:38

## 2022-10-16 RX ADMIN — QUETIAPINE FUMARATE 150 MG: 25 TABLET ORAL at 20:25

## 2022-10-16 RX ADMIN — FOLIC ACID 1 MG: 1 TABLET ORAL at 04:38

## 2022-10-16 RX ADMIN — LEVETIRACETAM 1000 MG: 500 TABLET ORAL at 04:38

## 2022-10-16 RX ADMIN — SUCRALFATE 1 G: 1 SUSPENSION ORAL at 23:59

## 2022-10-16 RX ADMIN — HYDROMORPHONE HYDROCHLORIDE 1 MG: 1 INJECTION, SOLUTION INTRAMUSCULAR; INTRAVENOUS; SUBCUTANEOUS at 09:50

## 2022-10-16 RX ADMIN — NICOTINE TRANSDERMAL SYSTEM 21 MG: 21 PATCH, EXTENDED RELEASE TRANSDERMAL at 15:07

## 2022-10-16 RX ADMIN — OMEPRAZOLE 20 MG: 20 CAPSULE, DELAYED RELEASE ORAL at 18:00

## 2022-10-16 RX ADMIN — SODIUM CHLORIDE: 9 INJECTION, SOLUTION INTRAVENOUS at 20:25

## 2022-10-16 RX ADMIN — SUCRALFATE 1 G: 1 SUSPENSION ORAL at 12:06

## 2022-10-16 RX ADMIN — MORPHINE SULFATE 15 MG: 15 TABLET, FILM COATED, EXTENDED RELEASE ORAL at 18:00

## 2022-10-16 RX ADMIN — MORPHINE SULFATE 15 MG: 15 TABLET, FILM COATED, EXTENDED RELEASE ORAL at 09:03

## 2022-10-16 RX ADMIN — CLONAZEPAM 0.5 MG: 0.5 TABLET ORAL at 15:59

## 2022-10-16 RX ADMIN — ONDANSETRON 4 MG: 2 INJECTION INTRAMUSCULAR; INTRAVENOUS at 20:25

## 2022-10-16 RX ADMIN — HYDROMORPHONE HYDROCHLORIDE 1 MG: 1 INJECTION, SOLUTION INTRAMUSCULAR; INTRAVENOUS; SUBCUTANEOUS at 15:07

## 2022-10-16 RX ADMIN — HYDROMORPHONE HYDROCHLORIDE 1 MG: 1 INJECTION, SOLUTION INTRAMUSCULAR; INTRAVENOUS; SUBCUTANEOUS at 20:01

## 2022-10-16 RX ADMIN — MAGNESIUM SULFATE HEPTAHYDRATE 4 G: 40 INJECTION, SOLUTION INTRAVENOUS at 12:07

## 2022-10-16 RX ADMIN — POTASSIUM CHLORIDE 40 MEQ: 1500 TABLET, EXTENDED RELEASE ORAL at 09:03

## 2022-10-16 RX ADMIN — SENNOSIDES AND DOCUSATE SODIUM 2 TABLET: 50; 8.6 TABLET ORAL at 18:00

## 2022-10-16 RX ADMIN — OMEPRAZOLE 20 MG: 20 CAPSULE, DELAYED RELEASE ORAL at 04:38

## 2022-10-16 RX ADMIN — THERA TABS 1 TABLET: TAB at 04:38

## 2022-10-16 RX ADMIN — HYDROMORPHONE HYDROCHLORIDE 1 MG: 1 INJECTION, SOLUTION INTRAMUSCULAR; INTRAVENOUS; SUBCUTANEOUS at 05:32

## 2022-10-16 RX ADMIN — ZONISAMIDE 200 MG: 50 CAPSULE ORAL at 20:25

## 2022-10-16 RX ADMIN — SENNOSIDES AND DOCUSATE SODIUM 2 TABLET: 50; 8.6 TABLET ORAL at 04:38

## 2022-10-16 RX ADMIN — SUCRALFATE 1 G: 1 SUSPENSION ORAL at 18:18

## 2022-10-16 RX ADMIN — THIAMINE HCL TAB 100 MG 100 MG: 100 TAB at 04:38

## 2022-10-16 RX ADMIN — SUCRALFATE 1 G: 1 SUSPENSION ORAL at 04:38

## 2022-10-16 RX ADMIN — LEVETIRACETAM 1000 MG: 500 TABLET ORAL at 18:19

## 2022-10-16 RX ADMIN — ONDANSETRON 4 MG: 2 INJECTION INTRAMUSCULAR; INTRAVENOUS at 15:59

## 2022-10-16 ASSESSMENT — PAIN DESCRIPTION - PAIN TYPE
TYPE: ACUTE PAIN

## 2022-10-16 ASSESSMENT — ENCOUNTER SYMPTOMS
SHORTNESS OF BREATH: 0
CHILLS: 0
NAUSEA: 1
VOMITING: 0
ABDOMINAL PAIN: 1
DEPRESSION: 1
NERVOUS/ANXIOUS: 1
COUGH: 0
FEVER: 0

## 2022-10-16 ASSESSMENT — PATIENT HEALTH QUESTIONNAIRE - PHQ9
SUM OF ALL RESPONSES TO PHQ9 QUESTIONS 1 AND 2: 0
1. LITTLE INTEREST OR PLEASURE IN DOING THINGS: NOT AT ALL
2. FEELING DOWN, DEPRESSED, IRRITABLE, OR HOPELESS: NOT AT ALL
2. FEELING DOWN, DEPRESSED, IRRITABLE, OR HOPELESS: NOT AT ALL
SUM OF ALL RESPONSES TO PHQ9 QUESTIONS 1 AND 2: 0
1. LITTLE INTEREST OR PLEASURE IN DOING THINGS: NOT AT ALL

## 2022-10-16 ASSESSMENT — LIFESTYLE VARIABLES: SUBSTANCE_ABUSE: 1

## 2022-10-16 NOTE — PROGRESS NOTES
Telemetry Shift Summary    Rhythm ST-ST, 24 sec of PSVT to 168   HR Range 80s-110s  Ectopy R PVC  Measurements 0.16/0.08/0.44        Normal Values  Rhythm SR  HR Range    Measurements 0.12-0.20 / 0.06-0.10  / 0.30-0.52
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
## 2022-10-16 NOTE — CARE PLAN
The patient is Watcher - Medium risk of patient condition declining or worsening    Shift Goals  Clinical Goals: monitor anxiety and pain  Patient Goals: rest and comfort  Family Goals: Free from falls or injuries, rest and sleep at least 4 hours, pain control to where patient is sleeping comfortably    Progress made toward(s) clinical / shift goals:    Problem: Pain - Standard  Goal: Alleviation of pain or a reduction in pain to the patient’s comfort goal  Outcome: Progressing       Patient is not progressing towards the following goals:

## 2022-10-16 NOTE — PROGRESS NOTES
"Pt ambulating in room to BR, and now back in bed. RN reviewed POC which is for continued pain mgt, monitor for tolerance of sips / fluids. Dr has addressed pain meds / added scheduled ER pain med. Fall and safety precautions in place, tele sitter in room, pt stated understanding to use call light for all ADL needs. Pt is independent w/ turns for skin integrity. Pt has Lovenox per MAR for VTE. RN encouraged CDB w/ \"I.S.\" will reinforce t/o day. Hourly rounds ongoing, call light w/in reach.   "

## 2022-10-16 NOTE — PROGRESS NOTES
Pt is sitting upright in bed when received. Complains of 8\10 pain, medications given per scale. Verbalizes needs well and demonstrates use of call bell. Call bell in reach    2015 /11, HR consistently elevated 105 Dr. Coronado made aware, to monitor for now.    2030 Dr Coronado initiated tele-sitter, discussed plans with Pt, pt verbalized understanding and agreed to sitter.

## 2022-10-16 NOTE — PROGRESS NOTES
Telemetry Strip     Strip printed: 1256  Measurements from am strip were as follows:  Rhythm: SR  HR: 63-85  Measurements: 0.16/ 0.08/ 0.40  Ectopy: r PVC, r PAC             Normal Values  Rhythm SR  HR Range    Measurements 0.12-0.20 / 0.06-0.10  / 0.30-0.52

## 2022-10-16 NOTE — PROGRESS NOTES
"Hospital Medicine Daily Progress Note    Date of Service  10/16/2022    Chief Complaint  Rhiannon Marrero is a 37 y.o. female admitted 10/12/2022 with question of seizure, abdominal pain    Hospital Course  37-year-old female with a history of alcohol dependence, seizure disorder, alcohol induced pancreatitis, bipolar disorder who presented to hospital from [facility] for abdominal pain and witnessed seizure. She states that she presented to [facility] early this morning, intoxicated, because she had binged drank for the first time in a month and didn't want to continue drinking. While at [facility] during assessment, there was question of absence seizure, but she states that her seizures are typically clonic and she may have less responsive as she was acutely intoxicated.  She does take Keppra but Pharmacy reports not having it filled since 7/2/22 but pt reports having excess prescriptions at home and taking it appropriately.     Interval Problem Update  10/12 - Pt with some tachycardia, mild white count elevation and metabolic acidosis with a bicarb of 14 and gap of 27. UA negative. She states this abdominal pain feels typical of her known pancreatitis and is central in her abdomen.  She has a gap acidosis with only mild ketones on UA - will check a lactic acid. Additionally, given her tachycardia, temp of 100.0 and elevated white count, will check RUQ u/s, COVID and CXR.    10/13 - Pt's bilirubin elevated, this does not appear to be chronic in nature - she had an MRCP last month that was negative. At that time, her CBD was mildly dilated and it is again today - will repeat MRCP given the new elevation in bilirubin. Replace phos.  Pt placed on legal hold by ERP - \"The patient actually denies any suicidal ideation to me and just states that she wants to die because 'the pain is so bad'. \" Discussed with the patient at bedside today, she denies any suicidal ideation, intent or overt depression. She was intoxicated on admission. She did " have a drop in hemoglobin overnight - with her pain being primarily epigastric as well as LUQ, she may have an alcoholic gastritis or PUD contributing as well. Will check occult blood as well as an anemia panel, increase PPI to BID and start carafate.    10/14 - Tachycardia improving, afebrile, bilirubin elevation resolved.  MRCP negative. Hemoglobin appears to be slowly equilibrating, FOB still pending. Pt has a lymphocytosis but recently tested negative for HIV - she was also negative for Hepatitis in May, but will recheck given her elevated bilirubin yesterday. Pt also with elevated iron and ferritin levels - will refer to Heme Onc at discharge for both lymphocytosis and abnormal iron studies, eval for hemachromatosis.    10/15 - Pt with sinus tachycardia this am - discussed with nursing, pt with a lot of anxiety and some pain, was given 0.5mg Klonopin, was calm when I came to see her.  Still denies SI, but after I left, nursing reported pt and family requesting to speak to Psych to evaluate if her underlying mental health issues are contributing to her uncontrolled pain. Will place consult. Labs remain stable, NPO for pancreatitis pain. I did attempt to call both pt's mother and significant other, but neither answered - I did a leave a message for both.     10/16 - Sitter placed last evening as I was able to get a hold of the pt's mom and she stated she felt worried that patient was suicidal. She did state that she did not feel that the pt would attempt suicide while in hospital, but she was concerned that she may attempt after discharge.  As pt denied that day having SI, a legal hold was not initiated, but a telesitter was placed to ensure pt's safety until this could be readdressed. Had extensive discussion with patient about her mood again this morning, and underlying psychiatric illness - she states that she is definitively not suicidal, has no intent and no plan.  She states that her mom is concerned  "because the pt and her boyfriend just broke up but pt states \"I'm Yazdanism - I'm too scared of what comes after this to risk suicide\". She also states that she has things to live for as she has children that she would not want to abandon via suicide.  She does agree that she needs increased help with mental health services, as she states that her mental health illness is not helped by her current meds, and that the mood fluctuations are exhausting. She is looking forward to speaking with Psych. Medically, she continues to have severe pain that is only decreased to a 7/10 with oral morphine and IV dilaudid. Will add MS Contin. Will refer to pain management as an outpatientHad an episode of PSVT overnight, will supplement K and Mag.     I have discussed this patient's plan of care and discharge plan at IDT rounds today with Case Management, Nursing, Nursing leadership, and other members of the IDT team.    Consultants/Specialty  none    Code Status  Full Code    Disposition  Patient is not medically cleared for discharge.   Anticipate discharge to to home with close outpatient follow-up.  I have placed the appropriate orders for post-discharge needs.    Review of Systems  Review of Systems   Constitutional:  Negative for chills and fever.   Respiratory:  Negative for cough and shortness of breath.    Cardiovascular:  Negative for chest pain and leg swelling.   Gastrointestinal:  Positive for abdominal pain and nausea. Negative for vomiting.   Psychiatric/Behavioral:  Positive for depression and substance abuse. Negative for suicidal ideas. The patient is nervous/anxious.    All other systems reviewed and are negative.     Physical Exam  Temp:  [36.3 °C (97.3 °F)-37.2 °C (98.9 °F)] 36.3 °C (97.3 °F)  Pulse:  [] 81  Resp:  [16-18] 17  BP: (125-165)/() 125/55  SpO2:  [92 %-99 %] 97 %    Physical Exam  Vitals and nursing note reviewed.   Constitutional:       Appearance: She is ill-appearing. She is not " toxic-appearing.   HENT:      Head: Normocephalic and atraumatic.   Cardiovascular:      Rate and Rhythm: Normal rate and regular rhythm.      Heart sounds: No murmur heard.  Pulmonary:      Effort: Pulmonary effort is normal.      Breath sounds: Normal breath sounds.   Abdominal:      General: Abdomen is flat. Bowel sounds are normal. There is no distension.      Palpations: Abdomen is soft.      Tenderness: There is abdominal tenderness. There is no guarding.   Musculoskeletal:         General: No swelling.      Right lower leg: No edema.      Left lower leg: No edema.   Skin:     General: Skin is warm and dry.      Coloration: Skin is not jaundiced.   Neurological:      General: No focal deficit present.      Mental Status: She is alert and oriented to person, place, and time.   Psychiatric:         Attention and Perception: Attention normal.         Mood and Affect: Mood is anxious. Affect is tearful.         Speech: Speech normal.         Behavior: Behavior normal. Behavior is cooperative.         Thought Content: Thought content normal. Thought content does not include suicidal ideation. Thought content does not include suicidal plan.         Judgment: Judgment is not impulsive.       Fluids    Intake/Output Summary (Last 24 hours) at 10/16/2022 1350  Last data filed at 10/16/2022 1200  Gross per 24 hour   Intake 0 ml   Output 0 ml   Net 0 ml         Laboratory  Recent Labs     10/14/22  0349 10/15/22  0427 10/16/22  0248   WBC 4.8 4.4* 4.2*   RBC 3.54* 3.77* 3.91*   HEMOGLOBIN 11.1* 11.9* 12.3   HEMATOCRIT 33.5* 35.7* 37.0   MCV 94.6 94.7 94.6   MCH 31.4 31.6 31.5   MCHC 33.1* 33.3* 33.2*   RDW 50.5* 49.0 47.5   PLATELETCT 153* 147* 155*   MPV 9.3 9.7 9.7       Recent Labs     10/14/22  0349 10/15/22  0427 10/16/22  0248   SODIUM 137 139 137   POTASSIUM 3.4* 3.4* 3.6   CHLORIDE 102 104 104   CO2 24 24 20   GLUCOSE 91 111* 123*   BUN 4* 2* <2*   CREATININE 0.46* 0.42* 0.49*   CALCIUM 8.5 8.9 9.3                      Imaging  IV-PCETPOH-D/O   Final Result      1.  There is no cholelithiasis. There is a trace of pericholecystic fluid.   2.  Common bile duct upper limits of normal at 6.3 mm. No evidence of choledocholithiasis.   3.  No change in hepatomegaly.      US-RUQ   Final Result      1.  Gallbladder sludge without stones or biliary dilation      2.  Mild, unchanged biliary dilation      DX-CHEST-PORTABLE (1 VIEW)   Final Result      Negative single view of the chest.      CT-ABDOMEN-PELVIS WITH   Final Result      New mild pancreatic calcification compatible with chronic pancreatitis. Duct dilatation is not outside of normal limits and there is no adjacent fat stranding to confirm acute pancreatitis      Stable right renal lateral cortex hypodensity has volume loss indicating this reflects scarring from prior injury, infection. This is of no acute significance      Mild hepatomegaly      Resolved pelvic free fluid             Assessment/Plan  * Acute on chronic pancreatitis (HCC)- (present on admission)  Assessment & Plan  - Lipase is not elevated however she has had chronic pancreatitis long enough that she may no longer elevate her lipase, there is no adjacent fat stranding noted however due to her location and severity of her pain, associated with a significant alcohol intake, I do feel that she may have acutely inflamed her pancreas  -  Advance diet as tolerated  -Patient understands she needs alcohol cessation, would like assistance after hospitalization is finished  - RUQ u/s with gallbladder sludge without stones or biliary dilation, mild, unchanged biliary dilation  - MRCP negative, clinical presentation not consistent with cholecystitis  - Add MS Contin for pain control as she is not having much improvement on MS IR    Tachycardia  Assessment & Plan  - Secondary to pain and anxiety  - Supplement mag and phos    Lymphocytosis  Assessment & Plan  - Recheck hepatitis panel negative, HIV negative three months ago,  will recheck    Abnormal iron saturation  Assessment & Plan  - high iron and somewhat elevated ferritin (but also expected in pancreatitis) - not transfused, does not appear to be on iron supplementation  - Referral placed to Heme Onc     Normocytic anemia  Assessment & Plan  - Stable without signs of blood loss   - Check occult blood - may have alcohol gastritis or PUD  - Increased Omeprazole to BID and added Carafate     Hypophosphatemia  Assessment & Plan  - Jump as needed     Elevated bilirubin  Assessment & Plan  - MRCP last month negative, CBD remains dilated  - MRCP negative again  - Elevated bilirubin resolved     Hypoglycemia- (present on admission)  Assessment & Plan  - Resolved     Leukocytosis- (present on admission)  Assessment & Plan  - Resolved with IVFs     Lactic acidosis- (present on admission)  Assessment & Plan  - Secondary to EtOH   - Resolved    High anion gap metabolic acidosis- (present on admission)  Assessment & Plan  - Lactic acidosis, resolved - due to alcohol.      Seizure disorder (HCC)- (present on admission)  Assessment & Plan  - Patient does have a history of seizures, even has breakthrough seizures, there was no grand mal activity reported  -Okay to continue home Keppra dose without increase  -PRN Ativan  -Adjust as needed  - Seizure precautions      Bipolar affective disorder, current episode hypomanic (HCC)- (present on admission)  Assessment & Plan  - Had good discussion with pt today - she feels that her bipolar is not adequately controlled on current medication and she feels like she is on an emotional rollercoaster  - Adamantly denies SI, intent, plan etc. She states she is [employer] and would not injure herself for fear of what the afterlife would entail, and that she has two children to live for  - Has requested to speak to Psych to titrate medications and help seek care after discharge, consult has been placed      Alcohol dependence (HCC)- (present on admission)  Assessment  & Plan  - Patient only started drinking day of admission, had been sober for over a month prior to that   - CIWA <10 for >24 hours, stopped CIWA protocol        VTE prophylaxis: SCDs/TEDs and enoxaparin ppx    I have performed a physical exam and reviewed and updated ROS and Plan today (10/16/2022). In review of yesterday's note (10/15/2022), there are no changes except as documented above.

## 2022-10-16 NOTE — CARE PLAN
The patient is Stable - Low risk of patient condition declining or worsening    Shift Goals monitor pain, goal to maintain pain at 5 out of 10 ( new med added)  Clinical Goals: monitor anxiety and pain  Patient Goals: rest and comfort  Family Goals: Free from falls or injuries, rest and sleep at least 4 hours, pain control to where patient is sleeping comfortably    Progress made toward(s) clinical / shift goals:  pt has advanced diet to clears    Patient is not progressing towards the following goals: pain still required some PRN IV meds - Dr has added scheduled med as well      Problem: Pain - Standard  Goal: Alleviation of pain or a reduction in pain to the patient’s comfort goal  Outcome: Not Progressing     Problem: Knowledge Deficit - Standard  Goal: Patient and family/care givers will demonstrate understanding of plan of care, disease process/condition, diagnostic tests and medications  Outcome: Not Progressing     Problem: Optimal Care for Alcohol Withdrawal  Goal: Optimal Care for the alcohol withdrawal patient  Outcome: Not Progressing     Problem: Seizure Precautions  Goal: Implementation of seizure precautions  Outcome: Not Progressing     Problem: Lifestyle Changes  Goal: Patient's ability to identify lifestyle changes and available resources to help reduce recurrence of condition will improve  Outcome: Not Progressing     Problem: Psychosocial  Goal: Patient's level of anxiety will decrease  Outcome: Not Progressing  Goal: Spiritual and cultural needs incorporated into hospitalization  Outcome: Not Progressing     Problem: Risk for Aspiration  Goal: Patient's risk for aspiration will be absent or decrease  Outcome: Not Progressing     Problem: Provide Safe Environment  Goal: Suicide environmental safety, protocols, policies, and practices will be implemented  Outcome: Not Progressing     Problem: Psychosocial  Goal: Patient's ability to identify and develop effective coping behaviors will  improve  Outcome: Not Progressing  Goal: Patient's ability to identify and utilize available support systems will improve  Outcome: Not Progressing

## 2022-10-17 LAB
ALBUMIN SERPL BCP-MCNC: 4 G/DL (ref 3.2–4.9)
ALBUMIN/GLOB SERPL: 1.7 G/DL
ALP SERPL-CCNC: 65 U/L (ref 30–99)
ALT SERPL-CCNC: 18 U/L (ref 2–50)
ANION GAP SERPL CALC-SCNC: 12 MMOL/L (ref 7–16)
AST SERPL-CCNC: 13 U/L (ref 12–45)
BASOPHILS # BLD AUTO: 0.8 % (ref 0–1.8)
BASOPHILS # BLD: 0.03 K/UL (ref 0–0.12)
BILIRUB SERPL-MCNC: 0.8 MG/DL (ref 0.1–1.5)
BUN SERPL-MCNC: 2 MG/DL (ref 8–22)
CALCIUM SERPL-MCNC: 9.1 MG/DL (ref 8.4–10.2)
CHLORIDE SERPL-SCNC: 106 MMOL/L (ref 96–112)
CO2 SERPL-SCNC: 21 MMOL/L (ref 20–33)
CREAT SERPL-MCNC: 0.51 MG/DL (ref 0.5–1.4)
EKG IMPRESSION: NORMAL
EOSINOPHIL # BLD AUTO: 0.26 K/UL (ref 0–0.51)
EOSINOPHIL NFR BLD: 6.6 % (ref 0–6.9)
ERYTHROCYTE [DISTWIDTH] IN BLOOD BY AUTOMATED COUNT: 48.6 FL (ref 35.9–50)
GFR SERPLBLD CREATININE-BSD FMLA CKD-EPI: 124 ML/MIN/1.73 M 2
GLOBULIN SER CALC-MCNC: 2.4 G/DL (ref 1.9–3.5)
GLUCOSE SERPL-MCNC: 117 MG/DL (ref 65–99)
HCT VFR BLD AUTO: 35.6 % (ref 37–47)
HGB BLD-MCNC: 11.6 G/DL (ref 12–16)
HIV 1+2 AB+HIV1 P24 AG SERPL QL IA: NORMAL
IMM GRANULOCYTES # BLD AUTO: 0 K/UL (ref 0–0.11)
IMM GRANULOCYTES NFR BLD AUTO: 0 % (ref 0–0.9)
LYMPHOCYTES # BLD AUTO: 2.28 K/UL (ref 1–4.8)
LYMPHOCYTES NFR BLD: 57.9 % (ref 22–41)
MAGNESIUM SERPL-MCNC: 2 MG/DL (ref 1.5–2.5)
MCH RBC QN AUTO: 31.4 PG (ref 27–33)
MCHC RBC AUTO-ENTMCNC: 32.6 G/DL (ref 33.6–35)
MCV RBC AUTO: 96.2 FL (ref 81.4–97.8)
MONOCYTES # BLD AUTO: 0.33 K/UL (ref 0–0.85)
MONOCYTES NFR BLD AUTO: 8.4 % (ref 0–13.4)
NEUTROPHILS # BLD AUTO: 1.04 K/UL (ref 2–7.15)
NEUTROPHILS NFR BLD: 26.3 % (ref 44–72)
NRBC # BLD AUTO: 0 K/UL
NRBC BLD-RTO: 0 /100 WBC
PLATELET # BLD AUTO: 175 K/UL (ref 164–446)
PMV BLD AUTO: 9.7 FL (ref 9–12.9)
POTASSIUM SERPL-SCNC: 3.5 MMOL/L (ref 3.6–5.5)
PROT SERPL-MCNC: 6.4 G/DL (ref 6–8.2)
RBC # BLD AUTO: 3.7 M/UL (ref 4.2–5.4)
SODIUM SERPL-SCNC: 139 MMOL/L (ref 135–145)
WBC # BLD AUTO: 3.9 K/UL (ref 4.8–10.8)

## 2022-10-17 PROCEDURE — 94760 N-INVAS EAR/PLS OXIMETRY 1: CPT

## 2022-10-17 PROCEDURE — 700102 HCHG RX REV CODE 250 W/ 637 OVERRIDE(OP): Performed by: FAMILY MEDICINE

## 2022-10-17 PROCEDURE — 93010 ELECTROCARDIOGRAM REPORT: CPT | Performed by: INTERNAL MEDICINE

## 2022-10-17 PROCEDURE — 86645 CMV ANTIBODY IGM: CPT

## 2022-10-17 PROCEDURE — 700105 HCHG RX REV CODE 258: Performed by: INTERNAL MEDICINE

## 2022-10-17 PROCEDURE — 86644 CMV ANTIBODY: CPT

## 2022-10-17 PROCEDURE — 86664 EPSTEIN-BARR NUCLEAR ANTIGEN: CPT

## 2022-10-17 PROCEDURE — 83735 ASSAY OF MAGNESIUM: CPT

## 2022-10-17 PROCEDURE — 80053 COMPREHEN METABOLIC PANEL: CPT

## 2022-10-17 PROCEDURE — A9270 NON-COVERED ITEM OR SERVICE: HCPCS | Performed by: INTERNAL MEDICINE

## 2022-10-17 PROCEDURE — 770020 HCHG ROOM/CARE - TELE (206)

## 2022-10-17 PROCEDURE — 86663 EPSTEIN-BARR ANTIBODY: CPT

## 2022-10-17 PROCEDURE — A9270 NON-COVERED ITEM OR SERVICE: HCPCS | Performed by: FAMILY MEDICINE

## 2022-10-17 PROCEDURE — 700111 HCHG RX REV CODE 636 W/ 250 OVERRIDE (IP): Performed by: INTERNAL MEDICINE

## 2022-10-17 PROCEDURE — 36415 COLL VENOUS BLD VENIPUNCTURE: CPT

## 2022-10-17 PROCEDURE — 700102 HCHG RX REV CODE 250 W/ 637 OVERRIDE(OP): Performed by: INTERNAL MEDICINE

## 2022-10-17 PROCEDURE — 86665 EPSTEIN-BARR CAPSID VCA: CPT

## 2022-10-17 PROCEDURE — 99232 SBSQ HOSP IP/OBS MODERATE 35: CPT | Performed by: FAMILY MEDICINE

## 2022-10-17 PROCEDURE — 85025 COMPLETE CBC W/AUTO DIFF WBC: CPT

## 2022-10-17 PROCEDURE — 700111 HCHG RX REV CODE 636 W/ 250 OVERRIDE (IP): Performed by: FAMILY MEDICINE

## 2022-10-17 RX ORDER — POTASSIUM CHLORIDE 20 MEQ/1
40 TABLET, EXTENDED RELEASE ORAL ONCE
Status: COMPLETED | OUTPATIENT
Start: 2022-10-17 | End: 2022-10-17

## 2022-10-17 RX ORDER — HYDROMORPHONE HYDROCHLORIDE 1 MG/ML
0.5 INJECTION, SOLUTION INTRAMUSCULAR; INTRAVENOUS; SUBCUTANEOUS EVERY 4 HOURS PRN
Status: DISCONTINUED | OUTPATIENT
Start: 2022-10-17 | End: 2022-10-18

## 2022-10-17 RX ADMIN — MORPHINE SULFATE 15 MG: 15 TABLET ORAL at 08:26

## 2022-10-17 RX ADMIN — LEVETIRACETAM 1000 MG: 500 TABLET ORAL at 17:16

## 2022-10-17 RX ADMIN — NICOTINE TRANSDERMAL SYSTEM 21 MG: 21 PATCH, EXTENDED RELEASE TRANSDERMAL at 05:18

## 2022-10-17 RX ADMIN — OMEPRAZOLE 20 MG: 20 CAPSULE, DELAYED RELEASE ORAL at 05:16

## 2022-10-17 RX ADMIN — POTASSIUM CHLORIDE 40 MEQ: 1500 TABLET, EXTENDED RELEASE ORAL at 08:19

## 2022-10-17 RX ADMIN — SODIUM CHLORIDE: 9 INJECTION, SOLUTION INTRAVENOUS at 20:06

## 2022-10-17 RX ADMIN — HYDROMORPHONE HYDROCHLORIDE 1 MG: 1 INJECTION, SOLUTION INTRAMUSCULAR; INTRAVENOUS; SUBCUTANEOUS at 05:17

## 2022-10-17 RX ADMIN — ONDANSETRON 4 MG: 2 INJECTION INTRAMUSCULAR; INTRAVENOUS at 14:02

## 2022-10-17 RX ADMIN — LEVETIRACETAM 1000 MG: 500 TABLET ORAL at 05:16

## 2022-10-17 RX ADMIN — HYDROMORPHONE HYDROCHLORIDE 1 MG: 1 INJECTION, SOLUTION INTRAMUSCULAR; INTRAVENOUS; SUBCUTANEOUS at 00:01

## 2022-10-17 RX ADMIN — HYDROMORPHONE HYDROCHLORIDE 0.5 MG: 1 INJECTION, SOLUTION INTRAMUSCULAR; INTRAVENOUS; SUBCUTANEOUS at 11:03

## 2022-10-17 RX ADMIN — SODIUM CHLORIDE: 9 INJECTION, SOLUTION INTRAVENOUS at 06:11

## 2022-10-17 RX ADMIN — MORPHINE SULFATE 15 MG: 15 TABLET ORAL at 14:02

## 2022-10-17 RX ADMIN — ONDANSETRON 4 MG: 2 INJECTION INTRAMUSCULAR; INTRAVENOUS at 21:14

## 2022-10-17 RX ADMIN — MORPHINE SULFATE 15 MG: 15 TABLET ORAL at 21:11

## 2022-10-17 RX ADMIN — OMEPRAZOLE 20 MG: 20 CAPSULE, DELAYED RELEASE ORAL at 17:16

## 2022-10-17 RX ADMIN — MORPHINE SULFATE 15 MG: 15 TABLET, FILM COATED, EXTENDED RELEASE ORAL at 17:16

## 2022-10-17 RX ADMIN — SUCRALFATE 1 G: 1 SUSPENSION ORAL at 11:24

## 2022-10-17 RX ADMIN — SUCRALFATE 1 G: 1 SUSPENSION ORAL at 17:16

## 2022-10-17 RX ADMIN — ZONISAMIDE 200 MG: 50 CAPSULE ORAL at 20:05

## 2022-10-17 RX ADMIN — SODIUM CHLORIDE: 9 INJECTION, SOLUTION INTRAVENOUS at 12:50

## 2022-10-17 RX ADMIN — SENNOSIDES AND DOCUSATE SODIUM 2 TABLET: 50; 8.6 TABLET ORAL at 05:16

## 2022-10-17 RX ADMIN — SUCRALFATE 1 G: 1 SUSPENSION ORAL at 05:16

## 2022-10-17 RX ADMIN — ONDANSETRON 4 MG: 4 TABLET, ORALLY DISINTEGRATING ORAL at 08:26

## 2022-10-17 RX ADMIN — QUETIAPINE FUMARATE 150 MG: 25 TABLET ORAL at 20:05

## 2022-10-17 RX ADMIN — HYDROMORPHONE HYDROCHLORIDE 0.5 MG: 1 INJECTION, SOLUTION INTRAMUSCULAR; INTRAVENOUS; SUBCUTANEOUS at 22:07

## 2022-10-17 RX ADMIN — HYDROMORPHONE HYDROCHLORIDE 0.5 MG: 1 INJECTION, SOLUTION INTRAMUSCULAR; INTRAVENOUS; SUBCUTANEOUS at 15:35

## 2022-10-17 RX ADMIN — MORPHINE SULFATE 15 MG: 15 TABLET, FILM COATED, EXTENDED RELEASE ORAL at 05:16

## 2022-10-17 RX ADMIN — SUCRALFATE 1 G: 1 SUSPENSION ORAL at 23:47

## 2022-10-17 ASSESSMENT — ENCOUNTER SYMPTOMS
CHILLS: 0
VOMITING: 0
FEVER: 0
COUGH: 0
DEPRESSION: 1
NERVOUS/ANXIOUS: 1
NAUSEA: 1
ABDOMINAL PAIN: 1
SHORTNESS OF BREATH: 0

## 2022-10-17 ASSESSMENT — PAIN DESCRIPTION - PAIN TYPE
TYPE: ACUTE PAIN

## 2022-10-17 ASSESSMENT — LIFESTYLE VARIABLES: SUBSTANCE_ABUSE: 1

## 2022-10-17 NOTE — PROGRESS NOTES
"Hospital Medicine Daily Progress Note    Date of Service  10/17/2022    Chief Complaint  Rhiannon Marrero is a 37 y.o. female admitted 10/12/2022 with question of seizure, abdominal pain    Hospital Course  37-year-old female with a history of alcohol dependence, seizure disorder, alcohol induced pancreatitis, bipolar disorder who presented to hospital from [facility] for abdominal pain and witnessed seizure. She states that she presented to [facility] early this morning, intoxicated, because she had binged drank for the first time in a month and didn't want to continue drinking. While at [facility] during assessment, there was question of absence seizure, but she states that her seizures are typically clonic and she may have less responsive as she was acutely intoxicated.  She does take Keppra but Pharmacy reports not having it filled since 7/2/22 but pt reports having excess prescriptions at home and taking it appropriately.     Interval Problem Update  10/12 - Pt with some tachycardia, mild white count elevation and metabolic acidosis with a bicarb of 14 and gap of 27. UA negative. She states this abdominal pain feels typical of her known pancreatitis and is central in her abdomen.  She has a gap acidosis with only mild ketones on UA - will check a lactic acid. Additionally, given her tachycardia, temp of 100.0 and elevated white count, will check RUQ u/s, COVID and CXR.    10/13 - Pt's bilirubin elevated, this does not appear to be chronic in nature - she had an MRCP last month that was negative. At that time, her CBD was mildly dilated and it is again today - will repeat MRCP given the new elevation in bilirubin. Replace phos.  Pt placed on legal hold by ERP - \"The patient actually denies any suicidal ideation to me and just states that she wants to die because 'the pain is so bad'. \" Discussed with the patient at bedside today, she denies any suicidal ideation, intent or overt depression. She was intoxicated on admission. She did " have a drop in hemoglobin overnight - with her pain being primarily epigastric as well as LUQ, she may have an alcoholic gastritis or PUD contributing as well. Will check occult blood as well as an anemia panel, increase PPI to BID and start carafate.    10/14 - Tachycardia improving, afebrile, bilirubin elevation resolved.  MRCP negative. Hemoglobin appears to be slowly equilibrating, FOB still pending. Pt has a lymphocytosis but recently tested negative for HIV - she was also negative for Hepatitis in May, but will recheck given her elevated bilirubin yesterday. Pt also with elevated iron and ferritin levels - will refer to Heme Onc at discharge for both lymphocytosis and abnormal iron studies, eval for hemachromatosis.    10/15 - Pt with sinus tachycardia this am - discussed with nursing, pt with a lot of anxiety and some pain, was given 0.5mg Klonopin, was calm when I came to see her.  Still denies SI, but after I left, nursing reported pt and family requesting to speak to Psych to evaluate if her underlying mental health issues are contributing to her uncontrolled pain. Will place consult. Labs remain stable, NPO for pancreatitis pain. I did attempt to call both pt's mother and significant other, but neither answered - I did a leave a message for both.     10/16 - Sitter placed last evening as I was able to get a hold of the pt's mom and she stated she felt worried that patient was suicidal. She did state that she did not feel that the pt would attempt suicide while in hospital, but she was concerned that she may attempt after discharge.  As pt denied that day having SI, a legal hold was not initiated, but a telesitter was placed to ensure pt's safety until this could be readdressed. Had extensive discussion with patient about her mood again this morning, and underlying psychiatric illness - she states that she is definitively not suicidal, has no intent and no plan.  She states that her mom is concerned  "because the pt and her boyfriend just broke up but pt states \"I'm [employer] - I'm too scared of what comes after this to risk suicide\". She also states that she has things to live for as she has children that she would not want to abandon via suicide.  She does agree that she needs increased help with mental health services, as she states that her mental health illness is not helped by her current meds, and that the mood fluctuations are exhausting. She is looking forward to speaking with Psych. Medically, she continues to have severe pain that is only decreased to a 7/10 with oral morphine and IV dilaudid. Will add MS Contin. Will refer to pain management as an outpatientHad an episode of PSVT overnight, will supplement K and Mag.     10/17 - Pt tolerating a CLD today but feels that her pain will get out of control if advanced further. Will continue for today, advance in the am. Hemoglobin remains stable, lymphocytosis increasing - HIV and hepatitis panel negative, CMV and EBV pending. Pt feels better after speaking with Hedy Morgan, and a Psych consult is pending for medication adjustment for her bipolar disorder.  The patient does wish to pursue rehab at discharge and is working on that independently - she notes that she may need a note to assist her to get out of her lease in order to stay at rehab for four months, we will provide one prior to discharge.     I have discussed this patient's plan of care and discharge plan at IDT rounds today with Case Management, Nursing, Nursing leadership, and other members of the IDT team.    Consultants/Specialty  none    Code Status  Full Code    Disposition  Patient is not medically cleared for discharge.   Anticipate discharge to to home with close outpatient follow-up.  I have placed the appropriate orders for post-discharge needs.    Review of Systems  Review of Systems   Constitutional:  Negative for chills and fever.   Respiratory:  Negative for cough and shortness of " breath.    Cardiovascular:  Negative for chest pain and leg swelling.   Gastrointestinal:  Positive for abdominal pain and nausea. Negative for vomiting.   Psychiatric/Behavioral:  Positive for depression and substance abuse. Negative for suicidal ideas. The patient is nervous/anxious.    All other systems reviewed and are negative.     Physical Exam  Temp:  [36.3 °C (97.3 °F)-36.7 °C (98.1 °F)] 36.4 °C (97.6 °F)  Pulse:  [] 84  Resp:  [16-17] 17  BP: (124-144)/() 127/92  SpO2:  [93 %-99 %] 95 %    Physical Exam  Vitals and nursing note reviewed.   Constitutional:       Appearance: She is ill-appearing. She is not toxic-appearing.   HENT:      Head: Normocephalic and atraumatic.   Cardiovascular:      Rate and Rhythm: Normal rate and regular rhythm.      Heart sounds: No murmur heard.  Pulmonary:      Effort: Pulmonary effort is normal.      Breath sounds: Normal breath sounds.   Abdominal:      General: Abdomen is flat. Bowel sounds are normal. There is no distension.      Palpations: Abdomen is soft.      Tenderness: There is abdominal tenderness. There is no guarding.   Musculoskeletal:         General: No swelling.      Right lower leg: No edema.      Left lower leg: No edema.   Skin:     General: Skin is warm and dry.      Coloration: Skin is not jaundiced.   Neurological:      General: No focal deficit present.      Mental Status: She is alert and oriented to person, place, and time.   Psychiatric:         Attention and Perception: Attention normal.         Mood and Affect: Mood is anxious. Affect is tearful.         Speech: Speech normal.         Behavior: Behavior normal. Behavior is cooperative.         Thought Content: Thought content normal. Thought content does not include suicidal ideation. Thought content does not include suicidal plan.         Judgment: Judgment is not impulsive.       Fluids    Intake/Output Summary (Last 24 hours) at 10/17/2022 0755  Last data filed at 10/17/2022  0000  Gross per 24 hour   Intake 1025.5 ml   Output 550 ml   Net 475.5 ml         Laboratory  Recent Labs     10/15/22  0427 10/16/22  0248 10/17/22  0233   WBC 4.4* 4.2* 3.9*   RBC 3.77* 3.91* 3.70*   HEMOGLOBIN 11.9* 12.3 11.6*   HEMATOCRIT 35.7* 37.0 35.6*   MCV 94.7 94.6 96.2   MCH 31.6 31.5 31.4   MCHC 33.3* 33.2* 32.6*   RDW 49.0 47.5 48.6   PLATELETCT 147* 155* 175   MPV 9.7 9.7 9.7       Recent Labs     10/15/22  0427 10/16/22  0248 10/17/22  0233   SODIUM 139 137 139   POTASSIUM 3.4* 3.6 3.5*   CHLORIDE 104 104 106   CO2 24 20 21   GLUCOSE 111* 123* 117*   BUN 2* <2* 2*   CREATININE 0.42* 0.49* 0.51   CALCIUM 8.9 9.3 9.1                     Imaging  [id number]/O   Final Result      1.  There is no cholelithiasis. There is a trace of pericholecystic fluid.   2.  Common bile duct upper limits of normal at 6.3 mm. No evidence of choledocholithiasis.   3.  No change in hepatomegaly.      US-RUQ   Final Result      1.  Gallbladder sludge without stones or biliary dilation      2.  Mild, unchanged biliary dilation      DX-CHEST-PORTABLE (1 VIEW)   Final Result      Negative single view of the chest.      CT-ABDOMEN-PELVIS WITH   Final Result      New mild pancreatic calcification compatible with chronic pancreatitis. Duct dilatation is not outside of normal limits and there is no adjacent fat stranding to confirm acute pancreatitis      Stable right renal lateral cortex hypodensity has volume loss indicating this reflects scarring from prior injury, infection. This is of no acute significance      Mild hepatomegaly      Resolved pelvic free fluid             Assessment/Plan  * Acute on chronic pancreatitis (HCC)- (present on admission)  Assessment & Plan  - Lipase is not elevated however she has had chronic pancreatitis long enough that she may no longer elevate her lipase, there is no adjacent fat stranding noted however due to her location and severity of her pain, associated with a significant alcohol  intake, I do feel that she may have acutely inflamed her pancreas  -  Advance diet as tolerated  -Patient understands she needs alcohol cessation, would like assistance after hospitalization is finished  - RUQ u/s with gallbladder sludge without stones or biliary dilation, mild, unchanged biliary dilation  - MRCP negative, clinical presentation not consistent with cholecystitis  - Added MS Contin for pain control as she is not having much improvement on MS IR    Tachycardia  Assessment & Plan  - Secondary to pain and anxiety  - Supplement mag and phos    Lymphocytosis  Assessment & Plan  - Recheck hepatitis panel negative, HIV negative, CMV and EBV pending  - Referral to Heme Onc in place    Abnormal iron saturation  Assessment & Plan  - high iron and somewhat elevated ferritin (but also expected in pancreatitis) - not transfused, does not appear to be on iron supplementation  - Referral placed to Heme Onc as she also has concurrent lymphocytosis     Normocytic anemia  Assessment & Plan  - Stable without signs of blood loss   - Check occult blood - may have alcohol gastritis or PUD  - Increased Omeprazole to BID and added Carafate     Hypophosphatemia  Assessment & Plan  - Jump as needed     Elevated bilirubin  Assessment & Plan  - MRCP last month negative, CBD remains dilated  - MRCP negative again  - Elevated bilirubin resolved     Hypoglycemia- (present on admission)  Assessment & Plan  - Resolved     Leukocytosis- (present on admission)  Assessment & Plan  - Resolved with IVFs     Lactic acidosis- (present on admission)  Assessment & Plan  - Secondary to EtOH   - Resolved    High anion gap metabolic acidosis- (present on admission)  Assessment & Plan  - Lactic acidosis, resolved - due to alcohol.      Seizure disorder (HCC)- (present on admission)  Assessment & Plan  - Patient does have a history of seizures, even has breakthrough seizures, there was no grand mal activity reported  -Okay to continue home Keppra  dose without increase  -PRN Ativan  -Adjust as needed  - Seizure precautions      Bipolar affective disorder, current episode hypomanic (HCC)- (present on admission)  Assessment & Plan  - Had good discussion with pt today - she feels that her bipolar is not adequately controlled on current medication and she feels like she is on an emotional rollercoaster  - Adamantly denies SI, intent, plan etc. She states she is Druze and would not injure herself for fear of what the afterlife would entail, and that she has two children to live for  - Has requested to speak to Psych to titrate medications and help seek care after discharge, consult has been placed      Alcohol dependence (HCC)- (present on admission)  Assessment & Plan  - Patient only started drinking day of admission, had been sober for over a month prior to that   - CIWA <10 for >24 hours, stopped CIWA protocol        VTE prophylaxis: SCDs/TEDs and enoxaparin ppx    I have performed a physical exam and reviewed and updated ROS and Plan today (10/17/2022). In review of yesterday's note (10/16/2022), there are no changes except as documented above.

## 2022-10-17 NOTE — PROGRESS NOTES
Pt is awake in bed. Pt c/o 10/10 abdominal pain. Medicated per MAR. Pt has been tolerating clear liquid diet with only minor c/o nausea and discomfort. Pt does not wish to advance diet at this time. RN discontinued tele-sitter per nursing communication. RN discussed POC with pt. All questions answered. Fall precautions in place.

## 2022-10-17 NOTE — DIETARY
Nutrition Services:  Day 5 of admit.  Rhiannon Marrero is a 37 y.o. female with admitting DX of Acute on chronic pancreatitis (HCC) [K85.90, K86.1]    Pt is currently on clear liquid diet. NPO/clears x 5 days. PO at lunch today 25-50%. This is better than recorded PO at breakfast of 0. Per nurse's note last night, pt wants to continue with clear liquids at this time. RD will order Boost Breeze for additional kcals and protein.    Wt 10/12/22: 56.8 kg via bed scale - wt increased by 6.5 kg since 8/15/22.     Malnutrition Risk: Pt does not meet ASPEN criteria. Risk noted due to NPO/clears x 5 days.     Recommendations/Plan:  Advance diet beyond clears when medically feasible.   Add Boost Breeze to meals TID   Encourage intake of meals  Document intake of all meals as % taken in ADL's to provide interdisciplinary communication across all shifts.   Monitor weight.  Nutrition rep will continue to see patient for ongoing meal and snack preferences.    RD following

## 2022-10-17 NOTE — PROGRESS NOTES
Telemetry Shift Summary    Rhythm SR-ST  HR Range 80s-151  Ectopy none  Measurements 0.16/0.08/0.42        Normal Values  Rhythm SR  HR Range    Measurements 0.12-0.20 / 0.06-0.10  / 0.30-0.52

## 2022-10-17 NOTE — PROGRESS NOTES
Received report from LESLIE Mendoza.  Assumed patient care. Patient is sitting up in bed, no signs of distress, denies any needs at this time. Hourly rounding in place.

## 2022-10-17 NOTE — CARE PLAN
The patient is Stable - Low risk of patient condition declining or worsening    Shift Goals  Clinical Goals: pt will remain at stated comfort goal of 3/10 pain this shift.  Patient Goals: sleep comfortably  Family Goals: no family present    Progress made toward(s) clinical / shift goals:      Pt required frequent doses of dilaudid overnight to manage pain. Per pt pain has improved this shift.    Problem: Pain - Standard  Goal: Alleviation of pain or a reduction in pain to the patient’s comfort goal  Outcome: Progressing     Problem: Knowledge Deficit - Standard  Goal: Patient and family/care givers will demonstrate understanding of plan of care, disease process/condition, diagnostic tests and medications  Outcome: Progressing     Problem: Optimal Care for Alcohol Withdrawal  Goal: Optimal Care for the alcohol withdrawal patient  Outcome: Progressing     Problem: Seizure Precautions  Goal: Implementation of seizure precautions  Outcome: Progressing     Problem: Risk for Aspiration  Goal: Patient's risk for aspiration will be absent or decrease  Outcome: Progressing     Problem: Provide Safe Environment  Goal: Suicide environmental safety, protocols, policies, and practices will be implemented  Outcome: Progressing     Problem: Psychosocial  Goal: Patient's ability to identify and develop effective coping behaviors will improve  Outcome: Progressing       Patient is not progressing towards the following goals:

## 2022-10-17 NOTE — DISCHARGE PLANNING
Case Management Discharge Planning    Admission Date: 10/12/2022  GMLOS: 3.1  ALOS: 5    6-Clicks ADL Score: 24  6-Clicks Mobility Score: 23      Anticipated Discharge Dispo: Discharge Disposition: Discharged to home/self care (01) (no longer on legal hold)    DME Needed: No    Action(s) Taken: Per Dr. Sheffield's note, CM to assist pt with applying for social security disability and referral to a dual diagnosis partial hospitalization program. Dr. Sheffield offline on Voalte. RNCM informed Dr. Shonna Parrish and Dr. Demar Rowley via Voalte that CM does not assist with applying for disability as pt would need to start this process with her PCP. Also informed Mds that CM does not assist in sending referrals to dual diagnosis partial hospitalization programs as it is up to pt to look into these programs and enroll in program voluntary.    Escalations Completed: None    Medically Clear: No    Next Steps:  f/u with medical team to discuss DC needs and barriers    Barriers to Discharge: Medical clearance

## 2022-10-18 PROCEDURE — 700111 HCHG RX REV CODE 636 W/ 250 OVERRIDE (IP): Performed by: FAMILY MEDICINE

## 2022-10-18 PROCEDURE — 700111 HCHG RX REV CODE 636 W/ 250 OVERRIDE (IP): Performed by: INTERNAL MEDICINE

## 2022-10-18 PROCEDURE — 700102 HCHG RX REV CODE 250 W/ 637 OVERRIDE(OP): Performed by: FAMILY MEDICINE

## 2022-10-18 PROCEDURE — A9270 NON-COVERED ITEM OR SERVICE: HCPCS | Performed by: INTERNAL MEDICINE

## 2022-10-18 PROCEDURE — A9270 NON-COVERED ITEM OR SERVICE: HCPCS | Performed by: FAMILY MEDICINE

## 2022-10-18 PROCEDURE — 700102 HCHG RX REV CODE 250 W/ 637 OVERRIDE(OP): Performed by: INTERNAL MEDICINE

## 2022-10-18 PROCEDURE — 94760 N-INVAS EAR/PLS OXIMETRY 1: CPT

## 2022-10-18 PROCEDURE — 99232 SBSQ HOSP IP/OBS MODERATE 35: CPT | Performed by: INTERNAL MEDICINE

## 2022-10-18 PROCEDURE — 700105 HCHG RX REV CODE 258: Performed by: INTERNAL MEDICINE

## 2022-10-18 PROCEDURE — 770020 HCHG ROOM/CARE - TELE (206)

## 2022-10-18 RX ORDER — VITAMIN B COMPLEX
1000 TABLET ORAL DAILY
Status: DISCONTINUED | OUTPATIENT
Start: 2022-10-18 | End: 2022-10-21 | Stop reason: HOSPADM

## 2022-10-18 RX ORDER — DIVALPROEX SODIUM 500 MG/1
500 TABLET, DELAYED RELEASE ORAL
Status: DISCONTINUED | OUTPATIENT
Start: 2022-10-18 | End: 2022-10-19

## 2022-10-18 RX ORDER — HYDROMORPHONE HYDROCHLORIDE 1 MG/ML
0.5 INJECTION, SOLUTION INTRAMUSCULAR; INTRAVENOUS; SUBCUTANEOUS EVERY 6 HOURS PRN
Status: DISCONTINUED | OUTPATIENT
Start: 2022-10-18 | End: 2022-10-20

## 2022-10-18 RX ORDER — NALTREXONE HYDROCHLORIDE 50 MG/1
50 TABLET, FILM COATED ORAL DAILY
Status: DISCONTINUED | OUTPATIENT
Start: 2022-10-18 | End: 2022-10-19

## 2022-10-18 RX ORDER — DIVALPROEX SODIUM 250 MG/1
250 TABLET, DELAYED RELEASE ORAL EVERY MORNING
Status: DISCONTINUED | OUTPATIENT
Start: 2022-10-18 | End: 2022-10-19

## 2022-10-18 RX ADMIN — ONDANSETRON 4 MG: 2 INJECTION INTRAMUSCULAR; INTRAVENOUS at 06:07

## 2022-10-18 RX ADMIN — MORPHINE SULFATE 15 MG: 15 TABLET, FILM COATED, EXTENDED RELEASE ORAL at 17:08

## 2022-10-18 RX ADMIN — ONDANSETRON 4 MG: 2 INJECTION INTRAMUSCULAR; INTRAVENOUS at 13:05

## 2022-10-18 RX ADMIN — NALTREXONE HYDROCHLORIDE 50 MG: 50 TABLET, FILM COATED ORAL at 12:36

## 2022-10-18 RX ADMIN — SUCRALFATE 1 G: 1 SUSPENSION ORAL at 05:59

## 2022-10-18 RX ADMIN — MORPHINE SULFATE 15 MG: 15 TABLET, FILM COATED, EXTENDED RELEASE ORAL at 06:00

## 2022-10-18 RX ADMIN — CLONAZEPAM 0.5 MG: 0.5 TABLET ORAL at 15:05

## 2022-10-18 RX ADMIN — SENNOSIDES AND DOCUSATE SODIUM 2 TABLET: 50; 8.6 TABLET ORAL at 05:59

## 2022-10-18 RX ADMIN — SODIUM CHLORIDE: 9 INJECTION, SOLUTION INTRAVENOUS at 09:20

## 2022-10-18 RX ADMIN — DIVALPROEX SODIUM 250 MG: 250 TABLET, DELAYED RELEASE ORAL at 12:10

## 2022-10-18 RX ADMIN — SODIUM CHLORIDE: 9 INJECTION, SOLUTION INTRAVENOUS at 03:05

## 2022-10-18 RX ADMIN — HYDROMORPHONE HYDROCHLORIDE 0.5 MG: 1 INJECTION, SOLUTION INTRAMUSCULAR; INTRAVENOUS; SUBCUTANEOUS at 06:07

## 2022-10-18 RX ADMIN — NICOTINE TRANSDERMAL SYSTEM 21 MG: 21 PATCH, EXTENDED RELEASE TRANSDERMAL at 05:59

## 2022-10-18 RX ADMIN — SODIUM CHLORIDE: 9 INJECTION, SOLUTION INTRAVENOUS at 17:14

## 2022-10-18 RX ADMIN — SUCRALFATE 1 G: 1 SUSPENSION ORAL at 17:08

## 2022-10-18 RX ADMIN — MORPHINE SULFATE 15 MG: 15 TABLET ORAL at 03:04

## 2022-10-18 RX ADMIN — HYDROMORPHONE HYDROCHLORIDE 0.5 MG: 1 INJECTION, SOLUTION INTRAMUSCULAR; INTRAVENOUS; SUBCUTANEOUS at 11:23

## 2022-10-18 RX ADMIN — SUCRALFATE 1 G: 1 SUSPENSION ORAL at 11:26

## 2022-10-18 RX ADMIN — Medication 1000 UNITS: at 11:26

## 2022-10-18 RX ADMIN — OMEPRAZOLE 20 MG: 20 CAPSULE, DELAYED RELEASE ORAL at 17:08

## 2022-10-18 RX ADMIN — MORPHINE SULFATE 15 MG: 15 TABLET ORAL at 09:19

## 2022-10-18 RX ADMIN — OMEPRAZOLE 20 MG: 20 CAPSULE, DELAYED RELEASE ORAL at 06:00

## 2022-10-18 RX ADMIN — LEVETIRACETAM 1000 MG: 500 TABLET ORAL at 05:59

## 2022-10-18 ASSESSMENT — PAIN DESCRIPTION - PAIN TYPE
TYPE: ACUTE PAIN

## 2022-10-18 ASSESSMENT — ENCOUNTER SYMPTOMS
COUGH: 0
FEVER: 0
NAUSEA: 1
DEPRESSION: 1
SHORTNESS OF BREATH: 0
VOMITING: 0
CHILLS: 0
NERVOUS/ANXIOUS: 1
ABDOMINAL PAIN: 1

## 2022-10-18 ASSESSMENT — LIFESTYLE VARIABLES: SUBSTANCE_ABUSE: 1

## 2022-10-18 NOTE — PROGRESS NOTES
Telemetry Shift Summary    Rhythm SR  HR Range 69-87  Ectopy rPVC, rPAC  Measurements 0.20/0.08/0.42        Normal Values  Rhythm SR  HR Range    Measurements 0.12-0.20 / 0.06-0.10  / 0.30-0.52
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
## 2022-10-18 NOTE — PROGRESS NOTES
Telemetry Shift Summary    Rhythm SR  HR Range 60s-70s  Ectopy rare PAC/PVC  Measurements 0.20/0.08/0.44        Normal Values  Rhythm SR  HR Range    Measurements 0.12-0.20 / 0.06-0.10  / 0.30-0.52

## 2022-10-18 NOTE — PROGRESS NOTES
Pt is awake in bed. Pt c/o 7/10 pain in abdomen. Review new medication parameters with pt and gave pt heat and ice packs. No n/v. Pt still unable to advance to full liquid diet due to increased pain after attempting to eat pudding this afternoon. Bowel sounds are hypoactive. Pt is requesting to rest. Fall precautions in place.

## 2022-10-18 NOTE — PROGRESS NOTES
Received bedside report.  Assumed pt care.   Assessment and chart check complete.  Pt is AA0X4, no signs of distress.  Constantly reports pain, medicated per MAR.   IVF infusing.  Nauseated, medicated per MAR.  Fall precautions in place, treaded socks on pt, bed in low position.   Call light within reach.   Educated pt to call if needing anything.   Hourly rounding.

## 2022-10-18 NOTE — PROGRESS NOTES
"Hospital Medicine Daily Progress Note    Date of Service  10/18/2022    Chief Complaint  Rhiannon Marrero is a 37 y.o. female admitted 10/12/2022 with question of seizure, abdominal pain    Hospital Course  37-year-old female with a history of alcohol dependence, seizure disorder, alcohol induced pancreatitis, bipolar disorder who presented to hospital from [facility] for abdominal pain and witnessed seizure. She states that she presented to [facility] early this morning, intoxicated, because she had binged drank for the first time in a month and didn't want to continue drinking. While at [facility] during assessment, there was question of absence seizure, but she states that her seizures are typically clonic and she may have less responsive as she was acutely intoxicated.  She does take Keppra but Pharmacy reports not having it filled since 7/2/22 but pt reports having excess prescriptions at home and taking it appropriately.     Interval Problem Update  10/12 - Pt with some tachycardia, mild white count elevation and metabolic acidosis with a bicarb of 14 and gap of 27. UA negative. She states this abdominal pain feels typical of her known pancreatitis and is central in her abdomen.  She has a gap acidosis with only mild ketones on UA - will check a lactic acid. Additionally, given her tachycardia, temp of 100.0 and elevated white count, will check RUQ u/s, COVID and CXR.    10/13 - Pt's bilirubin elevated, this does not appear to be chronic in nature - she had an MRCP last month that was negative. At that time, her CBD was mildly dilated and it is again today - will repeat MRCP given the new elevation in bilirubin. Replace phos.  Pt placed on legal hold by ERP - \"The patient actually denies any suicidal ideation to me and just states that she wants to die because 'the pain is so bad'. \" Discussed with the patient at bedside today, she denies any suicidal ideation, intent or overt depression. She was intoxicated on admission. She did " have a drop in hemoglobin overnight - with her pain being primarily epigastric as well as LUQ, she may have an alcoholic gastritis or PUD contributing as well. Will check occult blood as well as an anemia panel, increase PPI to BID and start carafate.    10/14 - Tachycardia improving, afebrile, bilirubin elevation resolved.  MRCP negative. Hemoglobin appears to be slowly equilibrating, FOB still pending. Pt has a lymphocytosis but recently tested negative for HIV - she was also negative for Hepatitis in May, but will recheck given her elevated bilirubin yesterday. Pt also with elevated iron and ferritin levels - will refer to Heme Onc at discharge for both lymphocytosis and abnormal iron studies, eval for hemachromatosis.    10/15 - Pt with sinus tachycardia this am - discussed with nursing, pt with a lot of anxiety and some pain, was given 0.5mg Klonopin, was calm when I came to see her.  Still denies SI, but after I left, nursing reported pt and family requesting to speak to Psych to evaluate if her underlying mental health issues are contributing to her uncontrolled pain. Will place consult. Labs remain stable, NPO for pancreatitis pain. I did attempt to call both pt's mother and significant other, but neither answered - I did a leave a message for both.     10/16 - Sitter placed last evening as I was able to get a hold of the pt's mom and she stated she felt worried that patient was suicidal. She did state that she did not feel that the pt would attempt suicide while in hospital, but she was concerned that she may attempt after discharge.  As pt denied that day having SI, a legal hold was not initiated, but a telesitter was placed to ensure pt's safety until this could be readdressed. Had extensive discussion with patient about her mood again this morning, and underlying psychiatric illness - she states that she is definitively not suicidal, has no intent and no plan.  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 She also states that she has things to live for as she has children that she would not want to abandon via suicide.  She does agree that she needs increased help with mental health services, as she states that her mental health illness is not helped by her current meds, and that the mood fluctuations are exhausting. She is looking forward to speaking with Psych. Medically, she continues to have severe pain that is only decreased to a 7/10 with oral morphine and IV dilaudid. Will add MS Contin. Will refer to pain management as an outpatientHad an episode of PSVT overnight, will supplement K and Mag.     10/17 - Pt tolerating a CLD today but feels that her pain will get out of control if advanced further. Will continue for today, advance in the am. Hemoglobin remains stable, lymphocytosis increasing - HIV and hepatitis panel negative, CMV and EBV pending. Pt feels better after speaking with Hedy Morgan, and a Psych consult is pending for medication adjustment for her bipolar disorder.  The patient does wish to pursue rehab at discharge and is working on that independently.    PATIENT SEEN BY PREVIOUS HOSPITALIST UNTIL 10/17    10/18: Patient seen at bedside this morning.  Patient laying in bed at the time of my evaluation.  The patient still complaining of abdominal pain.  Patient does not want to advance diet today as she fears she will get worse.  Psychiatry has recommended some medication changes which we have done.  We have discontinued Keppra and added Depakote.  We have added naltrexone.  I have discontinued morphine instant release and continue with MS Contin and Dilaudid for breakthrough pain.  We will continue to monitor closely.    I have discussed this patient's plan of care and discharge plan at IDT rounds today with Case Management, Nursing, Nursing leadership, and other " members of the IDT team.    Consultants/Specialty  none    Code Status  Full Code    Disposition  Patient is not medically cleared for discharge.   Anticipate discharge to to home with close outpatient follow-up.  I have placed the appropriate orders for post-discharge needs.    Review of Systems  Review of Systems   Constitutional:  Negative for chills and fever.   Respiratory:  Negative for cough and shortness of breath.    Cardiovascular:  Negative for chest pain and leg swelling.   Gastrointestinal:  Positive for abdominal pain and nausea. Negative for vomiting.   Psychiatric/Behavioral:  Positive for depression and substance abuse. Negative for suicidal ideas. The patient is nervous/anxious.    All other systems reviewed and are negative.     Physical Exam  Temp:  [36.3 °C (97.3 °F)-36.6 °C (97.9 °F)] 36.4 °C (97.6 °F)  Pulse:  [] 96  Resp:  [16-18] 18  BP: (125-154)/() 130/95  SpO2:  [91 %-100 %] 100 %    Physical Exam  Vitals and nursing note reviewed.   Constitutional:       Appearance: She is ill-appearing. She is not toxic-appearing.   HENT:      Head: Normocephalic and atraumatic.   Cardiovascular:      Rate and Rhythm: Normal rate and regular rhythm.      Heart sounds: No murmur heard.  Pulmonary:      Effort: Pulmonary effort is normal.      Breath sounds: Normal breath sounds.   Abdominal:      General: Abdomen is flat. Bowel sounds are normal. There is no distension.      Palpations: Abdomen is soft.      Tenderness: There is abdominal tenderness. There is no guarding.   Musculoskeletal:         General: No swelling.      Right lower leg: No edema.      Left lower leg: No edema.   Skin:     General: Skin is warm and dry.      Coloration: Skin is not jaundiced.   Neurological:      General: No focal deficit present.      Mental Status: She is alert and oriented to person, place, and time.   Psychiatric:         Attention and Perception: Attention normal.         Mood and Affect: Mood is  anxious. Affect is tearful.         Speech: Speech normal.         Behavior: Behavior normal. Behavior is cooperative.         Thought Content: Thought content normal. Thought content does not include suicidal ideation. Thought content does not include suicidal plan.         Judgment: Judgment is not impulsive.       Fluids    Intake/Output Summary (Last 24 hours) at 10/18/2022 1041  Last data filed at 10/18/2022 0824  Gross per 24 hour   Intake 2080 ml   Output --   Net 2080 ml         Laboratory  Recent Labs     10/16/22  0248 10/17/22  0233   WBC 4.2* 3.9*   RBC 3.91* 3.70*   HEMOGLOBIN 12.3 11.6*   HEMATOCRIT 37.0 35.6*   MCV 94.6 96.2   MCH 31.5 31.4   MCHC 33.2* 32.6*   RDW 47.5 48.6   PLATELETCT 155* 175   MPV 9.7 9.7       Recent Labs     10/16/22  0248 10/17/22  0233   SODIUM 137 139   POTASSIUM 3.6 3.5*   CHLORIDE 104 106   CO2 20 21   GLUCOSE 123* 117*   BUN <2* 2*   CREATININE 0.49* 0.51   CALCIUM 9.3 9.1                     Imaging  [id number]/O   Final Result      1.  There is no cholelithiasis. There is a trace of pericholecystic fluid.   2.  Common bile duct upper limits of normal at 6.3 mm. No evidence of choledocholithiasis.   3.  No change in hepatomegaly.      US-RUQ   Final Result      1.  Gallbladder sludge without stones or biliary dilation      2.  Mild, unchanged biliary dilation      DX-CHEST-PORTABLE (1 VIEW)   Final Result      Negative single view of the chest.      CT-ABDOMEN-PELVIS WITH   Final Result      New mild pancreatic calcification compatible with chronic pancreatitis. Duct dilatation is not outside of normal limits and there is no adjacent fat stranding to confirm acute pancreatitis      Stable right renal lateral cortex hypodensity has volume loss indicating this reflects scarring from prior injury, infection. This is of no acute significance      Mild hepatomegaly      Resolved pelvic free fluid             Assessment/Plan  * Acute on chronic pancreatitis (HCC)- (present  on admission)  Assessment & Plan  - Lipase is not elevated however she has had chronic pancreatitis long enough that she may no longer elevate her lipase, there is no adjacent fat stranding noted however due to her location and severity of her pain, associated with a significant alcohol intake, I do feel that she may have acutely inflamed her pancreas  -  Advance diet as tolerated  -Patient understands she needs alcohol cessation, would like assistance after hospitalization is finished  - RUQ u/s with gallbladder sludge without stones or biliary dilation, mild, unchanged biliary dilation  - MRCP negative, clinical presentation not consistent with cholecystitis  - Added MS Contin for pain control as she is not having much improvement on MS IR    Tachycardia  Assessment & Plan  - Secondary to pain and anxiety  - Supplement mag and phos    Lymphocytosis  Assessment & Plan  - Recheck hepatitis panel negative, HIV negative, CMV and EBV pending  - Referral to Heme Onc in place    Abnormal iron saturation  Assessment & Plan  - high iron and somewhat elevated ferritin (but also expected in pancreatitis) - not transfused, does not appear to be on iron supplementation  - Referral placed to Heme Onc as she also has concurrent lymphocytosis     Normocytic anemia  Assessment & Plan  - Stable without signs of blood loss   - Check occult blood - may have alcohol gastritis or PUD  - Increased Omeprazole to BID and added Carafate     Hypophosphatemia  Assessment & Plan  - Jump as needed     Elevated bilirubin  Assessment & Plan  - MRCP last month negative, CBD remains dilated  - MRCP negative again  - Elevated bilirubin resolved     Hypoglycemia- (present on admission)  Assessment & Plan  - Resolved     Leukocytosis- (present on admission)  Assessment & Plan  - Resolved with IVFs     Lactic acidosis- (present on admission)  Assessment & Plan  - Secondary to EtOH   - Resolved    High anion gap metabolic acidosis- (present on  admission)  Assessment & Plan  - Lactic acidosis, resolved - due to alcohol.      Seizure disorder (HCC)- (present on admission)  Assessment & Plan  - Patient does have a history of seizures, even has breakthrough seizures, there was no grand mal activity reported  -Okay to continue home Keppra dose without increase  -PRN Ativan  -Adjust as needed  - Seizure precautions    10/18: Have discontinued Keppra and added Depakote as per psychiatry recommendations.      Bipolar affective disorder, current episode hypomanic (HCC)- (present on admission)  Assessment & Plan  - Had good discussion with pt today - she feels that her bipolar is not adequately controlled on current medication and she feels like she is on an emotional rollercoaster  - Adamantly denies SI, intent, plan etc. She states she is [employer] and would not injure herself for fear of what the afterlife would entail, and that she has two children to live for  - Has requested to speak to Psych to titrate medications and help seek care after discharge, consult has been placed      Alcohol dependence (HCC)- (present on admission)  Assessment & Plan  - Patient only started drinking day of admission, had been sober for over a month prior to that   - CIWA <10 for >24 hours, stopped CIWA protocol        VTE prophylaxis: SCDs/TEDs and enoxaparin ppx    I have performed a physical exam and reviewed and updated ROS and Plan today (10/18/2022). In review of yesterday's note (10/17/2022), there are no changes except as documented above.

## 2022-10-18 NOTE — CARE PLAN
The patient is Stable - Low risk of patient condition declining or worsening    Shift Goals  Clinical Goals: pt will remain at stated comfort goal of 3/10 pain this shift.  Patient Goals: sleep comfortably  Family Goals: no family present    Progress made toward(s) clinical / shift goals:        Problem: Pain - Standard  Goal: Alleviation of pain or a reduction in pain to the patient’s comfort goal  Outcome: Progressing     Problem: Knowledge Deficit - Standard  Goal: Patient and family/care givers will demonstrate understanding of plan of care, disease process/condition, diagnostic tests and medications  Outcome: Progressing     Problem: Risk for Aspiration  Goal: Patient's risk for aspiration will be absent or decrease  Outcome: Progressing       Patient is not progressing towards the following goals:

## 2022-10-18 NOTE — CONSULTS
Psychiatric Consult    Consult received, discussed with attending. Will see pt tomorrow AM regarding medication.

## 2022-10-19 PROBLEM — R19.7 DIARRHEA: Status: ACTIVE | Noted: 2022-10-19

## 2022-10-19 LAB
ALBUMIN SERPL BCP-MCNC: 4.2 G/DL (ref 3.2–4.9)
ALBUMIN SERPL BCP-MCNC: 4.4 G/DL (ref 3.2–4.9)
ALBUMIN/GLOB SERPL: 1.5 G/DL
ALBUMIN/GLOB SERPL: 1.6 G/DL
ALP SERPL-CCNC: 66 U/L (ref 30–99)
ALP SERPL-CCNC: 74 U/L (ref 30–99)
ALT SERPL-CCNC: 15 U/L (ref 2–50)
ALT SERPL-CCNC: 17 U/L (ref 2–50)
ANION GAP SERPL CALC-SCNC: 12 MMOL/L (ref 7–16)
ANION GAP SERPL CALC-SCNC: 13 MMOL/L (ref 7–16)
APPEARANCE UR: CLEAR
AST SERPL-CCNC: 19 U/L (ref 12–45)
AST SERPL-CCNC: 22 U/L (ref 12–45)
BASOPHILS # BLD AUTO: 0.3 % (ref 0–1.8)
BASOPHILS # BLD: 0.02 K/UL (ref 0–0.12)
BILIRUB SERPL-MCNC: 0.7 MG/DL (ref 0.1–1.5)
BILIRUB SERPL-MCNC: 0.7 MG/DL (ref 0.1–1.5)
BILIRUB UR QL STRIP.AUTO: ABNORMAL
BUN SERPL-MCNC: 3 MG/DL (ref 8–22)
BUN SERPL-MCNC: 4 MG/DL (ref 8–22)
C DIFF DNA SPEC QL NAA+PROBE: NEGATIVE
C DIFF TOX GENS STL QL NAA+PROBE: NEGATIVE
CALCIUM SERPL-MCNC: 9 MG/DL (ref 8.4–10.2)
CALCIUM SERPL-MCNC: 9.6 MG/DL (ref 8.4–10.2)
CHLORIDE SERPL-SCNC: 106 MMOL/L (ref 96–112)
CHLORIDE SERPL-SCNC: 109 MMOL/L (ref 96–112)
CMV IGG SERPL IA-ACNC: 2 U/ML
CMV IGM SERPL IA-ACNC: <8 AU/ML
CO2 SERPL-SCNC: 19 MMOL/L (ref 20–33)
CO2 SERPL-SCNC: 20 MMOL/L (ref 20–33)
COLOR UR: YELLOW
CREAT SERPL-MCNC: 0.58 MG/DL (ref 0.5–1.4)
CREAT SERPL-MCNC: 0.59 MG/DL (ref 0.5–1.4)
EBV EA-D IGG SER-ACNC: <5 U/ML (ref 0–10.9)
EBV NA IGG SER IA-ACNC: 9.7 U/ML (ref 0–21.9)
EBV VCA IGG SER IA-ACNC: 340 U/ML (ref 0–21.9)
EBV VCA IGM SER IA-ACNC: <10 U/ML (ref 0–43.9)
EOSINOPHIL # BLD AUTO: 0.01 K/UL (ref 0–0.51)
EOSINOPHIL NFR BLD: 0.1 % (ref 0–6.9)
ERYTHROCYTE [DISTWIDTH] IN BLOOD BY AUTOMATED COUNT: 44.7 FL (ref 35.9–50)
GFR SERPLBLD CREATININE-BSD FMLA CKD-EPI: 120 ML/MIN/1.73 M 2
GFR SERPLBLD CREATININE-BSD FMLA CKD-EPI: 121 ML/MIN/1.73 M 2
GLOBULIN SER CALC-MCNC: 2.6 G/DL (ref 1.9–3.5)
GLOBULIN SER CALC-MCNC: 2.9 G/DL (ref 1.9–3.5)
GLUCOSE BLD STRIP.AUTO-MCNC: 143 MG/DL (ref 65–99)
GLUCOSE SERPL-MCNC: 125 MG/DL (ref 65–99)
GLUCOSE SERPL-MCNC: 136 MG/DL (ref 65–99)
GLUCOSE UR STRIP.AUTO-MCNC: NEGATIVE MG/DL
HCT VFR BLD AUTO: 40 % (ref 37–47)
HGB BLD-MCNC: 14 G/DL (ref 12–16)
IMM GRANULOCYTES # BLD AUTO: 0.02 K/UL (ref 0–0.11)
IMM GRANULOCYTES NFR BLD AUTO: 0.3 % (ref 0–0.9)
KETONES UR STRIP.AUTO-MCNC: >=80 MG/DL
LEUKOCYTE ESTERASE UR QL STRIP.AUTO: NEGATIVE
LYMPHOCYTES # BLD AUTO: 0.71 K/UL (ref 1–4.8)
LYMPHOCYTES NFR BLD: 9.5 % (ref 22–41)
MAGNESIUM SERPL-MCNC: 1.4 MG/DL (ref 1.5–2.5)
MAGNESIUM SERPL-MCNC: 1.4 MG/DL (ref 1.5–2.5)
MCH RBC QN AUTO: 31.6 PG (ref 27–33)
MCHC RBC AUTO-ENTMCNC: 35 G/DL (ref 33.6–35)
MCV RBC AUTO: 90.3 FL (ref 81.4–97.8)
MICRO URNS: ABNORMAL
MONOCYTES # BLD AUTO: 0.4 K/UL (ref 0–0.85)
MONOCYTES NFR BLD AUTO: 5.4 % (ref 0–13.4)
NEUTROPHILS # BLD AUTO: 6.28 K/UL (ref 2–7.15)
NEUTROPHILS NFR BLD: 84.4 % (ref 44–72)
NITRITE UR QL STRIP.AUTO: NEGATIVE
NRBC # BLD AUTO: 0 K/UL
NRBC BLD-RTO: 0 /100 WBC
PH UR STRIP.AUTO: 6 [PH] (ref 5–8)
PHOSPHATE SERPL-MCNC: 3.4 MG/DL (ref 2.5–4.5)
PLATELET # BLD AUTO: 262 K/UL (ref 164–446)
PMV BLD AUTO: 9.9 FL (ref 9–12.9)
POTASSIUM SERPL-SCNC: 2.8 MMOL/L (ref 3.6–5.5)
POTASSIUM SERPL-SCNC: 3.2 MMOL/L (ref 3.6–5.5)
PROT SERPL-MCNC: 6.8 G/DL (ref 6–8.2)
PROT SERPL-MCNC: 7.3 G/DL (ref 6–8.2)
PROT UR QL STRIP: NEGATIVE MG/DL
RBC # BLD AUTO: 4.43 M/UL (ref 4.2–5.4)
RBC UR QL AUTO: NEGATIVE
SODIUM SERPL-SCNC: 139 MMOL/L (ref 135–145)
SODIUM SERPL-SCNC: 140 MMOL/L (ref 135–145)
SP GR UR STRIP.AUTO: >=1.03
WBC # BLD AUTO: 7.4 K/UL (ref 4.8–10.8)

## 2022-10-19 PROCEDURE — A9270 NON-COVERED ITEM OR SERVICE: HCPCS | Performed by: INTERNAL MEDICINE

## 2022-10-19 PROCEDURE — 700111 HCHG RX REV CODE 636 W/ 250 OVERRIDE (IP): Performed by: INTERNAL MEDICINE

## 2022-10-19 PROCEDURE — 700102 HCHG RX REV CODE 250 W/ 637 OVERRIDE(OP): Performed by: INTERNAL MEDICINE

## 2022-10-19 PROCEDURE — A9270 NON-COVERED ITEM OR SERVICE: HCPCS | Performed by: HOSPITALIST

## 2022-10-19 PROCEDURE — 770020 HCHG ROOM/CARE - TELE (206)

## 2022-10-19 PROCEDURE — 81003 URINALYSIS AUTO W/O SCOPE: CPT

## 2022-10-19 PROCEDURE — 36415 COLL VENOUS BLD VENIPUNCTURE: CPT

## 2022-10-19 PROCEDURE — 84100 ASSAY OF PHOSPHORUS: CPT

## 2022-10-19 PROCEDURE — 82962 GLUCOSE BLOOD TEST: CPT

## 2022-10-19 PROCEDURE — 700102 HCHG RX REV CODE 250 W/ 637 OVERRIDE(OP): Performed by: FAMILY MEDICINE

## 2022-10-19 PROCEDURE — A9270 NON-COVERED ITEM OR SERVICE: HCPCS | Performed by: FAMILY MEDICINE

## 2022-10-19 PROCEDURE — 80053 COMPREHEN METABOLIC PANEL: CPT | Mod: 91

## 2022-10-19 PROCEDURE — 700111 HCHG RX REV CODE 636 W/ 250 OVERRIDE (IP): Performed by: HOSPITALIST

## 2022-10-19 PROCEDURE — 700102 HCHG RX REV CODE 250 W/ 637 OVERRIDE(OP): Performed by: HOSPITALIST

## 2022-10-19 PROCEDURE — 700105 HCHG RX REV CODE 258: Performed by: INTERNAL MEDICINE

## 2022-10-19 PROCEDURE — 94760 N-INVAS EAR/PLS OXIMETRY 1: CPT

## 2022-10-19 PROCEDURE — 85025 COMPLETE CBC W/AUTO DIFF WBC: CPT

## 2022-10-19 PROCEDURE — 99232 SBSQ HOSP IP/OBS MODERATE 35: CPT | Performed by: INTERNAL MEDICINE

## 2022-10-19 PROCEDURE — 84146 ASSAY OF PROLACTIN: CPT

## 2022-10-19 PROCEDURE — 83735 ASSAY OF MAGNESIUM: CPT | Mod: 91

## 2022-10-19 PROCEDURE — 87493 C DIFF AMPLIFIED PROBE: CPT

## 2022-10-19 RX ORDER — DIVALPROEX SODIUM 250 MG/1
250 TABLET, DELAYED RELEASE ORAL 2 TIMES DAILY
Status: DISCONTINUED | OUTPATIENT
Start: 2022-10-19 | End: 2022-10-21 | Stop reason: HOSPADM

## 2022-10-19 RX ORDER — MAGNESIUM SULFATE HEPTAHYDRATE 40 MG/ML
4 INJECTION, SOLUTION INTRAVENOUS ONCE
Status: COMPLETED | OUTPATIENT
Start: 2022-10-19 | End: 2022-10-19

## 2022-10-19 RX ORDER — POTASSIUM CHLORIDE 7.45 MG/ML
10 INJECTION INTRAVENOUS
Status: COMPLETED | OUTPATIENT
Start: 2022-10-19 | End: 2022-10-19

## 2022-10-19 RX ORDER — POTASSIUM CHLORIDE 20 MEQ/1
40 TABLET, EXTENDED RELEASE ORAL 2 TIMES DAILY
Status: COMPLETED | OUTPATIENT
Start: 2022-10-19 | End: 2022-10-20

## 2022-10-19 RX ADMIN — ZONISAMIDE 200 MG: 50 CAPSULE ORAL at 20:06

## 2022-10-19 RX ADMIN — POTASSIUM CHLORIDE 10 MEQ: 7.46 INJECTION, SOLUTION INTRAVENOUS at 22:46

## 2022-10-19 RX ADMIN — POTASSIUM CHLORIDE 40 MEQ: 1500 TABLET, EXTENDED RELEASE ORAL at 21:34

## 2022-10-19 RX ADMIN — SUCRALFATE 1 G: 1 SUSPENSION ORAL at 05:17

## 2022-10-19 RX ADMIN — MORPHINE SULFATE 15 MG: 15 TABLET, FILM COATED, EXTENDED RELEASE ORAL at 05:18

## 2022-10-19 RX ADMIN — Medication 1000 UNITS: at 05:17

## 2022-10-19 RX ADMIN — SUCRALFATE 1 G: 1 SUSPENSION ORAL at 23:10

## 2022-10-19 RX ADMIN — HYDROMORPHONE HYDROCHLORIDE 0.5 MG: 1 INJECTION, SOLUTION INTRAMUSCULAR; INTRAVENOUS; SUBCUTANEOUS at 21:35

## 2022-10-19 RX ADMIN — DIVALPROEX SODIUM 250 MG: 250 TABLET, DELAYED RELEASE ORAL at 19:10

## 2022-10-19 RX ADMIN — MAGNESIUM SULFATE HEPTAHYDRATE 4 G: 40 INJECTION, SOLUTION INTRAVENOUS at 21:35

## 2022-10-19 RX ADMIN — OMEPRAZOLE 20 MG: 20 CAPSULE, DELAYED RELEASE ORAL at 05:18

## 2022-10-19 RX ADMIN — SUCRALFATE 1 G: 1 SUSPENSION ORAL at 12:25

## 2022-10-19 RX ADMIN — NICOTINE TRANSDERMAL SYSTEM 21 MG: 21 PATCH, EXTENDED RELEASE TRANSDERMAL at 05:18

## 2022-10-19 RX ADMIN — SENNOSIDES AND DOCUSATE SODIUM 2 TABLET: 50; 8.6 TABLET ORAL at 05:17

## 2022-10-19 RX ADMIN — SUCRALFATE 1 G: 1 SUSPENSION ORAL at 17:47

## 2022-10-19 RX ADMIN — OMEPRAZOLE 20 MG: 20 CAPSULE, DELAYED RELEASE ORAL at 19:09

## 2022-10-19 RX ADMIN — SODIUM CHLORIDE: 9 INJECTION, SOLUTION INTRAVENOUS at 09:22

## 2022-10-19 RX ADMIN — HYDROMORPHONE HYDROCHLORIDE 0.5 MG: 1 INJECTION, SOLUTION INTRAMUSCULAR; INTRAVENOUS; SUBCUTANEOUS at 06:14

## 2022-10-19 RX ADMIN — POTASSIUM CHLORIDE 10 MEQ: 7.46 INJECTION, SOLUTION INTRAVENOUS at 21:35

## 2022-10-19 RX ADMIN — HYDROMORPHONE HYDROCHLORIDE 0.5 MG: 1 INJECTION, SOLUTION INTRAMUSCULAR; INTRAVENOUS; SUBCUTANEOUS at 12:24

## 2022-10-19 RX ADMIN — ONDANSETRON 4 MG: 2 INJECTION INTRAMUSCULAR; INTRAVENOUS at 05:17

## 2022-10-19 RX ADMIN — PROCHLORPERAZINE EDISYLATE 5 MG: 5 INJECTION, SOLUTION INTRAMUSCULAR; INTRAVENOUS at 09:22

## 2022-10-19 RX ADMIN — DIVALPROEX SODIUM 250 MG: 250 TABLET, DELAYED RELEASE ORAL at 05:18

## 2022-10-19 RX ADMIN — MORPHINE SULFATE 15 MG: 15 TABLET, FILM COATED, EXTENDED RELEASE ORAL at 19:10

## 2022-10-19 ASSESSMENT — ENCOUNTER SYMPTOMS
ABDOMINAL PAIN: 1
NERVOUS/ANXIOUS: 1
CHILLS: 0
DEPRESSION: 1
FEVER: 0
SHORTNESS OF BREATH: 0
VOMITING: 0
NAUSEA: 1
COUGH: 0

## 2022-10-19 ASSESSMENT — PAIN DESCRIPTION - PAIN TYPE
TYPE: ACUTE PAIN

## 2022-10-19 ASSESSMENT — LIFESTYLE VARIABLES: SUBSTANCE_ABUSE: 1

## 2022-10-19 NOTE — DIETARY
Nutrition Services: Update   Day 7 of admit.  Rhiannon Marrero is a 37 y.o. female with admitting DX of Acute on chronic pancreatitis (HCC) [K85.90, K86.1]    Current Diet: Clear liquids; Boost Breeze TID w/ meals    Problem: Nutritional:  Goal: Achieve adequate nutritional intake  Description: Patient will consume >50% of meals and tolerate diet advancement beyond clear liquids.  Outcome: Not progressing    Pt is now NPO/clear liquids diet day 7. PO intake per ADLs: meals refused up to %; most documented at 25-50%. Documented intake of Boost Breeze has been 0%. Spoke w/ MD Peacock regarding pt's nutrition POC given day 7 of inadequate nutrition. MD reports previous discussion w/ pt re: nutrition concerns, will address RD recommendation of small bowel tube feeding with patient given pt's ongoing poor PO intake. At present, MD would like pt to receive regular Boost supplements instead of Boost Breeze while on clear liquid diet.    Malnutrition Risk: At risk w/ poor nutrition x 7 days.    Recommendations/Plan:  Provide TID Boost Plus w/ meals per MD.  Encourage intake of meals and supplements >50%.  Document intake of all meals as % taken in ADL's to provide interdisciplinary communication across all shifts.   Monitor weight.  Nutrition rep will continue to see patient for ongoing meal and snack preferences.    RD following

## 2022-10-19 NOTE — CONSULTS
"  [facility] PSYCHIATRIC FOLLOW-UP:(established)    DOS: 10/19/22     Reason for admission:  Transferred from [facility] admissions after severe abdominal pain and question of seizures.  Legal Hold Status: not applicable      ID/Chief Complaint:   Chief Complaint   Patient presents with    Suicidal Ideation     Patient awake, alert and oriented on arrival to ED. Per EMS, patient was checking in to [facility] for thoughts of SI. EMS states that while in the waiting room at [facility], patient began complaining of severe abdominal pain and was reported to have and absence seizure x 2. Patient does report a hx of seizures that are more common when she drinks alcohol. Denies drug use. PMH pancreatitis. Patient states that this is the first day she drank alcohol in 34 days. Reporting SI without plan. Cooperative with care.                S:  Seen today as f/u psych consult. Pt feeling very nauseaous this morning, notes this started right after taking her morning medication. She says that she also feels like she slept \"too much\" overnight, would like to lower her dose of depakote. Appetite poor currenlty d/t nausea. Denies SI/HI, A/VH currently.       O: Medical ROS (as pertinent): Nausea and vomiting after AM meds.     PSYCHIATRIC EXAM (MSE):   Vitals:    10/19/22 0100   BP: 125/78   Pulse: 96   Resp: 18   Temp: 37.1 °C (98.7 °F)   SpO2: 92%      Weight: 56.8kg on 10/13/22    Constitutional: as noted above  General Appearance/Behavior: sitting over emesis bag, minimal engagement d/t nausea  Abnormal Movements: none, no PMA/PMR or tremor observed.  Gait and Posture: not observed  Musculoskeletal: as noted above  Mood: \"not great\"  Affect: Mood/Congruent and Appropriate   Speech: normal rate, normal rhythm, normal tone, normal volume, and normal fluency  Language:  spontaneous, comprehends spoken commands, and fluent   Thought Process: Linear and Logical   Thought Content: Denies SI/HI. Denies A/VH, no e/o delusions, PI, or " internal preoccupation.   Insight/Judgement:  intact  Alert/Orientation: alert, oriented to person, place and time  Attn/Concentration: normal  Fund of Knowledge: Average  Memory recent/remote: No gross evidence of memory deficits  MMSE: deferred this visit       Meds Current:  Scheduled Medications   Medication Dose Frequency    naltrexone  50 mg DAILY    divalproex  250 mg QAM    divalproex  500 mg QHS    vitamin D3  1,000 Units DAILY    morphine ER  15 mg Q12HRS    nicotine  1 Patch Daily-0600    omeprazole  20 mg BID    sucralfate  1 g Q6HRS    enoxaparin (LOVENOX) injection  40 mg DAILY AT 1800    zonisamide  200 mg QHS    senna-docusate  2 Tablet BID    Pharmacy Consult Request  1 Each PHARMACY TO DOSE     Allergies:   Allergies   Allergen Reactions    Promethazine Hcl Anxiety     Anxiety and makes her feels like skin coming off            *ASSESSMENT:   1. Intractable nausea and vomiting Acute   2. Suicidal ideation Acute   3. Alcoholic intoxication without complication (HCC) Acute   4. Alcohol-induced chronic pancreatitis (HCC)    5. Abnormal blood level of iron    6. Lymphocytosis    7. Acute on chronic pancreatitis (HCC)    8. Generalized abdominal pain       Bipolar affective disorder, most consistent with type II rapid cycling  PTSD, likely complicated  R/o personality disorder playing a role given significant trauma hx.  ETOH use disorder with dependence, severe  Hx Benzodiazepine dependence (11/21/21 [place]'s note)  Hx opioid dependence (11/21/21 [place] note)     Medical: as noted by the medical treatment team.   Pancreatitis (acute on chronic)  High iron/elevated ferritin - referred to heme onc  Anemia  Seizure disorder (prescribed Keppra)  Per notes, fibromyalgia  Per notes, interstitial cystitis with chronic pain.  S/p hysterectomy  Chronically low vitamin D, recommend supplementation    *RECOMMENDATIONS:  Legal Status: not applicable    Observation status:   -no sitter needed    Visitors:  Yes  Personal belongings: Yes    Discussed/voalted: Dr Ayush Sandhu and RN    Medication Recommendations: Final orders as per Treatment Team  Stop naltrexone - cannot be taken with opioids  Decrease depakote to 250mg BID (oversedation last night)  10/17/22 started depakote, stopped seroquel, keppra  Risks/benefits/side effects discussed, patient verbalized understanding  Medication reconciliation was completed.  Reviewed safety plan: 911, ER, PCM, MHC, suicide crisis line, nursing staff while inpatient.    Will Continue to Follow. Thank you for the consult.           Discharge recommendations:   -Please assist with outpatient psychiatric/substance use follow up appointments at discharge once medically cleared.

## 2022-10-19 NOTE — PROGRESS NOTES
Telemetry Shift Summary    Rhythm SR/ST/SA  HR Range ; up to 130s  Ectopy rPVC  Measurements 0.18/0.16/0.40        Normal Values  Rhythm SR  HR Range    Measurements 0.12-0.20 / 0.06-0.10  / 0.30-0.52

## 2022-10-19 NOTE — CARE PLAN
The patient is Watcher - Medium risk of patient condition declining or worsening    Shift Goals  Clinical Goals: pt will not sustain a fall this shift.  Patient Goals: sleep comfortably  Family Goals: no family present    Progress made toward(s) clinical / shift goals:      Pt placed on low fall precautions. No falls this shift.    Problem: Pain - Standard  Goal: Alleviation of pain or a reduction in pain to the patient’s comfort goal  Outcome: Progressing     Problem: Knowledge Deficit - Standard  Goal: Patient and family/care givers will demonstrate understanding of plan of care, disease process/condition, diagnostic tests and medications  Outcome: Progressing     Problem: Seizure Precautions  Goal: Implementation of seizure precautions  Outcome: Progressing     Problem: Risk for Aspiration  Goal: Patient's risk for aspiration will be absent or decrease  Outcome: Progressing     Problem: Provide Safe Environment  Goal: Suicide environmental safety, protocols, policies, and practices will be implemented  Outcome: Progressing       Patient is not progressing towards the following goals:

## 2022-10-19 NOTE — PROGRESS NOTES
Pt is asleep in bed. Arouses to touch. Frequently drifts off to sleep during conversation. Telemetry monitor tech called to notify of increasingly erratic heart rate ranging from 90s-130s. Pt denies any chest pain BP: 133/74. Will continue to monitor.    2200 Notified MD. Pt continues to deny chest pain. Orders are to monitor for changes.     0200: pt refusing lab draw. RN education provided. Pt still refusing.    0500: pt refusing naltrexone and vital signs. RN education provided. Pt still refusing.

## 2022-10-19 NOTE — PROGRESS NOTES
"Hospital Medicine Daily Progress Note    Date of Service  10/19/2022    Chief Complaint  Rhiannon Marrero is a 37 y.o. female admitted 10/12/2022 with question of seizure, abdominal pain    Hospital Course  37-year-old female with a history of alcohol dependence, seizure disorder, alcohol induced pancreatitis, bipolar disorder who presented to hospital from [facility] for abdominal pain and witnessed seizure. She states that she presented to [facility] early this morning, intoxicated, because she had binged drank for the first time in a month and didn't want to continue drinking. While at [facility] during assessment, there was question of absence seizure, but she states that her seizures are typically clonic and she may have less responsive as she was acutely intoxicated.  She does take Keppra but Pharmacy reports not having it filled since 7/2/22 but pt reports having excess prescriptions at home and taking it appropriately.     Interval Problem Update  10/12 - Pt with some tachycardia, mild white count elevation and metabolic acidosis with a bicarb of 14 and gap of 27. UA negative. She states this abdominal pain feels typical of her known pancreatitis and is central in her abdomen.  She has a gap acidosis with only mild ketones on UA - will check a lactic acid. Additionally, given her tachycardia, temp of 100.0 and elevated white count, will check RUQ u/s, COVID and CXR.    10/13 - Pt's bilirubin elevated, this does not appear to be chronic in nature - she had an MRCP last month that was negative. At that time, her CBD was mildly dilated and it is again today - will repeat MRCP given the new elevation in bilirubin. Replace phos.  Pt placed on legal hold by ERP - \"The patient actually denies any suicidal ideation to me and just states that she wants to die because 'the pain is so bad'. \" Discussed with the patient at bedside today, she denies any suicidal ideation, intent or overt depression. She was intoxicated on admission. She did " have a drop in hemoglobin overnight - with her pain being primarily epigastric as well as LUQ, she may have an alcoholic gastritis or PUD contributing as well. Will check occult blood as well as an anemia panel, increase PPI to BID and start carafate.    10/14 - Tachycardia improving, afebrile, bilirubin elevation resolved.  MRCP negative. Hemoglobin appears to be slowly equilibrating, FOB still pending. Pt has a lymphocytosis but recently tested negative for HIV - she was also negative for Hepatitis in May, but will recheck given her elevated bilirubin yesterday. Pt also with elevated iron and ferritin levels - will refer to Heme Onc at discharge for both lymphocytosis and abnormal iron studies, eval for hemachromatosis.    10/15 - Pt with sinus tachycardia this am - discussed with nursing, pt with a lot of anxiety and some pain, was given 0.5mg Klonopin, was calm when I came to see her.  Still denies SI, but after I left, nursing reported pt and family requesting to speak to Psych to evaluate if her underlying mental health issues are contributing to her uncontrolled pain. Will place consult. Labs remain stable, NPO for pancreatitis pain. I did attempt to call both pt's mother and significant other, but neither answered - I did a leave a message for both.     10/16 - Sitter placed last evening as I was able to get a hold of the pt's mom and she stated she felt worried that patient was suicidal. She did state that she did not feel that the pt would attempt suicide while in hospital, but she was concerned that she may attempt after discharge.  As pt denied that day having SI, a legal hold was not initiated, but a telesitter was placed to ensure pt's safety until this could be readdressed. Had extensive discussion with patient about her mood again this morning, and underlying psychiatric illness - she states that she is definitively not suicidal, has no intent and no plan.  She states that her mom is concerned  "because the pt and her boyfriend just broke up but pt states \"I'm Latter day - I'm too scared of what comes after this to risk suicide\". She also states that she has things to live for as she has children that she would not want to abandon via suicide.  She does agree that she needs increased help with mental health services, as she states that her mental health illness is not helped by her current meds, and that the mood fluctuations are exhausting. She is looking forward to speaking with Psych. Medically, she continues to have severe pain that is only decreased to a 7/10 with oral morphine and IV dilaudid. Will add MS Contin. Will refer to pain management as an outpatientHad an episode of PSVT overnight, will supplement K and Mag.     10/17 - Pt tolerating a CLD today but feels that her pain will get out of control if advanced further. Will continue for today, advance in the am. Hemoglobin remains stable, lymphocytosis increasing - HIV and hepatitis panel negative, CMV and EBV pending. Pt feels better after speaking with Hedy Morgan, and a Psych consult is pending for medication adjustment for her bipolar disorder.  The patient does wish to pursue rehab at discharge and is working on that independently.    PATIENT SEEN BY PREVIOUS HOSPITALIST UNTIL 10/17    10/18: Patient seen at bedside this morning.  Patient laying in bed at the time of my evaluation.  The patient still complaining of abdominal pain.  Patient does not want to advance diet today as she fears she will get worse.  Psychiatry has recommended some medication changes which we have done.  We have discontinued Keppra and added Depakote.  We have added naltrexone.  I have discontinued morphine instant release and continue with MS Contin and Dilaudid for breakthrough pain.  We will continue to monitor closely.    10/19: Patient seen at bedside this morning.  Patient lying in bed.  Patient mentions she still has severe pain, however on clinical evaluation " it seems that it is out of proportion.  The patient mentions that she was unable to tolerate naltrexone.  We appreciate further recommendations by psychiatry.  As per nurses patient has been sleeping more, this is most likely directly related to Depakote.  We will continue to monitor closely.    I have discussed this patient's plan of care and discharge plan at IDT rounds today with Case Management, Nursing, Nursing leadership, and other members of the IDT team.    Consultants/Specialty  Psychiatry    Code Status  Full Code    Disposition  Patient is not medically cleared for discharge.   Anticipate discharge to to home with close outpatient follow-up.  I have placed the appropriate orders for post-discharge needs.    Review of Systems  Review of Systems   Constitutional:  Negative for chills and fever.   Respiratory:  Negative for cough and shortness of breath.    Cardiovascular:  Negative for chest pain and leg swelling.   Gastrointestinal:  Positive for abdominal pain and nausea. Negative for vomiting.   Psychiatric/Behavioral:  Positive for depression and substance abuse. Negative for suicidal ideas. The patient is nervous/anxious.    All other systems reviewed and are negative.     Physical Exam  Temp:  [36.8 °C (98.2 °F)-37.1 °C (98.7 °F)] 37.1 °C (98.7 °F)  Pulse:  [] 96  Resp:  [18] 18  BP: (121-154)/() 125/78  SpO2:  [92 %-100 %] 92 %    Physical Exam  Vitals and nursing note reviewed.   Constitutional:       Appearance: She is ill-appearing. She is not toxic-appearing.   HENT:      Head: Normocephalic and atraumatic.   Cardiovascular:      Rate and Rhythm: Normal rate and regular rhythm.      Heart sounds: No murmur heard.  Pulmonary:      Effort: Pulmonary effort is normal.      Breath sounds: Normal breath sounds.   Abdominal:      General: Abdomen is flat. Bowel sounds are normal. There is no distension.      Palpations: Abdomen is soft.      Tenderness: There is abdominal tenderness. There  is no guarding.   Musculoskeletal:         General: No swelling.      Right lower leg: No edema.      Left lower leg: No edema.   Skin:     General: Skin is warm and dry.      Coloration: Skin is not jaundiced.   Neurological:      General: No focal deficit present.      Mental Status: She is alert and oriented to person, place, and time.   Psychiatric:         Attention and Perception: Attention normal.         Mood and Affect: Mood is anxious. Affect is tearful.         Speech: Speech normal.         Behavior: Behavior normal. Behavior is cooperative.         Thought Content: Thought content normal. Thought content does not include suicidal ideation. Thought content does not include suicidal plan.         Judgment: Judgment is not impulsive.       Fluids    Intake/Output Summary (Last 24 hours) at 10/19/2022 0832  Last data filed at 10/19/2022 0600  Gross per 24 hour   Intake 360 ml   Output --   Net 360 ml         Laboratory  Recent Labs     10/17/22  0233   WBC 3.9*   RBC 3.70*   HEMOGLOBIN 11.6*   HEMATOCRIT 35.6*   MCV 96.2   MCH 31.4   MCHC 32.6*   RDW 48.6   PLATELETCT 175   MPV 9.7       Recent Labs     10/17/22  0233   SODIUM 139   POTASSIUM 3.5*   CHLORIDE 106   CO2 21   GLUCOSE 117*   BUN 2*   CREATININE 0.51   CALCIUM 9.1                     Imaging  [id number]-E/O   Final Result      1.  There is no cholelithiasis. There is a trace of pericholecystic fluid.   2.  Common bile duct upper limits of normal at 6.3 mm. No evidence of choledocholithiasis.   3.  No change in hepatomegaly.      US-RUQ   Final Result      1.  Gallbladder sludge without stones or biliary dilation      2.  Mild, unchanged biliary dilation      DX-CHEST-PORTABLE (1 VIEW)   Final Result      Negative single view of the chest.      CT-ABDOMEN-PELVIS WITH   Final Result      New mild pancreatic calcification compatible with chronic pancreatitis. Duct dilatation is not outside of normal limits and there is no adjacent fat stranding to  confirm acute pancreatitis      Stable right renal lateral cortex hypodensity has volume loss indicating this reflects scarring from prior injury, infection. This is of no acute significance      Mild hepatomegaly      Resolved pelvic free fluid             Assessment/Plan  * Acute on chronic pancreatitis (HCC)- (present on admission)  Assessment & Plan  - Lipase is not elevated however she has had chronic pancreatitis long enough that she may no longer elevate her lipase, there is no adjacent fat stranding noted however due to her location and severity of her pain, associated with a significant alcohol intake, I do feel that she may have acutely inflamed her pancreas  -  Advance diet as tolerated  -Patient understands she needs alcohol cessation, would like assistance after hospitalization is finished  - RUQ u/s with gallbladder sludge without stones or biliary dilation, mild, unchanged biliary dilation  - MRCP negative, clinical presentation not consistent with cholecystitis  - Added MS Contin for pain control as she is not having much improvement on MS IR    Diarrhea  Assessment & Plan  Pending C. difficile.  This could be due to medication.    Tachycardia  Assessment & Plan  - Secondary to pain and anxiety  - Supplement mag and phos    Lymphocytosis  Assessment & Plan  - Recheck hepatitis panel negative, HIV negative, CMV and EBV pending  - Referral to Heme Onc in place    Abnormal iron saturation  Assessment & Plan  - high iron and somewhat elevated ferritin (but also expected in pancreatitis) - not transfused, does not appear to be on iron supplementation  - Referral placed to Heme Onc as she also has concurrent lymphocytosis     Normocytic anemia  Assessment & Plan  - Stable without signs of blood loss   - Check occult blood - may have alcohol gastritis or PUD  - Increased Omeprazole to BID and added Carafate     Hypophosphatemia  Assessment & Plan  - Jump as needed     Elevated bilirubin  Assessment &  Plan  - MRCP last month negative, CBD remains dilated  - MRCP negative again  - Elevated bilirubin resolved     Hypoglycemia- (present on admission)  Assessment & Plan  - Resolved     Leukocytosis- (present on admission)  Assessment & Plan  - Resolved with IVFs     Lactic acidosis- (present on admission)  Assessment & Plan  - Secondary to EtOH   - Resolved    High anion gap metabolic acidosis- (present on admission)  Assessment & Plan  - Lactic acidosis, resolved - due to alcohol.      Seizure disorder (HCC)- (present on admission)  Assessment & Plan  - Patient does have a history of seizures, even has breakthrough seizures, there was no grand mal activity reported  -Okay to continue home Keppra dose without increase  -PRN Ativan  -Adjust as needed  - Seizure precautions    10/18: Have discontinued Keppra and added Depakote as per psychiatry recommendations.      Bipolar affective disorder, current episode hypomanic (HCC)- (present on admission)  Assessment & Plan  - Had good discussion with pt today - she feels that her bipolar is not adequately controlled on current medication and she feels like she is on an emotional rollercoaster  - Adamantly denies SI, intent, plan etc. She states she is Faith and would not injure herself for fear of what the afterlife would entail, and that she has two children to live for  - Has requested to speak to Psych to titrate medications and help seek care after discharge, consult has been placed      Alcohol dependence (HCC)- (present on admission)  Assessment & Plan  - Patient only started drinking day of admission, had been sober for over a month prior to that   - CIWA <10 for >24 hours, stopped CIWA protocol          VTE prophylaxis: SCDs/TEDs and enoxaparin ppx    I have performed a physical exam and reviewed and updated ROS and Plan today (10/19/2022). In review of yesterday's note (10/18/2022), there are no changes except as documented above.

## 2022-10-19 NOTE — PROGRESS NOTES
Telemetry Shift Summary    Rhythm SR  HR Range 60s-90s  Ectopy rare PAC/PVC  Measurements 0.16/0.08/0.40        Normal Values  Rhythm SR  HR Range    Measurements 0.12-0.20 / 0.06-0.10  / 0.30-0.52

## 2022-10-19 NOTE — PROGRESS NOTES
Received report from night RN, assumed care. POC discussed.   A&Ox4  Pain 7/10   Bed in lowest position with call light and belongings within reach.

## 2022-10-19 NOTE — PROGRESS NOTES
Telemetry Shift Summary     Rhythm: SR/ST  Rate:   Measurements: .16/.08/.36  Ectopy (reported by Monitor Tech): r PVC  *up to 165     Normal Values  Rhythm: Sinus  HR:   Measurements: 0.12-0.20/0.06-0.10/0.30-0.52

## 2022-10-19 NOTE — CARE PLAN
The patient is Stable - Low risk of patient condition declining or worsening    Shift Goals  Clinical Goals: Pt pain will controlled thsi shift  Patient Goals: rest, adequate pain control  Family Goals: no family present    Progress made toward(s) clinical / shift goals:  Pt requiring 1 dose of dilaudid IV for pain control, scheduled pain medication to managed pain. Able to rest for 1 hour. Tolerating clear liquids. Fall precaution in place.    Patient is not progressing towards the following goals:      Problem: Pain - Standard  Goal: Alleviation of pain or a reduction in pain to the patient’s comfort goal  Outcome: Not Progressing     Problem: Seizure Precautions  Goal: Implementation of seizure precautions  Outcome: Progressing     Problem: Provide Safe Environment  Goal: Suicide environmental safety, protocols, policies, and practices will be implemented  Outcome: Progressing

## 2022-10-20 ENCOUNTER — APPOINTMENT (OUTPATIENT)
Dept: RADIOLOGY | Facility: MEDICAL CENTER | Age: 37
DRG: 439 | End: 2022-10-20
Attending: INTERNAL MEDICINE
Payer: COMMERCIAL

## 2022-10-20 LAB
ALBUMIN SERPL BCP-MCNC: 3.9 G/DL (ref 3.2–4.9)
ALBUMIN/GLOB SERPL: 2 G/DL
ALP SERPL-CCNC: 61 U/L (ref 30–99)
ALT SERPL-CCNC: 12 U/L (ref 2–50)
ANION GAP SERPL CALC-SCNC: 12 MMOL/L (ref 7–16)
AST SERPL-CCNC: 25 U/L (ref 12–45)
BILIRUB SERPL-MCNC: 0.5 MG/DL (ref 0.1–1.5)
BUN SERPL-MCNC: 3 MG/DL (ref 8–22)
CALCIUM SERPL-MCNC: 8.3 MG/DL (ref 8.4–10.2)
CHLORIDE SERPL-SCNC: 113 MMOL/L (ref 96–112)
CO2 SERPL-SCNC: 15 MMOL/L (ref 20–33)
CREAT SERPL-MCNC: 0.51 MG/DL (ref 0.5–1.4)
ERYTHROCYTE [DISTWIDTH] IN BLOOD BY AUTOMATED COUNT: 47.6 FL (ref 35.9–50)
GFR SERPLBLD CREATININE-BSD FMLA CKD-EPI: 124 ML/MIN/1.73 M 2
GLOBULIN SER CALC-MCNC: 2 G/DL (ref 1.9–3.5)
GLUCOSE SERPL-MCNC: 103 MG/DL (ref 65–99)
HCT VFR BLD AUTO: 36.1 % (ref 37–47)
HEMOCCULT STL QL: NEGATIVE
HGB BLD-MCNC: 12.4 G/DL (ref 12–16)
LIPASE SERPL-CCNC: 13 U/L (ref 7–58)
MAGNESIUM SERPL-MCNC: 2.6 MG/DL (ref 1.5–2.5)
MCH RBC QN AUTO: 31.2 PG (ref 27–33)
MCHC RBC AUTO-ENTMCNC: 34.3 G/DL (ref 33.6–35)
MCV RBC AUTO: 90.7 FL (ref 81.4–97.8)
PHOSPHATE SERPL-MCNC: 2.9 MG/DL (ref 2.5–4.5)
PLATELET # BLD AUTO: 246 K/UL (ref 164–446)
PMV BLD AUTO: 9.7 FL (ref 9–12.9)
POTASSIUM SERPL-SCNC: 3.9 MMOL/L (ref 3.6–5.5)
PROLACTIN SERPL-MCNC: 12.3 NG/ML (ref 2.8–26)
PROT SERPL-MCNC: 5.9 G/DL (ref 6–8.2)
RBC # BLD AUTO: 3.98 M/UL (ref 4.2–5.4)
SODIUM SERPL-SCNC: 140 MMOL/L (ref 135–145)
WBC # BLD AUTO: 5.5 K/UL (ref 4.8–10.8)

## 2022-10-20 PROCEDURE — 700102 HCHG RX REV CODE 250 W/ 637 OVERRIDE(OP): Performed by: FAMILY MEDICINE

## 2022-10-20 PROCEDURE — 83735 ASSAY OF MAGNESIUM: CPT

## 2022-10-20 PROCEDURE — A9270 NON-COVERED ITEM OR SERVICE: HCPCS | Performed by: FAMILY MEDICINE

## 2022-10-20 PROCEDURE — 700117 HCHG RX CONTRAST REV CODE 255: Performed by: INTERNAL MEDICINE

## 2022-10-20 PROCEDURE — 84100 ASSAY OF PHOSPHORUS: CPT

## 2022-10-20 PROCEDURE — 83690 ASSAY OF LIPASE: CPT

## 2022-10-20 PROCEDURE — A9270 NON-COVERED ITEM OR SERVICE: HCPCS | Performed by: INTERNAL MEDICINE

## 2022-10-20 PROCEDURE — 82272 OCCULT BLD FECES 1-3 TESTS: CPT

## 2022-10-20 PROCEDURE — A9270 NON-COVERED ITEM OR SERVICE: HCPCS | Performed by: HOSPITALIST

## 2022-10-20 PROCEDURE — 85027 COMPLETE CBC AUTOMATED: CPT

## 2022-10-20 PROCEDURE — 700102 HCHG RX REV CODE 250 W/ 637 OVERRIDE(OP): Performed by: INTERNAL MEDICINE

## 2022-10-20 PROCEDURE — 80053 COMPREHEN METABOLIC PANEL: CPT

## 2022-10-20 PROCEDURE — 94760 N-INVAS EAR/PLS OXIMETRY 1: CPT

## 2022-10-20 PROCEDURE — 770020 HCHG ROOM/CARE - TELE (206)

## 2022-10-20 PROCEDURE — 74177 CT ABD & PELVIS W/CONTRAST: CPT

## 2022-10-20 PROCEDURE — 700102 HCHG RX REV CODE 250 W/ 637 OVERRIDE(OP): Performed by: HOSPITALIST

## 2022-10-20 PROCEDURE — 74018 RADEX ABDOMEN 1 VIEW: CPT

## 2022-10-20 PROCEDURE — 700111 HCHG RX REV CODE 636 W/ 250 OVERRIDE (IP): Performed by: INTERNAL MEDICINE

## 2022-10-20 PROCEDURE — 700105 HCHG RX REV CODE 258: Performed by: INTERNAL MEDICINE

## 2022-10-20 PROCEDURE — 99233 SBSQ HOSP IP/OBS HIGH 50: CPT | Performed by: INTERNAL MEDICINE

## 2022-10-20 PROCEDURE — 36415 COLL VENOUS BLD VENIPUNCTURE: CPT

## 2022-10-20 RX ORDER — MORPHINE SULFATE 4 MG/ML
2 INJECTION INTRAVENOUS
Status: DISCONTINUED | OUTPATIENT
Start: 2022-10-20 | End: 2022-10-21 | Stop reason: HOSPADM

## 2022-10-20 RX ORDER — HYDROMORPHONE HYDROCHLORIDE 1 MG/ML
0.5 INJECTION, SOLUTION INTRAMUSCULAR; INTRAVENOUS; SUBCUTANEOUS ONCE
Status: COMPLETED | OUTPATIENT
Start: 2022-10-20 | End: 2022-10-20

## 2022-10-20 RX ORDER — HYDROMORPHONE HYDROCHLORIDE 1 MG/ML
0.5 INJECTION, SOLUTION INTRAMUSCULAR; INTRAVENOUS; SUBCUTANEOUS EVERY 4 HOURS PRN
Status: DISCONTINUED | OUTPATIENT
Start: 2022-10-20 | End: 2022-10-20

## 2022-10-20 RX ADMIN — MORPHINE SULFATE 2 MG: 4 INJECTION INTRAVENOUS at 21:19

## 2022-10-20 RX ADMIN — DIVALPROEX SODIUM 250 MG: 250 TABLET, DELAYED RELEASE ORAL at 18:30

## 2022-10-20 RX ADMIN — ONDANSETRON 4 MG: 2 INJECTION INTRAMUSCULAR; INTRAVENOUS at 15:43

## 2022-10-20 RX ADMIN — POTASSIUM CHLORIDE 40 MEQ: 1500 TABLET, EXTENDED RELEASE ORAL at 05:04

## 2022-10-20 RX ADMIN — MORPHINE SULFATE 15 MG: 15 TABLET, FILM COATED, EXTENDED RELEASE ORAL at 05:05

## 2022-10-20 RX ADMIN — SUCRALFATE 1 G: 1 SUSPENSION ORAL at 11:59

## 2022-10-20 RX ADMIN — PROCHLORPERAZINE EDISYLATE 5 MG: 5 INJECTION, SOLUTION INTRAMUSCULAR; INTRAVENOUS at 17:52

## 2022-10-20 RX ADMIN — NICOTINE TRANSDERMAL SYSTEM 21 MG: 21 PATCH, EXTENDED RELEASE TRANSDERMAL at 05:04

## 2022-10-20 RX ADMIN — ONDANSETRON 4 MG: 2 INJECTION INTRAMUSCULAR; INTRAVENOUS at 04:48

## 2022-10-20 RX ADMIN — SUCRALFATE 1 G: 1 SUSPENSION ORAL at 18:30

## 2022-10-20 RX ADMIN — IOHEXOL 100 ML: 350 INJECTION, SOLUTION INTRAVENOUS at 17:06

## 2022-10-20 RX ADMIN — OMEPRAZOLE 20 MG: 20 CAPSULE, DELAYED RELEASE ORAL at 18:30

## 2022-10-20 RX ADMIN — MORPHINE SULFATE 15 MG: 15 TABLET, FILM COATED, EXTENDED RELEASE ORAL at 18:30

## 2022-10-20 RX ADMIN — HYDROMORPHONE HYDROCHLORIDE 0.5 MG: 1 INJECTION, SOLUTION INTRAMUSCULAR; INTRAVENOUS; SUBCUTANEOUS at 07:54

## 2022-10-20 RX ADMIN — HYDROMORPHONE HYDROCHLORIDE 0.5 MG: 1 INJECTION, SOLUTION INTRAMUSCULAR; INTRAVENOUS; SUBCUTANEOUS at 03:41

## 2022-10-20 RX ADMIN — HYDROMORPHONE HYDROCHLORIDE 0.5 MG: 1 INJECTION, SOLUTION INTRAMUSCULAR; INTRAVENOUS; SUBCUTANEOUS at 12:02

## 2022-10-20 RX ADMIN — ZONISAMIDE 200 MG: 50 CAPSULE ORAL at 20:11

## 2022-10-20 RX ADMIN — DIVALPROEX SODIUM 250 MG: 250 TABLET, DELAYED RELEASE ORAL at 05:04

## 2022-10-20 RX ADMIN — POTASSIUM CHLORIDE 40 MEQ: 1500 TABLET, EXTENDED RELEASE ORAL at 18:30

## 2022-10-20 RX ADMIN — SODIUM CHLORIDE: 9 INJECTION, SOLUTION INTRAVENOUS at 15:29

## 2022-10-20 RX ADMIN — OMEPRAZOLE 20 MG: 20 CAPSULE, DELAYED RELEASE ORAL at 05:04

## 2022-10-20 RX ADMIN — Medication 1000 UNITS: at 05:06

## 2022-10-20 RX ADMIN — SUCRALFATE 1 G: 1 SUSPENSION ORAL at 05:04

## 2022-10-20 RX ADMIN — MORPHINE SULFATE 2 MG: 4 INJECTION INTRAVENOUS at 17:42

## 2022-10-20 RX ADMIN — HYDROMORPHONE HYDROCHLORIDE 0.5 MG: 1 INJECTION, SOLUTION INTRAMUSCULAR; INTRAVENOUS; SUBCUTANEOUS at 16:03

## 2022-10-20 ASSESSMENT — PAIN DESCRIPTION - PAIN TYPE
TYPE: ACUTE PAIN

## 2022-10-20 ASSESSMENT — ENCOUNTER SYMPTOMS
NERVOUS/ANXIOUS: 1
DEPRESSION: 1
NAUSEA: 1
COUGH: 0
CHILLS: 0
FEVER: 0
SHORTNESS OF BREATH: 0
VOMITING: 0
ABDOMINAL PAIN: 1

## 2022-10-20 ASSESSMENT — LIFESTYLE VARIABLES: SUBSTANCE_ABUSE: 1

## 2022-10-20 NOTE — PROGRESS NOTES
Telemetry Shift Summary     Rhythm: SR-ST  Rate:   Measurements: .16/.08/.40  Ectopy (reported by Monitor Tech): r PAC     Normal Values  Rhythm: Sinus  HR:   Measurements: 0.12-0.20/0.06-0.10/0.30-0.52

## 2022-10-20 NOTE — PROGRESS NOTES
"Hospital Medicine Daily Progress Note    Date of Service  10/20/2022    Chief Complaint  Rhiannon Marrero is a 37 y.o. female admitted 10/12/2022 with question of seizure, abdominal pain    Hospital Course  37-year-old female with a history of alcohol dependence, seizure disorder, alcohol induced pancreatitis, bipolar disorder who presented to hospital from [facility] for abdominal pain and witnessed seizure. She states that she presented to [facility] early this morning, intoxicated, because she had binged drank for the first time in a month and didn't want to continue drinking. While at [facility] during assessment, there was question of absence seizure, but she states that her seizures are typically clonic and she may have less responsive as she was acutely intoxicated.  She does take Keppra but Pharmacy reports not having it filled since 7/2/22 but pt reports having excess prescriptions at home and taking it appropriately.     Interval Problem Update  10/12 - Pt with some tachycardia, mild white count elevation and metabolic acidosis with a bicarb of 14 and gap of 27. UA negative. She states this abdominal pain feels typical of her known pancreatitis and is central in her abdomen.  She has a gap acidosis with only mild ketones on UA - will check a lactic acid. Additionally, given her tachycardia, temp of 100.0 and elevated white count, will check RUQ u/s, COVID and CXR.    10/13 - Pt's bilirubin elevated, this does not appear to be chronic in nature - she had an MRCP last month that was negative. At that time, her CBD was mildly dilated and it is again today - will repeat MRCP given the new elevation in bilirubin. Replace phos.  Pt placed on legal hold by ERP - \"The patient actually denies any suicidal ideation to me and just states that she wants to die because 'the pain is so bad'. \" Discussed with the patient at bedside today, she denies any suicidal ideation, intent or overt depression. She was intoxicated on admission. She did " have a drop in hemoglobin overnight - with her pain being primarily epigastric as well as LUQ, she may have an alcoholic gastritis or PUD contributing as well. Will check occult blood as well as an anemia panel, increase PPI to BID and start carafate.    10/14 - Tachycardia improving, afebrile, bilirubin elevation resolved.  MRCP negative. Hemoglobin appears to be slowly equilibrating, FOB still pending. Pt has a lymphocytosis but recently tested negative for HIV - she was also negative for Hepatitis in May, but will recheck given her elevated bilirubin yesterday. Pt also with elevated iron and ferritin levels - will refer to Heme Onc at discharge for both lymphocytosis and abnormal iron studies, eval for hemachromatosis.    10/15 - Pt with sinus tachycardia this am - discussed with nursing, pt with a lot of anxiety and some pain, was given 0.5mg Klonopin, was calm when I came to see her.  Still denies SI, but after I left, nursing reported pt and family requesting to speak to Psych to evaluate if her underlying mental health issues are contributing to her uncontrolled pain. Will place consult. Labs remain stable, NPO for pancreatitis pain. I did attempt to call both pt's mother and significant other, but neither answered - I did a leave a message for both.     10/16 - Sitter placed last evening as I was able to get a hold of the pt's mom and she stated she felt worried that patient was suicidal. She did state that she did not feel that the pt would attempt suicide while in hospital, but she was concerned that she may attempt after discharge.  As pt denied that day having SI, a legal hold was not initiated, but a telesitter was placed to ensure pt's safety until this could be readdressed. Had extensive discussion with patient about her mood again this morning, and underlying psychiatric illness - she states that she is definitively not suicidal, has no intent and no plan.  She states that her mom is concerned  "because the pt and her boyfriend just broke up but pt states \"I'm Temple - I'm too scared of what comes after this to risk suicide\". She also states that she has things to live for as she has children that she would not want to abandon via suicide.  She does agree that she needs increased help with mental health services, as she states that her mental health illness is not helped by her current meds, and that the mood fluctuations are exhausting. She is looking forward to speaking with Psych. Medically, she continues to have severe pain that is only decreased to a 7/10 with oral morphine and IV dilaudid. Will add MS Contin. Will refer to pain management as an outpatientHad an episode of PSVT overnight, will supplement K and Mag.     10/17 - Pt tolerating a CLD today but feels that her pain will get out of control if advanced further. Will continue for today, advance in the am. Hemoglobin remains stable, lymphocytosis increasing - HIV and hepatitis panel negative, CMV and EBV pending. Pt feels better after speaking with Hedy Morgan, and a Psych consult is pending for medication adjustment for her bipolar disorder.  The patient does wish to pursue rehab at discharge and is working on that independently.    PATIENT SEEN BY PREVIOUS HOSPITALIST UNTIL 10/17    10/18: Patient seen at bedside this morning.  Patient laying in bed at the time of my evaluation.  The patient still complaining of abdominal pain.  Patient does not want to advance diet today as she fears she will get worse.  Psychiatry has recommended some medication changes which we have done.  We have discontinued Keppra and added Depakote.  We have added naltrexone.  I have discontinued morphine instant release and continue with MS Contin and Dilaudid for breakthrough pain.  We will continue to monitor closely.    10/19: Patient seen at bedside this morning.  Patient lying in bed.  Patient mentions she still has severe pain, however on clinical evaluation " it seems that it is out of proportion.  The patient mentions that she was unable to tolerate naltrexone.  We appreciate further recommendations by psychiatry.  As per nurses patient has been sleeping more, this is most likely directly related to Depakote.  We will continue to monitor closely.    10/20: Patient seen at bedside this morning.  Patient laying in bed.  The patient today is complaining of severe abdominal pain.  We have increased the frequency of the Dilaudid.  Depending on how much Dilaudid she uses we could potentially increase her MS Contin.  The patient has only had clear liquids, we did discuss the possibility of doing tube feedings, however she denied at this time.  The patient mentioned that because of her pain she is not hungry, she will we discussed increasing the frequency of Dilaudid for now and advancing to full liquid diet for now.  Psychiatry still following the patient, we appreciate further recommendations.  We will continue to monitor closely.  -- Due to poor nutrition and advancing to full liquid diet today we will continue to monitor electrolytes as there is concern for refeeding syndrome.    UPDATE: Patient complaining of significant abdominal pain, we will repeat CT scan of abdomen and pelvis. I have also given her one extra dose of dilaudid.    UPDATE 2: Patient states dilaudid is not helping with her pain. She mentions morphine helps better. So we will change the dilaudid to IV morphine for now and titrate up the MS Contin if needed.    I have discussed this patient's plan of care and discharge plan at IDT rounds today with Case Management, Nursing, Nursing leadership, and other members of the IDT team.    Consultants/Specialty  Psychiatry    Code Status  Full Code    Disposition  Patient is not medically cleared for discharge.   Anticipate discharge to to home with close outpatient follow-up.  I have placed the appropriate orders for post-discharge needs.    Review of  Systems  Review of Systems   Constitutional:  Negative for chills and fever.   Respiratory:  Negative for cough and shortness of breath.    Cardiovascular:  Negative for chest pain and leg swelling.   Gastrointestinal:  Positive for abdominal pain and nausea. Negative for vomiting.   Psychiatric/Behavioral:  Positive for depression and substance abuse. Negative for suicidal ideas. The patient is nervous/anxious.    All other systems reviewed and are negative.     Physical Exam  Temp:  [36.6 °C (97.9 °F)-36.9 °C (98.5 °F)] 36.8 °C (98.2 °F)  Pulse:  [] 86  Resp:  [18] 18  BP: (120-139)/() 138/87  SpO2:  [94 %-100 %] 96 %    Physical Exam  Vitals and nursing note reviewed.   Constitutional:       Appearance: She is ill-appearing. She is not toxic-appearing.   HENT:      Head: Normocephalic and atraumatic.   Cardiovascular:      Rate and Rhythm: Normal rate and regular rhythm.      Heart sounds: No murmur heard.  Pulmonary:      Effort: Pulmonary effort is normal.      Breath sounds: Normal breath sounds.   Abdominal:      General: Abdomen is flat. Bowel sounds are normal. There is no distension.      Palpations: Abdomen is soft.      Tenderness: There is abdominal tenderness. There is no guarding.   Musculoskeletal:         General: No swelling.      Right lower leg: No edema.      Left lower leg: No edema.   Skin:     General: Skin is warm and dry.      Coloration: Skin is not jaundiced.   Neurological:      General: No focal deficit present.      Mental Status: She is alert and oriented to person, place, and time.   Psychiatric:         Attention and Perception: Attention normal.         Mood and Affect: Mood is anxious. Affect is tearful.         Speech: Speech normal.         Behavior: Behavior normal. Behavior is cooperative.         Thought Content: Thought content normal. Thought content does not include suicidal ideation. Thought content does not include suicidal plan.         Judgment: Judgment is  not impulsive.       Fluids    Intake/Output Summary (Last 24 hours) at 10/20/2022 1056  Last data filed at 10/20/2022 0500  Gross per 24 hour   Intake 1660 ml   Output --   Net 1660 ml         Laboratory  Recent Labs     10/19/22  0754 10/20/22  0611   WBC 7.4 5.5   RBC 4.43 3.98*   HEMOGLOBIN 14.0 12.4   HEMATOCRIT 40.0 36.1*   MCV 90.3 90.7   MCH 31.6 31.2   MCHC 35.0 34.3   RDW 44.7 47.6   PLATELETCT 262 246   MPV 9.9 9.7       Recent Labs     10/19/22  0754 10/19/22  1807 10/20/22  0317   SODIUM 139 140 140   POTASSIUM 3.2* 2.8* 3.9   CHLORIDE 106 109 113*   CO2 20 19* 15*   GLUCOSE 125* 136* 103*   BUN 3* 4* 3*   CREATININE 0.58 0.59 0.51   CALCIUM 9.6 9.0 8.3*                     Imaging  [id number]   Final Result      1.  There is no cholelithiasis. There is a trace of pericholecystic fluid.   2.  Common bile duct upper limits of normal at 6.3 mm. No evidence of choledocholithiasis.   3.  No change in hepatomegaly.      US-RUQ   Final Result      1.  Gallbladder sludge without stones or biliary dilation      2.  Mild, unchanged biliary dilation      DX-CHEST-PORTABLE (1 VIEW)   Final Result      Negative single view of the chest.      CT-ABDOMEN-PELVIS WITH   Final Result      New mild pancreatic calcification compatible with chronic pancreatitis. Duct dilatation is not outside of normal limits and there is no adjacent fat stranding to confirm acute pancreatitis      Stable right renal lateral cortex hypodensity has volume loss indicating this reflects scarring from prior injury, infection. This is of no acute significance      Mild hepatomegaly      Resolved pelvic free fluid             Assessment/Plan  * Acute on chronic pancreatitis (HCC)- (present on admission)  Assessment & Plan  - Lipase is not elevated however she has had chronic pancreatitis long enough that she may no longer elevate her lipase, there is no adjacent fat stranding noted however due to her location and severity of her pain,  associated with a significant alcohol intake, I do feel that she may have acutely inflamed her pancreas  -  Advance diet as tolerated  -Patient understands she needs alcohol cessation, would like assistance after hospitalization is finished  - RUQ u/s with gallbladder sludge without stones or biliary dilation, mild, unchanged biliary dilation  - MRCP negative, clinical presentation not consistent with cholecystitis  - Added MS Contin for pain control as she is not having much improvement on MS IR    10/20: Patient today states the pain is unbearable.  Unclear if patient truly is in significant pain, however the patient has only been on clear liquid for a week.  We did talk about nutrition and that there is concern for malnutrition and I spoke to her about the possibility of doing tube feedings, however she denied.  Because of the pain the patient mentions she is not hungry.  So we will increase the frequency of the Dilaudid to every 4 hours for breakthrough pain.  We will advance diet to full liquid as the patient would like to try that.  We will monitor for pain control and if needed will increase dose of MS Contin.    Diarrhea  Assessment & Plan  Pending C. difficile.  This could be due to medication.    --C Diff is negative    Tachycardia  Assessment & Plan  - Secondary to pain and anxiety  - Supplement mag and phos    Lymphocytosis  Assessment & Plan  - Recheck hepatitis panel negative, HIV negative, CMV and EBV pending  - Referral to Heme Onc in place    10/20: Lymphocytosis seems to have resolved. Monitor    Abnormal iron saturation  Assessment & Plan  - high iron and somewhat elevated ferritin (but also expected in pancreatitis) - not transfused, does not appear to be on iron supplementation  - Referral placed to Heme Onc as she also has concurrent lymphocytosis     Normocytic anemia  Assessment & Plan  - Stable without signs of blood loss   - Check occult blood - may have alcohol gastritis or PUD  -  Increased Omeprazole to BID and added Carafate     10/20: Pending occult blood, no signs of overt bleeding    Hypophosphatemia  Assessment & Plan  Resolved    But monitor closely due to concern for refeeding syndrome    Elevated bilirubin  Assessment & Plan  - MRCP last month negative, CBD remains dilated  - MRCP negative again  - Elevated bilirubin resolved     Hypoglycemia- (present on admission)  Assessment & Plan  - Resolved     Leukocytosis- (present on admission)  Assessment & Plan  - Resolved with IVFs     Lactic acidosis- (present on admission)  Assessment & Plan  - Secondary to EtOH   - Resolved    High anion gap metabolic acidosis- (present on admission)  Assessment & Plan  - Lactic acidosis, resolved - due to alcohol.      Seizure disorder (HCC)- (present on admission)  Assessment & Plan  - Patient does have a history of seizures, even has breakthrough seizures, there was no grand mal activity reported  -Okay to continue home Keppra dose without increase  -PRN Ativan  -Adjust as needed  - Seizure precautions    10/18: Have discontinued Keppra and added Depakote as per psychiatry recommendations.      Bipolar affective disorder, current episode hypomanic (HCC)- (present on admission)  Assessment & Plan  - Had good discussion with pt today - she feels that her bipolar is not adequately controlled on current medication and she feels like she is on an emotional rollercoaster  - Adamantly denies SI, intent, plan etc. She states she is Bahai and would not injure herself for fear of what the afterlife would entail, and that she has two children to live for  - Has requested to speak to Psych to titrate medications and help seek care after discharge, consult has been placed      Alcohol dependence (HCC)- (present on admission)  Assessment & Plan  - Patient only started drinking day of admission, had been sober for over a month prior to that   - CIWA <10 for >24 hours, stopped CIWA protocol          VTE  prophylaxis: SCDs/TEDs and enoxaparin ppx    I have performed a physical exam and reviewed and updated ROS and Plan today (10/20/2022). In review of yesterday's note (10/19/2022), there are no changes except as documented above.

## 2022-10-20 NOTE — PROGRESS NOTES
Rapid Response Summary     Rapid response called at 1753 for: Seizure like activity     VS: WDL (See Vitals Flowsheet)  Additional info: Patient following commands.  MD Paged: Dr. Smith  Interventions:    Imaging/Tests: N/A   Labs: CMP, Procalcitonin   Medications: None   Other: N/A  Disposition: Improved with rapid response team interventions. Primary RN updated on plan of care. Transfer not indicated at this time.  and Rapid team will continue to follow the patient.

## 2022-10-20 NOTE — PROGRESS NOTES
During medication administration. At 1748 Pt became unresponsive to voice and pain stimuli. Rapid response initiated. Rapid response team responded. Pt eventually responded to MD. Vital signs stable, Blood glucose 143. Labs ordered by MD. Continuous pulse ox and bed alarm on. Medications scheduled for 1800 held until pt is alert and can safely swallow medications.       Morphine ER 15 mg wasted in medication room with Magalis NELSON, as witness.

## 2022-10-20 NOTE — PROGRESS NOTES
Received report from night RN, assumed care. POC discussed.   A&Ox4  Pain 10/10 abdominal pain  Bed in lowest position with call light and belongings within reach.

## 2022-10-20 NOTE — PROGRESS NOTES
Pt is awake and alert in bed. Pt c/o 6/10 pain in abdomen. No n/v at this time. Pt still refusing to advance diet. Administered previously held medications. Pt is requesting to rest. Fall precautions in place.

## 2022-10-20 NOTE — CARE PLAN
The patient is Watcher - Medium risk of patient condition declining or worsening    Shift Goals  Clinical Goals: Zero falls during shift  Patient Goals: Pain controlled  Family Goals: no family present    Progress made toward(s) clinical / shift goals:  Pt has had zero falls this shift. Pain has been tolerable with scheduled and PRN pain medications.     Problem: Pain - Standard  Goal: Alleviation of pain or a reduction in pain to the patient’s comfort goal  Outcome: Progressing     Problem: Knowledge Deficit - Standard  Goal: Patient and family/care givers will demonstrate understanding of plan of care, disease process/condition, diagnostic tests and medications  Outcome: Progressing       Patient is not progressing towards the following goals:

## 2022-10-20 NOTE — CONSULTS
"  [facility] PSYCHIATRIC FOLLOW-UP:(established)    DOS: 10/20/22     Reason for admission:  Transferred from [facility] admissions after severe abdominal pain and question of seizures.  Legal Hold Status: not applicable      ID/Chief Complaint:   Chief Complaint   Patient presents with    Suicidal Ideation     Patient awake, alert and oriented on arrival to ED. Per EMS, patient was checking in to [facility] for thoughts of SI. EMS states that while in the waiting room at [facility], patient began complaining of severe abdominal pain and was reported to have and absence seizure x 2. Patient does report a hx of seizures that are more common when she drinks alcohol. Denies drug use. PMH pancreatitis. Patient states that this is the first day she drank alcohol in 34 days. Reporting SI without plan. Cooperative with care.                S:  Seen today as f/u psych consult. Mood \"I am ok,\" denies SI/HI. Denies A/VH. Pt notes sleep overnight to be very poor, \"I fell asleep around 2am, woke up around 3am\" - says this was d/t significant pain, does not want to make psych medication adjustments at this time. Pt says appetite is also very low due to the pain. No acute psych issues discussed.       O: Medical ROS (as pertinent): significant pain, team is monitoring this.   Latest Reference Range & Units 10/20/22 03:17   Magnesium 1.5 - 2.5 mg/dL 2.6 (H)   (H): Data is abnormally high   Latest Reference Range & Units 10/20/22 03:17   Calcium 8.4 - 10.2 mg/dL 8.3 (L)   (L): Data is abnormally low    PSYCHIATRIC EXAM (MSE):   Vitals:    10/20/22 0400   BP: (!) 139/93   Pulse: 88   Resp: 18   Temp: 36.9 °C (98.5 °F)   SpO2: 98%     Weight: 56.8kg on 10/13/22    Constitutional: as noted above  General Appearance/Behavior: surface level engagement   Abnormal Movements: none - no PMA/PMR or tremor observed.  Gait and Posture: gait not observed.  Musculoskeletal: as noted above  Mood: \"I am ok\"  Affect: flat  Speech: short quiet " responses  Language:  spontaneous, comprehends spoken commands, fluent.  Thought Process: linear, logical   Thought Content: Denies SI/HI. Denies A/VH, no e/o delusions, PI, or internal preoccupation.   Insight/Judgement:  intact  Alert/Orientation: groggy, oriented to person, place and time  Attn/Concentration: normal  Fund of Knowledge: Average  Memory recent/remote: No gross evidence of memory deficits  MMSE: deferred this visit         Meds Current:  Scheduled Medications   Medication Dose Frequency    divalproex  250 mg BID    potassium chloride SA  40 mEq BID    vitamin D3  1,000 Units DAILY    morphine ER  15 mg Q12HRS    nicotine  1 Patch Daily-0600    omeprazole  20 mg BID    sucralfate  1 g Q6HRS    enoxaparin (LOVENOX) injection  40 mg DAILY AT 1800    zonisamide  200 mg QHS    senna-docusate  2 Tablet BID    Pharmacy Consult Request  1 Each PHARMACY TO DOSE     Allergies:   Allergies   Allergen Reactions    Promethazine Hcl Anxiety     Anxiety and makes her feels like skin coming off            *ASSESSMENT:   1. Intractable nausea and vomiting Acute   2. Suicidal ideation Acute   3. Alcoholic intoxication without complication (HCC) Acute   4. Alcohol-induced chronic pancreatitis (HCC)    5. Abnormal blood level of iron    6. Lymphocytosis    7. Acute on chronic pancreatitis (HCC)    8. Generalized abdominal pain       Bipolar affective disorder, most consistent with type II rapid cycling  PTSD, likely complicated  R/o personality disorder playing a role given significant trauma hx.  ETOH use disorder with dependence, severe  Hx Benzodiazepine dependence (11/21/21 [facility] note)  Hx opioid dependence (11/21/21 [place] note)     Medical: as noted by the medical treatment team.   Pancreatitis (acute on chronic)  High iron/elevated ferritin - referred to heme onc  Anemia  Seizure disorder (prescribed Keppra)  Per notes, fibromyalgia  Per notes, interstitial cystitis with chronic pain.  S/p  hysterectomy  Chronically low vitamin D, recommend supplementation    *RECOMMENDATIONS:  Legal Status: not applicable    Observation status:   -no sitter needed    Visitors: Yes  Personal belongings: Yes    Medication Recommendations: Final orders as per Treatment Team  10/19/22 Decrease depakote to 250mg BID (oversedation)  10/18/22 started depakote, stopped seroquel, keppra  Risks/benefits/side effects discussed, patient verbalized understanding  Medication reconciliation was completed.  Reviewed safety plan: 911, ER, PCM, MHC, suicide crisis line, nursing staff while inpatient.    Will Continue to Follow. Thank you for the consult.           Discharge recommendations:   -Please assist with outpatient psychiatric/substance use follow up appointments at discharge once medically cleared.

## 2022-10-20 NOTE — PROGRESS NOTES
Rapid response called on this patient for concern for seizure.  I evaluated the patient at bedside and discussed with bedside nurse.  Patient was found to be minimally responsive.  No reported abnormal jerking movement, cyanosis, tongue biting, fecal or urinary incontinence.   Vital signs within normal limit, saturating well on room air, has strong peripheral pulses with warm extremities.  Patient is avoiding eye contact.  She is resisting my attempts to open her eyes.  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    Patient appears to be having low magnesium on early morning labs.  I will check a chemistry panel.  We will also order a prolactin level [can be elevated right after seizures]    Hypokalemia I will replace  Hypomagnesemia I will replace      Plan of care discussed with patient nurse, rapid response team.

## 2022-10-21 VITALS
HEART RATE: 89 BPM | OXYGEN SATURATION: 99 % | BODY MASS INDEX: 20.12 KG/M2 | WEIGHT: 125.22 LBS | HEIGHT: 66 IN | RESPIRATION RATE: 17 BRPM | DIASTOLIC BLOOD PRESSURE: 94 MMHG | SYSTOLIC BLOOD PRESSURE: 143 MMHG | TEMPERATURE: 97.8 F

## 2022-10-21 LAB
ALBUMIN SERPL BCP-MCNC: 3.7 G/DL (ref 3.2–4.9)
ALBUMIN/GLOB SERPL: 1.8 G/DL
ALP SERPL-CCNC: 51 U/L (ref 30–99)
ALT SERPL-CCNC: 12 U/L (ref 2–50)
ANION GAP SERPL CALC-SCNC: 9 MMOL/L (ref 7–16)
AST SERPL-CCNC: 15 U/L (ref 12–45)
BASOPHILS # BLD AUTO: 0.6 % (ref 0–1.8)
BASOPHILS # BLD: 0.03 K/UL (ref 0–0.12)
BILIRUB SERPL-MCNC: 0.6 MG/DL (ref 0.1–1.5)
BUN SERPL-MCNC: <2 MG/DL (ref 8–22)
CALCIUM SERPL-MCNC: 8.6 MG/DL (ref 8.4–10.2)
CHLORIDE SERPL-SCNC: 114 MMOL/L (ref 96–112)
CO2 SERPL-SCNC: 19 MMOL/L (ref 20–33)
CREAT SERPL-MCNC: 0.47 MG/DL (ref 0.5–1.4)
EOSINOPHIL # BLD AUTO: 0.11 K/UL (ref 0–0.51)
EOSINOPHIL NFR BLD: 2.1 % (ref 0–6.9)
ERYTHROCYTE [DISTWIDTH] IN BLOOD BY AUTOMATED COUNT: 49 FL (ref 35.9–50)
GFR SERPLBLD CREATININE-BSD FMLA CKD-EPI: 127 ML/MIN/1.73 M 2
GLOBULIN SER CALC-MCNC: 2.1 G/DL (ref 1.9–3.5)
GLUCOSE SERPL-MCNC: 95 MG/DL (ref 65–99)
HCT VFR BLD AUTO: 35.6 % (ref 37–47)
HGB BLD-MCNC: 12 G/DL (ref 12–16)
IMM GRANULOCYTES # BLD AUTO: 0.01 K/UL (ref 0–0.11)
IMM GRANULOCYTES NFR BLD AUTO: 0.2 % (ref 0–0.9)
LYMPHOCYTES # BLD AUTO: 2.64 K/UL (ref 1–4.8)
LYMPHOCYTES NFR BLD: 50.1 % (ref 22–41)
MAGNESIUM SERPL-MCNC: 1.7 MG/DL (ref 1.5–2.5)
MCH RBC QN AUTO: 31.1 PG (ref 27–33)
MCHC RBC AUTO-ENTMCNC: 33.7 G/DL (ref 33.6–35)
MCV RBC AUTO: 92.2 FL (ref 81.4–97.8)
MONOCYTES # BLD AUTO: 0.49 K/UL (ref 0–0.85)
MONOCYTES NFR BLD AUTO: 9.3 % (ref 0–13.4)
NEUTROPHILS # BLD AUTO: 1.99 K/UL (ref 2–7.15)
NEUTROPHILS NFR BLD: 37.7 % (ref 44–72)
NRBC # BLD AUTO: 0 K/UL
NRBC BLD-RTO: 0 /100 WBC
PHOSPHATE SERPL-MCNC: 3.1 MG/DL (ref 2.5–4.5)
PLATELET # BLD AUTO: 239 K/UL (ref 164–446)
PMV BLD AUTO: 10.1 FL (ref 9–12.9)
POTASSIUM SERPL-SCNC: 3.3 MMOL/L (ref 3.6–5.5)
PROT SERPL-MCNC: 5.8 G/DL (ref 6–8.2)
RBC # BLD AUTO: 3.86 M/UL (ref 4.2–5.4)
SODIUM SERPL-SCNC: 142 MMOL/L (ref 135–145)
WBC # BLD AUTO: 5.3 K/UL (ref 4.8–10.8)

## 2022-10-21 PROCEDURE — 80053 COMPREHEN METABOLIC PANEL: CPT

## 2022-10-21 PROCEDURE — 700111 HCHG RX REV CODE 636 W/ 250 OVERRIDE (IP): Performed by: INTERNAL MEDICINE

## 2022-10-21 PROCEDURE — 700102 HCHG RX REV CODE 250 W/ 637 OVERRIDE(OP): Performed by: INTERNAL MEDICINE

## 2022-10-21 PROCEDURE — 700102 HCHG RX REV CODE 250 W/ 637 OVERRIDE(OP): Performed by: FAMILY MEDICINE

## 2022-10-21 PROCEDURE — A9270 NON-COVERED ITEM OR SERVICE: HCPCS | Performed by: HOSPITALIST

## 2022-10-21 PROCEDURE — 99239 HOSP IP/OBS DSCHRG MGMT >30: CPT | Performed by: HOSPITALIST

## 2022-10-21 PROCEDURE — 85025 COMPLETE CBC W/AUTO DIFF WBC: CPT

## 2022-10-21 PROCEDURE — 84100 ASSAY OF PHOSPHORUS: CPT

## 2022-10-21 PROCEDURE — 83735 ASSAY OF MAGNESIUM: CPT

## 2022-10-21 PROCEDURE — 36415 COLL VENOUS BLD VENIPUNCTURE: CPT

## 2022-10-21 PROCEDURE — 700105 HCHG RX REV CODE 258: Performed by: INTERNAL MEDICINE

## 2022-10-21 PROCEDURE — A9270 NON-COVERED ITEM OR SERVICE: HCPCS | Performed by: FAMILY MEDICINE

## 2022-10-21 PROCEDURE — A9270 NON-COVERED ITEM OR SERVICE: HCPCS | Performed by: INTERNAL MEDICINE

## 2022-10-21 PROCEDURE — 700102 HCHG RX REV CODE 250 W/ 637 OVERRIDE(OP): Performed by: HOSPITALIST

## 2022-10-21 RX ORDER — DIVALPROEX SODIUM 250 MG/1
250 TABLET, DELAYED RELEASE ORAL 2 TIMES DAILY
Qty: 60 TABLET | Refills: 3 | Status: SHIPPED | OUTPATIENT
Start: 2022-10-21 | End: 2022-10-21 | Stop reason: SDUPTHER

## 2022-10-21 RX ORDER — POTASSIUM CHLORIDE 20 MEQ/1
20 TABLET, EXTENDED RELEASE ORAL DAILY
Status: DISCONTINUED | OUTPATIENT
Start: 2022-10-21 | End: 2022-10-21 | Stop reason: HOSPADM

## 2022-10-21 RX ORDER — GABAPENTIN 100 MG/1
CAPSULE ORAL
Qty: 150 CAPSULE | Refills: 1 | Status: SHIPPED | OUTPATIENT
Start: 2022-10-21 | End: 2022-10-21 | Stop reason: SDUPTHER

## 2022-10-21 RX ORDER — SUCRALFATE 1 G/1
1 TABLET ORAL
Qty: 120 TABLET | Refills: 3 | Status: SHIPPED | OUTPATIENT
Start: 2022-10-21 | End: 2022-12-07

## 2022-10-21 RX ORDER — ONDANSETRON 4 MG/1
4 TABLET, ORALLY DISINTEGRATING ORAL EVERY 6 HOURS PRN
Qty: 50 TABLET | Refills: 0 | Status: SHIPPED | OUTPATIENT
Start: 2022-10-21 | End: 2022-12-07

## 2022-10-21 RX ORDER — NICOTINE 21 MG/24HR
1 PATCH, TRANSDERMAL 24 HOURS TRANSDERMAL EVERY 24 HOURS
Qty: 30 PATCH | Refills: 1 | Status: SHIPPED | OUTPATIENT
Start: 2022-10-21 | End: 2022-10-21

## 2022-10-21 RX ORDER — GABAPENTIN 100 MG/1
100 CAPSULE ORAL
Status: DISCONTINUED | OUTPATIENT
Start: 2022-10-21 | End: 2022-10-21 | Stop reason: HOSPADM

## 2022-10-21 RX ORDER — POTASSIUM CHLORIDE 20 MEQ/1
20 TABLET, EXTENDED RELEASE ORAL DAILY
Qty: 10 TABLET | Refills: 0 | Status: SHIPPED | OUTPATIENT
Start: 2022-10-21 | End: 2022-12-07

## 2022-10-21 RX ORDER — GABAPENTIN 300 MG/1
300 CAPSULE ORAL
Status: DISCONTINUED | OUTPATIENT
Start: 2022-10-21 | End: 2022-10-21 | Stop reason: HOSPADM

## 2022-10-21 RX ORDER — GABAPENTIN 100 MG/1
CAPSULE ORAL
Qty: 150 CAPSULE | Refills: 1 | Status: SHIPPED | OUTPATIENT
Start: 2022-10-21 | End: 2022-12-07

## 2022-10-21 RX ORDER — LANOLIN ALCOHOL/MO/W.PET/CERES
400 CREAM (GRAM) TOPICAL DAILY
Status: DISCONTINUED | OUTPATIENT
Start: 2022-10-21 | End: 2022-10-21 | Stop reason: HOSPADM

## 2022-10-21 RX ORDER — CLONAZEPAM 0.5 MG/1
0.5 TABLET ORAL 2 TIMES DAILY PRN
Qty: 60 TABLET | Refills: 0 | Status: SHIPPED | OUTPATIENT
Start: 2022-10-21 | End: 2022-11-20

## 2022-10-21 RX ORDER — OMEPRAZOLE 20 MG/1
20 CAPSULE, DELAYED RELEASE ORAL 2 TIMES DAILY
Qty: 30 CAPSULE | Refills: 3 | Status: SHIPPED | OUTPATIENT
Start: 2022-10-21 | End: 2022-12-07

## 2022-10-21 RX ORDER — LANOLIN ALCOHOL/MO/W.PET/CERES
400 CREAM (GRAM) TOPICAL DAILY
Qty: 30 TABLET | Refills: 1 | Status: SHIPPED | OUTPATIENT
Start: 2022-10-21 | End: 2022-12-07

## 2022-10-21 RX ORDER — LANOLIN ALCOHOL/MO/W.PET/CERES
400 CREAM (GRAM) TOPICAL DAILY
Qty: 30 TABLET | Refills: 1 | Status: SHIPPED | OUTPATIENT
Start: 2022-10-21 | End: 2022-10-21 | Stop reason: SDUPTHER

## 2022-10-21 RX ORDER — GABAPENTIN 100 MG/1
100 CAPSULE ORAL EVERY MORNING
Status: DISCONTINUED | OUTPATIENT
Start: 2022-10-21 | End: 2022-10-21 | Stop reason: HOSPADM

## 2022-10-21 RX ORDER — MORPHINE SULFATE 15 MG/1
15 TABLET, FILM COATED, EXTENDED RELEASE ORAL EVERY 12 HOURS
Qty: 14 TABLET | Refills: 0 | Status: SHIPPED | OUTPATIENT
Start: 2022-10-21 | End: 2022-10-21 | Stop reason: SDUPTHER

## 2022-10-21 RX ORDER — MORPHINE SULFATE 15 MG/1
15 TABLET, FILM COATED, EXTENDED RELEASE ORAL EVERY 12 HOURS
Qty: 14 TABLET | Refills: 0 | Status: SHIPPED | OUTPATIENT
Start: 2022-10-21 | End: 2022-10-28

## 2022-10-21 RX ORDER — POTASSIUM CHLORIDE 20 MEQ/1
20 TABLET, EXTENDED RELEASE ORAL DAILY
Qty: 10 TABLET | Refills: 0 | Status: SHIPPED | OUTPATIENT
Start: 2022-10-21 | End: 2022-10-21 | Stop reason: SDUPTHER

## 2022-10-21 RX ORDER — DIVALPROEX SODIUM 250 MG/1
250 TABLET, DELAYED RELEASE ORAL 2 TIMES DAILY
Qty: 60 TABLET | Refills: 3 | Status: SHIPPED | OUTPATIENT
Start: 2022-10-21 | End: 2023-02-18

## 2022-10-21 RX ADMIN — SUCRALFATE 1 G: 1 SUSPENSION ORAL at 00:22

## 2022-10-21 RX ADMIN — MORPHINE SULFATE 15 MG: 15 TABLET, FILM COATED, EXTENDED RELEASE ORAL at 06:04

## 2022-10-21 RX ADMIN — SUCRALFATE 1 G: 1 SUSPENSION ORAL at 06:04

## 2022-10-21 RX ADMIN — MORPHINE SULFATE 2 MG: 4 INJECTION INTRAVENOUS at 00:21

## 2022-10-21 RX ADMIN — Medication 400 MG: at 07:51

## 2022-10-21 RX ADMIN — MORPHINE SULFATE 2 MG: 4 INJECTION INTRAVENOUS at 07:50

## 2022-10-21 RX ADMIN — Medication 1000 UNITS: at 06:03

## 2022-10-21 RX ADMIN — DIVALPROEX SODIUM 250 MG: 250 TABLET, DELAYED RELEASE ORAL at 06:04

## 2022-10-21 RX ADMIN — NICOTINE TRANSDERMAL SYSTEM 21 MG: 21 PATCH, EXTENDED RELEASE TRANSDERMAL at 06:04

## 2022-10-21 RX ADMIN — MORPHINE SULFATE 2 MG: 4 INJECTION INTRAVENOUS at 04:17

## 2022-10-21 RX ADMIN — POTASSIUM CHLORIDE 20 MEQ: 1500 TABLET, EXTENDED RELEASE ORAL at 07:51

## 2022-10-21 RX ADMIN — OMEPRAZOLE 20 MG: 20 CAPSULE, DELAYED RELEASE ORAL at 06:03

## 2022-10-21 RX ADMIN — SODIUM CHLORIDE: 9 INJECTION, SOLUTION INTRAVENOUS at 00:22

## 2022-10-21 RX ADMIN — ONDANSETRON 4 MG: 2 INJECTION INTRAMUSCULAR; INTRAVENOUS at 04:15

## 2022-10-21 RX ADMIN — PROCHLORPERAZINE EDISYLATE 10 MG: 5 INJECTION, SOLUTION INTRAMUSCULAR; INTRAVENOUS at 06:04

## 2022-10-21 ASSESSMENT — PAIN DESCRIPTION - PAIN TYPE
TYPE: ACUTE PAIN

## 2022-10-21 NOTE — CARE PLAN
The patient is Watcher - Medium risk of patient condition declining or worsening    Shift Goals  Clinical Goals: Consume more than 25% of meal  Patient Goals: Pain control  Family Goals: no family present    Progress made toward(s) clinical / shift goals:         Problem: Knowledge Deficit - Standard  Goal: Patient and family/care givers will demonstrate understanding of plan of care, disease process/condition, diagnostic tests and medications  Outcome: Progressing       Patient is not progressing towards the following goals: PRN medications changed to provide pain relief. Pt has not been able to tolerate meals due to pain and nausea. Additional testing ordered by MD.

## 2022-10-21 NOTE — PROGRESS NOTES
Pt is awake in bed. Mother at bedside. Pt c/o 6/10 pain in abdomen. No n/v at this time. Per pt, she still has been unable to tolerate meals due to pain and n/v. RN discussed PRN medications and POC for the evening. All questions answered. Fall precautions in place.

## 2022-10-21 NOTE — DISCHARGE SUMMARY
Discharge Summary    CHIEF COMPLAINT ON ADMISSION  Chief Complaint   Patient presents with    Suicidal Ideation     Patient awake, alert and oriented on arrival to ED. Per EMS, patient was checking in to [facility] for thoughts of SI. EMS states that while in the waiting room at [facility], patient began complaining of severe abdominal pain and was reported to have and absence seizure x 2. Patient does report a hx of seizures that are more common when she drinks alcohol. Denies drug use. PMH pancreatitis. Patient states that this is the first day she drank alcohol in 34 days. Reporting SI without plan. Cooperative with care.        Reason for Admission  EMS     Admission Date  10/12/2022    CODE STATUS  Full Code    HPI & HOSPITAL COURSE  Ms. Rhiannon Leone is a 37-year-old female who was initially admitted with recurrent abdominal pain.  The patient with known pancreatitis history was reevaluated and found to have acute on chronic pancreatitis.  The patient in spite of knowing that she had previously alcohol induced pancreatitis resume drinking very heavily.  Less than 24 hours after drinking the patient's pain exacerbated the point where she could not take it.  Patient came into the hospital for treatment evaluation, initially she was undergoing alcohol withdrawal management and severe acidosis and anion gap elevation, patient was placed on fluid resuscitation and alcohol withdrawal management.  She also was given supportive treatment.  Repeat imaging studies did not reveal any abnormalities.  Patient apparently in the emergency room was placed on a legal hold, patient described being depressed but she says she was just joking about being suicidal.  The patient was intoxicated and confused when she even was making these gestures but nevertheless psychiatry consultation was requested.  Psychiatry did consider placing her on a legal hold however felt that the patient really was not suicidal.  Mostly allowed visitors as well as  continued normal activity for her.  I did continue to follow her while she was in the hospital.  Recommendations were made and followed.  Slowly patient has improved.  Repeat imaging studies were again done as patient's pain escalated in the abdomen.  Consideration was even taken for tube feeding.  Patient was highly against this and thus no tube feeds are ever started.  At this point patient has demonstrated that she is understanding the consequences of her actions.  She is willing to stop drinking.  She has good resources with her mother at this point has also demonstrated the fact that she is able to eat independently without any additional support.  Patient at this point will be able to discharge in the care of her mom with the plan that she will enroll herself into a rehab program.  Patient currently is stable, she is alert awake oriented x3 pleasant cooperative female understands the plan and instructions.  I have personally spoken with Dr. Gina Stacy of psychiatry and she is also clearing the patient for discharge.    Therefore, she is discharged in good and stable condition to home with close outpatient follow-up.    The patient met 2-midnight criteria for an inpatient stay at the time of discharge.    Discharge Date  10/21/2022    FOLLOW UP ITEMS POST DISCHARGE  Follow-up with the primary care physician in 2 to 3 days  Follow-up with psychiatry as scheduled    DISCHARGE DIAGNOSES  Principal Problem:    Acute on chronic pancreatitis (HCC) POA: Yes  Active Problems:    Alcohol dependence (HCC) POA: Yes    Bipolar affective disorder, current episode hypomanic (HCC) POA: Yes    Seizure disorder (HCC) POA: Yes    High anion gap metabolic acidosis POA: Yes    Lactic acidosis POA: Yes    Leukocytosis POA: Yes    Hypoglycemia POA: Yes    Elevated bilirubin POA: Unknown    Hypophosphatemia POA: Unknown    Normocytic anemia POA: Unknown    Abnormal iron saturation POA: Unknown    Lymphocytosis POA: Unknown     Tachycardia POA: Unknown    Diarrhea POA: Unknown  Resolved Problems:    * No resolved hospital problems. *      FOLLOW UP  No future appointments.  No follow-up provider specified.    MEDICATIONS ON DISCHARGE     Medication List        START taking these medications        Instructions   clonazePAM 0.5 MG Tabs  Commonly known as: KLONOPIN   Take 1 Tablet by mouth 2 times a day as needed (Panic attack) for up to 30 days.  Dose: 0.5 mg     divalproex 250 MG Tbec  Commonly known as: DEPAKOTE   Take 1 Tablet by mouth 2 times a day for 120 days.  Dose: 250 mg     gabapentin 100 MG Caps  Commonly known as: NEURONTIN   Take 100 mg daily in the morning and at noon and take 300 mg at night     magnesium oxide 400 (240 Mg) MG Tabs   Take 1 Tablet by mouth every day.  Dose: 400 mg     morphine ER 15 MG Tbcr tablet  Commonly known as: MS CONTIN   Take 1 Tablet by mouth every 12 hours for 7 days.  Dose: 15 mg     nicotine 21 MG/24HR Pt24  Commonly known as: NICODERM   Place 1 Patch on the skin every 24 hours.  Dose: 1 Patch     potassium chloride SA 20 MEQ Tbcr  Commonly known as: Kdur   Take 1 Tablet by mouth every day.  Dose: 20 mEq     sucralfate 1 GM Tabs  Commonly known as: CARAFATE   Take 1 Tablet by mouth 4 Times a Day,Before Meals and at Bedtime.  Dose: 1 g     vitamin D 1000 UNIT Tabs  Start taking on: October 22, 2022  Commonly known as: VITAMIND D3   Take 1 Tablet by mouth every day.  Dose: 1,000 Units            CHANGE how you take these medications        Instructions   metoclopramide 10 MG Tabs  What changed: when to take this  Commonly known as: REGLAN   Take 1 Tablet by mouth four times daily - before meals and nightly as needed (If zofran ineffective or unable to be given).  Dose: 10 mg     omeprazole 20 MG delayed-release capsule  What changed: when to take this  Commonly known as: PRILOSEC   Take 1 Capsule by mouth 2 times a day.  Dose: 20 mg            CONTINUE taking these medications        Instructions    ondansetron 4 MG Tbdp  Commonly known as: ZOFRAN ODT   Take 1 Tablet by mouth every 6 hours as needed for Nausea.  Dose: 4 mg     zonisamide 100 MG Caps  Commonly known as: ZONEGRAN   Take 200 mg by mouth at bedtime. 2 tablets = 200 mg  Dose: 200 mg            STOP taking these medications      famotidine 20 MG Tabs  Commonly known as: PEPCID     ibuprofen 800 MG Tabs  Commonly known as: MOTRIN     levetiracetam 1000 MG tablet  Commonly known as: KEPPRA     QUEtiapine 50 MG tablet  Commonly known as: Seroquel              Allergies  Allergies   Allergen Reactions    Promethazine Hcl Anxiety     Anxiety and makes her feels like skin coming off        DIET  Orders Placed This Encounter   Procedures    Diet Order Diet: Full Liquid     Standing Status:   Standing     Number of Occurrences:   1     Order Specific Question:   Diet:     Answer:   Full Liquid [11]       ACTIVITY  As tolerated.  Weight bearing as tolerated    CONSULTATIONS  Psychiatry      PROCEDURES  None    LABORATORY  Lab Results   Component Value Date    SODIUM 142 10/21/2022    POTASSIUM 3.3 (L) 10/21/2022    CHLORIDE 114 (H) 10/21/2022    CO2 19 (L) 10/21/2022    GLUCOSE 95 10/21/2022    BUN <2 (L) 10/21/2022    CREATININE 0.47 (L) 10/21/2022    CREATININE 0.7 06/10/2008        Lab Results   Component Value Date    WBC 5.3 10/21/2022    HEMOGLOBIN 12.0 10/21/2022    HEMATOCRIT 35.6 (L) 10/21/2022    PLATELETCT 239 10/21/2022        Total time of the discharge process exceeds 39 minutes.

## 2022-10-21 NOTE — CARE PLAN
The patient is Stable - Low risk of patient condition declining or worsening    Shift Goals  Clinical Goals: pt will remain at stated comfort goal of 6/10 pain this shift.  Patient Goals: pain management  Family Goals: no family present    Progress made toward(s) clinical / shift goals:      Pt still requiring frequent doses of morphine to manage pain however tonight pt reports that she was able to sleep more and feels that her pain has been better managed with current medications.    Problem: Pain - Standard  Goal: Alleviation of pain or a reduction in pain to the patient’s comfort goal  Outcome: Progressing     Problem: Knowledge Deficit - Standard  Goal: Patient and family/care givers will demonstrate understanding of plan of care, disease process/condition, diagnostic tests and medications  Outcome: Progressing     Problem: Psychosocial  Goal: Patient's ability to identify and develop effective coping behaviors will improve  Outcome: Progressing  Goal: Patient's ability to identify and utilize available support systems will improve  Outcome: Progressing       Patient is not progressing towards the following goals:

## 2022-10-21 NOTE — PROGRESS NOTES
Telemetry Shift Summary     Rhythm: SR  Rate: 80-99  Measurements: .16/.08/.36  Ectopy (reported by Monitor Tech): r PAC     Normal Values  Rhythm: Sinus  HR:   Measurements: 0.12-0.20/0.06-0.10/0.30-0.52

## 2022-10-21 NOTE — PROGRESS NOTES
Received report from nightshift RN and assumed care of patient at 0700. Patient reports 7/10 pain in abdomen, medicated per MAR. Call light in reach. Bed in lowest locked position. Hourly rounding to continue.

## 2022-10-21 NOTE — PROGRESS NOTES
Discharging patient home per MD order. Pt verbalized understanding of discharge instructions and educational handouts and all questions answered.  Pt discharged off unit with hospital escort.

## 2022-10-21 NOTE — PROGRESS NOTES
Telemetry Shift Summary     Rhythm: SR  Rate: 69-87  Measurements: .16/.08/.40  Ectopy (reported by Monitor Tech): r PAC     Normal Values  Rhythm: Sinus  HR:   Measurements: 0.12-0.20/0.06-0.10/0.30-0.52

## 2022-10-22 NOTE — CONSULTS
"  [facility] PSYCHIATRIC FOLLOW-UP:(established)  *Reason for admission:   Transferred from [facility] admissions after severe abdominal pain and question of seizures. She had gone for SI and alcohol intoxication.   *Legal Hold Status: not applicable                S:  she is feeling better today. She slept and feels \"human\". She states that when she goes into a down cycle she is more prone to drink and becomes SI when she is intoxicated. And at other times, she drinks and that drops her mood down. Right now she is back at a baseline and wants to go stay with mom until she gets into a rehab whose list she has been on for a couple of months but knows she is next up. Part of the precipitants is that when she does not have the children around she feels \"lonely and bored\"    She was sober for 2 years and did reasonably well and did not cycle.    Anxiety is there all the time: she reports being on gabapentin in the remote past and thinks it may have helped though she is not sure. Also discussed that it may help with alcohol cravings and sleep though the dose may need adjusting. She has been on naltrexone and appears to have had some sort of allergic reaction.     Depakote was dicussed: risks/benefits/expectations.     Denying SI/HI/psychosis.    O: Medical ROS (as pertinent):                      *Psychiatric Examination:   Vitals:   Vitals:    10/20/22 2143 10/21/22 0100 10/21/22 0500 10/21/22 0802   BP: (!) 144/99 137/84 124/85 (!) 143/94   Pulse:  81 84 89   Resp:  18 18 17   Temp:  36.8 °C (98.2 °F) 36.6 °C (97.8 °F) 36.6 °C (97.8 °F)   TempSrc:  Oral Oral Temporal   SpO2:  98% 99% 99%   Weight:       Height:         General Appearance:  normal habitus, good eye contact, cooperative, and clean  Abnormal Movements: none   Gait and Posture: not observed  Speech: within normal limits  Thought Process: normal rate  Associations:   linear  Abnormal or Psychotic Thoughts: none  Judgement and Insight: " "improved  Orientation: grossly intact  Recent and Remote Memory: grossly intact  Attention Span and Concentration: intact  Language:fluent  Fund of Knowledge: not tested  Mood and Affect:  human\"  SI/HI:  suicidal - no and homicidal - no       Current Medications:  No current facility-administered medications for this encounter.     EKG: QTc 457     *ASSESSMENT/RECOMENDATIONS:  1. Alcohol intoxication: resolved  2  Alcohol use disorder: relapse after 34 days sobriety  3  Bipolar II depressed: improved  4 PTSD  5 Per records: hx of BZ and/or opiate dependence.     Medical:    -acute on chronic pancreatitis  -elevated iron/ferritin: to be referred to heme onc  -sz disorder: keppra which can promote depression      Legal hold: not applicable    Discussed/voalted: Vamshi Pablo MD    Medication and Other Recommendations: final orders as per Tx Tm  Recommend she get scripts for gabapentin 100/100/300 mg  Script for depakote 250 mg bid: which might be able to substitute for the keppra: can be discussed with outpt neurology.   F/U as planned with rehab  Will be staying with mom on dc  Psych referrals to include therapy as well    Signing off as she is being dc'd. please reconsult as needed if not.      Discharge recommendations: per tx tm           "

## 2022-10-22 NOTE — PROGRESS NOTES
Monitor Summary:    Rhythm:  SR   Rate Range:  63-88  Ectopy: Rare to Frequent PAC's    Measurements:  0.16/0.06/0.40

## 2022-11-23 ENCOUNTER — HOSPITAL ENCOUNTER (EMERGENCY)
Facility: MEDICAL CENTER | Age: 37
End: 2022-11-23
Attending: EMERGENCY MEDICINE
Payer: COMMERCIAL

## 2022-11-23 VITALS
HEART RATE: 90 BPM | OXYGEN SATURATION: 100 % | HEIGHT: 67 IN | BODY MASS INDEX: 22.21 KG/M2 | TEMPERATURE: 98.3 F | RESPIRATION RATE: 14 BRPM | DIASTOLIC BLOOD PRESSURE: 77 MMHG | WEIGHT: 141.54 LBS | SYSTOLIC BLOOD PRESSURE: 115 MMHG

## 2022-11-23 DIAGNOSIS — Z76.0 MEDICATION REFILL: ICD-10-CM

## 2022-11-23 DIAGNOSIS — F41.9 ANXIETY: ICD-10-CM

## 2022-11-23 DIAGNOSIS — G40.909 SEIZURE DISORDER (HCC): ICD-10-CM

## 2022-11-23 PROCEDURE — 99282 EMERGENCY DEPT VISIT SF MDM: CPT

## 2022-11-23 RX ORDER — HYDROXYZINE PAMOATE 50 MG/1
50 CAPSULE ORAL EVERY 8 HOURS PRN
Qty: 60 CAPSULE | Refills: 0 | Status: SHIPPED | OUTPATIENT
Start: 2022-11-23 | End: 2023-01-23

## 2022-11-23 RX ORDER — ZONISAMIDE 50 MG/1
150 CAPSULE ORAL
Qty: 90 CAPSULE | Refills: 0 | Status: ON HOLD | OUTPATIENT
Start: 2022-11-23 | End: 2022-12-25

## 2022-11-23 ASSESSMENT — FIBROSIS 4 INDEX: FIB4 SCORE: 0.63

## 2022-11-23 NOTE — ED TRIAGE NOTES
"Chief Complaint   Patient presents with    Medication Refill     Needs a prescription for her seizure medication and is unable to get in with her primary Dr due to the holiday      Pulse 91   Temp 36.3 °C (97.3 °F) (Temporal)   Resp 16   Ht 1.702 m (5' 7\")   Wt 64.2 kg (141 lb 8.6 oz)   LMP 10/30/2018   SpO2 100%   BMI 22.17 kg/m²     "

## 2022-11-23 NOTE — ED PROVIDER NOTES
ED Provider Note        Primary care provider: Ivy Adams M.D.    I verified that the patient was wearing a mask and I was wearing appropriate PPE every time I entered the room. The patient's mask was on the patient at all times during my encounter except for a brief view of the oropharynx.      CHIEF COMPLAINT  Chief Complaint   Patient presents with    Medication Refill     Needs a prescription for her seizure medication and is unable to get in with her primary Dr due to the holiday        HPI  Rhiannon Marrero is a 37 y.o. female who presents to the Emergency Department with chief complaint of medication refill.  Patient has history of epilepsy.  She has ran out of her zonisamide.  Patient is also chronic alcoholic and is entering into a treatment program.  She needs this medication before she can enter into her treatment program.  Has a history of also taking clonazepam which she is unable to take during the treatment program therefore is also requesting alternative for anxiety medication.  She has had no fevers no chills no cough congestion chest pain or shortness of breath she has a history of pancreatitis but denies any abdominal pain at this time.  No problems with urination or bowel movements no other acute symptom change or concerns.  She has all of her other medications available.    REVIEW OF SYSTEMS  Negative for any seizure activity abdominal pain fevers chills cough congestion chest pain or shortness of breath    PAST MEDICAL HISTORY   has a past medical history of Acute pancreatitis without infection or necrosis (07/20/2022), Alcohol dependence (HCC) (04/13/2017), Bronchitis (08/2012), Cold, Current moderate episode of major depressive disorder without prior episode (HCC) (01/31/2020), Heart burn, Hernia of unspecified site of abdominal cavity without mention of obstruction or gangrene, High anion gap metabolic acidosis (07/01/2022), Indigestion, Pancreatitis, Pneumonia (2011), and Seizure  "disorder (HCC) (05/23/2022).    SURGICAL HISTORY   has a past surgical history that includes other orthopedic surgery; gyn surgery; tonsillectomy (4/11/2013); arthroscopy, knee; hysterectomy robotic xi (10/11/2018); salpingectomy (Bilateral, 10/11/2018); and orif, wrist (Right, 4/24/2019).    SOCIAL HISTORY  Social History     Tobacco Use    Smoking status: Every Day     Packs/day: 0.75     Years: 10.00     Pack years: 7.50     Types: Cigarettes    Smokeless tobacco: Never   Vaping Use    Vaping Use: Former    Substances: Nicotine   Substance Use Topics    Alcohol use: Not Currently     Comment: 5 shots, binges. Was sober for 34 days until today 10/12    Drug use: Yes     Types: Marijuana     Comment: edibles      Social History     Substance and Sexual Activity   Drug Use Yes    Types: Marijuana    Comment: edibles       FAMILY HISTORY  Non-Contributory    CURRENT MEDICATIONS  Home Medications    **Home medications have not yet been reviewed for this encounter**         ALLERGIES  Allergies   Allergen Reactions    Promethazine Hcl Anxiety     Anxiety and makes her feels like skin coming off        PHYSICAL EXAM  VITAL SIGNS: /77   Pulse 90   Temp 36.8 °C (98.3 °F) (Temporal)   Resp 14   Ht 1.702 m (5' 7\")   Wt 64.2 kg (141 lb 8.6 oz)   LMP 10/30/2018   SpO2 100%   BMI 22.17 kg/m²     Constitutional: Alert and oriented x 3, no acute distress  HEENT: Atraumatic normocephalic, pupils are equal round,  moist mucous membranes  Neck: no obvious JVD or tracheal deviation  Cardiovascular: Regular rate and rhythm  Thorax & Lungs: No respiratory distress   GI: Soft nontender nondistended   Skin: Warm dry no obvious acute rash or lesion  Musculoskeletal: Moving all extremities with normal range strength, no acute  deformity  Neurologic: Cranial nerves III through XII are grossly intact, no sensory deficit, no cerebellar dysfunction   Psychiatric: Appropriate affect for situation at this time      DIAGNOSTIC " "STUDIES / PROCEDURES        Medical Decision Making: Discussed refilling chronic medications in the emergency department with the patient.  I think it is acceptable in this situation as patient is needing these acutely for admission into a alcohol rehab program.  She is given a 30-day supply of both hydroxyzine and zonisamide.  Given instructions return for any other acute symptom change or concern otherwise discharged in stable and improved condition.    /77   Pulse 90   Temp 36.8 °C (98.3 °F) (Temporal)   Resp 14   Ht 1.702 m (5' 7\")   Wt 64.2 kg (141 lb 8.6 oz)   LMP 10/30/2018   SpO2 100%   BMI 22.17 kg/m²     [facility]  [address] [facility] [address]  [phone]    for establishment of primary care, for blood pressure management    [facility], Emergency Dept  [address]  [facility] [id number] [phone]    in 12-24 hours if symptoms persist, immediately If symptoms worsen, or if you develop any other symptoms or concerns    Discharge Medication List as of 11/23/2022  3:10 PM        START taking these medications    Details   hydrOXYzine pamoate (VISTARIL) 50 MG Cap Take 1 Capsule by mouth every 8 hours as needed for Anxiety., Disp-60 Capsule, R-0, Normal             FINAL IMPRESSION  1. Medication refill    2. Anxiety    3. Seizure disorder (HCC)           This dictation has been created using voice recognition software and/or scribes. The accuracy of the dictation is limited by the abilities of the software and the expertise of the scribes. I expect there may be some errors of grammar and possibly content. I made every attempt to manually correct the errors within my dictation. However, errors related to voice recognition software and/or scribes may still exist and should be interpreted within the appropriate context.            "

## 2022-12-07 ENCOUNTER — HOSPITAL ENCOUNTER (INPATIENT)
Facility: MEDICAL CENTER | Age: 37
LOS: 5 days | DRG: 440 | End: 2022-12-12
Attending: EMERGENCY MEDICINE | Admitting: HOSPITALIST
Payer: COMMERCIAL

## 2022-12-07 DIAGNOSIS — K85.90 ACUTE PANCREATITIS, UNSPECIFIED COMPLICATION STATUS, UNSPECIFIED PANCREATITIS TYPE: ICD-10-CM

## 2022-12-07 DIAGNOSIS — F31.9 BIPOLAR AFFECTIVE DISORDER, REMISSION STATUS UNSPECIFIED (HCC): ICD-10-CM

## 2022-12-07 DIAGNOSIS — J02.9 SORE THROAT: ICD-10-CM

## 2022-12-07 LAB
ALBUMIN SERPL BCP-MCNC: 5.1 G/DL (ref 3.2–4.9)
ALBUMIN/GLOB SERPL: 1.7 G/DL
ALP SERPL-CCNC: 71 U/L (ref 30–99)
ALT SERPL-CCNC: 17 U/L (ref 2–50)
ANION GAP SERPL CALC-SCNC: 11 MMOL/L (ref 7–16)
APPEARANCE UR: CLEAR
AST SERPL-CCNC: 18 U/L (ref 12–45)
BASOPHILS # BLD AUTO: 0.5 % (ref 0–1.8)
BASOPHILS # BLD: 0.04 K/UL (ref 0–0.12)
BILIRUB SERPL-MCNC: 0.6 MG/DL (ref 0.1–1.5)
BILIRUB UR QL STRIP.AUTO: NEGATIVE
BUN SERPL-MCNC: 7 MG/DL (ref 8–22)
CALCIUM SERPL-MCNC: 9.6 MG/DL (ref 8.4–10.2)
CHLORIDE SERPL-SCNC: 102 MMOL/L (ref 96–112)
CO2 SERPL-SCNC: 25 MMOL/L (ref 20–33)
COLOR UR: NORMAL
CREAT SERPL-MCNC: 0.8 MG/DL (ref 0.5–1.4)
EOSINOPHIL # BLD AUTO: 0.05 K/UL (ref 0–0.51)
EOSINOPHIL NFR BLD: 0.7 % (ref 0–6.9)
ERYTHROCYTE [DISTWIDTH] IN BLOOD BY AUTOMATED COUNT: 43.8 FL (ref 35.9–50)
ETHANOL BLD-MCNC: <10.1 MG/DL
GFR SERPLBLD CREATININE-BSD FMLA CKD-EPI: 98 ML/MIN/1.73 M 2
GLOBULIN SER CALC-MCNC: 3 G/DL (ref 1.9–3.5)
GLUCOSE SERPL-MCNC: 115 MG/DL (ref 65–99)
GLUCOSE UR STRIP.AUTO-MCNC: NEGATIVE MG/DL
HCT VFR BLD AUTO: 45.1 % (ref 37–47)
HGB BLD-MCNC: 15.3 G/DL (ref 12–16)
IMM GRANULOCYTES # BLD AUTO: 0.02 K/UL (ref 0–0.11)
IMM GRANULOCYTES NFR BLD AUTO: 0.3 % (ref 0–0.9)
KETONES UR STRIP.AUTO-MCNC: NEGATIVE MG/DL
LEUKOCYTE ESTERASE UR QL STRIP.AUTO: NEGATIVE
LIPASE SERPL-CCNC: 510 U/L (ref 7–58)
LYMPHOCYTES # BLD AUTO: 3.24 K/UL (ref 1–4.8)
LYMPHOCYTES NFR BLD: 42.2 % (ref 22–41)
MCH RBC QN AUTO: 30.9 PG (ref 27–33)
MCHC RBC AUTO-ENTMCNC: 33.9 G/DL (ref 33.6–35)
MCV RBC AUTO: 91.1 FL (ref 81.4–97.8)
MICRO URNS: NORMAL
MONOCYTES # BLD AUTO: 0.38 K/UL (ref 0–0.85)
MONOCYTES NFR BLD AUTO: 4.9 % (ref 0–13.4)
NEUTROPHILS # BLD AUTO: 3.95 K/UL (ref 2–7.15)
NEUTROPHILS NFR BLD: 51.4 % (ref 44–72)
NITRITE UR QL STRIP.AUTO: NEGATIVE
NRBC # BLD AUTO: 0 K/UL
NRBC BLD-RTO: 0 /100 WBC
PH UR STRIP.AUTO: 6.5 [PH] (ref 5–8)
PLATELET # BLD AUTO: 310 K/UL (ref 164–446)
PMV BLD AUTO: 9.2 FL (ref 9–12.9)
POTASSIUM SERPL-SCNC: 3.7 MMOL/L (ref 3.6–5.5)
PROT SERPL-MCNC: 8.1 G/DL (ref 6–8.2)
PROT UR QL STRIP: NEGATIVE MG/DL
RBC # BLD AUTO: 4.95 M/UL (ref 4.2–5.4)
RBC UR QL AUTO: NEGATIVE
SODIUM SERPL-SCNC: 138 MMOL/L (ref 135–145)
SP GR UR STRIP.AUTO: <=1.005
WBC # BLD AUTO: 7.7 K/UL (ref 4.8–10.8)

## 2022-12-07 PROCEDURE — 700105 HCHG RX REV CODE 258: Performed by: EMERGENCY MEDICINE

## 2022-12-07 PROCEDURE — 83690 ASSAY OF LIPASE: CPT

## 2022-12-07 PROCEDURE — 81003 URINALYSIS AUTO W/O SCOPE: CPT

## 2022-12-07 PROCEDURE — 80053 COMPREHEN METABOLIC PANEL: CPT

## 2022-12-07 PROCEDURE — 85025 COMPLETE CBC W/AUTO DIFF WBC: CPT

## 2022-12-07 PROCEDURE — 700111 HCHG RX REV CODE 636 W/ 250 OVERRIDE (IP): Performed by: EMERGENCY MEDICINE

## 2022-12-07 PROCEDURE — 36415 COLL VENOUS BLD VENIPUNCTURE: CPT

## 2022-12-07 PROCEDURE — 96375 TX/PRO/DX INJ NEW DRUG ADDON: CPT

## 2022-12-07 PROCEDURE — 99223 1ST HOSP IP/OBS HIGH 75: CPT | Performed by: HOSPITALIST

## 2022-12-07 PROCEDURE — 82077 ASSAY SPEC XCP UR&BREATH IA: CPT

## 2022-12-07 PROCEDURE — 700105 HCHG RX REV CODE 258: Performed by: HOSPITALIST

## 2022-12-07 PROCEDURE — 96374 THER/PROPH/DIAG INJ IV PUSH: CPT

## 2022-12-07 PROCEDURE — 700111 HCHG RX REV CODE 636 W/ 250 OVERRIDE (IP): Performed by: HOSPITALIST

## 2022-12-07 PROCEDURE — 700102 HCHG RX REV CODE 250 W/ 637 OVERRIDE(OP): Performed by: HOSPITALIST

## 2022-12-07 PROCEDURE — 99285 EMERGENCY DEPT VISIT HI MDM: CPT

## 2022-12-07 PROCEDURE — 96376 TX/PRO/DX INJ SAME DRUG ADON: CPT

## 2022-12-07 PROCEDURE — A9270 NON-COVERED ITEM OR SERVICE: HCPCS | Performed by: HOSPITALIST

## 2022-12-07 PROCEDURE — 770006 HCHG ROOM/CARE - MED/SURG/GYN SEMI*

## 2022-12-07 RX ORDER — OXYCODONE HYDROCHLORIDE 5 MG/1
2.5 TABLET ORAL
Status: DISCONTINUED | OUTPATIENT
Start: 2022-12-07 | End: 2022-12-08

## 2022-12-07 RX ORDER — ZONISAMIDE 50 MG/1
150 CAPSULE ORAL
Status: DISCONTINUED | OUTPATIENT
Start: 2022-12-07 | End: 2022-12-12 | Stop reason: HOSPADM

## 2022-12-07 RX ORDER — MORPHINE SULFATE 4 MG/ML
4 INJECTION INTRAVENOUS ONCE
Status: COMPLETED | OUTPATIENT
Start: 2022-12-07 | End: 2022-12-07

## 2022-12-07 RX ORDER — ACETAMINOPHEN 325 MG/1
650 TABLET ORAL EVERY 6 HOURS PRN
Status: DISCONTINUED | OUTPATIENT
Start: 2022-12-07 | End: 2022-12-12 | Stop reason: HOSPADM

## 2022-12-07 RX ORDER — GABAPENTIN 100 MG/1
100 CAPSULE ORAL EVERY MORNING
Status: DISCONTINUED | OUTPATIENT
Start: 2022-12-08 | End: 2022-12-12 | Stop reason: HOSPADM

## 2022-12-07 RX ORDER — ONDANSETRON 2 MG/ML
4 INJECTION INTRAMUSCULAR; INTRAVENOUS EVERY 4 HOURS PRN
Status: DISCONTINUED | OUTPATIENT
Start: 2022-12-07 | End: 2022-12-12 | Stop reason: HOSPADM

## 2022-12-07 RX ORDER — SODIUM CHLORIDE, SODIUM LACTATE, POTASSIUM CHLORIDE, CALCIUM CHLORIDE 600; 310; 30; 20 MG/100ML; MG/100ML; MG/100ML; MG/100ML
INJECTION, SOLUTION INTRAVENOUS CONTINUOUS
Status: DISCONTINUED | OUTPATIENT
Start: 2022-12-07 | End: 2022-12-12 | Stop reason: HOSPADM

## 2022-12-07 RX ORDER — SODIUM CHLORIDE 9 MG/ML
1000 INJECTION, SOLUTION INTRAVENOUS ONCE
Status: COMPLETED | OUTPATIENT
Start: 2022-12-07 | End: 2022-12-07

## 2022-12-07 RX ORDER — GABAPENTIN 100 MG/1
100-300 CAPSULE ORAL 2 TIMES DAILY
COMMUNITY
End: 2023-05-16

## 2022-12-07 RX ORDER — ONDANSETRON 4 MG/1
4 TABLET, ORALLY DISINTEGRATING ORAL EVERY 4 HOURS PRN
Status: DISCONTINUED | OUTPATIENT
Start: 2022-12-07 | End: 2022-12-12 | Stop reason: HOSPADM

## 2022-12-07 RX ORDER — DIVALPROEX SODIUM 250 MG/1
250 TABLET, DELAYED RELEASE ORAL 2 TIMES DAILY
Status: DISCONTINUED | OUTPATIENT
Start: 2022-12-07 | End: 2022-12-12 | Stop reason: HOSPADM

## 2022-12-07 RX ORDER — ENOXAPARIN SODIUM 100 MG/ML
40 INJECTION SUBCUTANEOUS DAILY
Status: DISCONTINUED | OUTPATIENT
Start: 2022-12-07 | End: 2022-12-12 | Stop reason: HOSPADM

## 2022-12-07 RX ORDER — AMOXICILLIN 250 MG
2 CAPSULE ORAL 2 TIMES DAILY
Status: DISCONTINUED | OUTPATIENT
Start: 2022-12-07 | End: 2022-12-12 | Stop reason: HOSPADM

## 2022-12-07 RX ORDER — BISACODYL 10 MG
10 SUPPOSITORY, RECTAL RECTAL
Status: DISCONTINUED | OUTPATIENT
Start: 2022-12-07 | End: 2022-12-12 | Stop reason: HOSPADM

## 2022-12-07 RX ORDER — HYDROMORPHONE HYDROCHLORIDE 1 MG/ML
0.25 INJECTION, SOLUTION INTRAMUSCULAR; INTRAVENOUS; SUBCUTANEOUS
Status: DISCONTINUED | OUTPATIENT
Start: 2022-12-07 | End: 2022-12-08

## 2022-12-07 RX ORDER — GABAPENTIN 300 MG/1
300 CAPSULE ORAL EVERY EVENING
Status: DISCONTINUED | OUTPATIENT
Start: 2022-12-07 | End: 2022-12-12 | Stop reason: HOSPADM

## 2022-12-07 RX ORDER — POLYETHYLENE GLYCOL 3350 17 G/17G
1 POWDER, FOR SOLUTION ORAL
Status: DISCONTINUED | OUTPATIENT
Start: 2022-12-07 | End: 2022-12-12 | Stop reason: HOSPADM

## 2022-12-07 RX ORDER — OXYCODONE HYDROCHLORIDE 5 MG/1
5 TABLET ORAL
Status: DISCONTINUED | OUTPATIENT
Start: 2022-12-07 | End: 2022-12-08

## 2022-12-07 RX ORDER — HYDROXYZINE HYDROCHLORIDE 25 MG/1
50 TABLET, FILM COATED ORAL EVERY 8 HOURS PRN
Status: DISCONTINUED | OUTPATIENT
Start: 2022-12-07 | End: 2022-12-12 | Stop reason: HOSPADM

## 2022-12-07 RX ADMIN — ONDANSETRON 4 MG: 2 INJECTION INTRAMUSCULAR; INTRAVENOUS at 17:47

## 2022-12-07 RX ADMIN — HYDROMORPHONE HYDROCHLORIDE 0.25 MG: 1 INJECTION, SOLUTION INTRAMUSCULAR; INTRAVENOUS; SUBCUTANEOUS at 21:26

## 2022-12-07 RX ADMIN — MORPHINE SULFATE 4 MG: 4 INJECTION INTRAVENOUS at 16:40

## 2022-12-07 RX ADMIN — SODIUM CHLORIDE, POTASSIUM CHLORIDE, SODIUM LACTATE AND CALCIUM CHLORIDE: 600; 310; 30; 20 INJECTION, SOLUTION INTRAVENOUS at 18:43

## 2022-12-07 RX ADMIN — ZONISAMIDE 150 MG: 50 CAPSULE ORAL at 22:17

## 2022-12-07 RX ADMIN — SODIUM CHLORIDE 1000 ML: 9 INJECTION, SOLUTION INTRAVENOUS at 16:41

## 2022-12-07 RX ADMIN — HYDROMORPHONE HYDROCHLORIDE 0.25 MG: 1 INJECTION, SOLUTION INTRAMUSCULAR; INTRAVENOUS; SUBCUTANEOUS at 17:46

## 2022-12-07 ASSESSMENT — ENCOUNTER SYMPTOMS
CHILLS: 0
SHORTNESS OF BREATH: 0
VOMITING: 0
EYE REDNESS: 0
NAUSEA: 1
STRIDOR: 0
MYALGIAS: 0
BRUISES/BLEEDS EASILY: 0
FLANK PAIN: 0
NERVOUS/ANXIOUS: 0
FOCAL WEAKNESS: 0
COUGH: 0
EYE DISCHARGE: 0
FEVER: 0
ABDOMINAL PAIN: 1

## 2022-12-07 ASSESSMENT — FIBROSIS 4 INDEX: FIB4 SCORE: 0.63

## 2022-12-07 ASSESSMENT — PAIN DESCRIPTION - PAIN TYPE: TYPE: ACUTE PAIN

## 2022-12-07 ASSESSMENT — PAIN DESCRIPTION - DESCRIPTORS: DESCRIPTORS: ACHING

## 2022-12-07 NOTE — ED TRIAGE NOTES
Chief Complaint   Patient presents with    Abdominal Pain      Complains of mid upper and LUQ pain. History of pancreatitis. Pt is 56 days sober. Pt denies recent ETOH.

## 2022-12-08 LAB
ALBUMIN SERPL BCP-MCNC: 4 G/DL (ref 3.2–4.9)
ALBUMIN/GLOB SERPL: 1.8 G/DL
ALP SERPL-CCNC: 54 U/L (ref 30–99)
ALT SERPL-CCNC: 13 U/L (ref 2–50)
ANION GAP SERPL CALC-SCNC: 11 MMOL/L (ref 7–16)
AST SERPL-CCNC: 14 U/L (ref 12–45)
BILIRUB SERPL-MCNC: 0.8 MG/DL (ref 0.1–1.5)
BUN SERPL-MCNC: 8 MG/DL (ref 8–22)
CALCIUM SERPL-MCNC: 8.7 MG/DL (ref 8.4–10.2)
CHLORIDE SERPL-SCNC: 107 MMOL/L (ref 96–112)
CO2 SERPL-SCNC: 22 MMOL/L (ref 20–33)
CREAT SERPL-MCNC: 0.62 MG/DL (ref 0.5–1.4)
ERYTHROCYTE [DISTWIDTH] IN BLOOD BY AUTOMATED COUNT: 43.4 FL (ref 35.9–50)
GFR SERPLBLD CREATININE-BSD FMLA CKD-EPI: 119 ML/MIN/1.73 M 2
GLOBULIN SER CALC-MCNC: 2.2 G/DL (ref 1.9–3.5)
GLUCOSE SERPL-MCNC: 100 MG/DL (ref 65–99)
HCT VFR BLD AUTO: 39.5 % (ref 37–47)
HGB BLD-MCNC: 13 G/DL (ref 12–16)
MAGNESIUM SERPL-MCNC: 1.9 MG/DL (ref 1.5–2.5)
MCH RBC QN AUTO: 30.2 PG (ref 27–33)
MCHC RBC AUTO-ENTMCNC: 32.9 G/DL (ref 33.6–35)
MCV RBC AUTO: 91.6 FL (ref 81.4–97.8)
PLATELET # BLD AUTO: 243 K/UL (ref 164–446)
PMV BLD AUTO: 9.4 FL (ref 9–12.9)
POTASSIUM SERPL-SCNC: 3.6 MMOL/L (ref 3.6–5.5)
PROT SERPL-MCNC: 6.2 G/DL (ref 6–8.2)
RBC # BLD AUTO: 4.31 M/UL (ref 4.2–5.4)
SODIUM SERPL-SCNC: 140 MMOL/L (ref 135–145)
WBC # BLD AUTO: 6.3 K/UL (ref 4.8–10.8)

## 2022-12-08 PROCEDURE — 80053 COMPREHEN METABOLIC PANEL: CPT

## 2022-12-08 PROCEDURE — 700102 HCHG RX REV CODE 250 W/ 637 OVERRIDE(OP): Performed by: HOSPITALIST

## 2022-12-08 PROCEDURE — A9270 NON-COVERED ITEM OR SERVICE: HCPCS | Performed by: INTERNAL MEDICINE

## 2022-12-08 PROCEDURE — 99233 SBSQ HOSP IP/OBS HIGH 50: CPT | Performed by: INTERNAL MEDICINE

## 2022-12-08 PROCEDURE — 700111 HCHG RX REV CODE 636 W/ 250 OVERRIDE (IP): Performed by: INTERNAL MEDICINE

## 2022-12-08 PROCEDURE — 36415 COLL VENOUS BLD VENIPUNCTURE: CPT

## 2022-12-08 PROCEDURE — 700111 HCHG RX REV CODE 636 W/ 250 OVERRIDE (IP): Performed by: HOSPITALIST

## 2022-12-08 PROCEDURE — A9270 NON-COVERED ITEM OR SERVICE: HCPCS | Performed by: HOSPITALIST

## 2022-12-08 PROCEDURE — 700102 HCHG RX REV CODE 250 W/ 637 OVERRIDE(OP): Performed by: INTERNAL MEDICINE

## 2022-12-08 PROCEDURE — 83735 ASSAY OF MAGNESIUM: CPT

## 2022-12-08 PROCEDURE — 700105 HCHG RX REV CODE 258: Performed by: HOSPITALIST

## 2022-12-08 PROCEDURE — 770006 HCHG ROOM/CARE - MED/SURG/GYN SEMI*

## 2022-12-08 PROCEDURE — 85027 COMPLETE CBC AUTOMATED: CPT

## 2022-12-08 RX ORDER — MORPHINE SULFATE 4 MG/ML
4 INJECTION INTRAVENOUS
Status: DISCONTINUED | OUTPATIENT
Start: 2022-12-08 | End: 2022-12-10

## 2022-12-08 RX ORDER — OXYCODONE HYDROCHLORIDE 10 MG/1
10 TABLET ORAL
Status: DISCONTINUED | OUTPATIENT
Start: 2022-12-08 | End: 2022-12-12 | Stop reason: HOSPADM

## 2022-12-08 RX ORDER — OXYCODONE HYDROCHLORIDE 5 MG/1
5 TABLET ORAL
Status: DISCONTINUED | OUTPATIENT
Start: 2022-12-08 | End: 2022-12-12 | Stop reason: HOSPADM

## 2022-12-08 RX ORDER — NICOTINE 21 MG/24HR
1 PATCH, TRANSDERMAL 24 HOURS TRANSDERMAL
Status: DISCONTINUED | OUTPATIENT
Start: 2022-12-08 | End: 2022-12-12 | Stop reason: HOSPADM

## 2022-12-08 RX ADMIN — ZONISAMIDE 150 MG: 50 CAPSULE ORAL at 20:18

## 2022-12-08 RX ADMIN — ACETAMINOPHEN 650 MG: 325 TABLET, FILM COATED ORAL at 13:51

## 2022-12-08 RX ADMIN — GABAPENTIN 300 MG: 300 CAPSULE ORAL at 17:36

## 2022-12-08 RX ADMIN — NICOTINE TRANSDERMAL SYSTEM 21 MG: 21 PATCH, EXTENDED RELEASE TRANSDERMAL at 01:18

## 2022-12-08 RX ADMIN — MORPHINE SULFATE 4 MG: 4 INJECTION INTRAVENOUS at 21:28

## 2022-12-08 RX ADMIN — MORPHINE SULFATE 4 MG: 4 INJECTION INTRAVENOUS at 10:25

## 2022-12-08 RX ADMIN — MORPHINE SULFATE 4 MG: 4 INJECTION INTRAVENOUS at 17:32

## 2022-12-08 RX ADMIN — OXYCODONE HYDROCHLORIDE 10 MG: 10 TABLET ORAL at 12:23

## 2022-12-08 RX ADMIN — OXYCODONE 5 MG: 5 TABLET ORAL at 01:18

## 2022-12-08 RX ADMIN — DIVALPROEX SODIUM 250 MG: 250 TABLET, DELAYED RELEASE ORAL at 04:38

## 2022-12-08 RX ADMIN — OXYCODONE 5 MG: 5 TABLET ORAL at 09:33

## 2022-12-08 RX ADMIN — OXYCODONE HYDROCHLORIDE 10 MG: 10 TABLET ORAL at 16:23

## 2022-12-08 RX ADMIN — MORPHINE SULFATE 4 MG: 4 INJECTION INTRAVENOUS at 13:45

## 2022-12-08 RX ADMIN — GABAPENTIN 100 MG: 100 CAPSULE ORAL at 04:41

## 2022-12-08 RX ADMIN — SODIUM CHLORIDE, POTASSIUM CHLORIDE, SODIUM LACTATE AND CALCIUM CHLORIDE: 600; 310; 30; 20 INJECTION, SOLUTION INTRAVENOUS at 20:10

## 2022-12-08 RX ADMIN — HYDROMORPHONE HYDROCHLORIDE 0.25 MG: 1 INJECTION, SOLUTION INTRAMUSCULAR; INTRAVENOUS; SUBCUTANEOUS at 02:41

## 2022-12-08 RX ADMIN — HYDROMORPHONE HYDROCHLORIDE 0.25 MG: 1 INJECTION, SOLUTION INTRAMUSCULAR; INTRAVENOUS; SUBCUTANEOUS at 06:15

## 2022-12-08 RX ADMIN — SODIUM CHLORIDE, POTASSIUM CHLORIDE, SODIUM LACTATE AND CALCIUM CHLORIDE: 600; 310; 30; 20 INJECTION, SOLUTION INTRAVENOUS at 09:34

## 2022-12-08 RX ADMIN — OXYCODONE 5 MG: 5 TABLET ORAL at 04:39

## 2022-12-08 RX ADMIN — DIVALPROEX SODIUM 250 MG: 250 TABLET, DELAYED RELEASE ORAL at 17:34

## 2022-12-08 RX ADMIN — ACETAMINOPHEN 650 MG: 325 TABLET, FILM COATED ORAL at 21:26

## 2022-12-08 RX ADMIN — SODIUM CHLORIDE, POTASSIUM CHLORIDE, SODIUM LACTATE AND CALCIUM CHLORIDE: 600; 310; 30; 20 INJECTION, SOLUTION INTRAVENOUS at 00:11

## 2022-12-08 RX ADMIN — ONDANSETRON 4 MG: 2 INJECTION INTRAMUSCULAR; INTRAVENOUS at 20:17

## 2022-12-08 RX ADMIN — OXYCODONE HYDROCHLORIDE 10 MG: 10 TABLET ORAL at 20:17

## 2022-12-08 ASSESSMENT — COGNITIVE AND FUNCTIONAL STATUS - GENERAL
CLIMB 3 TO 5 STEPS WITH RAILING: A LITTLE
SUGGESTED CMS G CODE MODIFIER DAILY ACTIVITY: CH
CLIMB 3 TO 5 STEPS WITH RAILING: A LITTLE
DAILY ACTIVITIY SCORE: 24
SUGGESTED CMS G CODE MODIFIER MOBILITY: CI
DAILY ACTIVITIY SCORE: 24
MOBILITY SCORE: 23
SUGGESTED CMS G CODE MODIFIER DAILY ACTIVITY: CH

## 2022-12-08 ASSESSMENT — PAIN DESCRIPTION - PAIN TYPE
TYPE: ACUTE PAIN

## 2022-12-08 ASSESSMENT — LIFESTYLE VARIABLES
TOTAL SCORE: 3
HOW MANY TIMES IN THE PAST YEAR HAVE YOU HAD 5 OR MORE DRINKS IN A DAY: 0
TOTAL SCORE: 3
HAVE PEOPLE ANNOYED YOU BY CRITICIZING YOUR DRINKING: YES
TOTAL SCORE: 3
HAVE YOU EVER FELT YOU SHOULD CUT DOWN ON YOUR DRINKING: YES
ON A TYPICAL DAY WHEN YOU DRINK ALCOHOL HOW MANY DRINKS DO YOU HAVE: 0
EVER FELT BAD OR GUILTY ABOUT YOUR DRINKING: YES
AVERAGE NUMBER OF DAYS PER WEEK YOU HAVE A DRINK CONTAINING ALCOHOL: 0
EVER HAD A DRINK FIRST THING IN THE MORNING TO STEADY YOUR NERVES TO GET RID OF A HANGOVER: NO
CONSUMPTION TOTAL: POSITIVE
ALCOHOL_USE: NO

## 2022-12-08 ASSESSMENT — PATIENT HEALTH QUESTIONNAIRE - PHQ9
2. FEELING DOWN, DEPRESSED, IRRITABLE, OR HOPELESS: NOT AT ALL
SUM OF ALL RESPONSES TO PHQ9 QUESTIONS 1 AND 2: 0
1. LITTLE INTEREST OR PLEASURE IN DOING THINGS: NOT AT ALL
2. FEELING DOWN, DEPRESSED, IRRITABLE, OR HOPELESS: NOT AT ALL
1. LITTLE INTEREST OR PLEASURE IN DOING THINGS: NOT AT ALL
SUM OF ALL RESPONSES TO PHQ9 QUESTIONS 1 AND 2: 0
1. LITTLE INTEREST OR PLEASURE IN DOING THINGS: NOT AT ALL
SUM OF ALL RESPONSES TO PHQ9 QUESTIONS 1 AND 2: 0
2. FEELING DOWN, DEPRESSED, IRRITABLE, OR HOPELESS: NOT AT ALL

## 2022-12-08 NOTE — ASSESSMENT & PLAN NOTE
Resume home Depakote [discussed potential cause for her pancreatitis however patient says she wants to continue on it as it works so well for her bipolar disorder]

## 2022-12-08 NOTE — PROGRESS NOTES
"Hospital Medicine Daily Progress Note    Date of Service  12/8/2022    Chief Complaint  Rhiannon Marrero is a 37 y.o. female admitted 12/7/2022 with abdominal pain    Hospital Course  Per notes, \"37 y.o. female with a past medical history of alcohol dependence who is currently sober, recurrent pancreatitis, bipolar disorder who presented 12/7/2022 with progressively worsening abdominal pain.  Patient reports that she has not been feeling well for the past 36 hours.  Reports having progressively worsening epigastric abdominal pain.  The pain is now severe rated as 10 out of 10.  Pain does radiate to the back.  No aggravating or relieving factors to the pain.  Reports associated nausea and vomiting.  Denies noticing any bowel changes\"    Interval Problem Update  Pain still not controlled, not tolerating p.o..  Pain management adjusted    I have discussed this patient's plan of care and discharge plan at IDT rounds today with Case Management, Nursing, Nursing leadership, and other members of the IDT team.    Consultants/Specialty  None    Code Status  Full Code    Disposition  Patient is not medically cleared for discharge.   Anticipate discharge to to home with close outpatient follow-up.  I have placed the appropriate orders for post-discharge needs.    Review of Systems  Review of Systems   All other systems reviewed and are negative.     Physical Exam  Temp:  [36.3 °C (97.3 °F)-36.9 °C (98.4 °F)] 36.3 °C (97.3 °F)  Pulse:  [74-98] 98  Resp:  [16-17] 16  BP: ()/(69-89) 128/89  SpO2:  [96 %-100 %] 96 %    Physical Exam  Vitals and nursing note reviewed.   Cardiovascular:      Rate and Rhythm: Normal rate and regular rhythm.      Pulses: Normal pulses.      Heart sounds: Normal heart sounds.   Pulmonary:      Effort: Pulmonary effort is normal.      Breath sounds: Normal breath sounds.   Abdominal:      General: Abdomen is flat. Bowel sounds are normal.      Palpations: Abdomen is soft.   Musculoskeletal:      " Right lower leg: No edema.      Left lower leg: No edema.   Neurological:      General: No focal deficit present.      Mental Status: She is alert and oriented to person, place, and time. Mental status is at baseline.       Fluids    Intake/Output Summary (Last 24 hours) at 12/8/2022 1309  Last data filed at 12/8/2022 1223  Gross per 24 hour   Intake 2702.72 ml   Output --   Net 2702.72 ml       Laboratory  Recent Labs     12/07/22  1501 12/08/22  0437   WBC 7.7 6.3   RBC 4.95 4.31   HEMOGLOBIN 15.3 13.0   HEMATOCRIT 45.1 39.5   MCV 91.1 91.6   MCH 30.9 30.2   MCHC 33.9 32.9*   RDW 43.8 43.4   PLATELETCT 310 243   MPV 9.2 9.4     Recent Labs     12/07/22  1501 12/08/22  0437   SODIUM 138 140   POTASSIUM 3.7 3.6   CHLORIDE 102 107   CO2 25 22   GLUCOSE 115* 100*   BUN 7* 8   CREATININE 0.80 0.62   CALCIUM 9.6 8.7                   Imaging  No orders to display        Assessment/Plan  * Recurrent pancreatitis- (present on admission)  Assessment & Plan  AST, ALT, alkaline phosphatase and bilirubin within normal limits unlikely biliary etiology.  Discussed potential relationship to Depakote use, however patient says she wants to continue on it as it works so well for her bipolar disorder  Supportive care, multimodal pain control.  Dose increased due to inadequate control.  Patient states she wishes to use as little opiates as possible for control of, does not want opiates prescribed on discharge as she is currently in remission for substance abuse disorder and wishes to remain so.  Keep with bowel rest and n.p.o. as patient was unable to tolerate clear liquid diet this morning.  Continue with IV fluids until patient is tolerating diet  Watch input and output closely, watch respiratory status closely  Watch closely for potential complications including pleural effusion, hypocalcemia, acute renal failure, compartment syndrome    Bipolar disorder (HCC)- (present on admission)  Assessment & Plan  Resume home Depakote  [discussed potential cause for her pancreatitis however patient says she wants to continue on it as it works so well for her bipolar disorder]    Seizure disorder (HCC)- (present on admission)  Assessment & Plan  Continue home Depakote, gabapentin and zonisamide    Alcohol dependence (HCC)- (present on admission)  Assessment & Plan  Reports that she has been sober for 2 months, he is currently in a treatment program and wishes to continue       VTE prophylaxis: SCDs/TEDs    I have performed a physical exam and reviewed and updated ROS and Plan today (12/8/2022). In review of yesterday's note (12/7/2022), there are no changes except as documented above.

## 2022-12-08 NOTE — ED NOTES
Med rec updated and complete, per pt   Allergies reviewed, per pt  Pt reports that she has not taken MAGNESIUM OXIDE 400MG, METOCLOPRAMIDE 10MG, OMEPRAZOLE 20MG, SUCRAFATE 1GM, ZOFRAN 4MG, or VITAMIN D3 1000 units

## 2022-12-08 NOTE — PROGRESS NOTES
4 Eyes Skin Assessment Completed by LESLIE Parkinson and Hermes RN.    Head WDL  Ears WDL  Nose WDL  Mouth WDL  Neck WDL  Breast/Chest WDL  Shoulder Blades WDL  Spine WDL  (R) Arm/Elbow/Hand WDL  (L) Arm/Elbow/Hand WDL  Abdomen Scar  Groin WDL  Scrotum/Coccyx/Buttocks WDL  (R) Leg WDL  (L) Leg WDL  (R) Heel/Foot/Toe WDL  (L) Heel/Foot/Toe WDL          Devices In Places N/a      Interventions In Place N/A    Possible Skin Injury No    Pictures Uploaded Into Epic N/A  Wound Consult Placed N/A  RN Wound Prevention Protocol Ordered No

## 2022-12-08 NOTE — ASSESSMENT & PLAN NOTE
Reports that she has been sober for 2 months, he is currently in a treatment program and wishes to continue

## 2022-12-08 NOTE — CARE PLAN
The patient is Stable - Low risk of patient condition declining or worsening    Shift Goals  Clinical Goals: Pts pain will be rated at 5/10 or lower post interventions  Patient Goals: Rest and comfort    Progress made toward(s) clinical / shift goals:  ***    Patient is not progressing towards the following goals:

## 2022-12-08 NOTE — H&P
Hospital Medicine History & Physical Note    Date of Service  12/7/2022    Primary Care Physician  Ivy Adams M.D.    Consultants  None     Code Status  Full Code    Chief Complaint  Chief Complaint   Patient presents with    Abdominal Pain     History of Presenting Illness  Rhiannon Marrero is a 37 y.o. female with a past medical history of alcohol dependence who is currently sober, recurrent pancreatitis, bipolar disorder who presented 12/7/2022 with progressively worsening abdominal pain.  Patient reports that she has not been feeling well for the past 36 hours.  Reports having progressively worsening epigastric abdominal pain.  The pain is now severe rated as 10 out of 10.  Pain does radiate to the back.  No aggravating or relieving factors to the pain.  Reports associated nausea and vomiting.  Denies noticing any bowel changes     I discussed the plan of care with emergency department physician, and the patient.    Review of Systems  Review of Systems   Constitutional:  Negative for chills and fever.   Eyes:  Negative for discharge and redness.   Respiratory:  Negative for cough, shortness of breath and stridor.    Cardiovascular:  Negative for chest pain and leg swelling.   Gastrointestinal:  Positive for abdominal pain and nausea. Negative for vomiting.   Genitourinary:  Negative for flank pain.   Musculoskeletal:  Negative for myalgias.   Skin: Negative.    Neurological:  Negative for focal weakness.   Endo/Heme/Allergies:  Does not bruise/bleed easily.   Psychiatric/Behavioral:  The patient is not nervous/anxious.      Past Medical History   has a past medical history of Acute pancreatitis without infection or necrosis (07/20/2022), Alcohol dependence (HCC) (04/13/2017), Bronchitis (08/2012), Cold, Current moderate episode of major depressive disorder without prior episode (HCC) (01/31/2020), Heart burn, Hernia of unspecified site of abdominal cavity without mention of obstruction or gangrene, High anion  gap metabolic acidosis (07/01/2022), Indigestion, Pancreatitis, Pneumonia (2011), and Seizure disorder (HCC) (05/23/2022).    Surgical History   has a past surgical history that includes other orthopedic surgery; gyn surgery; tonsillectomy (4/11/2013); arthroscopy, knee; hysterectomy robotic xi (10/11/2018); salpingectomy (Bilateral, 10/11/2018); and orif, wrist (Right, 4/24/2019).     Family History  family history includes Arthritis in her mother; Heart Disease in her maternal grandfather; Psychiatric Illness in her mother; Stroke in her mother.      Social History   reports that she has been smoking cigarettes. She has a 7.50 pack-year smoking history. She has never used smokeless tobacco. She reports that she does not currently use alcohol. She reports current drug use. Drug: Marijuana.    Allergies  Allergies   Allergen Reactions    Promethazine Hcl Anxiety     Anxiety and makes her feels like skin coming off      Medications  Prior to Admission Medications   Prescriptions Last Dose Informant Patient Reported? Taking?   divalproex (DEPAKOTE) 250 MG Tablet Delayed Response 12/7/2022 at 0700 Patient No No   Sig: Take 1 Tablet by mouth 2 times a day for 120 days.   gabapentin (NEURONTIN) 100 MG Cap 12/7/2022 at 0700 Patient Yes Yes   Sig: Take 100-300 mg by mouth 2 times a day. Pt takes 100MG in AM and 300MG in the evening   hydrOXYzine pamoate (VISTARIL) 50 MG Cap 12/6/2022 at 1800 Patient No No   Sig: Take 1 Capsule by mouth every 8 hours as needed for Anxiety.   zonisamide (ZONEGRAN) 50 MG capsule 12/6/2022 at 1900 Patient No No   Sig: Take 3 Capsules by mouth at bedtime for 30 days.      Facility-Administered Medications: None     Physical Exam  Temp:  [36.8 °C (98.2 °F)] 36.8 °C (98.2 °F)  Pulse:  [95] 95  Resp:  [16] 16  BP: (118)/(81) 118/81  SpO2:  [99 %] 99 %  Blood Pressure: 118/81   Temperature: 36.8 °C (98.2 °F)   Pulse: 95   Respiration: 16   Pulse Oximetry: 99 %     Physical Exam  Constitutional:        General: She is not in acute distress.  HENT:      Head: Normocephalic and atraumatic.      Right Ear: External ear normal.      Left Ear: External ear normal.      Nose: No congestion or rhinorrhea.      Mouth/Throat:      Mouth: Mucous membranes are moist.      Pharynx: No oropharyngeal exudate or posterior oropharyngeal erythema.   Eyes:      General: No scleral icterus.        Right eye: No discharge.         Left eye: No discharge.      Conjunctiva/sclera: Conjunctivae normal.      Pupils: Pupils are equal, round, and reactive to light.   Cardiovascular:      Rate and Rhythm: Regular rhythm. Tachycardia present.      Heart sounds:     No friction rub. No gallop.   Pulmonary:      Effort: Pulmonary effort is normal.   Abdominal:      General: Abdomen is flat. There is no distension.      Tenderness: There is abdominal tenderness. There is no guarding or rebound.   Musculoskeletal:         General: No swelling.      Cervical back: Neck supple. No rigidity. No muscular tenderness.      Right lower leg: No edema.      Left lower leg: No edema.   Skin:     Capillary Refill: Capillary refill takes 2 to 3 seconds.      Coloration: Skin is not jaundiced or pale.      Findings: No bruising or erythema.   Neurological:      Mental Status: She is alert and oriented to person, place, and time.   Psychiatric:         Mood and Affect: Mood normal.         Judgment: Judgment normal.     Laboratory:  Recent Labs     12/07/22  1501   WBC 7.7   RBC 4.95   HEMOGLOBIN 15.3   HEMATOCRIT 45.1   MCV 91.1   MCH 30.9   MCHC 33.9   RDW 43.8   PLATELETCT 310   MPV 9.2     Recent Labs     12/07/22  1501   SODIUM 138   POTASSIUM 3.7   CHLORIDE 102   CO2 25   GLUCOSE 115*   BUN 7*   CREATININE 0.80   CALCIUM 9.6     Recent Labs     12/07/22  1501   ALTSGPT 17   ASTSGOT 18   ALKPHOSPHAT 71   TBILIRUBIN 0.6   LIPASE 510*   GLUCOSE 115*         No results for input(s): NTPROBNP in the last 72 hours.      No results for input(s): TROPONINT in  the last 72 hours.    Imaging:  No orders to display     Assessment/Plan:  Justification for Admission Status  I anticipate this patient will require at least two midnights for appropriate medical management, necessitating inpatient admission because patient has acute pancreatitis will require intravenous fluids, diet modification multimodal pain control    * Recurrent pancreatitis- (present on admission)  Assessment & Plan  AST, ALT, alkaline phosphatase and bilirubin within normal limits unlikely biliary etiology.  Discussed potential relationship to Depakote use, however patient says she wants to continue on it as it works so well for her bipolar disorder  Supportive care, multimodal pain control.  Bowel rest.  N.p.o. for now, consider starting clear liquid diet when pain improves   Watch input and output closely, watch respiratory status closely  Watch closely for potential complications including pleural effusion, hypocalcemia, acute renal failure, compartment syndrome    Bipolar disorder (HCC)- (present on admission)  Assessment & Plan  Resume home Depakote [discussed potential cause for her pancreatitis however patient says she wants to continue on it as it works so well for her bipolar disorder]    Seizure disorder (HCC)- (present on admission)  Assessment & Plan  Resume home Depakote, gabapentin and zonisamide    Alcohol dependence (HCC)- (present on admission)  Assessment & Plan  Reports that she has been sober for 2 months    VTE prophylaxis: SCDs/TEDs and enoxaparin ppx

## 2022-12-08 NOTE — PROGRESS NOTES
Pt arrived to [facility] via [facility], able to ambulate to bed.  pt is AO4 in room air. VSS.   Pt is resting comfortably in bed, call light within reach, instructed to call for assistance. Pt verbalized understanding . Safety and fall precautions in place. Will continue to monitor.

## 2022-12-08 NOTE — CARE PLAN
The patient is Stable - Low risk of patient condition declining or worsening  Shift Goals  Clinical Goals: Pts pain will be rated at 5/10 or lower post interventions  Patient Goals: Rest and comfort    Progress made toward(s) clinical / shift goals:  Administered pain meds per mar, comfort measures provided, pt seen resting comfortably in bed, reported pain at 5/10 post interventions.    Patient is not progressing towards the following goals: n/a

## 2022-12-08 NOTE — ASSESSMENT & PLAN NOTE
AST, ALT, alkaline phosphatase and bilirubin within normal limits unlikely biliary etiology.  Discussed potential relationship to Depakote use, however patient says she wants to continue on it as it works so well for her bipolar disorder  Supportive care, multimodal pain control.  Dose increased due to inadequate control.  Patient states she wishes to use as little opiates as possible for control of, does not want opiates prescribed on discharge as she is currently in remission for substance abuse disorder and wishes to remain so.  Continue with IV fluids until patient is tolerating diet-still having abdominal pain and not tolerating clear liquids  Continue with n.p.o.  Watch closely for potential complications including pleural effusion, hypocalcemia, acute renal failure, compartment syndrome   Pain not well controlled and she is not tolerating p.o. at this time so have adjusted pain regimen to allow for IV morphine's.    Patient has had multiple CT scans, does have evidence of chronic pancreatitis.  Overall seems improved although still reports pain, this is likely chronic.  At this time I do not think she needs repeat CT scan.  If continues to improve can possibly DC in 24 hours.

## 2022-12-08 NOTE — ED PROVIDER NOTES
ED Provider Note    CHIEF COMPLAINT  Chief Complaint   Patient presents with    Abdominal Pain       HPI  Rhiannon Marrero is a 37 y.o. female who presents to the emergency department complaining of midepigastric abdominal pain.  This is similar to her prior pancreatitis.  She does however explain that her prior pancreatitis flares have been secondary to alcohol intake.  She has not been sober for greater than 50 days and is currently under rehab facility.  Due to her increasing pain she was sent from the facility here.  She does note that she is able to take narcotics but is unable to be prescribed with any controlled substances once back at their facility.  At this point her pain is moderate.  She is adamant that she has not had any relapse or any alcohol intake.  She has not that she has had multiple evaluations due to her recurrent abdominal pain and her prior ultrasounds and CT imaging have largely been unremarkable for acute or more worrisome pathology.    Denies any nausea or vomiting.  No diarrhea.  No lower abdominal pain.  Prior hysterectomy.    REVIEW OF SYSTEMS  See HPI for further details. All other systems are negative.     PAST MEDICAL HISTORY   has a past medical history of Acute pancreatitis without infection or necrosis (07/20/2022), Alcohol dependence (HCC) (04/13/2017), Bronchitis (08/2012), Cold, Current moderate episode of major depressive disorder without prior episode ([facility]) (01/31/2020), Heart burn, Hernia of unspecified site of abdominal cavity without mention of obstruction or gangrene, High anion gap metabolic acidosis (07/01/2022), Indigestion, Pancreatitis, Pneumonia (2011), and Seizure disorder ([facility]) (05/23/2022).    SOCIAL HISTORY  Social History     Tobacco Use    Smoking status: Every Day     Packs/day: 0.75     Years: 10.00     Pack years: 7.50     Types: Cigarettes    Smokeless tobacco: Never   Vaping Use    Vaping Use: Former    Substances: Nicotine   Substance and Sexual  "Activity    Alcohol use: Not Currently     Comment: 5 shots, binges. Was sober for 34 days until today 10/12    Drug use: Yes     Types: Marijuana     Comment: edibles    Sexual activity: Not on file       SURGICAL HISTORY   has a past surgical history that includes other orthopedic surgery; gyn surgery; tonsillectomy (4/11/2013); arthroscopy, knee; hysterectomy robotic xi (10/11/2018); salpingectomy (Bilateral, 10/11/2018); and orif, wrist (Right, 4/24/2019).    CURRENT MEDICATIONS  Home Medications       Reviewed by Rajendra Bland (Pharmacy Tech) on 12/07/22 at 1621  Med List Status: Complete     Medication Last Dose Status   divalproex (DEPAKOTE) 250 MG Tablet Delayed Response 12/7/2022 Active   gabapentin (NEURONTIN) 100 MG Cap 12/7/2022 Active   hydrOXYzine pamoate (VISTARIL) 50 MG Cap 12/6/2022 Active   zonisamide (ZONEGRAN) 50 MG capsule 12/6/2022 Active                    ALLERGIES  Allergies   Allergen Reactions    Promethazine Hcl Anxiety     Anxiety and makes her feels like skin coming off        PHYSICAL EXAM  VITAL SIGNS: /81   Pulse 95   Temp 36.8 °C (98.2 °F) (Temporal)   Resp 16   Ht 1.702 m (5' 7\")   Wt 66 kg (145 lb 8.1 oz)   LMP 10/30/2018   SpO2 99%   BMI 22.79 kg/m²  @[email or web address][[id number]::@   Pulse ox interpretation: I interpret this pulse ox as normal.  Constitutional: Alert in no apparent distress.  HENT: No signs of trauma, Bilateral external ears normal, Nose normal.   Eyes: Pupils are equal and reactive  Neck: Normal range of motion, No tenderness, Supple  Cardiovascular: Regular rate and rhythm, no murmurs.   Thorax & Lungs: Normal breath sounds, No respiratory distress, No wheezing, No chest tenderness.   Abdomen: Bowel sounds normal, Soft, tenderness to the midepigastrium with even minimal depth of palpation.  Mild guarding.  Skin: Warm, Dry, No erythema, No rash.   Extremities: Intact distal pulses  Musculoskeletal: Good range of motion in all major joints. No tenderness to " palpation or major deformities noted.   Neurologic: Alert , Normal motor function, Normal sensory function, No focal deficits noted.   Psychiatric: Affect normal, Judgment normal, Mood normal.       DIAGNOSTIC STUDIES / PROCEDURES      LABS  Results for orders placed or performed during the hospital encounter of 12/07/22   CBC with Differential   Result Value Ref Range    WBC 7.7 4.8 - 10.8 K/uL    RBC 4.95 4.20 - 5.40 M/uL    Hemoglobin 15.3 12.0 - 16.0 g/dL    Hematocrit 45.1 37.0 - 47.0 %    MCV 91.1 81.4 - 97.8 fL    MCH 30.9 27.0 - 33.0 pg    MCHC 33.9 33.6 - 35.0 g/dL    RDW 43.8 35.9 - 50.0 fL    Platelet Count 310 164 - 446 K/uL    MPV 9.2 9.0 - 12.9 fL    Neutrophils-Polys 51.40 44.00 - 72.00 %    Lymphocytes 42.20 (H) 22.00 - 41.00 %    Monocytes 4.90 0.00 - 13.40 %    Eosinophils 0.70 0.00 - 6.90 %    Basophils 0.50 0.00 - 1.80 %    Immature Granulocytes 0.30 0.00 - 0.90 %    Nucleated RBC 0.00 /100 WBC    Neutrophils (Absolute) 3.95 2.00 - 7.15 K/uL    Lymphs (Absolute) 3.24 1.00 - 4.80 K/uL    Monos (Absolute) 0.38 0.00 - 0.85 K/uL    Eos (Absolute) 0.05 0.00 - 0.51 K/uL    Baso (Absolute) 0.04 0.00 - 0.12 K/uL    Immature Granulocytes (abs) 0.02 0.00 - 0.11 K/uL    NRBC (Absolute) 0.00 K/uL   Complete Metabolic Panel   Result Value Ref Range    Sodium 138 135 - 145 mmol/L    Potassium 3.7 3.6 - 5.5 mmol/L    Chloride 102 96 - 112 mmol/L    Co2 25 20 - 33 mmol/L    Anion Gap 11.0 7.0 - 16.0    Glucose 115 (H) 65 - 99 mg/dL    Bun 7 (L) 8 - 22 mg/dL    Creatinine 0.80 0.50 - 1.40 mg/dL    Calcium 9.6 8.4 - 10.2 mg/dL    AST(SGOT) 18 12 - 45 U/L    ALT(SGPT) 17 2 - 50 U/L    Alkaline Phosphatase 71 30 - 99 U/L    Total Bilirubin 0.6 0.1 - 1.5 mg/dL    Albumin 5.1 (H) 3.2 - 4.9 g/dL    Total Protein 8.1 6.0 - 8.2 g/dL    Globulin 3.0 1.9 - 3.5 g/dL    A-G Ratio 1.7 g/dL   Lipase   Result Value Ref Range    Lipase 510 (H) 7 - 58 U/L   Urinalysis    Specimen: Urine   Result Value Ref Range    Color Straw      Character Clear     Specific Gravity <=1.005 <1.035    Ph 6.5 5.0 - 8.0    Glucose Negative Negative mg/dL    Ketones Negative Negative mg/dL    Protein Negative Negative mg/dL    Bilirubin Negative Negative    Nitrite Negative Negative    Leukocyte Esterase Negative Negative    Occult Blood Negative Negative    Micro Urine Req see below    ESTIMATED GFR   Result Value Ref Range    GFR (CKD-EPI) 98 >60 mL/min/1.73 m 2   ETHYL ALCOHOL (BLOOD)   Result Value Ref Range    Diagnostic Alcohol <10.1 <10.1 mg/dL         RADIOLOGY  No orders to display         COURSE & MEDICAL DECISION MAKING  Pertinent Labs & Imaging studies reviewed. (See chart for details)    37-year-old female presenting the emergency department with the above presentation.  Chronic history of alcohol induced pancreatitis.  Despite her recent absence of alcohol intake she does have a recurrent elevated lipase which I do believe is an acute flare of her chronic pancreatitis.  For this reason she will be brought into an n.p.o. status for ongoing pain management and IV hydration.  Additional diagnostic alcohol was completed at the patient's request to prove that she had not had any alcohol today causing this.  Furthermore the patient states that she is unable to return to her current detox facility with any outpatient controlled substances such as narcotics for pain management.  She will need to be medically optimized from this acute pancreatitis flare from a pain standpoint before she can be discharged back to this facility.        FINAL IMPRESSION  1. Acute pancreatitis, unspecified complication status, unspecified pancreatitis type            Electronically signed by: Juni Coles M.D., 12/7/2022 5:21 PM

## 2022-12-08 NOTE — HOSPITAL COURSE
"Per notes, \"37 y.o. female with a past medical history of alcohol dependence who is currently sober, recurrent pancreatitis, bipolar disorder who presented 12/7/2022 with progressively worsening abdominal pain.  Patient reports that she has not been feeling well for the past 36 hours.  Reports having progressively worsening epigastric abdominal pain.  The pain is now severe rated as 10 out of 10.  Pain does radiate to the back.  No aggravating or relieving factors to the pain.  Reports associated nausea and vomiting.  Denies noticing any bowel changes\"  "

## 2022-12-08 NOTE — DISCHARGE PLANNING
Case Management Discharge Planning    Admission Date: 12/7/2022  GMLOS: 2.3  ALOS: 1    6-Clicks ADL Score: 24  6-Clicks Mobility Score: 23      Anticipated Discharge Dispo: Discharge Disposition: Discharged to home/self care (01)    DME Needed: No    Action(s) Taken: Chart review completed. No CM needs noted at this time.    Escalations Completed: None    Medically Clear: No    Next Steps:  f/u with medical team to discuss DC needs and barriers    Barriers to Discharge: Medical clearance

## 2022-12-08 NOTE — CARE PLAN
The patient is Stable - Low risk of patient condition declining or worsening    Shift Goals  Clinical Goals: Pts pain will be rated at 5/10 or lower post interventions  Patient Goals: Rest and comfort    Progress made toward(s) clinical / shift goals:  pt reports pain to be uncontrolled, will discuss with MD    Patient is not progressing towards the following goals:      Problem: Pain - Standard  Goal: Alleviation of pain or a reduction in pain to the patient’s comfort goal  Outcome: Progressing     Problem: Knowledge Deficit - Standard  Goal: Patient and family/care givers will demonstrate understanding of plan of care, disease process/condition, diagnostic tests and medications  Outcome: Progressing

## 2022-12-09 PROBLEM — J02.9 SORE THROAT: Status: ACTIVE | Noted: 2022-12-09

## 2022-12-09 LAB
ANION GAP SERPL CALC-SCNC: 12 MMOL/L (ref 7–16)
BUN SERPL-MCNC: 6 MG/DL (ref 8–22)
CALCIUM SERPL-MCNC: 9.1 MG/DL (ref 8.4–10.2)
CHLORIDE SERPL-SCNC: 104 MMOL/L (ref 96–112)
CO2 SERPL-SCNC: 21 MMOL/L (ref 20–33)
CREAT SERPL-MCNC: 0.58 MG/DL (ref 0.5–1.4)
ERYTHROCYTE [DISTWIDTH] IN BLOOD BY AUTOMATED COUNT: 43.9 FL (ref 35.9–50)
FLUAV RNA SPEC QL NAA+PROBE: NEGATIVE
FLUBV RNA SPEC QL NAA+PROBE: NEGATIVE
GFR SERPLBLD CREATININE-BSD FMLA CKD-EPI: 120 ML/MIN/1.73 M 2
GLUCOSE SERPL-MCNC: 90 MG/DL (ref 65–99)
HCT VFR BLD AUTO: 38.9 % (ref 37–47)
HGB BLD-MCNC: 13 G/DL (ref 12–16)
MAGNESIUM SERPL-MCNC: 1.8 MG/DL (ref 1.5–2.5)
MCH RBC QN AUTO: 30.7 PG (ref 27–33)
MCHC RBC AUTO-ENTMCNC: 33.4 G/DL (ref 33.6–35)
MCV RBC AUTO: 92 FL (ref 81.4–97.8)
PLATELET # BLD AUTO: 227 K/UL (ref 164–446)
PMV BLD AUTO: 9.7 FL (ref 9–12.9)
POTASSIUM SERPL-SCNC: 3.6 MMOL/L (ref 3.6–5.5)
RBC # BLD AUTO: 4.23 M/UL (ref 4.2–5.4)
RSV RNA SPEC QL NAA+PROBE: NEGATIVE
SARS-COV-2 RNA RESP QL NAA+PROBE: NOTDETECTED
SODIUM SERPL-SCNC: 137 MMOL/L (ref 135–145)
SPECIMEN SOURCE: NORMAL
WBC # BLD AUTO: 12.4 K/UL (ref 4.8–10.8)

## 2022-12-09 PROCEDURE — 99233 SBSQ HOSP IP/OBS HIGH 50: CPT | Performed by: INTERNAL MEDICINE

## 2022-12-09 PROCEDURE — 83735 ASSAY OF MAGNESIUM: CPT

## 2022-12-09 PROCEDURE — 85027 COMPLETE CBC AUTOMATED: CPT

## 2022-12-09 PROCEDURE — 700102 HCHG RX REV CODE 250 W/ 637 OVERRIDE(OP): Performed by: INTERNAL MEDICINE

## 2022-12-09 PROCEDURE — A9270 NON-COVERED ITEM OR SERVICE: HCPCS | Performed by: INTERNAL MEDICINE

## 2022-12-09 PROCEDURE — 0241U HCHG SARS-COV-2 COVID-19 NFCT DS RESP RNA 4 TRGT MIC: CPT

## 2022-12-09 PROCEDURE — 94760 N-INVAS EAR/PLS OXIMETRY 1: CPT

## 2022-12-09 PROCEDURE — 36415 COLL VENOUS BLD VENIPUNCTURE: CPT

## 2022-12-09 PROCEDURE — 80048 BASIC METABOLIC PNL TOTAL CA: CPT

## 2022-12-09 PROCEDURE — 770001 HCHG ROOM/CARE - MED/SURG/GYN PRIV*

## 2022-12-09 PROCEDURE — C9803 HOPD COVID-19 SPEC COLLECT: HCPCS | Performed by: INTERNAL MEDICINE

## 2022-12-09 PROCEDURE — A9270 NON-COVERED ITEM OR SERVICE: HCPCS | Performed by: HOSPITALIST

## 2022-12-09 PROCEDURE — 700111 HCHG RX REV CODE 636 W/ 250 OVERRIDE (IP): Performed by: INTERNAL MEDICINE

## 2022-12-09 PROCEDURE — 700105 HCHG RX REV CODE 258: Performed by: HOSPITALIST

## 2022-12-09 PROCEDURE — 700102 HCHG RX REV CODE 250 W/ 637 OVERRIDE(OP): Performed by: HOSPITALIST

## 2022-12-09 PROCEDURE — 700111 HCHG RX REV CODE 636 W/ 250 OVERRIDE (IP): Performed by: HOSPITALIST

## 2022-12-09 RX ORDER — GUAIFENESIN 200 MG/10ML
10 LIQUID ORAL EVERY 4 HOURS PRN
Status: DISCONTINUED | OUTPATIENT
Start: 2022-12-09 | End: 2022-12-12 | Stop reason: HOSPADM

## 2022-12-09 RX ADMIN — ZONISAMIDE 150 MG: 50 CAPSULE ORAL at 21:29

## 2022-12-09 RX ADMIN — ACETAMINOPHEN 650 MG: 325 TABLET, FILM COATED ORAL at 21:28

## 2022-12-09 RX ADMIN — MORPHINE SULFATE 4 MG: 4 INJECTION INTRAVENOUS at 20:47

## 2022-12-09 RX ADMIN — SODIUM CHLORIDE, POTASSIUM CHLORIDE, SODIUM LACTATE AND CALCIUM CHLORIDE: 600; 310; 30; 20 INJECTION, SOLUTION INTRAVENOUS at 05:55

## 2022-12-09 RX ADMIN — MORPHINE SULFATE 4 MG: 4 INJECTION INTRAVENOUS at 01:32

## 2022-12-09 RX ADMIN — MORPHINE SULFATE 4 MG: 4 INJECTION INTRAVENOUS at 23:53

## 2022-12-09 RX ADMIN — BENZOCAINE AND MENTHOL 1 LOZENGE: 15; 3.6 LOZENGE ORAL at 19:42

## 2022-12-09 RX ADMIN — OXYCODONE HYDROCHLORIDE 10 MG: 10 TABLET ORAL at 00:30

## 2022-12-09 RX ADMIN — MORPHINE SULFATE 4 MG: 4 INJECTION INTRAVENOUS at 10:54

## 2022-12-09 RX ADMIN — GABAPENTIN 300 MG: 300 CAPSULE ORAL at 17:22

## 2022-12-09 RX ADMIN — MORPHINE SULFATE 4 MG: 4 INJECTION INTRAVENOUS at 06:51

## 2022-12-09 RX ADMIN — OXYCODONE HYDROCHLORIDE 10 MG: 10 TABLET ORAL at 09:08

## 2022-12-09 RX ADMIN — OXYCODONE HYDROCHLORIDE 10 MG: 10 TABLET ORAL at 22:46

## 2022-12-09 RX ADMIN — DIVALPROEX SODIUM 250 MG: 250 TABLET, DELAYED RELEASE ORAL at 05:51

## 2022-12-09 RX ADMIN — GABAPENTIN 100 MG: 100 CAPSULE ORAL at 05:51

## 2022-12-09 RX ADMIN — OXYCODONE HYDROCHLORIDE 10 MG: 10 TABLET ORAL at 14:33

## 2022-12-09 RX ADMIN — MORPHINE SULFATE 4 MG: 4 INJECTION INTRAVENOUS at 15:41

## 2022-12-09 RX ADMIN — NICOTINE TRANSDERMAL SYSTEM 21 MG: 21 PATCH, EXTENDED RELEASE TRANSDERMAL at 05:51

## 2022-12-09 RX ADMIN — ONDANSETRON 4 MG: 4 TABLET, ORALLY DISINTEGRATING ORAL at 15:06

## 2022-12-09 RX ADMIN — ONDANSETRON 4 MG: 2 INJECTION INTRAMUSCULAR; INTRAVENOUS at 05:51

## 2022-12-09 RX ADMIN — OXYCODONE HYDROCHLORIDE 10 MG: 10 TABLET ORAL at 19:42

## 2022-12-09 RX ADMIN — SODIUM CHLORIDE, POTASSIUM CHLORIDE, SODIUM LACTATE AND CALCIUM CHLORIDE: 600; 310; 30; 20 INJECTION, SOLUTION INTRAVENOUS at 17:19

## 2022-12-09 RX ADMIN — OXYCODONE HYDROCHLORIDE 10 MG: 10 TABLET ORAL at 05:50

## 2022-12-09 RX ADMIN — DIVALPROEX SODIUM 250 MG: 250 TABLET, DELAYED RELEASE ORAL at 17:22

## 2022-12-09 ASSESSMENT — PAIN DESCRIPTION - PAIN TYPE
TYPE: ACUTE PAIN

## 2022-12-09 ASSESSMENT — ENCOUNTER SYMPTOMS
COUGH: 1
SORE THROAT: 1
ABDOMINAL PAIN: 1

## 2022-12-09 ASSESSMENT — PATIENT HEALTH QUESTIONNAIRE - PHQ9
SUM OF ALL RESPONSES TO PHQ9 QUESTIONS 1 AND 2: 0
2. FEELING DOWN, DEPRESSED, IRRITABLE, OR HOPELESS: NOT AT ALL
1. LITTLE INTEREST OR PLEASURE IN DOING THINGS: NOT AT ALL

## 2022-12-09 NOTE — CARE PLAN
The patient is Watcher - Medium risk of patient condition declining or worsening    Shift Goals  Clinical Goals: Pt's pain will remain tolerable at 5/10 or below after given PRN pain med  Patient Goals: Pain mgt and sleep and rest  Family Goals: NA    Progress made toward(s) clinical / shift goals:  Pt required multiple doses of IV PRN pain med for pain to remain tolerable at 5/10; Pt reported nausea at the start of shift but verbalized improvement after given IV zofran; Pt able to tolerate sips of clear liquid. Progressing in other goals.       Problem: Knowledge Deficit - Standard  Goal: Patient and family/care givers will demonstrate understanding of plan of care, disease process/condition, diagnostic tests and medications  Outcome: Progressing     Problem: Psychosocial  Goal: Patient's level of anxiety will decrease  Outcome: Progressing     Problem: Gastrointestinal Irritability  Goal: Nausea and vomiting will be absent or improve  Outcome: Progressing     Problem: Mobility  Goal: Patient's capacity to carry out activities will improve  Outcome: Progressing     Patient is not progressing towards the following goals:      Problem: Pain - Standard  Goal: Alleviation of pain or a reduction in pain to the patient’s comfort goal  Outcome: Not Progressing

## 2022-12-09 NOTE — ASSESSMENT & PLAN NOTE
Patient noted cough, congestion, sore throat on the morning of 12/9  Flu/COVID/RSV negative but still having symptoms  Symptomatic management

## 2022-12-09 NOTE — PROGRESS NOTES
Patient sitting up in bed. Alert and oriented x4. Complains of 7/10 abdominal pain; declines intervention at this time. Discussed plan of care and understood. Call light placed within reach.

## 2022-12-09 NOTE — PROGRESS NOTES
Received report from day shift RN. Pt resting in bed. Pt A&O X 4, on RA. Pt reports nausea with no observed vomiting and abd pain of 7/10, medicated per MAR. Pt tolerates sips of water. Pt continued on IV fluids, pain regimen discussed. Fall precautions in place, call light within reach. All needs met at this time.

## 2022-12-09 NOTE — PROGRESS NOTES
Received report from RN. Pt is A&O 4. VSS. Pt complained of pain about 7/10, complained of nausea  as of assessment. No signs of respiratory distress noted. Educated on pain medication use. Safety precaution in place. All needs met at this time.

## 2022-12-09 NOTE — PROGRESS NOTES
"Hospital Medicine Daily Progress Note    Date of Service  12/9/2022    Chief Complaint  Rhiannon Marrero is a 37 y.o. female admitted 12/7/2022 with abdominal pain    Hospital Course  Per notes, \"37 y.o. female with a past medical history of alcohol dependence who is currently sober, recurrent pancreatitis, bipolar disorder who presented 12/7/2022 with progressively worsening abdominal pain.  Patient reports that she has not been feeling well for the past 36 hours.  Reports having progressively worsening epigastric abdominal pain.  The pain is now severe rated as 10 out of 10.  Pain does radiate to the back.  No aggravating or relieving factors to the pain.  Reports associated nausea and vomiting.  Denies noticing any bowel changes\"    Interval Problem Update  Pain still not controlled, not tolerating p.o. also planing of cough and sore throat today.  Patient states she had several sick with COVID.  COVID flu and RSV swab sent    I have discussed this patient's plan of care and discharge plan at IDT rounds today with Case Management, Nursing, Nursing leadership, and other members of the IDT team.    Consultants/Specialty  None    Code Status  Full Code    Disposition  Patient is not medically cleared for discharge.   Anticipate discharge to to home with close outpatient follow-up.  I have placed the appropriate orders for post-discharge needs.    Review of Systems  Review of Systems   HENT:  Positive for congestion and sore throat.    Respiratory:  Positive for cough.    Gastrointestinal:  Positive for abdominal pain.   All other systems reviewed and are negative.     Physical Exam  Temp:  [36.4 °C (97.6 °F)-36.8 °C (98.3 °F)] 36.7 °C (98 °F)  Pulse:  [] 100  Resp:  [16-18] 17  BP: (108-142)/(69-92) 110/69  SpO2:  [96 %-100 %] 100 %    Physical Exam  Vitals and nursing note reviewed.   Cardiovascular:      Rate and Rhythm: Normal rate and regular rhythm.      Pulses: Normal pulses.      Heart sounds: Normal " heart sounds.   Pulmonary:      Effort: Pulmonary effort is normal.      Breath sounds: Normal breath sounds.   Abdominal:      General: Abdomen is flat. Bowel sounds are normal.      Palpations: Abdomen is soft.   Musculoskeletal:      Right lower leg: No edema.      Left lower leg: No edema.   Neurological:      General: No focal deficit present.      Mental Status: She is alert and oriented to person, place, and time. Mental status is at baseline.       Fluids    Intake/Output Summary (Last 24 hours) at 12/9/2022 1414  Last data filed at 12/9/2022 1200  Gross per 24 hour   Intake 480 ml   Output 1300 ml   Net -820 ml       Laboratory  Recent Labs     12/07/22  1501 12/08/22  0437 12/09/22  0201   WBC 7.7 6.3 12.4*   RBC 4.95 4.31 4.23   HEMOGLOBIN 15.3 13.0 13.0   HEMATOCRIT 45.1 39.5 38.9   MCV 91.1 91.6 92.0   MCH 30.9 30.2 30.7   MCHC 33.9 32.9* 33.4*   RDW 43.8 43.4 43.9   PLATELETCT 310 243 227   MPV 9.2 9.4 9.7     Recent Labs     12/07/22  1501 12/08/22  0437 12/09/22  0201   SODIUM 138 140 137   POTASSIUM 3.7 3.6 3.6   CHLORIDE 102 107 104   CO2 25 22 21   GLUCOSE 115* 100* 90   BUN 7* 8 6*   CREATININE 0.80 0.62 0.58   CALCIUM 9.6 8.7 9.1                   Imaging  No orders to display        Assessment/Plan  * Recurrent pancreatitis- (present on admission)  Assessment & Plan  AST, ALT, alkaline phosphatase and bilirubin within normal limits unlikely biliary etiology.  Discussed potential relationship to Depakote use, however patient says she wants to continue on it as it works so well for her bipolar disorder  Supportive care, multimodal pain control.  Dose increased due to inadequate control.  Patient states she wishes to use as little opiates as possible for control of, does not want opiates prescribed on discharge as she is currently in remission for substance abuse disorder and wishes to remain so.  Continue with IV fluids until patient is tolerating diet-still having abdominal pain and not  tolerating clear liquids  Continue with n.p.o.  Watch closely for potential complications including pleural effusion, hypocalcemia, acute renal failure, compartment syndrome       Sore throat- (present on admission)  Assessment & Plan  Patient noted cough, congestion, sore throat on the morning of 12/9  Flu/COVID/RSV swab sent  Symptomatic management    Bipolar disorder (HCC)- (present on admission)  Assessment & Plan  Resume home Depakote [discussed potential cause for her pancreatitis however patient says she wants to continue on it as it works so well for her bipolar disorder]    Seizure disorder (HCC)- (present on admission)  Assessment & Plan  Continue home Depakote, gabapentin and zonisamide    Alcohol dependence (HCC)- (present on admission)  Assessment & Plan  Reports that she has been sober for 2 months, he is currently in a treatment program and wishes to continue       VTE prophylaxis: SCDs/TEDs    I have performed a physical exam and reviewed and updated ROS and Plan today (12/9/2022). In review of yesterday's note (12/8/2022), there are no changes except as documented above.

## 2022-12-10 LAB
ALBUMIN SERPL BCP-MCNC: 4.1 G/DL (ref 3.2–4.9)
ALBUMIN/GLOB SERPL: 1.5 G/DL
ALP SERPL-CCNC: 62 U/L (ref 30–99)
ALT SERPL-CCNC: 10 U/L (ref 2–50)
ANION GAP SERPL CALC-SCNC: 11 MMOL/L (ref 7–16)
AST SERPL-CCNC: 13 U/L (ref 12–45)
BILIRUB SERPL-MCNC: 0.5 MG/DL (ref 0.1–1.5)
BUN SERPL-MCNC: 4 MG/DL (ref 8–22)
CALCIUM SERPL-MCNC: 8.8 MG/DL (ref 8.4–10.2)
CHLORIDE SERPL-SCNC: 98 MMOL/L (ref 96–112)
CO2 SERPL-SCNC: 25 MMOL/L (ref 20–33)
CREAT SERPL-MCNC: 0.58 MG/DL (ref 0.5–1.4)
GFR SERPLBLD CREATININE-BSD FMLA CKD-EPI: 120 ML/MIN/1.73 M 2
GLOBULIN SER CALC-MCNC: 2.7 G/DL (ref 1.9–3.5)
GLUCOSE SERPL-MCNC: 86 MG/DL (ref 65–99)
POTASSIUM SERPL-SCNC: 3.7 MMOL/L (ref 3.6–5.5)
PROT SERPL-MCNC: 6.8 G/DL (ref 6–8.2)
SODIUM SERPL-SCNC: 134 MMOL/L (ref 135–145)

## 2022-12-10 PROCEDURE — 36415 COLL VENOUS BLD VENIPUNCTURE: CPT

## 2022-12-10 PROCEDURE — A9270 NON-COVERED ITEM OR SERVICE: HCPCS | Performed by: INTERNAL MEDICINE

## 2022-12-10 PROCEDURE — 700105 HCHG RX REV CODE 258: Performed by: HOSPITALIST

## 2022-12-10 PROCEDURE — 700111 HCHG RX REV CODE 636 W/ 250 OVERRIDE (IP): Performed by: INTERNAL MEDICINE

## 2022-12-10 PROCEDURE — A9270 NON-COVERED ITEM OR SERVICE: HCPCS | Performed by: HOSPITALIST

## 2022-12-10 PROCEDURE — 700102 HCHG RX REV CODE 250 W/ 637 OVERRIDE(OP): Performed by: INTERNAL MEDICINE

## 2022-12-10 PROCEDURE — 99233 SBSQ HOSP IP/OBS HIGH 50: CPT | Performed by: INTERNAL MEDICINE

## 2022-12-10 PROCEDURE — 94760 N-INVAS EAR/PLS OXIMETRY 1: CPT

## 2022-12-10 PROCEDURE — 700102 HCHG RX REV CODE 250 W/ 637 OVERRIDE(OP): Performed by: HOSPITALIST

## 2022-12-10 PROCEDURE — 770001 HCHG ROOM/CARE - MED/SURG/GYN PRIV*

## 2022-12-10 PROCEDURE — 80053 COMPREHEN METABOLIC PANEL: CPT

## 2022-12-10 PROCEDURE — 700111 HCHG RX REV CODE 636 W/ 250 OVERRIDE (IP): Performed by: HOSPITALIST

## 2022-12-10 RX ORDER — MORPHINE SULFATE 4 MG/ML
4 INJECTION INTRAVENOUS EVERY 4 HOURS PRN
Status: DISCONTINUED | OUTPATIENT
Start: 2022-12-10 | End: 2022-12-12 | Stop reason: HOSPADM

## 2022-12-10 RX ORDER — MORPHINE SULFATE 4 MG/ML
4 INJECTION INTRAVENOUS EVERY 4 HOURS PRN
Status: DISCONTINUED | OUTPATIENT
Start: 2022-12-10 | End: 2022-12-10

## 2022-12-10 RX ADMIN — MORPHINE SULFATE 4 MG: 4 INJECTION INTRAVENOUS at 15:30

## 2022-12-10 RX ADMIN — NICOTINE TRANSDERMAL SYSTEM 21 MG: 21 PATCH, EXTENDED RELEASE TRANSDERMAL at 04:40

## 2022-12-10 RX ADMIN — OXYCODONE HYDROCHLORIDE 10 MG: 10 TABLET ORAL at 13:49

## 2022-12-10 RX ADMIN — OXYCODONE HYDROCHLORIDE 10 MG: 10 TABLET ORAL at 02:08

## 2022-12-10 RX ADMIN — DIVALPROEX SODIUM 250 MG: 250 TABLET, DELAYED RELEASE ORAL at 19:00

## 2022-12-10 RX ADMIN — OXYCODONE HYDROCHLORIDE 10 MG: 10 TABLET ORAL at 05:52

## 2022-12-10 RX ADMIN — OXYCODONE HYDROCHLORIDE 10 MG: 10 TABLET ORAL at 16:49

## 2022-12-10 RX ADMIN — ONDANSETRON 4 MG: 2 INJECTION INTRAMUSCULAR; INTRAVENOUS at 19:41

## 2022-12-10 RX ADMIN — ZONISAMIDE 150 MG: 50 CAPSULE ORAL at 20:46

## 2022-12-10 RX ADMIN — OXYCODONE HYDROCHLORIDE 10 MG: 10 TABLET ORAL at 22:40

## 2022-12-10 RX ADMIN — GABAPENTIN 100 MG: 100 CAPSULE ORAL at 04:43

## 2022-12-10 RX ADMIN — DIVALPROEX SODIUM 250 MG: 250 TABLET, DELAYED RELEASE ORAL at 04:43

## 2022-12-10 RX ADMIN — MORPHINE SULFATE 4 MG: 4 INJECTION INTRAVENOUS at 11:29

## 2022-12-10 RX ADMIN — MORPHINE SULFATE 4 MG: 4 INJECTION INTRAVENOUS at 06:56

## 2022-12-10 RX ADMIN — MORPHINE SULFATE 4 MG: 4 INJECTION INTRAVENOUS at 23:55

## 2022-12-10 RX ADMIN — SODIUM CHLORIDE, POTASSIUM CHLORIDE, SODIUM LACTATE AND CALCIUM CHLORIDE: 600; 310; 30; 20 INJECTION, SOLUTION INTRAVENOUS at 15:32

## 2022-12-10 RX ADMIN — BENZOCAINE AND MENTHOL 1 LOZENGE: 15; 3.6 LOZENGE ORAL at 04:43

## 2022-12-10 RX ADMIN — GABAPENTIN 300 MG: 300 CAPSULE ORAL at 19:00

## 2022-12-10 RX ADMIN — SODIUM CHLORIDE, POTASSIUM CHLORIDE, SODIUM LACTATE AND CALCIUM CHLORIDE: 600; 310; 30; 20 INJECTION, SOLUTION INTRAVENOUS at 05:54

## 2022-12-10 RX ADMIN — MORPHINE SULFATE 4 MG: 4 INJECTION INTRAVENOUS at 03:22

## 2022-12-10 RX ADMIN — OXYCODONE HYDROCHLORIDE 10 MG: 10 TABLET ORAL at 09:44

## 2022-12-10 RX ADMIN — MORPHINE SULFATE 4 MG: 4 INJECTION INTRAVENOUS at 19:35

## 2022-12-10 RX ADMIN — ONDANSETRON 4 MG: 2 INJECTION INTRAMUSCULAR; INTRAVENOUS at 07:29

## 2022-12-10 ASSESSMENT — PAIN DESCRIPTION - PAIN TYPE
TYPE: ACUTE PAIN

## 2022-12-10 ASSESSMENT — ENCOUNTER SYMPTOMS
SORE THROAT: 1
ABDOMINAL PAIN: 1
COUGH: 1

## 2022-12-10 ASSESSMENT — PATIENT HEALTH QUESTIONNAIRE - PHQ9
2. FEELING DOWN, DEPRESSED, IRRITABLE, OR HOPELESS: NOT AT ALL
SUM OF ALL RESPONSES TO PHQ9 QUESTIONS 1 AND 2: 0
1. LITTLE INTEREST OR PLEASURE IN DOING THINGS: NOT AT ALL

## 2022-12-10 NOTE — PROGRESS NOTES
Received report from day shift nurse. Assumed pt care at 1915. Pt is A&Ox4, resting comfortably in bed. Pt on r.a.. No signs of SOB/respiratory distress. Labs noted, VSS.  Fall precautions in place. Bed at lowest position. Call light and personal belongings within reach. Continue to monitor

## 2022-12-10 NOTE — PROGRESS NOTES
"Hospital Medicine Daily Progress Note    Date of Service  12/10/2022    Chief Complaint  Rhiannon Marrero is a 37 y.o. female admitted 12/7/2022 with abdominal pain    Hospital Course  Per notes, \"37 y.o. female with a past medical history of alcohol dependence who is currently sober, recurrent pancreatitis, bipolar disorder who presented 12/7/2022 with progressively worsening abdominal pain.  Patient reports that she has not been feeling well for the past 36 hours.  Reports having progressively worsening epigastric abdominal pain.  The pain is now severe rated as 10 out of 10.  Pain does radiate to the back.  No aggravating or relieving factors to the pain.  Reports associated nausea and vomiting.  Denies noticing any bowel changes\"    Interval Problem Update  Patient ill-appearing compared to yesterday, now with nausea and vomiting, still having abdominal pain and unable to tolerate p.o.    I have discussed this patient's plan of care and discharge plan at IDT rounds today with Case Management, Nursing, Nursing leadership, and other members of the IDT team.    Consultants/Specialty  None    Code Status  Full Code    Disposition  Patient is not medically cleared for discharge.   Anticipate discharge to to home with close outpatient follow-up.  I have placed the appropriate orders for post-discharge needs.    Review of Systems  Review of Systems   HENT:  Positive for congestion and sore throat.    Respiratory:  Positive for cough.    Gastrointestinal:  Positive for abdominal pain.   All other systems reviewed and are negative.     Physical Exam  Temp:  [36.7 °C (98 °F)-37.1 °C (98.8 °F)] 36.7 °C (98 °F)  Pulse:  [] 99  Resp:  [17-20] 18  BP: (101-121)/(63-82) 113/66  SpO2:  [94 %-98 %] 95 %    Physical Exam  Vitals and nursing note reviewed.   Cardiovascular:      Rate and Rhythm: Normal rate and regular rhythm.      Pulses: Normal pulses.      Heart sounds: Normal heart sounds.   Pulmonary:      Effort: " Pulmonary effort is normal.      Breath sounds: Normal breath sounds.   Abdominal:      General: Abdomen is flat. Bowel sounds are normal.      Palpations: Abdomen is soft.   Musculoskeletal:      Right lower leg: No edema.      Left lower leg: No edema.   Neurological:      General: No focal deficit present.      Mental Status: She is alert and oriented to person, place, and time. Mental status is at baseline.       Fluids    Intake/Output Summary (Last 24 hours) at 12/10/2022 1127  Last data filed at 12/10/2022 0500  Gross per 24 hour   Intake 440 ml   Output --   Net 440 ml       Laboratory  Recent Labs     12/07/22  1501 12/08/22  0437 12/09/22  0201   WBC 7.7 6.3 12.4*   RBC 4.95 4.31 4.23   HEMOGLOBIN 15.3 13.0 13.0   HEMATOCRIT 45.1 39.5 38.9   MCV 91.1 91.6 92.0   MCH 30.9 30.2 30.7   MCHC 33.9 32.9* 33.4*   RDW 43.8 43.4 43.9   PLATELETCT 310 243 227   MPV 9.2 9.4 9.7     Recent Labs     12/07/22  1501 12/08/22  0437 12/09/22  0201   SODIUM 138 140 137   POTASSIUM 3.7 3.6 3.6   CHLORIDE 102 107 104   CO2 25 22 21   GLUCOSE 115* 100* 90   BUN 7* 8 6*   CREATININE 0.80 0.62 0.58   CALCIUM 9.6 8.7 9.1                   Imaging  No orders to display        Assessment/Plan  * Recurrent pancreatitis- (present on admission)  Assessment & Plan  AST, ALT, alkaline phosphatase and bilirubin within normal limits unlikely biliary etiology.  Discussed potential relationship to Depakote use, however patient says she wants to continue on it as it works so well for her bipolar disorder  Supportive care, multimodal pain control.  Dose increased due to inadequate control.  Patient states she wishes to use as little opiates as possible for control of, does not want opiates prescribed on discharge as she is currently in remission for substance abuse disorder and wishes to remain so.  Continue with IV fluids until patient is tolerating diet-still having abdominal pain and not tolerating clear liquids  Continue with  n.p.o.  Watch closely for potential complications including pleural effusion, hypocalcemia, acute renal failure, compartment syndrome   Pain not well controlled and she is not tolerating p.o. at this time so have adjusted pain regimen to allow for IV morphine's.    If continues to worsen and consider rescanning.    Sore throat- (present on admission)  Assessment & Plan  Patient noted cough, congestion, sore throat on the morning of 12/9  Flu/COVID/RSV negative but still having symptoms  Symptomatic management    Bipolar disorder (HCC)- (present on admission)  Assessment & Plan  Resume home Depakote [discussed potential cause for her pancreatitis however patient says she wants to continue on it as it works so well for her bipolar disorder]    Seizure disorder (HCC)- (present on admission)  Assessment & Plan  Continue home Depakote, gabapentin and zonisamide    Alcohol dependence (HCC)- (present on admission)  Assessment & Plan  Reports that she has been sober for 2 months, he is currently in a treatment program and wishes to continue       VTE prophylaxis: SCDs/TEDs    I have performed a physical exam and reviewed and updated ROS and Plan today (12/10/2022). In review of yesterday's note (12/9/2022), there are no changes except as documented above.

## 2022-12-10 NOTE — CARE PLAN
The patient is Stable - Low risk of patient condition declining or worsening    Shift Goals  Clinical Goals: pain controlled at the end of the shift, free from falls throughout the shift  Patient Goals: sleep comfortably troughout the shift  Family Goals: NA    Progress made toward(s) clinical / shift goals:  PRN pain medications given, fall precautions in place, bed place in lowest position, hourly rounding done.    Patient is not progressing towards the following goals: n/a

## 2022-12-10 NOTE — CARE PLAN
The patient is Stable - Low risk of patient condition declining or worsening    Shift Goals  Clinical Goals: pain remains controlled  Patient Goals: pain gets controlled; free from respiratory illness  Family Goals: NA    Progress made toward(s) clinical / shift goals:  pain control and safety     Patient is not progressing towards the following goals:

## 2022-12-10 NOTE — PROGRESS NOTES
Received report from day shift nurse. Assumed pt care at 2011. Pt is A&Ox4, resting comfortably in bed. Pt on r.a. No signs of SOB/respiratory distress. Labs noted, VSS. Complains of pain in the epigastric area with a scale of 7.5/10. PRN oxycodone given. RUE edema noted. Fall precautions in place. Bed at lowest position. Call light and personal belongings within reach. Continue to monitor .

## 2022-12-10 NOTE — PROGRESS NOTES
Care of patient transferred to [place] and [place] RN's. Report given and pt transferred in stable condition.

## 2022-12-11 LAB
ANION GAP SERPL CALC-SCNC: 13 MMOL/L (ref 7–16)
BUN SERPL-MCNC: 3 MG/DL (ref 8–22)
CALCIUM SERPL-MCNC: 9 MG/DL (ref 8.4–10.2)
CHLORIDE SERPL-SCNC: 98 MMOL/L (ref 96–112)
CO2 SERPL-SCNC: 22 MMOL/L (ref 20–33)
CREAT SERPL-MCNC: 0.53 MG/DL (ref 0.5–1.4)
ERYTHROCYTE [DISTWIDTH] IN BLOOD BY AUTOMATED COUNT: 42.9 FL (ref 35.9–50)
GFR SERPLBLD CREATININE-BSD FMLA CKD-EPI: 123 ML/MIN/1.73 M 2
GLUCOSE SERPL-MCNC: 84 MG/DL (ref 65–99)
HCT VFR BLD AUTO: 36.7 % (ref 37–47)
HGB BLD-MCNC: 12.1 G/DL (ref 12–16)
MAGNESIUM SERPL-MCNC: 1.7 MG/DL (ref 1.5–2.5)
MCH RBC QN AUTO: 30.5 PG (ref 27–33)
MCHC RBC AUTO-ENTMCNC: 33 G/DL (ref 33.6–35)
MCV RBC AUTO: 92.4 FL (ref 81.4–97.8)
PLATELET # BLD AUTO: 203 K/UL (ref 164–446)
PMV BLD AUTO: 9.6 FL (ref 9–12.9)
POTASSIUM SERPL-SCNC: 3.5 MMOL/L (ref 3.6–5.5)
RBC # BLD AUTO: 3.97 M/UL (ref 4.2–5.4)
SODIUM SERPL-SCNC: 133 MMOL/L (ref 135–145)
WBC # BLD AUTO: 7.4 K/UL (ref 4.8–10.8)

## 2022-12-11 PROCEDURE — 700111 HCHG RX REV CODE 636 W/ 250 OVERRIDE (IP): Performed by: HOSPITALIST

## 2022-12-11 PROCEDURE — 700105 HCHG RX REV CODE 258: Performed by: HOSPITALIST

## 2022-12-11 PROCEDURE — 700102 HCHG RX REV CODE 250 W/ 637 OVERRIDE(OP): Performed by: INTERNAL MEDICINE

## 2022-12-11 PROCEDURE — A9270 NON-COVERED ITEM OR SERVICE: HCPCS | Performed by: INTERNAL MEDICINE

## 2022-12-11 PROCEDURE — 85027 COMPLETE CBC AUTOMATED: CPT

## 2022-12-11 PROCEDURE — A9270 NON-COVERED ITEM OR SERVICE: HCPCS | Performed by: HOSPITALIST

## 2022-12-11 PROCEDURE — 770001 HCHG ROOM/CARE - MED/SURG/GYN PRIV*

## 2022-12-11 PROCEDURE — 99232 SBSQ HOSP IP/OBS MODERATE 35: CPT | Performed by: INTERNAL MEDICINE

## 2022-12-11 PROCEDURE — 94760 N-INVAS EAR/PLS OXIMETRY 1: CPT

## 2022-12-11 PROCEDURE — 700111 HCHG RX REV CODE 636 W/ 250 OVERRIDE (IP): Performed by: INTERNAL MEDICINE

## 2022-12-11 PROCEDURE — 36415 COLL VENOUS BLD VENIPUNCTURE: CPT

## 2022-12-11 PROCEDURE — 700102 HCHG RX REV CODE 250 W/ 637 OVERRIDE(OP): Performed by: HOSPITALIST

## 2022-12-11 PROCEDURE — 83735 ASSAY OF MAGNESIUM: CPT

## 2022-12-11 PROCEDURE — 80048 BASIC METABOLIC PNL TOTAL CA: CPT

## 2022-12-11 RX ORDER — POTASSIUM CHLORIDE 20 MEQ/1
40 TABLET, EXTENDED RELEASE ORAL ONCE
Status: COMPLETED | OUTPATIENT
Start: 2022-12-11 | End: 2022-12-11

## 2022-12-11 RX ADMIN — OXYCODONE HYDROCHLORIDE 10 MG: 10 TABLET ORAL at 09:54

## 2022-12-11 RX ADMIN — SODIUM CHLORIDE, POTASSIUM CHLORIDE, SODIUM LACTATE AND CALCIUM CHLORIDE: 600; 310; 30; 20 INJECTION, SOLUTION INTRAVENOUS at 01:17

## 2022-12-11 RX ADMIN — GABAPENTIN 100 MG: 100 CAPSULE ORAL at 04:47

## 2022-12-11 RX ADMIN — MORPHINE SULFATE 4 MG: 4 INJECTION INTRAVENOUS at 17:36

## 2022-12-11 RX ADMIN — DIVALPROEX SODIUM 250 MG: 250 TABLET, DELAYED RELEASE ORAL at 17:36

## 2022-12-11 RX ADMIN — MORPHINE SULFATE 4 MG: 4 INJECTION INTRAVENOUS at 13:29

## 2022-12-11 RX ADMIN — OXYCODONE HYDROCHLORIDE 10 MG: 10 TABLET ORAL at 12:50

## 2022-12-11 RX ADMIN — NICOTINE TRANSDERMAL SYSTEM 21 MG: 21 PATCH, EXTENDED RELEASE TRANSDERMAL at 04:47

## 2022-12-11 RX ADMIN — POTASSIUM CHLORIDE 40 MEQ: 1500 TABLET, EXTENDED RELEASE ORAL at 09:04

## 2022-12-11 RX ADMIN — MORPHINE SULFATE 4 MG: 4 INJECTION INTRAVENOUS at 04:44

## 2022-12-11 RX ADMIN — SODIUM CHLORIDE, POTASSIUM CHLORIDE, SODIUM LACTATE AND CALCIUM CHLORIDE: 600; 310; 30; 20 INJECTION, SOLUTION INTRAVENOUS at 09:49

## 2022-12-11 RX ADMIN — OXYCODONE 5 MG: 5 TABLET ORAL at 20:29

## 2022-12-11 RX ADMIN — MORPHINE SULFATE 4 MG: 4 INJECTION INTRAVENOUS at 21:44

## 2022-12-11 RX ADMIN — ZONISAMIDE 150 MG: 50 CAPSULE ORAL at 20:30

## 2022-12-11 RX ADMIN — GABAPENTIN 300 MG: 300 CAPSULE ORAL at 17:36

## 2022-12-11 RX ADMIN — ONDANSETRON 4 MG: 4 TABLET, ORALLY DISINTEGRATING ORAL at 20:30

## 2022-12-11 RX ADMIN — MORPHINE SULFATE 4 MG: 4 INJECTION INTRAVENOUS at 09:05

## 2022-12-11 RX ADMIN — OXYCODONE HYDROCHLORIDE 10 MG: 10 TABLET ORAL at 15:53

## 2022-12-11 RX ADMIN — DIVALPROEX SODIUM 250 MG: 250 TABLET, DELAYED RELEASE ORAL at 04:47

## 2022-12-11 RX ADMIN — OXYCODONE HYDROCHLORIDE 10 MG: 10 TABLET ORAL at 01:47

## 2022-12-11 ASSESSMENT — PAIN DESCRIPTION - PAIN TYPE
TYPE: ACUTE PAIN

## 2022-12-11 ASSESSMENT — ENCOUNTER SYMPTOMS
SORE THROAT: 1
ABDOMINAL PAIN: 1
COUGH: 1

## 2022-12-11 NOTE — PROGRESS NOTES
"Hospital Medicine Daily Progress Note    Date of Service  12/11/2022    Chief Complaint  Rhiannon Marrero is a 37 y.o. female admitted 12/7/2022 with abdominal pain    Hospital Course  Per notes, \"37 y.o. female with a past medical history of alcohol dependence who is currently sober, recurrent pancreatitis, bipolar disorder who presented 12/7/2022 with progressively worsening abdominal pain.  Patient reports that she has not been feeling well for the past 36 hours.  Reports having progressively worsening epigastric abdominal pain.  The pain is now severe rated as 10 out of 10.  Pain does radiate to the back.  No aggravating or relieving factors to the pain.  Reports associated nausea and vomiting.  Denies noticing any bowel changes\"    Interval Problem Update  Patient still having abdominal pain but looks much better compared to previous days.  Although she is having abdominal pain I do not think a repeat CT is necessary at this time.  Still has some congestion, likely viral upper respiratory infection.  Will progress diet and if she tolerates can possibly DC in 24 hours.    I have discussed this patient's plan of care and discharge plan at IDT rounds today with Case Management, Nursing, Nursing leadership, and other members of the IDT team.    Consultants/Specialty  None    Code Status  Full Code    Disposition  Patient is not medically cleared for discharge.   Anticipate discharge to to home with close outpatient follow-up.  I have placed the appropriate orders for post-discharge needs.    Review of Systems  Review of Systems   HENT:  Positive for congestion and sore throat.    Respiratory:  Positive for cough.    Gastrointestinal:  Positive for abdominal pain.   All other systems reviewed and are negative.     Physical Exam  Temp:  [36.5 °C (97.7 °F)-36.8 °C (98.2 °F)] 36.5 °C (97.7 °F)  Pulse:  [76-97] 76  Resp:  [17-18] 18  BP: (100-115)/(68-83) 105/69  SpO2:  [90 %-98 %] 92 %    Physical Exam  Vitals and " nursing note reviewed.   Cardiovascular:      Rate and Rhythm: Normal rate and regular rhythm.      Pulses: Normal pulses.      Heart sounds: Normal heart sounds.   Pulmonary:      Effort: Pulmonary effort is normal.      Breath sounds: Normal breath sounds.   Abdominal:      General: Abdomen is flat. Bowel sounds are normal.      Palpations: Abdomen is soft.   Musculoskeletal:      Right lower leg: No edema.      Left lower leg: No edema.   Neurological:      General: No focal deficit present.      Mental Status: She is alert and oriented to person, place, and time. Mental status is at baseline.       Fluids    Intake/Output Summary (Last 24 hours) at 12/11/2022 0929  Last data filed at 12/11/2022 0147  Gross per 24 hour   Intake 580 ml   Output --   Net 580 ml       Laboratory  Recent Labs     12/09/22  0201 12/11/22  0201   WBC 12.4* 7.4   RBC 4.23 3.97*   HEMOGLOBIN 13.0 12.1   HEMATOCRIT 38.9 36.7*   MCV 92.0 92.4   MCH 30.7 30.5   MCHC 33.4* 33.0*   RDW 43.9 42.9   PLATELETCT 227 203   MPV 9.7 9.6     Recent Labs     12/09/22  0201 12/10/22  1333 12/11/22 0201   SODIUM 137 134* 133*   POTASSIUM 3.6 3.7 3.5*   CHLORIDE 104 98 98   CO2 21 25 22   GLUCOSE 90 86 84   BUN 6* 4* 3*   CREATININE 0.58 0.58 0.53   CALCIUM 9.1 8.8 9.0                   Imaging  No orders to display        Assessment/Plan  * Recurrent pancreatitis- (present on admission)  Assessment & Plan  AST, ALT, alkaline phosphatase and bilirubin within normal limits unlikely biliary etiology.  Discussed potential relationship to Depakote use, however patient says she wants to continue on it as it works so well for her bipolar disorder  Supportive care, multimodal pain control.  Dose increased due to inadequate control.  Patient states she wishes to use as little opiates as possible for control of, does not want opiates prescribed on discharge as she is currently in remission for substance abuse disorder and wishes to remain so.  Continue with IV  fluids until patient is tolerating diet-still having abdominal pain and not tolerating clear liquids  Continue with n.p.o.  Watch closely for potential complications including pleural effusion, hypocalcemia, acute renal failure, compartment syndrome   Pain not well controlled and she is not tolerating p.o. at this time so have adjusted pain regimen to allow for IV morphine's.    Patient has had multiple CT scans, does have evidence of chronic pancreatitis.  Overall seems improved although still reports pain, this is likely chronic.  At this time I do not think she needs repeat CT scan.  If continues to improve can possibly DC in 24 hours.    Sore throat- (present on admission)  Assessment & Plan  Patient noted cough, congestion, sore throat on the morning of 12/9  Flu/COVID/RSV negative but still having symptoms  Symptomatic management    Bipolar disorder (HCC)- (present on admission)  Assessment & Plan  Resume home Depakote [discussed potential cause for her pancreatitis however patient says she wants to continue on it as it works so well for her bipolar disorder]    Seizure disorder (HCC)- (present on admission)  Assessment & Plan  Continue home Depakote, gabapentin and zonisamide    Alcohol dependence (HCC)- (present on admission)  Assessment & Plan  Reports that she has been sober for 2 months, he is currently in a treatment program and wishes to continue       VTE prophylaxis: SCDs/TEDs    I have performed a physical exam and reviewed and updated ROS and Plan today (12/11/2022). In review of yesterday's note (12/10/2022), there are no changes except as documented above.

## 2022-12-11 NOTE — CARE PLAN
The patient is Stable - Low risk of patient condition declining or worsening    Shift Goals  Clinical Goals: pain level will lower at 5/10  Patient Goals: sleep at least 8 hours  Family Goals: NA    Progress made toward(s) clinical / shift goals:  Pt received Zofran IV as prn dose during this shift and was relieved after. Call light within reach. Fall precautions in placed. Will continue to monitor. Hourly rounding in progress    Patient is not progressing towards the following goals:  Pt still received Morphine 4 mg IV and Oxycodone 10 mg po  as prn doses during this shift. Pain level will decrease at 6 and will be back again at 8. Will continue to monitor    Problem: Pain - Standard  Goal: Alleviation of pain or a reduction in pain to the patient’s comfort goal  Outcome: Not Progressing

## 2022-12-12 VITALS
DIASTOLIC BLOOD PRESSURE: 54 MMHG | BODY MASS INDEX: 22.84 KG/M2 | SYSTOLIC BLOOD PRESSURE: 100 MMHG | HEIGHT: 67 IN | OXYGEN SATURATION: 98 % | RESPIRATION RATE: 18 BRPM | WEIGHT: 145.5 LBS | HEART RATE: 82 BPM | TEMPERATURE: 98.2 F

## 2022-12-12 LAB
ANION GAP SERPL CALC-SCNC: 13 MMOL/L (ref 7–16)
BUN SERPL-MCNC: 2 MG/DL (ref 8–22)
CALCIUM SERPL-MCNC: 9.3 MG/DL (ref 8.4–10.2)
CHLORIDE SERPL-SCNC: 100 MMOL/L (ref 96–112)
CO2 SERPL-SCNC: 22 MMOL/L (ref 20–33)
CREAT SERPL-MCNC: 0.52 MG/DL (ref 0.5–1.4)
GFR SERPLBLD CREATININE-BSD FMLA CKD-EPI: 124 ML/MIN/1.73 M 2
GLUCOSE SERPL-MCNC: 98 MG/DL (ref 65–99)
POTASSIUM SERPL-SCNC: 3.7 MMOL/L (ref 3.6–5.5)
SODIUM SERPL-SCNC: 135 MMOL/L (ref 135–145)

## 2022-12-12 PROCEDURE — A9270 NON-COVERED ITEM OR SERVICE: HCPCS | Performed by: HOSPITALIST

## 2022-12-12 PROCEDURE — A9270 NON-COVERED ITEM OR SERVICE: HCPCS | Performed by: INTERNAL MEDICINE

## 2022-12-12 PROCEDURE — 80048 BASIC METABOLIC PNL TOTAL CA: CPT

## 2022-12-12 PROCEDURE — 99239 HOSP IP/OBS DSCHRG MGMT >30: CPT | Performed by: INTERNAL MEDICINE

## 2022-12-12 PROCEDURE — 700102 HCHG RX REV CODE 250 W/ 637 OVERRIDE(OP): Performed by: HOSPITALIST

## 2022-12-12 PROCEDURE — 700111 HCHG RX REV CODE 636 W/ 250 OVERRIDE (IP): Performed by: HOSPITALIST

## 2022-12-12 PROCEDURE — 36415 COLL VENOUS BLD VENIPUNCTURE: CPT

## 2022-12-12 PROCEDURE — 700102 HCHG RX REV CODE 250 W/ 637 OVERRIDE(OP): Performed by: INTERNAL MEDICINE

## 2022-12-12 PROCEDURE — 700111 HCHG RX REV CODE 636 W/ 250 OVERRIDE (IP): Performed by: INTERNAL MEDICINE

## 2022-12-12 RX ORDER — ACETAMINOPHEN 325 MG/1
650 TABLET ORAL EVERY 6 HOURS PRN
Qty: 30 TABLET | Refills: 0 | Status: ON HOLD | OUTPATIENT
Start: 2022-12-12 | End: 2023-01-03

## 2022-12-12 RX ADMIN — MORPHINE SULFATE 4 MG: 4 INJECTION INTRAVENOUS at 08:39

## 2022-12-12 RX ADMIN — DIVALPROEX SODIUM 250 MG: 250 TABLET, DELAYED RELEASE ORAL at 05:23

## 2022-12-12 RX ADMIN — GABAPENTIN 100 MG: 100 CAPSULE ORAL at 05:23

## 2022-12-12 RX ADMIN — OXYCODONE HYDROCHLORIDE 10 MG: 10 TABLET ORAL at 00:42

## 2022-12-12 RX ADMIN — OXYCODONE HYDROCHLORIDE 10 MG: 10 TABLET ORAL at 05:22

## 2022-12-12 RX ADMIN — ONDANSETRON 4 MG: 2 INJECTION INTRAMUSCULAR; INTRAVENOUS at 08:45

## 2022-12-12 RX ADMIN — OXYCODONE HYDROCHLORIDE 10 MG: 10 TABLET ORAL at 11:50

## 2022-12-12 RX ADMIN — NICOTINE TRANSDERMAL SYSTEM 21 MG: 21 PATCH, EXTENDED RELEASE TRANSDERMAL at 05:23

## 2022-12-12 RX ADMIN — MORPHINE SULFATE 4 MG: 4 INJECTION INTRAVENOUS at 02:06

## 2022-12-12 RX ADMIN — MORPHINE SULFATE 4 MG: 4 INJECTION INTRAVENOUS at 12:44

## 2022-12-12 NOTE — CARE PLAN
The patient is Stable - Low risk of patient condition declining or worsening    Shift Goals  Clinical Goals: pt will hae pain controlled this shift.  Patient Goals: pain control  Family Goals: NA    Progress made toward(s) clinical / shift goals:  Pt pain remains. Medicated multiple times this shift.    Patient is not progressing towards the following goals:

## 2022-12-12 NOTE — CARE PLAN
The patient is Stable - Low risk of patient condition declining or worsening    Shift Goals Pain management  Clinical Goals: Pain will be 3/10 or less  Patient Goals: Sleep and move comfortably  Family Goals: NA    Progress made toward(s) clinical / shift goals:    Problem: Pain - Standard  Goal: Alleviation of pain or a reduction in pain to the patient’s comfort goal  Outcome: Progressing       Patient is not progressing towards the following goals:

## 2022-12-12 NOTE — DISCHARGE SUMMARY
"Discharge Summary    CHIEF COMPLAINT ON ADMISSION  Chief Complaint   Patient presents with    Abdominal Pain       Reason for Admission  Abd Pain, N/V     Admission Date  12/7/2022    CODE STATUS  Full Code    HPI & HOSPITAL COURSE  Per notes, \"37 y.o. female with a past medical history of alcohol dependence who is currently sober, recurrent pancreatitis, bipolar disorder who presented 12/7/2022 with progressively worsening abdominal pain.  Patient reports that she has not been feeling well for the past 36 hours.  Reports having progressively worsening epigastric abdominal pain.  The pain is now severe rated as 10 out of 10.  Pain does radiate to the back.  No aggravating or relieving factors to the pain.  Reports associated nausea and vomiting.  Denies noticing any bowel changes\"    Patient was admitted and treated for recurrent pancreatitis.  She was also diagnosed with a viral upper respiratory infection (negative for COVID, flu, RSV) and was treated with symptomatic management for cough, congestion and general malaise.  This likely exacerbated symptoms of pancreatitis.  She was able to tolerate diet and will be discharged to the outpatient setting.  She was offered opiate pain medications, but declined saying that she was in rehab and wished to continue on a good path.  I did recommend she abstain from alcohol and follow-up with PCP within 1 to 2 weeks for checkup.    Therefore, she is discharged in good and stable condition to home with close outpatient follow-up.    The patient met 2-midnight criteria for an inpatient stay at the time of discharge.    Discharge Date  12/12/2022    FOLLOW UP ITEMS POST DISCHARGE  FU with PCP    DISCHARGE DIAGNOSES  Principal Problem:    Recurrent pancreatitis POA: Yes  Active Problems:    Alcohol dependence (HCC) POA: Yes    Seizure disorder (HCC) POA: Yes    Bipolar disorder (HCC) POA: Yes    Sore throat POA: Yes  Resolved Problems:    * No resolved hospital problems. " *      FOLLOW UP  Future Appointments   Date Time Provider Department Center   2/3/2023  3:00 PM Darien Garcia M.D. [facility] None     Ivy Adams M.D.  [address] [facility] [id number]  [phone]    Schedule an appointment as soon as possible for a visit in 1 month(s)        MEDICATIONS ON DISCHARGE     Medication List        START taking these medications        Instructions   acetaminophen 325 MG Tabs  Commonly known as: Tylenol   Take 2 Tablets by mouth every 6 hours as needed for Moderate Pain.  Dose: 650 mg            CONTINUE taking these medications        Instructions   divalproex 250 MG Tbec  Commonly known as: DEPAKOTE   Take 1 Tablet by mouth 2 times a day for 120 days.  Dose: 250 mg     gabapentin 100 MG Caps  Commonly known as: NEURONTIN   Take 100-300 mg by mouth 2 times a day. Pt takes 100MG in AM and 300MG in the evening  Dose: 100-300 mg     hydrOXYzine pamoate 50 MG Caps  Commonly known as: Vistaril   Take 1 Capsule by mouth every 8 hours as needed for Anxiety.  Dose: 50 mg     zonisamide 50 MG capsule  Commonly known as: ZONEGRAN   Take 3 Capsules by mouth at bedtime for 30 days.  Dose: 150 mg              Allergies  Allergies   Allergen Reactions    Promethazine Hcl Anxiety     Anxiety and makes her feels like skin coming off        DIET  Orders Placed This Encounter   Procedures    Diet Order Diet: Clear Liquid (progress as tolerated)     Standing Status:   Standing     Number of Occurrences:   1     Order Specific Question:   Diet:     Answer:   Clear Liquid [10]     Comments:   progress as tolerated       ACTIVITY  As tolerated.  Weight bearing as tolerated    CONSULTATIONS  None    PROCEDURES  None    LABORATORY  Lab Results   Component Value Date    SODIUM 135 12/12/2022    POTASSIUM 3.7 12/12/2022    CHLORIDE 100 12/12/2022    CO2 22 12/12/2022    GLUCOSE 98 12/12/2022    BUN 2 (L) 12/12/2022    CREATININE 0.52 12/12/2022    CREATININE 0.7 06/10/2008        Lab Results   Component  Value Date    WBC 7.4 12/11/2022    HEMOGLOBIN 12.1 12/11/2022    HEMATOCRIT 36.7 (L) 12/11/2022    PLATELETCT 203 12/11/2022        Total time of the discharge process exceeds 45 minutes.

## 2022-12-12 NOTE — DISCHARGE PLANNING
Case Management Discharge Planning    Admission Date: 12/7/2022  GMLOS: 2.3  ALOS: 5    6-Clicks ADL Score: 24  6-Clicks Mobility Score: 23      Anticipated Discharge Dispo: Discharge Disposition: Discharged to home/self care (01)    DME Needed: No    Action(s) Taken: Updated Provider/Nurse on Discharge Plan    No anticipated DC needs. RN CM will continue to follow.     Escalations Completed: None    Medically Clear: Yes    Next Steps: N/A    Barriers to Discharge: None

## 2022-12-19 ENCOUNTER — HOSPITAL ENCOUNTER (OUTPATIENT)
Dept: LAB | Facility: MEDICAL CENTER | Age: 37
End: 2022-12-19
Attending: NURSE PRACTITIONER
Payer: COMMERCIAL

## 2022-12-19 LAB
25(OH)D3 SERPL-MCNC: 15 NG/ML (ref 30–100)
ALBUMIN SERPL BCP-MCNC: 4.8 G/DL (ref 3.2–4.9)
ALBUMIN/GLOB SERPL: 1.5 G/DL
ALP SERPL-CCNC: 78 U/L (ref 30–99)
ALT SERPL-CCNC: 15 U/L (ref 2–50)
ANION GAP SERPL CALC-SCNC: 12 MMOL/L (ref 7–16)
AST SERPL-CCNC: 19 U/L (ref 12–45)
BASOPHILS # BLD AUTO: 1 % (ref 0–1.8)
BASOPHILS # BLD: 0.07 K/UL (ref 0–0.12)
BILIRUB SERPL-MCNC: 0.3 MG/DL (ref 0.1–1.5)
BUN SERPL-MCNC: 6 MG/DL (ref 8–22)
CALCIUM ALBUM COR SERPL-MCNC: 9 MG/DL (ref 8.5–10.5)
CALCIUM SERPL-MCNC: 9.6 MG/DL (ref 8.5–10.5)
CHLORIDE SERPL-SCNC: 103 MMOL/L (ref 96–112)
CHOLEST SERPL-MCNC: 161 MG/DL (ref 100–199)
CO2 SERPL-SCNC: 25 MMOL/L (ref 20–33)
CREAT SERPL-MCNC: 0.67 MG/DL (ref 0.5–1.4)
EOSINOPHIL # BLD AUTO: 0.04 K/UL (ref 0–0.51)
EOSINOPHIL NFR BLD: 0.5 % (ref 0–6.9)
ERYTHROCYTE [DISTWIDTH] IN BLOOD BY AUTOMATED COUNT: 42.6 FL (ref 35.9–50)
EST. AVERAGE GLUCOSE BLD GHB EST-MCNC: 103 MG/DL
FASTING STATUS PATIENT QL REPORTED: NORMAL
FERRITIN SERPL-MCNC: 47.1 NG/ML (ref 10–291)
FOLATE SERPL-MCNC: 9.9 NG/ML
GFR SERPLBLD CREATININE-BSD FMLA CKD-EPI: 116 ML/MIN/1.73 M 2
GLOBULIN SER CALC-MCNC: 3.1 G/DL (ref 1.9–3.5)
GLUCOSE SERPL-MCNC: 77 MG/DL (ref 65–99)
HBA1C MFR BLD: 5.2 % (ref 4–5.6)
HCT VFR BLD AUTO: 46.4 % (ref 37–47)
HDLC SERPL-MCNC: 43 MG/DL
HGB BLD-MCNC: 15.5 G/DL (ref 12–16)
IMM GRANULOCYTES # BLD AUTO: 0.04 K/UL (ref 0–0.11)
IMM GRANULOCYTES NFR BLD AUTO: 0.5 % (ref 0–0.9)
IRON SATN MFR SERPL: 23 % (ref 15–55)
IRON SERPL-MCNC: 85 UG/DL (ref 40–170)
LDLC SERPL CALC-MCNC: 97 MG/DL
LIPASE SERPL-CCNC: 210 U/L (ref 11–82)
LYMPHOCYTES # BLD AUTO: 2.75 K/UL (ref 1–4.8)
LYMPHOCYTES NFR BLD: 37.6 % (ref 22–41)
MAGNESIUM SERPL-MCNC: 2.3 MG/DL (ref 1.5–2.5)
MCH RBC QN AUTO: 30.3 PG (ref 27–33)
MCHC RBC AUTO-ENTMCNC: 33.4 G/DL (ref 33.6–35)
MCV RBC AUTO: 90.8 FL (ref 81.4–97.8)
MONOCYTES # BLD AUTO: 0.73 K/UL (ref 0–0.85)
MONOCYTES NFR BLD AUTO: 10 % (ref 0–13.4)
NEUTROPHILS # BLD AUTO: 3.68 K/UL (ref 2–7.15)
NEUTROPHILS NFR BLD: 50.4 % (ref 44–72)
NRBC # BLD AUTO: 0 K/UL
NRBC BLD-RTO: 0 /100 WBC
PLATELET # BLD AUTO: 368 K/UL (ref 164–446)
PMV BLD AUTO: 9.8 FL (ref 9–12.9)
POTASSIUM SERPL-SCNC: 4.1 MMOL/L (ref 3.6–5.5)
PROT SERPL-MCNC: 7.9 G/DL (ref 6–8.2)
RBC # BLD AUTO: 5.11 M/UL (ref 4.2–5.4)
SODIUM SERPL-SCNC: 140 MMOL/L (ref 135–145)
T4 FREE SERPL-MCNC: 1.09 NG/DL (ref 0.93–1.7)
T4 SERPL-MCNC: 8 UG/DL (ref 4–12)
TIBC SERPL-MCNC: 371 UG/DL (ref 250–450)
TRIGL SERPL-MCNC: 103 MG/DL (ref 0–149)
TSH SERPL DL<=0.005 MIU/L-ACNC: 1.06 UIU/ML (ref 0.38–5.33)
UIBC SERPL-MCNC: 286 UG/DL (ref 110–370)
VIT B12 SERPL-MCNC: 780 PG/ML (ref 211–911)
WBC # BLD AUTO: 7.3 K/UL (ref 4.8–10.8)

## 2022-12-19 PROCEDURE — 83735 ASSAY OF MAGNESIUM: CPT

## 2022-12-19 PROCEDURE — 83540 ASSAY OF IRON: CPT

## 2022-12-19 PROCEDURE — 83550 IRON BINDING TEST: CPT

## 2022-12-19 PROCEDURE — 84439 ASSAY OF FREE THYROXINE: CPT

## 2022-12-19 PROCEDURE — 80053 COMPREHEN METABOLIC PANEL: CPT

## 2022-12-19 PROCEDURE — 82746 ASSAY OF FOLIC ACID SERUM: CPT

## 2022-12-19 PROCEDURE — 36415 COLL VENOUS BLD VENIPUNCTURE: CPT

## 2022-12-19 PROCEDURE — 84443 ASSAY THYROID STIM HORMONE: CPT

## 2022-12-19 PROCEDURE — 83036 HEMOGLOBIN GLYCOSYLATED A1C: CPT

## 2022-12-19 PROCEDURE — 82728 ASSAY OF FERRITIN: CPT

## 2022-12-19 PROCEDURE — 82607 VITAMIN B-12: CPT

## 2022-12-19 PROCEDURE — 80061 LIPID PANEL: CPT

## 2022-12-19 PROCEDURE — 82306 VITAMIN D 25 HYDROXY: CPT

## 2022-12-19 PROCEDURE — 83690 ASSAY OF LIPASE: CPT

## 2022-12-19 PROCEDURE — 85025 COMPLETE CBC W/AUTO DIFF WBC: CPT

## 2022-12-20 ENCOUNTER — HOSPITAL ENCOUNTER (INPATIENT)
Facility: MEDICAL CENTER | Age: 37
LOS: 5 days | DRG: 440 | End: 2022-12-25
Attending: EMERGENCY MEDICINE | Admitting: FAMILY MEDICINE
Payer: COMMERCIAL

## 2022-12-20 ENCOUNTER — APPOINTMENT (OUTPATIENT)
Dept: RADIOLOGY | Facility: MEDICAL CENTER | Age: 37
DRG: 440 | End: 2022-12-20
Attending: NURSE PRACTITIONER
Payer: COMMERCIAL

## 2022-12-20 ENCOUNTER — APPOINTMENT (OUTPATIENT)
Dept: RADIOLOGY | Facility: MEDICAL CENTER | Age: 37
DRG: 440 | End: 2022-12-20
Attending: FAMILY MEDICINE
Payer: COMMERCIAL

## 2022-12-20 DIAGNOSIS — G40.909 SEIZURE DISORDER (HCC): ICD-10-CM

## 2022-12-20 DIAGNOSIS — Z76.0 MEDICATION REFILL: ICD-10-CM

## 2022-12-20 DIAGNOSIS — K86.1 CHRONIC PANCREATITIS, UNSPECIFIED PANCREATITIS TYPE (HCC): ICD-10-CM

## 2022-12-20 PROBLEM — K85.90 ACUTE RECURRENT PANCREATITIS: Status: ACTIVE | Noted: 2022-12-20

## 2022-12-20 LAB
ALBUMIN SERPL BCP-MCNC: 4.5 G/DL (ref 3.2–4.9)
ALBUMIN/GLOB SERPL: 1.5 G/DL
ALP SERPL-CCNC: 71 U/L (ref 30–99)
ALT SERPL-CCNC: 13 U/L (ref 2–50)
ANION GAP SERPL CALC-SCNC: 12 MMOL/L (ref 7–16)
APPEARANCE UR: CLEAR
AST SERPL-CCNC: 15 U/L (ref 12–45)
BASOPHILS # BLD AUTO: 0.5 % (ref 0–1.8)
BASOPHILS # BLD: 0.04 K/UL (ref 0–0.12)
BILIRUB SERPL-MCNC: 0.4 MG/DL (ref 0.1–1.5)
BILIRUB UR QL STRIP.AUTO: NEGATIVE
BUN SERPL-MCNC: 6 MG/DL (ref 8–22)
CALCIUM ALBUM COR SERPL-MCNC: 9.1 MG/DL (ref 8.5–10.5)
CALCIUM SERPL-MCNC: 9.5 MG/DL (ref 8.4–10.2)
CHLORIDE SERPL-SCNC: 104 MMOL/L (ref 96–112)
CO2 SERPL-SCNC: 22 MMOL/L (ref 20–33)
COLOR UR: YELLOW
CREAT SERPL-MCNC: 0.82 MG/DL (ref 0.5–1.4)
EOSINOPHIL # BLD AUTO: 0.06 K/UL (ref 0–0.51)
EOSINOPHIL NFR BLD: 0.8 % (ref 0–6.9)
ERYTHROCYTE [DISTWIDTH] IN BLOOD BY AUTOMATED COUNT: 42.3 FL (ref 35.9–50)
ETHANOL BLD-MCNC: <10.1 MG/DL
GFR SERPLBLD CREATININE-BSD FMLA CKD-EPI: 95 ML/MIN/1.73 M 2
GLOBULIN SER CALC-MCNC: 3.1 G/DL (ref 1.9–3.5)
GLUCOSE SERPL-MCNC: 97 MG/DL (ref 65–99)
GLUCOSE UR STRIP.AUTO-MCNC: NEGATIVE MG/DL
HCG SERPL QL: NEGATIVE
HCT VFR BLD AUTO: 45.8 % (ref 37–47)
HGB BLD-MCNC: 15.5 G/DL (ref 12–16)
IMM GRANULOCYTES # BLD AUTO: 0.04 K/UL (ref 0–0.11)
IMM GRANULOCYTES NFR BLD AUTO: 0.5 % (ref 0–0.9)
KETONES UR STRIP.AUTO-MCNC: NEGATIVE MG/DL
LEUKOCYTE ESTERASE UR QL STRIP.AUTO: NEGATIVE
LIPASE SERPL-CCNC: 226 U/L (ref 7–58)
LYMPHOCYTES # BLD AUTO: 2.31 K/UL (ref 1–4.8)
LYMPHOCYTES NFR BLD: 30.8 % (ref 22–41)
MCH RBC QN AUTO: 30.5 PG (ref 27–33)
MCHC RBC AUTO-ENTMCNC: 33.8 G/DL (ref 33.6–35)
MCV RBC AUTO: 90 FL (ref 81.4–97.8)
MICRO URNS: NORMAL
MONOCYTES # BLD AUTO: 0.6 K/UL (ref 0–0.85)
MONOCYTES NFR BLD AUTO: 8 % (ref 0–13.4)
NEUTROPHILS # BLD AUTO: 4.46 K/UL (ref 2–7.15)
NEUTROPHILS NFR BLD: 59.4 % (ref 44–72)
NITRITE UR QL STRIP.AUTO: NEGATIVE
NRBC # BLD AUTO: 0 K/UL
NRBC BLD-RTO: 0 /100 WBC
PH UR STRIP.AUTO: 6.5 [PH] (ref 5–8)
PLATELET # BLD AUTO: 317 K/UL (ref 164–446)
PMV BLD AUTO: 9.4 FL (ref 9–12.9)
POTASSIUM SERPL-SCNC: 4.4 MMOL/L (ref 3.6–5.5)
PROT SERPL-MCNC: 7.6 G/DL (ref 6–8.2)
PROT UR QL STRIP: NEGATIVE MG/DL
RBC # BLD AUTO: 5.09 M/UL (ref 4.2–5.4)
RBC UR QL AUTO: NEGATIVE
SODIUM SERPL-SCNC: 138 MMOL/L (ref 135–145)
SP GR UR STRIP.AUTO: 1.01
VALPROATE SERPL-MCNC: 56 UG/ML (ref 50–100)
WBC # BLD AUTO: 7.5 K/UL (ref 4.8–10.8)

## 2022-12-20 PROCEDURE — 83690 ASSAY OF LIPASE: CPT

## 2022-12-20 PROCEDURE — 96374 THER/PROPH/DIAG INJ IV PUSH: CPT

## 2022-12-20 PROCEDURE — 700111 HCHG RX REV CODE 636 W/ 250 OVERRIDE (IP): Performed by: FAMILY MEDICINE

## 2022-12-20 PROCEDURE — 74177 CT ABD & PELVIS W/CONTRAST: CPT

## 2022-12-20 PROCEDURE — A9270 NON-COVERED ITEM OR SERVICE: HCPCS | Performed by: FAMILY MEDICINE

## 2022-12-20 PROCEDURE — 99223 1ST HOSP IP/OBS HIGH 75: CPT | Mod: AI | Performed by: FAMILY MEDICINE

## 2022-12-20 PROCEDURE — 770006 HCHG ROOM/CARE - MED/SURG/GYN SEMI*

## 2022-12-20 PROCEDURE — G0480 DRUG TEST DEF 1-7 CLASSES: HCPCS

## 2022-12-20 PROCEDURE — 36415 COLL VENOUS BLD VENIPUNCTURE: CPT

## 2022-12-20 PROCEDURE — 82077 ASSAY SPEC XCP UR&BREATH IA: CPT

## 2022-12-20 PROCEDURE — 700105 HCHG RX REV CODE 258: Performed by: FAMILY MEDICINE

## 2022-12-20 PROCEDURE — 99285 EMERGENCY DEPT VISIT HI MDM: CPT

## 2022-12-20 PROCEDURE — 81003 URINALYSIS AUTO W/O SCOPE: CPT

## 2022-12-20 PROCEDURE — 96375 TX/PRO/DX INJ NEW DRUG ADDON: CPT

## 2022-12-20 PROCEDURE — 94760 N-INVAS EAR/PLS OXIMETRY 1: CPT

## 2022-12-20 PROCEDURE — 700117 HCHG RX CONTRAST REV CODE 255: Performed by: FAMILY MEDICINE

## 2022-12-20 PROCEDURE — 85025 COMPLETE CBC W/AUTO DIFF WBC: CPT

## 2022-12-20 PROCEDURE — 80164 ASSAY DIPROPYLACETIC ACD TOT: CPT

## 2022-12-20 PROCEDURE — 700111 HCHG RX REV CODE 636 W/ 250 OVERRIDE (IP): Performed by: EMERGENCY MEDICINE

## 2022-12-20 PROCEDURE — 80053 COMPREHEN METABOLIC PANEL: CPT

## 2022-12-20 PROCEDURE — 700105 HCHG RX REV CODE 258: Performed by: EMERGENCY MEDICINE

## 2022-12-20 PROCEDURE — 700102 HCHG RX REV CODE 250 W/ 637 OVERRIDE(OP): Performed by: FAMILY MEDICINE

## 2022-12-20 PROCEDURE — 74181 MRI ABDOMEN W/O CONTRAST: CPT

## 2022-12-20 PROCEDURE — 84703 CHORIONIC GONADOTROPIN ASSAY: CPT

## 2022-12-20 PROCEDURE — 96376 TX/PRO/DX INJ SAME DRUG ADON: CPT

## 2022-12-20 RX ORDER — ONDANSETRON 4 MG/1
4 TABLET, ORALLY DISINTEGRATING ORAL EVERY 4 HOURS PRN
Status: DISCONTINUED | OUTPATIENT
Start: 2022-12-20 | End: 2022-12-25 | Stop reason: HOSPADM

## 2022-12-20 RX ORDER — AMOXICILLIN 250 MG
2 CAPSULE ORAL 2 TIMES DAILY
Status: DISCONTINUED | OUTPATIENT
Start: 2022-12-20 | End: 2022-12-25 | Stop reason: HOSPADM

## 2022-12-20 RX ORDER — ONDANSETRON 2 MG/ML
4 INJECTION INTRAMUSCULAR; INTRAVENOUS ONCE
Status: COMPLETED | OUTPATIENT
Start: 2022-12-20 | End: 2022-12-20

## 2022-12-20 RX ORDER — IBUPROFEN 200 MG
600 TABLET ORAL EVERY 6 HOURS PRN
Status: ON HOLD | COMMUNITY
End: 2023-01-14

## 2022-12-20 RX ORDER — OXYCODONE HYDROCHLORIDE 10 MG/1
10 TABLET ORAL
Status: DISCONTINUED | OUTPATIENT
Start: 2022-12-20 | End: 2022-12-24

## 2022-12-20 RX ORDER — ENOXAPARIN SODIUM 100 MG/ML
40 INJECTION SUBCUTANEOUS DAILY
Status: DISCONTINUED | OUTPATIENT
Start: 2022-12-20 | End: 2022-12-25 | Stop reason: HOSPADM

## 2022-12-20 RX ORDER — GABAPENTIN 300 MG/1
300 CAPSULE ORAL
Status: DISCONTINUED | OUTPATIENT
Start: 2022-12-20 | End: 2022-12-25 | Stop reason: HOSPADM

## 2022-12-20 RX ORDER — ONDANSETRON 2 MG/ML
4 INJECTION INTRAMUSCULAR; INTRAVENOUS EVERY 4 HOURS PRN
Status: DISCONTINUED | OUTPATIENT
Start: 2022-12-20 | End: 2022-12-25 | Stop reason: HOSPADM

## 2022-12-20 RX ORDER — LORAZEPAM 2 MG/ML
1 INJECTION INTRAMUSCULAR
Status: COMPLETED | OUTPATIENT
Start: 2022-12-20 | End: 2022-12-20

## 2022-12-20 RX ORDER — SODIUM CHLORIDE, SODIUM LACTATE, POTASSIUM CHLORIDE, CALCIUM CHLORIDE 600; 310; 30; 20 MG/100ML; MG/100ML; MG/100ML; MG/100ML
INJECTION, SOLUTION INTRAVENOUS CONTINUOUS
Status: DISCONTINUED | OUTPATIENT
Start: 2022-12-21 | End: 2022-12-24

## 2022-12-20 RX ORDER — OXYCODONE HYDROCHLORIDE 5 MG/1
5 TABLET ORAL
Status: DISCONTINUED | OUTPATIENT
Start: 2022-12-20 | End: 2022-12-24

## 2022-12-20 RX ORDER — HYDROMORPHONE HYDROCHLORIDE 1 MG/ML
0.5 INJECTION, SOLUTION INTRAMUSCULAR; INTRAVENOUS; SUBCUTANEOUS
Status: DISCONTINUED | OUTPATIENT
Start: 2022-12-20 | End: 2022-12-24

## 2022-12-20 RX ORDER — HYDROMORPHONE HYDROCHLORIDE 1 MG/ML
0.5 INJECTION, SOLUTION INTRAMUSCULAR; INTRAVENOUS; SUBCUTANEOUS ONCE
Status: COMPLETED | OUTPATIENT
Start: 2022-12-20 | End: 2022-12-20

## 2022-12-20 RX ORDER — POLYETHYLENE GLYCOL 3350 17 G/17G
1 POWDER, FOR SOLUTION ORAL
Status: DISCONTINUED | OUTPATIENT
Start: 2022-12-20 | End: 2022-12-25 | Stop reason: HOSPADM

## 2022-12-20 RX ORDER — BISACODYL 10 MG
10 SUPPOSITORY, RECTAL RECTAL
Status: DISCONTINUED | OUTPATIENT
Start: 2022-12-20 | End: 2022-12-25 | Stop reason: HOSPADM

## 2022-12-20 RX ORDER — SODIUM CHLORIDE, SODIUM LACTATE, POTASSIUM CHLORIDE, CALCIUM CHLORIDE 600; 310; 30; 20 MG/100ML; MG/100ML; MG/100ML; MG/100ML
INJECTION, SOLUTION INTRAVENOUS CONTINUOUS
Status: ACTIVE | OUTPATIENT
Start: 2022-12-20 | End: 2022-12-21

## 2022-12-20 RX ORDER — DIVALPROEX SODIUM 250 MG/1
250 TABLET, DELAYED RELEASE ORAL 2 TIMES DAILY
Status: DISCONTINUED | OUTPATIENT
Start: 2022-12-20 | End: 2022-12-25 | Stop reason: HOSPADM

## 2022-12-20 RX ORDER — LABETALOL HYDROCHLORIDE 5 MG/ML
10 INJECTION, SOLUTION INTRAVENOUS EVERY 4 HOURS PRN
Status: DISCONTINUED | OUTPATIENT
Start: 2022-12-20 | End: 2022-12-25 | Stop reason: HOSPADM

## 2022-12-20 RX ORDER — HYDROXYZINE HYDROCHLORIDE 25 MG/1
50 TABLET, FILM COATED ORAL EVERY 8 HOURS PRN
Status: DISCONTINUED | OUTPATIENT
Start: 2022-12-20 | End: 2022-12-25 | Stop reason: HOSPADM

## 2022-12-20 RX ORDER — ZONISAMIDE 50 MG/1
150 CAPSULE ORAL
Status: DISCONTINUED | OUTPATIENT
Start: 2022-12-20 | End: 2022-12-25 | Stop reason: HOSPADM

## 2022-12-20 RX ORDER — GABAPENTIN 100 MG/1
100 CAPSULE ORAL EVERY MORNING
Status: DISCONTINUED | OUTPATIENT
Start: 2022-12-20 | End: 2022-12-25 | Stop reason: HOSPADM

## 2022-12-20 RX ORDER — SODIUM CHLORIDE, SODIUM LACTATE, POTASSIUM CHLORIDE, CALCIUM CHLORIDE 600; 310; 30; 20 MG/100ML; MG/100ML; MG/100ML; MG/100ML
1000 INJECTION, SOLUTION INTRAVENOUS ONCE
Status: COMPLETED | OUTPATIENT
Start: 2022-12-20 | End: 2022-12-20

## 2022-12-20 RX ADMIN — SODIUM CHLORIDE, POTASSIUM CHLORIDE, SODIUM LACTATE AND CALCIUM CHLORIDE: 600; 310; 30; 20 INJECTION, SOLUTION INTRAVENOUS at 15:13

## 2022-12-20 RX ADMIN — GABAPENTIN 300 MG: 100 CAPSULE ORAL at 20:25

## 2022-12-20 RX ADMIN — HYDROMORPHONE HYDROCHLORIDE 0.5 MG: 1 INJECTION, SOLUTION INTRAMUSCULAR; INTRAVENOUS; SUBCUTANEOUS at 20:48

## 2022-12-20 RX ADMIN — ONDANSETRON 4 MG: 4 TABLET, ORALLY DISINTEGRATING ORAL at 20:26

## 2022-12-20 RX ADMIN — IOHEXOL 100 ML: 350 INJECTION, SOLUTION INTRAVENOUS at 12:47

## 2022-12-20 RX ADMIN — OXYCODONE HYDROCHLORIDE 10 MG: 10 TABLET ORAL at 16:07

## 2022-12-20 RX ADMIN — SODIUM CHLORIDE, POTASSIUM CHLORIDE, SODIUM LACTATE AND CALCIUM CHLORIDE 1000 ML: 600; 310; 30; 20 INJECTION, SOLUTION INTRAVENOUS at 09:49

## 2022-12-20 RX ADMIN — GABAPENTIN 100 MG: 100 CAPSULE ORAL at 12:59

## 2022-12-20 RX ADMIN — ZONISAMIDE 150 MG: 50 CAPSULE ORAL at 20:25

## 2022-12-20 RX ADMIN — HYDROMORPHONE HYDROCHLORIDE 0.5 MG: 1 INJECTION, SOLUTION INTRAMUSCULAR; INTRAVENOUS; SUBCUTANEOUS at 14:03

## 2022-12-20 RX ADMIN — PANCRELIPASE 24000 UNITS: 24000; 76000; 120000 CAPSULE, DELAYED RELEASE PELLETS ORAL at 17:31

## 2022-12-20 RX ADMIN — OXYCODONE HYDROCHLORIDE 10 MG: 10 TABLET ORAL at 12:59

## 2022-12-20 RX ADMIN — LORAZEPAM 1 MG: 2 INJECTION INTRAMUSCULAR; INTRAVENOUS at 18:29

## 2022-12-20 RX ADMIN — HYDROMORPHONE HYDROCHLORIDE 0.5 MG: 1 INJECTION, SOLUTION INTRAMUSCULAR; INTRAVENOUS; SUBCUTANEOUS at 09:50

## 2022-12-20 RX ADMIN — HYDROMORPHONE HYDROCHLORIDE 0.5 MG: 1 INJECTION, SOLUTION INTRAMUSCULAR; INTRAVENOUS; SUBCUTANEOUS at 17:31

## 2022-12-20 RX ADMIN — DIVALPROEX SODIUM 250 MG: 250 TABLET, DELAYED RELEASE ORAL at 17:31

## 2022-12-20 RX ADMIN — ONDANSETRON 4 MG: 2 INJECTION INTRAMUSCULAR; INTRAVENOUS at 09:50

## 2022-12-20 RX ADMIN — DIVALPROEX SODIUM 250 MG: 250 TABLET, DELAYED RELEASE ORAL at 12:59

## 2022-12-20 ASSESSMENT — COGNITIVE AND FUNCTIONAL STATUS - GENERAL
DAILY ACTIVITIY SCORE: 24
SUGGESTED CMS G CODE MODIFIER DAILY ACTIVITY: CH
MOBILITY SCORE: 24
SUGGESTED CMS G CODE MODIFIER MOBILITY: CH

## 2022-12-20 ASSESSMENT — LIFESTYLE VARIABLES
TOTAL SCORE: 0
ALCOHOL_USE: NO
EVER FELT BAD OR GUILTY ABOUT YOUR DRINKING: NO
HOW MANY TIMES IN THE PAST YEAR HAVE YOU HAD 5 OR MORE DRINKS IN A DAY: 0
TOTAL SCORE: 0
ON A TYPICAL DAY WHEN YOU DRINK ALCOHOL HOW MANY DRINKS DO YOU HAVE: 0
AVERAGE NUMBER OF DAYS PER WEEK YOU HAVE A DRINK CONTAINING ALCOHOL: 0
CONSUMPTION TOTAL: NEGATIVE
HAVE YOU EVER FELT YOU SHOULD CUT DOWN ON YOUR DRINKING: NO
TOTAL SCORE: 0
EVER HAD A DRINK FIRST THING IN THE MORNING TO STEADY YOUR NERVES TO GET RID OF A HANGOVER: NO
HAVE PEOPLE ANNOYED YOU BY CRITICIZING YOUR DRINKING: NO

## 2022-12-20 ASSESSMENT — ENCOUNTER SYMPTOMS
CHILLS: 0
SORE THROAT: 0
PALPITATIONS: 0
ABDOMINAL PAIN: 1
EYE DISCHARGE: 0
FALLS: 0
COUGH: 0
DEPRESSION: 1
DIZZINESS: 0
HEADACHES: 0
DIARRHEA: 1
SINUS PAIN: 0
WEIGHT LOSS: 0
BRUISES/BLEEDS EASILY: 0
VOMITING: 0
NERVOUS/ANXIOUS: 1
EYE PAIN: 0
SHORTNESS OF BREATH: 0
FEVER: 0
NAUSEA: 1

## 2022-12-20 ASSESSMENT — PAIN DESCRIPTION - PAIN TYPE
TYPE: ACUTE PAIN

## 2022-12-20 ASSESSMENT — FIBROSIS 4 INDEX
FIB4 SCORE: 0.47
FIB4 SCORE: 0.46

## 2022-12-20 NOTE — ED NOTES
Med rec updated and complete, per pt   Allergies reviewed, per pt  Pt reports that she is not taking, MAGNESIUM 400MG, SUCRALFATE 1GM, CLONAZEPAM 0.5MG, or VITAMIN D 1000 units.  Pt reports that she is taking ZONISAMIDE 50MG 3 capsules at bedtime.  Pt is not taking ZONISAMIDE 100MG.

## 2022-12-20 NOTE — ED NOTES
Pt AO4, sitting up in gurney. nad noted. Pt reporting 7/10 mid upper abd pain, requesting pain medication. No n/v currently.   Safety precautions in place including gurney locked in lowest position, both side rails up, call light within reach. Pt educated to use call light if requiring any assistance with getting oob.   Pt updated on POC, waiting for inpatient hospital room assignment.

## 2022-12-20 NOTE — ASSESSMENT & PLAN NOTE
- Resume home Depakote   -May need to readdress Depakote usage for Bipolar and seizure d/o if no further etiology of acute on chronic pancreatitis  - Check depakote level

## 2022-12-20 NOTE — ASSESSMENT & PLAN NOTE
- Lipase 226, down from previous admission but still elevated, pt reports her typical LUQ pancreatitis pain radiating to her back   - Did not have CT last admission - given her quick recurrence, will check CT abd/pelvis to evaluate for pseudocyst and/or necrosis given persistence of her symptoms despite EtOH cessation  - No elevation in bilirubin to suggest GB involvement currently, will see what CBD looks like on CT   - NPO  - LR@250cc/hr x 12 hours, then 150cc/hr thereafter  - Question of contribution from Depakote last admission, pt did decide to stay on Depakote despite that last stay - if CT unrevealing for pseudocyst or other etiology of her pancreatitis and pain, this may need to be readdressed with patient   - IV and PO options for pain control   - TGLs normal    ADDENDUM: pt with pancreatic duct stones on CT - [facility] consulted     12/21: GI evaluating the possibility of ERCP, we appreciate further recommendations.    12/22: GI is not recommending ERCP at this time. They are evaluating the potential use of EUS for celiac block. They also recommend consulting pancreatic surgery input. We will consult Dr Rachel, we appreciate further recommendations.    12/23: I discussed cased with Dr Rachel, and there is no surgical indication at this time. GI did celiac plexus block today. We appreciate further recommendations.    12/24: We will stop fluids, advance diet, and titrate down pain medications.

## 2022-12-20 NOTE — CONSULTS
Gastroenterology Initial Consult Note               Author:  GILBERT Maldonado Date & Time Created: 12/20/2022 1:54 PM       Patient ID:  Name:             Rhiannon Marrero  YOB: 1985  Age:                 37 y.o.  female  MRN:               [id number]      Referring Provider:  Magalis Coronado MD      Presenting Chief Complaint:  Abdominal pain, Elevated lipase      History of Present Illness:    This is a very pleasant 37 y.o. female with the past medical history that includes alcohol induced pancreatitis, alcohol dependence, and chronic pancreatitis secondary to alcohol use, bipolar disorder, history of suicide ideation and suicide attempt who presents to the hospital today with ongoing abdominal pain.  The patient states the pain is located in the epigastrium and left upper quadrant radiating to the back.  It is constant, but can be worse with eating.  She denies any vomiting but does have nausea.  She states that she is having diarrhea.  She was sent by her primary care physician due to elevated lipase.  The patient has multiple admissions over the last 1 to 2 years for alcohol intoxication, alcohol induced pancreatitis.  Her last hospitalization was on 12/7/2022 and at that time lipase was 510.  She does not report an increase in pain since his last hospitalization, but this has not improved much.  Currently lipase at 226.  Liver enzymes are normal.  She reports her last alcoholic drink was October 13, 2022.  She reports that she is currently in rehab for alcohol abuse.      Review of Systems:  Review of Systems   Constitutional:  Negative for chills and fever.   HENT:  Negative for sinus pain and sore throat.    Eyes:  Negative for pain and discharge.   Respiratory:  Negative for cough and shortness of breath.    Cardiovascular:  Negative for chest pain and palpitations.   Gastrointestinal:  Positive for abdominal pain and nausea. Negative for vomiting.   Genitourinary:  Negative for dysuria  and urgency.   Musculoskeletal:  Negative for falls and joint pain.   Skin:  Negative for itching and rash.   Neurological:  Negative for dizziness and headaches.   Endo/Heme/Allergies:  Negative for environmental allergies. Does not bruise/bleed easily.   Psychiatric/Behavioral:  Positive for depression. The patient is nervous/anxious.            Past Medical History:  Past Medical History:   Diagnosis Date    Acute pancreatitis without infection or necrosis 07/20/2022    Alcohol dependence ([facility]) 04/13/2017    Bronchitis 08/2012    Cold     sept 2018    Current moderate episode of major depressive disorder without prior episode ([facility]) 01/31/2020    Heart burn     Hernia of unspecified site of abdominal cavity without mention of obstruction or gangrene     High anion gap metabolic acidosis 07/01/2022    Indigestion     Pancreatitis     Pneumonia 2011    Seizure disorder ([facility]) 05/23/2022     Active Hospital Problems    Diagnosis     Acute recurrent pancreatitis [K85.90]     Chronic pancreatitis ([facility]) [K86.1]     Bipolar disorder ([facility]) [F31.9]     Diarrhea [R19.7]     Seizure disorder ([facility]) [G40.909]     Alcohol use disorder, severe, in early remission, dependence ([facility]) [F10.21]     Anxiety [F41.9]          Past Surgical History:  Past Surgical History:   Procedure Laterality Date    ORIF, WRIST Right 4/24/2019    Procedure: ORIF, WRIST;  Surgeon: Marquis Bell M.D.;  Location: SURGERY [facility];  Service: Orthopedics    HYSTERECTOMY ROBOTIC XI  10/11/2018    Procedure: HYSTERECTOMY ROBOTIC XI- RIGHT URETEROLYSIS;  Surgeon: Marquis Reeder M.D.;  Location: [facility];  Service: Gynecology    SALPINGECTOMY Bilateral 10/11/2018    Procedure: SALPINGECTOMY;  Surgeon: Marquis Reeder M.D.;  Location: [facility];  Service: Gynecology    TONSILLECTOMY  4/11/2013    Performed by Rock Rothman M.D. at SURGERY SAME DAY [facility]    ARTHROSCOPY, KNEE      GYN SURGERY      miscarriage May 2006     OTHER ORTHOPEDIC SURGERY      knee surgeries           Hospital Medications:  Current Facility-Administered Medications   Medication Dose Frequency Provider Last Rate Last Admin    divalproex (DEPAKOTE) delayed-release tablet 250 mg  250 mg BID Magalis Coronado M.D.   250 mg at 12/20/22 1259    gabapentin (NEURONTIN) capsule 100 mg  100 mg QAM Magalis Coronado M.D.   100 mg at 12/20/22 1259    gabapentin (NEURONTIN) capsule 300 mg  300 mg QHS Magalis Coronado M.D.        hydrOXYzine HCl (ATARAX) tablet 50 mg  50 mg Q8HRS PRN Magalis Coronado M.D.        zonisamide (ZONEGRAN) capsule 150 mg  150 mg QHS Magalis Coronado M.D.        lactated ringers infusion   Continuous Magalis Coronado M.D.        senna-docusate (PERICOLACE or SENOKOT S) 8.6-50 MG per tablet 2 Tablet  2 Tablet BID Magalis Coronado M.D.        And    polyethylene glycol/lytes (MIRALAX) PACKET 1 Packet  1 Packet QDAY PRN Magalis Coronado M.D.        And    magnesium hydroxide (MILK OF MAGNESIA) suspension 30 mL  30 mL QDAY PRN Magalis Coronado M.D.        And    bisacodyl (DULCOLAX) suppository 10 mg  10 mg QDAY PRN Magalis Coronado M.D.        enoxaparin (Lovenox) inj 40 mg  40 mg DAILY AT 1800 Magalis Coronado M.D.        labetalol (NORMODYNE/TRANDATE) injection 10 mg  10 mg Q4HRS PRN Magalis Coronado M.D.        ondansetron (ZOFRAN) syringe/vial injection 4 mg  4 mg Q4HRS PRN Magalis Coronado M.D.        ondansetron (ZOFRAN ODT) dispertab 4 mg  4 mg Q4HRS PRN Magalis Coronado M.D.        Pharmacy Consult Request ...Pain Management Review 1 Each  1 Each PHARMACY TO DOSE Magalis Coronado M.D.        oxyCODONE immediate-release (ROXICODONE) tablet 5 mg  5 mg Q3HRS PRN Magalis Coronado, M.D.        Or    oxyCODONE immediate release (ROXICODONE) tablet 10 mg  10 mg Q3HRS SHANNAN Coronado M.D.   10 mg at 12/20/22 1259    Or    HYDROmorphone (Dilaudid) injection 0.5 mg  0.5 mg Q3HRS SHANNAN Coronado M.D.        pancrelipase (Lip-Prot-Amyl) (CREON 84040)  93316-27475 units capsule 24,000 Units  1 Capsule TID WITH MEALS Magalis Coronado M.D.        [START ON 12/21/2022] lactated ringers infusion   Continuous Magalis Coronado M.D.       Last reviewed on 12/20/2022  9:28 AM by Rajendra Bland       Current Outpatient Medications:  (Not in a hospital admission)        Medication Allergies:  Allergies   Allergen Reactions    Promethazine Hcl Anxiety     Anxiety and makes her feels like skin coming off          Family Medical History:  Family History   Problem Relation Age of Onset    Psychiatric Illness Mother     Stroke Mother     Arthritis Mother     Heart Disease Maternal Grandfather     Cancer Neg Hx     Diabetes Neg Hx     Hypertension Neg Hx          Social History:  Social History     Socioeconomic History    Marital status:      Spouse name: Not on file    Number of children: Not on file    Years of education: Not on file    Highest education level: Not on file   Occupational History    Not on file   Tobacco Use    Smoking status: Every Day     Packs/day: 0.75     Years: 10.00     Pack years: 7.50     Types: Cigarettes    Smokeless tobacco: Never   Vaping Use    Vaping Use: Former    Substances: Nicotine   Substance and Sexual Activity    Alcohol use: Not Currently     Comment: 5 shots, binges. Was sober for 34 days until today 10/12    Drug use: Yes     Types: Marijuana     Comment: edibles    Sexual activity: Not on file   Other Topics Concern    Not on file   Social History Narrative    Not on file     Social Determinants of Health     Financial Resource Strain: Not on file   Food Insecurity: Not on file   Transportation Needs: Not on file   Physical Activity: Not on file   Stress: Not on file   Social Connections: Not on file   Intimate Partner Violence: Not on file   Housing Stability: Not on file         Vital signs:  Weight/BMI: Body mass index is 21.89 kg/m².  BP (!) 96/57   Pulse 72   Temp 36.4 °C (97.6 °F) (Tympanic)   Resp 20   Ht 1.702  "m (5' 7\")   Wt 63.4 kg (139 lb 12.4 oz)   SpO2 98%   Vitals:    12/20/22 1000 12/20/22 1100 12/20/22 1213 12/20/22 1305   BP: 125/86 (!) 112/91 (!) 96/61 (!) 96/57   Pulse: 88 78 78 72   Resp:       Temp:       TempSrc:       SpO2: 97% 99% 99% 98%   Weight:       Height:         Oxygen Therapy:  Pulse Oximetry: 98 %, O2 Delivery Device: None - Room Air  No intake or output data in the 24 hours ending 12/20/22 1354      Physical Exam:  Physical Exam  Vitals and nursing note reviewed.   Constitutional:       Appearance: Normal appearance.   HENT:      Head: Normocephalic and atraumatic.      Right Ear: External ear normal.      Left Ear: External ear normal.      Nose: Nose normal. No congestion.      Mouth/Throat:      Mouth: Mucous membranes are moist.      Pharynx: Oropharynx is clear.   Eyes:      General: No scleral icterus.     Extraocular Movements: Extraocular movements intact.      Pupils: Pupils are equal, round, and reactive to light.   Cardiovascular:      Rate and Rhythm: Normal rate and regular rhythm.      Pulses: Normal pulses.      Heart sounds: Normal heart sounds. No murmur heard.  Pulmonary:      Effort: Pulmonary effort is normal. No respiratory distress.      Breath sounds: Normal breath sounds. No wheezing.   Abdominal:      General: Abdomen is flat. Bowel sounds are normal. There is no distension.      Palpations: Abdomen is soft.      Tenderness: There is abdominal tenderness (Epigastric, LUQ, RUQ).   Musculoskeletal:      Cervical back: Neck supple.      Right lower leg: No edema.      Left lower leg: No edema.   Lymphadenopathy:      Cervical: No cervical adenopathy.   Skin:     General: Skin is warm and dry.   Neurological:      General: No focal deficit present.      Mental Status: She is alert and oriented to person, place, and time.   Psychiatric:         Thought Content: Thought content normal.         Judgment: Judgment normal.         Labs:  Recent Labs     12/19/22  1159 " 12/20/22  0949   SODIUM 140 138   POTASSIUM 4.1 4.4   CHLORIDE 103 104   CO2 25 22   BUN 6* 6*   CREATININE 0.67 0.82   MAGNESIUM 2.3  --    CALCIUM 9.6 9.5     Recent Labs     12/19/22  1159 12/20/22  0949   ALTSGPT 15 13   ASTSGOT 19 15   ALKPHOSPHAT 78 71   TBILIRUBIN 0.3 0.4   LIPASE 210* 226*   GLUCOSE 77 97     Recent Labs     12/19/22  1159 12/20/22  0949   WBC 7.3 7.5   NEUTSPOLYS 50.40 59.40   LYMPHOCYTES 37.60 30.80   MONOCYTES 10.00 8.00   EOSINOPHILS 0.50 0.80   BASOPHILS 1.00 0.50   ASTSGOT 19 15   ALTSGPT 15 13   ALKPHOSPHAT 78 71   TBILIRUBIN 0.3 0.4     Recent Labs     12/19/22  1159 12/20/22  0949   RBC 5.11 5.09   HEMOGLOBIN 15.5 15.5   HEMATOCRIT 46.4 45.8   PLATELETCT 368 317   IRON 85  --    FERRITIN 47.1  --    TOTIRONBC 371  --      Recent Results (from the past 24 hour(s))   URINALYSIS    Collection Time: 12/20/22  8:31 AM    Specimen: Urine, Clean Catch   Result Value Ref Range    Color Yellow     Character Clear     Specific Gravity 1.010 <1.035    Ph 6.5 5.0 - 8.0    Glucose Negative Negative mg/dL    Ketones Negative Negative mg/dL    Protein Negative Negative mg/dL    Bilirubin Negative Negative    Nitrite Negative Negative    Leukocyte Esterase Negative Negative    Occult Blood Negative Negative    Micro Urine Req see below    CBC WITH DIFFERENTIAL    Collection Time: 12/20/22  9:49 AM   Result Value Ref Range    WBC 7.5 4.8 - 10.8 K/uL    RBC 5.09 4.20 - 5.40 M/uL    Hemoglobin 15.5 12.0 - 16.0 g/dL    Hematocrit 45.8 37.0 - 47.0 %    MCV 90.0 81.4 - 97.8 fL    MCH 30.5 27.0 - 33.0 pg    MCHC 33.8 33.6 - 35.0 g/dL    RDW 42.3 35.9 - 50.0 fL    Platelet Count 317 164 - 446 K/uL    MPV 9.4 9.0 - 12.9 fL    Neutrophils-Polys 59.40 44.00 - 72.00 %    Lymphocytes 30.80 22.00 - 41.00 %    Monocytes 8.00 0.00 - 13.40 %    Eosinophils 0.80 0.00 - 6.90 %    Basophils 0.50 0.00 - 1.80 %    Immature Granulocytes 0.50 0.00 - 0.90 %    Nucleated RBC 0.00 /100 WBC    Neutrophils (Absolute) 4.46 2.00  - 7.15 K/uL    Lymphs (Absolute) 2.31 1.00 - 4.80 K/uL    Monos (Absolute) 0.60 0.00 - 0.85 K/uL    Eos (Absolute) 0.06 0.00 - 0.51 K/uL    Baso (Absolute) 0.04 0.00 - 0.12 K/uL    Immature Granulocytes (abs) 0.04 0.00 - 0.11 K/uL    NRBC (Absolute) 0.00 K/uL   Comp Metabolic Panel    Collection Time: 12/20/22  9:49 AM   Result Value Ref Range    Sodium 138 135 - 145 mmol/L    Potassium 4.4 3.6 - 5.5 mmol/L    Chloride 104 96 - 112 mmol/L    Co2 22 20 - 33 mmol/L    Anion Gap 12.0 7.0 - 16.0    Glucose 97 65 - 99 mg/dL    Bun 6 (L) 8 - 22 mg/dL    Creatinine 0.82 0.50 - 1.40 mg/dL    Calcium 9.5 8.4 - 10.2 mg/dL    AST(SGOT) 15 12 - 45 U/L    ALT(SGPT) 13 2 - 50 U/L    Alkaline Phosphatase 71 30 - 99 U/L    Total Bilirubin 0.4 0.1 - 1.5 mg/dL    Albumin 4.5 3.2 - 4.9 g/dL    Total Protein 7.6 6.0 - 8.2 g/dL    Globulin 3.1 1.9 - 3.5 g/dL    A-G Ratio 1.5 g/dL   LIPASE    Collection Time: 12/20/22  9:49 AM   Result Value Ref Range    Lipase 226 (H) 7 - 58 U/L   BETA-HCG QUALITATIVE SERUM    Collection Time: 12/20/22  9:49 AM   Result Value Ref Range    Beta-Hcg Qualitative Serum Negative Negative   ETHYL ALCOHOL (BLOOD)    Collection Time: 12/20/22  9:49 AM   Result Value Ref Range    Diagnostic Alcohol <10.1 <10.1 mg/dL   CORRECTED CALCIUM    Collection Time: 12/20/22  9:49 AM   Result Value Ref Range    Correct Calcium 9.1 8.5 - 10.5 mg/dL   ESTIMATED GFR    Collection Time: 12/20/22  9:49 AM   Result Value Ref Range    GFR (CKD-EPI) 95 >60 mL/min/1.73 m 2         Radiology Review:  CT-ABDOMEN-PELVIS WITH   Final Result      1.  Pancreatic duct stones measuring up to 4 x 6 mm with new pancreatic ductal dilatation.            MDM (Data Review):   -Records reviewed and summarized in current documentation  -I personally reviewed and interpreted the laboratory results  -I personally reviewed the radiology images        Medical Decision Making, by Problem:  Active Hospital Problems    Diagnosis     Acute recurrent  pancreatitis [K85.90]     Chronic pancreatitis (HCC) [K86.1]     Bipolar disorder (HCC) [F31.9]     Diarrhea [R19.7]     Seizure disorder (HCC) [G40.909]     Alcohol use disorder, severe, in early remission, dependence (HCC) [F10.21]     Anxiety [F41.9]            Assessment/Recommendations:  37-year-old female patient with ongoing abdominal pain, nausea, elevated lipase.  She has a history of recurrent alcohol induced pancreatitis, evidence of chronic pancreatitis, and new CT imaging suggestive of pancreatic duct stone as well as possibly new pancreatic duct dilation.  Unclear if this PD dilation is truly new and whether this is impeding flow of her pancreatic duct.  Additionally, the patient is adamant that she is sober from alcohol since October 13.    Assessment:  -Acute on chronic epigastric and left upper quadrant pain  -Nausea  -Possibly acute on chronic pancreatitis secondary to previous alcohol use  -Bipolar disorder  -Anxiety and depression  -Diarrhea    Plan:  -MRCP for  further evaluation of pancreas, pancreatic duct, and possible stone  -Phosphatidyl ethanol ordered to assess whether patient has adhered to alcohol free lifestyle as this can change her management  -Diet as tolerated. Consider starting with clear liquids and advancing slowly depending on patient response  -Continue pancreatic enzyme supplementation  -Continue alcohol cessation    GILBERT Maldonado      Thank you for inviting me to participate in the care of this patient. Please do not hesitate to call GI consultants with additional questions/concerns or changes in the patient's clinical status at [phone].      Core Quality Measures   Reviewed items:  Labs, Medications and Radiology reports reviewed

## 2022-12-20 NOTE — ASSESSMENT & PLAN NOTE
- Evidence on previous CTs of chronic pancreatitis/calcifications  - Start creon given her complaints of diarrhea with fatty/floating stools     12/23: Patient had celiac plexus block done today by GI. We appreciate further recommendations.

## 2022-12-20 NOTE — ED PROVIDER NOTES
ED Provider Note    CHIEF COMPLAINT  Chief Complaint   Patient presents with    Abdominal Pain     Chronic pancreatitis.  Last drink 69 days ago.      Sent by MD     Abnormal labs, Lipase 210       LIMITATION TO HISTORY   Select: : None    HPI  Rhiannon Marrero is a 37 y.o. female who presents with a chief complaint of abdominal pain.  The pain started in the epigastric and left upper quadrant region 2 days ago and has been worsening.  The pain radiates to her back.  She has not tried any medication for her symptoms.  She has associated nonbloody diarrhea and nausea but has not vomited.  She admits to some dysuria but has a history of interstitial cystitis and her symptoms are unchanged from baseline.  She has no fevers.  She visited her primary care physician yesterday and labs demonstrated an elevated lipase.  She was sent to the ER for management.  She does have a history of alcohol abuse but is currently in a rehab facility.  Her last drink of alcohol was 69 days ago.  She denies any drug use.    OUTSIDE HISTORIAN(S):  Select: -None    EXTERNAL RECORDS REVIEWED  Select: Other labs obtained yesterday reveal a lipase of 210.  Patient was admitted to this facility for worsening abdominal pain on 12/7/2022 for recurrent pancreatitis.  She was ultimately able to tolerate p.o. and discharged to an outpatient setting.  She was offered opiate pain medications but declined.    REVIEW OF SYSTEMS  See HPI for further details.  Abdominal pain.  Nausea.  Diarrhea.  All other systems are negative.     PAST MEDICAL HISTORY   has a past medical history of Acute pancreatitis without infection or necrosis (07/20/2022), Alcohol dependence (HCC) (04/13/2017), Bronchitis (08/2012), Cold, Current moderate episode of major depressive disorder without prior episode (HCC) (01/31/2020), Heart burn, Hernia of unspecified site of abdominal cavity without mention of obstruction or gangrene, High anion gap metabolic acidosis (07/01/2022),  "Indigestion, Pancreatitis, Pneumonia (2011), and Seizure disorder (HCC) (05/23/2022).    SOCIAL HISTORY  Social History     Tobacco Use    Smoking status: Every Day     Packs/day: 0.75     Years: 10.00     Pack years: 7.50     Types: Cigarettes    Smokeless tobacco: Never   Vaping Use    Vaping Use: Former    Substances: Nicotine   Substance and Sexual Activity    Alcohol use: Not Currently     Comment: 5 shots, binges. Was sober for 34 days until today 10/12    Drug use: Yes     Types: Marijuana     Comment: edibles    Sexual activity: Not on file       SURGICAL HISTORY   has a past surgical history that includes other orthopedic surgery; gyn surgery; tonsillectomy (4/11/2013); arthroscopy, knee; hysterectomy robotic xi (10/11/2018); salpingectomy (Bilateral, 10/11/2018); and orif, wrist (Right, 4/24/2019).    FAMILY HISTORY  Family History   Problem Relation Age of Onset    Psychiatric Illness Mother     Stroke Mother     Arthritis Mother     Heart Disease Maternal Grandfather     Cancer Neg Hx     Diabetes Neg Hx     Hypertension Neg Hx        CURRENT MEDICATIONS  Home Medications    **Home medications have not yet been reviewed for this encounter**         ALLERGIES  Allergies   Allergen Reactions    Promethazine Hcl Anxiety     Anxiety and makes her feels like skin coming off        PHYSICAL EXAM  VITAL SIGNS: /89   Pulse (!) 114   Temp 36.4 °C (97.6 °F) (Tympanic)   Resp 20   Ht 1.702 m (5' 7\")   Wt 63.4 kg (139 lb 12.4 oz)   LMP 10/30/2018   SpO2 99%   BMI 21.89 kg/m²    Physical Exam  Vitals and nursing note reviewed.   Constitutional:       General: She is not in acute distress.     Appearance: She is well-developed. She is not ill-appearing or toxic-appearing.   HENT:      Head: Normocephalic and atraumatic.      Mouth/Throat:      Mouth: Mucous membranes are moist.   Eyes:      Extraocular Movements: Extraocular movements intact.      Pupils: Pupils are equal, round, and reactive to light. "   Cardiovascular:      Rate and Rhythm: Regular rhythm. Tachycardia present.      Heart sounds: No murmur heard.    No friction rub. No gallop.   Pulmonary:      Effort: Pulmonary effort is normal. No respiratory distress.      Breath sounds: Normal breath sounds. No wheezing, rhonchi or rales.   Abdominal:      General: Abdomen is flat. Bowel sounds are normal.      Tenderness: There is abdominal tenderness in the epigastric area and left upper quadrant.      Comments: Epigastric and left upper quadrant tenderness without peritoneal signs.   Skin:     General: Skin is warm and dry.      Findings: No erythema or rash.   Neurological:      General: No focal deficit present.      Mental Status: She is alert.   Psychiatric:         Mood and Affect: Mood normal.         Behavior: Behavior normal.         DIAGNOSTIC STUDIES / PROCEDURES    LABS  Results for orders placed or performed during the hospital encounter of 12/20/22   CBC WITH DIFFERENTIAL   Result Value Ref Range    WBC 7.5 4.8 - 10.8 K/uL    RBC 5.09 4.20 - 5.40 M/uL    Hemoglobin 15.5 12.0 - 16.0 g/dL    Hematocrit 45.8 37.0 - 47.0 %    MCV 90.0 81.4 - 97.8 fL    MCH 30.5 27.0 - 33.0 pg    MCHC 33.8 33.6 - 35.0 g/dL    RDW 42.3 35.9 - 50.0 fL    Platelet Count 317 164 - 446 K/uL    MPV 9.4 9.0 - 12.9 fL    Neutrophils-Polys 59.40 44.00 - 72.00 %    Lymphocytes 30.80 22.00 - 41.00 %    Monocytes 8.00 0.00 - 13.40 %    Eosinophils 0.80 0.00 - 6.90 %    Basophils 0.50 0.00 - 1.80 %    Immature Granulocytes 0.50 0.00 - 0.90 %    Nucleated RBC 0.00 /100 WBC    Neutrophils (Absolute) 4.46 2.00 - 7.15 K/uL    Lymphs (Absolute) 2.31 1.00 - 4.80 K/uL    Monos (Absolute) 0.60 0.00 - 0.85 K/uL    Eos (Absolute) 0.06 0.00 - 0.51 K/uL    Baso (Absolute) 0.04 0.00 - 0.12 K/uL    Immature Granulocytes (abs) 0.04 0.00 - 0.11 K/uL    NRBC (Absolute) 0.00 K/uL   Comp Metabolic Panel   Result Value Ref Range    Sodium 138 135 - 145 mmol/L    Potassium 4.4 3.6 - 5.5 mmol/L     Chloride 104 96 - 112 mmol/L    Co2 22 20 - 33 mmol/L    Anion Gap 12.0 7.0 - 16.0    Glucose 97 65 - 99 mg/dL    Bun 6 (L) 8 - 22 mg/dL    Creatinine 0.82 0.50 - 1.40 mg/dL    Calcium 9.5 8.4 - 10.2 mg/dL    AST(SGOT) 15 12 - 45 U/L    ALT(SGPT) 13 2 - 50 U/L    Alkaline Phosphatase 71 30 - 99 U/L    Total Bilirubin 0.4 0.1 - 1.5 mg/dL    Albumin 4.5 3.2 - 4.9 g/dL    Total Protein 7.6 6.0 - 8.2 g/dL    Globulin 3.1 1.9 - 3.5 g/dL    A-G Ratio 1.5 g/dL   LIPASE   Result Value Ref Range    Lipase 226 (H) 7 - 58 U/L   BETA-HCG QUALITATIVE SERUM   Result Value Ref Range    Beta-Hcg Qualitative Serum Negative Negative   ETHYL ALCOHOL (BLOOD)   Result Value Ref Range    Diagnostic Alcohol <10.1 <10.1 mg/dL   CORRECTED CALCIUM   Result Value Ref Range    Correct Calcium 9.1 8.5 - 10.5 mg/dL   ESTIMATED GFR   Result Value Ref Range    GFR (CKD-EPI) 95 >60 mL/min/1.73 m 2     RADIOLOGY  No orders to display     COURSE & MEDICAL DECISION MAKING  Pertinent Labs & Imaging studies reviewed. (See chart for details)  Differential Diagnosis Considered   Pancreatitis, GERD, PUD, gastritis, duodenal ulcer    This is a 37-year-old with a history of chronic pancreatitis who is here with left upper quadrant and epigastric pain radiating to her back, nausea, and diarrhea consistent with prior episodes of pancreatitis.  She arrives tachycardic but afebrile with otherwise normal vital signs.  She appears slightly dehydrated with dry mucous membranes but nontoxic.  Abdomen is soft without peritoneal signs but she is tender in the epigastric and left upper quadrant.  No rigidity to suggest peritoneal/surgical abdomen.  Low suspicion for etiology such as perforated peptic ulcer.    Patient started on IV fluids and given pain and nausea medication.  Labs are consistent with pancreatitis.  Patient was given the option for discharge with outpatient management but has declined stating that she knows she will return if she is discharged.   Therefore she was discussed with the hospitalist.    DISCUSSION OF MANAGEMENT WITH OTHER PHYSICIANS, Q OR APPROPRIATE SOURCE  Select: Admitting team -hospitalist     HYDRATION: Based on the patient's presentation of Dehydration the patient was given IV fluids. IV Hydration was used because oral hydration was not adequate alone. Upon recheck following hydration, the patient was improved.    Discussed with the hospitalist and admitted in guarded condition.    FINAL IMPRESSION  1.  Chronic pancreatitis       Electronically signed by: Santy Bills M.D., 12/20/2022 9:00 AM

## 2022-12-20 NOTE — H&P
Hospital Medicine History & Physical Note    Date of Service  12/20/2022    Primary Care Physician  Ivy Adams M.D.    Consultants  none    Specialist Names: none    Code Status  Prior    Chief Complaint  Chief Complaint   Patient presents with    Abdominal Pain     Chronic pancreatitis.  Last drink 69 days ago.      Sent by MD     Abnormal labs, Lipase 210       History of Presenting Illness  Rhiannon Marrero is a 37 y.o. female who presented 12/20/2022 with abdominal pain. She has a history significant for bipolar disorder, seizure disorder, EtOH abuse in remission x 2 months and recurrent pancreatitis with several recent admissions who was just discharged on 12/12 for pancreatitis.  She saw her PCP yesterday who has referred her to GI as an outpatient, and directed her to come to the ER due to an elevated lipase. However, this lipase is lower than previous on 12/12 admit. She states that this feels like her typical pancreatitis pain and attests nausea without vomiting. She is having diarrhea with stools the consistency of baby food that float on the toilet water. She expresses frustration at continuing to have pancreatitis issues with cessation of alcohol.     I discussed the plan of care with patient and ERP .    Review of Systems  Review of Systems   Constitutional:  Positive for malaise/fatigue. Negative for chills, fever and weight loss.   Respiratory:  Negative for cough and shortness of breath.    Cardiovascular:  Negative for chest pain and leg swelling.   Gastrointestinal:  Positive for abdominal pain, diarrhea and nausea. Negative for vomiting.   All other systems reviewed and are negative.    Past Medical History   has a past medical history of Acute pancreatitis without infection or necrosis (07/20/2022), Alcohol dependence (HCC) (04/13/2017), Bronchitis (08/2012), Cold, Current moderate episode of major depressive disorder without prior episode (HCC) (01/31/2020), Heart burn, Hernia of unspecified  site of abdominal cavity without mention of obstruction or gangrene, High anion gap metabolic acidosis (07/01/2022), Indigestion, Pancreatitis, Pneumonia (2011), and Seizure disorder (HCC) (05/23/2022).    Surgical History   has a past surgical history that includes other orthopedic surgery; gyn surgery; tonsillectomy (4/11/2013); arthroscopy, knee; hysterectomy robotic xi (10/11/2018); salpingectomy (Bilateral, 10/11/2018); and orif, wrist (Right, 4/24/2019).     Family History  family history includes Arthritis in her mother; Heart Disease in her maternal grandfather; Psychiatric Illness in her mother; Stroke in her mother.   Family history reviewed with patient. There is no family history that is pertinent to the chief complaint.     Social History   reports that she has been smoking cigarettes. She has a 7.50 pack-year smoking history. She has never used smokeless tobacco. She reports that she does not currently use alcohol. She reports current drug use. Drug: Marijuana.    Allergies  Allergies   Allergen Reactions    Promethazine Hcl Anxiety     Anxiety and makes her feels like skin coming off        Medications  Prior to Admission Medications   Prescriptions Last Dose Informant Patient Reported? Taking?   acetaminophen (TYLENOL) 325 MG Tab NEW RX Patient No No   Sig: Take 2 Tablets by mouth every 6 hours as needed for Moderate Pain.   divalproex (DEPAKOTE) 250 MG Tablet Delayed Response 12/19/2022 at 1900 Patient No No   Sig: Take 1 Tablet by mouth 2 times a day for 120 days.   gabapentin (NEURONTIN) 100 MG Cap 12/19/2022 at 1900 Patient Yes No   Sig: Take 100-300 mg by mouth 2 times a day. Pt takes 100MG in AM and 300MG in the evening   hydrOXYzine pamoate (VISTARIL) 50 MG Cap > 1 week at Ran out Patient No No   Sig: Take 1 Capsule by mouth every 8 hours as needed for Anxiety.   ibuprofen (MOTRIN) 200 MG Tab > 2 weeks at Unknown Patient Yes Yes   Sig: Take 600 mg by mouth every 6 hours as needed. Indications:  Pain   zonisamide (ZONEGRAN) 50 MG capsule 12/19/2022 at 1900 Patient No No   Sig: Take 3 Capsules by mouth at bedtime for 30 days.      Facility-Administered Medications: None       Physical Exam  Temp:  [36.4 °C (97.6 °F)] 36.4 °C (97.6 °F)  Pulse:  [114] 114  Resp:  [20] 20  BP: (122)/(89) 122/89  SpO2:  [99 %] 99 %  Blood Pressure: 122/89   Temperature: 36.4 °C (97.6 °F)   Pulse: (!) 114   Respiration: 20   Pulse Oximetry: 99 %       Physical Exam  Vitals and nursing note reviewed.   Constitutional:       Appearance: She is ill-appearing. She is not toxic-appearing.   HENT:      Head: Normocephalic and atraumatic.      Mouth/Throat:      Mouth: Mucous membranes are dry.   Eyes:      Extraocular Movements: Extraocular movements intact.   Cardiovascular:      Rate and Rhythm: Regular rhythm. Tachycardia present.      Heart sounds: No murmur heard.  Pulmonary:      Effort: Pulmonary effort is normal. No respiratory distress.      Breath sounds: Normal breath sounds. No wheezing or rales.   Abdominal:      General: Abdomen is flat. Bowel sounds are normal. There is no distension.      Palpations: Abdomen is soft.      Tenderness: There is abdominal tenderness (LUQ>RUQ). There is no guarding or rebound.   Musculoskeletal:      Cervical back: Normal range of motion.   Neurological:      Mental Status: She is alert.       Laboratory:  Recent Labs     12/19/22  1159 12/20/22  0949   WBC 7.3 7.5   RBC 5.11 5.09   HEMOGLOBIN 15.5 15.5   HEMATOCRIT 46.4 45.8   MCV 90.8 90.0   MCH 30.3 30.5   MCHC 33.4* 33.8   RDW 42.6 42.3   PLATELETCT 368 317   MPV 9.8 9.4     Recent Labs     12/19/22  1159 12/20/22  0949   SODIUM 140 138   POTASSIUM 4.1 4.4   CHLORIDE 103 104   CO2 25 22   GLUCOSE 77 97   BUN 6* 6*   CREATININE 0.67 0.82   CALCIUM 9.6 9.5     Recent Labs     12/19/22  1159 12/20/22  0949   ALTSGPT 15 13   ASTSGOT 19 15   ALKPHOSPHAT 78 71   TBILIRUBIN 0.3 0.4   LIPASE 210* 226*   GLUCOSE 77 97         No results for  input(s): NTPROBNP in the last 72 hours.  Recent Labs     12/19/22  1159   TRIGLYCERIDE 103   HDL 43   LDL 97     No results for input(s): TROPONINT in the last 72 hours.    Imaging:  No orders to display       no X-Ray or EKG requiring interpretation    Assessment/Plan:  Justification for Admission Status  I anticipate this patient will require at least two midnights for appropriate medical management, necessitating inpatient admission because pt with pancreatitis, will need slow introduction of food.     Patient will need a Med/Surg bed on MEDICAL service .  The need is secondary to pancreatitis.     * Acute recurrent pancreatitis- (present on admission)  Assessment & Plan  - Lipase 226, down from previous admission but still elevated, pt reports her typical LUQ pancreatitis pain radiating to her back   - Did not have CT last admission - given her quick recurrence, will check CT abd/pelvis to evaluate for pseudocyst and/or necrosis given persistence of her symptoms despite EtOH cessation  - No elevation in bilirubin to suggest GB involvement currently, will see what CBD looks like on CT   - NPO  - LR@250cc/hr x 12 hours, then 150cc/hr thereafter  - Question of contribution from Depakote last admission, pt did decide to stay on Depakote despite that last stay - if CT unrevealing for pseudocyst or other etiology of her pancreatitis and pain, this may need to be readdressed with patient   - IV and PO options for pain control   - TGLs normal    ADDENDUM: pt with pancreatic duct stones on CT - [facility] consulted     Chronic pancreatitis (HCC)  Assessment & Plan  - Evidence on previous CTs of chronic pancreatitis/calcifications  - Start creon given her complaints of diarrhea with fatty/floating stools     Bipolar disorder (HCC)- (present on admission)  Assessment & Plan  - Resume home Depakote   -May need to readdress Depakote usage for Bipolar and seizure d/o if no further etiology of acute on chronic pancreatitis  - Check  depakote level     Diarrhea- (present on admission)  Assessment & Plan  - Described as baby food like in consistency, floats on the toilet water - consistent with stool in the setting of pancreatic insufficiency  - Start Creon - start 1 tab with meals today, titrate up     Alcohol dependence with withdrawal (HCC)  Assessment & Plan  - Pt reports no EtOH usage in two months     Seizure disorder (HCC)- (present on admission)  Assessment & Plan  - Resume home Depakote and Zonisamide  - Seizure precautions    Alcohol use disorder, severe, in early remission, dependence (HCC)- (present on admission)  Assessment & Plan  - pt reports no EtOH usage in two months     Anxiety- (present on admission)  Assessment & Plan  - Resume home PRN hydroxyzine      VTE prophylaxis: SCDs/TEDs and enoxaparin ppx

## 2022-12-20 NOTE — ASSESSMENT & PLAN NOTE
- Described as baby food like in consistency, floats on the toilet water - consistent with stool in the setting of pancreatic insufficiency  - Start Creon - start 1 tab with meals today, titrate up

## 2022-12-20 NOTE — ED NOTES
MRI screening form completed with patient. Continuous IV fluids started as ordered. Pt denies any needs at this time.

## 2022-12-20 NOTE — ED TRIAGE NOTES
"Chief Complaint   Patient presents with    Abdominal Pain     Chronic pancreatitis.  Last drink 69 days ago.      Sent by MD     Abnormal labs, Lipase 210     /89   Pulse (!) 114   Temp 36.4 °C (97.6 °F) (Tympanic)   Resp 20   Ht 1.702 m (5' 7\")   Wt 63.4 kg (139 lb 12.4 oz)   LMP 10/30/2018   SpO2 99%   BMI 21.89 kg/m²       "

## 2022-12-21 LAB
ALBUMIN SERPL BCP-MCNC: 3.7 G/DL (ref 3.2–4.9)
ALBUMIN/GLOB SERPL: 1.6 G/DL
ALP SERPL-CCNC: 58 U/L (ref 30–99)
ALT SERPL-CCNC: 9 U/L (ref 2–50)
ANION GAP SERPL CALC-SCNC: 12 MMOL/L (ref 7–16)
AST SERPL-CCNC: 13 U/L (ref 12–45)
BASOPHILS # BLD AUTO: 0.9 % (ref 0–1.8)
BASOPHILS # BLD: 0.06 K/UL (ref 0–0.12)
BILIRUB SERPL-MCNC: 0.3 MG/DL (ref 0.1–1.5)
BUN SERPL-MCNC: 7 MG/DL (ref 8–22)
CALCIUM ALBUM COR SERPL-MCNC: 8.8 MG/DL (ref 8.5–10.5)
CALCIUM SERPL-MCNC: 8.6 MG/DL (ref 8.4–10.2)
CHLORIDE SERPL-SCNC: 102 MMOL/L (ref 96–112)
CO2 SERPL-SCNC: 21 MMOL/L (ref 20–33)
CREAT SERPL-MCNC: 0.62 MG/DL (ref 0.5–1.4)
EOSINOPHIL # BLD AUTO: 0.08 K/UL (ref 0–0.51)
EOSINOPHIL NFR BLD: 1.2 % (ref 0–6.9)
ERYTHROCYTE [DISTWIDTH] IN BLOOD BY AUTOMATED COUNT: 42 FL (ref 35.9–50)
GFR SERPLBLD CREATININE-BSD FMLA CKD-EPI: 119 ML/MIN/1.73 M 2
GLOBULIN SER CALC-MCNC: 2.3 G/DL (ref 1.9–3.5)
GLUCOSE SERPL-MCNC: 88 MG/DL (ref 65–99)
HCT VFR BLD AUTO: 38.4 % (ref 37–47)
HGB BLD-MCNC: 12.8 G/DL (ref 12–16)
IMM GRANULOCYTES # BLD AUTO: 0.02 K/UL (ref 0–0.11)
IMM GRANULOCYTES NFR BLD AUTO: 0.3 % (ref 0–0.9)
LYMPHOCYTES # BLD AUTO: 2.58 K/UL (ref 1–4.8)
LYMPHOCYTES NFR BLD: 39 % (ref 22–41)
MCH RBC QN AUTO: 29.9 PG (ref 27–33)
MCHC RBC AUTO-ENTMCNC: 33.3 G/DL (ref 33.6–35)
MCV RBC AUTO: 89.7 FL (ref 81.4–97.8)
MONOCYTES # BLD AUTO: 0.46 K/UL (ref 0–0.85)
MONOCYTES NFR BLD AUTO: 7 % (ref 0–13.4)
NEUTROPHILS # BLD AUTO: 3.41 K/UL (ref 2–7.15)
NEUTROPHILS NFR BLD: 51.6 % (ref 44–72)
NRBC # BLD AUTO: 0 K/UL
NRBC BLD-RTO: 0 /100 WBC
PLATELET # BLD AUTO: 255 K/UL (ref 164–446)
PMV BLD AUTO: 9.2 FL (ref 9–12.9)
POTASSIUM SERPL-SCNC: 3.5 MMOL/L (ref 3.6–5.5)
PROT SERPL-MCNC: 6 G/DL (ref 6–8.2)
RBC # BLD AUTO: 4.28 M/UL (ref 4.2–5.4)
SODIUM SERPL-SCNC: 135 MMOL/L (ref 135–145)
WBC # BLD AUTO: 6.6 K/UL (ref 4.8–10.8)

## 2022-12-21 PROCEDURE — 85025 COMPLETE CBC W/AUTO DIFF WBC: CPT

## 2022-12-21 PROCEDURE — 99233 SBSQ HOSP IP/OBS HIGH 50: CPT | Performed by: INTERNAL MEDICINE

## 2022-12-21 PROCEDURE — 700111 HCHG RX REV CODE 636 W/ 250 OVERRIDE (IP): Performed by: FAMILY MEDICINE

## 2022-12-21 PROCEDURE — 80053 COMPREHEN METABOLIC PANEL: CPT

## 2022-12-21 PROCEDURE — 700102 HCHG RX REV CODE 250 W/ 637 OVERRIDE(OP): Performed by: INTERNAL MEDICINE

## 2022-12-21 PROCEDURE — 94760 N-INVAS EAR/PLS OXIMETRY 1: CPT

## 2022-12-21 PROCEDURE — A9270 NON-COVERED ITEM OR SERVICE: HCPCS | Performed by: FAMILY MEDICINE

## 2022-12-21 PROCEDURE — A9270 NON-COVERED ITEM OR SERVICE: HCPCS | Performed by: INTERNAL MEDICINE

## 2022-12-21 PROCEDURE — 36415 COLL VENOUS BLD VENIPUNCTURE: CPT

## 2022-12-21 PROCEDURE — 700102 HCHG RX REV CODE 250 W/ 637 OVERRIDE(OP): Performed by: FAMILY MEDICINE

## 2022-12-21 PROCEDURE — 770006 HCHG ROOM/CARE - MED/SURG/GYN SEMI*

## 2022-12-21 PROCEDURE — 700105 HCHG RX REV CODE 258: Performed by: FAMILY MEDICINE

## 2022-12-21 PROCEDURE — 700111 HCHG RX REV CODE 636 W/ 250 OVERRIDE (IP): Performed by: INTERNAL MEDICINE

## 2022-12-21 RX ORDER — KETOROLAC TROMETHAMINE 30 MG/ML
15 INJECTION, SOLUTION INTRAMUSCULAR; INTRAVENOUS EVERY 8 HOURS PRN
Status: DISCONTINUED | OUTPATIENT
Start: 2022-12-21 | End: 2022-12-21

## 2022-12-21 RX ORDER — KETOROLAC TROMETHAMINE 30 MG/ML
15 INJECTION, SOLUTION INTRAMUSCULAR; INTRAVENOUS EVERY 8 HOURS
Status: DISCONTINUED | OUTPATIENT
Start: 2022-12-21 | End: 2022-12-25 | Stop reason: HOSPADM

## 2022-12-21 RX ORDER — MORPHINE SULFATE 15 MG/1
15 TABLET, FILM COATED, EXTENDED RELEASE ORAL EVERY 12 HOURS
Status: DISCONTINUED | OUTPATIENT
Start: 2022-12-21 | End: 2022-12-24

## 2022-12-21 RX ORDER — POTASSIUM CHLORIDE 7.45 MG/ML
10 INJECTION INTRAVENOUS ONCE
Status: COMPLETED | OUTPATIENT
Start: 2022-12-21 | End: 2022-12-21

## 2022-12-21 RX ORDER — NICOTINE 21 MG/24HR
1 PATCH, TRANSDERMAL 24 HOURS TRANSDERMAL
Status: DISCONTINUED | OUTPATIENT
Start: 2022-12-21 | End: 2022-12-25 | Stop reason: HOSPADM

## 2022-12-21 RX ADMIN — OXYCODONE HYDROCHLORIDE 10 MG: 10 TABLET ORAL at 22:42

## 2022-12-21 RX ADMIN — DIVALPROEX SODIUM 250 MG: 250 TABLET, DELAYED RELEASE ORAL at 17:26

## 2022-12-21 RX ADMIN — HYDROMORPHONE HYDROCHLORIDE 0.5 MG: 1 INJECTION, SOLUTION INTRAMUSCULAR; INTRAVENOUS; SUBCUTANEOUS at 11:22

## 2022-12-21 RX ADMIN — PANCRELIPASE 24000 UNITS: 24000; 76000; 120000 CAPSULE, DELAYED RELEASE PELLETS ORAL at 11:22

## 2022-12-21 RX ADMIN — HYDROMORPHONE HYDROCHLORIDE 0.5 MG: 1 INJECTION, SOLUTION INTRAMUSCULAR; INTRAVENOUS; SUBCUTANEOUS at 20:53

## 2022-12-21 RX ADMIN — GABAPENTIN 100 MG: 100 CAPSULE ORAL at 04:37

## 2022-12-21 RX ADMIN — ONDANSETRON 4 MG: 2 INJECTION INTRAMUSCULAR; INTRAVENOUS at 17:31

## 2022-12-21 RX ADMIN — SODIUM CHLORIDE, POTASSIUM CHLORIDE, SODIUM LACTATE AND CALCIUM CHLORIDE: 600; 310; 30; 20 INJECTION, SOLUTION INTRAVENOUS at 08:16

## 2022-12-21 RX ADMIN — PANCRELIPASE 24000 UNITS: 24000; 76000; 120000 CAPSULE, DELAYED RELEASE PELLETS ORAL at 17:26

## 2022-12-21 RX ADMIN — KETOROLAC TROMETHAMINE 15 MG: 30 INJECTION, SOLUTION INTRAMUSCULAR; INTRAVENOUS at 14:28

## 2022-12-21 RX ADMIN — SODIUM CHLORIDE, POTASSIUM CHLORIDE, SODIUM LACTATE AND CALCIUM CHLORIDE: 600; 310; 30; 20 INJECTION, SOLUTION INTRAVENOUS at 22:17

## 2022-12-21 RX ADMIN — NICOTINE 14 MG: 14 PATCH TRANSDERMAL at 11:23

## 2022-12-21 RX ADMIN — HYDROMORPHONE HYDROCHLORIDE 0.5 MG: 1 INJECTION, SOLUTION INTRAMUSCULAR; INTRAVENOUS; SUBCUTANEOUS at 00:22

## 2022-12-21 RX ADMIN — HYDROMORPHONE HYDROCHLORIDE 0.5 MG: 1 INJECTION, SOLUTION INTRAMUSCULAR; INTRAVENOUS; SUBCUTANEOUS at 08:16

## 2022-12-21 RX ADMIN — MORPHINE SULFATE 15 MG: 15 TABLET, EXTENDED RELEASE ORAL at 17:26

## 2022-12-21 RX ADMIN — HYDROMORPHONE HYDROCHLORIDE 0.5 MG: 1 INJECTION, SOLUTION INTRAMUSCULAR; INTRAVENOUS; SUBCUTANEOUS at 04:36

## 2022-12-21 RX ADMIN — SODIUM CHLORIDE, POTASSIUM CHLORIDE, SODIUM LACTATE AND CALCIUM CHLORIDE 1000 ML: 600; 310; 30; 20 INJECTION, SOLUTION INTRAVENOUS at 00:24

## 2022-12-21 RX ADMIN — KETOROLAC TROMETHAMINE 15 MG: 30 INJECTION, SOLUTION INTRAMUSCULAR; INTRAVENOUS at 20:56

## 2022-12-21 RX ADMIN — PANCRELIPASE 24000 UNITS: 24000; 76000; 120000 CAPSULE, DELAYED RELEASE PELLETS ORAL at 08:16

## 2022-12-21 RX ADMIN — GABAPENTIN 300 MG: 100 CAPSULE ORAL at 20:53

## 2022-12-21 RX ADMIN — POTASSIUM CHLORIDE 10 MEQ: 7.46 INJECTION, SOLUTION INTRAVENOUS at 11:32

## 2022-12-21 RX ADMIN — HYDROMORPHONE HYDROCHLORIDE 0.5 MG: 1 INJECTION, SOLUTION INTRAMUSCULAR; INTRAVENOUS; SUBCUTANEOUS at 15:33

## 2022-12-21 RX ADMIN — OXYCODONE HYDROCHLORIDE 10 MG: 10 TABLET ORAL at 19:37

## 2022-12-21 RX ADMIN — ZONISAMIDE 150 MG: 50 CAPSULE ORAL at 20:53

## 2022-12-21 RX ADMIN — OXYCODONE HYDROCHLORIDE 10 MG: 10 TABLET ORAL at 14:28

## 2022-12-21 RX ADMIN — DIVALPROEX SODIUM 250 MG: 250 TABLET, DELAYED RELEASE ORAL at 04:37

## 2022-12-21 RX ADMIN — SODIUM CHLORIDE, POTASSIUM CHLORIDE, SODIUM LACTATE AND CALCIUM CHLORIDE: 600; 310; 30; 20 INJECTION, SOLUTION INTRAVENOUS at 14:29

## 2022-12-21 ASSESSMENT — ENCOUNTER SYMPTOMS
NAUSEA: 1
SINUS PAIN: 0
FEVER: 0
COUGH: 0
BLURRED VISION: 0
NERVOUS/ANXIOUS: 0
SORE THROAT: 0
ABDOMINAL PAIN: 1
DEPRESSION: 1
NERVOUS/ANXIOUS: 1
FALLS: 0
HEADACHES: 0
PALPITATIONS: 0
DOUBLE VISION: 0
VOMITING: 0
EYE DISCHARGE: 0
BACK PAIN: 0
BRUISES/BLEEDS EASILY: 0
EYE PAIN: 0
DIZZINESS: 0
HEARTBURN: 0
SHORTNESS OF BREATH: 0
DIARRHEA: 1
CHILLS: 0

## 2022-12-21 ASSESSMENT — PAIN DESCRIPTION - PAIN TYPE
TYPE: ACUTE PAIN

## 2022-12-21 ASSESSMENT — LIFESTYLE VARIABLES: SUBSTANCE_ABUSE: 0

## 2022-12-21 ASSESSMENT — FIBROSIS 4 INDEX: FIB4 SCORE: 0.59

## 2022-12-21 NOTE — ED NOTES
Pt states gets anxiety with MRI d/t claustrophobia, requesting med for anxiety, Dr Coronado notified of pt request.

## 2022-12-21 NOTE — PROGRESS NOTES
4 Eyes Skin Assessment Completed by Anitha RN and LESLIE Wall.    Head WDL  Ears WDL  Nose WDL  Mouth WDL  Neck WDL  Breast/Chest WDL  Shoulder Blades WDL  Spine WDL  (R) Arm/Elbow/Hand WDL  (L) Arm/Elbow/Hand WDL  Abdomen WDL  Groin WDL  Scrotum/Coccyx/Buttocks WDL  (R) Leg WDL  (L) Leg WDL  (R) Heel/Foot/Toe WDL  (L) Heel/Foot/Toe WDL          Devices In Places Tele Box, Blood Pressure Cuff, and Pulse Ox      Interventions In Place Pillows and Pressure Redistribution Mattress    Possible Skin Injury No    Pictures Uploaded Into Epic N/A  Wound Consult Placed N/A  RN Wound Prevention Protocol Ordered No

## 2022-12-21 NOTE — PROGRESS NOTES
"Hospital Medicine Daily Progress Note    Date of Service  12/21/2022    Chief Complaint  Rhiannon Marrero is a 37 y.o. female admitted 12/20/2022 with abdominal pain.    Hospital Course  As per chart review:  \"37 y.o. female who presented 12/20/2022 with abdominal pain. She has a history significant for bipolar disorder, seizure disorder, EtOH abuse in remission x 2 months and recurrent pancreatitis with several recent admissions who was just discharged on 12/12 for pancreatitis.  She saw her PCP yesterday who has referred her to GI as an outpatient, and directed her to come to the ER due to an elevated lipase. However, this lipase is lower than previous on 12/12 admit. She states that this feels like her typical pancreatitis pain and attests nausea without vomiting. She is having diarrhea with stools the consistency of baby food that float on the toilet water. She expresses frustration at continuing to have pancreatitis issues with cessation of alcohol.\"     Interval Problem Update  12/21: Patient seen at bedside this morning.  The patient complaining of abdominal pain.  She is not hungry or have an appetite yet.  We will continue with IV fluids.  GI following the patient, contemplating the need of ERCP.  We appreciate further recommendations.  Continue with pain management as well.    I have discussed this patient's plan of care and discharge plan at IDT rounds today with Case Management, Nursing, Nursing leadership, and other members of the IDT team.    Consultants/Specialty  GI    Code Status  Full Code    Disposition  Patient is not medically cleared for discharge.   Anticipate discharge to pending clinical evolution.      Review of Systems  Review of Systems   Constitutional:  Positive for malaise/fatigue. Negative for chills and fever.   HENT:  Negative for hearing loss and nosebleeds.    Eyes:  Negative for blurred vision and double vision.   Respiratory:  Negative for cough and shortness of breath.  "   Cardiovascular:  Negative for chest pain and palpitations.   Gastrointestinal:  Positive for abdominal pain, diarrhea and nausea. Negative for heartburn and vomiting.   Genitourinary:  Negative for dysuria and urgency.   Musculoskeletal:  Negative for back pain and falls.   Skin:  Negative for itching and rash.   Neurological:  Negative for dizziness and headaches.   Psychiatric/Behavioral:  Negative for substance abuse. The patient is not nervous/anxious.    All other systems reviewed and are negative.     Physical Exam  Temp:  [36.4 °C (97.6 °F)-36.6 °C (97.9 °F)] 36.4 °C (97.6 °F)  Pulse:  [72-97] 90  Resp:  [15-18] 18  BP: ()/(57-91) 104/60  SpO2:  [94 %-100 %] 100 %    Physical Exam  Vitals and nursing note reviewed.   Constitutional:       General: She is not in acute distress.     Appearance: She is ill-appearing.   HENT:      Head: Normocephalic and atraumatic.      Right Ear: External ear normal.      Left Ear: External ear normal.      Mouth/Throat:      Pharynx: No oropharyngeal exudate or posterior oropharyngeal erythema.   Eyes:      General:         Right eye: No discharge.         Left eye: No discharge.   Cardiovascular:      Rate and Rhythm: Normal rate and regular rhythm.      Pulses: Normal pulses.      Heart sounds: No murmur heard.    No gallop.   Pulmonary:      Effort: Pulmonary effort is normal. No respiratory distress.      Breath sounds: Normal breath sounds. No wheezing or rhonchi.   Abdominal:      General: Bowel sounds are normal. There is no distension.      Palpations: Abdomen is soft.      Tenderness: There is abdominal tenderness. There is no guarding.   Musculoskeletal:         General: No swelling or tenderness. Normal range of motion.      Cervical back: Normal range of motion and neck supple. No tenderness.   Skin:     General: Skin is warm and dry.   Neurological:      General: No focal deficit present.      Mental Status: She is alert and oriented to person, place, and  time. Mental status is at baseline.      Motor: No weakness.   Psychiatric:         Mood and Affect: Mood normal.         Behavior: Behavior normal.       Fluids    Intake/Output Summary (Last 24 hours) at 12/21/2022 0952  Last data filed at 12/21/2022 0816  Gross per 24 hour   Intake 1000 ml   Output --   Net 1000 ml       Laboratory  Recent Labs     12/19/22  1159 12/20/22  0949 12/21/22  0249   WBC 7.3 7.5 6.6   RBC 5.11 5.09 4.28   HEMOGLOBIN 15.5 15.5 12.8   HEMATOCRIT 46.4 45.8 38.4   MCV 90.8 90.0 89.7   MCH 30.3 30.5 29.9   MCHC 33.4* 33.8 33.3*   RDW 42.6 42.3 42.0   PLATELETCT 368 317 255   MPV 9.8 9.4 9.2     Recent Labs     12/19/22  1159 12/20/22  0949 12/21/22  0249   SODIUM 140 138 135   POTASSIUM 4.1 4.4 3.5*   CHLORIDE 103 104 102   CO2 25 22 21   GLUCOSE 77 97 88   BUN 6* 6* 7*   CREATININE 0.67 0.82 0.62   CALCIUM 9.6 9.5 8.6             Recent Labs     12/19/22  1159   TRIGLYCERIDE 103   HDL 43   LDL 97       Imaging  [id number]   Final Result      1. No choledocholithiasis or biliary dilatation.   2. Normal gallbladder. No cholelithiasis.   3. Pancreatic duct stones were better seen on the recent CT, with one of the stones resulting in upstream pancreatic ductal dilatation up to 5 mm.         CT-ABDOMEN-PELVIS WITH   Final Result      1.  Pancreatic duct stones measuring up to 4 x 6 mm with new pancreatic ductal dilatation.           Assessment/Plan  * Acute recurrent pancreatitis- (present on admission)  Assessment & Plan  - Lipase 226, down from previous admission but still elevated, pt reports her typical LUQ pancreatitis pain radiating to her back   - Did not have CT last admission - given her quick recurrence, will check CT abd/pelvis to evaluate for pseudocyst and/or necrosis given persistence of her symptoms despite EtOH cessation  - No elevation in bilirubin to suggest GB involvement currently, will see what CBD looks like on CT   - NPO  - LR@250cc/hr x 12 hours, then 150cc/hr  thereafter  - Question of contribution from Depakote last admission, pt did decide to stay on Depakote despite that last stay - if CT unrevealing for pseudocyst or other etiology of her pancreatitis and pain, this may need to be readdressed with patient   - IV and PO options for pain control   - TGLs normal    ADDENDUM: pt with pancreatic duct stones on CT - [facility] consulted     12/21: GI evaluating the possibility of ERCP, we appreciate further recommendations.    Chronic pancreatitis (HCC)  Assessment & Plan  - Evidence on previous CTs of chronic pancreatitis/calcifications  - Start creon given her complaints of diarrhea with fatty/floating stools     Bipolar disorder (HCC)- (present on admission)  Assessment & Plan  - Resume home Depakote   -May need to readdress Depakote usage for Bipolar and seizure d/o if no further etiology of acute on chronic pancreatitis  - Check depakote level     Diarrhea- (present on admission)  Assessment & Plan  - Described as baby food like in consistency, floats on the toilet water - consistent with stool in the setting of pancreatic insufficiency  - Start Creon - start 1 tab with meals today, titrate up     Alcohol dependence with withdrawal (HCC)  Assessment & Plan  - Pt reports no EtOH usage in two months     Hypokalemia- (present on admission)  Assessment & Plan  Replace as needed  monitor    Seizure disorder (HCC)- (present on admission)  Assessment & Plan  - Resume home Depakote and Zonisamide  - Seizure precautions    Alcohol use disorder, severe, in early remission, dependence (HCC)- (present on admission)  Assessment & Plan  - pt reports no EtOH usage in two months     Anxiety- (present on admission)  Assessment & Plan  - Resume home PRN hydroxyzine         VTE prophylaxis: enoxaparin ppx    I have performed a physical exam and reviewed and updated ROS and Plan today (12/21/2022). In review of yesterday's note (12/20/2022), there are no changes except as documented  above.

## 2022-12-21 NOTE — PROGRESS NOTES
Gastroenterology Initial Consult Note               Author:  GILBERT Maldonado Date & Time Created: 12/21/2022 9:23 AM       Patient ID:  Name:             Rhiannon Marrero  YOB: 1985  Age:                 37 y.o.  female  MRN:               [id number]      Referring Provider:  Magalis Coronado MD      Presenting Chief Complaint:  Abdominal pain, Elevated lipase      History of Present Illness:    This is a very pleasant 37 y.o. female with the past medical history that includes alcohol induced pancreatitis, alcohol dependence, and chronic pancreatitis secondary to alcohol use, bipolar disorder, history of suicide ideation and suicide attempt who presents to the hospital today with ongoing abdominal pain.  The patient states the pain is located in the epigastrium and left upper quadrant radiating to the back.  It is constant, but can be worse with eating.  She denies any vomiting but does have nausea.  She states that she is having diarrhea.  She was sent by her primary care physician due to elevated lipase.  The patient has multiple admissions over the last 1 to 2 years for alcohol intoxication, alcohol induced pancreatitis.  Her last hospitalization was on 12/7/2022 and at that time lipase was 510.  She does not report an increase in pain since his last hospitalization, but this has not improved much.  Currently lipase at 226.  Liver enzymes are normal.  She reports her last alcoholic drink was October 13, 2022.  She reports that she is currently in rehab for alcohol abuse.    Interval History  12/21/2022: Patient still with abdominal pain and nausea. She is tolerating a liquid diet. MRCP shows stone in PD with upstream 5 mm dilation. No other acute abnormalities.     Review of Systems:  Review of Systems   Constitutional:  Negative for chills and fever.   HENT:  Negative for sinus pain and sore throat.    Eyes:  Negative for pain and discharge.   Respiratory:  Negative for cough and shortness of  breath.    Cardiovascular:  Negative for chest pain and palpitations.   Gastrointestinal:  Positive for abdominal pain and nausea. Negative for vomiting.   Genitourinary:  Negative for dysuria and urgency.   Musculoskeletal:  Negative for falls and joint pain.   Skin:  Negative for itching and rash.   Neurological:  Negative for dizziness and headaches.   Endo/Heme/Allergies:  Negative for environmental allergies. Does not bruise/bleed easily.   Psychiatric/Behavioral:  Positive for depression. The patient is nervous/anxious.            Past Medical History:  Past Medical History:   Diagnosis Date    Acute pancreatitis without infection or necrosis 07/20/2022    Alcohol dependence ([facility]) 04/13/2017    Bronchitis 08/2012    Cold     sept 2018    Current moderate episode of major depressive disorder without prior episode ([facility]) 01/31/2020    Heart burn     Hernia of unspecified site of abdominal cavity without mention of obstruction or gangrene     High anion gap metabolic acidosis 07/01/2022    Indigestion     Pancreatitis     Pneumonia 2011    Seizure disorder ([facility]) 05/23/2022     Active Hospital Problems    Diagnosis     Acute recurrent pancreatitis [K85.90]     Chronic pancreatitis ([facility]) [K86.1]     Bipolar disorder ([facility]) [F31.9]     Diarrhea [R19.7]     Seizure disorder ([facility]) [G40.909]     Alcohol use disorder, severe, in early remission, dependence ([facility]) [F10.21]     Anxiety [F41.9]          Past Surgical History:  Past Surgical History:   Procedure Laterality Date    ORIF, WRIST Right 4/24/2019    Procedure: ORIF, WRIST;  Surgeon: Marquis Bell M.D.;  Location: [facility];  Service: Orthopedics    HYSTERECTOMY ROBOTIC XI  10/11/2018    Procedure: HYSTERECTOMY ROBOTIC XI- RIGHT URETEROLYSIS;  Surgeon: Marquis Reeder M.D.;  Location: [facility];  Service: Gynecology    SALPINGECTOMY Bilateral 10/11/2018    Procedure: SALPINGECTOMY;  Surgeon: Marquis Reeder M.D.;  Location: [facility]  TOWER ORS;  Service: Gynecology    TONSILLECTOMY  4/11/2013    Performed by Rock Rothman M.D. at SURGERY SAME DAY [facility]    ARTHROSCOPY, KNEE      GYN SURGERY      miscarriage May 2006    OTHER ORTHOPEDIC SURGERY      knee surgeries           Hospital Medications:  Current Facility-Administered Medications   Medication Dose Frequency Provider Last Rate Last Admin    divalproex (DEPAKOTE) delayed-release tablet 250 mg  250 mg BID Magalis Coronado M.D.   250 mg at 12/21/22 0437    gabapentin (NEURONTIN) capsule 100 mg  100 mg QAM Magalis Coronado M.D.   100 mg at 12/21/22 0437    gabapentin (NEURONTIN) capsule 300 mg  300 mg QHS Magalis Coronado M.D.   300 mg at 12/20/22 2025    hydrOXYzine HCl (ATARAX) tablet 50 mg  50 mg Q8HRS PRN Magalis Coronado M.D.        zonisamide (ZONEGRAN) capsule 150 mg  150 mg QHS Magalis Coronado M.D.   150 mg at 12/20/22 2025    senna-docusate (PERICOLACE or SENOKOT S) 8.6-50 MG per tablet 2 Tablet  2 Tablet BID Magalis Coronado M.D.        And    polyethylene glycol/lytes (MIRALAX) PACKET 1 Packet  1 Packet QDAY PRN Magalis Coronado M.D.        And    magnesium hydroxide (MILK OF MAGNESIA) suspension 30 mL  30 mL QDAY PRN Magalis Coronado M.D.        And    bisacodyl (DULCOLAX) suppository 10 mg  10 mg QDAY PRN Magalis Coronado M.D.        enoxaparin (Lovenox) inj 40 mg  40 mg DAILY AT 1800 Magalis Coronado M.D.        labetalol (NORMODYNE/TRANDATE) injection 10 mg  10 mg Q4HRS PRN Magalis Coronado M.D.        ondansetron (ZOFRAN) syringe/vial injection 4 mg  4 mg Q4HRS PRN Magalis Coronado M.D.        ondansetron (ZOFRAN ODT) dispertab 4 mg  4 mg Q4HRS PRN Magalis Coronado M.D.   4 mg at 12/20/22 2026    Pharmacy Consult Request ...Pain Management Review 1 Each  1 Each PHARMACY TO DOSE Magalis Coronado M.D.        oxyCODONE immediate-release (ROXICODONE) tablet 5 mg  5 mg Q3HRS PRN Magalis Coronado M.D.        Or    oxyCODONE immediate release (ROXICODONE) tablet 10 mg  10 mg Q3HRS PRN Magalis  KIRK Coronado M.D.   10 mg at 12/20/22 1607    Or    HYDROmorphone (Dilaudid) injection 0.5 mg  0.5 mg Q3HRS PRN Magalis Coronado M.D.   0.5 mg at 12/21/22 0816    pancrelipase (Lip-Prot-Amyl) (CREON 50761) 33545-89115 units capsule 24,000 Units  1 Capsule TID WITH MEALS Magalis Coronado M.D.   24,000 Units at 12/21/22 0816    lactated ringers infusion   Continuous Magalis Coronado M.D. 150 mL/hr at 12/21/22 0816 New Bag at 12/21/22 0816   Last reviewed on 12/20/2022  6:05 PM by Anitha Sawyer R.N.       Current Outpatient Medications:  Medications Prior to Admission   Medication Sig Dispense Refill Last Dose    ibuprofen (MOTRIN) 200 MG Tab Take 600 mg by mouth every 6 hours as needed. Indications: Pain   > 2 weeks at Unknown    acetaminophen (TYLENOL) 325 MG Tab Take 2 Tablets by mouth every 6 hours as needed for Moderate Pain. 30 Tablet 0 NEW RX    gabapentin (NEURONTIN) 100 MG Cap Take 100-300 mg by mouth 2 times a day. Pt takes 100MG in AM and 300MG in the evening   12/19/2022 at 1900    zonisamide (ZONEGRAN) 50 MG capsule Take 3 Capsules by mouth at bedtime for 30 days. 90 Capsule 0 12/19/2022 at 1900    hydrOXYzine pamoate (VISTARIL) 50 MG Cap Take 1 Capsule by mouth every 8 hours as needed for Anxiety. 60 Capsule 0 > 1 week at Ran out    divalproex (DEPAKOTE) 250 MG Tablet Delayed Response Take 1 Tablet by mouth 2 times a day for 120 days. 60 Tablet 3 12/19/2022 at 1900           Medication Allergies:  Allergies   Allergen Reactions    Promethazine Hcl Anxiety     Anxiety and makes her feels like skin coming off          Family Medical History:  Family History   Problem Relation Age of Onset    Psychiatric Illness Mother     Stroke Mother     Arthritis Mother     Heart Disease Maternal Grandfather     Cancer Neg Hx     Diabetes Neg Hx     Hypertension Neg Hx          Social History:  Social History     Socioeconomic History    Marital status:      Spouse name: Not on file    Number of children: Not on  "file    Years of education: Not on file    Highest education level: Not on file   Occupational History    Not on file   Tobacco Use    Smoking status: Every Day     Packs/day: 0.75     Years: 10.00     Pack years: 7.50     Types: Cigarettes    Smokeless tobacco: Never   Vaping Use    Vaping Use: Former    Substances: Nicotine   Substance and Sexual Activity    Alcohol use: Not Currently     Comment: 5 shots, binges. Was sober for 34 days until today 10/12    Drug use: Yes     Types: Marijuana     Comment: edibles    Sexual activity: Not on file   Other Topics Concern    Not on file   Social History Narrative    Not on file     Social Determinants of Health     Financial Resource Strain: Not on file   Food Insecurity: Not on file   Transportation Needs: Not on file   Physical Activity: Not on file   Stress: Not on file   Social Connections: Not on file   Intimate Partner Violence: Not on file   Housing Stability: Not on file         Vital signs:  Weight/BMI: Body mass index is 22.13 kg/m².  /60   Pulse 90   Temp 36.4 °C (97.6 °F) (Oral)   Resp 18   Ht 1.702 m (5' 7\")   Wt 64.1 kg (141 lb 5 oz)   SpO2 100%   Vitals:    12/20/22 1918 12/20/22 2000 12/21/22 0400 12/21/22 0721   BP:  116/59 123/86 104/60   Pulse: 97 82 87 90   Resp: 15 15 15 18   Temp:  36.5 °C (97.7 °F) 36.6 °C (97.9 °F) 36.4 °C (97.6 °F)   TempSrc:  Oral Oral Oral   SpO2: 94% 100% 100% 100%   Weight:    64.1 kg (141 lb 5 oz)   Height:         Oxygen Therapy:  Pulse Oximetry: 100 %, O2 (LPM): 0, O2 Delivery Device: None - Room Air    Intake/Output Summary (Last 24 hours) at 12/21/2022 0923  Last data filed at 12/21/2022 0816  Gross per 24 hour   Intake 1000 ml   Output --   Net 1000 ml         Physical Exam:  Physical Exam  Vitals and nursing note reviewed.   Constitutional:       Appearance: Normal appearance.   HENT:      Head: Normocephalic and atraumatic.      Right Ear: External ear normal.      Left Ear: External ear normal.      Nose: " Nose normal. No congestion.      Mouth/Throat:      Mouth: Mucous membranes are moist.      Pharynx: Oropharynx is clear.   Eyes:      General: No scleral icterus.     Extraocular Movements: Extraocular movements intact.      Pupils: Pupils are equal, round, and reactive to light.   Cardiovascular:      Rate and Rhythm: Normal rate and regular rhythm.      Pulses: Normal pulses.      Heart sounds: Normal heart sounds. No murmur heard.  Pulmonary:      Effort: Pulmonary effort is normal. No respiratory distress.      Breath sounds: Normal breath sounds. No wheezing.   Abdominal:      General: Abdomen is flat. Bowel sounds are normal. There is no distension.      Palpations: Abdomen is soft.      Tenderness: There is abdominal tenderness (Epigastric, LUQ, RUQ).   Musculoskeletal:      Cervical back: Neck supple.      Right lower leg: No edema.      Left lower leg: No edema.   Lymphadenopathy:      Cervical: No cervical adenopathy.   Skin:     General: Skin is warm and dry.   Neurological:      General: No focal deficit present.      Mental Status: She is alert and oriented to person, place, and time.   Psychiatric:         Thought Content: Thought content normal.         Judgment: Judgment normal.         Labs:  Recent Labs     12/19/22  1159 12/20/22  0949 12/21/22  0249   SODIUM 140 138 135   POTASSIUM 4.1 4.4 3.5*   CHLORIDE 103 104 102   CO2 25 22 21   BUN 6* 6* 7*   CREATININE 0.67 0.82 0.62   MAGNESIUM 2.3  --   --    CALCIUM 9.6 9.5 8.6       Recent Labs     12/19/22  1159 12/20/22  0949 12/21/22  0249   ALTSGPT 15 13 9   ASTSGOT 19 15 13   ALKPHOSPHAT 78 71 58   TBILIRUBIN 0.3 0.4 0.3   LIPASE 210* 226*  --    GLUCOSE 77 97 88       Recent Labs     12/19/22  1159 12/20/22  0949 12/21/22  0249   WBC 7.3 7.5 6.6   NEUTSPOLYS 50.40 59.40 51.60   LYMPHOCYTES 37.60 30.80 39.00   MONOCYTES 10.00 8.00 7.00   EOSINOPHILS 0.50 0.80 1.20   BASOPHILS 1.00 0.50 0.90   ASTSGOT 19 15 13   ALTSGPT 15 13 9   ALKPHOSPHAT 78 71  58   TBILIRUBIN 0.3 0.4 0.3       Recent Labs     12/19/22  1159 12/20/22  0949 12/21/22  0249   RBC 5.11 5.09 4.28   HEMOGLOBIN 15.5 15.5 12.8   HEMATOCRIT 46.4 45.8 38.4   PLATELETCT 368 317 255   IRON 85  --   --    FERRITIN 47.1  --   --    TOTIRONBC 371  --   --        Recent Results (from the past 24 hour(s))   CBC WITH DIFFERENTIAL    Collection Time: 12/20/22  9:49 AM   Result Value Ref Range    WBC 7.5 4.8 - 10.8 K/uL    RBC 5.09 4.20 - 5.40 M/uL    Hemoglobin 15.5 12.0 - 16.0 g/dL    Hematocrit 45.8 37.0 - 47.0 %    MCV 90.0 81.4 - 97.8 fL    MCH 30.5 27.0 - 33.0 pg    MCHC 33.8 33.6 - 35.0 g/dL    RDW 42.3 35.9 - 50.0 fL    Platelet Count 317 164 - 446 K/uL    MPV 9.4 9.0 - 12.9 fL    Neutrophils-Polys 59.40 44.00 - 72.00 %    Lymphocytes 30.80 22.00 - 41.00 %    Monocytes 8.00 0.00 - 13.40 %    Eosinophils 0.80 0.00 - 6.90 %    Basophils 0.50 0.00 - 1.80 %    Immature Granulocytes 0.50 0.00 - 0.90 %    Nucleated RBC 0.00 /100 WBC    Neutrophils (Absolute) 4.46 2.00 - 7.15 K/uL    Lymphs (Absolute) 2.31 1.00 - 4.80 K/uL    Monos (Absolute) 0.60 0.00 - 0.85 K/uL    Eos (Absolute) 0.06 0.00 - 0.51 K/uL    Baso (Absolute) 0.04 0.00 - 0.12 K/uL    Immature Granulocytes (abs) 0.04 0.00 - 0.11 K/uL    NRBC (Absolute) 0.00 K/uL   Comp Metabolic Panel    Collection Time: 12/20/22  9:49 AM   Result Value Ref Range    Sodium 138 135 - 145 mmol/L    Potassium 4.4 3.6 - 5.5 mmol/L    Chloride 104 96 - 112 mmol/L    Co2 22 20 - 33 mmol/L    Anion Gap 12.0 7.0 - 16.0    Glucose 97 65 - 99 mg/dL    Bun 6 (L) 8 - 22 mg/dL    Creatinine 0.82 0.50 - 1.40 mg/dL    Calcium 9.5 8.4 - 10.2 mg/dL    AST(SGOT) 15 12 - 45 U/L    ALT(SGPT) 13 2 - 50 U/L    Alkaline Phosphatase 71 30 - 99 U/L    Total Bilirubin 0.4 0.1 - 1.5 mg/dL    Albumin 4.5 3.2 - 4.9 g/dL    Total Protein 7.6 6.0 - 8.2 g/dL    Globulin 3.1 1.9 - 3.5 g/dL    A-G Ratio 1.5 g/dL   LIPASE    Collection Time: 12/20/22  9:49 AM   Result Value Ref Range    Lipase 226  (H) 7 - 58 U/L   BETA-HCG QUALITATIVE SERUM    Collection Time: 12/20/22  9:49 AM   Result Value Ref Range    Beta-Hcg Qualitative Serum Negative Negative   ETHYL ALCOHOL (BLOOD)    Collection Time: 12/20/22  9:49 AM   Result Value Ref Range    Diagnostic Alcohol <10.1 <10.1 mg/dL   CORRECTED CALCIUM    Collection Time: 12/20/22  9:49 AM   Result Value Ref Range    Correct Calcium 9.1 8.5 - 10.5 mg/dL   ESTIMATED GFR    Collection Time: 12/20/22  9:49 AM   Result Value Ref Range    GFR (CKD-EPI) 95 >60 mL/min/1.73 m 2   VALPROIC ACID    Collection Time: 12/20/22  8:07 PM   Result Value Ref Range    Valproic Acid 56.0 50.0 - 100.0 ug/mL   CBC with Differential    Collection Time: 12/21/22  2:49 AM   Result Value Ref Range    WBC 6.6 4.8 - 10.8 K/uL    RBC 4.28 4.20 - 5.40 M/uL    Hemoglobin 12.8 12.0 - 16.0 g/dL    Hematocrit 38.4 37.0 - 47.0 %    MCV 89.7 81.4 - 97.8 fL    MCH 29.9 27.0 - 33.0 pg    MCHC 33.3 (L) 33.6 - 35.0 g/dL    RDW 42.0 35.9 - 50.0 fL    Platelet Count 255 164 - 446 K/uL    MPV 9.2 9.0 - 12.9 fL    Neutrophils-Polys 51.60 44.00 - 72.00 %    Lymphocytes 39.00 22.00 - 41.00 %    Monocytes 7.00 0.00 - 13.40 %    Eosinophils 1.20 0.00 - 6.90 %    Basophils 0.90 0.00 - 1.80 %    Immature Granulocytes 0.30 0.00 - 0.90 %    Nucleated RBC 0.00 /100 WBC    Neutrophils (Absolute) 3.41 2.00 - 7.15 K/uL    Lymphs (Absolute) 2.58 1.00 - 4.80 K/uL    Monos (Absolute) 0.46 0.00 - 0.85 K/uL    Eos (Absolute) 0.08 0.00 - 0.51 K/uL    Baso (Absolute) 0.06 0.00 - 0.12 K/uL    Immature Granulocytes (abs) 0.02 0.00 - 0.11 K/uL    NRBC (Absolute) 0.00 K/uL   Comp Metabolic Panel (CMP)    Collection Time: 12/21/22  2:49 AM   Result Value Ref Range    Sodium 135 135 - 145 mmol/L    Potassium 3.5 (L) 3.6 - 5.5 mmol/L    Chloride 102 96 - 112 mmol/L    Co2 21 20 - 33 mmol/L    Anion Gap 12.0 7.0 - 16.0    Glucose 88 65 - 99 mg/dL    Bun 7 (L) 8 - 22 mg/dL    Creatinine 0.62 0.50 - 1.40 mg/dL    Calcium 8.6 8.4 - 10.2  mg/dL    AST(SGOT) 13 12 - 45 U/L    ALT(SGPT) 9 2 - 50 U/L    Alkaline Phosphatase 58 30 - 99 U/L    Total Bilirubin 0.3 0.1 - 1.5 mg/dL    Albumin 3.7 3.2 - 4.9 g/dL    Total Protein 6.0 6.0 - 8.2 g/dL    Globulin 2.3 1.9 - 3.5 g/dL    A-G Ratio 1.6 g/dL   CORRECTED CALCIUM    Collection Time: 12/21/22  2:49 AM   Result Value Ref Range    Correct Calcium 8.8 8.5 - 10.5 mg/dL   ESTIMATED GFR    Collection Time: 12/21/22  2:49 AM   Result Value Ref Range    GFR (CKD-EPI) 119 >60 mL/min/1.73 m 2         Radiology Review:  [id number]   Final Result      1. No choledocholithiasis or biliary dilatation.   2. Normal gallbladder. No cholelithiasis.   3. Pancreatic duct stones were better seen on the recent CT, with one of the stones resulting in upstream pancreatic ductal dilatation up to 5 mm.         CT-ABDOMEN-PELVIS WITH   Final Result      1.  Pancreatic duct stones measuring up to 4 x 6 mm with new pancreatic ductal dilatation.            MDM (Data Review):   -Records reviewed and summarized in current documentation  -I personally reviewed and interpreted the laboratory results  -I personally reviewed the radiology images        Medical Decision Making, by Problem:  Active Hospital Problems    Diagnosis     Acute recurrent pancreatitis [K85.90]     Chronic pancreatitis (HCC) [K86.1]     Bipolar disorder (HCC) [F31.9]     Diarrhea [R19.7]     Seizure disorder (HCC) [G40.909]     Alcohol use disorder, severe, in early remission, dependence (HCC) [F10.21]     Anxiety [F41.9]            Assessment/Recommendations:  37-year-old female patient with ongoing abdominal pain, nausea, elevated lipase.  She has a history of recurrent alcohol induced pancreatitis, evidence of chronic pancreatitis, and new CT imaging suggestive of pancreatic duct stone as well as possibly new pancreatic duct dilation.  Unclear if this PD dilation is truly new and whether this is impeding flow of her pancreatic duct.  Additionally, the patient  is adamant that she is sober from alcohol since October 13. MRCP with similar findings. PD dilated upstream to 5 mm    Assessment:  -Acute on chronic epigastric and left upper quadrant pain  -Nausea  -Possibly acute on chronic pancreatitis secondary to previous alcohol use  -Abnormal imaging of the biliary tract with PD stone and 5 mm dilation of the PD  -Bipolar disorder  -Anxiety and depression  -Diarrhea    Plan:  -I have a message out to my advanced endoscopy team to determine if ERCP is indicated in this situation. Awaiting a message or call back to decide. Once I hear from them I will message the hospitalist. ERCP, if needed, would not be available until tomorrow due to an already full operating room today.   -Continue pancreatic enzyme supplementation  -Continue alcohol cessation    CAMACHO Maldonado.      Thank you for inviting me to participate in the care of this patient. Please do not hesitate to call GI consultants with additional questions/concerns or changes in the patient's clinical status at [phone].      Core Quality Measures   Reviewed items:  Labs, Medications and Radiology reports reviewed

## 2022-12-21 NOTE — CARE PLAN
The patient is Stable - Low risk of patient condition declining or worsening    Shift Goals Pain management   Clinical Goals: Pt's pain will be 3/10 or less  Patient Goals: Sleep comfortably and without nausea    Progress made toward(s) clinical / shift goals:    Problem: Pain - Standard  Goal: Alleviation of pain or a reduction in pain to the patient’s comfort goal  Outcome: Progressing     Problem: Knowledge Deficit - Standard  Goal: Patient and family/care givers will demonstrate understanding of plan of care, disease process/condition, diagnostic tests and medications  Outcome: Progressing       Patient is not progressing towards the following goals:

## 2022-12-22 ENCOUNTER — APPOINTMENT (OUTPATIENT)
Dept: RADIOLOGY | Facility: MEDICAL CENTER | Age: 37
DRG: 440 | End: 2022-12-22
Attending: INTERNAL MEDICINE
Payer: COMMERCIAL

## 2022-12-22 PROBLEM — D69.6 THROMBOCYTOPENIA (HCC): Status: ACTIVE | Noted: 2022-12-22

## 2022-12-22 LAB
ALBUMIN SERPL BCP-MCNC: 4.2 G/DL (ref 3.2–4.9)
ALBUMIN/GLOB SERPL: 1.4 G/DL
ALP SERPL-CCNC: 62 U/L (ref 30–99)
ALT SERPL-CCNC: 9 U/L (ref 2–50)
ANION GAP SERPL CALC-SCNC: 11 MMOL/L (ref 7–16)
AST SERPL-CCNC: 16 U/L (ref 12–45)
BILIRUB SERPL-MCNC: 0.4 MG/DL (ref 0.1–1.5)
BUN SERPL-MCNC: 5 MG/DL (ref 8–22)
CALCIUM ALBUM COR SERPL-MCNC: 9.2 MG/DL (ref 8.5–10.5)
CALCIUM SERPL-MCNC: 9.4 MG/DL (ref 8.4–10.2)
CHLORIDE SERPL-SCNC: 106 MMOL/L (ref 96–112)
CO2 SERPL-SCNC: 20 MMOL/L (ref 20–33)
CREAT SERPL-MCNC: 0.54 MG/DL (ref 0.5–1.4)
ERYTHROCYTE [DISTWIDTH] IN BLOOD BY AUTOMATED COUNT: 41.9 FL (ref 35.9–50)
GFR SERPLBLD CREATININE-BSD FMLA CKD-EPI: 123 ML/MIN/1.73 M 2
GLOBULIN SER CALC-MCNC: 2.9 G/DL (ref 1.9–3.5)
GLUCOSE SERPL-MCNC: 91 MG/DL (ref 65–99)
HCT VFR BLD AUTO: 44.1 % (ref 37–47)
HGB BLD-MCNC: 14.4 G/DL (ref 12–16)
MAGNESIUM SERPL-MCNC: 1.9 MG/DL (ref 1.5–2.5)
MCH RBC QN AUTO: 29.9 PG (ref 27–33)
MCHC RBC AUTO-ENTMCNC: 32.7 G/DL (ref 33.6–35)
MCV RBC AUTO: 91.7 FL (ref 81.4–97.8)
PHOSPHATE SERPL-MCNC: 4.1 MG/DL (ref 2.5–4.5)
PLATELET # BLD AUTO: 143 K/UL (ref 164–446)
PMV BLD AUTO: 10.5 FL (ref 9–12.9)
POTASSIUM SERPL-SCNC: 3.6 MMOL/L (ref 3.6–5.5)
PROT SERPL-MCNC: 7.1 G/DL (ref 6–8.2)
RBC # BLD AUTO: 4.81 M/UL (ref 4.2–5.4)
SODIUM SERPL-SCNC: 137 MMOL/L (ref 135–145)
WBC # BLD AUTO: 6.2 K/UL (ref 4.8–10.8)

## 2022-12-22 PROCEDURE — 84100 ASSAY OF PHOSPHORUS: CPT

## 2022-12-22 PROCEDURE — A9270 NON-COVERED ITEM OR SERVICE: HCPCS | Performed by: INTERNAL MEDICINE

## 2022-12-22 PROCEDURE — 36415 COLL VENOUS BLD VENIPUNCTURE: CPT

## 2022-12-22 PROCEDURE — 85027 COMPLETE CBC AUTOMATED: CPT

## 2022-12-22 PROCEDURE — 700102 HCHG RX REV CODE 250 W/ 637 OVERRIDE(OP): Performed by: FAMILY MEDICINE

## 2022-12-22 PROCEDURE — 770001 HCHG ROOM/CARE - MED/SURG/GYN PRIV*

## 2022-12-22 PROCEDURE — 700105 HCHG RX REV CODE 258: Performed by: FAMILY MEDICINE

## 2022-12-22 PROCEDURE — 700111 HCHG RX REV CODE 636 W/ 250 OVERRIDE (IP): Performed by: FAMILY MEDICINE

## 2022-12-22 PROCEDURE — 94760 N-INVAS EAR/PLS OXIMETRY 1: CPT

## 2022-12-22 PROCEDURE — A9270 NON-COVERED ITEM OR SERVICE: HCPCS | Performed by: FAMILY MEDICINE

## 2022-12-22 PROCEDURE — 99233 SBSQ HOSP IP/OBS HIGH 50: CPT | Performed by: INTERNAL MEDICINE

## 2022-12-22 PROCEDURE — 80053 COMPREHEN METABOLIC PANEL: CPT

## 2022-12-22 PROCEDURE — 700111 HCHG RX REV CODE 636 W/ 250 OVERRIDE (IP): Performed by: INTERNAL MEDICINE

## 2022-12-22 PROCEDURE — 700102 HCHG RX REV CODE 250 W/ 637 OVERRIDE(OP): Performed by: INTERNAL MEDICINE

## 2022-12-22 PROCEDURE — 74018 RADEX ABDOMEN 1 VIEW: CPT

## 2022-12-22 PROCEDURE — 83735 ASSAY OF MAGNESIUM: CPT

## 2022-12-22 RX ADMIN — KETOROLAC TROMETHAMINE 15 MG: 30 INJECTION, SOLUTION INTRAMUSCULAR; INTRAVENOUS at 05:24

## 2022-12-22 RX ADMIN — GABAPENTIN 100 MG: 100 CAPSULE ORAL at 05:24

## 2022-12-22 RX ADMIN — DIVALPROEX SODIUM 250 MG: 250 TABLET, DELAYED RELEASE ORAL at 05:24

## 2022-12-22 RX ADMIN — OXYCODONE HYDROCHLORIDE 10 MG: 10 TABLET ORAL at 17:32

## 2022-12-22 RX ADMIN — OXYCODONE HYDROCHLORIDE 10 MG: 10 TABLET ORAL at 22:58

## 2022-12-22 RX ADMIN — SODIUM CHLORIDE, POTASSIUM CHLORIDE, SODIUM LACTATE AND CALCIUM CHLORIDE: 600; 310; 30; 20 INJECTION, SOLUTION INTRAVENOUS at 05:18

## 2022-12-22 RX ADMIN — ZONISAMIDE 150 MG: 50 CAPSULE ORAL at 20:08

## 2022-12-22 RX ADMIN — DIVALPROEX SODIUM 250 MG: 250 TABLET, DELAYED RELEASE ORAL at 17:31

## 2022-12-22 RX ADMIN — HYDROMORPHONE HYDROCHLORIDE 0.5 MG: 1 INJECTION, SOLUTION INTRAMUSCULAR; INTRAVENOUS; SUBCUTANEOUS at 08:34

## 2022-12-22 RX ADMIN — HYDROMORPHONE HYDROCHLORIDE 0.5 MG: 1 INJECTION, SOLUTION INTRAMUSCULAR; INTRAVENOUS; SUBCUTANEOUS at 20:07

## 2022-12-22 RX ADMIN — NICOTINE 14 MG: 14 PATCH TRANSDERMAL at 05:27

## 2022-12-22 RX ADMIN — KETOROLAC TROMETHAMINE 15 MG: 30 INJECTION, SOLUTION INTRAMUSCULAR; INTRAVENOUS at 20:07

## 2022-12-22 RX ADMIN — MORPHINE SULFATE 15 MG: 15 TABLET, EXTENDED RELEASE ORAL at 05:24

## 2022-12-22 RX ADMIN — OXYCODONE HYDROCHLORIDE 10 MG: 10 TABLET ORAL at 03:20

## 2022-12-22 RX ADMIN — MORPHINE SULFATE 15 MG: 15 TABLET, EXTENDED RELEASE ORAL at 17:32

## 2022-12-22 RX ADMIN — PANCRELIPASE 24000 UNITS: 24000; 76000; 120000 CAPSULE, DELAYED RELEASE PELLETS ORAL at 11:34

## 2022-12-22 RX ADMIN — PANCRELIPASE 24000 UNITS: 24000; 76000; 120000 CAPSULE, DELAYED RELEASE PELLETS ORAL at 17:31

## 2022-12-22 RX ADMIN — HYDROMORPHONE HYDROCHLORIDE 0.5 MG: 1 INJECTION, SOLUTION INTRAMUSCULAR; INTRAVENOUS; SUBCUTANEOUS at 04:48

## 2022-12-22 RX ADMIN — PANCRELIPASE 24000 UNITS: 24000; 76000; 120000 CAPSULE, DELAYED RELEASE PELLETS ORAL at 06:39

## 2022-12-22 RX ADMIN — SODIUM CHLORIDE, POTASSIUM CHLORIDE, SODIUM LACTATE AND CALCIUM CHLORIDE: 600; 310; 30; 20 INJECTION, SOLUTION INTRAVENOUS at 11:39

## 2022-12-22 RX ADMIN — HYDROMORPHONE HYDROCHLORIDE 0.5 MG: 1 INJECTION, SOLUTION INTRAMUSCULAR; INTRAVENOUS; SUBCUTANEOUS at 00:07

## 2022-12-22 RX ADMIN — HYDROMORPHONE HYDROCHLORIDE 0.5 MG: 1 INJECTION, SOLUTION INTRAMUSCULAR; INTRAVENOUS; SUBCUTANEOUS at 15:10

## 2022-12-22 RX ADMIN — ONDANSETRON 4 MG: 2 INJECTION INTRAMUSCULAR; INTRAVENOUS at 06:34

## 2022-12-22 RX ADMIN — SODIUM CHLORIDE, POTASSIUM CHLORIDE, SODIUM LACTATE AND CALCIUM CHLORIDE: 600; 310; 30; 20 INJECTION, SOLUTION INTRAVENOUS at 17:38

## 2022-12-22 RX ADMIN — HYDROMORPHONE HYDROCHLORIDE 0.5 MG: 1 INJECTION, SOLUTION INTRAMUSCULAR; INTRAVENOUS; SUBCUTANEOUS at 11:33

## 2022-12-22 RX ADMIN — OXYCODONE HYDROCHLORIDE 10 MG: 10 TABLET ORAL at 06:39

## 2022-12-22 RX ADMIN — GABAPENTIN 300 MG: 100 CAPSULE ORAL at 20:07

## 2022-12-22 ASSESSMENT — COGNITIVE AND FUNCTIONAL STATUS - GENERAL
DAILY ACTIVITIY SCORE: 24
SUGGESTED CMS G CODE MODIFIER DAILY ACTIVITY: CH
SUGGESTED CMS G CODE MODIFIER MOBILITY: CH
MOBILITY SCORE: 24

## 2022-12-22 ASSESSMENT — ENCOUNTER SYMPTOMS
BLOOD IN STOOL: 0
NAUSEA: 1
DIARRHEA: 1
HEARTBURN: 0
HEADACHES: 0
DIARRHEA: 0
FALLS: 0
DOUBLE VISION: 0
DIZZINESS: 0
CONSTIPATION: 1
ABDOMINAL PAIN: 1
COUGH: 0
VOMITING: 0
CHILLS: 0
FEVER: 0
BACK PAIN: 0
NERVOUS/ANXIOUS: 0
WEIGHT LOSS: 1
PALPITATIONS: 0
SHORTNESS OF BREATH: 0
BLURRED VISION: 0

## 2022-12-22 ASSESSMENT — FIBROSIS 4 INDEX: FIB4 SCORE: 1.31

## 2022-12-22 ASSESSMENT — PAIN DESCRIPTION - PAIN TYPE
TYPE: ACUTE PAIN
TYPE: ACUTE PAIN

## 2022-12-22 ASSESSMENT — LIFESTYLE VARIABLES: SUBSTANCE_ABUSE: 0

## 2022-12-22 NOTE — PROGRESS NOTES
"Hospital Medicine Daily Progress Note    Date of Service  12/22/2022    Chief Complaint  Rhiannon Marrero is a 37 y.o. female admitted 12/20/2022 with abdominal pain.    Hospital Course  As per chart review:  \"37 y.o. female who presented 12/20/2022 with abdominal pain. She has a history significant for bipolar disorder, seizure disorder, EtOH abuse in remission x 2 months and recurrent pancreatitis with several recent admissions who was just discharged on 12/12 for pancreatitis.  She saw her PCP yesterday who has referred her to GI as an outpatient, and directed her to come to the ER due to an elevated lipase. However, this lipase is lower than previous on 12/12 admit. She states that this feels like her typical pancreatitis pain and attests nausea without vomiting. She is having diarrhea with stools the consistency of baby food that float on the toilet water. She expresses frustration at continuing to have pancreatitis issues with cessation of alcohol.\"     Interval Problem Update  12/21: Patient seen at bedside this morning.  The patient complaining of abdominal pain.  She is not hungry or have an appetite yet.  We will continue with IV fluids.  GI following the patient, contemplating the need of ERCP.  We appreciate further recommendations.  Continue with pain management as well.    12/22: Patient seen at bedside this morning.  The patient still complaining of abdominal pain, however she says that it somewhat better than yesterday.  We will continue with IV fluids.  GI following the patient and they are evaluating the possibility of celiac plexus block, we appreciate further recommendations.  We will also reach out to Dr. Rachel to see if there are any surgical options.  Yesterday it seems like the mother of the patient called case management complaining that the patient was not being taking care for her pain control.  Had a long discussion with the patient at bedside today, charge nurse was present at " "the time of my evaluation.  The patient stated verbatim : \"do not listen to my mom, she is crazy.\"  Today at the time of evaluation the patient seems more comfortable.  We did start the patient on MS Contin, last time on hospitalization MS Contin was used and it seems to have helped her pain.  We will continue to monitor closely.  We appreciate further recommendations by GI    I have discussed this patient's plan of care and discharge plan at IDT rounds today with Case Management, Nursing, Nursing leadership, and other members of the IDT team.    Consultants/Specialty  GI    Code Status  Full Code    Disposition  Patient is not medically cleared for discharge.   Anticipate discharge to pending clinical evolution.      Review of Systems  Review of Systems   Constitutional:  Positive for malaise/fatigue. Negative for chills and fever.   HENT:  Negative for hearing loss and nosebleeds.    Eyes:  Negative for blurred vision and double vision.   Respiratory:  Negative for cough and shortness of breath.    Cardiovascular:  Negative for chest pain and palpitations.   Gastrointestinal:  Positive for abdominal pain, diarrhea and nausea. Negative for heartburn and vomiting.   Genitourinary:  Negative for dysuria and urgency.   Musculoskeletal:  Negative for back pain and falls.   Skin:  Negative for itching and rash.   Neurological:  Negative for dizziness and headaches.   Psychiatric/Behavioral:  Negative for substance abuse. The patient is not nervous/anxious.    All other systems reviewed and are negative.     Physical Exam  Temp:  [36.6 °C (97.8 °F)-36.8 °C (98.2 °F)] 36.6 °C (97.8 °F)  Pulse:  [75-87] 87  Resp:  [18-19] 18  BP: (125-148)/(65-79) 126/65  SpO2:  [97 %-100 %] 100 %    Physical Exam  Vitals and nursing note reviewed.   Constitutional:       General: She is not in acute distress.     Appearance: She is ill-appearing.   HENT:      Head: Normocephalic and atraumatic.      Right Ear: External ear normal.      " Left Ear: External ear normal.      Mouth/Throat:      Pharynx: No oropharyngeal exudate or posterior oropharyngeal erythema.   Eyes:      General:         Right eye: No discharge.         Left eye: No discharge.   Cardiovascular:      Rate and Rhythm: Normal rate and regular rhythm.      Pulses: Normal pulses.      Heart sounds: No murmur heard.    No gallop.   Pulmonary:      Effort: Pulmonary effort is normal. No respiratory distress.      Breath sounds: Normal breath sounds. No wheezing or rhonchi.   Abdominal:      General: Bowel sounds are normal. There is no distension.      Palpations: Abdomen is soft.      Tenderness: There is abdominal tenderness. There is no guarding.   Musculoskeletal:         General: No swelling or tenderness. Normal range of motion.      Cervical back: Normal range of motion and neck supple. No tenderness.   Skin:     General: Skin is warm and dry.   Neurological:      General: No focal deficit present.      Mental Status: She is alert and oriented to person, place, and time. Mental status is at baseline.      Motor: No weakness.   Psychiatric:         Mood and Affect: Mood normal.         Behavior: Behavior normal.       Fluids    Intake/Output Summary (Last 24 hours) at 12/22/2022 1146  Last data filed at 12/22/2022 0800  Gross per 24 hour   Intake 4652.8 ml   Output --   Net 4652.8 ml         Laboratory  Recent Labs     12/20/22  0949 12/21/22  0249 12/22/22  0251   WBC 7.5 6.6 6.2   RBC 5.09 4.28 4.81   HEMOGLOBIN 15.5 12.8 14.4   HEMATOCRIT 45.8 38.4 44.1   MCV 90.0 89.7 91.7   MCH 30.5 29.9 29.9   MCHC 33.8 33.3* 32.7*   RDW 42.3 42.0 41.9   PLATELETCT 317 255 143*   MPV 9.4 9.2 10.5       Recent Labs     12/20/22  0949 12/21/22  0249 12/22/22  0251   SODIUM 138 135 137   POTASSIUM 4.4 3.5* 3.6   CHLORIDE 104 102 106   CO2 22 21 20   GLUCOSE 97 88 91   BUN 6* 7* 5*   CREATININE 0.82 0.62 0.54   CALCIUM 9.5 8.6 9.4               Recent Labs     12/19/22  1159   TRIGLYCERIDE 103    HDL 43   LDL 97         Imaging  [id number] VIEW   Final Result      No evidence of bowel obstruction.      [id number]   Final Result      1. No choledocholithiasis or biliary dilatation.   2. Normal gallbladder. No cholelithiasis.   3. Pancreatic duct stones were better seen on the recent CT, with one of the stones resulting in upstream pancreatic ductal dilatation up to 5 mm.         CT-ABDOMEN-PELVIS WITH   Final Result      1.  Pancreatic duct stones measuring up to 4 x 6 mm with new pancreatic ductal dilatation.             Assessment/Plan  * Acute recurrent pancreatitis- (present on admission)  Assessment & Plan  - Lipase 226, down from previous admission but still elevated, pt reports her typical LUQ pancreatitis pain radiating to her back   - Did not have CT last admission - given her quick recurrence, will check CT abd/pelvis to evaluate for pseudocyst and/or necrosis given persistence of her symptoms despite EtOH cessation  - No elevation in bilirubin to suggest GB involvement currently, will see what CBD looks like on CT   - NPO  - LR@250cc/hr x 12 hours, then 150cc/hr thereafter  - Question of contribution from Depakote last admission, pt did decide to stay on Depakote despite that last stay - if CT unrevealing for pseudocyst or other etiology of her pancreatitis and pain, this may need to be readdressed with patient   - IV and PO options for pain control   - TGLs normal    ADDENDUM: pt with pancreatic duct stones on CT - [facility] consulted     12/21: GI evaluating the possibility of ERCP, we appreciate further recommendations.    12/22: GI is not recommending ERCP at this time. They are evaluating the potential use of EUS for celiac block. They also recommend consulting pancreatic surgery input. We will consult Dr Rachel, we appreciate further recommendations.    Thrombocytopenia (HCC)  Assessment & Plan  Seems like chronic  No signs of bleeding    Chronic pancreatitis (HCC)  Assessment &  Plan  - Evidence on previous CTs of chronic pancreatitis/calcifications  - Start creon given her complaints of diarrhea with fatty/floating stools     Bipolar disorder (HCC)- (present on admission)  Assessment & Plan  - Resume home Depakote   -May need to readdress Depakote usage for Bipolar and seizure d/o if no further etiology of acute on chronic pancreatitis  - Check depakote level     Diarrhea- (present on admission)  Assessment & Plan  - Described as baby food like in consistency, floats on the toilet water - consistent with stool in the setting of pancreatic insufficiency  - Start Creon - start 1 tab with meals today, titrate up     Alcohol dependence with withdrawal (HCC)  Assessment & Plan  - Pt reports no EtOH usage in two months     Hypokalemia- (present on admission)  Assessment & Plan  Replace as needed  monitor    Seizure disorder (HCC)- (present on admission)  Assessment & Plan  - Resume home Depakote and Zonisamide  - Seizure precautions    Alcohol use disorder, severe, in early remission, dependence (HCC)- (present on admission)  Assessment & Plan  - pt reports no EtOH usage in two months     Anxiety- (present on admission)  Assessment & Plan  - Resume home PRN hydroxyzine         VTE prophylaxis: enoxaparin ppx    I have performed a physical exam and reviewed and updated ROS and Plan today (12/22/2022). In review of yesterday's note (12/21/2022), there are no changes except as documented above.

## 2022-12-22 NOTE — PROGRESS NOTES
Gastroenterology Progress Note     Author: Agapito Lima M.D.   Date & Time Created: 12/22/2022 10:05 AM    Chief Complaint:  Epigastric pain, chronic pancreatitis    History of Present Illness:    This is a very pleasant 37 y.o. female with the past medical history that includes alcohol induced pancreatitis, alcohol dependence, and chronic pancreatitis secondary to alcohol use, bipolar disorder, history of suicide ideation and suicide attempt who presents to the hospital today with ongoing abdominal pain.  The patient states the pain is located in the epigastrium and left upper quadrant radiating to the back.  It is constant, but can be worse with eating.  She denies any vomiting but does have nausea.  She states that she is having diarrhea.  She was sent by her primary care physician due to elevated lipase.  The patient has multiple admissions over the last 1 to 2 years for alcohol intoxication, alcohol induced pancreatitis.  Her last hospitalization was on 12/7/2022 and at that time lipase was 510.  She does not report an increase in pain since his last hospitalization, but this has not improved much.  Currently lipase at 226.  Liver enzymes are normal.  She reports her last alcoholic drink was October 13, 2022.  She reports that she is currently in rehab for alcohol abuse.    Interval History:  12/21/2022: Patient still with abdominal pain and nausea. She is tolerating a liquid diet. MRCP shows stone in PD with upstream 5 mm dilation. No other acute abnormalities.     12/22/2022: Upper abdominal pain and nausea unchanged.  Tolerated liquid diet.  No BMs or flatus.  Still getting high doses of opioids.    Review of Systems:  Review of Systems   Constitutional:  Positive for malaise/fatigue and weight loss. Negative for chills and fever.   Respiratory:  Negative for shortness of breath.    Cardiovascular:  Negative for chest pain.   Gastrointestinal:  Positive for abdominal pain, constipation and nausea.  Negative for blood in stool, diarrhea, heartburn, melena and vomiting.     Physical Exam:  Physical Exam  Vitals and nursing note reviewed.   Constitutional:       Appearance: Normal appearance.   Cardiovascular:      Rate and Rhythm: Normal rate and regular rhythm.   Pulmonary:      Effort: Pulmonary effort is normal.      Breath sounds: Normal breath sounds.   Abdominal:      General: Abdomen is flat. Bowel sounds are decreased. There is no distension.      Palpations: Abdomen is soft. There is no mass.      Tenderness: There is abdominal tenderness in the epigastric area and left upper quadrant. There is no guarding or rebound.      Hernia: No hernia is present.   Musculoskeletal:      Right lower leg: No edema.      Left lower leg: No edema.   Skin:     Coloration: Skin is not jaundiced.   Neurological:      General: No focal deficit present.      Mental Status: She is alert and oriented to person, place, and time.       Labs:          Recent Labs     12/19/22 1159 12/20/22 0949 12/21/22 0249 12/22/22  0251   SODIUM 140 138 135 137   POTASSIUM 4.1 4.4 3.5* 3.6   CHLORIDE 103 104 102 106   CO2 25 22 21 20   BUN 6* 6* 7* 5*   CREATININE 0.67 0.82 0.62 0.54   MAGNESIUM 2.3  --   --  1.9   PHOSPHORUS  --   --   --  4.1   CALCIUM 9.6 9.5 8.6 9.4     Recent Labs     12/19/22 1159 12/20/22 0949 12/21/22 0249 12/22/22  0251   ALTSGPT 15 13 9 9   ASTSGOT 19 15 13 16   ALKPHOSPHAT 78 71 58 62   TBILIRUBIN 0.3 0.4 0.3 0.4   LIPASE 210* 226*  --   --    GLUCOSE 77 97 88 91     Recent Labs     12/19/22 1159 12/20/22  0949 12/21/22 0249 12/22/22  0251   RBC 5.11 5.09 4.28 4.81   HEMOGLOBIN 15.5 15.5 12.8 14.4   HEMATOCRIT 46.4 45.8 38.4 44.1   PLATELETCT 368 317 255 143*   IRON 85  --   --   --    FERRITIN 47.1  --   --   --    TOTIRONBC 371  --   --   --      Recent Labs     12/19/22 1159 12/20/22 0949 12/21/22 0249 12/22/22  0251   WBC 7.3 7.5 6.6 6.2   NEUTSPOLYS 50.40 59.40 51.60  --    LYMPHOCYTES 37.60 30.80  39.00  --    MONOCYTES 10.00 8.00 7.00  --    EOSINOPHILS 0.50 0.80 1.20  --    BASOPHILS 1.00 0.50 0.90  --    ASTSGOT 19 15 13 16   ALTSGPT 15 13 9 9   ALKPHOSPHAT 78 71 58 62   TBILIRUBIN 0.3 0.4 0.3 0.4     Hemodynamics:  Temp (24hrs), Av.7 °C (98 °F), Min:36.6 °C (97.8 °F), Max:36.8 °C (98.2 °F)  Temperature: 36.6 °C (97.8 °F)  Pulse  Av.4  Min: 72  Max: 114   Blood Pressure: 126/65     Respiratory:    Respiration: 18, Pulse Oximetry: 100 %           Fluids:    Intake/Output Summary (Last 24 hours) at 2022 1005  Last data filed at 2022 0800  Gross per 24 hour   Intake 4652.8 ml   Output no documentation   Net 4652.8 ml        GI/Nutrition:  Orders Placed This Encounter   Procedures    Diet Order Diet: Clear Liquid     Standing Status:   Standing     Number of Occurrences:   1     Order Specific Question:   Diet:     Answer:   Clear Liquid [10]     Medical Decision Making, by Problem:  Active Hospital Problems    Diagnosis     *Acute recurrent pancreatitis [K85.90]     Chronic pancreatitis (HCC) [K86.1]     Bipolar disorder (HCC) [F31.9]     Diarrhea [R19.7]     Hypokalemia [E87.6]     Seizure disorder (HCC) [G40.909]     Alcohol use disorder, severe, in early remission, dependence (HCC) [F10.21]     Anxiety [F41.9]      Assessment/Recommendations:  37-year-old female patient with ongoing abdominal pain, nausea, elevated lipase.  She has a history of recurrent alcohol induced pancreatitis, evidence of chronic pancreatitis, and new CT imaging suggestive of pancreatic duct stone as well as possibly new pancreatic duct dilation.  Unclear if this PD dilation is truly new and whether this is impeding flow of her pancreatic duct.  Additionally, the patient is adamant that she is sober from alcohol since . MRCP with similar findings. PD dilated upstream to 5 mm     Assessment:  -Acute on chronic epigastric and left upper quadrant pain  -Nausea  -Possibly acute on chronic pancreatitis  secondary to previous alcohol use  -Abnormal imaging of the biliary tract with PD stone and 5 mm dilation of the PD  -Bipolar disorder  -Anxiety and depression  -Diarrhea    Plan:  Discussed potential ERCP with attempts at PD stone removal with interventional endoscopists but they are hesitant to proceed as often difficult and can exacerbate underlying pancreatitis issues.  Could consider referral to tertiary care center but given [place] Medicaid as insurance, may not be option  Do feel it may be reasonable to proceed with EUS and celiac plexus block for possible pain control given difficulty controlling currently with opioids and no obvious acute pancreatic inflammation on imaging.  Will see if this can be performed during inpatient stay  Would also consult pancreatic surgeon to consider possible surgical options for management of chronic pancreatitis with PD stones and PD dilation  Continue IVF and clear liquid diet and pain control  Check KUB to rule out ileus/constipation that can contribute to pain and nausea    Quality-Core Measures   Reviewed items::  Radiology images reviewed, Medications reviewed and Labs reviewed

## 2022-12-22 NOTE — DISCHARGE PLANNING
note:  Mom called CM because she thinks that pt is not getting enough pain medication.     CM escalated to charge nurse Mae.     Mae NELSON notified Dr. Sandhu.

## 2022-12-22 NOTE — CARE PLAN
The patient is Stable - Low risk of patient condition declining or worsening    Shift Goals  Clinical Goals: Pain management  Patient Goals: pain control; sleep    Progress made toward(s) clinical / shift goals: Administer pain medication per MAR and pain level. Pain at start of shift was 9/10 pain has come down to 7/10; will continue to work with patient and interdisciplinary to lower pain level. Cluster care to allow patient to sleep.    Problem: Pain - Standard  Goal: Alleviation of pain or a reduction in pain to the patient’s comfort goal  Outcome: Progressing        Patient is not progressing towards the following goals:N/A

## 2022-12-22 NOTE — CARE PLAN
The patient is Watcher - Medium risk of patient condition declining or worsening    Shift Goals  Clinical Goals: pain mgmt, care plan, ERCP, NPO  Patient Goals: pain control, nictotine patch, rest, plan of care    Progress made toward(s) clinical / shift goals:  Diet progressed from NPO to clears. IVF given throughout shift. Nicotine patch provided per pt request with relief.     Problem: Knowledge Deficit - Standard  Goal: Patient and family/care givers will demonstrate understanding of plan of care, disease process/condition, diagnostic tests and medications  Outcome: Progressing         Patient is not progressing towards the following goals:Pain medication/regimen not providing adequate pain relief.  MD contacted multiple times and has changed pain medication options and schedule.  Will continue to monitor to allow for appropriate pain relief.         Problem: Pain - Standard  Goal: Alleviation of pain or a reduction in pain to the patient’s comfort goal  Outcome: Not Progressing

## 2022-12-23 ENCOUNTER — ANESTHESIA EVENT (OUTPATIENT)
Dept: SURGERY | Facility: MEDICAL CENTER | Age: 37
DRG: 440 | End: 2022-12-23
Payer: COMMERCIAL

## 2022-12-23 ENCOUNTER — ANESTHESIA (OUTPATIENT)
Dept: SURGERY | Facility: MEDICAL CENTER | Age: 37
DRG: 440 | End: 2022-12-23
Payer: COMMERCIAL

## 2022-12-23 LAB
ERYTHROCYTE [DISTWIDTH] IN BLOOD BY AUTOMATED COUNT: 40.9 FL (ref 35.9–50)
HCT VFR BLD AUTO: 39.5 % (ref 37–47)
HGB BLD-MCNC: 13.2 G/DL (ref 12–16)
MCH RBC QN AUTO: 30.2 PG (ref 27–33)
MCHC RBC AUTO-ENTMCNC: 33.4 G/DL (ref 33.6–35)
MCV RBC AUTO: 90.4 FL (ref 81.4–97.8)
PATHOLOGY CONSULT NOTE: NORMAL
PLATELET # BLD AUTO: 248 K/UL (ref 164–446)
PMV BLD AUTO: 9.1 FL (ref 9–12.9)
RBC # BLD AUTO: 4.37 M/UL (ref 4.2–5.4)
WBC # BLD AUTO: 5.2 K/UL (ref 4.8–10.8)

## 2022-12-23 PROCEDURE — 0DB78ZX EXCISION OF STOMACH, PYLORUS, VIA NATURAL OR ARTIFICIAL OPENING ENDOSCOPIC, DIAGNOSTIC: ICD-10-PCS | Performed by: INTERNAL MEDICINE

## 2022-12-23 PROCEDURE — 700111 HCHG RX REV CODE 636 W/ 250 OVERRIDE (IP): Performed by: INTERNAL MEDICINE

## 2022-12-23 PROCEDURE — 160009 HCHG ANES TIME/MIN: Performed by: INTERNAL MEDICINE

## 2022-12-23 PROCEDURE — 700111 HCHG RX REV CODE 636 W/ 250 OVERRIDE (IP): Performed by: ANESTHESIOLOGY

## 2022-12-23 PROCEDURE — 700105 HCHG RX REV CODE 258: Performed by: FAMILY MEDICINE

## 2022-12-23 PROCEDURE — 99233 SBSQ HOSP IP/OBS HIGH 50: CPT | Performed by: INTERNAL MEDICINE

## 2022-12-23 PROCEDURE — 700101 HCHG RX REV CODE 250: Performed by: INTERNAL MEDICINE

## 2022-12-23 PROCEDURE — 160036 HCHG PACU - EA ADDL 30 MINS PHASE I: Performed by: INTERNAL MEDICINE

## 2022-12-23 PROCEDURE — 36415 COLL VENOUS BLD VENIPUNCTURE: CPT

## 2022-12-23 PROCEDURE — 85027 COMPLETE CBC AUTOMATED: CPT

## 2022-12-23 PROCEDURE — 160035 HCHG PACU - 1ST 60 MINS PHASE I: Performed by: INTERNAL MEDICINE

## 2022-12-23 PROCEDURE — 700111 HCHG RX REV CODE 636 W/ 250 OVERRIDE (IP): Performed by: FAMILY MEDICINE

## 2022-12-23 PROCEDURE — 770001 HCHG ROOM/CARE - MED/SURG/GYN PRIV*

## 2022-12-23 PROCEDURE — 88305 TISSUE EXAM BY PATHOLOGIST: CPT | Mod: 59

## 2022-12-23 PROCEDURE — 160002 HCHG RECOVERY MINUTES (STAT): Performed by: INTERNAL MEDICINE

## 2022-12-23 PROCEDURE — 700105 HCHG RX REV CODE 258: Performed by: INTERNAL MEDICINE

## 2022-12-23 PROCEDURE — 700102 HCHG RX REV CODE 250 W/ 637 OVERRIDE(OP): Performed by: FAMILY MEDICINE

## 2022-12-23 PROCEDURE — 00731 ANES UPR GI NDSC PX NOS: CPT | Performed by: ANESTHESIOLOGY

## 2022-12-23 PROCEDURE — 0DB98ZX EXCISION OF DUODENUM, VIA NATURAL OR ARTIFICIAL OPENING ENDOSCOPIC, DIAGNOSTIC: ICD-10-PCS | Performed by: INTERNAL MEDICINE

## 2022-12-23 PROCEDURE — 160208 HCHG ENDO MINUTES - EA ADDL 1 MIN LEVEL 4: Performed by: INTERNAL MEDICINE

## 2022-12-23 PROCEDURE — 700102 HCHG RX REV CODE 250 W/ 637 OVERRIDE(OP): Performed by: INTERNAL MEDICINE

## 2022-12-23 PROCEDURE — 160203 HCHG ENDO MINUTES - 1ST 30 MINS LEVEL 4: Performed by: INTERNAL MEDICINE

## 2022-12-23 PROCEDURE — 160048 HCHG OR STATISTICAL LEVEL 1-5: Performed by: INTERNAL MEDICINE

## 2022-12-23 PROCEDURE — A9270 NON-COVERED ITEM OR SERVICE: HCPCS | Performed by: FAMILY MEDICINE

## 2022-12-23 PROCEDURE — 88312 SPECIAL STAINS GROUP 1: CPT

## 2022-12-23 PROCEDURE — A9270 NON-COVERED ITEM OR SERVICE: HCPCS | Performed by: INTERNAL MEDICINE

## 2022-12-23 PROCEDURE — 3E0T3BZ INTRODUCTION OF ANESTHETIC AGENT INTO PERIPHERAL NERVES AND PLEXI, PERCUTANEOUS APPROACH: ICD-10-PCS | Performed by: INTERNAL MEDICINE

## 2022-12-23 RX ORDER — CEFAZOLIN SODIUM 1 G/3ML
INJECTION, POWDER, FOR SOLUTION INTRAMUSCULAR; INTRAVENOUS PRN
Status: DISCONTINUED | OUTPATIENT
Start: 2022-12-23 | End: 2022-12-23 | Stop reason: SURG

## 2022-12-23 RX ORDER — TRIAMCINOLONE ACETONIDE 40 MG/ML
INJECTION, SUSPENSION INTRA-ARTICULAR; INTRAMUSCULAR
Status: DISCONTINUED | OUTPATIENT
Start: 2022-12-23 | End: 2022-12-23 | Stop reason: HOSPADM

## 2022-12-23 RX ORDER — MEPERIDINE HYDROCHLORIDE 25 MG/ML
6.25 INJECTION INTRAMUSCULAR; INTRAVENOUS; SUBCUTANEOUS
Status: DISCONTINUED | OUTPATIENT
Start: 2022-12-23 | End: 2022-12-23 | Stop reason: HOSPADM

## 2022-12-23 RX ORDER — SODIUM CHLORIDE, SODIUM LACTATE, POTASSIUM CHLORIDE, CALCIUM CHLORIDE 600; 310; 30; 20 MG/100ML; MG/100ML; MG/100ML; MG/100ML
INJECTION, SOLUTION INTRAVENOUS CONTINUOUS
Status: ACTIVE | OUTPATIENT
Start: 2022-12-23 | End: 2022-12-23

## 2022-12-23 RX ORDER — DIPHENHYDRAMINE HYDROCHLORIDE 50 MG/ML
12.5 INJECTION INTRAMUSCULAR; INTRAVENOUS
Status: DISCONTINUED | OUTPATIENT
Start: 2022-12-23 | End: 2022-12-23 | Stop reason: HOSPADM

## 2022-12-23 RX ORDER — SODIUM CHLORIDE, SODIUM LACTATE, POTASSIUM CHLORIDE, CALCIUM CHLORIDE 600; 310; 30; 20 MG/100ML; MG/100ML; MG/100ML; MG/100ML
INJECTION, SOLUTION INTRAVENOUS CONTINUOUS
Status: DISCONTINUED | OUTPATIENT
Start: 2022-12-23 | End: 2022-12-23 | Stop reason: HOSPADM

## 2022-12-23 RX ORDER — OXYCODONE HCL 5 MG/5 ML
10 SOLUTION, ORAL ORAL
Status: DISCONTINUED | OUTPATIENT
Start: 2022-12-23 | End: 2022-12-23 | Stop reason: HOSPADM

## 2022-12-23 RX ORDER — ALCOHOL 1 ML/ML
INJECTION, SOLUTION PERCUTANEOUS
Status: DISCONTINUED | OUTPATIENT
Start: 2022-12-23 | End: 2022-12-23 | Stop reason: HOSPADM

## 2022-12-23 RX ORDER — BUPIVACAINE HYDROCHLORIDE 2.5 MG/ML
INJECTION, SOLUTION EPIDURAL; INFILTRATION; INTRACAUDAL
Status: DISCONTINUED | OUTPATIENT
Start: 2022-12-23 | End: 2022-12-23 | Stop reason: HOSPADM

## 2022-12-23 RX ORDER — PHENYLEPHRINE HCL IN 0.9% NACL 0.5 MG/5ML
SYRINGE (ML) INTRAVENOUS PRN
Status: DISCONTINUED | OUTPATIENT
Start: 2022-12-23 | End: 2022-12-23 | Stop reason: SURG

## 2022-12-23 RX ORDER — HALOPERIDOL 5 MG/ML
1 INJECTION INTRAMUSCULAR
Status: DISCONTINUED | OUTPATIENT
Start: 2022-12-23 | End: 2022-12-23 | Stop reason: HOSPADM

## 2022-12-23 RX ORDER — OXYCODONE HCL 5 MG/5 ML
5 SOLUTION, ORAL ORAL
Status: DISCONTINUED | OUTPATIENT
Start: 2022-12-23 | End: 2022-12-23 | Stop reason: HOSPADM

## 2022-12-23 RX ORDER — ONDANSETRON 2 MG/ML
4 INJECTION INTRAMUSCULAR; INTRAVENOUS
Status: COMPLETED | OUTPATIENT
Start: 2022-12-23 | End: 2022-12-23

## 2022-12-23 RX ADMIN — KETOROLAC TROMETHAMINE 15 MG: 30 INJECTION, SOLUTION INTRAMUSCULAR; INTRAVENOUS at 04:53

## 2022-12-23 RX ADMIN — MORPHINE SULFATE 15 MG: 15 TABLET, EXTENDED RELEASE ORAL at 04:52

## 2022-12-23 RX ADMIN — MIDAZOLAM 2 MG: 1 INJECTION INTRAMUSCULAR; INTRAVENOUS at 07:14

## 2022-12-23 RX ADMIN — SENNOSIDES AND DOCUSATE SODIUM 2 TABLET: 50; 8.6 TABLET ORAL at 04:52

## 2022-12-23 RX ADMIN — DIVALPROEX SODIUM 250 MG: 250 TABLET, DELAYED RELEASE ORAL at 04:52

## 2022-12-23 RX ADMIN — Medication 100 MCG: at 07:26

## 2022-12-23 RX ADMIN — ONDANSETRON 4 MG: 2 INJECTION INTRAMUSCULAR; INTRAVENOUS at 08:42

## 2022-12-23 RX ADMIN — HYDROMORPHONE HYDROCHLORIDE 0.5 MG: 1 INJECTION, SOLUTION INTRAMUSCULAR; INTRAVENOUS; SUBCUTANEOUS at 17:12

## 2022-12-23 RX ADMIN — ONDANSETRON 4 MG: 4 TABLET, ORALLY DISINTEGRATING ORAL at 12:32

## 2022-12-23 RX ADMIN — MORPHINE SULFATE 15 MG: 15 TABLET, EXTENDED RELEASE ORAL at 19:18

## 2022-12-23 RX ADMIN — Medication 100 MCG: at 07:35

## 2022-12-23 RX ADMIN — OXYCODONE HYDROCHLORIDE 10 MG: 10 TABLET ORAL at 09:53

## 2022-12-23 RX ADMIN — NICOTINE 14 MG: 14 PATCH TRANSDERMAL at 06:00

## 2022-12-23 RX ADMIN — OXYCODONE HYDROCHLORIDE 10 MG: 10 TABLET ORAL at 22:21

## 2022-12-23 RX ADMIN — HYDROMORPHONE HYDROCHLORIDE 0.5 MG: 1 INJECTION, SOLUTION INTRAMUSCULAR; INTRAVENOUS; SUBCUTANEOUS at 00:03

## 2022-12-23 RX ADMIN — HYDROMORPHONE HYDROCHLORIDE 0.5 MG: 1 INJECTION, SOLUTION INTRAMUSCULAR; INTRAVENOUS; SUBCUTANEOUS at 14:11

## 2022-12-23 RX ADMIN — HYDROMORPHONE HYDROCHLORIDE 0.5 MG: 1 INJECTION, SOLUTION INTRAMUSCULAR; INTRAVENOUS; SUBCUTANEOUS at 20:25

## 2022-12-23 RX ADMIN — PANCRELIPASE 24000 UNITS: 24000; 76000; 120000 CAPSULE, DELAYED RELEASE PELLETS ORAL at 17:15

## 2022-12-23 RX ADMIN — PROPOFOL 50 MG: 10 INJECTION, EMULSION INTRAVENOUS at 07:17

## 2022-12-23 RX ADMIN — GABAPENTIN 100 MG: 100 CAPSULE ORAL at 04:52

## 2022-12-23 RX ADMIN — SENNOSIDES AND DOCUSATE SODIUM 2 TABLET: 50; 8.6 TABLET ORAL at 17:15

## 2022-12-23 RX ADMIN — OXYCODONE HYDROCHLORIDE 10 MG: 10 TABLET ORAL at 13:04

## 2022-12-23 RX ADMIN — FENTANYL CITRATE 50 MCG: 50 INJECTION, SOLUTION INTRAMUSCULAR; INTRAVENOUS at 07:17

## 2022-12-23 RX ADMIN — OXYCODONE HYDROCHLORIDE 10 MG: 10 TABLET ORAL at 16:13

## 2022-12-23 RX ADMIN — OXYCODONE HYDROCHLORIDE 10 MG: 10 TABLET ORAL at 02:05

## 2022-12-23 RX ADMIN — SODIUM CHLORIDE, POTASSIUM CHLORIDE, SODIUM LACTATE AND CALCIUM CHLORIDE: 600; 310; 30; 20 INJECTION, SOLUTION INTRAVENOUS at 19:24

## 2022-12-23 RX ADMIN — PANCRELIPASE 24000 UNITS: 24000; 76000; 120000 CAPSULE, DELAYED RELEASE PELLETS ORAL at 12:08

## 2022-12-23 RX ADMIN — POLYETHYLENE GLYCOL 3350 1 PACKET: 17 POWDER, FOR SOLUTION ORAL at 13:05

## 2022-12-23 RX ADMIN — HYDROMORPHONE HYDROCHLORIDE 0.5 MG: 1 INJECTION, SOLUTION INTRAMUSCULAR; INTRAVENOUS; SUBCUTANEOUS at 04:52

## 2022-12-23 RX ADMIN — OXYCODONE HYDROCHLORIDE 10 MG: 10 TABLET ORAL at 19:18

## 2022-12-23 RX ADMIN — HYDROMORPHONE HYDROCHLORIDE 0.5 MG: 1 INJECTION, SOLUTION INTRAMUSCULAR; INTRAVENOUS; SUBCUTANEOUS at 03:10

## 2022-12-23 RX ADMIN — SODIUM CHLORIDE, POTASSIUM CHLORIDE, SODIUM LACTATE AND CALCIUM CHLORIDE: 600; 310; 30; 20 INJECTION, SOLUTION INTRAVENOUS at 06:45

## 2022-12-23 RX ADMIN — HYDROMORPHONE HYDROCHLORIDE 0.5 MG: 1 INJECTION, SOLUTION INTRAMUSCULAR; INTRAVENOUS; SUBCUTANEOUS at 23:33

## 2022-12-23 RX ADMIN — KETOROLAC TROMETHAMINE 15 MG: 30 INJECTION, SOLUTION INTRAMUSCULAR; INTRAVENOUS at 14:14

## 2022-12-23 RX ADMIN — ZONISAMIDE 150 MG: 50 CAPSULE ORAL at 20:26

## 2022-12-23 RX ADMIN — CEFAZOLIN 2 G: 330 INJECTION, POWDER, FOR SOLUTION INTRAMUSCULAR; INTRAVENOUS at 07:21

## 2022-12-23 RX ADMIN — DIVALPROEX SODIUM 250 MG: 250 TABLET, DELAYED RELEASE ORAL at 17:16

## 2022-12-23 RX ADMIN — GABAPENTIN 300 MG: 100 CAPSULE ORAL at 20:26

## 2022-12-23 RX ADMIN — SODIUM CHLORIDE, POTASSIUM CHLORIDE, SODIUM LACTATE AND CALCIUM CHLORIDE: 600; 310; 30; 20 INJECTION, SOLUTION INTRAVENOUS at 12:28

## 2022-12-23 RX ADMIN — KETOROLAC TROMETHAMINE 15 MG: 30 INJECTION, SOLUTION INTRAMUSCULAR; INTRAVENOUS at 22:21

## 2022-12-23 RX ADMIN — HYDROMORPHONE HYDROCHLORIDE 0.5 MG: 1 INJECTION, SOLUTION INTRAMUSCULAR; INTRAVENOUS; SUBCUTANEOUS at 10:55

## 2022-12-23 ASSESSMENT — ENCOUNTER SYMPTOMS
VOMITING: 0
DIZZINESS: 0
HEARTBURN: 0
PALPITATIONS: 0
FALLS: 0
CHILLS: 0
HEADACHES: 0
FEVER: 0
BACK PAIN: 0
SHORTNESS OF BREATH: 0
BLURRED VISION: 0
NERVOUS/ANXIOUS: 0
DOUBLE VISION: 0
NAUSEA: 1
COUGH: 0
ABDOMINAL PAIN: 1

## 2022-12-23 ASSESSMENT — PATIENT HEALTH QUESTIONNAIRE - PHQ9
1. LITTLE INTEREST OR PLEASURE IN DOING THINGS: NOT AT ALL
SUM OF ALL RESPONSES TO PHQ9 QUESTIONS 1 AND 2: 0

## 2022-12-23 ASSESSMENT — PAIN DESCRIPTION - PAIN TYPE
TYPE: ACUTE PAIN

## 2022-12-23 ASSESSMENT — LIFESTYLE VARIABLES: SUBSTANCE_ABUSE: 0

## 2022-12-23 ASSESSMENT — PAIN SCALES - GENERAL: PAIN_LEVEL: 3

## 2022-12-23 NOTE — ANESTHESIA TIME REPORT
Anesthesia Start and Stop Event Times     Date Time Event    12/23/2022 0635 Ready for Procedure     0714 Anesthesia Start     0750 Anesthesia Stop        Responsible Staff  12/23/22    Name Role Begin End    Ana Davenport M.D. Anesth 0714 0750        Overtime Reason:  no overtime (within assigned shift)    Comments:

## 2022-12-23 NOTE — CARE PLAN
The patient is Stable - Low risk of patient condition declining or worsening    Shift Goals  Clinical Goals: pain <4\10 throughout shift  Patient Goals: rest and comfort    Progress made toward(s) clinical / shift goals:    Problem: Pain - Standard  Goal: Alleviation of pain or a reduction in pain to the patient’s comfort goal  12/22/2022 2304 by Jenni Velazquez, R.N.  Outcome: Progressing  12/22/2022 2303 by Jenni Velazquez, R.N.  Outcome: Progressing       Patient is not progressing towards the following goals:

## 2022-12-23 NOTE — OR NURSING
"0747 Pt arrived from endo, report received from anesthesiologist and RN. Pt sedated at this time. respirations spontaneous and unlabored. Pt having HoTN, cuff adjusted    0802 Pt waking at this time, frequently falls back asleep. MD rounded, pt too drowsy to participate in conversation.     0815 pt mom updated over the phone. Pt still drowsy drifts to sleep in the middle of conversation.     0830 Pt states pain 4/10 and \"she'll live\" when asked if pain was tolerable. Pt still drifts easily to sleep during conversation.     0842 pt states having nausea, medicated per MAR.     0856 Report to Alonzo RN     0918 Report back from Alonzo NELSON. Pt on transport list to return to room, report called to [facility] RN by Alonzo NELSON.     0933 Pt transferred to room on [facility] by transport. Belongings on [facility] at time of transfer.           "

## 2022-12-23 NOTE — ANESTHESIA POSTPROCEDURE EVALUATION
Patient: Rhiannon Marrero    Procedure Summary     Date: 12/23/22 Room / Location:  ENDOSCOPIC ULTRASOUND ROOM / SURGERY [facility]    Anesthesia Start: 0714 Anesthesia Stop: 0750    Procedures:       GASTROSCOPY (Abdomen)      EGD, WITH ENDOSCOPIC US (Abdomen)      BLOCK, CELIAC PLEXUS (Abdomen) Diagnosis: (Acute recurrent pancreatitis, chronic pancreatitis)    Surgeons: Td Kwok M.D. Responsible Provider: Ana Davenport M.D.    Anesthesia Type: general ASA Status: 3          Final Anesthesia Type: general  Last vitals  BP   Blood Pressure: 123/77    Temp   36.3 °C (97.4 °F)    Pulse   73   Resp   16    SpO2   99 %      Anesthesia Post Evaluation    Patient location during evaluation: PACU  Patient participation: complete - patient participated  Level of consciousness: awake and alert  Pain score: 3    Airway patency: patent  Anesthetic complications: no  Cardiovascular status: hemodynamically stable  Respiratory status: acceptable  Hydration status: euvolemic    PONV: none          No notable events documented.     Nurse Pain Score: 7 (NPRS)

## 2022-12-23 NOTE — PROGRESS NOTES
"Hospital Medicine Daily Progress Note    Date of Service  12/23/2022    Chief Complaint  Rhiannon Marrero is a 37 y.o. female admitted 12/20/2022 with abdominal pain.    Hospital Course  As per chart review:  \"37 y.o. female who presented 12/20/2022 with abdominal pain. She has a history significant for bipolar disorder, seizure disorder, EtOH abuse in remission x 2 months and recurrent pancreatitis with several recent admissions who was just discharged on 12/12 for pancreatitis.  She saw her PCP yesterday who has referred her to GI as an outpatient, and directed her to come to the ER due to an elevated lipase. However, this lipase is lower than previous on 12/12 admit. She states that this feels like her typical pancreatitis pain and attests nausea without vomiting. She is having diarrhea with stools the consistency of baby food that float on the toilet water. She expresses frustration at continuing to have pancreatitis issues with cessation of alcohol.\"     Interval Problem Update  12/21: Patient seen at bedside this morning.  The patient complaining of abdominal pain.  She is not hungry or have an appetite yet.  We will continue with IV fluids.  GI following the patient, contemplating the need of ERCP.  We appreciate further recommendations.  Continue with pain management as well.    12/22: Patient seen at bedside this morning.  The patient still complaining of abdominal pain, however she says that it somewhat better than yesterday.  We will continue with IV fluids.  GI following the patient and they are evaluating the possibility of celiac plexus block, we appreciate further recommendations.  We will also reach out to Dr. Rachel to see if there are any surgical options.  Yesterday it seems like the mother of the patient called case management complaining that the patient was not being taking care for her pain control.  Had a long discussion with the patient at bedside today, charge nurse was present at " "the time of my evaluation.  The patient stated verbatim : \"do not listen to my mom, she is crazy.\"  Today at the time of evaluation the patient seems more comfortable.  We did start the patient on MS Contin, last time on hospitalization MS Contin was used and it seems to have helped her pain.  We will continue to monitor closely.  We appreciate further recommendations by GI    12/23: Patient seen at bedside this morning.  The patient was seen in the room after the GI procedure for the celiac plexus block.  Nurse was at bedside at the time of my evaluation.  The patient still complaining of some abdominal pain, this could be postprocedural.  We should expect improvement in pain.  We will start the patient on clear liquid diet and advance as tolerated.  Continue to monitor closely.  We appreciate further recommendations by GI.    I have discussed this patient's plan of care and discharge plan at IDT rounds today with Case Management, Nursing, Nursing leadership, and other members of the IDT team.    Consultants/Specialty  GI    Code Status  Full Code    Disposition  Patient is not medically cleared for discharge.   Anticipate discharge to pending clinical evolution.      Review of Systems  Review of Systems   Constitutional:  Positive for malaise/fatigue. Negative for chills and fever.   HENT:  Negative for hearing loss and nosebleeds.    Eyes:  Negative for blurred vision and double vision.   Respiratory:  Negative for cough and shortness of breath.    Cardiovascular:  Negative for chest pain and palpitations.   Gastrointestinal:  Positive for abdominal pain and nausea. Negative for heartburn and vomiting.   Genitourinary:  Negative for dysuria and urgency.   Musculoskeletal:  Negative for back pain and falls.   Skin:  Negative for itching and rash.   Neurological:  Negative for dizziness and headaches.   Psychiatric/Behavioral:  Negative for substance abuse. The patient is not nervous/anxious.    All other systems " reviewed and are negative.     Physical Exam  Temp:  [36.1 °C (97 °F)-37.1 °C (98.8 °F)] 36.6 °C (97.8 °F)  Pulse:  [59-83] 78  Resp:  [14-18] 15  BP: ()/(34-83) 102/67  SpO2:  [95 %-100 %] 97 %    Physical Exam  Vitals and nursing note reviewed.   Constitutional:       General: She is not in acute distress.     Appearance: She is ill-appearing.   HENT:      Head: Normocephalic and atraumatic.      Right Ear: External ear normal.      Left Ear: External ear normal.      Mouth/Throat:      Pharynx: No oropharyngeal exudate or posterior oropharyngeal erythema.   Eyes:      General:         Right eye: No discharge.         Left eye: No discharge.   Cardiovascular:      Rate and Rhythm: Normal rate and regular rhythm.      Pulses: Normal pulses.      Heart sounds: No murmur heard.    No gallop.   Pulmonary:      Effort: Pulmonary effort is normal. No respiratory distress.      Breath sounds: Normal breath sounds. No wheezing or rhonchi.   Abdominal:      General: Bowel sounds are normal. There is no distension.      Palpations: Abdomen is soft.      Tenderness: There is abdominal tenderness. There is no guarding.   Musculoskeletal:         General: No swelling or tenderness. Normal range of motion.      Cervical back: Normal range of motion and neck supple. No tenderness.   Skin:     General: Skin is warm and dry.   Neurological:      General: No focal deficit present.      Mental Status: She is alert and oriented to person, place, and time. Mental status is at baseline.      Motor: No weakness.   Psychiatric:         Mood and Affect: Mood normal.         Behavior: Behavior normal.       Fluids    Intake/Output Summary (Last 24 hours) at 12/23/2022 1020  Last data filed at 12/23/2022 0940  Gross per 24 hour   Intake 664.83 ml   Output 0 ml   Net 664.83 ml         Laboratory  Recent Labs     12/21/22  0249 12/22/22  0251 12/23/22  0401   WBC 6.6 6.2 5.2   RBC 4.28 4.81 4.37   HEMOGLOBIN 12.8 14.4 13.2   HEMATOCRIT  38.4 44.1 39.5   MCV 89.7 91.7 90.4   MCH 29.9 29.9 30.2   MCHC 33.3* 32.7* 33.4*   RDW 42.0 41.9 40.9   PLATELETCT 255 143* 248   MPV 9.2 10.5 9.1       Recent Labs     12/21/22  0249 12/22/22  0251   SODIUM 135 137   POTASSIUM 3.5* 3.6   CHLORIDE 102 106   CO2 21 20   GLUCOSE 88 91   BUN 7* 5*   CREATININE 0.62 0.54   CALCIUM 8.6 9.4                       Imaging  [id number] VIEW   Final Result      No evidence of bowel obstruction.      [id number]   Final Result      1. No choledocholithiasis or biliary dilatation.   2. Normal gallbladder. No cholelithiasis.   3. Pancreatic duct stones were better seen on the recent CT, with one of the stones resulting in upstream pancreatic ductal dilatation up to 5 mm.         CT-ABDOMEN-PELVIS WITH   Final Result      1.  Pancreatic duct stones measuring up to 4 x 6 mm with new pancreatic ductal dilatation.             Assessment/Plan  * Acute recurrent pancreatitis- (present on admission)  Assessment & Plan  - Lipase 226, down from previous admission but still elevated, pt reports her typical LUQ pancreatitis pain radiating to her back   - Did not have CT last admission - given her quick recurrence, will check CT abd/pelvis to evaluate for pseudocyst and/or necrosis given persistence of her symptoms despite EtOH cessation  - No elevation in bilirubin to suggest GB involvement currently, will see what CBD looks like on CT   - NPO  - LR@250cc/hr x 12 hours, then 150cc/hr thereafter  - Question of contribution from Depakote last admission, pt did decide to stay on Depakote despite that last stay - if CT unrevealing for pseudocyst or other etiology of her pancreatitis and pain, this may need to be readdressed with patient   - IV and PO options for pain control   - TGLs normal    ADDENDUM: pt with pancreatic duct stones on CT - [facility] consulted     12/21: GI evaluating the possibility of ERCP, we appreciate further recommendations.    12/22: GI is not recommending ERCP at this  time. They are evaluating the potential use of EUS for celiac block. They also recommend consulting pancreatic surgery input. We will consult Dr Rachel, we appreciate further recommendations.    12/23: I discussed cased with Dr Rachel, and there is no surgical indication at this time. GI did celiac plexus block today. We appreciate further recommendations.    Thrombocytopenia (HCC)  Assessment & Plan  Seems like chronic  No signs of bleeding    12/23: Resolved    Chronic pancreatitis (HCC)  Assessment & Plan  - Evidence on previous CTs of chronic pancreatitis/calcifications  - Start creon given her complaints of diarrhea with fatty/floating stools     12/23: Patient had celiac plexus block done today by GI. We appreciate further recommendations.    Bipolar disorder (HCC)- (present on admission)  Assessment & Plan  - Resume home Depakote   -May need to readdress Depakote usage for Bipolar and seizure d/o if no further etiology of acute on chronic pancreatitis  - Check depakote level     Diarrhea- (present on admission)  Assessment & Plan  - Described as baby food like in consistency, floats on the toilet water - consistent with stool in the setting of pancreatic insufficiency  - Start Creon - start 1 tab with meals today, titrate up     Alcohol dependence with withdrawal (HCC)  Assessment & Plan  - Pt reports no EtOH usage in two months     Hypokalemia- (present on admission)  Assessment & Plan  Replace as needed  monitor    Seizure disorder (HCC)- (present on admission)  Assessment & Plan  - Resume home Depakote and Zonisamide  - Seizure precautions    Alcohol use disorder, severe, in early remission, dependence (HCC)- (present on admission)  Assessment & Plan  - pt reports no EtOH usage in two months     Anxiety- (present on admission)  Assessment & Plan  - Resume home PRN hydroxyzine         VTE prophylaxis: enoxaparin ppx    I have performed a physical exam and reviewed and updated ROS and Plan today  (12/23/2022). In review of yesterday's note (12/22/2022), there are no changes except as documented above.

## 2022-12-23 NOTE — PROGRESS NOTES
Pt is sitting upright when received, complains of 8\10 pain, medication given per scale. Verbalizes needs well and demonstrates use of call bell. Call bell in reach    Chart check completed    0607 Pt left floor

## 2022-12-23 NOTE — PROGRESS NOTES
Bedside report received from night RN. Assumed care of patient. Alert and oriented, on RA. Daily plan of care discussed. Pt complains of post procedure pain, medicated per MAR. Pt requested to rest at this time. Call light within reach. Hourly rounding in place.

## 2022-12-23 NOTE — OR SURGEON
EGD, EUS, celiac plexus block operative note    PreOp Diagnosis: Chronic alcoholic pancreatitis with chronic abdominal pain      PostOp Diagnosis: Same      Procedure(s):  GASTROSCOPY - Wound Class: None  EGD, WITH ENDOSCOPIC US - Wound Class: None  BLOCK, CELIAC PLEXUS - Wound Class: None    Surgeon(s):  Td Kwok M.D.    Anesthesiologist/Type of Anesthesia:  Anesthesiologist: Ana Davenport M.D./General    Surgical Staff:  Circulator: Guilherme Dockery R.N.  Endoscopy Technician: Iris Weaver; Twyla Jewell    Specimens removed if any:  ID Type Source Tests Collected by Time Destination   A : Biopsy, Duodenal, R/O celiac Other Other PATHOLOGY SPECIMEN Td Kwok M.D. 12/23/2022  7:25 AM    B : Biopsy, Gastric  Antrum, R/O H-pylori Other Other PATHOLOGY SPECIMEN Td Kwok M.D. 12/23/2022  7:26 AM        Dr. Kwok  [facility]  [facility]  [phone]    EGD with: Biopsy    EUS with: Celiac plexus block    Indication: Chronic alcoholic pancreatitis with chronic abdominal pain    Sedation: MAC (Dr. Davenport)    Findings:   EGD    Esophagus     Unremarkable mucosa    Stomach     Retained fluid and pill fragments     Gastric antrum erythema and edema biopsied for H. pylori    Duodenum     Unremarkable mucosa biopsied for celiac sprue     EUS    Celiac axis     No adenopathy    Pancreas body/tail     Atrophic, lobular, hypoechoic and heterogeneous     Extensive nonshadowing hyperechoic stranding and foci     Few scattered calcifications     Consistent with at least mild chronic pancreatitis    Pancreatic duct     Tortuous in the tail     Dilated in the body measuring upwards of 3.3 mm in the tail and 7.2 mm in the body of the pancreas     Shadowing filling defects consistent with stones     Larger stone in head of pancreas -mobile and not completely obstructive    Ampulla     Unremarkable    CBD     Normal course and caliber     No filling defects    Head of  pancreas     Normal dorsal ventral transition     Atrophic with similar findings to the body of pancreas      Celiac plexus block     5mL NS cushion     20mL Bupivacaine - 0.25%     10mL Triamcinolone - 80mg total     10mL Dehydrated alcohol - 98%     5mL NS flush     Plan:  Wean pain medication as tolerated    Continue alcohol abstinence    Follow-up in office      Procedure detail:    Prior to procedure, informed consent was obtained.  Risks, benefits, alternatives, including but not limited to risk of bleeding, infection, perforation, adverse reaction to sedating medicine, failure to identify pathology, pancreatitis, diarrhea, paraplegia and death were explained to the patient who accepted all risks.    Patient was prepped in the left lateral position with IV propofol sedation provided by anesthesia.    EGD  Scope tip of the Olympus flexible gastroscope was passed to the proximal esophagus.  Proximal middle and distal thirds of the esophagus were well visualized and were unremarkable.  Stomach was entered and the gastric pool was suctioned.  Of note there was a moderate size liquid gastric pool with pills present and small amount of partially digested food debris.  Air was insufflated scope was retroflexed to view the body fundus and cardia which was grossly unremarkable.  Scope was straightened to view the antrum and incisura demonstrating erythema and edema.  Biopsies were taken for H. pylori.  The pylorus was patent.  Duodenal bulb sweep second and third portion were visualized and were unremarkable.  Biopsies were taken for celiac sprue.  The scope was withdrawn air and liquid were suctioned and we proceeded with endoscopic ultrasound.    EUS  The flexible radial echoendoscope was passed to the proximal stomach.  The celiac axis was identified.  There was no adenopathy.  The pancreatic body and tail was visualized and was atrophic.  There was extensive lobularity in the gland itself was hypoechoic and  heterogeneous.  There was extensive nonshadowing hyperechoic stranding and foci as well as a few scattered calcifications present.  All of these findings are consistent with at least mild chronic pancreatitis.  The pancreatic duct was dilated with a normal course in the body but tortuous in the tail.  It measured 3.3 mm in the tail and 7.2 mm in the body of the pancreas.  There are multiple small shadowing filling defects consistent with stones.  Was atrophic with similar findings to the body of the pancreas consistent with chronic pancreatitis.  There was a normal dorsal ventral transition. The scope was advanced into the duodenum.  The biliary ampulla was endosonographically unremarkable and the common bile duct showed no filling defects and had a normal course and caliber.  The head of the pancreas the pancreatic duct in the head of the pancreas was dilated up to 7.2 millimeters with a large round stone present.  The stone appeared mobile and did not seem impacted.  The radial echoendoscope was withdrawn and we proceeded with celiac plexus block.    The flexible linear echoendoscope was advanced to the proximal stomach.  The celiac axis was identified and a airpim 19-gauge expect needle was entered into the celiac space under endosonographic guidance.  Following this 5 mL of normal saline for a cushion followed by 20 mL of 0.25% bupivacaine then 80 mg of triamcinolone diluted to 8 mL and finally 10 mL of 98% dehydrated alcohol followed by a normal saline flush was injected into the celiac space.  Prior to each injection the needle tip was confirmed endosonographically and aspiration was made and was negative for blood.  Once this was complete the procedure was deemed complete.  The needle was withdrawn.  Air and liquid were suctioned.  The scope was withdrawn.  Patient tolerated the procedure well and was sent to recovery without immediate complications.        12/23/2022 7:46 AM Td Kwok,  M.D.

## 2022-12-23 NOTE — ANESTHESIA PREPROCEDURE EVALUATION
Case: [id number] Date/Time: 12/23/22 0645    Procedures:       GASTROSCOPY      EGD, WITH ENDOSCOPIC US    Location:  ENDOSCOPIC ULTRASOUND ROOM / SURGERY [facility]    Surgeons: Td Kwok M.D.      36yo c h/o seizure disorder, bipolar, cigarette smoking for EGD    Relevant Problems   NEURO   (positive) Migraine   (positive) Seizure disorder (HCC)      CARDIAC   (positive) Migraine       Physical Exam    Airway   Mallampati: I  TM distance: >3 FB  Neck ROM: full       Cardiovascular - normal exam  Rhythm: regular  Rate: normal  (-) murmur     Dental - normal exam           Pulmonary - normal exam  Breath sounds clear to auscultation     Abdominal    Neurological - normal exam                 Anesthesia Plan    ASA 3   ASA physical status 3 criteria: alcohol and/or substance dependence or abuse    Plan - general       Airway plan will be natural airway          Induction: intravenous    Postoperative Plan: Postoperative administration of opioids is intended.    Pertinent diagnostic labs and testing reviewed    Informed Consent:    Anesthetic plan and risks discussed with patient.    Use of blood products discussed with: patient whom consented to blood products.

## 2022-12-24 LAB
ALBUMIN SERPL BCP-MCNC: 4 G/DL (ref 3.2–4.9)
ALBUMIN/GLOB SERPL: 1.6 G/DL
ALP SERPL-CCNC: 54 U/L (ref 30–99)
ALT SERPL-CCNC: 14 U/L (ref 2–50)
ANION GAP SERPL CALC-SCNC: 9 MMOL/L (ref 7–16)
AST SERPL-CCNC: 60 U/L (ref 12–45)
BILIRUB SERPL-MCNC: 0.3 MG/DL (ref 0.1–1.5)
BUN SERPL-MCNC: 6 MG/DL (ref 8–22)
CALCIUM ALBUM COR SERPL-MCNC: 9.1 MG/DL (ref 8.5–10.5)
CALCIUM SERPL-MCNC: 9.1 MG/DL (ref 8.4–10.2)
CHLORIDE SERPL-SCNC: 102 MMOL/L (ref 96–112)
CO2 SERPL-SCNC: 23 MMOL/L (ref 20–33)
CREAT SERPL-MCNC: 0.57 MG/DL (ref 0.5–1.4)
ERYTHROCYTE [DISTWIDTH] IN BLOOD BY AUTOMATED COUNT: 40.4 FL (ref 35.9–50)
GFR SERPLBLD CREATININE-BSD FMLA CKD-EPI: 121 ML/MIN/1.73 M 2
GLOBULIN SER CALC-MCNC: 2.5 G/DL (ref 1.9–3.5)
GLUCOSE SERPL-MCNC: 133 MG/DL (ref 65–99)
HCT VFR BLD AUTO: 38.2 % (ref 37–47)
HGB BLD-MCNC: 12.9 G/DL (ref 12–16)
MCH RBC QN AUTO: 30.3 PG (ref 27–33)
MCHC RBC AUTO-ENTMCNC: 33.8 G/DL (ref 33.6–35)
MCV RBC AUTO: 89.7 FL (ref 81.4–97.8)
PLATELET # BLD AUTO: 272 K/UL (ref 164–446)
PMV BLD AUTO: 9.6 FL (ref 9–12.9)
POTASSIUM SERPL-SCNC: 4.2 MMOL/L (ref 3.6–5.5)
PROT SERPL-MCNC: 6.5 G/DL (ref 6–8.2)
RBC # BLD AUTO: 4.26 M/UL (ref 4.2–5.4)
SODIUM SERPL-SCNC: 134 MMOL/L (ref 135–145)
WBC # BLD AUTO: 10.5 K/UL (ref 4.8–10.8)

## 2022-12-24 PROCEDURE — 770001 HCHG ROOM/CARE - MED/SURG/GYN PRIV*

## 2022-12-24 PROCEDURE — 700102 HCHG RX REV CODE 250 W/ 637 OVERRIDE(OP): Performed by: INTERNAL MEDICINE

## 2022-12-24 PROCEDURE — 99232 SBSQ HOSP IP/OBS MODERATE 35: CPT | Performed by: INTERNAL MEDICINE

## 2022-12-24 PROCEDURE — 36415 COLL VENOUS BLD VENIPUNCTURE: CPT

## 2022-12-24 PROCEDURE — A9270 NON-COVERED ITEM OR SERVICE: HCPCS | Performed by: FAMILY MEDICINE

## 2022-12-24 PROCEDURE — 94760 N-INVAS EAR/PLS OXIMETRY 1: CPT

## 2022-12-24 PROCEDURE — 700105 HCHG RX REV CODE 258: Performed by: FAMILY MEDICINE

## 2022-12-24 PROCEDURE — 700111 HCHG RX REV CODE 636 W/ 250 OVERRIDE (IP): Performed by: FAMILY MEDICINE

## 2022-12-24 PROCEDURE — 700111 HCHG RX REV CODE 636 W/ 250 OVERRIDE (IP): Performed by: INTERNAL MEDICINE

## 2022-12-24 PROCEDURE — 85027 COMPLETE CBC AUTOMATED: CPT

## 2022-12-24 PROCEDURE — 700102 HCHG RX REV CODE 250 W/ 637 OVERRIDE(OP): Performed by: FAMILY MEDICINE

## 2022-12-24 PROCEDURE — 80053 COMPREHEN METABOLIC PANEL: CPT

## 2022-12-24 PROCEDURE — A9270 NON-COVERED ITEM OR SERVICE: HCPCS | Performed by: INTERNAL MEDICINE

## 2022-12-24 RX ORDER — OXYCODONE HYDROCHLORIDE 5 MG/1
5 TABLET ORAL EVERY 6 HOURS PRN
Status: DISCONTINUED | OUTPATIENT
Start: 2022-12-24 | End: 2022-12-25 | Stop reason: HOSPADM

## 2022-12-24 RX ORDER — OXYCODONE HYDROCHLORIDE 10 MG/1
10 TABLET ORAL EVERY 6 HOURS PRN
Status: DISCONTINUED | OUTPATIENT
Start: 2022-12-24 | End: 2022-12-25 | Stop reason: HOSPADM

## 2022-12-24 RX ORDER — HYDROMORPHONE HYDROCHLORIDE 1 MG/ML
0.5 INJECTION, SOLUTION INTRAMUSCULAR; INTRAVENOUS; SUBCUTANEOUS EVERY 6 HOURS PRN
Status: DISCONTINUED | OUTPATIENT
Start: 2022-12-24 | End: 2022-12-25 | Stop reason: HOSPADM

## 2022-12-24 RX ADMIN — OXYCODONE HYDROCHLORIDE 10 MG: 10 TABLET ORAL at 05:12

## 2022-12-24 RX ADMIN — HYDROMORPHONE HYDROCHLORIDE 0.5 MG: 1 INJECTION, SOLUTION INTRAMUSCULAR; INTRAVENOUS; SUBCUTANEOUS at 19:53

## 2022-12-24 RX ADMIN — GABAPENTIN 100 MG: 100 CAPSULE ORAL at 05:22

## 2022-12-24 RX ADMIN — HYDROMORPHONE HYDROCHLORIDE 0.5 MG: 1 INJECTION, SOLUTION INTRAMUSCULAR; INTRAVENOUS; SUBCUTANEOUS at 03:00

## 2022-12-24 RX ADMIN — OXYCODONE HYDROCHLORIDE 10 MG: 10 TABLET ORAL at 01:55

## 2022-12-24 RX ADMIN — ONDANSETRON 4 MG: 2 INJECTION INTRAMUSCULAR; INTRAVENOUS at 05:13

## 2022-12-24 RX ADMIN — SODIUM CHLORIDE, POTASSIUM CHLORIDE, SODIUM LACTATE AND CALCIUM CHLORIDE: 600; 310; 30; 20 INJECTION, SOLUTION INTRAVENOUS at 02:06

## 2022-12-24 RX ADMIN — KETOROLAC TROMETHAMINE 15 MG: 30 INJECTION, SOLUTION INTRAMUSCULAR; INTRAVENOUS at 22:00

## 2022-12-24 RX ADMIN — MORPHINE SULFATE 15 MG: 15 TABLET, EXTENDED RELEASE ORAL at 05:12

## 2022-12-24 RX ADMIN — PANCRELIPASE 24000 UNITS: 24000; 76000; 120000 CAPSULE, DELAYED RELEASE PELLETS ORAL at 17:29

## 2022-12-24 RX ADMIN — OXYCODONE HYDROCHLORIDE 10 MG: 10 TABLET ORAL at 12:28

## 2022-12-24 RX ADMIN — OXYCODONE HYDROCHLORIDE 10 MG: 10 TABLET ORAL at 18:25

## 2022-12-24 RX ADMIN — DIVALPROEX SODIUM 250 MG: 250 TABLET, DELAYED RELEASE ORAL at 17:30

## 2022-12-24 RX ADMIN — ZONISAMIDE 150 MG: 50 CAPSULE ORAL at 19:52

## 2022-12-24 RX ADMIN — DIVALPROEX SODIUM 250 MG: 250 TABLET, DELAYED RELEASE ORAL at 05:12

## 2022-12-24 RX ADMIN — PANCRELIPASE 24000 UNITS: 24000; 76000; 120000 CAPSULE, DELAYED RELEASE PELLETS ORAL at 07:56

## 2022-12-24 RX ADMIN — HYDROMORPHONE HYDROCHLORIDE 0.5 MG: 1 INJECTION, SOLUTION INTRAMUSCULAR; INTRAVENOUS; SUBCUTANEOUS at 13:55

## 2022-12-24 RX ADMIN — NICOTINE 14 MG: 14 PATCH TRANSDERMAL at 05:12

## 2022-12-24 RX ADMIN — PANCRELIPASE 24000 UNITS: 24000; 76000; 120000 CAPSULE, DELAYED RELEASE PELLETS ORAL at 11:42

## 2022-12-24 RX ADMIN — GABAPENTIN 300 MG: 100 CAPSULE ORAL at 19:52

## 2022-12-24 RX ADMIN — KETOROLAC TROMETHAMINE 15 MG: 30 INJECTION, SOLUTION INTRAMUSCULAR; INTRAVENOUS at 13:56

## 2022-12-24 RX ADMIN — SENNOSIDES AND DOCUSATE SODIUM 2 TABLET: 50; 8.6 TABLET ORAL at 17:30

## 2022-12-24 RX ADMIN — KETOROLAC TROMETHAMINE 15 MG: 30 INJECTION, SOLUTION INTRAMUSCULAR; INTRAVENOUS at 05:12

## 2022-12-24 ASSESSMENT — COGNITIVE AND FUNCTIONAL STATUS - GENERAL
MOBILITY SCORE: 24
DAILY ACTIVITIY SCORE: 24
SUGGESTED CMS G CODE MODIFIER MOBILITY: CH
SUGGESTED CMS G CODE MODIFIER DAILY ACTIVITY: CH

## 2022-12-24 ASSESSMENT — ENCOUNTER SYMPTOMS
NAUSEA: 1
BACK PAIN: 0
DIZZINESS: 0
NERVOUS/ANXIOUS: 0
HEADACHES: 0
HEARTBURN: 0
SHORTNESS OF BREATH: 0
FEVER: 0
FALLS: 0
VOMITING: 0
CHILLS: 0
PALPITATIONS: 0
BLURRED VISION: 0
ABDOMINAL PAIN: 1
DOUBLE VISION: 0
COUGH: 0

## 2022-12-24 ASSESSMENT — PAIN DESCRIPTION - PAIN TYPE
TYPE: ACUTE PAIN
TYPE: ACUTE PAIN

## 2022-12-24 ASSESSMENT — LIFESTYLE VARIABLES: SUBSTANCE_ABUSE: 0

## 2022-12-24 ASSESSMENT — PAIN SCALES - PAIN ASSESSMENT IN ADVANCED DEMENTIA (PAINAD): CONSOLABILITY: NO NEED TO CONSOLE

## 2022-12-24 NOTE — CARE PLAN
The patient is Stable - Low risk of patient condition declining or worsening    Shift Goals  Clinical Goals: Manage pain with interventions throughout the shift  Patient Goals: pain control, rest  Family Goals: n/a    Progress made toward(s) clinical / shift goals: Pt able to verbalize pain relief with prescribed medications. Pt noted to be resting comfortably in bed through the shift.     Patient is not progressing towards the following goals:n/a

## 2022-12-24 NOTE — CARE PLAN
The patient is Stable - Low risk of patient condition declining or worsening    Shift Goals  Clinical Goals: pt will state pain is a 6 out of 10 by the end of shift  Patient Goals: pain control, rest  Family Goals: n/a    Progress made toward(s) clinical / shift goals:  Pt has been in pain throughout the night. Pain meds have been given. Pain rated a 7 to 8 out of 10 for most of the night. Pt has stated she has gone down to  6 out of 10 after iv dilaudid given.     Patient is not progressing towards the following goals:

## 2022-12-24 NOTE — PROGRESS NOTES
"Hospital Medicine Daily Progress Note    Date of Service  12/24/2022    Chief Complaint  Rhiannon Marrero is a 37 y.o. female admitted 12/20/2022 with abdominal pain.    Hospital Course  As per chart review:  \"37 y.o. female who presented 12/20/2022 with abdominal pain. She has a history significant for bipolar disorder, seizure disorder, EtOH abuse in remission x 2 months and recurrent pancreatitis with several recent admissions who was just discharged on 12/12 for pancreatitis.  She saw her PCP yesterday who has referred her to GI as an outpatient, and directed her to come to the ER due to an elevated lipase. However, this lipase is lower than previous on 12/12 admit. She states that this feels like her typical pancreatitis pain and attests nausea without vomiting. She is having diarrhea with stools the consistency of baby food that float on the toilet water. She expresses frustration at continuing to have pancreatitis issues with cessation of alcohol.\"     Interval Problem Update  12/21: Patient seen at bedside this morning.  The patient complaining of abdominal pain.  She is not hungry or have an appetite yet.  We will continue with IV fluids.  GI following the patient, contemplating the need of ERCP.  We appreciate further recommendations.  Continue with pain management as well.    12/22: Patient seen at bedside this morning.  The patient still complaining of abdominal pain, however she says that it somewhat better than yesterday.  We will continue with IV fluids.  GI following the patient and they are evaluating the possibility of celiac plexus block, we appreciate further recommendations.  We will also reach out to Dr. Rachel to see if there are any surgical options.  Yesterday it seems like the mother of the patient called case management complaining that the patient was not being taking care for her pain control.  Had a long discussion with the patient at bedside today, charge nurse was present at " "the time of my evaluation.  The patient stated verbatim : \"do not listen to my mom, she is crazy.\"  Today at the time of evaluation the patient seems more comfortable.  We did start the patient on MS Contin, last time on hospitalization MS Contin was used and it seems to have helped her pain.  We will continue to monitor closely.  We appreciate further recommendations by GI    12/23: Patient seen at bedside this morning.  The patient was seen in the room after the GI procedure for the celiac plexus block.  Nurse was at bedside at the time of my evaluation.  The patient still complaining of some abdominal pain, this could be postprocedural.  We should expect improvement in pain.  We will start the patient on clear liquid diet and advance as tolerated.  Continue to monitor closely.  We appreciate further recommendations by GI.    12/24: Patient seen at bedside this morning.  Overall the patient mentions she feels better and is the first time she mentions that to me.  However the patient still asking for pain medications fairly regularly.  After the celiac plexus block GI had recommended to titrate down opiate medications.  We will try to do that today we will remove MS Contin and decrease the frequency of oxycodone and Dilaudid PRN.  We will also advance the diet as she mentions she is having more appetite today.  The patient tolerating diet and the pain is better controlled we can potentially discharge the patient tomorrow with close follow-up as an outpatient.    I have discussed this patient's plan of care and discharge plan at IDT rounds today with Case Management, Nursing, Nursing leadership, and other members of the IDT team.    Consultants/Specialty  GI    Code Status  Full Code    Disposition  Patient is not medically cleared for discharge.   Anticipate discharge to pending clinical evolution.      Review of Systems  Review of Systems   Constitutional:  Positive for malaise/fatigue. Negative for chills and " fever.   HENT:  Negative for hearing loss and nosebleeds.    Eyes:  Negative for blurred vision and double vision.   Respiratory:  Negative for cough and shortness of breath.    Cardiovascular:  Negative for chest pain and palpitations.   Gastrointestinal:  Positive for abdominal pain and nausea. Negative for heartburn and vomiting.   Genitourinary:  Negative for dysuria and urgency.   Musculoskeletal:  Negative for back pain and falls.   Skin:  Negative for itching and rash.   Neurological:  Negative for dizziness and headaches.   Psychiatric/Behavioral:  Negative for substance abuse. The patient is not nervous/anxious.    All other systems reviewed and are negative.     Physical Exam  Temp:  [36.3 °C (97.4 °F)-37.4 °C (99.3 °F)] 36.8 °C (98.2 °F)  Pulse:  [64-79] 64  Resp:  [14-17] 16  BP: ()/(50-77) 95/52  SpO2:  [94 %-100 %] 100 %    Physical Exam  Vitals and nursing note reviewed.   Constitutional:       General: She is not in acute distress.     Appearance: She is ill-appearing.   HENT:      Head: Normocephalic and atraumatic.      Right Ear: External ear normal.      Left Ear: External ear normal.      Mouth/Throat:      Pharynx: No oropharyngeal exudate or posterior oropharyngeal erythema.   Eyes:      General:         Right eye: No discharge.         Left eye: No discharge.   Cardiovascular:      Rate and Rhythm: Normal rate and regular rhythm.      Pulses: Normal pulses.      Heart sounds: No murmur heard.    No gallop.   Pulmonary:      Effort: Pulmonary effort is normal. No respiratory distress.      Breath sounds: Normal breath sounds. No wheezing or rhonchi.   Abdominal:      General: Bowel sounds are normal. There is no distension.      Palpations: Abdomen is soft.      Tenderness: There is abdominal tenderness. There is no guarding.   Musculoskeletal:         General: No swelling or tenderness. Normal range of motion.      Cervical back: Normal range of motion and neck supple. No tenderness.    Skin:     General: Skin is warm and dry.   Neurological:      General: No focal deficit present.      Mental Status: She is alert and oriented to person, place, and time. Mental status is at baseline.      Motor: No weakness.   Psychiatric:         Mood and Affect: Mood normal.         Behavior: Behavior normal.       Fluids    Intake/Output Summary (Last 24 hours) at 12/24/2022 0902  Last data filed at 12/24/2022 0700  Gross per 24 hour   Intake 1280 ml   Output 0 ml   Net 1280 ml         Laboratory  Recent Labs     12/22/22  0251 12/23/22  0401 12/24/22  0306   WBC 6.2 5.2 10.5   RBC 4.81 4.37 4.26   HEMOGLOBIN 14.4 13.2 12.9   HEMATOCRIT 44.1 39.5 38.2   MCV 91.7 90.4 89.7   MCH 29.9 30.2 30.3   MCHC 32.7* 33.4* 33.8   RDW 41.9 40.9 40.4   PLATELETCT 143* 248 272   MPV 10.5 9.1 9.6       Recent Labs     12/22/22  0251 12/24/22  0306   SODIUM 137 134*   POTASSIUM 3.6 4.2   CHLORIDE 106 102   CO2 20 23   GLUCOSE 91 133*   BUN 5* 6*   CREATININE 0.54 0.57   CALCIUM 9.4 9.1                       Imaging  [id number] VIEW   Final Result      No evidence of bowel obstruction.      [id number]   Final Result      1. No choledocholithiasis or biliary dilatation.   2. Normal gallbladder. No cholelithiasis.   3. Pancreatic duct stones were better seen on the recent CT, with one of the stones resulting in upstream pancreatic ductal dilatation up to 5 mm.         CT-ABDOMEN-PELVIS WITH   Final Result      1.  Pancreatic duct stones measuring up to 4 x 6 mm with new pancreatic ductal dilatation.             Assessment/Plan  * Acute recurrent pancreatitis- (present on admission)  Assessment & Plan  - Lipase 226, down from previous admission but still elevated, pt reports her typical LUQ pancreatitis pain radiating to her back   - Did not have CT last admission - given her quick recurrence, will check CT abd/pelvis to evaluate for pseudocyst and/or necrosis given persistence of her symptoms despite EtOH cessation  - No  elevation in bilirubin to suggest GB involvement currently, will see what CBD looks like on CT   - NPO  - LR@250cc/hr x 12 hours, then 150cc/hr thereafter  - Question of contribution from Depakote last admission, pt did decide to stay on Depakote despite that last stay - if CT unrevealing for pseudocyst or other etiology of her pancreatitis and pain, this may need to be readdressed with patient   - IV and PO options for pain control   - TGLs normal    ADDENDUM: pt with pancreatic duct stones on CT - [facility] consulted     12/21: GI evaluating the possibility of ERCP, we appreciate further recommendations.    12/22: GI is not recommending ERCP at this time. They are evaluating the potential use of EUS for celiac block. They also recommend consulting pancreatic surgery input. We will consult Dr Rachel, we appreciate further recommendations.    12/23: I discussed cased with Dr Rachel, and there is no surgical indication at this time. GI did celiac plexus block today. We appreciate further recommendations.    12/24: We will stop fluids, advance diet, and titrate down pain medications.    Thrombocytopenia (HCC)  Assessment & Plan  Seems like chronic  No signs of bleeding    12/23: Resolved    Chronic pancreatitis (HCC)  Assessment & Plan  - Evidence on previous CTs of chronic pancreatitis/calcifications  - Start creon given her complaints of diarrhea with fatty/floating stools     12/23: Patient had celiac plexus block done today by GI. We appreciate further recommendations.    Bipolar disorder (HCC)- (present on admission)  Assessment & Plan  - Resume home Depakote   -May need to readdress Depakote usage for Bipolar and seizure d/o if no further etiology of acute on chronic pancreatitis  - Check depakote level     Diarrhea- (present on admission)  Assessment & Plan  - Described as baby food like in consistency, floats on the toilet water - consistent with stool in the setting of pancreatic insufficiency  - Start  Creon - start 1 tab with meals today, titrate up     Alcohol dependence with withdrawal (HCC)  Assessment & Plan  - Pt reports no EtOH usage in two months     Hypokalemia- (present on admission)  Assessment & Plan  Replace as needed  monitor    Seizure disorder (HCC)- (present on admission)  Assessment & Plan  - Resume home Depakote and Zonisamide  - Seizure precautions    Alcohol use disorder, severe, in early remission, dependence (HCC)- (present on admission)  Assessment & Plan  - pt reports no EtOH usage in two months     Anxiety- (present on admission)  Assessment & Plan  - Resume home PRN hydroxyzine         VTE prophylaxis: enoxaparin ppx    I have performed a physical exam and reviewed and updated ROS and Plan today (12/24/2022). In review of yesterday's note (12/23/2022), there are no changes except as documented above.

## 2022-12-24 NOTE — PROGRESS NOTES
Bedside report received from night RN. Assumed care of patient. Alert and oriented, wakes easily to voice. Daily plan of care discussed. Pt denies intolerable pain to abdomen. Call light within reach. Hourly rounding in place.

## 2022-12-25 VITALS
HEART RATE: 67 BPM | OXYGEN SATURATION: 99 % | DIASTOLIC BLOOD PRESSURE: 61 MMHG | HEIGHT: 67 IN | SYSTOLIC BLOOD PRESSURE: 112 MMHG | BODY MASS INDEX: 22.63 KG/M2 | TEMPERATURE: 98.5 F | WEIGHT: 144.18 LBS | RESPIRATION RATE: 18 BRPM

## 2022-12-25 PROCEDURE — 700102 HCHG RX REV CODE 250 W/ 637 OVERRIDE(OP): Performed by: INTERNAL MEDICINE

## 2022-12-25 PROCEDURE — 700111 HCHG RX REV CODE 636 W/ 250 OVERRIDE (IP): Performed by: FAMILY MEDICINE

## 2022-12-25 PROCEDURE — 700111 HCHG RX REV CODE 636 W/ 250 OVERRIDE (IP): Performed by: INTERNAL MEDICINE

## 2022-12-25 PROCEDURE — A9270 NON-COVERED ITEM OR SERVICE: HCPCS | Performed by: INTERNAL MEDICINE

## 2022-12-25 PROCEDURE — 700102 HCHG RX REV CODE 250 W/ 637 OVERRIDE(OP): Performed by: FAMILY MEDICINE

## 2022-12-25 PROCEDURE — 99239 HOSP IP/OBS DSCHRG MGMT >30: CPT | Performed by: INTERNAL MEDICINE

## 2022-12-25 PROCEDURE — A9270 NON-COVERED ITEM OR SERVICE: HCPCS | Performed by: FAMILY MEDICINE

## 2022-12-25 RX ORDER — POLYETHYLENE GLYCOL 3350 17 G/17G
POWDER, FOR SOLUTION ORAL
Refills: 3 | Status: ON HOLD | COMMUNITY
Start: 2022-12-25 | End: 2023-01-03

## 2022-12-25 RX ORDER — OXYCODONE HYDROCHLORIDE 5 MG/1
5 TABLET ORAL EVERY 6 HOURS PRN
Qty: 12 TABLET | Refills: 0 | Status: SHIPPED | OUTPATIENT
Start: 2022-12-25 | End: 2022-12-25

## 2022-12-25 RX ORDER — AMOXICILLIN 250 MG
2 CAPSULE ORAL 2 TIMES DAILY
Qty: 30 TABLET | Refills: 0 | Status: ON HOLD | COMMUNITY
Start: 2022-12-25 | End: 2023-01-14

## 2022-12-25 RX ORDER — ZONISAMIDE 50 MG/1
150 CAPSULE ORAL
Qty: 90 CAPSULE | Refills: 0 | Status: ON HOLD | OUTPATIENT
Start: 2022-12-25 | End: 2023-01-14

## 2022-12-25 RX ADMIN — HYDROMORPHONE HYDROCHLORIDE 0.5 MG: 1 INJECTION, SOLUTION INTRAMUSCULAR; INTRAVENOUS; SUBCUTANEOUS at 08:33

## 2022-12-25 RX ADMIN — GABAPENTIN 100 MG: 100 CAPSULE ORAL at 05:04

## 2022-12-25 RX ADMIN — SENNOSIDES AND DOCUSATE SODIUM 2 TABLET: 50; 8.6 TABLET ORAL at 05:04

## 2022-12-25 RX ADMIN — NICOTINE 14 MG: 14 PATCH TRANSDERMAL at 05:03

## 2022-12-25 RX ADMIN — KETOROLAC TROMETHAMINE 15 MG: 30 INJECTION, SOLUTION INTRAMUSCULAR; INTRAVENOUS at 05:04

## 2022-12-25 RX ADMIN — PANCRELIPASE 24000 UNITS: 24000; 76000; 120000 CAPSULE, DELAYED RELEASE PELLETS ORAL at 07:40

## 2022-12-25 RX ADMIN — DIVALPROEX SODIUM 250 MG: 250 TABLET, DELAYED RELEASE ORAL at 05:04

## 2022-12-25 RX ADMIN — HYDROMORPHONE HYDROCHLORIDE 0.5 MG: 1 INJECTION, SOLUTION INTRAMUSCULAR; INTRAVENOUS; SUBCUTANEOUS at 01:57

## 2022-12-25 RX ADMIN — POLYETHYLENE GLYCOL 3350 1 PACKET: 17 POWDER, FOR SOLUTION ORAL at 05:03

## 2022-12-25 RX ADMIN — ONDANSETRON 4 MG: 2 INJECTION INTRAMUSCULAR; INTRAVENOUS at 02:00

## 2022-12-25 RX ADMIN — OXYCODONE HYDROCHLORIDE 10 MG: 10 TABLET ORAL at 00:35

## 2022-12-25 RX ADMIN — OXYCODONE HYDROCHLORIDE 10 MG: 10 TABLET ORAL at 06:36

## 2022-12-25 NOTE — CARE PLAN
The patient is Stable - Low risk of patient condition declining or worsening    Shift Goals  Clinical Goals: Pain <4\10 throughout shift  Patient Goals: rest and comfort  Family Goals: n/a    Progress made toward(s) clinical / shift goals:    Problem: Pain - Standard  Goal: Alleviation of pain or a reduction in pain to the patient’s comfort goal  Outcome: Progressing       Patient is not progressing towards the following goals:

## 2022-12-25 NOTE — CARE PLAN
The patient is Stable - Low risk of patient condition declining or worsening    Shift Goals  Clinical Goals: Manage pain and nausea  Patient Goals: rest and comfort  Family Goals: n/a    Progress made toward(s) clinical / shift goals: Pt able to manage pain with decreased frequency of pain medications.  Denies nausea and vomiting throughout the shift.     Patient is not progressing towards the following goals:n/a

## 2022-12-25 NOTE — DISCHARGE INSTRUCTIONS
Discharge Instructions    Discharged to other by car with escort. Discharged via wheelchair, hospital escort: Yes.  Special equipment needed: Not Applicable    Be sure to schedule a follow-up appointment with your primary care doctor or any specialists as instructed.     Discharge Plan:   Diet Plan: Discussed  Activity Level: Discussed  Confirmed Follow up Appointment: Patient to Call and Schedule Appointment  Confirmed Symptoms Management: Discussed  Medication Reconciliation Updated: Yes  Influenza Vaccine Indication: Patient Refuses    I understand that a diet low in cholesterol, fat, and sodium is recommended for good health. Unless I have been given specific instructions below for another diet, I accept this instruction as my diet prescription.   Other diet: [place]    Special Instructions: None    -Is this patient being discharged with medication to prevent blood clots?  No    Is patient discharged on Warfarin / Coumadin?   No     Pancrelipase capsules  What is this medicine?  PANCRELIPASE (pan cre LI pase) helps to improve digestion of food by replacing digestive enzymes. This medicine is used to treat health conditions that cause your body to produce less of these enzymes.  This medicine may be used for other purposes; ask your health care provider or pharmacist if you have questions.  COMMON BRAND NAME(S): Cotazym S, Creon, Creon 10, Creon 20, Creon 5, Dygase, Ku-Zyme, Ku-Zyme HP, Kutrase, Lapase, Lipram, Lipram-CR, Lipram-PN, Lipram-UL, Palcaps, Pancrease, Pancrease MT, Pancreaze, Pancrecarb MS, Pancron, Pangestyme CN, Pangestyme EC, Pangestyme MT, Pangestyme UL, Panocaps, Panocaps MT, Pertzye, Ultracaps MT, Ultrase, Ultrase MT, Ultresa, Zenpep  What should I tell my health care provider before I take this medicine?  They need to know if you have any of these conditions:  a history of intestinal blockage or a condition called 'fibrosing colonopathy'  abnormally high uric acid in the  blood  diabetes  gout  kidney disease  trouble swallowing capsules  an unusual or allergic reaction to pancrelipase, pancreatin, pork, pork protein, other medicines, foods, dyes, or preservatives  pregnant or trying to get pregnant  breast-feeding  How should I use this medicine?  Take this medicine by mouth with a glass of water. Follow the directions on the prescription label. Take with food. Do not crush or chew the contents of the capsule. If you or your child have trouble swallowing, you may open the capsule and sprinkle the contents on soft foods that do not require chewing such as applesauce, pureed bananas, or pears. Swallow the mixture right away followed with water or juice. Do not store the mixture. If you are giving this medicine to an infant, you may sprinkle the contents directly into your child's mouth. Give the medicine right before each feeding of formula or breast milk. Do not mix capsule contents directly into formula or breast milk. Make sure the medicine is swallowed completely and that no medicine is left in the mouth. Take your doses at regular intervals. Do not take your medicine more often than directed.  A special MedGuide will be given to you by the pharmacist with each prescription and refill of delayed-release capsules (Creon, Zenpep, or Pancreaze). Be sure to read this information carefully each time.  Talk to your pediatrician regarding the use of this medicine in children. While this medicine may be prescribed for children for selected conditions precautions do apply.  Overdosage: If you think you have taken too much of this medicine contact a poison control center or emergency room at once.  NOTE: This medicine is only for you. Do not share this medicine with others.  What if I miss a dose?  If you miss a dose, take it as soon as you can. If it is almost time for your next dose, take only that dose. Do not take double or extra doses.  What may interact with this  medicine?  acarbose  antacids containing calcium or magnesium  iron  miglitol  This list may not describe all possible interactions. Give your health care provider a list of all the medicines, herbs, non-prescription drugs, or dietary supplements you use. Also tell them if you smoke, drink alcohol, or use illegal drugs. Some items may interact with your medicine.  What should I watch for while using this medicine?  Visit your doctor for regular check ups. Talk to your doctor before you change brands of this medicine. Each brand has different amounts of enzymes.  You may need to be on a special diet while taking this medicine. Also, ask your doctor how much water you need to drink.  This medicine may increase your chance of having a rare bowel disorder. The risk of having this condition may be reduced by following the dosing directions that your healthcare professional gives you. Call your healthcare professional right away if you have any unusual or severe stomach pain.  This medicine may increase blood uric acid levels, for example, worsening of gout, or painful, swollen joints. Call your healthcare professional right away if you have any of these symptoms.  Be careful if you open the capsule. This medicine can irritate the lungs if you breathe it in. Also, do not hold the medicine in your mouth or chew it. This may cause mouth sores.  This medicine may affect blood sugar levels. If you have diabetes, check with your doctor or health care professional before you change your diet or the dose of your diabetic medicine.  Women should inform their doctor if they wish to become pregnant or think they might be pregnant.  What side effects may I notice from receiving this medicine?  Side effects that you should report to your doctor or health care professional as soon as possible:  allergic reactions like skin rash, itching or hives, swelling of the face, lips, or tongue  fever or chills, sore throat  severe stomach pain  or bloating  shortness of breath  skin rash  trouble passing stool  vomiting  Side effects that usually do not require medical attention (report to your doctor or health care professional if they continue or are bothersome):  constipation or diarrhea  cough  dizziness  headache  nausea  stomach gas  stomach pain  weight loss  This list may not describe all possible side effects. Call your doctor for medical advice about side effects. You may report side effects to FDA at 5-376-ATL-7068.  Where should I keep my medicine?  Keep out of the reach of children.  Store at room temperature between 15 and 25 degrees C (59 and 77 degrees F). Do not refrigerate. Protect from moisture. Throw away any unused medicine after the expiration date.  NOTE: This sheet is a summary. It may not cover all possible information. If you have questions about this medicine, talk to your doctor, pharmacist, or health care provider.  © 2020 [employer] (2013-04-17 10:16:32)

## 2022-12-28 LAB — TEST NAME 95000: NORMAL

## 2023-01-02 ENCOUNTER — HOSPITAL ENCOUNTER (INPATIENT)
Facility: MEDICAL CENTER | Age: 38
LOS: 7 days | DRG: 439 | End: 2023-01-09
Attending: EMERGENCY MEDICINE | Admitting: HOSPITALIST
Payer: COMMERCIAL

## 2023-01-02 DIAGNOSIS — K85.90 ACUTE RECURRENT PANCREATITIS: ICD-10-CM

## 2023-01-02 DIAGNOSIS — K85.00 IDIOPATHIC ACUTE PANCREATITIS WITHOUT INFECTION OR NECROSIS: ICD-10-CM

## 2023-01-02 PROBLEM — D75.1 POLYCYTHEMIA DUE TO FALL IN PLASMA VOLUME: Status: ACTIVE | Noted: 2023-01-02

## 2023-01-02 LAB
ALBUMIN SERPL BCP-MCNC: 5 G/DL (ref 3.2–4.9)
ALBUMIN/GLOB SERPL: 1.7 G/DL
ALP SERPL-CCNC: 71 U/L (ref 30–99)
ALT SERPL-CCNC: 16 U/L (ref 2–50)
ANION GAP SERPL CALC-SCNC: 15 MMOL/L (ref 7–16)
AST SERPL-CCNC: 17 U/L (ref 12–45)
BASOPHILS # BLD AUTO: 0.5 % (ref 0–1.8)
BASOPHILS # BLD: 0.06 K/UL (ref 0–0.12)
BILIRUB SERPL-MCNC: 0.5 MG/DL (ref 0.1–1.5)
BUN SERPL-MCNC: 10 MG/DL (ref 8–22)
CALCIUM ALBUM COR SERPL-MCNC: 8.8 MG/DL (ref 8.5–10.5)
CALCIUM SERPL-MCNC: 9.6 MG/DL (ref 8.4–10.2)
CHLORIDE SERPL-SCNC: 103 MMOL/L (ref 96–112)
CO2 SERPL-SCNC: 23 MMOL/L (ref 20–33)
CREAT SERPL-MCNC: 0.77 MG/DL (ref 0.5–1.4)
EKG IMPRESSION: NORMAL
EOSINOPHIL # BLD AUTO: 0.1 K/UL (ref 0–0.51)
EOSINOPHIL NFR BLD: 0.9 % (ref 0–6.9)
ERYTHROCYTE [DISTWIDTH] IN BLOOD BY AUTOMATED COUNT: 43.2 FL (ref 35.9–50)
GFR SERPLBLD CREATININE-BSD FMLA CKD-EPI: 103 ML/MIN/1.73 M 2
GLOBULIN SER CALC-MCNC: 2.9 G/DL (ref 1.9–3.5)
GLUCOSE SERPL-MCNC: 93 MG/DL (ref 65–99)
HCG SERPL QL: NEGATIVE
HCT VFR BLD AUTO: 49.4 % (ref 37–47)
HGB BLD-MCNC: 16.4 G/DL (ref 12–16)
IMM GRANULOCYTES # BLD AUTO: 0.05 K/UL (ref 0–0.11)
IMM GRANULOCYTES NFR BLD AUTO: 0.5 % (ref 0–0.9)
LIPASE SERPL-CCNC: 232 U/L (ref 7–58)
LYMPHOCYTES # BLD AUTO: 3.13 K/UL (ref 1–4.8)
LYMPHOCYTES NFR BLD: 28.5 % (ref 22–41)
MCH RBC QN AUTO: 29.8 PG (ref 27–33)
MCHC RBC AUTO-ENTMCNC: 33.2 G/DL (ref 33.6–35)
MCV RBC AUTO: 89.8 FL (ref 81.4–97.8)
MONOCYTES # BLD AUTO: 0.57 K/UL (ref 0–0.85)
MONOCYTES NFR BLD AUTO: 5.2 % (ref 0–13.4)
NEUTROPHILS # BLD AUTO: 7.08 K/UL (ref 2–7.15)
NEUTROPHILS NFR BLD: 64.4 % (ref 44–72)
NRBC # BLD AUTO: 0 K/UL
NRBC BLD-RTO: 0 /100 WBC
PLATELET # BLD AUTO: 397 K/UL (ref 164–446)
PMV BLD AUTO: 9.4 FL (ref 9–12.9)
POTASSIUM SERPL-SCNC: 3.8 MMOL/L (ref 3.6–5.5)
PROT SERPL-MCNC: 7.9 G/DL (ref 6–8.2)
RBC # BLD AUTO: 5.5 M/UL (ref 4.2–5.4)
SODIUM SERPL-SCNC: 141 MMOL/L (ref 135–145)
WBC # BLD AUTO: 11 K/UL (ref 4.8–10.8)

## 2023-01-02 PROCEDURE — 84703 CHORIONIC GONADOTROPIN ASSAY: CPT

## 2023-01-02 PROCEDURE — 36415 COLL VENOUS BLD VENIPUNCTURE: CPT

## 2023-01-02 PROCEDURE — 80053 COMPREHEN METABOLIC PANEL: CPT

## 2023-01-02 PROCEDURE — 700105 HCHG RX REV CODE 258: Performed by: HOSPITALIST

## 2023-01-02 PROCEDURE — 700111 HCHG RX REV CODE 636 W/ 250 OVERRIDE (IP): Performed by: EMERGENCY MEDICINE

## 2023-01-02 PROCEDURE — 93005 ELECTROCARDIOGRAM TRACING: CPT | Performed by: EMERGENCY MEDICINE

## 2023-01-02 PROCEDURE — 96375 TX/PRO/DX INJ NEW DRUG ADDON: CPT

## 2023-01-02 PROCEDURE — 770006 HCHG ROOM/CARE - MED/SURG/GYN SEMI*

## 2023-01-02 PROCEDURE — 99285 EMERGENCY DEPT VISIT HI MDM: CPT

## 2023-01-02 PROCEDURE — A9270 NON-COVERED ITEM OR SERVICE: HCPCS | Performed by: HOSPITALIST

## 2023-01-02 PROCEDURE — 99223 1ST HOSP IP/OBS HIGH 75: CPT | Mod: AI | Performed by: HOSPITALIST

## 2023-01-02 PROCEDURE — 700111 HCHG RX REV CODE 636 W/ 250 OVERRIDE (IP): Performed by: HOSPITALIST

## 2023-01-02 PROCEDURE — 96374 THER/PROPH/DIAG INJ IV PUSH: CPT

## 2023-01-02 PROCEDURE — 700102 HCHG RX REV CODE 250 W/ 637 OVERRIDE(OP): Performed by: HOSPITALIST

## 2023-01-02 PROCEDURE — 700105 HCHG RX REV CODE 258: Performed by: EMERGENCY MEDICINE

## 2023-01-02 PROCEDURE — 85025 COMPLETE CBC W/AUTO DIFF WBC: CPT

## 2023-01-02 PROCEDURE — 83690 ASSAY OF LIPASE: CPT

## 2023-01-02 RX ORDER — OXYCODONE HYDROCHLORIDE 5 MG/1
5 TABLET ORAL
Status: DISCONTINUED | OUTPATIENT
Start: 2023-01-02 | End: 2023-01-03

## 2023-01-02 RX ORDER — HYDROXYZINE HYDROCHLORIDE 10 MG/1
50 TABLET, FILM COATED ORAL EVERY 8 HOURS PRN
Status: DISCONTINUED | OUTPATIENT
Start: 2023-01-02 | End: 2023-01-06

## 2023-01-02 RX ORDER — POLYETHYLENE GLYCOL 3350 17 G/17G
1 POWDER, FOR SOLUTION ORAL
Status: DISCONTINUED | OUTPATIENT
Start: 2023-01-02 | End: 2023-01-09 | Stop reason: HOSPADM

## 2023-01-02 RX ORDER — OXYCODONE HYDROCHLORIDE 10 MG/1
10 TABLET ORAL
Status: DISCONTINUED | OUTPATIENT
Start: 2023-01-02 | End: 2023-01-03

## 2023-01-02 RX ORDER — ZONISAMIDE 50 MG/1
150 CAPSULE ORAL
Status: DISCONTINUED | OUTPATIENT
Start: 2023-01-02 | End: 2023-01-09 | Stop reason: HOSPADM

## 2023-01-02 RX ORDER — DIVALPROEX SODIUM 250 MG/1
250 TABLET, DELAYED RELEASE ORAL 2 TIMES DAILY
Status: DISCONTINUED | OUTPATIENT
Start: 2023-01-02 | End: 2023-01-09 | Stop reason: HOSPADM

## 2023-01-02 RX ORDER — ONDANSETRON 2 MG/ML
4 INJECTION INTRAMUSCULAR; INTRAVENOUS ONCE
Status: COMPLETED | OUTPATIENT
Start: 2023-01-02 | End: 2023-01-02

## 2023-01-02 RX ORDER — SODIUM CHLORIDE, SODIUM LACTATE, POTASSIUM CHLORIDE, CALCIUM CHLORIDE 600; 310; 30; 20 MG/100ML; MG/100ML; MG/100ML; MG/100ML
INJECTION, SOLUTION INTRAVENOUS CONTINUOUS
Status: DISCONTINUED | OUTPATIENT
Start: 2023-01-02 | End: 2023-01-05

## 2023-01-02 RX ORDER — BISACODYL 10 MG
10 SUPPOSITORY, RECTAL RECTAL
Status: DISCONTINUED | OUTPATIENT
Start: 2023-01-02 | End: 2023-01-09 | Stop reason: HOSPADM

## 2023-01-02 RX ORDER — ACETAMINOPHEN 325 MG/1
650 TABLET ORAL EVERY 6 HOURS PRN
Status: DISCONTINUED | OUTPATIENT
Start: 2023-01-02 | End: 2023-01-09 | Stop reason: HOSPADM

## 2023-01-02 RX ORDER — GABAPENTIN 300 MG/1
300 CAPSULE ORAL EVERY EVENING
Status: DISCONTINUED | OUTPATIENT
Start: 2023-01-02 | End: 2023-01-09 | Stop reason: HOSPADM

## 2023-01-02 RX ORDER — ENOXAPARIN SODIUM 100 MG/ML
40 INJECTION SUBCUTANEOUS DAILY
Status: DISCONTINUED | OUTPATIENT
Start: 2023-01-03 | End: 2023-01-09 | Stop reason: HOSPADM

## 2023-01-02 RX ORDER — HYDROMORPHONE HYDROCHLORIDE 1 MG/ML
0.5 INJECTION, SOLUTION INTRAMUSCULAR; INTRAVENOUS; SUBCUTANEOUS
Status: DISCONTINUED | OUTPATIENT
Start: 2023-01-02 | End: 2023-01-03

## 2023-01-02 RX ORDER — SODIUM CHLORIDE, SODIUM LACTATE, POTASSIUM CHLORIDE, CALCIUM CHLORIDE 600; 310; 30; 20 MG/100ML; MG/100ML; MG/100ML; MG/100ML
1000 INJECTION, SOLUTION INTRAVENOUS ONCE
Status: COMPLETED | OUTPATIENT
Start: 2023-01-02 | End: 2023-01-02

## 2023-01-02 RX ORDER — ONDANSETRON 4 MG/1
4 TABLET, ORALLY DISINTEGRATING ORAL EVERY 4 HOURS PRN
Status: DISCONTINUED | OUTPATIENT
Start: 2023-01-02 | End: 2023-01-09 | Stop reason: HOSPADM

## 2023-01-02 RX ORDER — AMOXICILLIN 250 MG
2 CAPSULE ORAL 2 TIMES DAILY
Status: DISCONTINUED | OUTPATIENT
Start: 2023-01-02 | End: 2023-01-09 | Stop reason: HOSPADM

## 2023-01-02 RX ORDER — GABAPENTIN 100 MG/1
100 CAPSULE ORAL EVERY MORNING
Status: DISCONTINUED | OUTPATIENT
Start: 2023-01-03 | End: 2023-01-09 | Stop reason: HOSPADM

## 2023-01-02 RX ORDER — ONDANSETRON 2 MG/ML
4 INJECTION INTRAMUSCULAR; INTRAVENOUS EVERY 4 HOURS PRN
Status: DISCONTINUED | OUTPATIENT
Start: 2023-01-02 | End: 2023-01-09 | Stop reason: HOSPADM

## 2023-01-02 RX ADMIN — SODIUM CHLORIDE, POTASSIUM CHLORIDE, SODIUM LACTATE AND CALCIUM CHLORIDE: 600; 310; 30; 20 INJECTION, SOLUTION INTRAVENOUS at 23:20

## 2023-01-02 RX ADMIN — OXYCODONE HYDROCHLORIDE 10 MG: 10 TABLET ORAL at 20:46

## 2023-01-02 RX ADMIN — SODIUM CHLORIDE, POTASSIUM CHLORIDE, SODIUM LACTATE AND CALCIUM CHLORIDE 1000 ML: 600; 310; 30; 20 INJECTION, SOLUTION INTRAVENOUS at 19:42

## 2023-01-02 RX ADMIN — ONDANSETRON 4 MG: 2 INJECTION INTRAMUSCULAR; INTRAVENOUS at 19:49

## 2023-01-02 RX ADMIN — DIVALPROEX SODIUM 250 MG: 250 TABLET, DELAYED RELEASE ORAL at 20:22

## 2023-01-02 RX ADMIN — ZONISAMIDE 150 MG: 50 CAPSULE ORAL at 20:22

## 2023-01-02 RX ADMIN — GABAPENTIN 300 MG: 300 CAPSULE ORAL at 20:22

## 2023-01-02 RX ADMIN — MORPHINE SULFATE 8 MG: 10 INJECTION INTRAVENOUS at 19:41

## 2023-01-02 RX ADMIN — HYDROMORPHONE HYDROCHLORIDE 0.5 MG: 1 INJECTION, SOLUTION INTRAMUSCULAR; INTRAVENOUS; SUBCUTANEOUS at 21:49

## 2023-01-02 ASSESSMENT — PAIN DESCRIPTION - PAIN TYPE
TYPE: ACUTE PAIN
TYPE: ACUTE PAIN

## 2023-01-02 ASSESSMENT — ENCOUNTER SYMPTOMS
COUGH: 0
MYALGIAS: 0
DIARRHEA: 1
FEVER: 0
EYE DISCHARGE: 0
VOMITING: 1
SHORTNESS OF BREATH: 0
CHILLS: 0
BRUISES/BLEEDS EASILY: 0
FOCAL WEAKNESS: 0
EYE REDNESS: 0
NERVOUS/ANXIOUS: 0
FLANK PAIN: 0
NAUSEA: 1
ABDOMINAL PAIN: 1
STRIDOR: 0

## 2023-01-02 ASSESSMENT — FIBROSIS 4 INDEX: FIB4 SCORE: 2.06

## 2023-01-03 LAB
ALBUMIN SERPL BCP-MCNC: 4.3 G/DL (ref 3.2–4.9)
ALBUMIN/GLOB SERPL: 1.8 G/DL
ALP SERPL-CCNC: 55 U/L (ref 30–99)
ALT SERPL-CCNC: 13 U/L (ref 2–50)
ANION GAP SERPL CALC-SCNC: 11 MMOL/L (ref 7–16)
APPEARANCE UR: CLEAR
AST SERPL-CCNC: 15 U/L (ref 12–45)
BILIRUB SERPL-MCNC: 0.7 MG/DL (ref 0.1–1.5)
BILIRUB UR QL STRIP.AUTO: NEGATIVE
BUN SERPL-MCNC: 10 MG/DL (ref 8–22)
CALCIUM ALBUM COR SERPL-MCNC: 8.6 MG/DL (ref 8.5–10.5)
CALCIUM SERPL-MCNC: 8.8 MG/DL (ref 8.4–10.2)
CHLORIDE SERPL-SCNC: 104 MMOL/L (ref 96–112)
CO2 SERPL-SCNC: 22 MMOL/L (ref 20–33)
COLOR UR: YELLOW
CREAT SERPL-MCNC: 0.72 MG/DL (ref 0.5–1.4)
ERYTHROCYTE [DISTWIDTH] IN BLOOD BY AUTOMATED COUNT: 44.6 FL (ref 35.9–50)
GFR SERPLBLD CREATININE-BSD FMLA CKD-EPI: 111 ML/MIN/1.73 M 2
GLOBULIN SER CALC-MCNC: 2.4 G/DL (ref 1.9–3.5)
GLUCOSE SERPL-MCNC: 97 MG/DL (ref 65–99)
GLUCOSE UR STRIP.AUTO-MCNC: NEGATIVE MG/DL
HCT VFR BLD AUTO: 41.3 % (ref 37–47)
HGB BLD-MCNC: 13.5 G/DL (ref 12–16)
KETONES UR STRIP.AUTO-MCNC: NEGATIVE MG/DL
LEUKOCYTE ESTERASE UR QL STRIP.AUTO: NEGATIVE
MAGNESIUM SERPL-MCNC: 1.9 MG/DL (ref 1.5–2.5)
MCH RBC QN AUTO: 29.9 PG (ref 27–33)
MCHC RBC AUTO-ENTMCNC: 32.7 G/DL (ref 33.6–35)
MCV RBC AUTO: 91.6 FL (ref 81.4–97.8)
MICRO URNS: NORMAL
NITRITE UR QL STRIP.AUTO: NEGATIVE
PH UR STRIP.AUTO: 5.5 [PH] (ref 5–8)
PLATELET # BLD AUTO: 244 K/UL (ref 164–446)
PMV BLD AUTO: 10.5 FL (ref 9–12.9)
POTASSIUM SERPL-SCNC: 3.9 MMOL/L (ref 3.6–5.5)
PROT SERPL-MCNC: 6.7 G/DL (ref 6–8.2)
PROT UR QL STRIP: NEGATIVE MG/DL
RBC # BLD AUTO: 4.51 M/UL (ref 4.2–5.4)
RBC UR QL AUTO: NEGATIVE
SODIUM SERPL-SCNC: 137 MMOL/L (ref 135–145)
SP GR UR STRIP.AUTO: >=1.03
WBC # BLD AUTO: 8.8 K/UL (ref 4.8–10.8)

## 2023-01-03 PROCEDURE — 85027 COMPLETE CBC AUTOMATED: CPT

## 2023-01-03 PROCEDURE — 700102 HCHG RX REV CODE 250 W/ 637 OVERRIDE(OP): Performed by: INTERNAL MEDICINE

## 2023-01-03 PROCEDURE — 770006 HCHG ROOM/CARE - MED/SURG/GYN SEMI*

## 2023-01-03 PROCEDURE — 83735 ASSAY OF MAGNESIUM: CPT

## 2023-01-03 PROCEDURE — 81003 URINALYSIS AUTO W/O SCOPE: CPT

## 2023-01-03 PROCEDURE — 80053 COMPREHEN METABOLIC PANEL: CPT

## 2023-01-03 PROCEDURE — 700102 HCHG RX REV CODE 250 W/ 637 OVERRIDE(OP): Performed by: HOSPITALIST

## 2023-01-03 PROCEDURE — 96376 TX/PRO/DX INJ SAME DRUG ADON: CPT

## 2023-01-03 PROCEDURE — A9270 NON-COVERED ITEM OR SERVICE: HCPCS | Performed by: INTERNAL MEDICINE

## 2023-01-03 PROCEDURE — 99232 SBSQ HOSP IP/OBS MODERATE 35: CPT | Performed by: INTERNAL MEDICINE

## 2023-01-03 PROCEDURE — 94760 N-INVAS EAR/PLS OXIMETRY 1: CPT

## 2023-01-03 PROCEDURE — A9270 NON-COVERED ITEM OR SERVICE: HCPCS | Performed by: HOSPITALIST

## 2023-01-03 PROCEDURE — 700111 HCHG RX REV CODE 636 W/ 250 OVERRIDE (IP): Performed by: INTERNAL MEDICINE

## 2023-01-03 PROCEDURE — 700105 HCHG RX REV CODE 258: Performed by: HOSPITALIST

## 2023-01-03 PROCEDURE — 36415 COLL VENOUS BLD VENIPUNCTURE: CPT

## 2023-01-03 PROCEDURE — 700111 HCHG RX REV CODE 636 W/ 250 OVERRIDE (IP): Performed by: HOSPITALIST

## 2023-01-03 RX ORDER — HYDROMORPHONE HYDROCHLORIDE 1 MG/ML
1 INJECTION, SOLUTION INTRAMUSCULAR; INTRAVENOUS; SUBCUTANEOUS
Status: DISCONTINUED | OUTPATIENT
Start: 2023-01-03 | End: 2023-01-05

## 2023-01-03 RX ORDER — OXYCODONE HYDROCHLORIDE 5 MG/1
5 TABLET ORAL EVERY 4 HOURS PRN
Status: DISCONTINUED | OUTPATIENT
Start: 2023-01-03 | End: 2023-01-06

## 2023-01-03 RX ORDER — MORPHINE SULFATE 4 MG/ML
4 INJECTION INTRAVENOUS
Status: DISCONTINUED | OUTPATIENT
Start: 2023-01-03 | End: 2023-01-05

## 2023-01-03 RX ORDER — MORPHINE SULFATE 4 MG/ML
4 INJECTION INTRAVENOUS
Status: DISCONTINUED | OUTPATIENT
Start: 2023-01-03 | End: 2023-01-03

## 2023-01-03 RX ORDER — OXYCODONE HYDROCHLORIDE 10 MG/1
10 TABLET ORAL EVERY 4 HOURS PRN
Status: DISCONTINUED | OUTPATIENT
Start: 2023-01-03 | End: 2023-01-06

## 2023-01-03 RX ORDER — HYDROMORPHONE HYDROCHLORIDE 1 MG/ML
1 INJECTION, SOLUTION INTRAMUSCULAR; INTRAVENOUS; SUBCUTANEOUS
Status: DISCONTINUED | OUTPATIENT
Start: 2023-01-03 | End: 2023-01-03

## 2023-01-03 RX ORDER — OXYCODONE HYDROCHLORIDE 10 MG/1
10 TABLET ORAL EVERY 4 HOURS PRN
Status: DISCONTINUED | OUTPATIENT
Start: 2023-01-03 | End: 2023-01-03

## 2023-01-03 RX ORDER — OXYCODONE HYDROCHLORIDE 5 MG/1
5 TABLET ORAL EVERY 4 HOURS PRN
Status: DISCONTINUED | OUTPATIENT
Start: 2023-01-03 | End: 2023-01-03

## 2023-01-03 RX ADMIN — MORPHINE SULFATE 4 MG: 4 INJECTION INTRAVENOUS at 12:22

## 2023-01-03 RX ADMIN — DIVALPROEX SODIUM 250 MG: 250 TABLET, DELAYED RELEASE ORAL at 17:06

## 2023-01-03 RX ADMIN — MORPHINE SULFATE 4 MG: 4 INJECTION INTRAVENOUS at 05:45

## 2023-01-03 RX ADMIN — HYDROMORPHONE HYDROCHLORIDE 1 MG: 1 INJECTION, SOLUTION INTRAMUSCULAR; INTRAVENOUS; SUBCUTANEOUS at 21:36

## 2023-01-03 RX ADMIN — MORPHINE SULFATE 4 MG: 4 INJECTION INTRAVENOUS at 09:10

## 2023-01-03 RX ADMIN — ONDANSETRON 4 MG: 2 INJECTION INTRAMUSCULAR; INTRAVENOUS at 01:19

## 2023-01-03 RX ADMIN — SODIUM CHLORIDE, POTASSIUM CHLORIDE, SODIUM LACTATE AND CALCIUM CHLORIDE: 600; 310; 30; 20 INJECTION, SOLUTION INTRAVENOUS at 20:35

## 2023-01-03 RX ADMIN — ACETAMINOPHEN 650 MG: 325 TABLET, FILM COATED ORAL at 16:14

## 2023-01-03 RX ADMIN — ACETAMINOPHEN 650 MG: 325 TABLET, FILM COATED ORAL at 10:45

## 2023-01-03 RX ADMIN — OXYCODONE 5 MG: 5 TABLET ORAL at 16:15

## 2023-01-03 RX ADMIN — HYDROMORPHONE HYDROCHLORIDE 1 MG: 1 INJECTION, SOLUTION INTRAMUSCULAR; INTRAVENOUS; SUBCUTANEOUS at 15:25

## 2023-01-03 RX ADMIN — GABAPENTIN 300 MG: 300 CAPSULE ORAL at 17:06

## 2023-01-03 RX ADMIN — GABAPENTIN 100 MG: 100 CAPSULE ORAL at 05:45

## 2023-01-03 RX ADMIN — ZONISAMIDE 150 MG: 50 CAPSULE ORAL at 19:53

## 2023-01-03 RX ADMIN — OXYCODONE HYDROCHLORIDE 10 MG: 10 TABLET ORAL at 00:00

## 2023-01-03 RX ADMIN — HYDROMORPHONE HYDROCHLORIDE 1 MG: 1 INJECTION, SOLUTION INTRAMUSCULAR; INTRAVENOUS; SUBCUTANEOUS at 18:29

## 2023-01-03 RX ADMIN — HYDROXYZINE HYDROCHLORIDE 50 MG: 10 TABLET ORAL at 14:12

## 2023-01-03 RX ADMIN — HYDROMORPHONE HYDROCHLORIDE 1 MG: 1 INJECTION, SOLUTION INTRAMUSCULAR; INTRAVENOUS; SUBCUTANEOUS at 01:18

## 2023-01-03 RX ADMIN — DIVALPROEX SODIUM 250 MG: 250 TABLET, DELAYED RELEASE ORAL at 05:46

## 2023-01-03 RX ADMIN — HYDROXYZINE HYDROCHLORIDE 50 MG: 10 TABLET ORAL at 05:45

## 2023-01-03 RX ADMIN — MORPHINE SULFATE 4 MG: 4 INJECTION INTRAVENOUS at 03:39

## 2023-01-03 ASSESSMENT — COGNITIVE AND FUNCTIONAL STATUS - GENERAL
MOBILITY SCORE: 24
DAILY ACTIVITIY SCORE: 24
SUGGESTED CMS G CODE MODIFIER DAILY ACTIVITY: CH
MOBILITY SCORE: 24
SUGGESTED CMS G CODE MODIFIER MOBILITY: CH
SUGGESTED CMS G CODE MODIFIER MOBILITY: CH

## 2023-01-03 ASSESSMENT — FIBROSIS 4 INDEX: FIB4 SCORE: 0.6

## 2023-01-03 ASSESSMENT — PAIN DESCRIPTION - PAIN TYPE
TYPE: ACUTE PAIN

## 2023-01-03 ASSESSMENT — PATIENT HEALTH QUESTIONNAIRE - PHQ9
2. FEELING DOWN, DEPRESSED, IRRITABLE, OR HOPELESS: SEVERAL DAYS
5. POOR APPETITE OR OVEREATING: NOT AT ALL
8. MOVING OR SPEAKING SO SLOWLY THAT OTHER PEOPLE COULD HAVE NOTICED. OR THE OPPOSITE, BEING SO FIGETY OR RESTLESS THAT YOU HAVE BEEN MOVING AROUND A LOT MORE THAN USUAL: SEVERAL DAYS
4. FEELING TIRED OR HAVING LITTLE ENERGY: SEVERAL DAYS
7. TROUBLE CONCENTRATING ON THINGS, SUCH AS READING THE NEWSPAPER OR WATCHING TELEVISION: SEVERAL DAYS
1. LITTLE INTEREST OR PLEASURE IN DOING THINGS: SEVERAL DAYS
9. THOUGHTS THAT YOU WOULD BE BETTER OFF DEAD, OR OF HURTING YOURSELF: NOT AT ALL
6. FEELING BAD ABOUT YOURSELF - OR THAT YOU ARE A FAILURE OR HAVE LET YOURSELF OR YOUR FAMILY DOWN: MORE THAN HALF THE DAYS
SUM OF ALL RESPONSES TO PHQ9 QUESTIONS 1 AND 2: 2
3. TROUBLE FALLING OR STAYING ASLEEP OR SLEEPING TOO MUCH: SEVERAL DAYS
SUM OF ALL RESPONSES TO PHQ QUESTIONS 1-9: 8

## 2023-01-03 ASSESSMENT — LIFESTYLE VARIABLES
AVERAGE NUMBER OF DAYS PER WEEK YOU HAVE A DRINK CONTAINING ALCOHOL: 0
TOTAL SCORE: 0
TOTAL SCORE: 0
ON A TYPICAL DAY WHEN YOU DRINK ALCOHOL HOW MANY DRINKS DO YOU HAVE: 0
EVER FELT BAD OR GUILTY ABOUT YOUR DRINKING: NO
ALCOHOL_USE: YES
HAVE PEOPLE ANNOYED YOU BY CRITICIZING YOUR DRINKING: NO
CONSUMPTION TOTAL: NEGATIVE
TOTAL SCORE: 0
EVER HAD A DRINK FIRST THING IN THE MORNING TO STEADY YOUR NERVES TO GET RID OF A HANGOVER: NO
HAVE YOU EVER FELT YOU SHOULD CUT DOWN ON YOUR DRINKING: NO
HOW MANY TIMES IN THE PAST YEAR HAVE YOU HAD 5 OR MORE DRINKS IN A DAY: 0

## 2023-01-03 NOTE — ED NOTES
0730 pt awake , denies any needs at this time  0800 clear liquids given  0910 pt pain 6/10 , morphine given  Friend at bedside  Update given ,room assignment pensing

## 2023-01-03 NOTE — ED NOTES
Med rec completed per patient at bedside.   Allergies reviewed.   Patient denies any outpatient antibiotics in the last 30 days.   Preferred pharmacy - [facility] [address] [facility]     Loida Klein PharmD

## 2023-01-03 NOTE — ED TRIAGE NOTES
Pt amb to triage c/o epigastric abd pain x5d and n/v/d. Pt currently in a rehab program for detox from alcohol abuse. Pt reports 82 d since her last drink. Pt has recent hx pancreatitis and was hospitalized 12-20-22 to 12-25-22.

## 2023-01-03 NOTE — ED NOTES
"Pt AO4, c/o upper abd pain onset Saturday. Pt reports received a celiac nerve block prior to last admission and \"I was pain free for a week, no pain at all it was great, and Saturday the stomach pain started again and every thing was running through me (diarrhea.\" reports some nausea and x1 emesis.   C/o generalized chest pain \"I dont know if its my stomach pain is so bad, it feels like its up into my chest.\" EKG  Order placed.   "

## 2023-01-03 NOTE — ED NOTES
Pain medication administered as ordered. Discussed POC for admission and waiting for hospitalist assessment. Pt agreeable with plan.   Pt denies any needs at this time.

## 2023-01-03 NOTE — ASSESSMENT & PLAN NOTE
History of alcohol dependence, currently sober  AST, ALT, alkaline phosphatase and bilirubin within normal limits unlikely biliary etiology.  Supportive care, multimodal pain control.  Bowel rest.  N.p.o. for now, consider starting clear liquid diet when pain improves   Watch input and output closely, watch respiratory status closely  Watch closely for potential complications including pleural effusion, hypocalcemia, acute renal failure, compartment syndrome

## 2023-01-03 NOTE — ED NOTES
Pt c/o persistent abd pain despite po oxycodone. Pt medicated per orders.   Ice chips and apple juice provided. Additional warm blankets given.   Pt denies any further needs at this time.   Safety precautions in place including gurney locked in lowest position, both side rails up, call light within reach. Pt educated to use call light if requiring any assistance with getting oob.

## 2023-01-03 NOTE — ED PROVIDER NOTES
ED Provider Note    Primary care provider: Ivy Adams M.D.  Means of arrival: Walk-in  History obtained from: Patient    CHIEF COMPLAINT  Chief Complaint   Patient presents with    Epigastric Pain    Nausea/Vomiting/Diarrhea     Seen at 7:16 PM.   HPI  Rhiannon Marrero is a 37 y.o. female with history of alcoholism, currently 82 days sober in a rehab facility who presents to the Emergency Department gradual onset of severe diffuse abdominal pain rating to the back consistent with her prior pancreatitis.  Pain is been present for the past several days.  She was pain-free for about 5 to 6 days following a celiac plexus block that she received on her last inpatient admission.  She denies any fevers or chills.  She vomited 1 time.  She has had copious watery nonblack nonbloody diarrhea.  She is not allowed to take any opioid analgesia back to the rehab facility, however she can receive that here as an inpatient or in the ER.    REVIEW OF SYSTEMS  See HPI,   Remainder of ROS negative.     PAST MEDICAL HISTORY   has a past medical history of Acute pancreatitis without infection or necrosis (07/20/2022), Alcohol dependence ([facility]) (04/13/2017), Bronchitis (08/2012), Cold, Current moderate episode of major depressive disorder without prior episode ([facility]) (01/31/2020), Heart burn, Hernia of unspecified site of abdominal cavity without mention of obstruction or gangrene, High anion gap metabolic acidosis (07/01/2022), Indigestion, Pancreatitis, Pneumonia (2011), and Seizure disorder ([facility]) (05/23/2022).    SURGICAL HISTORY   has a past surgical history that includes other orthopedic surgery; gyn surgery; tonsillectomy (4/11/2013); arthroscopy, knee; hysterectomy robotic xi (10/11/2018); salpingectomy (Bilateral, 10/11/2018); orif, wrist (Right, 4/24/2019); upper gi endoscopy,diagnosis (N/A, 12/23/2022); inject nerv blck,celiac plexus (N/A, 12/23/2022); and egd w/endoscopic ultrasound (N/A, 12/23/2022).    SOCIAL  "HISTORY  Social History     Tobacco Use    Smoking status: Every Day     Packs/day: 0.75     Years: 10.00     Pack years: 7.50     Types: Cigarettes    Smokeless tobacco: Never   Vaping Use    Vaping Use: Former    Substances: Nicotine   Substance Use Topics    Alcohol use: Not Currently     Comment: 5 shots, binges. Was sober for 34 days until today 10/12    Drug use: Yes     Types: Marijuana     Comment: edibles      Social History     Substance and Sexual Activity   Drug Use Yes    Types: Marijuana    Comment: edibles       FAMILY HISTORY  Family History   Problem Relation Age of Onset    Psychiatric Illness Mother     Stroke Mother     Arthritis Mother     Heart Disease Maternal Grandfather     Cancer Neg Hx     Diabetes Neg Hx     Hypertension Neg Hx        CURRENT MEDICATIONS  Reviewed.  See Encounter Summary.     ALLERGIES  Allergies   Allergen Reactions    Promethazine Hcl Anxiety     Anxiety and makes her feels like skin coming off     Naltrexone Anxiety       PHYSICAL EXAM  VITAL SIGNS: BP (!) 131/94   Pulse (!) 118   Temp 37.4 °C (99.3 °F) (Temporal)   Resp 17   Ht 1.702 m (5' 7\")   Wt 62.7 kg (138 lb 3.7 oz)   LMP 10/30/2018   SpO2 95%   BMI 21.65 kg/m²   Constitutional: Awake, alert in no apparent distress.  Appears very uncomfortable, tearful.  HENT: Normocephalic, Bilateral external ears normal. Nose normal.   Eyes: Conjunctiva normal, non-icteric, EOMI.    Thorax & Lungs: Easy unlabored respirations, Clear to ascultation bilaterally.  Cardiovascular: Tachycardic, No murmurs, rubs or gallops. Bilateral pulses symmetrical.   Abdomen:  Soft, diffuse abdominal tenderness, normal active bowel sounds.   :    Skin: Visualized skin is  Dry, No erythema, No rash.   Musculoskeletal:   No cyanosis, clubbing or edema. No leg asymmetry.   Neurologic: Alert, Grossly non-focal.   Psychiatric: Normal affect, Normal mood  Lymphatic:  No cervical LAD        RADIOLOGY  No orders to display         COURSE & " MEDICAL DECISION MAKING  Pertinent Labs & Imaging studies reviewed. (See chart for details)    Differential diagnoses include but are not limited to: Acute pancreatitis.    7:16 PM - Medical record reviewed, prior history of alcoholism, bipolar.  Recurrent pancreatitis.  The patient was admitted for pancreatitis on 12/20, CT showed some pancreatic ductal stones with an elevated lipase.  Patient underwent MRCP that did not show any signs of choledocholithiasis.  GI was consulted, did not recommend ERCP based on the MRCP results.  She did undergo a celiac plexus block.  Patient was eventually discharged, lipase did not return to normal prior to discharge.    7:33 PM: Case discussed with Dr. Smith who will evaluate the patient for hospitalization.  Decision Making:  This is a pleasant 37 y.o. year old female who presents with severe abdominal pain rating to the back, consistent with prior episodes of pancreatitis.  She is sober for 82 days, therefore this is not alcohol related.  Interestingly, she did have a stone visualized on prior CT, this was not seen on her follow-up MRCP.  She does not have any LFT abnormalities though her lipase is elevated in the 200s, similar to where it was on her last check at our facility.  Because she cannot take any opioid analgesia back to the rehab facility I think that the most appropriate thing would be to admit her for IV hydration, pain control.  Consider GI consultation though if the lipase trends down and she does not develop LFT abnormalities I would not recommend a repeat CT.            FINAL IMPRESSION  1. Idiopathic acute pancreatitis without infection or necrosis

## 2023-01-03 NOTE — CARE PLAN
The patient is Stable - Low risk of patient condition declining or worsening    Shift Goals  Clinical Goals: Pain control  Patient Goals: Pain control    Progress made toward(s) clinical / shift goals:  Progressing    Patient is not progressing towards the following goals:

## 2023-01-03 NOTE — ED NOTES
"Patient called to inquire about more pain medications  Updated patient that she had a dose of morphine an hour and a half prior  States she \"normally\" has been given oxycodone to alternate between doses of morphine or dilaudid PRN  Offered patient hydroxyzine to keep with her normal home regiment of TID  Patient reluctantly agrees but requesting dose of oxycodone in addition to the PRN morphine.  Hospitalist contacted regarding patient's request for oxycodone    "

## 2023-01-03 NOTE — DISCHARGE PLANNING
ER CM met with pt at bedside. AOX4. Joellen. She is living at [facility] at [address]. She has been there about 1 month. Rx [facility] [address] [place]. PCP Doris Lay.   Care Transition Team Assessment    Information Source  Orientation Level: Oriented X4  Information Given By: Patient  Informant's Name: Rhiannon  Who is responsible for making decisions for patient? : Patient         Elopement Risk  Legal Hold: No  Ambulatory or Self Mobile in Wheelchair: No-Not an Elopement Risk    Interdisciplinary Discharge Planning  Lives with - Patient's Self Care Capacity: Other (Comments)  Support Systems: Other (Comments), 12 Step Group  Housing / Facility: Other (Comments)  Name of Care Facility: [facility]  Do You Take your Prescribed Medications Regularly: Yes  Mobility Issues: No  Durable Medical Equipment: Not Applicable    Discharge Preparedness  What is your plan after discharge?: Home with help  What are your discharge supports?: Other (comment)  Prior Functional Level: Ambulatory, Independent with Activities of Daily Living, Independent with Medication Management    Functional Assesment  Prior Functional Level: Ambulatory, Independent with Activities of Daily Living, Independent with Medication Management    Finances  Prescription Coverage: Yes                   Domestic Abuse  Have you ever been the victim of abuse or violence?: No    Psychological Assessment  History of Substance Abuse: Marijuana, Alcohol, None

## 2023-01-03 NOTE — H&P
Hospital Medicine History & Physical Note    Date of Service  1/2/2023    Primary Care Physician  Ivy Adams M.D.    Consultants  None     Code Status  Full Code    Chief Complaint  Chief Complaint   Patient presents with    Epigastric Pain    Nausea/Vomiting/Diarrhea     History of Presenting Illness  Rhiannon Marrero is a 37 y.o. female with a past medical history of alcohol dependence who is currently sober, recurrent pancreatitis who presented 1/2/2023 with abdominal pain nausea and vomiting.  Patient reports progressively worsening abdominal pain over the past few days.  Abdominal pain is currently severe rated as 10/10.  Pain is mostly located in the epigastric region.  Pain does not radiate to the back.  Does not have any aggravating or relieving factors.  Reports associated nausea and vomiting.  She reports that her bowel movements have been watery.  Denies noticing any blood or mucus in stool.      I discussed the plan of care with emergency department physician, and the patient.    Review of Systems  Review of Systems   Constitutional:  Positive for malaise/fatigue. Negative for chills and fever.   Eyes:  Negative for discharge and redness.   Respiratory:  Negative for cough, shortness of breath and stridor.    Cardiovascular:  Negative for chest pain and leg swelling.   Gastrointestinal:  Positive for abdominal pain, diarrhea, nausea and vomiting.   Genitourinary:  Negative for flank pain.   Musculoskeletal:  Negative for myalgias.   Skin: Negative.    Neurological:  Negative for focal weakness.   Endo/Heme/Allergies:  Does not bruise/bleed easily.   Psychiatric/Behavioral:  The patient is not nervous/anxious.      Past Medical History   has a past medical history of Acute pancreatitis without infection or necrosis (07/20/2022), Alcohol dependence ([facility]) (04/13/2017), Bronchitis (08/2012), Cold, Current moderate episode of major depressive disorder without prior episode ([facility]) (01/31/2020), Heart burn,  Hernia of unspecified site of abdominal cavity without mention of obstruction or gangrene, High anion gap metabolic acidosis (07/01/2022), Indigestion, Pancreatitis, Pneumonia (2011), and Seizure disorder (HCC) (05/23/2022).    Surgical History   has a past surgical history that includes other orthopedic surgery; gyn surgery; tonsillectomy (4/11/2013); arthroscopy, knee; hysterectomy robotic xi (10/11/2018); salpingectomy (Bilateral, 10/11/2018); orif, wrist (Right, 4/24/2019); pr upper gi endoscopy,diagnosis (N/A, 12/23/2022); pr inject nerv blck,celiac plexus (N/A, 12/23/2022); and egd w/endoscopic ultrasound (N/A, 12/23/2022).     Family History  family history includes Arthritis in her mother; Heart Disease in her maternal grandfather; Psychiatric Illness in her mother; Stroke in her mother.      Social History   reports that she has been smoking cigarettes. She has a 7.50 pack-year smoking history. She has never used smokeless tobacco. She reports that she does not currently use alcohol. She reports current drug use. Drug: Marijuana.    Allergies  Allergies   Allergen Reactions    Promethazine Hcl Anxiety     Anxiety and makes her feels like skin coming off     Naltrexone Anxiety     Medications  Prior to Admission Medications   Prescriptions Last Dose Informant Patient Reported? Taking?   acetaminophen (TYLENOL) 325 MG Tab  Patient No No   Sig: Take 2 Tablets by mouth every 6 hours as needed for Moderate Pain.   divalproex (DEPAKOTE) 250 MG Tablet Delayed Response  Patient No No   Sig: Take 1 Tablet by mouth 2 times a day for 120 days.   gabapentin (NEURONTIN) 100 MG Cap  Patient Yes No   Sig: Take 100-300 mg by mouth 2 times a day. Pt takes 100MG in AM and 300MG in the evening   hydrOXYzine pamoate (VISTARIL) 50 MG Cap  Patient No No   Sig: Take 1 Capsule by mouth every 8 hours as needed for Anxiety.   ibuprofen (MOTRIN) 200 MG Tab  Patient Yes No   Sig: Take 600 mg by mouth every 6 hours as needed.  Indications: Pain   pancrelipase, Lip-Prot-Amyl, (CREON 68862) 90376-33675 units Cap DR Particles   No No   Sig: Take 1 Capsule by mouth 3 times a day with meals for 30 days.   polyethylene glycol/lytes (MIRALAX) 17 g Pack   Yes No   Sig: Take  by mouth 1 time a day as needed (if sennosides and docusate ineffective after 24 hours).   senna-docusate (PERICOLACE OR SENOKOT S) 8.6-50 MG Tab   Yes No   Sig: Take 2 Tablets by mouth 2 times a day.   zonisamide (ZONEGRAN) 50 MG capsule   No No   Sig: Take 3 Capsules by mouth at bedtime for 30 days.      Facility-Administered Medications: None     Physical Exam  Temp:  [37.4 °C (99.3 °F)] 37.4 °C (99.3 °F)  Pulse:  [118] 118  Resp:  [17] 17  BP: (131)/(94) 131/94  SpO2:  [95 %] 95 %  Blood Pressure: (!) 131/94   Temperature: 37.4 °C (99.3 °F)   Pulse: (!) 118   Respiration: 17   Pulse Oximetry: 95 %     Physical Exam  Constitutional:       General: She is not in acute distress.  HENT:      Head: Normocephalic and atraumatic.      Right Ear: External ear normal.      Left Ear: External ear normal.      Nose: No congestion or rhinorrhea.      Mouth/Throat:      Mouth: Mucous membranes are dry.      Pharynx: No oropharyngeal exudate or posterior oropharyngeal erythema.   Eyes:      General: No scleral icterus.        Right eye: No discharge.         Left eye: No discharge.      Conjunctiva/sclera: Conjunctivae normal.      Pupils: Pupils are equal, round, and reactive to light.   Cardiovascular:      Rate and Rhythm: Tachycardia present.      Heart sounds:     No friction rub. No gallop.   Pulmonary:      Effort: Pulmonary effort is normal.   Abdominal:      General: Abdomen is flat. There is no distension.      Tenderness: There is abdominal tenderness. There is no guarding or rebound.   Musculoskeletal:         General: No swelling.      Cervical back: Neck supple. No rigidity. No muscular tenderness.      Right lower leg: No edema.      Left lower leg: No edema.   Skin:      General: Skin is dry.      Capillary Refill: Capillary refill takes 2 to 3 seconds.      Coloration: Skin is not jaundiced or pale.      Findings: No bruising or erythema.   Neurological:      Mental Status: She is alert and oriented to person, place, and time.   Psychiatric:         Mood and Affect: Mood normal.         Judgment: Judgment normal.     Laboratory:  Recent Labs     01/02/23  1703   WBC 11.0*   RBC 5.50*   HEMOGLOBIN 16.4*   HEMATOCRIT 49.4*   MCV 89.8   MCH 29.8   MCHC 33.2*   RDW 43.2   PLATELETCT 397   MPV 9.4     Recent Labs     01/02/23  1703   SODIUM 141   POTASSIUM 3.8   CHLORIDE 103   CO2 23   GLUCOSE 93   BUN 10   CREATININE 0.77   CALCIUM 9.6     Recent Labs     01/02/23  1703   ALTSGPT 16   ASTSGOT 17   ALKPHOSPHAT 71   TBILIRUBIN 0.5   LIPASE 232*   GLUCOSE 93         No results for input(s): NTPROBNP in the last 72 hours.      No results for input(s): TROPONINT in the last 72 hours.    Imaging:  No orders to display     Assessment/Plan:  Justification for Admission Status  I anticipate this patient will require at least two midnights for appropriate medical management, necessitating inpatient admission because patient has recurrent acute pancreatitis, will require intravenous fluids, and pain control , in addition to monitoring volume status urine output and electrolytes.    * Recurrent pancreatitis- (present on admission)  Assessment & Plan  History of alcohol dependence, currently sober  AST, ALT, alkaline phosphatase and bilirubin within normal limits unlikely biliary etiology.  Supportive care, multimodal pain control.  Bowel rest.  N.p.o. for now, consider starting clear liquid diet when pain improves   Watch input and output closely, watch respiratory status closely  Watch closely for potential complications including pleural effusion, hypocalcemia, acute renal failure, compartment syndrome     Polycythemia due to fall in plasma volume- (present on admission)  Assessment &  Plan  Secondary to severe dehydration.  Anticipate improvement with rehydration    Bipolar disorder (HCC)- (present on admission)  Assessment & Plan  Resume home Depakote    Seizure disorder (HCC)- (present on admission)  Assessment & Plan  Resume home gabapentin and zonisamide      VTE prophylaxis: SCDs/TEDs and enoxaparin ppx

## 2023-01-03 NOTE — ED NOTES
Medicated per MAR, IV changed due to patients request. Labs drawn. Pt is resting comfortably. Call light within reach. Pt has no needs at this time. Advised to call for help or for any needs or concerns. Pt verbalizes understanding. Pt didn't feel the best relief of her pain, will try Morphine to see if it will work better for pain.

## 2023-01-03 NOTE — ED NOTES
Medicated per MAR. VS taken. Apple juice given. Care Pac given. Pt resting. Call light within reach. Pt has no needs at this time. Advised to call for help or for any needs or concerns. Pt verbalizes understanding.

## 2023-01-04 PROCEDURE — A9270 NON-COVERED ITEM OR SERVICE: HCPCS | Performed by: INTERNAL MEDICINE

## 2023-01-04 PROCEDURE — 700102 HCHG RX REV CODE 250 W/ 637 OVERRIDE(OP): Performed by: INTERNAL MEDICINE

## 2023-01-04 PROCEDURE — 700111 HCHG RX REV CODE 636 W/ 250 OVERRIDE (IP): Performed by: INTERNAL MEDICINE

## 2023-01-04 PROCEDURE — 700102 HCHG RX REV CODE 250 W/ 637 OVERRIDE(OP): Performed by: HOSPITALIST

## 2023-01-04 PROCEDURE — 700111 HCHG RX REV CODE 636 W/ 250 OVERRIDE (IP): Performed by: HOSPITALIST

## 2023-01-04 PROCEDURE — 99231 SBSQ HOSP IP/OBS SF/LOW 25: CPT | Performed by: INTERNAL MEDICINE

## 2023-01-04 PROCEDURE — 700105 HCHG RX REV CODE 258: Performed by: HOSPITALIST

## 2023-01-04 PROCEDURE — 770006 HCHG ROOM/CARE - MED/SURG/GYN SEMI*

## 2023-01-04 PROCEDURE — A9270 NON-COVERED ITEM OR SERVICE: HCPCS | Performed by: HOSPITALIST

## 2023-01-04 RX ADMIN — ONDANSETRON 4 MG: 2 INJECTION INTRAMUSCULAR; INTRAVENOUS at 00:39

## 2023-01-04 RX ADMIN — SODIUM CHLORIDE, POTASSIUM CHLORIDE, SODIUM LACTATE AND CALCIUM CHLORIDE: 600; 310; 30; 20 INJECTION, SOLUTION INTRAVENOUS at 09:06

## 2023-01-04 RX ADMIN — HYDROMORPHONE HYDROCHLORIDE 1 MG: 1 INJECTION, SOLUTION INTRAMUSCULAR; INTRAVENOUS; SUBCUTANEOUS at 06:47

## 2023-01-04 RX ADMIN — GABAPENTIN 300 MG: 300 CAPSULE ORAL at 17:12

## 2023-01-04 RX ADMIN — HYDROMORPHONE HYDROCHLORIDE 1 MG: 1 INJECTION, SOLUTION INTRAMUSCULAR; INTRAVENOUS; SUBCUTANEOUS at 19:42

## 2023-01-04 RX ADMIN — ZONISAMIDE 150 MG: 50 CAPSULE ORAL at 21:11

## 2023-01-04 RX ADMIN — ONDANSETRON 4 MG: 2 INJECTION INTRAMUSCULAR; INTRAVENOUS at 19:42

## 2023-01-04 RX ADMIN — HYDROMORPHONE HYDROCHLORIDE 1 MG: 1 INJECTION, SOLUTION INTRAMUSCULAR; INTRAVENOUS; SUBCUTANEOUS at 00:39

## 2023-01-04 RX ADMIN — HYDROMORPHONE HYDROCHLORIDE 1 MG: 1 INJECTION, SOLUTION INTRAMUSCULAR; INTRAVENOUS; SUBCUTANEOUS at 03:41

## 2023-01-04 RX ADMIN — HYDROMORPHONE HYDROCHLORIDE 1 MG: 1 INJECTION, SOLUTION INTRAMUSCULAR; INTRAVENOUS; SUBCUTANEOUS at 13:00

## 2023-01-04 RX ADMIN — DIVALPROEX SODIUM 250 MG: 250 TABLET, DELAYED RELEASE ORAL at 17:12

## 2023-01-04 RX ADMIN — DIVALPROEX SODIUM 250 MG: 250 TABLET, DELAYED RELEASE ORAL at 05:06

## 2023-01-04 RX ADMIN — HYDROMORPHONE HYDROCHLORIDE 1 MG: 1 INJECTION, SOLUTION INTRAMUSCULAR; INTRAVENOUS; SUBCUTANEOUS at 16:37

## 2023-01-04 RX ADMIN — OXYCODONE HYDROCHLORIDE 10 MG: 10 TABLET ORAL at 15:12

## 2023-01-04 RX ADMIN — GABAPENTIN 100 MG: 100 CAPSULE ORAL at 05:06

## 2023-01-04 RX ADMIN — SODIUM CHLORIDE, POTASSIUM CHLORIDE, SODIUM LACTATE AND CALCIUM CHLORIDE: 600; 310; 30; 20 INJECTION, SOLUTION INTRAVENOUS at 21:31

## 2023-01-04 RX ADMIN — HYDROMORPHONE HYDROCHLORIDE 1 MG: 1 INJECTION, SOLUTION INTRAMUSCULAR; INTRAVENOUS; SUBCUTANEOUS at 10:08

## 2023-01-04 RX ADMIN — HYDROMORPHONE HYDROCHLORIDE 1 MG: 1 INJECTION, SOLUTION INTRAMUSCULAR; INTRAVENOUS; SUBCUTANEOUS at 22:57

## 2023-01-04 ASSESSMENT — PAIN DESCRIPTION - PAIN TYPE
TYPE: ACUTE PAIN

## 2023-01-04 ASSESSMENT — FIBROSIS 4 INDEX: FIB4 SCORE: 0.6

## 2023-01-04 NOTE — CARE PLAN
The patient is Stable - Low risk of patient condition declining or worsening    Shift Goals  Clinical Goals: Pain control  Patient Goals: pain control    Progress made toward(s) clinical / shift goals:  Pain states that she will advise when pain has increased to see if additional pain meds are needed.     Patient is not progressing towards the following goals:      Problem: Pain - Standard  Goal: Alleviation of pain or a reduction in pain to the patient’s comfort goal  Outcome: Progressing     Problem: Knowledge Deficit - Standard  Goal: Patient and family/care givers will demonstrate understanding of plan of care, disease process/condition, diagnostic tests and medications  Outcome: Progressing

## 2023-01-04 NOTE — PROGRESS NOTES
"[facility] HOSPITALIST SERVICE PROGRESS NOTE    CC: epigastric pain    none    OVERVIEW  Hospital Course:  Per HPI 37 y.o. female with a past medical history of alcohol dependence who is currently sober, recurrent pancreatitis who presented 1/2/2023 with abdominal pain nausea and vomiting.  Patient reports progressively worsening abdominal pain over the past few days.  Abdominal pain is currently severe rated as 10/10.  Pain is mostly located in the epigastric region.  Pain does not radiate to the back.  Does not have any aggravating or relieving factors.  Reports associated nausea and vomiting.  She reports that her bowel movements have been watery.  Denies noticing any blood or mucus in stool.      SUBJECTIVE  Patient reports having sig abd pain, asking for addition of oxycodone. + nausea, vomiting. Pain w/ eating.    ROS: All other systems were reviewed and otherwise negative aside from mentioned above    OBJECTIVE  Vital Signs: Last Filed Vitals Signs: 24 Hour Range  /70   Pulse 78   Temp 36.3 °C (97.3 °F) (Oral)   Resp 16   Ht 1.702 m (5' 7.01\")   Wt 63 kg (138 lb 14.2 oz)   SpO2 97%     Physical Examination:  General: A&Ox3, comfortable  HEENT: PERRL, no conjunctival injection  Neck: No Cervical LAD, No JVD, trachea is midline  Cardiovascular: tachycardic, regular, no murmur  Respiratory: CTAB, no wheezes  Gastrointestinal: Soft,  epigastric tenderness, no rigidity or guarding  Extremities:  no MICHAEL, distal pulses 2+  Skin: No rashes in visible areas, warm and dry  Psychiatric: Mood/affect non-depressed, no delirium  Neurologic: Cranial nerves are grossly intact    Laboratory Studies:  Recent Labs     01/02/23  1703 01/03/23  0343   WBC 11.0* 8.8   RBC 5.50* 4.51   HEMOGLOBIN 16.4* 13.5   HEMATOCRIT 49.4* 41.3   MCV 89.8 91.6   MCH 29.8 29.9   RDW 43.2 44.6   PLATELETCT 397 244   MPV 9.4 10.5   NEUTSPOLYS 64.40  --    LYMPHOCYTES 28.50  --    MONOCYTES 5.20  --    EOSINOPHILS 0.90  --    BASOPHILS 0.50  " --      Recent Labs     01/02/23  1703 01/03/23  0343   SODIUM 141 137   POTASSIUM 3.8 3.9   CHLORIDE 103 104   CO2 23 22   GLUCOSE 93 97   BUN 10 10   CREATININE 0.77 0.72       Medications:  Medications were reviewed and updated     Assessment and Plan:  Rhiannon Marrero is a 37 y.o. female  w/ recurrent pancreatitis.     * Recurrent pancreatitis- (present on admission)  Assessment & Plan  History of alcohol dependence, currently sober  AST, ALT, alkaline phosphatase and bilirubin within normal limits unlikely biliary etiology  Bowel rest.  N.p.o. for now, consider starting clear liquid diet when pain improves   Watch input and output closely, watch respiratory status closely  Watch closely for potential complications including pleural effusion, hypocalcemia, acute renal failure, compartment syndrome   Added oxycodone PRN, has IV morphine and dilaudid PRN for pain control     Polycythemia due to fall in plasma volume- (present on admission)  Assessment & Plan  Secondary to severe dehydration.  IVFs     Bipolar disorder (HCC)- (present on admission)  Assessment & Plan  Continue home Depakote     Seizure disorder (HCC)- (present on admission)  Assessment & Plan  Continue home gabapentin and zonisamide        VTE prophylaxis: SCDs/TEDs and enoxaparin ppx       Yue Davis M.D.

## 2023-01-04 NOTE — PROGRESS NOTES
"[facility] HOSPITALIST SERVICE PROGRESS NOTE    CC: epigastric pain    OVERVIEW  Hospital Course:  Per HPI 37 y.o. female with a past medical history of alcohol dependence who is currently sober, recurrent pancreatitis who presented 1/2/2023 with abdominal pain nausea and vomiting.  Patient reports progressively worsening abdominal pain over the past few days.  Abdominal pain is currently severe rated as 10/10.  Pain is mostly located in the epigastric region.  Pain does not radiate to the back.  Does not have any aggravating or relieving factors.  Reports associated nausea and vomiting.  She reports that her bowel movements have been watery.  Denies noticing any blood or mucus in stool.      SUBJECTIVE  Patient reports having sig abd pain, little appetite. Able to sip thin liquids.    ROS: All other systems were reviewed and otherwise negative aside from mentioned above    OBJECTIVE  Vital Signs: Last Filed Vitals Signs: 24 Hour Range  BP (!) 112/99   Pulse 79   Temp 36.7 °C (98.1 °F) (Oral)   Resp 18   Ht 1.702 m (5' 7.01\")   Wt 63.5 kg (139 lb 15.9 oz)   SpO2 100%     Physical Examination:  General: A&Ox3, comfortable  HEENT: PERRL, no conjunctival injection  Neck: No Cervical LAD, No JVD, trachea is midline  Cardiovascular: tachycardic, regular, no murmur  Respiratory: CTAB, no wheezes  Gastrointestinal: Soft,  epigastric tenderness, no rigidity or guarding  Extremities:  no MICHAEL, distal pulses 2+  Skin: No rashes in visible areas, warm and dry  Psychiatric: Mood/affect non-depressed, no delirium  Neurologic: Cranial nerves are grossly intact    Laboratory Studies:  Recent Labs     01/02/23  1703 01/03/23  0343   WBC 11.0* 8.8   RBC 5.50* 4.51   HEMOGLOBIN 16.4* 13.5   HEMATOCRIT 49.4* 41.3   MCV 89.8 91.6   MCH 29.8 29.9   RDW 43.2 44.6   PLATELETCT 397 244   MPV 9.4 10.5   NEUTSPOLYS 64.40  --    LYMPHOCYTES 28.50  --    MONOCYTES 5.20  --    EOSINOPHILS 0.90  --    BASOPHILS 0.50  --      Recent Labs     " 01/02/23  1703 01/03/23  0343   SODIUM 141 137   POTASSIUM 3.8 3.9   CHLORIDE 103 104   CO2 23 22   GLUCOSE 93 97   BUN 10 10   CREATININE 0.77 0.72       Medications:  Medications were reviewed and updated     Assessment and Plan:  Rhiannon Marrero is a 37 y.o. female  w/ recurrent pancreatitis.     * Recurrent pancreatitis- (present on admission)  Assessment & Plan  History of alcohol dependence, currently sober  AST, ALT, alkaline phosphatase and bilirubin within normal limits unlikely biliary etiology  Bowel rest.  Advance diet as tolerated  Watch input and output closely, watch respiratory status closely  Watch closely for potential complications including pleural effusion, hypocalcemia, acute renal failure, compartment syndrome   Added oxycodone PRN, has IV morphine and dilaudid PRN for pain control     Polycythemia due to fall in plasma volume- (present on admission)  Assessment & Plan  Secondary to severe dehydration.  IVFs     Bipolar disorder (HCC)- (present on admission)  Assessment & Plan  Continue home Depakote     Seizure disorder (HCC)- (present on admission)  Assessment & Plan  Continue home gabapentin and zonisamide        VTE prophylaxis: SCDs/TEDs and enoxaparin ppx       Yue Davis M.D.

## 2023-01-04 NOTE — CARE PLAN
The patient is Stable - Low risk of patient condition declining or worsening    Shift Goals  Clinical Goals: Pain management  Patient Goals: pain control    Progress made toward(s) clinical / shift goals:  Patient will have decreased pain and transition to oral medications per pain level. Medicate patient per pain level and MAR.   Problem: Pain - Standard  Goal: Alleviation of pain or a reduction in pain to the patient’s comfort goal  Outcome: Progressing     Problem: Knowledge Deficit - Standard  Goal: Patient and family/care givers will demonstrate understanding of plan of care, disease process/condition, diagnostic tests and medications  Outcome: Progressing       Patient is not progressing towards the following goals:N/A

## 2023-01-05 LAB
ANION GAP SERPL CALC-SCNC: 13 MMOL/L (ref 7–16)
BUN SERPL-MCNC: 7 MG/DL (ref 8–22)
CALCIUM SERPL-MCNC: 9.2 MG/DL (ref 8.4–10.2)
CHLORIDE SERPL-SCNC: 103 MMOL/L (ref 96–112)
CO2 SERPL-SCNC: 22 MMOL/L (ref 20–33)
CREAT SERPL-MCNC: 0.69 MG/DL (ref 0.5–1.4)
GFR SERPLBLD CREATININE-BSD FMLA CKD-EPI: 116 ML/MIN/1.73 M 2
GLUCOSE SERPL-MCNC: 94 MG/DL (ref 65–99)
MAGNESIUM SERPL-MCNC: 1.9 MG/DL (ref 1.5–2.5)
POTASSIUM SERPL-SCNC: 3.4 MMOL/L (ref 3.6–5.5)
SODIUM SERPL-SCNC: 138 MMOL/L (ref 135–145)

## 2023-01-05 PROCEDURE — 700105 HCHG RX REV CODE 258: Performed by: HOSPITALIST

## 2023-01-05 PROCEDURE — 700102 HCHG RX REV CODE 250 W/ 637 OVERRIDE(OP): Performed by: INTERNAL MEDICINE

## 2023-01-05 PROCEDURE — A9270 NON-COVERED ITEM OR SERVICE: HCPCS | Performed by: HOSPITALIST

## 2023-01-05 PROCEDURE — 770006 HCHG ROOM/CARE - MED/SURG/GYN SEMI*

## 2023-01-05 PROCEDURE — 700111 HCHG RX REV CODE 636 W/ 250 OVERRIDE (IP): Performed by: INTERNAL MEDICINE

## 2023-01-05 PROCEDURE — 99231 SBSQ HOSP IP/OBS SF/LOW 25: CPT | Performed by: INTERNAL MEDICINE

## 2023-01-05 PROCEDURE — 80048 BASIC METABOLIC PNL TOTAL CA: CPT

## 2023-01-05 PROCEDURE — 83735 ASSAY OF MAGNESIUM: CPT

## 2023-01-05 PROCEDURE — 700102 HCHG RX REV CODE 250 W/ 637 OVERRIDE(OP): Performed by: HOSPITALIST

## 2023-01-05 PROCEDURE — 700111 HCHG RX REV CODE 636 W/ 250 OVERRIDE (IP): Performed by: HOSPITALIST

## 2023-01-05 PROCEDURE — 94760 N-INVAS EAR/PLS OXIMETRY 1: CPT

## 2023-01-05 PROCEDURE — A9270 NON-COVERED ITEM OR SERVICE: HCPCS | Performed by: INTERNAL MEDICINE

## 2023-01-05 PROCEDURE — 36415 COLL VENOUS BLD VENIPUNCTURE: CPT

## 2023-01-05 RX ORDER — HYDROMORPHONE HYDROCHLORIDE 1 MG/ML
1 INJECTION, SOLUTION INTRAMUSCULAR; INTRAVENOUS; SUBCUTANEOUS
Status: DISCONTINUED | OUTPATIENT
Start: 2023-01-05 | End: 2023-01-06

## 2023-01-05 RX ORDER — MORPHINE SULFATE 4 MG/ML
4 INJECTION INTRAVENOUS EVERY 6 HOURS PRN
Status: DISCONTINUED | OUTPATIENT
Start: 2023-01-05 | End: 2023-01-06

## 2023-01-05 RX ORDER — MAGNESIUM SULFATE HEPTAHYDRATE 40 MG/ML
2 INJECTION, SOLUTION INTRAVENOUS ONCE
Status: COMPLETED | OUTPATIENT
Start: 2023-01-05 | End: 2023-01-05

## 2023-01-05 RX ORDER — POTASSIUM CHLORIDE 20 MEQ/1
40 TABLET, EXTENDED RELEASE ORAL DAILY
Status: DISCONTINUED | OUTPATIENT
Start: 2023-01-05 | End: 2023-01-09 | Stop reason: HOSPADM

## 2023-01-05 RX ADMIN — POTASSIUM CHLORIDE 40 MEQ: 1500 TABLET, EXTENDED RELEASE ORAL at 08:29

## 2023-01-05 RX ADMIN — HYDROMORPHONE HYDROCHLORIDE 1 MG: 1 INJECTION, SOLUTION INTRAMUSCULAR; INTRAVENOUS; SUBCUTANEOUS at 02:01

## 2023-01-05 RX ADMIN — SODIUM CHLORIDE, POTASSIUM CHLORIDE, SODIUM LACTATE AND CALCIUM CHLORIDE: 600; 310; 30; 20 INJECTION, SOLUTION INTRAVENOUS at 08:33

## 2023-01-05 RX ADMIN — MAGNESIUM SULFATE 2 G: 2 INJECTION INTRAVENOUS at 08:30

## 2023-01-05 RX ADMIN — ONDANSETRON 4 MG: 2 INJECTION INTRAMUSCULAR; INTRAVENOUS at 22:00

## 2023-01-05 RX ADMIN — GABAPENTIN 100 MG: 100 CAPSULE ORAL at 05:06

## 2023-01-05 RX ADMIN — HYDROMORPHONE HYDROCHLORIDE 1 MG: 1 INJECTION, SOLUTION INTRAMUSCULAR; INTRAVENOUS; SUBCUTANEOUS at 16:54

## 2023-01-05 RX ADMIN — DIVALPROEX SODIUM 250 MG: 250 TABLET, DELAYED RELEASE ORAL at 16:57

## 2023-01-05 RX ADMIN — ZONISAMIDE 150 MG: 50 CAPSULE ORAL at 20:59

## 2023-01-05 RX ADMIN — DIVALPROEX SODIUM 250 MG: 250 TABLET, DELAYED RELEASE ORAL at 05:06

## 2023-01-05 RX ADMIN — HYDROMORPHONE HYDROCHLORIDE 1 MG: 1 INJECTION, SOLUTION INTRAMUSCULAR; INTRAVENOUS; SUBCUTANEOUS at 20:59

## 2023-01-05 RX ADMIN — HYDROMORPHONE HYDROCHLORIDE 1 MG: 1 INJECTION, SOLUTION INTRAMUSCULAR; INTRAVENOUS; SUBCUTANEOUS at 05:06

## 2023-01-05 RX ADMIN — HYDROMORPHONE HYDROCHLORIDE 1 MG: 1 INJECTION, SOLUTION INTRAMUSCULAR; INTRAVENOUS; SUBCUTANEOUS at 13:31

## 2023-01-05 RX ADMIN — GABAPENTIN 300 MG: 300 CAPSULE ORAL at 16:57

## 2023-01-05 ASSESSMENT — PAIN DESCRIPTION - PAIN TYPE
TYPE: ACUTE PAIN

## 2023-01-05 NOTE — CARE PLAN
The patient is Stable - Low risk of patient condition declining or worsening    Shift Goals  Clinical Goals: pain managment  Patient Goals: pain  Family Goals: FANTASMA    Progress made toward(s) clinical / shift goals:        Problem: Pain - Standard  Goal: Alleviation of pain or a reduction in pain to the patient’s comfort goal  Outcome: Progressing     Problem: Knowledge Deficit - Standard  Goal: Patient and family/care givers will demonstrate understanding of plan of care, disease process/condition, diagnostic tests and medications  Outcome: Progressing       Patient is not progressing towards the following goals:

## 2023-01-05 NOTE — PROGRESS NOTES
Received report from Marcelina NELSON. Pt AOx4 and reports 7/10 pain, prn Dilaudid offered and given. Pt updated on Poc for the day. Pt has no further questions or needs at this time.

## 2023-01-05 NOTE — CARE PLAN
The patient is Stable - Low risk of patient condition declining or worsening    Shift Goals  Clinical Goals: Pain control, Monitor Labs  Patient Goals: Pain control  Family Goals: FANTASMA    Progress made toward(s) clinical / shift goals:    Problem: Pain - Standard  Goal: Alleviation of pain or a reduction in pain to the patient’s comfort goal  Outcome: Progressing: pt pain controlled with pain medication management not at goal yet but progressing     Problem: Knowledge Deficit - Standard  Goal: Patient and family/care givers will demonstrate understanding of plan of care, disease process/condition, diagnostic tests and medications  Outcome: Progressing: pt verbalized understanding of pOC

## 2023-01-05 NOTE — PROGRESS NOTES
"[facility] HOSPITALIST SERVICE PROGRESS NOTE    CC: epigastric pain    OVERVIEW  Hospital Course:  Per HPI 37 y.o. female with a past medical history of alcohol dependence who is currently sober, recurrent pancreatitis who presented 1/2/2023 with abdominal pain nausea and vomiting.  Patient reports progressively worsening abdominal pain over the past few days.  Abdominal pain is currently severe rated as 10/10.  Pain is mostly located in the epigastric region.  Pain does not radiate to the back.  Does not have any aggravating or relieving factors.  Reports associated nausea and vomiting.  She reports that her bowel movements have been watery.  Denies noticing any blood or mucus in stool.      SUBJECTIVE  Patient report having sig pain but a bit better. Better tolerating CLD. Agreeable to space out the dilaudid more.    ROS: All other systems were reviewed and otherwise negative aside from mentioned above    OBJECTIVE  Vital Signs: Last Filed Vitals Signs: 24 Hour Range  /63   Pulse 62   Temp 36.6 °C (97.9 °F) (Oral)   Resp 16   Ht 1.702 m (5' 7.01\")   Wt 63.5 kg (139 lb 15.9 oz)   SpO2 98%     Physical Examination:  General: A&Ox3, comfortable  HEENT: PERRL, dry mm  Neck: No Cervical LAD, No JVD, trachea is midline  Cardiovascular: tachycardic, regular, no murmur  Respiratory: CTAB, no wheezes  Gastrointestinal: Soft,  epigastric tenderness, no rigidity or guarding  Extremities:  no MICHAEL, distal pulses 2+  Skin: No rashes in visible areas, warm and dry  Psychiatric: Mood/affect non-depressed, no delirium  Neurologic: Cranial nerves are grossly intact    Laboratory Studies:  Recent Labs     01/02/23  1703 01/03/23  0343   WBC 11.0* 8.8   RBC 5.50* 4.51   HEMOGLOBIN 16.4* 13.5   HEMATOCRIT 49.4* 41.3   MCV 89.8 91.6   MCH 29.8 29.9   RDW 43.2 44.6   PLATELETCT 397 244   MPV 9.4 10.5   NEUTSPOLYS 64.40  --    LYMPHOCYTES 28.50  --    MONOCYTES 5.20  --    EOSINOPHILS 0.90  --    BASOPHILS 0.50  --        Recent " Labs     01/02/23  1703 01/03/23  0343 01/05/23  0310   SODIUM 141 137 138   POTASSIUM 3.8 3.9 3.4*   CHLORIDE 103 104 103   CO2 23 22 22   GLUCOSE 93 97 94   BUN 10 10 7*   CREATININE 0.77 0.72 0.69         Medications:  Medications were reviewed and updated     Assessment and Plan:  Rhiannon Marrero is a 37 y.o. female  w/ recurrent pancreatitis.     * Recurrent pancreatitis- (present on admission)  Assessment & Plan  History of alcohol dependence, currently sober  AST, ALT, alkaline phosphatase and bilirubin within normal limits unlikely biliary etiology  Bowel rest.  Advance diet as tolerated, still on CLD  Added oxycodone PRN, dec IV dilaudid to q6hr prn, add tylenol TID    Hyponatremia  Assessment & Plan  Resolved, due to hypovolemia.    Hypokalemia  Assessment & Plan  From poor PO intake, replete     Polycythemia due to fall in plasma volume- (present on admission)  Assessment & Plan  Secondary to severe dehydration.  IVFs for now     Bipolar disorder (HCC)- (present on admission)  Assessment & Plan  Continue home Depakote     Seizure disorder (HCC)- (present on admission)  Assessment & Plan  Continue home gabapentin and zonisamide        VTE prophylaxis: SCDs/TEDs and enoxaparin ppx. Dc by weekend if pain control off IV pain meds       Yue Davis M.D.

## 2023-01-06 LAB
ANION GAP SERPL CALC-SCNC: 12 MMOL/L (ref 7–16)
BUN SERPL-MCNC: 8 MG/DL (ref 8–22)
CALCIUM SERPL-MCNC: 9.4 MG/DL (ref 8.4–10.2)
CHLORIDE SERPL-SCNC: 104 MMOL/L (ref 96–112)
CO2 SERPL-SCNC: 21 MMOL/L (ref 20–33)
CREAT SERPL-MCNC: 0.68 MG/DL (ref 0.5–1.4)
GFR SERPLBLD CREATININE-BSD FMLA CKD-EPI: 116 ML/MIN/1.73 M 2
GLUCOSE SERPL-MCNC: 90 MG/DL (ref 65–99)
POTASSIUM SERPL-SCNC: 3.9 MMOL/L (ref 3.6–5.5)
SODIUM SERPL-SCNC: 137 MMOL/L (ref 135–145)

## 2023-01-06 PROCEDURE — 770006 HCHG ROOM/CARE - MED/SURG/GYN SEMI*

## 2023-01-06 PROCEDURE — 94760 N-INVAS EAR/PLS OXIMETRY 1: CPT

## 2023-01-06 PROCEDURE — 700102 HCHG RX REV CODE 250 W/ 637 OVERRIDE(OP): Performed by: HOSPITALIST

## 2023-01-06 PROCEDURE — 700102 HCHG RX REV CODE 250 W/ 637 OVERRIDE(OP): Performed by: INTERNAL MEDICINE

## 2023-01-06 PROCEDURE — 80048 BASIC METABOLIC PNL TOTAL CA: CPT

## 2023-01-06 PROCEDURE — A9270 NON-COVERED ITEM OR SERVICE: HCPCS | Performed by: INTERNAL MEDICINE

## 2023-01-06 PROCEDURE — 36415 COLL VENOUS BLD VENIPUNCTURE: CPT

## 2023-01-06 PROCEDURE — 700111 HCHG RX REV CODE 636 W/ 250 OVERRIDE (IP): Performed by: HOSPITALIST

## 2023-01-06 PROCEDURE — 700111 HCHG RX REV CODE 636 W/ 250 OVERRIDE (IP): Performed by: INTERNAL MEDICINE

## 2023-01-06 PROCEDURE — 99231 SBSQ HOSP IP/OBS SF/LOW 25: CPT | Performed by: INTERNAL MEDICINE

## 2023-01-06 PROCEDURE — A9270 NON-COVERED ITEM OR SERVICE: HCPCS | Performed by: HOSPITALIST

## 2023-01-06 RX ORDER — HYDROMORPHONE HYDROCHLORIDE 1 MG/ML
1 INJECTION, SOLUTION INTRAMUSCULAR; INTRAVENOUS; SUBCUTANEOUS EVERY 6 HOURS PRN
Status: DISCONTINUED | OUTPATIENT
Start: 2023-01-06 | End: 2023-01-09

## 2023-01-06 RX ORDER — HYDROMORPHONE HYDROCHLORIDE 2 MG/1
2 TABLET ORAL
Status: DISCONTINUED | OUTPATIENT
Start: 2023-01-06 | End: 2023-01-09 | Stop reason: HOSPADM

## 2023-01-06 RX ADMIN — HYDROMORPHONE HYDROCHLORIDE 1 MG: 1 INJECTION, SOLUTION INTRAMUSCULAR; INTRAVENOUS; SUBCUTANEOUS at 21:05

## 2023-01-06 RX ADMIN — HYDROMORPHONE HYDROCHLORIDE 2 MG: 2 TABLET ORAL at 17:50

## 2023-01-06 RX ADMIN — ONDANSETRON 4 MG: 2 INJECTION INTRAMUSCULAR; INTRAVENOUS at 22:47

## 2023-01-06 RX ADMIN — GABAPENTIN 300 MG: 300 CAPSULE ORAL at 17:50

## 2023-01-06 RX ADMIN — DIVALPROEX SODIUM 250 MG: 250 TABLET, DELAYED RELEASE ORAL at 17:50

## 2023-01-06 RX ADMIN — GABAPENTIN 100 MG: 100 CAPSULE ORAL at 05:18

## 2023-01-06 RX ADMIN — HYDROMORPHONE HYDROCHLORIDE 1 MG: 1 INJECTION, SOLUTION INTRAMUSCULAR; INTRAVENOUS; SUBCUTANEOUS at 09:48

## 2023-01-06 RX ADMIN — HYDROMORPHONE HYDROCHLORIDE 1 MG: 1 INJECTION, SOLUTION INTRAMUSCULAR; INTRAVENOUS; SUBCUTANEOUS at 01:47

## 2023-01-06 RX ADMIN — DIVALPROEX SODIUM 250 MG: 250 TABLET, DELAYED RELEASE ORAL at 05:18

## 2023-01-06 RX ADMIN — HYDROMORPHONE HYDROCHLORIDE 1 MG: 1 INJECTION, SOLUTION INTRAMUSCULAR; INTRAVENOUS; SUBCUTANEOUS at 14:53

## 2023-01-06 RX ADMIN — POTASSIUM CHLORIDE 40 MEQ: 1500 TABLET, EXTENDED RELEASE ORAL at 05:18

## 2023-01-06 RX ADMIN — ONDANSETRON 4 MG: 2 INJECTION INTRAMUSCULAR; INTRAVENOUS at 09:52

## 2023-01-06 RX ADMIN — ZONISAMIDE 150 MG: 50 CAPSULE ORAL at 20:34

## 2023-01-06 RX ADMIN — HYDROMORPHONE HYDROCHLORIDE 2 MG: 2 TABLET ORAL at 23:21

## 2023-01-06 RX ADMIN — HYDROMORPHONE HYDROCHLORIDE 1 MG: 1 INJECTION, SOLUTION INTRAMUSCULAR; INTRAVENOUS; SUBCUTANEOUS at 05:18

## 2023-01-06 RX ADMIN — ONDANSETRON 4 MG: 2 INJECTION INTRAMUSCULAR; INTRAVENOUS at 02:18

## 2023-01-06 ASSESSMENT — PAIN DESCRIPTION - PAIN TYPE
TYPE: ACUTE PAIN

## 2023-01-06 NOTE — FLOWSHEET NOTE
"Patient upset, angry and tearful.  Patient wanting dilaudid oral tablet. No order for oral tablets at this time. Offered patient oxycodone PO which patient has current order for. Patient refused stated \"it wont work\".  Notified Dr Tapia with orders to go ahead and give IV dilaudid now.  MD will follow up with PO orders for patient.   "

## 2023-01-06 NOTE — PROGRESS NOTES
Received report from Evelina Rhoades. Pt AOx4 and reports 10/10 pain. Pt updated on Poc for the day. Pt has no further questions or needs at this time.     At 2000 pt stating that IV is almost out of arm. IV removed. Unable to place IV with out infiltration. Ultrasound guided IV placement successful. Pt PRN pain medication given as well as  nausea medication.

## 2023-01-06 NOTE — CARE PLAN
The patient is Stable - Low risk of patient condition declining or worsening    Shift Goals  Clinical Goals: Pain management, advance diet  Patient Goals: pain control  Family Goals: FANTASMA    Progress made toward(s) clinical / shift goals:    Problem: Knowledge Deficit - Standard  Goal: Patient and family/care givers will demonstrate understanding of plan of care, disease process/condition, diagnostic tests and medications  Outcome: Progressing: pt verbalized understanding of POC       Patient is not progressing towards the following goals:      Problem: Pain - Standard  Goal: Alleviation of pain or a reduction in pain to the patient’s comfort goal  Outcome: Not Progressing: pt pain still uncontrolled by pain medication regimen, pt still reporting 7-10/10 out of pain

## 2023-01-07 LAB
ANION GAP SERPL CALC-SCNC: 11 MMOL/L (ref 7–16)
BUN SERPL-MCNC: 9 MG/DL (ref 8–22)
CALCIUM SERPL-MCNC: 9.3 MG/DL (ref 8.4–10.2)
CHLORIDE SERPL-SCNC: 103 MMOL/L (ref 96–112)
CO2 SERPL-SCNC: 21 MMOL/L (ref 20–33)
CREAT SERPL-MCNC: 0.68 MG/DL (ref 0.5–1.4)
GFR SERPLBLD CREATININE-BSD FMLA CKD-EPI: 116 ML/MIN/1.73 M 2
GLUCOSE SERPL-MCNC: 91 MG/DL (ref 65–99)
MAGNESIUM SERPL-MCNC: 2 MG/DL (ref 1.5–2.5)
POTASSIUM SERPL-SCNC: 4 MMOL/L (ref 3.6–5.5)
SODIUM SERPL-SCNC: 135 MMOL/L (ref 135–145)

## 2023-01-07 PROCEDURE — 36415 COLL VENOUS BLD VENIPUNCTURE: CPT

## 2023-01-07 PROCEDURE — 83735 ASSAY OF MAGNESIUM: CPT

## 2023-01-07 PROCEDURE — 700111 HCHG RX REV CODE 636 W/ 250 OVERRIDE (IP): Performed by: INTERNAL MEDICINE

## 2023-01-07 PROCEDURE — 700111 HCHG RX REV CODE 636 W/ 250 OVERRIDE (IP): Performed by: HOSPITALIST

## 2023-01-07 PROCEDURE — 94760 N-INVAS EAR/PLS OXIMETRY 1: CPT

## 2023-01-07 PROCEDURE — 80048 BASIC METABOLIC PNL TOTAL CA: CPT

## 2023-01-07 PROCEDURE — A9270 NON-COVERED ITEM OR SERVICE: HCPCS | Performed by: INTERNAL MEDICINE

## 2023-01-07 PROCEDURE — 700102 HCHG RX REV CODE 250 W/ 637 OVERRIDE(OP): Performed by: INTERNAL MEDICINE

## 2023-01-07 PROCEDURE — A9270 NON-COVERED ITEM OR SERVICE: HCPCS | Performed by: HOSPITALIST

## 2023-01-07 PROCEDURE — 770006 HCHG ROOM/CARE - MED/SURG/GYN SEMI*

## 2023-01-07 PROCEDURE — 99231 SBSQ HOSP IP/OBS SF/LOW 25: CPT | Performed by: INTERNAL MEDICINE

## 2023-01-07 PROCEDURE — 700102 HCHG RX REV CODE 250 W/ 637 OVERRIDE(OP): Performed by: HOSPITALIST

## 2023-01-07 RX ADMIN — GABAPENTIN 300 MG: 300 CAPSULE ORAL at 18:26

## 2023-01-07 RX ADMIN — ZONISAMIDE 150 MG: 50 CAPSULE ORAL at 19:51

## 2023-01-07 RX ADMIN — ONDANSETRON 4 MG: 2 INJECTION INTRAMUSCULAR; INTRAVENOUS at 03:39

## 2023-01-07 RX ADMIN — HYDROMORPHONE HYDROCHLORIDE 2 MG: 2 TABLET ORAL at 19:50

## 2023-01-07 RX ADMIN — HYDROMORPHONE HYDROCHLORIDE 2 MG: 2 TABLET ORAL at 14:20

## 2023-01-07 RX ADMIN — HYDROMORPHONE HYDROCHLORIDE 1 MG: 1 INJECTION, SOLUTION INTRAMUSCULAR; INTRAVENOUS; SUBCUTANEOUS at 11:13

## 2023-01-07 RX ADMIN — GABAPENTIN 100 MG: 100 CAPSULE ORAL at 06:03

## 2023-01-07 RX ADMIN — POTASSIUM CHLORIDE 40 MEQ: 1500 TABLET, EXTENDED RELEASE ORAL at 06:03

## 2023-01-07 RX ADMIN — HYDROMORPHONE HYDROCHLORIDE 1 MG: 1 INJECTION, SOLUTION INTRAMUSCULAR; INTRAVENOUS; SUBCUTANEOUS at 23:45

## 2023-01-07 RX ADMIN — DIVALPROEX SODIUM 250 MG: 250 TABLET, DELAYED RELEASE ORAL at 18:26

## 2023-01-07 RX ADMIN — SENNOSIDES AND DOCUSATE SODIUM 2 TABLET: 50; 8.6 TABLET ORAL at 06:03

## 2023-01-07 RX ADMIN — HYDROMORPHONE HYDROCHLORIDE 1 MG: 1 INJECTION, SOLUTION INTRAMUSCULAR; INTRAVENOUS; SUBCUTANEOUS at 18:13

## 2023-01-07 RX ADMIN — HYDROMORPHONE HYDROCHLORIDE 2 MG: 2 TABLET ORAL at 08:22

## 2023-01-07 RX ADMIN — HYDROMORPHONE HYDROCHLORIDE 1 MG: 1 INJECTION, SOLUTION INTRAMUSCULAR; INTRAVENOUS; SUBCUTANEOUS at 03:39

## 2023-01-07 RX ADMIN — DIVALPROEX SODIUM 250 MG: 250 TABLET, DELAYED RELEASE ORAL at 06:03

## 2023-01-07 RX ADMIN — ONDANSETRON 4 MG: 2 INJECTION INTRAMUSCULAR; INTRAVENOUS at 18:13

## 2023-01-07 ASSESSMENT — PAIN DESCRIPTION - PAIN TYPE
TYPE: ACUTE PAIN

## 2023-01-07 NOTE — PROGRESS NOTES
"[facility] HOSPITALIST SERVICE PROGRESS NOTE    CC: epigastric pain    OVERVIEW  Hospital Course:  38 yo w/ hx of etohism, pancreatitis, p/w recurrent pancreatitis, hospitalized for supportive care and IV pain management.    SUBJECTIVE  Feels slightly better. Oral dilaudid is working. Will attempt to reduce IV dilaudid use.    ROS: All other systems were reviewed and otherwise negative aside from mentioned above    OBJECTIVE  Vital Signs: Last Filed Vitals Signs: 24 Hour Range  /81   Pulse 95   Temp 36.4 °C (97.5 °F) (Oral)   Resp 18   Ht 1.702 m (5' 7.01\")   Wt 63.5 kg (139 lb 15.9 oz)   SpO2 95%     Physical Examination:  General: A&Ox3, comfortable  HEENT: PERRL, dry mm  Neck: No Cervical LAD, No JVD, trachea is midline  Cardiovascular: tachycardic, regular, no murmur  Respiratory: CTAB, no wheezes  Gastrointestinal: Soft,  epigastric tenderness, no rigidity or guarding  Extremities:  no MICHAEL, distal pulses 2+  Skin: No rashes in visible areas, warm and dry  Psychiatric: Mood/affect non-depressed, no delirium  Neurologic: Cranial nerves are grossly intact    Laboratory Studies:  No results for input(s): WBC, RBC, HEMOGLOBIN, HEMATOCRIT, MCV, MCH, RDW, PLATELETCT, MPV, NEUTSPOLYS, LYMPHOCYTES, MONOCYTES, EOSINOPHILS, BASOPHILS, RBCMORPHOLO in the last 72 hours.    Recent Labs     01/05/23  0310 01/06/23  0141 01/07/23  0325   SODIUM 138 137 135   POTASSIUM 3.4* 3.9 4.0   CHLORIDE 103 104 103   CO2 22 21 21   GLUCOSE 94 90 91   BUN 7* 8 9   CREATININE 0.69 0.68 0.68         Medications:  Medications were reviewed and updated     Assessment and Plan:  Rhiannon Marrero is a 37 y.o. female  w/ recurrent pancreatitis.     * Recurrent pancreatitis- (present on admission)  Assessment & Plan  History of alcohol dependence, currently sober  AST, ALT, alkaline phosphatase and bilirubin within normal limits unlikely biliary etiology  Bowel rest.  Advance diet as tolerated, still on CLD  Added oxycodone PRN, dec IV " dilaudid to q6hr prn, [id number] TID and add dilaudid PO 2mg q3hr prn  Encouraged PO intake/PO meds, possible dc tomorrow    Hyponatremia  Assessment & Plan  Resolved, due to hypovolemia.    Hypokalemia  Assessment & Plan  From poor PO intake, replete     Polycythemia due to fall in plasma volume- (present on admission)  Assessment & Plan  Secondary to severe dehydration.  IVFs for now- consider dc'ing over the weekend     Bipolar disorder (HCC)- (present on admission)  Assessment & Plan  Continue home Depakote     Seizure disorder (HCC)- (present on admission)  Assessment & Plan  Continue home gabapentin and zonisamide        VTE prophylaxis: SCDs/TEDs and enoxaparin ppx. Dc by weekend if pain control off IV pain meds       Yue Davis M.D.

## 2023-01-07 NOTE — PROGRESS NOTES
"[facility] HOSPITALIST SERVICE PROGRESS NOTE    CC: epigastric pain    OVERVIEW  Hospital Course:  Per HPI 37 y.o. female with a past medical history of alcohol dependence who is currently sober, recurrent pancreatitis who presented 1/2/2023 with abdominal pain nausea and vomiting.  Patient reports progressively worsening abdominal pain over the past few days.  Abdominal pain is currently severe rated as 10/10.  Pain is mostly located in the epigastric region.  Pain does not radiate to the back.  Does not have any aggravating or relieving factors.  Reports associated nausea and vomiting.  She reports that her bowel movements have been watery.  Denies noticing any blood or mucus in stool.      SUBJECTIVE  Patient is reporting sipping on clear liquids but not much PO intake. States she still has sig pain. Agreeable to try PO dilaudid and see if that works alternating with IV.    ROS: All other systems were reviewed and otherwise negative aside from mentioned above    OBJECTIVE  Vital Signs: Last Filed Vitals Signs: 24 Hour Range  /74   Pulse 68   Temp 36.8 °C (98.3 °F) (Oral)   Resp 18   Ht 1.702 m (5' 7.01\")   Wt 63.5 kg (139 lb 15.9 oz)   SpO2 98%     Physical Examination:  General: A&Ox3, comfortable  HEENT: PERRL, dry mm  Neck: No Cervical LAD, No JVD, trachea is midline  Cardiovascular: tachycardic, regular, no murmur  Respiratory: CTAB, no wheezes  Gastrointestinal: Soft,  epigastric tenderness, no rigidity or guarding  Extremities:  no MICHAEL, distal pulses 2+  Skin: No rashes in visible areas, warm and dry  Psychiatric: Mood/affect non-depressed, no delirium  Neurologic: Cranial nerves are grossly intact    Laboratory Studies:  No results for input(s): WBC, RBC, HEMOGLOBIN, HEMATOCRIT, MCV, MCH, RDW, PLATELETCT, MPV, NEUTSPOLYS, LYMPHOCYTES, MONOCYTES, EOSINOPHILS, BASOPHILS, RBCMORPHOLO in the last 72 hours.    Recent Labs     01/05/23  0310 01/06/23  0141   SODIUM 138 137   POTASSIUM 3.4* 3.9   CHLORIDE " 103 104   CO2 22 21   GLUCOSE 94 90   BUN 7* 8   CREATININE 0.69 0.68         Medications:  Medications were reviewed and updated     Assessment and Plan:  Rhiannon Marrero is a 37 y.o. female  w/ recurrent pancreatitis.     * Recurrent pancreatitis- (present on admission)  Assessment & Plan  History of alcohol dependence, currently sober  AST, ALT, alkaline phosphatase and bilirubin within normal limits unlikely biliary etiology  Bowel rest.  Advance diet as tolerated, still on CLD  Added oxycodone PRN, dec IV dilaudid to q6hr prn, [id number] TID and add dilaudid PO 2mg q3hr prn    Hyponatremia  Assessment & Plan  Resolved, due to hypovolemia.    Hypokalemia  Assessment & Plan  From poor PO intake, replete     Polycythemia due to fall in plasma volume- (present on admission)  Assessment & Plan  Secondary to severe dehydration.  IVFs for now- consider dc'ing over the weekend     Bipolar disorder (HCC)- (present on admission)  Assessment & Plan  Continue home Depakote     Seizure disorder (HCC)- (present on admission)  Assessment & Plan  Continue home gabapentin and zonisamide        VTE prophylaxis: SCDs/TEDs and enoxaparin ppx. Dc by weekend if pain control off IV pain meds       Yue Davis M.D.

## 2023-01-07 NOTE — DIETARY
Nutrition Services:  Day 5 of admit.  Rhiannon Marrero is a 37 y.o. female with admitting DX of Recurrent pancreatitis [K85.90]    Pt is currently on clear liquid diet x 5 days. Pt has order for advance diet as tolerated. Pt is not drinking her clears at this time. Based on MD's note yesterday, she still has significant pain. Pt may benefit from addition of Boost Breeze for additional kcals and protein while on clears.     Ht 170.2 cm. Wt 1/4/23: 63.5 kg via bed scale. BMI=21.92 kg/m2 - Wt has been stable during hospitalization    Malnutrition Risk: ASPEN criteria not met but risk noted due to current poor PO and restricted diet.    Recommendations/Plan:  Add Boost Breeze to meals TID  Advance diet beyond clears when medically feasible.    Encourage intake of meals  Document intake of all meals as % taken in ADL's to provide interdisciplinary communication across all shifts.   Monitor weight.  Nutrition rep will continue to see patient for ongoing meal and snack preferences.    RD following

## 2023-01-07 NOTE — CARE PLAN
The patient is Stable - Low risk of patient condition declining or worsening    Shift Goals  Clinical Goals: Pain management  Patient Goals: Pain management  Family Goals: FANTASMA    Progress made toward(s) clinical / shift goals:    Problem: Pain - Standard  Goal: Alleviation of pain or a reduction in pain to the patient’s comfort goal  Outcome: Progressing: pt alternating PO and IV medications but pain is still uncontrolled     Problem: Knowledge Deficit - Standard  Goal: Patient and family/care givers will demonstrate understanding of plan of care, disease process/condition, diagnostic tests and medications  Outcome: Progressing: pt verbalized understanding of POC

## 2023-01-08 ENCOUNTER — APPOINTMENT (OUTPATIENT)
Dept: RADIOLOGY | Facility: MEDICAL CENTER | Age: 38
DRG: 439 | End: 2023-01-08
Attending: INTERNAL MEDICINE
Payer: COMMERCIAL

## 2023-01-08 LAB
ANION GAP SERPL CALC-SCNC: 14 MMOL/L (ref 7–16)
BUN SERPL-MCNC: 11 MG/DL (ref 8–22)
CALCIUM SERPL-MCNC: 9.3 MG/DL (ref 8.4–10.2)
CHLORIDE SERPL-SCNC: 102 MMOL/L (ref 96–112)
CO2 SERPL-SCNC: 21 MMOL/L (ref 20–33)
CREAT SERPL-MCNC: 0.74 MG/DL (ref 0.5–1.4)
GFR SERPLBLD CREATININE-BSD FMLA CKD-EPI: 108 ML/MIN/1.73 M 2
GLUCOSE SERPL-MCNC: 93 MG/DL (ref 65–99)
POTASSIUM SERPL-SCNC: 3.9 MMOL/L (ref 3.6–5.5)
SODIUM SERPL-SCNC: 137 MMOL/L (ref 135–145)

## 2023-01-08 PROCEDURE — A9270 NON-COVERED ITEM OR SERVICE: HCPCS | Performed by: INTERNAL MEDICINE

## 2023-01-08 PROCEDURE — A9270 NON-COVERED ITEM OR SERVICE: HCPCS | Performed by: HOSPITALIST

## 2023-01-08 PROCEDURE — 99232 SBSQ HOSP IP/OBS MODERATE 35: CPT | Performed by: INTERNAL MEDICINE

## 2023-01-08 PROCEDURE — 80048 BASIC METABOLIC PNL TOTAL CA: CPT

## 2023-01-08 PROCEDURE — 36415 COLL VENOUS BLD VENIPUNCTURE: CPT

## 2023-01-08 PROCEDURE — 700111 HCHG RX REV CODE 636 W/ 250 OVERRIDE (IP): Performed by: HOSPITALIST

## 2023-01-08 PROCEDURE — 700117 HCHG RX CONTRAST REV CODE 255: Performed by: INTERNAL MEDICINE

## 2023-01-08 PROCEDURE — 770006 HCHG ROOM/CARE - MED/SURG/GYN SEMI*

## 2023-01-08 PROCEDURE — 74177 CT ABD & PELVIS W/CONTRAST: CPT

## 2023-01-08 PROCEDURE — 700102 HCHG RX REV CODE 250 W/ 637 OVERRIDE(OP): Performed by: HOSPITALIST

## 2023-01-08 PROCEDURE — 94760 N-INVAS EAR/PLS OXIMETRY 1: CPT

## 2023-01-08 PROCEDURE — 700111 HCHG RX REV CODE 636 W/ 250 OVERRIDE (IP): Performed by: INTERNAL MEDICINE

## 2023-01-08 PROCEDURE — 700102 HCHG RX REV CODE 250 W/ 637 OVERRIDE(OP): Performed by: INTERNAL MEDICINE

## 2023-01-08 RX ADMIN — HYDROMORPHONE HYDROCHLORIDE 2 MG: 2 TABLET ORAL at 02:34

## 2023-01-08 RX ADMIN — HYDROMORPHONE HYDROCHLORIDE 2 MG: 2 TABLET ORAL at 14:56

## 2023-01-08 RX ADMIN — GABAPENTIN 300 MG: 300 CAPSULE ORAL at 17:46

## 2023-01-08 RX ADMIN — HYDROMORPHONE HYDROCHLORIDE 1 MG: 1 INJECTION, SOLUTION INTRAMUSCULAR; INTRAVENOUS; SUBCUTANEOUS at 05:41

## 2023-01-08 RX ADMIN — ZONISAMIDE 150 MG: 50 CAPSULE ORAL at 20:20

## 2023-01-08 RX ADMIN — HYDROMORPHONE HYDROCHLORIDE 1 MG: 1 INJECTION, SOLUTION INTRAMUSCULAR; INTRAVENOUS; SUBCUTANEOUS at 12:07

## 2023-01-08 RX ADMIN — DIVALPROEX SODIUM 250 MG: 250 TABLET, DELAYED RELEASE ORAL at 05:40

## 2023-01-08 RX ADMIN — SENNOSIDES AND DOCUSATE SODIUM 2 TABLET: 50; 8.6 TABLET ORAL at 05:40

## 2023-01-08 RX ADMIN — HYDROMORPHONE HYDROCHLORIDE 2 MG: 2 TABLET ORAL at 08:01

## 2023-01-08 RX ADMIN — HYDROMORPHONE HYDROCHLORIDE 1 MG: 1 INJECTION, SOLUTION INTRAMUSCULAR; INTRAVENOUS; SUBCUTANEOUS at 23:38

## 2023-01-08 RX ADMIN — ONDANSETRON 4 MG: 2 INJECTION INTRAMUSCULAR; INTRAVENOUS at 15:01

## 2023-01-08 RX ADMIN — POTASSIUM CHLORIDE 40 MEQ: 1500 TABLET, EXTENDED RELEASE ORAL at 05:40

## 2023-01-08 RX ADMIN — HYDROMORPHONE HYDROCHLORIDE 2 MG: 2 TABLET ORAL at 20:23

## 2023-01-08 RX ADMIN — HYDROMORPHONE HYDROCHLORIDE 1 MG: 1 INJECTION, SOLUTION INTRAMUSCULAR; INTRAVENOUS; SUBCUTANEOUS at 18:19

## 2023-01-08 RX ADMIN — DIVALPROEX SODIUM 250 MG: 250 TABLET, DELAYED RELEASE ORAL at 17:45

## 2023-01-08 RX ADMIN — ONDANSETRON 4 MG: 4 TABLET, ORALLY DISINTEGRATING ORAL at 02:34

## 2023-01-08 RX ADMIN — IOHEXOL 100 ML: 350 INJECTION, SOLUTION INTRAVENOUS at 15:46

## 2023-01-08 RX ADMIN — GABAPENTIN 100 MG: 100 CAPSULE ORAL at 05:40

## 2023-01-08 ASSESSMENT — PAIN DESCRIPTION - PAIN TYPE
TYPE: ACUTE PAIN

## 2023-01-08 ASSESSMENT — ENCOUNTER SYMPTOMS
MEMORY LOSS: 0
CHILLS: 0
PALPITATIONS: 0
DIZZINESS: 0
ABDOMINAL PAIN: 1
NECK PAIN: 0
WEAKNESS: 1
EYE PAIN: 0
EYE DISCHARGE: 0
DIARRHEA: 1
VOMITING: 0
NAUSEA: 1
MYALGIAS: 0
FEVER: 0
COUGH: 0
FLANK PAIN: 0
HEMOPTYSIS: 0
NERVOUS/ANXIOUS: 1

## 2023-01-08 ASSESSMENT — LIFESTYLE VARIABLES: SUBSTANCE_ABUSE: 0

## 2023-01-08 ASSESSMENT — FIBROSIS 4 INDEX: FIB4 SCORE: 0.6

## 2023-01-08 NOTE — PROGRESS NOTES
"[facility] HOSPITALIST SERVICE PROGRESS NOTE    CC: epigastric pain    OVERVIEW  Hospital Course:  38 yo w/ hx of etohism, pancreatitis, p/w recurrent pancreatitis, hospitalized for supportive care and IV pain management.    SUBJECTIVE  Worsening LUQ pain today, overall about same not much progress. PO dilaudid helps. She wants to discharge tomorrow AM if possible bc she is starting a new job. However oral intake remains poor. + nausea. No BM    ROS: All other systems were reviewed and otherwise negative aside from mentioned above    OBJECTIVE  Vital Signs: Last Filed Vitals Signs: 24 Hour Range  BP 98/74   Pulse 76   Temp 36.3 °C (97.3 °F) (Oral)   Resp 18   Ht 1.702 m (5' 7.01\")   Wt 62.1 kg (136 lb 14.5 oz)   SpO2 98%     Physical Examination:  General: A&Ox3, comfortable  HEENT: PERRL, dry mm  Neck: No Cervical LAD, No JVD, trachea is midline  Cardiovascular: tachycardic, regular, no murmur  Respiratory: CTAB, no wheezes  Gastrointestinal: Soft,  epigastric tenderness, palpable mass along L abdomen, no rigidity or guarding  Extremities:  no MICHAEL, distal pulses 2+  Skin: No rashes in visible areas, warm and dry  Psychiatric: Mood/affect non-depressed  Neurologic: Cranial nerves are grossly intact    Laboratory Studies:  No results for input(s): WBC, RBC, HEMOGLOBIN, HEMATOCRIT, MCV, MCH, RDW, PLATELETCT, MPV, NEUTSPOLYS, LYMPHOCYTES, MONOCYTES, EOSINOPHILS, BASOPHILS, RBCMORPHOLO in the last 72 hours.    Recent Labs     01/06/23  0141 01/07/23  0325 01/08/23  0120   SODIUM 137 135 137   POTASSIUM 3.9 4.0 3.9   CHLORIDE 104 103 102   CO2 21 21 21   GLUCOSE 90 91 93   BUN 8 9 11   CREATININE 0.68 0.68 0.74         Medications:  Medications were reviewed and updated     Assessment and Plan:  Rhiannon Marrero is a 37 y.o. female  w/ recurrent pancreatitis.     * Recurrent pancreatitis- (present on admission)  Assessment & Plan  Patient with recurrent pancreatitis with readmission for same . Lipase usually is 220s " which is about same. CT imaging showed a stone in the pancreatic duct causing duct dilation. Given ongoing pain/no improvement, will re image and c/s GI. She is well known to GI and in the past ERCP was declined due to difficulty with removing pancreatic duct stones, risk of pancreatitis and likelihood of stones reoccuring. Hepatobiliary also evaluated patient last admission and did not think it was indication for surgery.  Advance diet as tolerated, still on CLD  oxycodone PRN,IV dilaudid to q6hr prn, [id number] TID and dilaudid PO 2mg q3hr prn  F/u CT A/P    Hyponatremia  Assessment & Plan  Resolved, due to hypovolemia.    Hypokalemia  Assessment & Plan  From poor PO intake, repleted     Polycythemia due to fall in plasma volume- (present on admission)  Assessment & Plan  Secondary to severe dehydration.  IVFs dc'd     Bipolar disorder (HCC)- (present on admission)  Assessment & Plan  Continue home Depakote     Seizure disorder (HCC)- (present on admission)  Assessment & Plan  Continue home gabapentin and zonisamide        VTE prophylaxis: SCDs/TEDs and enoxaparin ppx. Poss dc tomorrow       Yue Davis M.D.

## 2023-01-08 NOTE — PROGRESS NOTES
Report received from Clayton KAUR informed me that pt was refusing her breakfast tray.  When asking pt why, she said that she did not want an incident like yesterday of vomiting.  When talking about discharge, pt asking if she could have more imaging as her pain is normally epigastric but today its more LLQ and radiate to her back.  She states that she is passing flatus but has not had a bm since coming in.  Asked her what her normal habits are and she says its like IBS for a few days and then

## 2023-01-08 NOTE — PROGRESS NOTES
Rounding complete. Pt medicated for pain per MAR. Heat packs given for comfort. Safety precautions in place. Call light in reach.

## 2023-01-08 NOTE — PROGRESS NOTES
Report received from Evelina NELSON. POC discussed. Pt resting comfortably in bed. Safety precautions in place.

## 2023-01-08 NOTE — PROGRESS NOTES
Report given to Akbar NELSON. POC discussed. Pt resting comfortably in bed. Safety precautions in place.

## 2023-01-08 NOTE — CARE PLAN
The patient is Stable - Low risk of patient condition declining or worsening    Shift Goals  Clinical Goals: pain management  Patient Goals: rest  Family Goals: FANTASMA    Progress made toward(s) clinical / shift goals:  Pt medicated per MAR and non pharmacologic comfort measures in place. Pain assessments performed per flowsheets. Pt encouraged to report pain. Care clustered to allow for rest. Pt verbalized care plan understanding.    Patient is not progressing towards the following goals:

## 2023-01-08 NOTE — CONSULTS
Gastroenterology Initial Consult Note               Author:  GILBERT Maldonado Date & Time Created: 1/8/2023 12:06 PM       Patient ID:  Name:             Rhiannon Marrero  YOB: 1985  Age:                 37 y.o.  female  MRN:               [id number]      Referring Provider:  Yue Davis MD      Presenting Chief Complaint:  Abdominal pain, Nausea, Vomiting      History of Present Illness:    This is a very pleasant 37 y.o. female with the past medical history of abuse currently sober, recurrent pancreatitis from alcohol, chronic pancreatitis, and pancreatic duct stone.  He presented to the hospital on 1/2/2023 with complaints of epigastric abdominal pain nonradiating with nausea and vomiting.  Pain was rated as severe, sharp, and 10 out of 10.  Similar similar to previous admissions.  Lipase still elevated at 232 compared to December 20, 2022 admission.  At that time the patient underwent CT and MRI imaging demonstrating a pancreatic duct stone in the head of the pancreas with some upstream dilation.  She underwent endoscopic ultrasound and celiac plexus blockade and EUS did demonstrate chronic pancreatitis, calcifications, and reticulocyte duct stone in the head of the pancreas not completely obstructive at the time.  The patient has remained sober from alcohol.      Review of Systems:  Review of Systems   Constitutional:  Negative for chills and fever.   HENT:  Negative for hearing loss and tinnitus.    Eyes:  Negative for pain and discharge.   Respiratory:  Negative for cough and hemoptysis.    Cardiovascular:  Negative for chest pain and palpitations.   Gastrointestinal:  Positive for abdominal pain, diarrhea and nausea. Negative for vomiting.   Genitourinary:  Negative for flank pain and hematuria.   Musculoskeletal:  Negative for myalgias and neck pain.   Skin:  Negative for itching and rash.   Neurological:  Positive for weakness. Negative for dizziness.   Psychiatric/Behavioral:   Negative for memory loss and substance abuse. The patient is nervous/anxious.    All other systems reviewed and are negative.          Past Medical History:  Past Medical History:   Diagnosis Date    Acute pancreatitis without infection or necrosis 07/20/2022    Alcohol dependence ([facility]) 04/13/2017    Bronchitis 08/2012    Cold     sept 2018    Current moderate episode of major depressive disorder without prior episode ([facility]) 01/31/2020    Heart burn     Hernia of unspecified site of abdominal cavity without mention of obstruction or gangrene     High anion gap metabolic acidosis 07/01/2022    Indigestion     Pancreatitis     Pneumonia 2011    Seizure disorder ([facility]) 05/23/2022     Active Hospital Problems    Diagnosis     Polycythemia due to fall in plasma volume [D75.1]     Recurrent pancreatitis [K85.90]     Bipolar disorder ([facility]) [F31.9]     Seizure disorder ([facility]) [G40.909]          Past Surgical History:  Past Surgical History:   Procedure Laterality Date    SD UPPER GI ENDOSCOPY,DIAGNOSIS N/A 12/23/2022    Procedure: GASTROSCOPY;  Surgeon: Td Kwok M.D.;  Location: [facility];  Service: EUS    SD INJECT NERV BLCK,CELIAC PLEXUS N/A 12/23/2022    Procedure: BLOCK, CELIAC PLEXUS;  Surgeon: Td Kwok M.D.;  Location: [facility];  Service: EUS    EGD W/ENDOSCOPIC ULTRASOUND N/A 12/23/2022    Procedure: EGD, WITH ENDOSCOPIC US;  Surgeon: Td Kwok M.D.;  Location: [facility];  Service: EUS    ORIF, WRIST Right 4/24/2019    Procedure: ORIF, WRIST;  Surgeon: Marquis Bell M.D.;  Location: [facility];  Service: Orthopedics    HYSTERECTOMY ROBOTIC XI  10/11/2018    Procedure: HYSTERECTOMY ROBOTIC XI- RIGHT URETEROLYSIS;  Surgeon: Marquis Reeder M.D.;  Location: [facility];  Service: Gynecology    SALPINGECTOMY Bilateral 10/11/2018    Procedure: SALPINGECTOMY;  Surgeon: Marquis Reeder M.D.;  Location: [facility];   Service: Gynecology    TONSILLECTOMY  4/11/2013    Performed by Rock Rothman M.D. at SURGERY SAME DAY [facility]    ARTHROSCOPY, KNEE      GYN SURGERY      miscarriage May 2006    OTHER ORTHOPEDIC SURGERY      knee surgeries           Hospital Medications:  Current Facility-Administered Medications   Medication Dose Frequency Provider Last Rate Last Admin    HYDROmorphone (Dilaudid) injection 1 mg  1 mg Q6HRS PRN Yue Davsi M.D.   1 mg at 01/08/23 0541    HYDROmorphone (DILAUDID) tablet 2 mg  2 mg Q3HRS PRN Yue Davis M.D.   2 mg at 01/08/23 0801    potassium chloride SA (Kdur) tablet 40 mEq  40 mEq DAILY Yue Davis M.D.   40 mEq at 01/08/23 0540    divalproex (DEPAKOTE) delayed-release tablet 250 mg  250 mg BID Anita Smith M.D.   250 mg at 01/08/23 0540    gabapentin (NEURONTIN) capsule 100 mg  100 mg QAM Anita Smith M.D.   100 mg at 01/08/23 0540    zonisamide (ZONEGRAN) capsule 150 mg  150 mg QHS Asestephen Smith M.D.   150 mg at 01/07/23 1951    acetaminophen (Tylenol) tablet 650 mg  650 mg Q6HRS PRN Anita Smiht M.D.   650 mg at 01/03/23 1614    senna-docusate (PERICOLACE or SENOKOT S) 8.6-50 MG per tablet 2 Tablet  2 Tablet BID Anita Smith M.D.   2 Tablet at 01/08/23 0540    And    polyethylene glycol/lytes (MIRALAX) PACKET 1 Packet  1 Packet QDAY PRN Anita Smith M.D.        And    magnesium hydroxide (MILK OF MAGNESIA) suspension 30 mL  30 mL QDAY PRN Anita Smith M.D.        And    bisacodyl (DULCOLAX) suppository 10 mg  10 mg QDAY PRN Anita Smith M.D.        enoxaparin (Lovenox) inj 40 mg  40 mg DAILY AT 1800 Anita Smith M.D.        Pharmacy Consult Request ...Pain Management Review 1 Each  1 Each PHARMACY TO DOSE Anita Smith M.D.        ondansetron (ZOFRAN) syringe/vial injection 4 mg  4 mg Q4HRS PRN Anita Smith M.D.   4 mg at 01/07/23 1813    ondansetron (ZOFRAN ODT) dispertab 4 mg  4 mg Q4HRS PRN Anita Smith M.D.   4 mg at  01/08/23 0234    gabapentin (NEURONTIN) capsule 300 mg  300 mg Q EVENING Anita Smith M.D.   300 mg at 01/07/23 1826   Last reviewed on 1/3/2023  2:47 PM by Ligia Wakefield R.N.       Current Outpatient Medications:  Medications Prior to Admission   Medication Sig Dispense Refill Last Dose    zonisamide (ZONEGRAN) 50 MG capsule Take 3 Capsules by mouth at bedtime for 30 days. 90 Capsule 0 1/1/2023 at PM    senna-docusate (PERICOLACE OR SENOKOT S) 8.6-50 MG Tab Take 2 Tablets by mouth 2 times a day. 30 Tablet 0     ibuprofen (MOTRIN) 200 MG Tab Take 600 mg by mouth every 6 hours as needed. Indications: Pain       gabapentin (NEURONTIN) 100 MG Cap Take 100-300 mg by mouth 2 times a day. Pt takes 100MG in AM and 300MG in the evening   1/2/2023 at AM    hydrOXYzine pamoate (VISTARIL) 50 MG Cap Take 1 Capsule by mouth every 8 hours as needed for Anxiety. 60 Capsule 0 1/2/2023 at AM    divalproex (DEPAKOTE) 250 MG Tablet Delayed Response Take 1 Tablet by mouth 2 times a day for 120 days. 60 Tablet 3 1/2/2023 at AM         Medication Allergies:  Allergies   Allergen Reactions    Promethazine Hcl Anxiety     Anxiety and makes her feels like skin coming off     Naltrexone Anxiety         Family Medical History:  Family History   Problem Relation Age of Onset    Psychiatric Illness Mother     Stroke Mother     Arthritis Mother     Heart Disease Maternal Grandfather     Cancer Neg Hx     Diabetes Neg Hx     Hypertension Neg Hx          Social History:  Social History     Socioeconomic History    Marital status:      Spouse name: Not on file    Number of children: Not on file    Years of education: Not on file    Highest education level: Not on file   Occupational History    Not on file   Tobacco Use    Smoking status: Every Day     Packs/day: 0.75     Years: 10.00     Pack years: 7.50     Types: Cigarettes    Smokeless tobacco: Never   Vaping Use    Vaping Use: Former    Substances: Nicotine   Substance and  "Sexual Activity    Alcohol use: Not Currently     Comment: 5 shots, binges. Was sober for 34 days until today 10/12    Drug use: Yes     Types: Marijuana     Comment: edibles    Sexual activity: Not on file   Other Topics Concern    Not on file   Social History Narrative    Not on file     Social Determinants of Health     Financial Resource Strain: Not on file   Food Insecurity: Not on file   Transportation Needs: Not on file   Physical Activity: Not on file   Stress: Not on file   Social Connections: Not on file   Intimate Partner Violence: Not on file   Housing Stability: Not on file         Vital signs:  Weight/BMI: Body mass index is 21.44 kg/m².  BP 98/74   Pulse 76   Temp 36.3 °C (97.3 °F) (Oral)   Resp 18   Ht 1.702 m (5' 7.01\")   Wt 62.1 kg (136 lb 14.5 oz)   SpO2 98%   Vitals:    01/07/23 2018 01/08/23 0229 01/08/23 0400 01/08/23 0706   BP: (!) 142/93 116/73  98/74   Pulse: 88 97  76   Resp: 18 18  18   Temp: 36.4 °C (97.5 °F) 36.5 °C (97.7 °F)  36.3 °C (97.3 °F)   TempSrc: Oral Oral  Oral   SpO2: 91% 98%  98%   Weight:   62.1 kg (136 lb 14.5 oz)    Height:         Oxygen Therapy:  Pulse Oximetry: 98 %, O2 (LPM): 0, O2 Delivery Device: None - Room Air  No intake or output data in the 24 hours ending 01/08/23 1206      Physical Exam:  Physical Exam  Vitals and nursing note reviewed.   Constitutional:       Appearance: Normal appearance.   HENT:      Head: Normocephalic and atraumatic.      Right Ear: External ear normal.      Left Ear: External ear normal.      Nose: Nose normal. No congestion.   Eyes:      General: No scleral icterus.     Extraocular Movements: Extraocular movements intact.      Pupils: Pupils are equal, round, and reactive to light.   Cardiovascular:      Rate and Rhythm: Normal rate and regular rhythm.      Pulses: Normal pulses.      Heart sounds: Normal heart sounds. No murmur heard.  Pulmonary:      Effort: Pulmonary effort is normal. No respiratory distress.      Breath sounds: " Normal breath sounds.   Abdominal:      General: Abdomen is flat. Bowel sounds are normal. There is no distension.      Palpations: Abdomen is soft.      Tenderness: There is abdominal tenderness (Epigastric).   Musculoskeletal:      Cervical back: Neck supple.      Right lower leg: No edema.      Left lower leg: No edema.   Lymphadenopathy:      Cervical: No cervical adenopathy.   Skin:     General: Skin is warm and dry.   Neurological:      General: No focal deficit present.      Mental Status: She is alert and oriented to person, place, and time.   Psychiatric:         Thought Content: Thought content normal.         Judgment: Judgment normal.         Labs:  Recent Labs     01/06/23  0141 01/07/23  0325 01/08/23  0120   SODIUM 137 135 137   POTASSIUM 3.9 4.0 3.9   CHLORIDE 104 103 102   CO2 21 21 21   BUN 8 9 11   CREATININE 0.68 0.68 0.74   MAGNESIUM  --  2.0  --    CALCIUM 9.4 9.3 9.3     Recent Labs     01/06/23  0141 01/07/23  0325 01/08/23  0120   GLUCOSE 90 91 93         No results for input(s): RBC, HEMOGLOBIN, HEMATOCRIT, PLATELETCT, PROTHROMBTM, APTT, INR, IRON, FERRITIN, TOTIRONBC in the last 72 hours.  Recent Results (from the past 24 hour(s))   Basic Metabolic Panel    Collection Time: 01/08/23  1:20 AM   Result Value Ref Range    Sodium 137 135 - 145 mmol/L    Potassium 3.9 3.6 - 5.5 mmol/L    Chloride 102 96 - 112 mmol/L    Co2 21 20 - 33 mmol/L    Glucose 93 65 - 99 mg/dL    Bun 11 8 - 22 mg/dL    Creatinine 0.74 0.50 - 1.40 mg/dL    Calcium 9.3 8.4 - 10.2 mg/dL    Anion Gap 14.0 7.0 - 16.0   ESTIMATED GFR    Collection Time: 01/08/23  1:20 AM   Result Value Ref Range    GFR (CKD-EPI) 108 >60 mL/min/1.73 m 2         Radiology Review:  CT-ABDOMEN-PELVIS WITH    (Results Pending)         MDM (Data Review):   -Records reviewed and summarized in current documentation  -I personally reviewed and interpreted the laboratory results  -I personally reviewed the radiology images        Medical Decision  Making, by Problem:  Active Hospital Problems    Diagnosis     Polycythemia due to fall in plasma volume [D75.1]     Recurrent pancreatitis [K85.90]     Bipolar disorder (HCC) [F31.9]     Seizure disorder (HCC) [G40.909]            Assessment/Recommendations:  87-year-old female with a history of recurrent alcohol-related pancreatitis and chronic pancreatitis who was recently found to have a pancreatic duct stone in the head of her pancreas with some upstream dilation.  EUS without completely obstructive stone, and previously ERCP was not offered due to risk of pancreatitis from the procedure, risk of stone recurrence, and difficulty of ERCP.  Additionally, Dr. Rachel was asked about this patient and he said there was no indications for surgical treatment of this problem.  Recommend repeat imaging to assess for any changes or new developments and further discussion.    Assessment:  -Epigastric pain  -Alcohol related Chronic pancreatitis with possible acute component  -Abnormal imaging of the pancreas  -History of alcohol abuse, currently in remission  -Bipolar disorder  -Seizure disorder    Plan:  Diet as tolerated  CT with contrast ordered by hospitalist  Further discussion and evaluation pending imaging    GILBERT Maldonado      Thank you for inviting me to participate in the care of this patient. Please do not hesitate to call GI consultants with additional questions/concerns or changes in the patient's clinical status at [phone].      Core Quality Measures   Reviewed items:  Labs, Medications and Radiology reports reviewed

## 2023-01-08 NOTE — CARE PLAN
Problem: Pain - Standard  Goal: Alleviation of pain or a reduction in pain to the patient’s comfort goal  Outcome: Not Progressing  Note: Using PRNS but not as frequent as previous days, pt states that she cannot go home on narcotics because she is in a rehab.     Problem: Knowledge Deficit - Standard  Goal: Patient and family/care givers will demonstrate understanding of plan of care, disease process/condition, diagnostic tests and medications  Outcome: Progressing  Note: Doing CT this afternoon and plan for d/c in am   The patient is Stable - Low risk of patient condition declining or worsening    Shift Goals  Clinical Goals: Why is she not eating, pain management, discharge plan  Patient Goals: scan of abdomen for this pain  Family Goals: FANTASMA    Progress made toward(s) clinical / shift goals:  pt not wanting to eat because of how sick she got yesterday, pt has been using dilaudid less frequent and plan for discharge tomorrow.    Patient is not progressing towards the following goals:      Problem: Pain - Standard  Goal: Alleviation of pain or a reduction in pain to the patient’s comfort goal  Outcome: Not Progressing  Note: Using PRNS but not as frequent as previous days, pt states that she cannot go home on narcotics because she is in a rehab.

## 2023-01-09 ENCOUNTER — PHARMACY VISIT (OUTPATIENT)
Dept: PHARMACY | Facility: MEDICAL CENTER | Age: 38
End: 2023-01-09
Payer: MEDICARE

## 2023-01-09 VITALS
WEIGHT: 133.38 LBS | DIASTOLIC BLOOD PRESSURE: 63 MMHG | BODY MASS INDEX: 20.93 KG/M2 | HEART RATE: 91 BPM | HEIGHT: 67 IN | RESPIRATION RATE: 16 BRPM | SYSTOLIC BLOOD PRESSURE: 96 MMHG | TEMPERATURE: 97.9 F | OXYGEN SATURATION: 91 %

## 2023-01-09 PROBLEM — K86.89 PANCREATIC DUCT STONES: Status: ACTIVE | Noted: 2023-01-09

## 2023-01-09 LAB
ANION GAP SERPL CALC-SCNC: 12 MMOL/L (ref 7–16)
BUN SERPL-MCNC: 10 MG/DL (ref 8–22)
CALCIUM SERPL-MCNC: 8.9 MG/DL (ref 8.4–10.2)
CHLORIDE SERPL-SCNC: 100 MMOL/L (ref 96–112)
CO2 SERPL-SCNC: 20 MMOL/L (ref 20–33)
CREAT SERPL-MCNC: 0.79 MG/DL (ref 0.5–1.4)
GFR SERPLBLD CREATININE-BSD FMLA CKD-EPI: 100 ML/MIN/1.73 M 2
GLUCOSE SERPL-MCNC: 131 MG/DL (ref 65–99)
LIPASE SERPL-CCNC: 23 U/L (ref 7–58)
POTASSIUM SERPL-SCNC: 3.7 MMOL/L (ref 3.6–5.5)
SODIUM SERPL-SCNC: 132 MMOL/L (ref 135–145)

## 2023-01-09 PROCEDURE — A9270 NON-COVERED ITEM OR SERVICE: HCPCS | Performed by: INTERNAL MEDICINE

## 2023-01-09 PROCEDURE — 80048 BASIC METABOLIC PNL TOTAL CA: CPT

## 2023-01-09 PROCEDURE — 700102 HCHG RX REV CODE 250 W/ 637 OVERRIDE(OP): Performed by: HOSPITALIST

## 2023-01-09 PROCEDURE — 700102 HCHG RX REV CODE 250 W/ 637 OVERRIDE(OP): Performed by: INTERNAL MEDICINE

## 2023-01-09 PROCEDURE — 700111 HCHG RX REV CODE 636 W/ 250 OVERRIDE (IP): Performed by: HOSPITALIST

## 2023-01-09 PROCEDURE — 99239 HOSP IP/OBS DSCHRG MGMT >30: CPT | Performed by: INTERNAL MEDICINE

## 2023-01-09 PROCEDURE — A9270 NON-COVERED ITEM OR SERVICE: HCPCS | Performed by: HOSPITALIST

## 2023-01-09 PROCEDURE — 36415 COLL VENOUS BLD VENIPUNCTURE: CPT

## 2023-01-09 PROCEDURE — 700111 HCHG RX REV CODE 636 W/ 250 OVERRIDE (IP): Performed by: INTERNAL MEDICINE

## 2023-01-09 PROCEDURE — 83690 ASSAY OF LIPASE: CPT

## 2023-01-09 RX ORDER — ONDANSETRON 4 MG/1
4 TABLET, ORALLY DISINTEGRATING ORAL EVERY 4 HOURS PRN
Qty: 10 TABLET | Refills: 0 | Status: SHIPPED | OUTPATIENT
Start: 2023-01-09 | End: 2023-01-09 | Stop reason: SDUPTHER

## 2023-01-09 RX ORDER — HYDROMORPHONE HYDROCHLORIDE 1 MG/ML
1 INJECTION, SOLUTION INTRAMUSCULAR; INTRAVENOUS; SUBCUTANEOUS ONCE
Status: COMPLETED | OUTPATIENT
Start: 2023-01-09 | End: 2023-01-09

## 2023-01-09 RX ORDER — ONDANSETRON 4 MG/1
4 TABLET, ORALLY DISINTEGRATING ORAL EVERY 4 HOURS PRN
Qty: 10 TABLET | Refills: 0 | Status: ON HOLD | OUTPATIENT
Start: 2023-01-09 | End: 2023-01-14

## 2023-01-09 RX ADMIN — HYDROMORPHONE HYDROCHLORIDE 1 MG: 1 INJECTION, SOLUTION INTRAMUSCULAR; INTRAVENOUS; SUBCUTANEOUS at 05:51

## 2023-01-09 RX ADMIN — POTASSIUM CHLORIDE 40 MEQ: 1500 TABLET, EXTENDED RELEASE ORAL at 05:51

## 2023-01-09 RX ADMIN — HYDROMORPHONE HYDROCHLORIDE 1 MG: 1 INJECTION, SOLUTION INTRAMUSCULAR; INTRAVENOUS; SUBCUTANEOUS at 09:20

## 2023-01-09 RX ADMIN — ONDANSETRON 4 MG: 4 TABLET, ORALLY DISINTEGRATING ORAL at 02:59

## 2023-01-09 RX ADMIN — HYDROMORPHONE HYDROCHLORIDE 2 MG: 2 TABLET ORAL at 02:59

## 2023-01-09 RX ADMIN — HYDROMORPHONE HYDROCHLORIDE 2 MG: 2 TABLET ORAL at 11:33

## 2023-01-09 RX ADMIN — DIVALPROEX SODIUM 250 MG: 250 TABLET, DELAYED RELEASE ORAL at 05:50

## 2023-01-09 RX ADMIN — GABAPENTIN 100 MG: 100 CAPSULE ORAL at 05:50

## 2023-01-09 ASSESSMENT — ENCOUNTER SYMPTOMS
WEAKNESS: 1
PALPITATIONS: 0
DIZZINESS: 0
ABDOMINAL PAIN: 1
EYE DISCHARGE: 0
HEMOPTYSIS: 0
CHILLS: 0
MEMORY LOSS: 0
MYALGIAS: 0
DIARRHEA: 1
VOMITING: 0
NECK PAIN: 0
FLANK PAIN: 0
NAUSEA: 1
EYE PAIN: 0
FEVER: 0
COUGH: 0
NERVOUS/ANXIOUS: 1

## 2023-01-09 ASSESSMENT — PAIN DESCRIPTION - PAIN TYPE
TYPE: ACUTE PAIN

## 2023-01-09 ASSESSMENT — LIFESTYLE VARIABLES: SUBSTANCE_ABUSE: 0

## 2023-01-09 ASSESSMENT — FIBROSIS 4 INDEX: FIB4 SCORE: 0.6

## 2023-01-09 NOTE — PROGRESS NOTES
Report given to Wendi NELSON. POC discussed. Pt resting comfortably in bed. Safety precautions in place.

## 2023-01-09 NOTE — DISCHARGE SUMMARY
Hospital Medicine Discharge Note     Admit Date:  1/2/2023       Discharge Date:   1/9/2023  LOS: 7 days     Primary Care Provider:    Ivy Adams M.D.    Attending Physician:  Yue Davis M.D.     Discharge Diagnoses:   Principal Problem:    Recurrent pancreatitis  Active Problems:    Seizure disorder (HCC)    Bipolar disorder (HCC)    Polycythemia due to fall in plasma volume    Pancreatic duct stones        Hospital Summary (Brief Narrative):         37 y.o. female with the past medical history of abuse currently sober, recurrent pancreatitis from alcohol, chronic pancreatitis, and pancreatic duct stone who presented w/ recurrent acute on chronic pancreatitis due to pancreatic duct stone obstruction.    *Recurrent pancreatitis- (present on admission)  *Pancreatic duct obstruction 2/2 stone  Assessment & Plan  Patient with hx of recurrent pancreatitis with readmission for same . Lipase usually is 220s which is about same. CT imaging showed a stone in the pancreatic duct causing duct dilation. Pain was difficult to control. It was treated with IV dilaudid to q6hr prn, [id number] TID and dilaudid PO 2mg q3hr prn. GI was consulted again who deferred ERCP at this time. Repeat CT A/P once again confirmed pancreatic duct stone causing duct dilatation. Patient is high risk for readmission for recurrent acute on chronic pancreatitis. Recommend very close outpatient GI follow up. Kaushik Resendez from [facility] was contacted to help with arrangement of follow up appointment upon discharge. If she has repeat readmissions, risks and benefits of ERCP should be discussed with patient in detail and more definitve management of the obstructive pancreatic stones should be addressed.     Hyponatremia  Assessment & Plan  Resolved, due to hypovolemia.     Hypokalemia  Assessment & Plan  From poor PO intake, repleted     Polycythemia due to fall in plasma volume- (present on admission)  Assessment & Plan  Secondary to severe  dehydration.  IVFs dc'd     Bipolar disorder (HCC)- (present on admission)  Assessment & Plan  Continue home Depakote     Seizure disorder (HCC)- (present on admission)  Assessment & Plan  Continue home gabapentin and zonisamide    hx of EtOH abuse  Assessment & Plan  Currently resides at rehab, sober x 3 months or so.    Disposition:   Discharge home    Condition:  Stable    Activity:   As tolerated     Diet: low fat diet    Discharge Medications:           Medication List        START taking these medications        Instructions   ondansetron 4 MG Tbdp  Commonly known as: ZOFRAN ODT   Doctor's comments: (give PO if no IV route available).  Take 1 Tablet by mouth every four hours as needed for Nausea/Vomiting  Dose: 4 mg            CONTINUE taking these medications        Instructions   divalproex 250 MG Tbec  Commonly known as: DEPAKOTE   Take 1 Tablet by mouth 2 times a day for 120 days.  Dose: 250 mg     gabapentin 100 MG Caps  Commonly known as: NEURONTIN   Take 100-300 mg by mouth 2 times a day. Pt takes 100MG in AM and 300MG in the evening  Dose: 100-300 mg     hydrOXYzine pamoate 50 MG Caps  Commonly known as: Vistaril   Take 1 Capsule by mouth every 8 hours as needed for Anxiety.  Dose: 50 mg     ibuprofen 200 MG Tabs  Commonly known as: MOTRIN   Take 600 mg by mouth every 6 hours as needed. Indications: Pain  Dose: 600 mg     senna-docusate 8.6-50 MG Tabs  Commonly known as: PERICOLACE or SENOKOT S   Take 2 Tablets by mouth 2 times a day.  Dose: 2 Tablet     zonisamide 50 MG capsule  Commonly known as: ZONEGRAN   Take 3 Capsules by mouth at bedtime for 30 days.  Dose: 150 mg                Follow up appointment details :      I encouraged her to call his PCP to confirm follow up after discharge.    Future Appointments   Date Time Provider Department Center   2/3/2023  3:00 PM Darien Garcia M.D. [facility] None       [facility]ultants:      [facility]    Studies:    Imaging/ Testing:      CT-ABDOMEN-PELVIS WITH    Final Result      1.  No acute intracranial abnormality      2.  Pancreatic ductal dilation      3.  5 mm stone within the pancreatic duct at the junction of the head and body      4.  2 stones within the pancreatic duct at the junction of the body and tail measuring 1-2 mm      5.  Dilated portal vein measuring 16-7 mm suspicious for portal hypertension      6.  Prior hysterectomy          Procedures:        None      Instructions:      The were given instructions to return to the ER if patient's condition worsens      Time Spent on Discharge:     Discharge instructions were discussed with the patient at bedside. Patient  expressed understanding and agreed to comply with all discharge instructions.    35 minutes were spent in the discharge planning and management of this  patient, including more than 50% of the time spent face to face in   Counseling.

## 2023-01-09 NOTE — PROGRESS NOTES
Report received from Akbar NELSON. POC discussed. Pt resting comfortably in bed. Safety precautions in place.

## 2023-01-09 NOTE — CARE PLAN
The patient is Stable - Low risk of patient condition declining or worsening    Shift Goals  Clinical Goals: pain management  Patient Goals: rest  Family Goals: FANTASMA    Progress made toward(s) clinical / shift goals:  Pain assessments performed. Pharmacologic and nonpharmacologic comfort measures in place. Pt encouraged to report pain. Care clustered to allow for rest. Pt verbalized care plan understanding.    Patient is not progressing towards the following goals:

## 2023-01-09 NOTE — PROGRESS NOTES
Rounding complete. Pt medicated for pain per MAR. No needs at this time. Encouraged PO fluid intake. Pt encouraged for ongoing etoh cessation and congratulated for progress. Per pt, she has been sober for 88 days. Safety precautions in place call light in reach.

## 2023-01-09 NOTE — PROGRESS NOTES
Gastroenterology Initial Consult Note               Author:  GILBERT Corona Date & Time Created: 1/9/2023 10:49 AM       Patient ID:  Name:             Rhiannon Marrero  YOB: 1985  Age:                 37 y.o.  female  MRN:               [id number]      Referring Provider:  Yue Davis MD      Presenting Chief Complaint:  Abdominal pain, Nausea, Vomiting      History of Present Illness:    This is a very pleasant 37 y.o. female with the past medical history of abuse currently sober, recurrent pancreatitis from alcohol, chronic pancreatitis, and pancreatic duct stone.  He presented to the hospital on 1/2/2023 with complaints of epigastric abdominal pain nonradiating with nausea and vomiting.  Pain was rated as severe, sharp, and 10 out of 10.  Similar similar to previous admissions.  Lipase still elevated at 232 compared to December 20, 2022 admission.  At that time the patient underwent CT and MRI imaging demonstrating a pancreatic duct stone in the head of the pancreas with some upstream dilation.  She underwent endoscopic ultrasound and celiac plexus blockade and EUS did demonstrate chronic pancreatitis, calcifications, and reticulocyte duct stone in the head of the pancreas not completely obstructive at the time.  The patient has remained sober from alcohol.    INTERVAL HISTORY:  1/9/22: Stable. No acute overnight events. Continues to have abdominal pain.     CT scan abdomen/pelvis:   No acute intracranial abnormality  Pancreatic ductal dilation  5 mm stone within the pancreatic duct at the junction of the head and body   2 stones within the pancreatic duct at the junction of the body and tail measuring 1-2 mm  Dilated portal vein measuring 16-7 mm suspicious for portal hypertension           Review of Systems:  Review of Systems   Constitutional:  Negative for chills and fever.   HENT:  Negative for hearing loss and tinnitus.    Eyes:  Negative for pain and discharge.    Respiratory:  Negative for cough and hemoptysis.    Cardiovascular:  Negative for chest pain and palpitations.   Gastrointestinal:  Positive for abdominal pain, diarrhea and nausea. Negative for vomiting.   Genitourinary:  Negative for flank pain and hematuria.   Musculoskeletal:  Negative for myalgias and neck pain.   Skin:  Negative for itching and rash.   Neurological:  Positive for weakness. Negative for dizziness.   Psychiatric/Behavioral:  Negative for memory loss and substance abuse. The patient is nervous/anxious.    All other systems reviewed and are negative.          Past Medical History:  Past Medical History:   Diagnosis Date    Acute pancreatitis without infection or necrosis 07/20/2022    Alcohol dependence ([facility]) 04/13/2017    Bronchitis 08/2012    Cold     sept 2018    Current moderate episode of major depressive disorder without prior episode ([facility]) 01/31/2020    Heart burn     Hernia of unspecified site of abdominal cavity without mention of obstruction or gangrene     High anion gap metabolic acidosis 07/01/2022    Indigestion     Pancreatitis     Pneumonia 2011    Seizure disorder ([facility]) 05/23/2022     Active Hospital Problems    Diagnosis     Pancreatic duct stones [K86.89]     Polycythemia due to fall in plasma volume [D75.1]     Recurrent pancreatitis [K85.90]     Bipolar disorder ([facility]) [F31.9]     Seizure disorder ([facility]) [G40.909]          Past Surgical History:  Past Surgical History:   Procedure Laterality Date    OK UPPER GI ENDOSCOPY,DIAGNOSIS N/A 12/23/2022    Procedure: GASTROSCOPY;  Surgeon: Td Kwok M.D.;  Location: [facility];  Service: EUS    OK INJECT NERV BLCK,CELIAC PLEXUS N/A 12/23/2022    Procedure: BLOCK, CELIAC PLEXUS;  Surgeon: Td Kwok M.D.;  Location: [facility];  Service: EUS    EGD W/ENDOSCOPIC ULTRASOUND N/A 12/23/2022    Procedure: EGD, WITH ENDOSCOPIC US;  Surgeon: Td Kwok M.D.;  Location: [facility]  [facility];  Service: EUS    ORIF, WRIST Right 4/24/2019    Procedure: ORIF, WRIST;  Surgeon: Marquis Bell M.D.;  Location: SUR[facility]ERY [facility];  Service: Orthopedics    HYSTERECTOMY ROBOTIC XI  10/11/2018    Procedure: HYSTERECTOMY ROBOTIC XI- RIGHT URETEROLYSIS;  Surgeon: Marquis Reeder M.D.;  Location: SURGERY [facility];  Service: Gynecology    SALPINGECTOMY Bilateral 10/11/2018    Procedure: SALPINGECTOMY;  Surgeon: Marquis Reeder M.D.;  Location: SURGERY [facility];  Service: Gynecology    TONSILLECTOMY  4/11/2013    Performed by Rock Rothman M.D. at SURGERY SAME DAY [facility]    ARTHROSCOPY, KNEE      GYN SURGERY      [facility] [place] [facility] May 2006    OTHER ORTHOPEDIC SURGERY      knee surgeries           Hospital Medications:  Current Facility-Administered Medications   Medication Dose Frequency Provider Last Rate Last Admin    HYDROmorphone (DILAUDID) tablet 2 mg  2 mg Q3HRS PRN Yue Davis M.D.   2 mg at 01/09/23 0259    potassium chloride SA (Kdur) tablet 40 mEq  40 mEq DAILY Yue Davis M.D.   40 mEq at 01/09/23 0551    divalproex (DEPAKOTE) delayed-release tablet 250 mg  250 mg BID Asem NATACHA Smith M.D.   250 mg at 01/09/23 0550    gabapentin (NEURONTIN) capsule 100 mg  100 mg QAM Asestephen Smith M.D.   100 mg at 01/09/23 0550    zonisamide (ZONEGRAN) capsule 150 mg  150 mg QHS Asem NATACHA Smith M.D.   150 mg at 01/08/23 2020    acetaminophen (Tylenol) tablet 650 mg  650 mg Q6HRS PRN Asem NATACHA Smith M.D.   650 mg at 01/03/23 1614    senna-docusate (PERICOLACE or SENOKOT S) 8.6-50 MG per tablet 2 Tablet  2 Tablet BID Asem NATACHA Smith M.D.   2 Tablet at 01/08/23 0540    And    polyethylene glycol/lytes (MIRALAX) PACKET 1 Packet  1 Packet QDAY PRN Asem NATACHA Smith M.D.        And    magnesium hydroxide (MILK OF MAGNESIA) suspension 30 mL  30 mL QDAY PRN Asem A Mutasher, M.D.        And    bisacodyl (DULCOLAX) suppository 10 mg  10 mg QDAY PRN Anita Smith M.D.        enoxaparin  (Lovenox) inj 40 mg  40 mg DAILY AT 1800 Anita Smith M.D.        Pharmacy Consult Request ...Pain Management Review 1 Each  1 Each PHARMACY TO DOSE Anita Smith M.D.        ondansetron (ZOFRAN) syringe/vial injection 4 mg  4 mg Q4HRS PRN Anita Smith M.D.   4 mg at 01/08/23 1501    ondansetron (ZOFRAN ODT) dispertab 4 mg  4 mg Q4HRS PRN Anita Smith M.D.   4 mg at 01/09/23 0259    gabapentin (NEURONTIN) capsule 300 mg  300 mg Q EVENING Anita Smith M.D.   300 mg at 01/08/23 1746   Last reviewed on 1/3/2023  2:47 PM by Ligia Wakefield R.N.       Current Outpatient Medications:  Medications Prior to Admission   Medication Sig Dispense Refill Last Dose    zonisamide (ZONEGRAN) 50 MG capsule Take 3 Capsules by mouth at bedtime for 30 days. 90 Capsule 0 1/1/2023 at PM    senna-docusate (PERICOLACE OR SENOKOT S) 8.6-50 MG Tab Take 2 Tablets by mouth 2 times a day. 30 Tablet 0     ibuprofen (MOTRIN) 200 MG Tab Take 600 mg by mouth every 6 hours as needed. Indications: Pain       gabapentin (NEURONTIN) 100 MG Cap Take 100-300 mg by mouth 2 times a day. Pt takes 100MG in AM and 300MG in the evening   1/2/2023 at AM    hydrOXYzine pamoate (VISTARIL) 50 MG Cap Take 1 Capsule by mouth every 8 hours as needed for Anxiety. 60 Capsule 0 1/2/2023 at AM    divalproex (DEPAKOTE) 250 MG Tablet Delayed Response Take 1 Tablet by mouth 2 times a day for 120 days. 60 Tablet 3 1/2/2023 at AM         Medication Allergies:  Allergies   Allergen Reactions    Promethazine Hcl Anxiety     Anxiety and makes her feels like skin coming off     Naltrexone Anxiety         Family Medical History:  Family History   Problem Relation Age of Onset    Psychiatric Illness Mother     Stroke Mother     Arthritis Mother     Heart Disease Maternal Grandfather     Cancer Neg Hx     Diabetes Neg Hx     Hypertension Neg Hx          Social History:  Social History     Socioeconomic History    Marital status:      Spouse name: Not on  "file    Number of children: Not on file    Years of education: Not on file    Highest education level: Not on file   Occupational History    Not on file   Tobacco Use    Smoking status: Every Day     Packs/day: 0.75     Years: 10.00     Pack years: 7.50     Types: Cigarettes    Smokeless tobacco: Never   Vaping Use    Vaping Use: Former    Substances: Nicotine   Substance and Sexual Activity    Alcohol use: Not Currently     Comment: 5 shots, binges. Was sober for 34 days until today 10/12    Drug use: Yes     Types: Marijuana     Comment: edibles    Sexual activity: Not on file   Other Topics Concern    Not on file   Social History Narrative    Not on file     Social Determinants of Health     Financial Resource Strain: Not on file   Food Insecurity: Not on file   Transportation Needs: Not on file   Physical Activity: Not on file   Stress: Not on file   Social Connections: Not on file   Intimate Partner Violence: Not on file   Housing Stability: Not on file         Vital signs:  Weight/BMI: Body mass index is 20.88 kg/m².  BP 96/63   Pulse 91   Temp 36.6 °C (97.9 °F) (Oral)   Resp 16   Ht 1.702 m (5' 7.01\")   Wt 60.5 kg (133 lb 6.1 oz)   SpO2 91%   Vitals:    01/08/23 1923 01/09/23 0213 01/09/23 0400 01/09/23 0745   BP: 114/80 125/80  96/63   Pulse: 95 95  91   Resp: 18 18  16   Temp: 36.9 °C (98.4 °F) 36.7 °C (98.1 °F)  36.6 °C (97.9 °F)   TempSrc: Oral Oral  Oral   SpO2: 93% 95%  91%   Weight:   60.5 kg (133 lb 6.1 oz)    Height:         Oxygen Therapy:  Pulse Oximetry: 91 %, O2 (LPM): 0, O2 Delivery Device: None - Room Air  No intake or output data in the 24 hours ending 01/09/23 1049      Physical Exam:  Physical Exam  Vitals and nursing note reviewed.   Constitutional:       Appearance: Normal appearance.   HENT:      Head: Normocephalic and atraumatic.      Right Ear: External ear normal.      Left Ear: External ear normal.      Nose: Nose normal. No congestion.   Eyes:      General: No scleral " icterus.     Extraocular Movements: Extraocular movements intact.      Pupils: Pupils are equal, round, and reactive to light.   Cardiovascular:      Rate and Rhythm: Normal rate and regular rhythm.      Pulses: Normal pulses.      Heart sounds: Normal heart sounds. No murmur heard.  Pulmonary:      Effort: Pulmonary effort is normal. No respiratory distress.      Breath sounds: Normal breath sounds.   Abdominal:      General: Abdomen is flat. Bowel sounds are normal. There is no distension.      Palpations: Abdomen is soft.      Tenderness: There is abdominal tenderness (Epigastric).   Musculoskeletal:      Cervical back: Neck supple.      Right lower leg: No edema.      Left lower leg: No edema.   Lymphadenopathy:      Cervical: No cervical adenopathy.   Skin:     General: Skin is warm and dry.   Neurological:      General: No focal deficit present.      Mental Status: She is alert and oriented to person, place, and time.   Psychiatric:         Thought Content: Thought content normal.         Judgment: Judgment normal.         Labs:  Recent Labs     01/07/23  0325 01/08/23  0120 01/09/23  0252   SODIUM 135 137 132*   POTASSIUM 4.0 3.9 3.7   CHLORIDE 103 102 100   CO2 21 21 20   BUN 9 11 10   CREATININE 0.68 0.74 0.79   MAGNESIUM 2.0  --   --    CALCIUM 9.3 9.3 8.9       Recent Labs     01/07/23 0325 01/08/23  0120 01/09/23  0252   LIPASE  --   --  23   GLUCOSE 91 93 131*           No results for input(s): RBC, HEMOGLOBIN, HEMATOCRIT, PLATELETCT, PROTHROMBTM, APTT, INR, IRON, FERRITIN, TOTIRONBC in the last 72 hours.  Recent Results (from the past 24 hour(s))   LIPASE    Collection Time: 01/09/23  2:52 AM   Result Value Ref Range    Lipase 23 7 - 58 U/L   Basic Metabolic Panel    Collection Time: 01/09/23  2:52 AM   Result Value Ref Range    Sodium 132 (L) 135 - 145 mmol/L    Potassium 3.7 3.6 - 5.5 mmol/L    Chloride 100 96 - 112 mmol/L    Co2 20 20 - 33 mmol/L    Glucose 131 (H) 65 - 99 mg/dL    Bun 10 8 - 22  mg/dL    Creatinine 0.79 0.50 - 1.40 mg/dL    Calcium 8.9 8.4 - 10.2 mg/dL    Anion Gap 12.0 7.0 - 16.0   ESTIMATED GFR    Collection Time: 01/09/23  2:52 AM   Result Value Ref Range    GFR (CKD-EPI) 100 >60 mL/min/1.73 m 2         Radiology Review:  CT-ABDOMEN-PELVIS WITH   Final Result      1.  No acute intracranial abnormality      2.  Pancreatic ductal dilation      3.  5 mm stone within the pancreatic duct at the junction of the head and body      4.  2 stones within the pancreatic duct at the junction of the body and tail measuring 1-2 mm      5.  Dilated portal vein measuring 16-7 mm suspicious for portal hypertension      6.  Prior hysterectomy            MDM (Data Review):   -Records reviewed and summarized in current documentation  -I personally reviewed and interpreted the laboratory results  -I personally reviewed the radiology images        Medical Decision Making, by Problem:  Active Hospital Problems    Diagnosis     Pancreatic duct stones [K86.89]     Polycythemia due to fall in plasma volume [D75.1]     Recurrent pancreatitis [K85.90]     Bipolar disorder (HCC) [F31.9]     Seizure disorder (HCC) [G40.909]            Assessment/Recommendations:  87-year-old female with a history of recurrent alcohol-related pancreatitis and chronic pancreatitis who was recently found to have a pancreatic duct stone in the head of her pancreas with some upstream dilation.  EUS without completely obstructive stone, and previously ERCP was not offered due to risk of pancreatitis from the procedure, risk of stone recurrence, and difficulty of ERCP.  Additionally, Dr. Rachel was asked about this patient and he said there was no indications for surgical treatment of this problem.  Recommend repeat imaging to assess for any changes or new developments and further discussion.    Assessment:  -Epigastric pain  -Alcohol related Chronic pancreatitis with possible acute component  -Abnormal imaging of the  "pancreas  -History of alcohol abuse, currently in remission  -Bipolar disorder  -Seizure disorder    Plan:  Diet as tolerated  -Discussed the case with Dr. Sweet regarding possible ERCP with pancreatic stone extraction vs stent placement:  Per Dr. Sweet: \"Not ideal for ERCP as [facility] does not have equipment here and doing the ERCP without the necessary equipment will probably cause damage and turn her chronic pancreatitis to pseudocyst. The issue is the  mismatch of the stone in the neck and duct size at head of pancreas. Can't go through normal size pancreatic duct which is 3 mm in the hospital. One option is extra corporal shockwave fragment of the stone. Another possibility is just stent but will need to get the stent out often. Best option is tertiary hospital. High likelihood for complications.\"  -Patient will be notified of appointment with [facility] as an outpatient in the next week. Or patient can call: [phone] for an appointment.  -[facility] will attempt to get patient to a tertiary hospital however, this will be difficult due to her insurance is [place] Medicaid    GILBERT Anguiano    GI WILL SIGN OFF. PLEASE CALL IF ANY QUESTIONS OR CONCERNS.      Thank you for inviting me to participate in the care of this patient. Please do not hesitate to call GI consultants with additional questions/concerns or changes in the patient's clinical status at [phone].      Core Quality Measures   Reviewed items:  Labs, Medications and Radiology reports reviewed          "

## 2023-01-13 ENCOUNTER — HOSPITAL ENCOUNTER (INPATIENT)
Facility: MEDICAL CENTER | Age: 38
LOS: 3 days | DRG: 440 | End: 2023-01-16
Attending: EMERGENCY MEDICINE | Admitting: INTERNAL MEDICINE
Payer: COMMERCIAL

## 2023-01-13 DIAGNOSIS — E86.0 DEHYDRATION: ICD-10-CM

## 2023-01-13 DIAGNOSIS — K86.89 PANCREATIC DUCT STONES: ICD-10-CM

## 2023-01-13 DIAGNOSIS — K86.1 CHRONIC PANCREATITIS, UNSPECIFIED PANCREATITIS TYPE (HCC): ICD-10-CM

## 2023-01-13 DIAGNOSIS — R11.10 INTRACTABLE VOMITING: ICD-10-CM

## 2023-01-13 DIAGNOSIS — R82.90 ABNORMAL URINALYSIS: ICD-10-CM

## 2023-01-13 DIAGNOSIS — K86.1 ACUTE ON CHRONIC PANCREATITIS (HCC): ICD-10-CM

## 2023-01-13 DIAGNOSIS — K85.90 ACUTE RECURRENT PANCREATITIS: ICD-10-CM

## 2023-01-13 DIAGNOSIS — K85.90 ACUTE ON CHRONIC PANCREATITIS (HCC): ICD-10-CM

## 2023-01-13 LAB
ALBUMIN SERPL BCP-MCNC: 4.7 G/DL (ref 3.2–4.9)
ALBUMIN/GLOB SERPL: 1.9 G/DL
ALP SERPL-CCNC: 61 U/L (ref 30–99)
ALT SERPL-CCNC: 17 U/L (ref 2–50)
AMPHET UR QL SCN: NEGATIVE
ANION GAP SERPL CALC-SCNC: 14 MMOL/L (ref 7–16)
APPEARANCE UR: ABNORMAL
AST SERPL-CCNC: 17 U/L (ref 12–45)
BACTERIA #/AREA URNS HPF: ABNORMAL /HPF
BARBITURATES UR QL SCN: NEGATIVE
BASOPHILS # BLD AUTO: 0.4 % (ref 0–1.8)
BASOPHILS # BLD: 0.03 K/UL (ref 0–0.12)
BENZODIAZ UR QL SCN: NEGATIVE
BILIRUB SERPL-MCNC: 0.7 MG/DL (ref 0.1–1.5)
BILIRUB UR QL STRIP.AUTO: NEGATIVE
BUN SERPL-MCNC: 4 MG/DL (ref 8–22)
BZE UR QL SCN: NEGATIVE
CALCIUM ALBUM COR SERPL-MCNC: 9.1 MG/DL (ref 8.5–10.5)
CALCIUM SERPL-MCNC: 9.7 MG/DL (ref 8.4–10.2)
CANNABINOIDS UR QL SCN: NEGATIVE
CHLORIDE SERPL-SCNC: 103 MMOL/L (ref 96–112)
CO2 SERPL-SCNC: 23 MMOL/L (ref 20–33)
COLOR UR: YELLOW
CREAT SERPL-MCNC: 0.75 MG/DL (ref 0.5–1.4)
EOSINOPHIL # BLD AUTO: 0.08 K/UL (ref 0–0.51)
EOSINOPHIL NFR BLD: 1 % (ref 0–6.9)
EPI CELLS #/AREA URNS HPF: ABNORMAL /HPF
ERYTHROCYTE [DISTWIDTH] IN BLOOD BY AUTOMATED COUNT: 43.9 FL (ref 35.9–50)
ETHANOL BLD-MCNC: <10.1 MG/DL
GFR SERPLBLD CREATININE-BSD FMLA CKD-EPI: 106 ML/MIN/1.73 M 2
GLOBULIN SER CALC-MCNC: 2.5 G/DL (ref 1.9–3.5)
GLUCOSE SERPL-MCNC: 94 MG/DL (ref 65–99)
GLUCOSE UR STRIP.AUTO-MCNC: NEGATIVE MG/DL
HCT VFR BLD AUTO: 44.3 % (ref 37–47)
HGB BLD-MCNC: 14.8 G/DL (ref 12–16)
IMM GRANULOCYTES # BLD AUTO: 0.02 K/UL (ref 0–0.11)
IMM GRANULOCYTES NFR BLD AUTO: 0.3 % (ref 0–0.9)
KETONES UR STRIP.AUTO-MCNC: NEGATIVE MG/DL
LEUKOCYTE ESTERASE UR QL STRIP.AUTO: NEGATIVE
LIPASE SERPL-CCNC: 86 U/L (ref 7–58)
LYMPHOCYTES # BLD AUTO: 2.81 K/UL (ref 1–4.8)
LYMPHOCYTES NFR BLD: 35.7 % (ref 22–41)
MCH RBC QN AUTO: 30 PG (ref 27–33)
MCHC RBC AUTO-ENTMCNC: 33.4 G/DL (ref 33.6–35)
MCV RBC AUTO: 89.9 FL (ref 81.4–97.8)
METHADONE UR QL SCN: NEGATIVE
MICRO URNS: ABNORMAL
MONOCYTES # BLD AUTO: 0.59 K/UL (ref 0–0.85)
MONOCYTES NFR BLD AUTO: 7.5 % (ref 0–13.4)
MUCOUS THREADS #/AREA URNS HPF: ABNORMAL /HPF
NEUTROPHILS # BLD AUTO: 4.35 K/UL (ref 2–7.15)
NEUTROPHILS NFR BLD: 55.1 % (ref 44–72)
NITRITE UR QL STRIP.AUTO: POSITIVE
NRBC # BLD AUTO: 0 K/UL
NRBC BLD-RTO: 0 /100 WBC
OPIATES UR QL SCN: POSITIVE
OXYCODONE UR QL SCN: NEGATIVE
PCP UR QL SCN: NEGATIVE
PH UR STRIP.AUTO: 6 [PH] (ref 5–8)
PLATELET # BLD AUTO: 205 K/UL (ref 164–446)
PMV BLD AUTO: 10.5 FL (ref 9–12.9)
POTASSIUM SERPL-SCNC: 3.3 MMOL/L (ref 3.6–5.5)
PROPOXYPH UR QL SCN: NEGATIVE
PROT SERPL-MCNC: 7.2 G/DL (ref 6–8.2)
PROT UR QL STRIP: NEGATIVE MG/DL
RBC # BLD AUTO: 4.93 M/UL (ref 4.2–5.4)
RBC # URNS HPF: ABNORMAL /HPF
RBC UR QL AUTO: NEGATIVE
SODIUM SERPL-SCNC: 140 MMOL/L (ref 135–145)
SP GR UR STRIP.AUTO: >=1.03
UNIDENT CRYS URNS QL MICRO: ABNORMAL /HPF
WBC # BLD AUTO: 7.9 K/UL (ref 4.8–10.8)
WBC #/AREA URNS HPF: ABNORMAL /HPF

## 2023-01-13 PROCEDURE — 36415 COLL VENOUS BLD VENIPUNCTURE: CPT

## 2023-01-13 PROCEDURE — 94760 N-INVAS EAR/PLS OXIMETRY 1: CPT

## 2023-01-13 PROCEDURE — 99285 EMERGENCY DEPT VISIT HI MDM: CPT

## 2023-01-13 PROCEDURE — 96374 THER/PROPH/DIAG INJ IV PUSH: CPT

## 2023-01-13 PROCEDURE — 82077 ASSAY SPEC XCP UR&BREATH IA: CPT

## 2023-01-13 PROCEDURE — 99223 1ST HOSP IP/OBS HIGH 75: CPT | Mod: 25,AI | Performed by: INTERNAL MEDICINE

## 2023-01-13 PROCEDURE — 99406 BEHAV CHNG SMOKING 3-10 MIN: CPT | Performed by: INTERNAL MEDICINE

## 2023-01-13 PROCEDURE — 700105 HCHG RX REV CODE 258: Performed by: EMERGENCY MEDICINE

## 2023-01-13 PROCEDURE — 80053 COMPREHEN METABOLIC PANEL: CPT

## 2023-01-13 PROCEDURE — 700111 HCHG RX REV CODE 636 W/ 250 OVERRIDE (IP): Performed by: EMERGENCY MEDICINE

## 2023-01-13 PROCEDURE — 81001 URINALYSIS AUTO W/SCOPE: CPT

## 2023-01-13 PROCEDURE — 96376 TX/PRO/DX INJ SAME DRUG ADON: CPT

## 2023-01-13 PROCEDURE — 87086 URINE CULTURE/COLONY COUNT: CPT

## 2023-01-13 PROCEDURE — 770006 HCHG ROOM/CARE - MED/SURG/GYN SEMI*

## 2023-01-13 PROCEDURE — 80307 DRUG TEST PRSMV CHEM ANLYZR: CPT

## 2023-01-13 PROCEDURE — 96375 TX/PRO/DX INJ NEW DRUG ADDON: CPT

## 2023-01-13 PROCEDURE — 83690 ASSAY OF LIPASE: CPT

## 2023-01-13 PROCEDURE — 85025 COMPLETE CBC W/AUTO DIFF WBC: CPT

## 2023-01-13 RX ORDER — CEFTRIAXONE 1 G/1
1000 INJECTION, POWDER, FOR SOLUTION INTRAMUSCULAR; INTRAVENOUS ONCE
Status: COMPLETED | OUTPATIENT
Start: 2023-01-13 | End: 2023-01-13

## 2023-01-13 RX ORDER — HYDROXYZINE PAMOATE 50 MG/1
50 CAPSULE ORAL EVERY 8 HOURS PRN
Status: DISCONTINUED | OUTPATIENT
Start: 2023-01-13 | End: 2023-01-13

## 2023-01-13 RX ORDER — ZONISAMIDE 50 MG/1
150 CAPSULE ORAL
Status: DISCONTINUED | OUTPATIENT
Start: 2023-01-14 | End: 2023-01-16 | Stop reason: HOSPADM

## 2023-01-13 RX ORDER — ONDANSETRON 4 MG/1
4 TABLET, ORALLY DISINTEGRATING ORAL EVERY 4 HOURS PRN
Status: DISCONTINUED | OUTPATIENT
Start: 2023-01-13 | End: 2023-01-16 | Stop reason: HOSPADM

## 2023-01-13 RX ORDER — GABAPENTIN 100 MG/1
100 CAPSULE ORAL EVERY MORNING
Status: DISCONTINUED | OUTPATIENT
Start: 2023-01-14 | End: 2023-01-16 | Stop reason: HOSPADM

## 2023-01-13 RX ORDER — OXYCODONE HYDROCHLORIDE 10 MG/1
10 TABLET ORAL
Status: DISCONTINUED | OUTPATIENT
Start: 2023-01-13 | End: 2023-01-14

## 2023-01-13 RX ORDER — HYDROMORPHONE HYDROCHLORIDE 1 MG/ML
0.5 INJECTION, SOLUTION INTRAMUSCULAR; INTRAVENOUS; SUBCUTANEOUS
Status: DISCONTINUED | OUTPATIENT
Start: 2023-01-13 | End: 2023-01-14

## 2023-01-13 RX ORDER — HYDROMORPHONE HYDROCHLORIDE 1 MG/ML
1 INJECTION, SOLUTION INTRAMUSCULAR; INTRAVENOUS; SUBCUTANEOUS ONCE
Status: COMPLETED | OUTPATIENT
Start: 2023-01-13 | End: 2023-01-13

## 2023-01-13 RX ORDER — ACETAMINOPHEN 325 MG/1
650 TABLET ORAL EVERY 6 HOURS PRN
Status: DISCONTINUED | OUTPATIENT
Start: 2023-01-13 | End: 2023-01-16 | Stop reason: HOSPADM

## 2023-01-13 RX ORDER — ONDANSETRON 2 MG/ML
4 INJECTION INTRAMUSCULAR; INTRAVENOUS EVERY 4 HOURS PRN
Status: DISCONTINUED | OUTPATIENT
Start: 2023-01-13 | End: 2023-01-16 | Stop reason: HOSPADM

## 2023-01-13 RX ORDER — SODIUM CHLORIDE 9 MG/ML
1000 INJECTION, SOLUTION INTRAVENOUS ONCE
Status: COMPLETED | OUTPATIENT
Start: 2023-01-13 | End: 2023-01-13

## 2023-01-13 RX ORDER — DIVALPROEX SODIUM 250 MG/1
250 TABLET, DELAYED RELEASE ORAL 2 TIMES DAILY
Status: DISCONTINUED | OUTPATIENT
Start: 2023-01-14 | End: 2023-01-16 | Stop reason: HOSPADM

## 2023-01-13 RX ORDER — GABAPENTIN 100 MG/1
100-300 CAPSULE ORAL 2 TIMES DAILY
Status: DISCONTINUED | OUTPATIENT
Start: 2023-01-13 | End: 2023-01-13

## 2023-01-13 RX ORDER — HYDROXYZINE HYDROCHLORIDE 25 MG/1
50 TABLET, FILM COATED ORAL EVERY 8 HOURS PRN
Status: DISCONTINUED | OUTPATIENT
Start: 2023-01-13 | End: 2023-01-16 | Stop reason: HOSPADM

## 2023-01-13 RX ORDER — SODIUM CHLORIDE AND POTASSIUM CHLORIDE 150; 900 MG/100ML; MG/100ML
INJECTION, SOLUTION INTRAVENOUS CONTINUOUS
Status: DISCONTINUED | OUTPATIENT
Start: 2023-01-13 | End: 2023-01-15

## 2023-01-13 RX ORDER — NICOTINE 21 MG/24HR
14 PATCH, TRANSDERMAL 24 HOURS TRANSDERMAL
Status: DISCONTINUED | OUTPATIENT
Start: 2023-01-14 | End: 2023-01-16 | Stop reason: HOSPADM

## 2023-01-13 RX ORDER — GABAPENTIN 300 MG/1
300 CAPSULE ORAL EVERY EVENING
Status: DISCONTINUED | OUTPATIENT
Start: 2023-01-14 | End: 2023-01-16 | Stop reason: HOSPADM

## 2023-01-13 RX ORDER — ONDANSETRON 2 MG/ML
4 INJECTION INTRAMUSCULAR; INTRAVENOUS ONCE
Status: COMPLETED | OUTPATIENT
Start: 2023-01-13 | End: 2023-01-13

## 2023-01-13 RX ORDER — OXYCODONE HYDROCHLORIDE 5 MG/1
5 TABLET ORAL
Status: DISCONTINUED | OUTPATIENT
Start: 2023-01-13 | End: 2023-01-14

## 2023-01-13 RX ORDER — LABETALOL HYDROCHLORIDE 5 MG/ML
10 INJECTION, SOLUTION INTRAVENOUS EVERY 4 HOURS PRN
Status: DISCONTINUED | OUTPATIENT
Start: 2023-01-13 | End: 2023-01-16 | Stop reason: HOSPADM

## 2023-01-13 RX ADMIN — HYDROMORPHONE HYDROCHLORIDE 1 MG: 1 INJECTION, SOLUTION INTRAMUSCULAR; INTRAVENOUS; SUBCUTANEOUS at 22:03

## 2023-01-13 RX ADMIN — ONDANSETRON 4 MG: 2 INJECTION INTRAMUSCULAR; INTRAVENOUS at 20:26

## 2023-01-13 RX ADMIN — FAMOTIDINE 20 MG: 10 INJECTION, SOLUTION INTRAVENOUS at 20:27

## 2023-01-13 RX ADMIN — CEFTRIAXONE SODIUM 1000 MG: 1 INJECTION, POWDER, FOR SOLUTION INTRAMUSCULAR; INTRAVENOUS at 20:50

## 2023-01-13 RX ADMIN — SODIUM CHLORIDE 1000 ML: 9 INJECTION, SOLUTION INTRAVENOUS at 20:31

## 2023-01-13 RX ADMIN — ONDANSETRON 4 MG: 2 INJECTION INTRAMUSCULAR; INTRAVENOUS at 22:08

## 2023-01-13 RX ADMIN — HYDROMORPHONE HYDROCHLORIDE 1 MG: 1 INJECTION, SOLUTION INTRAMUSCULAR; INTRAVENOUS; SUBCUTANEOUS at 20:31

## 2023-01-13 ASSESSMENT — PATIENT HEALTH QUESTIONNAIRE - PHQ9
5. POOR APPETITE OR OVEREATING: NOT AT ALL
2. FEELING DOWN, DEPRESSED, IRRITABLE, OR HOPELESS: NOT AT ALL
3. TROUBLE FALLING OR STAYING ASLEEP OR SLEEPING TOO MUCH: SEVERAL DAYS
8. MOVING OR SPEAKING SO SLOWLY THAT OTHER PEOPLE COULD HAVE NOTICED. OR THE OPPOSITE, BEING SO FIGETY OR RESTLESS THAT YOU HAVE BEEN MOVING AROUND A LOT MORE THAN USUAL: SEVERAL DAYS
SUM OF ALL RESPONSES TO PHQ9 QUESTIONS 1 AND 2: 0
1. LITTLE INTEREST OR PLEASURE IN DOING THINGS: NOT AT ALL
SUM OF ALL RESPONSES TO PHQ QUESTIONS 1-9: 6
4. FEELING TIRED OR HAVING LITTLE ENERGY: SEVERAL DAYS
6. FEELING BAD ABOUT YOURSELF - OR THAT YOU ARE A FAILURE OR HAVE LET YOURSELF OR YOUR FAMILY DOWN: MORE THAN HALF THE DAYS
7. TROUBLE CONCENTRATING ON THINGS, SUCH AS READING THE NEWSPAPER OR WATCHING TELEVISION: SEVERAL DAYS
9. THOUGHTS THAT YOU WOULD BE BETTER OFF DEAD, OR OF HURTING YOURSELF: NOT AT ALL

## 2023-01-13 ASSESSMENT — COGNITIVE AND FUNCTIONAL STATUS - GENERAL
SUGGESTED CMS G CODE MODIFIER DAILY ACTIVITY: CH
DAILY ACTIVITIY SCORE: 24
MOBILITY SCORE: 24
SUGGESTED CMS G CODE MODIFIER MOBILITY: CH

## 2023-01-13 ASSESSMENT — ENCOUNTER SYMPTOMS
COUGH: 0
DEPRESSION: 0
FEVER: 0
SPUTUM PRODUCTION: 0
FALLS: 0
VOMITING: 1
MYALGIAS: 0
CONSTIPATION: 0
DIZZINESS: 0
DIARRHEA: 1
STRIDOR: 0
TINGLING: 0
LOSS OF CONSCIOUSNESS: 0
NAUSEA: 1
PALPITATIONS: 0
WEAKNESS: 0
CHILLS: 0
ABDOMINAL PAIN: 1
SHORTNESS OF BREATH: 0
HEADACHES: 0

## 2023-01-13 ASSESSMENT — LIFESTYLE VARIABLES
TOTAL SCORE: 0
AVERAGE NUMBER OF DAYS PER WEEK YOU HAVE A DRINK CONTAINING ALCOHOL: 0
CONSUMPTION TOTAL: NEGATIVE
ON A TYPICAL DAY WHEN YOU DRINK ALCOHOL HOW MANY DRINKS DO YOU HAVE: 0
EVER FELT BAD OR GUILTY ABOUT YOUR DRINKING: NO
ALCOHOL_USE: NO
HAVE PEOPLE ANNOYED YOU BY CRITICIZING YOUR DRINKING: NO
HOW MANY TIMES IN THE PAST YEAR HAVE YOU HAD 5 OR MORE DRINKS IN A DAY: 0
TOTAL SCORE: 0
HAVE YOU EVER FELT YOU SHOULD CUT DOWN ON YOUR DRINKING: NO
TOTAL SCORE: 0
EVER HAD A DRINK FIRST THING IN THE MORNING TO STEADY YOUR NERVES TO GET RID OF A HANGOVER: NO

## 2023-01-13 ASSESSMENT — FIBROSIS 4 INDEX
FIB4 SCORE: 0.7
FIB4 SCORE: 0.6

## 2023-01-13 ASSESSMENT — PAIN DESCRIPTION - PAIN TYPE
TYPE: CHRONIC PAIN
TYPE: CHRONIC PAIN

## 2023-01-14 LAB
ALBUMIN SERPL BCP-MCNC: 3.8 G/DL (ref 3.2–4.9)
ALBUMIN/GLOB SERPL: 1.7 G/DL
ALP SERPL-CCNC: 47 U/L (ref 30–99)
ALT SERPL-CCNC: 13 U/L (ref 2–50)
ANION GAP SERPL CALC-SCNC: 12 MMOL/L (ref 7–16)
AST SERPL-CCNC: 20 U/L (ref 12–45)
BILIRUB SERPL-MCNC: 0.5 MG/DL (ref 0.1–1.5)
BUN SERPL-MCNC: 4 MG/DL (ref 8–22)
CALCIUM ALBUM COR SERPL-MCNC: 8.5 MG/DL (ref 8.5–10.5)
CALCIUM SERPL-MCNC: 8.3 MG/DL (ref 8.4–10.2)
CHLORIDE SERPL-SCNC: 109 MMOL/L (ref 96–112)
CO2 SERPL-SCNC: 19 MMOL/L (ref 20–33)
CREAT SERPL-MCNC: 0.7 MG/DL (ref 0.5–1.4)
ERYTHROCYTE [DISTWIDTH] IN BLOOD BY AUTOMATED COUNT: 45.2 FL (ref 35.9–50)
GFR SERPLBLD CREATININE-BSD FMLA CKD-EPI: 115 ML/MIN/1.73 M 2
GLOBULIN SER CALC-MCNC: 2.2 G/DL (ref 1.9–3.5)
GLUCOSE SERPL-MCNC: 91 MG/DL (ref 65–99)
HCT VFR BLD AUTO: 40.2 % (ref 37–47)
HGB BLD-MCNC: 13.2 G/DL (ref 12–16)
MCH RBC QN AUTO: 30.1 PG (ref 27–33)
MCHC RBC AUTO-ENTMCNC: 32.8 G/DL (ref 33.6–35)
MCV RBC AUTO: 91.6 FL (ref 81.4–97.8)
PLATELET # BLD AUTO: 170 K/UL (ref 164–446)
PMV BLD AUTO: 10.8 FL (ref 9–12.9)
POTASSIUM SERPL-SCNC: 3.7 MMOL/L (ref 3.6–5.5)
PROT SERPL-MCNC: 6 G/DL (ref 6–8.2)
RBC # BLD AUTO: 4.39 M/UL (ref 4.2–5.4)
SODIUM SERPL-SCNC: 140 MMOL/L (ref 135–145)
WBC # BLD AUTO: 7.3 K/UL (ref 4.8–10.8)

## 2023-01-14 PROCEDURE — 36415 COLL VENOUS BLD VENIPUNCTURE: CPT

## 2023-01-14 PROCEDURE — 770006 HCHG ROOM/CARE - MED/SURG/GYN SEMI*

## 2023-01-14 PROCEDURE — 85027 COMPLETE CBC AUTOMATED: CPT

## 2023-01-14 PROCEDURE — 80053 COMPREHEN METABOLIC PANEL: CPT

## 2023-01-14 PROCEDURE — 94760 N-INVAS EAR/PLS OXIMETRY 1: CPT

## 2023-01-14 PROCEDURE — 99232 SBSQ HOSP IP/OBS MODERATE 35: CPT | Performed by: HOSPITALIST

## 2023-01-14 PROCEDURE — 700102 HCHG RX REV CODE 250 W/ 637 OVERRIDE(OP): Performed by: INTERNAL MEDICINE

## 2023-01-14 PROCEDURE — 700111 HCHG RX REV CODE 636 W/ 250 OVERRIDE (IP): Performed by: INTERNAL MEDICINE

## 2023-01-14 PROCEDURE — A9270 NON-COVERED ITEM OR SERVICE: HCPCS | Performed by: INTERNAL MEDICINE

## 2023-01-14 PROCEDURE — 700101 HCHG RX REV CODE 250: Performed by: INTERNAL MEDICINE

## 2023-01-14 RX ORDER — OXYCODONE HYDROCHLORIDE 5 MG/1
5 TABLET ORAL
Status: DISCONTINUED | OUTPATIENT
Start: 2023-01-14 | End: 2023-01-15

## 2023-01-14 RX ORDER — HYDROMORPHONE HYDROCHLORIDE 1 MG/ML
1 INJECTION, SOLUTION INTRAMUSCULAR; INTRAVENOUS; SUBCUTANEOUS
Status: DISCONTINUED | OUTPATIENT
Start: 2023-01-14 | End: 2023-01-15

## 2023-01-14 RX ORDER — ACETAMINOPHEN 500 MG
1000 TABLET ORAL
COMMUNITY
End: 2023-01-23

## 2023-01-14 RX ORDER — OXYCODONE HYDROCHLORIDE 10 MG/1
10 TABLET ORAL
Status: DISCONTINUED | OUTPATIENT
Start: 2023-01-14 | End: 2023-01-15

## 2023-01-14 RX ORDER — ZONISAMIDE 50 MG/1
150 CAPSULE ORAL
COMMUNITY
End: 2023-05-16

## 2023-01-14 RX ADMIN — ONDANSETRON 4 MG: 2 INJECTION INTRAMUSCULAR; INTRAVENOUS at 10:36

## 2023-01-14 RX ADMIN — HYDROMORPHONE HYDROCHLORIDE 1 MG: 1 INJECTION, SOLUTION INTRAMUSCULAR; INTRAVENOUS; SUBCUTANEOUS at 01:08

## 2023-01-14 RX ADMIN — OXYCODONE HYDROCHLORIDE 10 MG: 10 TABLET ORAL at 10:35

## 2023-01-14 RX ADMIN — OXYCODONE HYDROCHLORIDE 10 MG: 10 TABLET ORAL at 21:13

## 2023-01-14 RX ADMIN — HYDROMORPHONE HYDROCHLORIDE 1 MG: 1 INJECTION, SOLUTION INTRAMUSCULAR; INTRAVENOUS; SUBCUTANEOUS at 07:52

## 2023-01-14 RX ADMIN — HYDROMORPHONE HYDROCHLORIDE 1 MG: 1 INJECTION, SOLUTION INTRAMUSCULAR; INTRAVENOUS; SUBCUTANEOUS at 22:26

## 2023-01-14 RX ADMIN — DIVALPROEX SODIUM 250 MG: 250 TABLET, DELAYED RELEASE ORAL at 00:04

## 2023-01-14 RX ADMIN — OXYCODONE HYDROCHLORIDE 10 MG: 10 TABLET ORAL at 13:53

## 2023-01-14 RX ADMIN — ZONISAMIDE 150 MG: 50 CAPSULE ORAL at 20:06

## 2023-01-14 RX ADMIN — ZONISAMIDE 150 MG: 50 CAPSULE ORAL at 00:03

## 2023-01-14 RX ADMIN — OXYCODONE HYDROCHLORIDE 10 MG: 10 TABLET ORAL at 06:23

## 2023-01-14 RX ADMIN — HYDROMORPHONE HYDROCHLORIDE 1 MG: 1 INJECTION, SOLUTION INTRAMUSCULAR; INTRAVENOUS; SUBCUTANEOUS at 15:10

## 2023-01-14 RX ADMIN — GABAPENTIN 300 MG: 300 CAPSULE ORAL at 18:10

## 2023-01-14 RX ADMIN — DIVALPROEX SODIUM 250 MG: 250 TABLET, DELAYED RELEASE ORAL at 18:10

## 2023-01-14 RX ADMIN — ONDANSETRON 4 MG: 2 INJECTION INTRAMUSCULAR; INTRAVENOUS at 04:35

## 2023-01-14 RX ADMIN — OXYCODONE HYDROCHLORIDE 10 MG: 10 TABLET ORAL at 00:04

## 2023-01-14 RX ADMIN — POTASSIUM CHLORIDE AND SODIUM CHLORIDE: 900; 150 INJECTION, SOLUTION INTRAVENOUS at 11:30

## 2023-01-14 RX ADMIN — ACETAMINOPHEN 650 MG: 325 TABLET, FILM COATED ORAL at 11:37

## 2023-01-14 RX ADMIN — GABAPENTIN 100 MG: 100 CAPSULE ORAL at 05:05

## 2023-01-14 RX ADMIN — HYDROMORPHONE HYDROCHLORIDE 1 MG: 1 INJECTION, SOLUTION INTRAMUSCULAR; INTRAVENOUS; SUBCUTANEOUS at 18:13

## 2023-01-14 RX ADMIN — HYDROXYZINE HYDROCHLORIDE 50 MG: 25 TABLET, FILM COATED ORAL at 10:36

## 2023-01-14 RX ADMIN — POTASSIUM CHLORIDE AND SODIUM CHLORIDE: 900; 150 INJECTION, SOLUTION INTRAVENOUS at 20:13

## 2023-01-14 RX ADMIN — POTASSIUM CHLORIDE AND SODIUM CHLORIDE: 900; 150 INJECTION, SOLUTION INTRAVENOUS at 00:10

## 2023-01-14 RX ADMIN — HYDROMORPHONE HYDROCHLORIDE 1 MG: 1 INJECTION, SOLUTION INTRAMUSCULAR; INTRAVENOUS; SUBCUTANEOUS at 11:29

## 2023-01-14 RX ADMIN — HYDROMORPHONE HYDROCHLORIDE 1 MG: 1 INJECTION, SOLUTION INTRAMUSCULAR; INTRAVENOUS; SUBCUTANEOUS at 04:29

## 2023-01-14 RX ADMIN — RIVAROXABAN 10 MG: 10 TABLET, FILM COATED ORAL at 18:10

## 2023-01-14 RX ADMIN — NICOTINE 14 MG: 14 PATCH TRANSDERMAL at 05:07

## 2023-01-14 RX ADMIN — HYDROXYZINE HYDROCHLORIDE 50 MG: 25 TABLET, FILM COATED ORAL at 18:10

## 2023-01-14 RX ADMIN — OXYCODONE HYDROCHLORIDE 10 MG: 10 TABLET ORAL at 03:21

## 2023-01-14 ASSESSMENT — PAIN DESCRIPTION - PAIN TYPE
TYPE: ACUTE PAIN;CHRONIC PAIN
TYPE: CHRONIC PAIN
TYPE: ACUTE PAIN;CHRONIC PAIN
TYPE: ACUTE PAIN
TYPE: ACUTE PAIN;CHRONIC PAIN;INTRACTABLE PAIN
TYPE: ACUTE PAIN;CHRONIC PAIN
TYPE: ACUTE PAIN;CHRONIC PAIN

## 2023-01-14 ASSESSMENT — ENCOUNTER SYMPTOMS
ABDOMINAL PAIN: 1
COUGH: 0
CHILLS: 0
ORTHOPNEA: 0
DIARRHEA: 1
HEMOPTYSIS: 0
DIZZINESS: 0
VOMITING: 0
PALPITATIONS: 0
NAUSEA: 1
SPUTUM PRODUCTION: 0

## 2023-01-14 NOTE — CARE PLAN
The patient is Stable - Low risk of patient condition declining or worsening    Shift Goals  Clinical Goals: Pain management  Patient Goals: rest; feel better; discharge    Progress made toward(s) clinical / shift goals:  Clear liquid diet, fluids, pain and anti-nausea medication for pancreatitis management. Administer pain medication as prescribed to bring pian close or at comfort level goal. Zofran for nausea.     Problem: Knowledge Deficit - Standard  Goal: Patient and family/care givers will demonstrate understanding of plan of care, disease process/condition, diagnostic tests and medications  Outcome: Progressing     Problem: Pain - Standard  Goal: Alleviation of pain or a reduction in pain to the patient’s comfort goal  Outcome: Progressing     Problem: Fall Risk  Goal: Patient will remain free from falls  Outcome: Progressing       Patient is not progressing towards the following goals:N/A

## 2023-01-14 NOTE — ED TRIAGE NOTES
Pt to ed c/o chronic abd pain, hx pancreatitis  Recent admission for same  Unable to see Gi consultants until 01/24  N/v unable to hold fluids food down since yesterday

## 2023-01-14 NOTE — PROGRESS NOTES
[facility] Medicine Daily Progress Note    Date of Service  1/14/2023    Chief Complaint  Rhiannon Marrero is a 37 y.o. female admitted 1/13/2023 with abdominal pain    Hospital Course  Rhiannon Marrero has a history of recurrent pancreatitis despite a long period of alcohol abstinence .  She presented to the emergency room on 1/13/2023 with abdominal pain.  Lipase was elevated and CT scan was concerning for recurrent pancreatic ductal stones.  She was admitted to the hospital for fluids and pain control    Interval Problem Update  Patient tried liquid diet this morning, this resulted in increased pain  Required IV Dilaudid to recover  Continues to have severe upper quadrant abdominal pain radiating to her back  Endorses loose bowel movements  IV fluids continue, denies shortness of breath, denies lower extremity edema    I have discussed this patient's plan of care and discharge plan at IDT rounds today with Case Management, Nursing, Nursing leadership, and other members of the IDT team.    Consultants/Specialty  None    Code Status  Full Code    Disposition  Patient is not medically cleared for discharge.   Anticipate discharge to to home with close outpatient follow-up.  I have placed the appropriate orders for post-discharge needs.    Review of Systems  Review of Systems   Constitutional:  Negative for chills and malaise/fatigue.   Respiratory:  Negative for cough, hemoptysis and sputum production.    Cardiovascular:  Negative for chest pain, palpitations and orthopnea.   Gastrointestinal:  Positive for abdominal pain, diarrhea and nausea. Negative for vomiting.   Skin:  Negative for itching and rash.   Neurological:  Negative for dizziness.   All other systems reviewed and are negative.     Physical Exam  Temp:  [36.4 °C (97.5 °F)-37 °C (98.6 °F)] 36.6 °C (97.8 °F)  Pulse:  [] 89  Resp:  [12-22] 16  BP: (112-142)/(71-89) 131/71  SpO2:  [94 %-100 %] 100 %    Physical Exam  Constitutional:       General: She is not  in acute distress.     Appearance: Normal appearance. She is normal weight.   HENT:      Head: Normocephalic and atraumatic.      Right Ear: External ear normal.      Left Ear: External ear normal.      Nose: Nose normal.      Mouth/Throat:      Mouth: Mucous membranes are moist.      Pharynx: Oropharynx is clear.   Eyes:      Extraocular Movements: Extraocular movements intact.      Conjunctiva/sclera: Conjunctivae normal.      Pupils: Pupils are equal, round, and reactive to light.   Cardiovascular:      Rate and Rhythm: Normal rate and regular rhythm.      Pulses: Normal pulses.   Pulmonary:      Effort: Pulmonary effort is normal.      Breath sounds: Normal breath sounds.   Abdominal:      General: Abdomen is flat.      Palpations: Abdomen is soft.      Tenderness: There is abdominal tenderness.   Musculoskeletal:         General: Normal range of motion.      Cervical back: Normal range of motion and neck supple.   Skin:     General: Skin is warm and dry.      Capillary Refill: Capillary refill takes less than 2 seconds.      Coloration: Skin is not jaundiced.   Neurological:      General: No focal deficit present.      Mental Status: She is alert and oriented to person, place, and time.      Cranial Nerves: No cranial nerve deficit.      Gait: Gait normal.   Psychiatric:         Behavior: Behavior normal.      Comments: Anxious       Fluids    Intake/Output Summary (Last 24 hours) at 1/14/2023 1348  Last data filed at 1/14/2023 0633  Gross per 24 hour   Intake 1769.6 ml   Output --   Net 1769.6 ml       Laboratory  Recent Labs     01/13/23 2019 01/14/23  0150   WBC 7.9 7.3   RBC 4.93 4.39   HEMOGLOBIN 14.8 13.2   HEMATOCRIT 44.3 40.2   MCV 89.9 91.6   MCH 30.0 30.1   MCHC 33.4* 32.8*   RDW 43.9 45.2   PLATELETCT 205 170   MPV 10.5 10.8     Recent Labs     01/13/23 2019 01/14/23  0150   SODIUM 140 140   POTASSIUM 3.3* 3.7   CHLORIDE 103 109   CO2 23 19*   GLUCOSE 94 91   BUN 4* 4*   CREATININE 0.75 0.70    CALCIUM 9.7 8.3*                   Imaging  No orders to display        Assessment/Plan  * Acute on chronic pancreatitis (HCC)- (present on admission)  Assessment & Plan  Initially due to alcohol  Patient is doing treatment for sobriety, she is in a rehab facility  Was recently admitted, pancreatic stones were seen on CT scan  ERCP was not recommended during that admission  Continue IV fluids and bowel rest  She is requiring IV medications for pain control, she is unable to tolerate p.o. meds    Acute dehydration- (present on admission)  Assessment & Plan  Due to pancreatitis  IV fluids    Hypokalemia- (present on admission)  Assessment & Plan  Continue IV fluids with potassium included  I ordered BMP, magnesium, and phosphorus, labs for the morning to assess response to treatment    Bipolar disorder (HCC)- (present on admission)  Assessment & Plan  Home medications    Tobacco abuse- (present on admission)  Assessment & Plan  Tobacco cessation recommended at admission.       VTE prophylaxis: Xarelto 10 mg daily as prophylaxis    I have performed a physical exam and reviewed and updated ROS and Plan today (1/14/2023). In review of yesterday's note (1/13/2023), there are no changes except as documented above.

## 2023-01-14 NOTE — HOSPITAL COURSE
Rhiannon Marrero has a history of recurrent/chronic pancreatitis.  She is currently in rehab and reports a long history of alcohol abstinence. She has undergone multiple interventions including a celiac plexus nerve block on 12/23/2022.  The patient has stones in her pancreatic ducts noted during prior admission, ECRP was not been recommended due to risk of worsened pancreatitis and lack of inflammation noted in the pancreas on CT.  She presented to the emergency room on 1/13/2023 with abdominal pain.  She was admitted to the hospital for fluids and pain control

## 2023-01-14 NOTE — ED PROVIDER NOTES
ED Provider Note    CHIEF COMPLAINT  Chief Complaint   Patient presents with    Abdominal Pain       HPI/ROS      Rhiannon Marrero is a 37 y.o. female who presents to the emergency department complaining that she cannot stop vomiting and cannot tolerate oral intake over the last 3 days.  The patient states that she was just discharged from the hospital after an exacerbation of her chronic pancreatitis.  She has a history of pancreatitis initially precipitated by alcoholism but says that she has now been sober for 93 days but unfortunately she has had problems with recurrent pancreatitis and pancreatic ductal stones.  The patient states that she was told to return if she was not doing well and therefore she has.  She is feeling very dehydrated.  The patient is having some burning discomfort with urination but she is not sure if she feels like she has an infection or if she is just dehydrated.  Review of systems: No fever no black or bloody stool or emesis no chest pain cough or difficulty breathing.    Chart review: I reviewed the discharge summary from the ninth of this month confirming the history and also confirming that a CT on the eighth of this month showed multiple pancreatic ductal stones.  The patient was felt to be at high risk for readmission according to the discharge summary.  I also reviewed the GI consultation note from BRENDAN Corona    PAST MEDICAL HISTORY   has a past medical history of Acute pancreatitis without infection or necrosis (07/20/2022), Alcohol dependence ([facility]) (04/13/2017), Bronchitis (08/2012), Cold, Current moderate episode of major depressive disorder without prior episode ([facility]) (01/31/2020), Heart burn, Hernia of unspecified site of abdominal cavity without mention of obstruction or gangrene, High anion gap metabolic acidosis (07/01/2022), Indigestion, Pancreatitis, Pneumonia (2011), and Seizure disorder ([facility]) (05/23/2022).    SURGICAL HISTORY   has a past surgical history that  includes other orthopedic surgery; gyn surgery; tonsillectomy (4/11/2013); arthroscopy, knee; hysterectomy robotic xi (10/11/2018); salpingectomy (Bilateral, 10/11/2018); orif, wrist (Right, 4/24/2019); upper gi endoscopy,diagnosis (N/A, 12/23/2022); inject nerv blck,celiac plexus (N/A, 12/23/2022); and egd w/endoscopic ultrasound (N/A, 12/23/2022).    FAMILY HISTORY  Family History   Problem Relation Age of Onset    Psychiatric Illness Mother     Stroke Mother     Arthritis Mother     Heart Disease Maternal Grandfather     Cancer Neg Hx     Diabetes Neg Hx     Hypertension Neg Hx        SOCIAL HISTORY  Social History     Tobacco Use    Smoking status: Every Day     Packs/day: 0.75     Years: 10.00     Pack years: 7.50     Types: Cigarettes    Smokeless tobacco: Never   Vaping Use    Vaping Use: Former    Substances: Nicotine   Substance and Sexual Activity    Alcohol use: Not Currently     Comment: 5 shots, binges. Was sober for 34 days until today 10/12    Drug use: Yes     Types: Marijuana     Comment: edibles    Sexual activity: Not on file       CURRENT MEDICATIONS  Home Medications    **Home medications have not yet been reviewed for this encounter**         ALLERGIES  Allergies   Allergen Reactions    Promethazine Hcl Anxiety     Anxiety and makes her feels like skin coming off     Naltrexone Anxiety       PHYSICAL EXAM  VITAL SIGNS: /86   Pulse 81   Temp 37 °C (98.6 °F) (Temporal)   Resp 19   Wt 60.5 kg (133 lb 6.1 oz)   LMP 10/30/2018   SpO2 98%   BMI 20.88 kg/m²    Constitutional: Awake lucid verbal ill-appearing at the time of arrival  HENT: Mucous membranes are dry  Eyes: No erythema discharge or jaundice  Neck: Trachea midline no JVD  Cardiovascular: Regular tachycardia at the time of arrival  Respiratory: Clear bilaterally with no apparent difficulty breathing  Abdomen: Soft mildly distended diffusely tender but no focal tenderness or peritoneal findings  Skin: Warm and  dry  Musculoskeletal: No acute bony deformity  Neurologic: Awake lucid verbal moving all extremities        DIAGNOSTIC STUDIES / PROCEDURES    LABS  CBC shows white blood cell count of 7.9 hemoglobin is adequate at 14.8 basic metabolic panel shows a slightly low potassium of 3.3 LFTs are unremarkable lipase is slightly elevated at 86 the value was 23 when she was discharged from the hospital on the ninth of this month and when she was admitted her lipase was in the mid 200 range.  Urinalysis positive for nitrite there were 2-5 white blood cells and a few bacteria in the microscopic exam and I have ordered and add on urine culture      COURSE & MEDICAL DECISION MAKING  In the emergency department an IV was established and the patient was placed on the cardiac monitor.  Because of concern for recurrent pancreatitis she is started on intravenous fluids and kept NPO.  The urinalysis is abnormal so I have ordered a culture and started her on intravenous ceftriaxone.  The patient was given 2 rounds of intravenous Dilaudid and Zofran for discomfort.  At this point in time given that the patient is unable to tolerate oral intake and clinically she feels that she is doing worse she is going to require readmission and I have reviewed the case with the hospitalist.  And the patient is referred to the hospitalist service for further evaluation and treatment    FINAL DIAGNOSIS  1. Intractable vomiting    2. Chronic pancreatitis, unspecified pancreatitis type (HCC)    3. Pancreatic duct stones    4. Dehydration    5. Abnormal urinalysis           Electronically signed by: Phani Westbrook M.D., 1/13/2023 10:49 PM

## 2023-01-14 NOTE — ASSESSMENT & PLAN NOTE
Initially due to alcohol  Currently abstinent from alcohol, she is in a rehab facility  Lipase improved  Slowly advance diet  Stop IVF  Monitor serial abdominal exams

## 2023-01-14 NOTE — PROGRESS NOTES
4 Eyes Skin Assessment Completed by LESLIE Staples and Mauri RN.    Head WDL  Ears WDL  Nose WDL  Mouth WDL  Neck WDL  Breast/Chest WDL  Shoulder Blades WDL  Spine WDL  (R) Arm/Elbow/Hand Bruising  (L) Arm/Elbow/Hand Bruising  Abdomen WDL  Groin WDL  Scrotum/Coccyx/Buttocks WDL  (R) Leg WDL  (L) Leg WDL  (R) Heel/Foot/Toe WDL  (L) Heel/Foot/Toe WDL          Devices In Places N/A      Interventions In Place N/A    Possible Skin Injury No    Pictures Uploaded Into Epic N/A  Wound Consult Placed N/A  RN Wound Prevention Protocol Ordered No

## 2023-01-14 NOTE — PROGRESS NOTES
Med Rec Complete per patient  Allergies Reviewed with patient  No antibiotics within the last 30 days  Patient's Preferred Pharmacy: [facility] at [address] [place] [address]

## 2023-01-14 NOTE — DIETARY
"Nutrition services: Day 1 of admit.  Rhiannon Marrero is a 37 y.o. female with admitting DX of Acute on chronic pancreatitis.     Consult received for MST 2: 2-13 lb x <1 week, poor PO intake PTA. Attempted pt visit x 2; pt out of room/on phone during both attempts.    Assessment:  Height: 170.2 cm (5' 7\")  Weight: 63.9 kg (140 lb 14 oz)  Body mass index is 22.06 kg/m²., BMI classification: Normal  Diet/Intake: Clear liquid; PO intake not yet documented    Evaluation:   Admitted w/ N/V/D/abdominal pain x 3 days PTA, acute dehydration, hx of pancreatitis w/ intermittent pancreatic ductal stones. PMHx EtOH, now reported 93 days sober.  Labs: BUN 4, Ca+ 8.3  MAR: 0.9% NaCl w/ KCl @ 125 ml/hr, depakote, zofran, oxycodone  Skin: no wounds/edema  +BM: 1/13  Wt up since last admit and RD review on 1/9/23.    Malnutrition Risk: Wt up since last admit. At risk w/ reported poor PO intake and on nutritionally-inadequate clear liquid diet.    Recommendations/Plan:  Diet advancement to be per MD.  Encourage intake of meals >50-75%.  Document intake of all PO as % taken in ADL's to provide interdisciplinary communication across all shifts.   Monitor weight.  Nutrition rep will continue to see patient for ongoing meal and snack preferences.     RD following.  "

## 2023-01-14 NOTE — H&P
Hospital Medicine History & Physical Note    Date of Service  1/13/2023    Primary Care Physician  Ivy Adams M.D.    Consultants  None    Specialist Names: None    Code Status  Full Code    Chief Complaint  Chief Complaint   Patient presents with    Abdominal Pain       History of Presenting Illness  Rhiannon Marrero is a 37 y.o. female who presented 1/13/2023 with abdominal pain, nausea, vomiting and diarrhea.  Patient does have a history of chronic pancreatitis due to alcohol use.  She also has pancreatic ductal stones intermittently.  Patient states whenever she has an acute flare of her pancreatitis, these are the same symptoms she gets.  She states it started approximately 3 days ago, causing her to be unable to tolerate much oral intake.  She states she did not want to come in because she just started a new job however she was told by her employer that she should come to the hospital.  Patient states her pain was much more severe this time than it was last time she came in however lipase is much less elevated at this time.  She states the pain is the worst epigastric, sharp and severe at its worst.  I did discuss the case including labs and imaging with the ER physician.    I discussed the plan of care with patient.    Review of Systems  Review of Systems   Constitutional:  Negative for chills, fever and malaise/fatigue.   HENT:  Negative for congestion.    Respiratory:  Negative for cough, sputum production, shortness of breath and stridor.    Cardiovascular:  Negative for chest pain, palpitations and leg swelling.   Gastrointestinal:  Positive for abdominal pain, diarrhea, nausea and vomiting. Negative for constipation.   Genitourinary:  Negative for dysuria and urgency.   Musculoskeletal:  Negative for falls and myalgias.   Neurological:  Negative for dizziness, tingling, loss of consciousness, weakness and headaches.   Psychiatric/Behavioral:  Negative for depression and suicidal ideas.    All other  systems reviewed and are negative.    Past Medical History   has a past medical history of Acute pancreatitis without infection or necrosis (07/20/2022), Alcohol dependence ([facility]) (04/13/2017), Bronchitis (08/2012), Cold, Current moderate episode of major depressive disorder without prior episode ([facility]) (01/31/2020), Heart burn, Hernia of unspecified site of abdominal cavity without mention of obstruction or gangrene, High anion gap metabolic acidosis (07/01/2022), Indigestion, Pancreatitis, Pneumonia (2011), and Seizure disorder ([facility]) (05/23/2022).    Surgical History   has a past surgical history that includes other orthopedic surgery; gyn surgery; tonsillectomy (4/11/2013); arthroscopy, knee; hysterectomy robotic xi (10/11/2018); salpingectomy (Bilateral, 10/11/2018); orif, wrist (Right, 4/24/2019); pr upper gi endoscopy,diagnosis (N/A, 12/23/2022); pr inject nerv blck,celiac plexus (N/A, 12/23/2022); and egd w/endoscopic ultrasound (N/A, 12/23/2022).     Family History  family history includes Arthritis in her mother; Heart Disease in her maternal grandfather; Psychiatric Illness in her mother; Stroke in her mother.   Family history reviewed with patient. There is no family history that is pertinent to the chief complaint.     Social History   reports that she has been smoking cigarettes. She has a 7.50 pack-year smoking history. She has never used smokeless tobacco. She reports that she does not currently use alcohol. She reports current drug use. Drug: Marijuana.    Allergies  Allergies   Allergen Reactions    Promethazine Hcl Anxiety     Anxiety and makes her feels like skin coming off     Naltrexone Anxiety       Medications  Prior to Admission Medications   Prescriptions Last Dose Informant Patient Reported? Taking?   divalproex (DEPAKOTE) 250 MG Tablet Delayed Response  Patient No No   Sig: Take 1 Tablet by mouth 2 times a day for 120 days.   gabapentin (NEURONTIN) 100 MG Cap  Patient Yes No   Sig: Take  100-300 mg by mouth 2 times a day. Pt takes 100MG in AM and 300MG in the evening   hydrOXYzine pamoate (VISTARIL) 50 MG Cap  Patient No No   Sig: Take 1 Capsule by mouth every 8 hours as needed for Anxiety.   ibuprofen (MOTRIN) 200 MG Tab  Patient Yes No   Sig: Take 600 mg by mouth every 6 hours as needed. Indications: Pain   ondansetron (ZOFRAN ODT) 4 MG TABLET DISPERSIBLE   No No   Sig: Take 1 Tablet by mouth every four hours as needed for Nausea/Vomiting   senna-docusate (PERICOLACE OR SENOKOT S) 8.6-50 MG Tab   Yes No   Sig: Take 2 Tablets by mouth 2 times a day.   zonisamide (ZONEGRAN) 50 MG capsule   No No   Sig: Take 3 Capsules by mouth at bedtime for 30 days.      Facility-Administered Medications: None       Physical Exam  Temp:  [37 °C (98.6 °F)] 37 °C (98.6 °F)  Pulse:  [] 91  Resp:  [12-22] 16  BP: (116-134)/(77-89) 126/78  SpO2:  [94 %-99 %] 97 %  Blood Pressure: 126/78   Temperature: 37 °C (98.6 °F)   Pulse: 91   Respiration: 16   Pulse Oximetry: 97 %       Physical Exam  Vitals and nursing note reviewed.   Constitutional:       General: She is not in acute distress.     Appearance: She is well-developed. She is not diaphoretic.   HENT:      Head: Normocephalic and atraumatic.      Right Ear: External ear normal.      Left Ear: External ear normal.      Nose: Nose normal. No congestion or rhinorrhea.      Mouth/Throat:      Mouth: Mucous membranes are dry.      Pharynx: No oropharyngeal exudate.   Eyes:      General:         Right eye: No discharge.         Left eye: No discharge.   Neck:      Trachea: No tracheal deviation.   Cardiovascular:      Rate and Rhythm: Normal rate and regular rhythm.      Heart sounds: No murmur heard.    No friction rub. No gallop.   Pulmonary:      Effort: Pulmonary effort is normal. No respiratory distress.      Breath sounds: Normal breath sounds. No stridor. No wheezing or rales.   Chest:      Chest wall: No tenderness.   Abdominal:      General: Bowel sounds are  normal. There is no distension.      Palpations: Abdomen is soft.      Tenderness: There is abdominal tenderness in the epigastric area.   Musculoskeletal:         General: No tenderness. Normal range of motion.      Cervical back: Neck supple.      Right lower leg: No edema.      Left lower leg: No edema.   Lymphadenopathy:      Cervical: No cervical adenopathy.   Skin:     General: Skin is warm and dry.      Findings: No erythema or rash.   Neurological:      General: No focal deficit present.      Mental Status: She is alert and oriented to person, place, and time.      Cranial Nerves: No cranial nerve deficit.   Psychiatric:         Mood and Affect: Mood normal.         Behavior: Behavior normal.         Thought Content: Thought content normal.         Judgment: Judgment normal.       Laboratory:  Recent Labs     01/13/23 2019   WBC 7.9   RBC 4.93   HEMOGLOBIN 14.8   HEMATOCRIT 44.3   MCV 89.9   MCH 30.0   MCHC 33.4*   RDW 43.9   PLATELETCT 205   MPV 10.5     Recent Labs     01/13/23 2019   SODIUM 140   POTASSIUM 3.3*   CHLORIDE 103   CO2 23   GLUCOSE 94   BUN 4*   CREATININE 0.75   CALCIUM 9.7     Recent Labs     01/13/23 2019   ALTSGPT 17   ASTSGOT 17   ALKPHOSPHAT 61   TBILIRUBIN 0.7   LIPASE 86*   GLUCOSE 94         No results for input(s): NTPROBNP in the last 72 hours.      No results for input(s): TROPONINT in the last 72 hours.    Imaging:  No orders to display       no X-Ray or EKG requiring interpretation    Assessment/Plan:  Justification for Admission Status  I anticipate this patient will require at least two midnights for appropriate medical management, necessitating inpatient admission because acute on chronic pancreatitis    Patient will need a Med/Surg bed on MEDICAL service .  The need is secondary to acute on chronic pancreatitis.    * Acute on chronic pancreatitis (HCC)- (present on admission)  Assessment & Plan  - Pancreatitis was initially due to alcohol abuse, she states she is now 93  days sober  -No risk of alcohol withdrawal  -Patient now has recurrent pancreatic ductal stones, most recently seen on CT scan from 1/8  -Stones likely causing recurrent pancreatitis at this time  -We will trial clear liquid diet, if this worsens her pain, will transition to n.p.o.  -Narcotics and antiemetics as needed    Bipolar disorder (HCC)- (present on admission)  Assessment & Plan  - Continue home meds    Hypokalemia- (present on admission)  Assessment & Plan  - Place IV potassium and with IV fluids  -Repeat BMP in the morning    Acute dehydration- (present on admission)  Assessment & Plan  - Significant dehydration due to pancreatitis  -Start IV fluids saline    Tobacco abuse- (present on admission)  Assessment & Plan  -Tobacco cessation counseling and education provided for more than 5 minutes. Nicotine replacement options provided including patch, and further medical treatments including Wellbutrin and chantix.  As well as over the counter options of lozenges and gum.        VTE prophylaxis: SCDs/TEDs and Xarelto 10 mg daily as prophylaxis

## 2023-01-15 LAB
LIPASE SERPL-CCNC: 37 U/L (ref 7–58)
MAGNESIUM SERPL-MCNC: 1.8 MG/DL (ref 1.5–2.5)
PHOSPHATE SERPL-MCNC: 3.5 MG/DL (ref 2.5–4.5)

## 2023-01-15 PROCEDURE — 94760 N-INVAS EAR/PLS OXIMETRY 1: CPT

## 2023-01-15 PROCEDURE — A9270 NON-COVERED ITEM OR SERVICE: HCPCS | Performed by: INTERNAL MEDICINE

## 2023-01-15 PROCEDURE — A9270 NON-COVERED ITEM OR SERVICE: HCPCS | Performed by: HOSPITALIST

## 2023-01-15 PROCEDURE — 83735 ASSAY OF MAGNESIUM: CPT

## 2023-01-15 PROCEDURE — 770006 HCHG ROOM/CARE - MED/SURG/GYN SEMI*

## 2023-01-15 PROCEDURE — 700111 HCHG RX REV CODE 636 W/ 250 OVERRIDE (IP): Performed by: HOSPITALIST

## 2023-01-15 PROCEDURE — 700111 HCHG RX REV CODE 636 W/ 250 OVERRIDE (IP): Performed by: INTERNAL MEDICINE

## 2023-01-15 PROCEDURE — 84100 ASSAY OF PHOSPHORUS: CPT

## 2023-01-15 PROCEDURE — 700102 HCHG RX REV CODE 250 W/ 637 OVERRIDE(OP): Performed by: HOSPITALIST

## 2023-01-15 PROCEDURE — 99231 SBSQ HOSP IP/OBS SF/LOW 25: CPT | Performed by: HOSPITALIST

## 2023-01-15 PROCEDURE — 700101 HCHG RX REV CODE 250: Performed by: INTERNAL MEDICINE

## 2023-01-15 PROCEDURE — 83690 ASSAY OF LIPASE: CPT

## 2023-01-15 PROCEDURE — 700102 HCHG RX REV CODE 250 W/ 637 OVERRIDE(OP): Performed by: INTERNAL MEDICINE

## 2023-01-15 PROCEDURE — 36415 COLL VENOUS BLD VENIPUNCTURE: CPT

## 2023-01-15 RX ORDER — OXYCODONE HYDROCHLORIDE 5 MG/1
5 TABLET ORAL
Status: DISCONTINUED | OUTPATIENT
Start: 2023-01-15 | End: 2023-01-16 | Stop reason: HOSPADM

## 2023-01-15 RX ORDER — OXYCODONE HYDROCHLORIDE 10 MG/1
10 TABLET ORAL
Status: DISCONTINUED | OUTPATIENT
Start: 2023-01-15 | End: 2023-01-16 | Stop reason: HOSPADM

## 2023-01-15 RX ORDER — HYDROMORPHONE HYDROCHLORIDE 1 MG/ML
0.5 INJECTION, SOLUTION INTRAMUSCULAR; INTRAVENOUS; SUBCUTANEOUS
Status: DISCONTINUED | OUTPATIENT
Start: 2023-01-15 | End: 2023-01-16

## 2023-01-15 RX ADMIN — HYDROMORPHONE HYDROCHLORIDE 0.5 MG: 1 INJECTION, SOLUTION INTRAMUSCULAR; INTRAVENOUS; SUBCUTANEOUS at 19:53

## 2023-01-15 RX ADMIN — RIVAROXABAN 10 MG: 10 TABLET, FILM COATED ORAL at 18:44

## 2023-01-15 RX ADMIN — HYDROXYZINE HYDROCHLORIDE 50 MG: 25 TABLET, FILM COATED ORAL at 22:26

## 2023-01-15 RX ADMIN — HYDROMORPHONE HYDROCHLORIDE 0.5 MG: 1 INJECTION, SOLUTION INTRAMUSCULAR; INTRAVENOUS; SUBCUTANEOUS at 12:43

## 2023-01-15 RX ADMIN — DIVALPROEX SODIUM 250 MG: 250 TABLET, DELAYED RELEASE ORAL at 05:00

## 2023-01-15 RX ADMIN — OXYCODONE HYDROCHLORIDE 10 MG: 10 TABLET ORAL at 22:26

## 2023-01-15 RX ADMIN — NICOTINE 14 MG: 14 PATCH TRANSDERMAL at 05:01

## 2023-01-15 RX ADMIN — ZONISAMIDE 150 MG: 50 CAPSULE ORAL at 20:05

## 2023-01-15 RX ADMIN — OXYCODONE HYDROCHLORIDE 10 MG: 10 TABLET ORAL at 11:42

## 2023-01-15 RX ADMIN — OXYCODONE HYDROCHLORIDE 10 MG: 10 TABLET ORAL at 00:28

## 2023-01-15 RX ADMIN — HYDROMORPHONE HYDROCHLORIDE 1 MG: 1 INJECTION, SOLUTION INTRAMUSCULAR; INTRAVENOUS; SUBCUTANEOUS at 04:43

## 2023-01-15 RX ADMIN — OXYCODONE HYDROCHLORIDE 10 MG: 10 TABLET ORAL at 14:52

## 2023-01-15 RX ADMIN — ONDANSETRON 4 MG: 2 INJECTION INTRAMUSCULAR; INTRAVENOUS at 10:53

## 2023-01-15 RX ADMIN — OXYCODONE HYDROCHLORIDE 10 MG: 10 TABLET ORAL at 18:44

## 2023-01-15 RX ADMIN — GABAPENTIN 300 MG: 300 CAPSULE ORAL at 18:44

## 2023-01-15 RX ADMIN — OXYCODONE HYDROCHLORIDE 10 MG: 10 TABLET ORAL at 03:40

## 2023-01-15 RX ADMIN — GABAPENTIN 100 MG: 100 CAPSULE ORAL at 05:00

## 2023-01-15 RX ADMIN — ONDANSETRON 4 MG: 2 INJECTION INTRAMUSCULAR; INTRAVENOUS at 04:53

## 2023-01-15 RX ADMIN — HYDROXYZINE HYDROCHLORIDE 50 MG: 25 TABLET, FILM COATED ORAL at 08:37

## 2023-01-15 RX ADMIN — POTASSIUM CHLORIDE AND SODIUM CHLORIDE: 900; 150 INJECTION, SOLUTION INTRAVENOUS at 04:57

## 2023-01-15 RX ADMIN — HYDROMORPHONE HYDROCHLORIDE 0.5 MG: 1 INJECTION, SOLUTION INTRAMUSCULAR; INTRAVENOUS; SUBCUTANEOUS at 16:08

## 2023-01-15 RX ADMIN — HYDROMORPHONE HYDROCHLORIDE 1 MG: 1 INJECTION, SOLUTION INTRAMUSCULAR; INTRAVENOUS; SUBCUTANEOUS at 01:37

## 2023-01-15 RX ADMIN — HYDROMORPHONE HYDROCHLORIDE 1 MG: 1 INJECTION, SOLUTION INTRAMUSCULAR; INTRAVENOUS; SUBCUTANEOUS at 09:50

## 2023-01-15 RX ADMIN — HYDROMORPHONE HYDROCHLORIDE 0.5 MG: 1 INJECTION, SOLUTION INTRAMUSCULAR; INTRAVENOUS; SUBCUTANEOUS at 23:26

## 2023-01-15 RX ADMIN — DIVALPROEX SODIUM 250 MG: 250 TABLET, DELAYED RELEASE ORAL at 18:44

## 2023-01-15 RX ADMIN — OXYCODONE HYDROCHLORIDE 10 MG: 10 TABLET ORAL at 08:37

## 2023-01-15 ASSESSMENT — PAIN DESCRIPTION - PAIN TYPE
TYPE: ACUTE PAIN;CHRONIC PAIN
TYPE: ACUTE PAIN;CHRONIC PAIN
TYPE: ACUTE PAIN;SURGICAL PAIN
TYPE: ACUTE PAIN;CHRONIC PAIN
TYPE: ACUTE PAIN;CHRONIC PAIN

## 2023-01-15 ASSESSMENT — ENCOUNTER SYMPTOMS
SPUTUM PRODUCTION: 0
VOMITING: 0
HEMOPTYSIS: 0
CHILLS: 0
DIARRHEA: 1
ABDOMINAL PAIN: 1
ORTHOPNEA: 0
DIZZINESS: 0
PALPITATIONS: 0
COUGH: 0
NAUSEA: 1

## 2023-01-15 ASSESSMENT — FIBROSIS 4 INDEX: FIB4 SCORE: 1.142029815757824663

## 2023-01-15 NOTE — PROGRESS NOTES
[facility] Medicine Daily Progress Note    Date of Service  1/15/2023    Chief Complaint  Rhiannon Marrero is a 37 y.o. female admitted 1/13/2023 with abdominal pain    Hospital Course  Rhiannon Marrero has a history of recurrent/chronic pancreatitis.  She is currently in rehab and reports a long history of alcohol abstinence. She has undergone multiple interventions including a celiac plexus nerve block on 12/23/2022.  The patient has stones in her pancreatic ducts noted during prior admission, ECRP was not been recommended due to risk of worsened pancreatitis and lack of inflammation noted in the pancreas on CT.  She presented to the emergency room on 1/13/2023 with abdominal pain.  She was admitted to the hospital for fluids and pain control    Interval Problem Update  Pain is slightly better today, still has severe pain in upper quadrants, tender to palpation, worse with any movement, no emesis  Heart rate 71-89  Discussed plans to trial full liquid diet    I have discussed this patient's plan of care and discharge plan at IDT rounds today with Case Management, Nursing, Nursing leadership, and other members of the IDT team.    Consultants/Specialty  None    Code Status  Full Code    Disposition  Patient is not medically cleared for discharge.   Anticipate discharge to to home with close outpatient follow-up.  I have placed the appropriate orders for post-discharge needs.    Review of Systems  Review of Systems   Constitutional:  Negative for chills and malaise/fatigue.   Respiratory:  Negative for cough, hemoptysis and sputum production.    Cardiovascular:  Negative for chest pain, palpitations and orthopnea.   Gastrointestinal:  Positive for abdominal pain, diarrhea and nausea. Negative for vomiting.   Skin:  Negative for itching and rash.   Neurological:  Negative for dizziness.   All other systems reviewed and are negative.     Physical Exam  Temp:  [36.6 °C (97.8 °F)-36.8 °C (98.2 °F)] 36.7 °C (98.1 °F)  Pulse:   [71-89] 71  Resp:  [16-18] 18  BP: (109-131)/(65-87) 109/65  SpO2:  [96 %-100 %] 100 %    Physical Exam  Constitutional:       General: She is not in acute distress.     Appearance: Normal appearance. She is normal weight.   HENT:      Head: Normocephalic and atraumatic.      Right Ear: External ear normal.      Left Ear: External ear normal.      Nose: Nose normal.      Mouth/Throat:      Mouth: Mucous membranes are moist.      Pharynx: Oropharynx is clear.   Eyes:      Extraocular Movements: Extraocular movements intact.      Conjunctiva/sclera: Conjunctivae normal.      Pupils: Pupils are equal, round, and reactive to light.   Cardiovascular:      Rate and Rhythm: Normal rate and regular rhythm.      Pulses: Normal pulses.   Pulmonary:      Effort: Pulmonary effort is normal.      Breath sounds: Normal breath sounds.   Abdominal:      General: Abdomen is flat.      Palpations: Abdomen is soft.      Tenderness: There is abdominal tenderness.   Musculoskeletal:         General: Normal range of motion.      Cervical back: Normal range of motion and neck supple.   Skin:     General: Skin is warm and dry.      Capillary Refill: Capillary refill takes less than 2 seconds.      Coloration: Skin is not jaundiced.   Neurological:      General: No focal deficit present.      Mental Status: She is alert and oriented to person, place, and time.      Cranial Nerves: No cranial nerve deficit.      Gait: Gait normal.   Psychiatric:         Behavior: Behavior normal.      Comments: Anxious       Fluids  No intake or output data in the 24 hours ending 01/15/23 1208      Laboratory  Recent Labs     01/13/23 2019 01/14/23  0150   WBC 7.9 7.3   RBC 4.93 4.39   HEMOGLOBIN 14.8 13.2   HEMATOCRIT 44.3 40.2   MCV 89.9 91.6   MCH 30.0 30.1   MCHC 33.4* 32.8*   RDW 43.9 45.2   PLATELETCT 205 170   MPV 10.5 10.8       Recent Labs     01/13/23 2019 01/14/23  0150   SODIUM 140 140   POTASSIUM 3.3* 3.7   CHLORIDE 103 109   CO2 23 19*    GLUCOSE 94 91   BUN 4* 4*   CREATININE 0.75 0.70   CALCIUM 9.7 8.3*                     Imaging  No orders to display          Assessment/Plan  * Acute on chronic pancreatitis (HCC)- (present on admission)  Assessment & Plan  Initially due to alcohol  Currently abstinent from alcohol, she is in a rehab facility  Lipase improved  Slowly advance diet  Stop IVF  Monitor serial abdominal exams    Acute dehydration- (present on admission)  Assessment & Plan  Improved  Stop fluids today    Hypokalemia- (present on admission)  Assessment & Plan  Replaced  I reviewed this morning's magnesium and phosphorus levels which are acceptable    Bipolar disorder (HCC)- (present on admission)  Assessment & Plan  Home medications    Tobacco abuse- (present on admission)  Assessment & Plan  Tobacco cessation recommended at admission.       VTE prophylaxis: Xarelto 10 mg daily as prophylaxis    I have performed a physical exam and reviewed and updated ROS and Plan today (1/15/2023). In review of yesterday's note (1/14/2023), there are no changes except as documented above.

## 2023-01-15 NOTE — CARE PLAN
The patient is Stable - Low risk of patient condition declining or worsening    Shift Goals  Clinical Goals: pain management and safety  Patient Goals: pain relief and comfort  Family Goals: FANTASMA    Progress made toward(s) clinical / shift goals:  Patient pain is managed and controlled with PRNs, continue oxycodone and dilaudid, continue fluids, no s/s of nausea and vomiting at this time    Patient is not progressing towards the following goals:      Problem: Knowledge Deficit - Standard  Goal: Patient and family/care givers will demonstrate understanding of plan of care, disease process/condition, diagnostic tests and medications  Outcome: Progressing     Problem: Pain - Standard  Goal: Alleviation of pain or a reduction in pain to the patient’s comfort goal  Outcome: Progressing

## 2023-01-16 VITALS
DIASTOLIC BLOOD PRESSURE: 84 MMHG | TEMPERATURE: 97.8 F | WEIGHT: 141.09 LBS | BODY MASS INDEX: 22.15 KG/M2 | OXYGEN SATURATION: 96 % | SYSTOLIC BLOOD PRESSURE: 101 MMHG | HEIGHT: 67 IN | HEART RATE: 78 BPM | RESPIRATION RATE: 18 BRPM

## 2023-01-16 PROBLEM — E87.6 HYPOKALEMIA: Status: RESOLVED | Noted: 2022-08-16 | Resolved: 2023-01-16

## 2023-01-16 PROBLEM — K86.1 ACUTE ON CHRONIC PANCREATITIS (HCC): Status: RESOLVED | Noted: 2022-10-12 | Resolved: 2023-01-16

## 2023-01-16 PROBLEM — K85.90 ACUTE ON CHRONIC PANCREATITIS (HCC): Status: RESOLVED | Noted: 2022-10-12 | Resolved: 2023-01-16

## 2023-01-16 PROBLEM — E86.0 ACUTE DEHYDRATION: Status: RESOLVED | Noted: 2022-07-01 | Resolved: 2023-01-16

## 2023-01-16 LAB
BACTERIA UR CULT: NORMAL
SIGNIFICANT IND 70042: NORMAL
SITE SITE: NORMAL
SOURCE SOURCE: NORMAL

## 2023-01-16 PROCEDURE — 99239 HOSP IP/OBS DSCHRG MGMT >30: CPT | Performed by: HOSPITALIST

## 2023-01-16 PROCEDURE — A9270 NON-COVERED ITEM OR SERVICE: HCPCS | Performed by: INTERNAL MEDICINE

## 2023-01-16 PROCEDURE — 700111 HCHG RX REV CODE 636 W/ 250 OVERRIDE (IP): Performed by: HOSPITALIST

## 2023-01-16 PROCEDURE — 700102 HCHG RX REV CODE 250 W/ 637 OVERRIDE(OP): Performed by: HOSPITALIST

## 2023-01-16 PROCEDURE — 700102 HCHG RX REV CODE 250 W/ 637 OVERRIDE(OP): Performed by: INTERNAL MEDICINE

## 2023-01-16 PROCEDURE — A9270 NON-COVERED ITEM OR SERVICE: HCPCS | Performed by: HOSPITALIST

## 2023-01-16 RX ORDER — TRAMADOL HYDROCHLORIDE 50 MG/1
50 TABLET ORAL EVERY 6 HOURS PRN
Status: DISCONTINUED | OUTPATIENT
Start: 2023-01-16 | End: 2023-01-16 | Stop reason: HOSPADM

## 2023-01-16 RX ORDER — ONDANSETRON 4 MG/1
4 TABLET, ORALLY DISINTEGRATING ORAL EVERY 4 HOURS PRN
Qty: 10 TABLET | Refills: 0 | Status: SHIPPED | OUTPATIENT
Start: 2023-01-16 | End: 2023-03-20

## 2023-01-16 RX ORDER — TRAMADOL HYDROCHLORIDE 50 MG/1
50 TABLET ORAL EVERY 6 HOURS PRN
Qty: 15 TABLET | Refills: 0 | Status: SHIPPED | OUTPATIENT
Start: 2023-01-16 | End: 2023-01-16 | Stop reason: SDUPTHER

## 2023-01-16 RX ORDER — HYDROMORPHONE HYDROCHLORIDE 1 MG/ML
0.5 INJECTION, SOLUTION INTRAMUSCULAR; INTRAVENOUS; SUBCUTANEOUS ONCE
Status: COMPLETED | OUTPATIENT
Start: 2023-01-16 | End: 2023-01-16

## 2023-01-16 RX ORDER — ONDANSETRON 4 MG/1
4 TABLET, ORALLY DISINTEGRATING ORAL EVERY 4 HOURS PRN
Qty: 10 TABLET | Refills: 0 | Status: SHIPPED | OUTPATIENT
Start: 2023-01-16 | End: 2023-01-16 | Stop reason: SDUPTHER

## 2023-01-16 RX ORDER — TRAMADOL HYDROCHLORIDE 50 MG/1
50 TABLET ORAL EVERY 6 HOURS PRN
Qty: 15 TABLET | Refills: 0 | Status: SHIPPED | OUTPATIENT
Start: 2023-01-16 | End: 2023-01-21

## 2023-01-16 RX ADMIN — OXYCODONE HYDROCHLORIDE 10 MG: 10 TABLET ORAL at 05:09

## 2023-01-16 RX ADMIN — HYDROMORPHONE HYDROCHLORIDE 0.5 MG: 1 INJECTION, SOLUTION INTRAMUSCULAR; INTRAVENOUS; SUBCUTANEOUS at 06:12

## 2023-01-16 RX ADMIN — GABAPENTIN 100 MG: 100 CAPSULE ORAL at 05:06

## 2023-01-16 RX ADMIN — NICOTINE 14 MG: 14 PATCH TRANSDERMAL at 05:07

## 2023-01-16 RX ADMIN — DIVALPROEX SODIUM 250 MG: 250 TABLET, DELAYED RELEASE ORAL at 05:07

## 2023-01-16 RX ADMIN — HYDROMORPHONE HYDROCHLORIDE 0.5 MG: 1 INJECTION, SOLUTION INTRAMUSCULAR; INTRAVENOUS; SUBCUTANEOUS at 10:13

## 2023-01-16 RX ADMIN — OXYCODONE HYDROCHLORIDE 10 MG: 10 TABLET ORAL at 01:28

## 2023-01-16 RX ADMIN — OXYCODONE HYDROCHLORIDE 10 MG: 10 TABLET ORAL at 08:24

## 2023-01-16 ASSESSMENT — PAIN DESCRIPTION - PAIN TYPE
TYPE: ACUTE PAIN
TYPE: ACUTE PAIN;CHRONIC PAIN
TYPE: ACUTE PAIN
TYPE: ACUTE PAIN

## 2023-01-16 ASSESSMENT — FIBROSIS 4 INDEX
FIB4 SCORE: 1.142029815757824663
FIB4 SCORE: 1.142029815757824663

## 2023-01-16 NOTE — CARE PLAN
The patient is Stable - Low risk of patient condition declining or worsening    Shift Goals  Clinical Goals: pain relief, rest, comfort, move rooms  Patient Goals: rest, pain relief, move rooms  Family Goals: communication, plan of care    Progress made toward(s) clinical / shift goals:  Pain well managed with PRN medications. Diet advanced to full liquids, anti-emetics given for nausea. Pt more comfortable, able to rest in new room.  Problem: Knowledge Deficit - Standard  Goal: Patient and family/care givers will demonstrate understanding of plan of care, disease process/condition, diagnostic tests and medications  Outcome: Progressing     Problem: Pain - Standard  Goal: Alleviation of pain or a reduction in pain to the patient’s comfort goal  Outcome: Progressing     Problem: Fall Risk  Goal: Patient will remain free from falls  Outcome: Progressing       Patient is not progressing towards the following goals: n/a

## 2023-01-16 NOTE — PROGRESS NOTES
Pt discharged to [facility]. Discharge instructions provided to pt. Pt verbalizes understanding. Pt states all questions have been answered. Signed copy in chart. Prescriptions sent to [facility]. Pt states that all personal belongings are in possession. Pt off unit via walking, escorted by nursing staff.

## 2023-01-16 NOTE — DISCHARGE INSTRUCTIONS
Discharge Instructions    Acute Pancreatitis    The pancreas is a gland that is located behind the stomach on the left side of the abdomen. It produces enzymes that help to digest food. The pancreas also releases the hormones glucagon and insulin, which help to regulate blood sugar. Acute pancreatitis happens when inflammation of the pancreas suddenly occurs and the pancreas becomes irritated and swollen.  Most acute attacks last a few days and cause serious problems. Some people become dehydrated and develop low blood pressure. In severe cases, bleeding in the abdomen can lead to shock and can be life-threatening. The lungs, heart, and kidneys may fail.  What are the causes?  This condition may be caused by:  Alcohol abuse.  Drug abuse.  Gallstones or other conditions that can block the tube that drains the pancreas (pancreatic duct).  A tumor in the pancreas.  Other causes include:  Certain medicines.  Exposure to certain chemicals.  Diabetes.  An infection in the pancreas.  Damage caused by an accident (trauma).  The poison (venom) from a scorpion bite.  Abdominal surgery.  Autoimmune pancreatitis. This is when the body's disease-fighting (immune) system attacks the pancreas.  Genes that are passed from parent to child (inherited).  In some cases, the cause of this condition is not known.  What are the signs or symptoms?  Symptoms of this condition include:  Pain in the upper abdomen that may radiate to the back. Pain may be severe.  Tenderness and swelling of the abdomen.  Nausea and vomiting.  Fever.  How is this diagnosed?  This condition may be diagnosed based on:  A physical exam.  Blood tests.  Imaging tests, such as X-rays, CT or MRI scans, or an ultrasound of the abdomen.  How is this treated?  Treatment for this condition usually requires a stay in the hospital. Treatment for this condition may include:  Pain medicine.  Fluid replacement through an IV.  Placing a tube in the stomach to remove stomach  contents and to control vomiting (NG tube, or nasogastric tube).  Not eating for 3-4 days. This gives the pancreas a rest, because enzymes are not being produced that can cause further damage.  Antibiotic medicines, if your condition is caused by an infection.  Treating any underlying conditions that may be the cause.  Steroid medicines, if your condition is caused by your immune system attacking your body's own tissues (autoimmune disease).  Surgery on the pancreas or gallbladder.  Follow these instructions at home:  Eating and drinking    Follow instructions from your health care provider about diet. This may involve avoiding alcohol and decreasing the amount of fat in your diet.  Eat smaller, more frequent meals. This reduces the amount of digestive fluids that the pancreas produces.  Drink enough fluid to keep your urine pale yellow.  Do not drink alcohol if it caused your condition.  General instructions  Take over-the-counter and prescription medicines only as told by your health care provider.  Do not drive or use heavy machinery while taking prescription pain medicine.  Ask your health care provider if the medicine prescribed to you can cause constipation. You may need to take steps to prevent or treat constipation, such as:  Take an over-the-counter or prescription medicine for constipation.  Eat foods that are high in fiber such as whole grains and beans.  Limit foods that are high in fat and processed sugars, such as fried or sweet foods.  Do not use any products that contain nicotine or tobacco, such as cigarettes, e-cigarettes, and chewing tobacco. If you need help quitting, ask your health care provider.  Get plenty of rest.  If directed, check your blood sugar at home as told by your health care provider.  Keep all follow-up visits as told by your health care provider. This is important.  Contact a health care provider if you:  Do not recover as quickly as expected.  Develop new or worsening  symptoms.  Have persistent pain, weakness, or nausea.  Recover and then have another episode of pain.  Have a fever.  Get help right away if:  You cannot eat or keep fluids down.  Your pain becomes severe.  Your skin or the white part of your eyes turns yellow (jaundice).  You have sudden swelling in your abdomen.  You vomit.  You feel dizzy or you faint.  Your blood sugar is high (over 300 mg/dL).  Summary  Acute pancreatitis happens when inflammation of the pancreas suddenly occurs and the pancreas becomes irritated and swollen.  This condition is typically caused by alcohol abuse, drug abuse, or gallstones.  Treatment for this condition usually requires a stay in the hospital.  This information is not intended to replace advice given to you by your health care provider. Make sure you discuss any questions you have with your health care provider.  Document Released: 12/18/2006 Document Revised: 10/07/2019 Document Reviewed: 06/24/2019  [employer] Patient Education © 2020 [employer]        Discharged to group home by car with escort. Discharged via walking, hospital escort: Yes.  Special equipment needed: Not Applicable    Be sure to schedule a follow-up appointment with your primary care doctor or any specialists as instructed.     Discharge Plan:   Diet Plan: Discussed  Activity Level: Discussed  Confirmed Follow up Appointment: Appointment Scheduled  Confirmed Symptoms Management: Discussed  Medication Reconciliation Updated: Yes  Influenza Vaccine Indication: Patient Refuses    I understand that a diet low in cholesterol, fat, and sodium is recommended for good health. Unless I have been given specific instructions below for another diet, I accept this instruction as my diet prescription.   Other diet: full liquid    Special Instructions: None    -Is this patient being discharged with medication to prevent blood clots?  No    Is patient discharged on Warfarin / Coumadin?   No

## 2023-01-16 NOTE — PROGRESS NOTES
1100 Pt concerned because pharmacy stated they didn't have her tramadol script. Confirmed with MD that the med was sent to the correct pharmacy. Spoke with the pharm tech at [facility] and confirmed they had the script. Tech stated once it's processed the patient shouldn't have any problems filling it. Message relayed to patient.

## 2023-01-16 NOTE — PROGRESS NOTES
1045 Pt [facility] has the wrong pharmacy listed, asked MD to change to [address] [facility].

## 2023-01-16 NOTE — CARE PLAN
The patient is Stable - Low risk of patient condition declining or worsening    Shift Goals  Clinical Goals: pain control and comfort  Patient Goals: rest and pain relief  Family Goals: FANTASMA    Progress made toward(s) clinical / shift goals:  PRNs are given for patient pain, along with heat packs and distractions, anxiety is managed with PRN atarax, comfort measures in place, call light within reach, pt is resting      Patient is not progressing towards the following goals: Pt experienced period of 9/10 pain that was not relieved after PRNs. RN notified MD. Pt has been sleeping since administration of atarax.    Problem: Pain - Standard  Goal: Alleviation of pain or a reduction in pain to the patient’s comfort goal  Outcome: Progressing     Problem: Fall Risk  Goal: Patient will remain free from falls  Outcome: Progressing

## 2023-01-16 NOTE — DISCHARGE SUMMARY
Discharge Summary    CHIEF COMPLAINT ON ADMISSION  Chief Complaint   Patient presents with    Abdominal Pain       Reason for Admission  abdominal pain;nausea     Admission Date  1/13/2023    CODE STATUS  Full Code    HPI & HOSPITAL COURSE  This is a 37 y.o. female here with abdominal leonila.     Rhiannon Marrero has a history of chronic abdominal pain and recurrent/chronic pancreatitis.  She is currently in rehab and reports a long history of alcohol abstinence,    The patient has recurrent/chronic pancreatitis, initially thought to be secondary to alcohol.  She also has pancreatic duct stones.  Has been evaluated by GI, ERCP has not been recommended due to risk of pancreatitis and lack of inflammation noted on imaging.    She had a celiac plexus nerve block on 12/23/2022.      She presented to the emergency room on 1/13/2023 with abdominal pain and dehydration.  She was admitted to the hospital for fluids and pain control.    Pain has gradually improved, overnight she was able to sleep comfortably and she is showing no objective signs of discomfort.  She is tolerating diet.  She has close follow up with GI next week.    The patient can be discharged today.  I have prescribed tramadol and zofran for symptom control and recommended that she slowly advance her diet.    She will return to her residential rehab facility.    Therefore, she is discharged in fair and stable condition to home with close outpatient follow-up.    The patient met 2-midnight criteria for an inpatient stay at the time of discharge.    Discharge Date  1/16/2023    FOLLOW UP ITEMS POST DISCHARGE  Follow up with PCP, follow up with GI at scheduled appointment    DISCHARGE DIAGNOSES  Principal Problem (Resolved):    Acute on chronic pancreatitis (HCC) POA: Yes  Active Problems:    Tobacco abuse POA: Yes    Bipolar disorder (HCC) POA: Yes  Resolved Problems:    Acute dehydration POA: Yes    Hypokalemia POA: Yes      FOLLOW UP  Future Appointments   Date Time  Provider Department Center   2/3/2023  3:00 PM Darien Garcia M.D. [facility] None     Ivy Adams M.D.  [address] [facility] [id number]  [phone]    Follow up in 1 week(s)        MEDICATIONS ON DISCHARGE     Medication List        START taking these medications        Instructions   ondansetron 4 MG Tbdp  Commonly known as: ZOFRAN ODT   Take 1 Tablet by mouth every four hours as needed for Nausea/Vomiting (give PO if no IV route available).  Dose: 4 mg     traMADol 50 MG Tabs  Commonly known as: Ultram   Take 1 Tablet by mouth every 6 hours as needed for Severe Pain for up to 5 days.  Dose: 50 mg            CONTINUE taking these medications        Instructions   acetaminophen 500 MG Tabs  Commonly known as: TYLENOL   Take 1,000 mg by mouth 1 time a day as needed for Mild Pain. 1000 mg = 2 tablets  Dose: 1,000 mg     divalproex 250 MG Tbec  Commonly known as: DEPAKOTE   Take 1 Tablet by mouth 2 times a day for 120 days.  Dose: 250 mg     gabapentin 100 MG Caps  Commonly known as: NEURONTIN   Take 100-300 mg by mouth 2 times a day. Pt takes 100MG in AM and 300MG in the evening  Dose: 100-300 mg     hydrOXYzine pamoate 50 MG Caps  Commonly known as: Vistaril   Take 1 Capsule by mouth every 8 hours as needed for Anxiety.  Dose: 50 mg     zonisamide 50 MG capsule  Commonly known as: ZONEGRAN   Take 150 mg by mouth every day. 150 mg = 3 capsules  Dose: 150 mg              Allergies  Allergies   Allergen Reactions    Naltrexone Anxiety    Promethazine Hcl Anxiety     Anxiety and makes her feels like skin coming off        DIET  Orders Placed This Encounter   Procedures    Diet Order Diet: Low Fiber(GI Soft)     Standing Status:   Standing     Number of Occurrences:   1     Order Specific Question:   Diet:     Answer:   Low Fiber(GI Soft) [2]       ACTIVITY  As tolerated.  Weight bearing as tolerated    CONSULTATIONS  None    PROCEDURES  None    LABORATORY  Lab Results   Component Value Date    SODIUM 140 01/14/2023     POTASSIUM 3.7 01/14/2023    CHLORIDE 109 01/14/2023    CO2 19 (L) 01/14/2023    GLUCOSE 91 01/14/2023    BUN 4 (L) 01/14/2023    CREATININE 0.70 01/14/2023    CREATININE 0.7 06/10/2008        Lab Results   Component Value Date    WBC 7.3 01/14/2023    HEMOGLOBIN 13.2 01/14/2023    HEMATOCRIT 40.2 01/14/2023    PLATELETCT 170 01/14/2023        Total time of the discharge process exceeds 38 minutes.

## 2023-01-23 ENCOUNTER — HOSPITAL ENCOUNTER (INPATIENT)
Facility: MEDICAL CENTER | Age: 38
LOS: 6 days | DRG: 439 | End: 2023-01-29
Attending: EMERGENCY MEDICINE | Admitting: INTERNAL MEDICINE
Payer: COMMERCIAL

## 2023-01-23 DIAGNOSIS — K86.1 ACUTE ON CHRONIC PANCREATITIS (HCC): ICD-10-CM

## 2023-01-23 DIAGNOSIS — K86.1 CHRONIC PANCREATITIS, UNSPECIFIED PANCREATITIS TYPE (HCC): ICD-10-CM

## 2023-01-23 DIAGNOSIS — K85.90 ACUTE ON CHRONIC PANCREATITIS (HCC): ICD-10-CM

## 2023-01-23 DIAGNOSIS — K85.90 ACUTE RECURRENT PANCREATITIS: ICD-10-CM

## 2023-01-23 LAB
ALBUMIN SERPL BCP-MCNC: 4.6 G/DL (ref 3.2–4.9)
ALBUMIN/GLOB SERPL: 1.8 G/DL
ALP SERPL-CCNC: 63 U/L (ref 30–99)
ALT SERPL-CCNC: 22 U/L (ref 2–50)
ANION GAP SERPL CALC-SCNC: 12 MMOL/L (ref 7–16)
AST SERPL-CCNC: 18 U/L (ref 12–45)
BASOPHILS # BLD AUTO: 0.3 % (ref 0–1.8)
BASOPHILS # BLD: 0.02 K/UL (ref 0–0.12)
BILIRUB SERPL-MCNC: 0.5 MG/DL (ref 0.1–1.5)
BUN SERPL-MCNC: 10 MG/DL (ref 8–22)
CALCIUM ALBUM COR SERPL-MCNC: 8.5 MG/DL (ref 8.5–10.5)
CALCIUM SERPL-MCNC: 9 MG/DL (ref 8.4–10.2)
CHLORIDE SERPL-SCNC: 104 MMOL/L (ref 96–112)
CO2 SERPL-SCNC: 22 MMOL/L (ref 20–33)
CREAT SERPL-MCNC: 0.72 MG/DL (ref 0.5–1.4)
EOSINOPHIL # BLD AUTO: 0.05 K/UL (ref 0–0.51)
EOSINOPHIL NFR BLD: 0.7 % (ref 0–6.9)
ERYTHROCYTE [DISTWIDTH] IN BLOOD BY AUTOMATED COUNT: 43.8 FL (ref 35.9–50)
ETHANOL BLD-MCNC: <10.1 MG/DL
GFR SERPLBLD CREATININE-BSD FMLA CKD-EPI: 111 ML/MIN/1.73 M 2
GLOBULIN SER CALC-MCNC: 2.5 G/DL (ref 1.9–3.5)
GLUCOSE SERPL-MCNC: 90 MG/DL (ref 65–99)
HCG SERPL QL: NEGATIVE
HCT VFR BLD AUTO: 45.3 % (ref 37–47)
HGB BLD-MCNC: 15 G/DL (ref 12–16)
IMM GRANULOCYTES # BLD AUTO: 0.02 K/UL (ref 0–0.11)
IMM GRANULOCYTES NFR BLD AUTO: 0.3 % (ref 0–0.9)
LACTATE SERPL-SCNC: 1.4 MMOL/L (ref 0.5–2)
LIPASE SERPL-CCNC: 104 U/L (ref 7–58)
LYMPHOCYTES # BLD AUTO: 2.27 K/UL (ref 1–4.8)
LYMPHOCYTES NFR BLD: 30.2 % (ref 22–41)
MCH RBC QN AUTO: 29.9 PG (ref 27–33)
MCHC RBC AUTO-ENTMCNC: 33.1 G/DL (ref 33.6–35)
MCV RBC AUTO: 90.2 FL (ref 81.4–97.8)
MONOCYTES # BLD AUTO: 0.44 K/UL (ref 0–0.85)
MONOCYTES NFR BLD AUTO: 5.9 % (ref 0–13.4)
NEUTROPHILS # BLD AUTO: 4.72 K/UL (ref 2–7.15)
NEUTROPHILS NFR BLD: 62.6 % (ref 44–72)
NRBC # BLD AUTO: 0 K/UL
NRBC BLD-RTO: 0 /100 WBC
PLATELET # BLD AUTO: 232 K/UL (ref 164–446)
PMV BLD AUTO: 9.5 FL (ref 9–12.9)
POTASSIUM SERPL-SCNC: 3.8 MMOL/L (ref 3.6–5.5)
PROT SERPL-MCNC: 7.1 G/DL (ref 6–8.2)
RBC # BLD AUTO: 5.02 M/UL (ref 4.2–5.4)
SODIUM SERPL-SCNC: 138 MMOL/L (ref 135–145)
WBC # BLD AUTO: 7.5 K/UL (ref 4.8–10.8)

## 2023-01-23 PROCEDURE — 700111 HCHG RX REV CODE 636 W/ 250 OVERRIDE (IP)

## 2023-01-23 PROCEDURE — 700111 HCHG RX REV CODE 636 W/ 250 OVERRIDE (IP): Performed by: EMERGENCY MEDICINE

## 2023-01-23 PROCEDURE — 82077 ASSAY SPEC XCP UR&BREATH IA: CPT

## 2023-01-23 PROCEDURE — 36415 COLL VENOUS BLD VENIPUNCTURE: CPT

## 2023-01-23 PROCEDURE — 700111 HCHG RX REV CODE 636 W/ 250 OVERRIDE (IP): Performed by: HOSPITALIST

## 2023-01-23 PROCEDURE — 84703 CHORIONIC GONADOTROPIN ASSAY: CPT

## 2023-01-23 PROCEDURE — 99285 EMERGENCY DEPT VISIT HI MDM: CPT

## 2023-01-23 PROCEDURE — 85025 COMPLETE CBC W/AUTO DIFF WBC: CPT

## 2023-01-23 PROCEDURE — 700105 HCHG RX REV CODE 258: Performed by: INTERNAL MEDICINE

## 2023-01-23 PROCEDURE — 94760 N-INVAS EAR/PLS OXIMETRY 1: CPT

## 2023-01-23 PROCEDURE — 770001 HCHG ROOM/CARE - MED/SURG/GYN PRIV*

## 2023-01-23 PROCEDURE — 83690 ASSAY OF LIPASE: CPT

## 2023-01-23 PROCEDURE — 96374 THER/PROPH/DIAG INJ IV PUSH: CPT

## 2023-01-23 PROCEDURE — 83605 ASSAY OF LACTIC ACID: CPT

## 2023-01-23 PROCEDURE — 96375 TX/PRO/DX INJ NEW DRUG ADDON: CPT

## 2023-01-23 PROCEDURE — A9270 NON-COVERED ITEM OR SERVICE: HCPCS | Performed by: INTERNAL MEDICINE

## 2023-01-23 PROCEDURE — 700105 HCHG RX REV CODE 258: Performed by: EMERGENCY MEDICINE

## 2023-01-23 PROCEDURE — 96376 TX/PRO/DX INJ SAME DRUG ADON: CPT

## 2023-01-23 PROCEDURE — 700102 HCHG RX REV CODE 250 W/ 637 OVERRIDE(OP): Performed by: INTERNAL MEDICINE

## 2023-01-23 PROCEDURE — 80053 COMPREHEN METABOLIC PANEL: CPT

## 2023-01-23 PROCEDURE — 700111 HCHG RX REV CODE 636 W/ 250 OVERRIDE (IP): Performed by: INTERNAL MEDICINE

## 2023-01-23 PROCEDURE — 99222 1ST HOSP IP/OBS MODERATE 55: CPT | Mod: AI | Performed by: INTERNAL MEDICINE

## 2023-01-23 RX ORDER — MORPHINE SULFATE 4 MG/ML
4 INJECTION INTRAVENOUS ONCE
Status: COMPLETED | OUTPATIENT
Start: 2023-01-23 | End: 2023-01-23

## 2023-01-23 RX ORDER — LOPERAMIDE HYDROCHLORIDE 2 MG/1
2 CAPSULE ORAL 4 TIMES DAILY PRN
Status: DISCONTINUED | OUTPATIENT
Start: 2023-01-23 | End: 2023-01-29 | Stop reason: HOSPADM

## 2023-01-23 RX ORDER — SODIUM CHLORIDE 9 MG/ML
INJECTION, SOLUTION INTRAVENOUS CONTINUOUS
Status: DISCONTINUED | OUTPATIENT
Start: 2023-01-23 | End: 2023-01-29

## 2023-01-23 RX ORDER — HYDROMORPHONE HYDROCHLORIDE 1 MG/ML
1 INJECTION, SOLUTION INTRAMUSCULAR; INTRAVENOUS; SUBCUTANEOUS
Status: DISCONTINUED | OUTPATIENT
Start: 2023-01-23 | End: 2023-01-27

## 2023-01-23 RX ORDER — ONDANSETRON 2 MG/ML
4 INJECTION INTRAMUSCULAR; INTRAVENOUS ONCE
Status: COMPLETED | OUTPATIENT
Start: 2023-01-23 | End: 2023-01-23

## 2023-01-23 RX ORDER — OXYCODONE HYDROCHLORIDE 5 MG/1
5 TABLET ORAL
Status: DISCONTINUED | OUTPATIENT
Start: 2023-01-23 | End: 2023-01-27

## 2023-01-23 RX ORDER — DIVALPROEX SODIUM 250 MG/1
250 TABLET, DELAYED RELEASE ORAL 2 TIMES DAILY
Status: DISCONTINUED | OUTPATIENT
Start: 2023-01-23 | End: 2023-01-29 | Stop reason: HOSPADM

## 2023-01-23 RX ORDER — HYDROXYZINE HYDROCHLORIDE 25 MG/1
50 TABLET, FILM COATED ORAL EVERY EVENING
Status: DISCONTINUED | OUTPATIENT
Start: 2023-01-23 | End: 2023-01-29 | Stop reason: HOSPADM

## 2023-01-23 RX ORDER — GABAPENTIN 300 MG/1
300 CAPSULE ORAL EVERY EVENING
Status: DISCONTINUED | OUTPATIENT
Start: 2023-01-23 | End: 2023-01-29 | Stop reason: HOSPADM

## 2023-01-23 RX ORDER — OXYCODONE HYDROCHLORIDE 5 MG/1
5 TABLET ORAL
Status: DISCONTINUED | OUTPATIENT
Start: 2023-01-23 | End: 2023-01-23

## 2023-01-23 RX ORDER — GABAPENTIN 100 MG/1
100 CAPSULE ORAL EVERY MORNING
Status: DISCONTINUED | OUTPATIENT
Start: 2023-01-24 | End: 2023-01-29 | Stop reason: HOSPADM

## 2023-01-23 RX ORDER — ZONISAMIDE 50 MG/1
150 CAPSULE ORAL EVERY EVENING
Status: DISCONTINUED | OUTPATIENT
Start: 2023-01-23 | End: 2023-01-29 | Stop reason: HOSPADM

## 2023-01-23 RX ORDER — HYDROMORPHONE HYDROCHLORIDE 1 MG/ML
0.5 INJECTION, SOLUTION INTRAMUSCULAR; INTRAVENOUS; SUBCUTANEOUS
Status: DISCONTINUED | OUTPATIENT
Start: 2023-01-23 | End: 2023-01-23

## 2023-01-23 RX ORDER — ENOXAPARIN SODIUM 100 MG/ML
40 INJECTION SUBCUTANEOUS DAILY
Status: DISCONTINUED | OUTPATIENT
Start: 2023-01-23 | End: 2023-01-29 | Stop reason: HOSPADM

## 2023-01-23 RX ORDER — BISACODYL 10 MG
10 SUPPOSITORY, RECTAL RECTAL
Status: DISCONTINUED | OUTPATIENT
Start: 2023-01-23 | End: 2023-01-29 | Stop reason: HOSPADM

## 2023-01-23 RX ORDER — ACETAMINOPHEN 325 MG/1
650 TABLET ORAL EVERY 6 HOURS PRN
Status: DISCONTINUED | OUTPATIENT
Start: 2023-01-23 | End: 2023-01-29 | Stop reason: HOSPADM

## 2023-01-23 RX ORDER — METOCLOPRAMIDE HYDROCHLORIDE 5 MG/ML
10 INJECTION INTRAMUSCULAR; INTRAVENOUS EVERY 6 HOURS PRN
Status: COMPLETED | OUTPATIENT
Start: 2023-01-23 | End: 2023-01-23

## 2023-01-23 RX ORDER — POLYETHYLENE GLYCOL 3350 17 G/17G
1 POWDER, FOR SOLUTION ORAL
Status: DISCONTINUED | OUTPATIENT
Start: 2023-01-23 | End: 2023-01-29 | Stop reason: HOSPADM

## 2023-01-23 RX ORDER — HYDROXYZINE 50 MG/1
50 TABLET, FILM COATED ORAL EVERY EVENING
COMMUNITY
End: 2023-03-20

## 2023-01-23 RX ORDER — OXYCODONE HYDROCHLORIDE 10 MG/1
10 TABLET ORAL
Status: DISCONTINUED | OUTPATIENT
Start: 2023-01-23 | End: 2023-01-27

## 2023-01-23 RX ORDER — AMOXICILLIN 250 MG
2 CAPSULE ORAL 2 TIMES DAILY
Status: DISCONTINUED | OUTPATIENT
Start: 2023-01-23 | End: 2023-01-29 | Stop reason: HOSPADM

## 2023-01-23 RX ORDER — SODIUM CHLORIDE, SODIUM LACTATE, POTASSIUM CHLORIDE, CALCIUM CHLORIDE 600; 310; 30; 20 MG/100ML; MG/100ML; MG/100ML; MG/100ML
1000 INJECTION, SOLUTION INTRAVENOUS ONCE
Status: COMPLETED | OUTPATIENT
Start: 2023-01-23 | End: 2023-01-23

## 2023-01-23 RX ORDER — OXYCODONE HYDROCHLORIDE 10 MG/1
10 TABLET ORAL
Status: DISCONTINUED | OUTPATIENT
Start: 2023-01-23 | End: 2023-01-23

## 2023-01-23 RX ADMIN — OXYCODONE HYDROCHLORIDE 10 MG: 10 TABLET ORAL at 17:32

## 2023-01-23 RX ADMIN — MORPHINE SULFATE 4 MG: 4 INJECTION, SOLUTION INTRAMUSCULAR; INTRAVENOUS at 15:45

## 2023-01-23 RX ADMIN — SODIUM CHLORIDE, POTASSIUM CHLORIDE, SODIUM LACTATE AND CALCIUM CHLORIDE 1000 ML: 600; 310; 30; 20 INJECTION, SOLUTION INTRAVENOUS at 13:49

## 2023-01-23 RX ADMIN — SODIUM CHLORIDE: 9 INJECTION, SOLUTION INTRAVENOUS at 17:03

## 2023-01-23 RX ADMIN — MORPHINE SULFATE 4 MG: 4 INJECTION INTRAVENOUS at 13:48

## 2023-01-23 RX ADMIN — PANCRELIPASE 6000 UNITS: 30000; 6000; 19000 CAPSULE, DELAYED RELEASE PELLETS ORAL at 17:47

## 2023-01-23 RX ADMIN — HYDROMORPHONE HYDROCHLORIDE 1 MG: 1 INJECTION, SOLUTION INTRAMUSCULAR; INTRAVENOUS; SUBCUTANEOUS at 18:33

## 2023-01-23 RX ADMIN — ONDANSETRON 4 MG: 2 INJECTION INTRAMUSCULAR; INTRAVENOUS at 13:48

## 2023-01-23 RX ADMIN — HYDROMORPHONE HYDROCHLORIDE 1 MG: 1 INJECTION, SOLUTION INTRAMUSCULAR; INTRAVENOUS; SUBCUTANEOUS at 21:50

## 2023-01-23 RX ADMIN — SODIUM CHLORIDE: 9 INJECTION, SOLUTION INTRAVENOUS at 21:54

## 2023-01-23 RX ADMIN — HYDROXYZINE HYDROCHLORIDE 50 MG: 25 TABLET, FILM COATED ORAL at 17:47

## 2023-01-23 RX ADMIN — METOCLOPRAMIDE 10 MG: 5 INJECTION, SOLUTION INTRAMUSCULAR; INTRAVENOUS at 20:44

## 2023-01-23 RX ADMIN — OXYCODONE HYDROCHLORIDE 10 MG: 10 TABLET ORAL at 20:45

## 2023-01-23 RX ADMIN — GABAPENTIN 300 MG: 300 CAPSULE ORAL at 17:47

## 2023-01-23 RX ADMIN — DIVALPROEX SODIUM 250 MG: 250 TABLET, DELAYED RELEASE ORAL at 17:47

## 2023-01-23 RX ADMIN — ZONISAMIDE 150 MG: 50 CAPSULE ORAL at 17:47

## 2023-01-23 ASSESSMENT — LIFESTYLE VARIABLES
CONSUMPTION TOTAL: NEGATIVE
TOTAL SCORE: 0
TOTAL SCORE: 0
ALCOHOL_USE: NO
ON A TYPICAL DAY WHEN YOU DRINK ALCOHOL HOW MANY DRINKS DO YOU HAVE: 0
AVERAGE NUMBER OF DAYS PER WEEK YOU HAVE A DRINK CONTAINING ALCOHOL: 0
HAVE PEOPLE ANNOYED YOU BY CRITICIZING YOUR DRINKING: NO
HAVE YOU EVER FELT YOU SHOULD CUT DOWN ON YOUR DRINKING: NO
HOW MANY TIMES IN THE PAST YEAR HAVE YOU HAD 5 OR MORE DRINKS IN A DAY: 0
TOTAL SCORE: 0
EVER FELT BAD OR GUILTY ABOUT YOUR DRINKING: NO
EVER HAD A DRINK FIRST THING IN THE MORNING TO STEADY YOUR NERVES TO GET RID OF A HANGOVER: NO

## 2023-01-23 ASSESSMENT — COGNITIVE AND FUNCTIONAL STATUS - GENERAL
DAILY ACTIVITIY SCORE: 24
MOBILITY SCORE: 24
SUGGESTED CMS G CODE MODIFIER MOBILITY: CH
SUGGESTED CMS G CODE MODIFIER DAILY ACTIVITY: CH

## 2023-01-23 ASSESSMENT — PAIN DESCRIPTION - PAIN TYPE
TYPE: ACUTE PAIN

## 2023-01-23 ASSESSMENT — ENCOUNTER SYMPTOMS
DIZZINESS: 0
DIARRHEA: 1
VOMITING: 1
SHORTNESS OF BREATH: 0
NAUSEA: 1
ABDOMINAL PAIN: 1
WEIGHT LOSS: 1
WEAKNESS: 1
DEPRESSION: 0
MYALGIAS: 0
CHILLS: 0
FEVER: 0
BLURRED VISION: 0
HEADACHES: 0

## 2023-01-23 ASSESSMENT — PATIENT HEALTH QUESTIONNAIRE - PHQ9
2. FEELING DOWN, DEPRESSED, IRRITABLE, OR HOPELESS: NOT AT ALL
2. FEELING DOWN, DEPRESSED, IRRITABLE, OR HOPELESS: NOT AT ALL
SUM OF ALL RESPONSES TO PHQ9 QUESTIONS 1 AND 2: 0
1. LITTLE INTEREST OR PLEASURE IN DOING THINGS: NOT AT ALL
SUM OF ALL RESPONSES TO PHQ9 QUESTIONS 1 AND 2: 0
1. LITTLE INTEREST OR PLEASURE IN DOING THINGS: NOT AT ALL

## 2023-01-23 ASSESSMENT — FIBROSIS 4 INDEX: FIB4 SCORE: 1.142029815757824663

## 2023-01-23 NOTE — ED PROVIDER NOTES
ED Provider Note    CHIEF COMPLAINT  Chief Complaint   Patient presents with    Abdominal Pain     General abdominal burning pain started Friday.  Hx of pancreatitis.  Reports nausea/diarrhea/chills.         EXTERNAL RECORDS REVIEWED  Inpatient Notes -patient most recently admitted to this facility 1/13/2023 to 1/16/2023 for recurrent/chronic pancreatitis.  She is currently in alcohol rehab and reported a long history of alcohol abstinence.  She has been evaluated by GI and ERCP has not been recommended due to risk for pancreatitis.  She had a celiac plexus nerve block 12/23/2022.  She was admitted for abdominal pain with gradual improvement and was ultimately tolerating diet and discharged to follow-up with GI.    HPI/ROS  LIMITATION TO HISTORY   Select: : None  OUTSIDE HISTORIAN(S):  None    Rhiannon Marrero is a 37 y.o. female with a history of alcohol abuse who presents with abdominal pain radiating to the mid back that started on Friday.  The pain has been persistent and she is associated with nausea and vomiting.  She has had diarrhea but denies any melena or hematochezia.  She is currently living in an inpatient detox facility and has been sober for over 100 days.  She has not tried any medication for her symptoms they are consistent with prior episodes of pancreatitis.  She has had some associated chills but has not taken her temperature.  She denies illicit drug or marijuana use.      PAST MEDICAL HISTORY   has a past medical history of Acute pancreatitis without infection or necrosis (07/20/2022), Alcohol dependence (HCC) (04/13/2017), Bronchitis (08/2012), Cold, Current moderate episode of major depressive disorder without prior episode (HCC) (01/31/2020), Heart burn, Hernia of unspecified site of abdominal cavity without mention of obstruction or gangrene, High anion gap metabolic acidosis (07/01/2022), Indigestion, Pancreatitis, Pneumonia (2011), and Seizure disorder ([facility]) (05/23/2022).    SURGICAL  "HISTORY   has a past surgical history that includes other orthopedic surgery; gyn surgery; tonsillectomy (4/11/2013); arthroscopy, knee; hysterectomy robotic xi (10/11/2018); salpingectomy (Bilateral, 10/11/2018); orif, wrist (Right, 4/24/2019); upper gi endoscopy,diagnosis (N/A, 12/23/2022); inject nerv blck,celiac plexus (N/A, 12/23/2022); and egd w/endoscopic ultrasound (N/A, 12/23/2022).    FAMILY HISTORY  Family History   Problem Relation Age of Onset    Psychiatric Illness Mother     Stroke Mother     Arthritis Mother     Heart Disease Maternal Grandfather     Cancer Neg Hx     Diabetes Neg Hx     Hypertension Neg Hx        SOCIAL HISTORY  Social History     Tobacco Use    Smoking status: Every Day     Packs/day: 0.75     Years: 10.00     Pack years: 7.50     Types: Cigarettes    Smokeless tobacco: Never   Vaping Use    Vaping Use: Former    Substances: Nicotine   Substance and Sexual Activity    Alcohol use: Not Currently     Comment: 5 shots, binges. Was sober for 34 days until today 10/12    Drug use: Not Currently     Types: Marijuana     Comment: edibles    Sexual activity: Not on file       CURRENT MEDICATIONS  Home Medications       Reviewed by Alejandrina Cade R.N. (Registered Nurse) on 01/23/23 at 1134  Med List Status: Not Addressed     Medication Last Dose Status   acetaminophen (TYLENOL) 500 MG Tab  Active   divalproex (DEPAKOTE) 250 MG Tablet Delayed Response  Active   gabapentin (NEURONTIN) 100 MG Cap  Active   hydrOXYzine pamoate (VISTARIL) 50 MG Cap  Active   ondansetron (ZOFRAN ODT) 4 MG TABLET DISPERSIBLE  Active   zonisamide (ZONEGRAN) 50 MG capsule  Active                    ALLERGIES  Allergies   Allergen Reactions    Naltrexone Anxiety    Promethazine Hcl Anxiety     Anxiety and makes her feels like skin coming off        PHYSICAL EXAM  VITAL SIGNS: /73   Pulse (!) 121   Temp 36.7 °C (98 °F) (Temporal)   Resp 20   Ht 1.702 m (5' 7\")   Wt 62.5 kg (137 lb 12.6 oz)   LMP " 10/30/2018   SpO2 100%   BMI 21.58 kg/m²    Physical Exam  Vitals and nursing note reviewed.   Constitutional:       Appearance: She is well-developed.      Comments: Appears uncomfortable   HENT:      Head: Normocephalic and atraumatic.   Eyes:      Extraocular Movements: Extraocular movements intact.      Pupils: Pupils are equal, round, and reactive to light.   Cardiovascular:      Rate and Rhythm: Regular rhythm. Tachycardia present.      Heart sounds: No murmur heard.    No friction rub. No gallop.   Pulmonary:      Effort: Pulmonary effort is normal. No respiratory distress.      Breath sounds: Normal breath sounds.   Abdominal:      Palpations: Abdomen is soft.      Comments: Soft without peritoneal signs.  Left-sided abdominal tenderness.   Skin:     General: Skin is warm and dry.   Neurological:      General: No focal deficit present.      Mental Status: She is alert.   Psychiatric:         Mood and Affect: Mood normal.         Behavior: Behavior normal.     DIAGNOSTIC STUDIES / PROCEDURES  LABS  Results for orders placed or performed during the hospital encounter of 01/23/23   CBC WITH DIFFERENTIAL   Result Value Ref Range    WBC 7.5 4.8 - 10.8 K/uL    RBC 5.02 4.20 - 5.40 M/uL    Hemoglobin 15.0 12.0 - 16.0 g/dL    Hematocrit 45.3 37.0 - 47.0 %    MCV 90.2 81.4 - 97.8 fL    MCH 29.9 27.0 - 33.0 pg    MCHC 33.1 (L) 33.6 - 35.0 g/dL    RDW 43.8 35.9 - 50.0 fL    Platelet Count 232 164 - 446 K/uL    MPV 9.5 9.0 - 12.9 fL    Neutrophils-Polys 62.60 44.00 - 72.00 %    Lymphocytes 30.20 22.00 - 41.00 %    Monocytes 5.90 0.00 - 13.40 %    Eosinophils 0.70 0.00 - 6.90 %    Basophils 0.30 0.00 - 1.80 %    Immature Granulocytes 0.30 0.00 - 0.90 %    Nucleated RBC 0.00 /100 WBC    Neutrophils (Absolute) 4.72 2.00 - 7.15 K/uL    Lymphs (Absolute) 2.27 1.00 - 4.80 K/uL    Monos (Absolute) 0.44 0.00 - 0.85 K/uL    Eos (Absolute) 0.05 0.00 - 0.51 K/uL    Baso (Absolute) 0.02 0.00 - 0.12 K/uL    Immature Granulocytes  (abs) 0.02 0.00 - 0.11 K/uL    NRBC (Absolute) 0.00 K/uL   Comp Metabolic Panel   Result Value Ref Range    Sodium 138 135 - 145 mmol/L    Potassium 3.8 3.6 - 5.5 mmol/L    Chloride 104 96 - 112 mmol/L    Co2 22 20 - 33 mmol/L    Anion Gap 12.0 7.0 - 16.0    Glucose 90 65 - 99 mg/dL    Bun 10 8 - 22 mg/dL    Creatinine 0.72 0.50 - 1.40 mg/dL    Calcium 9.0 8.4 - 10.2 mg/dL    AST(SGOT) 18 12 - 45 U/L    ALT(SGPT) 22 2 - 50 U/L    Alkaline Phosphatase 63 30 - 99 U/L    Total Bilirubin 0.5 0.1 - 1.5 mg/dL    Albumin 4.6 3.2 - 4.9 g/dL    Total Protein 7.1 6.0 - 8.2 g/dL    Globulin 2.5 1.9 - 3.5 g/dL    A-G Ratio 1.8 g/dL   LIPASE   Result Value Ref Range    Lipase 104 (H) 7 - 58 U/L   BETA-HCG QUALITATIVE SERUM   Result Value Ref Range    Beta-Hcg Qualitative Serum Negative Negative   LACTIC ACID   Result Value Ref Range    Lactic Acid 1.4 0.5 - 2.0 mmol/L   ETHYL ALCOHOL (BLOOD)   Result Value Ref Range    Diagnostic Alcohol <10.1 <10.1 mg/dL   CORRECTED CALCIUM   Result Value Ref Range    Correct Calcium 8.5 8.5 - 10.5 mg/dL   ESTIMATED GFR   Result Value Ref Range    GFR (CKD-EPI) 111 >60 mL/min/1.73 m 2     RADIOLOGY  No orders to display     COURSE & MEDICAL DECISION MAKING    ED Observation Status? No; Patient does not meet criteria for ED Observation.     INITIAL ASSESSMENT AND PLAN  Care Narrative: This is a 37-year-old female with a history of recurrent/chronic pancreatitis who is here with abdominal pain radiating to her back and consistent with prior episodes of pancreatitis.  She additionally has had nausea and vomiting as well as multiple episodes of diarrhea.  Here she is tachycardic but afebrile with otherwise normal vital signs.  She appears dehydrated with dry mucous membranes and uncomfortable but nontoxic.  Abdomen is soft with tenderness particularly in the left upper quadrant.    Nursing staff had difficulty obtaining IV access so I placed a peripheral IV using the ultrasound.    Patient was  given pain and nausea medication as well as IV fluids.    CBC is without leukocytosis or left shift.  Metabolic panel demonstrates normal renal and liver function.  She has normal total bilirubin.  Lipase is elevated to 104 but given her normal LFTs I think that this is unlikely to be due to an obstructive picture.  Lactate is normal.  Diagnostic alcohol is negative.  hCG is negative.    Imaging was deferred as my suspicion is that her symptoms are due to pancreatitis.  I do think she will require hospitalization for additional management and if she does not improve with bowel rest and pain medication the inpatient team can consider advanced imaging.    Patient was discussed with the hospitalist and admitted in guarded condition.    ADDITIONAL PROBLEM LIST AND DISPOSITION  Admitted.  Problem #1: Pancreatitis    I have discussed management of the patient with the following physicians and BILL's: Dr. Benavides, hospitalist.    Discussion of management with other [facility] or appropriate source(s): None     Escalation of care considered, and ultimately not performed: N/A.    Barriers to care at this time, including but not limited to:  None .     Decision tools and prescription drugs considered including, but not limited to: None.    HYDRATION: Based on the patient's presentation of Dehydration and Tachycardia the patient was given IV fluids. IV Hydration was used because oral hydration was not adequate alone. Upon recheck following hydration, the patient was improved.    FINAL DIAGNOSIS  1.  Pancreatitis    Electronically signed by: Santy Bills M.D., 1/23/2023 12:50 PM

## 2023-01-23 NOTE — ED TRIAGE NOTES
"Chief Complaint   Patient presents with    Abdominal Pain     General abdominal burning pain started Friday.  Hx of pancreatitis.  Reports nausea/diarrhea/chills.       /73   Pulse (!) 121   Temp 36.7 °C (98 °F) (Temporal)   Resp 20   Ht 1.702 m (5' 7\")   Wt 62.5 kg (137 lb 12.6 oz)   LMP 10/30/2018   SpO2 100%   BMI 21.58 kg/m²     "

## 2023-01-23 NOTE — H&P
Hospital Medicine History & Physical Note    Date of Service  1/23/2023    Primary Care Physician  Ivy Adams M.D.    Consultants  N/A    Code Status  Full Code    Chief Complaint  Chief Complaint   Patient presents with    Abdominal Pain     General abdominal burning pain started Friday.  Hx of pancreatitis.  Reports nausea/diarrhea/chills.         History of Presenting Illness  Rhiannon Marrero is a 37 y.o. female who presented 1/23/2023 with abdominal pain.  Patient reports that she started having abdominal pain on Friday in the epigastric region radiating into her back.  She reports this pain has been persistent with associated nausea and vomiting.  She has also had diarrhea, denied melena or bright red blood.  She denies any fevers or chills.  She reports this pain is the same as the previous pain she had with her previous bouts of pancreatitis.  This is her fifth time being admitted.  Of note patient does have a history of alcohol abuse, she has been in a inpatient detox facility and has been sober for over 100 days.  She denies any chest pain, shortness of breath, recent sick contacts.  Her first appointment with GI consultants was supposed to be tomorrow.  She reports that she is been feeling very fatigued and weak, she is also been losing weight since this started.    In the ER patient tachycardic otherwise vital stable.  Significant for lipase of 104, negative alcohol.  Patient admitted for acute on chronic pancreatitis.    I discussed the plan of care with patient and bedside RN.    Review of Systems  Review of Systems   Constitutional:  Positive for malaise/fatigue and weight loss. Negative for chills and fever.   HENT:  Negative for congestion.    Eyes:  Negative for blurred vision.   Respiratory:  Negative for shortness of breath.    Cardiovascular:  Negative for chest pain.   Gastrointestinal:  Positive for abdominal pain, diarrhea, nausea and vomiting.   Genitourinary:  Negative for dysuria.    Musculoskeletal:  Negative for myalgias.   Skin:  Negative for rash.   Neurological:  Positive for weakness. Negative for dizziness and headaches.   Psychiatric/Behavioral:  Negative for depression.    All other systems reviewed and are negative.    Past Medical History   has a past medical history of Acute pancreatitis without infection or necrosis (07/20/2022), Alcohol dependence ([facility]) (04/13/2017), Bronchitis (08/2012), Cold, Current moderate episode of major depressive disorder without prior episode ([facility]) (01/31/2020), Heart burn, Hernia of unspecified site of abdominal cavity without mention of obstruction or gangrene, High anion gap metabolic acidosis (07/01/2022), Indigestion, Pancreatitis, Pneumonia (2011), and Seizure disorder ([facility]) (05/23/2022).    Surgical History   has a past surgical history that includes other orthopedic surgery; gyn surgery; tonsillectomy (4/11/2013); arthroscopy, knee; hysterectomy robotic xi (10/11/2018); salpingectomy (Bilateral, 10/11/2018); orif, wrist (Right, 4/24/2019); pr upper gi endoscopy,diagnosis (N/A, 12/23/2022); pr inject nerv blck,celiac plexus (N/A, 12/23/2022); and egd w/endoscopic ultrasound (N/A, 12/23/2022).     Family History  family history includes Arthritis in her mother; Heart Disease in her maternal grandfather; Psychiatric Illness in her mother; Stroke in her mother.   Family history reviewed with patient. There is no family history that is pertinent to the chief complaint.     Social History   reports that she has been smoking cigarettes. She has a 7.50 pack-year smoking history. She has never used smokeless tobacco. She reports that she does not currently use alcohol. She reports that she does not currently use drugs after having used the following drugs: Marijuana.    Allergies  Allergies   Allergen Reactions    Naltrexone Anxiety    Promethazine Hcl Anxiety     Anxiety and makes her feels like skin coming off        Medications  Prior to Admission  Medications   Prescriptions Last Dose Informant Patient Reported? Taking?   divalproex (DEPAKOTE) 250 MG Tablet Delayed Response 1/22/2023 at 1900 Patient No No   Sig: Take 1 Tablet by mouth 2 times a day for 120 days.   gabapentin (NEURONTIN) 100 MG Cap 1/22/2023 at 1900 Patient Yes No   Sig: Take 100-300 mg by mouth 2 times a day. Pt takes 100MG in AM and 300MG in the evening   hydrOXYzine HCl (ATARAX) 50 MG Tab 1/22/2023 at 1900 Patient Yes Yes   Sig: Take 50 mg by mouth every evening.   ondansetron (ZOFRAN ODT) 4 MG TABLET DISPERSIBLE NEW RX Patient No No   Sig: Take 1 Tablet by mouth every four hours as needed for Nausea/Vomiting (give PO if no IV route available).   zonisamide (ZONEGRAN) 50 MG capsule 1/22/2023 at 1900 Patient Yes No   Sig: Take 150 mg by mouth every evening. 150 mg = 3 capsules      Facility-Administered Medications: None       Physical Exam  Temp:  [36.7 °C (98 °F)-36.7 °C (98.1 °F)] 36.7 °C (98.1 °F)  Pulse:  [] 84  Resp:  [16-20] 16  BP: (111-119)/(73-76) 119/76  SpO2:  [100 %] 100 %  Blood Pressure: 114/74   Temperature: 36.7 °C (98.1 °F)   Pulse: 89   Respiration: 16   Pulse Oximetry: 100 %       Physical Exam  Vitals and nursing note reviewed.   Constitutional:       General: She is not in acute distress.     Appearance: She is ill-appearing.   HENT:      Head: Normocephalic and atraumatic.      Right Ear: External ear normal.      Left Ear: External ear normal.      Nose: Nose normal.      Mouth/Throat:      Pharynx: Oropharynx is clear.   Eyes:      Extraocular Movements: Extraocular movements intact.      Conjunctiva/sclera: Conjunctivae normal.      Pupils: Pupils are equal, round, and reactive to light.   Cardiovascular:      Rate and Rhythm: Regular rhythm. Tachycardia present.      Pulses: Normal pulses.      Heart sounds: Normal heart sounds.   Pulmonary:      Effort: Pulmonary effort is normal. No respiratory distress.      Breath sounds: Normal breath sounds. No wheezing  or rales.   Abdominal:      General: Abdomen is flat. There is no distension.      Palpations: Abdomen is soft.      Tenderness: There is abdominal tenderness (epigastric).   Musculoskeletal:         General: Normal range of motion.      Cervical back: Normal range of motion and neck supple.      Right lower leg: No edema.      Left lower leg: No edema.   Skin:     General: Skin is warm and dry.   Neurological:      General: No focal deficit present.      Mental Status: She is alert and oriented to person, place, and time.      Cranial Nerves: No cranial nerve deficit.   Psychiatric:         Mood and Affect: Mood normal.         Behavior: Behavior normal.       Laboratory:  Recent Labs     01/23/23  1340   WBC 7.5   RBC 5.02   HEMOGLOBIN 15.0   HEMATOCRIT 45.3   MCV 90.2   MCH 29.9   MCHC 33.1*   RDW 43.8   PLATELETCT 232   MPV 9.5     Recent Labs     01/23/23  1340   SODIUM 138   POTASSIUM 3.8   CHLORIDE 104   CO2 22   GLUCOSE 90   BUN 10   CREATININE 0.72   CALCIUM 9.0     Recent Labs     01/23/23  1340   ALTSGPT 22   ASTSGOT 18   ALKPHOSPHAT 63   TBILIRUBIN 0.5   LIPASE 104*   GLUCOSE 90         No results for input(s): NTPROBNP in the last 72 hours.      No results for input(s): TROPONINT in the last 72 hours.    Imaging:  No orders to display       no X-Ray or EKG requiring interpretation    Assessment/Plan:  Justification for Admission Status  I anticipate this patient will require at least two midnights for appropriate medical management, necessitating inpatient admission because acute on chronic pancreatitis needs aggressive IV fluid hydration and pain control.      * Acute on chronic pancreatitis (HCC)- (present on admission)  Assessment & Plan  IVF hydration   Pain control  Antiemetics as needed  Creon  Consider GI consult, she had an appointment tomorrow morning with GI consultants to establish care    Diarrhea- (present on admission)  Assessment & Plan  Likely from her acute on chronic  pancreatitis  Creon  IV fluids  Imodium as needed    Seizure disorder (HCC)- (present on admission)  Assessment & Plan  Continue Depakote and zonisamide    Bipolar affective disorder, current episode hypomanic (HCC)- (present on admission)  Assessment & Plan  Continue Depakote and zonisamide    Alcohol dependence (HCC)- (present on admission)  Assessment & Plan  Has been sober for over 100 days  Plans to return to alcohol rehab after hospitalization    Anxiety- (present on admission)  Assessment & Plan  Continue Atarax        VTE prophylaxis: SCDs/TEDs and enoxaparin ppx

## 2023-01-23 NOTE — ED NOTES
Med rec updated and complete, per pt   Allergies reviewed, per pt  Pt reports that she has not taken her ONDANSETRON 4MG

## 2023-01-24 ENCOUNTER — TELEPHONE (OUTPATIENT)
Dept: HEMATOLOGY ONCOLOGY | Facility: MEDICAL CENTER | Age: 38
End: 2023-01-24
Payer: COMMERCIAL

## 2023-01-24 PROBLEM — K76.6 PORTAL HYPERTENSION (HCC): Status: ACTIVE | Noted: 2023-01-24

## 2023-01-24 LAB
ANION GAP SERPL CALC-SCNC: 8 MMOL/L (ref 7–16)
BUN SERPL-MCNC: 8 MG/DL (ref 8–22)
CALCIUM SERPL-MCNC: 7.6 MG/DL (ref 8.4–10.2)
CHLORIDE SERPL-SCNC: 108 MMOL/L (ref 96–112)
CO2 SERPL-SCNC: 20 MMOL/L (ref 20–33)
CREAT SERPL-MCNC: 0.64 MG/DL (ref 0.5–1.4)
ERYTHROCYTE [DISTWIDTH] IN BLOOD BY AUTOMATED COUNT: 45 FL (ref 35.9–50)
GFR SERPLBLD CREATININE-BSD FMLA CKD-EPI: 118 ML/MIN/1.73 M 2
GLUCOSE SERPL-MCNC: 97 MG/DL (ref 65–99)
HCT VFR BLD AUTO: 38.2 % (ref 37–47)
HGB BLD-MCNC: 12.4 G/DL (ref 12–16)
MAGNESIUM SERPL-MCNC: 1.7 MG/DL (ref 1.5–2.5)
MCH RBC QN AUTO: 29.8 PG (ref 27–33)
MCHC RBC AUTO-ENTMCNC: 32.5 G/DL (ref 33.6–35)
MCV RBC AUTO: 91.8 FL (ref 81.4–97.8)
PLATELET # BLD AUTO: 184 K/UL (ref 164–446)
PMV BLD AUTO: 9.8 FL (ref 9–12.9)
POTASSIUM SERPL-SCNC: 3.8 MMOL/L (ref 3.6–5.5)
RBC # BLD AUTO: 4.16 M/UL (ref 4.2–5.4)
SODIUM SERPL-SCNC: 136 MMOL/L (ref 135–145)
WBC # BLD AUTO: 6.3 K/UL (ref 4.8–10.8)

## 2023-01-24 PROCEDURE — 770001 HCHG ROOM/CARE - MED/SURG/GYN PRIV*

## 2023-01-24 PROCEDURE — 99232 SBSQ HOSP IP/OBS MODERATE 35: CPT | Performed by: INTERNAL MEDICINE

## 2023-01-24 PROCEDURE — 80048 BASIC METABOLIC PNL TOTAL CA: CPT

## 2023-01-24 PROCEDURE — A9270 NON-COVERED ITEM OR SERVICE: HCPCS | Performed by: INTERNAL MEDICINE

## 2023-01-24 PROCEDURE — 94760 N-INVAS EAR/PLS OXIMETRY 1: CPT

## 2023-01-24 PROCEDURE — 700102 HCHG RX REV CODE 250 W/ 637 OVERRIDE(OP): Performed by: INTERNAL MEDICINE

## 2023-01-24 PROCEDURE — 36415 COLL VENOUS BLD VENIPUNCTURE: CPT

## 2023-01-24 PROCEDURE — 83735 ASSAY OF MAGNESIUM: CPT

## 2023-01-24 PROCEDURE — 700105 HCHG RX REV CODE 258: Performed by: INTERNAL MEDICINE

## 2023-01-24 PROCEDURE — 85027 COMPLETE CBC AUTOMATED: CPT

## 2023-01-24 PROCEDURE — 700111 HCHG RX REV CODE 636 W/ 250 OVERRIDE (IP): Performed by: INTERNAL MEDICINE

## 2023-01-24 PROCEDURE — 700111 HCHG RX REV CODE 636 W/ 250 OVERRIDE (IP): Performed by: HOSPITALIST

## 2023-01-24 RX ORDER — ONDANSETRON 2 MG/ML
4 INJECTION INTRAMUSCULAR; INTRAVENOUS EVERY 4 HOURS PRN
Status: DISCONTINUED | OUTPATIENT
Start: 2023-01-24 | End: 2023-01-24

## 2023-01-24 RX ORDER — CALCIUM GLUCONATE 20 MG/ML
1 INJECTION, SOLUTION INTRAVENOUS ONCE
Status: COMPLETED | OUTPATIENT
Start: 2023-01-24 | End: 2023-01-24

## 2023-01-24 RX ORDER — METOCLOPRAMIDE HYDROCHLORIDE 5 MG/ML
10 INJECTION INTRAMUSCULAR; INTRAVENOUS ONCE
Status: COMPLETED | OUTPATIENT
Start: 2023-01-24 | End: 2023-01-24

## 2023-01-24 RX ORDER — ONDANSETRON 2 MG/ML
4 INJECTION INTRAMUSCULAR; INTRAVENOUS EVERY 6 HOURS PRN
Status: DISCONTINUED | OUTPATIENT
Start: 2023-01-24 | End: 2023-01-29 | Stop reason: HOSPADM

## 2023-01-24 RX ADMIN — SODIUM CHLORIDE: 9 INJECTION, SOLUTION INTRAVENOUS at 08:22

## 2023-01-24 RX ADMIN — HYDROMORPHONE HYDROCHLORIDE 1 MG: 1 INJECTION, SOLUTION INTRAMUSCULAR; INTRAVENOUS; SUBCUTANEOUS at 12:46

## 2023-01-24 RX ADMIN — GABAPENTIN 300 MG: 300 CAPSULE ORAL at 17:04

## 2023-01-24 RX ADMIN — OXYCODONE HYDROCHLORIDE 10 MG: 10 TABLET ORAL at 21:47

## 2023-01-24 RX ADMIN — OXYCODONE HYDROCHLORIDE 10 MG: 10 TABLET ORAL at 00:16

## 2023-01-24 RX ADMIN — OXYCODONE HYDROCHLORIDE 10 MG: 10 TABLET ORAL at 11:43

## 2023-01-24 RX ADMIN — PANCRELIPASE 6000 UNITS: 30000; 6000; 19000 CAPSULE, DELAYED RELEASE PELLETS ORAL at 11:42

## 2023-01-24 RX ADMIN — OXYCODONE HYDROCHLORIDE 10 MG: 10 TABLET ORAL at 18:18

## 2023-01-24 RX ADMIN — HYDROMORPHONE HYDROCHLORIDE 1 MG: 1 INJECTION, SOLUTION INTRAMUSCULAR; INTRAVENOUS; SUBCUTANEOUS at 01:40

## 2023-01-24 RX ADMIN — CALCIUM GLUCONATE 1 G: 20 INJECTION, SOLUTION INTRAVENOUS at 12:46

## 2023-01-24 RX ADMIN — DIVALPROEX SODIUM 250 MG: 250 TABLET, DELAYED RELEASE ORAL at 16:56

## 2023-01-24 RX ADMIN — HYDROMORPHONE HYDROCHLORIDE 1 MG: 1 INJECTION, SOLUTION INTRAMUSCULAR; INTRAVENOUS; SUBCUTANEOUS at 05:09

## 2023-01-24 RX ADMIN — OXYCODONE HYDROCHLORIDE 10 MG: 10 TABLET ORAL at 08:18

## 2023-01-24 RX ADMIN — HYDROMORPHONE HYDROCHLORIDE 1 MG: 1 INJECTION, SOLUTION INTRAMUSCULAR; INTRAVENOUS; SUBCUTANEOUS at 16:57

## 2023-01-24 RX ADMIN — SODIUM CHLORIDE: 9 INJECTION, SOLUTION INTRAVENOUS at 15:27

## 2023-01-24 RX ADMIN — METOCLOPRAMIDE 10 MG: 5 INJECTION, SOLUTION INTRAMUSCULAR; INTRAVENOUS at 05:40

## 2023-01-24 RX ADMIN — OXYCODONE HYDROCHLORIDE 10 MG: 10 TABLET ORAL at 03:45

## 2023-01-24 RX ADMIN — ACETAMINOPHEN 650 MG: 325 TABLET, FILM COATED ORAL at 05:15

## 2023-01-24 RX ADMIN — SODIUM CHLORIDE: 9 INJECTION, SOLUTION INTRAVENOUS at 03:03

## 2023-01-24 RX ADMIN — SODIUM CHLORIDE: 9 INJECTION, SOLUTION INTRAVENOUS at 20:16

## 2023-01-24 RX ADMIN — HYDROMORPHONE HYDROCHLORIDE 1 MG: 1 INJECTION, SOLUTION INTRAMUSCULAR; INTRAVENOUS; SUBCUTANEOUS at 22:59

## 2023-01-24 RX ADMIN — PANCRELIPASE 6000 UNITS: 30000; 6000; 19000 CAPSULE, DELAYED RELEASE PELLETS ORAL at 08:18

## 2023-01-24 RX ADMIN — PANCRELIPASE 6000 UNITS: 30000; 6000; 19000 CAPSULE, DELAYED RELEASE PELLETS ORAL at 16:57

## 2023-01-24 RX ADMIN — HYDROXYZINE HYDROCHLORIDE 50 MG: 25 TABLET, FILM COATED ORAL at 16:56

## 2023-01-24 RX ADMIN — ZONISAMIDE 150 MG: 50 CAPSULE ORAL at 16:56

## 2023-01-24 RX ADMIN — HYDROMORPHONE HYDROCHLORIDE 1 MG: 1 INJECTION, SOLUTION INTRAMUSCULAR; INTRAVENOUS; SUBCUTANEOUS at 09:30

## 2023-01-24 RX ADMIN — DIVALPROEX SODIUM 250 MG: 250 TABLET, DELAYED RELEASE ORAL at 05:07

## 2023-01-24 RX ADMIN — HYDROMORPHONE HYDROCHLORIDE 1 MG: 1 INJECTION, SOLUTION INTRAMUSCULAR; INTRAVENOUS; SUBCUTANEOUS at 19:49

## 2023-01-24 RX ADMIN — GABAPENTIN 100 MG: 100 CAPSULE ORAL at 05:07

## 2023-01-24 RX ADMIN — OXYCODONE HYDROCHLORIDE 10 MG: 10 TABLET ORAL at 15:17

## 2023-01-24 ASSESSMENT — PAIN DESCRIPTION - PAIN TYPE
TYPE: ACUTE PAIN

## 2023-01-24 ASSESSMENT — ENCOUNTER SYMPTOMS
CHILLS: 0
PALPITATIONS: 0
SHORTNESS OF BREATH: 0
VOMITING: 1
DEPRESSION: 0
ABDOMINAL PAIN: 1
WEAKNESS: 0
MYALGIAS: 0
SORE THROAT: 0
DIZZINESS: 0
HEADACHES: 0
NAUSEA: 0
FOCAL WEAKNESS: 0
COUGH: 0
BACK PAIN: 0
BLOOD IN STOOL: 0
HEARTBURN: 0
HALLUCINATIONS: 0
DIARRHEA: 0
FEVER: 0

## 2023-01-24 NOTE — DISCHARGE PLANNING
ER CM spoke with pt via phone. She was already in room She is well known to this ER CM She is living at [facility] at [address] [facility] She has been there about 1 to 1.5 month. Rx [facility] [address] [place]. PCP Doris Lay She is maintaining her sobriety, She has had chronic ongoing abd pain pancreatitis issues. DC plan will be to return to her sober living arrangement.  Care Transition Team Assessment    Information Source  Orientation Level: Oriented X4  Information Given By: Patient  Informant's Name: kamran  Who is responsible for making decisions for patient? : Patient         Elopement Risk  Legal Hold: No  Ambulatory or Self Mobile in Wheelchair: No-Not an Elopement Risk    Interdisciplinary Discharge Planning  Primary Care Physician: so  Lives with - Patient's Self Care Capacity: Other (Comments)  Support Systems: Other (Comments), Shelter  Housing / Facility: Transitional Living  Do You Take your Prescribed Medications Regularly: Yes  Mobility Issues: No  Durable Medical Equipment: Not Applicable    Discharge Preparedness  What is your plan after discharge?: Other (comment)  What are your discharge supports?: Other (comment)  Prior Functional Level: Ambulatory, Independent with Activities of Daily Living, Independent with Medication Management    Functional Assesment  Prior Functional Level: Ambulatory, Independent with Activities of Daily Living, Independent with Medication Management    Finances  Prescription Coverage: Yes                   Domestic Abuse  Have you ever been the victim of abuse or violence?: No              Anticipated Discharge Information  Discharge Disposition: Discharged to home/self care (01)

## 2023-01-24 NOTE — CARE PLAN
The patient is Stable - Low risk of patient condition declining or worsening    Shift Goals  Clinical Goals: Zero falls this shift  Patient Goals: Pain management    Progress made toward(s) clinical / shift goals:  Pt has had zero falls this shift.     Problem: Pain - Standard  Goal: Alleviation of pain or a reduction in pain to the patient’s comfort goal  Outcome: Progressing     Problem: Knowledge Deficit - Standard  Goal: Patient and family/care givers will demonstrate understanding of plan of care, disease process/condition, diagnostic tests and medications  Outcome: Progressing       Patient is not progressing towards the following goals:

## 2023-01-24 NOTE — ASSESSMENT & PLAN NOTE
She has about 10 exacerbations per year and has had 4 or 5 since December  Currently residing at alcohol rehab for the last 2.5 months and for the next 1 month  Clinically she looks comfortable but complaining of severe pain, even while asleep  Tolerating ginger ale  Continue antiemetics as needed  Creon  She still needs to establish with outpatient GI, history of celiac block with Dr. Johnson in the past  Continue to adat  Decreased continue dilaudid at .5mg q4h  Change oxycodone to tramadol which she would be able to continue as an outpatient

## 2023-01-24 NOTE — ASSESSMENT & PLAN NOTE
Albumin normal, ionized Ca is normal but corrected ca was still low  Associated with higher morbidity in the setting of pancreatitis  Replaced w Ca Gluc x3 this admission  Ca within normal range now  Repeat in AM

## 2023-01-24 NOTE — DISCHARGE PLANNING
Case Management Discharge Planning    Admission Date: 1/23/2023  GMLOS: 2.3  ALOS: 1    6-Clicks ADL Score: 24  6-Clicks Mobility Score: 24      Anticipated Discharge Dispo: Discharge Disposition: Discharged to home/self care (01)    DME Needed: No    Action(s) Taken: RNCM attended IDT rounds and reviewed chart. No CM needs noted.    Escalations Completed: None    Medically Clear: No    Next Steps:  f/u with medical team to discuss DC needs and barriers    Barriers to Discharge: Medical clearance

## 2023-01-24 NOTE — PROGRESS NOTES
Hospital Medicine Daily Progress Note    Date of Service  1/24/2023    Chief Complaint  Rhiannon Marrero is a 37 y.o. female admitted 1/23/2023 with abdominal pain    Hospital Course  History of alcohol abuse, sober for 100 days, history of multiple recurrent episodes of acute pancreatitis here with abdominal pain secondary to acute on chronic pancreatitis, lipase 100.  She started supportive care with IV fluids and antiemetics    Interval Problem Update  1/24: Vomited twice this morning after drinking clear, back off on diet to NPO.  Continue NS at 200.  Oxycodone and Dilaudid as needed.  Drowsy    I have discussed this patient's plan of care and discharge plan at IDT rounds today with Case Management, Nursing, Nursing leadership, and other members of the IDT team.    Consultants/Specialty  None    Code Status  Full Code    Disposition  Patient is not medically cleared for discharge.   Anticipate discharge to to home with close outpatient follow-up.  I have placed the appropriate orders for post-discharge needs.    Review of Systems  Review of Systems   Constitutional:  Positive for malaise/fatigue. Negative for chills and fever.   HENT:  Negative for sore throat.    Respiratory:  Negative for cough and shortness of breath.    Cardiovascular:  Negative for chest pain and palpitations.   Gastrointestinal:  Positive for abdominal pain and vomiting. Negative for blood in stool, diarrhea, heartburn and nausea.   Genitourinary:  Negative for dysuria and frequency.   Musculoskeletal:  Negative for back pain and myalgias.   Neurological:  Negative for dizziness, focal weakness, weakness and headaches.   Psychiatric/Behavioral:  Negative for depression and hallucinations.    All other systems reviewed and are negative.     Physical Exam  Temp:  [36.4 °C (97.5 °F)-36.7 °C (98.1 °F)] 36.4 °C (97.5 °F)  Pulse:  [79-97] 84  Resp:  [16-18] 18  BP: (104-125)/(70-82) 111/70  SpO2:  [92 %-100 %] 92 %    Physical  Exam  Constitutional:       General: She is not in acute distress.     Comments: Lethargic   HENT:      Nose: Nose normal.      Mouth/Throat:      Mouth: Mucous membranes are dry.   Cardiovascular:      Rate and Rhythm: Normal rate and regular rhythm.      Pulses: Normal pulses.   Pulmonary:      Effort: Pulmonary effort is normal.      Breath sounds: Normal breath sounds.   Abdominal:      General: There is no distension.      Palpations: Abdomen is soft.      Tenderness: There is abdominal tenderness.   Musculoskeletal:         General: No swelling.      Cervical back: No muscular tenderness.   Lymphadenopathy:      Cervical: No cervical adenopathy.   Skin:     General: Skin is warm and dry.      Coloration: Skin is pale.      Findings: No rash.   Neurological:      General: No focal deficit present.      Mental Status: She is alert and oriented to person, place, and time.      Motor: Weakness present.   Psychiatric:         Mood and Affect: Mood normal.         Thought Content: Thought content normal.       Fluids    Intake/Output Summary (Last 24 hours) at 1/24/2023 1201  Last data filed at 1/23/2023 2250  Gross per 24 hour   Intake 1359 ml   Output --   Net 1359 ml       Laboratory  Recent Labs     01/23/23  1340 01/24/23  0209   WBC 7.5 6.3   RBC 5.02 4.16*   HEMOGLOBIN 15.0 12.4   HEMATOCRIT 45.3 38.2   MCV 90.2 91.8   MCH 29.9 29.8   MCHC 33.1* 32.5*   RDW 43.8 45.0   PLATELETCT 232 184   MPV 9.5 9.8     Recent Labs     01/23/23  1340 01/24/23  0209   SODIUM 138 136   POTASSIUM 3.8 3.8   CHLORIDE 104 108   CO2 22 20   GLUCOSE 90 97   BUN 10 8   CREATININE 0.72 0.64   CALCIUM 9.0 7.6*                   Imaging  No orders to display        Assessment/Plan  * Acute on chronic pancreatitis (HCC)- (present on admission)  Assessment & Plan  She has about 10 exacerbations per year and has had 4 or 5 since December  Currently residing at alcohol rehab for the last 2 and half months and for the next 1 month  Pain  control oral as needed, IV if needed for breakthrough pain  Antiemetics as needed  Creon  Consider GI consultation, she is waiting to establish care as an outpatient    Portal hypertension (HCC)  Assessment & Plan  Seen on previous imaging  Albumin is normal, no evidence of ascites, LFTs normal    Diarrhea- (present on admission)  Assessment & Plan  Likely from her acute on chronic pancreatitis  Creon  IV fluids  Imodium as needed    Hypocalcemia  Assessment & Plan  Albumin was normal yesterday  Check ionize  Replace with CaGluc x1  Repeat in AM    Seizure disorder (HCC)- (present on admission)  Assessment & Plan  Continue Depakote and zonisamide    Bipolar affective disorder, current episode hypomanic (HCC)- (present on admission)  Assessment & Plan  Continue Depakote and zonisamide    Chronic pain syndrome- (present on admission)  Assessment & Plan  Currently in alcohol rehab  She does make frequent requests for pain  Continue as needed Doxy and Dilaudid but limit as able    Alcohol dependence (HCC)- (present on admission)  Assessment & Plan  Has been sober for over 100 days  Plans to return to alcohol rehab after hospitalization    Anxiety- (present on admission)  Assessment & Plan  Continue Atarax         VTE prophylaxis: SCDs/TEDs    I have performed a physical exam and reviewed and updated ROS and Plan today (1/24/2023). In review of yesterday's note (1/23/2023), there are no changes except as documented above.

## 2023-01-24 NOTE — PROGRESS NOTES
Received report from dayshift RN. Pt resting in bed, supine. Pt A&O x4, on RA. Pt complains of 9/10 mid and left abdominal  pain at this time, medicated per MAR. Pt continued on IV fluids. Pt updated with POC which includes pain management and nausea/vomiting. Call light within reach, fall precautions in place, all needs met at this time.

## 2023-01-24 NOTE — ASSESSMENT & PLAN NOTE
Currently in alcohol rehab and is not allowed to have narcotics there  I reviewed her previous hospital stays and it appears she has had multiple admissions with difficulty weaning from pain medications  Transition from oxycodone to tramadol today as she would be able to continue that as an outpatient  Continue as needed Dilaudid

## 2023-01-24 NOTE — TELEPHONE ENCOUNTER
----- Message from Darien Garcia M.D. sent at 1/23/2023  2:54 PM PST -----  This patient is on my schedule for next Friday. She was referred by a Hospitalist in October for increased lymphocyte count. Since then she has had 4 hospitalizations for acute pancreatitis related to her prior alcohol history and multiple CBC's show a normal lymphocyte count. She is currently in the ER again with abdominal pain (which is what her four admissions were for). I see no reason for her to keep this appt. Here and would remove her from the schedule and let her know the consult is unnecessary.

## 2023-01-24 NOTE — HOSPITAL COURSE
History of alcohol abuse, sober for 100 days, history of multiple recurrent episodes of acute pancreatitis here with abdominal pain secondary to acute on chronic pancreatitis, lipase 100.  She started supportive care with IV fluids and antiemetics

## 2023-01-24 NOTE — CARE PLAN
The patient is Stable - Low risk of patient condition declining or worsening    Shift Goals  Clinical Goals: Pt will have pain manageable at shift and will tolerate clear liquids.  Patient Goals: Sleep comfortably at shift.    Progress made toward(s) clinical / shift goals:      Pt's pain slightly decreased during shift but required multiple administrations of PRN pain meds; nausea noted twice during shift and given medications per MAR; able to tolerate clear liquids.     Patient is not progressing towards the following goals: N/A

## 2023-01-25 LAB
ALBUMIN SERPL BCP-MCNC: 3.4 G/DL (ref 3.2–4.9)
ALBUMIN/GLOB SERPL: 1.8 G/DL
ALP SERPL-CCNC: 51 U/L (ref 30–99)
ALT SERPL-CCNC: 12 U/L (ref 2–50)
ANION GAP SERPL CALC-SCNC: 10 MMOL/L (ref 7–16)
AST SERPL-CCNC: 10 U/L (ref 12–45)
BILIRUB SERPL-MCNC: 0.4 MG/DL (ref 0.1–1.5)
BUN SERPL-MCNC: 3 MG/DL (ref 8–22)
CA-I SERPL-SCNC: 1.17 MMOL/L (ref 1.1–1.3)
CALCIUM ALBUM COR SERPL-MCNC: 8.4 MG/DL (ref 8.5–10.5)
CALCIUM SERPL-MCNC: 7.9 MG/DL (ref 8.4–10.2)
CHLORIDE SERPL-SCNC: 109 MMOL/L (ref 96–112)
CO2 SERPL-SCNC: 20 MMOL/L (ref 20–33)
CREAT SERPL-MCNC: 0.51 MG/DL (ref 0.5–1.4)
ERYTHROCYTE [DISTWIDTH] IN BLOOD BY AUTOMATED COUNT: 42.5 FL (ref 35.9–50)
GFR SERPLBLD CREATININE-BSD FMLA CKD-EPI: 124 ML/MIN/1.73 M 2
GLOBULIN SER CALC-MCNC: 1.9 G/DL (ref 1.9–3.5)
GLUCOSE SERPL-MCNC: 98 MG/DL (ref 65–99)
HCT VFR BLD AUTO: 35.7 % (ref 37–47)
HGB BLD-MCNC: 11.7 G/DL (ref 12–16)
MCH RBC QN AUTO: 29.8 PG (ref 27–33)
MCHC RBC AUTO-ENTMCNC: 32.8 G/DL (ref 33.6–35)
MCV RBC AUTO: 90.8 FL (ref 81.4–97.8)
PLATELET # BLD AUTO: 173 K/UL (ref 164–446)
PMV BLD AUTO: 9.6 FL (ref 9–12.9)
POTASSIUM SERPL-SCNC: 3.7 MMOL/L (ref 3.6–5.5)
PROT SERPL-MCNC: 5.3 G/DL (ref 6–8.2)
RBC # BLD AUTO: 3.93 M/UL (ref 4.2–5.4)
SODIUM SERPL-SCNC: 139 MMOL/L (ref 135–145)
WBC # BLD AUTO: 4.5 K/UL (ref 4.8–10.8)

## 2023-01-25 PROCEDURE — 700111 HCHG RX REV CODE 636 W/ 250 OVERRIDE (IP): Performed by: INTERNAL MEDICINE

## 2023-01-25 PROCEDURE — A9270 NON-COVERED ITEM OR SERVICE: HCPCS | Performed by: INTERNAL MEDICINE

## 2023-01-25 PROCEDURE — 85027 COMPLETE CBC AUTOMATED: CPT

## 2023-01-25 PROCEDURE — 700102 HCHG RX REV CODE 250 W/ 637 OVERRIDE(OP): Performed by: INTERNAL MEDICINE

## 2023-01-25 PROCEDURE — 700105 HCHG RX REV CODE 258: Performed by: INTERNAL MEDICINE

## 2023-01-25 PROCEDURE — 36415 COLL VENOUS BLD VENIPUNCTURE: CPT

## 2023-01-25 PROCEDURE — 99232 SBSQ HOSP IP/OBS MODERATE 35: CPT | Performed by: INTERNAL MEDICINE

## 2023-01-25 PROCEDURE — 82330 ASSAY OF CALCIUM: CPT

## 2023-01-25 PROCEDURE — 80053 COMPREHEN METABOLIC PANEL: CPT

## 2023-01-25 PROCEDURE — 94760 N-INVAS EAR/PLS OXIMETRY 1: CPT

## 2023-01-25 PROCEDURE — 770001 HCHG ROOM/CARE - MED/SURG/GYN PRIV*

## 2023-01-25 RX ORDER — CALCIUM GLUCONATE 20 MG/ML
2 INJECTION, SOLUTION INTRAVENOUS ONCE
Status: COMPLETED | OUTPATIENT
Start: 2023-01-25 | End: 2023-01-25

## 2023-01-25 RX ADMIN — SODIUM CHLORIDE: 9 INJECTION, SOLUTION INTRAVENOUS at 15:04

## 2023-01-25 RX ADMIN — HYDROMORPHONE HYDROCHLORIDE 1 MG: 1 INJECTION, SOLUTION INTRAMUSCULAR; INTRAVENOUS; SUBCUTANEOUS at 23:21

## 2023-01-25 RX ADMIN — PANCRELIPASE 6000 UNITS: 30000; 6000; 19000 CAPSULE, DELAYED RELEASE PELLETS ORAL at 11:36

## 2023-01-25 RX ADMIN — OXYCODONE HYDROCHLORIDE 10 MG: 10 TABLET ORAL at 22:10

## 2023-01-25 RX ADMIN — SODIUM CHLORIDE: 9 INJECTION, SOLUTION INTRAVENOUS at 01:34

## 2023-01-25 RX ADMIN — ZONISAMIDE 150 MG: 50 CAPSULE ORAL at 17:37

## 2023-01-25 RX ADMIN — HYDROXYZINE HYDROCHLORIDE 50 MG: 25 TABLET, FILM COATED ORAL at 17:36

## 2023-01-25 RX ADMIN — CALCIUM GLUCONATE 2 G: 20 INJECTION, SOLUTION INTRAVENOUS at 09:02

## 2023-01-25 RX ADMIN — ONDANSETRON 4 MG: 2 INJECTION INTRAMUSCULAR; INTRAVENOUS at 20:05

## 2023-01-25 RX ADMIN — OXYCODONE HYDROCHLORIDE 10 MG: 10 TABLET ORAL at 04:39

## 2023-01-25 RX ADMIN — OXYCODONE HYDROCHLORIDE 10 MG: 10 TABLET ORAL at 14:58

## 2023-01-25 RX ADMIN — HYDROMORPHONE HYDROCHLORIDE 1 MG: 1 INJECTION, SOLUTION INTRAMUSCULAR; INTRAVENOUS; SUBCUTANEOUS at 19:56

## 2023-01-25 RX ADMIN — PANCRELIPASE 6000 UNITS: 30000; 6000; 19000 CAPSULE, DELAYED RELEASE PELLETS ORAL at 17:37

## 2023-01-25 RX ADMIN — DIVALPROEX SODIUM 250 MG: 250 TABLET, DELAYED RELEASE ORAL at 05:03

## 2023-01-25 RX ADMIN — OXYCODONE HYDROCHLORIDE 10 MG: 10 TABLET ORAL at 11:36

## 2023-01-25 RX ADMIN — HYDROMORPHONE HYDROCHLORIDE 1 MG: 1 INJECTION, SOLUTION INTRAMUSCULAR; INTRAVENOUS; SUBCUTANEOUS at 09:00

## 2023-01-25 RX ADMIN — OXYCODONE HYDROCHLORIDE 10 MG: 10 TABLET ORAL at 01:22

## 2023-01-25 RX ADMIN — ONDANSETRON 4 MG: 2 INJECTION INTRAMUSCULAR; INTRAVENOUS at 07:45

## 2023-01-25 RX ADMIN — OXYCODONE HYDROCHLORIDE 10 MG: 10 TABLET ORAL at 07:41

## 2023-01-25 RX ADMIN — HYDROMORPHONE HYDROCHLORIDE 1 MG: 1 INJECTION, SOLUTION INTRAMUSCULAR; INTRAVENOUS; SUBCUTANEOUS at 16:14

## 2023-01-25 RX ADMIN — DIVALPROEX SODIUM 250 MG: 250 TABLET, DELAYED RELEASE ORAL at 17:37

## 2023-01-25 RX ADMIN — GABAPENTIN 300 MG: 300 CAPSULE ORAL at 17:36

## 2023-01-25 RX ADMIN — HYDROMORPHONE HYDROCHLORIDE 1 MG: 1 INJECTION, SOLUTION INTRAMUSCULAR; INTRAVENOUS; SUBCUTANEOUS at 06:02

## 2023-01-25 RX ADMIN — HYDROMORPHONE HYDROCHLORIDE 1 MG: 1 INJECTION, SOLUTION INTRAMUSCULAR; INTRAVENOUS; SUBCUTANEOUS at 12:49

## 2023-01-25 RX ADMIN — PANCRELIPASE 6000 UNITS: 30000; 6000; 19000 CAPSULE, DELAYED RELEASE PELLETS ORAL at 07:42

## 2023-01-25 RX ADMIN — SODIUM CHLORIDE: 9 INJECTION, SOLUTION INTRAVENOUS at 20:10

## 2023-01-25 RX ADMIN — GABAPENTIN 100 MG: 100 CAPSULE ORAL at 05:03

## 2023-01-25 RX ADMIN — OXYCODONE HYDROCHLORIDE 10 MG: 10 TABLET ORAL at 18:36

## 2023-01-25 RX ADMIN — HYDROMORPHONE HYDROCHLORIDE 1 MG: 1 INJECTION, SOLUTION INTRAMUSCULAR; INTRAVENOUS; SUBCUTANEOUS at 02:29

## 2023-01-25 ASSESSMENT — PAIN DESCRIPTION - PAIN TYPE
TYPE: ACUTE PAIN

## 2023-01-25 ASSESSMENT — ENCOUNTER SYMPTOMS
VOMITING: 1
ABDOMINAL PAIN: 1
PALPITATIONS: 0
SHORTNESS OF BREATH: 0
DIZZINESS: 0
DEPRESSION: 0
FOCAL WEAKNESS: 0
NAUSEA: 0
HEADACHES: 0
BACK PAIN: 0
SORE THROAT: 0
FEVER: 0
COUGH: 0
BLOOD IN STOOL: 0
DIARRHEA: 0
HEARTBURN: 0
WEAKNESS: 0
MYALGIAS: 0
CHILLS: 0
HALLUCINATIONS: 0

## 2023-01-25 NOTE — PROGRESS NOTES
Received report from night RN. Patient alert and oriented. Complains of 8/10 mid upper abdominal and left lower quadrant pain managed per MAR. Complains of nausea managed per MAR. Discussed plan of care and understood. Assessment per [facility]. Falls precaution in place. Call light placed within reach.

## 2023-01-25 NOTE — CARE PLAN
The patient is Stable - Low risk of patient condition declining or worsening    Shift Goals  Clinical Goals: Pt will be able to sleep comfortably overnight. Pt will be able to control pain and will report pain scale less than 5/10. No nausea/vomiting throughout the shift.  Patient Goals: sleep comfortably  Family Goals: GI consult    Progress made toward(s) clinical / shift goals:  Pt able to sleep comfortably at least 4 hours within the shift. Denies nausea or vomiting. Pt required multiple prn pain medications throughout the shift.Pt states abdominal pain at 7/10 at this time; given with prn pain medication as per MAR.     Patient is not progressing towards the following goals:    Problem: Pain - Standard  Goal: Alleviation of pain or a reduction in pain to the patient’s comfort goal  1/25/2023 0447 by Harpreet Leija RYOHANA  Outcome: Not Progressing

## 2023-01-25 NOTE — CARE PLAN
The patient is Stable - Low risk of patient condition declining or worsening    Shift Goals  Clinical Goals: pain management , bowel rest  Patient Goals: pain control  Family Goals: GI consult    Progress made toward(s) clinical / shift goals:  rest     Patient is not progressing towards the following goals:      Problem: Pain - Standard  Goal: Alleviation of pain or a reduction in pain to the patient’s comfort goal  Outcome: Not Progressing  Note: Pt requiring IV pain medication for pain management, see MAR.

## 2023-01-25 NOTE — PROGRESS NOTES
Pt a&o 4, VSS, assessment completed. Resting comfortably in bed with call light, bedside table in reach. Pt requesting pain medication, see MAR. Side rails up 2. Instructed to use call light when needing assistance verbalized understanding. Bed alarm n/a, bed in low position. Will continue to monitor.

## 2023-01-25 NOTE — PROGRESS NOTES
Received report from day shift nurse. Assumed pt care at 1915. Pt is A&Ox4, resting comfortably in bed. Pt on room air; no signs of SOB/respiratory distress. Labs noted.Pt complains of abdominal pain at 8/10 pain scale; given with prn pain medication as per MAR. Needs attended well. Fall precautions in place. Bed at lowest position. Call light and personal belongings within reach. Continue to monitor.

## 2023-01-25 NOTE — PROGRESS NOTES
Hospital Medicine Daily Progress Note    Date of Service  1/25/2023    Chief Complaint  Rhiannon Marrero is a 37 y.o. female admitted 1/23/2023 with abdominal pain    Hospital Course  History of alcohol abuse, sober for 100 days, history of multiple recurrent episodes of acute pancreatitis here with abdominal pain secondary to acute on chronic pancreatitis, lipase 100.  She started supportive care with IV fluids and antiemetics    Interval Problem Update  1/24: Vomited twice this morning after drinking clear, back off on diet to NPO.  Continue NS at 200.  Oxycodone and Dilaudid as needed.  Drowsy  1/25: Still remains drowsy, getting frequent doses of pain medications.  Unable to tolerate anything other than clear liquids, still complains of pain    I have discussed this patient's plan of care and discharge plan at IDT rounds today with Case Management, Nursing, Nursing leadership, and other members of the IDT team.    Consultants/Specialty  None    Code Status  Full Code    Disposition  Patient is not medically cleared for discharge.   Anticipate discharge to to home with close outpatient follow-up.  I have placed the appropriate orders for post-discharge needs.    Review of Systems  Review of Systems   Constitutional:  Positive for malaise/fatigue. Negative for chills and fever.   HENT:  Negative for sore throat.    Respiratory:  Negative for cough and shortness of breath.    Cardiovascular:  Negative for chest pain and palpitations.   Gastrointestinal:  Positive for abdominal pain and vomiting. Negative for blood in stool, diarrhea, heartburn and nausea.   Genitourinary:  Negative for dysuria and frequency.   Musculoskeletal:  Negative for back pain and myalgias.   Neurological:  Negative for dizziness, focal weakness, weakness and headaches.   Psychiatric/Behavioral:  Negative for depression and hallucinations.    All other systems reviewed and are negative.     Physical Exam  Temp:  [36.7 °C (98.1 °F)-36.9 °C  (98.4 °F)] 36.7 °C (98.1 °F)  Pulse:  [70-92] 70  Resp:  [18] 18  BP: (110-125)/(72-84) 110/72  SpO2:  [95 %-99 %] 95 %    Physical Exam  Constitutional:       General: She is not in acute distress.     Comments: Lethargic   HENT:      Nose: Nose normal.      Mouth/Throat:      Mouth: Mucous membranes are dry.   Cardiovascular:      Rate and Rhythm: Normal rate and regular rhythm.      Pulses: Normal pulses.   Pulmonary:      Effort: Pulmonary effort is normal.      Breath sounds: Normal breath sounds.   Abdominal:      General: There is no distension.      Palpations: Abdomen is soft.      Tenderness: There is abdominal tenderness.   Musculoskeletal:         General: No swelling.      Cervical back: No muscular tenderness.   Lymphadenopathy:      Cervical: No cervical adenopathy.   Skin:     General: Skin is warm and dry.      Coloration: Skin is pale.      Findings: No rash.   Neurological:      General: No focal deficit present.      Mental Status: She is alert and oriented to person, place, and time.      Motor: Weakness present.   Psychiatric:         Mood and Affect: Mood normal.         Thought Content: Thought content normal.       Fluids    Intake/Output Summary (Last 24 hours) at 1/25/2023 1253  Last data filed at 1/25/2023 0835  Gross per 24 hour   Intake 5568.87 ml   Output --   Net 5568.87 ml         Laboratory  Recent Labs     01/23/23  1340 01/24/23  0209 01/25/23  0332   WBC 7.5 6.3 4.5*   RBC 5.02 4.16* 3.93*   HEMOGLOBIN 15.0 12.4 11.7*   HEMATOCRIT 45.3 38.2 35.7*   MCV 90.2 91.8 90.8   MCH 29.9 29.8 29.8   MCHC 33.1* 32.5* 32.8*   RDW 43.8 45.0 42.5   PLATELETCT 232 184 173   MPV 9.5 9.8 9.6       Recent Labs     01/23/23  1340 01/24/23  0209 01/25/23  0332   SODIUM 138 136 139   POTASSIUM 3.8 3.8 3.7   CHLORIDE 104 108 109   CO2 22 20 20   GLUCOSE 90 97 98   BUN 10 8 3*   CREATININE 0.72 0.64 0.51   CALCIUM 9.0 7.6* 7.9*                     Imaging  No orders to display           Assessment/Plan  * Acute on chronic pancreatitis (HCC)- (present on admission)  Assessment & Plan  She has about 10 exacerbations per year and has had 4 or 5 since December  Currently residing at alcohol rehab for the last 2 and half months and for the next 1 month  Pain control oral as needed, IV if needed for breakthrough pain  Antiemetics as needed  Creon  She still needs to establish with outpatient GI    Portal hypertension (HCC)  Assessment & Plan  Seen on previous imaging  Albumin is normal, no evidence of ascites, LFTs normal    Diarrhea- (present on admission)  Assessment & Plan  Likely from her acute on chronic pancreatitis  Creon  IV fluids  Imodium as needed    Hypocalcemia  Assessment & Plan  Albumin was normal yesterday, ionized Ca is normal but corrected ca still low  Associated with higher morbidity in the setting of pancreatitis  Replace with CaGluc x2  Repeat in AM    Seizure disorder (HCC)- (present on admission)  Assessment & Plan  Continue Depakote and zonisamide    Bipolar affective disorder, current episode hypomanic (HCC)- (present on admission)  Assessment & Plan  Continue Depakote and zonisamide    Chronic pain syndrome- (present on admission)  Assessment & Plan  Currently in alcohol rehab  She does make frequent requests for pain  Continue as needed Doxy and Dilaudid but limit as able    Alcohol dependence (HCC)- (present on admission)  Assessment & Plan  Has been sober for over 100 days  Plans to return to alcohol rehab after hospitalization    Anxiety- (present on admission)  Assessment & Plan  Continue Atarax         VTE prophylaxis: SCDs/TEDs    I have performed a physical exam and reviewed and updated ROS and Plan today (1/25/2023). In review of yesterday's note (1/24/2023), there are no changes except as documented above.

## 2023-01-26 LAB
ALBUMIN SERPL BCP-MCNC: 3.7 G/DL (ref 3.2–4.9)
ALBUMIN/GLOB SERPL: 1.6 G/DL
ALP SERPL-CCNC: 52 U/L (ref 30–99)
ALT SERPL-CCNC: 10 U/L (ref 2–50)
ANION GAP SERPL CALC-SCNC: 11 MMOL/L (ref 7–16)
AST SERPL-CCNC: 13 U/L (ref 12–45)
BASOPHILS # BLD AUTO: 0.5 % (ref 0–1.8)
BASOPHILS # BLD: 0.02 K/UL (ref 0–0.12)
BILIRUB SERPL-MCNC: 0.5 MG/DL (ref 0.1–1.5)
BUN SERPL-MCNC: 2 MG/DL (ref 8–22)
CALCIUM ALBUM COR SERPL-MCNC: 8.5 MG/DL (ref 8.5–10.5)
CALCIUM SERPL-MCNC: 8.3 MG/DL (ref 8.4–10.2)
CHLORIDE SERPL-SCNC: 108 MMOL/L (ref 96–112)
CO2 SERPL-SCNC: 21 MMOL/L (ref 20–33)
CREAT SERPL-MCNC: 0.49 MG/DL (ref 0.5–1.4)
EOSINOPHIL # BLD AUTO: 0.16 K/UL (ref 0–0.51)
EOSINOPHIL NFR BLD: 3.7 % (ref 0–6.9)
ERYTHROCYTE [DISTWIDTH] IN BLOOD BY AUTOMATED COUNT: 42.5 FL (ref 35.9–50)
GFR SERPLBLD CREATININE-BSD FMLA CKD-EPI: 125 ML/MIN/1.73 M 2
GLOBULIN SER CALC-MCNC: 2.3 G/DL (ref 1.9–3.5)
GLUCOSE SERPL-MCNC: 91 MG/DL (ref 65–99)
HCT VFR BLD AUTO: 37.8 % (ref 37–47)
HGB BLD-MCNC: 12.5 G/DL (ref 12–16)
IMM GRANULOCYTES # BLD AUTO: 0.01 K/UL (ref 0–0.11)
IMM GRANULOCYTES NFR BLD AUTO: 0.2 % (ref 0–0.9)
LYMPHOCYTES # BLD AUTO: 2.46 K/UL (ref 1–4.8)
LYMPHOCYTES NFR BLD: 56.3 % (ref 22–41)
MAGNESIUM SERPL-MCNC: 1.7 MG/DL (ref 1.5–2.5)
MCH RBC QN AUTO: 30 PG (ref 27–33)
MCHC RBC AUTO-ENTMCNC: 33.1 G/DL (ref 33.6–35)
MCV RBC AUTO: 90.9 FL (ref 81.4–97.8)
MONOCYTES # BLD AUTO: 0.31 K/UL (ref 0–0.85)
MONOCYTES NFR BLD AUTO: 7.1 % (ref 0–13.4)
NEUTROPHILS # BLD AUTO: 1.41 K/UL (ref 2–7.15)
NEUTROPHILS NFR BLD: 32.2 % (ref 44–72)
NRBC # BLD AUTO: 0 K/UL
NRBC BLD-RTO: 0 /100 WBC
PHOSPHATE SERPL-MCNC: 4.5 MG/DL (ref 2.5–4.5)
PLATELET # BLD AUTO: 179 K/UL (ref 164–446)
PMV BLD AUTO: 9.5 FL (ref 9–12.9)
POTASSIUM SERPL-SCNC: 3.7 MMOL/L (ref 3.6–5.5)
PROT SERPL-MCNC: 6 G/DL (ref 6–8.2)
RBC # BLD AUTO: 4.16 M/UL (ref 4.2–5.4)
SODIUM SERPL-SCNC: 140 MMOL/L (ref 135–145)
WBC # BLD AUTO: 4.4 K/UL (ref 4.8–10.8)

## 2023-01-26 PROCEDURE — 770001 HCHG ROOM/CARE - MED/SURG/GYN PRIV*

## 2023-01-26 PROCEDURE — 700102 HCHG RX REV CODE 250 W/ 637 OVERRIDE(OP): Performed by: INTERNAL MEDICINE

## 2023-01-26 PROCEDURE — 700111 HCHG RX REV CODE 636 W/ 250 OVERRIDE (IP): Performed by: INTERNAL MEDICINE

## 2023-01-26 PROCEDURE — 85025 COMPLETE CBC W/AUTO DIFF WBC: CPT

## 2023-01-26 PROCEDURE — 700105 HCHG RX REV CODE 258: Performed by: INTERNAL MEDICINE

## 2023-01-26 PROCEDURE — 84100 ASSAY OF PHOSPHORUS: CPT

## 2023-01-26 PROCEDURE — 36415 COLL VENOUS BLD VENIPUNCTURE: CPT

## 2023-01-26 PROCEDURE — A9270 NON-COVERED ITEM OR SERVICE: HCPCS | Performed by: INTERNAL MEDICINE

## 2023-01-26 PROCEDURE — 83735 ASSAY OF MAGNESIUM: CPT

## 2023-01-26 PROCEDURE — 80053 COMPREHEN METABOLIC PANEL: CPT

## 2023-01-26 PROCEDURE — 99232 SBSQ HOSP IP/OBS MODERATE 35: CPT | Performed by: INTERNAL MEDICINE

## 2023-01-26 RX ADMIN — ONDANSETRON 4 MG: 2 INJECTION INTRAMUSCULAR; INTRAVENOUS at 12:23

## 2023-01-26 RX ADMIN — HYDROMORPHONE HYDROCHLORIDE 1 MG: 1 INJECTION, SOLUTION INTRAMUSCULAR; INTRAVENOUS; SUBCUTANEOUS at 02:30

## 2023-01-26 RX ADMIN — HYDROMORPHONE HYDROCHLORIDE 1 MG: 1 INJECTION, SOLUTION INTRAMUSCULAR; INTRAVENOUS; SUBCUTANEOUS at 09:08

## 2023-01-26 RX ADMIN — SENNOSIDES AND DOCUSATE SODIUM 2 TABLET: 50; 8.6 TABLET ORAL at 05:44

## 2023-01-26 RX ADMIN — DIVALPROEX SODIUM 250 MG: 250 TABLET, DELAYED RELEASE ORAL at 05:44

## 2023-01-26 RX ADMIN — OXYCODONE HYDROCHLORIDE 10 MG: 10 TABLET ORAL at 01:17

## 2023-01-26 RX ADMIN — OXYCODONE HYDROCHLORIDE 10 MG: 10 TABLET ORAL at 04:21

## 2023-01-26 RX ADMIN — HYDROMORPHONE HYDROCHLORIDE 1 MG: 1 INJECTION, SOLUTION INTRAMUSCULAR; INTRAVENOUS; SUBCUTANEOUS at 05:41

## 2023-01-26 RX ADMIN — GABAPENTIN 100 MG: 100 CAPSULE ORAL at 05:44

## 2023-01-26 RX ADMIN — DIVALPROEX SODIUM 250 MG: 250 TABLET, DELAYED RELEASE ORAL at 17:43

## 2023-01-26 RX ADMIN — SODIUM CHLORIDE: 9 INJECTION, SOLUTION INTRAVENOUS at 11:08

## 2023-01-26 RX ADMIN — HYDROXYZINE HYDROCHLORIDE 50 MG: 25 TABLET, FILM COATED ORAL at 17:43

## 2023-01-26 RX ADMIN — OXYCODONE HYDROCHLORIDE 10 MG: 10 TABLET ORAL at 11:14

## 2023-01-26 RX ADMIN — PANCRELIPASE 6000 UNITS: 30000; 6000; 19000 CAPSULE, DELAYED RELEASE PELLETS ORAL at 17:43

## 2023-01-26 RX ADMIN — HYDROMORPHONE HYDROCHLORIDE 1 MG: 1 INJECTION, SOLUTION INTRAMUSCULAR; INTRAVENOUS; SUBCUTANEOUS at 15:57

## 2023-01-26 RX ADMIN — GABAPENTIN 300 MG: 300 CAPSULE ORAL at 17:43

## 2023-01-26 RX ADMIN — OXYCODONE HYDROCHLORIDE 10 MG: 10 TABLET ORAL at 14:51

## 2023-01-26 RX ADMIN — PANCRELIPASE 6000 UNITS: 30000; 6000; 19000 CAPSULE, DELAYED RELEASE PELLETS ORAL at 11:08

## 2023-01-26 RX ADMIN — OXYCODONE HYDROCHLORIDE 10 MG: 10 TABLET ORAL at 20:58

## 2023-01-26 RX ADMIN — SODIUM CHLORIDE: 9 INJECTION, SOLUTION INTRAVENOUS at 00:59

## 2023-01-26 RX ADMIN — OXYCODONE HYDROCHLORIDE 10 MG: 10 TABLET ORAL at 07:36

## 2023-01-26 RX ADMIN — SODIUM CHLORIDE: 9 INJECTION, SOLUTION INTRAVENOUS at 21:01

## 2023-01-26 RX ADMIN — HYDROMORPHONE HYDROCHLORIDE 1 MG: 1 INJECTION, SOLUTION INTRAMUSCULAR; INTRAVENOUS; SUBCUTANEOUS at 22:22

## 2023-01-26 RX ADMIN — HYDROMORPHONE HYDROCHLORIDE 1 MG: 1 INJECTION, SOLUTION INTRAMUSCULAR; INTRAVENOUS; SUBCUTANEOUS at 12:23

## 2023-01-26 RX ADMIN — ZONISAMIDE 150 MG: 50 CAPSULE ORAL at 17:43

## 2023-01-26 RX ADMIN — OXYCODONE HYDROCHLORIDE 10 MG: 10 TABLET ORAL at 17:57

## 2023-01-26 RX ADMIN — ACETAMINOPHEN 650 MG: 325 TABLET, FILM COATED ORAL at 16:36

## 2023-01-26 RX ADMIN — ONDANSETRON 4 MG: 2 INJECTION INTRAMUSCULAR; INTRAVENOUS at 05:47

## 2023-01-26 RX ADMIN — HYDROMORPHONE HYDROCHLORIDE 1 MG: 1 INJECTION, SOLUTION INTRAMUSCULAR; INTRAVENOUS; SUBCUTANEOUS at 19:35

## 2023-01-26 RX ADMIN — SODIUM CHLORIDE: 9 INJECTION, SOLUTION INTRAVENOUS at 05:56

## 2023-01-26 RX ADMIN — PANCRELIPASE 6000 UNITS: 30000; 6000; 19000 CAPSULE, DELAYED RELEASE PELLETS ORAL at 07:32

## 2023-01-26 RX ADMIN — ONDANSETRON 4 MG: 2 INJECTION INTRAMUSCULAR; INTRAVENOUS at 20:58

## 2023-01-26 ASSESSMENT — ENCOUNTER SYMPTOMS
CHILLS: 0
ABDOMINAL PAIN: 1
NAUSEA: 0
VOMITING: 1
SORE THROAT: 0
BACK PAIN: 0
DEPRESSION: 0
FOCAL WEAKNESS: 0
HALLUCINATIONS: 0
FEVER: 0
DIARRHEA: 0
WEAKNESS: 0
DIZZINESS: 0
SHORTNESS OF BREATH: 0
PALPITATIONS: 0
BLOOD IN STOOL: 0
COUGH: 0
MYALGIAS: 0
HEARTBURN: 0
HEADACHES: 0

## 2023-01-26 ASSESSMENT — PAIN DESCRIPTION - PAIN TYPE
TYPE: ACUTE PAIN

## 2023-01-26 NOTE — PROGRESS NOTES
Received bedside report from day shift LESLIE Durand at 19:15. Assumed pt care.   Pt seen AO4, with IVF NS running at 200/hr. Pain reported at 8/10, nausea reported, medicated per mar.    POC discussed.   Safety and fall precautions in place.   Instructed pt to use call light, verbalized understanding.   Call light kept within reach.  No other needs at this time.   Will continue to monitor.

## 2023-01-26 NOTE — PROGRESS NOTES
Received report from night RN. Patient alert and oriented. Denies any complains at this time. Discussed plan of care and understood. Assessment per [facility]. Falls precaution in place. Call light placed within reach.

## 2023-01-26 NOTE — PROGRESS NOTES
Hospital Medicine Daily Progress Note    Date of Service  1/26/2023    Chief Complaint  Rhiannon Marrero is a 37 y.o. female admitted 1/23/2023 with abdominal pain    Hospital Course  History of alcohol abuse, sober for 100 days, history of multiple recurrent episodes of acute pancreatitis here with abdominal pain secondary to acute on chronic pancreatitis, lipase 100.  She started supportive care with IV fluids and antiemetics    Interval Problem Update  1/24: Vomited twice this morning after drinking clear, back off on diet to NPO.  Continue NS at 200.  Oxycodone and Dilaudid as needed.  Drowsy  1/25: Still remains drowsy, getting frequent doses of pain medications.  Unable to tolerate anything other than clear liquids, still complains of pain  1/26: Looks comfortable, but c/o having severe pain, including while asleep.  Symptoms may be baseline, trial of full liquids to prepare for dc    I have discussed this patient's plan of care and discharge plan at IDT rounds today with Case Management, Nursing, Nursing leadership, and other members of the IDT team.    Consultants/Specialty  None    Code Status  Full Code    Disposition  Patient is not medically cleared for discharge.   Anticipate discharge to to home with close outpatient follow-up.  I have placed the appropriate orders for post-discharge needs.    Review of Systems  Review of Systems   Constitutional:  Positive for malaise/fatigue. Negative for chills and fever.   HENT:  Negative for sore throat.    Respiratory:  Negative for cough and shortness of breath.    Cardiovascular:  Negative for chest pain and palpitations.   Gastrointestinal:  Positive for abdominal pain and vomiting. Negative for blood in stool, diarrhea, heartburn and nausea.   Genitourinary:  Negative for dysuria and frequency.   Musculoskeletal:  Negative for back pain and myalgias.   Neurological:  Negative for dizziness, focal weakness, weakness and headaches.   Psychiatric/Behavioral:   Negative for depression and hallucinations.    All other systems reviewed and are negative.     Physical Exam  Temp:  [36.6 °C (97.8 °F)-36.7 °C (98.1 °F)] 36.6 °C (97.8 °F)  Pulse:  [69-91] 78  Resp:  [17-19] 17  BP: (110-128)/(67-86) 124/78  SpO2:  [95 %-99 %] 98 %    Physical Exam  Constitutional:       General: She is not in acute distress.     Comments: Lethargic   HENT:      Nose: Nose normal.      Mouth/Throat:      Mouth: Mucous membranes are dry.   Cardiovascular:      Rate and Rhythm: Normal rate and regular rhythm.      Pulses: Normal pulses.   Pulmonary:      Effort: Pulmonary effort is normal.      Breath sounds: Normal breath sounds.   Abdominal:      General: There is no distension.      Palpations: Abdomen is soft.      Tenderness: There is abdominal tenderness.   Musculoskeletal:         General: No swelling.      Cervical back: No muscular tenderness.   Lymphadenopathy:      Cervical: No cervical adenopathy.   Skin:     General: Skin is warm and dry.      Coloration: Skin is pale.      Findings: No rash.   Neurological:      General: No focal deficit present.      Mental Status: She is alert and oriented to person, place, and time.      Motor: Weakness present.   Psychiatric:         Mood and Affect: Mood normal.         Thought Content: Thought content normal.       Fluids    Intake/Output Summary (Last 24 hours) at 1/26/2023 1220  Last data filed at 1/26/2023 0600  Gross per 24 hour   Intake 5658.56 ml   Output 150 ml   Net 5508.56 ml         Laboratory  Recent Labs     01/24/23 0209 01/25/23 0332 01/26/23 0449   WBC 6.3 4.5* 4.4*   RBC 4.16* 3.93* 4.16*   HEMOGLOBIN 12.4 11.7* 12.5   HEMATOCRIT 38.2 35.7* 37.8   MCV 91.8 90.8 90.9   MCH 29.8 29.8 30.0   MCHC 32.5* 32.8* 33.1*   RDW 45.0 42.5 42.5   PLATELETCT 184 173 179   MPV 9.8 9.6 9.5       Recent Labs     01/24/23  0209 01/25/23  0332 01/26/23  0449   SODIUM 136 139 140   POTASSIUM 3.8 3.7 3.7   CHLORIDE 108 109 108   CO2 20 20 21    GLUCOSE 97 98 91   BUN 8 3* 2*   CREATININE 0.64 0.51 0.49*   CALCIUM 7.6* 7.9* 8.3*                     Imaging  No orders to display          Assessment/Plan  * Acute on chronic pancreatitis (HCC)- (present on admission)  Assessment & Plan  She has about 10 exacerbations per year and has had 4 or 5 since December  Currently residing at alcohol rehab for the last 2 and half months and for the next 1 month  Pain control oral as needed, IV if needed for breakthrough pain  Antiemetics as needed  Creon  She still needs to establish with outpatient GI    Portal hypertension (HCC)  Assessment & Plan  Seen on previous imaging  Albumin is normal, no evidence of ascites, LFTs normal    Diarrhea- (present on admission)  Assessment & Plan  Likely from her acute on chronic pancreatitis, stable  Creon  IV fluids  Imodium as needed    Hypocalcemia  Assessment & Plan  Albumin normal yesterday, ionized Ca is normal but corrected ca still low  Associated with higher morbidity in the setting of pancreatitis  Replaced w Ca Gluc x3 this admission  Ca within normal range now  Repeat in AM    Seizure disorder (HCC)- (present on admission)  Assessment & Plan  Continue Depakote and zonisamide    Bipolar affective disorder, current episode hypomanic (HCC)- (present on admission)  Assessment & Plan  Continue Depakote and zonisamide    Chronic pain syndrome- (present on admission)  Assessment & Plan  Currently in alcohol rehab  She does make frequent requests for pain  Continue as needed Doxy and Dilaudid but limit as able    Alcohol dependence (HCC)- (present on admission)  Assessment & Plan  Has been sober for over 100 days  Plans to return to alcohol rehab after hospitalization    Anxiety- (present on admission)  Assessment & Plan  Continue Atarax         VTE prophylaxis: SCDs/TEDs    I have performed a physical exam and reviewed and updated ROS and Plan today (1/26/2023). In review of yesterday's note (1/25/2023), there are no changes  except as documented above.

## 2023-01-26 NOTE — CARE PLAN
The patient is Stable - Low risk of patient condition declining or worsening    Shift Goals  Clinical Goals: Pain control 3/10 or less; Tolerate diet  Patient Goals: Pain control  Family Goals: GI consult    Progress made toward(s) clinical / shift goals:  Tolerating clear liquids; Pain controlled 3/10 or less during this shift.    Patient is not progressing towards the following goals:

## 2023-01-26 NOTE — CARE PLAN
The patient is Stable - Low risk of patient condition declining or worsening    Shift Goals  Clinical Goals: Pt will rate pain at 4/10 or lower post interventions  Patient Goals: Sleep  Family Goals: n/a    Progress made toward(s) clinical / shift goals:  Pt reported falling asleep this shift. Heat pack provided, administered pain meds per mar. Pt reported pain at 4/10 post interventions.    Patient is not progressing towards the following goals: N/a

## 2023-01-27 LAB
ALBUMIN SERPL BCP-MCNC: 3.2 G/DL (ref 3.2–4.9)
ALBUMIN/GLOB SERPL: 1.6 G/DL
ALP SERPL-CCNC: 52 U/L (ref 30–99)
ALT SERPL-CCNC: 19 U/L (ref 2–50)
ANION GAP SERPL CALC-SCNC: 7 MMOL/L (ref 7–16)
AST SERPL-CCNC: 17 U/L (ref 12–45)
BILIRUB SERPL-MCNC: 0.5 MG/DL (ref 0.1–1.5)
BUN SERPL-MCNC: <2 MG/DL (ref 8–22)
CALCIUM ALBUM COR SERPL-MCNC: 8.7 MG/DL (ref 8.5–10.5)
CALCIUM SERPL-MCNC: 8.1 MG/DL (ref 8.4–10.2)
CHLORIDE SERPL-SCNC: 107 MMOL/L (ref 96–112)
CO2 SERPL-SCNC: 21 MMOL/L (ref 20–33)
CREAT SERPL-MCNC: 0.5 MG/DL (ref 0.5–1.4)
GFR SERPLBLD CREATININE-BSD FMLA CKD-EPI: 125 ML/MIN/1.73 M 2
GLOBULIN SER CALC-MCNC: 2 G/DL (ref 1.9–3.5)
GLUCOSE SERPL-MCNC: 100 MG/DL (ref 65–99)
POTASSIUM SERPL-SCNC: 3.5 MMOL/L (ref 3.6–5.5)
PROT SERPL-MCNC: 5.2 G/DL (ref 6–8.2)
SODIUM SERPL-SCNC: 135 MMOL/L (ref 135–145)

## 2023-01-27 PROCEDURE — A9270 NON-COVERED ITEM OR SERVICE: HCPCS | Performed by: INTERNAL MEDICINE

## 2023-01-27 PROCEDURE — 99232 SBSQ HOSP IP/OBS MODERATE 35: CPT | Performed by: INTERNAL MEDICINE

## 2023-01-27 PROCEDURE — 94760 N-INVAS EAR/PLS OXIMETRY 1: CPT

## 2023-01-27 PROCEDURE — 770001 HCHG ROOM/CARE - MED/SURG/GYN PRIV*

## 2023-01-27 PROCEDURE — 700111 HCHG RX REV CODE 636 W/ 250 OVERRIDE (IP): Performed by: INTERNAL MEDICINE

## 2023-01-27 PROCEDURE — 700102 HCHG RX REV CODE 250 W/ 637 OVERRIDE(OP): Performed by: INTERNAL MEDICINE

## 2023-01-27 PROCEDURE — 700105 HCHG RX REV CODE 258: Performed by: INTERNAL MEDICINE

## 2023-01-27 PROCEDURE — 36415 COLL VENOUS BLD VENIPUNCTURE: CPT

## 2023-01-27 PROCEDURE — 80053 COMPREHEN METABOLIC PANEL: CPT

## 2023-01-27 RX ORDER — HYDROMORPHONE HYDROCHLORIDE 1 MG/ML
0.5 INJECTION, SOLUTION INTRAMUSCULAR; INTRAVENOUS; SUBCUTANEOUS EVERY 4 HOURS PRN
Status: DISCONTINUED | OUTPATIENT
Start: 2023-01-27 | End: 2023-01-29 | Stop reason: HOSPADM

## 2023-01-27 RX ORDER — POTASSIUM CHLORIDE 20 MEQ/1
40 TABLET, EXTENDED RELEASE ORAL ONCE
Status: COMPLETED | OUTPATIENT
Start: 2023-01-27 | End: 2023-01-27

## 2023-01-27 RX ORDER — OXYCODONE HYDROCHLORIDE 10 MG/1
10 TABLET ORAL EVERY 4 HOURS PRN
Status: DISCONTINUED | OUTPATIENT
Start: 2023-01-27 | End: 2023-01-28

## 2023-01-27 RX ORDER — CAPSAICIN 0.025 %
CREAM (GRAM) TOPICAL 3 TIMES DAILY
Status: DISCONTINUED | OUTPATIENT
Start: 2023-01-27 | End: 2023-01-29 | Stop reason: HOSPADM

## 2023-01-27 RX ORDER — OXYCODONE HYDROCHLORIDE 5 MG/1
5 TABLET ORAL EVERY 4 HOURS PRN
Status: DISCONTINUED | OUTPATIENT
Start: 2023-01-27 | End: 2023-01-28

## 2023-01-27 RX ADMIN — OXYCODONE HYDROCHLORIDE 10 MG: 10 TABLET ORAL at 15:20

## 2023-01-27 RX ADMIN — OXYCODONE HYDROCHLORIDE 10 MG: 10 TABLET ORAL at 11:07

## 2023-01-27 RX ADMIN — SENNOSIDES AND DOCUSATE SODIUM 2 TABLET: 50; 8.6 TABLET ORAL at 17:18

## 2023-01-27 RX ADMIN — SODIUM CHLORIDE: 9 INJECTION, SOLUTION INTRAVENOUS at 03:18

## 2023-01-27 RX ADMIN — SODIUM CHLORIDE: 9 INJECTION, SOLUTION INTRAVENOUS at 08:58

## 2023-01-27 RX ADMIN — PANCRELIPASE 6000 UNITS: 30000; 6000; 19000 CAPSULE, DELAYED RELEASE PELLETS ORAL at 07:51

## 2023-01-27 RX ADMIN — HYDROMORPHONE HYDROCHLORIDE 0.5 MG: 1 INJECTION, SOLUTION INTRAMUSCULAR; INTRAVENOUS; SUBCUTANEOUS at 16:48

## 2023-01-27 RX ADMIN — SENNOSIDES AND DOCUSATE SODIUM 2 TABLET: 50; 8.6 TABLET ORAL at 04:40

## 2023-01-27 RX ADMIN — DIVALPROEX SODIUM 250 MG: 250 TABLET, DELAYED RELEASE ORAL at 17:18

## 2023-01-27 RX ADMIN — HYDROXYZINE HYDROCHLORIDE 50 MG: 25 TABLET, FILM COATED ORAL at 17:18

## 2023-01-27 RX ADMIN — OXYCODONE HYDROCHLORIDE 10 MG: 10 TABLET ORAL at 04:40

## 2023-01-27 RX ADMIN — OXYCODONE HYDROCHLORIDE 10 MG: 10 TABLET ORAL at 07:51

## 2023-01-27 RX ADMIN — ONDANSETRON 4 MG: 2 INJECTION INTRAMUSCULAR; INTRAVENOUS at 04:40

## 2023-01-27 RX ADMIN — HYDROMORPHONE HYDROCHLORIDE 1 MG: 1 INJECTION, SOLUTION INTRAMUSCULAR; INTRAVENOUS; SUBCUTANEOUS at 08:51

## 2023-01-27 RX ADMIN — SODIUM CHLORIDE: 9 INJECTION, SOLUTION INTRAVENOUS at 21:25

## 2023-01-27 RX ADMIN — HYDROMORPHONE HYDROCHLORIDE 1 MG: 1 INJECTION, SOLUTION INTRAMUSCULAR; INTRAVENOUS; SUBCUTANEOUS at 01:58

## 2023-01-27 RX ADMIN — HYDROMORPHONE HYDROCHLORIDE 0.5 MG: 1 INJECTION, SOLUTION INTRAMUSCULAR; INTRAVENOUS; SUBCUTANEOUS at 12:08

## 2023-01-27 RX ADMIN — POTASSIUM CHLORIDE 40 MEQ: 1500 TABLET, EXTENDED RELEASE ORAL at 08:50

## 2023-01-27 RX ADMIN — ZONISAMIDE 150 MG: 50 CAPSULE ORAL at 17:16

## 2023-01-27 RX ADMIN — OXYCODONE HYDROCHLORIDE 10 MG: 10 TABLET ORAL at 00:45

## 2023-01-27 RX ADMIN — GABAPENTIN 100 MG: 100 CAPSULE ORAL at 04:41

## 2023-01-27 RX ADMIN — CAPSAICIN: 0.25 CREAM TOPICAL at 15:21

## 2023-01-27 RX ADMIN — DIVALPROEX SODIUM 250 MG: 250 TABLET, DELAYED RELEASE ORAL at 04:41

## 2023-01-27 RX ADMIN — HYDROMORPHONE HYDROCHLORIDE 0.5 MG: 1 INJECTION, SOLUTION INTRAMUSCULAR; INTRAVENOUS; SUBCUTANEOUS at 21:21

## 2023-01-27 RX ADMIN — SODIUM CHLORIDE: 9 INJECTION, SOLUTION INTRAVENOUS at 17:12

## 2023-01-27 RX ADMIN — OXYCODONE HYDROCHLORIDE 10 MG: 10 TABLET ORAL at 20:05

## 2023-01-27 RX ADMIN — GABAPENTIN 300 MG: 300 CAPSULE ORAL at 17:18

## 2023-01-27 ASSESSMENT — PAIN DESCRIPTION - PAIN TYPE
TYPE: ACUTE PAIN;CHRONIC PAIN
TYPE: ACUTE PAIN
TYPE: ACUTE PAIN
TYPE: ACUTE PAIN;CHRONIC PAIN
TYPE: ACUTE PAIN

## 2023-01-27 ASSESSMENT — ENCOUNTER SYMPTOMS
NAUSEA: 0
CHILLS: 0
FOCAL WEAKNESS: 0
DIZZINESS: 0
VOMITING: 1
COUGH: 0
SHORTNESS OF BREATH: 0
DEPRESSION: 0
BACK PAIN: 0
ABDOMINAL PAIN: 1
HEARTBURN: 0
BLOOD IN STOOL: 0
MYALGIAS: 0
HEADACHES: 0
FEVER: 0
PALPITATIONS: 0
HALLUCINATIONS: 0
DIARRHEA: 0
SORE THROAT: 0
WEAKNESS: 0

## 2023-01-27 NOTE — CARE PLAN
The patient is Stable - Low risk of patient condition declining or worsening    Shift Goals  Clinical Goals: Pain control 3/10 or lower  Patient Goals: Pain control  Family Goals: GI consult    Progress made toward(s) clinical / shift goals:  Pain control 3/10 or lower; Free from falls; Able to keep full liquid diet down.    Patient is not progressing towards the following goals:  Increased pain from full liquid diet however denies nausea or vomiting.

## 2023-01-27 NOTE — CARE PLAN
Problem: Nutrition  Goal: Patient's nutritional and fluid intake will be adequate or improve  Outcome: Progressing     Problem: Gastrointestinal Irritability  Goal: Nausea and vomiting will be absent or improve  Outcome: Progressing     The patient is Stable - Low risk of patient condition declining or worsening    Shift Goals  Clinical Goals: Ensure consistent pain and nausea control  Patient Goals: Rest comfortably, tolerate oral intake  Family Goals: n/a    Progress made toward(s) clinical / shift goals:  Pt denies episodes of nausea this shift; no episodes of nausea or vomiting observed at time of writing. Pt able to tolerate clear liquids without issue.    Patient is not progressing towards the following goals: Pt reports significant pain not completely relieved with administration of prn pain medication. Capsaicin cream added by hospitalist this shift. TM.    Problem: Pain - Standard  Goal: Alleviation of pain or a reduction in pain to the patient’s comfort goal  Outcome: Not Progressing

## 2023-01-27 NOTE — PROGRESS NOTES
Received bedside report from day shift LESLIE Durand.   Assumed pt care. Pt seen AO4, with IVF NS running at 200/hr. Reported pain at 8/10, medicated per mar. POC discussed. Safety and fall precautions in place.   Call light kept within reach.  No other needs at this time.   Will continue to monitor.

## 2023-01-27 NOTE — PROGRESS NOTES
1200 dose of IV dilaudid given, 0.5 mg. Vial discarded before documentation complete. Unable to retrieve vial to scan medication. NAP Ritchelle at bedside witnessed administration.

## 2023-01-27 NOTE — PROGRESS NOTES
Bedside report received from night RN. Assumed care of patient. Daily plan of care discussed. Pt awake and alert, reports 8/10 abdominal pain, no nausea at this time. Pt unable to tolerate full liquids at this time. Pt instructed to call for assistance if nauseated or if abdominal pain increases; pt verbalized understanding. Hourly rounding in place.

## 2023-01-27 NOTE — PROGRESS NOTES
Hospital Medicine Daily Progress Note    Date of Service  1/27/2023    Chief Complaint  Rhiannon Marrero is a 37 y.o. female admitted 1/23/2023 with abdominal pain    Hospital Course  History of alcohol abuse, sober for 100 days, history of multiple recurrent episodes of acute pancreatitis here with abdominal pain secondary to acute on chronic pancreatitis, lipase 100.  She started supportive care with IV fluids and antiemetics    Interval Problem Update  1/24: Vomited twice this morning after drinking clear, back off on diet to NPO.  Continue NS at 200.  Oxycodone and Dilaudid as needed.  Drowsy  1/25: Still remains drowsy, getting frequent doses of pain medications.  Unable to tolerate anything other than clear liquids, still complains of pain  1/26: Looks comfortable, but c/o having severe pain, including while asleep.  Symptoms may be baseline, trial of full liquids to prepare for dc  1/27: No vomiting last 5 hours still complaining of severe pain.  Only tolerating ginger ale.  Decrease oxycodone and Dilaudid frequency and dose    I have discussed this patient's plan of care and discharge plan at IDT rounds today with Case Management, Nursing, Nursing leadership, and other members of the IDT team.    Consultants/Specialty  None    Code Status  Full Code    Disposition  Patient is not medically cleared for discharge.   Anticipate discharge to to home with close outpatient follow-up.  I have placed the appropriate orders for post-discharge needs.    Review of Systems  Review of Systems   Constitutional:  Positive for malaise/fatigue. Negative for chills and fever.   HENT:  Negative for sore throat.    Respiratory:  Negative for cough and shortness of breath.    Cardiovascular:  Negative for chest pain and palpitations.   Gastrointestinal:  Positive for abdominal pain and vomiting. Negative for blood in stool, diarrhea, heartburn and nausea.   Genitourinary:  Negative for dysuria and frequency.   Musculoskeletal:   Negative for back pain and myalgias.   Neurological:  Negative for dizziness, focal weakness, weakness and headaches.   Psychiatric/Behavioral:  Negative for depression and hallucinations.    All other systems reviewed and are negative.     Physical Exam  Temp:  [36.5 °C (97.7 °F)-36.9 °C (98.4 °F)] 36.9 °C (98.4 °F)  Pulse:  [70-84] 70  Resp:  [16-17] 16  BP: (105-125)/(71-82) 110/78  SpO2:  [93 %-99 %] 99 %    Physical Exam  Constitutional:       General: She is not in acute distress.     Comments: Lethargic   HENT:      Nose: Nose normal.      Mouth/Throat:      Mouth: Mucous membranes are dry.   Cardiovascular:      Rate and Rhythm: Normal rate and regular rhythm.      Pulses: Normal pulses.   Pulmonary:      Effort: Pulmonary effort is normal.      Breath sounds: Normal breath sounds.   Abdominal:      General: There is no distension.      Palpations: Abdomen is soft.      Tenderness: There is abdominal tenderness.   Musculoskeletal:         General: No swelling.      Cervical back: No muscular tenderness.   Lymphadenopathy:      Cervical: No cervical adenopathy.   Skin:     General: Skin is warm and dry.      Coloration: Skin is pale.      Findings: No rash.   Neurological:      General: No focal deficit present.      Mental Status: She is alert and oriented to person, place, and time.      Motor: Weakness present.   Psychiatric:         Mood and Affect: Mood normal.         Thought Content: Thought content normal.       Fluids    Intake/Output Summary (Last 24 hours) at 1/27/2023 1200  Last data filed at 1/27/2023 0456  Gross per 24 hour   Intake 4735.72 ml   Output 100 ml   Net 4635.72 ml         Laboratory  Recent Labs     01/25/23  0332 01/26/23  0449   WBC 4.5* 4.4*   RBC 3.93* 4.16*   HEMOGLOBIN 11.7* 12.5   HEMATOCRIT 35.7* 37.8   MCV 90.8 90.9   MCH 29.8 30.0   MCHC 32.8* 33.1*   RDW 42.5 42.5   PLATELETCT 173 179   MPV 9.6 9.5       Recent Labs     01/25/23  0332 01/26/23  0449 01/27/23  0310   SODIUM  139 140 135   POTASSIUM 3.7 3.7 3.5*   CHLORIDE 109 108 107   CO2 20 21 21   GLUCOSE 98 91 100*   BUN 3* 2* <2*   CREATININE 0.51 0.49* 0.50   CALCIUM 7.9* 8.3* 8.1*                     Imaging  No orders to display          Assessment/Plan  * Acute on chronic pancreatitis (HCC)- (present on admission)  Assessment & Plan  She has about 10 exacerbations per year and has had 4 or 5 since December  Currently residing at alcohol rehab for the last 2 and half months and for the next 1 month  Clinically she looks comfortable but complaining of severe pain, even while asleep  Unable to tolerate diet other than ginger ale, though no witness vomiting over last 24h  Antiemetics as needed  Creon  She still needs to establish with outpatient GI  Continue to adat - so far not tolerating  Decrease dilaudid to .5mg q4h from 1mg q3h  Decrease oxy to 5-10mg q4h from 5-10mg q3h    Portal hypertension (HCC)  Assessment & Plan  Seen on previous imaging  Albumin is normal, no evidence of ascites, LFTs normal    Diarrhea- (present on admission)  Assessment & Plan  Likely from her acute on chronic pancreatitis, stable  Creon  IV fluids  Imodium as needed    Hypocalcemia  Assessment & Plan  Albumin normal, ionized Ca is normal but corrected ca was still low  Associated with higher morbidity in the setting of pancreatitis  Replaced w Ca Gluc x3 this admission  Ca within normal range now  Repeat in AM    Seizure disorder (HCC)- (present on admission)  Assessment & Plan  Continue Depakote and zonisamide    Bipolar affective disorder, current episode hypomanic (HCC)- (present on admission)  Assessment & Plan  Continue Depakote and zonisamide    Chronic pain syndrome- (present on admission)  Assessment & Plan  Currently in alcohol rehab  She does make frequent requests for pain meds  Wean medications today  Continue as needed oxy and Dilaudid but limit as able- reduce to q4h from q3h    Alcohol dependence (HCC)- (present on  admission)  Assessment & Plan  Has been sober for over 100 days  Plans to return to alcohol rehab after hospitalization    Anxiety- (present on admission)  Assessment & Plan  Continue Atarax         VTE prophylaxis: SCDs/TEDs    I have performed a physical exam and reviewed and updated ROS and Plan today (1/27/2023). In review of yesterday's note (1/26/2023), there are no changes except as documented above.

## 2023-01-27 NOTE — CARE PLAN
"The patient is Stable - Low risk of patient condition declining or worsening    Shift Goals  Clinical Goals: Decreased episodes of N&V, pain rated at 4/10 or lower post interventions  Patient Goals: Pain mgt, rest comfortably  Family Goals:     Progress made toward(s) clinical / shift goals:  Administered PRN antiemetics 2x this shift. PRN pain meds and heat pack provided, pain relief reported post interventions. Pt was able to take periods of naps this shift.    Patient is not progressing towards the following goals: Pt reported pain post interventions at 4/10, per pt pain was more since she \"tried the real food\" yesterday. Pt also had 1 episode of vomiting this shift.      "

## 2023-01-28 LAB
ALBUMIN SERPL BCP-MCNC: 3.1 G/DL (ref 3.2–4.9)
ALBUMIN/GLOB SERPL: 1.7 G/DL
ALP SERPL-CCNC: 48 U/L (ref 30–99)
ALT SERPL-CCNC: 15 U/L (ref 2–50)
ANION GAP SERPL CALC-SCNC: 8 MMOL/L (ref 7–16)
AST SERPL-CCNC: 13 U/L (ref 12–45)
BILIRUB SERPL-MCNC: 0.5 MG/DL (ref 0.1–1.5)
BUN SERPL-MCNC: <2 MG/DL (ref 8–22)
CALCIUM ALBUM COR SERPL-MCNC: 8.8 MG/DL (ref 8.5–10.5)
CALCIUM SERPL-MCNC: 8.1 MG/DL (ref 8.4–10.2)
CHLORIDE SERPL-SCNC: 109 MMOL/L (ref 96–112)
CO2 SERPL-SCNC: 20 MMOL/L (ref 20–33)
CREAT SERPL-MCNC: 0.48 MG/DL (ref 0.5–1.4)
GFR SERPLBLD CREATININE-BSD FMLA CKD-EPI: 126 ML/MIN/1.73 M 2
GLOBULIN SER CALC-MCNC: 1.8 G/DL (ref 1.9–3.5)
GLUCOSE SERPL-MCNC: 91 MG/DL (ref 65–99)
MAGNESIUM SERPL-MCNC: 1.5 MG/DL (ref 1.5–2.5)
POTASSIUM SERPL-SCNC: 3.6 MMOL/L (ref 3.6–5.5)
PROT SERPL-MCNC: 4.9 G/DL (ref 6–8.2)
SODIUM SERPL-SCNC: 137 MMOL/L (ref 135–145)

## 2023-01-28 PROCEDURE — 700102 HCHG RX REV CODE 250 W/ 637 OVERRIDE(OP): Performed by: INTERNAL MEDICINE

## 2023-01-28 PROCEDURE — A9270 NON-COVERED ITEM OR SERVICE: HCPCS | Performed by: INTERNAL MEDICINE

## 2023-01-28 PROCEDURE — 80053 COMPREHEN METABOLIC PANEL: CPT

## 2023-01-28 PROCEDURE — 770001 HCHG ROOM/CARE - MED/SURG/GYN PRIV*

## 2023-01-28 PROCEDURE — 36415 COLL VENOUS BLD VENIPUNCTURE: CPT

## 2023-01-28 PROCEDURE — 700111 HCHG RX REV CODE 636 W/ 250 OVERRIDE (IP): Performed by: INTERNAL MEDICINE

## 2023-01-28 PROCEDURE — 700105 HCHG RX REV CODE 258: Performed by: INTERNAL MEDICINE

## 2023-01-28 PROCEDURE — 83735 ASSAY OF MAGNESIUM: CPT

## 2023-01-28 PROCEDURE — 94760 N-INVAS EAR/PLS OXIMETRY 1: CPT

## 2023-01-28 PROCEDURE — 99233 SBSQ HOSP IP/OBS HIGH 50: CPT | Performed by: INTERNAL MEDICINE

## 2023-01-28 RX ORDER — TRAMADOL HYDROCHLORIDE 50 MG/1
100 TABLET ORAL EVERY 4 HOURS PRN
Status: DISCONTINUED | OUTPATIENT
Start: 2023-01-28 | End: 2023-01-28

## 2023-01-28 RX ORDER — TRAMADOL HYDROCHLORIDE 50 MG/1
50-100 TABLET ORAL EVERY 4 HOURS PRN
Status: DISCONTINUED | OUTPATIENT
Start: 2023-01-28 | End: 2023-01-29 | Stop reason: HOSPADM

## 2023-01-28 RX ADMIN — HYDROXYZINE HYDROCHLORIDE 50 MG: 25 TABLET, FILM COATED ORAL at 18:00

## 2023-01-28 RX ADMIN — SENNOSIDES AND DOCUSATE SODIUM 2 TABLET: 50; 8.6 TABLET ORAL at 05:17

## 2023-01-28 RX ADMIN — ONDANSETRON 4 MG: 2 INJECTION INTRAMUSCULAR; INTRAVENOUS at 16:04

## 2023-01-28 RX ADMIN — PANCRELIPASE 6000 UNITS: 30000; 6000; 19000 CAPSULE, DELAYED RELEASE PELLETS ORAL at 11:06

## 2023-01-28 RX ADMIN — PANCRELIPASE 6000 UNITS: 30000; 6000; 19000 CAPSULE, DELAYED RELEASE PELLETS ORAL at 17:30

## 2023-01-28 RX ADMIN — SODIUM CHLORIDE: 9 INJECTION, SOLUTION INTRAVENOUS at 23:33

## 2023-01-28 RX ADMIN — OXYCODONE HYDROCHLORIDE 10 MG: 10 TABLET ORAL at 09:26

## 2023-01-28 RX ADMIN — OXYCODONE HYDROCHLORIDE 10 MG: 10 TABLET ORAL at 05:16

## 2023-01-28 RX ADMIN — DIVALPROEX SODIUM 250 MG: 250 TABLET, DELAYED RELEASE ORAL at 05:17

## 2023-01-28 RX ADMIN — TRAMADOL HYDROCHLORIDE 100 MG: 50 TABLET, COATED ORAL at 13:34

## 2023-01-28 RX ADMIN — HYDROMORPHONE HYDROCHLORIDE 0.5 MG: 1 INJECTION, SOLUTION INTRAMUSCULAR; INTRAVENOUS; SUBCUTANEOUS at 14:39

## 2023-01-28 RX ADMIN — GABAPENTIN 300 MG: 300 CAPSULE ORAL at 17:31

## 2023-01-28 RX ADMIN — SODIUM CHLORIDE: 9 INJECTION, SOLUTION INTRAVENOUS at 02:05

## 2023-01-28 RX ADMIN — HYDROMORPHONE HYDROCHLORIDE 0.5 MG: 1 INJECTION, SOLUTION INTRAMUSCULAR; INTRAVENOUS; SUBCUTANEOUS at 11:06

## 2023-01-28 RX ADMIN — ZONISAMIDE 150 MG: 50 CAPSULE ORAL at 17:31

## 2023-01-28 RX ADMIN — GABAPENTIN 100 MG: 100 CAPSULE ORAL at 05:16

## 2023-01-28 RX ADMIN — TRAMADOL HYDROCHLORIDE 100 MG: 50 TABLET, COATED ORAL at 23:32

## 2023-01-28 RX ADMIN — HYDROMORPHONE HYDROCHLORIDE 0.5 MG: 1 INJECTION, SOLUTION INTRAMUSCULAR; INTRAVENOUS; SUBCUTANEOUS at 18:41

## 2023-01-28 RX ADMIN — SODIUM CHLORIDE: 9 INJECTION, SOLUTION INTRAVENOUS at 07:49

## 2023-01-28 RX ADMIN — HYDROMORPHONE HYDROCHLORIDE 0.5 MG: 1 INJECTION, SOLUTION INTRAMUSCULAR; INTRAVENOUS; SUBCUTANEOUS at 02:00

## 2023-01-28 RX ADMIN — OXYCODONE HYDROCHLORIDE 10 MG: 10 TABLET ORAL at 00:55

## 2023-01-28 RX ADMIN — PANCRELIPASE 6000 UNITS: 30000; 6000; 19000 CAPSULE, DELAYED RELEASE PELLETS ORAL at 07:48

## 2023-01-28 RX ADMIN — HYDROMORPHONE HYDROCHLORIDE 0.5 MG: 1 INJECTION, SOLUTION INTRAMUSCULAR; INTRAVENOUS; SUBCUTANEOUS at 07:44

## 2023-01-28 RX ADMIN — DIVALPROEX SODIUM 250 MG: 250 TABLET, DELAYED RELEASE ORAL at 17:34

## 2023-01-28 RX ADMIN — TRAMADOL HYDROCHLORIDE 100 MG: 50 TABLET, COATED ORAL at 17:35

## 2023-01-28 ASSESSMENT — PAIN DESCRIPTION - PAIN TYPE
TYPE: ACUTE PAIN

## 2023-01-28 ASSESSMENT — ENCOUNTER SYMPTOMS
SHORTNESS OF BREATH: 0
VOMITING: 1
DIARRHEA: 0
HEARTBURN: 0
PALPITATIONS: 0
HEADACHES: 0
BLOOD IN STOOL: 0
MYALGIAS: 0
ABDOMINAL PAIN: 1
SORE THROAT: 0
BACK PAIN: 0
NAUSEA: 0
DIZZINESS: 0
CHILLS: 0
FEVER: 0
DEPRESSION: 0
WEAKNESS: 0
COUGH: 0
HALLUCINATIONS: 0
FOCAL WEAKNESS: 0

## 2023-01-28 NOTE — PROGRESS NOTES
Hospital Medicine Daily Progress Note    Date of Service  1/28/2023    Chief Complaint  Rhiannon Marrero is a 37 y.o. female admitted 1/23/2023 with abdominal pain    Hospital Course  History of alcohol abuse, sober for 100 days, history of multiple recurrent episodes of acute pancreatitis here with abdominal pain secondary to acute on chronic pancreatitis, lipase 100.  She started supportive care with IV fluids and antiemetics    Interval Problem Update  1/24: Vomited twice this morning after drinking clear, back off on diet to NPO.  Continue NS at 200.  Oxycodone and Dilaudid as needed.  Drowsy  1/25: Still remains drowsy, getting frequent doses of pain medications.  Unable to tolerate anything other than clear liquids, still complains of pain  1/26: Looks comfortable, but c/o having severe pain, including while asleep.  Symptoms may be baseline, trial of full liquids to prepare for dc  1/27: No vomiting last 5 hours still complaining of severe pain.  Only tolerating ginger ale.  Decrease oxycodone and Dilaudid frequency and dose  1/28: Change oxycodone to tramadol in preparation for outpatient pain regimen.  Encourage p.o. intake as able    I have discussed this patient's plan of care and discharge plan at IDT rounds today with Case Management, Nursing, Nursing leadership, and other members of the IDT team.    Consultants/Specialty  None    Code Status  Full Code    Disposition  Patient is not medically cleared for discharge.   Anticipate discharge to to home with close outpatient follow-up.  I have placed the appropriate orders for post-discharge needs.    Review of Systems  Review of Systems   Constitutional:  Positive for malaise/fatigue. Negative for chills and fever.   HENT:  Negative for sore throat.    Respiratory:  Negative for cough and shortness of breath.    Cardiovascular:  Negative for chest pain and palpitations.   Gastrointestinal:  Positive for abdominal pain and vomiting. Negative for blood in  stool, diarrhea, heartburn and nausea.   Genitourinary:  Negative for dysuria and frequency.   Musculoskeletal:  Negative for back pain and myalgias.   Neurological:  Negative for dizziness, focal weakness, weakness and headaches.   Psychiatric/Behavioral:  Negative for depression and hallucinations.    All other systems reviewed and are negative.     Physical Exam  Temp:  [36.7 °C (98.1 °F)-36.8 °C (98.3 °F)] 36.7 °C (98.1 °F)  Pulse:  [70-77] 70  Resp:  [15-18] 18  BP: (102-135)/(79-86) 127/79  SpO2:  [92 %-98 %] 92 %    Physical Exam  Constitutional:       General: She is not in acute distress.     Comments: Lethargic   HENT:      Nose: Nose normal.      Mouth/Throat:      Mouth: Mucous membranes are dry.   Cardiovascular:      Rate and Rhythm: Normal rate and regular rhythm.      Pulses: Normal pulses.   Pulmonary:      Effort: Pulmonary effort is normal.      Breath sounds: Normal breath sounds.   Abdominal:      General: There is no distension.      Palpations: Abdomen is soft.      Tenderness: There is abdominal tenderness.   Musculoskeletal:         General: No swelling.      Cervical back: No muscular tenderness.   Lymphadenopathy:      Cervical: No cervical adenopathy.   Skin:     General: Skin is warm and dry.      Coloration: Skin is pale.      Findings: No rash.   Neurological:      General: No focal deficit present.      Mental Status: She is alert and oriented to person, place, and time.      Motor: Weakness present.   Psychiatric:         Mood and Affect: Mood normal.         Thought Content: Thought content normal.       Fluids    Intake/Output Summary (Last 24 hours) at 1/28/2023 1146  Last data filed at 1/28/2023 1042  Gross per 24 hour   Intake 100 ml   Output --   Net 100 ml         Laboratory  Recent Labs     01/26/23 0449   WBC 4.4*   RBC 4.16*   HEMOGLOBIN 12.5   HEMATOCRIT 37.8   MCV 90.9   MCH 30.0   MCHC 33.1*   RDW 42.5   PLATELETCT 179   MPV 9.5       Recent Labs     01/26/23 0441  01/27/23  0310 01/28/23  0045   SODIUM 140 135 137   POTASSIUM 3.7 3.5* 3.6   CHLORIDE 108 107 109   CO2 21 21 20   GLUCOSE 91 100* 91   BUN 2* <2* <2*   CREATININE 0.49* 0.50 0.48*   CALCIUM 8.3* 8.1* 8.1*                     Imaging  No orders to display          Assessment/Plan  * Acute on chronic pancreatitis (HCC)- (present on admission)  Assessment & Plan  She has about 10 exacerbations per year and has had 4 or 5 since December  Currently residing at alcohol rehab for the last 2.5 months and for the next 1 month  Clinically she looks comfortable but complaining of severe pain, even while asleep  Tolerating ginger ale  Continue antiemetics as needed  Creon  She still needs to establish with outpatient GI, history of celiac block with Dr. Johnson in the past  Continue to adat  Decreased continue dilaudid at .5mg q4h  Change oxycodone to tramadol which she would be able to continue as an outpatient    Portal hypertension (HCC)  Assessment & Plan  Seen on previous imaging  Albumin is normal, no evidence of ascites, LFTs normal    Diarrhea- (present on admission)  Assessment & Plan  Likely from her acute on chronic pancreatitis, stable  Creon  IV fluids  Imodium as needed    Hypocalcemia  Assessment & Plan  Albumin normal, ionized Ca is normal but corrected ca was still low  Associated with higher morbidity in the setting of pancreatitis  Replaced w Ca Gluc x3 this admission  Ca within normal range now  Repeat in AM    Seizure disorder (HCC)- (present on admission)  Assessment & Plan  Continue Depakote and zonisamide    Bipolar affective disorder, current episode hypomanic (HCC)- (present on admission)  Assessment & Plan  Continue Depakote and zonisamide    Chronic pain syndrome- (present on admission)  Assessment & Plan  Currently in alcohol rehab and is not allowed to have narcotics there  I reviewed her previous hospital stays and it appears she has had multiple admissions with difficulty weaning from pain  medications  Transition from oxycodone to tramadol today as she would be able to continue that as an outpatient  Continue as needed Dilaudid    Alcohol dependence (HCC)- (present on admission)  Assessment & Plan  Has been sober for over 105 days  Plans to return to alcohol rehab after hospitalization    Anxiety- (present on admission)  Assessment & Plan  Continue Atarax         VTE prophylaxis: SCDs/TEDs    I have performed a physical exam and reviewed and updated ROS and Plan today (1/28/2023). In review of yesterday's note (1/27/2023), there are no changes except as documented above.

## 2023-01-28 NOTE — DIETARY
"Nutrition services: Day 5 of admit.  Rhiannon Marrero is a 37 y.o. female with admitting DX of Acute on chronic pancreatitis (HCC) [K85.90, K86.1]    Poor PO intake/pt not tolerating diet advancement. Only tolerating ginger ale 1/27. Current diet order full liquids. PO intake per ADLs has been <25% up to 50-75%; refused x 2 meals 1/27.  Labs: BUN <2, crea 0.48, Ca+ 8.1 (adjusted for  alb 3.1 = 8.82, WNL)  MAR: NS @ 200 ml/hr, PRN zofran (last admin 1/27 a.m.), creon TID w/ meals, senna  +BM 1/23    Height: 170.2 cm (5' 7\")  Weight: 62.5 kg (137 lb 12.6 oz)  Body mass index is 21.58 kg/m²., BMI classification: Normal  Diet/Intake: Full liquids    Plan/Recommendations:  -Pt w/ poor nutritional status; will provide Boost Breeze clear liquid supplement TID w/ meals to bolster kcal/protein intake (PO intake averaging <50% x >48 hrs).    RD following.      "

## 2023-01-28 NOTE — CARE PLAN
The patient is Stable - Low risk of patient condition declining or worsening    Shift Goals  Clinical Goals: Pt will state pain is 6 out of 10 by the end of shift  Patient Goals: Rest comfortably, tolerate oral intake  Family Goals: n/a    Progress made toward(s) clinical / shift goals:  Pt has been having a lot of pain during this shift. Rating the pain a 9 out 10 most of the night. Pain meds given. Pt able to sleep a little during this shift. Pt calls appropriately.      Patient is not progressing towards the following goals:

## 2023-01-29 VITALS
WEIGHT: 137.79 LBS | DIASTOLIC BLOOD PRESSURE: 80 MMHG | BODY MASS INDEX: 21.63 KG/M2 | RESPIRATION RATE: 14 BRPM | HEART RATE: 74 BPM | TEMPERATURE: 98.1 F | HEIGHT: 67 IN | OXYGEN SATURATION: 93 % | SYSTOLIC BLOOD PRESSURE: 116 MMHG

## 2023-01-29 LAB — LIPASE SERPL-CCNC: 10 U/L (ref 7–58)

## 2023-01-29 PROCEDURE — 700102 HCHG RX REV CODE 250 W/ 637 OVERRIDE(OP): Performed by: INTERNAL MEDICINE

## 2023-01-29 PROCEDURE — 94760 N-INVAS EAR/PLS OXIMETRY 1: CPT

## 2023-01-29 PROCEDURE — A9270 NON-COVERED ITEM OR SERVICE: HCPCS | Performed by: INTERNAL MEDICINE

## 2023-01-29 PROCEDURE — 700111 HCHG RX REV CODE 636 W/ 250 OVERRIDE (IP): Performed by: INTERNAL MEDICINE

## 2023-01-29 PROCEDURE — 36415 COLL VENOUS BLD VENIPUNCTURE: CPT

## 2023-01-29 PROCEDURE — 83690 ASSAY OF LIPASE: CPT

## 2023-01-29 PROCEDURE — 99239 HOSP IP/OBS DSCHRG MGMT >30: CPT | Performed by: INTERNAL MEDICINE

## 2023-01-29 PROCEDURE — 700105 HCHG RX REV CODE 258: Performed by: INTERNAL MEDICINE

## 2023-01-29 RX ORDER — PROCHLORPERAZINE MALEATE 5 MG/1
5 TABLET ORAL EVERY 8 HOURS PRN
Qty: 30 TABLET | Refills: 0 | Status: SHIPPED | OUTPATIENT
Start: 2023-01-29 | End: 2023-02-08

## 2023-01-29 RX ORDER — TRAMADOL HYDROCHLORIDE 50 MG/1
50-100 TABLET ORAL EVERY 4 HOURS PRN
Qty: 150 TABLET | Refills: 0 | Status: SHIPPED | OUTPATIENT
Start: 2023-01-29 | End: 2023-02-08

## 2023-01-29 RX ORDER — MAGNESIUM SULFATE HEPTAHYDRATE 40 MG/ML
2 INJECTION, SOLUTION INTRAVENOUS ONCE
Status: COMPLETED | OUTPATIENT
Start: 2023-01-29 | End: 2023-01-29

## 2023-01-29 RX ADMIN — SENNOSIDES AND DOCUSATE SODIUM 2 TABLET: 50; 8.6 TABLET ORAL at 05:45

## 2023-01-29 RX ADMIN — TRAMADOL HYDROCHLORIDE 100 MG: 50 TABLET, COATED ORAL at 05:45

## 2023-01-29 RX ADMIN — MAGNESIUM SULFATE HEPTAHYDRATE 2 G: 40 INJECTION, SOLUTION INTRAVENOUS at 09:44

## 2023-01-29 RX ADMIN — GABAPENTIN 100 MG: 100 CAPSULE ORAL at 05:45

## 2023-01-29 RX ADMIN — TRAMADOL HYDROCHLORIDE 50 MG: 50 TABLET, COATED ORAL at 12:14

## 2023-01-29 RX ADMIN — PANCRELIPASE 6000 UNITS: 30000; 6000; 19000 CAPSULE, DELAYED RELEASE PELLETS ORAL at 12:14

## 2023-01-29 RX ADMIN — PANCRELIPASE 6000 UNITS: 30000; 6000; 19000 CAPSULE, DELAYED RELEASE PELLETS ORAL at 07:42

## 2023-01-29 RX ADMIN — HYDROMORPHONE HYDROCHLORIDE 0.5 MG: 1 INJECTION, SOLUTION INTRAMUSCULAR; INTRAVENOUS; SUBCUTANEOUS at 07:43

## 2023-01-29 RX ADMIN — HYDROMORPHONE HYDROCHLORIDE 0.5 MG: 1 INJECTION, SOLUTION INTRAMUSCULAR; INTRAVENOUS; SUBCUTANEOUS at 14:13

## 2023-01-29 RX ADMIN — SODIUM CHLORIDE: 9 INJECTION, SOLUTION INTRAVENOUS at 05:48

## 2023-01-29 RX ADMIN — DIVALPROEX SODIUM 250 MG: 250 TABLET, DELAYED RELEASE ORAL at 05:45

## 2023-01-29 RX ADMIN — HYDROMORPHONE HYDROCHLORIDE 0.5 MG: 1 INJECTION, SOLUTION INTRAMUSCULAR; INTRAVENOUS; SUBCUTANEOUS at 00:37

## 2023-01-29 ASSESSMENT — PAIN DESCRIPTION - PAIN TYPE: TYPE: ACUTE PAIN

## 2023-01-29 NOTE — CARE PLAN
The patient is Stable - Low risk of patient condition declining or worsening    Shift Goals  Clinical Goals: Pain will have pain be a 5/10 this shift  Patient Goals: Pt will rest this shift  Family Goals: n/a    Progress made toward(s) clinical / shift goals:        Patient is not progressing towards the following goals:      Problem: Pain - Standard  Goal: Alleviation of pain or a reduction in pain to the patient’s comfort goal  Outcome: Not Progressing

## 2023-01-29 NOTE — PROGRESS NOTES
Received report from night RN. Pt is A&O 4. Pt has complaints of pain and medicated per MAR. Denies  N/V as of assessment. No signs of respiratory distress noted. Educated on pain medication use. Safety precaution in place. All needs met at this time. Hourly rounding in place.

## 2023-01-29 NOTE — CARE PLAN
Problem: Pain - Standard  Goal: Alleviation of pain or a reduction in pain to the patient’s comfort goal  Outcome: Progressing     Problem: Nutrition  Goal: Patient's nutritional and fluid intake will be adequate or improve  Outcome: Progressing     Problem: Psychosocial  Goal: Patient's level of anxiety will decrease  Outcome: Progressing     Problem: Mobility  Goal: Patient's capacity to carry out activities will improve  Outcome: Progressing     Problem: Self Care  Goal: Patient will have the ability to perform ADLs independently or with assistance (bathe, groom, dress, toilet and feed)  Outcome: Progressing   The patient is Stable - Low risk of patient condition declining or worsening    Shift Goals  Clinical Goals: pt pain will be less than a 5/10  Patient Goals: rest comfortably  Family Goals: n/a    Progress made toward(s) clinical / shift goals:  all goals     Patient is not progressing towards the following goals:

## 2023-01-29 NOTE — DISCHARGE SUMMARY
"Discharge Summary    CHIEF COMPLAINT ON ADMISSION  Chief Complaint   Patient presents with    Abdominal Pain     General abdominal burning pain started Friday.  Hx of pancreatitis.  Reports nausea/diarrhea/chills.         Reason for Admission  Abdominal Pain      Admission Date  1/23/2023    CODE STATUS  Full Code    HPI & HOSPITAL COURSE  This is a 37 y.o. female with a History of chronic pain and alcohol abuse, sober for 100 days, history of multiple recurrent episodes of acute pancreatitis here with abdominal pain secondary to acute on chronic pancreatitis, lipase 100.  She started supportive care with IV fluids and antiemetics.  She was very slow to improve.  Clinically she looks better other than appearing somnolent however she continued to complain of 10 out of 10 abdominal pain requiring frequent doses of IV Dilaudid.  Within approximately 24 to 48 hours her vomiting discontinued and she was able to tolerate ginger ale.  Over the next 48 hours her pain medications were slowly weaned and her oxycodone was transitioned to oral tramadol as she is only able to continue this medication for pain at the current rehab facility that she is living in.  During her last 24 hours her pain improved enough where she was able to tolerate broth and soups therefore she was determined stable for discharge.  The chronicity of her illness was emphasized to her and she was provided counseling regarding dealing with this pain on a long-term basis rather than the expectation of a complete \"cure\".  She will be discharged with tramadol to take as needed as well as Compazine to take as needed.  She requested the repeat lipase be drawn prior to discharge so that she is able to trend levels both in the acute flare phase and in the resolution phase.    Therefore, she is discharged in fair and stable condition to home with close outpatient follow-up.    The patient met 2-midnight criteria for an inpatient stay at the time of " discharge.    Discharge Date  1/29/2023    FOLLOW UP ITEMS POST DISCHARGE  Follow-up with GI to establish care    DISCHARGE DIAGNOSES  Principal Problem:    Acute on chronic pancreatitis (HCC) POA: Yes  Active Problems:    Anxiety POA: Yes    Alcohol dependence (HCC) POA: Yes    Chronic pain syndrome POA: Yes    Bipolar affective disorder, current episode hypomanic (HCC) POA: Yes    Seizure disorder (HCC) POA: Yes    Hypocalcemia POA: Unknown    Diarrhea POA: Yes    Portal hypertension (HCC) POA: Unknown  Resolved Problems:    * No resolved hospital problems. *      FOLLOW UP  No future appointments.  No follow-up provider specified.    MEDICATIONS ON DISCHARGE     Medication List        START taking these medications        Instructions   pancrelipase (Lip-Prot-Amyl) 6000-25727 units Cpep  Commonly known as: CREON 6000   Take 1 Capsule by mouth 3 times a day with meals.  Dose: 1 Capsule     prochlorperazine 5 MG Tabs  Commonly known as: COMPAZINE   Take 1 Tablet by mouth every 8 hours as needed for Nausea/Vomiting for up to 10 days.  Dose: 5 mg     traMADol 50 MG Tabs  Commonly known as: Ultram   Take 1-2 Tablets by mouth every four hours as needed for Moderate Pain for up to 14 days.  Dose:  mg            CONTINUE taking these medications        Instructions   divalproex 250 MG Tbec  Commonly known as: DEPAKOTE   Take 1 Tablet by mouth 2 times a day for 120 days.  Dose: 250 mg     gabapentin 100 MG Caps  Commonly known as: NEURONTIN   Take 100-300 mg by mouth 2 times a day. Pt takes 100MG in AM and 300MG in the evening  Dose: 100-300 mg     hydrOXYzine HCl 50 MG Tabs  Commonly known as: ATARAX   Take 50 mg by mouth every evening.  Dose: 50 mg     ondansetron 4 MG Tbdp  Commonly known as: ZOFRAN ODT   Take 1 Tablet by mouth every four hours as needed for Nausea/Vomiting (give PO if no IV route available).  Dose: 4 mg     zonisamide 50 MG capsule  Commonly known as: ZONEGRAN   Take 150 mg by mouth every  evening. 150 mg = 3 capsules  Dose: 150 mg              Allergies  Allergies   Allergen Reactions    Naltrexone Anxiety    Promethazine Hcl Anxiety     Anxiety and makes her feels like skin coming off        DIET  Orders Placed This Encounter   Procedures    Diet Order Diet: Full Liquid     Standing Status:   Standing     Number of Occurrences:   1     Order Specific Question:   Diet:     Answer:   Full Liquid [11]       ACTIVITY  As tolerated.  Weight bearing as tolerated    CONSULTATIONS  None    PROCEDURES  None    LABORATORY  Lab Results   Component Value Date    SODIUM 137 01/28/2023    POTASSIUM 3.6 01/28/2023    CHLORIDE 109 01/28/2023    CO2 20 01/28/2023    GLUCOSE 91 01/28/2023    BUN <2 (L) 01/28/2023    CREATININE 0.48 (L) 01/28/2023    CREATININE 0.7 06/10/2008        Lab Results   Component Value Date    WBC 4.4 (L) 01/26/2023    HEMOGLOBIN 12.5 01/26/2023    HEMATOCRIT 37.8 01/26/2023    PLATELETCT 179 01/26/2023        Total time of the discharge process exceeds 44 minutes.

## 2023-01-30 NOTE — PROGRESS NOTES
Discharge instructions reviewed and signed by the patient. All questions answered at this time. IV was removed. Offered pt to escort or wheel but patient refused instead walked to the ED entrance and wait for the transport from the [facility] rehab transport to pick her up for discharge.

## 2023-02-01 ENCOUNTER — HOSPITAL ENCOUNTER (EMERGENCY)
Facility: MEDICAL CENTER | Age: 38
End: 2023-02-02
Attending: EMERGENCY MEDICINE
Payer: COMMERCIAL

## 2023-02-01 ENCOUNTER — APPOINTMENT (OUTPATIENT)
Dept: RADIOLOGY | Facility: MEDICAL CENTER | Age: 38
End: 2023-02-01
Attending: EMERGENCY MEDICINE
Payer: COMMERCIAL

## 2023-02-01 DIAGNOSIS — R10.9 CHRONIC ABDOMINAL PAIN: ICD-10-CM

## 2023-02-01 DIAGNOSIS — G89.29 CHRONIC ABDOMINAL PAIN: ICD-10-CM

## 2023-02-01 LAB
ALBUMIN SERPL BCP-MCNC: 4.8 G/DL (ref 3.2–4.9)
ALBUMIN/GLOB SERPL: 1.7 G/DL
ALP SERPL-CCNC: 69 U/L (ref 30–99)
ALT SERPL-CCNC: 17 U/L (ref 2–50)
ANION GAP SERPL CALC-SCNC: 12 MMOL/L (ref 7–16)
APPEARANCE UR: CLEAR
AST SERPL-CCNC: 23 U/L (ref 12–45)
BASOPHILS # BLD AUTO: 0.6 % (ref 0–1.8)
BASOPHILS # BLD: 0.04 K/UL (ref 0–0.12)
BILIRUB SERPL-MCNC: 0.5 MG/DL (ref 0.1–1.5)
BILIRUB UR QL STRIP.AUTO: NEGATIVE
BUN SERPL-MCNC: 7 MG/DL (ref 8–22)
CALCIUM ALBUM COR SERPL-MCNC: 8.7 MG/DL (ref 8.5–10.5)
CALCIUM SERPL-MCNC: 9.3 MG/DL (ref 8.4–10.2)
CHLORIDE SERPL-SCNC: 101 MMOL/L (ref 96–112)
CO2 SERPL-SCNC: 21 MMOL/L (ref 20–33)
COLOR UR: YELLOW
CREAT SERPL-MCNC: 0.69 MG/DL (ref 0.5–1.4)
EOSINOPHIL # BLD AUTO: 0.05 K/UL (ref 0–0.51)
EOSINOPHIL NFR BLD: 0.8 % (ref 0–6.9)
ERYTHROCYTE [DISTWIDTH] IN BLOOD BY AUTOMATED COUNT: 43.8 FL (ref 35.9–50)
GFR SERPLBLD CREATININE-BSD FMLA CKD-EPI: 115 ML/MIN/1.73 M 2
GLOBULIN SER CALC-MCNC: 2.8 G/DL (ref 1.9–3.5)
GLUCOSE SERPL-MCNC: 98 MG/DL (ref 65–99)
GLUCOSE UR STRIP.AUTO-MCNC: NEGATIVE MG/DL
HCT VFR BLD AUTO: 44.8 % (ref 37–47)
HGB BLD-MCNC: 14.8 G/DL (ref 12–16)
IMM GRANULOCYTES # BLD AUTO: 0.02 K/UL (ref 0–0.11)
IMM GRANULOCYTES NFR BLD AUTO: 0.3 % (ref 0–0.9)
KETONES UR STRIP.AUTO-MCNC: NEGATIVE MG/DL
LEUKOCYTE ESTERASE UR QL STRIP.AUTO: NEGATIVE
LIPASE SERPL-CCNC: 14 U/L (ref 7–58)
LYMPHOCYTES # BLD AUTO: 2.41 K/UL (ref 1–4.8)
LYMPHOCYTES NFR BLD: 38.3 % (ref 22–41)
MCH RBC QN AUTO: 29.5 PG (ref 27–33)
MCHC RBC AUTO-ENTMCNC: 33 G/DL (ref 33.6–35)
MCV RBC AUTO: 89.2 FL (ref 81.4–97.8)
MICRO URNS: NORMAL
MONOCYTES # BLD AUTO: 0.51 K/UL (ref 0–0.85)
MONOCYTES NFR BLD AUTO: 8.1 % (ref 0–13.4)
NEUTROPHILS # BLD AUTO: 3.27 K/UL (ref 2–7.15)
NEUTROPHILS NFR BLD: 51.9 % (ref 44–72)
NITRITE UR QL STRIP.AUTO: NEGATIVE
NRBC # BLD AUTO: 0 K/UL
NRBC BLD-RTO: 0 /100 WBC
PH UR STRIP.AUTO: 6.5 [PH] (ref 5–8)
PLATELET # BLD AUTO: 262 K/UL (ref 164–446)
PMV BLD AUTO: 10.3 FL (ref 9–12.9)
POTASSIUM SERPL-SCNC: 4.2 MMOL/L (ref 3.6–5.5)
PROT SERPL-MCNC: 7.6 G/DL (ref 6–8.2)
PROT UR QL STRIP: NEGATIVE MG/DL
RBC # BLD AUTO: 5.02 M/UL (ref 4.2–5.4)
RBC UR QL AUTO: NEGATIVE
SODIUM SERPL-SCNC: 134 MMOL/L (ref 135–145)
SP GR UR STRIP.AUTO: 1.01
WBC # BLD AUTO: 6.3 K/UL (ref 4.8–10.8)

## 2023-02-01 PROCEDURE — 85025 COMPLETE CBC W/AUTO DIFF WBC: CPT

## 2023-02-01 PROCEDURE — 36415 COLL VENOUS BLD VENIPUNCTURE: CPT

## 2023-02-01 PROCEDURE — 99285 EMERGENCY DEPT VISIT HI MDM: CPT

## 2023-02-01 PROCEDURE — 96375 TX/PRO/DX INJ NEW DRUG ADDON: CPT

## 2023-02-01 PROCEDURE — 74177 CT ABD & PELVIS W/CONTRAST: CPT

## 2023-02-01 PROCEDURE — 700111 HCHG RX REV CODE 636 W/ 250 OVERRIDE (IP): Performed by: EMERGENCY MEDICINE

## 2023-02-01 PROCEDURE — 80053 COMPREHEN METABOLIC PANEL: CPT

## 2023-02-01 PROCEDURE — 83690 ASSAY OF LIPASE: CPT

## 2023-02-01 PROCEDURE — 81003 URINALYSIS AUTO W/O SCOPE: CPT

## 2023-02-01 PROCEDURE — 96374 THER/PROPH/DIAG INJ IV PUSH: CPT

## 2023-02-01 PROCEDURE — 700105 HCHG RX REV CODE 258: Performed by: EMERGENCY MEDICINE

## 2023-02-01 PROCEDURE — 700117 HCHG RX CONTRAST REV CODE 255: Performed by: EMERGENCY MEDICINE

## 2023-02-01 RX ORDER — ONDANSETRON 2 MG/ML
4 INJECTION INTRAMUSCULAR; INTRAVENOUS ONCE
Status: COMPLETED | OUTPATIENT
Start: 2023-02-01 | End: 2023-02-01

## 2023-02-01 RX ORDER — SODIUM CHLORIDE 9 MG/ML
1000 INJECTION, SOLUTION INTRAVENOUS ONCE
Status: COMPLETED | OUTPATIENT
Start: 2023-02-01 | End: 2023-02-02

## 2023-02-01 RX ORDER — MORPHINE SULFATE 4 MG/ML
4 INJECTION INTRAVENOUS ONCE
Status: COMPLETED | OUTPATIENT
Start: 2023-02-01 | End: 2023-02-01

## 2023-02-01 RX ADMIN — IOHEXOL 100 ML: 350 INJECTION, SOLUTION INTRAVENOUS at 23:13

## 2023-02-01 RX ADMIN — MORPHINE SULFATE 4 MG: 4 INJECTION INTRAVENOUS at 22:22

## 2023-02-01 RX ADMIN — SODIUM CHLORIDE 1000 ML: 9 INJECTION, SOLUTION INTRAVENOUS at 22:34

## 2023-02-01 RX ADMIN — ONDANSETRON 4 MG: 2 INJECTION INTRAMUSCULAR; INTRAVENOUS at 22:21

## 2023-02-01 ASSESSMENT — PAIN DESCRIPTION - PAIN TYPE: TYPE: ACUTE PAIN

## 2023-02-01 ASSESSMENT — FIBROSIS 4 INDEX: FIB4 SCORE: 0.66

## 2023-02-02 VITALS
DIASTOLIC BLOOD PRESSURE: 85 MMHG | OXYGEN SATURATION: 100 % | TEMPERATURE: 98.5 F | BODY MASS INDEX: 21.7 KG/M2 | SYSTOLIC BLOOD PRESSURE: 132 MMHG | HEIGHT: 67 IN | HEART RATE: 100 BPM | WEIGHT: 138.23 LBS | RESPIRATION RATE: 18 BRPM

## 2023-02-02 PROCEDURE — 700102 HCHG RX REV CODE 250 W/ 637 OVERRIDE(OP): Performed by: EMERGENCY MEDICINE

## 2023-02-02 PROCEDURE — 700111 HCHG RX REV CODE 636 W/ 250 OVERRIDE (IP): Performed by: EMERGENCY MEDICINE

## 2023-02-02 PROCEDURE — A9270 NON-COVERED ITEM OR SERVICE: HCPCS | Performed by: EMERGENCY MEDICINE

## 2023-02-02 PROCEDURE — 96375 TX/PRO/DX INJ NEW DRUG ADDON: CPT

## 2023-02-02 RX ORDER — HYDROMORPHONE HYDROCHLORIDE 1 MG/ML
0.5 INJECTION, SOLUTION INTRAMUSCULAR; INTRAVENOUS; SUBCUTANEOUS ONCE
Status: COMPLETED | OUTPATIENT
Start: 2023-02-02 | End: 2023-02-02

## 2023-02-02 RX ORDER — HYDROCODONE BITARTRATE AND ACETAMINOPHEN 5; 325 MG/1; MG/1
1 TABLET ORAL ONCE
Status: COMPLETED | OUTPATIENT
Start: 2023-02-02 | End: 2023-02-02

## 2023-02-02 RX ADMIN — HYDROMORPHONE HYDROCHLORIDE 0.5 MG: 1 INJECTION, SOLUTION INTRAMUSCULAR; INTRAVENOUS; SUBCUTANEOUS at 00:31

## 2023-02-02 RX ADMIN — HYDROCODONE BITARTRATE AND ACETAMINOPHEN 1 TABLET: 5; 325 TABLET ORAL at 02:01

## 2023-02-02 NOTE — ED TRIAGE NOTES
"Chief Complaint   Patient presents with    Abdominal Pain     38 yo female ambulates to triage with reports of flare up of her pancreatitis.  Patient reports discharged from here on Sunday for the same.      /84   Pulse (!) 103   Temp 36.9 °C (98.5 °F) (Temporal)   Resp 18   Ht 1.702 m (5' 7\")   Wt 62.7 kg (138 lb 3.7 oz)   LMP 10/30/2018   SpO2 99%   BMI 21.65 kg/m²    "

## 2023-02-02 NOTE — ED PROVIDER NOTES
ED Provider Note    CHIEF COMPLAINT  Chief Complaint   Patient presents with    Abdominal Pain     36 yo female ambulates to triage with reports of flare up of her pancreatitis.  Patient reports discharged from here on Sunday for the same.         EXTERNAL RECORDS REVIEWED  Inpatient Notes patient has been hospitalized 5 times since mid December 2022, all for chronic pancreatitis.  She was found to have pancreatic duct stones but it was decided against ERCP during her hospitalizations due to lack of inflammation of the pancreas.  She was scheduled to follow-up with GI as outpatient.  She was just discharged from the hospital on January 29 (3 days ago) after a 6-day hospitalization for chronic abdominal pain due to acute on chronic pancreatitis.  She required frequent doses of IV Dilaudid.  She was vomiting during that hospitalization.    Recommendation from GI consultation on January 8, 2023:  87-year-old female with a history of recurrent alcohol-related pancreatitis and chronic pancreatitis who was recently found to have a pancreatic duct stone in the head of her pancreas with some upstream dilation.  EUS without completely obstructive stone, and previously ERCP was not offered due to risk of pancreatitis from the procedure, risk of stone recurrence, and difficulty of ERCP.  Additionally, Dr. Rachel was asked about this patient and he said there was no indications for surgical treatment of this problem.  Recommend repeat imaging to assess for any changes or new developments and further discussion.    From discharge summary on January 16, 2023.  She also has pancreatic duct stones. Has been evaluated by GI, ERCP has not been recommended due to risk of pancreatitis and lack of inflammation noted on imaging.      HPI/ROS  LIMITATION TO HISTORY   Select: : None  OUTSIDE HISTORIAN(S):  None    Rhiannon Marrero is a 37 y.o. female who presents to the ER with recurrent upper abdominal pain with associated nausea  "which began 2 days ago.  The patient was just discharged in the hospital 3 days ago.  She said day after discharge she was feeling well.  However, pain returned.  Is the same pain she always has.  She had pancreatitis multiple times in the past when she was drinking alcohol.  She has been 120 days sober.  However, she had multiple hospitalizations since December 2022 for issues related to chronic pancreatitis.  Patient reports the pain radiates into her back and down into her left mid abdomen.  She had a hysterectomy.  Otherwise no abdominal surgeries.  She has not been vomiting but feels nauseated.  No fevers or chills.  Patient states that she is currently at a rehab facility and she says that anytime she \"does not look good\" the rehab facility tells her she has to come to the hospital because it is a \"liability for them.\"    PAST MEDICAL HISTORY   has a past medical history of Acute pancreatitis without infection or necrosis (07/20/2022), Alcohol dependence ([facility]) (04/13/2017), Bronchitis (08/2012), Cold, Current moderate episode of major depressive disorder without prior episode ([facility]) (01/31/2020), Heart burn, Hernia of unspecified site of abdominal cavity without mention of obstruction or gangrene, High anion gap metabolic acidosis (07/01/2022), Indigestion, Pancreatitis, Pneumonia (2011), and Seizure disorder ([facility]) (05/23/2022).    SURGICAL HISTORY   has a past surgical history that includes other orthopedic surgery; gyn surgery; tonsillectomy (4/11/2013); arthroscopy, knee; hysterectomy robotic xi (10/11/2018); salpingectomy (Bilateral, 10/11/2018); orif, wrist (Right, 4/24/2019); upper gi endoscopy,diagnosis (N/A, 12/23/2022); inject nerv blck,celiac plexus (N/A, 12/23/2022); and egd w/endoscopic ultrasound (N/A, 12/23/2022).    FAMILY HISTORY  Family History   Problem Relation Age of Onset    Psychiatric Illness Mother     Stroke Mother     Arthritis Mother     Heart Disease Maternal Grandfather     Cancer " "Neg Hx     Diabetes Neg Hx     Hypertension Neg Hx        SOCIAL HISTORY  Social History     Tobacco Use    Smoking status: Every Day     Packs/day: 0.75     Years: 10.00     Pack years: 7.50     Types: Cigarettes    Smokeless tobacco: Never   Vaping Use    Vaping Use: Former    Substances: Nicotine   Substance and Sexual Activity    Alcohol use: Not Currently     Comment: 5 shots, binges. Was sober for 34 days until today 10/12    Drug use: Not Currently     Types: Marijuana     Comment: edibles    Sexual activity: Not on file       CURRENT MEDICATIONS  Home Medications       Reviewed by Jenni Jacobsen R.N. (Registered Nurse) on 02/01/23 at 1930  Med List Status: Not Addressed     Medication Last Dose Status   divalproex (DEPAKOTE) 250 MG Tablet Delayed Response  Active   gabapentin (NEURONTIN) 100 MG Cap  Active   hydrOXYzine HCl (ATARAX) 50 MG Tab  Active   ondansetron (ZOFRAN ODT) 4 MG TABLET DISPERSIBLE  Active   pancrelipase, Lip-Prot-Amyl, (CREON 6000) 6000-41914 units Cap DR Particles  Active   prochlorperazine (COMPAZINE) 5 MG Tab  Active   traMADol (ULTRAM) 50 MG Tab  Active   zonisamide (ZONEGRAN) 50 MG capsule  Active                    ALLERGIES  Allergies   Allergen Reactions    Naltrexone Anxiety    Promethazine Hcl Anxiety     Anxiety and makes her feels like skin coming off        PHYSICAL EXAM  VITAL SIGNS: /79   Pulse (!) 103   Temp 36.9 °C (98.5 °F) (Temporal)   Resp 18   Ht 1.702 m (5' 7\")   Wt 62.7 kg (138 lb 3.7 oz)   LMP 10/30/2018   SpO2 99%   BMI 21.65 kg/m²    Constitutional:  Well developed, well nourished; mild acute distress   HENT: Normocephalic, Atraumatic, Bilateral external ears normal, Oropharynx moist, No erythema or exudates in posterior oropharynx.   Eyes: PERRL, EOMI, Conjunctiva normal, No discharge.   Neck: Normal range of motion, supple, nontender  Lymphatic: No lymphadenopathy noted.   Cardiovascular: Normal heart rate, Normal rhythm, No murmurs, rubs or " gallops   Thorax & Lungs: CTA=bilaterally;  No respiratory distress,  No wheezing rales, or rhonchi; No chest tenderness. No crepitus or subQ air  Abdomen: soft, tender midepigastrum with percussion and palpation, no guarding no rebound, no masses, no pulsatile mass, no tenderness, no distention  Skin: Warm, Dry, No erythema, No rash.   Back: No tenderness, No CVA tenderness.   Extremities: 2+ dp and pt pulses bilateral LEs;  Nontender; no pretibial edema  Neurologic: Alert & oriented x 4, clear speech  Psychiatric: appropriate, normal affect     DIAGNOSTIC STUDIES / PROCEDURES      LABS  Results for orders placed or performed during the hospital encounter of 02/01/23   CBC with Differential   Result Value Ref Range    WBC 6.3 4.8 - 10.8 K/uL    RBC 5.02 4.20 - 5.40 M/uL    Hemoglobin 14.8 12.0 - 16.0 g/dL    Hematocrit 44.8 37.0 - 47.0 %    MCV 89.2 81.4 - 97.8 fL    MCH 29.5 27.0 - 33.0 pg    MCHC 33.0 (L) 33.6 - 35.0 g/dL    RDW 43.8 35.9 - 50.0 fL    Platelet Count 262 164 - 446 K/uL    MPV 10.3 9.0 - 12.9 fL    Neutrophils-Polys 51.90 44.00 - 72.00 %    Lymphocytes 38.30 22.00 - 41.00 %    Monocytes 8.10 0.00 - 13.40 %    Eosinophils 0.80 0.00 - 6.90 %    Basophils 0.60 0.00 - 1.80 %    Immature Granulocytes 0.30 0.00 - 0.90 %    Nucleated RBC 0.00 /100 WBC    Neutrophils (Absolute) 3.27 2.00 - 7.15 K/uL    Lymphs (Absolute) 2.41 1.00 - 4.80 K/uL    Monos (Absolute) 0.51 0.00 - 0.85 K/uL    Eos (Absolute) 0.05 0.00 - 0.51 K/uL    Baso (Absolute) 0.04 0.00 - 0.12 K/uL    Immature Granulocytes (abs) 0.02 0.00 - 0.11 K/uL    NRBC (Absolute) 0.00 K/uL   Complete Metabolic Panel   Result Value Ref Range    Sodium 134 (L) 135 - 145 mmol/L    Potassium 4.2 3.6 - 5.5 mmol/L    Chloride 101 96 - 112 mmol/L    Co2 21 20 - 33 mmol/L    Anion Gap 12.0 7.0 - 16.0    Glucose 98 65 - 99 mg/dL    Bun 7 (L) 8 - 22 mg/dL    Creatinine 0.69 0.50 - 1.40 mg/dL    Calcium 9.3 8.4 - 10.2 mg/dL    AST(SGOT) 23 12 - 45 U/L    ALT(SGPT)  17 2 - 50 U/L    Alkaline Phosphatase 69 30 - 99 U/L    Total Bilirubin 0.5 0.1 - 1.5 mg/dL    Albumin 4.8 3.2 - 4.9 g/dL    Total Protein 7.6 6.0 - 8.2 g/dL    Globulin 2.8 1.9 - 3.5 g/dL    A-G Ratio 1.7 g/dL   Lipase   Result Value Ref Range    Lipase 14 7 - 58 U/L   Urinalysis    Specimen: Urine   Result Value Ref Range    Color Yellow     Character Clear     Specific Gravity 1.015 <1.035    Ph 6.5 5.0 - 8.0    Glucose Negative Negative mg/dL    Ketones Negative Negative mg/dL    Protein Negative Negative mg/dL    Bilirubin Negative Negative    Nitrite Negative Negative    Leukocyte Esterase Negative Negative    Occult Blood Negative Negative    Micro Urine Req see below    CORRECTED CALCIUM   Result Value Ref Range    Correct Calcium 8.7 8.5 - 10.5 mg/dL   ESTIMATED GFR   Result Value Ref Range    GFR (CKD-EPI) 115 >60 mL/min/1.73 m 2        RADIOLOGY  I have independently interpreted the diagnostic imaging associated with this visit and am waiting the final reading from the radiologist.     My preliminary interpretation is a follows: My review of the CT scan does not reveal any obvious obstruction.    Radiologist interpretation:   CT-ABDOMEN-PELVIS WITH   Final Result         1.  Calcified stone in the pancreatic head with ductal dilatation, appearance suggesting choledocholithiasis with pancreatic ductal dilatation. Overall appears similar compared to prior study   2.  Hepatomegaly           COURSE & MEDICAL DECISION MAKING    ED Observation Status? Yes; I am placing the patient in to an observation status due to a diagnostic uncertainty as well as therapeutic intensity. Patient placed in observation status at 10:36 PM, 2/1/2023.     Observation plan is as follows: Patient placed in ED observation status for diagnostic uncertainty due to her abdominal pain.  She was being worked up for acute pancreatitis and extensive work-up was needed to determine if patient was to be hospitalized or discharge.  She also  needed pain control.    Upon Reevaluation, the patient's condition has: Improved; and will be discharged.    Patient discharged from ED Observation status at 0150 (Time) February 2, 2023 (Date).     INITIAL ASSESSMENT, COURSE AND PLAN  Care Narrative: Patient presents to the ER complaining of recurrent upper abdominal pain which she says feels identical to her pancreatitis pain.  Patient initially developed pancreatitis when she was drinking alcohol heavily.  She has been sober for 120 days.  Over the last several months she has been in and out of the hospital, admitted for acute on chronic pancreatitis.  She also has some pancreatic duct stones which were evaluated by GI on several occasions.  At this time they do not feel there is anything to be done about them and that she does not need ERCP.  It was decided that she could follow-up outpatient with GI.  She had an appointment a couple days ago, but she missed it due to being in house here at [facility] for the episode of recurrent pancreatitis with nausea and vomiting.  She is not vomiting today.  No fevers or chills.  Her pain is the same pain that she has had many times in the past.  Nothing new or different.  She is concerned she had a flareup of her pancreatitis.  Her lipase today is 14.  I really do not think she has burned out her pancreas at this point because she has had elevated lipase is in the 102 100 range over the last 1 month.  With her lipase being normal today, I do not think she has an acute on chronic pancreatitis.  CT scan reveals similar appearance to the pancreatic ductal dilatation and stones which were seen on previous imaging studies.  Nothing new or different.  There is no signs of obstruction on her lab work.  At this time everything seems stable.  She is to follow-up outpatient with GI.  I spoke with Dr. Mattson, hospitalist on-call.  He knows this patient well as he is admitted her several times in the past.  He is reviewed her labs  and CT scan and he does not think he needs to be hospitalized and he does not think she has an acute on chronic pancreatitis either given her normal lipase today.  Patient is not septic or toxic.  She is not having fever.  Her vital signs are normal and stable.  White count is normal.  No signs of infection.  No signs of other dangerous intra-abdominal pathology.  At this time patient is safe and stable for outpatient management discharge home.  She is feeling better with the pain medicine.  She is tolerating p.o. fluids.  She has been instructed to follow-up with her primary care physician as well as with GI.  She has been given strict return precautions and discharge instructions and she understands treatment plan and follow-up.    HYDRATION: Based on the patient's presentation of Other pancreatitis the patient was given IV fluids. IV Hydration was used because oral hydration was not as rapid as required. Upon recheck following hydration, the patient was improved.      ADDITIONAL PROBLEM LIST  Problem #1: Worsening chronic upper abdominal pain/pancreas pain    DISPOSITION AND DISCUSSIONS  I have discussed management of the patient with the following physicians and BILL's: Dr. Mattson, hospitalist on-call.  He knows patient well.  He has reviewed patient's labs.  We have discussed patient's imaging.  He says there is no reason to hospitalize this patient at this time.  There is nothing there can I do about the pancreatic duct stones at this time.  She has been evaluated by GI and they have decided not to do ERCP due to risk of pancreatitis and lack of inflammatory changes.  Additionally, lipase is normal today when it has been elevated before when she is actually had acute on chronic pancreatitis.  He feels patient can go home and does not need to be hospitalized.  There is nothing else he would do in the hospital.    Discussion of management with other [facility] or appropriate source(s): None     Escalation of care  considered, and ultimately not performed:acute inpatient care management, however at this time, the patient is most appropriate for outpatient management    Barriers to care at this time, including but not limited to:  Patient is currently in rehab .     Decision tools and prescription drugs considered including, but not limited to: Antibiotics no infection identified and therefore no antibiotics needed. .    I verified that the patient was wearing a mask and I was wearing appropriate PPE every time I entered the room. The patient's mask was on the patient at all times during my encounter except for a brief view of the oropharynx.     FINAL DIAGNOSIS  1. Chronic abdominal pain Acute        This dictation has been created using voice recognition software. The accuracy of the dictation is limited by the abilities of the software. I expect there may be some errors of grammar and possibly content. I made every attempt to manually correct the errors within my dictation. However, errors related to voice recognition software may still exist and should be interpreted within the appropriate context.     Electronically signed by: Elvira Leos M.D., 2/1/2023 8:52 PM

## 2023-02-02 NOTE — DISCHARGE INSTRUCTIONS
Follow-up with gastroenterology as instructed during her previous hospitalizations.  Since you missed your last appointment and were not rescheduled until April, please call the gastroenterologist office periodically to see if they have any cancellations.  Perhaps you can get an earlier.    Follow-up with your primary care doctor tomorrow.  Please call for appointment.      Return to the ER for any worsening abdominal pain, changing abdominal pain, fevers over 100.4, shaking chills, nausea, vomiting, worsening diarrhea, blood in stool, or for any concerns.    Drink plenty of fluids to stay well-hydrated.    Patient is medically cleared to go back to rehab.

## 2023-02-05 ENCOUNTER — HOSPITAL ENCOUNTER (EMERGENCY)
Facility: MEDICAL CENTER | Age: 38
End: 2023-02-05
Attending: EMERGENCY MEDICINE
Payer: COMMERCIAL

## 2023-02-05 VITALS
DIASTOLIC BLOOD PRESSURE: 77 MMHG | OXYGEN SATURATION: 96 % | HEIGHT: 67 IN | WEIGHT: 135.14 LBS | BODY MASS INDEX: 21.21 KG/M2 | HEART RATE: 76 BPM | RESPIRATION RATE: 15 BRPM | SYSTOLIC BLOOD PRESSURE: 128 MMHG | TEMPERATURE: 98.2 F

## 2023-02-05 DIAGNOSIS — R10.13 ACUTE EPIGASTRIC PAIN: ICD-10-CM

## 2023-02-05 DIAGNOSIS — R19.7 ACUTE DIARRHEA: ICD-10-CM

## 2023-02-05 LAB
ALBUMIN SERPL BCP-MCNC: 4.3 G/DL (ref 3.2–4.9)
ALBUMIN/GLOB SERPL: 1.5 G/DL
ALP SERPL-CCNC: 60 U/L (ref 30–99)
ALT SERPL-CCNC: 22 U/L (ref 2–50)
ANION GAP SERPL CALC-SCNC: 15 MMOL/L (ref 7–16)
AST SERPL-CCNC: 19 U/L (ref 12–45)
BASOPHILS # BLD AUTO: 0.5 % (ref 0–1.8)
BASOPHILS # BLD: 0.06 K/UL (ref 0–0.12)
BILIRUB SERPL-MCNC: 0.4 MG/DL (ref 0.1–1.5)
BUN SERPL-MCNC: 5 MG/DL (ref 8–22)
CALCIUM ALBUM COR SERPL-MCNC: 8.4 MG/DL (ref 8.5–10.5)
CALCIUM SERPL-MCNC: 8.6 MG/DL (ref 8.4–10.2)
CHLORIDE SERPL-SCNC: 102 MMOL/L (ref 96–112)
CO2 SERPL-SCNC: 21 MMOL/L (ref 20–33)
CREAT SERPL-MCNC: 0.72 MG/DL (ref 0.5–1.4)
EKG IMPRESSION: NORMAL
EOSINOPHIL # BLD AUTO: 0.04 K/UL (ref 0–0.51)
EOSINOPHIL NFR BLD: 0.4 % (ref 0–6.9)
ERYTHROCYTE [DISTWIDTH] IN BLOOD BY AUTOMATED COUNT: 42.2 FL (ref 35.9–50)
ETHANOL BLD-MCNC: <10.1 MG/DL
GFR SERPLBLD CREATININE-BSD FMLA CKD-EPI: 111 ML/MIN/1.73 M 2
GLOBULIN SER CALC-MCNC: 2.8 G/DL (ref 1.9–3.5)
GLUCOSE SERPL-MCNC: 102 MG/DL (ref 65–99)
HCG SERPL QL: NEGATIVE
HCT VFR BLD AUTO: 45.3 % (ref 37–47)
HGB BLD-MCNC: 15.5 G/DL (ref 12–16)
IMM GRANULOCYTES # BLD AUTO: 0.04 K/UL (ref 0–0.11)
IMM GRANULOCYTES NFR BLD AUTO: 0.4 % (ref 0–0.9)
LACTATE SERPL-SCNC: 2 MMOL/L (ref 0.5–2)
LIPASE SERPL-CCNC: 48 U/L (ref 7–58)
LYMPHOCYTES # BLD AUTO: 3 K/UL (ref 1–4.8)
LYMPHOCYTES NFR BLD: 27 % (ref 22–41)
MCH RBC QN AUTO: 29.8 PG (ref 27–33)
MCHC RBC AUTO-ENTMCNC: 34.2 G/DL (ref 33.6–35)
MCV RBC AUTO: 86.9 FL (ref 81.4–97.8)
MONOCYTES # BLD AUTO: 0.62 K/UL (ref 0–0.85)
MONOCYTES NFR BLD AUTO: 5.6 % (ref 0–13.4)
NEUTROPHILS # BLD AUTO: 7.37 K/UL (ref 2–7.15)
NEUTROPHILS NFR BLD: 66.1 % (ref 44–72)
NRBC # BLD AUTO: 0 K/UL
NRBC BLD-RTO: 0 /100 WBC
PLATELET # BLD AUTO: 281 K/UL (ref 164–446)
PMV BLD AUTO: 9.9 FL (ref 9–12.9)
POTASSIUM SERPL-SCNC: 3.2 MMOL/L (ref 3.6–5.5)
PROT SERPL-MCNC: 7.1 G/DL (ref 6–8.2)
RBC # BLD AUTO: 5.21 M/UL (ref 4.2–5.4)
SODIUM SERPL-SCNC: 138 MMOL/L (ref 135–145)
TROPONIN T SERPL-MCNC: <6 NG/L (ref 6–19)
WBC # BLD AUTO: 11.1 K/UL (ref 4.8–10.8)

## 2023-02-05 PROCEDURE — 93005 ELECTROCARDIOGRAM TRACING: CPT

## 2023-02-05 PROCEDURE — 83605 ASSAY OF LACTIC ACID: CPT

## 2023-02-05 PROCEDURE — 36415 COLL VENOUS BLD VENIPUNCTURE: CPT

## 2023-02-05 PROCEDURE — 96375 TX/PRO/DX INJ NEW DRUG ADDON: CPT

## 2023-02-05 PROCEDURE — 99285 EMERGENCY DEPT VISIT HI MDM: CPT

## 2023-02-05 PROCEDURE — 82077 ASSAY SPEC XCP UR&BREATH IA: CPT

## 2023-02-05 PROCEDURE — 96374 THER/PROPH/DIAG INJ IV PUSH: CPT

## 2023-02-05 PROCEDURE — 85025 COMPLETE CBC W/AUTO DIFF WBC: CPT

## 2023-02-05 PROCEDURE — 84703 CHORIONIC GONADOTROPIN ASSAY: CPT

## 2023-02-05 PROCEDURE — 83690 ASSAY OF LIPASE: CPT

## 2023-02-05 PROCEDURE — 80053 COMPREHEN METABOLIC PANEL: CPT

## 2023-02-05 PROCEDURE — 700102 HCHG RX REV CODE 250 W/ 637 OVERRIDE(OP): Performed by: EMERGENCY MEDICINE

## 2023-02-05 PROCEDURE — 700111 HCHG RX REV CODE 636 W/ 250 OVERRIDE (IP): Performed by: EMERGENCY MEDICINE

## 2023-02-05 PROCEDURE — 700105 HCHG RX REV CODE 258: Performed by: EMERGENCY MEDICINE

## 2023-02-05 PROCEDURE — 93005 ELECTROCARDIOGRAM TRACING: CPT | Performed by: EMERGENCY MEDICINE

## 2023-02-05 PROCEDURE — A9270 NON-COVERED ITEM OR SERVICE: HCPCS | Performed by: EMERGENCY MEDICINE

## 2023-02-05 PROCEDURE — 84484 ASSAY OF TROPONIN QUANT: CPT

## 2023-02-05 PROCEDURE — 96376 TX/PRO/DX INJ SAME DRUG ADON: CPT

## 2023-02-05 RX ORDER — ONDANSETRON 2 MG/ML
4 INJECTION INTRAMUSCULAR; INTRAVENOUS ONCE
Status: COMPLETED | OUTPATIENT
Start: 2023-02-05 | End: 2023-02-05

## 2023-02-05 RX ORDER — HYDROCODONE BITARTRATE AND ACETAMINOPHEN 5; 325 MG/1; MG/1
1 TABLET ORAL EVERY 4 HOURS PRN
Qty: 10 TABLET | Refills: 0 | Status: SHIPPED | OUTPATIENT
Start: 2023-02-05 | End: 2023-02-10

## 2023-02-05 RX ORDER — OMEPRAZOLE 20 MG/1
20 CAPSULE, DELAYED RELEASE ORAL DAILY
Qty: 30 CAPSULE | Refills: 0 | Status: SHIPPED | OUTPATIENT
Start: 2023-02-05 | End: 2023-03-13

## 2023-02-05 RX ORDER — MORPHINE SULFATE 4 MG/ML
4 INJECTION INTRAVENOUS ONCE
Status: COMPLETED | OUTPATIENT
Start: 2023-02-05 | End: 2023-02-05

## 2023-02-05 RX ORDER — SODIUM CHLORIDE, SODIUM LACTATE, POTASSIUM CHLORIDE, CALCIUM CHLORIDE 600; 310; 30; 20 MG/100ML; MG/100ML; MG/100ML; MG/100ML
1000 INJECTION, SOLUTION INTRAVENOUS ONCE
Status: COMPLETED | OUTPATIENT
Start: 2023-02-05 | End: 2023-02-05

## 2023-02-05 RX ORDER — POTASSIUM CHLORIDE 20 MEQ/1
40 TABLET, EXTENDED RELEASE ORAL ONCE
Status: COMPLETED | OUTPATIENT
Start: 2023-02-05 | End: 2023-02-05

## 2023-02-05 RX ADMIN — ONDANSETRON 4 MG: 2 INJECTION INTRAMUSCULAR; INTRAVENOUS at 20:24

## 2023-02-05 RX ADMIN — SODIUM CHLORIDE, POTASSIUM CHLORIDE, SODIUM LACTATE AND CALCIUM CHLORIDE 1000 ML: 600; 310; 30; 20 INJECTION, SOLUTION INTRAVENOUS at 20:24

## 2023-02-05 RX ADMIN — POTASSIUM CHLORIDE 40 MEQ: 20 TABLET, EXTENDED RELEASE ORAL at 21:17

## 2023-02-05 RX ADMIN — MORPHINE SULFATE 4 MG: 4 INJECTION, SOLUTION INTRAMUSCULAR; INTRAVENOUS at 21:17

## 2023-02-05 RX ADMIN — MORPHINE SULFATE 6 MG: 10 INJECTION INTRAVENOUS at 20:24

## 2023-02-05 RX ADMIN — FAMOTIDINE 20 MG: 10 INJECTION, SOLUTION INTRAVENOUS at 20:23

## 2023-02-05 ASSESSMENT — LIFESTYLE VARIABLES
HAVE PEOPLE ANNOYED YOU BY CRITICIZING YOUR DRINKING: NO
TOTAL SCORE: 0
CONSUMPTION TOTAL: INCOMPLETE
EVER FELT BAD OR GUILTY ABOUT YOUR DRINKING: NO
HAVE YOU EVER FELT YOU SHOULD CUT DOWN ON YOUR DRINKING: NO
TOTAL SCORE: 0
EVER HAD A DRINK FIRST THING IN THE MORNING TO STEADY YOUR NERVES TO GET RID OF A HANGOVER: NO
TOTAL SCORE: 0
DO YOU DRINK ALCOHOL: NO

## 2023-02-05 ASSESSMENT — FIBROSIS 4 INDEX: FIB4 SCORE: 0.75

## 2023-02-06 NOTE — ED NOTES
Discharge instructions reviewed thoroughly with patient. Pt provided opportunity to ask questions regarding DC instructions and information given appropriately. Pt verbalized understanding of discharge instructions. Pt ambulatory with steady gait. VINOD. NAD.

## 2023-02-06 NOTE — ED PROVIDER NOTES
"ED Provider Note    CHIEF COMPLAINT  Chief Complaint   Patient presents with    Abdominal Pain     Seen recently \"for chonic pancreatitis\", reports epigastric pain continues with diarrhea. Denies vomiting.    Chest Pain     Reports \"squeezing\" sensation in chest       EXTERNAL RECORDS REVIEWED  Outpatient Notes reviewed emergency department note dated February 1, 2023 by Dr. Elvira Leos: patient found to have reassuring metabolic work-up as well as CT imaging and lipase, plan was for outpatient management.    HPI/ROS  LIMITATION TO HISTORY   Select: : None  OUTSIDE HISTORIAN(S):  None available    Rhiannon Marrero is a 37 y.o. female who presents for evaluation of acute epigastric abdominal pain recurring about 7 days ago.  Patient has history of chronic pancreatitis, seen multiple times at this facility and often required admission.  Patient was seen at this facility on the first as noted above, thorough work-up by my partner obtained, CT imaging obtained, patient has been feeling better and CT and lipase level were not consistent with acute pancreatitis and she was discharged home.  She notes persistent symptoms, she is nauseous but has not been vomiting, rather she relates persistent loose diarrheal stools.  She has been taking her Creon as directed.  No blood in the stool.  No fever.  Pain is localized to the epigastrium, severe, with radiation to the back and with associated \"squeezing\" sensation to the chest.  No dyspnea no exertional component.  Given the persistent and recurrent pain she came to be reassessed.    PAST MEDICAL HISTORY   has a past medical history of Acute pancreatitis without infection or necrosis (07/20/2022), Alcohol dependence ([facility]) (04/13/2017), Bronchitis (08/2012), Cold, Current moderate episode of major depressive disorder without prior episode ([facility]) (01/31/2020), Heart burn, Hernia of unspecified site of abdominal cavity without mention of obstruction or gangrene, High anion gap " metabolic acidosis (07/01/2022), Indigestion, Pancreatitis, Pneumonia (2011), and Seizure disorder ([facility]) (05/23/2022).    SURGICAL HISTORY   has a past surgical history that includes other orthopedic surgery; gyn surgery; tonsillectomy (4/11/2013); arthroscopy, knee; hysterectomy robotic xi (10/11/2018); salpingectomy (Bilateral, 10/11/2018); orif, wrist (Right, 4/24/2019); upper gi endoscopy,diagnosis (N/A, 12/23/2022); inject nerv blck,celiac plexus (N/A, 12/23/2022); and egd w/endoscopic ultrasound (N/A, 12/23/2022).    FAMILY HISTORY  Family History   Problem Relation Age of Onset    Psychiatric Illness Mother     Stroke Mother     Arthritis Mother     Heart Disease Maternal Grandfather     Cancer Neg Hx     Diabetes Neg Hx     Hypertension Neg Hx    Heart disease in sister and grandfather    SOCIAL HISTORY  Social History     Tobacco Use    Smoking status: Every Day     Packs/day: 0.75     Years: 10.00     Pack years: 7.50     Types: Cigarettes    Smokeless tobacco: Never   Vaping Use    Vaping Use: Former    Substances: Nicotine   Substance and Sexual Activity    Alcohol use: Not Currently     Comment: 5 shots, binges. Was sober for 34 days until today 10/12    Drug use: Not Currently     Types: Marijuana     Comment: edibles    Sexual activity: Not on file       CURRENT MEDICATIONS  Home Medications       Reviewed by Sadie Fairbanks R.N. (Registered Nurse) on 02/05/23 at 1942  Med List Status: Not Addressed     Medication Last Dose Status   divalproex (DEPAKOTE) 250 MG Tablet Delayed Response  Active   gabapentin (NEURONTIN) 100 MG Cap  Active   hydrOXYzine HCl (ATARAX) 50 MG Tab  Active   ondansetron (ZOFRAN ODT) 4 MG TABLET DISPERSIBLE  Active   pancrelipase, Lip-Prot-Amyl, (CREON 6000) 6000-36775 units Cap DR Particles  Active   prochlorperazine (COMPAZINE) 5 MG Tab  Active   traMADol (ULTRAM) 50 MG Tab  Active   zonisamide (ZONEGRAN) 50 MG capsule  Active                    ALLERGIES  Allergies  "  Allergen Reactions    Naltrexone Anxiety    Promethazine Hcl Anxiety     Anxiety and makes her feels like skin coming off        PHYSICAL EXAM  VITAL SIGNS: /77   Pulse 76   Temp 36.8 °C (98.2 °F) (Temporal)   Resp 15   Ht 1.702 m (5' 7\")   Wt 61.3 kg (135 lb 2.3 oz)   LMP 10/30/2018   SpO2 96%   BMI 21.17 kg/m²    General: Alert, mild acute distress, appears uncomfortable  Skin: Warm, dry, normal for ethnicity  Head: Normocephalic, atraumatic  Neck: Trachea midline, no tenderness  Eye: Sclera are anicteric, normal conjunctiva  ENMT: Oral mucosa pink and dry  Cardiovascular: S1, S2, moderately tachycardic, otherwise regular rate and rhythm, No murmur, Normal peripheral perfusion  Respiratory: Lungs CTA, respirations are non-labored, breath sounds are equal  Gastrointestinal: Soft, nondistended, bowel sounds are hyperactive.  Focal tenderness to the epigastrium, no lower quadrant tenderness, no guarding, no rebound, no rigidity.  Musculoskeletal: No swelling, no deformity, no CVA tenderness.  Neurological: Alert and oriented to person, place, time, and situation  Lymphatics: No lymphadenopathy  Psychiatric: Cooperative, anxious, otherwise appropriate mood & affect     DIAGNOSTIC STUDIES / PROCEDURES  EKG  I have independently interpreted this EKG  EKG Interpretation    Interpreted by emergency department physician    Rhythm: sinus tachycardia  Rate: 114  Axis: normal  Ectopy: none  Conduction: normal  ST Segments: no acute change  T Waves: no acute change  Q Waves: none    Clinical Impression: no acute changes     LABS  Results for orders placed or performed during the hospital encounter of 02/05/23   CBC WITH DIFFERENTIAL   Result Value Ref Range    WBC 11.1 (H) 4.8 - 10.8 K/uL    RBC 5.21 4.20 - 5.40 M/uL    Hemoglobin 15.5 12.0 - 16.0 g/dL    Hematocrit 45.3 37.0 - 47.0 %    MCV 86.9 81.4 - 97.8 fL    MCH 29.8 27.0 - 33.0 pg    MCHC 34.2 33.6 - 35.0 g/dL    RDW 42.2 35.9 - 50.0 fL    Platelet Count " 281 164 - 446 K/uL    MPV 9.9 9.0 - 12.9 fL    Neutrophils-Polys 66.10 44.00 - 72.00 %    Lymphocytes 27.00 22.00 - 41.00 %    Monocytes 5.60 0.00 - 13.40 %    Eosinophils 0.40 0.00 - 6.90 %    Basophils 0.50 0.00 - 1.80 %    Immature Granulocytes 0.40 0.00 - 0.90 %    Nucleated RBC 0.00 /100 WBC    Neutrophils (Absolute) 7.37 (H) 2.00 - 7.15 K/uL    Lymphs (Absolute) 3.00 1.00 - 4.80 K/uL    Monos (Absolute) 0.62 0.00 - 0.85 K/uL    Eos (Absolute) 0.04 0.00 - 0.51 K/uL    Baso (Absolute) 0.06 0.00 - 0.12 K/uL    Immature Granulocytes (abs) 0.04 0.00 - 0.11 K/uL    NRBC (Absolute) 0.00 K/uL   COMP METABOLIC PANEL   Result Value Ref Range    Sodium 138 135 - 145 mmol/L    Potassium 3.2 (L) 3.6 - 5.5 mmol/L    Chloride 102 96 - 112 mmol/L    Co2 21 20 - 33 mmol/L    Anion Gap 15.0 7.0 - 16.0    Glucose 102 (H) 65 - 99 mg/dL    Bun 5 (L) 8 - 22 mg/dL    Creatinine 0.72 0.50 - 1.40 mg/dL    Calcium 8.6 8.4 - 10.2 mg/dL    AST(SGOT) 19 12 - 45 U/L    ALT(SGPT) 22 2 - 50 U/L    Alkaline Phosphatase 60 30 - 99 U/L    Total Bilirubin 0.4 0.1 - 1.5 mg/dL    Albumin 4.3 3.2 - 4.9 g/dL    Total Protein 7.1 6.0 - 8.2 g/dL    Globulin 2.8 1.9 - 3.5 g/dL    A-G Ratio 1.5 g/dL   LIPASE   Result Value Ref Range    Lipase 48 7 - 58 U/L   LACTIC ACID   Result Value Ref Range    Lactic Acid 2.0 0.5 - 2.0 mmol/L   TROPONIN   Result Value Ref Range    Troponin T <6 6 - 19 ng/L   HCG QUAL SERUM   Result Value Ref Range    Beta-Hcg Qualitative Serum Negative Negative   ETHYL ALCOHOL (BLOOD)   Result Value Ref Range    Diagnostic Alcohol <10.1 <10.1 mg/dL   CORRECTED CALCIUM   Result Value Ref Range    Correct Calcium 8.4 (L) 8.5 - 10.5 mg/dL   ESTIMATED GFR   Result Value Ref Range    GFR (CKD-EPI) 111 >60 mL/min/1.73 m 2   EKG   Result Value Ref Range    Report       [facility] Emergency Dept.    Test Date:  2023-02-05  Pt Name:    JAKE LOPEZ                    Department: EDS  MRN:        [id number]                       Room:  Gender:     Female                       Technician: [id number]  :        1985                   Requested By:ER TRIAGE PROTOCOL  Order #:    [id number]                    Reading MD:    Measurements  Intervals                                Axis  Rate:       114                          P:          41  ME:         168                          QRS:        85  QRSD:       82                           T:          8  QT:         340  QTc:        469    Interpretive Statements  SINUS TACHYCARDIA  PROBABLE LEFT ATRIAL ABNORMALITY  Compared to ECG 2023 18:53:40  Sinus rhythm no longer present     Mild leukocytosis without left shift or bandemia, likely hemoconcentration.  Hypokalemia, otherwise unremarkable      COURSE & MEDICAL DECISION MAKING    ED Observation Status? Yes; I am placing the patient in to an observation status due to a diagnostic uncertainty as well as therapeutic intensity. Patient placed in observation status at 7:59 PM, 2023.     Observation plan is as follows: Patient medicated with morphine 6 mg IV, Zofran 4 mg IV, famotidine 20 mg IV, 1 L of lactated Ringer's.  Metabolic work-up including lipase will be obtained.  EKG and troponin also ordered.  Differential at this time includes but is not limited to acute on chronic pancreatitis, gastritis, electrolyte aberration, gastroenteritis, dehydration    : Patient reassessed, pain improved somewhat.  I have ordered potassium 40 mill equivalents p.o. given her hypokalemia.  I have updated her with reassuring work-up thus far.     have ordered additional analgesia, morphine 4 mg IV.    : Tachycardia resolved with IV fluids, her heart rate is 76.    Upon Reevaluation, the patient's condition has: Improved; and will be discharged.    Patient discharged from ED Observation status at 2206 (Time) 23 (Date).     Patient Vitals for the past 24 hrs:   BP Temp Temp src Pulse Resp SpO2 Height Weight   23 2157 128/77 36.8  "°C (98.2 °F) Temporal 76 15 96 % -- --   02/05/23 1939 123/79 37 °C (98.6 °F) Temporal (!) 133 18 97 % 1.702 m (5' 7\") 61.3 kg (135 lb 2.3 oz)        INITIAL ASSESSMENT, COURSE AND PLAN  Care Narrative: This patient is a very pleasant 37-year-old female with history of chronic pancreatitis who presents for recurrence of epigastric pain with acute diarrheal stools.  She is indeed volume depleted, tachycardic, likely secondary to her diarrhea.  IV fluids clearly indicated and ordered.  She has no peritoneal signs, no fever, she has had numerous CT scans of the abdomen and pelvis, and her lipase is unremarkable today.  Risk of advanced imaging outweigh any benefit.  I suspect likely mild pancreatitis and gastritis.  I will write her for pancreatitis eating plan, recommend liquid diet for the next 24 hours.  Appropriate analgesia initiated.  She already has Compazine and Zofran for her nausea at home.  HYDRATION: Based on the patient's presentation of Acute Diarrhea and Dehydration the patient was given IV fluids. IV Hydration was used because oral hydration was not adequate alone. Upon recheck following hydration, the patient was doing better, skin tone is improving with IV fluids.      ADDITIONAL PROBLEM LIST    Epigastric abdominal pain  Acute diarrhea    DISPOSITION AND DISCUSSIONS  I have discussed management of the patient with the following physicians and BILL's:  NA    Discussion of management with other QHP or appropriate source(s): None     Escalation of care considered, and ultimately not performed:diagnostic imaging    Barriers to care at this time, including but not limited to:  NA .     Decision tools and prescription drugs considered including, but not limited to: HEART Score 0 .   aware reviewed, no contraindication to control substances    I reviewed prescription monitoring program for patient's narcotic use before prescribing a scheduled drug.The patient will not drink alcohol nor drive with " prescribed medications      In prescribing controlled substances to this patient, I certify that I have obtained and reviewed the medical history this patient I have also made a good nick effort to obtain applicable records from other providers who have treated the patient and records did not demonstrate any increased risk of substance abuse that would prevent me from prescribing controlled substances.     I have conducted a physical exam and documented it. I have reviewed Ms. Marrero’s prescription history as maintained by the [place] Prescription Monitoring Program.     I have assessed the patient’s risk for abuse, dependency, and addiction using the validated Opioid Risk Tool available at https://www.mdcalc.com/rhddot-yqwi-qesb-ort-narcotic-abuse.     Given the above, I believe the benefits of controlled substance therapy outweigh the risks. The reasons for prescribing controlled substances include in my professional opinion, controlled substances are a reasonable choice for this patient. Accordingly, I have discussed the risk and benefits, treatment plan, and alternative therapies with the patient. The patient has been consented for the medication and understands the risks.     I reviewed prescription monitoring program for patient's narcotic use before prescribing a scheduled drug.The patient will not drink alcohol nor drive with prescribed medications. The patient will return for new or worsening symptoms and is stable at the time of discharge.    Patient has had high blood pressure while in the emergency department, felt likely secondary to medical condition. Counseled patient to monitor blood pressure at home and follow up with primary care physician.      DISPOSITION:  Patient will be discharged home in stable condition.    FOLLOW UP:  Ivy Adams M.D.  [address] [facility] [address]  [phone]    Schedule an appointment as soon as possible for a visit         OUTPATIENT MEDICATIONS:  New Prescriptions     HYDROCODONE-ACETAMINOPHEN (NORCO) 5-325 MG TAB PER TABLET    Take 1 Tablet by mouth every four hours as needed (Severe Pain) for up to 5 days.    OMEPRAZOLE (PRILOSEC) 20 MG DELAYED-RELEASE CAPSULE    Take 1 Capsule by mouth every day.          FINAL DIAGNOSIS  1. Acute epigastric pain    2. Acute diarrhea           Electronically signed by: Janell Berrios M.D., 2/5/2023 7:47 PM

## 2023-02-08 ENCOUNTER — HOSPITAL ENCOUNTER (INPATIENT)
Facility: MEDICAL CENTER | Age: 38
LOS: 6 days | DRG: 438 | End: 2023-02-14
Attending: EMERGENCY MEDICINE | Admitting: INTERNAL MEDICINE
Payer: COMMERCIAL

## 2023-02-08 DIAGNOSIS — K85.10 ACUTE BILIARY PANCREATITIS WITHOUT INFECTION OR NECROSIS: ICD-10-CM

## 2023-02-08 DIAGNOSIS — K85.90 ACUTE RECURRENT PANCREATITIS: ICD-10-CM

## 2023-02-08 LAB
ALBUMIN SERPL BCP-MCNC: 4.7 G/DL (ref 3.2–4.9)
ALBUMIN/GLOB SERPL: 1.4 G/DL
ALP SERPL-CCNC: 79 U/L (ref 30–99)
ALT SERPL-CCNC: 29 U/L (ref 2–50)
ANION GAP SERPL CALC-SCNC: 16 MMOL/L (ref 7–16)
AST SERPL-CCNC: 30 U/L (ref 12–45)
BASOPHILS # BLD AUTO: 1.1 % (ref 0–1.8)
BASOPHILS # BLD: 0.06 K/UL (ref 0–0.12)
BILIRUB SERPL-MCNC: 0.4 MG/DL (ref 0.1–1.5)
BUN SERPL-MCNC: 5 MG/DL (ref 8–22)
CALCIUM ALBUM COR SERPL-MCNC: 8.4 MG/DL (ref 8.5–10.5)
CALCIUM SERPL-MCNC: 9 MG/DL (ref 8.4–10.2)
CHLORIDE SERPL-SCNC: 101 MMOL/L (ref 96–112)
CO2 SERPL-SCNC: 18 MMOL/L (ref 20–33)
COMMENT 1642: NORMAL
CREAT SERPL-MCNC: 0.8 MG/DL (ref 0.5–1.4)
EOSINOPHIL # BLD AUTO: 0.04 K/UL (ref 0–0.51)
EOSINOPHIL NFR BLD: 0.7 % (ref 0–6.9)
ERYTHROCYTE [DISTWIDTH] IN BLOOD BY AUTOMATED COUNT: 44.4 FL (ref 35.9–50)
GFR SERPLBLD CREATININE-BSD FMLA CKD-EPI: 98 ML/MIN/1.73 M 2
GLOBULIN SER CALC-MCNC: 3.4 G/DL (ref 1.9–3.5)
GLUCOSE SERPL-MCNC: 75 MG/DL (ref 65–99)
HCG SERPL QL: NEGATIVE
HCT VFR BLD AUTO: 51.1 % (ref 37–47)
HGB BLD-MCNC: 16.6 G/DL (ref 12–16)
IMM GRANULOCYTES # BLD AUTO: 0.01 K/UL (ref 0–0.11)
IMM GRANULOCYTES NFR BLD AUTO: 0.2 % (ref 0–0.9)
LIPASE SERPL-CCNC: 148 U/L (ref 7–58)
LYMPHOCYTES # BLD AUTO: 1.5 K/UL (ref 1–4.8)
LYMPHOCYTES NFR BLD: 27.5 % (ref 22–41)
MCH RBC QN AUTO: 29.5 PG (ref 27–33)
MCHC RBC AUTO-ENTMCNC: 32.5 G/DL (ref 33.6–35)
MCV RBC AUTO: 90.8 FL (ref 81.4–97.8)
MONOCYTES # BLD AUTO: 0.87 K/UL (ref 0–0.85)
MONOCYTES NFR BLD AUTO: 15.9 % (ref 0–13.4)
NEUTROPHILS # BLD AUTO: 2.98 K/UL (ref 2–7.15)
NEUTROPHILS NFR BLD: 54.6 % (ref 44–72)
NRBC # BLD AUTO: 0 K/UL
NRBC BLD-RTO: 0 /100 WBC
PLATELET # BLD AUTO: 196 K/UL (ref 164–446)
PLATELET BLD QL SMEAR: NORMAL
PMV BLD AUTO: 10.8 FL (ref 9–12.9)
POTASSIUM SERPL-SCNC: 3.8 MMOL/L (ref 3.6–5.5)
PROT SERPL-MCNC: 8.1 G/DL (ref 6–8.2)
RBC # BLD AUTO: 5.63 M/UL (ref 4.2–5.4)
RBC BLD AUTO: NORMAL
SODIUM SERPL-SCNC: 135 MMOL/L (ref 135–145)
WBC # BLD AUTO: 5.5 K/UL (ref 4.8–10.8)

## 2023-02-08 PROCEDURE — 700111 HCHG RX REV CODE 636 W/ 250 OVERRIDE (IP): Performed by: EMERGENCY MEDICINE

## 2023-02-08 PROCEDURE — 96375 TX/PRO/DX INJ NEW DRUG ADDON: CPT

## 2023-02-08 PROCEDURE — 80053 COMPREHEN METABOLIC PANEL: CPT

## 2023-02-08 PROCEDURE — 99285 EMERGENCY DEPT VISIT HI MDM: CPT

## 2023-02-08 PROCEDURE — 700102 HCHG RX REV CODE 250 W/ 637 OVERRIDE(OP): Performed by: INTERNAL MEDICINE

## 2023-02-08 PROCEDURE — A9270 NON-COVERED ITEM OR SERVICE: HCPCS | Performed by: INTERNAL MEDICINE

## 2023-02-08 PROCEDURE — 85025 COMPLETE CBC W/AUTO DIFF WBC: CPT

## 2023-02-08 PROCEDURE — 94760 N-INVAS EAR/PLS OXIMETRY 1: CPT

## 2023-02-08 PROCEDURE — 700111 HCHG RX REV CODE 636 W/ 250 OVERRIDE (IP): Performed by: INTERNAL MEDICINE

## 2023-02-08 PROCEDURE — 99223 1ST HOSP IP/OBS HIGH 75: CPT | Performed by: INTERNAL MEDICINE

## 2023-02-08 PROCEDURE — 700105 HCHG RX REV CODE 258: Performed by: EMERGENCY MEDICINE

## 2023-02-08 PROCEDURE — 36415 COLL VENOUS BLD VENIPUNCTURE: CPT

## 2023-02-08 PROCEDURE — 96376 TX/PRO/DX INJ SAME DRUG ADON: CPT

## 2023-02-08 PROCEDURE — 83690 ASSAY OF LIPASE: CPT

## 2023-02-08 PROCEDURE — 770006 HCHG ROOM/CARE - MED/SURG/GYN SEMI*

## 2023-02-08 PROCEDURE — 96374 THER/PROPH/DIAG INJ IV PUSH: CPT

## 2023-02-08 PROCEDURE — 84703 CHORIONIC GONADOTROPIN ASSAY: CPT

## 2023-02-08 PROCEDURE — 700105 HCHG RX REV CODE 258: Performed by: INTERNAL MEDICINE

## 2023-02-08 RX ORDER — HYDROMORPHONE HYDROCHLORIDE 1 MG/ML
1 INJECTION, SOLUTION INTRAMUSCULAR; INTRAVENOUS; SUBCUTANEOUS
Status: DISCONTINUED | OUTPATIENT
Start: 2023-02-08 | End: 2023-02-14 | Stop reason: HOSPADM

## 2023-02-08 RX ORDER — ERGOCALCIFEROL 1.25 MG/1
50000 CAPSULE ORAL
COMMUNITY
Start: 2023-01-22 | End: 2023-03-20

## 2023-02-08 RX ORDER — OXYCODONE HYDROCHLORIDE 10 MG/1
10 TABLET ORAL
Status: DISCONTINUED | OUTPATIENT
Start: 2023-02-08 | End: 2023-02-08

## 2023-02-08 RX ORDER — BISACODYL 10 MG
10 SUPPOSITORY, RECTAL RECTAL
Status: DISCONTINUED | OUTPATIENT
Start: 2023-02-08 | End: 2023-02-14 | Stop reason: HOSPADM

## 2023-02-08 RX ORDER — ZONISAMIDE 50 MG/1
150 CAPSULE ORAL EVERY EVENING
Status: DISCONTINUED | OUTPATIENT
Start: 2023-02-08 | End: 2023-02-14 | Stop reason: HOSPADM

## 2023-02-08 RX ORDER — PROCHLORPERAZINE EDISYLATE 5 MG/ML
5-10 INJECTION INTRAMUSCULAR; INTRAVENOUS EVERY 4 HOURS PRN
Status: DISCONTINUED | OUTPATIENT
Start: 2023-02-08 | End: 2023-02-14 | Stop reason: HOSPADM

## 2023-02-08 RX ORDER — HYDROXYZINE HYDROCHLORIDE 25 MG/1
50 TABLET, FILM COATED ORAL EVERY EVENING
Status: DISCONTINUED | OUTPATIENT
Start: 2023-02-08 | End: 2023-02-14 | Stop reason: HOSPADM

## 2023-02-08 RX ORDER — SODIUM CHLORIDE 9 MG/ML
INJECTION, SOLUTION INTRAVENOUS CONTINUOUS
Status: DISCONTINUED | OUTPATIENT
Start: 2023-02-08 | End: 2023-02-14 | Stop reason: HOSPADM

## 2023-02-08 RX ORDER — GABAPENTIN 100 MG/1
100 CAPSULE ORAL EVERY MORNING
Status: DISCONTINUED | OUTPATIENT
Start: 2023-02-09 | End: 2023-02-14 | Stop reason: HOSPADM

## 2023-02-08 RX ORDER — AMOXICILLIN 250 MG
2 CAPSULE ORAL 2 TIMES DAILY
Status: DISCONTINUED | OUTPATIENT
Start: 2023-02-08 | End: 2023-02-14 | Stop reason: HOSPADM

## 2023-02-08 RX ORDER — OXYCODONE HYDROCHLORIDE 10 MG/1
10 TABLET ORAL
Status: DISCONTINUED | OUTPATIENT
Start: 2023-02-08 | End: 2023-02-14 | Stop reason: HOSPADM

## 2023-02-08 RX ORDER — GABAPENTIN 300 MG/1
300 CAPSULE ORAL EVERY EVENING
Status: DISCONTINUED | OUTPATIENT
Start: 2023-02-08 | End: 2023-02-14 | Stop reason: HOSPADM

## 2023-02-08 RX ORDER — HYDROMORPHONE HYDROCHLORIDE 1 MG/ML
0.5 INJECTION, SOLUTION INTRAMUSCULAR; INTRAVENOUS; SUBCUTANEOUS
Status: DISCONTINUED | OUTPATIENT
Start: 2023-02-08 | End: 2023-02-08

## 2023-02-08 RX ORDER — ONDANSETRON 2 MG/ML
4 INJECTION INTRAMUSCULAR; INTRAVENOUS EVERY 4 HOURS PRN
Status: DISCONTINUED | OUTPATIENT
Start: 2023-02-08 | End: 2023-02-14 | Stop reason: HOSPADM

## 2023-02-08 RX ORDER — OMEPRAZOLE 20 MG/1
20 CAPSULE, DELAYED RELEASE ORAL DAILY
Status: DISCONTINUED | OUTPATIENT
Start: 2023-02-08 | End: 2023-02-14 | Stop reason: HOSPADM

## 2023-02-08 RX ORDER — OXYCODONE HYDROCHLORIDE 5 MG/1
5 TABLET ORAL
Status: DISCONTINUED | OUTPATIENT
Start: 2023-02-08 | End: 2023-02-08

## 2023-02-08 RX ORDER — SODIUM CHLORIDE 9 MG/ML
1000 INJECTION, SOLUTION INTRAVENOUS ONCE
Status: COMPLETED | OUTPATIENT
Start: 2023-02-08 | End: 2023-02-08

## 2023-02-08 RX ORDER — PROMETHAZINE HYDROCHLORIDE 25 MG/1
12.5-25 TABLET ORAL EVERY 4 HOURS PRN
Status: DISCONTINUED | OUTPATIENT
Start: 2023-02-08 | End: 2023-02-14 | Stop reason: HOSPADM

## 2023-02-08 RX ORDER — PROMETHAZINE HYDROCHLORIDE 25 MG/1
12.5-25 SUPPOSITORY RECTAL EVERY 4 HOURS PRN
Status: DISCONTINUED | OUTPATIENT
Start: 2023-02-08 | End: 2023-02-14 | Stop reason: HOSPADM

## 2023-02-08 RX ORDER — ONDANSETRON 2 MG/ML
4 INJECTION INTRAMUSCULAR; INTRAVENOUS ONCE
Status: COMPLETED | OUTPATIENT
Start: 2023-02-08 | End: 2023-02-08

## 2023-02-08 RX ORDER — ONDANSETRON 4 MG/1
4 TABLET, ORALLY DISINTEGRATING ORAL EVERY 4 HOURS PRN
Status: DISCONTINUED | OUTPATIENT
Start: 2023-02-08 | End: 2023-02-14 | Stop reason: HOSPADM

## 2023-02-08 RX ORDER — DIVALPROEX SODIUM 250 MG/1
250 TABLET, DELAYED RELEASE ORAL 2 TIMES DAILY
Status: DISCONTINUED | OUTPATIENT
Start: 2023-02-08 | End: 2023-02-14 | Stop reason: HOSPADM

## 2023-02-08 RX ORDER — POLYETHYLENE GLYCOL 3350 17 G/17G
1 POWDER, FOR SOLUTION ORAL
Status: DISCONTINUED | OUTPATIENT
Start: 2023-02-08 | End: 2023-02-14 | Stop reason: HOSPADM

## 2023-02-08 RX ORDER — OXYCODONE HYDROCHLORIDE 5 MG/1
5 TABLET ORAL
Status: DISCONTINUED | OUTPATIENT
Start: 2023-02-08 | End: 2023-02-14 | Stop reason: HOSPADM

## 2023-02-08 RX ORDER — ENOXAPARIN SODIUM 100 MG/ML
40 INJECTION SUBCUTANEOUS DAILY
Status: DISCONTINUED | OUTPATIENT
Start: 2023-02-08 | End: 2023-02-14 | Stop reason: HOSPADM

## 2023-02-08 RX ADMIN — DIVALPROEX SODIUM 250 MG: 250 TABLET, DELAYED RELEASE ORAL at 18:26

## 2023-02-08 RX ADMIN — HYDROMORPHONE HYDROCHLORIDE 1 MG: 1 INJECTION, SOLUTION INTRAMUSCULAR; INTRAVENOUS; SUBCUTANEOUS at 21:32

## 2023-02-08 RX ADMIN — OXYCODONE HYDROCHLORIDE 10 MG: 10 TABLET ORAL at 20:22

## 2023-02-08 RX ADMIN — OMEPRAZOLE 20 MG: 20 CAPSULE, DELAYED RELEASE ORAL at 18:27

## 2023-02-08 RX ADMIN — MORPHINE SULFATE 6 MG: 10 INJECTION INTRAVENOUS at 15:24

## 2023-02-08 RX ADMIN — SODIUM CHLORIDE 1000 ML: 9 INJECTION, SOLUTION INTRAVENOUS at 13:22

## 2023-02-08 RX ADMIN — ZONISAMIDE 150 MG: 50 CAPSULE ORAL at 18:26

## 2023-02-08 RX ADMIN — SODIUM CHLORIDE: 9 INJECTION, SOLUTION INTRAVENOUS at 20:30

## 2023-02-08 RX ADMIN — MORPHINE SULFATE 6 MG: 10 INJECTION INTRAVENOUS at 13:23

## 2023-02-08 RX ADMIN — GABAPENTIN 300 MG: 300 CAPSULE ORAL at 18:27

## 2023-02-08 RX ADMIN — ONDANSETRON 4 MG: 2 INJECTION INTRAMUSCULAR; INTRAVENOUS at 13:22

## 2023-02-08 RX ADMIN — HYDROXYZINE HYDROCHLORIDE 50 MG: 25 TABLET, FILM COATED ORAL at 18:26

## 2023-02-08 RX ADMIN — PANCRELIPASE 6000 UNITS: 30000; 6000; 19000 CAPSULE, DELAYED RELEASE PELLETS ORAL at 18:26

## 2023-02-08 ASSESSMENT — PATIENT HEALTH QUESTIONNAIRE - PHQ9
4. FEELING TIRED OR HAVING LITTLE ENERGY: NOT AT ALL
2. FEELING DOWN, DEPRESSED, IRRITABLE, OR HOPELESS: SEVERAL DAYS
5. POOR APPETITE OR OVEREATING: NOT AT ALL
8. MOVING OR SPEAKING SO SLOWLY THAT OTHER PEOPLE COULD HAVE NOTICED. OR THE OPPOSITE, BEING SO FIGETY OR RESTLESS THAT YOU HAVE BEEN MOVING AROUND A LOT MORE THAN USUAL: NOT AT ALL
6. FEELING BAD ABOUT YOURSELF - OR THAT YOU ARE A FAILURE OR HAVE LET YOURSELF OR YOUR FAMILY DOWN: NOT AL ALL
SUM OF ALL RESPONSES TO PHQ QUESTIONS 1-9: 3
SUM OF ALL RESPONSES TO PHQ9 QUESTIONS 1 AND 2: 2
9. THOUGHTS THAT YOU WOULD BE BETTER OFF DEAD, OR OF HURTING YOURSELF: NOT AT ALL
3. TROUBLE FALLING OR STAYING ASLEEP OR SLEEPING TOO MUCH: SEVERAL DAYS
7. TROUBLE CONCENTRATING ON THINGS, SUCH AS READING THE NEWSPAPER OR WATCHING TELEVISION: NOT AT ALL
1. LITTLE INTEREST OR PLEASURE IN DOING THINGS: SEVERAL DAYS

## 2023-02-08 ASSESSMENT — PAIN DESCRIPTION - PAIN TYPE
TYPE: ACUTE PAIN;CHRONIC PAIN
TYPE: ACUTE PAIN;CHRONIC PAIN

## 2023-02-08 ASSESSMENT — ENCOUNTER SYMPTOMS
DIZZINESS: 0
DEPRESSION: 0
NERVOUS/ANXIOUS: 0
BLURRED VISION: 0
COUGH: 0
CONSTIPATION: 0
HEADACHES: 0
SHORTNESS OF BREATH: 0
VOMITING: 0
CHILLS: 0
CLAUDICATION: 0
MYALGIAS: 0
WEAKNESS: 0
INSOMNIA: 0
FEVER: 0
HEARTBURN: 0
PHOTOPHOBIA: 0
SENSORY CHANGE: 0
ABDOMINAL PAIN: 1
SPEECH CHANGE: 0
DIARRHEA: 0

## 2023-02-08 ASSESSMENT — COGNITIVE AND FUNCTIONAL STATUS - GENERAL
MOBILITY SCORE: 24
DAILY ACTIVITIY SCORE: 24
SUGGESTED CMS G CODE MODIFIER DAILY ACTIVITY: CH
SUGGESTED CMS G CODE MODIFIER MOBILITY: CH

## 2023-02-08 ASSESSMENT — FIBROSIS 4 INDEX
FIB4 SCORE: 0.5
FIB4 SCORE: 0.99

## 2023-02-08 ASSESSMENT — LIFESTYLE VARIABLES
TOTAL SCORE: 4
EVER FELT BAD OR GUILTY ABOUT YOUR DRINKING: YES
AVERAGE NUMBER OF DAYS PER WEEK YOU HAVE A DRINK CONTAINING ALCOHOL: 0
TOTAL SCORE: 4
CONSUMPTION TOTAL: INCOMPLETE
HAVE PEOPLE ANNOYED YOU BY CRITICIZING YOUR DRINKING: YES
EVER HAD A DRINK FIRST THING IN THE MORNING TO STEADY YOUR NERVES TO GET RID OF A HANGOVER: YES
HAVE YOU EVER FELT YOU SHOULD CUT DOWN ON YOUR DRINKING: YES
ALCOHOL_USE: NO
TOTAL SCORE: 4
ON A TYPICAL DAY WHEN YOU DRINK ALCOHOL HOW MANY DRINKS DO YOU HAVE: 0

## 2023-02-08 NOTE — ED NOTES
Med rec updated and complete, per pt   Allergies reviewed, per pt  Pt reports that she has not taken her TRAMADOL 50MG in the last 30 days

## 2023-02-08 NOTE — ED PROVIDER NOTES
ED Provider Note    CHIEF COMPLAINT  Chief Complaint   Patient presents with    LUQ Pain       EXTERNAL RECORDS REVIEWED  Patient has had multiple recent admissions for acute/chronic pancreatitis.  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  She was last seen 3 days ago for similar symptoms and treated symptomatically and discharged    HPI/ROS  LIMITATION TO HISTORY   Select: : None  OUTSIDE HISTORIAN(S):  None    Rhiannon Marrero is a 37 y.o. female who presents to the emergency department with upper abdominal pain and vomiting.  Patient has a long history of recurrent admissions for pancreatitis and states her pain is similar today.  She states she has had ongoing pain constantly for the last few months.  It is possibly mildly worse today however no other new or different symptoms.  She did have some vomiting this morning.  She is currently in a inpatient alcohol treatment facility who frequently wants her to come in and get checked out.  She has not had any fevers, dysuria.    PAST MEDICAL HISTORY   has a past medical history of Acute pancreatitis without infection or necrosis (07/20/2022), Alcohol dependence ([facility]) (04/13/2017), Bronchitis (08/2012), Cold, Current moderate episode of major depressive disorder without prior episode ([facility]) (01/31/2020), Heart burn, Hernia of unspecified site of abdominal cavity without mention of obstruction or gangrene, High anion gap metabolic acidosis (07/01/2022), Indigestion, Pancreatitis, Pneumonia (2011), and Seizure disorder ([facility]) (05/23/2022).    SURGICAL HISTORY   has a past surgical history that includes other orthopedic surgery; gyn surgery; tonsillectomy (4/11/2013); arthroscopy, knee; hysterectomy robotic xi (10/11/2018); salpingectomy (Bilateral, 10/11/2018); orif, wrist (Right, 4/24/2019); upper gi endoscopy,diagnosis (N/A, 12/23/2022); inject nerv blck,celiac plexus (N/A,  "12/23/2022); and egd w/endoscopic ultrasound (N/A, 12/23/2022).    FAMILY HISTORY  Family History   Problem Relation Age of Onset    Psychiatric Illness Mother     Stroke Mother     Arthritis Mother     Heart Disease Maternal Grandfather     Cancer Neg Hx     Diabetes Neg Hx     Hypertension Neg Hx        SOCIAL HISTORY  Social History     Tobacco Use    Smoking status: Every Day     Packs/day: 0.75     Years: 10.00     Pack years: 7.50     Types: Cigarettes    Smokeless tobacco: Never   Vaping Use    Vaping Use: Former    Substances: Nicotine   Substance and Sexual Activity    Alcohol use: Not Currently     Comment: 5 shots, binges. Was sober for 34 days until today 10/12    Drug use: Not Currently     Types: Marijuana     Comment: edibles    Sexual activity: Not on file       CURRENT MEDICATIONS  Home Medications       Reviewed by Rajendra Bland (Pharmacy Tech) on 02/08/23 at 1242  Med List Status: Complete     Medication Last Dose Status   divalproex (DEPAKOTE) 250 MG Tablet Delayed Response 2/7/2023 Active   gabapentin (NEURONTIN) 100 MG Cap 2/7/2023 Active   HYDROcodone-acetaminophen (NORCO) 5-325 MG Tab per tablet 2/7/2023 Active   hydrOXYzine HCl (ATARAX) 50 MG Tab 2/7/2023 Active   omeprazole (PRILOSEC) 20 MG delayed-release capsule NEW RX Active   ondansetron (ZOFRAN ODT) 4 MG TABLET DISPERSIBLE > 1 week Active   pancrelipase, Lip-Prot-Amyl, (CREON 6000) 6000-93156 units Cap DR Particles 2/7/2023 Active   vitamin D2, Ergocalciferol, (DRISDOL) 1.25 MG (22059 UT) Cap capsule 2/6/2023 Active   zonisamide (ZONEGRAN) 50 MG capsule 2/7/2023 Active                    ALLERGIES  Allergies   Allergen Reactions    Naltrexone Anxiety    Promethazine Hcl Anxiety     Anxiety and makes her feels like skin coming off        PHYSICAL EXAM  VITAL SIGNS: /75   Pulse 89   Temp 36 °C (96.8 °F) (Temporal)   Resp 17   Ht 1.702 m (5' 7\")   Wt 59.3 kg (130 lb 11.7 oz)   LMP 10/30/2018   SpO2 100%   BMI 20.48 " kg/m²    Constitutional: Nontoxic appearing. Alert in no apparent distress.  HENT: Normocephalic, Atraumatic. Bilateral external ears normal. Nose normal.  Moist mucous membranes.  Oropharynx clear.  Eyes: Pupils are equal and reactive. Conjunctiva normal.   Neck: Supple, full range of motion  Heart: Tachycardia.  Regular rhythm.  No murmurs.    Lungs: No respiratory distress, normal work of breathing. Lungs clear to auscultation bilaterally.  Abdomen Soft, no distention.  Severe epigastric abdominal pain with voluntary guarding  Musculoskeletal: Atraumatic. No obvious deformities noted.  No lower extremity edema.  Skin: Warm, Dry.  No erythema, No rash.   Neurologic: Alert and oriented x3. Moving all extremities spontaneously without focal deficits.  Psychiatric: Affect normal, Mood normal, Appears appropriate and not intoxicated.      DIAGNOSTIC STUDIES / PROCEDURES    LABS  Labs Reviewed   CBC WITH DIFFERENTIAL - Abnormal; Notable for the following components:       Result Value    RBC 5.63 (*)     Hemoglobin 16.6 (*)     Hematocrit 51.1 (*)     MCHC 32.5 (*)     Monocytes 15.90 (*)     Monos (Absolute) 0.87 (*)     All other components within normal limits   COMP METABOLIC PANEL - Abnormal; Notable for the following components:    Co2 18 (*)     Bun 5 (*)     All other components within normal limits   LIPASE - Abnormal; Notable for the following components:    Lipase 148 (*)     All other components within normal limits   CORRECTED CALCIUM - Abnormal; Notable for the following components:    Correct Calcium 8.4 (*)     All other components within normal limits   HCG QUAL SERUM   ESTIMATED GFR   PLATELET ESTIMATE   MORPHOLOGY   DIFFERENTIAL COMMENT         COURSE & MEDICAL DECISION MAKING    ED Observation Status? No; Patient does not meet criteria for ED Observation.     INITIAL ASSESSMENT, COURSE AND PLAN  Care Narrative: Patient with history of ongoing acute/chronic pancreatitis related to alcohol use as well  as a known pancreatic duct stone who presents with increasing abdominal pain and vomiting today.  The patient has had multiple admissions and ER visits for this over the last few months.  She has been seen by GI who has been hesitant for intervention on this pancreatic duct stone.  She is tachycardic on arrival with otherwise reassuring vital signs.  Her abdominal exam is consistent with pancreatitis however not concerning for bowel obstruction, perforation, appendicitis, diverticulitis.  I do not feel that repeat imaging is helpful at this point.  Her labs show mild elevation of lipase consistent with acute pancreatitis.  She has no associated leukocytosis or signs of sepsis.  She is likely mildly dehydrated however no significant electrolyte abnormalities.        ADDITIONAL PROBLEM LIST  Problem #1: Acute pancreatitis -patient has had multiple recent visits for the same, seems to be related to pancreatic duct stone which has not been intervened upon, will plan to consult with gastroenterology again and likely admit        DISPOSITION AND DISCUSSIONS  I have discussed management of the patient with the following physicians and BILL's:    I discussed the case with Dr. Leslie, on-call with GI consultants, they will evaluate the patient as an inpatient.  He did not give me much more plan on addressing pancreatic stone.    I discussed the case with Dr. Neal, gastroenterology on-call at [facility].  He does feel that this stone likely needs to be intervened upon to prevent recurrent admissions and ER visits.  He does agree that this likely needs to occur at a tertiary care center and is recommending admission and attempted inpatient inpatient transfer to somewhere that could possibly perform ERCP and Revere knife procedure to break up the stone.    I discussed the case with Dr. Nur, hospitalist on-call, who accepts admission of the patient.    5:01 PM - Upon reassessment, patient is resting comfortably with normal vital  signs.  No new complaints at this time.  Discussed results with patient and/or family as well as plan of care for admission.  Patient is agreeable.      FINAL DIAGNOSIS  1. Acute biliary pancreatitis without infection or necrosis           Electronically signed by: Aide Dobson M.D., 2/8/2023 1:47 PM

## 2023-02-08 NOTE — ED TRIAGE NOTES
Pt amb to triage c/o luq pain x1wk. Pt seen in er x4d ago for same s/s. Pt currently an in-pt rehab for etoh abuse.

## 2023-02-09 LAB
ALBUMIN SERPL BCP-MCNC: 3.5 G/DL (ref 3.2–4.9)
ALBUMIN/GLOB SERPL: 1.7 G/DL
ALP SERPL-CCNC: 54 U/L (ref 30–99)
ALT SERPL-CCNC: 18 U/L (ref 2–50)
ANION GAP SERPL CALC-SCNC: 12 MMOL/L (ref 7–16)
AST SERPL-CCNC: 19 U/L (ref 12–45)
BILIRUB SERPL-MCNC: 0.3 MG/DL (ref 0.1–1.5)
BUN SERPL-MCNC: 7 MG/DL (ref 8–22)
CALCIUM ALBUM COR SERPL-MCNC: 7.9 MG/DL (ref 8.5–10.5)
CALCIUM SERPL-MCNC: 7.5 MG/DL (ref 8.4–10.2)
CHLORIDE SERPL-SCNC: 108 MMOL/L (ref 96–112)
CO2 SERPL-SCNC: 18 MMOL/L (ref 20–33)
CREAT SERPL-MCNC: 0.72 MG/DL (ref 0.5–1.4)
ERYTHROCYTE [DISTWIDTH] IN BLOOD BY AUTOMATED COUNT: 43.8 FL (ref 35.9–50)
FLUAV RNA SPEC QL NAA+PROBE: NEGATIVE
FLUBV RNA SPEC QL NAA+PROBE: NEGATIVE
GFR SERPLBLD CREATININE-BSD FMLA CKD-EPI: 111 ML/MIN/1.73 M 2
GLOBULIN SER CALC-MCNC: 2.1 G/DL (ref 1.9–3.5)
GLUCOSE SERPL-MCNC: 94 MG/DL (ref 65–99)
HCT VFR BLD AUTO: 38.4 % (ref 37–47)
HGB BLD-MCNC: 12.5 G/DL (ref 12–16)
LIPASE SERPL-CCNC: 55 U/L (ref 7–58)
MCH RBC QN AUTO: 29.3 PG (ref 27–33)
MCHC RBC AUTO-ENTMCNC: 32.6 G/DL (ref 33.6–35)
MCV RBC AUTO: 89.9 FL (ref 81.4–97.8)
PLATELET # BLD AUTO: 191 K/UL (ref 164–446)
PMV BLD AUTO: 10.3 FL (ref 9–12.9)
POTASSIUM SERPL-SCNC: 3.7 MMOL/L (ref 3.6–5.5)
PROT SERPL-MCNC: 5.6 G/DL (ref 6–8.2)
RBC # BLD AUTO: 4.27 M/UL (ref 4.2–5.4)
RSV RNA SPEC QL NAA+PROBE: NEGATIVE
SARS-COV-2 RNA RESP QL NAA+PROBE: DETECTED
SODIUM SERPL-SCNC: 138 MMOL/L (ref 135–145)
SPECIMEN SOURCE: ABNORMAL
WBC # BLD AUTO: 4.3 K/UL (ref 4.8–10.8)

## 2023-02-09 PROCEDURE — 85027 COMPLETE CBC AUTOMATED: CPT

## 2023-02-09 PROCEDURE — C9803 HOPD COVID-19 SPEC COLLECT: HCPCS | Performed by: INTERNAL MEDICINE

## 2023-02-09 PROCEDURE — 700111 HCHG RX REV CODE 636 W/ 250 OVERRIDE (IP): Performed by: INTERNAL MEDICINE

## 2023-02-09 PROCEDURE — 770001 HCHG ROOM/CARE - MED/SURG/GYN PRIV*

## 2023-02-09 PROCEDURE — 700105 HCHG RX REV CODE 258: Performed by: INTERNAL MEDICINE

## 2023-02-09 PROCEDURE — 700102 HCHG RX REV CODE 250 W/ 637 OVERRIDE(OP): Performed by: INTERNAL MEDICINE

## 2023-02-09 PROCEDURE — 0241U HCHG SARS-COV-2 COVID-19 NFCT DS RESP RNA 4 TRGT MIC: CPT

## 2023-02-09 PROCEDURE — 99232 SBSQ HOSP IP/OBS MODERATE 35: CPT | Performed by: INTERNAL MEDICINE

## 2023-02-09 PROCEDURE — 83690 ASSAY OF LIPASE: CPT

## 2023-02-09 PROCEDURE — 8E0ZXY6 ISOLATION: ICD-10-PCS | Performed by: INTERNAL MEDICINE

## 2023-02-09 PROCEDURE — 80053 COMPREHEN METABOLIC PANEL: CPT

## 2023-02-09 PROCEDURE — A9270 NON-COVERED ITEM OR SERVICE: HCPCS | Performed by: INTERNAL MEDICINE

## 2023-02-09 PROCEDURE — 94760 N-INVAS EAR/PLS OXIMETRY 1: CPT

## 2023-02-09 PROCEDURE — 36415 COLL VENOUS BLD VENIPUNCTURE: CPT

## 2023-02-09 RX ADMIN — OXYCODONE HYDROCHLORIDE 10 MG: 10 TABLET ORAL at 00:09

## 2023-02-09 RX ADMIN — OXYCODONE HYDROCHLORIDE 10 MG: 10 TABLET ORAL at 06:29

## 2023-02-09 RX ADMIN — OXYCODONE HYDROCHLORIDE 10 MG: 10 TABLET ORAL at 17:24

## 2023-02-09 RX ADMIN — HYDROMORPHONE HYDROCHLORIDE 1 MG: 1 INJECTION, SOLUTION INTRAMUSCULAR; INTRAVENOUS; SUBCUTANEOUS at 18:22

## 2023-02-09 RX ADMIN — HYDROMORPHONE HYDROCHLORIDE 1 MG: 1 INJECTION, SOLUTION INTRAMUSCULAR; INTRAVENOUS; SUBCUTANEOUS at 05:08

## 2023-02-09 RX ADMIN — OXYCODONE HYDROCHLORIDE 10 MG: 10 TABLET ORAL at 10:36

## 2023-02-09 RX ADMIN — OXYCODONE HYDROCHLORIDE 10 MG: 10 TABLET ORAL at 03:29

## 2023-02-09 RX ADMIN — SODIUM CHLORIDE: 9 INJECTION, SOLUTION INTRAVENOUS at 06:30

## 2023-02-09 RX ADMIN — SODIUM CHLORIDE: 9 INJECTION, SOLUTION INTRAVENOUS at 12:32

## 2023-02-09 RX ADMIN — HYDROMORPHONE HYDROCHLORIDE 1 MG: 1 INJECTION, SOLUTION INTRAMUSCULAR; INTRAVENOUS; SUBCUTANEOUS at 22:02

## 2023-02-09 RX ADMIN — GABAPENTIN 100 MG: 100 CAPSULE ORAL at 05:08

## 2023-02-09 RX ADMIN — HYDROMORPHONE HYDROCHLORIDE 1 MG: 1 INJECTION, SOLUTION INTRAMUSCULAR; INTRAVENOUS; SUBCUTANEOUS at 15:28

## 2023-02-09 RX ADMIN — HYDROMORPHONE HYDROCHLORIDE 1 MG: 1 INJECTION, SOLUTION INTRAMUSCULAR; INTRAVENOUS; SUBCUTANEOUS at 12:33

## 2023-02-09 RX ADMIN — OXYCODONE HYDROCHLORIDE 10 MG: 10 TABLET ORAL at 14:28

## 2023-02-09 RX ADMIN — ONDANSETRON 4 MG: 2 INJECTION INTRAMUSCULAR; INTRAVENOUS at 00:09

## 2023-02-09 RX ADMIN — HYDROMORPHONE HYDROCHLORIDE 1 MG: 1 INJECTION, SOLUTION INTRAMUSCULAR; INTRAVENOUS; SUBCUTANEOUS at 07:41

## 2023-02-09 RX ADMIN — SODIUM CHLORIDE: 9 INJECTION, SOLUTION INTRAVENOUS at 23:11

## 2023-02-09 RX ADMIN — ZONISAMIDE 150 MG: 50 CAPSULE ORAL at 17:23

## 2023-02-09 RX ADMIN — PANCRELIPASE 6000 UNITS: 30000; 6000; 19000 CAPSULE, DELAYED RELEASE PELLETS ORAL at 07:41

## 2023-02-09 RX ADMIN — ONDANSETRON 4 MG: 2 INJECTION INTRAMUSCULAR; INTRAVENOUS at 14:28

## 2023-02-09 RX ADMIN — SODIUM CHLORIDE: 9 INJECTION, SOLUTION INTRAVENOUS at 17:49

## 2023-02-09 RX ADMIN — DIVALPROEX SODIUM 250 MG: 250 TABLET, DELAYED RELEASE ORAL at 17:24

## 2023-02-09 RX ADMIN — PANCRELIPASE 6000 UNITS: 30000; 6000; 19000 CAPSULE, DELAYED RELEASE PELLETS ORAL at 10:59

## 2023-02-09 RX ADMIN — OMEPRAZOLE 20 MG: 20 CAPSULE, DELAYED RELEASE ORAL at 05:08

## 2023-02-09 RX ADMIN — GABAPENTIN 300 MG: 300 CAPSULE ORAL at 17:24

## 2023-02-09 RX ADMIN — HYDROXYZINE HYDROCHLORIDE 50 MG: 25 TABLET, FILM COATED ORAL at 17:23

## 2023-02-09 RX ADMIN — HYDROMORPHONE HYDROCHLORIDE 1 MG: 1 INJECTION, SOLUTION INTRAMUSCULAR; INTRAVENOUS; SUBCUTANEOUS at 01:29

## 2023-02-09 RX ADMIN — ONDANSETRON 4 MG: 2 INJECTION INTRAMUSCULAR; INTRAVENOUS at 23:14

## 2023-02-09 RX ADMIN — PANCRELIPASE 6000 UNITS: 30000; 6000; 19000 CAPSULE, DELAYED RELEASE PELLETS ORAL at 17:23

## 2023-02-09 RX ADMIN — DIVALPROEX SODIUM 250 MG: 250 TABLET, DELAYED RELEASE ORAL at 05:08

## 2023-02-09 RX ADMIN — OXYCODONE HYDROCHLORIDE 10 MG: 10 TABLET ORAL at 20:41

## 2023-02-09 ASSESSMENT — LIFESTYLE VARIABLES
ALCOHOL_USE: NO
AVERAGE NUMBER OF DAYS PER WEEK YOU HAVE A DRINK CONTAINING ALCOHOL: 0
EVER FELT BAD OR GUILTY ABOUT YOUR DRINKING: YES
EVER HAD A DRINK FIRST THING IN THE MORNING TO STEADY YOUR NERVES TO GET RID OF A HANGOVER: YES
HOW MANY TIMES IN THE PAST YEAR HAVE YOU HAD 5 OR MORE DRINKS IN A DAY: 200
TOTAL SCORE: 4
CONSUMPTION TOTAL: POSITIVE
HAVE YOU EVER FELT YOU SHOULD CUT DOWN ON YOUR DRINKING: YES
SUBSTANCE_ABUSE: 1
ON A TYPICAL DAY WHEN YOU DRINK ALCOHOL HOW MANY DRINKS DO YOU HAVE: 0
TOTAL SCORE: 4
HAVE PEOPLE ANNOYED YOU BY CRITICIZING YOUR DRINKING: YES
TOTAL SCORE: 4

## 2023-02-09 ASSESSMENT — PAIN DESCRIPTION - PAIN TYPE
TYPE: ACUTE PAIN;CHRONIC PAIN

## 2023-02-09 ASSESSMENT — ENCOUNTER SYMPTOMS
RESPIRATORY NEGATIVE: 1
EYES NEGATIVE: 1
MUSCULOSKELETAL NEGATIVE: 1
CONSTITUTIONAL NEGATIVE: 1
CARDIOVASCULAR NEGATIVE: 1
NEUROLOGICAL NEGATIVE: 1
ABDOMINAL PAIN: 1

## 2023-02-09 ASSESSMENT — FIBROSIS 4 INDEX: FIB4 SCORE: 0.82

## 2023-02-09 NOTE — DIETARY
"Nutrition services: Day 1 of admit.  Rhiannon Marrero is a 37 y.o. female with admitting DX of Acute on chronic pancreatitis (HCC) [K85.90, K86.1]    Consult received for MST 2: 2-13 lb wt loss x 1 month, poor PO intake PTA. RD not entering enhanced droplet isolation per department guidelines.    Assessment:  Height: 170.2 cm (5' 7\")  Weight: 65.1 kg (143 lb 8.3 oz)   Body mass index is 22.48 kg/m²., BMI classification: Normal  Diet/Intake: Clear liquids; 0% breakfast this a.m.    Evaluation:   Dx list: acute on chronic pancreatitis, hx EtOH use disorder sober x 120 days, dehdyration, pancreatic duct stones.  Evaluated by GI who are unable to perform ERCP for removal of stone in pancreatic duct and recommend pt transfer to tertiary hospital for procedure.  Labs: BUN 7, corrected Ca+ 7.9  MAR: NS @ 200 ml/hr, prilosec, PRN zofran, oxycodone, creon  Pt w/ frequent admits, variable wt hx w/ overall trend up since 09/2022    Malnutrition Risk: Unable to fully assess w/ nutrition-focused physical exam d/t pt is on enhanced droplet/contact isolation for COVID rule-out. Current wt 143.5 lb elevated from 137.8 lb on admit ~2 weeks ago.    Recommendations/Plan:  Diet as tolerated.   Encourage intake of meals >50-75%.  Document intake of all PO as % taken in ADL's to provide interdisciplinary communication across all shifts.   Monitor weight.  Nutrition rep will continue to see patient for ongoing meal and snack preferences.     RD following.  "

## 2023-02-09 NOTE — CARE PLAN
Patient A&Ox4, VS stable, room air, NPO, IV infusing, given PRN pain and nausea meds, bed low and locked,call light within reach    The patient is Stable - Low risk of patient condition declining or worsening    Shift Goals  Clinical Goals: pain and nausea control  Patient Goals: pain and nausea control    Progress made toward(s) clinical / shift goals:    Problem: Pain - Standard  Goal: Alleviation of pain or a reduction in pain to the patient’s comfort goal  Outcome: Progressing     Problem: Knowledge Deficit - Standard  Goal: Patient and family/care givers will demonstrate understanding of plan of care, disease process/condition, diagnostic tests and medications  Outcome: Progressing       Patient is not progressing towards the following goals:

## 2023-02-09 NOTE — PROGRESS NOTES
Hospital Medicine Daily Progress Note    Date of Service  2/9/2023    Chief Complaint  LUQ pain    Hospital Course  Rhiannon Marrero is a 37 y.o. female with alcohol dependence, major depression, history of chronic alcohol pancreatitis, currently in an alcohol treatment program where she has been staying sober for days, admitted 2/8/2023 with progressively worsening abdominal pain despite self treatment with bowel rest and an oral rehydration.  On evaluation, lipase was 148.  She had no leukocytosis.  Electrolytes and renal function are normal.  LFTs are normal.  Notably, she had an MRCP in December and at that time was found to have 5 mm pancreatic duct stone but GI did not feel comfortable with ERCP and removal of stone duct that large and recommended that patient may require transfer to tertiary center for further intervention.  She is started on bowel rest and IV fluids.  Gabapentin dose was increased, and Creon was resumed.  GI was contacted by the ER physician.    Interval Problem Update  2/9/2023 - I reviewed the patient's chart. There were no significant overnight events. Remains hemodynamically stable and afebrile. Stable on RA.  No leukocytosis.  Hemoglobin 12.5.  Electrolytes and renal function are normal.  LFTs remain normal.  I discussed with GI, in the recommended transfer to a tertiary surgery center for further/definitive intervention for the pancreatic duct stone.    > I have personally seen and examined the patient today.  She still having pain in the epigastric area, then there with palpation.  Slight nausea but no vomiting.  Had bowel movement last night.  Not short of breath.  No chest pain.    I have discussed this patient's plan of care and discharge plan at IDT rounds today with Case Management, Nursing, Nursing leadership, and other members of the IDT team.    Consultants/Specialty  GI    Code Status  Full Code    Disposition  Patient is not medically cleared for discharge.   Anticipate  discharge to  tertiary center .  I have placed the appropriate orders for post-discharge needs.    Review of Systems  ROS     Pertinent positives/negatives as mentioned above.     A complete review of systems was personally done by me. All other systems were negative.       Physical Exam  Temp:  [36.6 °C (97.9 °F)-36.7 °C (98.1 °F)] 36.7 °C (98.1 °F)  Pulse:  [83-98] 83  Resp:  [16-18] 18  BP: (100-120)/(55-79) 101/55  SpO2:  [95 %-100 %] 95 %    Physical Exam  Vitals reviewed.   Constitutional:       General: She is not in acute distress.     Appearance: Normal appearance. She is normal weight. She is not ill-appearing or diaphoretic.   HENT:      Head: Normocephalic and atraumatic.      Right Ear: External ear normal.      Left Ear: External ear normal.      Mouth/Throat:      Mouth: Mucous membranes are moist.      Pharynx: No oropharyngeal exudate or posterior oropharyngeal erythema.   Eyes:      General: No scleral icterus.     Extraocular Movements: Extraocular movements intact.      Conjunctiva/sclera: Conjunctivae normal.      Pupils: Pupils are equal, round, and reactive to light.   Cardiovascular:      Rate and Rhythm: Normal rate and regular rhythm.      Heart sounds: Normal heart sounds. No murmur heard.  Pulmonary:      Effort: Pulmonary effort is normal. No respiratory distress.      Breath sounds: Normal breath sounds. No stridor. No wheezing, rhonchi or rales.   Chest:      Chest wall: No tenderness.   Abdominal:      General: Bowel sounds are normal. There is no distension.      Palpations: Abdomen is soft. There is no mass.      Tenderness: There is abdominal tenderness (epigastrium). There is no guarding or rebound.   Musculoskeletal:         General: No swelling. Normal range of motion.      Cervical back: Normal range of motion and neck supple. No rigidity. No muscular tenderness.      Right lower leg: No edema.      Left lower leg: No edema.   Lymphadenopathy:      Cervical: No cervical  adenopathy.   Skin:     General: Skin is warm and dry.      Coloration: Skin is not jaundiced.      Findings: No rash.   Neurological:      General: No focal deficit present.      Mental Status: She is alert and oriented to person, place, and time. Mental status is at baseline.      Cranial Nerves: No cranial nerve deficit.   Psychiatric:         Mood and Affect: Mood normal.         Behavior: Behavior normal.         Thought Content: Thought content normal.         Judgment: Judgment normal.       Fluids    Intake/Output Summary (Last 24 hours) at 2/9/2023 1222  Last data filed at 2/9/2023 0900  Gross per 24 hour   Intake 180 ml   Output --   Net 180 ml       Laboratory  Recent Labs     02/08/23  1244 02/09/23  0050   WBC 5.5 4.3*   RBC 5.63* 4.27   HEMOGLOBIN 16.6* 12.5   HEMATOCRIT 51.1* 38.4   MCV 90.8 89.9   MCH 29.5 29.3   MCHC 32.5* 32.6*   RDW 44.4 43.8   PLATELETCT 196 191   MPV 10.8 10.3     Recent Labs     02/08/23  1244 02/09/23  0050   SODIUM 135 138   POTASSIUM 3.8 3.7   CHLORIDE 101 108   CO2 18* 18*   GLUCOSE 75 94   BUN 5* 7*   CREATININE 0.80 0.72   CALCIUM 9.0 7.5*                   Imaging  No orders to display        Assessment/Plan  * Acute on chronic pancreatitis (HCC)- (present on admission)  Assessment & Plan  - Due to 5 mm pancreatic duct stone found on MRCP in December 2022.  Lipase on admission at this time is 148.  -Discussed with GI, who gave the opinion that they are unable to perform ERCP due to  risk of pancreatitis from the procedure, risk of stone recurrence, and difficulty of ERCP. Dr. Rachel as recommended in the past that there was no indications for surgical treatment of this problem.   GI recommending transfer to a tertiary surgery center for further/definitive intervention for the pancreatic duct.  -I have initiated the transfer process with [facility].  Should the accepting facility require GI to GI signout, Dr. Kwok will be able to provide the sign off.   -Clear liquid  diet for now.   -Continue NS at 200 cc/h.  -Continue analgesics with IV Dilaudid, and PRN oxycodone. Continue increased dose of gabapentin 100 mg in the morning and 300 mg at night.  - trend lipase.      Pancreatic duct stones- (present on admission)  Assessment & Plan  - Management as above.      Dehydration- (present on admission)  Assessment & Plan  - Continue IV fluids as above.    Chronic pancreatitis (HCC)- (present on admission)  Assessment & Plan  - Continue Creon, and gabapentin.  Management of pancreatic duct stone as above.    Alcohol use disorder, severe, in early remission, dependence (HCC)- (present on admission)  Assessment & Plan  -Patient currently in rehab outpatient and has been sober for 120 days.           VTE prophylaxis: enoxaparin ppx

## 2023-02-09 NOTE — CONSULTS
Gastroenterology Consult Note:    GILBERT Anguiano  Date & Time note created:    2/9/2023   10:18 AM     Referring MD:  Dr. Nestor Burgess    Patient ID:  Name:             Rhiannon Marrero   YOB: 1985  Age:                 37 y.o.  female   MRN:               [id number]                                                             Reason for Consult:      Abdominal pain  Chronic pancreatitis secondary to alcohol abuse    History of Present Illness:    This is a very pleasant 37 y.o. female with the past medical history of abuse currently sober (120 days), recurrent pancreatitis from alcohol, chronic pancreatitis, and pancreatic duct stone.  Presented to the hospital on 2/8/23 with complaints of epigastric abdominal pain nonradiating with nausea and vomiting.  Pain was rated as severe, sharp, and 10 out of 10.  Similar similar to previous admissions.  Lipase slightly elevated  148 resolved 55 this morning. CT scan abdomen/pelvi 2/1/23: Calcified stone in the pancreatic head with ductal dilatation, appearance suggesting choledocholithiasis with pancreatic ductal dilatation. Overall appears similar compared to prior study    CT and MRI imaging redemonstrating a pancreatic duct stone in the head of the pancreas with some upstream dilation.  She underwent endoscopic ultrasound and celiac plexus blockade and EUS, Dec. 2021: did demonstrate chronic pancreatitis, calcifications, and reticulocyte duct stone in the head of the pancreas not completely obstructive at the time.  Celiac axis block did not help with abdominal pain.    She has been hospitalized since January 2023 on 3 different occasions and has had multiple ER visits for chronic abdominal pain.    Review of Systems:      Review of Systems   Constitutional: Negative.    HENT: Negative.     Eyes: Negative.    Respiratory: Negative.     Cardiovascular: Negative.    Gastrointestinal:  Positive for abdominal pain.   Genitourinary: Negative.   "  Musculoskeletal: Negative.    Skin: Negative.    Neurological: Negative.    Psychiatric/Behavioral:  Positive for substance abuse.            Physical Exam:  Vitals/ General Appearance:   Weight/BMI: Body mass index is 22.48 kg/m².    Vitals:    02/08/23 1812 02/08/23 2031 02/09/23 0400 02/09/23 0748   BP: 118/79 100/66 107/67 101/55   Pulse: 84 89 84 83   Resp:  16 18 18   Temp:  36.7 °C (98 °F) 36.6 °C (97.9 °F) 36.7 °C (98.1 °F)   TempSrc:  Oral Temporal Oral   SpO2: 97% 97% 97% 95%   Weight:  61.6 kg (135 lb 12.9 oz)  65.1 kg (143 lb 8.3 oz)   Height:  1.702 m (5' 7\")       Oxygen Therapy:  Pulse Oximetry: 95 %, O2 Delivery Device: None - Room Air    Physical Exam  Vitals reviewed.   Constitutional:       Appearance: She is ill-appearing.   HENT:      Head: Normocephalic and atraumatic.      Mouth/Throat:      Mouth: Mucous membranes are moist.   Eyes:      Extraocular Movements: Extraocular movements intact.      Pupils: Pupils are equal, round, and reactive to light.   Cardiovascular:      Rate and Rhythm: Normal rate and regular rhythm.      Pulses: Normal pulses.      Heart sounds: Normal heart sounds.   Pulmonary:      Effort: Pulmonary effort is normal.      Breath sounds: Normal breath sounds.   Abdominal:      General: Bowel sounds are normal. There is no distension.      Palpations: Abdomen is soft.      Tenderness: There is abdominal tenderness (upper abdomen). There is guarding.   Musculoskeletal:         General: Normal range of motion.   Skin:     General: Skin is warm and dry.   Neurological:      General: No focal deficit present.      Mental Status: She is alert and oriented to person, place, and time.   Psychiatric:         Mood and Affect: Mood normal.         Behavior: Behavior normal.         Thought Content: Thought content normal.         Judgment: Judgment normal.       Past Medical History:   Past Medical History:   Diagnosis Date    Acute pancreatitis without infection or necrosis " 07/20/2022    Alcohol dependence (HCC) 04/13/2017    Bronchitis 08/2012    Cold     sept 2018    Current moderate episode of major depressive disorder without prior episode (HCC) 01/31/2020    Heart burn     Hernia of unspecified site of abdominal cavity without mention of obstruction or gangrene     High anion gap metabolic acidosis 07/01/2022    Indigestion     Pancreatitis     Pneumonia 2011    Seizure disorder (HCC) 05/23/2022       Past Surgical History:  Past Surgical History:   Procedure Laterality Date    MD UPPER GI ENDOSCOPY,DIAGNOSIS N/A 12/23/2022    Procedure: GASTROSCOPY;  Surgeon: Td Kwok M.D.;  Location: [facility];  Service: EUS    MD INJECT NERV BLCK,CELIAC PLEXUS N/A 12/23/2022    Procedure: BLOCK, CELIAC PLEXUS;  Surgeon: Td Kwok M.D.;  Location: [facility];  Service: EUS    EGD W/ENDOSCOPIC ULTRASOUND N/A 12/23/2022    Procedure: EGD, WITH ENDOSCOPIC US;  Surgeon: Td Kwok M.D.;  Location: [facility];  Service: EUS    ORIF, WRIST Right 4/24/2019    Procedure: ORIF, WRIST;  Surgeon: Marquis Bell M.D.;  Location: [facility];  Service: Orthopedics    HYSTERECTOMY ROBOTIC XI  10/11/2018    Procedure: HYSTERECTOMY ROBOTIC XI- RIGHT URETEROLYSIS;  Surgeon: Marquis Reeder M.D.;  Location: SURGERY [facility];  Service: Gynecology    SALPINGECTOMY Bilateral 10/11/2018    Procedure: SALPINGECTOMY;  Surgeon: Marquis Reeder M.D.;  Location: SURGERY [facility];  Service: Gynecology    TONSILLECTOMY  4/11/2013    Performed by Rock Rothman M.D. at SURGERY SAME DAY [facility]    ARTHROSCOPY, KNEE      GYN SURGERY      miscarriage May 2006    OTHER ORTHOPEDIC SURGERY      knee surgeries       Hospital Medications:    Current Facility-Administered Medications:     pancrelipase (Lip-Prot-Amyl) (CREON 6000) 6000-04751 units capsule 6,000 Units, 1 Capsule, Oral, TID WITH MEALS, Ramiladeandre Nur, D.O., 6,000 Units at  02/09/23 0741    zonisamide (ZONEGRAN) capsule 150 mg, 150 mg, Oral, Q EVENING, Ramila Nur D.O., 150 mg at 02/08/23 1826    gabapentin (NEURONTIN) capsule 300 mg, 300 mg, Oral, Q EVENING, Ramila Nur D.O., 300 mg at 02/08/23 1827    divalproex (DEPAKOTE) delayed-release tablet 250 mg, 250 mg, Oral, BID, Ramila Nur D.O., 250 mg at 02/09/23 0508    omeprazole (PRILOSEC) capsule 20 mg, 20 mg, Oral, DAILY, Ramila Nur D.O., 20 mg at 02/09/23 0508    hydrOXYzine HCl (ATARAX) tablet 50 mg, 50 mg, Oral, Q EVENING, Ramila Nur D.O., 50 mg at 02/08/23 1826    senna-docusate (PERICOLACE or SENOKOT S) 8.6-50 MG per tablet 2 Tablet, 2 Tablet, Oral, BID **AND** polyethylene glycol/lytes (MIRALAX) PACKET 1 Packet, 1 Packet, Oral, QDAY PRN **AND** magnesium hydroxide (MILK OF MAGNESIA) suspension 30 mL, 30 mL, Oral, QDAY PRN **AND** bisacodyl (DULCOLAX) suppository 10 mg, 10 mg, Rectal, QDAY PRN, Ramila Nur D.O.    NS infusion, , Intravenous, Continuous, GERALDINE Tapia.O., Last Rate: 200 mL/hr at 02/09/23 0630, New Bag at 02/09/23 0630    enoxaparin (Lovenox) inj 40 mg, 40 mg, Subcutaneous, DAILY AT 1800, GERALDINE Tapia.O.    Notify provider if pain remains uncontrolled, , , CONTINUOUS **AND** Use the Numeric Rating Scale (NRS), Oleary-Baker Faces (WBF), or FLACC on regular floors and Critical-Care Pain Observation Tool (CPOT) on ICUs/Trauma to assess pain, , , CONTINUOUS **AND** Pulse Ox, , , CONTINUOUS **AND** Pharmacy Consult Request ...Pain Management Review 1 Each, 1 Each, Other, PHARMACY TO DOSE **AND** If patient difficult to arouse and/or has respiratory depression (respiratory rate of 10 or less), stop any opiates that are currently infusing and call a Rapid Response., , , CONTINUOUS, Ramila Nur D.O.    ondansetron (ZOFRAN) syringe/vial injection 4 mg, 4 mg, Intravenous, Q4HRS PRN, Ramila Nur D.O., 4 mg at 02/09/23 0009    ondansetron (ZOFRAN ODT) dispertab 4 mg, 4 mg, Oral, Q4HRS PRN,  YEIMY TapiaOAjit    promethazine (PHENERGAN) tablet 12.5-25 mg, 12.5-25 mg, Oral, Q4HRS PRN, GERALDINE Tapia.O.    promethazine (PHENERGAN) suppository 12.5-25 mg, 12.5-25 mg, Rectal, Q4HRS PRN, GERALDINE Tapia.O.    prochlorperazine (COMPAZINE) injection 5-10 mg, 5-10 mg, Intravenous, Q4HRS PRN, GERALDINE Tapia.O.    oxyCODONE immediate-release (ROXICODONE) tablet 5 mg, 5 mg, Oral, Q3HRS PRN **OR** oxyCODONE immediate release (ROXICODONE) tablet 10 mg, 10 mg, Oral, Q3HRS PRN, 10 mg at 02/09/23 0629 **OR** HYDROmorphone (Dilaudid) injection 1 mg, 1 mg, Intravenous, Q3HRS PRN, GERALDINE Tapia.O., 1 mg at 02/09/23 0741    gabapentin (NEURONTIN) capsule 100 mg, 100 mg, Oral, QAM, Ramila Nur D.O., 100 mg at 02/09/23 0508    Current Outpatient Medications:  Current Facility-Administered Medications   Medication Dose Route Frequency Provider Last Rate Last Admin    pancrelipase (Lip-Prot-Amyl) (CREON 6000) 6000-65803 units capsule 6,000 Units  1 Capsule Oral TID WITH MEALS GERALDINE Tapia.O.   6,000 Units at 02/09/23 0741    zonisamide (ZONEGRAN) capsule 150 mg  150 mg Oral Q EVENING GERALDINE Tapia.O.   150 mg at 02/08/23 1826    gabapentin (NEURONTIN) capsule 300 mg  300 mg Oral Q EVENING Ramila Nur D.O.   300 mg at 02/08/23 1827    divalproex (DEPAKOTE) delayed-release tablet 250 mg  250 mg Oral BID YEIMY TapiaO.   250 mg at 02/09/23 0508    omeprazole (PRILOSEC) capsule 20 mg  20 mg Oral DAILY GERALDINE Tapia.O.   20 mg at 02/09/23 0508    hydrOXYzine HCl (ATARAX) tablet 50 mg  50 mg Oral Q EVENING YEIMY TapiaO.   50 mg at 02/08/23 1826    senna-docusate (PERICOLACE or SENOKOT S) 8.6-50 MG per tablet 2 Tablet  2 Tablet Oral BID Ramila Nur D.O.        And    polyethylene glycol/lytes (MIRALAX) PACKET 1 Packet  1 Packet Oral QDAY PRN Ramila Nur D.O.        And    magnesium hydroxide (MILK OF MAGNESIA) suspension 30 mL  30 mL Oral QDAY PRN Ramila Nur D.O.        And    bisacodyl  (DULCOLAX) suppository 10 mg  10 mg Rectal QDAY PRN YEIMY TapiaO.        NS infusion   Intravenous Continuous Ramila Nur D.O. 200 mL/hr at 02/09/23 0630 New Bag at 02/09/23 0630    enoxaparin (Lovenox) inj 40 mg  40 mg Subcutaneous DAILY AT 1800 Ramila Nur D.O.        Pharmacy Consult Request ...Pain Management Review 1 Each  1 Each Other PHARMACY TO DOSE Ramila Nur D.O.        ondansetron (ZOFRAN) syringe/vial injection 4 mg  4 mg Intravenous Q4HRS PRN GERALDINE Tapia.O.   4 mg at 02/09/23 0009    ondansetron (ZOFRAN ODT) dispertab 4 mg  4 mg Oral Q4HRS PRN GERALDINE Tapia.O.        promethazine (PHENERGAN) tablet 12.5-25 mg  12.5-25 mg Oral Q4HRS PRN GERALDINE Tapia.O.        promethazine (PHENERGAN) suppository 12.5-25 mg  12.5-25 mg Rectal Q4HRS PRN GERALDINE Tapia.O.        prochlorperazine (COMPAZINE) injection 5-10 mg  5-10 mg Intravenous Q4HRS PRN GERALDINE Tapia.O.        oxyCODONE immediate-release (ROXICODONE) tablet 5 mg  5 mg Oral Q3HRS PRN GERALDINE Tapia.O.        Or    oxyCODONE immediate release (ROXICODONE) tablet 10 mg  10 mg Oral Q3HRS PRN GERALDINE Tapia.O.   10 mg at 02/09/23 0629    Or    HYDROmorphone (Dilaudid) injection 1 mg  1 mg Intravenous Q3HRS PRN GERALDINE Tapia.O.   1 mg at 02/09/23 0741    gabapentin (NEURONTIN) capsule 100 mg  100 mg Oral QAM Ramila Nur D.O.   100 mg at 02/09/23 0508       Medication Allergy:  Allergies   Allergen Reactions    Naltrexone Anxiety    Promethazine Hcl Anxiety     Anxiety and makes her feels like skin coming off        Family History:  Family History   Problem Relation Age of Onset    Psychiatric Illness Mother     Stroke Mother     Arthritis Mother     Heart Disease Maternal Grandfather     Cancer Neg Hx     Diabetes Neg Hx     Hypertension Neg Hx        Social History:  Social History     Socioeconomic History    Marital status:      Spouse name: Not on file    Number of children: Not on file    Years of  education: Not on file    Highest education level: Not on file   Occupational History    Not on file   Tobacco Use    Smoking status: Every Day     Packs/day: 0.50     Years: 10.00     Pack years: 5.00     Types: Cigarettes    Smokeless tobacco: Never   Vaping Use    Vaping Use: Former    Substances: Nicotine   Substance and Sexual Activity    Alcohol use: Not Currently     Comment: 119 days sober    Drug use: Not Currently    Sexual activity: Not on file   Other Topics Concern    Not on file   Social History Narrative    Not on file     Social Determinants of Health     Financial Resource Strain: Not on file   Food Insecurity: Not on file   Transportation Needs: Not on file   Physical Activity: Not on file   Stress: Not on file   Social Connections: Not on file   Intimate Partner Violence: Not on file   Housing Stability: Not on file         MDM (Data Review):     Records reviewed and summarized in current documentation    Lab Data Review:  Recent Results (from the past 24 hour(s))   CBC WITH DIFFERENTIAL    Collection Time: 02/08/23 12:44 PM   Result Value Ref Range    WBC 5.5 4.8 - 10.8 K/uL    RBC 5.63 (H) 4.20 - 5.40 M/uL    Hemoglobin 16.6 (H) 12.0 - 16.0 g/dL    Hematocrit 51.1 (H) 37.0 - 47.0 %    MCV 90.8 81.4 - 97.8 fL    MCH 29.5 27.0 - 33.0 pg    MCHC 32.5 (L) 33.6 - 35.0 g/dL    RDW 44.4 35.9 - 50.0 fL    Platelet Count 196 164 - 446 K/uL    MPV 10.8 9.0 - 12.9 fL    Neutrophils-Polys 54.60 44.00 - 72.00 %    Lymphocytes 27.50 22.00 - 41.00 %    Monocytes 15.90 (H) 0.00 - 13.40 %    Eosinophils 0.70 0.00 - 6.90 %    Basophils 1.10 0.00 - 1.80 %    Immature Granulocytes 0.20 0.00 - 0.90 %    Nucleated RBC 0.00 /100 WBC    Neutrophils (Absolute) 2.98 2.00 - 7.15 K/uL    Lymphs (Absolute) 1.50 1.00 - 4.80 K/uL    Monos (Absolute) 0.87 (H) 0.00 - 0.85 K/uL    Eos (Absolute) 0.04 0.00 - 0.51 K/uL    Baso (Absolute) 0.06 0.00 - 0.12 K/uL    Immature Granulocytes (abs) 0.01 0.00 - 0.11 K/uL    NRBC (Absolute)  0.00 K/uL   COMP METABOLIC PANEL    Collection Time: 02/08/23 12:44 PM   Result Value Ref Range    Sodium 135 135 - 145 mmol/L    Potassium 3.8 3.6 - 5.5 mmol/L    Chloride 101 96 - 112 mmol/L    Co2 18 (L) 20 - 33 mmol/L    Anion Gap 16.0 7.0 - 16.0    Glucose 75 65 - 99 mg/dL    Bun 5 (L) 8 - 22 mg/dL    Creatinine 0.80 0.50 - 1.40 mg/dL    Calcium 9.0 8.4 - 10.2 mg/dL    AST(SGOT) 30 12 - 45 U/L    ALT(SGPT) 29 2 - 50 U/L    Alkaline Phosphatase 79 30 - 99 U/L    Total Bilirubin 0.4 0.1 - 1.5 mg/dL    Albumin 4.7 3.2 - 4.9 g/dL    Total Protein 8.1 6.0 - 8.2 g/dL    Globulin 3.4 1.9 - 3.5 g/dL    A-G Ratio 1.4 g/dL   LIPASE    Collection Time: 02/08/23 12:44 PM   Result Value Ref Range    Lipase 148 (H) 7 - 58 U/L   CORRECTED CALCIUM    Collection Time: 02/08/23 12:44 PM   Result Value Ref Range    Correct Calcium 8.4 (L) 8.5 - 10.5 mg/dL   ESTIMATED GFR    Collection Time: 02/08/23 12:44 PM   Result Value Ref Range    GFR (CKD-EPI) 98 >60 mL/min/1.73 m 2   PLATELET ESTIMATE    Collection Time: 02/08/23 12:44 PM   Result Value Ref Range    Plt Estimation Normal    MORPHOLOGY    Collection Time: 02/08/23 12:44 PM   Result Value Ref Range    RBC Morphology Normal    DIFFERENTIAL COMMENT    Collection Time: 02/08/23 12:44 PM   Result Value Ref Range    Comments-Diff see below    HCG QUAL SERUM    Collection Time: 02/08/23  1:18 PM   Result Value Ref Range    Beta-Hcg Qualitative Serum Negative Negative   CBC without Differential    Collection Time: 02/09/23 12:50 AM   Result Value Ref Range    WBC 4.3 (L) 4.8 - 10.8 K/uL    RBC 4.27 4.20 - 5.40 M/uL    Hemoglobin 12.5 12.0 - 16.0 g/dL    Hematocrit 38.4 37.0 - 47.0 %    MCV 89.9 81.4 - 97.8 fL    MCH 29.3 27.0 - 33.0 pg    MCHC 32.6 (L) 33.6 - 35.0 g/dL    RDW 43.8 35.9 - 50.0 fL    Platelet Count 191 164 - 446 K/uL    MPV 10.3 9.0 - 12.9 fL   Comp Metabolic Panel (CMP)    Collection Time: 02/09/23 12:50 AM   Result Value Ref Range    Sodium 138 135 - 145 mmol/L     Potassium 3.7 3.6 - 5.5 mmol/L    Chloride 108 96 - 112 mmol/L    Co2 18 (L) 20 - 33 mmol/L    Anion Gap 12.0 7.0 - 16.0    Glucose 94 65 - 99 mg/dL    Bun 7 (L) 8 - 22 mg/dL    Creatinine 0.72 0.50 - 1.40 mg/dL    Calcium 7.5 (L) 8.4 - 10.2 mg/dL    AST(SGOT) 19 12 - 45 U/L    ALT(SGPT) 18 2 - 50 U/L    Alkaline Phosphatase 54 30 - 99 U/L    Total Bilirubin 0.3 0.1 - 1.5 mg/dL    Albumin 3.5 3.2 - 4.9 g/dL    Total Protein 5.6 (L) 6.0 - 8.2 g/dL    Globulin 2.1 1.9 - 3.5 g/dL    A-G Ratio 1.7 g/dL   CORRECTED CALCIUM    Collection Time: 02/09/23 12:50 AM   Result Value Ref Range    Correct Calcium 7.9 (L) 8.5 - 10.5 mg/dL   ESTIMATED GFR    Collection Time: 02/09/23 12:50 AM   Result Value Ref Range    GFR (CKD-EPI) 111 >60 mL/min/1.73 m 2   LIPASE    Collection Time: 02/09/23 12:50 AM   Result Value Ref Range    Lipase 55 7 - 58 U/L       Imaging/Procedures Review:      No orders to display        MDM (Assessment and Plan):     Patient Active Problem List    Diagnosis Date Noted    Portal hypertension (HCC) 01/24/2023    Acute on chronic pancreatitis (HCC) 01/23/2023    Pancreatic duct stones 01/09/2023    Polycythemia due to fall in plasma volume 01/02/2023    Thrombocytopenia (HCC) 12/22/2022    Acute recurrent pancreatitis 12/20/2022    Chronic pancreatitis (HCC) 12/20/2022    Sore throat 12/09/2022    Recurrent pancreatitis 12/07/2022    Bipolar disorder (HCC) 12/07/2022    Diarrhea 10/19/2022    Tachycardia 10/16/2022    Abnormal iron saturation 10/14/2022    Lymphocytosis 10/14/2022    Elevated bilirubin 10/13/2022    Hypophosphatemia 10/13/2022    Normocytic anemia 10/13/2022    Hypoglycemia 10/12/2022    Abdominal pain 09/07/2022    Alcohol dependence with withdrawal (HCC) 09/05/2022    Alcohol-induced acute pancreatitis, unspecified complication status 08/15/2022    Leukocytosis 08/15/2022    Recurrent oral herpes simplex 08/01/2022    Migraine 07/03/2022    Tinnitus of both ears 07/03/2022    High anion  gap metabolic acidosis 07/01/2022    Lactic acidosis 07/01/2022    Dehydration 07/01/2022    Starvation ketoacidosis 07/01/2022    Hypocalcemia 07/01/2022    Hypokalemia due to excessive gastrointestinal loss of potassium 05/24/2022    Transaminitis 05/24/2022    Pancreatitis, recurrent 05/23/2022    Seizure disorder (HCC) 05/23/2022    Interstitial cystitis (chronic) without hematuria 01/31/2020    Alcohol use disorder, severe, in early remission, dependence (Formerly [facility]) 11/26/2019    Pain due to interstitial cystitis 08/12/2019    Seasonal allergic rhinitis 08/12/2019    Tobacco abuse 08/12/2019    Bipolar affective disorder, current episode hypomanic (HCC) 06/11/2019    Low back pain with sciatica 01/08/2019    S/P hysterectomy 01/08/2019    Chronic pain syndrome 01/25/2018    Anxiety 04/13/2017    Alcohol dependence (Former[facility]) 04/13/2017    Syncope 04/13/2017     This is a 38 yo female with a history of chronic abdominal pain and pancreatitis secondary to alcohol abuse. Has been sober for 120 days. Admitted to the hospital 2/8/23 with exacerbation of abdominal pain. Lipase 148 but quickly went back to normal next day. CT scan: Calcified stone in the pancreatic head with ductal dilatation, appearance suggesting choledocholithiasis with pancreatic ductal dilatation. Unable to perform ERCP due to  risk of pancreatitis from the procedure, risk of stone recurrence, and difficulty of ERCP.  Additionally, Dr. Rachel was asked about this patient and he said there was no indications for surgical treatment of this problem.     Patient had a celiac axis block which did not help with abdominal pain. Unfortunately, continues to be hospitalized on multiple occasions due to chronic abdominal pain.    ASSESSMENT:  Chronic pancreatitis  Alcohol abuse--sober x 120 days  Chronic abdominal pain    PLAN:  Discussed the case with Dr. Kwok, and recommended patient be transferred to a tertiary hospital with capabilities to address  pancreatic stone.   Pain management per primary team  Diet as tolerated    Thank your for the opportunity to assist in the care of your patient.  Please call for any questions or concerns.    CAMACHO Anguiano.

## 2023-02-09 NOTE — H&P
Hospital Medicine History & Physical Note    Date of Service  2/8/2023    Primary Care Physician  Ivy Adams M.D.    Consultants  GI    Specialist Names: Osgard to see tomorrow    Code Status  Full Code    Chief Complaint  Chief Complaint   Patient presents with    LUQ Pain       History of Presenting Illness  Rhiannon Marrero is a 37 y.o. female who presented 2/8/2023 with recurrent abdominal pain and known chronic pancreatitis secondary to history of alcoholism.  She presents today from alcohol treatment program where she has been staying sober for days.  She has tried gatorade and water and trialing clear liquid diet without improvement thus she presented to the ED again.  I've started her on aggressive IV hydration and pain medication and continuing gabapentin with increasing the am dose to 300 mg.  She does have a pancreatic duct stone seen on MRCP in December and at that time GI did not feel comfortable with ERCP and removal of stone that large and patient may require transfer to tertiary center for further intervention.    I discussed the plan of care with patient.    Review of Systems  Review of Systems   Constitutional:  Negative for chills and fever.   HENT:  Negative for congestion.    Eyes:  Negative for blurred vision and photophobia.   Respiratory:  Negative for cough and shortness of breath.    Cardiovascular:  Negative for chest pain, claudication and leg swelling.   Gastrointestinal:  Positive for abdominal pain. Negative for constipation, diarrhea, heartburn and vomiting.   Genitourinary:  Negative for dysuria and hematuria.   Musculoskeletal:  Negative for joint pain and myalgias.   Skin:  Negative for itching and rash.   Neurological:  Negative for dizziness, sensory change, speech change, weakness and headaches.   Psychiatric/Behavioral:  Negative for depression. The patient is not nervous/anxious and does not have insomnia.      Past Medical History   has a past medical history of Acute  pancreatitis without infection or necrosis (07/20/2022), Alcohol dependence (HCC) (04/13/2017), Bronchitis (08/2012), Cold, Current moderate episode of major depressive disorder without prior episode ([facility]) (01/31/2020), Heart burn, Hernia of unspecified site of abdominal cavity without mention of obstruction or gangrene, High anion gap metabolic acidosis (07/01/2022), Indigestion, Pancreatitis, Pneumonia (2011), and Seizure disorder ([facility]) (05/23/2022).    Surgical History   has a past surgical history that includes other orthopedic surgery; gyn surgery; tonsillectomy (4/11/2013); arthroscopy, knee; hysterectomy robotic xi (10/11/2018); salpingectomy (Bilateral, 10/11/2018); orif, wrist (Right, 4/24/2019); pr upper gi endoscopy,diagnosis (N/A, 12/23/2022); pr inject nerv blck,celiac plexus (N/A, 12/23/2022); and egd w/endoscopic ultrasound (N/A, 12/23/2022).     Family History  family history includes Arthritis in her mother; Heart Disease in her maternal grandfather; Psychiatric Illness in her mother; Stroke in her mother.   Family history reviewed with patient. There is no family history that is pertinent to the chief complaint.     Social History   reports that she has been smoking cigarettes. She has a 7.50 pack-year smoking history. She has never used smokeless tobacco. She reports that she does not currently use alcohol. She reports that she does not currently use drugs after having used the following drugs: Marijuana.    Allergies  Allergies   Allergen Reactions    Naltrexone Anxiety    Promethazine Hcl Anxiety     Anxiety and makes her feels like skin coming off        Medications  Prior to Admission Medications   Prescriptions Last Dose Informant Patient Reported? Taking?   HYDROcodone-acetaminophen (NORCO) 5-325 MG Tab per tablet 2/7/2023 at 1900 Patient No No   Sig: Take 1 Tablet by mouth every four hours as needed (Severe Pain) for up to 5 days.   divalproex (DEPAKOTE) 250 MG Tablet Delayed Response  2/7/2023 at 1930 Patient No No   Sig: Take 1 Tablet by mouth 2 times a day for 120 days.   gabapentin (NEURONTIN) 100 MG Cap 2/7/2023 at 1930 Patient Yes No   Sig: Take 100-300 mg by mouth 2 times a day. Pt takes 100MG in AM and 300MG in the evening   hydrOXYzine HCl (ATARAX) 50 MG Tab 2/7/2023 at 1930 Patient Yes No   Sig: Take 50 mg by mouth every evening.   omeprazole (PRILOSEC) 20 MG delayed-release capsule NEW RX Patient No No   Sig: Take 1 Capsule by mouth every day.   ondansetron (ZOFRAN ODT) 4 MG TABLET DISPERSIBLE > 1 week at Unknown Patient No No   Sig: Take 1 Tablet by mouth every four hours as needed for Nausea/Vomiting (give PO if no IV route available).   pancrelipase, Lip-Prot-Amyl, (CREON 6000) 6000-64099 units Cap DR Particles 2/7/2023 at 1930 Patient No No   Sig: Take 1 Capsule by mouth 3 times a day with meals.   vitamin D2, Ergocalciferol, (DRISDOL) 1.25 MG (52669 UT) Cap capsule 2/6/2023 at PM Patient Yes No   Sig: Take 50,000 Units by mouth every 7 days. On Monday   zonisamide (ZONEGRAN) 50 MG capsule 2/7/2023 at 1930 Patient Yes No   Sig: Take 150 mg by mouth every evening. 150 mg = 3 capsules      Facility-Administered Medications: None       Physical Exam  Temp:  [36 °C (96.8 °F)] 36 °C (96.8 °F)  Pulse:  [] 84  Resp:  [17] 17  BP: (117-129)/(75-81) 118/79  SpO2:  [97 %-100 %] 97 %  Blood Pressure: 118/79   Temperature: 36 °C (96.8 °F)   Pulse: 84   Respiration: 17   Pulse Oximetry: 97 %       Physical Exam  Vitals and nursing note reviewed.   Constitutional:       General: She is not in acute distress.     Appearance: Normal appearance. She is not ill-appearing.   HENT:      Head: Normocephalic and atraumatic.      Nose: Nose normal.   Cardiovascular:      Rate and Rhythm: Normal rate and regular rhythm.      Heart sounds: Normal heart sounds. No murmur heard.  Pulmonary:      Effort: Pulmonary effort is normal.      Breath sounds: Normal breath sounds.   Abdominal:      General:  Bowel sounds are normal. There is no distension.      Palpations: Abdomen is soft.      Tenderness: There is abdominal tenderness (mid abdomen).   Musculoskeletal:         General: No swelling or tenderness.      Cervical back: Neck supple.   Skin:     General: Skin is warm and dry.   Neurological:      General: No focal deficit present.      Mental Status: She is alert and oriented to person, place, and time.   Psychiatric:         Mood and Affect: Mood normal.       Laboratory:  Recent Labs     02/05/23 2007 02/08/23  1244   WBC 11.1* 5.5   RBC 5.21 5.63*   HEMOGLOBIN 15.5 16.6*   HEMATOCRIT 45.3 51.1*   MCV 86.9 90.8   MCH 29.8 29.5   MCHC 34.2 32.5*   RDW 42.2 44.4   PLATELETCT 281 196   MPV 9.9 10.8     Recent Labs     02/05/23 2007 02/08/23  1244   SODIUM 138 135   POTASSIUM 3.2* 3.8   CHLORIDE 102 101   CO2 21 18*   GLUCOSE 102* 75   BUN 5* 5*   CREATININE 0.72 0.80   CALCIUM 8.6 9.0     Recent Labs     02/05/23 2007 02/08/23  1244   ALTSGPT 22 29   ASTSGOT 19 30   ALKPHOSPHAT 60 79   TBILIRUBIN 0.4 0.4   LIPASE 48 148*   GLUCOSE 102* 75         No results for input(s): NTPROBNP in the last 72 hours.      Recent Labs     02/05/23 2007   TROPONINT <6       Imaging:  No orders to display           Assessment/Plan:  Justification for Admission Status  I anticipate this patient will require at least two midnights for appropriate medical management, necessitating inpatient admission because IV hydration and pain control    Patient will need a Med/Surg bed on MEDICAL service .  The need is secondary to IV hydration and pain control.    * Acute on chronic pancreatitis (HCC)- (present on admission)  Assessment & Plan  Multiple visits and admissions for this issue  Has pancreatic duct stone 5mm on MRCP December 2022, scan not repeated  Lipase 148  NPO and aggressive IV fluids  GI to consult given stone, possible need to transfer to tertiary center for intervention for stone  Creon to resume when able to take  PO  Continue gabapentin bid, increase am dose to 300mg     Pancreatic duct stones- (present on admission)  Assessment & Plan  5mm stone seen on past MRCP  GI to consult for further recommendation      Chronic pancreatitis (HCC)- (present on admission)  Assessment & Plan  pancrelpase and gabapentin      Dehydration  Assessment & Plan  Aggressive IV hydration      Alcohol use disorder, severe, in early remission, dependence (HCC)- (present on admission)  Assessment & Plan  Patient currently in rehab outpatient and has not been drinking          VTE prophylaxis: SCDs/TEDs

## 2023-02-09 NOTE — ASSESSMENT & PLAN NOTE
- Due to 5 mm pancreatic duct stone found on MRCP in December 2022.  Lipase on admission at this time is 148, which has since normalized.  - GI gave the opinion that they are unable to perform ERCP due to  risk of pancreatitis from the procedure, risk of stone recurrence, and difficulty of ERCP. Dr. Rachel as recommended in the past that there was no indications for surgical treatment of this problem.   GI recommending transfer to a tertiary surgery center for further/definitive intervention for the pancreatic duct.  -Has been accepted by [facility] in [place], pending bed availability. RTOC working on transfers.  -Maintain on clear liquid diet for now.   -Lipase has normalized.  Continue IV fluids at 125 cc/h.    -Labs today including BMP, LFTs, and lipase.  -Still has significant pain, requiring significant amount of analgesics.  Continue IV Dilaudid, and PRN oxycodone. Continue increased dose of gabapentin 100 mg in the morning and 300 mg at night.

## 2023-02-09 NOTE — CARE PLAN
The patient is Stable - Low risk of patient condition declining or worsening    Shift Goals  Clinical Goals: Pain and nausea control; bowel rest; IVF.  Patient Goals: Pain control  Family Goals: FANTASMA    Progress made toward(s) clinical / shift goals:    Problem: Pain - Standard  Goal: Alleviation of pain or a reduction in pain to the patient’s comfort goal  Outcome: Progressing  Note: Patient is receiving appropriated pain management.     Problem: Knowledge Deficit - Standard  Goal: Patient and family/care givers will demonstrate understanding of plan of care, disease process/condition, diagnostic tests and medications  Outcome: Progressing       Patient is not progressing towards the following goals:

## 2023-02-09 NOTE — DISCHARGE PLANNING
Case Management Discharge Planning    Admission Date: 2/8/2023  GMLOS: 2.3  ALOS: 1    6-Clicks ADL Score: 24  6-Clicks Mobility Score: 24      Anticipated Discharge Dispo:  [facility]    DME Needed: No    Action(s) Taken: Discussed pt in IDT rounds. Per MD, initiating transfer to a tertiary hospital for pancreatic stones and GI consult.     1436: LSW notified by Georgina in transfer center that pt going to [facility] ([address] [place] [address]).  Accepting physician Soren Denson MD. Georgina requested this LSW get transfer packet ready.    1450: LSW faxed PCS form to transfer center.     1515: LSW spoke to pt at bedside, pt is agreeable to transfer. Pt declined having this LSW notify anyone of her transfer. LSW placed completed transport packet in chart, bedside RN aware.    Next Steps: LSW to provide transport packet to bedside RN.     Barriers to Discharge: None

## 2023-02-09 NOTE — DISCHARGE SUMMARY
Transfer/Discharge Summary    CHIEF COMPLAINT ON ADMISSION  Chief Complaint   Patient presents with    LUQ Pain       Reason for Admission  Abdominal Pain    Admission Date  2/8/2023     CODE STATUS  Full Code    HPI & HOSPITAL COURSE  Rhiannon Marrero is a 37 y.o. female with alcohol dependence, major depression, history of chronic alcohol pancreatitis, currently in an alcohol treatment program where she has been staying sober for days, admitted 2/8/2023 with progressively worsening abdominal pain despite self treatment with bowel rest and oral rehydration.  On evaluation, lipase was 148.  She had no leukocytosis.  Electrolytes and renal function are normal.  LFTs are normal.  Notably, she had an MRCP in December and at that time was found to have 5 mm pancreatic duct stone but GI did not feel comfortable with ERCP and removal of stone duct that large and recommended that patient may require transfer to tertiary center for further intervention.  She is started on bowel rest, analgesics, and IV fluids. Gabapentin dose was increased, and Creon was resumed.  GI was consulted, who gave the opinion that they are unable to perform ERCP due to  risk of pancreatitis from the procedure, risk of stone recurrence, and difficulty of ERCP. Dr. Rachel has recommended in the past that there was no indications for surgical treatment of this problem.   GI recommended transfer to a tertiary surgery center for further/definitive intervention for the pancreatic duct, which was pursued.     I have personally seen and examined the patient on the day of discharge. Patient felt comfortable going  to the tertiary facility . The discharge plan was discussed with the patient, with which she was agreeable to.     Therefore, she is discharged in fair and stable condition to [facility].       DISCHARGE DATE  2/14/2023    FOLLOW UP ITEMS POST DISCHARGE  - further definitive intervention for the pancreatic duct to be done at the  tertiary facility.     DISCHARGE DIAGNOSES  Principal Problem:    Acute on chronic pancreatitis (HCC) POA: Yes  Active Problems:    Dehydration POA: Yes    Pancreatic duct stones POA: Yes    Alcohol use disorder, severe, in early remission, dependence (HCC) POA: Yes    Chronic pancreatitis (HCC) POA: Yes  Resolved Problems:    * No resolved hospital problems. *      FOLLOW UP  No future appointments.  No follow-up provider specified.    MEDICATIONS ON DISCHARGE     Medication List        CONTINUE taking these medications        Instructions   divalproex 250 MG Tbec  Commonly known as: DEPAKOTE   Take 1 Tablet by mouth 2 times a day for 120 days.  Dose: 250 mg     gabapentin 100 MG Caps  Commonly known as: NEURONTIN   Take 100-300 mg by mouth 2 times a day. Pt takes 100MG in AM and 300MG in the evening  Dose: 100-300 mg     HYDROcodone-acetaminophen 5-325 MG Tabs per tablet  Commonly known as: NORCO   Take 1 Tablet by mouth every four hours as needed (Severe Pain) for up to 5 days.  Dose: 1 Tablet     hydrOXYzine HCl 50 MG Tabs  Commonly known as: ATARAX   Take 50 mg by mouth every evening.  Dose: 50 mg     omeprazole 20 MG delayed-release capsule  Commonly known as: PRILOSEC   Take 1 Capsule by mouth every day.  Dose: 20 mg     ondansetron 4 MG Tbdp  Commonly known as: ZOFRAN ODT   Take 1 Tablet by mouth every four hours as needed for Nausea/Vomiting (give PO if no IV route available).  Dose: 4 mg     pancrelipase (Lip-Prot-Amyl) 6000-65895 units Cpep  Commonly known as: CREON 6000   Take 1 Capsule by mouth 3 times a day with meals.  Dose: 1 Capsule     vitamin D2 (Ergocalciferol) 1.25 MG (98436 UT) Caps capsule  Commonly known as: Drisdol   Take 50,000 Units by mouth every 7 days. On Monday  Dose: 50,000 Units     zonisamide 50 MG capsule  Commonly known as: ZONEGRAN   Take 150 mg by mouth every evening. 150 mg = 3 capsules  Dose: 150 mg              Allergies  Allergies   Allergen Reactions    Naltrexone Anxiety     Promethazine Hcl Anxiety     Anxiety and makes her feels like skin coming off        DIET  Orders Placed This Encounter   Procedures    Diet Order Diet: Clear Liquid     Standing Status:   Standing     Number of Occurrences:   1     Order Specific Question:   Diet:     Answer:   Clear Liquid [10]       ACTIVITY  As tolerated.  Weight bearing as tolerated    LINES, DRAINS, AND WOUNDS  This is an automated list. Peripheral IVs will be removed prior to discharge.  Peripheral IV 02/08/23 22 G Right Hand (Active)   Site Assessment Clean;Dry;Intact 02/09/23 0800   Dressing Type Transparent 02/09/23 0800   Line Status Infusing;Flushed;Scrubbed the hub prior to access 02/09/23 0800   Dressing Status Clean;Dry;Intact 02/09/23 0800   Dressing Intervention N/A 02/09/23 0800   Infiltration Grading ([facility]) 0 02/09/23 0800   Phlebitis Scale ([facility] Only) 0 02/09/23 0800          Peripheral IV 02/08/23 22 G Right Hand (Active)   Site Assessment Clean;Dry;Intact 02/09/23 0800   Dressing Type Transparent 02/09/23 0800   Line Status Infusing;Flushed;Scrubbed the hub prior to access 02/09/23 0800   Dressing Status Clean;Dry;Intact 02/09/23 0800   Dressing Intervention N/A 02/09/23 0800   Infiltration Grading ([facility]) 0 02/09/23 0800   Phlebitis Scale ([facility] Only) 0 02/09/23 0800               MENTAL STATUS ON TRANSFER   AOx3          CONSULTATIONS  GI    PROCEDURES  None    LABORATORY  Lab Results   Component Value Date    SODIUM 138 02/09/2023    POTASSIUM 3.7 02/09/2023    CHLORIDE 108 02/09/2023    CO2 18 (L) 02/09/2023    GLUCOSE 94 02/09/2023    BUN 7 (L) 02/09/2023    CREATININE 0.72 02/09/2023    CREATININE 0.7 06/10/2008        Lab Results   Component Value Date    WBC 4.3 (L) 02/09/2023    HEMOGLOBIN 12.5 02/09/2023    HEMATOCRIT 38.4 02/09/2023    PLATELETCT 191 02/09/2023        Total time of the discharge process = 51 minutes.

## 2023-02-10 ENCOUNTER — PATIENT OUTREACH (OUTPATIENT)
Dept: SCHEDULING | Facility: IMAGING CENTER | Age: 38
End: 2023-02-10
Payer: COMMERCIAL

## 2023-02-10 PROBLEM — U07.1 ASYMPTOMATIC COVID-19 VIRUS INFECTION: Status: ACTIVE | Noted: 2023-02-10

## 2023-02-10 PROCEDURE — 770001 HCHG ROOM/CARE - MED/SURG/GYN PRIV*

## 2023-02-10 PROCEDURE — 700102 HCHG RX REV CODE 250 W/ 637 OVERRIDE(OP): Performed by: INTERNAL MEDICINE

## 2023-02-10 PROCEDURE — 700105 HCHG RX REV CODE 258: Performed by: INTERNAL MEDICINE

## 2023-02-10 PROCEDURE — A9270 NON-COVERED ITEM OR SERVICE: HCPCS | Performed by: INTERNAL MEDICINE

## 2023-02-10 PROCEDURE — 700111 HCHG RX REV CODE 636 W/ 250 OVERRIDE (IP): Performed by: INTERNAL MEDICINE

## 2023-02-10 PROCEDURE — 99232 SBSQ HOSP IP/OBS MODERATE 35: CPT | Performed by: INTERNAL MEDICINE

## 2023-02-10 PROCEDURE — 94760 N-INVAS EAR/PLS OXIMETRY 1: CPT

## 2023-02-10 RX ADMIN — HYDROMORPHONE HYDROCHLORIDE 1 MG: 1 INJECTION, SOLUTION INTRAMUSCULAR; INTRAVENOUS; SUBCUTANEOUS at 09:09

## 2023-02-10 RX ADMIN — ZONISAMIDE 150 MG: 50 CAPSULE ORAL at 17:32

## 2023-02-10 RX ADMIN — OXYCODONE HYDROCHLORIDE 10 MG: 10 TABLET ORAL at 21:09

## 2023-02-10 RX ADMIN — SODIUM CHLORIDE: 9 INJECTION, SOLUTION INTRAVENOUS at 19:52

## 2023-02-10 RX ADMIN — HYDROXYZINE HYDROCHLORIDE 50 MG: 25 TABLET, FILM COATED ORAL at 17:32

## 2023-02-10 RX ADMIN — PANCRELIPASE 6000 UNITS: 30000; 6000; 19000 CAPSULE, DELAYED RELEASE PELLETS ORAL at 11:15

## 2023-02-10 RX ADMIN — SODIUM CHLORIDE: 9 INJECTION, SOLUTION INTRAVENOUS at 04:18

## 2023-02-10 RX ADMIN — HYDROMORPHONE HYDROCHLORIDE 1 MG: 1 INJECTION, SOLUTION INTRAMUSCULAR; INTRAVENOUS; SUBCUTANEOUS at 16:11

## 2023-02-10 RX ADMIN — HYDROMORPHONE HYDROCHLORIDE 1 MG: 1 INJECTION, SOLUTION INTRAMUSCULAR; INTRAVENOUS; SUBCUTANEOUS at 06:20

## 2023-02-10 RX ADMIN — HYDROMORPHONE HYDROCHLORIDE 1 MG: 1 INJECTION, SOLUTION INTRAMUSCULAR; INTRAVENOUS; SUBCUTANEOUS at 12:31

## 2023-02-10 RX ADMIN — GABAPENTIN 100 MG: 100 CAPSULE ORAL at 06:20

## 2023-02-10 RX ADMIN — OMEPRAZOLE 20 MG: 20 CAPSULE, DELAYED RELEASE ORAL at 06:20

## 2023-02-10 RX ADMIN — DIVALPROEX SODIUM 250 MG: 250 TABLET, DELAYED RELEASE ORAL at 17:32

## 2023-02-10 RX ADMIN — OXYCODONE HYDROCHLORIDE 10 MG: 10 TABLET ORAL at 00:28

## 2023-02-10 RX ADMIN — OXYCODONE HYDROCHLORIDE 10 MG: 10 TABLET ORAL at 08:04

## 2023-02-10 RX ADMIN — OXYCODONE HYDROCHLORIDE 10 MG: 10 TABLET ORAL at 11:16

## 2023-02-10 RX ADMIN — HYDROMORPHONE HYDROCHLORIDE 1 MG: 1 INJECTION, SOLUTION INTRAMUSCULAR; INTRAVENOUS; SUBCUTANEOUS at 22:50

## 2023-02-10 RX ADMIN — OXYCODONE HYDROCHLORIDE 10 MG: 10 TABLET ORAL at 04:17

## 2023-02-10 RX ADMIN — SODIUM CHLORIDE: 9 INJECTION, SOLUTION INTRAVENOUS at 09:15

## 2023-02-10 RX ADMIN — PANCRELIPASE 6000 UNITS: 30000; 6000; 19000 CAPSULE, DELAYED RELEASE PELLETS ORAL at 08:03

## 2023-02-10 RX ADMIN — GABAPENTIN 300 MG: 300 CAPSULE ORAL at 17:32

## 2023-02-10 RX ADMIN — ONDANSETRON 4 MG: 2 INJECTION INTRAMUSCULAR; INTRAVENOUS at 17:32

## 2023-02-10 RX ADMIN — DIVALPROEX SODIUM 250 MG: 250 TABLET, DELAYED RELEASE ORAL at 06:20

## 2023-02-10 RX ADMIN — OXYCODONE HYDROCHLORIDE 10 MG: 10 TABLET ORAL at 17:32

## 2023-02-10 RX ADMIN — PANCRELIPASE 6000 UNITS: 30000; 6000; 19000 CAPSULE, DELAYED RELEASE PELLETS ORAL at 17:30

## 2023-02-10 RX ADMIN — HYDROMORPHONE HYDROCHLORIDE 1 MG: 1 INJECTION, SOLUTION INTRAMUSCULAR; INTRAVENOUS; SUBCUTANEOUS at 01:58

## 2023-02-10 RX ADMIN — OXYCODONE HYDROCHLORIDE 10 MG: 10 TABLET ORAL at 14:29

## 2023-02-10 RX ADMIN — HYDROMORPHONE HYDROCHLORIDE 1 MG: 1 INJECTION, SOLUTION INTRAMUSCULAR; INTRAVENOUS; SUBCUTANEOUS at 18:51

## 2023-02-10 ASSESSMENT — ENCOUNTER SYMPTOMS
CONSTITUTIONAL NEGATIVE: 1
NEUROLOGICAL NEGATIVE: 1
EYES NEGATIVE: 1
MUSCULOSKELETAL NEGATIVE: 1
ABDOMINAL PAIN: 1
RESPIRATORY NEGATIVE: 1
CARDIOVASCULAR NEGATIVE: 1

## 2023-02-10 ASSESSMENT — PAIN DESCRIPTION - PAIN TYPE: TYPE: ACUTE PAIN

## 2023-02-10 ASSESSMENT — FIBROSIS 4 INDEX
FIB4 SCORE: 0.82
FIB4 SCORE: 0.82

## 2023-02-10 ASSESSMENT — LIFESTYLE VARIABLES: SUBSTANCE_ABUSE: 1

## 2023-02-10 NOTE — ASSESSMENT & PLAN NOTE
- Incidentally found with routine testing for transfer.  -Not hypoxic, no respiratory symptoms.  Holding off on steroids or additional therapies.  -Continue to monitor.  Continue supportive symptomatic therapies with antitussives (Tessalon).  -Maintain on isolation precautions.

## 2023-02-10 NOTE — DISCHARGE PLANNING
Case Management Discharge Planning      Anticipated Discharge Dispo: Discharge Disposition: D/T to another type of health care inst. (70)    Action(s) Taken: LSW notified by transfer center RN Laya that transfer center destination changed to [facility] with TBA. Laya to fax TBA to this LSW for MD signature.     0945: LSW met with pt at bedside, explained TBA. Pt signed TBA.    1120: LSW faxed completed TBA to [facility] transfer center and [facility].    Next Steps: LSW to facilitate and assist as needed with TBA with [facility].

## 2023-02-10 NOTE — PROGRESS NOTES
Hospital Medicine Daily Progress Note    Date of Service  2/10/2023    Chief Complaint  LUQ pain    Hospital Course  Rhiannon Marrero is a 37 y.o. female with alcohol dependence, major depression, history of chronic alcohol pancreatitis, currently in an alcohol treatment program where she has been staying sober for days, admitted 2/8/2023 with progressively worsening abdominal pain despite self treatment with bowel rest and an oral rehydration.  On evaluation, lipase was 148.  She had no leukocytosis.  Electrolytes and renal function are normal.  LFTs are normal.  Notably, she had an MRCP in December and at that time was found to have 5 mm pancreatic duct stone but GI did not feel comfortable with ERCP and removal of stone duct that large and recommended that patient may require transfer to tertiary center for further intervention.  She is started on bowel rest and IV fluids.  Gabapentin dose was increased, and Creon was resumed.  GI was consulted, who gave the opinion that they are unable to perform ERCP due to  risk of pancreatitis from the procedure, risk of stone recurrence, and difficulty of ERCP. Dr. Rachel has recommended in the past that there was no indications for surgical treatment of this problem.   GI recommended transfer to a tertiary surgery center for further/definitive intervention for the pancreatic duct, which was pursued.     Interval Problem Update  2/10/2023 - I reviewed the patient's chart today. Uneventful night. VSS. Afebrile. Saturating well on RA.  She tested positive for COVID-19.  Lipase has normalized. Per [facility], [facility] in [place] can accept once bed available.    > I have personally seen and examined the patient today.  She still having abdominal pain, along with nausea but no vomiting.  She is tolerating her clear liquid diet.  Not short of breath.  No cough.  No respiratory symptoms.    I have discussed this patient's plan of care and discharge plan at IDT rounds today with Case  Management, Nursing, Nursing leadership, and other members of the IDT team.    Consultants/Specialty  GI    Code Status  Full Code    Disposition  Patient is not medically cleared for discharge.   Anticipate discharge to  tertiary center .  I have placed the appropriate orders for post-discharge needs.    Review of Systems  ROS     Pertinent positives/negatives as mentioned above.     A complete review of systems was personally done by me. All other systems were negative.       Physical Exam  Temp:  [36.7 °C (98.1 °F)-37.1 °C (98.7 °F)] 37.1 °C (98.7 °F)  Pulse:  [73-89] 73  Resp:  [16-18] 18  BP: ()/(57-65) 95/57  SpO2:  [91 %-99 %] 98 %    Physical Exam  Vitals reviewed.   Constitutional:       General: She is not in acute distress.     Appearance: Normal appearance. She is normal weight. She is not ill-appearing or diaphoretic.   HENT:      Head: Normocephalic and atraumatic.      Right Ear: External ear normal.      Left Ear: External ear normal.      Mouth/Throat:      Mouth: Mucous membranes are moist.      Pharynx: No oropharyngeal exudate or posterior oropharyngeal erythema.   Eyes:      General: No scleral icterus.     Extraocular Movements: Extraocular movements intact.      Conjunctiva/sclera: Conjunctivae normal.      Pupils: Pupils are equal, round, and reactive to light.   Cardiovascular:      Rate and Rhythm: Normal rate and regular rhythm.      Heart sounds: Normal heart sounds. No murmur heard.  Pulmonary:      Effort: Pulmonary effort is normal. No respiratory distress.      Breath sounds: Normal breath sounds. No stridor. No wheezing, rhonchi or rales.   Chest:      Chest wall: No tenderness.   Abdominal:      General: Bowel sounds are normal. There is no distension.      Palpations: Abdomen is soft. There is no mass.      Tenderness: There is abdominal tenderness (epigastrium). There is no guarding or rebound.   Musculoskeletal:         General: No swelling. Normal range of motion.       Cervical back: Normal range of motion and neck supple. No rigidity. No muscular tenderness.      Right lower leg: No edema.      Left lower leg: No edema.   Lymphadenopathy:      Cervical: No cervical adenopathy.   Skin:     General: Skin is warm and dry.      Coloration: Skin is not jaundiced.      Findings: No rash.   Neurological:      General: No focal deficit present.      Mental Status: She is alert and oriented to person, place, and time. Mental status is at baseline.      Cranial Nerves: No cranial nerve deficit.   Psychiatric:         Mood and Affect: Mood normal.         Behavior: Behavior normal.         Thought Content: Thought content normal.         Judgment: Judgment normal.       I have performed the physical examination today 2/10/2023.  In review of yesterday's note, there are no new changes except as documented above.      Fluids  No intake or output data in the 24 hours ending 02/10/23 1042    Laboratory  Recent Labs     02/08/23  1244 02/09/23  0050   WBC 5.5 4.3*   RBC 5.63* 4.27   HEMOGLOBIN 16.6* 12.5   HEMATOCRIT 51.1* 38.4   MCV 90.8 89.9   MCH 29.5 29.3   MCHC 32.5* 32.6*   RDW 44.4 43.8   PLATELETCT 196 191   MPV 10.8 10.3     Recent Labs     02/08/23  1244 02/09/23  0050   SODIUM 135 138   POTASSIUM 3.8 3.7   CHLORIDE 101 108   CO2 18* 18*   GLUCOSE 75 94   BUN 5* 7*   CREATININE 0.80 0.72   CALCIUM 9.0 7.5*                   Imaging  No orders to display        Assessment/Plan  * Acute on chronic pancreatitis (HCC)- (present on admission)  Assessment & Plan  - Due to 5 mm pancreatic duct stone found on MRCP in December 2022.  Lipase on admission at this time is 148, which has since normalized.  -Discussed with GI, who gave the opinion that they are unable to perform ERCP due to  risk of pancreatitis from the procedure, risk of stone recurrence, and difficulty of ERCP. Dr. Rachel as recommended in the past that there was no indications for surgical treatment of this problem.   GI  recommending transfer to a tertiary surgery center for further/definitive intervention for the pancreatic duct.  -Reportedly has been accepted by [facility] in [place], pending bed availability. RT[facility] working on transfers.  -Maintain on clear liquid diet for now.   -Continue NS at 200 cc/h.  -Continue analgesics with IV Dilaudid, and PRN oxycodone. Continue increased dose of gabapentin 100 mg in the morning and 300 mg at night.      Asymptomatic COVID-19 virus infection- (present on admission)  Assessment & Plan  - Incidentally found with routine testing for transfer.  -Not hypoxic, no respiratory symptoms.  Holding off on steroids or additional therapies.  -Continue to monitor.  -Maintain on isolation precautions.      Pancreatic duct stones- (present on admission)  Assessment & Plan  - Management as above.      Dehydration- (present on admission)  Assessment & Plan  - Continue IV fluids as above.    Chronic pancreatitis (HCC)- (present on admission)  Assessment & Plan  - Continue Creon, and gabapentin.  Management of pancreatic duct stone as above.    Alcohol use disorder, severe, in early remission, dependence (HCC)- (present on admission)  Assessment & Plan  -Patient currently in rehab outpatient and has been sober for 120 days.           VTE prophylaxis: enoxaparin ppx

## 2023-02-10 NOTE — PROGRESS NOTES
Gastroenterology Consult Note:    GILBERT Anguiano  Date & Time note created:    2/10/2023   8:53 AM     Referring MD:  Dr. Nestor Burgess    Patient ID:  Name:             Rhiannon Marrero   YOB: 1985  Age:                 37 y.o.  female   MRN:               [id number]                                                             Reason for Consult:      Abdominal pain  Chronic pancreatitis secondary to alcohol abuse    History of Present Illness:    This is a very pleasant 37 y.o. female with the past medical history of abuse currently sober (120 days), recurrent pancreatitis from alcohol, chronic pancreatitis, and pancreatic duct stone.  Presented to the hospital on 2/8/23 with complaints of epigastric abdominal pain nonradiating with nausea and vomiting.  Pain was rated as severe, sharp, and 10 out of 10.  Similar similar to previous admissions.  Lipase slightly elevated  148 resolved 55 this morning. CT scan abdomen/pelvi 2/1/23: Calcified stone in the pancreatic head with ductal dilatation, appearance suggesting choledocholithiasis with pancreatic ductal dilatation. Overall appears similar compared to prior study    CT and MRI imaging redemonstrating a pancreatic duct stone in the head of the pancreas with some upstream dilation.  She underwent endoscopic ultrasound and celiac plexus blockade and EUS, Dec. 2021: did demonstrate chronic pancreatitis, calcifications, and reticulocyte duct stone in the head of the pancreas not completely obstructive at the time.  Celiac axis block did not help with abdominal pain.    She has been hospitalized since January 2023 on 3 different occasions and has had multiple ER visits for chronic abdominal pain.    Interval history:  2/10/23: Diagnosed with COVID. Complaints of cough. Still having abdominal pain. Plan to transfer to [facility].     Review of Systems:      Review of Systems   Constitutional: Negative.    HENT: Negative.      Eyes: Negative.    Respiratory: Negative.     Cardiovascular: Negative.    Gastrointestinal:  Positive for abdominal pain.   Genitourinary: Negative.    Musculoskeletal: Negative.    Skin: Negative.    Neurological: Negative.    Psychiatric/Behavioral:  Positive for substance abuse.            Physical Exam:  Vitals/ General Appearance:   Weight/BMI: Body mass index is 22.37 kg/m².    Vitals:    02/09/23 1545 02/09/23 1956 02/10/23 0235 02/10/23 0738   BP: 112/65 96/61 109/63 95/57   Pulse: 89 78 77 73   Resp: 18 16 16 18   Temp: 36.7 °C (98.1 °F)   37.1 °C (98.7 °F)   TempSrc: Oral   Temporal   SpO2: 91% 99% 98% 98%   Weight:   65.8 kg (145 lb 1 oz) 64.8 kg (142 lb 13.7 oz)   Height:         Oxygen Therapy:  Pulse Oximetry: 98 %, O2 (LPM): 0, O2 Delivery Device: None - Room Air    Physical Exam  Vitals reviewed.   Constitutional:       Appearance: She is ill-appearing.   HENT:      Head: Normocephalic and atraumatic.      Mouth/Throat:      Mouth: Mucous membranes are moist.   Eyes:      Extraocular Movements: Extraocular movements intact.      Pupils: Pupils are equal, round, and reactive to light.   Cardiovascular:      Rate and Rhythm: Normal rate and regular rhythm.      Pulses: Normal pulses.      Heart sounds: Normal heart sounds.   Pulmonary:      Effort: Pulmonary effort is normal.      Breath sounds: Normal breath sounds.   Abdominal:      General: Bowel sounds are normal. There is no distension.      Palpations: Abdomen is soft.      Tenderness: There is abdominal tenderness (upper abdomen). There is guarding.   Musculoskeletal:         General: Normal range of motion.   Skin:     General: Skin is warm and dry.   Neurological:      General: No focal deficit present.      Mental Status: She is alert and oriented to person, place, and time.   Psychiatric:         Mood and Affect: Mood normal.         Behavior: Behavior normal.         Thought Content: Thought content normal.         Judgment: Judgment  normal.       Past Medical History:   Past Medical History:   Diagnosis Date    Acute pancreatitis without infection or necrosis 07/20/2022    Alcohol dependence (HCC) 04/13/2017    Bronchitis 08/2012    Cold     sept 2018    Current moderate episode of major depressive disorder without prior episode (HCC) 01/31/2020    Heart burn     Hernia of unspecified site of abdominal cavity without mention of obstruction or gangrene     High anion gap metabolic acidosis 07/01/2022    Indigestion     Pancreatitis     Pneumonia 2011    Seizure disorder (HCC) 05/23/2022       Past Surgical History:  Past Surgical History:   Procedure Laterality Date    TN UPPER GI ENDOSCOPY,DIAGNOSIS N/A 12/23/2022    Procedure: GASTROSCOPY;  Surgeon: Td Kwok M.D.;  Location: [facility];  Service: EUS    TN INJECT NERV BLCK,CELIAC PLEXUS N/A 12/23/2022    Procedure: BLOCK, CELIAC PLEXUS;  Surgeon: Td Kwok M.D.;  Location: [facility];  Service: EUS    EGD W/ENDOSCOPIC ULTRASOUND N/A 12/23/2022    Procedure: EGD, WITH ENDOSCOPIC US;  Surgeon: Td Kwok M.D.;  Location: [facility];  Service: EUS    ORIF, WRIST Right 4/24/2019    Procedure: ORIF, WRIST;  Surgeon: Marquis Bell M.D.;  Location: [facility];  Service: Orthopedics    HYSTERECTOMY ROBOTIC XI  10/11/2018    Procedure: HYSTERECTOMY ROBOTIC XI- RIGHT URETEROLYSIS;  Surgeon: Marquis Reeder M.D.;  Location: SURGERY [facility];  Service: Gynecology    SALPINGECTOMY Bilateral 10/11/2018    Procedure: SALPINGECTOMY;  Surgeon: Marquis Reeder M.D.;  Location: SURGERY [facility];  Service: Gynecology    TONSILLECTOMY  4/11/2013    Performed by Rock Rothman M.D. at SURGERY SAME DAY [facility]    ARTHROSCOPY, KNEE      GYN SURGERY      miscarriage May 2006    OTHER ORTHOPEDIC SURGERY      knee surgeries       Hospital Medications:    Current Facility-Administered Medications:     pancrelipase  (Lip-Prot-Amyl) (CREON 6000) 6000-92271 units capsule 6,000 Units, 1 Capsule, Oral, TID WITH MEALS, Ramila Nur D.O., 6,000 Units at 02/10/23 0803    zonisamide (ZONEGRAN) capsule 150 mg, 150 mg, Oral, Q EVENING, Ramila Nur D.O., 150 mg at 02/09/23 1723    gabapentin (NEURONTIN) capsule 300 mg, 300 mg, Oral, Q EVENING, Ramila Nur D.O., 300 mg at 02/09/23 1724    divalproex (DEPAKOTE) delayed-release tablet 250 mg, 250 mg, Oral, BID, Ramila Nur D.O., 250 mg at 02/10/23 0620    omeprazole (PRILOSEC) capsule 20 mg, 20 mg, Oral, DAILY, Ramila Nur D.O., 20 mg at 02/10/23 0620    hydrOXYzine HCl (ATARAX) tablet 50 mg, 50 mg, Oral, Q EVENING, Ramila Nur D.O., 50 mg at 02/09/23 1723    senna-docusate (PERICOLACE or SENOKOT S) 8.6-50 MG per tablet 2 Tablet, 2 Tablet, Oral, BID **AND** polyethylene glycol/lytes (MIRALAX) PACKET 1 Packet, 1 Packet, Oral, QDAY PRN **AND** magnesium hydroxide (MILK OF MAGNESIA) suspension 30 mL, 30 mL, Oral, QDAY PRN **AND** bisacodyl (DULCOLAX) suppository 10 mg, 10 mg, Rectal, QDAY PRN, Ramila Nur D.O.    NS infusion, , Intravenous, Continuous, Ramila Nur D.O., Last Rate: 200 mL/hr at 02/10/23 0418, New Bag at 02/10/23 0418    enoxaparin (Lovenox) inj 40 mg, 40 mg, Subcutaneous, DAILY AT 1800, Ramila Nur D.O.    Notify provider if pain remains uncontrolled, , , CONTINUOUS **AND** Use the Numeric Rating Scale (NRS), Oleary-Baker Faces (WBF), or FLACC on regular floors and Critical-Care Pain Observation Tool (CPOT) on ICUs/Trauma to assess pain, , , CONTINUOUS **AND** Pulse Ox, , , CONTINUOUS **AND** Pharmacy Consult Request ...Pain Management Review 1 Each, 1 Each, Other, PHARMACY TO DOSE **AND** If patient difficult to arouse and/or has respiratory depression (respiratory rate of 10 or less), stop any opiates that are currently infusing and call a Rapid Response., , , CONTINUOUS, Ramila Nur D.O.    ondansetron (ZOFRAN) syringe/vial injection 4 mg, 4 mg,  Intravenous, Q4HRS PRN, Ramila Nur D.O., 4 mg at 02/09/23 2314    ondansetron (ZOFRAN ODT) dispertab 4 mg, 4 mg, Oral, Q4HRS PRN, Ramila Nur D.O.    promethazine (PHENERGAN) tablet 12.5-25 mg, 12.5-25 mg, Oral, Q4HRS PRN, Ramila Nur D.O.    promethazine (PHENERGAN) suppository 12.5-25 mg, 12.5-25 mg, Rectal, Q4HRS PRN, Ramila Nur D.O.    prochlorperazine (COMPAZINE) injection 5-10 mg, 5-10 mg, Intravenous, Q4HRS PRN, Ramila Nur D.O.    oxyCODONE immediate-release (ROXICODONE) tablet 5 mg, 5 mg, Oral, Q3HRS PRN **OR** oxyCODONE immediate release (ROXICODONE) tablet 10 mg, 10 mg, Oral, Q3HRS PRN, 10 mg at 02/10/23 0804 **OR** HYDROmorphone (Dilaudid) injection 1 mg, 1 mg, Intravenous, Q3HRS PRN, Ramila Nur, D.O., 1 mg at 02/10/23 0620    gabapentin (NEURONTIN) capsule 100 mg, 100 mg, Oral, QAM, Ramila Nur, D.O., 100 mg at 02/10/23 0620    Current Outpatient Medications:  Current Facility-Administered Medications   Medication Dose Route Frequency Provider Last Rate Last Admin    pancrelipase (Lip-Prot-Amyl) (CREON 6000) 6000-62671 units capsule 6,000 Units  1 Capsule Oral TID WITH MEALS Ramila Nur, D.O.   6,000 Units at 02/10/23 0803    zonisamide (ZONEGRAN) capsule 150 mg  150 mg Oral Q EVENING Ramila Nur, D.O.   150 mg at 02/09/23 1723    gabapentin (NEURONTIN) capsule 300 mg  300 mg Oral Q EVENING Ramila Nur, D.O.   300 mg at 02/09/23 1724    divalproex (DEPAKOTE) delayed-release tablet 250 mg  250 mg Oral BID YEIMY TapiaOAjit   250 mg at 02/10/23 0620    omeprazole (PRILOSEC) capsule 20 mg  20 mg Oral DAILY GERALDINE Tapia.O.   20 mg at 02/10/23 0620    hydrOXYzine HCl (ATARAX) tablet 50 mg  50 mg Oral Q EVENING YEIMY TapiaO.   50 mg at 02/09/23 1723    senna-docusate (PERICOLACE or SENOKOT S) 8.6-50 MG per tablet 2 Tablet  2 Tablet Oral BID Ramila Nur D.O.        And    polyethylene glycol/lytes (MIRALAX) PACKET 1 Packet  1 Packet Oral QDAY PRN Ramila Nur D.O.         And    magnesium hydroxide (MILK OF MAGNESIA) suspension 30 mL  30 mL Oral QDAY PRN YEIMY TapiaOAjit        And    bisacodyl (DULCOLAX) suppository 10 mg  10 mg Rectal QDAY PRN Ramila Nur D.O.        NS infusion   Intravenous Continuous Ramila Nur D.O. 200 mL/hr at 02/10/23 0418 New Bag at 02/10/23 0418    enoxaparin (Lovenox) inj 40 mg  40 mg Subcutaneous DAILY AT 1800 Ramila Nur D.O.        Pharmacy Consult Request ...Pain Management Review 1 Each  1 Each Other PHARMACY TO DOSE Ramila Nur D.O.        ondansetron (ZOFRAN) syringe/vial injection 4 mg  4 mg Intravenous Q4HRS PRN GERALDINE Tapia.O.   4 mg at 02/09/23 2314    ondansetron (ZOFRAN ODT) dispertab 4 mg  4 mg Oral Q4HRS PRN YEIMY TapiaO.        promethazine (PHENERGAN) tablet 12.5-25 mg  12.5-25 mg Oral Q4HRS PRN GERALDINE Tapia.O.        promethazine (PHENERGAN) suppository 12.5-25 mg  12.5-25 mg Rectal Q4HRS PRN GERALDINE Tapia.O.        prochlorperazine (COMPAZINE) injection 5-10 mg  5-10 mg Intravenous Q4HRS PRN GERALDINE Tapia.O.        oxyCODONE immediate-release (ROXICODONE) tablet 5 mg  5 mg Oral Q3HRS PRN GERALDINE Tapia.O.        Or    oxyCODONE immediate release (ROXICODONE) tablet 10 mg  10 mg Oral Q3HRS PRN GERALDINE Tapia.O.   10 mg at 02/10/23 0804    Or    HYDROmorphone (Dilaudid) injection 1 mg  1 mg Intravenous Q3HRS PRN GERALDINE Tapia.O.   1 mg at 02/10/23 0620    gabapentin (NEURONTIN) capsule 100 mg  100 mg Oral QAM GERALDINE Tapia.O.   100 mg at 02/10/23 0620       Medication Allergy:  Allergies   Allergen Reactions    Naltrexone Anxiety    Promethazine Hcl Anxiety     Anxiety and makes her feels like skin coming off        Family History:  Family History   Problem Relation Age of Onset    Psychiatric Illness Mother     Stroke Mother     Arthritis Mother     Heart Disease Maternal Grandfather     Cancer Neg Hx     Diabetes Neg Hx     Hypertension Neg Hx        Social History:  Social History      Socioeconomic History    Marital status:      Spouse name: Not on file    Number of children: Not on file    Years of education: Not on file    Highest education level: Not on file   Occupational History    Not on file   Tobacco Use    Smoking status: Every Day     Packs/day: 0.50     Years: 10.00     Pack years: 5.00     Types: Cigarettes    Smokeless tobacco: Never   Vaping Use    Vaping Use: Former    Substances: Nicotine   Substance and Sexual Activity    Alcohol use: Not Currently     Comment: 119 days sober    Drug use: Not Currently    Sexual activity: Not on file   Other Topics Concern    Not on file   Social History Narrative    Not on file     Social Determinants of Health     Financial Resource Strain: Not on file   Food Insecurity: Not on file   Transportation Needs: Not on file   Physical Activity: Not on file   Stress: Not on file   Social Connections: Not on file   Intimate Partner Violence: Not on file   Housing Stability: Not on file         MDM (Data Review):     Records reviewed and summarized in current documentation    Lab Data Review:  Recent Results (from the past 24 hour(s))   COV-2, FLU A/B, AND RSV BY PCR (2-4 HOURS CEPHEID): Collect NP swab in VTM    Collection Time: 02/09/23  2:45 PM    Specimen: Nasopharyngeal; Respirate   Result Value Ref Range    Influenza virus A RNA Negative Negative    Influenza virus B, PCR Negative Negative    RSV, PCR Negative Negative    SARS-CoV-2 by PCR DETECTED (AA)     SARS-CoV-2 Source Nasal Swab        Imaging/Procedures Review:      No orders to display        MDM (Assessment and Plan):     Patient Active Problem List    Diagnosis Date Noted    Asymptomatic COVID-19 virus infection 02/10/2023    Portal hypertension (HCC) 01/24/2023    Acute on chronic pancreatitis (HCC) 01/23/2023    Pancreatic duct stones 01/09/2023    Polycythemia due to fall in plasma volume 01/02/2023    Thrombocytopenia (HCC) 12/22/2022    Acute recurrent pancreatitis  12/20/2022    Chronic pancreatitis (HCC) 12/20/2022    Sore throat 12/09/2022    Recurrent pancreatitis 12/07/2022    Bipolar disorder (HCC) 12/07/2022    Diarrhea 10/19/2022    Tachycardia 10/16/2022    Abnormal iron saturation 10/14/2022    Lymphocytosis 10/14/2022    Elevated bilirubin 10/13/2022    Hypophosphatemia 10/13/2022    Normocytic anemia 10/13/2022    Hypoglycemia 10/12/2022    Abdominal pain 09/07/2022    Alcohol dependence with withdrawal (HCC) 09/05/2022    Alcohol-induced acute pancreatitis, unspecified complication status 08/15/2022    Leukocytosis 08/15/2022    Recurrent oral herpes simplex 08/01/2022    Migraine 07/03/2022    Tinnitus of both ears 07/03/2022    High anion gap metabolic acidosis 07/01/2022    Lactic acidosis 07/01/2022    Dehydration 07/01/2022    Starvation ketoacidosis 07/01/2022    Hypocalcemia 07/01/2022    Hypokalemia due to excessive gastrointestinal loss of potassium 05/24/2022    Transaminitis 05/24/2022    Pancreatitis, recurrent 05/23/2022    Seizure disorder (HCC) 05/23/2022    Interstitial cystitis (chronic) without hematuria 01/31/2020    Alcohol use disorder, severe, in early remission, dependence ([facility]) 11/26/2019    Pain due to interstitial cystitis 08/12/2019    Seasonal allergic rhinitis 08/12/2019    Tobacco abuse 08/12/2019    Bipolar affective disorder, current episode hypomanic ([facility]) 06/11/2019    Low back pain with sciatica 01/08/2019    S/P hysterectomy 01/08/2019    Chronic pain syndrome 01/25/2018    Anxiety 04/13/2017    Alcohol dependence ([facility]) 04/13/2017    Syncope 04/13/2017     This is a 38 yo female with a history of chronic abdominal pain and pancreatitis secondary to alcohol abuse. Has been sober for 120 days. Admitted to the hospital 2/8/23 with exacerbation of abdominal pain. Lipase 148 but quickly went back to normal next day. CT scan: Calcified stone in the pancreatic head with ductal dilatation, appearance suggesting choledocholithiasis with  pancreatic ductal dilatation. Unable to perform ERCP due to  risk of pancreatitis from the procedure, risk of stone recurrence, and difficulty of ERCP.  Additionally, Dr. Rachel was asked about this patient and he said there was no indications for surgical treatment of this problem.     Patient had a celiac axis block which did not help with abdominal pain. Unfortunately, continues to be hospitalized on multiple occasions due to chronic abdominal pain.    ASSESSMENT:  Chronic pancreatitis  Alcohol abuse--sober x 120 days  Chronic abdominal pain    PLAN:  Recommend transfer to tertiary hospital  Pain management per primary team  Diet as tolerated    GI WILL SIGN OFF. PLEASE CALL IF ANY QUESTIONS OR CONCERNS.    Thank your for the opportunity to assist in the care of your patient.  Please call for any questions or concerns.    CAMACHO Anguiano.

## 2023-02-10 NOTE — CARE PLAN
The patient is Stable - Low risk of patient condition declining or worsening    Shift Goals  Clinical Goals: Pain control, DC plan  Patient Goals: Pain control, DC plan  Family Goals: FANTASMA    Progress made toward(s) clinical / shift goals:    Problem: Pain - Standard  Goal: Alleviation of pain or a reduction in pain to the patient’s comfort goal  Outcome: Progressing  Note: Patient appropriately verbalizes needs related to pain.      Problem: Knowledge Deficit - Standard  Goal: Patient and family/care givers will demonstrate understanding of plan of care, disease process/condition, diagnostic tests and medications  Outcome: Progressing  Note: Updated patient on plan of care including medications and treatments. Encouraged verbalization of questions and concerns. All concerns have been addressed at this time.        Patient is not progressing towards the following goals:

## 2023-02-10 NOTE — CARE PLAN
Patient A&Ox4, VS stable, room air, IV infusing, given PRN pain meds, bed low and locked, call light within reach    The patient is Stable - Low risk of patient condition declining or worsening    Shift Goals  Clinical Goals: pain/nausea control  Patient Goals: IVF, pain/nausea control  Family Goals: FANTASMA    Progress made toward(s) clinical / shift goals:    Problem: Pain - Standard  Goal: Alleviation of pain or a reduction in pain to the patient’s comfort goal  Outcome: Progressing     Problem: Knowledge Deficit - Standard  Goal: Patient and family/care givers will demonstrate understanding of plan of care, disease process/condition, diagnostic tests and medications  Outcome: Progressing       Patient is not progressing towards the following goals:

## 2023-02-11 LAB
ALBUMIN SERPL BCP-MCNC: 3.2 G/DL (ref 3.2–4.9)
ALBUMIN/GLOB SERPL: 1.3 G/DL
ALP SERPL-CCNC: 51 U/L (ref 30–99)
ALT SERPL-CCNC: 15 U/L (ref 2–50)
ANION GAP SERPL CALC-SCNC: 10 MMOL/L (ref 7–16)
AST SERPL-CCNC: 14 U/L (ref 12–45)
BILIRUB SERPL-MCNC: 0.4 MG/DL (ref 0.1–1.5)
BUN SERPL-MCNC: 2 MG/DL (ref 8–22)
CALCIUM ALBUM COR SERPL-MCNC: 8.8 MG/DL (ref 8.5–10.5)
CALCIUM SERPL-MCNC: 8.2 MG/DL (ref 8.4–10.2)
CHLORIDE SERPL-SCNC: 110 MMOL/L (ref 96–112)
CO2 SERPL-SCNC: 20 MMOL/L (ref 20–33)
CREAT SERPL-MCNC: 0.54 MG/DL (ref 0.5–1.4)
ERYTHROCYTE [DISTWIDTH] IN BLOOD BY AUTOMATED COUNT: 41.2 FL (ref 35.9–50)
GFR SERPLBLD CREATININE-BSD FMLA CKD-EPI: 122 ML/MIN/1.73 M 2
GLOBULIN SER CALC-MCNC: 2.5 G/DL (ref 1.9–3.5)
GLUCOSE SERPL-MCNC: 98 MG/DL (ref 65–99)
HCT VFR BLD AUTO: 33.7 % (ref 37–47)
HGB BLD-MCNC: 11.2 G/DL (ref 12–16)
LIPASE SERPL-CCNC: 14 U/L (ref 7–58)
MCH RBC QN AUTO: 29.5 PG (ref 27–33)
MCHC RBC AUTO-ENTMCNC: 33.2 G/DL (ref 33.6–35)
MCV RBC AUTO: 88.7 FL (ref 81.4–97.8)
PLATELET # BLD AUTO: 168 K/UL (ref 164–446)
PMV BLD AUTO: 9.9 FL (ref 9–12.9)
POTASSIUM SERPL-SCNC: 3.6 MMOL/L (ref 3.6–5.5)
PROT SERPL-MCNC: 5.7 G/DL (ref 6–8.2)
RBC # BLD AUTO: 3.8 M/UL (ref 4.2–5.4)
SODIUM SERPL-SCNC: 140 MMOL/L (ref 135–145)
WBC # BLD AUTO: 3.5 K/UL (ref 4.8–10.8)

## 2023-02-11 PROCEDURE — 99232 SBSQ HOSP IP/OBS MODERATE 35: CPT | Performed by: INTERNAL MEDICINE

## 2023-02-11 PROCEDURE — 85027 COMPLETE CBC AUTOMATED: CPT

## 2023-02-11 PROCEDURE — 700102 HCHG RX REV CODE 250 W/ 637 OVERRIDE(OP): Performed by: INTERNAL MEDICINE

## 2023-02-11 PROCEDURE — 770001 HCHG ROOM/CARE - MED/SURG/GYN PRIV*

## 2023-02-11 PROCEDURE — 700105 HCHG RX REV CODE 258: Performed by: INTERNAL MEDICINE

## 2023-02-11 PROCEDURE — 83690 ASSAY OF LIPASE: CPT

## 2023-02-11 PROCEDURE — 94760 N-INVAS EAR/PLS OXIMETRY 1: CPT

## 2023-02-11 PROCEDURE — 36415 COLL VENOUS BLD VENIPUNCTURE: CPT

## 2023-02-11 PROCEDURE — A9270 NON-COVERED ITEM OR SERVICE: HCPCS | Performed by: INTERNAL MEDICINE

## 2023-02-11 PROCEDURE — 80053 COMPREHEN METABOLIC PANEL: CPT

## 2023-02-11 PROCEDURE — 700111 HCHG RX REV CODE 636 W/ 250 OVERRIDE (IP): Performed by: INTERNAL MEDICINE

## 2023-02-11 RX ORDER — BENZONATATE 100 MG/1
100 CAPSULE ORAL 3 TIMES DAILY PRN
Status: DISCONTINUED | OUTPATIENT
Start: 2023-02-11 | End: 2023-02-14 | Stop reason: HOSPADM

## 2023-02-11 RX ADMIN — DIVALPROEX SODIUM 250 MG: 250 TABLET, DELAYED RELEASE ORAL at 17:55

## 2023-02-11 RX ADMIN — DIVALPROEX SODIUM 250 MG: 250 TABLET, DELAYED RELEASE ORAL at 05:44

## 2023-02-11 RX ADMIN — PANCRELIPASE 6000 UNITS: 30000; 6000; 19000 CAPSULE, DELAYED RELEASE PELLETS ORAL at 12:41

## 2023-02-11 RX ADMIN — HYDROMORPHONE HYDROCHLORIDE 1 MG: 1 INJECTION, SOLUTION INTRAMUSCULAR; INTRAVENOUS; SUBCUTANEOUS at 17:55

## 2023-02-11 RX ADMIN — OXYCODONE HYDROCHLORIDE 10 MG: 10 TABLET ORAL at 04:26

## 2023-02-11 RX ADMIN — HYDROXYZINE HYDROCHLORIDE 50 MG: 25 TABLET, FILM COATED ORAL at 17:55

## 2023-02-11 RX ADMIN — OXYCODONE HYDROCHLORIDE 10 MG: 10 TABLET ORAL at 12:41

## 2023-02-11 RX ADMIN — SODIUM CHLORIDE: 9 INJECTION, SOLUTION INTRAVENOUS at 01:59

## 2023-02-11 RX ADMIN — OXYCODONE HYDROCHLORIDE 10 MG: 10 TABLET ORAL at 20:14

## 2023-02-11 RX ADMIN — OXYCODONE HYDROCHLORIDE 10 MG: 10 TABLET ORAL at 16:06

## 2023-02-11 RX ADMIN — SODIUM CHLORIDE: 9 INJECTION, SOLUTION INTRAVENOUS at 12:41

## 2023-02-11 RX ADMIN — HYDROMORPHONE HYDROCHLORIDE 1 MG: 1 INJECTION, SOLUTION INTRAMUSCULAR; INTRAVENOUS; SUBCUTANEOUS at 10:07

## 2023-02-11 RX ADMIN — SODIUM CHLORIDE: 9 INJECTION, SOLUTION INTRAVENOUS at 07:23

## 2023-02-11 RX ADMIN — OXYCODONE HYDROCHLORIDE 10 MG: 10 TABLET ORAL at 09:00

## 2023-02-11 RX ADMIN — HYDROMORPHONE HYDROCHLORIDE 1 MG: 1 INJECTION, SOLUTION INTRAMUSCULAR; INTRAVENOUS; SUBCUTANEOUS at 05:44

## 2023-02-11 RX ADMIN — SENNOSIDES AND DOCUSATE SODIUM 2 TABLET: 50; 8.6 TABLET ORAL at 17:55

## 2023-02-11 RX ADMIN — OMEPRAZOLE 20 MG: 20 CAPSULE, DELAYED RELEASE ORAL at 05:44

## 2023-02-11 RX ADMIN — HYDROMORPHONE HYDROCHLORIDE 1 MG: 1 INJECTION, SOLUTION INTRAMUSCULAR; INTRAVENOUS; SUBCUTANEOUS at 14:08

## 2023-02-11 RX ADMIN — HYDROMORPHONE HYDROCHLORIDE 1 MG: 1 INJECTION, SOLUTION INTRAMUSCULAR; INTRAVENOUS; SUBCUTANEOUS at 01:56

## 2023-02-11 RX ADMIN — OXYCODONE HYDROCHLORIDE 10 MG: 10 TABLET ORAL at 00:36

## 2023-02-11 RX ADMIN — PANCRELIPASE 6000 UNITS: 30000; 6000; 19000 CAPSULE, DELAYED RELEASE PELLETS ORAL at 17:55

## 2023-02-11 RX ADMIN — BENZONATATE 100 MG: 100 CAPSULE ORAL at 14:09

## 2023-02-11 RX ADMIN — GABAPENTIN 100 MG: 100 CAPSULE ORAL at 05:44

## 2023-02-11 RX ADMIN — OXYCODONE HYDROCHLORIDE 10 MG: 10 TABLET ORAL at 23:27

## 2023-02-11 RX ADMIN — ZONISAMIDE 150 MG: 50 CAPSULE ORAL at 17:55

## 2023-02-11 RX ADMIN — ONDANSETRON 4 MG: 2 INJECTION INTRAMUSCULAR; INTRAVENOUS at 17:54

## 2023-02-11 RX ADMIN — PANCRELIPASE 6000 UNITS: 30000; 6000; 19000 CAPSULE, DELAYED RELEASE PELLETS ORAL at 07:30

## 2023-02-11 RX ADMIN — GABAPENTIN 300 MG: 300 CAPSULE ORAL at 17:55

## 2023-02-11 RX ADMIN — HYDROMORPHONE HYDROCHLORIDE 1 MG: 1 INJECTION, SOLUTION INTRAMUSCULAR; INTRAVENOUS; SUBCUTANEOUS at 21:28

## 2023-02-11 ASSESSMENT — PAIN DESCRIPTION - PAIN TYPE
TYPE: ACUTE PAIN
TYPE: ACUTE PAIN;CHRONIC PAIN
TYPE: ACUTE PAIN
TYPE: ACUTE PAIN
TYPE: ACUTE PAIN;CHRONIC PAIN

## 2023-02-11 NOTE — PROGRESS NOTES
Hospital Medicine Daily Progress Note    Date of Service  2/11/2023    Chief Complaint  LUQ pain    Hospital Course  Rhiannon Marrero is a 37 y.o. female with alcohol dependence, major depression, history of chronic alcohol pancreatitis, currently in an alcohol treatment program where she has been staying sober for days, admitted 2/8/2023 with progressively worsening abdominal pain despite self treatment with bowel rest and an oral rehydration.  On evaluation, lipase was 148.  She had no leukocytosis.  Electrolytes and renal function are normal.  LFTs are normal.  Notably, she had an MRCP in December and at that time was found to have 5 mm pancreatic duct stone but GI did not feel comfortable with ERCP and removal of stone duct that large and recommended that patient may require transfer to tertiary center for further intervention.  She is started on bowel rest and IV fluids.  Gabapentin dose was increased, and Creon was resumed.  GI was consulted, who gave the opinion that they are unable to perform ERCP due to  risk of pancreatitis from the procedure, risk of stone recurrence, and difficulty of ERCP. Dr. Rachel has recommended in the past that there was no indications for surgical treatment of this problem.   GI recommended transfer to a tertiary surgery center for further/definitive intervention for the pancreatic duct, which was pursued. She tested positive for COVID-19 on routine testing for transfer requirements, but she remained asymptomatic and so she did not require any COVID-19 therapies.  Lipase has normalized.    Interval Problem Update  2/11/2023 - I reviewed the patient's chart today. Uneventful night. VSS. Afebrile. Saturating well on RA.  Labs are stable.  Hemoglobin stable.  Electrolytes and renal function are normal.  Lipase 14.  LFTs are normal.    > I have personally seen and examined the patient today.  She had some nausea overnight, and rated her abdominal pain at 6 out of 10 this  morning.  No bowel movement changes.  She has occasional cough, but nonproductive.  Not short of breath.  No chest pain.    I have discussed this patient's plan of care and discharge plan at IDT rounds today with Case Management, Nursing, Nursing leadership, and other members of the IDT team.    Consultants/Specialty  GI    Code Status  Full Code    Disposition  Patient is not medically cleared for discharge.   Anticipate discharge to  tertiary center . [facility] accepted pending bed availability.  I have placed the appropriate orders for post-discharge needs.    Review of Systems  ROS     Pertinent positives/negatives as mentioned above.     A complete review of systems was personally done by me. All other systems were negative.       Physical Exam  Temp:  [36.8 °C (98.2 °F)-37.1 °C (98.8 °F)] 36.9 °C (98.4 °F)  Pulse:  [67-80] 67  Resp:  [14-18] 14  BP: ()/(57-75) 107/57  SpO2:  [94 %-97 %] 94 %    Physical Exam  Vitals reviewed.   Constitutional:       General: She is not in acute distress.     Appearance: Normal appearance. She is normal weight. She is not ill-appearing or diaphoretic.   HENT:      Head: Normocephalic and atraumatic.      Right Ear: External ear normal.      Left Ear: External ear normal.      Mouth/Throat:      Mouth: Mucous membranes are moist.      Pharynx: No oropharyngeal exudate or posterior oropharyngeal erythema.   Eyes:      General: No scleral icterus.     Extraocular Movements: Extraocular movements intact.      Conjunctiva/sclera: Conjunctivae normal.      Pupils: Pupils are equal, round, and reactive to light.   Cardiovascular:      Rate and Rhythm: Normal rate and regular rhythm.      Heart sounds: Normal heart sounds. No murmur heard.  Pulmonary:      Effort: Pulmonary effort is normal. No respiratory distress.      Breath sounds: Normal breath sounds. No stridor. No wheezing, rhonchi or rales.   Chest:      Chest wall: No tenderness.   Abdominal:      General: Bowel sounds are  normal. There is no distension.      Palpations: Abdomen is soft. There is no mass.      Tenderness: There is abdominal tenderness (epigastrium). There is no guarding or rebound.   Musculoskeletal:         General: No swelling. Normal range of motion.      Cervical back: Normal range of motion and neck supple. No rigidity. No muscular tenderness.      Right lower leg: No edema.      Left lower leg: No edema.   Lymphadenopathy:      Cervical: No cervical adenopathy.   Skin:     General: Skin is warm and dry.      Coloration: Skin is not jaundiced.      Findings: No rash.   Neurological:      General: No focal deficit present.      Mental Status: She is alert and oriented to person, place, and time. Mental status is at baseline.      Cranial Nerves: No cranial nerve deficit.   Psychiatric:         Mood and Affect: Mood normal.         Behavior: Behavior normal.         Thought Content: Thought content normal.         Judgment: Judgment normal.       I have performed the physical examination today 2/11/2023.  In review of yesterday's note, there are no new changes except as documented above.      Fluids    Intake/Output Summary (Last 24 hours) at 2/11/2023 1129  Last data filed at 2/10/2023 2300  Gross per 24 hour   Intake 4421.7 ml   Output --   Net 4421.7 ml       Laboratory  Recent Labs     02/08/23  1244 02/09/23  0050 02/11/23  0804   WBC 5.5 4.3* 3.5*   RBC 5.63* 4.27 3.80*   HEMOGLOBIN 16.6* 12.5 11.2*   HEMATOCRIT 51.1* 38.4 33.7*   MCV 90.8 89.9 88.7   MCH 29.5 29.3 29.5   MCHC 32.5* 32.6* 33.2*   RDW 44.4 43.8 41.2   PLATELETCT 196 191 168   MPV 10.8 10.3 9.9     Recent Labs     02/08/23  1244 02/09/23  0050 02/11/23  0804   SODIUM 135 138 140   POTASSIUM 3.8 3.7 3.6   CHLORIDE 101 108 110   CO2 18* 18* 20   GLUCOSE 75 94 98   BUN 5* 7* 2*   CREATININE 0.80 0.72 0.54   CALCIUM 9.0 7.5* 8.2*                   Imaging  No orders to display        Assessment/Plan  * Acute on chronic pancreatitis (HCC)- (present  on admission)  Assessment & Plan  - Due to 5 mm pancreatic duct stone found on MRCP in December 2022.  Lipase on admission at this time is 148, which has since normalized.  - GI gave the opinion that they are unable to perform ERCP due to  risk of pancreatitis from the procedure, risk of stone recurrence, and difficulty of ERCP. Dr. Rachel as recommended in the past that there was no indications for surgical treatment of this problem.   GI recommending transfer to a tertiary surgery center for further/definitive intervention for the pancreatic duct.  -Has been accepted by [facility] in [place], pending bed availability. [facility] working on transfers.  -Maintain on clear liquid diet for now.   -Now that lipase is normal, decrease IVF rate to 125 cc/h.    -Continue analgesics with IV Dilaudid, and PRN oxycodone. Continue increased dose of gabapentin 100 mg in the morning and 300 mg at night.      Asymptomatic COVID-19 virus infection- (present on admission)  Assessment & Plan  - Incidentally found with routine testing for transfer.  -Not hypoxic, no respiratory symptoms.  Holding off on steroids or additional therapies.  -Continue to monitor.  -Maintain on isolation precautions.      Pancreatic duct stones- (present on admission)  Assessment & Plan  - Management as above.      Dehydration- (present on admission)  Assessment & Plan  - Continue IV fluids as above.    Chronic pancreatitis (HCC)- (present on admission)  Assessment & Plan  - Continue Creon, and gabapentin.  Management of pancreatic duct stone as above.    Alcohol use disorder, severe, in early remission, dependence (HCC)- (present on admission)  Assessment & Plan  -Patient currently in rehab outpatient and has been sober for 120 days.           VTE prophylaxis: enoxaparin ppx

## 2023-02-11 NOTE — CARE PLAN
The patient is Stable - Low risk of patient condition declining or worsening    Shift Goals  Clinical Goals: pain control  Patient Goals: pain control, DC plan  Family Goals: FANTASMA    Progress made toward(s) clinical / shift goals:    Problem: Pain - Standard  Goal: Alleviation of pain or a reduction in pain to the patient’s comfort goal  Outcome: Progressing     Problem: Knowledge Deficit - Standard  Goal: Patient and family/care givers will demonstrate understanding of plan of care, disease process/condition, diagnostic tests and medications  Outcome: Progressing       Patient is not progressing towards the following goals:

## 2023-02-11 NOTE — PROGRESS NOTES
Assumed care and received report from LESLIE Mora. Pt is A&Ox4, complaints of abdomen pain 8/10 on 0-10 scale. Medicated per MAR. Updated Pt on POC for night, Pt has no questions at this time. Bed in lowest and locked position, call light within reach, all safety measures in place.

## 2023-02-12 PROCEDURE — 770001 HCHG ROOM/CARE - MED/SURG/GYN PRIV*

## 2023-02-12 PROCEDURE — A9270 NON-COVERED ITEM OR SERVICE: HCPCS | Performed by: INTERNAL MEDICINE

## 2023-02-12 PROCEDURE — 700102 HCHG RX REV CODE 250 W/ 637 OVERRIDE(OP): Performed by: INTERNAL MEDICINE

## 2023-02-12 PROCEDURE — 700105 HCHG RX REV CODE 258: Performed by: INTERNAL MEDICINE

## 2023-02-12 PROCEDURE — 700111 HCHG RX REV CODE 636 W/ 250 OVERRIDE (IP): Performed by: INTERNAL MEDICINE

## 2023-02-12 PROCEDURE — 99232 SBSQ HOSP IP/OBS MODERATE 35: CPT | Performed by: INTERNAL MEDICINE

## 2023-02-12 PROCEDURE — 94760 N-INVAS EAR/PLS OXIMETRY 1: CPT

## 2023-02-12 RX ADMIN — DIVALPROEX SODIUM 250 MG: 250 TABLET, DELAYED RELEASE ORAL at 05:37

## 2023-02-12 RX ADMIN — HYDROMORPHONE HYDROCHLORIDE 1 MG: 1 INJECTION, SOLUTION INTRAMUSCULAR; INTRAVENOUS; SUBCUTANEOUS at 11:48

## 2023-02-12 RX ADMIN — OXYCODONE HYDROCHLORIDE 10 MG: 10 TABLET ORAL at 21:09

## 2023-02-12 RX ADMIN — ZONISAMIDE 150 MG: 50 CAPSULE ORAL at 17:12

## 2023-02-12 RX ADMIN — HYDROMORPHONE HYDROCHLORIDE 1 MG: 1 INJECTION, SOLUTION INTRAMUSCULAR; INTRAVENOUS; SUBCUTANEOUS at 22:25

## 2023-02-12 RX ADMIN — HYDROMORPHONE HYDROCHLORIDE 1 MG: 1 INJECTION, SOLUTION INTRAMUSCULAR; INTRAVENOUS; SUBCUTANEOUS at 08:28

## 2023-02-12 RX ADMIN — PANCRELIPASE 6000 UNITS: 30000; 6000; 19000 CAPSULE, DELAYED RELEASE PELLETS ORAL at 16:56

## 2023-02-12 RX ADMIN — OXYCODONE HYDROCHLORIDE 10 MG: 10 TABLET ORAL at 02:42

## 2023-02-12 RX ADMIN — HYDROMORPHONE HYDROCHLORIDE 1 MG: 1 INJECTION, SOLUTION INTRAMUSCULAR; INTRAVENOUS; SUBCUTANEOUS at 15:32

## 2023-02-12 RX ADMIN — PANCRELIPASE 6000 UNITS: 30000; 6000; 19000 CAPSULE, DELAYED RELEASE PELLETS ORAL at 06:58

## 2023-02-12 RX ADMIN — HYDROMORPHONE HYDROCHLORIDE 1 MG: 1 INJECTION, SOLUTION INTRAMUSCULAR; INTRAVENOUS; SUBCUTANEOUS at 18:27

## 2023-02-12 RX ADMIN — ONDANSETRON 4 MG: 2 INJECTION INTRAMUSCULAR; INTRAVENOUS at 16:56

## 2023-02-12 RX ADMIN — SODIUM CHLORIDE: 9 INJECTION, SOLUTION INTRAVENOUS at 13:34

## 2023-02-12 RX ADMIN — OXYCODONE HYDROCHLORIDE 10 MG: 10 TABLET ORAL at 06:58

## 2023-02-12 RX ADMIN — OXYCODONE HYDROCHLORIDE 10 MG: 10 TABLET ORAL at 13:34

## 2023-02-12 RX ADMIN — HYDROMORPHONE HYDROCHLORIDE 1 MG: 1 INJECTION, SOLUTION INTRAMUSCULAR; INTRAVENOUS; SUBCUTANEOUS at 03:56

## 2023-02-12 RX ADMIN — GABAPENTIN 300 MG: 300 CAPSULE ORAL at 17:12

## 2023-02-12 RX ADMIN — GABAPENTIN 100 MG: 100 CAPSULE ORAL at 05:37

## 2023-02-12 RX ADMIN — HYDROXYZINE HYDROCHLORIDE 50 MG: 25 TABLET, FILM COATED ORAL at 17:12

## 2023-02-12 RX ADMIN — OXYCODONE HYDROCHLORIDE 5 MG: 5 TABLET ORAL at 17:12

## 2023-02-12 RX ADMIN — HYDROMORPHONE HYDROCHLORIDE 1 MG: 1 INJECTION, SOLUTION INTRAMUSCULAR; INTRAVENOUS; SUBCUTANEOUS at 00:39

## 2023-02-12 RX ADMIN — DIVALPROEX SODIUM 250 MG: 250 TABLET, DELAYED RELEASE ORAL at 17:12

## 2023-02-12 RX ADMIN — PANCRELIPASE 6000 UNITS: 30000; 6000; 19000 CAPSULE, DELAYED RELEASE PELLETS ORAL at 11:46

## 2023-02-12 RX ADMIN — OMEPRAZOLE 20 MG: 20 CAPSULE, DELAYED RELEASE ORAL at 05:37

## 2023-02-12 RX ADMIN — OXYCODONE HYDROCHLORIDE 10 MG: 10 TABLET ORAL at 10:17

## 2023-02-12 ASSESSMENT — FIBROSIS 4 INDEX: FIB4 SCORE: 0.75

## 2023-02-12 ASSESSMENT — PAIN DESCRIPTION - PAIN TYPE
TYPE: ACUTE PAIN
TYPE: ACUTE PAIN

## 2023-02-12 ASSESSMENT — PAIN SCALES - WONG BAKER: WONGBAKER_NUMERICALRESPONSE: HURTS A LITTLE MORE

## 2023-02-12 NOTE — PROGRESS NOTES
Hospital Medicine Daily Progress Note    Date of Service  2/12/2023    Chief Complaint  LUQ pain    Hospital Course  Rhiannon Marrero is a 37 y.o. female with alcohol dependence, major depression, history of chronic alcohol pancreatitis, currently in an alcohol treatment program where she has been staying sober for days, admitted 2/8/2023 with progressively worsening abdominal pain despite self treatment with bowel rest and an oral rehydration.  On evaluation, lipase was 148.  She had no leukocytosis.  Electrolytes and renal function are normal.  LFTs are normal.  Notably, she had an MRCP in December and at that time was found to have 5 mm pancreatic duct stone but GI did not feel comfortable with ERCP and removal of stone duct that large and recommended that patient may require transfer to tertiary center for further intervention.  She is started on bowel rest and IV fluids.  Gabapentin dose was increased, and Creon was resumed.  GI was consulted, who gave the opinion that they are unable to perform ERCP due to  risk of pancreatitis from the procedure, risk of stone recurrence, and difficulty of ERCP. Dr. Rachel has recommended in the past that there was no indications for surgical treatment of this problem.   GI recommended transfer to a tertiary surgery center for further/definitive intervention for the pancreatic duct, which was pursued. She tested positive for COVID-19 on routine testing for transfer requirements, but she remained asymptomatic and so she did not require any COVID-19 therapies.  Lipase has normalized.    Interval Problem Update  2/12/2023 - I reviewed the patient's chart today. Uneventful night. VSS. Afebrile. Saturating well on RA.  No new labs today.    > I have personally seen and examined the patient today.  She stated she was nauseated yesterday, but better today.  Still with epigastric abdominal pain, rated 6 out of 10 in severity, worse with coughing.  Cough is better today.  No  bowel movement since the 8th.  She is tolerating her clear liquid diet.      I have discussed this patient's plan of care and discharge plan at IDT rounds today with Case Management, Nursing, Nursing leadership, and other members of the IDT team.    Consultants/Specialty  GI    Code Status  Full Code    Disposition  Patient is not medically cleared for discharge.   Anticipate discharge to  tertiary center . [facility] accepted pending bed availability.  I have placed the appropriate orders for post-discharge needs.    Review of Systems  ROS     Pertinent positives/negatives as mentioned above.     A complete review of systems was personally done by me. All other systems were negative.       Physical Exam  Temp:  [36.6 °C (97.8 °F)-37.1 °C (98.8 °F)] 36.6 °C (97.8 °F)  Pulse:  [61-81] 61  Resp:  [16-18] 18  BP: (110-131)/(66-82) 110/66  SpO2:  [94 %-99 %] 94 %    Physical Exam  Vitals reviewed.   Constitutional:       General: She is not in acute distress.     Appearance: Normal appearance. She is normal weight. She is not ill-appearing or diaphoretic.   HENT:      Head: Normocephalic and atraumatic.      Right Ear: External ear normal.      Left Ear: External ear normal.      Mouth/Throat:      Mouth: Mucous membranes are moist.      Pharynx: No oropharyngeal exudate or posterior oropharyngeal erythema.   Eyes:      General: No scleral icterus.     Extraocular Movements: Extraocular movements intact.      Conjunctiva/sclera: Conjunctivae normal.      Pupils: Pupils are equal, round, and reactive to light.   Cardiovascular:      Rate and Rhythm: Normal rate and regular rhythm.      Heart sounds: Normal heart sounds. No murmur heard.  Pulmonary:      Effort: Pulmonary effort is normal. No respiratory distress.      Breath sounds: Normal breath sounds. No stridor. No wheezing, rhonchi or rales.   Chest:      Chest wall: No tenderness.   Abdominal:      General: Bowel sounds are normal. There is no distension.      Palpations:  Abdomen is soft. There is no mass.      Tenderness: There is abdominal tenderness (epigastrium). There is no guarding or rebound.   Musculoskeletal:         General: No swelling. Normal range of motion.      Cervical back: Normal range of motion and neck supple. No rigidity. No muscular tenderness.      Right lower leg: No edema.      Left lower leg: No edema.   Lymphadenopathy:      Cervical: No cervical adenopathy.   Skin:     General: Skin is warm and dry.      Coloration: Skin is not jaundiced.      Findings: No rash.   Neurological:      General: No focal deficit present.      Mental Status: She is alert and oriented to person, place, and time. Mental status is at baseline.      Cranial Nerves: No cranial nerve deficit.   Psychiatric:         Mood and Affect: Mood normal.         Behavior: Behavior normal.         Thought Content: Thought content normal.         Judgment: Judgment normal.       I have performed the physical examination today 2/12/2023.  In review of yesterday's note, there are no new changes except as documented above.      Fluids    Intake/Output Summary (Last 24 hours) at 2/12/2023 0811  Last data filed at 2/11/2023 1241  Gross per 24 hour   Intake 250 ml   Output --   Net 250 ml       Laboratory  Recent Labs     02/11/23  0804   WBC 3.5*   RBC 3.80*   HEMOGLOBIN 11.2*   HEMATOCRIT 33.7*   MCV 88.7   MCH 29.5   MCHC 33.2*   RDW 41.2   PLATELETCT 168   MPV 9.9     Recent Labs     02/11/23  0804   SODIUM 140   POTASSIUM 3.6   CHLORIDE 110   CO2 20   GLUCOSE 98   BUN 2*   CREATININE 0.54   CALCIUM 8.2*                   Imaging  No orders to display        Assessment/Plan  * Acute on chronic pancreatitis (HCC)- (present on admission)  Assessment & Plan  - Due to 5 mm pancreatic duct stone found on MRCP in December 2022.  Lipase on admission at this time is 148, which has since normalized.  - GI gave the opinion that they are unable to perform ERCP due to  risk of pancreatitis from the procedure,  risk of stone recurrence, and difficulty of ERCP. Dr. Rachel as recommended in the past that there was no indications for surgical treatment of this problem.   GI recommending transfer to a tertiary surgery center for further/definitive intervention for the pancreatic duct.  -Has been accepted by [facility] in [place], pending bed availability. [facility] working on transfers.  -Maintain on clear liquid diet for now.   -Lipase has normalized.  Continue IV fluids at 125 cc/h.    -Continue analgesics with IV Dilaudid, and PRN oxycodone. Continue increased dose of gabapentin 100 mg in the morning and 300 mg at night.      Asymptomatic COVID-19 virus infection- (present on admission)  Assessment & Plan  - Incidentally found with routine testing for transfer.  -Not hypoxic, no respiratory symptoms.  Holding off on steroids or additional therapies.  -Continue to monitor.  Continue supportive symptomatic therapies with antitussives (Tessalon).  -Maintain on isolation precautions.      Pancreatic duct stones- (present on admission)  Assessment & Plan  - Management as above.      Dehydration- (present on admission)  Assessment & Plan  - Continue IV fluids as above.    Chronic pancreatitis (HCC)- (present on admission)  Assessment & Plan  - Continue Creon, and gabapentin.  Management of pancreatic duct stone as above.    Alcohol use disorder, severe, in early remission, dependence (HCC)- (present on admission)  Assessment & Plan  -Patient currently in rehab outpatient and has been sober for 120 days.           VTE prophylaxis: enoxaparin ppx

## 2023-02-12 NOTE — CARE PLAN
The patient is Stable - Low risk of patient condition declining or worsening    Shift Goals  Clinical Goals: pain control  Patient Goals: pain management  Family Goals: FANTASMA    Progress made toward(s) clinical / shift goals:      Problem: Pain - Standard  Goal: Alleviation of pain or a reduction in pain to the patient’s comfort goal  2/12/2023 0002 by Rock Lawson R.N.  Outcome: Progressing  Note: Pt update on POC to include IVF therapy, pain management, and surgical procedure/ transfer.   2/12/2023 0002 by Rock Lawson RALLYSSA.  Outcome: Progressing       Patient is not progressing towards the following goals:

## 2023-02-12 NOTE — DISCHARGE PLANNING
Case Management Discharge Planning    Anticipated Discharge Dispo: Discharge Disposition: D/T to another type of health care inst. (70)    Action(s) Taken: SW reviewed transfer center notes. Accepting facility does not have a bed available today and is unlikely to have beds soon.     Medically Clear: Yes- for transfer    Barriers to Discharge: Pending Placement

## 2023-02-12 NOTE — CARE PLAN
Problem: Pain - Standard  Goal: Alleviation of pain or a reduction in pain to the patient’s comfort goal  Outcome: Progressing     Problem: Knowledge Deficit - Standard  Goal: Patient and family/care givers will demonstrate understanding of plan of care, disease process/condition, diagnostic tests and medications  Outcome: Progressing   The patient is Stable - Low risk of patient condition declining or worsening    Shift Goals  Clinical Goals: pain control  Patient Goals: pain management  Family Goals: FANTASMA

## 2023-02-12 NOTE — CARE PLAN
The patient is Stable - Low risk of patient condition declining or worsening    Shift Goals  Clinical Goals: pain control, safety  Patient Goals: pain control rest  Family Goals: FANTASMA    Progress made toward(s) clinical / shift goals:  Pain managed with PRN medications, IV fluids given continuously per MAR.  Patient comfortable and compliant with safety/fall precautions, bed low and locked, call light in reach. Isolation precautions in place for COVID infection.    Problem: Pain - Standard  Goal: Alleviation of pain or a reduction in pain to the patient’s comfort goal  Outcome: Progressing     Problem: Knowledge Deficit - Standard  Goal: Patient and family/care givers will demonstrate understanding of plan of care, disease process/condition, diagnostic tests and medications  Outcome: Progressing       Patient is not progressing towards the following goals: n/a

## 2023-02-13 LAB
ALBUMIN SERPL BCP-MCNC: 3.3 G/DL (ref 3.2–4.9)
ALBUMIN/GLOB SERPL: 1.4 G/DL
ALP SERPL-CCNC: 53 U/L (ref 30–99)
ALT SERPL-CCNC: 8 U/L (ref 2–50)
ANION GAP SERPL CALC-SCNC: 13 MMOL/L (ref 7–16)
AST SERPL-CCNC: 12 U/L (ref 12–45)
BILIRUB SERPL-MCNC: 0.4 MG/DL (ref 0.1–1.5)
BUN SERPL-MCNC: <2 MG/DL (ref 8–22)
CALCIUM ALBUM COR SERPL-MCNC: 8.7 MG/DL (ref 8.5–10.5)
CALCIUM SERPL-MCNC: 8.1 MG/DL (ref 8.4–10.2)
CHLORIDE SERPL-SCNC: 109 MMOL/L (ref 96–112)
CO2 SERPL-SCNC: 20 MMOL/L (ref 20–33)
CREAT SERPL-MCNC: 0.55 MG/DL (ref 0.5–1.4)
ERYTHROCYTE [DISTWIDTH] IN BLOOD BY AUTOMATED COUNT: 39 FL (ref 35.9–50)
GFR SERPLBLD CREATININE-BSD FMLA CKD-EPI: 122 ML/MIN/1.73 M 2
GLOBULIN SER CALC-MCNC: 2.4 G/DL (ref 1.9–3.5)
GLUCOSE SERPL-MCNC: 97 MG/DL (ref 65–99)
HCT VFR BLD AUTO: 37.5 % (ref 37–47)
HGB BLD-MCNC: 12.9 G/DL (ref 12–16)
LIPASE SERPL-CCNC: 9 U/L (ref 7–58)
MCH RBC QN AUTO: 29.7 PG (ref 27–33)
MCHC RBC AUTO-ENTMCNC: 34.4 G/DL (ref 33.6–35)
MCV RBC AUTO: 86.2 FL (ref 81.4–97.8)
PLATELET # BLD AUTO: 199 K/UL (ref 164–446)
PMV BLD AUTO: 9.8 FL (ref 9–12.9)
POTASSIUM SERPL-SCNC: 3.4 MMOL/L (ref 3.6–5.5)
PROT SERPL-MCNC: 5.7 G/DL (ref 6–8.2)
RBC # BLD AUTO: 4.35 M/UL (ref 4.2–5.4)
SODIUM SERPL-SCNC: 142 MMOL/L (ref 135–145)
WBC # BLD AUTO: 3.2 K/UL (ref 4.8–10.8)

## 2023-02-13 PROCEDURE — 94760 N-INVAS EAR/PLS OXIMETRY 1: CPT

## 2023-02-13 PROCEDURE — 85027 COMPLETE CBC AUTOMATED: CPT

## 2023-02-13 PROCEDURE — 83690 ASSAY OF LIPASE: CPT

## 2023-02-13 PROCEDURE — 700111 HCHG RX REV CODE 636 W/ 250 OVERRIDE (IP): Performed by: INTERNAL MEDICINE

## 2023-02-13 PROCEDURE — 700102 HCHG RX REV CODE 250 W/ 637 OVERRIDE(OP): Performed by: INTERNAL MEDICINE

## 2023-02-13 PROCEDURE — A9270 NON-COVERED ITEM OR SERVICE: HCPCS | Performed by: INTERNAL MEDICINE

## 2023-02-13 PROCEDURE — 700105 HCHG RX REV CODE 258: Performed by: INTERNAL MEDICINE

## 2023-02-13 PROCEDURE — 80053 COMPREHEN METABOLIC PANEL: CPT

## 2023-02-13 PROCEDURE — 99232 SBSQ HOSP IP/OBS MODERATE 35: CPT | Performed by: INTERNAL MEDICINE

## 2023-02-13 PROCEDURE — 770001 HCHG ROOM/CARE - MED/SURG/GYN PRIV*

## 2023-02-13 PROCEDURE — 36415 COLL VENOUS BLD VENIPUNCTURE: CPT

## 2023-02-13 RX ADMIN — HYDROMORPHONE HYDROCHLORIDE 1 MG: 1 INJECTION, SOLUTION INTRAMUSCULAR; INTRAVENOUS; SUBCUTANEOUS at 01:27

## 2023-02-13 RX ADMIN — OXYCODONE HYDROCHLORIDE 10 MG: 10 TABLET ORAL at 13:53

## 2023-02-13 RX ADMIN — PANCRELIPASE 6000 UNITS: 30000; 6000; 19000 CAPSULE, DELAYED RELEASE PELLETS ORAL at 11:38

## 2023-02-13 RX ADMIN — OMEPRAZOLE 20 MG: 20 CAPSULE, DELAYED RELEASE ORAL at 04:46

## 2023-02-13 RX ADMIN — OXYCODONE HYDROCHLORIDE 10 MG: 10 TABLET ORAL at 19:14

## 2023-02-13 RX ADMIN — GABAPENTIN 300 MG: 300 CAPSULE ORAL at 17:05

## 2023-02-13 RX ADMIN — HYDROMORPHONE HYDROCHLORIDE 1 MG: 1 INJECTION, SOLUTION INTRAMUSCULAR; INTRAVENOUS; SUBCUTANEOUS at 17:04

## 2023-02-13 RX ADMIN — OXYCODONE HYDROCHLORIDE 10 MG: 10 TABLET ORAL at 09:38

## 2023-02-13 RX ADMIN — HYDROMORPHONE HYDROCHLORIDE 1 MG: 1 INJECTION, SOLUTION INTRAMUSCULAR; INTRAVENOUS; SUBCUTANEOUS at 23:18

## 2023-02-13 RX ADMIN — HYDROMORPHONE HYDROCHLORIDE 1 MG: 1 INJECTION, SOLUTION INTRAMUSCULAR; INTRAVENOUS; SUBCUTANEOUS at 04:46

## 2023-02-13 RX ADMIN — HYDROMORPHONE HYDROCHLORIDE 1 MG: 1 INJECTION, SOLUTION INTRAMUSCULAR; INTRAVENOUS; SUBCUTANEOUS at 20:53

## 2023-02-13 RX ADMIN — DIVALPROEX SODIUM 250 MG: 250 TABLET, DELAYED RELEASE ORAL at 17:05

## 2023-02-13 RX ADMIN — GABAPENTIN 100 MG: 100 CAPSULE ORAL at 04:47

## 2023-02-13 RX ADMIN — OXYCODONE HYDROCHLORIDE 10 MG: 10 TABLET ORAL at 22:07

## 2023-02-13 RX ADMIN — OXYCODONE HYDROCHLORIDE 10 MG: 10 TABLET ORAL at 06:43

## 2023-02-13 RX ADMIN — PANCRELIPASE 6000 UNITS: 30000; 6000; 19000 CAPSULE, DELAYED RELEASE PELLETS ORAL at 06:42

## 2023-02-13 RX ADMIN — DIVALPROEX SODIUM 250 MG: 250 TABLET, DELAYED RELEASE ORAL at 04:47

## 2023-02-13 RX ADMIN — OXYCODONE HYDROCHLORIDE 10 MG: 10 TABLET ORAL at 00:22

## 2023-02-13 RX ADMIN — PANCRELIPASE 6000 UNITS: 30000; 6000; 19000 CAPSULE, DELAYED RELEASE PELLETS ORAL at 17:04

## 2023-02-13 RX ADMIN — SODIUM CHLORIDE: 9 INJECTION, SOLUTION INTRAVENOUS at 09:39

## 2023-02-13 RX ADMIN — HYDROXYZINE HYDROCHLORIDE 50 MG: 25 TABLET, FILM COATED ORAL at 17:05

## 2023-02-13 RX ADMIN — OXYCODONE HYDROCHLORIDE 10 MG: 10 TABLET ORAL at 03:31

## 2023-02-13 RX ADMIN — ZONISAMIDE 150 MG: 50 CAPSULE ORAL at 17:04

## 2023-02-13 RX ADMIN — HYDROMORPHONE HYDROCHLORIDE 1 MG: 1 INJECTION, SOLUTION INTRAMUSCULAR; INTRAVENOUS; SUBCUTANEOUS at 11:37

## 2023-02-13 ASSESSMENT — PAIN DESCRIPTION - PAIN TYPE
TYPE: ACUTE PAIN
TYPE: ACUTE PAIN;CHRONIC PAIN
TYPE: ACUTE PAIN
TYPE: ACUTE PAIN

## 2023-02-13 ASSESSMENT — FIBROSIS 4 INDEX: FIB4 SCORE: 0.75

## 2023-02-13 NOTE — DISCHARGE PLANNING
Case Management Discharge Planning    Admission Date: 2/8/2023  GMLOS: 2.3  ALOS: 5    6-Clicks ADL Score: 24  6-Clicks Mobility Score: 24      Anticipated Discharge Dispo: Discharge Disposition: D/T to another type of health care inst. (70)    DME Needed: No    Action(s) Taken: Patient discussed during IDT rounds.  Plan is for the patient to transfer to [facility] in [place].  Per [facility] notes, no bed is currently available at [facility].    Escalations Completed: None    Medically Clear: No, but stable for transfer to [facility].    Next Steps: Care coordination to follow up with [facility].    Barriers to Discharge: Pending Placement    Is the patient up for discharge tomorrow: Potentially

## 2023-02-13 NOTE — PROGRESS NOTES
Received bedside report from LESLIE Bangura, pt care assumed. VSS, pt assessment complete. Pt A&Ox4, pt c/o 8/10 abdominal pain at this time. POC discussed with pt and verbalizes no questions. Pt denies any additional needs at this time. Bed locked and in lowest position. Pt educated on fall risk and verbalized understanding, call light within reach, hourly rounding initiated.

## 2023-02-13 NOTE — DIETARY
Nutrition Services Brief Update:    Day 5 of admit.  Rhiannon Marrero is a 37 y.o. female with admitting DX of Acute on chronic pancreatitis (HCC) [K85.90, K86.1]    Current Diet: clear liquids. NPO/clear liquids x 5 days. Recorded PO intake 0 to 50-75% w/ avg of 30%. Per MD's note, pt with increased abdominal pain and nausea today. Pt would benefit from advancement of diet beyond clears. Until this occurs, RD will add Boost Breeze for additional nutrition.     Wt gain noted since admit but this most likely is fluid related. Pt is +5 liters since admit.     Problem: Nutritional:  Goal: Achieve adequate nutritional intake  Description: Diet advanced beyond clears. Patient will consume >50% of meals  Outcome: not progressing.     RD following.

## 2023-02-13 NOTE — CARE PLAN
The patient is Stable - Low risk of patient condition declining or worsening    Shift Goals  Clinical Goals: pain control  Patient Goals: comfort  Family Goals: FANTASMA    Progress made toward(s) clinical / shift goals:      Problem: Pain - Standard  Goal: Alleviation of pain or a reduction in pain to the patient’s comfort goal  Outcome: Progressing     Problem: Knowledge Deficit - Standard  Goal: Patient and family/care givers will demonstrate understanding of plan of care, disease process/condition, diagnostic tests and medications  Outcome: Progressing       Patient is not progressing towards the following goals:

## 2023-02-13 NOTE — CARE PLAN
Problem: Pain - Standard  Goal: Alleviation of pain or a reduction in pain to the patient’s comfort goal  Outcome: Progressing     Problem: Knowledge Deficit - Standard  Goal: Patient and family/care givers will demonstrate understanding of plan of care, disease process/condition, diagnostic tests and medications  Outcome: Progressing   The patient is Stable - Low risk of patient condition declining or worsening    Shift Goals  Clinical Goals: pain control, transfer to other facility  Patient Goals: pain control  Family Goals: FANTASMA    Progress made toward(s) clinical / shift goals:  updated pt on plan of care, continue with pain management. Pt reported 9/10 pain in abdomen medicated per MAR. Continue to monitor pain.     Patient is not progressing towards the following goals:

## 2023-02-13 NOTE — PROGRESS NOTES
Hospital Medicine Daily Progress Note    Date of Service  2/13/2023    Chief Complaint  LUQ pain    Hospital Course  Rhiannon Marrero is a 37 y.o. female with alcohol dependence, major depression, history of chronic alcohol pancreatitis, currently in an alcohol treatment program where she has been staying sober for days, admitted 2/8/2023 with progressively worsening abdominal pain despite self treatment with bowel rest and an oral rehydration.  On evaluation, lipase was 148.  She had no leukocytosis.  Electrolytes and renal function are normal.  LFTs are normal.  Notably, she had an MRCP in December and at that time was found to have 5 mm pancreatic duct stone but GI did not feel comfortable with ERCP and removal of stone duct that large and recommended that patient may require transfer to tertiary center for further intervention.  She is started on bowel rest and IV fluids.  Gabapentin dose was increased, and Creon was resumed.  GI was consulted, who gave the opinion that they are unable to perform ERCP due to  risk of pancreatitis from the procedure, risk of stone recurrence, and difficulty of ERCP. Dr. Rachel has recommended in the past that there was no indications for surgical treatment of this problem.   GI recommended transfer to a tertiary surgery center for further/definitive intervention for the pancreatic duct, which was pursued. She tested positive for COVID-19 on routine testing for transfer requirements, but she remained asymptomatic and so she did not require any COVID-19 therapies.  Lipase has normalized.    Interval Problem Update  2/13/2023 - I reviewed the patient's chart. There were no significant overnight events. Remains hemodynamically stable and afebrile. Stable on RA.  Awaiting bed availability at [facility]    > I have personally seen and examined the patient today.  She has increased abdominal pain today.  She also feels nauseated.  No vomiting.  No shortness of breath.  No  diarrhea.      I have discussed this patient's plan of care and discharge plan at IDT rounds today with Case Management, Nursing, Nursing leadership, and other members of the IDT team.    Consultants/Specialty  GI    Code Status  Full Code    Disposition  Patient is not medically cleared for discharge.   Anticipate discharge to  tertiary center . [facility] accepted pending bed availability.  I have placed the appropriate orders for post-discharge needs.    Review of Systems  ROS     Pertinent positives/negatives as mentioned above.     A complete review of systems was personally done by me. All other systems were negative.       Physical Exam  Temp:  [36.7 °C (98 °F)-37.2 °C (99 °F)] 37.2 °C (99 °F)  Pulse:  [67-92] 92  Resp:  [16-18] 16  BP: (109-121)/(60-86) 117/78  SpO2:  [94 %-100 %] 94 %    Physical Exam  Vitals reviewed.   Constitutional:       General: She is not in acute distress.     Appearance: Normal appearance. She is normal weight. She is not ill-appearing or diaphoretic.   HENT:      Head: Normocephalic and atraumatic.      Right Ear: External ear normal.      Left Ear: External ear normal.      Mouth/Throat:      Mouth: Mucous membranes are moist.      Pharynx: No oropharyngeal exudate or posterior oropharyngeal erythema.   Eyes:      General: No scleral icterus.     Extraocular Movements: Extraocular movements intact.      Conjunctiva/sclera: Conjunctivae normal.      Pupils: Pupils are equal, round, and reactive to light.   Cardiovascular:      Rate and Rhythm: Normal rate and regular rhythm.      Heart sounds: Normal heart sounds. No murmur heard.  Pulmonary:      Effort: Pulmonary effort is normal. No respiratory distress.      Breath sounds: Normal breath sounds. No stridor. No wheezing, rhonchi or rales.   Chest:      Chest wall: No tenderness.   Abdominal:      General: Bowel sounds are normal. There is no distension.      Palpations: Abdomen is soft. There is no mass.      Tenderness: There is  abdominal tenderness (epigastrium). There is no guarding or rebound.   Musculoskeletal:         General: No swelling. Normal range of motion.      Cervical back: Normal range of motion and neck supple. No rigidity. No muscular tenderness.      Right lower leg: No edema.      Left lower leg: No edema.   Lymphadenopathy:      Cervical: No cervical adenopathy.   Skin:     General: Skin is warm and dry.      Coloration: Skin is not jaundiced.      Findings: No rash.   Neurological:      General: No focal deficit present.      Mental Status: She is alert and oriented to person, place, and time. Mental status is at baseline.      Cranial Nerves: No cranial nerve deficit.   Psychiatric:         Mood and Affect: Mood normal.         Behavior: Behavior normal.         Thought Content: Thought content normal.         Judgment: Judgment normal.       I have performed the physical examination today 2/13/2023.  In review of yesterday's note, there are no new changes except as documented above.      Fluids    Intake/Output Summary (Last 24 hours) at 2/13/2023 0958  Last data filed at 2/12/2023 1827  Gross per 24 hour   Intake 460 ml   Output --   Net 460 ml       Laboratory  Recent Labs     02/11/23  0804   WBC 3.5*   RBC 3.80*   HEMOGLOBIN 11.2*   HEMATOCRIT 33.7*   MCV 88.7   MCH 29.5   MCHC 33.2*   RDW 41.2   PLATELETCT 168   MPV 9.9     Recent Labs     02/11/23  0804   SODIUM 140   POTASSIUM 3.6   CHLORIDE 110   CO2 20   GLUCOSE 98   BUN 2*   CREATININE 0.54   CALCIUM 8.2*                   Imaging  No orders to display        Assessment/Plan  * Acute on chronic pancreatitis (HCC)- (present on admission)  Assessment & Plan  - Due to 5 mm pancreatic duct stone found on MRCP in December 2022.  Lipase on admission at this time is 148, which has since normalized.  - GI gave the opinion that they are unable to perform ERCP due to  risk of pancreatitis from the procedure, risk of stone recurrence, and difficulty of ERCP.   Virginie as recommended in the past that there was no indications for surgical treatment of this problem.   GI recommending transfer to a tertiary surgery center for further/definitive intervention for the pancreatic duct.  -Has been accepted by [facility] in [place], pending bed availability. RTOC working on transfers.  -Maintain on clear liquid diet for now.   -Lipase has normalized.  Continue IV fluids at 125 cc/h.    -Labs today including BMP, LFTs, and lipase.  -Still has significant pain, requiring significant amount of analgesics.  Continue IV Dilaudid, and PRN oxycodone. Continue increased dose of gabapentin 100 mg in the morning and 300 mg at night.      Asymptomatic COVID-19 virus infection- (present on admission)  Assessment & Plan  - Incidentally found with routine testing for transfer.  -Not hypoxic, no respiratory symptoms.  Holding off on steroids or additional therapies.  -Continue to monitor.  Continue supportive symptomatic therapies with antitussives (Tessalon).  -Maintain on isolation precautions.      Pancreatic duct stones- (present on admission)  Assessment & Plan  - Management as above.      Dehydration- (present on admission)  Assessment & Plan  - Continue IV fluids as above.    Chronic pancreatitis (HCC)- (present on admission)  Assessment & Plan  - Continue Creon, and gabapentin.  Management of pancreatic duct stone as above.    Alcohol use disorder, severe, in early remission, dependence (HCC)- (present on admission)  Assessment & Plan  -Patient currently in rehab outpatient and has been sober for 120 days.           VTE prophylaxis: enoxaparin ppx

## 2023-02-14 VITALS
TEMPERATURE: 97.9 F | BODY MASS INDEX: 22.66 KG/M2 | SYSTOLIC BLOOD PRESSURE: 114 MMHG | WEIGHT: 144.4 LBS | RESPIRATION RATE: 18 BRPM | OXYGEN SATURATION: 98 % | HEIGHT: 67 IN | HEART RATE: 71 BPM | DIASTOLIC BLOOD PRESSURE: 61 MMHG

## 2023-02-14 PROCEDURE — 700105 HCHG RX REV CODE 258: Performed by: INTERNAL MEDICINE

## 2023-02-14 PROCEDURE — 700102 HCHG RX REV CODE 250 W/ 637 OVERRIDE(OP): Performed by: INTERNAL MEDICINE

## 2023-02-14 PROCEDURE — 700111 HCHG RX REV CODE 636 W/ 250 OVERRIDE (IP): Performed by: INTERNAL MEDICINE

## 2023-02-14 PROCEDURE — A9270 NON-COVERED ITEM OR SERVICE: HCPCS | Performed by: INTERNAL MEDICINE

## 2023-02-14 RX ADMIN — SODIUM CHLORIDE: 9 INJECTION, SOLUTION INTRAVENOUS at 01:12

## 2023-02-14 RX ADMIN — OXYCODONE HYDROCHLORIDE 10 MG: 10 TABLET ORAL at 04:38

## 2023-02-14 RX ADMIN — HYDROMORPHONE HYDROCHLORIDE 1 MG: 1 INJECTION, SOLUTION INTRAMUSCULAR; INTRAVENOUS; SUBCUTANEOUS at 07:52

## 2023-02-14 RX ADMIN — ONDANSETRON 4 MG: 4 TABLET, ORALLY DISINTEGRATING ORAL at 07:25

## 2023-02-14 RX ADMIN — GABAPENTIN 100 MG: 100 CAPSULE ORAL at 04:39

## 2023-02-14 RX ADMIN — PANCRELIPASE 6000 UNITS: 30000; 6000; 19000 CAPSULE, DELAYED RELEASE PELLETS ORAL at 07:24

## 2023-02-14 RX ADMIN — HYDROMORPHONE HYDROCHLORIDE 1 MG: 1 INJECTION, SOLUTION INTRAMUSCULAR; INTRAVENOUS; SUBCUTANEOUS at 05:44

## 2023-02-14 RX ADMIN — OXYCODONE HYDROCHLORIDE 10 MG: 10 TABLET ORAL at 07:24

## 2023-02-14 RX ADMIN — HYDROMORPHONE HYDROCHLORIDE 1 MG: 1 INJECTION, SOLUTION INTRAMUSCULAR; INTRAVENOUS; SUBCUTANEOUS at 02:23

## 2023-02-14 RX ADMIN — OMEPRAZOLE 20 MG: 20 CAPSULE, DELAYED RELEASE ORAL at 04:38

## 2023-02-14 RX ADMIN — OXYCODONE HYDROCHLORIDE 10 MG: 10 TABLET ORAL at 01:12

## 2023-02-14 RX ADMIN — DIVALPROEX SODIUM 250 MG: 250 TABLET, DELAYED RELEASE ORAL at 04:38

## 2023-02-14 ASSESSMENT — FIBROSIS 4 INDEX: FIB4 SCORE: 0.75

## 2023-02-14 NOTE — CARE PLAN
The patient is Stable - Low risk of patient condition declining or worsening    Shift Goals  Clinical Goals: Pain Management  Patient Goals: Rest  Family Goals: FANTASMA    Progress made toward(s) clinical / shift goals:    Problem: Pain - Standard  Goal: Alleviation of pain or a reduction in pain to the patient’s comfort goal  Outcome: Progressing  Note: Pain medications being given per MAR     Problem: Knowledge Deficit - Standard  Goal: Patient and family/care givers will demonstrate understanding of plan of care, disease process/condition, diagnostic tests and medications  Outcome: Progressing  Note: POC discussed with patient, phonecall was made to family to inform them of possible transfer tonight       Patient is not progressing towards the following goals:

## 2023-02-14 NOTE — PROGRESS NOTES
Report endorsed to Marielos NELSON at [facility], all questions answered. Pt transport documents in chart ready for transport crew.

## 2023-03-13 ENCOUNTER — HOSPITAL ENCOUNTER (EMERGENCY)
Facility: MEDICAL CENTER | Age: 38
End: 2023-03-13
Attending: EMERGENCY MEDICINE
Payer: COMMERCIAL

## 2023-03-13 ENCOUNTER — APPOINTMENT (OUTPATIENT)
Dept: RADIOLOGY | Facility: MEDICAL CENTER | Age: 38
End: 2023-03-13
Attending: EMERGENCY MEDICINE
Payer: COMMERCIAL

## 2023-03-13 VITALS
HEART RATE: 85 BPM | RESPIRATION RATE: 16 BRPM | OXYGEN SATURATION: 95 % | TEMPERATURE: 97.3 F | DIASTOLIC BLOOD PRESSURE: 73 MMHG | SYSTOLIC BLOOD PRESSURE: 104 MMHG

## 2023-03-13 DIAGNOSIS — R11.2 NAUSEA AND VOMITING, UNSPECIFIED VOMITING TYPE: ICD-10-CM

## 2023-03-13 DIAGNOSIS — R10.13 EPIGASTRIC ABDOMINAL PAIN: ICD-10-CM

## 2023-03-13 LAB
ALBUMIN SERPL BCP-MCNC: 4.2 G/DL (ref 3.2–4.9)
ALBUMIN/GLOB SERPL: 1.4 G/DL
ALP SERPL-CCNC: 114 U/L (ref 30–99)
ALT SERPL-CCNC: 26 U/L (ref 2–50)
ANION GAP SERPL CALC-SCNC: 14 MMOL/L (ref 7–16)
APPEARANCE UR: CLEAR
AST SERPL-CCNC: 28 U/L (ref 12–45)
BASOPHILS # BLD AUTO: 0 % (ref 0–1.8)
BASOPHILS # BLD: 0 K/UL (ref 0–0.12)
BILIRUB SERPL-MCNC: 0.5 MG/DL (ref 0.1–1.5)
BILIRUB UR QL STRIP.AUTO: NEGATIVE
BUN SERPL-MCNC: 4 MG/DL (ref 8–22)
CALCIUM ALBUM COR SERPL-MCNC: 9 MG/DL (ref 8.5–10.5)
CALCIUM SERPL-MCNC: 9.2 MG/DL (ref 8.4–10.2)
CHLORIDE SERPL-SCNC: 105 MMOL/L (ref 96–112)
CO2 SERPL-SCNC: 20 MMOL/L (ref 20–33)
COLOR UR: YELLOW
CREAT SERPL-MCNC: 0.58 MG/DL (ref 0.5–1.4)
EOSINOPHIL # BLD AUTO: 2.9 K/UL (ref 0–0.51)
EOSINOPHIL NFR BLD: 21 % (ref 0–6.9)
ERYTHROCYTE [DISTWIDTH] IN BLOOD BY AUTOMATED COUNT: 41 FL (ref 35.9–50)
GFR SERPLBLD CREATININE-BSD FMLA CKD-EPI: 120 ML/MIN/1.73 M 2
GLOBULIN SER CALC-MCNC: 3.1 G/DL (ref 1.9–3.5)
GLUCOSE SERPL-MCNC: 85 MG/DL (ref 65–99)
GLUCOSE UR STRIP.AUTO-MCNC: NEGATIVE MG/DL
HCT VFR BLD AUTO: 38.5 % (ref 37–47)
HGB BLD-MCNC: 13 G/DL (ref 12–16)
KETONES UR STRIP.AUTO-MCNC: NEGATIVE MG/DL
LEUKOCYTE ESTERASE UR QL STRIP.AUTO: NEGATIVE
LIPASE SERPL-CCNC: 7 U/L (ref 7–58)
LYMPHOCYTES # BLD AUTO: 2.07 K/UL (ref 1–4.8)
LYMPHOCYTES NFR BLD: 15 % (ref 22–41)
MANUAL DIFF BLD: NORMAL
MCH RBC QN AUTO: 29.3 PG (ref 27–33)
MCHC RBC AUTO-ENTMCNC: 33.8 G/DL (ref 33.6–35)
MCV RBC AUTO: 86.9 FL (ref 81.4–97.8)
MICRO URNS: NORMAL
MONOCYTES # BLD AUTO: 0.41 K/UL (ref 0–0.85)
MONOCYTES NFR BLD AUTO: 3 % (ref 0–13.4)
NEUTROPHILS # BLD AUTO: 8.42 K/UL (ref 2–7.15)
NEUTROPHILS NFR BLD: 61 % (ref 44–72)
NITRITE UR QL STRIP.AUTO: NEGATIVE
NRBC # BLD AUTO: 0 K/UL
NRBC BLD-RTO: 0 /100 WBC
PH UR STRIP.AUTO: 6.5 [PH] (ref 5–8)
PLATELET # BLD AUTO: 354 K/UL (ref 164–446)
PMV BLD AUTO: 9.1 FL (ref 9–12.9)
POTASSIUM SERPL-SCNC: 4.9 MMOL/L (ref 3.6–5.5)
PROT SERPL-MCNC: 7.3 G/DL (ref 6–8.2)
PROT UR QL STRIP: NEGATIVE MG/DL
RBC # BLD AUTO: 4.43 M/UL (ref 4.2–5.4)
RBC UR QL AUTO: NEGATIVE
SODIUM SERPL-SCNC: 139 MMOL/L (ref 135–145)
SP GR UR STRIP.AUTO: <=1.005
WBC # BLD AUTO: 13.8 K/UL (ref 4.8–10.8)

## 2023-03-13 PROCEDURE — 36415 COLL VENOUS BLD VENIPUNCTURE: CPT

## 2023-03-13 PROCEDURE — 85025 COMPLETE CBC W/AUTO DIFF WBC: CPT

## 2023-03-13 PROCEDURE — 83690 ASSAY OF LIPASE: CPT

## 2023-03-13 PROCEDURE — 96374 THER/PROPH/DIAG INJ IV PUSH: CPT | Mod: XU

## 2023-03-13 PROCEDURE — 700117 HCHG RX CONTRAST REV CODE 255: Performed by: EMERGENCY MEDICINE

## 2023-03-13 PROCEDURE — 700105 HCHG RX REV CODE 258: Performed by: EMERGENCY MEDICINE

## 2023-03-13 PROCEDURE — 80053 COMPREHEN METABOLIC PANEL: CPT

## 2023-03-13 PROCEDURE — 85007 BL SMEAR W/DIFF WBC COUNT: CPT

## 2023-03-13 PROCEDURE — 74177 CT ABD & PELVIS W/CONTRAST: CPT

## 2023-03-13 PROCEDURE — 700111 HCHG RX REV CODE 636 W/ 250 OVERRIDE (IP): Performed by: EMERGENCY MEDICINE

## 2023-03-13 PROCEDURE — 81003 URINALYSIS AUTO W/O SCOPE: CPT

## 2023-03-13 PROCEDURE — 96376 TX/PRO/DX INJ SAME DRUG ADON: CPT

## 2023-03-13 PROCEDURE — 96375 TX/PRO/DX INJ NEW DRUG ADDON: CPT

## 2023-03-13 PROCEDURE — 99285 EMERGENCY DEPT VISIT HI MDM: CPT

## 2023-03-13 RX ORDER — HYDROMORPHONE HYDROCHLORIDE 1 MG/ML
1 INJECTION, SOLUTION INTRAMUSCULAR; INTRAVENOUS; SUBCUTANEOUS ONCE
Status: COMPLETED | OUTPATIENT
Start: 2023-03-13 | End: 2023-03-13

## 2023-03-13 RX ORDER — OXYCODONE HYDROCHLORIDE AND ACETAMINOPHEN 5; 325 MG/1; MG/1
1 TABLET ORAL EVERY 4 HOURS PRN
Qty: 15 TABLET | Refills: 0 | Status: SHIPPED | OUTPATIENT
Start: 2023-03-13 | End: 2023-03-18

## 2023-03-13 RX ORDER — SODIUM CHLORIDE 9 MG/ML
1000 INJECTION, SOLUTION INTRAVENOUS ONCE
Status: COMPLETED | OUTPATIENT
Start: 2023-03-13 | End: 2023-03-13

## 2023-03-13 RX ORDER — PROCHLORPERAZINE EDISYLATE 5 MG/ML
10 INJECTION INTRAMUSCULAR; INTRAVENOUS ONCE
Status: COMPLETED | OUTPATIENT
Start: 2023-03-13 | End: 2023-03-13

## 2023-03-13 RX ORDER — DIVALPROEX SODIUM 250 MG/1
250 TABLET, DELAYED RELEASE ORAL 2 TIMES DAILY
Status: SHIPPED | COMMUNITY
End: 2023-05-16

## 2023-03-13 RX ORDER — PROCHLORPERAZINE 25 MG
25 SUPPOSITORY, RECTAL RECTAL EVERY 6 HOURS PRN
Qty: 10 SUPPOSITORY | Refills: 0 | Status: SHIPPED | OUTPATIENT
Start: 2023-03-13 | End: 2023-03-20

## 2023-03-13 RX ORDER — ONDANSETRON 2 MG/ML
4 INJECTION INTRAMUSCULAR; INTRAVENOUS ONCE
Status: COMPLETED | OUTPATIENT
Start: 2023-03-13 | End: 2023-03-13

## 2023-03-13 RX ORDER — IBUPROFEN 200 MG
400 TABLET ORAL EVERY 6 HOURS PRN
Status: SHIPPED | COMMUNITY
End: 2023-03-20

## 2023-03-13 RX ADMIN — PROCHLORPERAZINE EDISYLATE 10 MG: 5 INJECTION INTRAMUSCULAR; INTRAVENOUS at 18:01

## 2023-03-13 RX ADMIN — HYDROMORPHONE HYDROCHLORIDE 1 MG: 1 INJECTION, SOLUTION INTRAMUSCULAR; INTRAVENOUS; SUBCUTANEOUS at 18:01

## 2023-03-13 RX ADMIN — ONDANSETRON 4 MG: 2 INJECTION INTRAMUSCULAR; INTRAVENOUS at 16:49

## 2023-03-13 RX ADMIN — HYDROMORPHONE HYDROCHLORIDE 1 MG: 1 INJECTION, SOLUTION INTRAMUSCULAR; INTRAVENOUS; SUBCUTANEOUS at 16:49

## 2023-03-13 RX ADMIN — SODIUM CHLORIDE 1000 ML: 9 INJECTION, SOLUTION INTRAVENOUS at 16:48

## 2023-03-13 RX ADMIN — IOHEXOL 100 ML: 350 INJECTION, SOLUTION INTRAVENOUS at 17:45

## 2023-03-13 ASSESSMENT — LIFESTYLE VARIABLES
HAVE YOU EVER FELT YOU SHOULD CUT DOWN ON YOUR DRINKING: YES
CONSUMPTION TOTAL: INCOMPLETE
TOTAL SCORE: 1
EVER FELT BAD OR GUILTY ABOUT YOUR DRINKING: NO
TOTAL SCORE: 1
HAVE PEOPLE ANNOYED YOU BY CRITICIZING YOUR DRINKING: NO
TOTAL SCORE: 1
DO YOU DRINK ALCOHOL: NO
EVER HAD A DRINK FIRST THING IN THE MORNING TO STEADY YOUR NERVES TO GET RID OF A HANGOVER: NO

## 2023-03-13 ASSESSMENT — PAIN DESCRIPTION - PAIN TYPE: TYPE: ACUTE PAIN

## 2023-03-13 NOTE — ED NOTES
Pt in no apparent distress, though states increased abd pain with nausea since yesterday. Saline lock with blood draw attempted, though unable to obtain enough blood for ordered tests. Lab called for same, urine previously recvd by same.

## 2023-03-13 NOTE — ED PROVIDER NOTES
ER Provider Note    Scribed for Santiago Chirinos D.O. by Mayra Abdalla. 3/13/2023  4:24 PM    Primary Care Provider: Ivy Adams M.D.    CHIEF COMPLAINT  Chief Complaint   Patient presents with    Abdominal Pain    Nausea/Vomiting/Diarrhea       HPI/ROS  LIMITATION TO HISTORY   None  OUTSIDE HISTORIAN(S):  None    Rhiannon Marrero is a 37 y.o. female with history of pancreatitis who presents to the Emergency Department for worsening abdominal pain onset yesterday. She states she had open abdominal surgery 2 weeks ago for a pancreatic stone and gallbladder removal. She states she has been improving, but then last night developed sudden onset abdominal pain, which feels more like pancreatitis pain rather than surgical pain. She notes her pain radiates to her back. She admits to associated symptoms of diarrhea (onset last night), nausea, and vomiting (onset this morning), but denies fevers or chills. She has been taking Zofran without relief. She reports she finished her surgical pain medications.       ROS as per HPI.    PAST MEDICAL HISTORY  Past Medical History:   Diagnosis Date    Acute pancreatitis without infection or necrosis 07/20/2022    Alcohol dependence (HCC) 04/13/2017    Bronchitis 08/2012    Cold     sept 2018    Current moderate episode of major depressive disorder without prior episode (HCC) 01/31/2020    Heart burn     Hernia of unspecified site of abdominal cavity without mention of obstruction or gangrene     High anion gap metabolic acidosis 07/01/2022    Indigestion     Pancreatitis     Pneumonia 2011    Seizure disorder (HCC) 05/23/2022       SURGICAL HISTORY  Past Surgical History:   Procedure Laterality Date    ME UPPER GI ENDOSCOPY,DIAGNOSIS N/A 12/23/2022    Procedure: GASTROSCOPY;  Surgeon: Td Kwok M.D.;  Location: SURGERY [facility];  Service: EUS    ME INJECT NERV BLCK,CELIAC PLEXUS N/A 12/23/2022    Procedure: BLOCK, CELIAC PLEXUS;  Surgeon: Td Kwok  M.D.;  Location: SURGERY [facility];  Service: EUS    EGD W/ENDOSCOPIC ULTRASOUND N/A 12/23/2022    Procedure: EGD, WITH ENDOSCOPIC US;  Surgeon: Td Kwok M.D.;  Location: SURGERY [facility];  Service: EUS    ORIF, WRIST Right 4/24/2019    Procedure: ORIF, WRIST;  Surgeon: Marquis Bell M.D.;  Location: SURGERY [facility];  Service: Orthopedics    HYSTERECTOMY ROBOTIC XI  10/11/2018    Procedure: HYSTERECTOMY ROBOTIC XI- RIGHT URETEROLYSIS;  Surgeon: Marquis Reeder M.D.;  Location: SURGERY [facility];  Service: Gynecology    SALPINGECTOMY Bilateral 10/11/2018    Procedure: SALPINGECTOMY;  Surgeon: Marquis Reeder M.D.;  Location: SURGERY [facility];  Service: Gynecology    TONSILLECTOMY  4/11/2013    Performed by Rock Rothman M.D. at SURGERY SAME DAY [facility]    ARTHROSCOPY, KNEE      GYN SURGERY      miscarriage May 2006    OTHER ORTHOPEDIC SURGERY      knee surgeries       FAMILY HISTORY  Family History   Problem Relation Age of Onset    Psychiatric Illness Mother     Stroke Mother     Arthritis Mother     Heart Disease Maternal Grandfather     Cancer Neg Hx     Diabetes Neg Hx     Hypertension Neg Hx        SOCIAL HISTORY   reports that she has been smoking cigarettes. She has a 5.00 pack-year smoking history. She has never used smokeless tobacco. She reports that she does not currently use alcohol. She reports current drug use. Drug: Inhaled.    CURRENT MEDICATIONS  Previous Medications    GABAPENTIN (NEURONTIN) 100 MG CAP    Take 100-300 mg by mouth 2 times a day. Pt takes 100MG in AM and 300MG in the evening    HYDROXYZINE HCL (ATARAX) 50 MG TAB    Take 50 mg by mouth every evening.    OMEPRAZOLE (PRILOSEC) 20 MG DELAYED-RELEASE CAPSULE    Take 1 Capsule by mouth every day.    ONDANSETRON (ZOFRAN ODT) 4 MG TABLET DISPERSIBLE    Take 1 Tablet by mouth every four hours as needed for Nausea/Vomiting (give PO if no IV route available).    PANCRELIPASE, LIP-PROT-AMYL, (CREON  6000) 6000-10790 UNITS CAP DR PARTICLES    Take 1 Capsule by mouth 3 times a day with meals.    VITAMIN D2, ERGOCALCIFEROL, (DRISDOL) 1.25 MG (44530 UT) CAP CAPSULE    Take 50,000 Units by mouth every 7 days. On Monday    ZONISAMIDE (ZONEGRAN) 50 MG CAPSULE    Take 150 mg by mouth every evening. 150 mg = 3 capsules       ALLERGIES  Naltrexone and Promethazine hcl    PHYSICAL EXAM  /72   Pulse (!) 118   Temp 36.9 °C (98.5 °F) (Temporal)   Resp 16   LMP 10/30/2018   SpO2 97%     General: No acute distress.  HENT: Normocephalic, Mucus membranes are moist.   Chest: Lungs have even and unlabored respirations, Clear to auscultation.   Cardiovascular: Regular rate and regular rhythm, No peripheral cyanosis.  Abdomen: Non distended. Tender to mid-epigastric region. Surgical site has no dehiscence, drainage, or erythema.  Neuro: Awake, Conversive, Able to relay recent events.  Psychiatric: Calm and cooperative.     EXTERNAL RECORDS REVIEWED  Show 2/8/23 had evaluation for pancreatic obstruction.     INITIAL ASSESSMENT  Patient is 2 weeks post-op for surgery to remove pancreatic stone and gallbladder. Was improving, now significant pain, vomiting, and diarrhea. Concern for acute pancreatitis, pancreatic obstruction, bile duct  obstruction, and infection. Patient will be treated for symptoms. CT for evaluation.    ED Observation Status? Yes; I am placing the patient in to an observation status due to a diagnostic uncertainty as well as therapeutic intensity. Patient placed in observation status at 4:30 PM, 3/13/2023.     Observation plan is as follows: Patient will be treated for pain and nausea while evaluation is in progress.    Upon Reevaluation, the patient's condition has: Improved; and will be discharged.    Patient discharged from ED Observation status at 6:29 PM (Time) 3/13/2023 (Date).     DIAGNOSTIC STUDIES    Labs:   Results for orders placed or performed during the hospital encounter of 03/13/23   CBC  with Differential   Result Value Ref Range    WBC 13.8 (H) 4.8 - 10.8 K/uL    RBC 4.43 4.20 - 5.40 M/uL    Hemoglobin 13.0 12.0 - 16.0 g/dL    Hematocrit 38.5 37.0 - 47.0 %    MCV 86.9 81.4 - 97.8 fL    MCH 29.3 27.0 - 33.0 pg    MCHC 33.8 33.6 - 35.0 g/dL    RDW 41.0 35.9 - 50.0 fL    Platelet Count 354 164 - 446 K/uL    MPV 9.1 9.0 - 12.9 fL    Neutrophils-Polys 61.00 44.00 - 72.00 %    Lymphocytes 15.00 (L) 22.00 - 41.00 %    Monocytes 3.00 0.00 - 13.40 %    Eosinophils 21.00 (H) 0.00 - 6.90 %    Basophils 0.00 0.00 - 1.80 %    Nucleated RBC 0.00 /100 WBC    Neutrophils (Absolute) 8.42 (H) 2.00 - 7.15 K/uL    Lymphs (Absolute) 2.07 1.00 - 4.80 K/uL    Monos (Absolute) 0.41 0.00 - 0.85 K/uL    Eos (Absolute) 2.90 (H) 0.00 - 0.51 K/uL    Baso (Absolute) 0.00 0.00 - 0.12 K/uL    NRBC (Absolute) 0.00 K/uL   Complete Metabolic Panel   Result Value Ref Range    Sodium 139 135 - 145 mmol/L    Potassium 4.9 3.6 - 5.5 mmol/L    Chloride 105 96 - 112 mmol/L    Co2 20 20 - 33 mmol/L    Anion Gap 14.0 7.0 - 16.0    Glucose 85 65 - 99 mg/dL    Bun 4 (L) 8 - 22 mg/dL    Creatinine 0.58 0.50 - 1.40 mg/dL    Calcium 9.2 8.4 - 10.2 mg/dL    AST(SGOT) 28 12 - 45 U/L    ALT(SGPT) 26 2 - 50 U/L    Alkaline Phosphatase 114 (H) 30 - 99 U/L    Total Bilirubin 0.5 0.1 - 1.5 mg/dL    Albumin 4.2 3.2 - 4.9 g/dL    Total Protein 7.3 6.0 - 8.2 g/dL    Globulin 3.1 1.9 - 3.5 g/dL    A-G Ratio 1.4 g/dL   Lipase   Result Value Ref Range    Lipase 7 7 - 58 U/L   Urinalysis    Specimen: Blood   Result Value Ref Range    Color Yellow     Character Clear     Specific Gravity <=1.005 <1.035    Ph 6.5 5.0 - 8.0    Glucose Negative Negative mg/dL    Ketones Negative Negative mg/dL    Protein Negative Negative mg/dL    Bilirubin Negative Negative    Nitrite Negative Negative    Leukocyte Esterase Negative Negative    Occult Blood Negative Negative    Micro Urine Req see below    CORRECTED CALCIUM   Result Value Ref Range    Correct Calcium 9.0 8.5 -  10.5 mg/dL   ESTIMATED GFR   Result Value Ref Range    GFR (CKD-EPI) 120 >60 mL/min/1.73 m 2   DIFFERENTIAL MANUAL   Result Value Ref Range    Manual Diff Status PERFORMED        Radiology:   The attending emergency physician has independently interpreted the diagnostic imaging associated with this visit and am waiting the final reading from the radiologist.   Preliminary interpretation is as follows: No obstruction  Radiologist interpretation:   CT-ABDOMEN-PELVIS WITH   Final Result      1.  Small residual calcification in the pancreatic neck/head, significantly decreased in size when compared to the previous exam. Upstream dilation of the pancreatic duct has resolved.   2.  Mild nonspecific peripancreatic stranding.   3.  Hepatomegaly.   4.  Hepatic steatosis.   5.  Status post cholecystectomy.            COURSE & MEDICAL DECISION MAKING     COURSE AND PLAN  4:24 PM - Patient seen and examined at bedside. Discussed plan of care, including labs, imaging, and medications. Patient agrees to the plan of care. The patient will be resuscitated with 1L NS IV and medicated with ondansetron 4 mg INJ and hydromorphone 1 mg INJ,. Ordered for CT-Abdomen-Pelvis WITH, UA, lipase, CMP, and CBC w/ diff to evaluate her symptoms.     5:53 PM - Patient will be medicated with hydromorphone 1 mg INJ and prochlorperazine 10 mg INJ.    6:29 PM - I reevaluated the patient at bedside. Her pain and nausea have resolved. I discussed the patient's diagnostic study results and plan for discharge and follow up as outlined below. The patient is stable for discharge at this time and will return for any new or worsening symptoms. Patient verbalizes understanding and support with my plan for discharge.      ED Summary: Patient presents with abdominal pain.  She is postop from a complicated surgery of the pancreas and gallbladder, she was seen in [place] for this.  She has had increasing pain.  Her lipase is normal, white blood cell count is  normal no signs of pancreatitis or infection.  CT was done shows no biliary or pancreatic obstruction.  Her pain and nausea is improved she is discharged home with symptomatic care.    Decision tools and prescription drugs considered including, but not limited to:     HYDRATION: Based on the patient's presentation of Other vomiting the patient was given IV fluids. IV Hydration was used because oral hydration was not adequate alone. Upon recheck following hydration, the patient was improved.      DISPOSITION AND DISCUSSIONS  I reviewed prescription monitoring program for patient's narcotic use before prescribing a scheduled drug.The patient will not drink alcohol nor drive with prescribed medications. The patient will return for new or worsening symptoms and is stable at the time of discharge.    The patient is referred to a primary physician for blood pressure management, diabetic screening, and for all other preventative health concerns.    In prescribing controlled substances to this patient, I certify that I have obtained and reviewed the medical history of Rhiannon Marrero. I have also made a good nick effort to obtain applicable records from other providers who have treated the patient and records did not demonstrate any increased risk of substance abuse that would prevent me from prescribing controlled substances.     I have conducted a physical exam and documented it. I have reviewed Ms. Marrero’s prescription history as maintained by the [place] Prescription Monitoring Program.     I have assessed the patient’s risk for abuse, dependency, and addiction using the validated Opioid Risk Tool available at https://www.mdcalc.com/pqqkri-wpvk-xbiu-ort-narcotic-abuse.     Given the above, I believe the benefits of controlled substance therapy outweigh the risks. The reasons for prescribing controlled substances include non-narcotic, oral analgesic alternatives are contraindicated. Accordingly, I have discussed the  risk and benefits, treatment plan, and alternative therapies with the patient.       DISPOSITION:  Patient will be discharged home in stable condition.    FOLLOW UP:  No follow-up provider specified.    OUTPATIENT MEDICATIONS:  New Prescriptions    PROCHLORPERAZINE (COMPAZINE) 25 MG SUPPOS    Insert 1 Suppository into the rectum every 6 hours as needed for Nausea/Vomiting.     FINAL DIAGNOSIS  1. Nausea and vomiting, unspecified vomiting type    2. Epigastric abdominal pain         Mayra RASHEED (Scribe), am scribing for, and in the presence of, Santiago Chirinos D.O..    Electronically signed by: Mayra Abdalla (Scribe), 3/13/2023    ISantiago D.O. personally performed the services described in this documentation, as scribed by Mayra Abdalla in my presence, and it is both accurate and complete.    The note accurately reflects work and decisions made by me.  Santiago Chirinos D.O.  3/13/2023  9:27 PM

## 2023-03-14 NOTE — ED NOTES
Pt given discharge instruction, medications to  at [facility], pt states they understand they have to take the medication as prescribed, taking too much of the percocet can result in death.  Pt educated to come back if pain gets worse and symptoms are unresolved.  Pt reports understanding they need to follow up with primary physician.

## 2023-03-14 NOTE — ED NOTES
Pt reports having surgery on pancrease 2/25/23. Pt reports feeling they were just recovering from surgery with normal amount of pain until yesterday when the pain changed and now feels more like when their pancreas is irritated.  9/10 pain at rest.

## 2023-03-14 NOTE — DISCHARGE INSTRUCTIONS
CAT scan shows no sign of obstruction, or infection.  Lab tests are normal.  Your pain and nausea is improved.  You are discharged home with additional nausea medication and simple pain medication.  Please follow-up with your primary physicians.

## 2023-03-14 NOTE — ED NOTES
Med rec updated and complete, per pt   Allergies reviewed, per pt  Pt reports that she has not taken OMEPRAZOLE 20MG in the last 30 days or longer.

## 2023-03-14 NOTE — ED NOTES
PT ROUNDING UPON RETURN FROM CT. STATES MINIMAL CHG AFTER PREVIOUS PAIN and nausea MEDs .  No emesis noted.Update to erp, orders recvd

## 2023-03-20 ENCOUNTER — HOSPITAL ENCOUNTER (INPATIENT)
Facility: MEDICAL CENTER | Age: 38
LOS: 6 days | DRG: 438 | End: 2023-03-26
Attending: EMERGENCY MEDICINE | Admitting: STUDENT IN AN ORGANIZED HEALTH CARE EDUCATION/TRAINING PROGRAM
Payer: COMMERCIAL

## 2023-03-20 ENCOUNTER — APPOINTMENT (OUTPATIENT)
Dept: RADIOLOGY | Facility: MEDICAL CENTER | Age: 38
DRG: 438 | End: 2023-03-20
Attending: EMERGENCY MEDICINE
Payer: COMMERCIAL

## 2023-03-20 DIAGNOSIS — R11.2 NAUSEA AND VOMITING, UNSPECIFIED VOMITING TYPE: ICD-10-CM

## 2023-03-20 DIAGNOSIS — R10.13 EPIGASTRIC PAIN: ICD-10-CM

## 2023-03-20 DIAGNOSIS — G89.18 POST-OP PAIN: ICD-10-CM

## 2023-03-20 DIAGNOSIS — K86.0 ALCOHOL-INDUCED CHRONIC PANCREATITIS (HCC): ICD-10-CM

## 2023-03-20 PROBLEM — R52 INTRACTABLE PAIN: Status: ACTIVE | Noted: 2023-03-20

## 2023-03-20 PROBLEM — Z78.9 ALCOHOL USE: Status: ACTIVE | Noted: 2023-03-20

## 2023-03-20 LAB
ALBUMIN SERPL BCP-MCNC: 3.9 G/DL (ref 3.2–4.9)
ALBUMIN/GLOB SERPL: 1.3 G/DL
ALP SERPL-CCNC: 91 U/L (ref 30–99)
ALT SERPL-CCNC: 18 U/L (ref 2–50)
ANION GAP SERPL CALC-SCNC: 16 MMOL/L (ref 7–16)
APPEARANCE UR: CLEAR
AST SERPL-CCNC: 17 U/L (ref 12–45)
BASOPHILS # BLD AUTO: 0.6 % (ref 0–1.8)
BASOPHILS # BLD: 0.06 K/UL (ref 0–0.12)
BILIRUB SERPL-MCNC: 0.5 MG/DL (ref 0.1–1.5)
BILIRUB UR QL STRIP.AUTO: NEGATIVE
BUN SERPL-MCNC: 11 MG/DL (ref 8–22)
CALCIUM ALBUM COR SERPL-MCNC: 8.8 MG/DL (ref 8.5–10.5)
CALCIUM SERPL-MCNC: 8.7 MG/DL (ref 8.5–10.5)
CHLORIDE SERPL-SCNC: 106 MMOL/L (ref 96–112)
CO2 SERPL-SCNC: 17 MMOL/L (ref 20–33)
COLOR UR: YELLOW
CREAT SERPL-MCNC: 0.66 MG/DL (ref 0.5–1.4)
EOSINOPHIL # BLD AUTO: 0.66 K/UL (ref 0–0.51)
EOSINOPHIL NFR BLD: 7 % (ref 0–6.9)
ERYTHROCYTE [DISTWIDTH] IN BLOOD BY AUTOMATED COUNT: 47.1 FL (ref 35.9–50)
GFR SERPLBLD CREATININE-BSD FMLA CKD-EPI: 117 ML/MIN/1.73 M 2
GLOBULIN SER CALC-MCNC: 3.1 G/DL (ref 1.9–3.5)
GLUCOSE SERPL-MCNC: 132 MG/DL (ref 65–99)
GLUCOSE UR STRIP.AUTO-MCNC: NEGATIVE MG/DL
HCT VFR BLD AUTO: 39.7 % (ref 37–47)
HGB BLD-MCNC: 13 G/DL (ref 12–16)
IMM GRANULOCYTES # BLD AUTO: 0.03 K/UL (ref 0–0.11)
IMM GRANULOCYTES NFR BLD AUTO: 0.3 % (ref 0–0.9)
KETONES UR STRIP.AUTO-MCNC: NEGATIVE MG/DL
LACTATE SERPL-SCNC: 1.6 MMOL/L (ref 0.5–2)
LACTATE SERPL-SCNC: 1.7 MMOL/L (ref 0.5–2)
LEUKOCYTE ESTERASE UR QL STRIP.AUTO: NEGATIVE
LIPASE SERPL-CCNC: 18 U/L (ref 11–82)
LYMPHOCYTES # BLD AUTO: 2.14 K/UL (ref 1–4.8)
LYMPHOCYTES NFR BLD: 22.7 % (ref 22–41)
MCH RBC QN AUTO: 29.1 PG (ref 27–33)
MCHC RBC AUTO-ENTMCNC: 32.7 G/DL (ref 33.6–35)
MCV RBC AUTO: 88.8 FL (ref 81.4–97.8)
MICRO URNS: NORMAL
MONOCYTES # BLD AUTO: 0.5 K/UL (ref 0–0.85)
MONOCYTES NFR BLD AUTO: 5.3 % (ref 0–13.4)
NEUTROPHILS # BLD AUTO: 6.03 K/UL (ref 2–7.15)
NEUTROPHILS NFR BLD: 64.1 % (ref 44–72)
NITRITE UR QL STRIP.AUTO: NEGATIVE
NRBC # BLD AUTO: 0 K/UL
NRBC BLD-RTO: 0 /100 WBC
PH UR STRIP.AUTO: 6 [PH] (ref 5–8)
PLATELET # BLD AUTO: 265 K/UL (ref 164–446)
PMV BLD AUTO: 9.1 FL (ref 9–12.9)
POTASSIUM SERPL-SCNC: 3.3 MMOL/L (ref 3.6–5.5)
PROT SERPL-MCNC: 7 G/DL (ref 6–8.2)
PROT UR QL STRIP: NEGATIVE MG/DL
RBC # BLD AUTO: 4.47 M/UL (ref 4.2–5.4)
RBC UR QL AUTO: NEGATIVE
SODIUM SERPL-SCNC: 139 MMOL/L (ref 135–145)
SP GR UR STRIP.AUTO: 1
UROBILINOGEN UR STRIP.AUTO-MCNC: 0.2 MG/DL
WBC # BLD AUTO: 9.4 K/UL (ref 4.8–10.8)

## 2023-03-20 PROCEDURE — 81003 URINALYSIS AUTO W/O SCOPE: CPT

## 2023-03-20 PROCEDURE — 85025 COMPLETE CBC W/AUTO DIFF WBC: CPT

## 2023-03-20 PROCEDURE — 36415 COLL VENOUS BLD VENIPUNCTURE: CPT

## 2023-03-20 PROCEDURE — 700105 HCHG RX REV CODE 258: Performed by: EMERGENCY MEDICINE

## 2023-03-20 PROCEDURE — 96375 TX/PRO/DX INJ NEW DRUG ADDON: CPT

## 2023-03-20 PROCEDURE — 87086 URINE CULTURE/COLONY COUNT: CPT

## 2023-03-20 PROCEDURE — 99222 1ST HOSP IP/OBS MODERATE 55: CPT | Performed by: STUDENT IN AN ORGANIZED HEALTH CARE EDUCATION/TRAINING PROGRAM

## 2023-03-20 PROCEDURE — A9270 NON-COVERED ITEM OR SERVICE: HCPCS | Performed by: STUDENT IN AN ORGANIZED HEALTH CARE EDUCATION/TRAINING PROGRAM

## 2023-03-20 PROCEDURE — 99285 EMERGENCY DEPT VISIT HI MDM: CPT

## 2023-03-20 PROCEDURE — 74177 CT ABD & PELVIS W/CONTRAST: CPT

## 2023-03-20 PROCEDURE — 80053 COMPREHEN METABOLIC PANEL: CPT

## 2023-03-20 PROCEDURE — 96374 THER/PROPH/DIAG INJ IV PUSH: CPT

## 2023-03-20 PROCEDURE — 96376 TX/PRO/DX INJ SAME DRUG ADON: CPT

## 2023-03-20 PROCEDURE — 87040 BLOOD CULTURE FOR BACTERIA: CPT | Mod: 91

## 2023-03-20 PROCEDURE — 700111 HCHG RX REV CODE 636 W/ 250 OVERRIDE (IP): Performed by: STUDENT IN AN ORGANIZED HEALTH CARE EDUCATION/TRAINING PROGRAM

## 2023-03-20 PROCEDURE — 71045 X-RAY EXAM CHEST 1 VIEW: CPT

## 2023-03-20 PROCEDURE — 700111 HCHG RX REV CODE 636 W/ 250 OVERRIDE (IP): Performed by: EMERGENCY MEDICINE

## 2023-03-20 PROCEDURE — 83690 ASSAY OF LIPASE: CPT

## 2023-03-20 PROCEDURE — 700105 HCHG RX REV CODE 258: Performed by: STUDENT IN AN ORGANIZED HEALTH CARE EDUCATION/TRAINING PROGRAM

## 2023-03-20 PROCEDURE — 770006 HCHG ROOM/CARE - MED/SURG/GYN SEMI*

## 2023-03-20 PROCEDURE — 83605 ASSAY OF LACTIC ACID: CPT

## 2023-03-20 PROCEDURE — 700102 HCHG RX REV CODE 250 W/ 637 OVERRIDE(OP): Performed by: STUDENT IN AN ORGANIZED HEALTH CARE EDUCATION/TRAINING PROGRAM

## 2023-03-20 PROCEDURE — 700117 HCHG RX CONTRAST REV CODE 255: Performed by: EMERGENCY MEDICINE

## 2023-03-20 RX ORDER — PROCHLORPERAZINE EDISYLATE 5 MG/ML
5-10 INJECTION INTRAMUSCULAR; INTRAVENOUS EVERY 4 HOURS PRN
Status: DISCONTINUED | OUTPATIENT
Start: 2023-03-20 | End: 2023-03-26 | Stop reason: HOSPADM

## 2023-03-20 RX ORDER — HYDROXYZINE PAMOATE 50 MG/1
50 CAPSULE ORAL 3 TIMES DAILY PRN
Status: DISCONTINUED | OUTPATIENT
Start: 2023-03-20 | End: 2023-03-20

## 2023-03-20 RX ORDER — ONDANSETRON 2 MG/ML
4 INJECTION INTRAMUSCULAR; INTRAVENOUS EVERY 4 HOURS PRN
Status: DISCONTINUED | OUTPATIENT
Start: 2023-03-20 | End: 2023-03-26 | Stop reason: HOSPADM

## 2023-03-20 RX ORDER — MORPHINE SULFATE 4 MG/ML
4 INJECTION INTRAVENOUS ONCE
Status: COMPLETED | OUTPATIENT
Start: 2023-03-20 | End: 2023-03-20

## 2023-03-20 RX ORDER — ZONISAMIDE 50 MG/1
150 CAPSULE ORAL
Status: DISCONTINUED | OUTPATIENT
Start: 2023-03-20 | End: 2023-03-26 | Stop reason: HOSPADM

## 2023-03-20 RX ORDER — SODIUM CHLORIDE, SODIUM LACTATE, POTASSIUM CHLORIDE, AND CALCIUM CHLORIDE .6; .31; .03; .02 G/100ML; G/100ML; G/100ML; G/100ML
30 INJECTION, SOLUTION INTRAVENOUS ONCE
Status: COMPLETED | OUTPATIENT
Start: 2023-03-20 | End: 2023-03-20

## 2023-03-20 RX ORDER — GABAPENTIN 100 MG/1
100-300 CAPSULE ORAL 2 TIMES DAILY
Status: DISCONTINUED | OUTPATIENT
Start: 2023-03-20 | End: 2023-03-20

## 2023-03-20 RX ORDER — PROMETHAZINE HYDROCHLORIDE 25 MG/1
12.5-25 SUPPOSITORY RECTAL EVERY 4 HOURS PRN
Status: DISCONTINUED | OUTPATIENT
Start: 2023-03-20 | End: 2023-03-26 | Stop reason: HOSPADM

## 2023-03-20 RX ORDER — HYDROXYZINE 50 MG/1
50 TABLET, FILM COATED ORAL 3 TIMES DAILY PRN
Status: DISCONTINUED | OUTPATIENT
Start: 2023-03-20 | End: 2023-03-26 | Stop reason: HOSPADM

## 2023-03-20 RX ORDER — ONDANSETRON 2 MG/ML
4 INJECTION INTRAMUSCULAR; INTRAVENOUS ONCE
Status: COMPLETED | OUTPATIENT
Start: 2023-03-20 | End: 2023-03-20

## 2023-03-20 RX ORDER — ONDANSETRON 4 MG/1
4 TABLET, ORALLY DISINTEGRATING ORAL EVERY 4 HOURS PRN
Status: DISCONTINUED | OUTPATIENT
Start: 2023-03-20 | End: 2023-03-26 | Stop reason: HOSPADM

## 2023-03-20 RX ORDER — ENOXAPARIN SODIUM 100 MG/ML
40 INJECTION SUBCUTANEOUS DAILY
Status: DISCONTINUED | OUTPATIENT
Start: 2023-03-20 | End: 2023-03-26 | Stop reason: HOSPADM

## 2023-03-20 RX ORDER — GABAPENTIN 100 MG/1
100 CAPSULE ORAL DAILY
Status: DISCONTINUED | OUTPATIENT
Start: 2023-03-21 | End: 2023-03-21

## 2023-03-20 RX ORDER — GABAPENTIN 300 MG/1
300 CAPSULE ORAL EVERY EVENING
Status: DISCONTINUED | OUTPATIENT
Start: 2023-03-20 | End: 2023-03-26 | Stop reason: HOSPADM

## 2023-03-20 RX ORDER — SODIUM CHLORIDE 9 MG/ML
INJECTION, SOLUTION INTRAVENOUS CONTINUOUS
Status: ACTIVE | OUTPATIENT
Start: 2023-03-20 | End: 2023-03-21

## 2023-03-20 RX ORDER — DIVALPROEX SODIUM 250 MG/1
250 TABLET, DELAYED RELEASE ORAL 2 TIMES DAILY
Status: DISCONTINUED | OUTPATIENT
Start: 2023-03-20 | End: 2023-03-26 | Stop reason: HOSPADM

## 2023-03-20 RX ORDER — HYDROMORPHONE HYDROCHLORIDE 1 MG/ML
1 INJECTION, SOLUTION INTRAMUSCULAR; INTRAVENOUS; SUBCUTANEOUS
Status: DISCONTINUED | OUTPATIENT
Start: 2023-03-20 | End: 2023-03-21

## 2023-03-20 RX ORDER — ACETAMINOPHEN 325 MG/1
650 TABLET ORAL EVERY 6 HOURS PRN
Status: DISCONTINUED | OUTPATIENT
Start: 2023-03-20 | End: 2023-03-26 | Stop reason: HOSPADM

## 2023-03-20 RX ORDER — HYDROMORPHONE HYDROCHLORIDE 1 MG/ML
1 INJECTION, SOLUTION INTRAMUSCULAR; INTRAVENOUS; SUBCUTANEOUS EVERY 4 HOURS PRN
Status: DISCONTINUED | OUTPATIENT
Start: 2023-03-20 | End: 2023-03-20

## 2023-03-20 RX ORDER — POTASSIUM CHLORIDE 20 MEQ/1
40 TABLET, EXTENDED RELEASE ORAL
Status: DISPENSED | OUTPATIENT
Start: 2023-03-20 | End: 2023-03-20

## 2023-03-20 RX ORDER — PROMETHAZINE HYDROCHLORIDE 25 MG/1
12.5-25 TABLET ORAL EVERY 4 HOURS PRN
Status: DISCONTINUED | OUTPATIENT
Start: 2023-03-20 | End: 2023-03-26 | Stop reason: HOSPADM

## 2023-03-20 RX ORDER — HYDROXYZINE PAMOATE 50 MG/1
50 CAPSULE ORAL 3 TIMES DAILY PRN
COMMUNITY
End: 2023-04-24

## 2023-03-20 RX ADMIN — ONDANSETRON HYDROCHLORIDE 4 MG: 2 SOLUTION INTRAMUSCULAR; INTRAVENOUS at 15:38

## 2023-03-20 RX ADMIN — DIVALPROEX SODIUM 250 MG: 250 TABLET, DELAYED RELEASE ORAL at 18:59

## 2023-03-20 RX ADMIN — SODIUM CHLORIDE: 9 INJECTION, SOLUTION INTRAVENOUS at 20:52

## 2023-03-20 RX ADMIN — ZONISAMIDE 150 MG: 50 CAPSULE ORAL at 20:52

## 2023-03-20 RX ADMIN — HYDROMORPHONE HYDROCHLORIDE 1 MG: 1 INJECTION, SOLUTION INTRAMUSCULAR; INTRAVENOUS; SUBCUTANEOUS at 22:14

## 2023-03-20 RX ADMIN — MORPHINE SULFATE 4 MG: 4 INJECTION, SOLUTION INTRAMUSCULAR; INTRAVENOUS at 17:25

## 2023-03-20 RX ADMIN — MORPHINE SULFATE 4 MG: 4 INJECTION, SOLUTION INTRAMUSCULAR; INTRAVENOUS at 15:38

## 2023-03-20 RX ADMIN — ONDANSETRON 4 MG: 2 INJECTION INTRAMUSCULAR; INTRAVENOUS at 20:48

## 2023-03-20 RX ADMIN — IOHEXOL 80 ML: 350 INJECTION, SOLUTION INTRAVENOUS at 16:05

## 2023-03-20 RX ADMIN — HYDROMORPHONE HYDROCHLORIDE 1 MG: 1 INJECTION, SOLUTION INTRAMUSCULAR; INTRAVENOUS; SUBCUTANEOUS at 18:57

## 2023-03-20 RX ADMIN — GABAPENTIN 300 MG: 300 CAPSULE ORAL at 18:59

## 2023-03-20 RX ADMIN — POTASSIUM CHLORIDE 40 MEQ: 1500 TABLET, EXTENDED RELEASE ORAL at 18:59

## 2023-03-20 RX ADMIN — SODIUM CHLORIDE, POTASSIUM CHLORIDE, SODIUM LACTATE AND CALCIUM CHLORIDE 1716 ML: 600; 310; 30; 20 INJECTION, SOLUTION INTRAVENOUS at 15:37

## 2023-03-20 ASSESSMENT — PAIN DESCRIPTION - PAIN TYPE
TYPE: ACUTE PAIN
TYPE: ACUTE PAIN

## 2023-03-20 ASSESSMENT — FIBROSIS 4 INDEX: FIB4 SCORE: 0.54

## 2023-03-20 ASSESSMENT — ENCOUNTER SYMPTOMS
RESPIRATORY NEGATIVE: 1
EYES NEGATIVE: 1
NEUROLOGICAL NEGATIVE: 1
DIARRHEA: 1
PSYCHIATRIC NEGATIVE: 1
ABDOMINAL PAIN: 1
CARDIOVASCULAR NEGATIVE: 1
NAUSEA: 1
MUSCULOSKELETAL NEGATIVE: 1
VOMITING: 1

## 2023-03-20 NOTE — ED TRIAGE NOTES
"Chief Complaint   Patient presents with    Abdominal Pain     \"I feel like I have pancreatitis again\" - was instructed to come back to ER if pain continued     Patient is 2 weeks post-op for surgery to remove pancreatic stone and gallbladder. Pt states 5 days ago pain changed from post-op pain to pancreatic pain. Pt was seen here on the 13th for this and was told to come back if pain continued. Protocol ordered.     Pt is alert and oriented, speaking in full sentences, follows commands and responds appropriately to questions.      Pt placed in lobby. Pt educated on triage process and apologized for wait time. Pt encouraged to alert staff for any changes.     Patient and staff wearing appropriate PPE    BP (!) 135/93   Pulse (!) 115   Temp 36.8 °C (98.2 °F) (Temporal)   Resp 18   Ht 1.702 m (5' 7\")   Wt 57.2 kg (126 lb)   SpO2 99%       "

## 2023-03-20 NOTE — ED PROVIDER NOTES
"  ER Provider Note    Scribed for Kwasi Garcia M.d. by Emelia Allen. 3/20/2023  2:40 PM    Primary Care Provider: Ivy Adams M.D.    CHIEF COMPLAINT  Chief Complaint   Patient presents with    Abdominal Pain     \"I feel like I have pancreatitis again\" - was instructed to come back to ER if pain continued     LIMITATION TO HISTORY   Select: : None    HPI/ROS  OUTSIDE HISTORIAN(S):  None    EXTERNAL RECORDS REVIEWED  Reviewed records from [facility] recent visit.    Rhiannon Marrero is a 37 y.o. female who presents to the ED for for evaluation of abdominal pain onset one week. Patient had pancreas surgery about three weeks ago in [place]. Patient had the surgery done due to a 1 cm pancreatic duct stone that surgeons attemtped to remove three times endoscopically. She was recovering well for the following weeks after surgery. However, suddenly, one week ago the patient started to experience abdominal pain that was just like her pancreatitis pain prior to the surgery. So, patient went to [facility] last Monday. She had a CAT scan and blood work done that returned normal. She was told to return to the ED if her symptoms worsen or do not alleviate. Rhiannon endorses that the pain has been worsening.  She admits to associated symptoms of vomiting, diarrhea, and bloating, but denies dysuria or fever. No alleviating factors were reported. Patient is on medication for bipolar, seizure, and anxiety. She does vape, however she does not drink alcohol or use illicit drugs. Patient is a revoering alcoholic.       PAST MEDICAL HISTORY  Past Medical History:   Diagnosis Date    Acute pancreatitis without infection or necrosis 07/20/2022    Alcohol dependence (HCC) 04/13/2017    Bronchitis 08/2012    Cold     sept 2018    Current moderate episode of major depressive disorder without prior episode (HCC) 01/31/2020    Heart burn     Hernia of unspecified site of abdominal cavity without mention of " obstruction or gangrene     High anion gap metabolic acidosis 07/01/2022    Indigestion     Pancreatitis     Pneumonia 2011    Seizure disorder (HCC) 05/23/2022       SURGICAL HISTORY  Past Surgical History:   Procedure Laterality Date    MN UPPER GI ENDOSCOPY,DIAGNOSIS N/A 12/23/2022    Procedure: GASTROSCOPY;  Surgeon: Td Kwok M.D.;  Location: [facility];  Service: EUS    MN INJECT NERV BLCK,CELIAC PLEXUS N/A 12/23/2022    Procedure: BLOCK, CELIAC PLEXUS;  Surgeon: Td Kwok M.D.;  Location: [facility];  Service: EUS    EGD W/ENDOSCOPIC ULTRASOUND N/A 12/23/2022    Procedure: EGD, WITH ENDOSCOPIC US;  Surgeon: Td Kwok M.D.;  Location: [facility];  Service: EUS    ORIF, WRIST Right 4/24/2019    Procedure: ORIF, WRIST;  Surgeon: Marquis Bell M.D.;  Location: [facility];  Service: Orthopedics    HYSTERECTOMY ROBOTIC XI  10/11/2018    Procedure: HYSTERECTOMY ROBOTIC XI- RIGHT URETEROLYSIS;  Surgeon: Marquis Reeder M.D.;  Location: [facility];  Service: Gynecology    SALPINGECTOMY Bilateral 10/11/2018    Procedure: SALPINGECTOMY;  Surgeon: Marquis Reeder M.D.;  Location: [facility];  Service: Gynecology    TONSILLECTOMY  4/11/2013    Performed by Rock Rothman M.D. at SURGERY SAME DAY [facility]    ARTHROSCOPY, KNEE      GYN SURGERY      miscarriage May 2006    OTHER ORTHOPEDIC SURGERY      knee surgeries       FAMILY HISTORY  Family History   Problem Relation Age of Onset    Psychiatric Illness Mother     Stroke Mother     Arthritis Mother     Heart Disease Maternal Grandfather     Cancer Neg Hx     Diabetes Neg Hx     Hypertension Neg Hx        SOCIAL HISTORY   reports that she has been smoking cigarettes. She has a 5.00 pack-year smoking history. She has never used smokeless tobacco. She reports that she does not currently use alcohol. She reports current drug use. Drug: Inhaled.    CURRENT  "MEDICATIONS  Previous Medications    DIVALPROEX (DEPAKOTE) 250 MG TABLET DELAYED RESPONSE    Take 250 mg by mouth 2 times a day.    GABAPENTIN (NEURONTIN) 100 MG CAP    Take 100-300 mg by mouth 2 times a day. 100 mg = AM   300 mg = PM    HYDROXYZINE PAMOATE (VISTARIL) 50 MG CAP    Take 50 mg by mouth 3 times a day as needed for Anxiety.    PANCRELIPASE, LIP-PROT-AMYL, (CREON 6000) 6000-54380 UNITS CAP DR PARTICLES    Take 1 Capsule by mouth 3 times a day with meals.    ZONISAMIDE (ZONEGRAN) 50 MG CAPSULE    Take 150 mg by mouth at bedtime. 50 mg ( 3 capsules) = 150 mg       ALLERGIES  Naltrexone and Promethazine hcl    PHYSICAL EXAM  BP (!) 135/93   Pulse (!) 115   Temp 36.8 °C (98.2 °F) (Temporal)   Resp 18   Ht 1.702 m (5' 7\")   Wt 57.2 kg (126 lb)   LMP 10/30/2018   SpO2 99%   BMI 19.73 kg/m²     Constitutional: Awake alert mildly ill-appearing.  HENT: Normocephalic, Atraumatic, Bilateral external ears normal, Dry mucous membranes, No oral exudates, Nose normal.   Eyes: PERRL, EOMI, Conjunctiva normal, No discharge.   Neck: Normal range of motion, No tenderness, Supple, No stridor.   Cardiovascular: Tachycardic heart rate, Normal rhythm, No murmurs, No rubs, No gallops.   Thorax & Lungs: Normal breath sounds, No respiratory distress, No wheezing, No chest tenderness.   Abdomen: Bowel sounds normal, Soft, epigastric tenderness, distention.   Skin: Warm, Dry, No erythema, No rash. Midline vertical incision clean, dry, and intact.   Back: No tenderness, No CVA tenderness.   Musculoskeletal: Good range of motion in all major joints.   Neurologic: Alert,  No focal deficits noted.   Psychiatric: Affect normal    DIAGNOSTIC STUDIES & PROCEDURES    Labs:   Results for orders placed or performed during the hospital encounter of 03/20/23   CBC with Differential   Result Value Ref Range    WBC 9.4 4.8 - 10.8 K/uL    RBC 4.47 4.20 - 5.40 M/uL    Hemoglobin 13.0 12.0 - 16.0 g/dL    Hematocrit 39.7 37.0 - 47.0 %    MCV " 88.8 81.4 - 97.8 fL    MCH 29.1 27.0 - 33.0 pg    MCHC 32.7 (L) 33.6 - 35.0 g/dL    RDW 47.1 35.9 - 50.0 fL    Platelet Count 265 164 - 446 K/uL    MPV 9.1 9.0 - 12.9 fL    Neutrophils-Polys 64.10 44.00 - 72.00 %    Lymphocytes 22.70 22.00 - 41.00 %    Monocytes 5.30 0.00 - 13.40 %    Eosinophils 7.00 (H) 0.00 - 6.90 %    Basophils 0.60 0.00 - 1.80 %    Immature Granulocytes 0.30 0.00 - 0.90 %    Nucleated RBC 0.00 /100 WBC    Neutrophils (Absolute) 6.03 2.00 - 7.15 K/uL    Lymphs (Absolute) 2.14 1.00 - 4.80 K/uL    Monos (Absolute) 0.50 0.00 - 0.85 K/uL    Eos (Absolute) 0.66 (H) 0.00 - 0.51 K/uL    Baso (Absolute) 0.06 0.00 - 0.12 K/uL    Immature Granulocytes (abs) 0.03 0.00 - 0.11 K/uL    NRBC (Absolute) 0.00 K/uL   Complete Metabolic Panel   Result Value Ref Range    Sodium 139 135 - 145 mmol/L    Potassium 3.3 (L) 3.6 - 5.5 mmol/L    Chloride 106 96 - 112 mmol/L    Co2 17 (L) 20 - 33 mmol/L    Anion Gap 16.0 7.0 - 16.0    Glucose 132 (H) 65 - 99 mg/dL    Bun 11 8 - 22 mg/dL    Creatinine 0.66 0.50 - 1.40 mg/dL    Calcium 8.7 8.5 - 10.5 mg/dL    AST(SGOT) 17 12 - 45 U/L    ALT(SGPT) 18 2 - 50 U/L    Alkaline Phosphatase 91 30 - 99 U/L    Total Bilirubin 0.5 0.1 - 1.5 mg/dL    Albumin 3.9 3.2 - 4.9 g/dL    Total Protein 7.0 6.0 - 8.2 g/dL    Globulin 3.1 1.9 - 3.5 g/dL    A-G Ratio 1.3 g/dL   Lipase   Result Value Ref Range    Lipase 18 11 - 82 U/L   Urinalysis    Specimen: Urine   Result Value Ref Range    Color Yellow     Character Clear     Specific Gravity 1.003 <1.035    Ph 6.0 5.0 - 8.0    Glucose Negative Negative mg/dL    Ketones Negative Negative mg/dL    Protein Negative Negative mg/dL    Bilirubin Negative Negative    Urobilinogen, Urine 0.2 Negative    Nitrite Negative Negative    Leukocyte Esterase Negative Negative    Occult Blood Negative Negative    Micro Urine Req see below    LACTIC ACID   Result Value Ref Range    Lactic Acid 1.6 0.5 - 2.0 mmol/L   Lactic Acid   Result Value Ref Range     Lactic Acid 1.7 0.5 - 2.0 mmol/L   CORRECTED CALCIUM   Result Value Ref Range    Correct Calcium 8.8 8.5 - 10.5 mg/dL   ESTIMATED GFR   Result Value Ref Range    GFR (CKD-EPI) 117 >60 mL/min/1.73 m 2       All labs reviewed by me.    Radiology:   The attending Emergency Physician has independently interpreted the diagnostic imaging associated with this visit and is awaiting the final reading from the radiologist, which will be displayed below.    Preliminary interpretation is a follows: Diffuse inflammatory change in the epigastric area on the CT.  Radiologist interpretation:     CT-ABDOMEN-PELVIS WITH   Final Result      1.  No evidence of bowel obstruction or focal inflammatory change.      2.  Again seen surgical change consistent with cholecystectomy. There is again seen a small amount of stranding attenuation and fluid at the surgical site. No evidence of focal rim-enhancing fluid collection to suggest presence of abscess.      3.  Stable punctate calcification involving the pancreatic head. No evidence of pancreatic ductal dilatation.      4.  Fatty liver.      DX-CHEST-PORTABLE (1 VIEW)   Final Result      1.  No acute cardiac or pulmonary abnormalities are identified.           COURSE & MEDICAL DECISION MAKING    ED Observation Status? Yes; I am placing the patient in to an observation status due to a diagnostic uncertainty as well as therapeutic intensity. Patient placed in observation status at 3:02 PM, 3/20/2023.     Observation plan is as follows: Patient admitted to etiolog with work-up for her abdominal pain, determine whether or not she requires hospitalization review her records.    Upon Reevaluation, the patient's condition has: not improved; and will be escalated to hospitalization.    Patient discharged from ED Observation status at 6:03 PM  (Time) 3/20/2023. (Date).     INITIAL ASSESSMENT AND PLAN  Care Narrative:       2:40 PM - Patient seen and evaluated at bedside. Rhiannon Marrero is a 37  y.o. female who presents with abdominal pain. Patient will be treated with lactated ringers, morphine injection 4 mg, and Zofran injection 4mg for her symptoms. Ordered CT-abdomen, DX-chest, lactic acid, Urinalysis, Urine culture, Blood culture, corrected calcium, CBC with Diff, CMP, Lipase, and Urinalysis to evaluate. She understands and agrees to the plan of care. Differential diagnoses include but are not limited to: Post op infection vs Gastroenteritis vs Recurrent pancreatitic stone vs Pancreatitis.     Patient is reassessed she still in a lot of pain despite IV narcotics.  Her chart has been reviewed.  She has a history of chronic abdominal pain and fairly significant narcotic use and certainly this is concerning, however, I believe she is having pain and she has significant tenderness.  Her CT shows fairly significant inflammation.  Regular postoperative inflammation was causing vomiting, and  decreased ability to tolerate fluids.  She is recently postop for significant surgery.  He should be admitted for pain control and hydration and we have to speak with the surgeons.  I will speak with the hospitalist.    6:03 PM I discussed the patient's case and the above findings with Dr. Garcia (Hospitalist) who kindly agreed to evaluate the patient.      HYDRATION: Based on the patient's presentation of Dehydration the patient was given IV fluids. IV Hydration was used because oral hydration was not as rapid as required. Upon recheck following hydration, the patient was mildly improved.    ADDITIONAL PROBLEM LIST AND DISPOSITION  Recurrent pancreatitis  History of alcohol use  Recent complex abdominal surgery.  Modified Whipple.               DISPOSITION AND DISCUSSIONS  I have discussed management of the patient with the following physicians and BILL's: Dr. Garcia (Hospitalist)     Discussion of management with other [facility] or appropriate source(s): None         Barriers to care at this time, including but not limited  to:  None .         DISPOSITION:  Patient will be hospitalized by Dr. Garcia in guarded condition.      FINAL IMPRESSION   1. Epigastric pain    2. Post-op pain    3. Nausea and vomiting, unspecified vomiting type    4.  Status post recent modified Whipple procedure about 3 weeks ago.     Emelia RASHEED (Scribe), am scribing for, and in the presence of, Juni Crowe M.D..    Electronically signed by: Emelia Allen (Scribe), 3/20/2023    Juni RASHEED M.D. personally performed the services described in this documentation, as scribed by Emelia Allen in my presence, and it is both accurate and complete.    The note accurately reflects work and decisions made by me.  Juni Crowe M.D.  3/20/2023  7:16 PM

## 2023-03-21 PROBLEM — K86.0 ALCOHOL-INDUCED CHRONIC PANCREATITIS (HCC): Status: ACTIVE | Noted: 2023-03-21

## 2023-03-21 PROBLEM — R19.7 DIARRHEA DUE TO MALABSORPTION: Status: ACTIVE | Noted: 2023-03-21

## 2023-03-21 PROBLEM — K90.9 DIARRHEA DUE TO MALABSORPTION: Status: ACTIVE | Noted: 2023-03-21

## 2023-03-21 PROCEDURE — 700102 HCHG RX REV CODE 250 W/ 637 OVERRIDE(OP): Performed by: STUDENT IN AN ORGANIZED HEALTH CARE EDUCATION/TRAINING PROGRAM

## 2023-03-21 PROCEDURE — A9270 NON-COVERED ITEM OR SERVICE: HCPCS | Performed by: STUDENT IN AN ORGANIZED HEALTH CARE EDUCATION/TRAINING PROGRAM

## 2023-03-21 PROCEDURE — 700102 HCHG RX REV CODE 250 W/ 637 OVERRIDE(OP): Performed by: HOSPITALIST

## 2023-03-21 PROCEDURE — 770006 HCHG ROOM/CARE - MED/SURG/GYN SEMI*

## 2023-03-21 PROCEDURE — 700111 HCHG RX REV CODE 636 W/ 250 OVERRIDE (IP): Performed by: STUDENT IN AN ORGANIZED HEALTH CARE EDUCATION/TRAINING PROGRAM

## 2023-03-21 PROCEDURE — A9270 NON-COVERED ITEM OR SERVICE: HCPCS | Performed by: HOSPITALIST

## 2023-03-21 PROCEDURE — 700111 HCHG RX REV CODE 636 W/ 250 OVERRIDE (IP): Performed by: HOSPITALIST

## 2023-03-21 PROCEDURE — 700105 HCHG RX REV CODE 258: Performed by: STUDENT IN AN ORGANIZED HEALTH CARE EDUCATION/TRAINING PROGRAM

## 2023-03-21 PROCEDURE — 99233 SBSQ HOSP IP/OBS HIGH 50: CPT | Performed by: HOSPITALIST

## 2023-03-21 RX ORDER — OXYCODONE HYDROCHLORIDE 5 MG/1
5-10 TABLET ORAL EVERY 4 HOURS PRN
Status: DISCONTINUED | OUTPATIENT
Start: 2023-03-21 | End: 2023-03-25

## 2023-03-21 RX ORDER — GABAPENTIN 100 MG/1
200 CAPSULE ORAL DAILY
Status: DISCONTINUED | OUTPATIENT
Start: 2023-03-22 | End: 2023-03-26 | Stop reason: HOSPADM

## 2023-03-21 RX ORDER — HYDROMORPHONE HYDROCHLORIDE 1 MG/ML
0.5 INJECTION, SOLUTION INTRAMUSCULAR; INTRAVENOUS; SUBCUTANEOUS
Status: DISCONTINUED | OUTPATIENT
Start: 2023-03-21 | End: 2023-03-22

## 2023-03-21 RX ORDER — HYDROMORPHONE HYDROCHLORIDE 1 MG/ML
0.5 INJECTION, SOLUTION INTRAMUSCULAR; INTRAVENOUS; SUBCUTANEOUS
Status: DISCONTINUED | OUTPATIENT
Start: 2023-03-21 | End: 2023-03-21

## 2023-03-21 RX ORDER — OMEPRAZOLE 20 MG/1
20 CAPSULE, DELAYED RELEASE ORAL 2 TIMES DAILY
Status: DISCONTINUED | OUTPATIENT
Start: 2023-03-21 | End: 2023-03-26 | Stop reason: HOSPADM

## 2023-03-21 RX ORDER — OXYCODONE HYDROCHLORIDE 5 MG/1
5 TABLET ORAL EVERY 4 HOURS PRN
Status: DISCONTINUED | OUTPATIENT
Start: 2023-03-21 | End: 2023-03-21

## 2023-03-21 RX ADMIN — PANCRELIPASE 6000 UNITS: 30000; 6000; 19000 CAPSULE, DELAYED RELEASE PELLETS ORAL at 07:34

## 2023-03-21 RX ADMIN — PANCRELIPASE 12000 UNITS: 15000; 3000; 9500 CAPSULE, DELAYED RELEASE ORAL at 17:52

## 2023-03-21 RX ADMIN — OMEPRAZOLE 20 MG: 20 CAPSULE, DELAYED RELEASE ORAL at 17:53

## 2023-03-21 RX ADMIN — HYDROMORPHONE HYDROCHLORIDE 0.5 MG: 1 INJECTION, SOLUTION INTRAMUSCULAR; INTRAVENOUS; SUBCUTANEOUS at 15:24

## 2023-03-21 RX ADMIN — ZONISAMIDE 150 MG: 50 CAPSULE ORAL at 21:19

## 2023-03-21 RX ADMIN — HYDROMORPHONE HYDROCHLORIDE 1 MG: 1 INJECTION, SOLUTION INTRAMUSCULAR; INTRAVENOUS; SUBCUTANEOUS at 07:34

## 2023-03-21 RX ADMIN — HYDROMORPHONE HYDROCHLORIDE 0.5 MG: 1 INJECTION, SOLUTION INTRAMUSCULAR; INTRAVENOUS; SUBCUTANEOUS at 18:33

## 2023-03-21 RX ADMIN — HYDROMORPHONE HYDROCHLORIDE 1 MG: 1 INJECTION, SOLUTION INTRAMUSCULAR; INTRAVENOUS; SUBCUTANEOUS at 04:25

## 2023-03-21 RX ADMIN — ONDANSETRON 4 MG: 2 INJECTION INTRAMUSCULAR; INTRAVENOUS at 20:17

## 2023-03-21 RX ADMIN — DIVALPROEX SODIUM 250 MG: 250 TABLET, DELAYED RELEASE ORAL at 17:53

## 2023-03-21 RX ADMIN — GABAPENTIN 300 MG: 300 CAPSULE ORAL at 17:53

## 2023-03-21 RX ADMIN — SODIUM CHLORIDE: 9 INJECTION, SOLUTION INTRAVENOUS at 10:43

## 2023-03-21 RX ADMIN — HYDROMORPHONE HYDROCHLORIDE 1 MG: 1 INJECTION, SOLUTION INTRAMUSCULAR; INTRAVENOUS; SUBCUTANEOUS at 10:38

## 2023-03-21 RX ADMIN — PANCRELIPASE 12000 UNITS: 15000; 3000; 9500 CAPSULE, DELAYED RELEASE ORAL at 12:13

## 2023-03-21 RX ADMIN — HYDROMORPHONE HYDROCHLORIDE 1 MG: 1 INJECTION, SOLUTION INTRAMUSCULAR; INTRAVENOUS; SUBCUTANEOUS at 01:12

## 2023-03-21 RX ADMIN — OXYCODONE HYDROCHLORIDE 5 MG: 5 TABLET ORAL at 17:52

## 2023-03-21 RX ADMIN — OXYCODONE HYDROCHLORIDE 5 MG: 5 TABLET ORAL at 13:22

## 2023-03-21 RX ADMIN — OXYCODONE HYDROCHLORIDE 10 MG: 5 TABLET ORAL at 22:55

## 2023-03-21 RX ADMIN — HYDROMORPHONE HYDROCHLORIDE 0.5 MG: 1 INJECTION, SOLUTION INTRAMUSCULAR; INTRAVENOUS; SUBCUTANEOUS at 21:19

## 2023-03-21 RX ADMIN — GABAPENTIN 100 MG: 100 CAPSULE ORAL at 05:46

## 2023-03-21 RX ADMIN — ACETAMINOPHEN 650 MG: 325 TABLET, FILM COATED ORAL at 07:38

## 2023-03-21 RX ADMIN — DIVALPROEX SODIUM 250 MG: 250 TABLET, DELAYED RELEASE ORAL at 05:46

## 2023-03-21 RX ADMIN — ONDANSETRON 4 MG: 2 INJECTION INTRAMUSCULAR; INTRAVENOUS at 04:23

## 2023-03-21 RX ADMIN — SODIUM CHLORIDE: 9 INJECTION, SOLUTION INTRAVENOUS at 03:50

## 2023-03-21 ASSESSMENT — ENCOUNTER SYMPTOMS
CLAUDICATION: 0
BACK PAIN: 0
COUGH: 0
DOUBLE VISION: 0
CHILLS: 0
ABDOMINAL PAIN: 1
DIZZINESS: 0
VOMITING: 0
PALPITATIONS: 0
HEADACHES: 0
NAUSEA: 0
HEMOPTYSIS: 0
BRUISES/BLEEDS EASILY: 0
MYALGIAS: 0
HEARTBURN: 0
BLURRED VISION: 0
DEPRESSION: 0
PND: 0
DIARRHEA: 1
WHEEZING: 0
FEVER: 0

## 2023-03-21 ASSESSMENT — LIFESTYLE VARIABLES
DOES PATIENT WANT TO STOP DRINKING: YES
HAVE YOU EVER FELT YOU SHOULD CUT DOWN ON YOUR DRINKING: NO
HAVE PEOPLE ANNOYED YOU BY CRITICIZING YOUR DRINKING: NO
EVER HAD A DRINK FIRST THING IN THE MORNING TO STEADY YOUR NERVES TO GET RID OF A HANGOVER: NO
HOW MANY TIMES IN THE PAST YEAR HAVE YOU HAD 5 OR MORE DRINKS IN A DAY: 0
ALCOHOL_USE: NO
ON A TYPICAL DAY WHEN YOU DRINK ALCOHOL HOW MANY DRINKS DO YOU HAVE: 0
CONSUMPTION TOTAL: NEGATIVE
TOTAL SCORE: 0
TOTAL SCORE: 0
AVERAGE NUMBER OF DAYS PER WEEK YOU HAVE A DRINK CONTAINING ALCOHOL: 0
EVER FELT BAD OR GUILTY ABOUT YOUR DRINKING: NO
SUBSTANCE_ABUSE: 0
TOTAL SCORE: 0

## 2023-03-21 ASSESSMENT — COGNITIVE AND FUNCTIONAL STATUS - GENERAL
MOBILITY SCORE: 24
SUGGESTED CMS G CODE MODIFIER DAILY ACTIVITY: CH
SUGGESTED CMS G CODE MODIFIER MOBILITY: CH
DAILY ACTIVITIY SCORE: 24

## 2023-03-21 ASSESSMENT — PAIN DESCRIPTION - PAIN TYPE
TYPE: ACUTE PAIN

## 2023-03-21 ASSESSMENT — PAIN SCALES - WONG BAKER
WONGBAKER_NUMERICALRESPONSE: HURTS A WHOLE LOT
WONGBAKER_NUMERICALRESPONSE: HURTS A LITTLE MORE
WONGBAKER_NUMERICALRESPONSE: HURTS A LITTLE MORE

## 2023-03-21 ASSESSMENT — FIBROSIS 4 INDEX: FIB4 SCORE: 0.53

## 2023-03-21 NOTE — H&P
"Hospital Medicine History & Physical Note    Date of Service  3/20/2023    Primary Care Physician  Ivy Adams M.D.    Consultants  None    Code Status  Full Code    Chief Complaint  Chief Complaint   Patient presents with    Abdominal Pain     \"I feel like I have pancreatitis again\" - was instructed to come back to ER if pain continued       History of Presenting Illness  Rhiannon Marrero is a 37 y.o. female who presented 3/20/2023 with abdominal pain.    Patient has a history of alcohol dependence, depression, chronic alcoholic pancreatitis.  Had an MRCP in December and was found to have 5 mm pancreatic duct stone.      Patient recently presented to [facility] on February 8.  She presented for abdominal pain.  GI was consulted and did not feel comfortable performing ERCP and recommended patient require transfer to tertiary center for further intervention.    Patient went to [place] subsequently and had open abdominal surgery for pancreatic stone and gallbladder removal.  She was doing fine until she developed abdominal pain again on March 13 and presented to [facility].  CT shows no biliary or pancreatic obstruction and she was subsequently discharged with symptomatic care.    Patient once again presents with abdominal pain, nausea, clear colored diarrhea, non bilious vomiting.  Initially found to be tachycardic.  Pertinent labs include potassium 3.3, bicarb 17.  Lipase negative.  Lactic acid negative.  CT abdomen showing no evidence of bowel obstruction or focal inflammatory change.  Surgical change seen consistent with cholecystectomy.  Small amount of strandy attenuation fluid at the surgical site, no focal rim-enhancing fluid collection to suggest presence of abscess.  Stable punctuate calcification involving the pancreatic head with no evidence of pancreatic ductal dilatation.    I discussed the plan of care with patient.    Review of Systems  Review of Systems   Constitutional:  Positive for " malaise/fatigue.   HENT: Negative.     Eyes: Negative.    Respiratory: Negative.     Cardiovascular: Negative.    Gastrointestinal:  Positive for abdominal pain, diarrhea, nausea and vomiting.   Genitourinary: Negative.    Musculoskeletal: Negative.    Skin: Negative.    Neurological: Negative.    Endo/Heme/Allergies: Negative.    Psychiatric/Behavioral: Negative.       Past Medical History   has a past medical history of Acute pancreatitis without infection or necrosis (07/20/2022), Alcohol dependence ([facility]) (04/13/2017), Bronchitis (08/2012), Cold, Current moderate episode of major depressive disorder without prior episode ([facility]) (01/31/2020), Heart burn, Hernia of unspecified site of abdominal cavity without mention of obstruction or gangrene, High anion gap metabolic acidosis (07/01/2022), Indigestion, Pancreatitis, Pneumonia (2011), and Seizure disorder ([facility]) (05/23/2022).    Surgical History   has a past surgical history that includes other orthopedic surgery; gyn surgery; tonsillectomy (4/11/2013); arthroscopy, knee; hysterectomy robotic xi (10/11/2018); salpingectomy (Bilateral, 10/11/2018); orif, wrist (Right, 4/24/2019); pr upper gi endoscopy,diagnosis (N/A, 12/23/2022); pr inject nerv blck,celiac plexus (N/A, 12/23/2022); and egd w/endoscopic ultrasound (N/A, 12/23/2022).     Family History  family history includes Arthritis in her mother; Heart Disease in her maternal grandfather; Psychiatric Illness in her mother; Stroke in her mother.   Family history reviewed with patient. There is no family history that is pertinent to the chief complaint.     Social History   reports that she has been smoking cigarettes. She has a 5.00 pack-year smoking history. She has never used smokeless tobacco. She reports that she does not currently use alcohol. She reports current drug use. Drug: Inhaled.    Allergies  Allergies   Allergen Reactions    Naltrexone Anxiety    Promethazine Hcl Anxiety     Anxiety and makes her  feels like skin coming off        Medications  Prior to Admission Medications   Prescriptions Last Dose Informant Patient Reported? Taking?   divalproex (DEPAKOTE) 250 MG Tablet Delayed Response 3/19/2023 at 2200 Patient Yes No   Sig: Take 250 mg by mouth 2 times a day.   gabapentin (NEURONTIN) 100 MG Cap 3/19/2023 at 2200 Patient Yes No   Sig: Take 100-300 mg by mouth 2 times a day. 100 mg = AM   300 mg = PM   hydrOXYzine pamoate (VISTARIL) 50 MG Cap 3/19/2023 at 2200 Patient Yes Yes   Sig: Take 50 mg by mouth 3 times a day as needed for Anxiety.   pancrelipase, Lip-Prot-Amyl, (CREON 6000) 6000-67758 units Cap DR Particles 3/19/2023 at 1830 Patient No No   Sig: Take 1 Capsule by mouth 3 times a day with meals.   zonisamide (ZONEGRAN) 50 MG capsule 3/19/2023 at 2200 Patient Yes No   Sig: Take 150 mg by mouth at bedtime. 50 mg ( 3 capsules) = 150 mg      Facility-Administered Medications: None       Physical Exam  Temp:  [36.8 °C (98.2 °F)] 36.8 °C (98.2 °F)  Pulse:  [] 91  Resp:  [18] 18  BP: (114-135)/(80-93) 114/80  SpO2:  [98 %-99 %] 98 %  Blood Pressure: 114/80   Temperature: 36.8 °C (98.2 °F)   Pulse: 91   Respiration: 18   Pulse Oximetry: 98 %       Physical Exam  Constitutional:       Appearance: Normal appearance. She is normal weight.   HENT:      Head: Normocephalic.      Nose: Nose normal.      Mouth/Throat:      Mouth: Mucous membranes are moist.   Eyes:      Pupils: Pupils are equal, round, and reactive to light.   Cardiovascular:      Rate and Rhythm: Normal rate and regular rhythm.   Pulmonary:      Effort: Pulmonary effort is normal.      Breath sounds: Normal breath sounds.   Abdominal:      General: Abdomen is flat. Bowel sounds are normal.      Palpations: Abdomen is soft.      Comments: Vertical incision scar noted, healed well  Endorses pain upon palpation of epigastric area   Musculoskeletal:         General: Normal range of motion.      Cervical back: Neck supple.   Neurological:       General: No focal deficit present.      Mental Status: She is alert and oriented to person, place, and time. Mental status is at baseline.   Psychiatric:         Mood and Affect: Mood normal.         Behavior: Behavior normal.         Thought Content: Thought content normal.       Laboratory:  Recent Labs     03/20/23  1415   WBC 9.4   RBC 4.47   HEMOGLOBIN 13.0   HEMATOCRIT 39.7   MCV 88.8   MCH 29.1   MCHC 32.7*   RDW 47.1   PLATELETCT 265   MPV 9.1     Recent Labs     03/20/23  1415   SODIUM 139   POTASSIUM 3.3*   CHLORIDE 106   CO2 17*   GLUCOSE 132*   BUN 11   CREATININE 0.66   CALCIUM 8.7     Recent Labs     03/20/23  1415   ALTSGPT 18   ASTSGOT 17   ALKPHOSPHAT 91   TBILIRUBIN 0.5   LIPASE 18   GLUCOSE 132*         No results for input(s): NTPROBNP in the last 72 hours.      No results for input(s): TROPONINT in the last 72 hours.    Imaging:  CT-ABDOMEN-PELVIS WITH   Final Result      1.  No evidence of bowel obstruction or focal inflammatory change.      2.  Again seen surgical change consistent with cholecystectomy. There is again seen a small amount of stranding attenuation and fluid at the surgical site. No evidence of focal rim-enhancing fluid collection to suggest presence of abscess.      3.  Stable punctate calcification involving the pancreatic head. No evidence of pancreatic ductal dilatation.      4.  Fatty liver.      DX-CHEST-PORTABLE (1 VIEW)   Final Result      1.  No acute cardiac or pulmonary abnormalities are identified.          X-Ray:  My impression is: No acute cardiopulmonary pathology    Assessment/Plan:  Justification for Admission Status  I anticipate this patient will require at least two midnights for appropriate medical management, necessitating inpatient admission because will require treatment for pain management and electrolyte replacement    Patient will need a Med/Surg bed on EMERGENCY service .  The need is secondary to pain management and electrolyte replacement.    *  Intractable pain- (present on admission)  Assessment & Plan  Found to have negative lipase, lactic acid. No acute findings on CAT scan of abdomen. Admitted for pain management  Fluids  Dilaudid 1 mg q4h prn  Zofran q4h    Alcohol use  Assessment & Plan  Has been sober for about 157 days    Hypokalemia  Assessment & Plan  From vomiting  Replace  Serial BMP      Tobacco use  Assessment & Plan  Counseled for more than 5 minutes on tobacco cessation [id number]          VTE prophylaxis: enoxaparin ppx

## 2023-03-21 NOTE — CARE PLAN
The patient is Stable - Low risk of patient condition declining or worsening    Shift Goals  Clinical Goals: iv hydration and pain management  Patient Goals: Rest and pain control    Progress made toward(s) clinical / shift goals:  iv fluid started and pain medication given as directed and reassessed within 2 hours.   Problem: Knowledge Deficit - Standard  Goal: Patient and family/care givers will demonstrate understanding of plan of care, disease process/condition, diagnostic tests and medications  Description: Target End Date:  1-3 days or as soon as patient condition allows    Document in Patient Education    1.  Patient and family/caregiver oriented to unit, equipment, visitation policy and means for communicating concern  2.  Complete/review Learning Assessment  3.  Assess knowledge level of disease process/condition, treatment plan, diagnostic tests and medications  4.  Explain disease process/condition, treatment plan, diagnostic tests and medications  Outcome: Progressing     Problem: Pain - Standard  Goal: Alleviation of pain or a reduction in pain to the patient’s comfort goal  Description: Target End Date:  Prior to discharge or change in level of care    Document on Vitals flowsheet    1.  Document pain using the appropriate pain scale per order or unit policy  2.  Educate and implement non-pharmacologic comfort measures (i.e. relaxation, distraction, massage, cold/heat therapy, etc.)  3.  Pain management medications as ordered  4.  Reassess pain after pain med administration per policy  5.  If opiods administered assess patient's response to pain medication is appropriate per POSS sedation scale  6.  Follow pain management plan developed in collaboration with patient and interdisciplinary team (including palliative care or pain specialists if applicable)  Outcome: Progressing       Patient is not progressing towards the following goals:

## 2023-03-21 NOTE — PROGRESS NOTES
[facility] Medicine Daily Progress Note    Date of Service  3/21/2023    Chief Complaint  Rhiannon Marrero is a 37 y.o. female admitted 3/20/2023 with abdominal pain.     Hospital Course  Rhiannon Marrero is a 37 y.o. female who presented 3/20/2023 with abdominal pain.     Patient has a history of alcohol dependence, depression, chronic alcoholic pancreatitis.  Had an MRCP in December and was found to have 5 mm pancreatic duct stone.       Patient recently presented to [facility] on February 8.  She presented for abdominal pain.  GI was consulted and did not feel comfortable performing ERCP and recommended patient require transfer to tertiary center for further intervention.     Patient went to [place] subsequently and had open abdominal surgery for pancreatic stone and gallbladder removal.  She was doing fine until she developed abdominal pain again on March 13 and presented to [facility] ED.  CT shows no biliary or pancreatic obstruction and she was subsequently discharged with symptomatic care.     Patient once again presents with abdominal pain, nausea, clear colored diarrhea, non bilious vomiting.  Initially found to be tachycardic.  Pertinent labs include potassium 3.3, bicarb 17.  Lipase negative.  Lactic acid negative.  CT abdomen showing no evidence of bowel obstruction or focal inflammatory change.  Surgical change seen consistent with cholecystectomy.  Small amount of strandy attenuation fluid at the surgical site, no focal rim-enhancing fluid collection to suggest presence of abscess.  Stable punctuate calcification involving the pancreatic head with no evidence of pancreatic ductal dilatation.    Interval Problem Update  3/21 in bed, c/o abdominal pain and frequent diarrhea, no bloody but noticed fat in stool,  pain is epigastric, radiated to back, no focal weakness, able to speak in full sentences, pain medications adjusted, added PPI, will check for pancreatic insufficiency.      I have discussed  this patient's plan of care and discharge plan at IDT rounds today with Case Management, Nursing, Nursing leadership, and other members of the IDT team.    Consultants/Specialty  N/a    Code Status  Full Code    Disposition  Patient is not medically cleared for discharge.   Anticipate discharge to to home with close outpatient follow-up.  I have placed the appropriate orders for post-discharge needs.    Review of Systems  Review of Systems   Constitutional:  Negative for chills and fever.   HENT:  Negative for congestion, ear discharge and nosebleeds.    Eyes:  Negative for blurred vision and double vision.   Respiratory:  Negative for cough, hemoptysis and wheezing.    Cardiovascular:  Negative for chest pain, palpitations, claudication, leg swelling and PND.   Gastrointestinal:  Positive for abdominal pain and diarrhea. Negative for heartburn, melena, nausea and vomiting.   Genitourinary:  Negative for hematuria and urgency.   Musculoskeletal:  Negative for back pain and myalgias.   Skin:  Negative for rash.   Neurological:  Negative for dizziness and headaches.   Endo/Heme/Allergies:  Does not bruise/bleed easily.   Psychiatric/Behavioral:  Negative for depression, substance abuse and suicidal ideas.       Physical Exam  Temp:  [36 °C (96.8 °F)-36.5 °C (97.7 °F)] 36 °C (96.8 °F)  Pulse:  [] 79  Resp:  [17-18] 18  BP: (113-128)/(77-85) 113/77  SpO2:  [96 %-98 %] 97 %    Physical Exam  Vitals and nursing note reviewed.   Constitutional:       General: She is not in acute distress.     Appearance: Normal appearance. She is ill-appearing.   HENT:      Head: Normocephalic.      Nose: Nose normal.      Mouth/Throat:      Mouth: Mucous membranes are dry.      Pharynx: No oropharyngeal exudate or posterior oropharyngeal erythema.   Eyes:      General: No scleral icterus.        Right eye: No discharge.         Left eye: No discharge.      Extraocular Movements: Extraocular movements intact.      Conjunctiva/sclera:  Conjunctivae normal.   Cardiovascular:      Rate and Rhythm: Normal rate and regular rhythm.      Pulses: Normal pulses.      Heart sounds: Normal heart sounds.   Pulmonary:      Effort: Pulmonary effort is normal. No respiratory distress.      Breath sounds: Normal breath sounds. No wheezing.   Abdominal:      General: Bowel sounds are normal. There is no distension.      Palpations: Abdomen is soft.      Tenderness: There is abdominal tenderness. There is no guarding or rebound.   Musculoskeletal:         General: Normal range of motion.      Cervical back: Normal range of motion and neck supple. No rigidity or tenderness.      Right lower leg: No edema.      Left lower leg: No edema.   Skin:     General: Skin is warm and dry.      Capillary Refill: Capillary refill takes less than 2 seconds.      Coloration: Skin is not jaundiced.   Neurological:      General: No focal deficit present.      Mental Status: She is alert and oriented to person, place, and time.      Cranial Nerves: No cranial nerve deficit.      Motor: No weakness.   Psychiatric:         Mood and Affect: Mood normal.         Behavior: Behavior normal.         Thought Content: Thought content normal.       Fluids    Intake/Output Summary (Last 24 hours) at 3/21/2023 1412  Last data filed at 3/20/2023 1637  Gross per 24 hour   Intake 1716 ml   Output --   Net 1716 ml       Laboratory  Recent Labs     03/20/23  1415   WBC 9.4   RBC 4.47   HEMOGLOBIN 13.0   HEMATOCRIT 39.7   MCV 88.8   MCH 29.1   MCHC 32.7*   RDW 47.1   PLATELETCT 265   MPV 9.1     Recent Labs     03/20/23  1415   SODIUM 139   POTASSIUM 3.3*   CHLORIDE 106   CO2 17*   GLUCOSE 132*   BUN 11   CREATININE 0.66   CALCIUM 8.7                   Imaging  CT-ABDOMEN-PELVIS WITH   Final Result      1.  No evidence of bowel obstruction or focal inflammatory change.      2.  Again seen surgical change consistent with cholecystectomy. There is again seen a small amount of stranding attenuation and  fluid at the surgical site. No evidence of focal rim-enhancing fluid collection to suggest presence of abscess.      3.  Stable punctate calcification involving the pancreatic head. No evidence of pancreatic ductal dilatation.      4.  Fatty liver.      DX-CHEST-PORTABLE (1 VIEW)   Final Result      1.  No acute cardiac or pulmonary abnormalities are identified.           Assessment/Plan  * Intractable pain- (present on admission)  Assessment & Plan  Found to have negative lipase, lactic acid. No acute findings on CAT scan of abdomen. Admitted for pain management  Fluids  Dilaudid 1 mg q4h prn  Zofran q4h  Ct abdomen revised  Lipase neg  Likely chronic pancreatitis. Continue pain management. Gabapentin, oxycodone, weaning off iv dilaudid.     Diarrhea due to malabsorption- (present on admission)  Assessment & Plan  Probably due to chronic pancreatitis  Check stool fat and elastase  Increase pancreatic enzymes.    Supportive treatment.     Alcohol-induced chronic pancreatitis (HCC)- (present on admission)  Assessment & Plan  Ct abdomen showed calcification in heaf the pancreas  Will check stool for fat and elastase-1 to assess for pancreatic insufficiency patient with diarrhea.     Alcohol use  Assessment & Plan  Has been sober     Hypokalemia  Assessment & Plan  From vomiting  Replacing.  Will try po if she can't tolerate will do iv infusion.   Check Mg.       Seizure disorder (HCC)- (present on admission)  Assessment & Plan  Continue depakote, gabapentin.   Seizures precautions.     Tobacco use  Assessment & Plan  Needs to quit.             VTE prophylaxis: enoxaparin ppx    I have performed a physical exam and reviewed and updated ROS and Plan today (3/21/2023). In review of yesterday's note (3/20/2023), there are no changes except as documented above.    Continue monitoring for oral intake, signs of dehydration, monitoring electrolyte replacement, response to medications adjustment,  f/u labs results.   I have  reviewed labs and imagine results available.

## 2023-03-21 NOTE — ASSESSMENT & PLAN NOTE
Found to have negative lipase, lactic acid. No acute findings on CAT scan of abdomen. Admitted for pain management  Fluids  Dilaudid 1 mg q4h prn  Zofran q4h  Ct abdomen revised  Lipase neg  Likely chronic pancreatitis. Continue pain management. Gabapentin, oxycodone, weaning off iv dilaudid.   I have increased MS contin po.   Decreased iv dilaudid to 0.5 mg q4hrs.   Needs to ambulate more.   Iv dialudid stopped today.   Continues complains of abdominal pain, cmp and lipase wnl. Will get mrcp today, changed to po dilaudid.

## 2023-03-21 NOTE — ED NOTES
Med rec updated and complete. Allergies reviewed. Confirmed name and date of birth.  Pt denies antibiotic use at home .    Home pharmacy [facility] [phone]

## 2023-03-21 NOTE — ASSESSMENT & PLAN NOTE
From vomiting  Replacing.  Will try po if she can't tolerate will do iv infusion.    Mg, po mag.   Monitoring

## 2023-03-21 NOTE — ASSESSMENT & PLAN NOTE
Probably due to chronic pancreatitis  Check stool fat and elastase  Increase pancreatic enzymes.    Supportive treatment.

## 2023-03-21 NOTE — ASSESSMENT & PLAN NOTE
Ct abdomen showed calcification in heaf the pancreas  Will check stool for fat and elastase-1 to assess for pancreatic insufficiency patient with diarrhea.   Stool analysis pending.      No

## 2023-03-21 NOTE — PROGRESS NOTES
"Received alert and oriented x 4. Check vitals sign and recorded accordingly and due med given per MAR. Monitor sign and symptoms of respiratory distress and treatment given accordingly per MAR.Medicated per MAR and reassessed every 2 hours per protocol. Call light within reach. Steady on her feet. Needs attended. Will continue to monitor./85   Pulse 86   Temp 36.5 °C (97.7 °F) (Temporal)   Resp 17   Ht 1.702 m (5' 7\")   Wt 57.2 kg (126 lb)   LMP 10/30/2018   SpO2 96%   BMI 19.73 kg/m² . Kept NPO ok with sips of medication. IV fluid started as ordered. Still c/o abdominal pain and prn med relayed to MD to revised and nausea medication as well given.  "

## 2023-03-21 NOTE — ED NOTES
Pt resting in room, easy, equal chest rise and fall. Pt remains calm and cooperative. Parents at bedside

## 2023-03-21 NOTE — HOSPITAL COURSE
Rhiannon Marrero is a 37 y.o. female who presented 3/20/2023 with abdominal pain.     Patient has a history of alcohol dependence, depression, chronic alcoholic pancreatitis.  Had an MRCP in December and was found to have 5 mm pancreatic duct stone.       Patient recently presented to [facility] on February 8.  She presented for abdominal pain.  GI was consulted and did not feel comfortable performing ERCP and recommended patient require transfer to tertiary center for further intervention.     Patient went to [place] subsequently and had open abdominal surgery for pancreatic stone and gallbladder removal.  She was doing fine until she developed abdominal pain again on March 13 and presented to [facility] ED.  CT shows no biliary or pancreatic obstruction and she was subsequently discharged with symptomatic care.     Patient once again presents with abdominal pain, nausea, clear colored diarrhea, non bilious vomiting.  Initially found to be tachycardic.  Pertinent labs include potassium 3.3, bicarb 17.  Lipase negative.  Lactic acid negative.  CT abdomen showing no evidence of bowel obstruction or focal inflammatory change.  Surgical change seen consistent with cholecystectomy.  Small amount of strandy attenuation fluid at the surgical site, no focal rim-enhancing fluid collection to suggest presence of abscess.  Stable punctuate calcification involving the pancreatic head with no evidence of pancreatic ductal dilatation.

## 2023-03-21 NOTE — PROGRESS NOTES
Assumed care of patient 0700. Received Report from [facility] nurse. Patient A&O 4, on  RA, Resting quietly in bed watching her tablet. . Call light within reach, belongings within reach, Fall precautions in place, and bed alarm is on and bed in lowest position. Patient does not have any other needs at this time.

## 2023-03-21 NOTE — ED NOTES
Attempted to give report, admitting RN not available at this time Admitting RN will call back when they are available. Transport at bedside

## 2023-03-21 NOTE — PROGRESS NOTES
4 Eyes Skin Assessment Completed by Morelia NAJERA RN and LESLIE Byrd.    Head WDL  Ears WDL  Nose WDL  Mouth WDL  Neck WDL  Breast/Chest WDL  Shoulder Blades WDL  Spine WDL  (R) Arm/Elbow/Hand WDL  (L) Arm/Elbow/Hand WDL  Abdomen Midline incision Proximal Yellow [place] Redness, Scab, and Incision Dry Dermabond  Groin WDL  Scrotum/Coccyx/Buttocks WDL  (R) Leg WDL  (L) Leg WDL  (R) Heel/Foot/Toe WDL  (L) Heel/Foot/Toe WDL          Devices In Places Blood Pressure Cuff      Interventions In Place N/A    Possible Skin Injury Yes    Pictures Uploaded Into Epic Yes  Wound Consult Placed Yes  RN Wound Prevention Protocol Ordered No

## 2023-03-21 NOTE — CARE PLAN
The patient is Stable - Low risk of patient condition declining or worsening    Shift Goals  Clinical Goals: Gut rest, IV  Patient Goals: pain control    Progress made toward(s) clinical / shift goals:    Problem: Knowledge Deficit - Standard  Goal: Patient and family/care givers will demonstrate understanding of plan of care, disease process/condition, diagnostic tests and medications  Outcome: Progressing     Problem: Pain - Standard  Goal: Alleviation of pain or a reduction in pain to the patient’s comfort goal  Outcome: Progressing       Patient is not progressing towards the following goals:

## 2023-03-22 LAB
ANION GAP SERPL CALC-SCNC: 11 MMOL/L (ref 7–16)
BACTERIA UR CULT: NORMAL
BUN SERPL-MCNC: 5 MG/DL (ref 8–22)
CALCIUM SERPL-MCNC: 8.6 MG/DL (ref 8.5–10.5)
CHLORIDE SERPL-SCNC: 104 MMOL/L (ref 96–112)
CO2 SERPL-SCNC: 20 MMOL/L (ref 20–33)
CREAT SERPL-MCNC: 0.47 MG/DL (ref 0.5–1.4)
CRP SERPL HS-MCNC: <0.3 MG/DL (ref 0–0.75)
ERYTHROCYTE [DISTWIDTH] IN BLOOD BY AUTOMATED COUNT: 45.7 FL (ref 35.9–50)
ERYTHROCYTE [SEDIMENTATION RATE] IN BLOOD BY WESTERGREN METHOD: 5 MM/HOUR (ref 0–25)
GFR SERPLBLD CREATININE-BSD FMLA CKD-EPI: 127 ML/MIN/1.73 M 2
GLUCOSE SERPL-MCNC: 92 MG/DL (ref 65–99)
HCT VFR BLD AUTO: 36 % (ref 37–47)
HGB BLD-MCNC: 12.1 G/DL (ref 12–16)
MAGNESIUM SERPL-MCNC: 1.8 MG/DL (ref 1.5–2.5)
MCH RBC QN AUTO: 29.1 PG (ref 27–33)
MCHC RBC AUTO-ENTMCNC: 33.6 G/DL (ref 33.6–35)
MCV RBC AUTO: 86.5 FL (ref 81.4–97.8)
PHOSPHATE SERPL-MCNC: 4.8 MG/DL (ref 2.5–4.5)
PLATELET # BLD AUTO: 212 K/UL (ref 164–446)
PMV BLD AUTO: 9.2 FL (ref 9–12.9)
POTASSIUM SERPL-SCNC: 3.9 MMOL/L (ref 3.6–5.5)
RBC # BLD AUTO: 4.16 M/UL (ref 4.2–5.4)
SIGNIFICANT IND 70042: NORMAL
SITE SITE: NORMAL
SODIUM SERPL-SCNC: 135 MMOL/L (ref 135–145)
SOURCE SOURCE: NORMAL
WBC # BLD AUTO: 5.9 K/UL (ref 4.8–10.8)

## 2023-03-22 PROCEDURE — 83735 ASSAY OF MAGNESIUM: CPT

## 2023-03-22 PROCEDURE — 36415 COLL VENOUS BLD VENIPUNCTURE: CPT

## 2023-03-22 PROCEDURE — 700102 HCHG RX REV CODE 250 W/ 637 OVERRIDE(OP): Performed by: HOSPITALIST

## 2023-03-22 PROCEDURE — 770006 HCHG ROOM/CARE - MED/SURG/GYN SEMI*

## 2023-03-22 PROCEDURE — 700102 HCHG RX REV CODE 250 W/ 637 OVERRIDE(OP): Performed by: STUDENT IN AN ORGANIZED HEALTH CARE EDUCATION/TRAINING PROGRAM

## 2023-03-22 PROCEDURE — 85652 RBC SED RATE AUTOMATED: CPT

## 2023-03-22 PROCEDURE — A9270 NON-COVERED ITEM OR SERVICE: HCPCS | Performed by: HOSPITALIST

## 2023-03-22 PROCEDURE — 700111 HCHG RX REV CODE 636 W/ 250 OVERRIDE (IP): Performed by: HOSPITALIST

## 2023-03-22 PROCEDURE — 99232 SBSQ HOSP IP/OBS MODERATE 35: CPT | Performed by: HOSPITALIST

## 2023-03-22 PROCEDURE — A9270 NON-COVERED ITEM OR SERVICE: HCPCS | Performed by: STUDENT IN AN ORGANIZED HEALTH CARE EDUCATION/TRAINING PROGRAM

## 2023-03-22 PROCEDURE — 80048 BASIC METABOLIC PNL TOTAL CA: CPT

## 2023-03-22 PROCEDURE — 700111 HCHG RX REV CODE 636 W/ 250 OVERRIDE (IP): Performed by: STUDENT IN AN ORGANIZED HEALTH CARE EDUCATION/TRAINING PROGRAM

## 2023-03-22 PROCEDURE — 86140 C-REACTIVE PROTEIN: CPT

## 2023-03-22 PROCEDURE — 84100 ASSAY OF PHOSPHORUS: CPT

## 2023-03-22 PROCEDURE — 85027 COMPLETE CBC AUTOMATED: CPT

## 2023-03-22 RX ORDER — MORPHINE SULFATE 15 MG/1
15 TABLET, FILM COATED, EXTENDED RELEASE ORAL EVERY 12 HOURS
Status: DISCONTINUED | OUTPATIENT
Start: 2023-03-22 | End: 2023-03-23

## 2023-03-22 RX ORDER — HYDROMORPHONE HYDROCHLORIDE 1 MG/ML
1 INJECTION, SOLUTION INTRAMUSCULAR; INTRAVENOUS; SUBCUTANEOUS
Status: DISCONTINUED | OUTPATIENT
Start: 2023-03-22 | End: 2023-03-23

## 2023-03-22 RX ADMIN — HYDROMORPHONE HYDROCHLORIDE 1 MG: 1 INJECTION, SOLUTION INTRAMUSCULAR; INTRAVENOUS; SUBCUTANEOUS at 15:30

## 2023-03-22 RX ADMIN — OMEPRAZOLE 20 MG: 20 CAPSULE, DELAYED RELEASE ORAL at 17:08

## 2023-03-22 RX ADMIN — ONDANSETRON 4 MG: 2 INJECTION INTRAMUSCULAR; INTRAVENOUS at 23:00

## 2023-03-22 RX ADMIN — OXYCODONE HYDROCHLORIDE 10 MG: 5 TABLET ORAL at 06:17

## 2023-03-22 RX ADMIN — HYDROMORPHONE HYDROCHLORIDE 1 MG: 1 INJECTION, SOLUTION INTRAMUSCULAR; INTRAVENOUS; SUBCUTANEOUS at 22:01

## 2023-03-22 RX ADMIN — HYDROMORPHONE HYDROCHLORIDE 0.5 MG: 1 INJECTION, SOLUTION INTRAMUSCULAR; INTRAVENOUS; SUBCUTANEOUS at 07:49

## 2023-03-22 RX ADMIN — PANCRELIPASE 12000 UNITS: 15000; 3000; 9500 CAPSULE, DELAYED RELEASE ORAL at 18:06

## 2023-03-22 RX ADMIN — HYDROMORPHONE HYDROCHLORIDE 1 MG: 1 INJECTION, SOLUTION INTRAMUSCULAR; INTRAVENOUS; SUBCUTANEOUS at 12:06

## 2023-03-22 RX ADMIN — DIVALPROEX SODIUM 250 MG: 250 TABLET, DELAYED RELEASE ORAL at 06:14

## 2023-03-22 RX ADMIN — MORPHINE SULFATE 15 MG: 15 TABLET, FILM COATED, EXTENDED RELEASE ORAL at 17:08

## 2023-03-22 RX ADMIN — GABAPENTIN 200 MG: 100 CAPSULE ORAL at 06:13

## 2023-03-22 RX ADMIN — GABAPENTIN 300 MG: 300 CAPSULE ORAL at 17:09

## 2023-03-22 RX ADMIN — ONDANSETRON 4 MG: 2 INJECTION INTRAMUSCULAR; INTRAVENOUS at 17:21

## 2023-03-22 RX ADMIN — ONDANSETRON 4 MG: 2 INJECTION INTRAMUSCULAR; INTRAVENOUS at 10:48

## 2023-03-22 RX ADMIN — HYDROMORPHONE HYDROCHLORIDE 0.5 MG: 1 INJECTION, SOLUTION INTRAMUSCULAR; INTRAVENOUS; SUBCUTANEOUS at 04:13

## 2023-03-22 RX ADMIN — DIVALPROEX SODIUM 250 MG: 250 TABLET, DELAYED RELEASE ORAL at 17:09

## 2023-03-22 RX ADMIN — HYDROMORPHONE HYDROCHLORIDE 0.5 MG: 1 INJECTION, SOLUTION INTRAMUSCULAR; INTRAVENOUS; SUBCUTANEOUS at 10:48

## 2023-03-22 RX ADMIN — ACETAMINOPHEN 650 MG: 325 TABLET, FILM COATED ORAL at 17:08

## 2023-03-22 RX ADMIN — HYDROMORPHONE HYDROCHLORIDE 1 MG: 1 INJECTION, SOLUTION INTRAMUSCULAR; INTRAVENOUS; SUBCUTANEOUS at 18:32

## 2023-03-22 RX ADMIN — PANCRELIPASE 12000 UNITS: 15000; 3000; 9500 CAPSULE, DELAYED RELEASE ORAL at 11:30

## 2023-03-22 RX ADMIN — OMEPRAZOLE 20 MG: 20 CAPSULE, DELAYED RELEASE ORAL at 06:13

## 2023-03-22 RX ADMIN — HYDROMORPHONE HYDROCHLORIDE 0.5 MG: 1 INJECTION, SOLUTION INTRAMUSCULAR; INTRAVENOUS; SUBCUTANEOUS at 00:54

## 2023-03-22 RX ADMIN — ZONISAMIDE 150 MG: 50 CAPSULE ORAL at 20:11

## 2023-03-22 RX ADMIN — PANCRELIPASE 12000 UNITS: 15000; 3000; 9500 CAPSULE, DELAYED RELEASE ORAL at 07:49

## 2023-03-22 ASSESSMENT — ENCOUNTER SYMPTOMS
DOUBLE VISION: 0
HEMOPTYSIS: 0
PND: 0
COUGH: 0
BLURRED VISION: 0
DIARRHEA: 1
BRUISES/BLEEDS EASILY: 0
VOMITING: 0
HEADACHES: 0
PALPITATIONS: 0
CHILLS: 0
DEPRESSION: 0
FEVER: 0
NAUSEA: 0
MYALGIAS: 0
DIZZINESS: 0
ABDOMINAL PAIN: 1
WHEEZING: 0
HEARTBURN: 0
BACK PAIN: 0

## 2023-03-22 ASSESSMENT — PAIN SCALES - WONG BAKER
WONGBAKER_NUMERICALRESPONSE: HURTS A LITTLE MORE
WONGBAKER_NUMERICALRESPONSE: HURTS A LITTLE MORE
WONGBAKER_NUMERICALRESPONSE: HURTS JUST A LITTLE BIT

## 2023-03-22 ASSESSMENT — PAIN DESCRIPTION - PAIN TYPE
TYPE: ACUTE PAIN

## 2023-03-22 ASSESSMENT — LIFESTYLE VARIABLES: SUBSTANCE_ABUSE: 0

## 2023-03-22 NOTE — DIETARY
"Nutrition services: Day 2 of admit.  Rhiannon Marrero is a 37 y.o. female with admitting DX of Intractable pain. Chronic pancreatitis per MD note.  Consult received for Malnutrition Screening Tool score of 2 for unplanned weight loss of 2-13 lb x 1 month and poor appetite.    Assessment:  Height: 170.2 cm (5' 7\")  Weight: 57.2 kg (126 lb)  Body mass index is 19.73 kg/m²., BMI classification: Normal  Diet/Intake: Clear liquid    Evaluation:   Pt states she has been eating poorly and lost weight due to multiple hospitalizations and surgery.  Per chart review, Weight on 1/23/23 = 62.5 kg. Pt with 8.5% weight loss x 2 months which is severe.  Diet just advanced to Clear liquids at lunch today. Pt stated she has tolerated a few sips so far. Discussed addition of oral nutrition supplement, but pt declined at this time.    Malnutrition Risk: Pt meets criteria for severe malnutrition in context of acute illness related to abdominal pain, recent gallbladder removal as evidenced by reported poor PO intake <50% for >1 week and 8.5% weight loss x 2 months.    Recommendations/Plan:  Diet advancement per MD.   Encourage intake of meals >50%.  Document intake of all meals as % taken in ADL's to provide interdisciplinary communication across all shifts.   Monitor weight.  Nutrition rep will continue to see patient for ongoing meal and snack preferences.     RD following      "

## 2023-03-22 NOTE — PROGRESS NOTES
[facility] Medicine Daily Progress Note    Date of Service  3/22/2023    Chief Complaint  Rhiannon Marrero is a 37 y.o. female admitted 3/20/2023 with abdominal pain.     Hospital Course  Rhiannon Marreor is a 37 y.o. female who presented 3/20/2023 with abdominal pain.     Patient has a history of alcohol dependence, depression, chronic alcoholic pancreatitis.  Had an MRCP in December and was found to have 5 mm pancreatic duct stone.       Patient recently presented to [facility] on February 8.  She presented for abdominal pain.  GI was consulted and did not feel comfortable performing ERCP and recommended patient require transfer to tertiary center for further intervention.     Patient went to [place] subsequently and had open abdominal surgery for pancreatic stone and gallbladder removal.  She was doing fine until she developed abdominal pain again on March 13 and presented to [facility] ED.  CT shows no biliary or pancreatic obstruction and she was subsequently discharged with symptomatic care.     Patient once again presents with abdominal pain, nausea, clear colored diarrhea, non bilious vomiting.  Initially found to be tachycardic.  Pertinent labs include potassium 3.3, bicarb 17.  Lipase negative.  Lactic acid negative.  CT abdomen showing no evidence of bowel obstruction or focal inflammatory change.  Surgical change seen consistent with cholecystectomy.  Small amount of strandy attenuation fluid at the surgical site, no focal rim-enhancing fluid collection to suggest presence of abscess.  Stable punctuate calcification involving the pancreatic head with no evidence of pancreatic ductal dilatation.    Interval Problem Update  3/21 in bed, c/o abdominal pain and frequent diarrhea, no bloody but noticed fat in stool,  pain is epigastric, radiated to back, no focal weakness, able to speak in full sentences, pain medications adjusted, added PPI, will check for pancreatic insufficiency.    3/22 in bed, c/o  abdominal pain, check esr/crp, added po ms contin, increased dilaudid to 1mg iv, will try cld, continue ppi, will add sucralfate. F/u stool analysis.    I have discussed this patient's plan of care and discharge plan at IDT rounds today with Case Management, Nursing, Nursing leadership, and other members of the IDT team.    Consultants/Specialty  N/a    Code Status  Full Code    Disposition  Patient is not medically cleared for discharge.   Anticipate discharge to to home with close outpatient follow-up.  I have placed the appropriate orders for post-discharge needs.    Review of Systems  Review of Systems   Constitutional:  Negative for chills and fever.   HENT:  Negative for congestion, ear discharge and nosebleeds.    Eyes:  Negative for blurred vision and double vision.   Respiratory:  Negative for cough, hemoptysis and wheezing.    Cardiovascular:  Negative for chest pain, palpitations and PND.   Gastrointestinal:  Positive for abdominal pain and diarrhea. Negative for heartburn, melena, nausea and vomiting.   Genitourinary:  Negative for hematuria and urgency.   Musculoskeletal:  Negative for back pain and myalgias.   Skin:  Negative for rash.   Neurological:  Negative for dizziness and headaches.   Endo/Heme/Allergies:  Does not bruise/bleed easily.   Psychiatric/Behavioral:  Negative for depression, substance abuse and suicidal ideas.       Physical Exam  Temp:  [35.9 °C (96.7 °F)-36.3 °C (97.3 °F)] 36.2 °C (97.2 °F)  Pulse:  [68-85] 77  Resp:  [17-19] 18  BP: (106-126)/(27-88) 106/65  SpO2:  [91 %-95 %] 91 %    Physical Exam  Vitals and nursing note reviewed.   Constitutional:       General: She is in acute distress.      Appearance: Normal appearance. She is ill-appearing.   HENT:      Head: Normocephalic.      Nose: Nose normal.      Mouth/Throat:      Mouth: Mucous membranes are dry.      Pharynx: No oropharyngeal exudate or posterior oropharyngeal erythema.   Eyes:      General: No scleral icterus.         Right eye: No discharge.         Left eye: No discharge.      Extraocular Movements: Extraocular movements intact.      Conjunctiva/sclera: Conjunctivae normal.   Cardiovascular:      Rate and Rhythm: Normal rate and regular rhythm.      Pulses: Normal pulses.      Heart sounds: Normal heart sounds.   Pulmonary:      Effort: Pulmonary effort is normal. No respiratory distress.      Breath sounds: Normal breath sounds. No wheezing.   Abdominal:      General: Bowel sounds are normal. There is no distension.      Palpations: Abdomen is soft.      Tenderness: There is abdominal tenderness. There is no guarding or rebound.   Musculoskeletal:         General: Normal range of motion.      Cervical back: Normal range of motion and neck supple. No rigidity or tenderness.      Right lower leg: No edema.      Left lower leg: No edema.   Skin:     General: Skin is warm and dry.      Capillary Refill: Capillary refill takes less than 2 seconds.      Coloration: Skin is not jaundiced.   Neurological:      General: No focal deficit present.      Mental Status: She is alert and oriented to person, place, and time.      Cranial Nerves: No cranial nerve deficit.      Motor: No weakness.   Psychiatric:         Mood and Affect: Mood normal.         Behavior: Behavior normal.         Thought Content: Thought content normal.       Fluids  No intake or output data in the 24 hours ending 03/22/23 1449      Laboratory  Recent Labs     03/20/23  1415 03/22/23  0136   WBC 9.4 5.9   RBC 4.47 4.16*   HEMOGLOBIN 13.0 12.1   HEMATOCRIT 39.7 36.0*   MCV 88.8 86.5   MCH 29.1 29.1   MCHC 32.7* 33.6   RDW 47.1 45.7   PLATELETCT 265 212   MPV 9.1 9.2       Recent Labs     03/20/23  1415 03/22/23  0136   SODIUM 139 135   POTASSIUM 3.3* 3.9   CHLORIDE 106 104   CO2 17* 20   GLUCOSE 132* 92   BUN 11 5*   CREATININE 0.66 0.47*   CALCIUM 8.7 8.6                     Imaging  CT-ABDOMEN-PELVIS WITH   Final Result      1.  No evidence of bowel obstruction  or focal inflammatory change.      2.  Again seen surgical change consistent with cholecystectomy. There is again seen a small amount of stranding attenuation and fluid at the surgical site. No evidence of focal rim-enhancing fluid collection to suggest presence of abscess.      3.  Stable punctate calcification involving the pancreatic head. No evidence of pancreatic ductal dilatation.      4.  Fatty liver.      DX-CHEST-PORTABLE (1 VIEW)   Final Result      1.  No acute cardiac or pulmonary abnormalities are identified.             Assessment/Plan  * Intractable pain- (present on admission)  Assessment & Plan  Found to have negative lipase, lactic acid. No acute findings on CAT scan of abdomen. Admitted for pain management  Fluids  Dilaudid 1 mg q4h prn  Zofran q4h  Ct abdomen revised  Lipase neg  Likely chronic pancreatitis. Continue pain management. Gabapentin, oxycodone, weaning off iv dilaudid.   Added MS contin po.     Diarrhea due to malabsorption- (present on admission)  Assessment & Plan  Probably due to chronic pancreatitis  Check stool fat and elastase  Increase pancreatic enzymes.    Supportive treatment.     Alcohol-induced chronic pancreatitis (HCC)- (present on admission)  Assessment & Plan  Ct abdomen showed calcification in heaf the pancreas  Will check stool for fat and elastase-1 to assess for pancreatic insufficiency patient with diarrhea.   Stool analysis pending.       Alcohol use  Assessment & Plan  Has been sober     Hypokalemia  Assessment & Plan  From vomiting  Replacing.  Will try po if she can't tolerate will do iv infusion.    Mg, po mag.   Monitoring     Seizure disorder (HCC)- (present on admission)  Assessment & Plan  Continue depakote, gabapentin.   Seizures precautions.     Tobacco use  Assessment & Plan  Needs to quit.             VTE prophylaxis: enoxaparin ppx    I have performed a physical exam and reviewed and updated ROS and Plan today (3/22/2023). In review of yesterday's  note (3/21/2023), there are no changes except as documented above.    Continue monitoring for oral intake, signs of dehydration, monitoring electrolyte replacement, response to medications adjustment,  f/u labs results.  I have reviewed labs and imagine results available.

## 2023-03-22 NOTE — CARE PLAN
The patient is Stable - Low risk of patient condition declining or worsening    Shift Goals  Clinical Goals: gut rest, pain control  Patient Goals: pain control    Progress made toward(s) clinical / shift goals:    Problem: Knowledge Deficit - Standard  Goal: Patient and family/care givers will demonstrate understanding of plan of care, disease process/condition, diagnostic tests and medications  Outcome: Progressing     Problem: Pain - Standard  Goal: Alleviation of pain or a reduction in pain to the patient’s comfort goal  Outcome: Progressing       Patient is not progressing towards the following goals:

## 2023-03-22 NOTE — PROGRESS NOTES
Assumed care of patient. AO x4. With complaints of pain 8/10 in her abdomen. Routine assessment done. Vital signs within normal limits. Call light within reach and personal belongings within reach. Bed locked and in lowest position. Policies and procedures reinforced patient verbalized understanding.Treaded socks in place. Will continue to monitor.

## 2023-03-22 NOTE — PROGRESS NOTES
Assumed care of patient 0700. Received Report from [facility] nurse. Patient A&O 4, on  RA, Reporting a pain level of  6. Call light within reach, belongings within reach, Fall precautions in place, and bed alarm is on and bed in lowest position. Patient does not have any other needs at this time.

## 2023-03-22 NOTE — CARE PLAN
The patient is Stable - Low risk of patient condition declining or worsening    Shift Goals  Clinical Goals: Pain control  Patient Goals: Comfort    Progress made toward(s) clinical / shift goals:    Problem: Knowledge Deficit - Standard  Goal: Patient and family/care givers will demonstrate understanding of plan of care, disease process/condition, diagnostic tests and medications  Outcome: Progressing     Problem: Pain - Standard  Goal: Alleviation of pain or a reduction in pain to the patient’s comfort goal  Outcome: Progressing       Patient is not progressing towards the following goals:

## 2023-03-23 ENCOUNTER — APPOINTMENT (OUTPATIENT)
Dept: RADIOLOGY | Facility: MEDICAL CENTER | Age: 38
DRG: 438 | End: 2023-03-23
Attending: HOSPITALIST
Payer: COMMERCIAL

## 2023-03-23 PROCEDURE — 700111 HCHG RX REV CODE 636 W/ 250 OVERRIDE (IP): Performed by: HOSPITALIST

## 2023-03-23 PROCEDURE — 700111 HCHG RX REV CODE 636 W/ 250 OVERRIDE (IP): Performed by: STUDENT IN AN ORGANIZED HEALTH CARE EDUCATION/TRAINING PROGRAM

## 2023-03-23 PROCEDURE — 700102 HCHG RX REV CODE 250 W/ 637 OVERRIDE(OP): Performed by: HOSPITALIST

## 2023-03-23 PROCEDURE — 99232 SBSQ HOSP IP/OBS MODERATE 35: CPT | Performed by: HOSPITALIST

## 2023-03-23 PROCEDURE — 770006 HCHG ROOM/CARE - MED/SURG/GYN SEMI*

## 2023-03-23 PROCEDURE — A9270 NON-COVERED ITEM OR SERVICE: HCPCS | Performed by: HOSPITALIST

## 2023-03-23 PROCEDURE — A9270 NON-COVERED ITEM OR SERVICE: HCPCS | Performed by: STUDENT IN AN ORGANIZED HEALTH CARE EDUCATION/TRAINING PROGRAM

## 2023-03-23 PROCEDURE — 700102 HCHG RX REV CODE 250 W/ 637 OVERRIDE(OP): Performed by: STUDENT IN AN ORGANIZED HEALTH CARE EDUCATION/TRAINING PROGRAM

## 2023-03-23 RX ORDER — MORPHINE SULFATE 30 MG/1
30 TABLET, FILM COATED, EXTENDED RELEASE ORAL EVERY 12 HOURS
Status: DISCONTINUED | OUTPATIENT
Start: 2023-03-23 | End: 2023-03-26 | Stop reason: HOSPADM

## 2023-03-23 RX ORDER — HYDROMORPHONE HYDROCHLORIDE 1 MG/ML
0.5 INJECTION, SOLUTION INTRAMUSCULAR; INTRAVENOUS; SUBCUTANEOUS
Status: DISCONTINUED | OUTPATIENT
Start: 2023-03-23 | End: 2023-03-23

## 2023-03-23 RX ORDER — HYDROMORPHONE HYDROCHLORIDE 1 MG/ML
0.5 INJECTION, SOLUTION INTRAMUSCULAR; INTRAVENOUS; SUBCUTANEOUS EVERY 4 HOURS PRN
Status: DISCONTINUED | OUTPATIENT
Start: 2023-03-23 | End: 2023-03-24

## 2023-03-23 RX ADMIN — MORPHINE SULFATE 30 MG: 30 TABLET, FILM COATED, EXTENDED RELEASE ORAL at 17:42

## 2023-03-23 RX ADMIN — ONDANSETRON 4 MG: 2 INJECTION INTRAMUSCULAR; INTRAVENOUS at 16:21

## 2023-03-23 RX ADMIN — MORPHINE SULFATE 15 MG: 15 TABLET, FILM COATED, EXTENDED RELEASE ORAL at 04:54

## 2023-03-23 RX ADMIN — HYDROMORPHONE HYDROCHLORIDE 1 MG: 1 INJECTION, SOLUTION INTRAMUSCULAR; INTRAVENOUS; SUBCUTANEOUS at 01:03

## 2023-03-23 RX ADMIN — HYDROMORPHONE HYDROCHLORIDE 1 MG: 1 INJECTION, SOLUTION INTRAMUSCULAR; INTRAVENOUS; SUBCUTANEOUS at 10:04

## 2023-03-23 RX ADMIN — ZONISAMIDE 150 MG: 50 CAPSULE ORAL at 21:14

## 2023-03-23 RX ADMIN — GABAPENTIN 200 MG: 100 CAPSULE ORAL at 04:54

## 2023-03-23 RX ADMIN — GABAPENTIN 300 MG: 300 CAPSULE ORAL at 17:42

## 2023-03-23 RX ADMIN — HYDROMORPHONE HYDROCHLORIDE 0.5 MG: 1 INJECTION, SOLUTION INTRAMUSCULAR; INTRAVENOUS; SUBCUTANEOUS at 13:54

## 2023-03-23 RX ADMIN — ONDANSETRON 4 MG: 2 INJECTION INTRAMUSCULAR; INTRAVENOUS at 04:04

## 2023-03-23 RX ADMIN — HYDROMORPHONE HYDROCHLORIDE 1 MG: 1 INJECTION, SOLUTION INTRAMUSCULAR; INTRAVENOUS; SUBCUTANEOUS at 04:04

## 2023-03-23 RX ADMIN — DIVALPROEX SODIUM 250 MG: 250 TABLET, DELAYED RELEASE ORAL at 17:42

## 2023-03-23 RX ADMIN — HYDROMORPHONE HYDROCHLORIDE 0.5 MG: 1 INJECTION, SOLUTION INTRAMUSCULAR; INTRAVENOUS; SUBCUTANEOUS at 21:33

## 2023-03-23 RX ADMIN — DIVALPROEX SODIUM 250 MG: 250 TABLET, DELAYED RELEASE ORAL at 04:54

## 2023-03-23 RX ADMIN — PANCRELIPASE 12000 UNITS: 15000; 3000; 9500 CAPSULE, DELAYED RELEASE ORAL at 11:30

## 2023-03-23 RX ADMIN — OMEPRAZOLE 20 MG: 20 CAPSULE, DELAYED RELEASE ORAL at 04:54

## 2023-03-23 RX ADMIN — OXYCODONE HYDROCHLORIDE 10 MG: 5 TABLET ORAL at 07:35

## 2023-03-23 RX ADMIN — HYDROMORPHONE HYDROCHLORIDE 0.5 MG: 1 INJECTION, SOLUTION INTRAMUSCULAR; INTRAVENOUS; SUBCUTANEOUS at 17:42

## 2023-03-23 RX ADMIN — OXYCODONE HYDROCHLORIDE 10 MG: 5 TABLET ORAL at 16:23

## 2023-03-23 RX ADMIN — OXYCODONE HYDROCHLORIDE 10 MG: 5 TABLET ORAL at 11:31

## 2023-03-23 RX ADMIN — OMEPRAZOLE 20 MG: 20 CAPSULE, DELAYED RELEASE ORAL at 17:42

## 2023-03-23 RX ADMIN — PANCRELIPASE 12000 UNITS: 15000; 3000; 9500 CAPSULE, DELAYED RELEASE ORAL at 17:51

## 2023-03-23 ASSESSMENT — ENCOUNTER SYMPTOMS
COUGH: 0
HEMOPTYSIS: 0
BACK PAIN: 0
DOUBLE VISION: 0
VOMITING: 0
NAUSEA: 0
PND: 0
DEPRESSION: 0
MYALGIAS: 0
CHILLS: 0
HEADACHES: 0
DIARRHEA: 1
FEVER: 0
BRUISES/BLEEDS EASILY: 0
HEARTBURN: 0
BLURRED VISION: 0
DIZZINESS: 0
ABDOMINAL PAIN: 1
PALPITATIONS: 0

## 2023-03-23 ASSESSMENT — PATIENT HEALTH QUESTIONNAIRE - PHQ9
9. THOUGHTS THAT YOU WOULD BE BETTER OFF DEAD, OR OF HURTING YOURSELF: NOT AT ALL
2. FEELING DOWN, DEPRESSED, IRRITABLE, OR HOPELESS: NOT AT ALL
3. TROUBLE FALLING OR STAYING ASLEEP OR SLEEPING TOO MUCH: NOT AT ALL
6. FEELING BAD ABOUT YOURSELF - OR THAT YOU ARE A FAILURE OR HAVE LET YOURSELF OR YOUR FAMILY DOWN: NOT AL ALL
SUM OF ALL RESPONSES TO PHQ9 QUESTIONS 1 AND 2: 0
5. POOR APPETITE OR OVEREATING: NOT AT ALL
8. MOVING OR SPEAKING SO SLOWLY THAT OTHER PEOPLE COULD HAVE NOTICED. OR THE OPPOSITE, BEING SO FIGETY OR RESTLESS THAT YOU HAVE BEEN MOVING AROUND A LOT MORE THAN USUAL: NOT AT ALL
SUM OF ALL RESPONSES TO PHQ QUESTIONS 1-9: 0
4. FEELING TIRED OR HAVING LITTLE ENERGY: NOT AT ALL
7. TROUBLE CONCENTRATING ON THINGS, SUCH AS READING THE NEWSPAPER OR WATCHING TELEVISION: NOT AT ALL
1. LITTLE INTEREST OR PLEASURE IN DOING THINGS: NOT AT ALL

## 2023-03-23 ASSESSMENT — PAIN DESCRIPTION - PAIN TYPE
TYPE: ACUTE PAIN
TYPE: ACUTE PAIN;CHRONIC PAIN
TYPE: ACUTE PAIN
TYPE: ACUTE PAIN
TYPE: ACUTE PAIN;CHRONIC PAIN
TYPE: ACUTE PAIN;CHRONIC PAIN
TYPE: ACUTE PAIN
TYPE: ACUTE PAIN;CHRONIC PAIN
TYPE: ACUTE PAIN;CHRONIC PAIN
TYPE: ACUTE PAIN
TYPE: ACUTE PAIN;CHRONIC PAIN
TYPE: ACUTE PAIN;CHRONIC PAIN

## 2023-03-23 ASSESSMENT — LIFESTYLE VARIABLES: SUBSTANCE_ABUSE: 0

## 2023-03-23 NOTE — PROGRESS NOTES
Took over care from [facility] at 2230. Patient medicated per MAR for pain and nausea. No other needs at this time.
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
## 2023-03-23 NOTE — PROGRESS NOTES
Assumed care of patient. AO x4. With complaints of pain 2/10. Routine assessment done. Vital signs within normal limits. Call light within reach and personal belongings within reach. Bed locked and in lowest position. Policies and procedures reinforced patient verbalized understanding.Treaded socks in place. Will continue to monitor.

## 2023-03-23 NOTE — PROGRESS NOTES
[facility] Daily Progress Note    Date of Service  3/23/2023    Chief Complaint  Rhiannon Marrero is a 37 y.o. female admitted 3/20/2023 with abdominal pain.     Hospital Course  Rhiannon Marrero is a 37 y.o. female who presented 3/20/2023 with abdominal pain.     Patient has a history of alcohol dependence, depression, chronic alcoholic pancreatitis.  Had an MRCP in December and was found to have 5 mm pancreatic duct stone.       Patient recently presented to [facility] on February 8.  She presented for abdominal pain.  GI was consulted and did not feel comfortable performing ERCP and recommended patient require transfer to tertiary center for further intervention.     Patient went to [facility] subsequently and had open abdominal surgery for pancreatic stone and gallbladder removal.  She was doing fine until she developed abdominal pain again on March 13 and presented to [facility] ED.  CT shows no biliary or pancreatic obstruction and she was subsequently discharged with symptomatic care.     Patient once again presents with abdominal pain, nausea, clear colored diarrhea, non bilious vomiting.  Initially found to be tachycardic.  Pertinent labs include potassium 3.3, bicarb 17.  Lipase negative.  Lactic acid negative.  CT abdomen showing no evidence of bowel obstruction or focal inflammatory change.  Surgical change seen consistent with cholecystectomy.  Small amount of strandy attenuation fluid at the surgical site, no focal rim-enhancing fluid collection to suggest presence of abscess.  Stable punctuate calcification involving the pancreatic head with no evidence of pancreatic ductal dilatation.    Interval Problem Update  3/21 in bed, c/o abdominal pain and frequent diarrhea, no bloody but noticed fat in stool,  pain is epigastric, radiated to back, no focal weakness, able to speak in full sentences, pain medications adjusted, added PPI, will check for pancreatic insufficiency.    3/22 in bed, c/o  abdominal pain, check esr/crp, added po ms contin, increased dilaudid to 1mg iv, will try cld, continue ppi, will add sucralfate. F/u stool analysis.  3/23 in bed, c/o epigastric abdominal pain, esr/crp neg, increased ms contin to 30 bid, decreased dilaudid to 0.5 mg q4hrs, discussed with nurse staff.     I have discussed this patient's plan of care and discharge plan at IDT rounds today with Case Management, Nursing, Nursing leadership, and other members of the IDT team.    Consultants/Specialty  N/a    Code Status  Full Code    Disposition  Patient is not medically cleared for discharge.   Anticipate discharge to to home with close outpatient follow-up.  I have placed the appropriate orders for post-discharge needs.    Review of Systems  Review of Systems   Constitutional:  Negative for chills and fever.   HENT:  Negative for congestion, ear discharge and nosebleeds.    Eyes:  Negative for blurred vision and double vision.   Respiratory:  Negative for cough and hemoptysis.    Cardiovascular:  Negative for chest pain, palpitations and PND.   Gastrointestinal:  Positive for abdominal pain and diarrhea. Negative for heartburn, melena, nausea and vomiting.   Genitourinary:  Negative for hematuria and urgency.   Musculoskeletal:  Negative for back pain and myalgias.   Skin:  Negative for rash.   Neurological:  Negative for dizziness and headaches.   Endo/Heme/Allergies:  Does not bruise/bleed easily.   Psychiatric/Behavioral:  Negative for depression, substance abuse and suicidal ideas.       Physical Exam  Temp:  [36.3 °C (97.3 °F)-36.4 °C (97.6 °F)] 36.4 °C (97.5 °F)  Pulse:  [64-81] 81  Resp:  [16-18] 16  BP: ()/(58-68) 91/58  SpO2:  [95 %-98 %] 96 %    Physical Exam  Vitals and nursing note reviewed.   Constitutional:       General: She is in acute distress.      Appearance: Normal appearance. She is ill-appearing.   HENT:      Head: Normocephalic.      Nose: Nose normal.      Mouth/Throat:      Mouth: Mucous  membranes are dry.      Pharynx: No oropharyngeal exudate or posterior oropharyngeal erythema.   Eyes:      General: No scleral icterus.        Right eye: No discharge.         Left eye: No discharge.      Extraocular Movements: Extraocular movements intact.      Conjunctiva/sclera: Conjunctivae normal.   Cardiovascular:      Rate and Rhythm: Normal rate and regular rhythm.      Pulses: Normal pulses.      Heart sounds: Normal heart sounds.   Pulmonary:      Effort: Pulmonary effort is normal. No respiratory distress.      Breath sounds: Normal breath sounds. No wheezing.   Abdominal:      General: Bowel sounds are normal. There is no distension.      Palpations: Abdomen is soft.      Tenderness: There is abdominal tenderness. There is no guarding or rebound.   Musculoskeletal:         General: Normal range of motion.      Cervical back: Normal range of motion and neck supple. No rigidity or tenderness.      Right lower leg: No edema.      Left lower leg: No edema.   Skin:     General: Skin is warm and dry.      Capillary Refill: Capillary refill takes less than 2 seconds.      Coloration: Skin is not jaundiced.   Neurological:      General: No focal deficit present.      Mental Status: She is alert and oriented to person, place, and time.      Cranial Nerves: No cranial nerve deficit.      Motor: No weakness.   Psychiatric:         Mood and Affect: Mood normal.         Behavior: Behavior normal.         Thought Content: Thought content normal.       Fluids  No intake or output data in the 24 hours ending 03/23/23 1505      Laboratory  Recent Labs     03/22/23  0136   WBC 5.9   RBC 4.16*   HEMOGLOBIN 12.1   HEMATOCRIT 36.0*   MCV 86.5   MCH 29.1   MCHC 33.6   RDW 45.7   PLATELETCT 212   MPV 9.2       Recent Labs     03/22/23  0136   SODIUM 135   POTASSIUM 3.9   CHLORIDE 104   CO2 20   GLUCOSE 92   BUN 5*   CREATININE 0.47*   CALCIUM 8.6                     Imaging  IR-US GUIDED PIV   Final Result     Ultrasound-guided PERIPHERAL IV INSERTION performed by    qualified nursing staff as above.      CT-ABDOMEN-PELVIS WITH   Final Result      1.  No evidence of bowel obstruction or focal inflammatory change.      2.  Again seen surgical change consistent with cholecystectomy. There is again seen a small amount of stranding attenuation and fluid at the surgical site. No evidence of focal rim-enhancing fluid collection to suggest presence of abscess.      3.  Stable punctate calcification involving the pancreatic head. No evidence of pancreatic ductal dilatation.      4.  Fatty liver.      DX-CHEST-PORTABLE (1 VIEW)   Final Result      1.  No acute cardiac or pulmonary abnormalities are identified.             Assessment/Plan  * Intractable pain- (present on admission)  Assessment & Plan  Found to have negative lipase, lactic acid. No acute findings on CAT scan of abdomen. Admitted for pain management  Fluids  Dilaudid 1 mg q4h prn  Zofran q4h  Ct abdomen revised  Lipase neg  Likely chronic pancreatitis. Continue pain management. Gabapentin, oxycodone, weaning off iv dilaudid.   I have increased MS contin po.   Decreased iv dilaudid to 0.5 mg q4hrs.   Needs to ambulate more.     Diarrhea due to malabsorption- (present on admission)  Assessment & Plan  Probably due to chronic pancreatitis  Check stool fat and elastase  Increase pancreatic enzymes.    Supportive treatment.     Alcohol-induced chronic pancreatitis (HCC)- (present on admission)  Assessment & Plan  Ct abdomen showed calcification in heaf the pancreas  Will check stool for fat and elastase-1 to assess for pancreatic insufficiency patient with diarrhea.   Stool analysis pending.       Alcohol use  Assessment & Plan  Has been sober     Hypokalemia  Assessment & Plan  From vomiting  Replacing.  Will try po if she can't tolerate will do iv infusion.    Mg, po mag.   Monitoring     Seizure disorder (HCC)- (present on admission)  Assessment & Plan  Continue  depakote, gabapentin.   Seizures precautions.     Tobacco use  Assessment & Plan  Needs to quit.             VTE prophylaxis: enoxaparin ppx    I have performed a physical exam and reviewed and updated ROS and Plan today (3/23/2023). In review of yesterday's note (3/22/2023), there are no changes except as documented above.    Continue monitoring for oral intake, signs of dehydration, monitoring electrolyte replacement, response to medications adjustment,  f/u labs results.  I have reviewed labs and imagine results available.

## 2023-03-23 NOTE — CARE PLAN
The patient is Stable - Low risk of patient condition declining or worsening    Shift Goals  Clinical Goals: pain control, bowel rest  Patient Goals: pain control    Progress made toward(s) clinical / shift goals:    Problem: Knowledge Deficit - Standard  Goal: Patient and family/care givers will demonstrate understanding of plan of care, disease process/condition, diagnostic tests and medications  Outcome: Progressing     Problem: Pain - Standard  Goal: Alleviation of pain or a reduction in pain to the patient’s comfort goal  Outcome: Progressing     Pt updated on plan of care. Pt encouraged to ask questions and questions were answered. Call light within reach. Pt reminded to use call light whenever needing assistance.  Pt medicated for pain with PRN pain meds per MAR. Vitals stable, pt continues to be NPO, will update care team with any changes.      Patient is not progressing towards the following goals:

## 2023-03-24 ENCOUNTER — APPOINTMENT (OUTPATIENT)
Dept: RADIOLOGY | Facility: MEDICAL CENTER | Age: 38
DRG: 438 | End: 2023-03-24
Attending: HOSPITALIST
Payer: COMMERCIAL

## 2023-03-24 PROCEDURE — 770006 HCHG ROOM/CARE - MED/SURG/GYN SEMI*

## 2023-03-24 PROCEDURE — 700111 HCHG RX REV CODE 636 W/ 250 OVERRIDE (IP): Performed by: STUDENT IN AN ORGANIZED HEALTH CARE EDUCATION/TRAINING PROGRAM

## 2023-03-24 PROCEDURE — A9270 NON-COVERED ITEM OR SERVICE: HCPCS | Performed by: STUDENT IN AN ORGANIZED HEALTH CARE EDUCATION/TRAINING PROGRAM

## 2023-03-24 PROCEDURE — 700111 HCHG RX REV CODE 636 W/ 250 OVERRIDE (IP): Performed by: HOSPITALIST

## 2023-03-24 PROCEDURE — 74018 RADEX ABDOMEN 1 VIEW: CPT

## 2023-03-24 PROCEDURE — 99232 SBSQ HOSP IP/OBS MODERATE 35: CPT | Performed by: HOSPITALIST

## 2023-03-24 PROCEDURE — 700102 HCHG RX REV CODE 250 W/ 637 OVERRIDE(OP): Performed by: STUDENT IN AN ORGANIZED HEALTH CARE EDUCATION/TRAINING PROGRAM

## 2023-03-24 PROCEDURE — A9270 NON-COVERED ITEM OR SERVICE: HCPCS | Performed by: HOSPITALIST

## 2023-03-24 PROCEDURE — 700102 HCHG RX REV CODE 250 W/ 637 OVERRIDE(OP): Performed by: HOSPITALIST

## 2023-03-24 RX ORDER — HYDROMORPHONE HYDROCHLORIDE 1 MG/ML
1 INJECTION, SOLUTION INTRAMUSCULAR; INTRAVENOUS; SUBCUTANEOUS ONCE
Status: COMPLETED | OUTPATIENT
Start: 2023-03-24 | End: 2023-03-24

## 2023-03-24 RX ADMIN — GABAPENTIN 200 MG: 100 CAPSULE ORAL at 05:02

## 2023-03-24 RX ADMIN — PANCRELIPASE 12000 UNITS: 15000; 3000; 9500 CAPSULE, DELAYED RELEASE ORAL at 13:30

## 2023-03-24 RX ADMIN — OMEPRAZOLE 20 MG: 20 CAPSULE, DELAYED RELEASE ORAL at 05:02

## 2023-03-24 RX ADMIN — OMEPRAZOLE 20 MG: 20 CAPSULE, DELAYED RELEASE ORAL at 17:48

## 2023-03-24 RX ADMIN — OXYCODONE HYDROCHLORIDE 10 MG: 5 TABLET ORAL at 20:08

## 2023-03-24 RX ADMIN — HYDROMORPHONE HYDROCHLORIDE 1 MG: 1 INJECTION, SOLUTION INTRAMUSCULAR; INTRAVENOUS; SUBCUTANEOUS at 11:29

## 2023-03-24 RX ADMIN — GABAPENTIN 300 MG: 300 CAPSULE ORAL at 17:47

## 2023-03-24 RX ADMIN — ONDANSETRON 4 MG: 4 TABLET, ORALLY DISINTEGRATING ORAL at 08:57

## 2023-03-24 RX ADMIN — MORPHINE SULFATE 30 MG: 30 TABLET, FILM COATED, EXTENDED RELEASE ORAL at 17:47

## 2023-03-24 RX ADMIN — MORPHINE SULFATE 30 MG: 30 TABLET, FILM COATED, EXTENDED RELEASE ORAL at 05:02

## 2023-03-24 RX ADMIN — ZONISAMIDE 150 MG: 50 CAPSULE ORAL at 20:10

## 2023-03-24 RX ADMIN — DIVALPROEX SODIUM 250 MG: 250 TABLET, DELAYED RELEASE ORAL at 17:47

## 2023-03-24 RX ADMIN — OXYCODONE HYDROCHLORIDE 10 MG: 5 TABLET ORAL at 13:53

## 2023-03-24 RX ADMIN — DIVALPROEX SODIUM 250 MG: 250 TABLET, DELAYED RELEASE ORAL at 05:02

## 2023-03-24 RX ADMIN — OXYCODONE HYDROCHLORIDE 10 MG: 5 TABLET ORAL at 09:19

## 2023-03-24 RX ADMIN — PANCRELIPASE 12000 UNITS: 30000; 6000; 19000 CAPSULE, DELAYED RELEASE PELLETS ORAL at 17:48

## 2023-03-24 RX ADMIN — HYDROMORPHONE HYDROCHLORIDE 0.5 MG: 1 INJECTION, SOLUTION INTRAMUSCULAR; INTRAVENOUS; SUBCUTANEOUS at 07:18

## 2023-03-24 RX ADMIN — PANCRELIPASE 12000 UNITS: 15000; 3000; 9500 CAPSULE, DELAYED RELEASE ORAL at 08:56

## 2023-03-24 ASSESSMENT — ENCOUNTER SYMPTOMS
ABDOMINAL PAIN: 1
CHILLS: 0
FEVER: 0
PND: 0
COUGH: 0
DIZZINESS: 0
BRUISES/BLEEDS EASILY: 0
MYALGIAS: 0
HEADACHES: 0
VOMITING: 0
BLURRED VISION: 0
DOUBLE VISION: 0
PALPITATIONS: 0
HEARTBURN: 0
NAUSEA: 0
DIARRHEA: 0
BACK PAIN: 0
DEPRESSION: 0
HEMOPTYSIS: 0

## 2023-03-24 ASSESSMENT — LIFESTYLE VARIABLES: SUBSTANCE_ABUSE: 0

## 2023-03-24 ASSESSMENT — PAIN DESCRIPTION - PAIN TYPE
TYPE: ACUTE PAIN

## 2023-03-24 NOTE — PROGRESS NOTES
Assumed care of pt  at 1900. Report received from Day RN. Pt is A&O x 4. Patient stated that their pain is 8/10, pt educated on the next time she is able to receive the dilaudid. Pt agreeable. Pt stated that she did not want to get in trouble with the Dr for not taking the roxicodone which was explained as a PRN medication. Pt understands. Pt's pain comfort goal is 4/10. Pt educated on how to use the call light, pt verbalized understanding. Bed in lowest locked position, call light within reach, hourly rounding in place. Labs reviewed, orders reviewed, communication board updated. Pt declines any further needs at this time.

## 2023-03-24 NOTE — DIETARY
Nutrition Services Brief Update:    Day 4 of admit.  Rhiannon Marrero is a 37 y.o. female with admitting DX of Intractable pain.    Pt tolerated Full liquids this morning with good PO intake recorded at % of breakfast. Diet just advanced to Low Fiber, Low fat. Pt states she tolerated lunch.    Problem: Nutritional:  Goal: Achieve adequate nutritional intake  Description: Patient will consume >50% of meals  Outcome: Progressing    RD following

## 2023-03-24 NOTE — CARE PLAN
Problem: Knowledge Deficit - Standard  Goal: Patient and family/care givers will demonstrate understanding of plan of care, disease process/condition, diagnostic tests and medications  Outcome: Progressing     Problem: Pain - Standard  Goal: Alleviation of pain or a reduction in pain to the patient’s comfort goal  Outcome: Progressing         The patient is Stable - Low risk of patient condition declining or worsening    Shift Goals  Clinical Goals: Monitor labs, VS, comfort and rest  Patient Goals: Rest and comfort  Family Goals: N/A    Progress made toward(s) clinical / shift goals:  Discussed plan of care with patient. Pain controlled with medications on EMAR.    Patient is not progressing towards the following goals:

## 2023-03-24 NOTE — PROGRESS NOTES
Assumed care of patient 0700. Received Report from [facility] nurse. Patient A&O 4, on RA, Reporting a pain level of 8. Call light within reach, belongings within reach, Fall precautions in place, and bed in lowest position. Patient does not have any other needs at this time.     POC was discussed with patient. All questions were answered. Patient verbalized understanding.

## 2023-03-24 NOTE — DOCUMENTATION QUERY
[facility]                                                                       Query Response Note      PATIENT:               JAKE LOPEZ  ACCT #:                  [id number]  MRN:                     [id number]  :                      1985  ADMIT DATE:       3/20/2023 2:11 PM  DISCH DATE:          RESPONDING  PROVIDER #:        [id number]           QUERY TEXT:    On 3/22/23, documentation in the Dietary consultation identifies patient as meeting Aspen criteria for Severe protein calorie malnutrition. Considering the clinical indicators and additional documentation, please confirm/clarify the degree of malnutrition for this admission.    The patient's Clinical Indicators include:  NOTED    3/22 RD consultation:  Body mass index 19.73 kg/m2., BMI classification: Normal  -Pt meets criteria for severe malnutrition in context of acute illness related to abdominal pain, recent gallbladder removal as evidenced by reported poor PO intake <50% for >1 week and 8.5% weight loss x 2 months which is severe.   -Diarrhea due to malabsorption  Dietary consultation/monitoring/treatment  Clear liquid diet / Creon 3 x w/meals   stool studies   Alcohol - induced chronic pancreatitis  Hx open abdominal surgery   Intractable pain     Thank you,  Maribell Clinton RN, CCS  Clinical Documentation Integrity  Connect via Voalte  Options provided:   -- Malnutrition, Severe   -- Malnutrition, Moderate   -- Malnutrition, Mild   -- Other explanation, (Pls specify)   -- Unable to determine      Query created by: Maribell Clinton on 3/23/2023 7:03 AM    RESPONSE TEXT:    Malnutrition, Severe          Electronically signed by:  JOSEPH COLINDRES MD 3/23/2023 5:07 PM

## 2023-03-24 NOTE — CARE PLAN
The patient is Stable - Low risk of patient condition declining or worsening    Shift Goals  Clinical Goals: pain manangement  Patient Goals: pain control  Family Goals: FANTASMA    Progress made toward(s) clinical / shift goals:    Problem: Knowledge Deficit - Standard  Goal: Patient and family/care givers will demonstrate understanding of plan of care, disease process/condition, diagnostic tests and medications  Outcome: Progressing          Patient is not progressing towards the following goals:  pain level

## 2023-03-24 NOTE — CARE PLAN
The patient is Stable - Low risk of patient condition declining or worsening    Shift Goals  Clinical Goals: Pain management  Patient Goals: Pain management  Family Goals: FANTASMA    Progress made toward(s) clinical / shift goals:      Problem: Knowledge Deficit - Standard  Goal: Patient and family/care givers will demonstrate understanding of plan of care, disease process/condition, diagnostic tests and medications  Outcome: Progressing     Problem: Pain - Standard  Goal: Alleviation of pain or a reduction in pain to the patient’s comfort goal  Outcome: Progressing  Pt's pain is 8/10 before medication. Pt's comfort goal is 4/10.       Patient is not progressing towards the following goals:

## 2023-03-24 NOTE — PROGRESS NOTES
[facility] Medicine Daily Progress Note    Date of Service  3/24/2023    Chief Complaint  Rhiannon Marrero is a 37 y.o. female admitted 3/20/2023 with abdominal pain.     Hospital Course  Rhiannon Marrero is a 37 y.o. female who presented 3/20/2023 with abdominal pain.     Patient has a history of alcohol dependence, depression, chronic alcoholic pancreatitis.  Had an MRCP in December and was found to have 5 mm pancreatic duct stone.       Patient recently presented to [facility] on February 8.  She presented for abdominal pain.  GI was consulted and did not feel comfortable performing ERCP and recommended patient require transfer to tertiary center for further intervention.     Patient went to [place] subsequently and had open abdominal surgery for pancreatic stone and gallbladder removal.  She was doing fine until she developed abdominal pain again on March 13 and presented to [facility] ED.  CT shows no biliary or pancreatic obstruction and she was subsequently discharged with symptomatic care.     Patient once again presents with abdominal pain, nausea, clear colored diarrhea, non bilious vomiting.  Initially found to be tachycardic.  Pertinent labs include potassium 3.3, bicarb 17.  Lipase negative.  Lactic acid negative.  CT abdomen showing no evidence of bowel obstruction or focal inflammatory change.  Surgical change seen consistent with cholecystectomy.  Small amount of strandy attenuation fluid at the surgical site, no focal rim-enhancing fluid collection to suggest presence of abscess.  Stable punctuate calcification involving the pancreatic head with no evidence of pancreatic ductal dilatation.    Interval Problem Update  3/21 in bed, c/o abdominal pain and frequent diarrhea, no bloody but noticed fat in stool,  pain is epigastric, radiated to back, no focal weakness, able to speak in full sentences, pain medications adjusted, added PPI, will check for pancreatic insufficiency.    3/22 in bed, c/o  abdominal pain, check esr/crp, added po ms contin, increased dilaudid to 1mg iv, will try cld, continue ppi, will add sucralfate. F/u stool analysis.  3/23 in bed, c/o epigastric abdominal pain, esr/crp neg, increased ms contin to 30 bid, decreased dilaudid to 0.5 mg q4hrs, discussed with nurse staff.   3/24 in bed, c/o abdominal pain, discussed regarding pain management, iv dilaudid stopped, I have ordered and reviewed kub my reading no bowel dilatation. Needs to ambulate, advance diet to soft today.     I have discussed this patient's plan of care and discharge plan at IDT rounds today with Case Management, Nursing, Nursing leadership, and other members of the IDT team.    Consultants/Specialty  N/a    Code Status  Full Code    Disposition  Patient is not medically cleared for discharge.   Anticipate discharge to to home with close outpatient follow-up.  I have placed the appropriate orders for post-discharge needs.    Review of Systems  Review of Systems   Constitutional:  Negative for chills and fever.   HENT:  Negative for congestion, ear discharge and nosebleeds.    Eyes:  Negative for blurred vision and double vision.   Respiratory:  Negative for cough and hemoptysis.    Cardiovascular:  Negative for chest pain, palpitations and PND.   Gastrointestinal:  Positive for abdominal pain. Negative for diarrhea, heartburn, melena, nausea and vomiting.   Genitourinary:  Negative for hematuria and urgency.   Musculoskeletal:  Negative for back pain and myalgias.   Skin:  Negative for rash.   Neurological:  Negative for dizziness and headaches.   Endo/Heme/Allergies:  Does not bruise/bleed easily.   Psychiatric/Behavioral:  Negative for depression, substance abuse and suicidal ideas.       Physical Exam  Temp:  [36 °C (96.8 °F)-36.7 °C (98 °F)] 36.7 °C (98 °F)  Pulse:  [78-99] 99  Resp:  [16-17] 16  BP: ()/(62-69) 99/62  SpO2:  [95 %-100 %] 97 %    Physical Exam  Vitals and nursing note reviewed.    Constitutional:       General: She is in acute distress.      Appearance: Normal appearance. She is ill-appearing.   HENT:      Head: Normocephalic.      Nose: Nose normal.      Mouth/Throat:      Mouth: Mucous membranes are dry.      Pharynx: No oropharyngeal exudate or posterior oropharyngeal erythema.   Eyes:      General: No scleral icterus.        Right eye: No discharge.         Left eye: No discharge.      Extraocular Movements: Extraocular movements intact.      Conjunctiva/sclera: Conjunctivae normal.   Cardiovascular:      Rate and Rhythm: Normal rate and regular rhythm.      Pulses: Normal pulses.      Heart sounds: Normal heart sounds.   Pulmonary:      Effort: Pulmonary effort is normal. No respiratory distress.      Breath sounds: Normal breath sounds. No wheezing.   Abdominal:      General: Bowel sounds are normal. There is no distension.      Palpations: Abdomen is soft.      Tenderness: There is abdominal tenderness. There is no guarding or rebound.   Musculoskeletal:         General: Normal range of motion.      Cervical back: Normal range of motion and neck supple. No rigidity or tenderness.      Right lower leg: No edema.      Left lower leg: No edema.   Skin:     General: Skin is warm and dry.      Capillary Refill: Capillary refill takes less than 2 seconds.      Coloration: Skin is not jaundiced.   Neurological:      General: No focal deficit present.      Mental Status: She is alert and oriented to person, place, and time.      Cranial Nerves: No cranial nerve deficit.      Motor: No weakness.   Psychiatric:         Mood and Affect: Mood normal.         Behavior: Behavior normal.         Thought Content: Thought content normal.       Fluids    Intake/Output Summary (Last 24 hours) at 3/24/2023 1417  Last data filed at 3/24/2023 0830  Gross per 24 hour   Intake 360 ml   Output --   Net 360 ml         Laboratory  Recent Labs     03/22/23  0136   WBC 5.9   RBC 4.16*   HEMOGLOBIN 12.1    HEMATOCRIT 36.0*   MCV 86.5   MCH 29.1   MCHC 33.6   RDW 45.7   PLATELETCT 212   MPV 9.2       Recent Labs     03/22/23  0136   SODIUM 135   POTASSIUM 3.9   CHLORIDE 104   CO2 20   GLUCOSE 92   BUN 5*   CREATININE 0.47*   CALCIUM 8.6                     Imaging  [id number] VIEW   Final Result         No specific finding to suggest small bowel obstruction.      IR-US GUIDED PIV   Final Result    Ultrasound-guided PERIPHERAL IV INSERTION performed by    qualified nursing staff as above.      CT-ABDOMEN-PELVIS WITH   Final Result      1.  No evidence of bowel obstruction or focal inflammatory change.      2.  Again seen surgical change consistent with cholecystectomy. There is again seen a small amount of stranding attenuation and fluid at the surgical site. No evidence of focal rim-enhancing fluid collection to suggest presence of abscess.      3.  Stable punctate calcification involving the pancreatic head. No evidence of pancreatic ductal dilatation.      4.  Fatty liver.      DX-CHEST-PORTABLE (1 VIEW)   Final Result      1.  No acute cardiac or pulmonary abnormalities are identified.             Assessment/Plan  * Intractable pain- (present on admission)  Assessment & Plan  Found to have negative lipase, lactic acid. No acute findings on CAT scan of abdomen. Admitted for pain management  Fluids  Dilaudid 1 mg q4h prn  Zofran q4h  Ct abdomen revised  Lipase neg  Likely chronic pancreatitis. Continue pain management. Gabapentin, oxycodone, weaning off iv dilaudid.   I have increased MS contin po.   Decreased iv dilaudid to 0.5 mg q4hrs.   Needs to ambulate more.   Iv dialudid stopped today.     Diarrhea due to malabsorption- (present on admission)  Assessment & Plan  Probably due to chronic pancreatitis  Check stool fat and elastase  Increase pancreatic enzymes.    Supportive treatment.     Alcohol-induced chronic pancreatitis (HCC)- (present on admission)  Assessment & Plan  Ct abdomen showed calcification in  heaf the pancreas  Will check stool for fat and elastase-1 to assess for pancreatic insufficiency patient with diarrhea.   Stool analysis pending.       Alcohol use  Assessment & Plan  Has been sober     Hypokalemia  Assessment & Plan  From vomiting  Replacing.  Will try po if she can't tolerate will do iv infusion.    Mg, po mag.   Monitoring     Seizure disorder (HCC)- (present on admission)  Assessment & Plan  Continue depakote, gabapentin.   Seizures precautions.     Tobacco use  Assessment & Plan  Needs to quit.             VTE prophylaxis: enoxaparin ppx    I have performed a physical exam and reviewed and updated ROS and Plan today (3/24/2023). In review of yesterday's note (3/23/2023), there are no changes except as documented above.      I have reviewed labs and imagine results available.

## 2023-03-25 LAB
ALBUMIN SERPL BCP-MCNC: 4.1 G/DL (ref 3.2–4.9)
ALBUMIN/GLOB SERPL: 1.5 G/DL
ALP SERPL-CCNC: 96 U/L (ref 30–99)
ALT SERPL-CCNC: 19 U/L (ref 2–50)
ANION GAP SERPL CALC-SCNC: 11 MMOL/L (ref 7–16)
AST SERPL-CCNC: 13 U/L (ref 12–45)
BACTERIA BLD CULT: NORMAL
BACTERIA BLD CULT: NORMAL
BILIRUB SERPL-MCNC: 0.5 MG/DL (ref 0.1–1.5)
BUN SERPL-MCNC: 6 MG/DL (ref 8–22)
CALCIUM ALBUM COR SERPL-MCNC: 8.8 MG/DL (ref 8.5–10.5)
CALCIUM SERPL-MCNC: 8.9 MG/DL (ref 8.5–10.5)
CHLORIDE SERPL-SCNC: 101 MMOL/L (ref 96–112)
CO2 SERPL-SCNC: 25 MMOL/L (ref 20–33)
CREAT SERPL-MCNC: 0.66 MG/DL (ref 0.5–1.4)
GFR SERPLBLD CREATININE-BSD FMLA CKD-EPI: 117 ML/MIN/1.73 M 2
GLOBULIN SER CALC-MCNC: 2.7 G/DL (ref 1.9–3.5)
GLUCOSE SERPL-MCNC: 100 MG/DL (ref 65–99)
LIPASE SERPL-CCNC: 7 U/L (ref 11–82)
POTASSIUM SERPL-SCNC: 3.9 MMOL/L (ref 3.6–5.5)
PROT SERPL-MCNC: 6.8 G/DL (ref 6–8.2)
SIGNIFICANT IND 70042: NORMAL
SIGNIFICANT IND 70042: NORMAL
SITE SITE: NORMAL
SITE SITE: NORMAL
SODIUM SERPL-SCNC: 137 MMOL/L (ref 135–145)
SOURCE SOURCE: NORMAL
SOURCE SOURCE: NORMAL

## 2023-03-25 PROCEDURE — 700111 HCHG RX REV CODE 636 W/ 250 OVERRIDE (IP): Performed by: STUDENT IN AN ORGANIZED HEALTH CARE EDUCATION/TRAINING PROGRAM

## 2023-03-25 PROCEDURE — A9270 NON-COVERED ITEM OR SERVICE: HCPCS | Performed by: STUDENT IN AN ORGANIZED HEALTH CARE EDUCATION/TRAINING PROGRAM

## 2023-03-25 PROCEDURE — 700102 HCHG RX REV CODE 250 W/ 637 OVERRIDE(OP): Performed by: STUDENT IN AN ORGANIZED HEALTH CARE EDUCATION/TRAINING PROGRAM

## 2023-03-25 PROCEDURE — 80053 COMPREHEN METABOLIC PANEL: CPT

## 2023-03-25 PROCEDURE — 36415 COLL VENOUS BLD VENIPUNCTURE: CPT

## 2023-03-25 PROCEDURE — 700111 HCHG RX REV CODE 636 W/ 250 OVERRIDE (IP)

## 2023-03-25 PROCEDURE — A9270 NON-COVERED ITEM OR SERVICE: HCPCS | Performed by: HOSPITALIST

## 2023-03-25 PROCEDURE — 83690 ASSAY OF LIPASE: CPT

## 2023-03-25 PROCEDURE — 770006 HCHG ROOM/CARE - MED/SURG/GYN SEMI*

## 2023-03-25 PROCEDURE — 99232 SBSQ HOSP IP/OBS MODERATE 35: CPT | Performed by: HOSPITALIST

## 2023-03-25 PROCEDURE — 700102 HCHG RX REV CODE 250 W/ 637 OVERRIDE(OP): Performed by: HOSPITALIST

## 2023-03-25 RX ORDER — LORAZEPAM 2 MG/ML
0.5 INJECTION INTRAMUSCULAR
Status: DISCONTINUED | OUTPATIENT
Start: 2023-03-25 | End: 2023-03-26 | Stop reason: HOSPADM

## 2023-03-25 RX ORDER — HYDROMORPHONE HYDROCHLORIDE 1 MG/ML
0.5 INJECTION, SOLUTION INTRAMUSCULAR; INTRAVENOUS; SUBCUTANEOUS ONCE
Status: COMPLETED | OUTPATIENT
Start: 2023-03-25 | End: 2023-03-25

## 2023-03-25 RX ORDER — HYDROMORPHONE HYDROCHLORIDE 1 MG/ML
0.25 INJECTION, SOLUTION INTRAMUSCULAR; INTRAVENOUS; SUBCUTANEOUS ONCE
Status: COMPLETED | OUTPATIENT
Start: 2023-03-25 | End: 2023-03-25

## 2023-03-25 RX ORDER — HYDROMORPHONE HYDROCHLORIDE 2 MG/1
2-4 TABLET ORAL EVERY 4 HOURS PRN
Status: DISCONTINUED | OUTPATIENT
Start: 2023-03-25 | End: 2023-03-26 | Stop reason: HOSPADM

## 2023-03-25 RX ADMIN — MORPHINE SULFATE 30 MG: 30 TABLET, FILM COATED, EXTENDED RELEASE ORAL at 17:11

## 2023-03-25 RX ADMIN — DIVALPROEX SODIUM 250 MG: 250 TABLET, DELAYED RELEASE ORAL at 04:11

## 2023-03-25 RX ADMIN — GABAPENTIN 300 MG: 300 CAPSULE ORAL at 17:11

## 2023-03-25 RX ADMIN — OMEPRAZOLE 20 MG: 20 CAPSULE, DELAYED RELEASE ORAL at 17:10

## 2023-03-25 RX ADMIN — HYDROMORPHONE HYDROCHLORIDE 0.5 MG: 1 INJECTION, SOLUTION INTRAMUSCULAR; INTRAVENOUS; SUBCUTANEOUS at 04:11

## 2023-03-25 RX ADMIN — ZONISAMIDE 150 MG: 50 CAPSULE ORAL at 19:50

## 2023-03-25 RX ADMIN — ONDANSETRON 4 MG: 2 INJECTION INTRAMUSCULAR; INTRAVENOUS at 08:11

## 2023-03-25 RX ADMIN — OXYCODONE HYDROCHLORIDE 10 MG: 5 TABLET ORAL at 01:00

## 2023-03-25 RX ADMIN — HYDROMORPHONE HYDROCHLORIDE 0.25 MG: 1 INJECTION, SOLUTION INTRAMUSCULAR; INTRAVENOUS; SUBCUTANEOUS at 02:14

## 2023-03-25 RX ADMIN — HYDROMORPHONE HYDROCHLORIDE 4 MG: 2 TABLET ORAL at 12:51

## 2023-03-25 RX ADMIN — HYDROMORPHONE HYDROCHLORIDE 4 MG: 2 TABLET ORAL at 19:48

## 2023-03-25 RX ADMIN — OXYCODONE HYDROCHLORIDE 10 MG: 5 TABLET ORAL at 08:10

## 2023-03-25 RX ADMIN — DIVALPROEX SODIUM 250 MG: 250 TABLET, DELAYED RELEASE ORAL at 17:10

## 2023-03-25 RX ADMIN — PANCRELIPASE 12000 UNITS: 30000; 6000; 19000 CAPSULE, DELAYED RELEASE PELLETS ORAL at 17:11

## 2023-03-25 RX ADMIN — OMEPRAZOLE 20 MG: 20 CAPSULE, DELAYED RELEASE ORAL at 04:12

## 2023-03-25 RX ADMIN — PANCRELIPASE 12000 UNITS: 30000; 6000; 19000 CAPSULE, DELAYED RELEASE PELLETS ORAL at 11:12

## 2023-03-25 RX ADMIN — MORPHINE SULFATE 30 MG: 30 TABLET, FILM COATED, EXTENDED RELEASE ORAL at 04:11

## 2023-03-25 RX ADMIN — HYDROMORPHONE HYDROCHLORIDE 4 MG: 2 TABLET ORAL at 23:55

## 2023-03-25 RX ADMIN — PANCRELIPASE 12000 UNITS: 30000; 6000; 19000 CAPSULE, DELAYED RELEASE PELLETS ORAL at 08:10

## 2023-03-25 RX ADMIN — GABAPENTIN 200 MG: 100 CAPSULE ORAL at 04:11

## 2023-03-25 ASSESSMENT — ENCOUNTER SYMPTOMS
HEADACHES: 0
CHILLS: 0
COUGH: 0
PALPITATIONS: 0
NAUSEA: 0
ABDOMINAL PAIN: 1
BLURRED VISION: 0
MYALGIAS: 0
DIZZINESS: 0
DIARRHEA: 0
BACK PAIN: 0
HEMOPTYSIS: 0
PND: 0
FEVER: 0
BRUISES/BLEEDS EASILY: 0
NERVOUS/ANXIOUS: 1
DEPRESSION: 0
HEARTBURN: 0
DOUBLE VISION: 0
VOMITING: 0

## 2023-03-25 ASSESSMENT — PAIN DESCRIPTION - PAIN TYPE
TYPE: ACUTE PAIN
TYPE: ACUTE PAIN;CHRONIC PAIN
TYPE: ACUTE PAIN
TYPE: ACUTE PAIN;CHRONIC PAIN
TYPE: ACUTE PAIN

## 2023-03-25 ASSESSMENT — LIFESTYLE VARIABLES: SUBSTANCE_ABUSE: 0

## 2023-03-25 NOTE — PROGRESS NOTES
Assumed care of pt  at 1900. Report received from Day RN. Pt is A&O x 4. Patient stated that their pain is 8/10 and pt was medicated per the MAR. Pt's pain comfort goal is 4/10. Pt educated on how to use the call light, pt verbalized understanding. Bed in lowest locked position, call light within reach, hourly rounding in place. Labs reviewed, orders reviewed, communication board updated. Pt declines any further needs at this time.

## 2023-03-25 NOTE — PROGRESS NOTES
"Pt called stating that she needed to speak with me. Pt was sitting in bed watching tv when I entered the room. Pt stated \"I am dying, is there anyway that you could contact the dr to get something stronger? Like a one time thing\". I assumed that the pt was talking about her pain, I educated the pt that there is still the PO roxicodone that is in parameters to give before we ask for something stronger. Pt understood.  Pt was tearful and expressed concern stating, \"I don't think it was a good idea to change my diet while also taking me off the IV pain med\" . Pt understood that the IV dilaudid was no longer available because we are trying to wean her off the IV route and move to PO instead. Pt educated that I will check on her in about an hour to evaluate the effectiveness of the PO medication.    0157: Pt stated that she is \"dying\". Pt endorses that the pain remains about the same at 9/10. Pt asked that I speak with the dr in regards to a one time dose of high pain medication. Pt's blood pressure is 98/67 per the CNA. Pt's bps have historically been soft. Hospitalist has been notified. Orders to follow.     0215: Pt tearful when I entered the room to administer the dilaudid. Pt asked, \"what is it?\". I told the pt that it was dilaudid but a small, one time dose. Pt understood. Pt does not report any needs at this time. Will reassess effectiveness of the pain medications in an hour.     0320: Pt is in bed tearful as I entered the room. Pt covering her eyes. Pt endorses that the pain remains 10/10 without little change from the IV dilaudid. Pt educated on the timing of pain medication and the effects it has on vitals in regards to her soft bps. Pt verbalized understanding. On call hospitalist notified.   "

## 2023-03-25 NOTE — CARE PLAN
The patient is Stable - Low risk of patient condition declining or worsening    Shift Goals  Clinical Goals: Pain Management  Patient Goals: Pain control  Family Goals: FANTASMA    Progress made toward(s) clinical / shift goals:    Problem: Mobility  Goal: Patient's capacity to carry out activities will improve  Outcome: Progressing       Patient is not progressing towards the following goals:pain control

## 2023-03-25 NOTE — CARE PLAN
The patient is Stable - Low risk of patient condition declining or worsening    Shift Goals  Clinical Goals: Pain Management  Patient Goals: Pain control  Family Goals: FANTASMA    Progress made toward(s) clinical / shift goals:      Problem: Knowledge Deficit - Standard  Goal: Patient and family/care givers will demonstrate understanding of plan of care, disease process/condition, diagnostic tests and medications  Outcome: Progressing       Problem: Pain - Standard  Goal: Alleviation of pain or a reduction in pain to the patient’s comfort goal  Outcome: Progressing  Pt stated that her pain is 9/10 despite medication. Pt's pain comfort goal is 4/10.         Patient is not progressing towards the following goals:

## 2023-03-25 NOTE — PROGRESS NOTES
[facility] Medicine Daily Progress Note    Date of Service  3/25/2023    Chief Complaint  Rhiannon Marrero is a 37 y.o. female admitted 3/20/2023 with abdominal pain.     Hospital Course  Rhiannon Marrero is a 37 y.o. female who presented 3/20/2023 with abdominal pain.     Patient has a history of alcohol dependence, depression, chronic alcoholic pancreatitis.  Had an MRCP in December and was found to have 5 mm pancreatic duct stone.       Patient recently presented to [facility] on February 8.  She presented for abdominal pain.  GI was consulted and did not feel comfortable performing ERCP and recommended patient require transfer to tertiary center for further intervention.     Patient went to [place] subsequently and had open abdominal surgery for pancreatic stone and gallbladder removal.  She was doing fine until she developed abdominal pain again on March 13 and presented to [facility] ED.  CT shows no biliary or pancreatic obstruction and she was subsequently discharged with symptomatic care.     Patient once again presents with abdominal pain, nausea, clear colored diarrhea, non bilious vomiting.  Initially found to be tachycardic.  Pertinent labs include potassium 3.3, bicarb 17.  Lipase negative.  Lactic acid negative.  CT abdomen showing no evidence of bowel obstruction or focal inflammatory change.  Surgical change seen consistent with cholecystectomy.  Small amount of strandy attenuation fluid at the surgical site, no focal rim-enhancing fluid collection to suggest presence of abscess.  Stable punctuate calcification involving the pancreatic head with no evidence of pancreatic ductal dilatation.    Interval Problem Update  3/21 in bed, c/o abdominal pain and frequent diarrhea, no bloody but noticed fat in stool,  pain is epigastric, radiated to back, no focal weakness, able to speak in full sentences, pain medications adjusted, added PPI, will check for pancreatic insufficiency.    3/22 in bed, c/o  abdominal pain, check esr/crp, added po ms contin, increased dilaudid to 1mg iv, will try cld, continue ppi, will add sucralfate. F/u stool analysis.  3/23 in bed, c/o epigastric abdominal pain, esr/crp neg, increased ms contin to 30 bid, decreased dilaudid to 0.5 mg q4hrs, discussed with nurse staff.   3/24 in bed, c/o abdominal pain, discussed regarding pain management, iv dilaudid stopped, I have ordered and reviewed kub my reading no bowel dilatation. Needs to ambulate, advance diet to soft today.   3/25 In bed, no fever or chills, continues c/o abdominal pain, ruq and epigastric radiated to right shoulder, cmp and lipase ordered and reviewed they are in normal limits, I have order MRCP and iv ativan for mri claustrophobia, changed oxycodone to po dialudid.     I have discussed this patient's plan of care and discharge plan at IDT rounds today with Case Management, Nursing, Nursing leadership, and other members of the IDT team.    Consultants/Specialty  N/a    Code Status  Full Code    Disposition  Patient is not medically cleared for discharge.   Anticipate discharge to to home with close outpatient follow-up.  I have placed the appropriate orders for post-discharge needs.    Review of Systems  Review of Systems   Constitutional:  Negative for chills and fever.   HENT:  Negative for congestion, ear discharge and nosebleeds.    Eyes:  Negative for blurred vision and double vision.   Respiratory:  Negative for cough and hemoptysis.    Cardiovascular:  Negative for chest pain, palpitations and PND.   Gastrointestinal:  Positive for abdominal pain. Negative for diarrhea, heartburn, nausea and vomiting.   Genitourinary:  Negative for hematuria and urgency.   Musculoskeletal:  Negative for back pain and myalgias.   Skin:  Negative for rash.   Neurological:  Negative for dizziness and headaches.   Endo/Heme/Allergies:  Does not bruise/bleed easily.   Psychiatric/Behavioral:  Negative for depression, substance abuse  and suicidal ideas. The patient is nervous/anxious.       Physical Exam  Temp:  [36 °C (96.8 °F)-36.6 °C (97.8 °F)] 36.2 °C (97.2 °F)  Pulse:  [72-92] 72  Resp:  [16-19] 18  BP: ()/(61-67) 97/67  SpO2:  [91 %-100 %] 91 %    Physical Exam  Vitals and nursing note reviewed.   Constitutional:       General: She is in acute distress.      Appearance: Normal appearance. She is ill-appearing.   HENT:      Head: Normocephalic.      Nose: Nose normal.      Mouth/Throat:      Mouth: Mucous membranes are dry.      Pharynx: No oropharyngeal exudate or posterior oropharyngeal erythema.   Eyes:      General: No scleral icterus.        Right eye: No discharge.         Left eye: No discharge.   Cardiovascular:      Rate and Rhythm: Normal rate and regular rhythm.      Pulses: Normal pulses.      Heart sounds: Normal heart sounds.   Pulmonary:      Effort: Pulmonary effort is normal. No respiratory distress.      Breath sounds: Normal breath sounds. No wheezing.   Abdominal:      General: Bowel sounds are normal. There is no distension.      Palpations: Abdomen is soft.      Tenderness: There is abdominal tenderness. There is no guarding or rebound.   Musculoskeletal:         General: Normal range of motion.      Cervical back: Normal range of motion and neck supple. No rigidity or tenderness.      Right lower leg: No edema.      Left lower leg: No edema.   Skin:     General: Skin is warm and dry.      Capillary Refill: Capillary refill takes less than 2 seconds.      Coloration: Skin is not jaundiced.   Neurological:      General: No focal deficit present.      Mental Status: She is alert and oriented to person, place, and time.      Cranial Nerves: No cranial nerve deficit.      Motor: No weakness.   Psychiatric:         Mood and Affect: Mood normal.         Behavior: Behavior normal.         Thought Content: Thought content normal.       Fluids    Intake/Output Summary (Last 24 hours) at 3/25/2023 3950  Last data filed at  3/25/2023 0900  Gross per 24 hour   Intake 1240 ml   Output --   Net 1240 ml         Laboratory        Recent Labs     03/25/23  0942   SODIUM 137   POTASSIUM 3.9   CHLORIDE 101   CO2 25   GLUCOSE 100*   BUN 6*   CREATININE 0.66   CALCIUM 8.9                     Imaging  [id number] VIEW   Final Result         No specific finding to suggest small bowel obstruction.      IR-US GUIDED PIV   Final Result    Ultrasound-guided PERIPHERAL IV INSERTION performed by    qualified nursing staff as above.      CT-ABDOMEN-PELVIS WITH   Final Result      1.  No evidence of bowel obstruction or focal inflammatory change.      2.  Again seen surgical change consistent with cholecystectomy. There is again seen a small amount of stranding attenuation and fluid at the surgical site. No evidence of focal rim-enhancing fluid collection to suggest presence of abscess.      3.  Stable punctate calcification involving the pancreatic head. No evidence of pancreatic ductal dilatation.      4.  Fatty liver.      DX-CHEST-PORTABLE (1 VIEW)   Final Result      1.  No acute cardiac or pulmonary abnormalities are identified.      [id number]-G/O    (Results Pending)          Assessment/Plan  * Intractable pain- (present on admission)  Assessment & Plan  Found to have negative lipase, lactic acid. No acute findings on CAT scan of abdomen. Admitted for pain management  Fluids  Dilaudid 1 mg q4h prn  Zofran q4h  Ct abdomen revised  Lipase neg  Likely chronic pancreatitis. Continue pain management. Gabapentin, oxycodone, weaning off iv dilaudid.   I have increased MS contin po.   Decreased iv dilaudid to 0.5 mg q4hrs.   Needs to ambulate more.   Iv dialudid stopped today.   Continues complains of abdominal pain, cmp and lipase wnl. Will get mrcp today, changed to po dilaudid.     Diarrhea due to malabsorption- (present on admission)  Assessment & Plan  Probably due to chronic pancreatitis  Check stool fat and elastase  Increase pancreatic enzymes.     Supportive treatment.     Alcohol-induced chronic pancreatitis (HCC)- (present on admission)  Assessment & Plan  Ct abdomen showed calcification in heaf the pancreas  Will check stool for fat and elastase-1 to assess for pancreatic insufficiency patient with diarrhea.   Stool analysis pending.       Alcohol use  Assessment & Plan  Has been sober     Hypokalemia  Assessment & Plan  From vomiting  Replacing.  Will try po if she can't tolerate will do iv infusion.    Mg, po mag.   Monitoring     Seizure disorder (HCC)- (present on admission)  Assessment & Plan  Continue depakote, gabapentin.   Seizures precautions.     Tobacco use  Assessment & Plan  Needs to quit.             VTE prophylaxis: enoxaparin ppx    I have performed a physical exam and reviewed and updated ROS and Plan today (3/25/2023). In review of yesterday's note (3/24/2023), there are no changes except as documented above.

## 2023-03-26 ENCOUNTER — APPOINTMENT (OUTPATIENT)
Dept: RADIOLOGY | Facility: MEDICAL CENTER | Age: 38
DRG: 438 | End: 2023-03-26
Attending: HOSPITALIST
Payer: COMMERCIAL

## 2023-03-26 ENCOUNTER — PHARMACY VISIT (OUTPATIENT)
Dept: PHARMACY | Facility: MEDICAL CENTER | Age: 38
End: 2023-03-26
Payer: COMMERCIAL

## 2023-03-26 VITALS
OXYGEN SATURATION: 91 % | SYSTOLIC BLOOD PRESSURE: 102 MMHG | WEIGHT: 126 LBS | HEIGHT: 67 IN | TEMPERATURE: 97.5 F | BODY MASS INDEX: 19.78 KG/M2 | HEART RATE: 85 BPM | DIASTOLIC BLOOD PRESSURE: 71 MMHG | RESPIRATION RATE: 18 BRPM

## 2023-03-26 PROBLEM — E87.6 HYPOKALEMIA: Status: RESOLVED | Noted: 2022-08-16 | Resolved: 2023-03-26

## 2023-03-26 PROBLEM — K90.9 DIARRHEA DUE TO MALABSORPTION: Status: RESOLVED | Noted: 2023-03-21 | Resolved: 2023-03-26

## 2023-03-26 PROBLEM — R19.7 DIARRHEA DUE TO MALABSORPTION: Status: RESOLVED | Noted: 2023-03-21 | Resolved: 2023-03-26

## 2023-03-26 PROBLEM — Z78.9 ALCOHOL USE: Status: RESOLVED | Noted: 2023-03-20 | Resolved: 2023-03-26

## 2023-03-26 PROBLEM — R52 INTRACTABLE PAIN: Status: RESOLVED | Noted: 2023-03-20 | Resolved: 2023-03-26

## 2023-03-26 PROCEDURE — 700102 HCHG RX REV CODE 250 W/ 637 OVERRIDE(OP): Performed by: HOSPITALIST

## 2023-03-26 PROCEDURE — 74181 MRI ABDOMEN W/O CONTRAST: CPT

## 2023-03-26 PROCEDURE — A9270 NON-COVERED ITEM OR SERVICE: HCPCS | Performed by: STUDENT IN AN ORGANIZED HEALTH CARE EDUCATION/TRAINING PROGRAM

## 2023-03-26 PROCEDURE — 700111 HCHG RX REV CODE 636 W/ 250 OVERRIDE (IP): Performed by: HOSPITALIST

## 2023-03-26 PROCEDURE — RXMED WILLOW AMBULATORY MEDICATION CHARGE: Performed by: HOSPITALIST

## 2023-03-26 PROCEDURE — A9270 NON-COVERED ITEM OR SERVICE: HCPCS | Performed by: HOSPITALIST

## 2023-03-26 PROCEDURE — 700102 HCHG RX REV CODE 250 W/ 637 OVERRIDE(OP): Performed by: STUDENT IN AN ORGANIZED HEALTH CARE EDUCATION/TRAINING PROGRAM

## 2023-03-26 PROCEDURE — 700111 HCHG RX REV CODE 636 W/ 250 OVERRIDE (IP): Performed by: STUDENT IN AN ORGANIZED HEALTH CARE EDUCATION/TRAINING PROGRAM

## 2023-03-26 PROCEDURE — 99239 HOSP IP/OBS DSCHRG MGMT >30: CPT | Performed by: HOSPITALIST

## 2023-03-26 RX ORDER — OMEPRAZOLE 20 MG/1
20 CAPSULE, DELAYED RELEASE ORAL DAILY
Qty: 30 CAPSULE | Refills: 0 | Status: SHIPPED | OUTPATIENT
Start: 2023-03-26 | End: 2023-04-24

## 2023-03-26 RX ORDER — HYDROMORPHONE HYDROCHLORIDE 2 MG/1
4 TABLET ORAL EVERY 4 HOURS PRN
Qty: 18 TABLET | Refills: 0 | Status: SHIPPED | OUTPATIENT
Start: 2023-03-26 | End: 2023-03-26 | Stop reason: SDUPTHER

## 2023-03-26 RX ORDER — HYDROMORPHONE HYDROCHLORIDE 4 MG/1
4 TABLET ORAL EVERY 4 HOURS PRN
Qty: 18 TABLET | Refills: 0 | Status: SHIPPED | OUTPATIENT
Start: 2023-03-26 | End: 2023-03-29

## 2023-03-26 RX ORDER — ONDANSETRON 4 MG/1
4 TABLET, ORALLY DISINTEGRATING ORAL EVERY 4 HOURS PRN
Qty: 10 TABLET | Refills: 0 | Status: SHIPPED | OUTPATIENT
Start: 2023-03-26 | End: 2023-04-11 | Stop reason: SDUPTHER

## 2023-03-26 RX ADMIN — HYDROMORPHONE HYDROCHLORIDE 4 MG: 2 TABLET ORAL at 11:09

## 2023-03-26 RX ADMIN — OMEPRAZOLE 20 MG: 20 CAPSULE, DELAYED RELEASE ORAL at 04:08

## 2023-03-26 RX ADMIN — HYDROMORPHONE HYDROCHLORIDE 4 MG: 2 TABLET ORAL at 05:53

## 2023-03-26 RX ADMIN — ONDANSETRON 4 MG: 2 INJECTION INTRAMUSCULAR; INTRAVENOUS at 07:58

## 2023-03-26 RX ADMIN — PANCRELIPASE 12000 UNITS: 30000; 6000; 19000 CAPSULE, DELAYED RELEASE PELLETS ORAL at 07:58

## 2023-03-26 RX ADMIN — PANCRELIPASE 12000 UNITS: 30000; 6000; 19000 CAPSULE, DELAYED RELEASE PELLETS ORAL at 11:08

## 2023-03-26 RX ADMIN — GABAPENTIN 200 MG: 100 CAPSULE ORAL at 04:08

## 2023-03-26 RX ADMIN — DIVALPROEX SODIUM 250 MG: 250 TABLET, DELAYED RELEASE ORAL at 04:08

## 2023-03-26 RX ADMIN — MORPHINE SULFATE 30 MG: 30 TABLET, FILM COATED, EXTENDED RELEASE ORAL at 04:08

## 2023-03-26 RX ADMIN — LORAZEPAM 0.5 MG: 2 INJECTION INTRAMUSCULAR; INTRAVENOUS at 04:31

## 2023-03-26 ASSESSMENT — PAIN DESCRIPTION - PAIN TYPE
TYPE: ACUTE PAIN;CHRONIC PAIN
TYPE: ACUTE PAIN;CHRONIC PAIN

## 2023-03-26 NOTE — PROGRESS NOTES
Assumed care of patient 0700. Received Report from [facility] nurse. Patient A&O4, on RA, Reporting a pain level of 6/10 in abdomen. Call light within reach, belongings within reach, Fall precautions in place, bed in lowest position. Patient does not have any other needs at this time.     POC was discussed with patient. Waiting for MRI resuts, possible discharge if results are WNL.All questions were answered. Patient verbalized understanding.

## 2023-03-26 NOTE — CARE PLAN
The patient is Stable - Low risk of patient condition declining or worsening    Shift Goals  Clinical Goals: MRI resullts, Pain Mgt  Patient Goals: Pain Mgt, rest.  Family Goals: N/A    Progress made toward(s) clinical / shift goals:  MRI results read, Patient set for discharge. Meds to bed ordered.  Problem: Knowledge Deficit - Standard  Goal: Patient and family/care givers will demonstrate understanding of plan of care, disease process/condition, diagnostic tests and medications  Outcome: Progressing     Problem: Pain - Standard  Goal: Alleviation of pain or a reduction in pain to the patient’s comfort goal  Outcome: Progressing     Problem: Mobility  Goal: Patient's capacity to carry out activities will improve  Outcome: Progressing       Patient is not progressing towards the following goals:

## 2023-03-26 NOTE — PROGRESS NOTES
Received report from LESLIE Noel and assumed care of patient at 19:00. Orders reviewed. Assessment completed. A&Ox4, VSS on RA, with complaints of 9/10 ABD pain. Medicated per MAR. Sprite provided. No other needs at this time. Fall precautions in place. Bed in the lowest position, wheels locked, and bedside table with belongings and call light in reach. Care ongoing.

## 2023-03-26 NOTE — DISCHARGE SUMMARY
"Discharge Summary    CHIEF COMPLAINT ON ADMISSION  Chief Complaint   Patient presents with    Abdominal Pain     \"I feel like I have pancreatitis again\" - was instructed to come back to ER if pain continued       Reason for Admission  post op     Admission Date  3/20/2023    CODE STATUS  Full code    HPI & HOSPITAL COURSE      Please see original H&P for specific information's.  Rhiannon Marrero is a 37 y.o. female who presented 3/20/2023 with abdominal pain.     Patient has a history of alcohol dependence, depression, chronic alcoholic pancreatitis.  Had an MRCP in December and was found to have 5 mm pancreatic duct stone.       Patient recently presented to [facility] on February 8.  She presented for abdominal pain.  GI was consulted and did not feel comfortable performing ERCP and recommended patient require transfer to tertiary center for further intervention.     Patient went to [place] subsequently and had open abdominal surgery for pancreatic stone and gallbladder removal.  She was doing fine until she developed abdominal pain again on March 13 and presented to [facility] ED.  CT shows no biliary or pancreatic obstruction and she was subsequently discharged with symptomatic care.     Patient once again presents with abdominal pain, nausea, clear colored diarrhea, non bilious vomiting.  Initially found to be tachycardic.  Pertinent labs include potassium 3.3, bicarb 17.  Lipase negative.  Lactic acid negative.  CT abdomen showing no evidence of bowel obstruction or focal inflammatory change.  Surgical change seen consistent with cholecystectomy.  Small amount of strandy attenuation fluid at the surgical site, no focal rim-enhancing fluid collection to suggest presence of abscess.  Stable punctuate calcification involving the pancreatic head with no evidence of pancreatic ductal dilatation.    Patient was admitted for supportive treatment, patient has CT abdomen that did not show any acute findings, patient " had negative lipase, liver function test within normal limits, patient was started on IV pain medication, patient gradually was able to be weaned off, now she is tolerating pain with oral Dilaudid, patient had MRCP that did not show any acute findings, patient today is feeling much better, pain is better controlled with oral Dilaudid, she is tolerating diet she had a bowel movement, patient is going to need to follow-up with primary care physician, pain management, GI doctor, patient has expressed understanding of her plan of care and agree with it her questions have been answered.      Therefore, she is discharged in good and stable condition to home with close outpatient follow-up.    The patient met 2-midnight criteria for an inpatient stay at the time of discharge.    Discharge Date  3/26/2023    FOLLOW UP ITEMS POST DISCHARGE  Primary care physician  Pain management  GI    DISCHARGE DIAGNOSES  Principal Problem (Resolved):    Intractable pain POA: Yes  Active Problems:    Tobacco use POA: Unknown    Seizure disorder (HCC) POA: Yes    Alcohol-induced chronic pancreatitis (HCC) POA: Yes  Resolved Problems:    Hypokalemia POA: Unknown    Alcohol use POA: Unknown    Diarrhea due to malabsorption POA: Yes      FOLLOW UP  No future appointments.  Doris Lay, [email or web address]  [address]  [address]  [facility] [address] [phone]    Follow up in 1 week(s)        MEDICATIONS ON DISCHARGE     Medication List        START taking these medications        Instructions   HYDROmorphone 4 MG Tabs  Commonly known as: DILAUDID   Take 1 Tablet by mouth every four hours as needed for Severe Pain for up to 3 days.  Dose: 4 mg     omeprazole 20 MG delayed-release capsule  Commonly known as: PRILOSEC   Take 1 Capsule by mouth every day for 30 days.  Dose: 20 mg     ondansetron 4 MG Tbdp  Commonly known as: ZOFRAN ODT   Take 1 Tablet by mouth every four hours as needed for Nausea/Vomiting.  Dose: 4 mg            CONTINUE taking these  medications        Instructions   divalproex 250 MG Tbec  Commonly known as: DEPAKOTE   Take 250 mg by mouth 2 times a day.  Dose: 250 mg     gabapentin 100 MG Caps  Commonly known as: NEURONTIN   Take 100-300 mg by mouth 2 times a day. 100 mg = AM   300 mg = PM  Dose: 100-300 mg     hydrOXYzine pamoate 50 MG Caps  Commonly known as: VISTARIL   Take 50 mg by mouth 3 times a day as needed for Anxiety.  Dose: 50 mg     pancrelipase (Lip-Prot-Amyl) 6000-30111 units Cpep  Commonly known as: CREON 6000   Take 1 Capsule by mouth 3 times a day with meals.  Dose: 1 Capsule     zonisamide 50 MG capsule  Commonly known as: ZONEGRAN   Take 150 mg by mouth at bedtime. 50 mg ( 3 capsules) = 150 mg  Dose: 150 mg              Allergies  Allergies   Allergen Reactions    Naltrexone Anxiety    Promethazine Hcl Anxiety     Anxiety and makes her feels like skin coming off        DIET  Healthy diet/low-fat    ACTIVITY  As tolerated.  Weight bearing as tolerated    CONSULTATIONS  None    PROCEDURES  None    LABORATORY  Lab Results   Component Value Date    SODIUM 137 03/25/2023    POTASSIUM 3.9 03/25/2023    CHLORIDE 101 03/25/2023    CO2 25 03/25/2023    GLUCOSE 100 (H) 03/25/2023    BUN 6 (L) 03/25/2023    CREATININE 0.66 03/25/2023    CREATININE 0.7 06/10/2008        Lab Results   Component Value Date    WBC 5.9 03/22/2023    HEMOGLOBIN 12.1 03/22/2023    HEMATOCRIT 36.0 (L) 03/22/2023    PLATELETCT 212 03/22/2023        Total time of the discharge process exceeds 37 minutes.

## 2023-03-26 NOTE — PROGRESS NOTES
04:33 Patient off the floor to MRI via wheelchair and transported by transport team.    05:47 Patient returned to hospital room from MRI.

## 2023-03-26 NOTE — CARE PLAN
The patient is Stable - Low risk of patient condition declining or worsening    Shift Goals  Clinical Goals: MRI, have BM, pain management  Patient Goals: Pain management, rest  Family Goals: FANTASMA      Problem: Knowledge Deficit - Standard  Goal: Patient and family/care givers will demonstrate understanding of plan of care, disease process/condition, diagnostic tests and medications  Outcome: Progressing     Problem: Pain - Standard  Goal: Alleviation of pain or a reduction in pain to the patient’s comfort goal  Outcome: Progressing     Problem: Mobility  Goal: Patient's capacity to carry out activities will improve  Outcome: Progressing       Progress made toward(s) clinical / shift goals:  Patient updated on the plan of care. All questions answered. Skin and pain assessed. Medicated per MAR. Mobility encouraged. Fall precautions in place. Care ongoing.

## 2023-04-11 ENCOUNTER — HOSPITAL ENCOUNTER (INPATIENT)
Facility: MEDICAL CENTER | Age: 38
LOS: 2 days | DRG: 440 | End: 2023-04-15
Attending: EMERGENCY MEDICINE | Admitting: STUDENT IN AN ORGANIZED HEALTH CARE EDUCATION/TRAINING PROGRAM
Payer: MEDICAID

## 2023-04-11 ENCOUNTER — APPOINTMENT (OUTPATIENT)
Dept: RADIOLOGY | Facility: MEDICAL CENTER | Age: 38
DRG: 440 | End: 2023-04-11
Attending: EMERGENCY MEDICINE
Payer: MEDICAID

## 2023-04-11 DIAGNOSIS — R11.0 NAUSEA: ICD-10-CM

## 2023-04-11 DIAGNOSIS — R00.2 PALPITATIONS: ICD-10-CM

## 2023-04-11 DIAGNOSIS — R10.9 INTRACTABLE ABDOMINAL PAIN: Primary | ICD-10-CM

## 2023-04-11 DIAGNOSIS — R25.1 TREMOR: ICD-10-CM

## 2023-04-11 DIAGNOSIS — K86.0 ALCOHOL-INDUCED CHRONIC PANCREATITIS (HCC): ICD-10-CM

## 2023-04-11 PROBLEM — R11.2 INTRACTABLE NAUSEA AND VOMITING: Status: ACTIVE | Noted: 2023-04-11

## 2023-04-11 PROBLEM — F39 MOOD DISORDER (HCC): Status: ACTIVE | Noted: 2022-12-07

## 2023-04-11 LAB
ALBUMIN SERPL BCP-MCNC: 4.4 G/DL (ref 3.2–4.9)
ALBUMIN/GLOB SERPL: 1.3 G/DL
ALP SERPL-CCNC: 98 U/L (ref 30–99)
ALT SERPL-CCNC: 23 U/L (ref 2–50)
ANION GAP SERPL CALC-SCNC: 14 MMOL/L (ref 7–16)
APPEARANCE UR: ABNORMAL
AST SERPL-CCNC: 18 U/L (ref 12–45)
BACTERIA #/AREA URNS HPF: ABNORMAL /HPF
BASOPHILS # BLD AUTO: 0.5 % (ref 0–1.8)
BASOPHILS # BLD: 0.05 K/UL (ref 0–0.12)
BILIRUB SERPL-MCNC: 0.6 MG/DL (ref 0.1–1.5)
BILIRUB UR QL STRIP.AUTO: ABNORMAL
BUN SERPL-MCNC: 6 MG/DL (ref 8–22)
CALCIUM ALBUM COR SERPL-MCNC: 8.7 MG/DL (ref 8.5–10.5)
CALCIUM SERPL-MCNC: 9 MG/DL (ref 8.5–10.5)
CHLORIDE SERPL-SCNC: 108 MMOL/L (ref 96–112)
CO2 SERPL-SCNC: 22 MMOL/L (ref 20–33)
COLOR UR: ABNORMAL
CREAT SERPL-MCNC: 0.68 MG/DL (ref 0.5–1.4)
EKG IMPRESSION: NORMAL
EOSINOPHIL # BLD AUTO: 0.11 K/UL (ref 0–0.51)
EOSINOPHIL NFR BLD: 1.1 % (ref 0–6.9)
EPI CELLS #/AREA URNS HPF: ABNORMAL /HPF
ERYTHROCYTE [DISTWIDTH] IN BLOOD BY AUTOMATED COUNT: 46.1 FL (ref 35.9–50)
GFR SERPLBLD CREATININE-BSD FMLA CKD-EPI: 116 ML/MIN/1.73 M 2
GLOBULIN SER CALC-MCNC: 3.5 G/DL (ref 1.9–3.5)
GLUCOSE SERPL-MCNC: 97 MG/DL (ref 65–99)
GLUCOSE UR STRIP.AUTO-MCNC: NEGATIVE MG/DL
HCG SERPL QL: NEGATIVE
HCT VFR BLD AUTO: 45.7 % (ref 37–47)
HGB BLD-MCNC: 15.2 G/DL (ref 12–16)
HYALINE CASTS #/AREA URNS LPF: ABNORMAL /LPF
IMM GRANULOCYTES # BLD AUTO: 0.02 K/UL (ref 0–0.11)
IMM GRANULOCYTES NFR BLD AUTO: 0.2 % (ref 0–0.9)
KETONES UR STRIP.AUTO-MCNC: ABNORMAL MG/DL
LEUKOCYTE ESTERASE UR QL STRIP.AUTO: ABNORMAL
LIPASE SERPL-CCNC: 6 U/L (ref 11–82)
LYMPHOCYTES # BLD AUTO: 3.19 K/UL (ref 1–4.8)
LYMPHOCYTES NFR BLD: 32.4 % (ref 22–41)
MCH RBC QN AUTO: 29.3 PG (ref 27–33)
MCHC RBC AUTO-ENTMCNC: 33.3 G/DL (ref 33.6–35)
MCV RBC AUTO: 88.2 FL (ref 81.4–97.8)
MICRO URNS: ABNORMAL
MONOCYTES # BLD AUTO: 0.48 K/UL (ref 0–0.85)
MONOCYTES NFR BLD AUTO: 4.9 % (ref 0–13.4)
NEUTROPHILS # BLD AUTO: 6.01 K/UL (ref 2–7.15)
NEUTROPHILS NFR BLD: 60.9 % (ref 44–72)
NITRITE UR QL STRIP.AUTO: POSITIVE
NRBC # BLD AUTO: 0 K/UL
NRBC BLD-RTO: 0 /100 WBC
PH UR STRIP.AUTO: 6 [PH] (ref 5–8)
PLATELET # BLD AUTO: 384 K/UL (ref 164–446)
PMV BLD AUTO: 9.5 FL (ref 9–12.9)
POTASSIUM SERPL-SCNC: 4.1 MMOL/L (ref 3.6–5.5)
PROT SERPL-MCNC: 7.9 G/DL (ref 6–8.2)
PROT UR QL STRIP: NEGATIVE MG/DL
RBC # BLD AUTO: 5.18 M/UL (ref 4.2–5.4)
RBC # URNS HPF: ABNORMAL /HPF
RBC UR QL AUTO: NEGATIVE
SODIUM SERPL-SCNC: 144 MMOL/L (ref 135–145)
SP GR UR STRIP.AUTO: 1.02
UROBILINOGEN UR STRIP.AUTO-MCNC: 1 MG/DL
WBC # BLD AUTO: 9.9 K/UL (ref 4.8–10.8)
WBC #/AREA URNS HPF: ABNORMAL /HPF

## 2023-04-11 PROCEDURE — 84703 CHORIONIC GONADOTROPIN ASSAY: CPT

## 2023-04-11 PROCEDURE — 99222 1ST HOSP IP/OBS MODERATE 55: CPT | Performed by: STUDENT IN AN ORGANIZED HEALTH CARE EDUCATION/TRAINING PROGRAM

## 2023-04-11 PROCEDURE — 700111 HCHG RX REV CODE 636 W/ 250 OVERRIDE (IP): Performed by: EMERGENCY MEDICINE

## 2023-04-11 PROCEDURE — 93005 ELECTROCARDIOGRAM TRACING: CPT

## 2023-04-11 PROCEDURE — 700102 HCHG RX REV CODE 250 W/ 637 OVERRIDE(OP): Performed by: EMERGENCY MEDICINE

## 2023-04-11 PROCEDURE — 93005 ELECTROCARDIOGRAM TRACING: CPT | Performed by: EMERGENCY MEDICINE

## 2023-04-11 PROCEDURE — 80053 COMPREHEN METABOLIC PANEL: CPT

## 2023-04-11 PROCEDURE — 700105 HCHG RX REV CODE 258: Performed by: EMERGENCY MEDICINE

## 2023-04-11 PROCEDURE — 36415 COLL VENOUS BLD VENIPUNCTURE: CPT

## 2023-04-11 PROCEDURE — 81001 URINALYSIS AUTO W/SCOPE: CPT

## 2023-04-11 PROCEDURE — 83690 ASSAY OF LIPASE: CPT

## 2023-04-11 PROCEDURE — 96375 TX/PRO/DX INJ NEW DRUG ADDON: CPT

## 2023-04-11 PROCEDURE — 99285 EMERGENCY DEPT VISIT HI MDM: CPT

## 2023-04-11 PROCEDURE — A9270 NON-COVERED ITEM OR SERVICE: HCPCS | Performed by: EMERGENCY MEDICINE

## 2023-04-11 PROCEDURE — 96374 THER/PROPH/DIAG INJ IV PUSH: CPT

## 2023-04-11 PROCEDURE — 85025 COMPLETE CBC W/AUTO DIFF WBC: CPT

## 2023-04-11 PROCEDURE — 76705 ECHO EXAM OF ABDOMEN: CPT

## 2023-04-11 PROCEDURE — G0378 HOSPITAL OBSERVATION PER HR: HCPCS

## 2023-04-11 RX ORDER — HYDROMORPHONE HYDROCHLORIDE 1 MG/ML
0.25 INJECTION, SOLUTION INTRAMUSCULAR; INTRAVENOUS; SUBCUTANEOUS
Status: DISCONTINUED | OUTPATIENT
Start: 2023-04-11 | End: 2023-04-12

## 2023-04-11 RX ORDER — AMOXICILLIN 250 MG
2 CAPSULE ORAL 2 TIMES DAILY
Status: DISCONTINUED | OUTPATIENT
Start: 2023-04-11 | End: 2023-04-15 | Stop reason: HOSPADM

## 2023-04-11 RX ORDER — SODIUM CHLORIDE, SODIUM LACTATE, POTASSIUM CHLORIDE, CALCIUM CHLORIDE 600; 310; 30; 20 MG/100ML; MG/100ML; MG/100ML; MG/100ML
1000 INJECTION, SOLUTION INTRAVENOUS ONCE
Status: COMPLETED | OUTPATIENT
Start: 2023-04-11 | End: 2023-04-11

## 2023-04-11 RX ORDER — PHENOBARBITAL 32.4 MG/1
64.8 TABLET ORAL ONCE
Status: COMPLETED | OUTPATIENT
Start: 2023-04-11 | End: 2023-04-11

## 2023-04-11 RX ORDER — ONDANSETRON 4 MG/1
4 TABLET, ORALLY DISINTEGRATING ORAL EVERY 4 HOURS PRN
Qty: 10 TABLET | Refills: 0 | Status: SHIPPED | OUTPATIENT
Start: 2023-04-11 | End: 2023-04-24

## 2023-04-11 RX ORDER — DIVALPROEX SODIUM 250 MG/1
250 TABLET, DELAYED RELEASE ORAL 2 TIMES DAILY
Status: DISCONTINUED | OUTPATIENT
Start: 2023-04-12 | End: 2023-04-15 | Stop reason: HOSPADM

## 2023-04-11 RX ORDER — POLYETHYLENE GLYCOL 3350 17 G/17G
1 POWDER, FOR SOLUTION ORAL
Status: DISCONTINUED | OUTPATIENT
Start: 2023-04-11 | End: 2023-04-15 | Stop reason: HOSPADM

## 2023-04-11 RX ORDER — BISACODYL 10 MG
10 SUPPOSITORY, RECTAL RECTAL
Status: DISCONTINUED | OUTPATIENT
Start: 2023-04-11 | End: 2023-04-15 | Stop reason: HOSPADM

## 2023-04-11 RX ORDER — CELECOXIB 100 MG/1
200 CAPSULE ORAL 2 TIMES DAILY
Status: DISCONTINUED | OUTPATIENT
Start: 2023-04-12 | End: 2023-04-13

## 2023-04-11 RX ORDER — ONDANSETRON 2 MG/ML
4 INJECTION INTRAMUSCULAR; INTRAVENOUS EVERY 4 HOURS PRN
Status: DISCONTINUED | OUTPATIENT
Start: 2023-04-11 | End: 2023-04-15 | Stop reason: HOSPADM

## 2023-04-11 RX ORDER — ACETAMINOPHEN 500 MG
1000 TABLET ORAL EVERY 6 HOURS
Status: DISCONTINUED | OUTPATIENT
Start: 2023-04-12 | End: 2023-04-15 | Stop reason: HOSPADM

## 2023-04-11 RX ORDER — LORAZEPAM 2 MG/ML
1 INJECTION INTRAMUSCULAR ONCE
Status: COMPLETED | OUTPATIENT
Start: 2023-04-11 | End: 2023-04-11

## 2023-04-11 RX ORDER — GABAPENTIN 100 MG/1
100-300 CAPSULE ORAL 2 TIMES DAILY
Status: DISCONTINUED | OUTPATIENT
Start: 2023-04-12 | End: 2023-04-12

## 2023-04-11 RX ORDER — OXYCODONE HYDROCHLORIDE 5 MG/1
5 TABLET ORAL ONCE
Status: COMPLETED | OUTPATIENT
Start: 2023-04-11 | End: 2023-04-11

## 2023-04-11 RX ORDER — OXYCODONE HYDROCHLORIDE 5 MG/1
5 TABLET ORAL
Status: DISCONTINUED | OUTPATIENT
Start: 2023-04-11 | End: 2023-04-12

## 2023-04-11 RX ORDER — ONDANSETRON 4 MG/1
4 TABLET, ORALLY DISINTEGRATING ORAL EVERY 8 HOURS PRN
Qty: 10 TABLET | Refills: 0 | Status: SHIPPED | OUTPATIENT
Start: 2023-04-11 | End: 2023-04-15 | Stop reason: SDUPTHER

## 2023-04-11 RX ORDER — GUAIFENESIN/DEXTROMETHORPHAN 100-10MG/5
10 SYRUP ORAL EVERY 6 HOURS PRN
Status: DISCONTINUED | OUTPATIENT
Start: 2023-04-11 | End: 2023-04-15 | Stop reason: HOSPADM

## 2023-04-11 RX ORDER — CELECOXIB 100 MG/1
200 CAPSULE ORAL 2 TIMES DAILY PRN
Status: DISCONTINUED | OUTPATIENT
Start: 2023-04-17 | End: 2023-04-13

## 2023-04-11 RX ORDER — PANTOPRAZOLE SODIUM 40 MG/10ML
40 INJECTION, POWDER, LYOPHILIZED, FOR SOLUTION INTRAVENOUS 2 TIMES DAILY
Status: DISCONTINUED | OUTPATIENT
Start: 2023-04-11 | End: 2023-04-12

## 2023-04-11 RX ORDER — ENOXAPARIN SODIUM 100 MG/ML
40 INJECTION SUBCUTANEOUS DAILY
Status: DISCONTINUED | OUTPATIENT
Start: 2023-04-12 | End: 2023-04-15 | Stop reason: HOSPADM

## 2023-04-11 RX ORDER — ZONISAMIDE 50 MG/1
150 CAPSULE ORAL
Status: DISCONTINUED | OUTPATIENT
Start: 2023-04-12 | End: 2023-04-15 | Stop reason: HOSPADM

## 2023-04-11 RX ORDER — SODIUM CHLORIDE, SODIUM LACTATE, POTASSIUM CHLORIDE, AND CALCIUM CHLORIDE .6; .31; .03; .02 G/100ML; G/100ML; G/100ML; G/100ML
500 INJECTION, SOLUTION INTRAVENOUS
Status: DISCONTINUED | OUTPATIENT
Start: 2023-04-11 | End: 2023-04-15 | Stop reason: HOSPADM

## 2023-04-11 RX ORDER — SODIUM CHLORIDE 9 MG/ML
1000 INJECTION, SOLUTION INTRAVENOUS CONTINUOUS
Status: DISCONTINUED | OUTPATIENT
Start: 2023-04-11 | End: 2023-04-12

## 2023-04-11 RX ORDER — ONDANSETRON 4 MG/1
4 TABLET, ORALLY DISINTEGRATING ORAL EVERY 4 HOURS PRN
Status: DISCONTINUED | OUTPATIENT
Start: 2023-04-11 | End: 2023-04-15 | Stop reason: HOSPADM

## 2023-04-11 RX ORDER — MORPHINE SULFATE 4 MG/ML
4 INJECTION INTRAVENOUS ONCE
Status: COMPLETED | OUTPATIENT
Start: 2023-04-11 | End: 2023-04-11

## 2023-04-11 RX ORDER — OXYCODONE HYDROCHLORIDE 5 MG/1
2.5 TABLET ORAL
Status: DISCONTINUED | OUTPATIENT
Start: 2023-04-11 | End: 2023-04-12

## 2023-04-11 RX ORDER — KETOROLAC TROMETHAMINE 30 MG/ML
15 INJECTION, SOLUTION INTRAMUSCULAR; INTRAVENOUS ONCE
Status: COMPLETED | OUTPATIENT
Start: 2023-04-11 | End: 2023-04-11

## 2023-04-11 RX ORDER — FAMOTIDINE 20 MG/1
20 TABLET, FILM COATED ORAL ONCE
Status: COMPLETED | OUTPATIENT
Start: 2023-04-11 | End: 2023-04-11

## 2023-04-11 RX ORDER — HYDROXYZINE PAMOATE 50 MG/1
50 CAPSULE ORAL 3 TIMES DAILY PRN
Status: DISCONTINUED | OUTPATIENT
Start: 2023-04-11 | End: 2023-04-12

## 2023-04-11 RX ORDER — ACETAMINOPHEN 325 MG/1
650 TABLET ORAL EVERY 6 HOURS PRN
Status: DISCONTINUED | OUTPATIENT
Start: 2023-04-11 | End: 2023-04-12

## 2023-04-11 RX ORDER — ACETAMINOPHEN 500 MG
1000 TABLET ORAL EVERY 6 HOURS PRN
Status: DISCONTINUED | OUTPATIENT
Start: 2023-04-17 | End: 2023-04-15 | Stop reason: HOSPADM

## 2023-04-11 RX ADMIN — SODIUM CHLORIDE, POTASSIUM CHLORIDE, SODIUM LACTATE AND CALCIUM CHLORIDE 1000 ML: 600; 310; 30; 20 INJECTION, SOLUTION INTRAVENOUS at 21:19

## 2023-04-11 RX ADMIN — FAMOTIDINE 20 MG: 20 TABLET, FILM COATED ORAL at 19:55

## 2023-04-11 RX ADMIN — LORAZEPAM 1 MG: 2 INJECTION INTRAMUSCULAR; INTRAVENOUS at 19:54

## 2023-04-11 RX ADMIN — SODIUM CHLORIDE, POTASSIUM CHLORIDE, SODIUM LACTATE AND CALCIUM CHLORIDE 1000 ML: 600; 310; 30; 20 INJECTION, SOLUTION INTRAVENOUS at 19:56

## 2023-04-11 RX ADMIN — MORPHINE SULFATE 4 MG: 4 INJECTION INTRAVENOUS at 22:55

## 2023-04-11 RX ADMIN — OXYCODONE HYDROCHLORIDE 5 MG: 5 TABLET ORAL at 21:17

## 2023-04-11 RX ADMIN — KETOROLAC TROMETHAMINE 15 MG: 30 INJECTION, SOLUTION INTRAMUSCULAR at 21:17

## 2023-04-11 RX ADMIN — PHENOBARBITAL 64.8 MG: 32.4 TABLET ORAL at 21:17

## 2023-04-11 ASSESSMENT — ENCOUNTER SYMPTOMS
SHORTNESS OF BREATH: 0
COUGH: 0
MYALGIAS: 0
WEAKNESS: 1
BRUISES/BLEEDS EASILY: 0
BLURRED VISION: 0
HEADACHES: 0
CHILLS: 0
PALPITATIONS: 0
HEARTBURN: 1
DOUBLE VISION: 0
DEPRESSION: 0
SPUTUM PRODUCTION: 0
VOMITING: 1
ABDOMINAL PAIN: 1
DIZZINESS: 0
FEVER: 0
NAUSEA: 1

## 2023-04-11 ASSESSMENT — PAIN DESCRIPTION - PAIN TYPE: TYPE: ACUTE PAIN

## 2023-04-11 ASSESSMENT — LIFESTYLE VARIABLES: SUBSTANCE_ABUSE: 0

## 2023-04-11 ASSESSMENT — FIBROSIS 4 INDEX: FIB4 SCORE: 0.49

## 2023-04-12 LAB
ALBUMIN SERPL BCP-MCNC: 3.8 G/DL (ref 3.2–4.9)
ALBUMIN/GLOB SERPL: 1.4 G/DL
ALP SERPL-CCNC: 84 U/L (ref 30–99)
ALT SERPL-CCNC: 16 U/L (ref 2–50)
ANION GAP SERPL CALC-SCNC: 9 MMOL/L (ref 7–16)
AST SERPL-CCNC: 12 U/L (ref 12–45)
BASOPHILS # BLD AUTO: 0.6 % (ref 0–1.8)
BASOPHILS # BLD: 0.04 K/UL (ref 0–0.12)
BILIRUB SERPL-MCNC: 0.6 MG/DL (ref 0.1–1.5)
BUN SERPL-MCNC: 7 MG/DL (ref 8–22)
CALCIUM ALBUM COR SERPL-MCNC: 8.4 MG/DL (ref 8.5–10.5)
CALCIUM SERPL-MCNC: 8.2 MG/DL (ref 8.5–10.5)
CHLORIDE SERPL-SCNC: 106 MMOL/L (ref 96–112)
CO2 SERPL-SCNC: 22 MMOL/L (ref 20–33)
CREAT SERPL-MCNC: 0.6 MG/DL (ref 0.5–1.4)
EKG IMPRESSION: NORMAL
EOSINOPHIL # BLD AUTO: 0.26 K/UL (ref 0–0.51)
EOSINOPHIL NFR BLD: 3.8 % (ref 0–6.9)
ERYTHROCYTE [DISTWIDTH] IN BLOOD BY AUTOMATED COUNT: 46.1 FL (ref 35.9–50)
GFR SERPLBLD CREATININE-BSD FMLA CKD-EPI: 119 ML/MIN/1.73 M 2
GLOBULIN SER CALC-MCNC: 2.8 G/DL (ref 1.9–3.5)
GLUCOSE SERPL-MCNC: 82 MG/DL (ref 65–99)
HCT VFR BLD AUTO: 41.9 % (ref 37–47)
HGB BLD-MCNC: 13.3 G/DL (ref 12–16)
IMM GRANULOCYTES # BLD AUTO: 0.02 K/UL (ref 0–0.11)
IMM GRANULOCYTES NFR BLD AUTO: 0.3 % (ref 0–0.9)
LYMPHOCYTES # BLD AUTO: 3.29 K/UL (ref 1–4.8)
LYMPHOCYTES NFR BLD: 47.5 % (ref 22–41)
MAGNESIUM SERPL-MCNC: 1.9 MG/DL (ref 1.5–2.5)
MCH RBC QN AUTO: 28.7 PG (ref 27–33)
MCHC RBC AUTO-ENTMCNC: 31.7 G/DL (ref 33.6–35)
MCV RBC AUTO: 90.3 FL (ref 81.4–97.8)
MONOCYTES # BLD AUTO: 0.41 K/UL (ref 0–0.85)
MONOCYTES NFR BLD AUTO: 5.9 % (ref 0–13.4)
NEUTROPHILS # BLD AUTO: 2.9 K/UL (ref 2–7.15)
NEUTROPHILS NFR BLD: 41.9 % (ref 44–72)
NRBC # BLD AUTO: 0 K/UL
NRBC BLD-RTO: 0 /100 WBC
PHOSPHATE SERPL-MCNC: 3.1 MG/DL (ref 2.5–4.5)
PLATELET # BLD AUTO: 283 K/UL (ref 164–446)
PMV BLD AUTO: 9.6 FL (ref 9–12.9)
POTASSIUM SERPL-SCNC: 3.5 MMOL/L (ref 3.6–5.5)
PROT SERPL-MCNC: 6.6 G/DL (ref 6–8.2)
RBC # BLD AUTO: 4.64 M/UL (ref 4.2–5.4)
SODIUM SERPL-SCNC: 137 MMOL/L (ref 135–145)
WBC # BLD AUTO: 6.9 K/UL (ref 4.8–10.8)

## 2023-04-12 PROCEDURE — 700102 HCHG RX REV CODE 250 W/ 637 OVERRIDE(OP): Performed by: STUDENT IN AN ORGANIZED HEALTH CARE EDUCATION/TRAINING PROGRAM

## 2023-04-12 PROCEDURE — G0378 HOSPITAL OBSERVATION PER HR: HCPCS

## 2023-04-12 PROCEDURE — C9113 INJ PANTOPRAZOLE SODIUM, VIA: HCPCS | Performed by: STUDENT IN AN ORGANIZED HEALTH CARE EDUCATION/TRAINING PROGRAM

## 2023-04-12 PROCEDURE — 700111 HCHG RX REV CODE 636 W/ 250 OVERRIDE (IP): Performed by: STUDENT IN AN ORGANIZED HEALTH CARE EDUCATION/TRAINING PROGRAM

## 2023-04-12 PROCEDURE — 96375 TX/PRO/DX INJ NEW DRUG ADDON: CPT

## 2023-04-12 PROCEDURE — 80053 COMPREHEN METABOLIC PANEL: CPT

## 2023-04-12 PROCEDURE — 84100 ASSAY OF PHOSPHORUS: CPT

## 2023-04-12 PROCEDURE — A9270 NON-COVERED ITEM OR SERVICE: HCPCS | Performed by: STUDENT IN AN ORGANIZED HEALTH CARE EDUCATION/TRAINING PROGRAM

## 2023-04-12 PROCEDURE — 700105 HCHG RX REV CODE 258: Performed by: STUDENT IN AN ORGANIZED HEALTH CARE EDUCATION/TRAINING PROGRAM

## 2023-04-12 PROCEDURE — 36415 COLL VENOUS BLD VENIPUNCTURE: CPT

## 2023-04-12 PROCEDURE — 96376 TX/PRO/DX INJ SAME DRUG ADON: CPT

## 2023-04-12 PROCEDURE — 93005 ELECTROCARDIOGRAM TRACING: CPT | Performed by: STUDENT IN AN ORGANIZED HEALTH CARE EDUCATION/TRAINING PROGRAM

## 2023-04-12 PROCEDURE — A9270 NON-COVERED ITEM OR SERVICE: HCPCS

## 2023-04-12 PROCEDURE — 93010 ELECTROCARDIOGRAM REPORT: CPT | Performed by: INTERNAL MEDICINE

## 2023-04-12 PROCEDURE — 700102 HCHG RX REV CODE 250 W/ 637 OVERRIDE(OP)

## 2023-04-12 PROCEDURE — 99232 SBSQ HOSP IP/OBS MODERATE 35: CPT | Performed by: STUDENT IN AN ORGANIZED HEALTH CARE EDUCATION/TRAINING PROGRAM

## 2023-04-12 PROCEDURE — 83735 ASSAY OF MAGNESIUM: CPT

## 2023-04-12 PROCEDURE — 700111 HCHG RX REV CODE 636 W/ 250 OVERRIDE (IP)

## 2023-04-12 PROCEDURE — 85025 COMPLETE CBC W/AUTO DIFF WBC: CPT

## 2023-04-12 RX ORDER — OMEPRAZOLE 20 MG/1
20 CAPSULE, DELAYED RELEASE ORAL 2 TIMES DAILY
Status: COMPLETED | OUTPATIENT
Start: 2023-04-13 | End: 2023-04-13

## 2023-04-12 RX ORDER — SODIUM CHLORIDE 9 MG/ML
1000 INJECTION, SOLUTION INTRAVENOUS CONTINUOUS
Status: DISCONTINUED | OUTPATIENT
Start: 2023-04-12 | End: 2023-04-12

## 2023-04-12 RX ORDER — OXYCODONE HYDROCHLORIDE 10 MG/1
10 TABLET ORAL EVERY 4 HOURS PRN
Status: DISCONTINUED | OUTPATIENT
Start: 2023-04-12 | End: 2023-04-12

## 2023-04-12 RX ORDER — OXYCODONE HYDROCHLORIDE 10 MG/1
10 TABLET ORAL
Status: DISCONTINUED | OUTPATIENT
Start: 2023-04-12 | End: 2023-04-12

## 2023-04-12 RX ORDER — HYDROMORPHONE HYDROCHLORIDE 1 MG/ML
1 INJECTION, SOLUTION INTRAMUSCULAR; INTRAVENOUS; SUBCUTANEOUS EVERY 4 HOURS PRN
Status: DISCONTINUED | OUTPATIENT
Start: 2023-04-12 | End: 2023-04-12

## 2023-04-12 RX ORDER — SODIUM CHLORIDE 9 MG/ML
1000 INJECTION, SOLUTION INTRAVENOUS CONTINUOUS
Status: DISCONTINUED | OUTPATIENT
Start: 2023-04-12 | End: 2023-04-15 | Stop reason: HOSPADM

## 2023-04-12 RX ORDER — HYDROXYZINE 50 MG/1
50 TABLET, FILM COATED ORAL 3 TIMES DAILY PRN
Status: DISCONTINUED | OUTPATIENT
Start: 2023-04-12 | End: 2023-04-15 | Stop reason: HOSPADM

## 2023-04-12 RX ORDER — OXYCODONE HYDROCHLORIDE 10 MG/1
TABLET ORAL
Status: ACTIVE
Start: 2023-04-12 | End: 2023-04-12

## 2023-04-12 RX ORDER — HYDROMORPHONE HYDROCHLORIDE 1 MG/ML
0.5 INJECTION, SOLUTION INTRAMUSCULAR; INTRAVENOUS; SUBCUTANEOUS
Status: DISCONTINUED | OUTPATIENT
Start: 2023-04-12 | End: 2023-04-12

## 2023-04-12 RX ORDER — GABAPENTIN 300 MG/1
300 CAPSULE ORAL
Status: DISCONTINUED | OUTPATIENT
Start: 2023-04-12 | End: 2023-04-15 | Stop reason: HOSPADM

## 2023-04-12 RX ORDER — OXYCODONE HYDROCHLORIDE 10 MG/1
10 TABLET ORAL EVERY 4 HOURS PRN
Status: DISCONTINUED | OUTPATIENT
Start: 2023-04-12 | End: 2023-04-15 | Stop reason: HOSPADM

## 2023-04-12 RX ORDER — GABAPENTIN 100 MG/1
100 CAPSULE ORAL EVERY MORNING
Status: DISCONTINUED | OUTPATIENT
Start: 2023-04-12 | End: 2023-04-15 | Stop reason: HOSPADM

## 2023-04-12 RX ORDER — HYDROMORPHONE HYDROCHLORIDE 1 MG/ML
1 INJECTION, SOLUTION INTRAMUSCULAR; INTRAVENOUS; SUBCUTANEOUS EVERY 4 HOURS PRN
Status: DISCONTINUED | OUTPATIENT
Start: 2023-04-12 | End: 2023-04-13

## 2023-04-12 RX ORDER — OXYCODONE HYDROCHLORIDE 5 MG/1
5 TABLET ORAL
Status: DISCONTINUED | OUTPATIENT
Start: 2023-04-12 | End: 2023-04-12

## 2023-04-12 RX ADMIN — DIVALPROEX SODIUM 250 MG: 250 TABLET, DELAYED RELEASE ORAL at 05:31

## 2023-04-12 RX ADMIN — SODIUM CHLORIDE 1000 ML: 9 INJECTION, SOLUTION INTRAVENOUS at 19:08

## 2023-04-12 RX ADMIN — GABAPENTIN 300 MG: 300 CAPSULE ORAL at 02:53

## 2023-04-12 RX ADMIN — ACETAMINOPHEN 1000 MG: 500 TABLET, FILM COATED ORAL at 05:30

## 2023-04-12 RX ADMIN — PANTOPRAZOLE SODIUM 40 MG: 40 INJECTION, POWDER, LYOPHILIZED, FOR SOLUTION INTRAVENOUS at 05:29

## 2023-04-12 RX ADMIN — ONDANSETRON 4 MG: 2 INJECTION INTRAMUSCULAR; INTRAVENOUS at 11:35

## 2023-04-12 RX ADMIN — HYDROMORPHONE HYDROCHLORIDE 1 MG: 1 INJECTION, SOLUTION INTRAMUSCULAR; INTRAVENOUS; SUBCUTANEOUS at 22:40

## 2023-04-12 RX ADMIN — OXYCODONE HYDROCHLORIDE 10 MG: 10 TABLET ORAL at 13:00

## 2023-04-12 RX ADMIN — HYDROMORPHONE HYDROCHLORIDE 1 MG: 1 INJECTION, SOLUTION INTRAMUSCULAR; INTRAVENOUS; SUBCUTANEOUS at 19:06

## 2023-04-12 RX ADMIN — PANTOPRAZOLE SODIUM 40 MG: 40 INJECTION, POWDER, LYOPHILIZED, FOR SOLUTION INTRAVENOUS at 00:59

## 2023-04-12 RX ADMIN — SODIUM CHLORIDE 1000 ML: 9 INJECTION, SOLUTION INTRAVENOUS at 01:32

## 2023-04-12 RX ADMIN — OXYCODONE HYDROCHLORIDE 10 MG: 10 TABLET ORAL at 04:15

## 2023-04-12 RX ADMIN — HYDROMORPHONE HYDROCHLORIDE 1 MG: 1 INJECTION, SOLUTION INTRAMUSCULAR; INTRAVENOUS; SUBCUTANEOUS at 15:01

## 2023-04-12 RX ADMIN — CELECOXIB 200 MG: 100 CAPSULE ORAL at 17:42

## 2023-04-12 RX ADMIN — PANCRELIPASE 6000 UNITS: 30000; 6000; 19000 CAPSULE, DELAYED RELEASE PELLETS ORAL at 17:41

## 2023-04-12 RX ADMIN — DIVALPROEX SODIUM 250 MG: 250 TABLET, DELAYED RELEASE ORAL at 17:42

## 2023-04-12 RX ADMIN — PANCRELIPASE 6000 UNITS: 30000; 6000; 19000 CAPSULE, DELAYED RELEASE PELLETS ORAL at 08:04

## 2023-04-12 RX ADMIN — PANCRELIPASE 6000 UNITS: 30000; 6000; 19000 CAPSULE, DELAYED RELEASE PELLETS ORAL at 11:35

## 2023-04-12 RX ADMIN — HYDROMORPHONE HYDROCHLORIDE 0.25 MG: 1 INJECTION, SOLUTION INTRAMUSCULAR; INTRAVENOUS; SUBCUTANEOUS at 02:51

## 2023-04-12 RX ADMIN — SODIUM CHLORIDE 1000 ML: 9 INJECTION, SOLUTION INTRAVENOUS at 08:06

## 2023-04-12 RX ADMIN — SODIUM CHLORIDE 1000 ML: 9 INJECTION, SOLUTION INTRAVENOUS at 00:59

## 2023-04-12 RX ADMIN — ACETAMINOPHEN 1000 MG: 500 TABLET, FILM COATED ORAL at 11:35

## 2023-04-12 RX ADMIN — OXYCODONE HYDROCHLORIDE 5 MG: 5 TABLET ORAL at 00:59

## 2023-04-12 RX ADMIN — ACETAMINOPHEN 1000 MG: 500 TABLET, FILM COATED ORAL at 00:59

## 2023-04-12 RX ADMIN — OXYCODONE HYDROCHLORIDE 10 MG: 10 TABLET ORAL at 08:04

## 2023-04-12 RX ADMIN — CELECOXIB 200 MG: 100 CAPSULE ORAL at 05:31

## 2023-04-12 RX ADMIN — GABAPENTIN 100 MG: 100 CAPSULE ORAL at 05:30

## 2023-04-12 RX ADMIN — HYDROMORPHONE HYDROCHLORIDE 1 MG: 1 INJECTION, SOLUTION INTRAMUSCULAR; INTRAVENOUS; SUBCUTANEOUS at 09:56

## 2023-04-12 RX ADMIN — ACETAMINOPHEN 1000 MG: 500 TABLET, FILM COATED ORAL at 17:42

## 2023-04-12 RX ADMIN — ZONISAMIDE 150 MG: 50 CAPSULE ORAL at 22:44

## 2023-04-12 RX ADMIN — GABAPENTIN 300 MG: 300 CAPSULE ORAL at 22:44

## 2023-04-12 RX ADMIN — HYDROMORPHONE HYDROCHLORIDE 0.5 MG: 1 INJECTION, SOLUTION INTRAMUSCULAR; INTRAVENOUS; SUBCUTANEOUS at 05:27

## 2023-04-12 ASSESSMENT — LIFESTYLE VARIABLES
HOW MANY TIMES IN THE PAST YEAR HAVE YOU HAD 5 OR MORE DRINKS IN A DAY: 0
HAVE PEOPLE ANNOYED YOU BY CRITICIZING YOUR DRINKING: NO
AVERAGE NUMBER OF DAYS PER WEEK YOU HAVE A DRINK CONTAINING ALCOHOL: 0
DOES PATIENT WANT TO STOP DRINKING: CANNOT ASSESS
TOTAL SCORE: 0
TOTAL SCORE: 0
HAVE YOU EVER FELT YOU SHOULD CUT DOWN ON YOUR DRINKING: NO
ALCOHOL_USE: NO
TOTAL SCORE: 0
EVER HAD A DRINK FIRST THING IN THE MORNING TO STEADY YOUR NERVES TO GET RID OF A HANGOVER: NO
EVER FELT BAD OR GUILTY ABOUT YOUR DRINKING: NO
ON A TYPICAL DAY WHEN YOU DRINK ALCOHOL HOW MANY DRINKS DO YOU HAVE: 0
CONSUMPTION TOTAL: NEGATIVE

## 2023-04-12 ASSESSMENT — PATIENT HEALTH QUESTIONNAIRE - PHQ9
9. THOUGHTS THAT YOU WOULD BE BETTER OFF DEAD, OR OF HURTING YOURSELF: NOT AT ALL
8. MOVING OR SPEAKING SO SLOWLY THAT OTHER PEOPLE COULD HAVE NOTICED. OR THE OPPOSITE, BEING SO FIGETY OR RESTLESS THAT YOU HAVE BEEN MOVING AROUND A LOT MORE THAN USUAL: NOT AT ALL
SUM OF ALL RESPONSES TO PHQ QUESTIONS 1-9: 0
3. TROUBLE FALLING OR STAYING ASLEEP OR SLEEPING TOO MUCH: NOT AT ALL
4. FEELING TIRED OR HAVING LITTLE ENERGY: NOT AT ALL
7. TROUBLE CONCENTRATING ON THINGS, SUCH AS READING THE NEWSPAPER OR WATCHING TELEVISION: NOT AT ALL
1. LITTLE INTEREST OR PLEASURE IN DOING THINGS: NOT AT ALL
SUM OF ALL RESPONSES TO PHQ9 QUESTIONS 1 AND 2: 0
6. FEELING BAD ABOUT YOURSELF - OR THAT YOU ARE A FAILURE OR HAVE LET YOURSELF OR YOUR FAMILY DOWN: NOT AL ALL
5. POOR APPETITE OR OVEREATING: NOT AT ALL
2. FEELING DOWN, DEPRESSED, IRRITABLE, OR HOPELESS: NOT AT ALL

## 2023-04-12 ASSESSMENT — COGNITIVE AND FUNCTIONAL STATUS - GENERAL
SUGGESTED CMS G CODE MODIFIER DAILY ACTIVITY: CJ
DRESSING REGULAR LOWER BODY CLOTHING: A LITTLE
CLIMB 3 TO 5 STEPS WITH RAILING: A LITTLE
MOBILITY SCORE: 23
SUGGESTED CMS G CODE MODIFIER MOBILITY: CI
DRESSING REGULAR UPPER BODY CLOTHING: A LITTLE
DAILY ACTIVITIY SCORE: 22

## 2023-04-12 ASSESSMENT — FIBROSIS 4 INDEX: FIB4 SCORE: 0.34

## 2023-04-12 ASSESSMENT — PAIN DESCRIPTION - PAIN TYPE: TYPE: ACUTE PAIN

## 2023-04-12 NOTE — PROGRESS NOTES
[facility] Medicine Daily Progress Note    Date of Service  4/12/2023    Chief Complaint  Rhiannon Marrero is a 37 y.o. female admitted 4/11/2023 with abdominal pain.    Hospital Course  Rhiannon Marrero is a 37 y.o. female with history of alcohol dependence, chronic alcoholic pancreatitis, recently discharged home 3/26/2023 after having negative MRCP who presented 4/11/2023 with evaluation for intractable abdominal pain.  Patient reported several days of abdominal discomfort associate with nausea vomiting, decreased oral intake.  In ER, normal CBC/CMP/lipase.  No acute abnormality seen on right upper quadrant ultrasound.  She required several doses of narcotic and other pain meds in ED.  Still reported intractable abdominal pain.  ERP requested admission for intractable abdominal pain.  Admitted to medicine service.    Interval Problem Update  Patient admitted yesterday for abdominal pain due to chronic pancreatitis.  Labs reviewed.  Continue IV fluids NS 100cc/hr.  Continue CLD, ADAT.  Pain control with oxycodone and dilaudid.  Continue home medications for bipolar disorder and seizure disorder.  Anticipate discharge home in next 24-48 hours if symptoms improve.    I have discussed this patient's plan of care and discharge plan at IDT rounds today with Case Management, Nursing, Nursing leadership, and other members of the IDT team.    Consultants/Specialty  none    Code Status  Full Code    Disposition  Patient is not medically cleared for discharge.   Anticipate discharge to to home with close outpatient follow-up.  I have placed the appropriate orders for post-discharge needs.    Review of Systems  ROS     Physical Exam  Temp:  [35.9 °C (96.7 °F)-36.7 °C (98 °F)] 36.4 °C (97.6 °F)  Pulse:  [] 86  Resp:  [14-20] 15  BP: (100-175)/() 128/88  SpO2:  [95 %-100 %] 100 %    Physical Exam    Fluids    Intake/Output Summary (Last 24 hours) at 4/12/2023 1435  Last data filed at 4/12/2023 0956  Gross per 24  hour   Intake 2470 ml   Output --   Net 2470 ml       Laboratory  Recent Labs     04/11/23  1810 04/12/23  0721   WBC 9.9 6.9   RBC 5.18 4.64   HEMOGLOBIN 15.2 13.3   HEMATOCRIT 45.7 41.9   MCV 88.2 90.3   MCH 29.3 28.7   MCHC 33.3* 31.7*   RDW 46.1 46.1   PLATELETCT 384 283   MPV 9.5 9.6     Recent Labs     04/11/23  1810 04/12/23  0721   SODIUM 144 137   POTASSIUM 4.1 3.5*   CHLORIDE 108 106   CO2 22 22   GLUCOSE 97 82   BUN 6* 7*   CREATININE 0.68 0.60   CALCIUM 9.0 8.2*                   Imaging  US-RUQ   Final Result      1.  Prior cholecystectomy.   2.  No biliary dilation   3.  Hepatomegaly, nonspecific.           Assessment/Plan  * Intractable abdominal pain- (present on admission)  Assessment & Plan  Supportive pain control  IVF  Trial of Protonix  Likely due to chronic pancreatitis    Intractable nausea and vomiting  Assessment & Plan  Support IVF  Support antiemetic  Clear liquid diet, advance diet as tolerated    Alcohol-induced chronic pancreatitis (HCC)- (present on admission)  Assessment & Plan  Continue Creon  PPI    Mood disorder (HCC)  Assessment & Plan  Home meds    Dehydration- (present on admission)  Assessment & Plan  IVF    Seizure disorder (HCC)- (present on admission)  Assessment & Plan  Continue home regimen-Depakote/zonisamide    Chronic pain syndrome- (present on admission)  Assessment & Plan  Supportive pain control    Alcohol dependence (HCC)- (present on admission)  Assessment & Plan  Per history  Denies current use         VTE prophylaxis: enoxaparin ppx    I have performed a physical exam and reviewed and updated ROS and Plan today (4/12/2023). In review of yesterday's note (4/11/2023), there are no changes except as documented above.

## 2023-04-12 NOTE — ED TRIAGE NOTES
"Chief Complaint   Patient presents with    Palpitations     Patient reports feeling like her heart is racing intermittently for a few days. Patient reports intermittent dizziness as well    Abdominal Pain     Patient states she has chronic abdominal pain with history of pancreatitis, but states the pain has been getting worse the last few days. Patient also reports a few episodes of diarrhea       36 yo female to triage for above complaint. Patient states history of anxiety as well and reports feeling anxious over her pain and palpitations. Patient denies chest pain  EKG complete. Protocol ordered.    Pt is alert and oriented, speaking in full sentences, follows commands and responds appropriately to questions.     Patient placed back in lobby and educated on triage process. Asked to inform RN of any changes.    BP (!) 175/102   Pulse (!) 123   Temp 36.7 °C (98 °F) (Temporal)   Resp 18   Ht 1.702 m (5' 7\")   Wt 58.9 kg (129 lb 13.6 oz)   LMP 10/30/2018   SpO2 98%   BMI 20.34 kg/m²     "

## 2023-04-12 NOTE — PROGRESS NOTES
4 Eyes Skin Assessment Completed by LESLIE Grimaldo and LESLIE Lauren.    Head WDL  Ears WDL  Nose WDL  Mouth WDL  Neck WDL  Breast/Chest WDL  Shoulder Blades WDL  Spine WDL  (R) Arm/Elbow/Hand WDL  (L) Arm/Elbow/Hand WDL  Abdomen Scar  Groin WDL  Scrotum/Coccyx/Buttocks WDL  (R) Leg WDL  (L) Leg WDL  (R) Heel/Foot/Toe WDL  (L) Heel/Foot/Toe WDL    Interventions In Place N/A    Possible Skin Injury No    Pictures Uploaded Into Epic N/A  Wound Consult Placed N/A  RN Wound Prevention Protocol Ordered No

## 2023-04-12 NOTE — CARE PLAN
The patient is Stable - Low risk of patient condition declining or worsening    Shift Goals  Clinical Goals: Pain control, IV hydration  Patient Goals: Pain control  Family Goals: FANTASMA    Progress made toward(s) clinical / shift goals:  Patient pain controled per MAR, patient receiving IV fluids for hydration, however, good oral intake.       Problem: Pain - Standard  Goal: Alleviation of pain or a reduction in pain to the patient’s comfort goal  Outcome: Progressing     Problem: Knowledge Deficit - Standard  Goal: Patient and family/care givers will demonstrate understanding of plan of care, disease process/condition, diagnostic tests and medications  Outcome: Progressing     Problem: Fluid Volume  Goal: Fluid volume balance will be maintained  Outcome: Progressing

## 2023-04-12 NOTE — H&P
[facility] Medicine History & Physical Note    Date of Service  4/11/2023    Primary Care Physician  JANA Harrington.    Consultants  None    Code Status  Full Code    Chief Complaint  Chief Complaint   Patient presents with    Palpitations     Patient reports feeling like her heart is racing intermittently for a few days. Patient reports intermittent dizziness as well    Abdominal Pain     Patient states she has chronic abdominal pain with history of pancreatitis, but states the pain has been getting worse the last few days. Patient also reports a few episodes of diarrhea       History of Presenting Illness  Rhiannon Marrero is a 37 y.o. female with history of alcohol dependence, chronic alcoholic pancreatitis, recently discharged home 3/26/2023 after having negative MRCP who presented 4/11/2023 with evaluation for intractable abdominal pain.  Patient reported several days of abdominal discomfort associate with nausea vomiting, decreased oral intake.  In ER, normal CBC/CMP/lipase.  No acute abnormality seen on right upper quadrant ultrasound.  She required several doses of narcotic and other pain meds in ED.  Still reported intractable abdominal pain.  ERP requested admission for intractable abdominal pain.  Admitted to medicine service.    I discussed the plan of care with patient and bedside RN.    Review of Systems  Review of Systems   Constitutional:  Negative for chills and fever.   HENT:  Negative for hearing loss and tinnitus.    Eyes:  Negative for blurred vision and double vision.   Respiratory:  Negative for cough, sputum production and shortness of breath.    Cardiovascular:  Negative for chest pain and palpitations.   Gastrointestinal:  Positive for abdominal pain, heartburn, nausea and vomiting.   Genitourinary:  Negative for dysuria and hematuria.   Musculoskeletal:  Negative for joint pain and myalgias.   Skin:  Negative for itching and rash.   Neurological:  Positive for weakness. Negative for  dizziness and headaches.   Endo/Heme/Allergies:  Negative for environmental allergies. Does not bruise/bleed easily.   Psychiatric/Behavioral:  Negative for depression and substance abuse.    All other systems reviewed and are negative.    Past Medical History   has a past medical history of Acute pancreatitis without infection or necrosis (07/20/2022), Alcohol dependence ([facility]) (04/13/2017), Bronchitis (08/2012), Cold, Current moderate episode of major depressive disorder without prior episode ([facility]) (01/31/2020), Heart burn, Hernia of unspecified site of abdominal cavity without mention of obstruction or gangrene, High anion gap metabolic acidosis (07/01/2022), Indigestion, Pancreatitis, Pneumonia (2011), and Seizure disorder ([facility]) (05/23/2022).    Surgical History   has a past surgical history that includes other orthopedic surgery; gyn surgery; tonsillectomy (4/11/2013); arthroscopy, knee; hysterectomy robotic xi (10/11/2018); salpingectomy (Bilateral, 10/11/2018); orif, wrist (Right, 4/24/2019); pr upper gi endoscopy,diagnosis (N/A, 12/23/2022); pr inject nerv blck,celiac plexus (N/A, 12/23/2022); and egd w/endoscopic ultrasound (N/A, 12/23/2022).     Family History  family history includes Arthritis in her mother; Heart Disease in her maternal grandfather; Psychiatric Illness in her mother; Stroke in her mother.   Family history reviewed with patient. There is no family history that is pertinent to the chief complaint.     Social History   reports that she has been smoking cigarettes. She has a 5.00 pack-year smoking history. She has never used smokeless tobacco. She reports that she does not currently use alcohol. She reports current drug use. Drug: Inhaled.    Allergies  Allergies   Allergen Reactions    Naltrexone Anxiety    Promethazine Hcl Anxiety     Anxiety and makes her feels like skin coming off        Medications  Prior to Admission Medications   Prescriptions Last Dose Informant Patient Reported?  Taking?   divalproex (DEPAKOTE) 250 MG Tablet Delayed Response  Patient Yes No   Sig: Take 250 mg by mouth 2 times a day.   gabapentin (NEURONTIN) 100 MG Cap  Patient Yes No   Sig: Take 100-300 mg by mouth 2 times a day. 100 mg = AM   300 mg = PM   hydrOXYzine pamoate (VISTARIL) 50 MG Cap  Patient Yes No   Sig: Take 50 mg by mouth 3 times a day as needed for Anxiety.   omeprazole (PRILOSEC) 20 MG delayed-release capsule   No No   Sig: Take 1 Capsule by mouth every day for 30 days.   ondansetron (ZOFRAN ODT) 4 MG TABLET DISPERSIBLE   No No   Sig: Take 1 Tablet by mouth every four hours as needed for Nausea/Vomiting.   ondansetron (ZOFRAN ODT) 4 MG TABLET DISPERSIBLE   No Yes   Sig: Take 1 Tablet by mouth every four hours as needed for Nausea/Vomiting.   pancrelipase, Lip-Prot-Amyl, (CREON 6000) 6000-60157 units Cap DR Particles  Patient No No   Sig: Take 1 Capsule by mouth 3 times a day with meals.   zonisamide (ZONEGRAN) 50 MG capsule  Patient Yes No   Sig: Take 150 mg by mouth at bedtime. 50 mg ( 3 capsules) = 150 mg      Facility-Administered Medications: None       Physical Exam  Temp:  [36.7 °C (98 °F)] 36.7 °C (98 °F)  Pulse:  [] 94  Resp:  [16-20] 16  BP: (100-175)/() 111/70  SpO2:  [95 %-98 %] 95 %  Blood Pressure: 111/70   Temperature: 36.7 °C (98 °F)   Pulse: 94   Respiration: 16   Pulse Oximetry: 95 %       Physical Exam  Vitals and nursing note reviewed.   Constitutional:       General: She is not in acute distress.  HENT:      Head: Normocephalic and atraumatic.      Nose: Nose normal.      Mouth/Throat:      Mouth: Mucous membranes are dry.      Pharynx: Oropharynx is clear.   Eyes:      General: No scleral icterus.     Extraocular Movements: Extraocular movements intact.   Cardiovascular:      Rate and Rhythm: Normal rate and regular rhythm.      Pulses: Normal pulses.      Heart sounds:     No friction rub.   Pulmonary:      Effort: No respiratory distress.      Breath sounds: No wheezing  or rales.   Chest:      Chest wall: No tenderness.   Abdominal:      General: There is distension.      Tenderness: There is no abdominal tenderness. There is no guarding or rebound.   Musculoskeletal:         General: No swelling or tenderness. Normal range of motion.      Cervical back: Neck supple. No tenderness.   Skin:     General: Skin is warm and dry.      Capillary Refill: Capillary refill takes less than 2 seconds.   Neurological:      General: No focal deficit present.      Mental Status: She is alert and oriented to person, place, and time.   Psychiatric:         Mood and Affect: Mood normal.       Laboratory:  Recent Labs     04/11/23  1810   WBC 9.9   RBC 5.18   HEMOGLOBIN 15.2   HEMATOCRIT 45.7   MCV 88.2   MCH 29.3   MCHC 33.3*   RDW 46.1   PLATELETCT 384   MPV 9.5     Recent Labs     04/11/23  1810   SODIUM 144   POTASSIUM 4.1   CHLORIDE 108   CO2 22   GLUCOSE 97   BUN 6*   CREATININE 0.68   CALCIUM 9.0     Recent Labs     04/11/23  1810   ALTSGPT 23   ASTSGOT 18   ALKPHOSPHAT 98   TBILIRUBIN 0.6   LIPASE 6*   GLUCOSE 97         No results for input(s): NTPROBNP in the last 72 hours.      No results for input(s): TROPONINT in the last 72 hours.    Imaging:  US-RUQ   Final Result      1.  Prior cholecystectomy.   2.  No biliary dilation   3.  Hepatomegaly, nonspecific.          X-Ray:  I have personally reviewed the images and compared with prior images.  EKG:  I have personally reviewed the images and compared with prior images.    Assessment/Plan:  Justification for Admission Status  I anticipate this patient is appropriate for observation status at this time.        * Intractable abdominal pain- (present on admission)  Assessment & Plan  Supportive pain control  IVF  Trial of Protonix    Intractable nausea and vomiting  Assessment & Plan  Support IVF  Support antiemetic  Clear liquid diet, advance diet as tolerated    Mood disorder (HCC)  Assessment & Plan  Home meds    Dehydration- (present on  admission)  Assessment & Plan  IVF    Seizure disorder (HCC)- (present on admission)  Assessment & Plan  Continue home regimen-Depakote/zonisamide    Chronic pain syndrome- (present on admission)  Assessment & Plan  Supportive pain control    Alcohol-induced chronic pancreatitis (HCC)- (present on admission)  Assessment & Plan  Continue Creon  PPI    Alcohol dependence (HCC)- (present on admission)  Assessment & Plan  Per history  Denies current use        VTE prophylaxis: enoxaparin ppx

## 2023-04-13 PROCEDURE — A9270 NON-COVERED ITEM OR SERVICE: HCPCS | Performed by: STUDENT IN AN ORGANIZED HEALTH CARE EDUCATION/TRAINING PROGRAM

## 2023-04-13 PROCEDURE — 99232 SBSQ HOSP IP/OBS MODERATE 35: CPT | Performed by: STUDENT IN AN ORGANIZED HEALTH CARE EDUCATION/TRAINING PROGRAM

## 2023-04-13 PROCEDURE — 700111 HCHG RX REV CODE 636 W/ 250 OVERRIDE (IP): Performed by: STUDENT IN AN ORGANIZED HEALTH CARE EDUCATION/TRAINING PROGRAM

## 2023-04-13 PROCEDURE — 770006 HCHG ROOM/CARE - MED/SURG/GYN SEMI*

## 2023-04-13 PROCEDURE — 700102 HCHG RX REV CODE 250 W/ 637 OVERRIDE(OP): Performed by: STUDENT IN AN ORGANIZED HEALTH CARE EDUCATION/TRAINING PROGRAM

## 2023-04-13 PROCEDURE — 96376 TX/PRO/DX INJ SAME DRUG ADON: CPT

## 2023-04-13 PROCEDURE — 700105 HCHG RX REV CODE 258: Performed by: STUDENT IN AN ORGANIZED HEALTH CARE EDUCATION/TRAINING PROGRAM

## 2023-04-13 RX ORDER — POTASSIUM CHLORIDE 20 MEQ/1
40 TABLET, EXTENDED RELEASE ORAL ONCE
Status: COMPLETED | OUTPATIENT
Start: 2023-04-13 | End: 2023-04-13

## 2023-04-13 RX ORDER — HYDROMORPHONE HYDROCHLORIDE 1 MG/ML
1 INJECTION, SOLUTION INTRAMUSCULAR; INTRAVENOUS; SUBCUTANEOUS EVERY 4 HOURS PRN
Status: DISCONTINUED | OUTPATIENT
Start: 2023-04-13 | End: 2023-04-14

## 2023-04-13 RX ADMIN — ZONISAMIDE 150 MG: 50 CAPSULE ORAL at 20:57

## 2023-04-13 RX ADMIN — GABAPENTIN 100 MG: 100 CAPSULE ORAL at 05:29

## 2023-04-13 RX ADMIN — HYDROMORPHONE HYDROCHLORIDE 1 MG: 1 INJECTION, SOLUTION INTRAMUSCULAR; INTRAVENOUS; SUBCUTANEOUS at 19:39

## 2023-04-13 RX ADMIN — ONDANSETRON 4 MG: 2 INJECTION INTRAMUSCULAR; INTRAVENOUS at 18:27

## 2023-04-13 RX ADMIN — DIVALPROEX SODIUM 250 MG: 250 TABLET, DELAYED RELEASE ORAL at 16:56

## 2023-04-13 RX ADMIN — OMEPRAZOLE 20 MG: 20 CAPSULE, DELAYED RELEASE ORAL at 05:29

## 2023-04-13 RX ADMIN — HYDROMORPHONE HYDROCHLORIDE 1 MG: 1 INJECTION, SOLUTION INTRAMUSCULAR; INTRAVENOUS; SUBCUTANEOUS at 05:33

## 2023-04-13 RX ADMIN — SODIUM CHLORIDE 1000 ML: 9 INJECTION, SOLUTION INTRAVENOUS at 21:23

## 2023-04-13 RX ADMIN — ONDANSETRON 4 MG: 2 INJECTION INTRAMUSCULAR; INTRAVENOUS at 08:09

## 2023-04-13 RX ADMIN — PANCRELIPASE 6000 UNITS: 30000; 6000; 19000 CAPSULE, DELAYED RELEASE PELLETS ORAL at 07:57

## 2023-04-13 RX ADMIN — ACETAMINOPHEN 1000 MG: 500 TABLET, FILM COATED ORAL at 05:29

## 2023-04-13 RX ADMIN — CELECOXIB 200 MG: 100 CAPSULE ORAL at 05:28

## 2023-04-13 RX ADMIN — OMEPRAZOLE 20 MG: 20 CAPSULE, DELAYED RELEASE ORAL at 16:57

## 2023-04-13 RX ADMIN — OXYCODONE HYDROCHLORIDE 10 MG: 10 TABLET ORAL at 12:00

## 2023-04-13 RX ADMIN — DIVALPROEX SODIUM 250 MG: 250 TABLET, DELAYED RELEASE ORAL at 05:29

## 2023-04-13 RX ADMIN — GABAPENTIN 300 MG: 300 CAPSULE ORAL at 20:57

## 2023-04-13 RX ADMIN — POTASSIUM CHLORIDE 40 MEQ: 1500 TABLET, EXTENDED RELEASE ORAL at 07:57

## 2023-04-13 RX ADMIN — HYDROMORPHONE HYDROCHLORIDE 1 MG: 1 INJECTION, SOLUTION INTRAMUSCULAR; INTRAVENOUS; SUBCUTANEOUS at 09:52

## 2023-04-13 RX ADMIN — PANCRELIPASE 6000 UNITS: 30000; 6000; 19000 CAPSULE, DELAYED RELEASE PELLETS ORAL at 11:57

## 2023-04-13 RX ADMIN — OXYCODONE HYDROCHLORIDE 10 MG: 10 TABLET ORAL at 16:56

## 2023-04-13 RX ADMIN — HYDROMORPHONE HYDROCHLORIDE 1 MG: 1 INJECTION, SOLUTION INTRAMUSCULAR; INTRAVENOUS; SUBCUTANEOUS at 14:16

## 2023-04-13 RX ADMIN — ACETAMINOPHEN 1000 MG: 500 TABLET, FILM COATED ORAL at 16:57

## 2023-04-13 RX ADMIN — ACETAMINOPHEN 1000 MG: 500 TABLET, FILM COATED ORAL at 11:58

## 2023-04-13 RX ADMIN — PANCRELIPASE 6000 UNITS: 30000; 6000; 19000 CAPSULE, DELAYED RELEASE PELLETS ORAL at 17:04

## 2023-04-13 ASSESSMENT — FIBROSIS 4 INDEX: FIB4 SCORE: 0.37

## 2023-04-13 ASSESSMENT — ENCOUNTER SYMPTOMS
BLURRED VISION: 0
PALPITATIONS: 0
DIARRHEA: 0
BACK PAIN: 0
SHORTNESS OF BREATH: 0
SPEECH CHANGE: 0
CONSTIPATION: 0
FEVER: 0
HEADACHES: 0
VOMITING: 1
EYE PAIN: 0
INSOMNIA: 0
CHILLS: 0
NAUSEA: 1
FOCAL WEAKNESS: 0
FALLS: 0
ABDOMINAL PAIN: 1
COUGH: 0
DIZZINESS: 0

## 2023-04-13 ASSESSMENT — PAIN DESCRIPTION - PAIN TYPE
TYPE: ACUTE PAIN

## 2023-04-13 ASSESSMENT — LIFESTYLE VARIABLES: SUBSTANCE_ABUSE: 0

## 2023-04-13 NOTE — CARE PLAN
The patient is Stable - Low risk of patient condition declining or worsening    Shift Goals  Clinical Goals: pain less than 6, new IV  Patient Goals: pain control  Family Goals: pain control    Progress made toward(s) clinical / shift goals:    Problem: Pain - Standard  Goal: Alleviation of pain or a reduction in pain to the patient’s comfort goal  Outcome: Progressing     Problem: Knowledge Deficit - Standard  Goal: Patient and family/care givers will demonstrate understanding of plan of care, disease process/condition, diagnostic tests and medications  Outcome: Progressing     Problem: Respiratory  Goal: Patient will achieve/maintain optimum respiratory ventilation and gas exchange  Outcome: Progressing       Patient is not progressing towards the following goals:

## 2023-04-13 NOTE — PROGRESS NOTES
Assumed care of patient and received report.  Patient is oriented x 4 and reports pain level of 9/10.  Patient is ambulatory with 0 assist, low fall risk, and on room air. Patient given heat packs, applied to abdomen between medications.

## 2023-04-13 NOTE — PROGRESS NOTES
[facility] Medicine Daily Progress Note    Date of Service  4/13/2023    Chief Complaint  Rhiannon Marrero is a 37 y.o. female admitted 4/11/2023 with abdominal pain.    Hospital Course  Rhiannon Marrero is a 37 y.o. female with history of alcohol dependence, chronic alcoholic pancreatitis, recently discharged home 3/26/2023 after having negative MRCP who presented 4/11/2023 with evaluation for intractable abdominal pain.  Patient reported several days of abdominal discomfort associate with nausea vomiting, decreased oral intake.  In ER, normal CBC/CMP/lipase.  No acute abnormality seen on right upper quadrant ultrasound.  She required several doses of narcotic and other pain meds in ED.  Still reported intractable abdominal pain.  ERP requested admission for intractable abdominal pain.  Admitted to medicine service.    Interval Problem Update  No acute events overnight.  K 3.5 low today, repleting orally with kdur. Monitor.  Patient reports pain level still very high, will adjust pain regimen.  Continue supportive management for pain, nausea.  Continue IV fluids NS 100cc/hr.  Continue CLD, ADAT.  Anticipate discharge home tomorrow if improving.      I have discussed this patient's plan of care and discharge plan at IDT rounds today with Case Management, Nursing, Nursing leadership, and other members of the IDT team.    Consultants/Specialty  none    Code Status  Full Code    Disposition  Patient is not medically cleared for discharge.   Anticipate discharge to to home with close outpatient follow-up.  I have placed the appropriate orders for post-discharge needs.    Review of Systems  Review of Systems   Constitutional:  Negative for chills and fever.   Eyes:  Negative for blurred vision and pain.   Respiratory:  Negative for cough and shortness of breath.    Cardiovascular:  Negative for chest pain, palpitations and leg swelling.   Gastrointestinal:  Positive for abdominal pain, nausea and vomiting. Negative for  constipation and diarrhea.   Genitourinary:  Negative for dysuria and urgency.   Musculoskeletal:  Negative for back pain and falls.   Skin:  Negative for itching and rash.   Neurological:  Negative for dizziness, speech change, focal weakness and headaches.   Psychiatric/Behavioral:  Negative for substance abuse. The patient does not have insomnia.       Physical Exam  Temp:  [36.3 °C (97.4 °F)-36.9 °C (98.4 °F)] 36.3 °C (97.4 °F)  Pulse:  [] 85  Resp:  [16-18] 16  BP: (102-131)/(66-92) 111/66  SpO2:  [95 %-96 %] 95 %    Physical Exam  Vitals reviewed.   Constitutional:       General: She is not in acute distress.     Appearance: She is not diaphoretic.   HENT:      Head: Normocephalic and atraumatic.      Right Ear: External ear normal.      Left Ear: External ear normal.      Nose: Nose normal. No congestion.      Mouth/Throat:      Pharynx: No oropharyngeal exudate or posterior oropharyngeal erythema.   Eyes:      Extraocular Movements: Extraocular movements intact.      Pupils: Pupils are equal, round, and reactive to light.   Cardiovascular:      Rate and Rhythm: Normal rate and regular rhythm.   Pulmonary:      Effort: Pulmonary effort is normal. No respiratory distress.      Breath sounds: Normal breath sounds. No wheezing or rales.   Abdominal:      General: Bowel sounds are normal. There is no distension.      Palpations: Abdomen is soft.      Tenderness: There is no abdominal tenderness. There is no guarding.   Musculoskeletal:         General: No swelling. Normal range of motion.      Cervical back: Normal range of motion and neck supple.      Right lower leg: No edema.      Left lower leg: No edema.   Skin:     General: Skin is warm and dry.   Neurological:      General: No focal deficit present.      Mental Status: She is alert and oriented to person, place, and time.      Cranial Nerves: No cranial nerve deficit.      Sensory: No sensory deficit.      Motor: No weakness.   Psychiatric:          Mood and Affect: Mood normal.         Behavior: Behavior normal.       Fluids    Intake/Output Summary (Last 24 hours) at 4/13/2023 1229  Last data filed at 4/12/2023 1300  Gross per 24 hour   Intake 360 ml   Output --   Net 360 ml       Laboratory  Recent Labs     04/11/23  1810 04/12/23  0721   WBC 9.9 6.9   RBC 5.18 4.64   HEMOGLOBIN 15.2 13.3   HEMATOCRIT 45.7 41.9   MCV 88.2 90.3   MCH 29.3 28.7   MCHC 33.3* 31.7*   RDW 46.1 46.1   PLATELETCT 384 283   MPV 9.5 9.6     Recent Labs     04/11/23  1810 04/12/23  0721   SODIUM 144 137   POTASSIUM 4.1 3.5*   CHLORIDE 108 106   CO2 22 22   GLUCOSE 97 82   BUN 6* 7*   CREATININE 0.68 0.60   CALCIUM 9.0 8.2*                   Imaging  US-RUQ   Final Result      1.  Prior cholecystectomy.   2.  No biliary dilation   3.  Hepatomegaly, nonspecific.           Assessment/Plan  * Intractable abdominal pain- (present on admission)  Assessment & Plan  Supportive pain control  IVF  Trial of Protonix  Likely due to chronic pancreatitis    Intractable nausea and vomiting  Assessment & Plan  Support IVF  Support antiemetic  Clear liquid diet, advance diet as tolerated    Alcohol-induced chronic pancreatitis (HCC)- (present on admission)  Assessment & Plan  Continue Creon  PPI    Mood disorder (HCC)  Assessment & Plan  Home meds    Dehydration- (present on admission)  Assessment & Plan  IVF    Seizure disorder (HCC)- (present on admission)  Assessment & Plan  Continue home regimen-Depakote/zonisamide    Chronic pain syndrome- (present on admission)  Assessment & Plan  Supportive pain control    Alcohol dependence (HCC)- (present on admission)  Assessment & Plan  Per history  Denies current use         VTE prophylaxis: enoxaparin ppx    I have performed a physical exam and reviewed and updated ROS and Plan today (4/13/2023). In review of yesterday's note (4/12/2023), there are no changes except as documented above.

## 2023-04-13 NOTE — CARE PLAN
The patient is Stable - Low risk of patient condition declining or worsening    Shift Goals  Clinical Goals: Pain control, nausea control  Patient Goals: Pain control  Family Goals: FANTASMA    Progress made toward(s) clinical / shift goals:  Pt. Had instance of nausea and vomiting this morning. Pt. Was given zofran and no complaints after. Pt's pain control was changed by doctor and pt can no receive oxy and dilaudid, switching off every two hours. Pt's pain was poorly managed before.    Problem: Pain - Standard  Goal: Alleviation of pain or a reduction in pain to the patient’s comfort goal  Outcome: Progressing     Problem: Knowledge Deficit - Standard  Goal: Patient and family/care givers will demonstrate understanding of plan of care, disease process/condition, diagnostic tests and medications  Outcome: Progressing     Problem: Fluid Volume  Goal: Fluid volume balance will be maintained  Outcome: Progressing     Problem: Respiratory  Goal: Patient will achieve/maintain optimum respiratory ventilation and gas exchange  Outcome: Progressing

## 2023-04-14 LAB
ANION GAP SERPL CALC-SCNC: 8 MMOL/L (ref 7–16)
BUN SERPL-MCNC: 3 MG/DL (ref 8–22)
CALCIUM SERPL-MCNC: 8.5 MG/DL (ref 8.5–10.5)
CHLORIDE SERPL-SCNC: 106 MMOL/L (ref 96–112)
CO2 SERPL-SCNC: 24 MMOL/L (ref 20–33)
CREAT SERPL-MCNC: 0.5 MG/DL (ref 0.5–1.4)
GFR SERPLBLD CREATININE-BSD FMLA CKD-EPI: 125 ML/MIN/1.73 M 2
GLUCOSE SERPL-MCNC: 80 MG/DL (ref 65–99)
POTASSIUM SERPL-SCNC: 4.4 MMOL/L (ref 3.6–5.5)
SODIUM SERPL-SCNC: 138 MMOL/L (ref 135–145)

## 2023-04-14 PROCEDURE — A9270 NON-COVERED ITEM OR SERVICE: HCPCS | Performed by: STUDENT IN AN ORGANIZED HEALTH CARE EDUCATION/TRAINING PROGRAM

## 2023-04-14 PROCEDURE — 700111 HCHG RX REV CODE 636 W/ 250 OVERRIDE (IP): Performed by: STUDENT IN AN ORGANIZED HEALTH CARE EDUCATION/TRAINING PROGRAM

## 2023-04-14 PROCEDURE — 770006 HCHG ROOM/CARE - MED/SURG/GYN SEMI*

## 2023-04-14 PROCEDURE — 700105 HCHG RX REV CODE 258: Performed by: STUDENT IN AN ORGANIZED HEALTH CARE EDUCATION/TRAINING PROGRAM

## 2023-04-14 PROCEDURE — 99232 SBSQ HOSP IP/OBS MODERATE 35: CPT | Performed by: STUDENT IN AN ORGANIZED HEALTH CARE EDUCATION/TRAINING PROGRAM

## 2023-04-14 PROCEDURE — 700102 HCHG RX REV CODE 250 W/ 637 OVERRIDE(OP): Performed by: STUDENT IN AN ORGANIZED HEALTH CARE EDUCATION/TRAINING PROGRAM

## 2023-04-14 PROCEDURE — 80048 BASIC METABOLIC PNL TOTAL CA: CPT

## 2023-04-14 RX ORDER — HYDROMORPHONE HYDROCHLORIDE 2 MG/1
2 TABLET ORAL EVERY 4 HOURS PRN
Status: DISCONTINUED | OUTPATIENT
Start: 2023-04-14 | End: 2023-04-15 | Stop reason: HOSPADM

## 2023-04-14 RX ORDER — ONDANSETRON 2 MG/ML
4 INJECTION INTRAMUSCULAR; INTRAVENOUS EVERY 6 HOURS
Status: DISCONTINUED | OUTPATIENT
Start: 2023-04-14 | End: 2023-04-15 | Stop reason: HOSPADM

## 2023-04-14 RX ADMIN — SODIUM CHLORIDE 1000 ML: 9 INJECTION, SOLUTION INTRAVENOUS at 20:10

## 2023-04-14 RX ADMIN — ACETAMINOPHEN 1000 MG: 500 TABLET, FILM COATED ORAL at 16:33

## 2023-04-14 RX ADMIN — ONDANSETRON 4 MG: 2 INJECTION INTRAMUSCULAR; INTRAVENOUS at 08:11

## 2023-04-14 RX ADMIN — OXYCODONE HYDROCHLORIDE 10 MG: 10 TABLET ORAL at 16:34

## 2023-04-14 RX ADMIN — DOCUSATE SODIUM 50 MG AND SENNOSIDES 8.6 MG 2 TABLET: 8.6; 5 TABLET, FILM COATED ORAL at 04:06

## 2023-04-14 RX ADMIN — ONDANSETRON 4 MG: 2 INJECTION INTRAMUSCULAR; INTRAVENOUS at 22:29

## 2023-04-14 RX ADMIN — HYDROMORPHONE HYDROCHLORIDE 2 MG: 2 TABLET ORAL at 20:12

## 2023-04-14 RX ADMIN — ONDANSETRON 4 MG: 2 INJECTION INTRAMUSCULAR; INTRAVENOUS at 00:01

## 2023-04-14 RX ADMIN — DIVALPROEX SODIUM 250 MG: 250 TABLET, DELAYED RELEASE ORAL at 04:06

## 2023-04-14 RX ADMIN — ONDANSETRON 4 MG: 2 INJECTION INTRAMUSCULAR; INTRAVENOUS at 16:34

## 2023-04-14 RX ADMIN — PANCRELIPASE 6000 UNITS: 30000; 6000; 19000 CAPSULE, DELAYED RELEASE PELLETS ORAL at 08:11

## 2023-04-14 RX ADMIN — HYDROMORPHONE HYDROCHLORIDE 1 MG: 1 INJECTION, SOLUTION INTRAMUSCULAR; INTRAVENOUS; SUBCUTANEOUS at 00:01

## 2023-04-14 RX ADMIN — HYDROMORPHONE HYDROCHLORIDE 2 MG: 2 TABLET ORAL at 13:55

## 2023-04-14 RX ADMIN — HYDROMORPHONE HYDROCHLORIDE 1 MG: 1 INJECTION, SOLUTION INTRAMUSCULAR; INTRAVENOUS; SUBCUTANEOUS at 08:10

## 2023-04-14 RX ADMIN — PANCRELIPASE 6000 UNITS: 30000; 6000; 19000 CAPSULE, DELAYED RELEASE PELLETS ORAL at 16:33

## 2023-04-14 RX ADMIN — PANCRELIPASE 6000 UNITS: 30000; 6000; 19000 CAPSULE, DELAYED RELEASE PELLETS ORAL at 11:25

## 2023-04-14 RX ADMIN — GABAPENTIN 100 MG: 100 CAPSULE ORAL at 04:06

## 2023-04-14 RX ADMIN — HYDROMORPHONE HYDROCHLORIDE 1 MG: 1 INJECTION, SOLUTION INTRAMUSCULAR; INTRAVENOUS; SUBCUTANEOUS at 04:06

## 2023-04-14 RX ADMIN — ONDANSETRON 4 MG: 2 INJECTION INTRAMUSCULAR; INTRAVENOUS at 11:25

## 2023-04-14 RX ADMIN — ACETAMINOPHEN 1000 MG: 500 TABLET, FILM COATED ORAL at 00:01

## 2023-04-14 RX ADMIN — ZONISAMIDE 150 MG: 50 CAPSULE ORAL at 20:14

## 2023-04-14 RX ADMIN — ACETAMINOPHEN 1000 MG: 500 TABLET, FILM COATED ORAL at 04:05

## 2023-04-14 RX ADMIN — DIVALPROEX SODIUM 250 MG: 250 TABLET, DELAYED RELEASE ORAL at 16:34

## 2023-04-14 RX ADMIN — ACETAMINOPHEN 1000 MG: 500 TABLET, FILM COATED ORAL at 11:25

## 2023-04-14 RX ADMIN — GABAPENTIN 300 MG: 300 CAPSULE ORAL at 20:11

## 2023-04-14 ASSESSMENT — FIBROSIS 4 INDEX: FIB4 SCORE: 0.37

## 2023-04-14 ASSESSMENT — ENCOUNTER SYMPTOMS
DIZZINESS: 0
EYE PAIN: 0
DIARRHEA: 0
COUGH: 0
FOCAL WEAKNESS: 0
PALPITATIONS: 0
SHORTNESS OF BREATH: 0
FEVER: 0
INSOMNIA: 0
ABDOMINAL PAIN: 1
CONSTIPATION: 0
SPEECH CHANGE: 0
FALLS: 0
BLURRED VISION: 0
VOMITING: 1
HEADACHES: 0
BACK PAIN: 0
NAUSEA: 1
CHILLS: 0

## 2023-04-14 ASSESSMENT — PAIN DESCRIPTION - PAIN TYPE
TYPE: ACUTE PAIN

## 2023-04-14 ASSESSMENT — LIFESTYLE VARIABLES: SUBSTANCE_ABUSE: 0

## 2023-04-14 NOTE — PROGRESS NOTES
[facility] Medicine Daily Progress Note    Date of Service  4/14/2023    Chief Complaint  Rhiannon Marrero is a 37 y.o. female admitted 4/11/2023 with abdominal pain.    Hospital Course  Rhiannon Mrarero is a 37 y.o. female with history of alcohol dependence, chronic alcoholic pancreatitis, recently discharged home 3/26/2023 after having negative MRCP who presented 4/11/2023 with evaluation for intractable abdominal pain.  Patient reported several days of abdominal discomfort associate with nausea vomiting, decreased oral intake.  In ER, normal CBC/CMP/lipase.  No acute abnormality seen on right upper quadrant ultrasound.  She required several doses of narcotic and other pain meds in ED.  Still reported intractable abdominal pain.  ERP requested admission for intractable abdominal pain.  Admitted to medicine service.    Interval Problem Update  No acute events overnight.  Patient requesting dilaudid po, will trial dilaudid PO and stop IV.  Continue supportive management for pain, nausea.  Continue IV fluids NS 100cc/hr.  Continue CLD, ADAT.  Patient has follow up with GI and pain management specialist in the outpatient setting, encouraged follow up.  Anticipate discharge home tomorrow on PO medications.      I have discussed this patient's plan of care and discharge plan at IDT rounds today with Case Management, Nursing, Nursing leadership, and other members of the IDT team.    Consultants/Specialty  none    Code Status  Full Code    Disposition  Patient is not medically cleared for discharge.   Anticipate discharge to to home with close outpatient follow-up.  I have placed the appropriate orders for post-discharge needs.    Review of Systems  Review of Systems   Constitutional:  Negative for chills and fever.   Eyes:  Negative for blurred vision and pain.   Respiratory:  Negative for cough and shortness of breath.    Cardiovascular:  Negative for chest pain, palpitations and leg swelling.   Gastrointestinal:   Positive for abdominal pain, nausea and vomiting. Negative for constipation and diarrhea.   Genitourinary:  Negative for dysuria and urgency.   Musculoskeletal:  Negative for back pain and falls.   Skin:  Negative for itching and rash.   Neurological:  Negative for dizziness, speech change, focal weakness and headaches.   Psychiatric/Behavioral:  Negative for substance abuse. The patient does not have insomnia.       Physical Exam  Temp:  [36.1 °C (97 °F)-36.7 °C (98 °F)] 36.6 °C (97.8 °F)  Pulse:  [79-87] 81  Resp:  [16-18] 17  BP: (107-126)/(62-75) 122/75  SpO2:  [95 %-100 %] 100 %    Physical Exam  Vitals reviewed.   Constitutional:       General: She is not in acute distress.     Appearance: She is not diaphoretic.   HENT:      Head: Normocephalic and atraumatic.      Right Ear: External ear normal.      Left Ear: External ear normal.      Nose: Nose normal. No congestion.      Mouth/Throat:      Pharynx: No oropharyngeal exudate or posterior oropharyngeal erythema.   Eyes:      Extraocular Movements: Extraocular movements intact.      Pupils: Pupils are equal, round, and reactive to light.   Cardiovascular:      Rate and Rhythm: Normal rate and regular rhythm.   Pulmonary:      Effort: Pulmonary effort is normal. No respiratory distress.      Breath sounds: Normal breath sounds. No wheezing or rales.   Abdominal:      General: Bowel sounds are normal. There is no distension.      Palpations: Abdomen is soft.      Tenderness: There is no abdominal tenderness. There is no guarding.   Musculoskeletal:         General: No swelling. Normal range of motion.      Cervical back: Normal range of motion and neck supple.      Right lower leg: No edema.      Left lower leg: No edema.   Skin:     General: Skin is warm and dry.   Neurological:      General: No focal deficit present.      Mental Status: She is alert and oriented to person, place, and time.      Cranial Nerves: No cranial nerve deficit.      Sensory: No  sensory deficit.      Motor: No weakness.   Psychiatric:         Mood and Affect: Mood normal.         Behavior: Behavior normal.       Fluids    Intake/Output Summary (Last 24 hours) at 4/14/2023 1431  Last data filed at 4/14/2023 0836  Gross per 24 hour   Intake 240 ml   Output --   Net 240 ml       Laboratory  Recent Labs     04/11/23 1810 04/12/23  0721   WBC 9.9 6.9   RBC 5.18 4.64   HEMOGLOBIN 15.2 13.3   HEMATOCRIT 45.7 41.9   MCV 88.2 90.3   MCH 29.3 28.7   MCHC 33.3* 31.7*   RDW 46.1 46.1   PLATELETCT 384 283   MPV 9.5 9.6     Recent Labs     04/11/23  1810 04/12/23  0721 04/14/23  0405   SODIUM 144 137 138   POTASSIUM 4.1 3.5* 4.4   CHLORIDE 108 106 106   CO2 22 22 24   GLUCOSE 97 82 80   BUN 6* 7* 3*   CREATININE 0.68 0.60 0.50   CALCIUM 9.0 8.2* 8.5                   Imaging  US-RUQ   Final Result      1.  Prior cholecystectomy.   2.  No biliary dilation   3.  Hepatomegaly, nonspecific.           Assessment/Plan  * Intractable abdominal pain- (present on admission)  Assessment & Plan  Supportive pain control  IVF  Trial of Protonix  Likely due to chronic pancreatitis    Intractable nausea and vomiting  Assessment & Plan  Support IVF  Support antiemetic  Clear liquid diet, advance diet as tolerated    Alcohol-induced chronic pancreatitis (HCC)- (present on admission)  Assessment & Plan  Continue Creon  PPI    Mood disorder (HCC)  Assessment & Plan  Home meds    Dehydration- (present on admission)  Assessment & Plan  IVF    Seizure disorder (HCC)- (present on admission)  Assessment & Plan  Continue home regimen-Depakote/zonisamide    Chronic pain syndrome- (present on admission)  Assessment & Plan  Supportive pain control    Alcohol dependence (HCC)- (present on admission)  Assessment & Plan  Per history  Denies current use         VTE prophylaxis: enoxaparin ppx    I have performed a physical exam and reviewed and updated ROS and Plan today (4/14/2023). In review of yesterday's note (4/13/2023), there  are no changes except as documented above.

## 2023-04-14 NOTE — CARE PLAN
The patient is Stable - Low risk of patient condition declining or worsening    Shift Goals  Clinical Goals: Pain control less than 6, nausea control  Patient Goals: Pain control  Family Goals: FANTASMA    Progress made toward(s) clinical / shift goals:  Pain is still poorly controlled per patient's recall. All pain meds were changed to PO but patient is still receiving most available doses and calls every 2 hours. Nausea seems to be doing better with the med control of zofran in the morning and afternoon. Pt able to get rest throughout day and some sleep. Discharge was possibly discussed for tomorrow with a prescription for home pain meds.    Problem: Pain - Standard  Goal: Alleviation of pain or a reduction in pain to the patient’s comfort goal  Outcome: Progressing     Problem: Knowledge Deficit - Standard  Goal: Patient and family/care givers will demonstrate understanding of plan of care, disease process/condition, diagnostic tests and medications  Outcome: Progressing     Problem: Fluid Volume  Goal: Fluid volume balance will be maintained  Outcome: Progressing     Problem: Urinary - Renal Perfusion  Goal: Ability to achieve and maintain adequate renal perfusion and functioning will improve  Outcome: Progressing     Problem: Respiratory  Goal: Patient will achieve/maintain optimum respiratory ventilation and gas exchange  Outcome: Progressing     Problem: Fall Risk  Goal: Patient will remain free from falls  Outcome: Progressing

## 2023-04-14 NOTE — CARE PLAN
The patient is Stable - Low risk of patient condition declining or worsening    Shift Goals  Clinical Goals: pain less than 6  Patient Goals: pain control  Family Goals: edith    Progress made toward(s) clinical / shift goals:  Pt called complaining of abdomen pain, found grimacing and rates pain 8/10. Administered prn dilaudid with relief. Reviewed plan of care with pt, pt verbally understands.     Problem: Pain - Standard  Goal: Alleviation of pain or a reduction in pain to the patient’s comfort goal  Outcome: Progressing     Problem: Knowledge Deficit - Standard  Goal: Patient and family/care givers will demonstrate understanding of plan of care, disease process/condition, diagnostic tests and medications  Outcome: Progressing       Patient is not progressing towards the following goals:

## 2023-04-15 ENCOUNTER — PHARMACY VISIT (OUTPATIENT)
Dept: PHARMACY | Facility: MEDICAL CENTER | Age: 38
End: 2023-04-15
Payer: COMMERCIAL

## 2023-04-15 VITALS
SYSTOLIC BLOOD PRESSURE: 115 MMHG | RESPIRATION RATE: 17 BRPM | OXYGEN SATURATION: 97 % | WEIGHT: 137.13 LBS | DIASTOLIC BLOOD PRESSURE: 72 MMHG | HEIGHT: 67 IN | TEMPERATURE: 98 F | BODY MASS INDEX: 21.52 KG/M2 | HEART RATE: 83 BPM

## 2023-04-15 PROCEDURE — 99239 HOSP IP/OBS DSCHRG MGMT >30: CPT | Performed by: STUDENT IN AN ORGANIZED HEALTH CARE EDUCATION/TRAINING PROGRAM

## 2023-04-15 PROCEDURE — 700111 HCHG RX REV CODE 636 W/ 250 OVERRIDE (IP): Performed by: STUDENT IN AN ORGANIZED HEALTH CARE EDUCATION/TRAINING PROGRAM

## 2023-04-15 PROCEDURE — A9270 NON-COVERED ITEM OR SERVICE: HCPCS | Performed by: STUDENT IN AN ORGANIZED HEALTH CARE EDUCATION/TRAINING PROGRAM

## 2023-04-15 PROCEDURE — 700102 HCHG RX REV CODE 250 W/ 637 OVERRIDE(OP): Performed by: STUDENT IN AN ORGANIZED HEALTH CARE EDUCATION/TRAINING PROGRAM

## 2023-04-15 PROCEDURE — 700105 HCHG RX REV CODE 258: Performed by: STUDENT IN AN ORGANIZED HEALTH CARE EDUCATION/TRAINING PROGRAM

## 2023-04-15 PROCEDURE — RXMED WILLOW AMBULATORY MEDICATION CHARGE: Performed by: STUDENT IN AN ORGANIZED HEALTH CARE EDUCATION/TRAINING PROGRAM

## 2023-04-15 RX ORDER — HYDROMORPHONE HYDROCHLORIDE 2 MG/1
2 TABLET ORAL EVERY 6 HOURS PRN
Qty: 20 TABLET | Refills: 0 | Status: SHIPPED | OUTPATIENT
Start: 2023-04-15 | End: 2023-04-20

## 2023-04-15 RX ORDER — ONDANSETRON 4 MG/1
4 TABLET, ORALLY DISINTEGRATING ORAL EVERY 8 HOURS PRN
Qty: 30 TABLET | Refills: 0 | Status: SHIPPED | OUTPATIENT
Start: 2023-04-15 | End: 2023-05-16

## 2023-04-15 RX ADMIN — ONDANSETRON 4 MG: 2 INJECTION INTRAMUSCULAR; INTRAVENOUS at 05:30

## 2023-04-15 RX ADMIN — HYDROMORPHONE HYDROCHLORIDE 2 MG: 2 TABLET ORAL at 01:06

## 2023-04-15 RX ADMIN — DOCUSATE SODIUM 50 MG AND SENNOSIDES 8.6 MG 2 TABLET: 8.6; 5 TABLET, FILM COATED ORAL at 05:29

## 2023-04-15 RX ADMIN — ACETAMINOPHEN 1000 MG: 500 TABLET, FILM COATED ORAL at 05:29

## 2023-04-15 RX ADMIN — DIVALPROEX SODIUM 250 MG: 250 TABLET, DELAYED RELEASE ORAL at 05:28

## 2023-04-15 RX ADMIN — SODIUM CHLORIDE 1000 ML: 9 INJECTION, SOLUTION INTRAVENOUS at 05:23

## 2023-04-15 RX ADMIN — PANCRELIPASE 6000 UNITS: 30000; 6000; 19000 CAPSULE, DELAYED RELEASE PELLETS ORAL at 07:52

## 2023-04-15 RX ADMIN — GABAPENTIN 100 MG: 100 CAPSULE ORAL at 05:29

## 2023-04-15 RX ADMIN — HYDROMORPHONE HYDROCHLORIDE 2 MG: 2 TABLET ORAL at 07:52

## 2023-04-15 ASSESSMENT — PAIN DESCRIPTION - PAIN TYPE
TYPE: ACUTE PAIN

## 2023-04-15 ASSESSMENT — FIBROSIS 4 INDEX: FIB4 SCORE: 0.37

## 2023-04-15 NOTE — CARE PLAN
The patient is Stable - Low risk of patient condition declining or worsening    Shift Goals  Clinical Goals: Discharge, pain management, nausea control  Patient Goals: Comfort, pain control  Family Goals: FANTASMA    Progress made toward(s) clinical / shift goals:  Pt being discharged to discharge lounge. PO dilaudid given this morning for pain control.     Problem: Pain - Standard  Goal: Alleviation of pain or a reduction in pain to the patient’s comfort goal  Outcome: Progressing     Problem: Knowledge Deficit - Standard  Goal: Patient and family/care givers will demonstrate understanding of plan of care, disease process/condition, diagnostic tests and medications  Outcome: Progressing     Problem: Fluid Volume  Goal: Fluid volume balance will be maintained  Outcome: Progressing     Problem: Urinary - Renal Perfusion  Goal: Ability to achieve and maintain adequate renal perfusion and functioning will improve  Outcome: Progressing     Problem: Fall Risk  Goal: Patient will remain free from falls  Outcome: Progressing

## 2023-04-15 NOTE — DISCHARGE SUMMARY
Discharge Summary    CHIEF COMPLAINT ON ADMISSION  Chief Complaint   Patient presents with    Palpitations     Patient reports feeling like her heart is racing intermittently for a few days. Patient reports intermittent dizziness as well    Abdominal Pain     Patient states she has chronic abdominal pain with history of pancreatitis, but states the pain has been getting worse the last few days. Patient also reports a few episodes of diarrhea       Reason for Admission  abd pain     Admission Date  4/11/2023    CODE STATUS  Full Code    HPI & HOSPITAL COURSE  Rhiannon Marrero is a 37 y.o. female with history of alcohol dependence, chronic alcoholic pancreatitis, recently discharged home 3/26/2023 after having negative MRCP who presented 4/11/2023 with evaluation for intractable abdominal pain.   Patient with normal lab findings and RUQ US. Pain likely related to chronic pancreatitis which continues to be a chronic issue for patient given her numerous hospitalization related to pancreatitis. Patient treated with supportive care for pain management. Ultimately patient is discharged on short course of oral dilaudid for pain control. Patient has follow up with PCP, GI, and referral from PCP to pain management. She is advised to follow up with the above for continued management of her chronic pain and pancreatitis.      Therefore, she is discharged in fair and stable condition to home with close outpatient follow-up.    The patient met 2-midnight criteria for an inpatient stay at the time of discharge.    Discharge Date  4/15/2023    FOLLOW UP ITEMS POST DISCHARGE  Take medications as prescribed.  Follow up with PCP, GI, pain management.    DISCHARGE DIAGNOSES  Principal Problem:    Intractable abdominal pain POA: Yes  Active Problems:    Alcohol dependence (HCC) POA: Yes    Chronic pain syndrome POA: Yes    Seizure disorder (HCC) POA: Yes    Dehydration POA: Yes    Mood disorder (HCC) POA: Unknown    Alcohol-induced  chronic pancreatitis (HCC) POA: Yes    Intractable nausea and vomiting POA: Unknown  Resolved Problems:    * No resolved hospital problems. *      FOLLOW UP  No future appointments.  MALOU Harrington  [address]  [address]  [place] [id number] [phone]    Follow up in 1 week(s)        MEDICATIONS ON DISCHARGE     Medication List        START taking these medications        Instructions   HYDROmorphone 2 MG Tabs  Commonly known as: DILAUDID   Take 1 Tablet by mouth every 6 hours as needed for Severe Pain for up to 5 days.  Dose: 2 mg            CHANGE how you take these medications        Instructions   * ondansetron 4 MG Tbdp  What changed: Another medication with the same name was added. Make sure you understand how and when to take each.  Commonly known as: ZOFRAN ODT   Take 1 Tablet by mouth every four hours as needed for Nausea/Vomiting.  Dose: 4 mg     * ondansetron 4 MG Tbdp  What changed: You were already taking a medication with the same name, and this prescription was added. Make sure you understand how and when to take each.  Commonly known as: ZOFRAN ODT   Dissolve 1 Tablet by mouth every 8 hours as needed for Nausea/Vomiting.  Dose: 4 mg           * This list has 2 medication(s) that are the same as other medications prescribed for you. Read the directions carefully, and ask your doctor or other care provider to review them with you.                CONTINUE taking these medications        Instructions   divalproex 250 MG Tbec  Commonly known as: DEPAKOTE   Take 250 mg by mouth 2 times a day.  Dose: 250 mg     gabapentin 100 MG Caps  Commonly known as: NEURONTIN   Take 100-300 mg by mouth 2 times a day. 100 mg = AM   300 mg = PM  Dose: 100-300 mg     hydrOXYzine pamoate 50 MG Caps  Commonly known as: VISTARIL   Take 50 mg by mouth 3 times a day as needed for Anxiety.  Dose: 50 mg     omeprazole 20 MG delayed-release capsule  Commonly known as: PRILOSEC   Take 1 Capsule by mouth every day for 30  days.  Dose: 20 mg     pancrelipase (Lip-Prot-Amyl) 6000-29643 units Cpep  Commonly known as: CREON 6000   Take 1 Capsule by mouth 3 times a day with meals.  Dose: 1 Capsule     zonisamide 50 MG capsule  Commonly known as: ZONEGRAN   Take 150 mg by mouth at bedtime. 50 mg ( 3 capsules) = 150 mg  Dose: 150 mg              Allergies  Allergies   Allergen Reactions    Naltrexone Anxiety    Promethazine Hcl Anxiety     Anxiety and makes her feels like skin coming off        DIET  Orders Placed This Encounter   Procedures    Diet Order Diet: Full Liquid     Standing Status:   Standing     Number of Occurrences:   1     Order Specific Question:   Diet:     Answer:   Full Liquid [11]       ACTIVITY  As tolerated.  Weight bearing as tolerated    CONSULTATIONS  none    PROCEDURES  none    LABORATORY  Lab Results   Component Value Date    SODIUM 138 04/14/2023    POTASSIUM 4.4 04/14/2023    CHLORIDE 106 04/14/2023    CO2 24 04/14/2023    GLUCOSE 80 04/14/2023    BUN 3 (L) 04/14/2023    CREATININE 0.50 04/14/2023    CREATININE 0.7 06/10/2008        Lab Results   Component Value Date    WBC 6.9 04/12/2023    HEMOGLOBIN 13.3 04/12/2023    HEMATOCRIT 41.9 04/12/2023    PLATELETCT 283 04/12/2023        Total time of the discharge process exceeds 32 minutes.

## 2023-04-15 NOTE — PROGRESS NOTES
Patient is A&Ox4. Pain and nausea medication administered with effective results. Safety precaution in place.

## 2023-04-15 NOTE — CARE PLAN
The patient is Stable - Low risk of patient condition declining or worsening    Shift Goals  Clinical Goals: Pain management  Patient Goals: Rest  Family Goals: FANTASMA    Progress made toward(s) clinical / shift goals:    Problem: Pain - Standard  Goal: Alleviation of pain or a reduction in pain to the patient’s comfort goal  Outcome: Progressing     Problem: Fluid Volume  Goal: Fluid volume balance will be maintained  Outcome: Progressing     Prn pain medication administered per orders. Tolerated without issues.

## 2023-04-15 NOTE — PROGRESS NOTES
Pt called and c/o vomiting and palpitation.  VS checked.  -/66 100% on RA.  Pt just vomited about 50 ml emesis.  HR went down to 98.  Scheduled Zofran given.

## 2023-04-23 ENCOUNTER — HOSPITAL ENCOUNTER (EMERGENCY)
Facility: MEDICAL CENTER | Age: 38
End: 2023-04-23
Attending: EMERGENCY MEDICINE
Payer: MEDICAID

## 2023-04-23 VITALS
OXYGEN SATURATION: 98 % | HEIGHT: 67 IN | DIASTOLIC BLOOD PRESSURE: 52 MMHG | WEIGHT: 129.41 LBS | HEART RATE: 115 BPM | RESPIRATION RATE: 18 BRPM | SYSTOLIC BLOOD PRESSURE: 106 MMHG | TEMPERATURE: 98.7 F | BODY MASS INDEX: 20.31 KG/M2

## 2023-04-23 DIAGNOSIS — F43.21 SITUATIONAL DEPRESSION: ICD-10-CM

## 2023-04-23 DIAGNOSIS — R10.13 EPIGASTRIC ABDOMINAL PAIN: ICD-10-CM

## 2023-04-23 DIAGNOSIS — F10.920 ALCOHOLIC INTOXICATION WITHOUT COMPLICATION (HCC): ICD-10-CM

## 2023-04-23 LAB
ALBUMIN SERPL BCP-MCNC: 4.3 G/DL (ref 3.2–4.9)
ALBUMIN/GLOB SERPL: 1.4 G/DL
ALP SERPL-CCNC: 83 U/L (ref 30–99)
ALT SERPL-CCNC: 41 U/L (ref 2–50)
ANION GAP SERPL CALC-SCNC: 23 MMOL/L (ref 7–16)
AST SERPL-CCNC: 30 U/L (ref 12–45)
BASOPHILS # BLD AUTO: 0.5 % (ref 0–1.8)
BASOPHILS # BLD: 0.03 K/UL (ref 0–0.12)
BILIRUB SERPL-MCNC: 1 MG/DL (ref 0.1–1.5)
BUN SERPL-MCNC: 5 MG/DL (ref 8–22)
CALCIUM ALBUM COR SERPL-MCNC: 8.3 MG/DL (ref 8.5–10.5)
CALCIUM SERPL-MCNC: 8.5 MG/DL (ref 8.5–10.5)
CHLORIDE SERPL-SCNC: 106 MMOL/L (ref 96–112)
CO2 SERPL-SCNC: 18 MMOL/L (ref 20–33)
CREAT SERPL-MCNC: 0.86 MG/DL (ref 0.5–1.4)
EKG IMPRESSION: NORMAL
EOSINOPHIL # BLD AUTO: 0.02 K/UL (ref 0–0.51)
EOSINOPHIL NFR BLD: 0.3 % (ref 0–6.9)
ERYTHROCYTE [DISTWIDTH] IN BLOOD BY AUTOMATED COUNT: 46 FL (ref 35.9–50)
GFR SERPLBLD CREATININE-BSD FMLA CKD-EPI: 90 ML/MIN/1.73 M 2
GLOBULIN SER CALC-MCNC: 3 G/DL (ref 1.9–3.5)
GLUCOSE SERPL-MCNC: 86 MG/DL (ref 65–99)
HCT VFR BLD AUTO: 44.9 % (ref 37–47)
HGB BLD-MCNC: 14.8 G/DL (ref 12–16)
IMM GRANULOCYTES # BLD AUTO: 0.02 K/UL (ref 0–0.11)
IMM GRANULOCYTES NFR BLD AUTO: 0.3 % (ref 0–0.9)
LIPASE SERPL-CCNC: 16 U/L (ref 11–82)
LYMPHOCYTES # BLD AUTO: 3.94 K/UL (ref 1–4.8)
LYMPHOCYTES NFR BLD: 62.8 % (ref 22–41)
MCH RBC QN AUTO: 28.8 PG (ref 27–33)
MCHC RBC AUTO-ENTMCNC: 33 G/DL (ref 33.6–35)
MCV RBC AUTO: 87.4 FL (ref 81.4–97.8)
MONOCYTES # BLD AUTO: 0.09 K/UL (ref 0–0.85)
MONOCYTES NFR BLD AUTO: 1.4 % (ref 0–13.4)
NEUTROPHILS # BLD AUTO: 2.17 K/UL (ref 2–7.15)
NEUTROPHILS NFR BLD: 34.7 % (ref 44–72)
NRBC # BLD AUTO: 0 K/UL
NRBC BLD-RTO: 0 /100 WBC
PLATELET # BLD AUTO: 277 K/UL (ref 164–446)
PMV BLD AUTO: 10 FL (ref 9–12.9)
POC BREATHALIZER: 0.19 PERCENT (ref 0–0.01)
POTASSIUM SERPL-SCNC: 3.4 MMOL/L (ref 3.6–5.5)
PROT SERPL-MCNC: 7.3 G/DL (ref 6–8.2)
RBC # BLD AUTO: 5.14 M/UL (ref 4.2–5.4)
SODIUM SERPL-SCNC: 147 MMOL/L (ref 135–145)
WBC # BLD AUTO: 6.3 K/UL (ref 4.8–10.8)

## 2023-04-23 PROCEDURE — 96374 THER/PROPH/DIAG INJ IV PUSH: CPT

## 2023-04-23 PROCEDURE — 700111 HCHG RX REV CODE 636 W/ 250 OVERRIDE (IP): Performed by: EMERGENCY MEDICINE

## 2023-04-23 PROCEDURE — 93005 ELECTROCARDIOGRAM TRACING: CPT | Performed by: EMERGENCY MEDICINE

## 2023-04-23 PROCEDURE — 302970 POC BREATHALIZER

## 2023-04-23 PROCEDURE — 80053 COMPREHEN METABOLIC PANEL: CPT

## 2023-04-23 PROCEDURE — 700105 HCHG RX REV CODE 258: Performed by: EMERGENCY MEDICINE

## 2023-04-23 PROCEDURE — A9270 NON-COVERED ITEM OR SERVICE: HCPCS | Performed by: EMERGENCY MEDICINE

## 2023-04-23 PROCEDURE — 83690 ASSAY OF LIPASE: CPT

## 2023-04-23 PROCEDURE — 700102 HCHG RX REV CODE 250 W/ 637 OVERRIDE(OP): Performed by: EMERGENCY MEDICINE

## 2023-04-23 PROCEDURE — 90791 PSYCH DIAGNOSTIC EVALUATION: CPT

## 2023-04-23 PROCEDURE — 93005 ELECTROCARDIOGRAM TRACING: CPT

## 2023-04-23 PROCEDURE — 36415 COLL VENOUS BLD VENIPUNCTURE: CPT

## 2023-04-23 PROCEDURE — 99285 EMERGENCY DEPT VISIT HI MDM: CPT

## 2023-04-23 PROCEDURE — 85025 COMPLETE CBC W/AUTO DIFF WBC: CPT

## 2023-04-23 PROCEDURE — 302970 POC BREATHALIZER: Performed by: EMERGENCY MEDICINE

## 2023-04-23 RX ORDER — SODIUM CHLORIDE 9 MG/ML
2000 INJECTION, SOLUTION INTRAVENOUS ONCE
Status: COMPLETED | OUTPATIENT
Start: 2023-04-23 | End: 2023-04-23

## 2023-04-23 RX ORDER — SODIUM CHLORIDE 9 MG/ML
1000 INJECTION, SOLUTION INTRAVENOUS ONCE
Status: COMPLETED | OUTPATIENT
Start: 2023-04-23 | End: 2023-04-23

## 2023-04-23 RX ORDER — ONDANSETRON 2 MG/ML
4 INJECTION INTRAMUSCULAR; INTRAVENOUS ONCE
Status: COMPLETED | OUTPATIENT
Start: 2023-04-23 | End: 2023-04-23

## 2023-04-23 RX ADMIN — SODIUM CHLORIDE 1000 ML: 9 INJECTION, SOLUTION INTRAVENOUS at 16:04

## 2023-04-23 RX ADMIN — SODIUM CHLORIDE 2000 ML: 9 INJECTION, SOLUTION INTRAVENOUS at 13:39

## 2023-04-23 RX ADMIN — LIDOCAINE HYDROCHLORIDE 30 ML: 20 SOLUTION OROPHARYNGEAL at 16:03

## 2023-04-23 RX ADMIN — ONDANSETRON HYDROCHLORIDE 4 MG: 2 SOLUTION INTRAMUSCULAR; INTRAVENOUS at 13:40

## 2023-04-23 ASSESSMENT — FIBROSIS 4 INDEX: FIB4 SCORE: 0.37

## 2023-04-23 NOTE — ED TRIAGE NOTES
"Rhiannon Marrero  37 y.o.  Chief Complaint   Patient presents with    Epigastric Pain     Starting yesterday after heavy alcohol use  + concurrent N/V today  Last drink 1 hour ago  Endorses 12 + drinks in the past 24 hours  Describes pain as 7/10 burning  Hx. Alcoholic pancreatitis - recently discharged from this facility 4/15/2023 for the same    Suicidal Ideation     States \"I'm bipolar and I want to kill myself\"  Admits to taking a \"shit ton of depakote last night\" in addition to heavy drinking trying to kill herself     EKG completed prior to triage per protocol.    Ambulatory to triage with steady gait for above. A & O x 4, GCS 15.    States that she took 30 tablets of Depakote 250 mg PO at 2200 last night.    Complains of ringing in her ears happening \"for awhile.\"    Noted to be tachycardic - admits to being anxious in triage.    Charge LESLIE [place] notified of patient. Patient directly from triage to [facility].  "

## 2023-04-23 NOTE — CONSULTS
"[facility]   TRIAGE ASSESSMENT    Name: Rhiannon Marrero  MRN: [id number]  : 1985  Age: 37 y.o.  Date of assessment: 2023  PCP: MALOU Harrington  Persons in attendance: Patient  Patient Location: [facility]    CHIEF COMPLAINT/PRESENTING ISSUE (as stated by patient): Pt had six months sobriety following completion of the [facility] recovery program. She states she thinks about alcohol constantly and is struggling with sobriety. She relapsed last night and took an overdose of Depakote. Pt was admitted with a BAL of 0.188 and is now clinically sober and is able to contact for safety. Referral sent to [facility], [facility] signed. Dr. Mayen feels pt is safe to discharge.  Pt lists her two children as protective factors,  she is forward thinking and able to seek help when needed. Pt completed safety crisis plan, uploaded to chart.  Chief Complaint   Patient presents with    Epigastric Pain     Starting yesterday after heavy alcohol use  + concurrent N/V today  Last drink 1 hour ago  Endorses 12 + drinks in the past 24 hours  Describes pain as 7/10 burning  Hx. Alcoholic pancreatitis - recently discharged from this facility 4/15/2023 for the same    Suicidal Ideation     States \"I'm bipolar and I want to kill myself\"  Admits to taking a \"shit ton of depakote last night\" in addition to heavy drinking trying to kill herself        CURRENT LIVING SITUATION/SOCIAL SUPPORT/FINANCIAL RESOURCES: Lives in [place], unemployed. Has two children who are in the custody of her cousin voluntarily.     BEHAVIORAL HEALTH/SUBSTANCE USE TREATMENT HISTORY  Does patient/parent report a history of prior behavioral health/substance use treatment for patient?   Yes:    Dates Level of Care Facilty/Provider Diagnosis/Problem Medications   21, 20 inpatient [facility] Suicide attempt, bipolar d/o Ativan 1mg BID, Seroquel 100mg QHS                                    "                                   SAFETY ASSESSMENT - SELF  Does patient acknowledge current or past symptoms of dangerousness to self or is previous history noted? yes  Does parent/significant other report patient has current or past symptoms of dangerousness to self? N\A  Does presenting problem suggest symptoms of dangerousness to self? Yes:     Past Current    Suicidal Thoughts: [x]  [x]    Suicidal Plans: [x]  []    Suicidal Intent: [x]  [x]    Suicide Attempts: [x]  [x]    Self-Injury []  []      For any boxes checked above, provide detail: Suicide attempt by cutting herself 7/14/20, overdosed on Ativan 11/17/21. Presented here for overdose on Depakote and alcohol, per Dr. Mayen, pt is able to discharge today with outpatient follow up.    History of suicide by family member: no  History of suicide by friend/significant other: yes, friends  Recent change in frequency/specificity/intensity of suicidal thoughts or self-harm behavior? Yes, due to relapse  Current access to firearms, medications, or other identified means of suicide/self-harm? no  If yes, willing to restrict access to means of suicide/self-harm? yes  Protective factors present:  Fear of suicide, Positive coping skills, Strong family connections, and Willing to address in treatment    SAFETY ASSESSMENT - OTHERS  Does patient acknowledge current or past symptoms of aggressive behavior or risk to others or is previous history noted? no  Does parent/significant other report patient has current or past symptoms of aggressive behavior or risk to others?  N\A  Does presenting problem suggest symptoms of dangerousness to others? No    LEGAL HISTORY  Does patient acknowledge history of arrest/long term/FDC or is previous history noted? no    Crisis Safety Plan completed and copy given to patient? yes    ABUSE/NEGLECT SCREENING  Does patient report feeling “unsafe” in his/her home, or afraid of anyone?  no  Does patient report any history of physical,  "sexual, or emotional abuse?  no  Does parent or significant other report any of the above? N\A  Is there evidence of neglect by self?  no  Is there evidence of neglect by a caregiver? no  Does the patient/parent report any history of CPS/APS/police involvement related to suspected abuse/neglect or domestic violence? no  Based on the information provided during the current assessment, is a mandated report of suspected abuse/neglect being made?  No    SUBSTANCE USE SCREENING  Yes:  Guilherme all substances used in the past 30 days:      Last Use Amount   [x]   Alcohol 4/23/23 12 drinks   []   Marijuana     []   Heroin     []   Prescription Opioids  (used without prescription, for    recreation, or in excess of prescribed amount)     []   Other Prescription  (used without prescription, for    recreation, or in excess of prescribed amount)     []   Cocaine      []   Methamphetamine     []   \"\" drugs (ectasy, MDMA)     []   Other substances        UDS results: not done  Breathalyzer results: 0.188 at time of admission    What consequences does the patient associate with any of the above substance use and or addictive behaviors? Family problems: Placed children in custody of her cousin, Health problems: chronic pancreatitis    Risk factors for detox (check all that apply):  [x]  Seizures   [x]  Diaphoretic (sweating)   [x]  Tremors   [x]  Hallucinations   [x]  Increased blood pressure   []  Decreased blood pressure   [x]  Other   []  None      [] Patient education on risk factors for detoxification and instructed to return to ER as needed.      MENTAL STATUS   Participation: Active verbal participation, Limited verbal participation, Attentive, Engaged, and Open to feedback  Grooming: Disheveled  Orientation: Alert and Fully Oriented  Behavior: Calm  Eye contact: Good  Mood: Depressed  Affect: Tearful  Thought process: Logical and Goal-directed  Thought content: Within normal limits  Speech: Rate within normal limits " and Volume within normal limits  Perception: Within normal limits  Memory:  No gross evidence of memory deficits  Insight: Good  Judgment:  Adequate  Other:    Collateral information:    Source:  [] Significant other present in person:   [] Significant other by telephone  [] [facility]   [] [facility]  [] [facility]  [] Other:     [] Unable to complete full assessment due to:  [] Acute intoxication  [] Patient declined to participate/engage  [] Patient verbally unresponsive  [] Significant cognitive deficits  [] Significant perceptual distortions or behavioral disorganization  [] Other:      CLINICAL IMPRESSIONS:  Primary: suicidal ideation  Secondary:    alcohol use disorder, bipolar affective disorder       IDENTIFIED NEEDS/PLAN:  [Trigger DISPOSITION list for any items marked]    []  Imminent safety risk - self [] Imminent safety risk - others   []  Acute substance withdrawal []  Psychosis/Impaired reality testing   [x]  Mood/anxiety [x]  Substance use/Addictive behavior   []  Maladaptive behaviro []  Parent/child conflict   []  Family/Couples conflict []  Biomedical   []  Housing []  Financial   []   Legal  Occupational/Educational   []  Domestic violence []  Other:     Recommended Plan of Care:  Refer to [facility]  *[facility] may not be utilized for moderate or high risk patients    Has the Recommended Plan of Care/Level of Observation been reviewed with the patient's assigned nurse? yes    Does patient/parent or guardian express agreement with the above plan? no    Referral appointment(s) scheduled? Office is closed, referral sent    Alert team only:   I have discussed findings and recommendations with Dr. Mayen who is in agreement with these recommendations.     Referral information sent to the following outpatient community providers :    Referral information sent to the following inpatient community providers : [facility]  Counseling    If applicable : Referred  to  Alert Team for legal hold follow up at (time): discontinued per Dr. Reyes, RAjitN.  4/23/2023

## 2023-04-23 NOTE — ED PROVIDER NOTES
Name:  Benjie Mcgovern   Age:  55 y.o.  Sex:  male  :  1964  MRN:  [id number]   Visit Number:  [id number]  Primary Care Physician:  Evie Martin MD  Date of Discharge:  3/5/2020  Admission Date:  3/3/2020  9:30 AM      Discharge Diagnosis:       Patient Active Problem List   Diagnosis   • Paroxysmal atrial fibrillation (CMS/HCC)   • Benign essential hypertension   • Gastroesophageal reflux disease with esophagitis   • Knee pain   • Impotence of organic origin   • Multiple-type hyperlipidemia   • Spasm   • Muscle pain   • Hand joint pain   • Pain in extremity   • Vitamin D deficiency   • Colon cancer screening   • Loose body in knee, left knee   • Primary osteoarthritis of left knee   • Chronic pain of left knee   • Status post total knee replacement using cement, left       Presenting Problem/History of Present Illness:    Primary osteoarthritis of right knee [M17.11]  Chronic pain of right knee [M25.561, G89.29]  Primary osteoarthritis of left knee [M17.12]         Consults:     Consults     Date and Time Order Name Status Description    3/3/2020 1530 Inpatient Consult to Hospitalist Completed           Procedures Performed:    Procedure(s):  total knee arthroplasty, Left         History of presenting illness:  Patient is status post day #2 elective left total knee arthroplasty.  Patient does have a past medical history for bilateral knee osteoarthritis, high blood pressure and atrial fibrillation.  He was admitted on 3/3/2020 for a planned left total knee arthroplasty.  Patient tolerated the surgical procedure well.  Patient did have an episode where his heart rate was noted to be 180 bpm.  Patient was also experiencing left calf cramping and swelling.  An ultrasound was ordered which revealed a partial thrombosis in the posterior tibial vein.  He also had a CT PE protocol which was negative for PE.  He had a thorough cardiac work-up.  He was noted to have an 8 beat run of V. tach early this morning.  A  "ED Provider Note    CHIEF COMPLAINT  Chief Complaint   Patient presents with    Epigastric Pain     Starting yesterday after heavy alcohol use  + concurrent N/V today  Last drink 1 hour ago  Endorses 12 + drinks in the past 24 hours  Describes pain as 7/10 burning  Hx. Alcoholic pancreatitis - recently discharged from this facility 4/15/2023 for the same    Suicidal Ideation     States \"I'm bipolar and I want to kill myself\"  Admits to taking a \"shit ton of depakote last night\" in addition to heavy drinking trying to kill herself       EXTERNAL RECORDS REVIEWED  Inpatient Notes discharge summary 4/15/2023, admitted for abdominal pain,    HPI/ROS  LIMITATION TO HISTORY   Select: Intoxication    Rhiannon Marrero is a 37 y.o. female who presents for evaluation of upper abdominal pain and alcohol intoxication.  States that she has chronic pancreatitis secondary to alcohol abuse but has been sober for the past 6 months, she relapsed last night and drank a lot of alcohol.  Initially reporting some suicidal feelings but to me she admits that she would never kill herself because she has 2 children, she reports that she does have chronic depression and bipolar disorder.  The patient has been vomiting.  She has not had diarrhea.  No fever.  No chest pain or shortness of breath and no other complaints    PAST MEDICAL HISTORY   has a past medical history of Acute pancreatitis without infection or necrosis (07/20/2022), Alcohol dependence ([facility]) (04/13/2017), Bronchitis (08/2012), Cold, Current moderate episode of major depressive disorder without prior episode ([facility]) (01/31/2020), Heart burn, Hernia of unspecified site of abdominal cavity without mention of obstruction or gangrene, High anion gap metabolic acidosis (07/01/2022), Indigestion, Pancreatitis, Pneumonia (2011), and Seizure disorder ([facility]) (05/23/2022).    SURGICAL HISTORY   has a past surgical history that includes other orthopedic surgery; gyn surgery; tonsillectomy " "(4/11/2013); arthroscopy, knee; hysterectomy robotic xi (10/11/2018); salpingectomy (Bilateral, 10/11/2018); orif, wrist (Right, 4/24/2019); upper gi endoscopy,diagnosis (N/A, 12/23/2022); inject nerv blck,celiac plexus (N/A, 12/23/2022); and egd w/endoscopic ultrasound (N/A, 12/23/2022).    FAMILY HISTORY  Family History   Problem Relation Age of Onset    Psychiatric Illness Mother     Stroke Mother     Arthritis Mother     Heart Disease Maternal Grandfather     Cancer Neg Hx     Diabetes Neg Hx     Hypertension Neg Hx        SOCIAL HISTORY  Social History     Tobacco Use    Smoking status: Every Day     Packs/day: 0.25     Years: 10.00     Pack years: 2.50     Types: Cigarettes    Smokeless tobacco: Never   Vaping Use    Vaping Use: Every day    Substances: Nicotine   Substance and Sexual Activity    Alcohol use: Yes     Comment: 12+ drinks in past 24 hours    Drug use: Yes     Types: Inhaled     Comment: weed for pain    Sexual activity: Not on file       CURRENT MEDICATIONS  Home Medications       Reviewed by Gissell Willoughby R.N. (Registered Nurse) on 04/23/23 at 1229  Med List Status: Partial     Medication Last Dose Status   divalproex (DEPAKOTE) 250 MG Tablet Delayed Response  Active   gabapentin (NEURONTIN) 100 MG Cap  Active   hydrOXYzine pamoate (VISTARIL) 50 MG Cap  Active   omeprazole (PRILOSEC) 20 MG delayed-release capsule  Active   ondansetron (ZOFRAN ODT) 4 MG TABLET DISPERSIBLE  Active   ondansetron (ZOFRAN ODT) 4 MG TABLET DISPERSIBLE  Active   pancrelipase, Lip-Prot-Amyl, (CREON 6000) 6000-87160 units Cap DR Particles  Active   zonisamide (ZONEGRAN) 50 MG capsule  Active                    ALLERGIES  Allergies   Allergen Reactions    Naltrexone Anxiety    Promethazine Hcl Anxiety     Anxiety and makes her feels like skin coming off        PHYSICAL EXAM  VITAL SIGNS: /77   Pulse (!) 113   Temp 37.6 °C (99.6 °F) (Temporal)   Resp (!) 22   Ht 1.702 m (5' 7\")   Wt 58.7 kg (129 lb 6.6 oz)   " 2D echo was obtained and interpreted by Dr. Bridges.    After seeing the patient this afternoon, he states he is feeling great.  He has little to no pain.  He currently denies chest pain, shortness of breath, dizziness, palpitations or abdominal pain    Vital Signs:    Temp:  [97.6 °F (36.4 °C)-99.8 °F (37.7 °C)] 98.1 °F (36.7 °C)  Heart Rate:  [82-91] 85  Resp:  [16-18] 18  BP: (112-135)/(67-77) 135/68    Physical Exam:    General Appearance alert, appears stated age and cooperative  Head atraumatic  Eyes lids and lashes normal  Ears ears appear intact with no abnormalities noted  Nose no drainage  Throat oral mucosa moist  Lungs respirations regular, respirations even and respirations unlabored  Heart regular rhythm & normal rate  Abdomen soft non-tender  Extremities no redness and Rebekah's sign negative  Pulses Pulses palpable and equal bilaterally  Skin turgor normal  Neurologic Mental Status orientated to person, place, time and situation      Pertinent Lab Results:     Lab Results (all)     Procedure Component Value Units Date/Time    Troponin [[id number]] Collected:  03/05/20 1234    Specimen:  Blood Updated:  03/05/20 1246    Magnesium [[id number]]  (Normal) Collected:  03/05/20 0541    Specimen:  Blood Updated:  03/05/20 0748     Magnesium 2.2 mg/dL     Basic Metabolic Panel [[id number]]  (Abnormal) Collected:  03/05/20 0541    Specimen:  Blood Updated:  03/05/20 0748     Glucose 118 mg/dL      BUN 22 mg/dL      Creatinine 0.88 mg/dL      Sodium 137 mmol/L      Potassium 4.2 mmol/L      Chloride 101 mmol/L      CO2 24.9 mmol/L      Calcium 8.7 mg/dL      eGFR Non African Amer 91 mL/min/1.73      BUN/Creatinine Ratio 25.0     Anion Gap 11.1 mmol/L     Narrative:       GFR Normal >60  Chronic Kidney Disease <60  Kidney Failure <15      CBC & Differential [[id number]] Collected:  03/05/20 0541    Specimen:  Blood Updated:  03/05/20 0633    Narrative:       The following orders were created for panel order CBC &  Differential.  Procedure                               Abnormality         Status                     ---------                               -----------         ------                     CBC Auto Differential[[id number]]        Abnormal            Final result                 Please view results for these tests on the individual orders.    CBC Auto Differential [[id number]]  (Abnormal) Collected:  03/05/20 0541    Specimen:  Blood Updated:  03/05/20 0633     WBC 13.91 10*3/mm3      RBC 4.34 10*6/mm3      Hemoglobin 13.3 g/dL      Hematocrit 39.6 %      MCV 91.2 fL      MCH 30.6 pg      MCHC 33.6 g/dL      RDW 12.7 %      RDW-SD 42.2 fl      MPV 11.3 fL      Platelets 229 10*3/mm3      Neutrophil % 68.8 %      Lymphocyte % 15.4 %      Monocyte % 15.0 %      Eosinophil % 0.1 %      Basophil % 0.3 %      Immature Grans % 0.4 %      Neutrophils, Absolute 9.58 10*3/mm3      Lymphocytes, Absolute 2.14 10*3/mm3      Monocytes, Absolute 2.08 10*3/mm3      Eosinophils, Absolute 0.02 10*3/mm3      Basophils, Absolute 0.04 10*3/mm3      Immature Grans, Absolute 0.05 10*3/mm3      nRBC 0.0 /100 WBC     Troponin [[id number]]  (Normal) Collected:  03/04/20 1023    Specimen:  Blood Updated:  03/04/20 1137     Troponin T <0.010 ng/mL     Narrative:       Troponin T Reference Range:  <= 0.03 ng/mL-   Negative for AMI  >0.03 ng/mL-     Abnormal for myocardial necrosis.  Clinicians would have to utilize clinical acumen, EKG, Troponin and serial changes to determine if it is an Acute Myocardial Infarction or myocardial injury due to an underlying chronic condition.       Results may be falsely decreased if patient taking Biotin.      Basic Metabolic Panel [[id number]]  (Abnormal) Collected:  03/04/20 0546    Specimen:  Blood Updated:  03/04/20 0642     Glucose 136 mg/dL      BUN 16 mg/dL      Creatinine 0.70 mg/dL      Sodium 136 mmol/L      Potassium 4.2 mmol/L      Chloride 103 mmol/L      CO2 21.0 mmol/L      Calcium 8.7 mg/dL       LMP 10/30/2018   SpO2 94%   BMI 20.27 kg/m²    Constitutional: Well appearing patient in no acute distress.  Awake and alert, not toxic nor ill in appearance.  Tearful.  HENT: Dry mucous membranes  Eyes: No scleral icterus. Normal conjunctiva   Thorax & Lungs: Normal nonlabored respirations. I appreciate no wheezing, rhonchi or rales. There is normal air movement.  Upon cardiac ascultation I appreciate a regular heart rhythm and a tachycardic rate  Abdomen: Epigastric tenderness with no rebound or guarding.  The rest of the abdomen is soft and nontender.  Skin: The exposed portions of skin reveal no obvious rash or other abnormalities.  Extremities/Musculoskeletal: No obvious sign of acute trauma. No asymmetric calf tenderness or edema.   Neurologic: Alert & oriented. No focal deficits observed.      DIAGNOSTIC STUDIES / PROCEDURES    LABS  Results for orders placed or performed during the hospital encounter of 04/23/23   CBC with Differential   Result Value Ref Range    WBC 6.3 4.8 - 10.8 K/uL    RBC 5.14 4.20 - 5.40 M/uL    Hemoglobin 14.8 12.0 - 16.0 g/dL    Hematocrit 44.9 37.0 - 47.0 %    MCV 87.4 81.4 - 97.8 fL    MCH 28.8 27.0 - 33.0 pg    MCHC 33.0 (L) 33.6 - 35.0 g/dL    RDW 46.0 35.9 - 50.0 fL    Platelet Count 277 164 - 446 K/uL    MPV 10.0 9.0 - 12.9 fL    Neutrophils-Polys 34.70 (L) 44.00 - 72.00 %    Lymphocytes 62.80 (H) 22.00 - 41.00 %    Monocytes 1.40 0.00 - 13.40 %    Eosinophils 0.30 0.00 - 6.90 %    Basophils 0.50 0.00 - 1.80 %    Immature Granulocytes 0.30 0.00 - 0.90 %    Nucleated RBC 0.00 /100 WBC    Neutrophils (Absolute) 2.17 2.00 - 7.15 K/uL    Lymphs (Absolute) 3.94 1.00 - 4.80 K/uL    Monos (Absolute) 0.09 0.00 - 0.85 K/uL    Eos (Absolute) 0.02 0.00 - 0.51 K/uL    Baso (Absolute) 0.03 0.00 - 0.12 K/uL    Immature Granulocytes (abs) 0.02 0.00 - 0.11 K/uL    NRBC (Absolute) 0.00 K/uL   Complete Metabolic Panel   Result Value Ref Range    Sodium 147 (H) 135 - 145 mmol/L    Potassium 3.4  (L) 3.6 - 5.5 mmol/L    Chloride 106 96 - 112 mmol/L    Co2 18 (L) 20 - 33 mmol/L    Anion Gap 23.0 (H) 7.0 - 16.0    Glucose 86 65 - 99 mg/dL    Bun 5 (L) 8 - 22 mg/dL    Creatinine 0.86 0.50 - 1.40 mg/dL    Calcium 8.5 8.5 - 10.5 mg/dL    AST(SGOT) 30 12 - 45 U/L    ALT(SGPT) 41 2 - 50 U/L    Alkaline Phosphatase 83 30 - 99 U/L    Total Bilirubin 1.0 0.1 - 1.5 mg/dL    Albumin 4.3 3.2 - 4.9 g/dL    Total Protein 7.3 6.0 - 8.2 g/dL    Globulin 3.0 1.9 - 3.5 g/dL    A-G Ratio 1.4 g/dL   Lipase   Result Value Ref Range    Lipase 16 11 - 82 U/L   ESTIMATED GFR   Result Value Ref Range    GFR (CKD-EPI) 90 >60 mL/min/1.73 m 2   CORRECTED CALCIUM   Result Value Ref Range    Correct Calcium 8.3 (L) 8.5 - 10.5 mg/dL   POC BREATHALIZER   Result Value Ref Range    POC Breathalizer 0.188 (A) 0.00 - 0.01 Percent   EKG (NOW)   Result Value Ref Range    Report       [facility]    Test Date:  2023  Pt Name:    JAKE LOPEZ                    Department: ER  MRN:        [id number]                      Room:       [facility]  Gender:     Female                       Technician: [id number]  :        1985                   Requested By:ER TRIAGE PROTOCOL  Order #:    [id number]                    Reading MD: BRETT ANDRE MD    Measurements  Intervals                                Axis  Rate:       142                          P:          76  LA:         102                          QRS:        78  QRSD:       86                           T:          25  QT:         299  QTc:        460    Interpretive Statements  12 Lead EKG interpreted by me to show: -- Rate 142 -- Rhythm: Normal sinus  rhythm -- Axis: Normal -- LA and QRS Intervals: Normal -- T waves: No acute  changes -- ST segments: No acute changes -- Ectopy: None. My impression of  this  EKG: Does not indicate acute ischemia at this time.  Electronically Si gned On 2023 15:42:21 PDT by BRETT ANDRE MD          COURSE & MEDICAL  eGFR Non African Amer 118 mL/min/1.73      BUN/Creatinine Ratio 22.9     Anion Gap 12.0 mmol/L     Narrative:       GFR Normal >60  Chronic Kidney Disease <60  Kidney Failure <15      CBC & Differential [[id number]] Collected:  03/04/20 0546    Specimen:  Blood Updated:  03/04/20 0616    Narrative:       The following orders were created for panel order CBC & Differential.  Procedure                               Abnormality         Status                     ---------                               -----------         ------                     CBC Auto Differential[[id number]]        Abnormal            Final result                 Please view results for these tests on the individual orders.    CBC Auto Differential [[id number]]  (Abnormal) Collected:  03/04/20 0546    Specimen:  Blood Updated:  03/04/20 0616     WBC 18.61 10*3/mm3      RBC 4.82 10*6/mm3      Hemoglobin 14.7 g/dL      Hematocrit 43.5 %      MCV 90.2 fL      MCH 30.5 pg      MCHC 33.8 g/dL      RDW 12.2 %      RDW-SD 40.2 fl      MPV 11.0 fL      Platelets 261 10*3/mm3      Neutrophil % 83.0 %      Lymphocyte % 8.5 %      Monocyte % 8.0 %      Eosinophil % 0.0 %      Basophil % 0.1 %      Immature Grans % 0.4 %      Neutrophils, Absolute 15.44 10*3/mm3      Lymphocytes, Absolute 1.59 10*3/mm3      Monocytes, Absolute 1.49 10*3/mm3      Eosinophils, Absolute 0.00 10*3/mm3      Basophils, Absolute 0.02 10*3/mm3      Immature Grans, Absolute 0.07 10*3/mm3      nRBC 0.0 /100 WBC     Tissue Pathology Exam [[id number]] Collected:  03/03/20 1148    Specimen:  Bone from Knee, Left Updated:  03/03/20 1421          Pertinent Radiology Results:    Imaging Results (All)     Procedure Component Value Units Date/Time    CT Chest Pulmonary Embolism [[id number]] Collected:  03/04/20 1625     Updated:  03/04/20 1628    Narrative:       PROCEDURE: CT CHEST PULMONARY EMBOLISM-     HISTORY: Dyspnea, cardiac origin suspected; Z74.09-Other reduced  mobility;  DECISION MAKING    ED Observation Status? No; Patient does not meet criteria for ED Observation.     INITIAL ASSESSMENT, COURSE AND PLAN  Care Narrative: 37-year-old female, alcoholic, epigastric abdominal pain for the past 12 hours or so after relapsing with her alcohol abuse.  She has a history of alcoholic pancreatitis.  Tender on exam.  She is tachycardic and appears dehydrated.  Blood work will be obtained.  She will be treated with IV fluids.  She will be reevaluated    Evaluation here is reassuring with normal LFTs, bilirubin and lipase.  Normal hemoglobin and WBC.  Normal renal function.  Minimal electrolyte abnormalities and I suspect is secondary to dehydration.  The patient has sobered and upon reevaluation she is not suicidal.  She reports that she has 2 kids that are 14 and 16 years old and she has to live for them.  She states she will not hurt herself.  She is refusing evaluation by the peer recovery support team.  She is receptive to outpatient psychiatric referrals being provided and the alert team has been contacted.  The patient has received 3 L of IV fluids and her vital signs are improving significantly and she is feeling better.  At this time, the patient is stable and appropriate for discharge.  She is shemar for safety.  She will continue her attempts at outpatient sobriety      DISPOSITION AND DISCUSSIONS    Escalation of care considered, and ultimately not performed:acute inpatient care management, however at this time, the patient is most appropriate for outpatient management    Decision tools and prescription drugs considered including, but not limited to:  I considered prescribing Librium although the patient has not had any alcohol in 6 months, it seems very unlikely that she will experience any withdrawal from the single relapse .    FINAL DIAGNOSIS  1. Epigastric abdominal pain    2. Alcoholic intoxication without complication (HCC)    3. Situational depression        M17.12-Unilateral primary osteoarthritis, left knee;  M25.562-Pain in left knee; G89.29-Other chronic pain     TECHNIQUE: Thin section axial CT with IV contrast supplemented with 3D  reconstructed MIP images.     FINDINGS:     Pulmonary vessels enhance in a normal fashion without evidence of  embolic disease. Thoracic aorta is normal in caliber without evidence of  aneurysm or dissection. The left vertebral artery arises directly from  the aortic arch as a normal variant.     Minimal scattered atelectasis is present. There is no evidence of edema  or pneumonia.  No pleural or pericardial effusion is seen. No adenopathy  or mass lesion is present.       Impression:       1. No evidence of pulmonary embolism.           This study was performed with techniques to keep radiation doses as low  as reasonably achievable (ALARA). Individualized dose reduction  techniques using automated exposure control or adjustment of vA and/or  kV according to the patient size were employed.      This report was finalized on 3/4/2020 4:26 PM by Anuel Chapa MD.    US Venous Doppler Lower Extremity Left (duplex) [[id number]] Collected:  03/04/20 1451     Updated:  03/04/20 1454    Narrative:       PROCEDURE: US VENOUS DOPPLER LOWER EXTREMITY LEFT (DUPLEX)-     HISTORY: left calf pain and swelling s/p left tka; Z74.09-Other reduced  mobility; M17.12-Unilateral primary osteoarthritis, left knee;  M25.562-Pain in left knee; G89.29-Other chronic pain     Multiple transverse and longitudinal scans were performed of the  femoropopliteal deep venous system, with augmentation and compression  maneuvers.     FINDINGS: The femoral and popliteal veins are patent. Normal flow is  noted by Doppler exam.     Assessment of the calf vein shows partial thrombosis of the posterior  tibial vein. Anterior tibial and peroneal veins are patent by Doppler  exam.       Impression:       Isolated calf vein thrombosis. Consider follow-up duplex  examination 5-7      Electronically signed by: Hieu Mayen M.D., 4/23/2023 2:09 PM       days to assess for potential propagation     This report was finalized on 3/4/2020 2:52 PM by Anuel Chapa MD.    XR Knee 1 or 2 View Left [[id number]] Collected:  03/03/20 1447     Updated:  03/03/20 1450    Narrative:       PROCEDURE: XR KNEE 1 OR 2 VW LEFT-     HISTORY: Post-op L TKR; M17.12-Unilateral primary osteoarthritis, left  knee; M25.562-Pain in left knee; G89.29-Other chronic pain     COMPARISON: 01/03/2020     FINDINGS:     Two views left knee show surgical changes of arthroplasty. Hardware has  an unremarkable appearance. No fracture is present.     CONCLUSION: Surgical changes of left knee arthroplasty.     This report was finalized on 3/3/2020 2:47 PM by Anuel Chapa MD.          Condition on Discharge:      stable      Discharge Disposition:    Home-Health Care [facility]    Discharge Medication:       Discharge Medications      New Medications      Instructions Start Date   apixaban 5 MG tablet tablet  Commonly known as:  ELIQUIS   5 mg, Oral, Every 12 Hours Scheduled      docusate sodium 100 MG capsule   100 mg, Oral, 2 Times Daily PRN      HYDROcodone-acetaminophen 7.5-325 MG per tablet  Commonly known as:  NORCO   1 tablet, Oral, Every 6 Hours PRN      metoprolol tartrate 25 MG tablet  Commonly known as:  LOPRESSOR   12.5 mg, Oral, 2 Times Daily         Changes to Medications      Instructions Start Date   losartan 50 MG tablet  Commonly known as:  COZAAR  What changed:  how much to take   25 mg, Oral, Daily         Continue These Medications      Instructions Start Date   dilTIAZem  MG 24 hr capsule  Commonly known as:  CARDIZEM CD   120 mg, Oral, Nightly         Stop These Medications    acetaminophen 500 MG tablet  Commonly known as:  TYLENOL     aspirin 81 MG tablet            Discharge Diet:     Diet Instructions     Advance Diet as Tolerated            Activity at Discharge:     Activity Instructions     Change Dressing      Leave the initial postop bandage intact until 3/10/2020.  Once  you remove the initial tan bandage please discard this.  Please keep the stapled skin covered, clean and dry until your follow-up appointment.  You may change a dressing daily after 3/10/2020    Discharge Activity      Patient May Shower; No Tub Baths, Pools or Hot Tubs      May change dressing routinely, keep a clean dry dressing in place.    Weight Bearing As Tolerated      Protected weight bearing as tolerated, with cane, crutches or walker as appropriate for condition and safety.          Follow-up Appointments:    Future Appointments   Date Time Provider Department Center   3/16/2020  2:45 PM Dean Garcia MD [facility] None   5/14/2020  8:00 AM Evie Martin MD [facility] None     Additional Instructions for the Follow-ups that You Need to Schedule     Ambulatory Referral to Home Health   As directed      Face to Face Visit Date:  3/5/2020    Follow-up provider for Plan of Care?:  I will be treating the patient on an ongoing basis.  Please send me the Plan of Care for signature.    Follow-up provider:  DEAN GARCIA [[id number]]    Reason/Clinical Findings:  s/p left tka    Describe mobility limitations that make leaving home difficult:  decreased mobility    Nursing/Therapeutic Services Requested:  Physical Therapy    PT orders:  Total joint pathway Therapeutic exercise Gait Training Transfer training Strengthening    Weight Bearing Status:  As Tolerated    Frequency:  1 Week 1         Apply Ice to Affected Extremity Every 2 Hours   As directed      Only 2 hours maximum at a time, only three to four times every 24 hours.    Order Comments:  Only 2 hours maximum at a time, only three to four times every 24 hours.          Discharge Follow-up with PCP   As directed       Currently Documented PCP:    Evie Martin MD    PCP Phone Number:    [phone]     Follow Up Details:  with patient primary care physician in 1 -3 months as appropriate.         Discharge Follow-up with Specialty: Dr. Bridges  Cardiology   As directed      Specialty:  Dr. Bridges Cardiology    Follow Up Details:  3-9-20 Monday at 1:45 pm         Discharge Follow-up with Specialty: cardiology; 1 Week   As directed      Specialty:  cardiology    Follow Up:  1 Week    Follow Up Details:  please assist patient in obtaining a follow up appt. with Dr. Bridges         Discharge Follow-up with Specified Provider: Horacio Garcia MD   As directed      To:  Horacio Garcia MD    Follow Up Details:  at scheduled appointment - check on EPIC patient appointments for date and time.         Elevate Affected Extremity At or Above Level of Heart   As directed      Keep above level of heart at periodic intervals to reduce swelling.    Order Comments:  Keep above level of heart at periodic intervals to reduce swelling.          US Venous Doppler Lower Extremity Left (duplex)   Mar 18, 2020      Patient needs this US done before his appt. with Orthopedics.  Please compare to Ultrasound 3-4-20    Exam reason:  LLE post. tibial vein DVT.               Test Results Pending at Discharge:     Order Current Status    Tissue Pathology Exam In process        Patient request the repeat venous Doppler ultrasound be performed the morning before his initial postop appointment.    We will place patient on Eliquis 5 mg twice daily as suggested per cardiology.  The typical DVT prophylaxis for knee replacement is 12 days.     Cardiology requested the addition of a beta-blocker.  They will decrease his Cozaar tablet at night.  This will reflect in the patient's med rec summary.    Patient has an appt with Dr. Bridges 3-9-20 Monday at 1:45     Cecil Tellez PA-C  03/05/20  1:09 PM    Time: Discharge 30 min

## 2023-04-23 NOTE — ED NOTES
"Report received. Pt remains tearful. Repeats \"its just so hard, I don't want to do It anymore\" hx depression, bipolar. Pt admits to alcohol abuse and taking Depakote last night. Attempt to place IV x 3 unable to advance catheter. Additional rn requested for iv placement.   Placed on legal hold sitter at bedside. Room cleared of items monitoring cord remain in room. Clothing removed and placed in bag x 3.   "

## 2023-04-23 NOTE — ED NOTES
Behavioral health to bedside. Pt provided with follow up resources. Belongings returned to pt. Pt to discharge home to self. Denies si or current plan. Understand to return as needed .

## 2023-04-24 ENCOUNTER — HOSPITAL ENCOUNTER (EMERGENCY)
Facility: MEDICAL CENTER | Age: 38
End: 2023-04-24
Attending: EMERGENCY MEDICINE
Payer: MEDICAID

## 2023-04-24 VITALS
HEART RATE: 95 BPM | TEMPERATURE: 98.1 F | RESPIRATION RATE: 16 BRPM | BODY MASS INDEX: 20.2 KG/M2 | SYSTOLIC BLOOD PRESSURE: 115 MMHG | DIASTOLIC BLOOD PRESSURE: 76 MMHG | WEIGHT: 129 LBS | OXYGEN SATURATION: 98 %

## 2023-04-24 DIAGNOSIS — K86.1 ACUTE ON CHRONIC PANCREATITIS (HCC): ICD-10-CM

## 2023-04-24 DIAGNOSIS — R10.9 CHRONIC ABDOMINAL PAIN: ICD-10-CM

## 2023-04-24 DIAGNOSIS — K85.90 ACUTE ON CHRONIC PANCREATITIS (HCC): ICD-10-CM

## 2023-04-24 DIAGNOSIS — F10.929 ACUTE ALCOHOLIC INTOXICATION WITH COMPLICATION (HCC): ICD-10-CM

## 2023-04-24 DIAGNOSIS — G89.29 CHRONIC ABDOMINAL PAIN: ICD-10-CM

## 2023-04-24 LAB
ALBUMIN SERPL BCP-MCNC: 4.2 G/DL (ref 3.2–4.9)
ALBUMIN/GLOB SERPL: 1.4 G/DL
ALP SERPL-CCNC: 76 U/L (ref 30–99)
ALT SERPL-CCNC: 34 U/L (ref 2–50)
ANION GAP SERPL CALC-SCNC: 19 MMOL/L (ref 7–16)
AST SERPL-CCNC: 24 U/L (ref 12–45)
BILIRUB SERPL-MCNC: 0.9 MG/DL (ref 0.1–1.5)
BUN SERPL-MCNC: 6 MG/DL (ref 8–22)
CALCIUM ALBUM COR SERPL-MCNC: 8.2 MG/DL (ref 8.5–10.5)
CALCIUM SERPL-MCNC: 8.4 MG/DL (ref 8.5–10.5)
CHLORIDE SERPL-SCNC: 110 MMOL/L (ref 96–112)
CO2 SERPL-SCNC: 20 MMOL/L (ref 20–33)
CREAT SERPL-MCNC: 0.68 MG/DL (ref 0.5–1.4)
ERYTHROCYTE [DISTWIDTH] IN BLOOD BY AUTOMATED COUNT: 46.9 FL (ref 35.9–50)
ETHANOL BLD-MCNC: 288.9 MG/DL
GFR SERPLBLD CREATININE-BSD FMLA CKD-EPI: 116 ML/MIN/1.73 M 2
GLOBULIN SER CALC-MCNC: 2.9 G/DL (ref 1.9–3.5)
GLUCOSE SERPL-MCNC: 91 MG/DL (ref 65–99)
HCG SERPL QL: NEGATIVE
HCT VFR BLD AUTO: 40.4 % (ref 37–47)
HGB BLD-MCNC: 13.5 G/DL (ref 12–16)
LIPASE SERPL-CCNC: 14 U/L (ref 11–82)
MCH RBC QN AUTO: 29.1 PG (ref 27–33)
MCHC RBC AUTO-ENTMCNC: 33.4 G/DL (ref 33.6–35)
MCV RBC AUTO: 87.1 FL (ref 81.4–97.8)
PLATELET # BLD AUTO: 230 K/UL (ref 164–446)
PMV BLD AUTO: 10.1 FL (ref 9–12.9)
POC BREATHALIZER: 0.07 PERCENT (ref 0–0.01)
POTASSIUM SERPL-SCNC: 3.5 MMOL/L (ref 3.6–5.5)
PROT SERPL-MCNC: 7.1 G/DL (ref 6–8.2)
RBC # BLD AUTO: 4.64 M/UL (ref 4.2–5.4)
SODIUM SERPL-SCNC: 149 MMOL/L (ref 135–145)
WBC # BLD AUTO: 6 K/UL (ref 4.8–10.8)

## 2023-04-24 PROCEDURE — 700101 HCHG RX REV CODE 250: Performed by: EMERGENCY MEDICINE

## 2023-04-24 PROCEDURE — 85027 COMPLETE CBC AUTOMATED: CPT

## 2023-04-24 PROCEDURE — 700102 HCHG RX REV CODE 250 W/ 637 OVERRIDE(OP): Performed by: EMERGENCY MEDICINE

## 2023-04-24 PROCEDURE — 82077 ASSAY SPEC XCP UR&BREATH IA: CPT

## 2023-04-24 PROCEDURE — 700111 HCHG RX REV CODE 636 W/ 250 OVERRIDE (IP): Mod: UD | Performed by: EMERGENCY MEDICINE

## 2023-04-24 PROCEDURE — 80053 COMPREHEN METABOLIC PANEL: CPT

## 2023-04-24 PROCEDURE — 36415 COLL VENOUS BLD VENIPUNCTURE: CPT

## 2023-04-24 PROCEDURE — 96375 TX/PRO/DX INJ NEW DRUG ADDON: CPT

## 2023-04-24 PROCEDURE — 302970 POC BREATHALIZER: Performed by: EMERGENCY MEDICINE

## 2023-04-24 PROCEDURE — 83690 ASSAY OF LIPASE: CPT

## 2023-04-24 PROCEDURE — 99285 EMERGENCY DEPT VISIT HI MDM: CPT

## 2023-04-24 PROCEDURE — 84703 CHORIONIC GONADOTROPIN ASSAY: CPT

## 2023-04-24 PROCEDURE — 96374 THER/PROPH/DIAG INJ IV PUSH: CPT

## 2023-04-24 PROCEDURE — 700105 HCHG RX REV CODE 258: Performed by: EMERGENCY MEDICINE

## 2023-04-24 PROCEDURE — A9270 NON-COVERED ITEM OR SERVICE: HCPCS | Performed by: EMERGENCY MEDICINE

## 2023-04-24 PROCEDURE — 700111 HCHG RX REV CODE 636 W/ 250 OVERRIDE (IP): Performed by: EMERGENCY MEDICINE

## 2023-04-24 RX ORDER — ONDANSETRON 2 MG/ML
4 INJECTION INTRAMUSCULAR; INTRAVENOUS ONCE
Status: COMPLETED | OUTPATIENT
Start: 2023-04-24 | End: 2023-04-24

## 2023-04-24 RX ORDER — OMEPRAZOLE 20 MG/1
20 CAPSULE, DELAYED RELEASE ORAL
Status: SHIPPED | COMMUNITY
End: 2023-05-16

## 2023-04-24 RX ORDER — AMITRIPTYLINE HYDROCHLORIDE 10 MG/1
10 TABLET, FILM COATED ORAL ONCE
Status: COMPLETED | OUTPATIENT
Start: 2023-04-24 | End: 2023-04-24

## 2023-04-24 RX ORDER — HYDROMORPHONE HYDROCHLORIDE 2 MG/1
2 TABLET ORAL ONCE
Status: COMPLETED | OUTPATIENT
Start: 2023-04-24 | End: 2023-04-24

## 2023-04-24 RX ORDER — KETOROLAC TROMETHAMINE 30 MG/ML
15 INJECTION, SOLUTION INTRAMUSCULAR; INTRAVENOUS ONCE
Status: COMPLETED | OUTPATIENT
Start: 2023-04-24 | End: 2023-04-24

## 2023-04-24 RX ORDER — ALUMINA, MAGNESIA, AND SIMETHICONE 2400; 2400; 240 MG/30ML; MG/30ML; MG/30ML
20 SUSPENSION ORAL ONCE
Status: COMPLETED | OUTPATIENT
Start: 2023-04-24 | End: 2023-04-24

## 2023-04-24 RX ORDER — AMITRIPTYLINE HYDROCHLORIDE 10 MG/1
10 TABLET, FILM COATED ORAL NIGHTLY
Qty: 30 TABLET | Refills: 0 | Status: SHIPPED | OUTPATIENT
Start: 2023-04-24 | End: 2023-05-16

## 2023-04-24 RX ORDER — SODIUM CHLORIDE, SODIUM LACTATE, POTASSIUM CHLORIDE, CALCIUM CHLORIDE 600; 310; 30; 20 MG/100ML; MG/100ML; MG/100ML; MG/100ML
1000 INJECTION, SOLUTION INTRAVENOUS ONCE
Status: COMPLETED | OUTPATIENT
Start: 2023-04-24 | End: 2023-04-24

## 2023-04-24 RX ADMIN — ALUMINUM HYDROXIDE, MAGNESIUM HYDROXIDE, AND DIMETHICONE 20 ML: 400; 400; 40 SUSPENSION ORAL at 03:42

## 2023-04-24 RX ADMIN — ONDANSETRON HYDROCHLORIDE 4 MG: 2 SOLUTION INTRAMUSCULAR; INTRAVENOUS at 07:26

## 2023-04-24 RX ADMIN — AMITRIPTYLINE HYDROCHLORIDE 10 MG: 10 TABLET, FILM COATED ORAL at 06:22

## 2023-04-24 RX ADMIN — KETAMINE HYDROCHLORIDE 25 MG: 10 INJECTION INTRAMUSCULAR; INTRAVENOUS at 03:43

## 2023-04-24 RX ADMIN — KETOROLAC TROMETHAMINE 15 MG: 30 INJECTION, SOLUTION INTRAMUSCULAR; INTRAVENOUS at 03:40

## 2023-04-24 RX ADMIN — HYDROMORPHONE HYDROCHLORIDE 2 MG: 2 TABLET ORAL at 06:19

## 2023-04-24 RX ADMIN — SODIUM CHLORIDE, POTASSIUM CHLORIDE, SODIUM LACTATE AND CALCIUM CHLORIDE 1000 ML: 600; 310; 30; 20 INJECTION, SOLUTION INTRAVENOUS at 03:38

## 2023-04-24 ASSESSMENT — PAIN DESCRIPTION - PAIN TYPE
TYPE: ACUTE PAIN

## 2023-04-24 ASSESSMENT — FIBROSIS 4 INDEX: FIB4 SCORE: 0.59

## 2023-04-24 NOTE — DISCHARGE PLANNING
Alert Team:    Patient is a 36 y/o female, second ER visit in the past 24 hours; presenting with medical complains and alcohol intoxication, continuing depression, denies SI. ED Consult completed yesterday. Patient would benefit connecting with Alert Team and Peer Recovery for discharge planning. Will Have Day Alert Team f/u.

## 2023-04-24 NOTE — ED TRIAGE NOTES
Rhiannon Marrero  Chief Complaint   Patient presents with    Alcohol Intoxication     Pt took a couple shots, hx of pancreatitis, now having pain and n/v. Received 4mg of zofran with EMS. Just here yesterday for same.     Depressed     Denies SI/HI         Pt placed in lobby and educated on triage process. Pt encouraged to alert staff for any changes.     Patient and staff wearing appropriate PPE    /84   Pulse 95   Temp 36.7 °C (98.1 °F) (Temporal)   Resp 18   Wt 58.5 kg (129 lb)   LMP 10/30/2018   SpO2 100% Comment: [place] ir  BMI 20.20 kg/m²

## 2023-04-24 NOTE — ED NOTES
Bedside report received from previous shift. Assumed patient care. Verified patient identification. Checked on bed, connected to monitor,  with unlabored respirations. Vital signs is stable. Denied any new complaints. Gurney in low position, side rail up for pt safety. Call light within reach. Will continue to monitor for any complications.

## 2023-04-24 NOTE — ED NOTES
Bedside report rec'd from LESLIE Kirk.  RA, on monitor, all needs met at this time.  Call light in reach.

## 2023-04-24 NOTE — ED NOTES
Bedside report given to the next shift RN [place] for continuity of care and management.  Provided opportunity to asks questions.  Pt connected to monitor  All pt belongings at bedside

## 2023-04-24 NOTE — ED PROVIDER NOTES
ER Provider Note    Scribed for Lex Resendez M.D., by Tanner West. 4/24/2023  3:09 AM    Primary Care Provider: MALOU Harrington    CHIEF COMPLAINT  Chief Complaint   Patient presents with    Alcohol Intoxication     Pt took a couple shots, hx of pancreatitis, now having pain and n/v. Received 4mg of zofran with EMS. Just here yesterday for same.     Depressed     Denies SI/HI      EXTERNAL RECORDS REVIEWED  Inpatient notes: Patient was admitted April 11th for abdominal pain that was thought to be from chronic pancreatitis and was prescribed a small amount of opoid pain medication. ED Note (not external): Patient was seen here yesterday and lest at 5:12 PM which was 10 hours ago.     HPI/ROS  LIMITATION TO HISTORY   Intoxication  OUTSIDE HISTORIAN(S):  None    Rhiannon Marrero is a 37 y.o. female who presents to the ED via EMS complaining of abdominal pain onset today. She reports being seen here yesterday evening and went home; she took a couple of shots of alcohol. She endorses associated nausea and vomiting. She reports a history of pancreatitis and states she was 6 months sober until today. She reports no exacerbating or alleviating factors.     PAST MEDICAL HISTORY  Past Medical History:   Diagnosis Date    Acute pancreatitis without infection or necrosis 07/20/2022    Alcohol dependence (HCC) 04/13/2017    Bronchitis 08/2012    Cold     sept 2018    Current moderate episode of major depressive disorder without prior episode (HCC) 01/31/2020    Heart burn     Hernia of unspecified site of abdominal cavity without mention of obstruction or gangrene     High anion gap metabolic acidosis 07/01/2022    Indigestion     Pancreatitis     Pneumonia 2011    Seizure disorder (HCC) 05/23/2022     SURGICAL HISTORY  Past Surgical History:   Procedure Laterality Date    GA UPPER GI ENDOSCOPY,DIAGNOSIS N/A 12/23/2022    Procedure: GASTROSCOPY;  Surgeon: Td Kwok M.D.;  Location: SURGERY [facility];   Service: EUS    OH INJECT NERV BLCK,CELIAC PLEXUS N/A 12/23/2022    Procedure: BLOCK, CELIAC PLEXUS;  Surgeon: Td Kwok M.D.;  Location: SURGERY [facility];  Service: EUS    EGD W/ENDOSCOPIC ULTRASOUND N/A 12/23/2022    Procedure: EGD, WITH ENDOSCOPIC US;  Surgeon: Td Kwok M.D.;  Location: SURGERY [facility];  Service: EUS    ORIF, WRIST Right 4/24/2019    Procedure: ORIF, WRIST;  Surgeon: Marquis Bell M.D.;  Location: SURGERY [facility];  Service: Orthopedics    HYSTERECTOMY ROBOTIC XI  10/11/2018    Procedure: HYSTERECTOMY ROBOTIC XI- RIGHT URETEROLYSIS;  Surgeon: Marquis Reeder M.D.;  Location: SURGERY [facility];  Service: Gynecology    SALPINGECTOMY Bilateral 10/11/2018    Procedure: SALPINGECTOMY;  Surgeon: Marquis Reeder M.D.;  Location: SURGERY [facility];  Service: Gynecology    TONSILLECTOMY  4/11/2013    Performed by Rock Rothman M.D. at SURGERY SAME DAY [facility]    ARTHROSCOPY, KNEE      GYN SURGERY      miscarriage May 2006    OTHER ORTHOPEDIC SURGERY      knee surgeries     FAMILY HISTORY  Family History   Problem Relation Age of Onset    Psychiatric Illness Mother     Stroke Mother     Arthritis Mother     Heart Disease Maternal Grandfather     Cancer Neg Hx     Diabetes Neg Hx     Hypertension Neg Hx      SOCIAL HISTORY   reports that she has been smoking cigarettes. She has a 2.50 pack-year smoking history. She has never used smokeless tobacco. She reports current alcohol use. She reports current drug use. Drug: Inhaled.    CURRENT MEDICATIONS  Previous Medications    DIVALPROEX (DEPAKOTE) 250 MG TABLET DELAYED RESPONSE    Take 250 mg by mouth 2 times a day.    GABAPENTIN (NEURONTIN) 100 MG CAP    Take 100-300 mg by mouth 2 times a day. 100 mg = AM   300 mg = PM    HYDROXYZINE PAMOATE (VISTARIL) 50 MG CAP    Take 50 mg by mouth 3 times a day as needed for Anxiety.    OMEPRAZOLE (PRILOSEC) 20 MG DELAYED-RELEASE CAPSULE    Take 1 Capsule by  mouth every day for 30 days.    ONDANSETRON (ZOFRAN ODT) 4 MG TABLET DISPERSIBLE    Take 1 Tablet by mouth every four hours as needed for Nausea/Vomiting.    ONDANSETRON (ZOFRAN ODT) 4 MG TABLET DISPERSIBLE    Dissolve 1 Tablet by mouth every 8 hours as needed for Nausea/Vomiting.    PANCRELIPASE, LIP-PROT-AMYL, (CREON 6000) 6000-56781 UNITS CAP DR PARTICLES    Take 1 Capsule by mouth 3 times a day with meals.    ZONISAMIDE (ZONEGRAN) 50 MG CAPSULE    Take 150 mg by mouth at bedtime. 50 mg ( 3 capsules) = 150 mg     ALLERGIES  Promethazine hcl    PHYSICAL EXAM  /84   Pulse 95   Temp 36.7 °C (98.1 °F) (Temporal)   Resp 18   Wt 58.5 kg (129 lb)   SpO2 100%      Gen: Alert, uncomfortable appearing  HEENT: ATNC  Eyes: PERRL, EOMI, normal conjunctiva.   Neck: Trachea midline  Resp: No respiratory distress  CV: No JVD, RRR  Abd: Non-distended, soft, epigastric tenderness, no rebound or guarding  Ext: No deformities  Psych: Normal mood  Neuro: Speech fluent       DIAGNOSTIC STUDIES    Labs:   Pertinent Labs & Imaging studies reviewed.  Labs Reviewed   CBC WITHOUT DIFFERENTIAL - Abnormal; Notable for the following components:       Result Value    MCHC 33.4 (*)     All other components within normal limits   COMP METABOLIC PANEL - Abnormal; Notable for the following components:    Sodium 149 (*)     Potassium 3.5 (*)     Anion Gap 19.0 (*)     Bun 6 (*)     Calcium 8.4 (*)     All other components within normal limits   DIAGNOSTIC ALCOHOL - Abnormal; Notable for the following components:    Diagnostic Alcohol 288.9 (*)     All other components within normal limits   CORRECTED CALCIUM - Abnormal; Notable for the following components:    Correct Calcium 8.2 (*)     All other components within normal limits   LIPASE   HCG QUAL SERUM   ESTIMATED GFR      CARE NARRATIVE & MEDICAL DECISION MAKING     ED Observation Status? Yes; patient will be admitted to ED observation at 3:16 AM on 4/24/2023.  This is for diagnostic  uncertainty and therapeutic intensity.    Observation plan: Medications, discussion with behavioral health.    Patient will be signed out to Dr. Zhang awaiting completion of evaluation by the mental health team.    3:09 AM - Patient was seen and evaluated at bedside. Discussed concerns of alcohol use with history of pancreatic issues with the patient and they were given an opportunity to ask questions. Patient will be treated with LR Bolus, Mylanta DS suspension 20mL, ketamine 25mg IV and Toradol 15mg injection. Patient verbalizes understanding and agreement to this plan of care.     5:25 AM - Patient was seen at bedside. Patient is concerned about very light brown stool though discussed normal lab results with the patient. Counseled on alcohol cessation and discussed discharge. Patient verbalizes understanding and agreement to this plan of care.      MDM: Patient returns with ongoing abdominal pain now in the setting of acute alcohol intoxication.  The patient was seen yesterday for alcohol intoxication, as well as recently admitted for chronic abdominal pain.  The patient's had reassuring work-ups.  Her labs demonstrate no signs of worsening pancreatitis.  She has a known peritonitic abdomen.  She was trialed on ketamine to help reset her pain at a lower level with minimal impact.  Given her chronic abdominal pain, I do not believe a prescription for opioids is indicated.  At this point in time, this appears to be chronic and is not an indication for acute hospitalization.  The patient will be trialed on amitriptyline for her chronic abdominal pain.  After discussion with the behavioral health team, they recommend a comprehensive evaluation as the patient was recently seen for alcohol intoxication and overdose of her Depakote.  She may also benefit from returning to an alcohol treatment program.    PROBLEM LIST AND DISPOSITION    I have discussed management of the patient with the following physicians and BILL's:  Dr. Leos    Discussion of management with other [facility] or appropriate source(s): None and Behavioral Health recommend further evaluation and getting titrated with resources as well as treatment for her alcohol          Decision tools and prescription drugs considered including, but not limited to: Pain Medications we will trial amitriptyline for chronic abdominal pain .        FINAL DIAGNOSIS  1. Acute alcoholic intoxication with complication (HCC)    2. Chronic abdominal pain      The note accurately reflects work and decisions made by me.  Lex Resendez M.D.  4/24/2023  6:24 AM    ITanner (Aman), am scribing for, and in the presence of, Lex Resendez M.D.    Electronically signed by: Tanner West (Aman), 4/24/2023    ILex M.D., personally performed the services described in this documentation, as scribed by Tanner West in my presence, and it is both accurate and complete. This contains dictation that was created using voice recognition software. The accuracy of the dictation is limited to the abilities of the software. I expect there may be some errors of grammar and possibly content. The nursing notes were reviewed and certain aspects of this information were incorporated into this note.

## 2023-04-24 NOTE — ED NOTES
Checked on bed, connected to monitor,  with unlabored respirations. Vital signs is stable.Sleeping. Gurney in low position, side rail up for pt safety. Call light within reach. Will continue to monitor for any complications.

## 2023-04-24 NOTE — ED NOTES
Notified to the Alert Team regarding this pt, if she can be helped for he alcoholism since the Peer support is not available at this time, as advised by the ERP

## 2023-04-24 NOTE — DISCHARGE SUMMARY
ED Observation Discharge Summary    Patient:Rhiannon Marrero  Patient : 1985  Patient MRN: [id number]  Patient PCP: MALOU Harrington    Admit Date: 2023  Discharge Date and Time: 23 12:03 PM  Discharge Diagnosis:   1. Acute alcoholic intoxication with complication (HCC)        2. Chronic abdominal pain  amitriptyline (ELAVIL) 10 MG Tab      3. Acute on chronic pancreatitis (HCC)  pancrelipase, Lip-Prot-Amyl, (CREON 6000) 6000-73592 units Cap DR Particles         Discharge Attending: Elvira Leos M.D.  Discharge Service: ED Observation    ED Course  Rhiannon is a 37 y.o. female who was evaluated at [facility] complaining of upper abdominal pain last night.  She drank some alcohol and was intoxicated.  She was allowed to sober up.  Upon achieving sobriety she was evaluated by peer recovery who has arranged for her to go to [facility] tomorrow.  She admits to being depressed but is not suicidal.  She has depression due to her chronic pancreatitis pain.  Here in the ER she is feeling better.  Pain is her normal and chronic abdominal pain.  Nothing new or different.  Labs are fairly unremarkable.  Lipase of 14.  She is tolerating p.o.  At this time she is safe and stable for outpatient management discharge home.  She has been encouraged to abstain from alcohol and drink plenty of fluids to stay well-hydrated.  She is well-appearing upon discharge with stable vitals.    Discharge Exam:  /67   Pulse (!) 103   Temp 36.8 °C (98.3 °F) (Temporal)   Resp 18   Wt 58.5 kg (129 lb)   LMP 10/30/2018   SpO2 98%   BMI 20.20 kg/m² .    Constitutional: Awake and alert. Nontoxic  HENT:  Grossly normal  Eyes: Grossly normal  Neck: Normal range of motion  Cardiovascular: Normal heart rate   Thorax & Lungs: No respiratory distress  Abdomen: Nontender  Skin:  No pathologic rash.   Extremities: Well perfused  Psychiatric: Affect normal    Labs  Results for orders placed or performed during the hospital encounter  of 04/24/23   CBC WITHOUT DIFFERENTIAL   Result Value Ref Range    WBC 6.0 4.8 - 10.8 K/uL    RBC 4.64 4.20 - 5.40 M/uL    Hemoglobin 13.5 12.0 - 16.0 g/dL    Hematocrit 40.4 37.0 - 47.0 %    MCV 87.1 81.4 - 97.8 fL    MCH 29.1 27.0 - 33.0 pg    MCHC 33.4 (L) 33.6 - 35.0 g/dL    RDW 46.9 35.9 - 50.0 fL    Platelet Count 230 164 - 446 K/uL    MPV 10.1 9.0 - 12.9 fL   COMP METABOLIC PANEL   Result Value Ref Range    Sodium 149 (H) 135 - 145 mmol/L    Potassium 3.5 (L) 3.6 - 5.5 mmol/L    Chloride 110 96 - 112 mmol/L    Co2 20 20 - 33 mmol/L    Anion Gap 19.0 (H) 7.0 - 16.0    Glucose 91 65 - 99 mg/dL    Bun 6 (L) 8 - 22 mg/dL    Creatinine 0.68 0.50 - 1.40 mg/dL    Calcium 8.4 (L) 8.5 - 10.5 mg/dL    AST(SGOT) 24 12 - 45 U/L    ALT(SGPT) 34 2 - 50 U/L    Alkaline Phosphatase 76 30 - 99 U/L    Total Bilirubin 0.9 0.1 - 1.5 mg/dL    Albumin 4.2 3.2 - 4.9 g/dL    Total Protein 7.1 6.0 - 8.2 g/dL    Globulin 2.9 1.9 - 3.5 g/dL    A-G Ratio 1.4 g/dL   LIPASE   Result Value Ref Range    Lipase 14 11 - 82 U/L   DIAGNOSTIC ALCOHOL   Result Value Ref Range    Diagnostic Alcohol 288.9 (H) <10.1 mg/dL   HCG QUAL SERUM   Result Value Ref Range    Beta-Hcg Qualitative Serum Negative Negative   ESTIMATED GFR   Result Value Ref Range    GFR (CKD-EPI) 116 >60 mL/min/1.73 m 2   CORRECTED CALCIUM   Result Value Ref Range    Correct Calcium 8.2 (L) 8.5 - 10.5 mg/dL   POC BREATHALIZER   Result Value Ref Range    POC Breathalizer 0.069 (A) 0.00 - 0.01 Percent       Radiology  No orders to display       Medications:   New Prescriptions    AMITRIPTYLINE (ELAVIL) 10 MG TAB    Take 1 Tablet by mouth every evening.       My final assessment includes that the patient was seen here in the ER last night for chronic abdominal pain with alcohol intoxication.  She has history of chronic pancreatitis.  She not suicidal.  She was allowed to sober up.  She requests rehab for alcohol abuse.  She would also like treatment for depression as to chronic  pancreatitis pain she has been dealing with is causing her to feel very depressed. Patient had some nausea upon my initial evaluation earlier this morning.  She was given Zofran.  She is feeling much better.  She was evaluated by peer recovery.  She has an appointment tomorrow at [facility] ([facility]  Patient is comfortable with this plan.  At this time she is feeling better.  She is safe and stable for outpatient management and discharge home.  She was offered prescription for Zofran but declines.  She says she has plenty at home.      Upon Reevaluation, the patient's condition has: Improved; and will be discharged.    Patient discharged from ED Observation status at 1206 (Time) April 24, 2023 (Date).     Total time spent on this ED Observation discharge encounter is < 30 Minutes    Electronically signed by: Elvira Leos M.D., 4/24/2023 12:03 PM

## 2023-04-24 NOTE — ED NOTES
Information was offered by ERP that pt is for observation - for pending PEER support and Alert Team Consult

## 2023-04-24 NOTE — DISCHARGE INSTRUCTIONS
You were seen in the emergency department for alcohol intoxication as well as abdominal pain in the setting of likely chronic pancreatitis and chronic abdominal pain.You are being started on a medication that should hopefully help your chronic abdominal pain.    For pain you can take acetaminophen (Tylenol), 1000mg every 8 hours as needed for pain. Do not take more than 3000mg of acetaminophen in any 24 hour period. You can also take  ibuprofen (Motrin), 600mg every 6 hours as needed for pain (take with food to avoid GI upset).  Taking these medications regularly during the day can be very effective in controlling pain.    You are also being represcribed your pancreas enzymes to assist with your digestion.    It is important to follow-up with your primary care provider as well as your gastroenterologist for this.    Please return to the emergency department or seek medical attention if you develop:  Fevers, uncontrolled pain, ongoing vomiting, any other new or concerning findings

## 2023-04-24 NOTE — DISCHARGE PLANNING
Suzy from [facility] is coming at 1000 to do a bedside assessment for possible intake to their residential addiction programs.

## 2023-04-24 NOTE — ED NOTES
Assumed pt. Care, pt. Upset that she drank last night, states she has pancreatitis and had a partial whipple within the past year, pt. States that she was just released from [facility] after an si attempt, states she was in a manic stage when she took all her depakote, pt. Currently denies any si thoughts, call light at bedside, 3 p' s addressed, poc updated,

## 2023-04-24 NOTE — PROGRESS NOTES
ED Observation Progress Note    Date of Service: 04/24/23    Interval History and Interventions  Patient was seen earlier this morning by my partner, Dr. Resendez.  She presented with depression and alcohol intoxication.  Patient has a history of chronic pancreatitis.  She says she has chronic abdominal pain.  She reports drinking some shots of alcohol after 6 months of sobriety last night just prior to arrival.  She describes associated nausea and vomiting.  She expressed depression over her chronic and unrelenting chronic pancreatitis pain.  She has been to alcohol rehab before and would like to go back.  At this time she is pending evaluation by peer [facility] and hopes that she can get to a facility who can help her with alcohol rehab and treatment for bipolar depression.  Patient still complains of some nausea.  Otherwise she has no complaints.    Physical Exam  /64   Pulse (!) 103   Temp 36.3 °C (97.3 °F) (Temporal)   Resp 18   Wt 58.5 kg (129 lb)   LMP 10/30/2018   SpO2 92%   BMI 20.20 kg/m² .    Constitutional: Awake and alert. Nontoxic  HENT:  Grossly normal.  Slightly dry mucous membranes.  Eyes: Grossly normal  Neck: Normal range of motion  Cardiovascular: Normal heart rate.  Heart is regular rate and rhythm without murmurs rubs or gallops.  Thorax & Lungs: No respiratory distress.  Chest is clear to auscultation bilaterally without wheezes rales or rhonchi.  Abdomen: Mild tenderness to palpation in the upper abdomen and midepigastrium which patient says is chronic for her.  Skin:  No pathologic rash.   Extremities: Well perfused.  No pretibial edema.  Psychiatric: Affect normal    Labs  Results for orders placed or performed during the hospital encounter of 04/24/23   CBC WITHOUT DIFFERENTIAL   Result Value Ref Range    WBC 6.0 4.8 - 10.8 K/uL    RBC 4.64 4.20 - 5.40 M/uL    Hemoglobin 13.5 12.0 - 16.0 g/dL    Hematocrit 40.4 37.0 - 47.0 %    MCV 87.1 81.4 - 97.8 fL    MCH 29.1 27.0 - 33.0 pg     MCHC 33.4 (L) 33.6 - 35.0 g/dL    RDW 46.9 35.9 - 50.0 fL    Platelet Count 230 164 - 446 K/uL    MPV 10.1 9.0 - 12.9 fL   COMP METABOLIC PANEL   Result Value Ref Range    Sodium 149 (H) 135 - 145 mmol/L    Potassium 3.5 (L) 3.6 - 5.5 mmol/L    Chloride 110 96 - 112 mmol/L    Co2 20 20 - 33 mmol/L    Anion Gap 19.0 (H) 7.0 - 16.0    Glucose 91 65 - 99 mg/dL    Bun 6 (L) 8 - 22 mg/dL    Creatinine 0.68 0.50 - 1.40 mg/dL    Calcium 8.4 (L) 8.5 - 10.5 mg/dL    AST(SGOT) 24 12 - 45 U/L    ALT(SGPT) 34 2 - 50 U/L    Alkaline Phosphatase 76 30 - 99 U/L    Total Bilirubin 0.9 0.1 - 1.5 mg/dL    Albumin 4.2 3.2 - 4.9 g/dL    Total Protein 7.1 6.0 - 8.2 g/dL    Globulin 2.9 1.9 - 3.5 g/dL    A-G Ratio 1.4 g/dL   LIPASE   Result Value Ref Range    Lipase 14 11 - 82 U/L   DIAGNOSTIC ALCOHOL   Result Value Ref Range    Diagnostic Alcohol 288.9 (H) <10.1 mg/dL   HCG QUAL SERUM   Result Value Ref Range    Beta-Hcg Qualitative Serum Negative Negative   ESTIMATED GFR   Result Value Ref Range    GFR (CKD-EPI) 116 >60 mL/min/1.73 m 2   CORRECTED CALCIUM   Result Value Ref Range    Correct Calcium 8.2 (L) 8.5 - 10.5 mg/dL       Radiology  No orders to display     1130: Patient has been evaluated by peer recovery.  She has an appointment at [facility] tomorrow.  Patient has a way to get there.  She is comfortable with the plan.  At this time she feels comfortable going home.  She is no longer nauseated.  She says she has Zofran at home.  She will be discharged.    I verified that the patient was wearing a mask and I was wearing appropriate PPE every time I entered the room. The patient's mask was on the patient at all times during my encounter except for a brief view of the oropharynx.     Problem List  1.   1. Acute alcoholic intoxication with complication (HCC)        2. Chronic abdominal pain  amitriptyline (ELAVIL) 10 MG Tab      3. Acute on chronic pancreatitis (HCC)  pancrelipase, Lip-Prot-Amyl, (CREON 6000) 6000-40814 units Cap DR  Particles             This dictation has been created using voice recognition software. The accuracy of the dictation is limited by the abilities of the software. I expect there may be some errors of grammar and possibly content. I made every attempt to manually correct the errors within my dictation. However, errors related to voice recognition software may still exist and should be interpreted within the appropriate context.     Electronically signed by: Elvira Leos M.D., 4/24/2023 7:23 AM

## 2023-05-16 ENCOUNTER — HOSPITAL ENCOUNTER (INPATIENT)
Facility: MEDICAL CENTER | Age: 38
LOS: 1 days | DRG: 439 | End: 2023-05-17
Attending: EMERGENCY MEDICINE | Admitting: INTERNAL MEDICINE
Payer: MEDICAID

## 2023-05-16 DIAGNOSIS — K85.90 ACUTE ON CHRONIC PANCREATITIS (HCC): ICD-10-CM

## 2023-05-16 DIAGNOSIS — K86.0 ALCOHOL-INDUCED CHRONIC PANCREATITIS (HCC): ICD-10-CM

## 2023-05-16 DIAGNOSIS — K86.1 ACUTE ON CHRONIC PANCREATITIS (HCC): ICD-10-CM

## 2023-05-16 DIAGNOSIS — R52 UNCONTROLLED PAIN: ICD-10-CM

## 2023-05-16 DIAGNOSIS — R10.13 EPIGASTRIC ABDOMINAL PAIN: ICD-10-CM

## 2023-05-16 LAB
ALBUMIN SERPL BCP-MCNC: 4.4 G/DL (ref 3.2–4.9)
ALBUMIN/GLOB SERPL: 1.4 G/DL
ALP SERPL-CCNC: 85 U/L (ref 30–99)
ALT SERPL-CCNC: 16 U/L (ref 2–50)
ANION GAP SERPL CALC-SCNC: 16 MMOL/L (ref 7–16)
APPEARANCE UR: CLEAR
AST SERPL-CCNC: 16 U/L (ref 12–45)
BASOPHILS # BLD AUTO: 1.2 % (ref 0–1.8)
BASOPHILS # BLD: 0.1 K/UL (ref 0–0.12)
BILIRUB SERPL-MCNC: 0.5 MG/DL (ref 0.1–1.5)
BILIRUB UR QL STRIP.AUTO: NEGATIVE
BUN SERPL-MCNC: 3 MG/DL (ref 8–22)
CALCIUM ALBUM COR SERPL-MCNC: 8.7 MG/DL (ref 8.5–10.5)
CALCIUM SERPL-MCNC: 9 MG/DL (ref 8.5–10.5)
CHLORIDE SERPL-SCNC: 102 MMOL/L (ref 96–112)
CO2 SERPL-SCNC: 22 MMOL/L (ref 20–33)
COLOR UR: YELLOW
CREAT SERPL-MCNC: 0.7 MG/DL (ref 0.5–1.4)
EOSINOPHIL # BLD AUTO: 0.15 K/UL (ref 0–0.51)
EOSINOPHIL NFR BLD: 1.7 % (ref 0–6.9)
ERYTHROCYTE [DISTWIDTH] IN BLOOD BY AUTOMATED COUNT: 46.3 FL (ref 35.9–50)
ETHANOL BLD-MCNC: <10.1 MG/DL
GFR SERPLBLD CREATININE-BSD FMLA CKD-EPI: 115 ML/MIN/1.73 M 2
GLOBULIN SER CALC-MCNC: 3.1 G/DL (ref 1.9–3.5)
GLUCOSE SERPL-MCNC: 86 MG/DL (ref 65–99)
GLUCOSE UR STRIP.AUTO-MCNC: NEGATIVE MG/DL
HCG UR QL: NEGATIVE
HCT VFR BLD AUTO: 42 % (ref 37–47)
HGB BLD-MCNC: 13.9 G/DL (ref 12–16)
IMM GRANULOCYTES # BLD AUTO: 0.02 K/UL (ref 0–0.11)
IMM GRANULOCYTES NFR BLD AUTO: 0.2 % (ref 0–0.9)
KETONES UR STRIP.AUTO-MCNC: NEGATIVE MG/DL
LEUKOCYTE ESTERASE UR QL STRIP.AUTO: NEGATIVE
LIPASE SERPL-CCNC: 9 U/L (ref 11–82)
LYMPHOCYTES # BLD AUTO: 4 K/UL (ref 1–4.8)
LYMPHOCYTES NFR BLD: 46 % (ref 22–41)
MCH RBC QN AUTO: 28.8 PG (ref 27–33)
MCHC RBC AUTO-ENTMCNC: 33.1 G/DL (ref 33.6–35)
MCV RBC AUTO: 87 FL (ref 81.4–97.8)
MICRO URNS: NORMAL
MONOCYTES # BLD AUTO: 0.5 K/UL (ref 0–0.85)
MONOCYTES NFR BLD AUTO: 5.8 % (ref 0–13.4)
NEUTROPHILS # BLD AUTO: 3.92 K/UL (ref 2–7.15)
NEUTROPHILS NFR BLD: 45.1 % (ref 44–72)
NITRITE UR QL STRIP.AUTO: NEGATIVE
NRBC # BLD AUTO: 0 K/UL
NRBC BLD-RTO: 0 /100 WBC
PH UR STRIP.AUTO: 7 [PH] (ref 5–8)
PLATELET # BLD AUTO: 446 K/UL (ref 164–446)
PMV BLD AUTO: 9.2 FL (ref 9–12.9)
POTASSIUM SERPL-SCNC: 3.4 MMOL/L (ref 3.6–5.5)
PROT SERPL-MCNC: 7.5 G/DL (ref 6–8.2)
PROT UR QL STRIP: NEGATIVE MG/DL
RBC # BLD AUTO: 4.83 M/UL (ref 4.2–5.4)
RBC UR QL AUTO: NEGATIVE
SODIUM SERPL-SCNC: 140 MMOL/L (ref 135–145)
SP GR UR STRIP.AUTO: 1
UROBILINOGEN UR STRIP.AUTO-MCNC: 0.2 MG/DL
WBC # BLD AUTO: 8.7 K/UL (ref 4.8–10.8)

## 2023-05-16 PROCEDURE — 81025 URINE PREGNANCY TEST: CPT

## 2023-05-16 PROCEDURE — 99285 EMERGENCY DEPT VISIT HI MDM: CPT

## 2023-05-16 PROCEDURE — 82077 ASSAY SPEC XCP UR&BREATH IA: CPT

## 2023-05-16 PROCEDURE — G0378 HOSPITAL OBSERVATION PER HR: HCPCS

## 2023-05-16 PROCEDURE — 36415 COLL VENOUS BLD VENIPUNCTURE: CPT

## 2023-05-16 PROCEDURE — 81003 URINALYSIS AUTO W/O SCOPE: CPT

## 2023-05-16 PROCEDURE — 83690 ASSAY OF LIPASE: CPT

## 2023-05-16 PROCEDURE — 99222 1ST HOSP IP/OBS MODERATE 55: CPT | Mod: 25 | Performed by: INTERNAL MEDICINE

## 2023-05-16 PROCEDURE — 99406 BEHAV CHNG SMOKING 3-10 MIN: CPT | Performed by: INTERNAL MEDICINE

## 2023-05-16 PROCEDURE — 700105 HCHG RX REV CODE 258: Mod: UD | Performed by: EMERGENCY MEDICINE

## 2023-05-16 PROCEDURE — 700111 HCHG RX REV CODE 636 W/ 250 OVERRIDE (IP): Mod: UD | Performed by: EMERGENCY MEDICINE

## 2023-05-16 PROCEDURE — 85025 COMPLETE CBC W/AUTO DIFF WBC: CPT

## 2023-05-16 PROCEDURE — 80053 COMPREHEN METABOLIC PANEL: CPT

## 2023-05-16 PROCEDURE — C9113 INJ PANTOPRAZOLE SODIUM, VIA: HCPCS | Mod: UD | Performed by: EMERGENCY MEDICINE

## 2023-05-16 PROCEDURE — 96375 TX/PRO/DX INJ NEW DRUG ADDON: CPT

## 2023-05-16 RX ORDER — NICOTINE 21 MG/24HR
21 PATCH, TRANSDERMAL 24 HOURS TRANSDERMAL
Status: DISCONTINUED | OUTPATIENT
Start: 2023-05-17 | End: 2023-05-17 | Stop reason: HOSPADM

## 2023-05-16 RX ORDER — ONDANSETRON 2 MG/ML
4 INJECTION INTRAMUSCULAR; INTRAVENOUS EVERY 4 HOURS PRN
Status: DISCONTINUED | OUTPATIENT
Start: 2023-05-16 | End: 2023-05-17 | Stop reason: HOSPADM

## 2023-05-16 RX ORDER — ARIPIPRAZOLE 2 MG/1
2 TABLET ORAL DAILY
Status: DISCONTINUED | OUTPATIENT
Start: 2023-05-17 | End: 2023-05-16

## 2023-05-16 RX ORDER — SODIUM CHLORIDE 9 MG/ML
1000 INJECTION, SOLUTION INTRAVENOUS ONCE
Status: COMPLETED | OUTPATIENT
Start: 2023-05-16 | End: 2023-05-16

## 2023-05-16 RX ORDER — HYDROMORPHONE HYDROCHLORIDE 1 MG/ML
1 INJECTION, SOLUTION INTRAMUSCULAR; INTRAVENOUS; SUBCUTANEOUS ONCE
Status: COMPLETED | OUTPATIENT
Start: 2023-05-16 | End: 2023-05-16

## 2023-05-16 RX ORDER — SODIUM CHLORIDE AND POTASSIUM CHLORIDE 300; 900 MG/100ML; MG/100ML
INJECTION, SOLUTION INTRAVENOUS CONTINUOUS
Status: DISCONTINUED | OUTPATIENT
Start: 2023-05-16 | End: 2023-05-17 | Stop reason: HOSPADM

## 2023-05-16 RX ORDER — ZONISAMIDE 50 MG/1
150 CAPSULE ORAL
Status: DISCONTINUED | OUTPATIENT
Start: 2023-05-16 | End: 2023-05-17 | Stop reason: HOSPADM

## 2023-05-16 RX ORDER — OXYCODONE HYDROCHLORIDE 5 MG/1
5 TABLET ORAL
Status: DISCONTINUED | OUTPATIENT
Start: 2023-05-16 | End: 2023-05-17 | Stop reason: HOSPADM

## 2023-05-16 RX ORDER — PANCRELIPASE 36000; 180000; 114000 [USP'U]/1; [USP'U]/1; [USP'U]/1
1 CAPSULE, DELAYED RELEASE PELLETS ORAL
COMMUNITY

## 2023-05-16 RX ORDER — HYDROMORPHONE HYDROCHLORIDE 1 MG/ML
1 INJECTION, SOLUTION INTRAMUSCULAR; INTRAVENOUS; SUBCUTANEOUS
Status: DISCONTINUED | OUTPATIENT
Start: 2023-05-16 | End: 2023-05-17

## 2023-05-16 RX ORDER — LORAZEPAM 2 MG/ML
1 INJECTION INTRAMUSCULAR
Status: DISCONTINUED | OUTPATIENT
Start: 2023-05-16 | End: 2023-05-17 | Stop reason: HOSPADM

## 2023-05-16 RX ORDER — SUCRALFATE 1 G/1
1 TABLET ORAL EVERY 6 HOURS
Status: DISCONTINUED | OUTPATIENT
Start: 2023-05-17 | End: 2023-05-17 | Stop reason: HOSPADM

## 2023-05-16 RX ORDER — LORAZEPAM 1 MG/1
0.5 TABLET ORAL EVERY 4 HOURS PRN
Status: DISCONTINUED | OUTPATIENT
Start: 2023-05-16 | End: 2023-05-17 | Stop reason: HOSPADM

## 2023-05-16 RX ORDER — GAUZE BANDAGE 2" X 2"
100 BANDAGE TOPICAL DAILY
Status: DISCONTINUED | OUTPATIENT
Start: 2023-05-17 | End: 2023-05-16

## 2023-05-16 RX ORDER — ENOXAPARIN SODIUM 100 MG/ML
40 INJECTION SUBCUTANEOUS DAILY
Status: DISCONTINUED | OUTPATIENT
Start: 2023-05-16 | End: 2023-05-17 | Stop reason: HOSPADM

## 2023-05-16 RX ORDER — ONDANSETRON 4 MG/1
4 TABLET, ORALLY DISINTEGRATING ORAL EVERY 4 HOURS PRN
Status: DISCONTINUED | OUTPATIENT
Start: 2023-05-16 | End: 2023-05-17 | Stop reason: HOSPADM

## 2023-05-16 RX ORDER — BISACODYL 10 MG
10 SUPPOSITORY, RECTAL RECTAL
Status: DISCONTINUED | OUTPATIENT
Start: 2023-05-16 | End: 2023-05-17 | Stop reason: HOSPADM

## 2023-05-16 RX ORDER — MORPHINE SULFATE 4 MG/ML
4 INJECTION INTRAVENOUS
Status: CANCELLED | OUTPATIENT
Start: 2023-05-16

## 2023-05-16 RX ORDER — POLYETHYLENE GLYCOL 3350 17 G/17G
1 POWDER, FOR SOLUTION ORAL
Status: DISCONTINUED | OUTPATIENT
Start: 2023-05-16 | End: 2023-05-17 | Stop reason: HOSPADM

## 2023-05-16 RX ORDER — HYDROMORPHONE HYDROCHLORIDE 2 MG/ML
2 INJECTION, SOLUTION INTRAMUSCULAR; INTRAVENOUS; SUBCUTANEOUS ONCE
Status: COMPLETED | OUTPATIENT
Start: 2023-05-16 | End: 2023-05-16

## 2023-05-16 RX ORDER — ONDANSETRON 2 MG/ML
8 INJECTION INTRAMUSCULAR; INTRAVENOUS ONCE
Status: COMPLETED | OUTPATIENT
Start: 2023-05-16 | End: 2023-05-16

## 2023-05-16 RX ORDER — LORAZEPAM 2 MG/1
4 TABLET ORAL
Status: DISCONTINUED | OUTPATIENT
Start: 2023-05-16 | End: 2023-05-17 | Stop reason: HOSPADM

## 2023-05-16 RX ORDER — LORAZEPAM 2 MG/ML
2 INJECTION INTRAMUSCULAR
Status: DISCONTINUED | OUTPATIENT
Start: 2023-05-16 | End: 2023-05-17 | Stop reason: HOSPADM

## 2023-05-16 RX ORDER — ACETAMINOPHEN 325 MG/1
650 TABLET ORAL EVERY 6 HOURS PRN
Status: DISCONTINUED | OUTPATIENT
Start: 2023-05-16 | End: 2023-05-17 | Stop reason: HOSPADM

## 2023-05-16 RX ORDER — LORAZEPAM 2 MG/ML
1.5 INJECTION INTRAMUSCULAR
Status: DISCONTINUED | OUTPATIENT
Start: 2023-05-16 | End: 2023-05-17 | Stop reason: HOSPADM

## 2023-05-16 RX ORDER — OXYCODONE HYDROCHLORIDE 10 MG/1
10 TABLET ORAL
Status: DISCONTINUED | OUTPATIENT
Start: 2023-05-16 | End: 2023-05-17 | Stop reason: HOSPADM

## 2023-05-16 RX ORDER — LORAZEPAM 1 MG/1
1 TABLET ORAL EVERY 4 HOURS PRN
Status: DISCONTINUED | OUTPATIENT
Start: 2023-05-16 | End: 2023-05-17 | Stop reason: HOSPADM

## 2023-05-16 RX ORDER — FOLIC ACID 1 MG/1
1 TABLET ORAL DAILY
Status: DISCONTINUED | OUTPATIENT
Start: 2023-05-17 | End: 2023-05-17 | Stop reason: HOSPADM

## 2023-05-16 RX ORDER — LORAZEPAM 2 MG/1
2 TABLET ORAL
Status: DISCONTINUED | OUTPATIENT
Start: 2023-05-16 | End: 2023-05-17 | Stop reason: HOSPADM

## 2023-05-16 RX ORDER — ARIPIPRAZOLE 5 MG/1
5 TABLET ORAL DAILY
Status: DISCONTINUED | OUTPATIENT
Start: 2023-05-17 | End: 2023-05-17 | Stop reason: HOSPADM

## 2023-05-16 RX ORDER — AMOXICILLIN 250 MG
2 CAPSULE ORAL 2 TIMES DAILY
Status: DISCONTINUED | OUTPATIENT
Start: 2023-05-16 | End: 2023-05-17 | Stop reason: HOSPADM

## 2023-05-16 RX ORDER — GAUZE BANDAGE 2" X 2"
100 BANDAGE TOPICAL DAILY
Status: DISCONTINUED | OUTPATIENT
Start: 2023-05-17 | End: 2023-05-17 | Stop reason: HOSPADM

## 2023-05-16 RX ORDER — PANTOPRAZOLE SODIUM 40 MG/10ML
40 INJECTION, POWDER, LYOPHILIZED, FOR SOLUTION INTRAVENOUS DAILY
Status: DISCONTINUED | OUTPATIENT
Start: 2023-05-17 | End: 2023-05-17 | Stop reason: HOSPADM

## 2023-05-16 RX ORDER — LORAZEPAM 2 MG/ML
0.5 INJECTION INTRAMUSCULAR EVERY 4 HOURS PRN
Status: DISCONTINUED | OUTPATIENT
Start: 2023-05-16 | End: 2023-05-17 | Stop reason: HOSPADM

## 2023-05-16 RX ADMIN — HYDROMORPHONE HYDROCHLORIDE 1 MG: 1 INJECTION, SOLUTION INTRAMUSCULAR; INTRAVENOUS; SUBCUTANEOUS at 21:17

## 2023-05-16 RX ADMIN — ONDANSETRON 8 MG: 2 INJECTION INTRAMUSCULAR; INTRAVENOUS at 21:16

## 2023-05-16 RX ADMIN — HYDROMORPHONE HYDROCHLORIDE 2 MG: 2 INJECTION, SOLUTION INTRAMUSCULAR; INTRAVENOUS; SUBCUTANEOUS at 21:59

## 2023-05-16 RX ADMIN — SODIUM CHLORIDE 1000 ML: 9 INJECTION, SOLUTION INTRAVENOUS at 21:15

## 2023-05-16 RX ADMIN — SODIUM CHLORIDE 80 MG: 9 INJECTION, SOLUTION INTRAVENOUS at 22:49

## 2023-05-16 ASSESSMENT — ENCOUNTER SYMPTOMS
DOUBLE VISION: 0
BACK PAIN: 0
NAUSEA: 1
POLYDIPSIA: 0
ABDOMINAL PAIN: 1
WEIGHT LOSS: 0
VOMITING: 1
HALLUCINATIONS: 0
COUGH: 0
HEADACHES: 0
CHILLS: 0
PALPITATIONS: 0
DIARRHEA: 1
NERVOUS/ANXIOUS: 0
FEVER: 0
NECK PAIN: 0
TREMORS: 0
HEARTBURN: 0
BLURRED VISION: 0
ORTHOPNEA: 0
BRUISES/BLEEDS EASILY: 0
FLANK PAIN: 0
PHOTOPHOBIA: 0
SPEECH CHANGE: 0
FOCAL WEAKNESS: 0
HEMOPTYSIS: 0
SPUTUM PRODUCTION: 0

## 2023-05-16 ASSESSMENT — PAIN DESCRIPTION - PAIN TYPE: TYPE: ACUTE PAIN

## 2023-05-16 ASSESSMENT — LIFESTYLE VARIABLES: SUBSTANCE_ABUSE: 0

## 2023-05-16 ASSESSMENT — FIBROSIS 4 INDEX: FIB4 SCORE: 0.68

## 2023-05-17 ENCOUNTER — APPOINTMENT (OUTPATIENT)
Dept: RADIOLOGY | Facility: MEDICAL CENTER | Age: 38
DRG: 439 | End: 2023-05-17
Attending: NURSE PRACTITIONER
Payer: MEDICAID

## 2023-05-17 VITALS
OXYGEN SATURATION: 97 % | WEIGHT: 131.61 LBS | SYSTOLIC BLOOD PRESSURE: 123 MMHG | TEMPERATURE: 97.8 F | BODY MASS INDEX: 20.66 KG/M2 | RESPIRATION RATE: 22 BRPM | HEIGHT: 67 IN | HEART RATE: 113 BPM | DIASTOLIC BLOOD PRESSURE: 75 MMHG

## 2023-05-17 PROBLEM — Z76.5 DRUG-SEEKING BEHAVIOR: Status: ACTIVE | Noted: 2023-05-17

## 2023-05-17 LAB
ALBUMIN SERPL BCP-MCNC: 3.4 G/DL (ref 3.2–4.9)
ALBUMIN/GLOB SERPL: 1.5 G/DL
ALP SERPL-CCNC: 67 U/L (ref 30–99)
ALT SERPL-CCNC: 12 U/L (ref 2–50)
AMPHET UR QL SCN: NEGATIVE
ANION GAP SERPL CALC-SCNC: 10 MMOL/L (ref 7–16)
AST SERPL-CCNC: 13 U/L (ref 12–45)
BARBITURATES UR QL SCN: NEGATIVE
BASOPHILS # BLD AUTO: 1.3 % (ref 0–1.8)
BASOPHILS # BLD: 0.12 K/UL (ref 0–0.12)
BENZODIAZ UR QL SCN: NEGATIVE
BILIRUB SERPL-MCNC: 0.7 MG/DL (ref 0.1–1.5)
BUN SERPL-MCNC: 4 MG/DL (ref 8–22)
BZE UR QL SCN: NEGATIVE
CALCIUM ALBUM COR SERPL-MCNC: 7.8 MG/DL (ref 8.5–10.5)
CALCIUM SERPL-MCNC: 7.3 MG/DL (ref 8.5–10.5)
CANNABINOIDS UR QL SCN: NEGATIVE
CHLORIDE SERPL-SCNC: 109 MMOL/L (ref 96–112)
CO2 SERPL-SCNC: 20 MMOL/L (ref 20–33)
CREAT SERPL-MCNC: 0.71 MG/DL (ref 0.5–1.4)
EOSINOPHIL # BLD AUTO: 0.26 K/UL (ref 0–0.51)
EOSINOPHIL NFR BLD: 2.7 % (ref 0–6.9)
ERYTHROCYTE [DISTWIDTH] IN BLOOD BY AUTOMATED COUNT: 46.6 FL (ref 35.9–50)
FENTANYL UR QL: NEGATIVE
GFR SERPLBLD CREATININE-BSD FMLA CKD-EPI: 113 ML/MIN/1.73 M 2
GLOBULIN SER CALC-MCNC: 2.2 G/DL (ref 1.9–3.5)
GLUCOSE SERPL-MCNC: 81 MG/DL (ref 65–99)
HCT VFR BLD AUTO: 36.2 % (ref 37–47)
HGB BLD-MCNC: 12 G/DL (ref 12–16)
IMM GRANULOCYTES # BLD AUTO: 0.03 K/UL (ref 0–0.11)
IMM GRANULOCYTES NFR BLD AUTO: 0.3 % (ref 0–0.9)
LYMPHOCYTES # BLD AUTO: 3.89 K/UL (ref 1–4.8)
LYMPHOCYTES NFR BLD: 40.9 % (ref 22–41)
MAGNESIUM SERPL-MCNC: 1.6 MG/DL (ref 1.5–2.5)
MCH RBC QN AUTO: 29.3 PG (ref 27–33)
MCHC RBC AUTO-ENTMCNC: 33.1 G/DL (ref 33.6–35)
MCV RBC AUTO: 88.3 FL (ref 81.4–97.8)
METHADONE UR QL SCN: NEGATIVE
MONOCYTES # BLD AUTO: 0.65 K/UL (ref 0–0.85)
MONOCYTES NFR BLD AUTO: 6.8 % (ref 0–13.4)
NEUTROPHILS # BLD AUTO: 4.57 K/UL (ref 2–7.15)
NEUTROPHILS NFR BLD: 48 % (ref 44–72)
NRBC # BLD AUTO: 0 K/UL
NRBC BLD-RTO: 0 /100 WBC
OPIATES UR QL SCN: POSITIVE
OXYCODONE UR QL SCN: NEGATIVE
PCP UR QL SCN: NEGATIVE
PHOSPHATE SERPL-MCNC: 3.5 MG/DL (ref 2.5–4.5)
PLATELET # BLD AUTO: 327 K/UL (ref 164–446)
PMV BLD AUTO: 9.4 FL (ref 9–12.9)
POTASSIUM SERPL-SCNC: 3.9 MMOL/L (ref 3.6–5.5)
PROPOXYPH UR QL SCN: NEGATIVE
PROT SERPL-MCNC: 5.6 G/DL (ref 6–8.2)
RBC # BLD AUTO: 4.1 M/UL (ref 4.2–5.4)
SODIUM SERPL-SCNC: 139 MMOL/L (ref 135–145)
WBC # BLD AUTO: 9.5 K/UL (ref 4.8–10.8)

## 2023-05-17 PROCEDURE — 96365 THER/PROPH/DIAG IV INF INIT: CPT

## 2023-05-17 PROCEDURE — 700101 HCHG RX REV CODE 250: Mod: UD | Performed by: INTERNAL MEDICINE

## 2023-05-17 PROCEDURE — 700102 HCHG RX REV CODE 250 W/ 637 OVERRIDE(OP): Mod: UD | Performed by: INTERNAL MEDICINE

## 2023-05-17 PROCEDURE — 36415 COLL VENOUS BLD VENIPUNCTURE: CPT

## 2023-05-17 PROCEDURE — 80307 DRUG TEST PRSMV CHEM ANLYZR: CPT

## 2023-05-17 PROCEDURE — 770001 HCHG ROOM/CARE - MED/SURG/GYN PRIV*

## 2023-05-17 PROCEDURE — 99239 HOSP IP/OBS DSCHRG MGMT >30: CPT | Performed by: NURSE PRACTITIONER

## 2023-05-17 PROCEDURE — 700111 HCHG RX REV CODE 636 W/ 250 OVERRIDE (IP): Mod: UD | Performed by: INTERNAL MEDICINE

## 2023-05-17 PROCEDURE — 83735 ASSAY OF MAGNESIUM: CPT

## 2023-05-17 PROCEDURE — 80053 COMPREHEN METABOLIC PANEL: CPT

## 2023-05-17 PROCEDURE — C9113 INJ PANTOPRAZOLE SODIUM, VIA: HCPCS | Mod: UD | Performed by: INTERNAL MEDICINE

## 2023-05-17 PROCEDURE — 96366 THER/PROPH/DIAG IV INF ADDON: CPT

## 2023-05-17 PROCEDURE — 84100 ASSAY OF PHOSPHORUS: CPT

## 2023-05-17 PROCEDURE — 96376 TX/PRO/DX INJ SAME DRUG ADON: CPT

## 2023-05-17 PROCEDURE — A9270 NON-COVERED ITEM OR SERVICE: HCPCS | Mod: UD | Performed by: INTERNAL MEDICINE

## 2023-05-17 PROCEDURE — 85025 COMPLETE CBC W/AUTO DIFF WBC: CPT

## 2023-05-17 RX ADMIN — SUCRALFATE 1 G: 1 TABLET ORAL at 13:01

## 2023-05-17 RX ADMIN — SUCRALFATE 1 G: 1 TABLET ORAL at 01:06

## 2023-05-17 RX ADMIN — SERTRALINE 50 MG: 50 TABLET, FILM COATED ORAL at 06:13

## 2023-05-17 RX ADMIN — PANTOPRAZOLE SODIUM 40 MG: 40 INJECTION, POWDER, FOR SOLUTION INTRAVENOUS at 12:24

## 2023-05-17 RX ADMIN — THIAMINE HCL TAB 100 MG 100 MG: 100 TAB at 06:11

## 2023-05-17 RX ADMIN — ZONISAMIDE 150 MG: 50 CAPSULE ORAL at 01:07

## 2023-05-17 RX ADMIN — THERA TABS 1 TABLET: TAB at 06:12

## 2023-05-17 RX ADMIN — SUCRALFATE 1 G: 1 TABLET ORAL at 08:11

## 2023-05-17 RX ADMIN — FOLIC ACID 1 MG: 1 TABLET ORAL at 06:12

## 2023-05-17 RX ADMIN — PANCRELIPASE 12000 UNITS: 30000; 6000; 19000 CAPSULE, DELAYED RELEASE PELLETS ORAL at 13:00

## 2023-05-17 RX ADMIN — LORAZEPAM 2 MG: 1 TABLET ORAL at 10:58

## 2023-05-17 RX ADMIN — HYDROMORPHONE HYDROCHLORIDE 1 MG: 1 INJECTION, SOLUTION INTRAMUSCULAR; INTRAVENOUS; SUBCUTANEOUS at 08:19

## 2023-05-17 RX ADMIN — LORAZEPAM 3 MG: 1 TABLET ORAL at 04:14

## 2023-05-17 RX ADMIN — HYDROMORPHONE HYDROCHLORIDE 1 MG: 1 INJECTION, SOLUTION INTRAMUSCULAR; INTRAVENOUS; SUBCUTANEOUS at 01:16

## 2023-05-17 RX ADMIN — OXYCODONE 5 MG: 5 TABLET ORAL at 12:23

## 2023-05-17 RX ADMIN — NICOTINE TRANSDERMAL SYSTEM 21 MG: 21 PATCH, EXTENDED RELEASE TRANSDERMAL at 06:10

## 2023-05-17 RX ADMIN — HYDROMORPHONE HYDROCHLORIDE 1 MG: 1 INJECTION, SOLUTION INTRAMUSCULAR; INTRAVENOUS; SUBCUTANEOUS at 05:15

## 2023-05-17 RX ADMIN — ONDANSETRON 4 MG: 4 TABLET, ORALLY DISINTEGRATING ORAL at 13:22

## 2023-05-17 RX ADMIN — PANCRELIPASE 12000 UNITS: 30000; 6000; 19000 CAPSULE, DELAYED RELEASE PELLETS ORAL at 08:10

## 2023-05-17 RX ADMIN — LORAZEPAM 1 MG: 1 TABLET ORAL at 13:22

## 2023-05-17 RX ADMIN — POTASSIUM CHLORIDE AND SODIUM CHLORIDE: 900; 300 INJECTION, SOLUTION INTRAVENOUS at 01:04

## 2023-05-17 RX ADMIN — ARIPIPRAZOLE 5 MG: 5 TABLET ORAL at 06:13

## 2023-05-17 ASSESSMENT — LIFESTYLE VARIABLES
EVER FELT BAD OR GUILTY ABOUT YOUR DRINKING: YES
TOTAL SCORE: 4
ON A TYPICAL DAY WHEN YOU DRINK ALCOHOL HOW MANY DRINKS DO YOU HAVE: 0
PAROXYSMAL SWEATS: NO SWEAT VISIBLE
TOTAL SCORE: 17
PAROXYSMAL SWEATS: NO SWEAT VISIBLE
NAUSEA AND VOMITING: INTERMITTENT NAUSEA WITH DRY HEAVES
AUDITORY DISTURBANCES: NOT PRESENT
HAVE YOU EVER FELT YOU SHOULD CUT DOWN ON YOUR DRINKING: YES
ANXIETY: EQUIVALENT TO ACUTE PANIC STATES AS OCCUR IN SEVERE DELIRIUM OR ACUTE SCHIZOPHRENIC REACTIONS
ORIENTATION AND CLOUDING OF SENSORIUM: ORIENTED AND CAN DO SERIAL ADDITIONS
TREMOR: *
HEADACHE, FULLNESS IN HEAD: NOT PRESENT
AUDITORY DISTURBANCES: MILD HARSHNESS OR ABILITY TO FRIGHTEN
TREMOR: NO TREMOR
VISUAL DISTURBANCES: NOT PRESENT
AUDITORY DISTURBANCES: NOT PRESENT
CONSUMPTION TOTAL: POSITIVE
NAUSEA AND VOMITING: MILD NAUSEA WITH NO VOMITING
PAROXYSMAL SWEATS: NO SWEAT VISIBLE
AGITATION: NORMAL ACTIVITY
TOTAL SCORE: 4
EVER HAD A DRINK FIRST THING IN THE MORNING TO STEADY YOUR NERVES TO GET RID OF A HANGOVER: YES
TACTILE DISTURBANCES: VERY MILD ITCHING, PINS AND NEEDLES SENSATION, BURNING OR NUMBNESS
AGITATION: NORMAL ACTIVITY
VISUAL DISTURBANCES: NOT PRESENT
ALCOHOL_USE: YES
HEADACHE, FULLNESS IN HEAD: VERY MILD
ANXIETY: *
ORIENTATION AND CLOUDING OF SENSORIUM: ORIENTED AND CAN DO SERIAL ADDITIONS
ORIENTATION AND CLOUDING OF SENSORIUM: ORIENTED AND CAN DO SERIAL ADDITIONS
ANXIETY: MODERATELY ANXIOUS OR GUARDED, SO ANXIETY IS INFERRED
HEADACHE, FULLNESS IN HEAD: MODERATELY SEVERE
HOW MANY TIMES IN THE PAST YEAR HAVE YOU HAD 5 OR MORE DRINKS IN A DAY: 0
TOTAL SCORE: 4
DOES PATIENT WANT TO STOP DRINKING: YES
DOES PATIENT WANT TO TALK TO SOMEONE ABOUT QUITTING: NO
AGITATION: NORMAL ACTIVITY
TOTAL SCORE: 8
HAVE PEOPLE ANNOYED YOU BY CRITICIZING YOUR DRINKING: YES
NAUSEA AND VOMITING: *
VISUAL DISTURBANCES: NOT PRESENT
TOTAL SCORE: 12
TREMOR: *
AVERAGE NUMBER OF DAYS PER WEEK YOU HAVE A DRINK CONTAINING ALCOHOL: 0

## 2023-05-17 ASSESSMENT — PATIENT HEALTH QUESTIONNAIRE - PHQ9
1. LITTLE INTEREST OR PLEASURE IN DOING THINGS: SEVERAL DAYS
4. FEELING TIRED OR HAVING LITTLE ENERGY: MORE THAN HALF THE DAYS
8. MOVING OR SPEAKING SO SLOWLY THAT OTHER PEOPLE COULD HAVE NOTICED. OR THE OPPOSITE, BEING SO FIGETY OR RESTLESS THAT YOU HAVE BEEN MOVING AROUND A LOT MORE THAN USUAL: NOT AT ALL
SUM OF ALL RESPONSES TO PHQ9 QUESTIONS 1 AND 2: 2
9. THOUGHTS THAT YOU WOULD BE BETTER OFF DEAD, OR OF HURTING YOURSELF: NOT AT ALL
6. FEELING BAD ABOUT YOURSELF - OR THAT YOU ARE A FAILURE OR HAVE LET YOURSELF OR YOUR FAMILY DOWN: NOT AL ALL
3. TROUBLE FALLING OR STAYING ASLEEP OR SLEEPING TOO MUCH: MORE THAN HALF THE DAYS
2. FEELING DOWN, DEPRESSED, IRRITABLE, OR HOPELESS: SEVERAL DAYS
SUM OF ALL RESPONSES TO PHQ QUESTIONS 1-9: 6
5. POOR APPETITE OR OVEREATING: NOT AT ALL
7. TROUBLE CONCENTRATING ON THINGS, SUCH AS READING THE NEWSPAPER OR WATCHING TELEVISION: NOT AT ALL

## 2023-05-17 ASSESSMENT — PAIN DESCRIPTION - PAIN TYPE: TYPE: CHRONIC PAIN

## 2023-05-17 ASSESSMENT — FIBROSIS 4 INDEX: FIB4 SCORE: 0.41

## 2023-05-17 NOTE — ED TRIAGE NOTES
Pt to triage .  Chief Complaint   Patient presents with    Abdominal Pain     Pt c/o abd pain n/v/d x 4 days. Hst of pancreatitis     Nausea/Vomiting/Diarrhea

## 2023-05-17 NOTE — H&P
Hospital Medicine History & Physical Note    Date of Service  5/16/2023    Primary Care Physician  MALOU Harrington        Code Status  full    Chief Complaint  Chief Complaint   Patient presents with    Abdominal Pain     Pt c/o abd pain n/v/d x 4 days. Hst of pancreatitis     Nausea/Vomiting/Diarrhea       History of Presenting Illness  Rhiannon Marrero is a 38 y.o. 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  Patient stated that last drink was in the end of April.  She does have significant tremulousness on exam, but stated is mostly because she was not able to eat.  In ER she received 3 mg of Dilaudid and still complains of pain 7 out of 10 in the abdomen.  It was noted that patient had CT of the abdomen and pelvis without contrast done on 5/2 and it did show status postcholecystectomy, normal looking pancreas at that time, unremarkable bile ducts, unremarkable liver.  Her blood work showed hypokalemia 3.4, otherwise unremarkable.  Lipase level is decreased, 9.  Alcohol level is not elevated.  I discussed the plan of care with patient.    Review of Systems  Review of Systems   Constitutional:  Negative for chills, fever and weight loss.   HENT:  Negative for ear pain, hearing loss and tinnitus.    Eyes:  Negative for blurred vision, double vision and photophobia.   Respiratory:  Negative for cough, hemoptysis and sputum production.    Cardiovascular:  Negative for chest pain, palpitations and orthopnea.   Gastrointestinal:  Positive for abdominal pain, diarrhea, nausea and vomiting. Negative for heartburn.   Genitourinary:  Negative for dysuria, flank pain, frequency and hematuria.    Musculoskeletal:  Negative for back pain, joint pain and neck pain.   Skin:  Negative for itching and rash.   Neurological:  Negative for tremors, speech change, focal weakness and headaches.   Endo/Heme/Allergies:  Negative for environmental allergies and polydipsia. Does not bruise/bleed easily.   Psychiatric/Behavioral:  Negative for hallucinations and substance abuse. The patient is not nervous/anxious.        Past Medical History   has a past medical history of Acute pancreatitis without infection or necrosis (07/20/2022), Alcohol dependence ([facility]) (04/13/2017), Bronchitis (08/2012), Cold, Current moderate episode of major depressive disorder without prior episode ([facility]) (01/31/2020), Heart burn, Hernia of unspecified site of abdominal cavity without mention of obstruction or gangrene, High anion gap metabolic acidosis (07/01/2022), Indigestion, Pancreatitis, Pneumonia (2011), and Seizure disorder ([facility]) (05/23/2022).    Surgical History   has a past surgical history that includes other orthopedic surgery; gyn surgery; tonsillectomy (4/11/2013); arthroscopy, knee; hysterectomy robotic xi (10/11/2018); salpingectomy (Bilateral, 10/11/2018); orif, wrist (Right, 4/24/2019); pr upper gi endoscopy,diagnosis (N/A, 12/23/2022); pr inject nerv blck,celiac plexus (N/A, 12/23/2022); and egd w/endoscopic ultrasound (N/A, 12/23/2022).     Family History  family history includes Arthritis in her mother; Heart Disease in her maternal grandfather; Psychiatric Illness in her mother; Stroke in her mother.   Family history reviewed with patient. There is no family history that is pertinent to the chief complaint.     Social History   reports that she has been smoking cigarettes. She has a 2.50 pack-year smoking history. She has never used smokeless tobacco. She reports current alcohol use. She reports current drug use. Drug: Inhaled.    Allergies  Allergies   Allergen Reactions    Promethazine Hcl Anxiety     Anxiety and makes  her feels like skin coming off        Medications  Prior to Admission Medications   Prescriptions Last Dose Informant Patient Reported? Taking?   ARIPiprazole (ABILIFY DISCMELT PO) 5/16/2023  Yes Yes   Sig: Take  by mouth.   Sertraline HCl (ZOLOFT PO) 5/16/2023  Yes Yes   Sig: Take  by mouth.   omeprazole (PRILOSEC) 20 MG delayed-release capsule  Patient Yes No   Sig: Take 20 mg by mouth 1 time a day as needed. Indications: Heartburn   ondansetron (ZOFRAN ODT) 4 MG TABLET DISPERSIBLE  Patient No No   Sig: Dissolve 1 Tablet by mouth every 8 hours as needed for Nausea/Vomiting.   pancrelipase, Lip-Prot-Amyl, (CREON 6000) 6000-76723 units Cap DR Particles  Patient No No   Sig: Take 1 Capsule by mouth 3 times a day with meals.   zonisamide (ZONEGRAN) 50 MG capsule  Patient Yes No   Sig: Take 150 mg by mouth at bedtime. 50 mg ( 3 capsules) = 150 mg      Facility-Administered Medications: None       Physical Exam  Temp:  [37.1 °C (98.7 °F)] 37.1 °C (98.7 °F)  Pulse:  [] 97  Resp:  [18] 18  BP: (116-148)/(75-81) 116/75  SpO2:  [97 %-100 %] 99 %  Blood Pressure: 116/75   Temperature: 37.1 °C (98.7 °F)   Pulse: 97   Respiration: 18   Pulse Oximetry: 99 %       Physical Exam  Vitals and nursing note reviewed.   Constitutional:       General: She is not in acute distress.     Appearance: Normal appearance.   HENT:      Head: Normocephalic and atraumatic.      Nose: Nose normal.      Mouth/Throat:      Mouth: Mucous membranes are moist.   Eyes:      Extraocular Movements: Extraocular movements intact.      Pupils: Pupils are equal, round, and reactive to light.   Cardiovascular:      Rate and Rhythm: Normal rate and regular rhythm.   Pulmonary:      Effort: Pulmonary effort is normal.      Breath sounds: Normal breath sounds.   Abdominal:      General: Abdomen is flat. There is no distension.      Tenderness: There is abdominal tenderness in the epigastric area. There is no guarding or rebound.      Comments: Midline  healing surgical incision   Musculoskeletal:         General: No swelling or deformity. Normal range of motion.      Cervical back: Normal range of motion and neck supple.   Skin:     General: Skin is warm and dry.   Neurological:      General: No focal deficit present.      Mental Status: She is alert and oriented to person, place, and time.   Psychiatric:         Mood and Affect: Mood normal.         Behavior: Behavior normal.         Laboratory:  Recent Labs     05/16/23  1818   WBC 8.7   RBC 4.83   HEMOGLOBIN 13.9   HEMATOCRIT 42.0   MCV 87.0   MCH 28.8   MCHC 33.1*   RDW 46.3   PLATELETCT 446   MPV 9.2     Recent Labs     05/16/23  1818   SODIUM 140   POTASSIUM 3.4*   CHLORIDE 102   CO2 22   GLUCOSE 86   BUN 3*   CREATININE 0.70   CALCIUM 9.0     Recent Labs     05/16/23  1818   ALTSGPT 16   ASTSGOT 16   ALKPHOSPHAT 85   TBILIRUBIN 0.5   LIPASE 9*   GLUCOSE 86         No results for input(s): NTPROBNP in the last 72 hours.      No results for input(s): TROPONINT in the last 72 hours.    Imaging:  No orders to display       Assessment/Plan:  Justification for Admission Status  I anticipate this patient is appropriate for observation status at this time because pancreatitis    Patient will need a Med/Surg bed on EMERGENCY service .  The need is secondary to pancreatitis    Exacerbation of chronic pancreatitis  Assessment & Plan  Patient presented with recurrent complaints of upper abdominal pain, nausea and vomiting.  Recent CT of the abdomen result from 5/2 noted.  Patient has history of cholecystectomy.  She is admitted for pain control, IV rehydration, antiacids, IV Zofran as needed, IV Dilaudid as needed  We will double outpatient dose of Creon  Clear liquid diet to start, advance as tolerated  Alcohol cessation counseling  In case of failure to improve clinically, will consider repeat imaging    Alcohol dependence with withdrawal (HCC)- (present on admission)  Assessment & Plan  Monitor with [facility]  protocol  Thiamine supplementation    Hypokalemia due to excessive gastrointestinal loss of potassium- (present on admission)  Assessment & Plan  We will replace potassium by IV route    Seizure disorder (HCC)- (present on admission)  Assessment & Plan  Resume zonisamide    Tobacco use- (present on admission)  Assessment & Plan  Spent approx 5 mins on Tobacco cessation education . Discussed options of nicotine patch, medical treatment with wellbutrin and chantix. Discussed the benefits of quitting smoking and risks of continued smoking including cardiovascular disease, cancer and COPD.   Code 57948      Bipolar affective disorder, current episode hypomanic (HCC)- (present on admission)  Assessment & Plan  Continue aripiprazole        VTE prophylaxis: enoxaparin ppx

## 2023-05-17 NOTE — ASSESSMENT & PLAN NOTE
Spent approx 5 mins on Tobacco cessation education . Discussed options of nicotine patch, medical treatment with wellbutrin and chantix. Discussed the benefits of quitting smoking and risks of continued smoking including cardiovascular disease, cancer and COPD.   Code 29011

## 2023-05-17 NOTE — ED NOTES
Pt states she is feeling more anxiety than pain at this time. Pt requested ativan instead of oxycodone. RN returned oxycodone to [facility].

## 2023-05-17 NOTE — ED NOTES
Attempted to call report to LESLIE GOMEZ Was informed she is in a PT room and will call me back when available.

## 2023-05-17 NOTE — ED PROVIDER NOTES
"ER Provider Note    Scribed for Santiago Chirinos D.O. by Megan Solis. 5/16/2023  7:12 PM    Primary Care Provider: MALOU Harrington    CHIEF COMPLAINT  Chief Complaint   Patient presents with    Abdominal Pain     Pt c/o abd pain n/v/d x 4 days. Hst of pancreatitis     Nausea/Vomiting/Diarrhea       HPI/ROS  LIMITATION TO HISTORY   None  OUTSIDE HISTORIAN(S):  None    Rhiannon Marrero is a 38 y.o. female with a history of abdominal pain who presents to the Emergency Department for acute abdominal pain onset 4 days ago. Patient reports that she is an alcoholic with pancreatitis. She notes she stopped drinking, but had a \"manic episode\" and started drinking again on April 29th. Patient has associated symptoms of nausea, vomiting, and diarrhea. She states she is unable to eat due to the pain. She adds that she is had abdominal surgery for a pancreatic stone and gall bladder removal on 2/25/2023 at [facility].     ROS as per HPI.    PAST MEDICAL HISTORY  Past Medical History:   Diagnosis Date    Acute pancreatitis without infection or necrosis 07/20/2022    Alcohol dependence (HCC) 04/13/2017    Bronchitis 08/2012    Cold     sept 2018    Current moderate episode of major depressive disorder without prior episode (HCC) 01/31/2020    Heart burn     Hernia of unspecified site of abdominal cavity without mention of obstruction or gangrene     High anion gap metabolic acidosis 07/01/2022    Indigestion     Pancreatitis     Pneumonia 2011    Seizure disorder (HCC) 05/23/2022       SURGICAL HISTORY  Past Surgical History:   Procedure Laterality Date    WY UPPER GI ENDOSCOPY,DIAGNOSIS N/A 12/23/2022    Procedure: GASTROSCOPY;  Surgeon: Td Kwok M.D.;  Location: [facility];  Service: EUS    WY INJECT NERV BLCK,CELIAC PLEXUS N/A 12/23/2022    Procedure: BLOCK, CELIAC PLEXUS;  Surgeon: Td Kwok M.D.;  Location: [facility];  Service: EUS    EGD W/ENDOSCOPIC ULTRASOUND N/A 12/23/2022 "    Procedure: EGD, WITH ENDOSCOPIC US;  Surgeon: Td Kwok M.D.;  Location: SURGERY [facility];  Service: EUS    ORIF, WRIST Right 4/24/2019    Procedure: ORIF, WRIST;  Surgeon: Marquis Bell M.D.;  Location: SURGERY [facility];  Service: Orthopedics    HYSTERECTOMY ROBOTIC XI  10/11/2018    Procedure: HYSTERECTOMY ROBOTIC XI- RIGHT URETEROLYSIS;  Surgeon: Marquis Reeder M.D.;  Location: SURGERY [facility];  Service: Gynecology    SALPINGECTOMY Bilateral 10/11/2018    Procedure: SALPINGECTOMY;  Surgeon: Marquis Reeder M.D.;  Location: SURGERY [facility];  Service: Gynecology    TONSILLECTOMY  4/11/2013    Performed by Rock Rothman M.D. at SURGERY SAME DAY [facility]    ARTHROSCOPY, KNEE      GYN SURGERY      miscarriage May 2006    OTHER ORTHOPEDIC SURGERY      knee surgeries       FAMILY HISTORY  Family History   Problem Relation Age of Onset    Psychiatric Illness Mother     Stroke Mother     Arthritis Mother     Heart Disease Maternal Grandfather     Cancer Neg Hx     Diabetes Neg Hx     Hypertension Neg Hx        SOCIAL HISTORY   reports that she has been smoking cigarettes. She has a 2.50 pack-year smoking history. She has never used smokeless tobacco. She reports current alcohol use. She reports current drug use. Drug: Inhaled.    CURRENT MEDICATIONS  Previous Medications    ARIPIPRAZOLE (ABILIFY DISCMELT PO)    Take  by mouth.    OMEPRAZOLE (PRILOSEC) 20 MG DELAYED-RELEASE CAPSULE    Take 20 mg by mouth 1 time a day as needed. Indications: Heartburn    ONDANSETRON (ZOFRAN ODT) 4 MG TABLET DISPERSIBLE    Dissolve 1 Tablet by mouth every 8 hours as needed for Nausea/Vomiting.    PANCRELIPASE, LIP-PROT-AMYL, (CREON 6000) 6000-56104 UNITS CAP DR PARTICLES    Take 1 Capsule by mouth 3 times a day with meals.    SERTRALINE HCL (ZOLOFT PO)    Take  by mouth.    ZONISAMIDE (ZONEGRAN) 50 MG CAPSULE    Take 150 mg by mouth at bedtime. 50 mg ( 3 capsules) = 150 mg        ALLERGIES  Promethazine hcl    PHYSICAL EXAM  BP (!) 148/81   Pulse (!) 128   Temp 37.1 °C (98.7 °F) (Temporal)   Resp 18   Wt 58.7 kg (129 lb 6.6 oz)   LMP 10/30/2018   SpO2 100%   BMI 20.27 kg/m²     General: Moderate acute distress due to pain.   HENT: Normocephalic, Mucus membranes are moist.   Chest: Lungs have even and unlabored respirations, Clear to auscultation.   Cardiovascular: Tachycardiac, No peripheral cyanosis.   Abdomen: Non distended.  Neuro: Awake, Conversive, Able to relay recent events. Diffuse tremors.   Psychiatric: Anxious    EXTERNAL RECORDS REVIEWED  Records show that the patient was admitted to [facility] and discharged on 5/6/2033 for abdominal pain.     INITIAL ASSESSMENT  Patient appears in a state of distress due to the pain. She has chronic pancreatitis and had surgery in February for this. She is tachycardiac. I will treat for pain and administer nausea medication. I will administer her with an IV.     ED Observation Status? Yes; I am placing the patient in to an observation status due to a diagnostic uncertainty as well as therapeutic intensity. Patient placed in observation status at 7:07 PM, 5/16/2023.     Observation plan is as follows: Treat with IV fluids and pain medication an reevaluate after evaluation is completed.     Upon Reevaluation, the patient's condition has: not improved; and will be escalated to hospitalization.    Patient discharged from ED Observation status at 5/16/2023 at 1035    DIAGNOSTIC STUDIES    Labs:   Results for orders placed or performed during the hospital encounter of 05/16/23   CBC with Differential   Result Value Ref Range    WBC 8.7 4.8 - 10.8 K/uL    RBC 4.83 4.20 - 5.40 M/uL    Hemoglobin 13.9 12.0 - 16.0 g/dL    Hematocrit 42.0 37.0 - 47.0 %    MCV 87.0 81.4 - 97.8 fL    MCH 28.8 27.0 - 33.0 pg    MCHC 33.1 (L) 33.6 - 35.0 g/dL    RDW 46.3 35.9 - 50.0 fL    Platelet Count 446 164 - 446 K/uL    MPV 9.2 9.0 - 12.9 fL     Neutrophils-Polys 45.10 44.00 - 72.00 %    Lymphocytes 46.00 (H) 22.00 - 41.00 %    Monocytes 5.80 0.00 - 13.40 %    Eosinophils 1.70 0.00 - 6.90 %    Basophils 1.20 0.00 - 1.80 %    Immature Granulocytes 0.20 0.00 - 0.90 %    Nucleated RBC 0.00 /100 WBC    Neutrophils (Absolute) 3.92 2.00 - 7.15 K/uL    Lymphs (Absolute) 4.00 1.00 - 4.80 K/uL    Monos (Absolute) 0.50 0.00 - 0.85 K/uL    Eos (Absolute) 0.15 0.00 - 0.51 K/uL    Baso (Absolute) 0.10 0.00 - 0.12 K/uL    Immature Granulocytes (abs) 0.02 0.00 - 0.11 K/uL    NRBC (Absolute) 0.00 K/uL   Complete Metabolic Panel   Result Value Ref Range    Sodium 140 135 - 145 mmol/L    Potassium 3.4 (L) 3.6 - 5.5 mmol/L    Chloride 102 96 - 112 mmol/L    Co2 22 20 - 33 mmol/L    Anion Gap 16.0 7.0 - 16.0    Glucose 86 65 - 99 mg/dL    Bun 3 (L) 8 - 22 mg/dL    Creatinine 0.70 0.50 - 1.40 mg/dL    Calcium 9.0 8.5 - 10.5 mg/dL    AST(SGOT) 16 12 - 45 U/L    ALT(SGPT) 16 2 - 50 U/L    Alkaline Phosphatase 85 30 - 99 U/L    Total Bilirubin 0.5 0.1 - 1.5 mg/dL    Albumin 4.4 3.2 - 4.9 g/dL    Total Protein 7.5 6.0 - 8.2 g/dL    Globulin 3.1 1.9 - 3.5 g/dL    A-G Ratio 1.4 g/dL   Lipase   Result Value Ref Range    Lipase 9 (L) 11 - 82 U/L   Urinalysis    Specimen: Urine   Result Value Ref Range    Color Yellow     Character Clear     Specific Gravity 1.003 <1.035    Ph 7.0 5.0 - 8.0    Glucose Negative Negative mg/dL    Ketones Negative Negative mg/dL    Protein Negative Negative mg/dL    Bilirubin Negative Negative    Urobilinogen, Urine 0.2 Negative    Nitrite Negative Negative    Leukocyte Esterase Negative Negative    Occult Blood Negative Negative    Micro Urine Req see below    DIAGNOSTIC ALCOHOL   Result Value Ref Range    Diagnostic Alcohol <10.1 <10.1 mg/dL   ESTIMATED GFR   Result Value Ref Range    GFR (CKD-EPI) 115 >60 mL/min/1.73 m 2   CORRECTED CALCIUM   Result Value Ref Range    Correct Calcium 8.7 8.5 - 10.5 mg/dL   BETA-HCG QUALITATIVE URINE   Result Value Ref  Range    Beta-Hcg Urine Negative Negative      COURSE & MEDICAL DECISION MAKING     COURSE AND PLAN  7:12 PM - Patient seen and examined at bedside. Discussed plan of care, including labs and medication. Patient agrees to the plan of care. The patient will be given IV fluids medicated with Zofran 8 mg injection and Dilaudid injection 1 mg. Ordered for UA, Lipase, CMP. CBC w/ Diff, Diagnostic Alcohol and Beta-HCG Qual Urine to evaluate her symptoms.     ED Summary: Patient presents emergency department with exacerbation of chronic Pancreatitis.  Her previous history was related to alcohol, and she has had stones in the pancreas and she has had surgery for this in March of this year.    For the pain is severe, she had nausea and vomiting and some diarrhea.  She was tachycardic on arrival she was given IV fluids pain medications and antinausea medications.  Her pain is still 8 out of 10 after 3 mg of Dilaudid.  Her nausea is resolved and she is tolerating fluids.  Her potassium is 3.4 no signs of other significant electrolyte imbalance.  White blood cell count is normal.  She is requesting a CT.  Her last CT was done on 5/2/2023 and showed some gastritis.  Proton pump inhibitor will be initiated here 2.    Her pain is requiring significant amounts of narcotics, she will be admitted for pain control, IV hydration.  I spoke with     Decision tools and prescription drugs considered including, but  DISPOSITION AND DISCUSSIONS  I have discussed management of the patient with the following physicians and BILL's:  Dr Mccann       Admitted in guarded condition    FINAL DIAGNOSIS  1. Epigastric abdominal pain    2. Alcohol-induced chronic pancreatitis (HCC)    3. Acute on chronic pancreatitis (HCC)    4. Uncontrolled pain         Megan RASHEED), am scribing for, and in the presence of, Santiago Chirinos D.O..    Electronically signed by: Megan Clark), 5/16/2023    Santiago RASHEED D.O.  personally performed the services described in this documentation, as scribed by Megan Solis in my presence, and it is both accurate and complete.    The note accurately reflects work and decisions made by me.  Santiago Chirinos D.O.  5/16/2023  10:37 PM

## 2023-05-17 NOTE — ED NOTES
Pt provided with hospital prepared meal tray. Pt resting in room, easy, equal chest rise and fall. Pt remains calm and cooperative.

## 2023-05-17 NOTE — PROGRESS NOTES
[facility] [place]. Admission begun.

## 2023-05-17 NOTE — ASSESSMENT & PLAN NOTE
Received 6 mg Dilaudid from ED staff past admission  With no objective results indicative of abdominal pain

## 2023-05-17 NOTE — PROGRESS NOTES
"Patient found walking in hallway fully dressed. States \"will you get this thing out of me?\", while pointing to the IV. States is going to \"another hospital to get the medicine the doctor and I agreed on last night\". IV DC'd. NP informed. Charge informed. ER charge informed.   "

## 2023-05-17 NOTE — ED NOTES
Bedside report received from LESLIE Aguilar. Pt sitting up, AOx4, on room air, with potassium/NS infusion running at 100 mL/hr on a pump. Pt expresses no needs at this time.

## 2023-05-17 NOTE — PROGRESS NOTES
UA DOA ordered by NP. Checked with lab. Not enough specimen to run. Will need to recollect. RN in ER notified.

## 2023-05-17 NOTE — ASSESSMENT & PLAN NOTE
Patient presented with recurrent complaints of upper abdominal pain, nausea and vomiting.  Recent CT of the abdomen result from 5/2 noted.  Patient has history of cholecystectomy.  She is admitted for pain control, IV rehydration, antiacids, IV Zofran as needed, IV Dilaudid as needed  We will double outpatient dose of Creon  Clear liquid diet to start, advance as tolerated  Alcohol cessation counseling  In case of failure to improve clinically, will consider repeat imaging

## 2023-05-17 NOTE — DISCHARGE SUMMARY
Discharge Summary    CHIEF COMPLAINT ON ADMISSION  Chief Complaint   Patient presents with    Abdominal Pain     Pt c/o abd pain n/v/d x 4 days. Hst of pancreatitis     Nausea/Vomiting/Diarrhea       Reason for Admission  other     Admission Date  5/16/2023    CODE STATUS  Full Code    HPI & HOSPITAL COURSE  Rhiannon Marrero is a 38 y.o. 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  Patient stated that last drink was in the end of April.  She does have significant tremulousness on exam, but stated is mostly because she was not able to eat.  In ER she received 3 mg of Dilaudid and still complains of pain 7 out of 10 in the abdomen.  It was noted that patient had CT of the abdomen and pelvis without contrast done on 5/2 and it did show status postcholecystectomy, normal looking pancreas at that time, unremarkable bile ducts, unremarkable liver.  Her blood work showed hypokalemia 3.4, otherwise unremarkable.  Lipase level is decreased, 9.  Alcohol level is not elevated     Interval Problem Update  5/16  Patient somnolent when I went into the room  Has been requiring many doses of Dilaudid IV  Lab work relatively unremarkable   Lipase is 9  CT abdomen pelvis ordered  Tox screen ordered  Patient has received 6 mg of Dilaudid since presenting in the ED   However she appears high on my evaluation   Dilaudid discontinued    Patient subsequently decided to leave AMA once her Dilaudid was discontinued.       Therefore, she is discharged in fair and stable condition against medcial advice.    The patient recovered much more quickly than anticipated on admission.    Discharge Date  5/17/2023    FOLLOW UP  ITEMS POST DISCHARGE      DISCHARGE DIAGNOSES  Principal Problem:    Abdominal pain (POA: Yes)  Active Problems:    Bipolar affective disorder, current episode hypomanic (HCC) (POA: Yes)    Tobacco use (POA: Yes)    Seizure disorder (HCC) (POA: Yes)    Hypokalemia due to excessive gastrointestinal loss of potassium (POA: Yes)    Alcohol dependence with withdrawal (HCC) (POA: Yes)  Resolved Problems:    * No resolved hospital problems. *      FOLLOW UP  No future appointments.  No follow-up provider specified.    MEDICATIONS ON DISCHARGE     Medication List        ASK your doctor about these medications        Instructions   ABILIFY DISCMELT PO   Take 1 Tablet by mouth every day.  Dose: 1 Tablet     BUSPAR PO   Take 1 Tablet by mouth 2 times a day.  Dose: 1 Tablet     Creon 53214-335888 units Cpep  Generic drug: Pancrelipase (Lip-Prot-Amyl)  Ask about: Which instructions should I use?   Take 1 Capful by mouth 3 times a day with meals.  Dose: 1 Capful     ZOLOFT PO   Take 1 Tablet by mouth every day.  Dose: 1 Tablet              Allergies  Allergies   Allergen Reactions    Promethazine Hcl Anxiety     Anxiety and makes her feels like skin coming off        DIET  Orders Placed This Encounter   Procedures    Diet Order Diet: Clear Liquid     Standing Status:   Standing     Number of Occurrences:   1     Order Specific Question:   Diet:     Answer:   Clear Liquid [10]       ACTIVITY  As tolerated.  Weight bearing as tolerated    CONSULTATIONS  None     PROCEDURES  none    LABORATORY  Lab Results   Component Value Date    SODIUM 139 05/17/2023    POTASSIUM 3.9 05/17/2023    CHLORIDE 109 05/17/2023    CO2 20 05/17/2023    GLUCOSE 81 05/17/2023    BUN 4 (L) 05/17/2023    CREATININE 0.71 05/17/2023    CREATININE 0.7 06/10/2008    GLOMRATE 79 05/07/2023        Lab Results   Component Value Date    WBC 9.5 05/17/2023    HEMOGLOBIN 12.0 05/17/2023    HEMATOCRIT 36.2 (L) 05/17/2023    PLATELETCT 327 05/17/2023        Total time  of the discharge process was 35 minutes.

## 2023-05-17 NOTE — PROGRESS NOTES
[facility] Medicine Daily Progress Note    Date of Service  5/17/2023    Chief Complaint  Rhiannon Marrero is a 38 y.o. female admitted 5/16/2023 with abdominal pain.    Hospital Course  Rhiannon Marrero is a 38 y.o. 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  Patient stated that last drink was in the end of April.  She does have significant tremulousness on exam, but stated is mostly because she was not able to eat.  In ER she received 3 mg of Dilaudid and still complains of pain 7 out of 10 in the abdomen.  It was noted that patient had CT of the abdomen and pelvis without contrast done on 5/2 and it did show status postcholecystectomy, normal looking pancreas at that time, unremarkable bile ducts, unremarkable liver.  Her blood work showed hypokalemia 3.4, otherwise unremarkable.  Lipase level is decreased, 9.  Alcohol level is not elevated    Interval Problem Update  5/16  Patient somnolent when I went into the room  Has been requiring many doses of Dilaudid IV  Lab work relatively unremarkable   Lipase is 9  CT abdomen pelvis ordered  Tox screen ordered          I have discussed this patient's plan of care and discharge plan at IDT rounds today with Case Management, Nursing, Nursing leadership, and other members of the IDT team.    Consultants/Specialty  None    Code Status  Full Code    Disposition  <MEDICALLYCLEARED>  I have placed the appropriate orders for post-discharge needs.    Review of Systems  ROS     Physical Exam  Temp:  [36.6 °C (97.8 °F)-37.1 °C (98.7 °F)] 36.6 °C (97.8 °F)  Pulse:  [] 108  Resp:  [18] 18  BP: (112-150)/() 123/75  SpO2:  [92 %-100 %] 97  %    Physical Exam  Vitals and nursing note reviewed.   Constitutional:       Appearance: Normal appearance.   HENT:      Head: Normocephalic and atraumatic.      Mouth/Throat:      Mouth: Mucous membranes are moist.   Eyes:      Extraocular Movements: Extraocular movements intact.      Conjunctiva/sclera: Conjunctivae normal.      Pupils: Pupils are equal, round, and reactive to light.   Cardiovascular:      Rate and Rhythm: Normal rate and regular rhythm.      Pulses: Normal pulses.      Heart sounds: Normal heart sounds. No murmur heard.  Pulmonary:      Breath sounds: Normal breath sounds. No wheezing, rhonchi or rales.   Abdominal:      General: Abdomen is flat. Bowel sounds are normal.      Palpations: Abdomen is soft.      Tenderness: There is abdominal tenderness. There is guarding. There is no rebound.   Musculoskeletal:         General: Normal range of motion.      Cervical back: Normal range of motion.      Right lower leg: No edema.      Left lower leg: No edema.   Skin:     General: Skin is warm and dry.   Neurological:      General: No focal deficit present.      Mental Status: She is alert and oriented to person, place, and time.   Psychiatric:         Mood and Affect: Mood normal.         Thought Content: Thought content normal.         Fluids    Intake/Output Summary (Last 24 hours) at 5/17/2023 1315  Last data filed at 5/16/2023 2307  Gross per 24 hour   Intake 2100 ml   Output --   Net 2100 ml       Laboratory  Recent Labs     05/16/23  1818 05/17/23  0322   WBC 8.7 9.5   RBC 4.83 4.10*   HEMOGLOBIN 13.9 12.0   HEMATOCRIT 42.0 36.2*   MCV 87.0 88.3   MCH 28.8 29.3   MCHC 33.1* 33.1*   RDW 46.3 46.6   PLATELETCT 446 327   MPV 9.2 9.4     Recent Labs     05/16/23  1818 05/17/23  0322   SODIUM 140 139   POTASSIUM 3.4* 3.9   CHLORIDE 102 109   CO2 22 20   GLUCOSE 86 81   BUN 3* 4*   CREATININE 0.70 0.71   CALCIUM 9.0 7.3*                   Imaging  CT-ABDOMEN-PELVIS WITH    (Results Pending)         Assessment/Plan  Exacerbation of chronic pancreatitis  Assessment & Plan  Patient presented with recurrent complaints of upper abdominal pain, nausea and vomiting.  Recent CT of the abdomen result from 5/2 noted.  Patient has history of cholecystectomy.  She is admitted for pain control, IV rehydration, antiacids, IV Zofran as needed, IV Dilaudid as needed  We will double outpatient dose of Creon  Clear liquid diet to start, advance as tolerated  Alcohol cessation counseling  In case of failure to improve clinically, will consider repeat imaging    Alcohol dependence with withdrawal (HCC)- (present on admission)  Assessment & Plan  Monitor with CIWA protocol  Thiamine supplementation    Hypokalemia due to excessive gastrointestinal loss of potassium- (present on admission)  Assessment & Plan  We will replace potassium by IV route    Seizure disorder (HCC)- (present on admission)  Assessment & Plan  Resume zonisamide    Tobacco use- (present on admission)  Assessment & Plan  Spent approx 5 mins on Tobacco cessation education . Discussed options of nicotine patch, medical treatment with wellbutrin and chantix. Discussed the benefits of quitting smoking and risks of continued smoking including cardiovascular disease, cancer and COPD.   Code 63226      Bipolar affective disorder, current episode hypomanic (HCC)- (present on admission)  Assessment & Plan  Continue aripiprazole         VTE prophylaxis: enoxaparin ppx    I have performed a physical exam and reviewed and updated ROS and Plan today (5/17/2023). In review of yesterday's note (5/16/2023), there are no changes except as documented above.

## 2023-05-23 ENCOUNTER — APPOINTMENT (OUTPATIENT)
Dept: RADIOLOGY | Facility: MEDICAL CENTER | Age: 38
DRG: 897 | End: 2023-05-23
Attending: EMERGENCY MEDICINE
Payer: MEDICAID

## 2023-05-23 ENCOUNTER — HOSPITAL ENCOUNTER (INPATIENT)
Facility: MEDICAL CENTER | Age: 38
LOS: 2 days | DRG: 897 | End: 2023-05-26
Attending: EMERGENCY MEDICINE | Admitting: HOSPITALIST
Payer: MEDICAID

## 2023-05-23 DIAGNOSIS — E87.6 HYPOKALEMIA: ICD-10-CM

## 2023-05-23 DIAGNOSIS — R10.9 FLANK PAIN: ICD-10-CM

## 2023-05-23 DIAGNOSIS — R19.7 DIARRHEA, UNSPECIFIED TYPE: ICD-10-CM

## 2023-05-23 DIAGNOSIS — R10.9 ABDOMINAL PAIN, UNSPECIFIED ABDOMINAL LOCATION: ICD-10-CM

## 2023-05-23 DIAGNOSIS — F10.929 ALCOHOLIC INTOXICATION WITH COMPLICATION (HCC): ICD-10-CM

## 2023-05-23 PROBLEM — E87.20 METABOLIC ACIDOSIS: Status: ACTIVE | Noted: 2023-05-23

## 2023-05-23 LAB
ALBUMIN SERPL BCP-MCNC: 4.1 G/DL (ref 3.2–4.9)
ALBUMIN/GLOB SERPL: 1.6 G/DL
ALP SERPL-CCNC: 85 U/L (ref 30–99)
ALT SERPL-CCNC: 16 U/L (ref 2–50)
AMPHET UR QL SCN: NEGATIVE
ANION GAP SERPL CALC-SCNC: 20 MMOL/L (ref 7–16)
APPEARANCE UR: CLEAR
AST SERPL-CCNC: 24 U/L (ref 12–45)
BARBITURATES UR QL SCN: NEGATIVE
BASOPHILS # BLD AUTO: 0.8 % (ref 0–1.8)
BASOPHILS # BLD: 0.07 K/UL (ref 0–0.12)
BENZODIAZ UR QL SCN: NEGATIVE
BILIRUB SERPL-MCNC: 0.3 MG/DL (ref 0.1–1.5)
BILIRUB UR QL STRIP.AUTO: NEGATIVE
BUN SERPL-MCNC: 10 MG/DL (ref 8–22)
BZE UR QL SCN: NEGATIVE
CALCIUM ALBUM COR SERPL-MCNC: 8.3 MG/DL (ref 8.5–10.5)
CALCIUM SERPL-MCNC: 8.4 MG/DL (ref 8.4–10.2)
CANNABINOIDS UR QL SCN: NEGATIVE
CHLORIDE SERPL-SCNC: 109 MMOL/L (ref 96–112)
CO2 SERPL-SCNC: 14 MMOL/L (ref 20–33)
COLOR UR: YELLOW
CREAT SERPL-MCNC: 0.67 MG/DL (ref 0.5–1.4)
EKG IMPRESSION: NORMAL
EOSINOPHIL # BLD AUTO: 0.12 K/UL (ref 0–0.51)
EOSINOPHIL NFR BLD: 1.3 % (ref 0–6.9)
ERYTHROCYTE [DISTWIDTH] IN BLOOD BY AUTOMATED COUNT: 47.6 FL (ref 35.9–50)
ETHANOL BLD-MCNC: 267 MG/DL
GFR SERPLBLD CREATININE-BSD FMLA CKD-EPI: 116 ML/MIN/1.73 M 2
GLOBULIN SER CALC-MCNC: 2.6 G/DL (ref 1.9–3.5)
GLUCOSE SERPL-MCNC: 133 MG/DL (ref 65–99)
GLUCOSE UR STRIP.AUTO-MCNC: NEGATIVE MG/DL
HCT VFR BLD AUTO: 38.1 % (ref 37–47)
HGB BLD-MCNC: 12.9 G/DL (ref 12–16)
IMM GRANULOCYTES # BLD AUTO: 0.02 K/UL (ref 0–0.11)
IMM GRANULOCYTES NFR BLD AUTO: 0.2 % (ref 0–0.9)
KETONES UR STRIP.AUTO-MCNC: NEGATIVE MG/DL
LEUKOCYTE ESTERASE UR QL STRIP.AUTO: NEGATIVE
LIPASE SERPL-CCNC: 17 U/L (ref 7–58)
LYMPHOCYTES # BLD AUTO: 2.68 K/UL (ref 1–4.8)
LYMPHOCYTES NFR BLD: 29.2 % (ref 22–41)
MAGNESIUM SERPL-MCNC: 2 MG/DL (ref 1.5–2.5)
MCH RBC QN AUTO: 29.7 PG (ref 27–33)
MCHC RBC AUTO-ENTMCNC: 33.9 G/DL (ref 32.2–35.5)
MCV RBC AUTO: 87.8 FL (ref 81.4–97.8)
METHADONE UR QL SCN: NEGATIVE
MICRO URNS: NORMAL
MONOCYTES # BLD AUTO: 0.36 K/UL (ref 0–0.85)
MONOCYTES NFR BLD AUTO: 3.9 % (ref 0–13.4)
NEUTROPHILS # BLD AUTO: 5.93 K/UL (ref 1.82–7.42)
NEUTROPHILS NFR BLD: 64.6 % (ref 44–72)
NITRITE UR QL STRIP.AUTO: NEGATIVE
NRBC # BLD AUTO: 0 K/UL
NRBC BLD-RTO: 0 /100 WBC (ref 0–0.2)
OPIATES UR QL SCN: POSITIVE
OXYCODONE UR QL SCN: NEGATIVE
PCP UR QL SCN: NEGATIVE
PH UR STRIP.AUTO: 5.5 [PH] (ref 5–8)
PLATELET # BLD AUTO: 345 K/UL (ref 164–446)
PMV BLD AUTO: 9.4 FL (ref 9–12.9)
POTASSIUM SERPL-SCNC: 2.7 MMOL/L (ref 3.6–5.5)
PROPOXYPH UR QL SCN: NEGATIVE
PROT SERPL-MCNC: 6.7 G/DL (ref 6–8.2)
PROT UR QL STRIP: NEGATIVE MG/DL
RBC # BLD AUTO: 4.34 M/UL (ref 4.2–5.4)
RBC UR QL AUTO: NEGATIVE
SODIUM SERPL-SCNC: 143 MMOL/L (ref 135–145)
SP GR UR STRIP.AUTO: 1.01
WBC # BLD AUTO: 9.2 K/UL (ref 4.8–10.8)

## 2023-05-23 PROCEDURE — 81003 URINALYSIS AUTO W/O SCOPE: CPT

## 2023-05-23 PROCEDURE — 96365 THER/PROPH/DIAG IV INF INIT: CPT

## 2023-05-23 PROCEDURE — 700111 HCHG RX REV CODE 636 W/ 250 OVERRIDE (IP): Performed by: EMERGENCY MEDICINE

## 2023-05-23 PROCEDURE — 74177 CT ABD & PELVIS W/CONTRAST: CPT

## 2023-05-23 PROCEDURE — 700105 HCHG RX REV CODE 258: Performed by: EMERGENCY MEDICINE

## 2023-05-23 PROCEDURE — 83735 ASSAY OF MAGNESIUM: CPT

## 2023-05-23 PROCEDURE — 700111 HCHG RX REV CODE 636 W/ 250 OVERRIDE (IP): Performed by: HOSPITALIST

## 2023-05-23 PROCEDURE — 96367 TX/PROPH/DG ADDL SEQ IV INF: CPT

## 2023-05-23 PROCEDURE — 96375 TX/PRO/DX INJ NEW DRUG ADDON: CPT

## 2023-05-23 PROCEDURE — 93005 ELECTROCARDIOGRAM TRACING: CPT | Performed by: EMERGENCY MEDICINE

## 2023-05-23 PROCEDURE — 83690 ASSAY OF LIPASE: CPT

## 2023-05-23 PROCEDURE — G0378 HOSPITAL OBSERVATION PER HR: HCPCS

## 2023-05-23 PROCEDURE — HZ2ZZZZ DETOXIFICATION SERVICES FOR SUBSTANCE ABUSE TREATMENT: ICD-10-PCS | Performed by: HOSPITALIST

## 2023-05-23 PROCEDURE — 36415 COLL VENOUS BLD VENIPUNCTURE: CPT

## 2023-05-23 PROCEDURE — 85025 COMPLETE CBC W/AUTO DIFF WBC: CPT

## 2023-05-23 PROCEDURE — 96368 THER/DIAG CONCURRENT INF: CPT

## 2023-05-23 PROCEDURE — 99223 1ST HOSP IP/OBS HIGH 75: CPT | Performed by: HOSPITALIST

## 2023-05-23 PROCEDURE — 80307 DRUG TEST PRSMV CHEM ANLYZR: CPT

## 2023-05-23 PROCEDURE — 82077 ASSAY SPEC XCP UR&BREATH IA: CPT

## 2023-05-23 PROCEDURE — 700101 HCHG RX REV CODE 250: Performed by: EMERGENCY MEDICINE

## 2023-05-23 PROCEDURE — 700102 HCHG RX REV CODE 250 W/ 637 OVERRIDE(OP): Performed by: HOSPITALIST

## 2023-05-23 PROCEDURE — 99285 EMERGENCY DEPT VISIT HI MDM: CPT

## 2023-05-23 PROCEDURE — 700101 HCHG RX REV CODE 250: Performed by: HOSPITALIST

## 2023-05-23 PROCEDURE — 700117 HCHG RX CONTRAST REV CODE 255: Performed by: EMERGENCY MEDICINE

## 2023-05-23 PROCEDURE — 96376 TX/PRO/DX INJ SAME DRUG ADON: CPT

## 2023-05-23 PROCEDURE — A9270 NON-COVERED ITEM OR SERVICE: HCPCS | Performed by: HOSPITALIST

## 2023-05-23 PROCEDURE — 80053 COMPREHEN METABOLIC PANEL: CPT

## 2023-05-23 RX ORDER — POTASSIUM CHLORIDE 7.45 MG/ML
10 INJECTION INTRAVENOUS ONCE
Status: COMPLETED | OUTPATIENT
Start: 2023-05-23 | End: 2023-05-23

## 2023-05-23 RX ORDER — ENOXAPARIN SODIUM 100 MG/ML
40 INJECTION SUBCUTANEOUS DAILY
Status: DISCONTINUED | OUTPATIENT
Start: 2023-05-23 | End: 2023-05-26 | Stop reason: HOSPADM

## 2023-05-23 RX ORDER — MORPHINE SULFATE 4 MG/ML
4 INJECTION INTRAVENOUS ONCE
Status: COMPLETED | OUTPATIENT
Start: 2023-05-23 | End: 2023-05-23

## 2023-05-23 RX ORDER — LORAZEPAM 0.5 MG/1
0.5 TABLET ORAL EVERY 4 HOURS PRN
Status: DISCONTINUED | OUTPATIENT
Start: 2023-05-23 | End: 2023-05-26 | Stop reason: HOSPADM

## 2023-05-23 RX ORDER — ONDANSETRON 2 MG/ML
4 INJECTION INTRAMUSCULAR; INTRAVENOUS ONCE
Status: COMPLETED | OUTPATIENT
Start: 2023-05-23 | End: 2023-05-23

## 2023-05-23 RX ORDER — KETOROLAC TROMETHAMINE 30 MG/ML
15 INJECTION, SOLUTION INTRAMUSCULAR; INTRAVENOUS ONCE
Status: COMPLETED | OUTPATIENT
Start: 2023-05-23 | End: 2023-05-23

## 2023-05-23 RX ORDER — LORAZEPAM 1 MG/1
2 TABLET ORAL
Status: DISCONTINUED | OUTPATIENT
Start: 2023-05-23 | End: 2023-05-26 | Stop reason: HOSPADM

## 2023-05-23 RX ORDER — POTASSIUM CHLORIDE 7.45 MG/ML
10 INJECTION INTRAVENOUS
Status: DISPENSED | OUTPATIENT
Start: 2023-05-23 | End: 2023-05-23

## 2023-05-23 RX ORDER — MORPHINE SULFATE 4 MG/ML
4 INJECTION INTRAVENOUS
Status: DISCONTINUED | OUTPATIENT
Start: 2023-05-23 | End: 2023-05-26 | Stop reason: HOSPADM

## 2023-05-23 RX ORDER — BUSPIRONE HYDROCHLORIDE 10 MG/1
10 TABLET ORAL 3 TIMES DAILY
COMMUNITY
End: 2023-07-24

## 2023-05-23 RX ORDER — POLYETHYLENE GLYCOL 3350 17 G/17G
1 POWDER, FOR SOLUTION ORAL
Status: DISCONTINUED | OUTPATIENT
Start: 2023-05-23 | End: 2023-05-26 | Stop reason: HOSPADM

## 2023-05-23 RX ORDER — BUSPIRONE HYDROCHLORIDE 5 MG/1
10 TABLET ORAL 3 TIMES DAILY
Status: DISCONTINUED | OUTPATIENT
Start: 2023-05-23 | End: 2023-05-26 | Stop reason: HOSPADM

## 2023-05-23 RX ORDER — LORAZEPAM 1 MG/1
3 TABLET ORAL
Status: DISCONTINUED | OUTPATIENT
Start: 2023-05-23 | End: 2023-05-26 | Stop reason: HOSPADM

## 2023-05-23 RX ORDER — LORAZEPAM 2 MG/ML
2 INJECTION INTRAMUSCULAR
Status: DISCONTINUED | OUTPATIENT
Start: 2023-05-23 | End: 2023-05-26 | Stop reason: HOSPADM

## 2023-05-23 RX ORDER — ACETAMINOPHEN 325 MG/1
650 TABLET ORAL EVERY 6 HOURS PRN
Status: DISCONTINUED | OUTPATIENT
Start: 2023-05-23 | End: 2023-05-26 | Stop reason: HOSPADM

## 2023-05-23 RX ORDER — LORAZEPAM 2 MG/ML
0.5 INJECTION INTRAMUSCULAR EVERY 4 HOURS PRN
Status: DISCONTINUED | OUTPATIENT
Start: 2023-05-23 | End: 2023-05-26 | Stop reason: HOSPADM

## 2023-05-23 RX ORDER — POTASSIUM CHLORIDE 7.45 MG/ML
10 INJECTION INTRAVENOUS ONCE
Status: DISCONTINUED | OUTPATIENT
Start: 2023-05-23 | End: 2023-05-23

## 2023-05-23 RX ORDER — ARIPIPRAZOLE 2 MG/1
2 TABLET ORAL DAILY
COMMUNITY
End: 2023-07-24

## 2023-05-23 RX ORDER — BISACODYL 10 MG
10 SUPPOSITORY, RECTAL RECTAL
Status: DISCONTINUED | OUTPATIENT
Start: 2023-05-23 | End: 2023-05-26 | Stop reason: HOSPADM

## 2023-05-23 RX ORDER — NICOTINE 21 MG/24HR
1 PATCH, TRANSDERMAL 24 HOURS TRANSDERMAL
Status: DISCONTINUED | OUTPATIENT
Start: 2023-05-24 | End: 2023-05-26 | Stop reason: HOSPADM

## 2023-05-23 RX ORDER — SODIUM CHLORIDE AND POTASSIUM CHLORIDE 300; 900 MG/100ML; MG/100ML
INJECTION, SOLUTION INTRAVENOUS CONTINUOUS
Status: DISCONTINUED | OUTPATIENT
Start: 2023-05-23 | End: 2023-05-26

## 2023-05-23 RX ORDER — ONDANSETRON 4 MG/1
4 TABLET, ORALLY DISINTEGRATING ORAL EVERY 4 HOURS PRN
Status: DISCONTINUED | OUTPATIENT
Start: 2023-05-23 | End: 2023-05-26 | Stop reason: HOSPADM

## 2023-05-23 RX ORDER — OXYCODONE HYDROCHLORIDE 5 MG/1
5 TABLET ORAL
Status: DISCONTINUED | OUTPATIENT
Start: 2023-05-23 | End: 2023-05-26 | Stop reason: HOSPADM

## 2023-05-23 RX ORDER — ONDANSETRON 2 MG/ML
4 INJECTION INTRAMUSCULAR; INTRAVENOUS EVERY 4 HOURS PRN
Status: DISCONTINUED | OUTPATIENT
Start: 2023-05-23 | End: 2023-05-26 | Stop reason: HOSPADM

## 2023-05-23 RX ORDER — SODIUM CHLORIDE 9 MG/ML
1000 INJECTION, SOLUTION INTRAVENOUS ONCE
Status: COMPLETED | OUTPATIENT
Start: 2023-05-23 | End: 2023-05-23

## 2023-05-23 RX ORDER — LORAZEPAM 1 MG/1
4 TABLET ORAL
Status: DISCONTINUED | OUTPATIENT
Start: 2023-05-23 | End: 2023-05-26 | Stop reason: HOSPADM

## 2023-05-23 RX ORDER — AMOXICILLIN 250 MG
2 CAPSULE ORAL 2 TIMES DAILY
Status: DISCONTINUED | OUTPATIENT
Start: 2023-05-23 | End: 2023-05-26 | Stop reason: HOSPADM

## 2023-05-23 RX ORDER — LORAZEPAM 2 MG/ML
1.5 INJECTION INTRAMUSCULAR
Status: DISCONTINUED | OUTPATIENT
Start: 2023-05-23 | End: 2023-05-26 | Stop reason: HOSPADM

## 2023-05-23 RX ORDER — ARIPIPRAZOLE 2 MG/1
2 TABLET ORAL DAILY
Status: DISCONTINUED | OUTPATIENT
Start: 2023-05-24 | End: 2023-05-26 | Stop reason: HOSPADM

## 2023-05-23 RX ORDER — OXYCODONE HYDROCHLORIDE 10 MG/1
10 TABLET ORAL
Status: DISCONTINUED | OUTPATIENT
Start: 2023-05-23 | End: 2023-05-26 | Stop reason: HOSPADM

## 2023-05-23 RX ORDER — LORAZEPAM 2 MG/ML
1 INJECTION INTRAMUSCULAR
Status: DISCONTINUED | OUTPATIENT
Start: 2023-05-23 | End: 2023-05-26 | Stop reason: HOSPADM

## 2023-05-23 RX ORDER — LORAZEPAM 1 MG/1
1 TABLET ORAL EVERY 4 HOURS PRN
Status: DISCONTINUED | OUTPATIENT
Start: 2023-05-23 | End: 2023-05-26 | Stop reason: HOSPADM

## 2023-05-23 RX ADMIN — OXYCODONE 5 MG: 5 TABLET ORAL at 20:18

## 2023-05-23 RX ADMIN — LORAZEPAM 0.5 MG: 0.5 TABLET ORAL at 20:18

## 2023-05-23 RX ADMIN — KETOROLAC TROMETHAMINE 15 MG: 30 INJECTION, SOLUTION INTRAMUSCULAR at 17:41

## 2023-05-23 RX ADMIN — BUSPIRONE HYDROCHLORIDE 10 MG: 5 TABLET ORAL at 21:44

## 2023-05-23 RX ADMIN — POTASSIUM CHLORIDE AND SODIUM CHLORIDE: 900; 300 INJECTION, SOLUTION INTRAVENOUS at 20:36

## 2023-05-23 RX ADMIN — ONDANSETRON 4 MG: 2 INJECTION INTRAMUSCULAR; INTRAVENOUS at 17:03

## 2023-05-23 RX ADMIN — LORAZEPAM 3 MG: 1 TABLET ORAL at 23:18

## 2023-05-23 RX ADMIN — POTASSIUM CHLORIDE 10 MEQ: 7.46 INJECTION, SOLUTION INTRAVENOUS at 21:56

## 2023-05-23 RX ADMIN — THIAMINE HYDROCHLORIDE: 100 INJECTION, SOLUTION INTRAMUSCULAR; INTRAVENOUS at 20:37

## 2023-05-23 RX ADMIN — IOHEXOL 100 ML: 350 INJECTION, SOLUTION INTRAVENOUS at 18:42

## 2023-05-23 RX ADMIN — MORPHINE SULFATE 4 MG: 4 INJECTION INTRAVENOUS at 18:20

## 2023-05-23 RX ADMIN — MORPHINE SULFATE 4 MG: 4 INJECTION INTRAVENOUS at 21:48

## 2023-05-23 RX ADMIN — POTASSIUM CHLORIDE 10 MEQ: 7.46 INJECTION, SOLUTION INTRAVENOUS at 18:56

## 2023-05-23 RX ADMIN — SODIUM CHLORIDE 1000 ML: 9 INJECTION, SOLUTION INTRAVENOUS at 17:02

## 2023-05-23 RX ADMIN — MORPHINE SULFATE 4 MG: 4 INJECTION INTRAVENOUS at 17:03

## 2023-05-23 ASSESSMENT — ENCOUNTER SYMPTOMS
COUGH: 0
NAUSEA: 1
STRIDOR: 0
EYE REDNESS: 0
FOCAL WEAKNESS: 0
SHORTNESS OF BREATH: 0
MYALGIAS: 0
VOMITING: 1
ABDOMINAL PAIN: 1
CHILLS: 0
EYE DISCHARGE: 0
FEVER: 0
NERVOUS/ANXIOUS: 0
BRUISES/BLEEDS EASILY: 0
FLANK PAIN: 0

## 2023-05-23 ASSESSMENT — LIFESTYLE VARIABLES
ANXIETY: *
VISUAL DISTURBANCES: NOT PRESENT
ORIENTATION AND CLOUDING OF SENSORIUM: ORIENTED AND CAN DO SERIAL ADDITIONS
TOTAL SCORE: 24
PAROXYSMAL SWEATS: *
NAUSEA AND VOMITING: NO NAUSEA AND NO VOMITING
TOTAL SCORE: MILD ITCHING, PINS AND NEEDLES SENSATION, BURNING OR NUMBNESS
HEADACHE, FULLNESS IN HEAD: MODERATE
AGITATION: *
AUDITORY DISTURBANCES: NOT PRESENT
TREMOR: *

## 2023-05-23 ASSESSMENT — FIBROSIS 4 INDEX: FIB4 SCORE: 0.63

## 2023-05-23 NOTE — ED TRIAGE NOTES
Pt BIB [facility] with c/o L flank pain and LUQ pain. Pt has a hx of ETOH abuse and pancreatitis. Pt states is supposed to go to rehab tomorrow so decided to drink again today and is not in pain.

## 2023-05-24 ENCOUNTER — APPOINTMENT (OUTPATIENT)
Dept: RADIOLOGY | Facility: MEDICAL CENTER | Age: 38
DRG: 897 | End: 2023-05-24
Attending: INTERNAL MEDICINE
Payer: MEDICAID

## 2023-05-24 LAB
ALBUMIN SERPL BCP-MCNC: 3.3 G/DL (ref 3.2–4.9)
ALBUMIN/GLOB SERPL: 1.6 G/DL
ALP SERPL-CCNC: 83 U/L (ref 30–99)
ALT SERPL-CCNC: 23 U/L (ref 2–50)
ANION GAP SERPL CALC-SCNC: 9 MMOL/L (ref 7–16)
AST SERPL-CCNC: 35 U/L (ref 12–45)
BILIRUB SERPL-MCNC: 0.6 MG/DL (ref 0.1–1.5)
BUN SERPL-MCNC: 6 MG/DL (ref 8–22)
CALCIUM ALBUM COR SERPL-MCNC: 8.3 MG/DL (ref 8.5–10.5)
CALCIUM SERPL-MCNC: 7.7 MG/DL (ref 8.4–10.2)
CHLORIDE SERPL-SCNC: 108 MMOL/L (ref 96–112)
CO2 SERPL-SCNC: 18 MMOL/L (ref 20–33)
CREAT SERPL-MCNC: 0.54 MG/DL (ref 0.5–1.4)
ERYTHROCYTE [DISTWIDTH] IN BLOOD BY AUTOMATED COUNT: 49.1 FL (ref 35.9–50)
GFR SERPLBLD CREATININE-BSD FMLA CKD-EPI: 122 ML/MIN/1.73 M 2
GLOBULIN SER CALC-MCNC: 2.1 G/DL (ref 1.9–3.5)
GLUCOSE SERPL-MCNC: 99 MG/DL (ref 65–99)
HCT VFR BLD AUTO: 34.6 % (ref 37–47)
HGB BLD-MCNC: 11.3 G/DL (ref 12–16)
MAGNESIUM SERPL-MCNC: 1.8 MG/DL (ref 1.5–2.5)
MCH RBC QN AUTO: 29.4 PG (ref 27–33)
MCHC RBC AUTO-ENTMCNC: 32.7 G/DL (ref 32.2–35.5)
MCV RBC AUTO: 90.1 FL (ref 81.4–97.8)
PLATELET # BLD AUTO: 217 K/UL (ref 164–446)
PMV BLD AUTO: 9.5 FL (ref 9–12.9)
POTASSIUM SERPL-SCNC: 4.1 MMOL/L (ref 3.6–5.5)
PROT SERPL-MCNC: 5.4 G/DL (ref 6–8.2)
RBC # BLD AUTO: 3.84 M/UL (ref 4.2–5.4)
SODIUM SERPL-SCNC: 135 MMOL/L (ref 135–145)
WBC # BLD AUTO: 6.8 K/UL (ref 4.8–10.8)

## 2023-05-24 PROCEDURE — 700102 HCHG RX REV CODE 250 W/ 637 OVERRIDE(OP): Performed by: STUDENT IN AN ORGANIZED HEALTH CARE EDUCATION/TRAINING PROGRAM

## 2023-05-24 PROCEDURE — 85027 COMPLETE CBC AUTOMATED: CPT

## 2023-05-24 PROCEDURE — 83735 ASSAY OF MAGNESIUM: CPT

## 2023-05-24 PROCEDURE — 96375 TX/PRO/DX INJ NEW DRUG ADDON: CPT

## 2023-05-24 PROCEDURE — A9270 NON-COVERED ITEM OR SERVICE: HCPCS | Performed by: HOSPITALIST

## 2023-05-24 PROCEDURE — A9270 NON-COVERED ITEM OR SERVICE: HCPCS | Performed by: STUDENT IN AN ORGANIZED HEALTH CARE EDUCATION/TRAINING PROGRAM

## 2023-05-24 PROCEDURE — 700111 HCHG RX REV CODE 636 W/ 250 OVERRIDE (IP): Performed by: HOSPITALIST

## 2023-05-24 PROCEDURE — 770020 HCHG ROOM/CARE - TELE (206)

## 2023-05-24 PROCEDURE — 96368 THER/DIAG CONCURRENT INF: CPT

## 2023-05-24 PROCEDURE — 96376 TX/PRO/DX INJ SAME DRUG ADON: CPT

## 2023-05-24 PROCEDURE — 96366 THER/PROPH/DIAG IV INF ADDON: CPT

## 2023-05-24 PROCEDURE — 700102 HCHG RX REV CODE 250 W/ 637 OVERRIDE(OP): Performed by: HOSPITALIST

## 2023-05-24 PROCEDURE — 700101 HCHG RX REV CODE 250: Performed by: HOSPITALIST

## 2023-05-24 PROCEDURE — A9270 NON-COVERED ITEM OR SERVICE: HCPCS | Performed by: INTERNAL MEDICINE

## 2023-05-24 PROCEDURE — 700102 HCHG RX REV CODE 250 W/ 637 OVERRIDE(OP): Performed by: INTERNAL MEDICINE

## 2023-05-24 PROCEDURE — 36415 COLL VENOUS BLD VENIPUNCTURE: CPT

## 2023-05-24 PROCEDURE — 700111 HCHG RX REV CODE 636 W/ 250 OVERRIDE (IP): Performed by: INTERNAL MEDICINE

## 2023-05-24 PROCEDURE — 96367 TX/PROPH/DG ADDL SEQ IV INF: CPT

## 2023-05-24 PROCEDURE — 80053 COMPREHEN METABOLIC PANEL: CPT

## 2023-05-24 PROCEDURE — 94760 N-INVAS EAR/PLS OXIMETRY 1: CPT

## 2023-05-24 PROCEDURE — 99233 SBSQ HOSP IP/OBS HIGH 50: CPT | Performed by: INTERNAL MEDICINE

## 2023-05-24 RX ORDER — CHLORDIAZEPOXIDE HYDROCHLORIDE 25 MG/1
25 CAPSULE, GELATIN COATED ORAL ONCE
Status: COMPLETED | OUTPATIENT
Start: 2023-05-24 | End: 2023-05-24

## 2023-05-24 RX ORDER — CALCIUM GLUCONATE 20 MG/ML
1 INJECTION, SOLUTION INTRAVENOUS ONCE
Status: COMPLETED | OUTPATIENT
Start: 2023-05-24 | End: 2023-05-24

## 2023-05-24 RX ORDER — MAGNESIUM SULFATE HEPTAHYDRATE 40 MG/ML
2 INJECTION, SOLUTION INTRAVENOUS ONCE
Status: COMPLETED | OUTPATIENT
Start: 2023-05-24 | End: 2023-05-24

## 2023-05-24 RX ORDER — GAUZE BANDAGE 2" X 2"
100 BANDAGE TOPICAL DAILY
Status: DISCONTINUED | OUTPATIENT
Start: 2023-05-25 | End: 2023-05-26 | Stop reason: HOSPADM

## 2023-05-24 RX ORDER — FOLIC ACID 1 MG/1
1 TABLET ORAL DAILY
Status: DISCONTINUED | OUTPATIENT
Start: 2023-05-25 | End: 2023-05-26 | Stop reason: HOSPADM

## 2023-05-24 RX ADMIN — BUSPIRONE HYDROCHLORIDE 10 MG: 5 TABLET ORAL at 08:00

## 2023-05-24 RX ADMIN — LORAZEPAM 1.5 MG: 2 INJECTION INTRAMUSCULAR; INTRAVENOUS at 00:07

## 2023-05-24 RX ADMIN — OXYCODONE 5 MG: 5 TABLET ORAL at 12:05

## 2023-05-24 RX ADMIN — NICOTINE TRANSDERMAL SYSTEM 21 MG: 21 PATCH, EXTENDED RELEASE TRANSDERMAL at 08:00

## 2023-05-24 RX ADMIN — LORAZEPAM 0.5 MG: 0.5 TABLET ORAL at 22:05

## 2023-05-24 RX ADMIN — ARIPIPRAZOLE 2 MG: 2 TABLET ORAL at 08:11

## 2023-05-24 RX ADMIN — POTASSIUM CHLORIDE AND SODIUM CHLORIDE: 900; 300 INJECTION, SOLUTION INTRAVENOUS at 05:07

## 2023-05-24 RX ADMIN — LORAZEPAM 1.5 MG: 2 INJECTION INTRAMUSCULAR; INTRAVENOUS at 02:07

## 2023-05-24 RX ADMIN — POTASSIUM CHLORIDE AND SODIUM CHLORIDE: 900; 300 INJECTION, SOLUTION INTRAVENOUS at 20:50

## 2023-05-24 RX ADMIN — PANCRELIPASE 36000 UNITS: 30000; 6000; 19000 CAPSULE, DELAYED RELEASE PELLETS ORAL at 12:03

## 2023-05-24 RX ADMIN — OXYCODONE HYDROCHLORIDE 10 MG: 10 TABLET ORAL at 18:42

## 2023-05-24 RX ADMIN — OXYCODONE HYDROCHLORIDE 10 MG: 10 TABLET ORAL at 01:09

## 2023-05-24 RX ADMIN — LORAZEPAM 0.5 MG: 0.5 TABLET ORAL at 17:50

## 2023-05-24 RX ADMIN — ONDANSETRON 4 MG: 2 INJECTION INTRAMUSCULAR; INTRAVENOUS at 12:04

## 2023-05-24 RX ADMIN — CHLORDIAZEPOXIDE HYDROCHLORIDE 25 MG: 25 CAPSULE ORAL at 03:15

## 2023-05-24 RX ADMIN — MORPHINE SULFATE 4 MG: 4 INJECTION INTRAVENOUS at 15:52

## 2023-05-24 RX ADMIN — PANCRELIPASE 36000 UNITS: 30000; 6000; 19000 CAPSULE, DELAYED RELEASE PELLETS ORAL at 17:51

## 2023-05-24 RX ADMIN — LORAZEPAM 2 MG: 2 INJECTION INTRAMUSCULAR; INTRAVENOUS at 05:02

## 2023-05-24 RX ADMIN — BUSPIRONE HYDROCHLORIDE 10 MG: 5 TABLET ORAL at 17:50

## 2023-05-24 RX ADMIN — OXYCODONE HYDROCHLORIDE 10 MG: 10 TABLET ORAL at 14:48

## 2023-05-24 RX ADMIN — LORAZEPAM 1.5 MG: 2 INJECTION INTRAMUSCULAR; INTRAVENOUS at 01:10

## 2023-05-24 RX ADMIN — CALCIUM GLUCONATE 1 G: 20 INJECTION, SOLUTION INTRAVENOUS at 11:34

## 2023-05-24 RX ADMIN — MAGNESIUM SULFATE HEPTAHYDRATE 2 G: 2 INJECTION, SOLUTION INTRAVENOUS at 12:35

## 2023-05-24 RX ADMIN — OXYCODONE HYDROCHLORIDE 10 MG: 10 TABLET ORAL at 05:27

## 2023-05-24 RX ADMIN — BUSPIRONE HYDROCHLORIDE 10 MG: 5 TABLET ORAL at 12:04

## 2023-05-24 RX ADMIN — MORPHINE SULFATE 4 MG: 4 INJECTION INTRAVENOUS at 20:43

## 2023-05-24 ASSESSMENT — LIFESTYLE VARIABLES
TREMOR: TREMOR NOT VISIBLE BUT CAN BE FELT, FINGERTIP TO FINGERTIP
ANXIETY: MILDLY ANXIOUS
ANXIETY: *
PAROXYSMAL SWEATS: NO SWEAT VISIBLE
ANXIETY: *
HEADACHE, FULLNESS IN HEAD: NOT PRESENT
TOTAL SCORE: 4
HEADACHE, FULLNESS IN HEAD: MODERATE
AGITATION: NORMAL ACTIVITY
ANXIETY: *
CONSUMPTION TOTAL: POSITIVE
PAROXYSMAL SWEATS: NO SWEAT VISIBLE
AGITATION: *
AGITATION: NORMAL ACTIVITY
HEADACHE, FULLNESS IN HEAD: NOT PRESENT
AUDITORY DISTURBANCES: NOT PRESENT
VISUAL DISTURBANCES: NOT PRESENT
TOTAL SCORE: 24
ORIENTATION AND CLOUDING OF SENSORIUM: ORIENTED AND CAN DO SERIAL ADDITIONS
ORIENTATION AND CLOUDING OF SENSORIUM: ORIENTED AND CAN DO SERIAL ADDITIONS
AGITATION: NORMAL ACTIVITY
EVER FELT BAD OR GUILTY ABOUT YOUR DRINKING: YES
PAROXYSMAL SWEATS: BARELY PERCEPTIBLE SWEATING. PALMS MOIST
TREMOR: *
PAROXYSMAL SWEATS: NO SWEAT VISIBLE
NAUSEA AND VOMITING: NO NAUSEA AND NO VOMITING
VISUAL DISTURBANCES: NOT PRESENT
PAROXYSMAL SWEATS: NO SWEAT VISIBLE
ORIENTATION AND CLOUDING OF SENSORIUM: ORIENTED AND CAN DO SERIAL ADDITIONS
AUDITORY DISTURBANCES: NOT PRESENT
VISUAL DISTURBANCES: NOT PRESENT
ORIENTATION AND CLOUDING OF SENSORIUM: ORIENTED AND CAN DO SERIAL ADDITIONS
HAVE PEOPLE ANNOYED YOU BY CRITICIZING YOUR DRINKING: YES
TOTAL SCORE: 4
AUDITORY DISTURBANCES: NOT PRESENT
AUDITORY DISTURBANCES: NOT PRESENT
NAUSEA AND VOMITING: NO NAUSEA AND NO VOMITING
TREMOR: *
AGITATION: NORMAL ACTIVITY
HEADACHE, FULLNESS IN HEAD: NOT PRESENT
PAROXYSMAL SWEATS: *
TOTAL SCORE: 3
HEADACHE, FULLNESS IN HEAD: MODERATE
PAROXYSMAL SWEATS: *
ORIENTATION AND CLOUDING OF SENSORIUM: ORIENTED AND CAN DO SERIAL ADDITIONS
VISUAL DISTURBANCES: NOT PRESENT
TOTAL SCORE: MILD ITCHING, PINS AND NEEDLES SENSATION, BURNING OR NUMBNESS
HOW MANY TIMES IN THE PAST YEAR HAVE YOU HAD 5 OR MORE DRINKS IN A DAY: 0
VISUAL DISTURBANCES: NOT PRESENT
ANXIETY: NO ANXIETY (AT EASE)
PAROXYSMAL SWEATS: NO SWEAT VISIBLE
TOTAL SCORE: 24
TOTAL SCORE: 5
AGITATION: *
VISUAL DISTURBANCES: NOT PRESENT
TREMOR: *
ORIENTATION AND CLOUDING OF SENSORIUM: ORIENTED AND CAN DO SERIAL ADDITIONS
NAUSEA AND VOMITING: NO NAUSEA AND NO VOMITING
TOTAL SCORE: 4
VISUAL DISTURBANCES: NOT PRESENT
PAROXYSMAL SWEATS: *
AUDITORY DISTURBANCES: NOT PRESENT
TOTAL SCORE: MILD ITCHING, PINS AND NEEDLES SENSATION, BURNING OR NUMBNESS
AGITATION: NORMAL ACTIVITY
TREMOR: TREMOR NOT VISIBLE BUT CAN BE FELT, FINGERTIP TO FINGERTIP
NAUSEA AND VOMITING: MILD NAUSEA WITH NO VOMITING
TOTAL SCORE: 4
HEADACHE, FULLNESS IN HEAD: NOT PRESENT
ALCOHOL_USE: YES
ANXIETY: EQUIVALENT TO ACUTE PANIC STATES AS OCCUR IN SEVERE DELIRIUM OR ACUTE SCHIZOPHRENIC REACTIONS
ORIENTATION AND CLOUDING OF SENSORIUM: ORIENTED AND CAN DO SERIAL ADDITIONS
ANXIETY: MILDLY ANXIOUS
AUDITORY DISTURBANCES: NOT PRESENT
TREMOR: *
ON A TYPICAL DAY WHEN YOU DRINK ALCOHOL HOW MANY DRINKS DO YOU HAVE: 0
TREMOR: *
PAROXYSMAL SWEATS: NO SWEAT VISIBLE
AUDITORY DISTURBANCES: NOT PRESENT
ORIENTATION AND CLOUDING OF SENSORIUM: ORIENTED AND CAN DO SERIAL ADDITIONS
ORIENTATION AND CLOUDING OF SENSORIUM: ORIENTED AND CAN DO SERIAL ADDITIONS
TREMOR: *
TREMOR: *
TOTAL SCORE: 5
HEADACHE, FULLNESS IN HEAD: MODERATE
NAUSEA AND VOMITING: NO NAUSEA AND NO VOMITING
VISUAL DISTURBANCES: NOT PRESENT
NAUSEA AND VOMITING: NO NAUSEA AND NO VOMITING
NAUSEA AND VOMITING: NO NAUSEA AND NO VOMITING
VISUAL DISTURBANCES: NOT PRESENT
VISUAL DISTURBANCES: NOT PRESENT
TOTAL SCORE: 25
HEADACHE, FULLNESS IN HEAD: NOT PRESENT
AUDITORY DISTURBANCES: NOT PRESENT
EVER HAD A DRINK FIRST THING IN THE MORNING TO STEADY YOUR NERVES TO GET RID OF A HANGOVER: YES
AUDITORY DISTURBANCES: NOT PRESENT
AGITATION: *
TOTAL SCORE: MILD ITCHING, PINS AND NEEDLES SENSATION, BURNING OR NUMBNESS
AUDITORY DISTURBANCES: NOT PRESENT
NAUSEA AND VOMITING: NO NAUSEA AND NO VOMITING
ANXIETY: *
VISUAL DISTURBANCES: NOT PRESENT
TREMOR: *
TOTAL SCORE: 2
ANXIETY: *
TREMOR: *
TOTAL SCORE: 2
HAVE YOU EVER FELT YOU SHOULD CUT DOWN ON YOUR DRINKING: YES
TOTAL SCORE: 24
NAUSEA AND VOMITING: MILD NAUSEA WITH NO VOMITING
TOTAL SCORE: MILD ITCHING, PINS AND NEEDLES SENSATION, BURNING OR NUMBNESS
NAUSEA AND VOMITING: NO NAUSEA AND NO VOMITING
ORIENTATION AND CLOUDING OF SENSORIUM: ORIENTED AND CAN DO SERIAL ADDITIONS
AVERAGE NUMBER OF DAYS PER WEEK YOU HAVE A DRINK CONTAINING ALCOHOL: 0
AUDITORY DISTURBANCES: NOT PRESENT
AGITATION: NORMAL ACTIVITY
AGITATION: *
PAROXYSMAL SWEATS: *
HEADACHE, FULLNESS IN HEAD: MODERATE
HEADACHE, FULLNESS IN HEAD: NOT PRESENT
HEADACHE, FULLNESS IN HEAD: NOT PRESENT
NAUSEA AND VOMITING: NO NAUSEA AND NO VOMITING
ORIENTATION AND CLOUDING OF SENSORIUM: ORIENTED AND CAN DO SERIAL ADDITIONS
AGITATION: NORMAL ACTIVITY
ANXIETY: NO ANXIETY (AT EASE)

## 2023-05-24 ASSESSMENT — FIBROSIS 4 INDEX: FIB4 SCORE: 1.21

## 2023-05-24 ASSESSMENT — PATIENT HEALTH QUESTIONNAIRE - PHQ9
8. MOVING OR SPEAKING SO SLOWLY THAT OTHER PEOPLE COULD HAVE NOTICED. OR THE OPPOSITE, BEING SO FIGETY OR RESTLESS THAT YOU HAVE BEEN MOVING AROUND A LOT MORE THAN USUAL: NOT AT ALL
7. TROUBLE CONCENTRATING ON THINGS, SUCH AS READING THE NEWSPAPER OR WATCHING TELEVISION: NOT AT ALL
9. THOUGHTS THAT YOU WOULD BE BETTER OFF DEAD, OR OF HURTING YOURSELF: NOT AT ALL
SUM OF ALL RESPONSES TO PHQ QUESTIONS 1-9: 10
3. TROUBLE FALLING OR STAYING ASLEEP OR SLEEPING TOO MUCH: MORE THAN HALF THE DAYS
5. POOR APPETITE OR OVEREATING: MORE THAN HALF THE DAYS
4. FEELING TIRED OR HAVING LITTLE ENERGY: MORE THAN HALF THE DAYS
6. FEELING BAD ABOUT YOURSELF - OR THAT YOU ARE A FAILURE OR HAVE LET YOURSELF OR YOUR FAMILY DOWN: MORE THAN HALF THE DAYS
1. LITTLE INTEREST OR PLEASURE IN DOING THINGS: SEVERAL DAYS
SUM OF ALL RESPONSES TO PHQ9 QUESTIONS 1 AND 2: 2
2. FEELING DOWN, DEPRESSED, IRRITABLE, OR HOPELESS: SEVERAL DAYS

## 2023-05-24 ASSESSMENT — ENCOUNTER SYMPTOMS
ABDOMINAL PAIN: 1
DIZZINESS: 0
COUGH: 0
VOMITING: 0
CHILLS: 0
FEVER: 0
PALPITATIONS: 0
TREMORS: 1
CONSTIPATION: 0
HEADACHES: 0
DIARRHEA: 0
BACK PAIN: 0
NAUSEA: 1
SHORTNESS OF BREATH: 0

## 2023-05-24 ASSESSMENT — PAIN DESCRIPTION - PAIN TYPE
TYPE: ACUTE PAIN
TYPE: ACUTE PAIN

## 2023-05-24 ASSESSMENT — PAIN SCALES - WONG BAKER: WONGBAKER_NUMERICALRESPONSE: HURTS EVEN MORE

## 2023-05-24 NOTE — CARE PLAN
Problem: Knowledge Deficit - Standard  Goal: Patient and family/care givers will demonstrate understanding of plan of care, disease process/condition, diagnostic tests and medications  Outcome: Progressing     Problem: Seizure Precautions  Goal: Implementation of seizure precautions  5/24/2023 1516 by Akbar Tanner R.N.  Outcome: Progressing  Note: Suction at bedside  5/24/2023 1516 by Akbar Tanner R.N.  Note: Suction at bedside     Problem: Lifestyle Changes  Goal: Patient's ability to identify lifestyle changes and available resources to help reduce recurrence of condition will improve  5/24/2023 1516 by Akbar Tanner R.N.  Outcome: Progressing  Note: Pt planning on going to [facility] for Rehab  5/24/2023 1516 by Akbar Tanner R.N.  Note: Pt planning on going to [facility] for Rehab   The patient is Stable - Low risk of patient condition declining or worsening    Shift Goals  Clinical Goals: Admit profile, continue to monitor pain and CIWA  Patient Goals: Get better so I can get on the train tomorrow afternoon    Progress made toward(s) clinical / shift goals:  completed    Patient is not progressing towards the following goals:

## 2023-05-24 NOTE — H&P
Hospital Medicine History & Physical Note    Date of Service  5/23/2023    Primary Care Physician  MALOU Harrington     Consultants  None     Code Status  Full Code    Chief Complaint  Chief Complaint   Patient presents with    Flank Pain    LUQ Pain    N/V     History of Presenting Illness  Rhiannon Marrero is a 38 y.o. female with a past medical history of alcohol dependence and chronic pancreatitis who presented 5/23/2023 with nausea vomiting abdominal pain diarrhea.  Patient reports that she has not been feeling well for the past 3 days.  She has been having pain in the left lower part of her abdomen.  Pain is constant and has been having progressing over the past 3 days.  Pain also radiates to the epigastric region.  It is associated with nausea and vomiting.  She denies noticing any changes to her stool.  She denies noticing any blood in her vomitus.    I discussed the plan of care with emergency department physician, the patient.    Review of Systems  Review of Systems   Constitutional:  Negative for chills and fever.   Eyes:  Negative for discharge and redness.   Respiratory:  Negative for cough, shortness of breath and stridor.    Cardiovascular:  Negative for chest pain and leg swelling.   Gastrointestinal:  Positive for abdominal pain, nausea and vomiting.   Genitourinary:  Negative for flank pain.   Musculoskeletal:  Negative for myalgias.   Skin: Negative.    Neurological:  Negative for focal weakness.   Endo/Heme/Allergies:  Does not bruise/bleed easily.   Psychiatric/Behavioral:  The patient is not nervous/anxious.      Past Medical History   has a past medical history of Acute pancreatitis without infection or necrosis (07/20/2022), Alcohol dependence (HCC) (04/13/2017), Bronchitis (08/2012), Cold, Current moderate episode of major depressive disorder without prior episode (HCC) (01/31/2020), Heart burn, Hernia of unspecified site of abdominal cavity without mention of obstruction or gangrene,  High anion gap metabolic acidosis (07/01/2022), Indigestion, Pancreatitis, Pneumonia (2011), and Seizure disorder (HCC) (05/23/2022).    Surgical History   has a past surgical history that includes other orthopedic surgery; gyn surgery; tonsillectomy (4/11/2013); arthroscopy, knee; hysterectomy robotic xi (10/11/2018); salpingectomy (Bilateral, 10/11/2018); orif, wrist (Right, 4/24/2019); pr upper gi endoscopy,diagnosis (N/A, 12/23/2022); pr inject nerv blck,celiac plexus (N/A, 12/23/2022); and egd w/endoscopic ultrasound (N/A, 12/23/2022).     Family History  family history includes Arthritis in her mother; Heart Disease in her maternal grandfather; Psychiatric Illness in her mother; Stroke in her mother.      Social History   reports that she has been smoking cigarettes. She has a 2.50 pack-year smoking history. She has never used smokeless tobacco. She reports current alcohol use. She reports current drug use. Drug: Inhaled.    Allergies  Allergies   Allergen Reactions    Promethazine Hcl Anxiety     Anxiety and makes her feels like skin coming off      Medications  Prior to Admission Medications   Prescriptions Last Dose Informant Patient Reported? Taking?   ARIPiprazole (ABILIFY DISCMELT PO)   Yes No   Sig: Take 1 Tablet by mouth every day.   Pancrelipase, Lip-Prot-Amyl, (CREON) 26318-296381 units Cap DR Particles   Yes No   Sig: Take 1 Capful by mouth 3 times a day with meals.   Sertraline HCl (ZOLOFT PO)   Yes No   Sig: Take 1 Tablet by mouth every day.   busPIRone HCl (BUSPAR PO)   Yes No   Sig: Take 1 Tablet by mouth 2 times a day.      Facility-Administered Medications: None     Physical Exam  Temp:  [36.9 °C (98.5 °F)] 36.9 °C (98.5 °F)  Pulse:  [120] 120  Resp:  [20] 20  BP: (112)/(64) 112/64  SpO2:  [94 %] 94 %  Blood Pressure: 112/64   Temperature: 36.9 °C (98.5 °F)   Pulse: (!) 120   Respiration: 20   Pulse Oximetry: 94 %     Physical Exam  Constitutional:       General: She is not in acute  distress.  HENT:      Head: Normocephalic and atraumatic.      Right Ear: External ear normal.      Left Ear: External ear normal.      Nose: No congestion or rhinorrhea.      Mouth/Throat:      Mouth: Mucous membranes are dry.      Pharynx: No oropharyngeal exudate or posterior oropharyngeal erythema.   Eyes:      General: No scleral icterus.        Right eye: No discharge.         Left eye: No discharge.      Conjunctiva/sclera: Conjunctivae normal.      Pupils: Pupils are equal, round, and reactive to light.   Cardiovascular:      Rate and Rhythm: Regular rhythm. Tachycardia present.      Heart sounds:      No friction rub. No gallop.   Pulmonary:      Effort: Pulmonary effort is normal.   Abdominal:      General: Abdomen is flat. There is no distension.      Tenderness: There is no guarding.   Musculoskeletal:         General: No swelling.      Cervical back: Neck supple. No rigidity. No muscular tenderness.      Right lower leg: No edema.      Left lower leg: No edema.   Skin:     General: Skin is dry.      Capillary Refill: Capillary refill takes 2 to 3 seconds.      Coloration: Skin is not jaundiced or pale.      Findings: No bruising or erythema.   Neurological:      Mental Status: She is alert and oriented to person, place, and time.      Coordination: Coordination abnormal.      Comments: Tremor   Psychiatric:         Mood and Affect: Mood normal.         Judgment: Judgment normal.       Laboratory:  Recent Labs     05/23/23  1625   WBC 9.2   RBC 4.34   HEMOGLOBIN 12.9   HEMATOCRIT 38.1   MCV 87.8   MCH 29.7   MCHC 33.9   RDW 47.6   PLATELETCT 345   MPV 9.4     Recent Labs     05/23/23  1625   SODIUM 143   POTASSIUM 2.7*   CHLORIDE 109   CO2 14*   GLUCOSE 133*   BUN 10   CREATININE 0.67   CALCIUM 8.4     Recent Labs     05/23/23  1625   ALTSGPT 16   ASTSGOT 24   ALKPHOSPHAT 85   TBILIRUBIN 0.3   LIPASE 17   GLUCOSE 133*         No results for input(s): NTPROBNP in the last 72 hours.      No results for  input(s): TROPONINT in the last 72 hours.    Imaging:  CT-ABDOMEN-PELVIS WITH   Final Result      1.  Chronic pancreatitis without CT evidence of acute pancreatitis      2.  Cholecystectomy with no biliary dilatation      3.  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  Hysterectomy      5.  Hepatomegaly        I personally reviewed patient's EKG shows sinus tachycardia with a rate of 107, there is no ST elevation, there is flattening of T waves in lead III and aVL.  QTc is 462.    Assessment/Plan:  Justification for Admission Status  I anticipate this patient is appropriate for observation status at this time because likely discharge from    Patient will need a  bed on telemetry, the patient has tachycardia and alcohol withdrawal    * Hypokalemia- (present on admission)  Assessment & Plan  Potassium of 2.7  Replacing, checking magnesium    EKG shows sinus tachycardia with a rate of 107, there is no ST elevation, there is flattening of T waves in lead III and aVL.  QTc is 462.  Continue to monitor replace as needed     Intractable nausea and vomiting- (present on admission)  Assessment & Plan  Likely secondary to chronic pancreatitis and chronic alcoholism.  Supportive care with modified diet, intravenous fluids and antiemetics as needed    Tachycardia- (present on admission)  Assessment & Plan  EKG shows sinus tachycardia with a rate of 107, there is no ST elevation, there is flattening of T waves in lead III and aVL.  QTc is 462.  Likely secondary to severe dehydration and alcohol withdrawal    Alcohol dependence with withdrawal (HCC)- (present on admission)  Assessment & Plan  CIWA protocol   Continuous cardiac monitoring  Monitor electrolytes and replace accordingly, particularly magnesium, potassium and phosphorus  Fall, seizure, aspiration precautions.  Thiamine folic acid and multivitamin.     Metabolic acidosis- (present on admission)  Assessment & Plan  With a bicarb level  of 14.  Likely secondary to increased GI losses secondary   Anticipate improvement with intravenous fluids, will continue to monitor    Chronic pancreatitis (HCC)- (present on admission)  Assessment & Plan  Modified diet.  I will start pancrelipase  Multimodal pain control      VTE prophylaxis: SCDs/TEDs and enoxaparin ppx

## 2023-05-24 NOTE — ED NOTES
Medication history updated per patient and review of prescription bottles.  Patient reports she has not taken abilify or zoloft in several days due to insomnia. She was taking both at bedtime which may have contributed.     Patient denies recent antibiotics or OTC med use.    Win Quesada, PharmD, BCPS

## 2023-05-24 NOTE — ED PROVIDER NOTES
ED Provider Note    CHIEF COMPLAINT  Chief Complaint   Patient presents with    Flank Pain    LUQ Pain    N/V       EXTERNAL RECORDS REVIEWED  Inpatient Notes patient was admitted to the hospital on May 16, 2023.  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  She presented on May 16 with complaints of upper abdominal pain worse after eating as well as nausea vomiting and diarrhea which is going on for 4 days.  She received 6 mg of Dilaudid in the ER and was still having significant pain and was therefore admitted to the hospital.  The practitioner who admitted the patient noticed that the patient appeared high on her evaluation and the Dilaudid was discontinued.  Patient subsequently decided to leave AMA once her Dilaudid was discontinued.    HPI/ROS  LIMITATION TO HISTORY   Select: : None  OUTSIDE HISTORIAN(S):  None    Rhiannon Marrero is a 38 y.o. female who presents to the ER complaining of left flank pain with associated nausea and vomiting and diarrhea which began 3 days ago.  Patient reports she was camping over the weekend.  She started having diarrhea and left-sided abdominal pain 3 days ago.  Vomiting to started today.  She says typically her pain is in the mid epigastric area, and she does not typically have pain on the left side.  This is unusual for her.  Patient states she is set to go to rehab tomorrow.  Unfortunately, she drank alcohol and believes the pain started after drinking.  She was just admitted to the hospital last week and left AMA after being told she was not can to get any more Dilaudid.    PAST MEDICAL HISTORY   has a past medical history of Acute pancreatitis without infection or necrosis (07/20/2022), Alcohol dependence (HCC) (04/13/2017), Bronchitis (08/2012), Cold, Current moderate episode of major depressive disorder without prior  episode (HCC) (01/31/2020), Heart burn, Hernia of unspecified site of abdominal cavity without mention of obstruction or gangrene, High anion gap metabolic acidosis (07/01/2022), Indigestion, Pancreatitis, Pneumonia (2011), and Seizure disorder (HCC) (05/23/2022).    SURGICAL HISTORY   has a past surgical history that includes other orthopedic surgery; gyn surgery; tonsillectomy (4/11/2013); arthroscopy, knee; hysterectomy robotic xi (10/11/2018); salpingectomy (Bilateral, 10/11/2018); orif, wrist (Right, 4/24/2019); upper gi endoscopy,diagnosis (N/A, 12/23/2022); inject nerv blck,celiac plexus (N/A, 12/23/2022); and egd w/endoscopic ultrasound (N/A, 12/23/2022).    FAMILY HISTORY  Family History   Problem Relation Age of Onset    Psychiatric Illness Mother     Stroke Mother     Arthritis Mother     Heart Disease Maternal Grandfather     Cancer Neg Hx     Diabetes Neg Hx     Hypertension Neg Hx        SOCIAL HISTORY  Social History     Tobacco Use    Smoking status: Every Day     Packs/day: 0.25     Years: 10.00     Pack years: 2.50     Types: Cigarettes    Smokeless tobacco: Never   Vaping Use    Vaping Use: Every day    Substances: Nicotine   Substance and Sexual Activity    Alcohol use: Yes     Comment: 12+ drinks in past 24 hours    Drug use: Yes     Types: Inhaled     Comment: weed for pain    Sexual activity: Not on file       CURRENT MEDICATIONS  Home Medications       Reviewed by Chandrakant Quesada, PharmD (Pharmacist) on 05/23/23 at 1951  Med List Status: Complete     Medication Last Dose Status   ARIPiprazole (ABILIFY) 2 MG tablet 5/19/2023 Active   busPIRone (BUSPAR) 10 MG Tab tablet 5/23/2023 Active   Pancrelipase, Lip-Prot-Amyl, (CREON) 44573-356811 units Cap DR Particles 5/23/2023 Active   sertraline (ZOLOFT) 50 MG Tab 5/20/2023 Active                    ALLERGIES  Allergies   Allergen Reactions    Promethazine Hcl Anxiety     Anxiety and makes her feels like skin coming off        PHYSICAL EXAM  VITAL  SIGNS: /64   Pulse (!) 120   Temp 36.9 °C (98.5 °F)   Resp 20   Wt 58 kg (127 lb 13.9 oz)   LMP 10/30/2018   SpO2 94%   BMI 20.03 kg/m²    Constitutional: Intermittently vomiting and grabbing her left side.  Between episodes of vomiting grabbing her left side she is comfortably surfing her cell phone.  At times patient is on her hands and knees rocking forward and backward on the gurney.  She is screaming and crying in pain, loud enough for everyone in the ER to hear.  Other times she is quietly typing on her phone.  Smell of alcoholic ketones on breath.  HENT: Normocephalic, Atraumatic, Bilateral external ears normal, slightly dry mucous membranes.  Eyes: PERRL, EOMI, Conjunctiva normal, No discharge.   Neck: Normal range of motion, supple, nontender  Lymphatic: No lymphadenopathy noted.   Cardiovascular: Normal heart rate, Normal rhythm, No murmurs, rubs or gallops   Thorax & Lungs: CTA=bilaterally;  No respiratory distress,  No wheezing rales, or rhonchi; No chest tenderness. No crepitus or subQ air  Abdomen: Tender midepigastrium and left upper quadrant.  No guarding no rebound, no masses, no pulsatile mass, no tenderness, no distention  Skin: Warm, Dry, No erythema, No rash.   Back: No tenderness, No CVA tenderness.   Extremities: 2+ dp and pt pulses bilateral LEs;  Nontender; no pretibial edema  Neurologic: Alert & oriented x 4, clear speech  Psychiatric: appropriate, normal affect     DIAGNOSTIC STUDIES / PROCEDURES      LABS  Results for orders placed or performed during the hospital encounter of 05/23/23   CBC with Differential   Result Value Ref Range    WBC 9.2 4.8 - 10.8 K/uL    RBC 4.34 4.20 - 5.40 M/uL    Hemoglobin 12.9 12.0 - 16.0 g/dL    Hematocrit 38.1 37.0 - 47.0 %    MCV 87.8 81.4 - 97.8 fL    MCH 29.7 27.0 - 33.0 pg    MCHC 33.9 32.2 - 35.5 g/dL    RDW 47.6 35.9 - 50.0 fL    Platelet Count 345 164 - 446 K/uL    MPV 9.4 9.0 - 12.9 fL    Neutrophils-Polys 64.60 44.00 - 72.00 %     Lymphocytes 29.20 22.00 - 41.00 %    Monocytes 3.90 0.00 - 13.40 %    Eosinophils 1.30 0.00 - 6.90 %    Basophils 0.80 0.00 - 1.80 %    Immature Granulocytes 0.20 0.00 - 0.90 %    Nucleated RBC 0.00 0.00 - 0.20 /100 WBC    Neutrophils (Absolute) 5.93 1.82 - 7.42 K/uL    Lymphs (Absolute) 2.68 1.00 - 4.80 K/uL    Monos (Absolute) 0.36 0.00 - 0.85 K/uL    Eos (Absolute) 0.12 0.00 - 0.51 K/uL    Baso (Absolute) 0.07 0.00 - 0.12 K/uL    Immature Granulocytes (abs) 0.02 0.00 - 0.11 K/uL    NRBC (Absolute) 0.00 K/uL   Complete Metabolic Panel   Result Value Ref Range    Sodium 143 135 - 145 mmol/L    Potassium 2.7 (LL) 3.6 - 5.5 mmol/L    Chloride 109 96 - 112 mmol/L    Co2 14 (L) 20 - 33 mmol/L    Anion Gap 20.0 (H) 7.0 - 16.0    Glucose 133 (H) 65 - 99 mg/dL    Bun 10 8 - 22 mg/dL    Creatinine 0.67 0.50 - 1.40 mg/dL    Calcium 8.4 8.4 - 10.2 mg/dL    AST(SGOT) 24 12 - 45 U/L    ALT(SGPT) 16 2 - 50 U/L    Alkaline Phosphatase 85 30 - 99 U/L    Total Bilirubin 0.3 0.1 - 1.5 mg/dL    Albumin 4.1 3.2 - 4.9 g/dL    Total Protein 6.7 6.0 - 8.2 g/dL    Globulin 2.6 1.9 - 3.5 g/dL    A-G Ratio 1.6 g/dL   Lipase   Result Value Ref Range    Lipase 17 7 - 58 U/L   Urinalysis    Specimen: Urine   Result Value Ref Range    Color Yellow     Character Clear     Specific Gravity 1.015 <1.035    Ph 5.5 5.0 - 8.0    Glucose Negative Negative mg/dL    Ketones Negative Negative mg/dL    Protein Negative Negative mg/dL    Bilirubin Negative Negative    Nitrite Negative Negative    Leukocyte Esterase Negative Negative    Occult Blood Negative Negative    Micro Urine Req see below    URINE DRUG SCREEN   Result Value Ref Range    Amphetamines Urine Negative Negative    Barbiturates Negative Negative    Benzodiazepines Negative Negative    Cocaine Metabolite Negative Negative    Methadone Negative Negative    Opiates Positive (A) Negative    Oxycodone Negative Negative    Phencyclidine -Pcp Negative Negative    Propoxyphene Negative Negative     Cannabinoid Metab Negative Negative   MAGNESIUM   Result Value Ref Range    Magnesium 2.0 1.5 - 2.5 mg/dL   ETHYL ALCOHOL (BLOOD)   Result Value Ref Range    Diagnostic Alcohol 267.0 (H) <10.1 mg/dL   CORRECTED CALCIUM   Result Value Ref Range    Correct Calcium 8.3 (L) 8.5 - 10.5 mg/dL   ESTIMATED GFR   Result Value Ref Range    GFR (CKD-EPI) 116 >60 mL/min/1.73 m 2   EKG   Result Value Ref Range    Report       [facility] Emergency Dept.    Test Date:  2023  Pt Name:    JAKE LOPEZ                    Department: [facility]  MRN:        [id number]                      [facility]  Gender:     Female                       Technician: [id number] CHARITY  :        [id number]                   Requested By:CHOCO LEOS  Order #:    [id number]                    Reading MD: Choco Leos    Measurements  Intervals                                Axis  Rate:       107                          P:          66  MS:         166                          QRS:        63  QRSD:       81                           T:          31  QT:         346  QTc:        462    Interpretive Statements  Sinus tachycardia rate 107  Normal axis  Normal intervals  ST elevation V3  No other ST elevation and no ST depression  Probable left atrial enlargement  Compared to ECG 2023 12:20:19  Possible ischemia no longer present  Electronically Signed On 2023 19:22:47 PDT by Choco Leos          RADIOLOGY  I have independently interpreted the diagnostic imaging associated with this visit and am waiting the final reading from the radiologist.     My preliminary interpretation is as follows: ER MD has reviewed the patient's CT scan.  No obvious obstruction.  No obvious hydronephrosis.    Radiologist interpretation:   CT-ABDOMEN-PELVIS WITH   Final Result      1.  Chronic pancreatitis without CT evidence of acute pancreatitis      2.  Cholecystectomy with no biliary dilatation      3.  Right renal calyceal diverticulum  favored over cyst or just scarring      4.  Hysterectomy      5.  Hepatomegaly           COURSE & MEDICAL DECISION MAKING    ED Observation Status? Yes; I am placing the patient in to an observation status due to a diagnostic uncertainty as well as therapeutic intensity. Patient placed in observation status at 5:25 PM, 5/23/2023.     Observation plan is as follows: Patient was placed in ED observation status as she will need comprehensive work-up and evaluation for her complaint of abdominal pain.  She also need therapeutic intervention for her pain, nausea and vomiting she is actively vomiting here in the ER.    Upon Reevaluation, the patient's condition has: not improved; and will be escalated to hospitalization.    Patient discharged from ED Observation status at 1925 (Time) May 23, 2023 (Date).     INITIAL ASSESSMENT, COURSE AND PLAN  Care Narrative: Patient presents to the ER complaining of left sided abdominal pain for the last 3 days.  She also describes diarrhea for the last 3 days which she reports is watery.  She had multiple episodes of watery diarrhea.  Today she started vomiting.  She typically has epigastric pain but does not usually have left-sided abdominal pain or flank pain.  Patient is an alcoholic.  She started drinking again a couple days ago despite plan to go to rehab tomorrow.  The patient presents tachycardic with a pulse of 120.  She is crying.  Her cries can be heard throughout the ER.  She is rocking up and down on her hands and knees.  However, at times she is comfortably lying on the gurney surfing her cell phone.  She seems to start crying when people are walking by her bed/room.  Patient was just admitted to the hospital a week ago for abdominal pain with nausea and vomiting.  She has history of chronic pancreatitis as well as alcohol abuse.  Once the hospitalist BRENDAN told her she would not be getting any more Dilaudid, she left AMA.  The patient has normal lab evaluation with  exception of a potassium level 2.7.  She was supplemented here in the emergency department and will need further supplementation as inpatient.  Her magnesium is normal.  She is intoxicated with an alcohol level in the high 200s.  Given patient's complaint of severe pain in her abdomen, went ahead and did a CT scan.  There is no acute abnormality seen on CT scan.  No obstruction.  No perforation.  No colitis or diverticulitis.  No appendicitis.  Patient was given IV fluids as well as pain medications and antiemetics.  She was also given Toradol.  She is feeling a little better.  She will need to be hospitalized for hypokalemia.  I spoke with , hospitalist on-call, and he will come evaluate the patient hospitalization.    1925: Paged hospitalist    1930: Discussed with avis Valenzuela    HYDRATION: Based on the patient's presentation of Acute Vomiting the patient was given IV fluids. IV Hydration was used because oral hydration was not as rapid as required. Upon recheck following hydration, the patient was improved.       ADDITIONAL PROBLEM LIST  Problem #1: Left-sided abdominal pain  Problem #2: Vomiting  Problem #3: Diarrhea  From #4: Alcohol abuse and intoxication    DISPOSITION AND DISCUSSIONS  I have discussed management of the patient with the following physicians and BILL's: Hospitalist    Discussion of management with other [facility] or appropriate source(s): Case Management patient's potassium 2.7 meets outpatient admission criteria.  Per Karissa    Escalation of care considered, and ultimately not performed: I considered antibiotics, but at this time no evidence of infection and therefore no need for antibiotics.    Barriers to care at this time, including but not limited to:  Patient is a alcoholic .     Decision tools and prescription drugs considered including, but not limited to: Pain Medications since patient left the hospital AMA last week after getting cut off from Dilaudid, if chosen not to give  the patient any Dilaudid here today. .    FINAL DIAGNOSIS  1. Abdominal pain, unspecified abdominal location Acute   2. Diarrhea, unspecified type Acute   3. Hypokalemia Acute   4. Flank pain Acute   5. Alcoholic intoxication with complication (HCC)         This dictation has been created using voice recognition software. The accuracy of the dictation is limited by the abilities of the software. I expect there may be some errors of grammar and possibly content. I made every attempt to manually correct the errors within my dictation. However, errors related to voice recognition software may still exist and should be interpreted within the appropriate context.   Electronically signed by: Elvira Leos M.D., 5/23/2023 5:18 PM

## 2023-05-24 NOTE — ASSESSMENT & PLAN NOTE
Likely secondary to chronic pancreatitis and chronic alcoholism.  Supportive care with modified diet, intravenous fluids and antiemetics as needed    IV zofran administered today

## 2023-05-24 NOTE — ASSESSMENT & PLAN NOTE
Potassium of 2.7  EKG shows sinus tachycardia with a rate of 107, there is no ST elevation, there is flattening of T waves in lead III and aVL.  QTc is 462.  Continue to monitor replace as needed   Due to poor nutrition from alcohol use    4.3, monitoring

## 2023-05-24 NOTE — ASSESSMENT & PLAN NOTE
CIWA protocol   Continuous cardiac monitoring  Monitor electrolytes and replace accordingly, particularly magnesium, potassium and phosphorus  Fall, seizure, aspiration precautions.    I ordered thiamine and folic acid  - Continued counseling with patient, she is optimistic about going to alcohol for rehabilitation at [facility]  -- meds to beds delivered Librium for discharge

## 2023-05-24 NOTE — ASSESSMENT & PLAN NOTE
With a bicarb level of 14.  Likely secondary to increased GI losses secondary   Anticipate improvement with intravenous fluids, will continue to monitor    Bicarb 19, continued IVF

## 2023-05-24 NOTE — ASSESSMENT & PLAN NOTE
EKG shows sinus tachycardia with a rate of 107, there is no ST elevation, there is flattening of T waves in lead III and aVL.  QTc is 462.  Likely secondary to severe dehydration and alcohol withdrawal    Continue to monitor on telemetry

## 2023-05-24 NOTE — DISCHARGE PLANNING
ER CM met with pt at bedside. She is well known to this ER CM . [facility] and followed by [facility]  in past but lost that placement and [facility]. She has a plan for today to go to [facility] in [place] via train at 3:30 today with her mother driving her to the [place] station in time to make this plan.  It is a 28 day sobriety program.  She lives alone. Mother is supportive.   Care Transition Team Assessment    Information Source  Orientation Level: Oriented X4  Information Given By: Patient  Informant's Name: Rhiannon  Who is responsible for making decisions for patient? : Patient         Elopement Risk  Legal Hold: No  Ambulatory or Self Mobile in Wheelchair: No-Not an Elopement Risk    Interdisciplinary Discharge Planning  Primary Care Physician: none  Lives with - Patient's Self Care Capacity: Alone and Able to Care For Self  Support Systems: Parent  Housing / Facility: 1 Story Apartment / Condo  Do You Take your Prescribed Medications Regularly: Yes  Able to Return to Previous ADL's: Yes  Assistance Needed: No    Discharge Preparedness  What is your plan after discharge?: Other (comment)  What are your discharge supports?: Parent         Finances  Prescription Coverage: Yes                   Domestic Abuse  Have you ever been the victim of abuse or violence?: No    Psychological Assessment  History of Substance Abuse: Alcohol  Date Last Used - Alcohol: 5.23.23  Substance Abuse Comments: See notes  History of Psychiatric Problems: Yes    Discharge Risks or Barriers  Discharge risks or barriers?: Substance abuse    Anticipated Discharge Information  Discharge Disposition: Discharged to home/self care (01)

## 2023-05-24 NOTE — ED NOTES
tech from Lab called with critical result of k+ 2.7 at 1748. Critical lab result read back to tech.   Dr. Leos notified of critical lab result at 1748.  Critical lab result read back by Dr. Leos.

## 2023-05-24 NOTE — ASSESSMENT & PLAN NOTE
Patient requiring IV Ativan, modified diet.  I will start pancrelipase  Multimodal pain control     She is requiring IV morphine, continue pain regimen

## 2023-05-24 NOTE — ED NOTES
Pt taken to third floor from ED at approximately 1400 today, 5/24/23.    VEEG monitoring preliminary results:    VEEG has been running for over one hour.  EEG showed moderate generalized slowing with mostly theta activities.  No epileptiform activities, no clinical or electrographic seizures were observed.      Anusha Zurita MD  Neurology

## 2023-05-25 ENCOUNTER — PHARMACY VISIT (OUTPATIENT)
Dept: PHARMACY | Facility: MEDICAL CENTER | Age: 38
End: 2023-05-25
Payer: COMMERCIAL

## 2023-05-25 PROBLEM — E87.6 HYPOKALEMIA: Status: RESOLVED | Noted: 2022-05-24 | Resolved: 2023-05-25

## 2023-05-25 PROBLEM — E87.20 METABOLIC ACIDOSIS: Status: RESOLVED | Noted: 2023-05-23 | Resolved: 2023-05-25

## 2023-05-25 PROBLEM — E83.42 HYPOMAGNESEMIA: Status: ACTIVE | Noted: 2023-05-25

## 2023-05-25 PROBLEM — R00.0 TACHYCARDIA: Status: RESOLVED | Noted: 2022-10-16 | Resolved: 2023-05-25

## 2023-05-25 PROBLEM — E83.42 HYPOMAGNESEMIA: Status: RESOLVED | Noted: 2023-05-25 | Resolved: 2023-05-25

## 2023-05-25 PROBLEM — R11.2 INTRACTABLE NAUSEA AND VOMITING: Status: RESOLVED | Noted: 2023-04-11 | Resolved: 2023-05-25

## 2023-05-25 LAB
ALBUMIN SERPL BCP-MCNC: 3.3 G/DL (ref 3.2–4.9)
ALBUMIN/GLOB SERPL: 1.7 G/DL
ALP SERPL-CCNC: 79 U/L (ref 30–99)
ALT SERPL-CCNC: 19 U/L (ref 2–50)
ANION GAP SERPL CALC-SCNC: 11 MMOL/L (ref 7–16)
AST SERPL-CCNC: 23 U/L (ref 12–45)
BILIRUB SERPL-MCNC: 0.5 MG/DL (ref 0.1–1.5)
BUN SERPL-MCNC: 2 MG/DL (ref 8–22)
CALCIUM ALBUM COR SERPL-MCNC: 8.4 MG/DL (ref 8.5–10.5)
CALCIUM SERPL-MCNC: 7.8 MG/DL (ref 8.4–10.2)
CHLORIDE SERPL-SCNC: 108 MMOL/L (ref 96–112)
CO2 SERPL-SCNC: 19 MMOL/L (ref 20–33)
CREAT SERPL-MCNC: 0.48 MG/DL (ref 0.5–1.4)
ERYTHROCYTE [DISTWIDTH] IN BLOOD BY AUTOMATED COUNT: 48.4 FL (ref 35.9–50)
EST. AVERAGE GLUCOSE BLD GHB EST-MCNC: 97 MG/DL
GFR SERPLBLD CREATININE-BSD FMLA CKD-EPI: 126 ML/MIN/1.73 M 2
GLOBULIN SER CALC-MCNC: 2 G/DL (ref 1.9–3.5)
GLUCOSE SERPL-MCNC: 97 MG/DL (ref 65–99)
HBA1C MFR BLD: 5 % (ref 4–5.6)
HCT VFR BLD AUTO: 33.4 % (ref 37–47)
HGB BLD-MCNC: 11.1 G/DL (ref 12–16)
MAGNESIUM SERPL-MCNC: 1.9 MG/DL (ref 1.5–2.5)
MCH RBC QN AUTO: 29.6 PG (ref 27–33)
MCHC RBC AUTO-ENTMCNC: 33.2 G/DL (ref 32.2–35.5)
MCV RBC AUTO: 89.1 FL (ref 81.4–97.8)
PHOSPHATE SERPL-MCNC: 3.2 MG/DL (ref 2.5–4.5)
PLATELET # BLD AUTO: 222 K/UL (ref 164–446)
PMV BLD AUTO: 9.5 FL (ref 9–12.9)
POTASSIUM SERPL-SCNC: 4.3 MMOL/L (ref 3.6–5.5)
PROT SERPL-MCNC: 5.3 G/DL (ref 6–8.2)
RBC # BLD AUTO: 3.75 M/UL (ref 4.2–5.4)
SODIUM SERPL-SCNC: 138 MMOL/L (ref 135–145)
WBC # BLD AUTO: 5.7 K/UL (ref 4.8–10.8)

## 2023-05-25 PROCEDURE — 84100 ASSAY OF PHOSPHORUS: CPT

## 2023-05-25 PROCEDURE — 83735 ASSAY OF MAGNESIUM: CPT

## 2023-05-25 PROCEDURE — A9270 NON-COVERED ITEM OR SERVICE: HCPCS | Performed by: INTERNAL MEDICINE

## 2023-05-25 PROCEDURE — 80053 COMPREHEN METABOLIC PANEL: CPT

## 2023-05-25 PROCEDURE — 36415 COLL VENOUS BLD VENIPUNCTURE: CPT

## 2023-05-25 PROCEDURE — 700102 HCHG RX REV CODE 250 W/ 637 OVERRIDE(OP): Performed by: HOSPITALIST

## 2023-05-25 PROCEDURE — 700111 HCHG RX REV CODE 636 W/ 250 OVERRIDE (IP): Performed by: HOSPITALIST

## 2023-05-25 PROCEDURE — 99232 SBSQ HOSP IP/OBS MODERATE 35: CPT | Performed by: INTERNAL MEDICINE

## 2023-05-25 PROCEDURE — 700102 HCHG RX REV CODE 250 W/ 637 OVERRIDE(OP): Performed by: INTERNAL MEDICINE

## 2023-05-25 PROCEDURE — 700101 HCHG RX REV CODE 250: Performed by: HOSPITALIST

## 2023-05-25 PROCEDURE — 85027 COMPLETE CBC AUTOMATED: CPT

## 2023-05-25 PROCEDURE — 83036 HEMOGLOBIN GLYCOSYLATED A1C: CPT

## 2023-05-25 PROCEDURE — 94760 N-INVAS EAR/PLS OXIMETRY 1: CPT

## 2023-05-25 PROCEDURE — A9270 NON-COVERED ITEM OR SERVICE: HCPCS | Performed by: HOSPITALIST

## 2023-05-25 PROCEDURE — 770020 HCHG ROOM/CARE - TELE (206)

## 2023-05-25 PROCEDURE — RXMED WILLOW AMBULATORY MEDICATION CHARGE: Performed by: INTERNAL MEDICINE

## 2023-05-25 RX ORDER — CHLORDIAZEPOXIDE HYDROCHLORIDE 10 MG/1
CAPSULE, GELATIN COATED ORAL
Qty: 12 CAPSULE | Refills: 0 | Status: SHIPPED | OUTPATIENT
Start: 2023-05-25 | End: 2023-05-26 | Stop reason: SDUPTHER

## 2023-05-25 RX ORDER — LANOLIN ALCOHOL/MO/W.PET/CERES
100 CREAM (GRAM) TOPICAL DAILY
Qty: 30 TABLET | Refills: 3 | Status: SHIPPED | OUTPATIENT
Start: 2023-05-26 | End: 2023-06-25

## 2023-05-25 RX ORDER — FOLIC ACID 1 MG/1
1 TABLET ORAL DAILY
Qty: 30 TABLET | Refills: 3 | Status: SHIPPED | OUTPATIENT
Start: 2023-05-26 | End: 2023-06-25

## 2023-05-25 RX ADMIN — ONDANSETRON 4 MG: 2 INJECTION INTRAMUSCULAR; INTRAVENOUS at 10:30

## 2023-05-25 RX ADMIN — OXYCODONE HYDROCHLORIDE 10 MG: 10 TABLET ORAL at 01:50

## 2023-05-25 RX ADMIN — POTASSIUM CHLORIDE AND SODIUM CHLORIDE: 900; 300 INJECTION, SOLUTION INTRAVENOUS at 07:41

## 2023-05-25 RX ADMIN — POTASSIUM CHLORIDE AND SODIUM CHLORIDE: 900; 300 INJECTION, SOLUTION INTRAVENOUS at 21:55

## 2023-05-25 RX ADMIN — BUSPIRONE HYDROCHLORIDE 10 MG: 5 TABLET ORAL at 12:35

## 2023-05-25 RX ADMIN — PANCRELIPASE 36000 UNITS: 30000; 6000; 19000 CAPSULE, DELAYED RELEASE PELLETS ORAL at 12:35

## 2023-05-25 RX ADMIN — SERTRALINE 50 MG: 50 TABLET, FILM COATED ORAL at 04:57

## 2023-05-25 RX ADMIN — OXYCODONE HYDROCHLORIDE 10 MG: 10 TABLET ORAL at 23:41

## 2023-05-25 RX ADMIN — ACETAMINOPHEN 650 MG: 325 TABLET ORAL at 20:37

## 2023-05-25 RX ADMIN — OXYCODONE HYDROCHLORIDE 10 MG: 10 TABLET ORAL at 20:30

## 2023-05-25 RX ADMIN — ARIPIPRAZOLE 2 MG: 2 TABLET ORAL at 04:57

## 2023-05-25 RX ADMIN — LORAZEPAM 0.5 MG: 0.5 TABLET ORAL at 10:30

## 2023-05-25 RX ADMIN — MORPHINE SULFATE 4 MG: 4 INJECTION INTRAVENOUS at 17:09

## 2023-05-25 RX ADMIN — LORAZEPAM 0.5 MG: 0.5 TABLET ORAL at 15:51

## 2023-05-25 RX ADMIN — BUSPIRONE HYDROCHLORIDE 10 MG: 5 TABLET ORAL at 04:57

## 2023-05-25 RX ADMIN — FOLIC ACID 1 MG: 1 TABLET ORAL at 05:00

## 2023-05-25 RX ADMIN — MORPHINE SULFATE 4 MG: 4 INJECTION INTRAVENOUS at 21:51

## 2023-05-25 RX ADMIN — THIAMINE HCL TAB 100 MG 100 MG: 100 TAB at 06:36

## 2023-05-25 RX ADMIN — MORPHINE SULFATE 4 MG: 4 INJECTION INTRAVENOUS at 13:12

## 2023-05-25 RX ADMIN — PANCRELIPASE 36000 UNITS: 30000; 6000; 19000 CAPSULE, DELAYED RELEASE PELLETS ORAL at 07:41

## 2023-05-25 RX ADMIN — BUSPIRONE HYDROCHLORIDE 10 MG: 5 TABLET ORAL at 17:08

## 2023-05-25 RX ADMIN — PANCRELIPASE 36000 UNITS: 30000; 6000; 19000 CAPSULE, DELAYED RELEASE PELLETS ORAL at 17:09

## 2023-05-25 RX ADMIN — MORPHINE SULFATE 4 MG: 4 INJECTION INTRAVENOUS at 07:46

## 2023-05-25 RX ADMIN — OXYCODONE HYDROCHLORIDE 10 MG: 10 TABLET ORAL at 05:01

## 2023-05-25 RX ADMIN — NICOTINE TRANSDERMAL SYSTEM 21 MG: 21 PATCH, EXTENDED RELEASE TRANSDERMAL at 04:55

## 2023-05-25 RX ADMIN — OXYCODONE HYDROCHLORIDE 10 MG: 10 TABLET ORAL at 11:06

## 2023-05-25 RX ADMIN — OXYCODONE HYDROCHLORIDE 10 MG: 10 TABLET ORAL at 15:51

## 2023-05-25 ASSESSMENT — PAIN DESCRIPTION - PAIN TYPE
TYPE: CHRONIC PAIN;ACUTE PAIN
TYPE: ACUTE PAIN;CHRONIC PAIN
TYPE: ACUTE PAIN;CHRONIC PAIN
TYPE: CHRONIC PAIN;ACUTE PAIN
TYPE: CHRONIC PAIN;ACUTE PAIN
TYPE: ACUTE PAIN;CHRONIC PAIN
TYPE: CHRONIC PAIN;ACUTE PAIN
TYPE: ACUTE PAIN;CHRONIC PAIN

## 2023-05-25 ASSESSMENT — LIFESTYLE VARIABLES
TREMOR: *
TOTAL SCORE: 7
ORIENTATION AND CLOUDING OF SENSORIUM: ORIENTED AND CAN DO SERIAL ADDITIONS
AGITATION: NORMAL ACTIVITY
PAROXYSMAL SWEATS: NO SWEAT VISIBLE
TREMOR: *
VISUAL DISTURBANCES: NOT PRESENT
AUDITORY DISTURBANCES: NOT PRESENT
PAROXYSMAL SWEATS: NO SWEAT VISIBLE
ANXIETY: MILDLY ANXIOUS
AGITATION: NORMAL ACTIVITY
AGITATION: NORMAL ACTIVITY
NAUSEA AND VOMITING: NO NAUSEA AND NO VOMITING
NAUSEA AND VOMITING: *
TOTAL SCORE: 2
ORIENTATION AND CLOUDING OF SENSORIUM: ORIENTED AND CAN DO SERIAL ADDITIONS
HEADACHE, FULLNESS IN HEAD: NOT PRESENT
HEADACHE, FULLNESS IN HEAD: NOT PRESENT
TREMOR: *
AUDITORY DISTURBANCES: NOT PRESENT
VISUAL DISTURBANCES: NOT PRESENT
HEADACHE, FULLNESS IN HEAD: NOT PRESENT
PAROXYSMAL SWEATS: NO SWEAT VISIBLE
ORIENTATION AND CLOUDING OF SENSORIUM: ORIENTED AND CAN DO SERIAL ADDITIONS
NAUSEA AND VOMITING: NO NAUSEA AND NO VOMITING
HEADACHE, FULLNESS IN HEAD: NOT PRESENT
VISUAL DISTURBANCES: NOT PRESENT
ANXIETY: *
PAROXYSMAL SWEATS: NO SWEAT VISIBLE
AGITATION: SOMEWHAT MORE THAN NORMAL ACTIVITY
ANXIETY: NO ANXIETY (AT EASE)
AUDITORY DISTURBANCES: NOT PRESENT
SUBSTANCE_ABUSE: 1
VISUAL DISTURBANCES: NOT PRESENT
ANXIETY: NO ANXIETY (AT EASE)
TOTAL SCORE: 5
ORIENTATION AND CLOUDING OF SENSORIUM: ORIENTED AND CAN DO SERIAL ADDITIONS
TREMOR: *
AUDITORY DISTURBANCES: NOT PRESENT
TOTAL SCORE: 3
NAUSEA AND VOMITING: *

## 2023-05-25 ASSESSMENT — ENCOUNTER SYMPTOMS
CONSTIPATION: 0
HALLUCINATIONS: 0
BACK PAIN: 0
NAUSEA: 1
TREMORS: 1
CHILLS: 0
DIZZINESS: 0
SHORTNESS OF BREATH: 0
ABDOMINAL PAIN: 1
NERVOUS/ANXIOUS: 1
DIARRHEA: 0
VOMITING: 0
HEADACHES: 0
PALPITATIONS: 0
COUGH: 0
DEPRESSION: 0
FEVER: 0

## 2023-05-25 ASSESSMENT — PAIN SCALES - WONG BAKER
WONGBAKER_NUMERICALRESPONSE: HURTS EVEN MORE
WONGBAKER_NUMERICALRESPONSE: HURTS EVEN MORE

## 2023-05-25 NOTE — PROGRESS NOTES
Telemetry Shift Summary     Rhythm: SR  HR: 66-98  Ectopy: rPVC  Measurements: 16/08/40    Normal Values  Rhythm: SR  HR:   Measurements: 0.12-0.20 / 0.04-0.10 / 0.30-0.52    Strip reviewed and placed in chart  SDR

## 2023-05-25 NOTE — PROGRESS NOTES
4 Eyes Skin Assessment Completed by LESLIE Webber and LESLIE Goodwin.    Head WDL  Ears WDL  Nose WDL  Mouth WDL  Neck WDL  Breast/Chest WDL  Shoulder Blades WDL  Spine WDL  (R) Arm/Elbow/Hand WDL  (L) Arm/Elbow/Hand WDL  Abdomen WDL  Groin WDL  Scrotum/Coccyx/Buttocks WDL  (R) Leg WDL  (L) Leg WDL  (R) Heel/Foot/Toe WDL  (L) Heel/Foot/Toe WDL          Devices In Places Tele Box      Interventions In Place N/A    Possible Skin Injury No    Pictures Uploaded Into Epic N/A  Wound Consult Placed N/A  RN Wound Prevention Protocol Ordered No

## 2023-05-25 NOTE — PROGRESS NOTES
[facility] Medicine Daily Progress Note    Date of Service  5/24/2023    Chief Complaint  Rhiannon Marrero is a 38 y.o. female admitted 5/23/2023 with   Chief Complaint   Patient presents with    Flank Pain    LUQ Pain    N/V     Hospital Course  No notes on file    Interval Problem Update  Patient stated she had ongoing abdominal pain from her chronic pancreatitis.  She stated she does want to stop alcohol, she is going to [place] to enter into alcohol rehab program.  She did miss her train today and will have a train ride back to [place] when she is ready.  - CIWA elevated this morning, requiring Ativan  - Continuing with pain control, IV morphine due to chronic pancreatitis  - Potassium 4.1  - Magnesium 1.8 replacing via IV monitoring for hypotension    I have discussed this patient's plan of care and discharge plan at IDT rounds today with Case Management, Nursing, Nursing leadership, and other members of the IDT team.    Consultants/Specialty  none    Code Status  Full Code    Disposition  The patient is not medically cleared for discharge to home or a post-acute facility.  Anticipate discharge to: home with close outpatient follow-up    I have placed the appropriate orders for post-discharge needs.    Review of Systems  Review of Systems   Constitutional:  Negative for chills, fever and malaise/fatigue.   Respiratory:  Negative for cough and shortness of breath.    Cardiovascular:  Negative for chest pain and palpitations.   Gastrointestinal:  Positive for abdominal pain and nausea. Negative for constipation, diarrhea and vomiting.   Musculoskeletal:  Negative for back pain and joint pain.   Neurological:  Positive for tremors. Negative for dizziness and headaches.   All other systems reviewed and are negative.       Physical Exam  Temp:  [36.3 °C (97.4 °F)-36.9 °C (98.4 °F)] 36.6 °C (97.9 °F)  Pulse:  [] 78  Resp:  [11-18] 18  BP: (101-150)/(62-98) 145/88  SpO2:  [94 %-100 %] 100 %    Physical Exam  Vitals and  nursing note reviewed.   Constitutional:       General: She is not in acute distress.     Appearance: Normal appearance. She is not ill-appearing.   HENT:      Head: Normocephalic and atraumatic.   Eyes:      General: No scleral icterus.     Extraocular Movements: Extraocular movements intact.   Cardiovascular:      Rate and Rhythm: Normal rate.      Pulses: Normal pulses.      Heart sounds: Normal heart sounds. No murmur heard.  Pulmonary:      Effort: Pulmonary effort is normal. No respiratory distress.      Breath sounds: Normal breath sounds.   Abdominal:      General: There is no distension.      Palpations: Abdomen is soft.      Tenderness: There is abdominal tenderness.   Musculoskeletal:         General: No swelling or tenderness. Normal range of motion.      Cervical back: Normal range of motion and neck supple.   Skin:     General: Skin is warm.      Capillary Refill: Capillary refill takes less than 2 seconds.      Coloration: Skin is not jaundiced.      Findings: No erythema.   Neurological:      Mental Status: She is alert and oriented to person, place, and time. Mental status is at baseline.      Motor: No weakness.      Comments: Tremors noted   Psychiatric:         Mood and Affect: Mood normal.         Behavior: Behavior normal.         Thought Content: Thought content normal.         Judgment: Judgment normal.         Fluids    Intake/Output Summary (Last 24 hours) at 5/24/2023 1828  Last data filed at 5/24/2023 1500  Gross per 24 hour   Intake 300 ml   Output --   Net 300 ml       Laboratory  Recent Labs     05/23/23  1625 05/24/23  0948   WBC 9.2 6.8   RBC 4.34 3.84*   HEMOGLOBIN 12.9 11.3*   HEMATOCRIT 38.1 34.6*   MCV 87.8 90.1   MCH 29.7 29.4   MCHC 33.9 32.7   RDW 47.6 49.1   PLATELETCT 345 217   MPV 9.4 9.5     Recent Labs     05/23/23  1625 05/24/23  0948   SODIUM 143 135   POTASSIUM 2.7* 4.1   CHLORIDE 109 108   CO2 14* 18*   GLUCOSE 133* 99   BUN 10 6*   CREATININE 0.67 0.54   CALCIUM 8.4  7.7*                   Imaging  IR-US GUIDED PIV   Final Result    Ultrasound-guided PERIPHERAL IV INSERTION performed by    qualified nursing staff as above.      CT-ABDOMEN-PELVIS WITH   Final Result      1.  Chronic pancreatitis without CT evidence of acute pancreatitis      2.  Cholecystectomy with no biliary dilatation      3.  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  Hysterectomy      5.  Hepatomegaly           Assessment/Plan  * Hypokalemia- (present on admission)  Assessment & Plan  Potassium of 2.7  Replacing, checking magnesium    EKG shows sinus tachycardia with a rate of 107, there is no ST elevation, there is flattening of T waves in lead III and aVL.  QTc is 462.  Continue to monitor replace as needed     K 4.1, monitoring    Metabolic acidosis- (present on admission)  Assessment & Plan  With a bicarb level of 14.  Likely secondary to increased GI losses secondary   Anticipate improvement with intravenous fluids, will continue to monitor    Bicarb 18, continued IVF    Intractable nausea and vomiting- (present on admission)  Assessment & Plan  Likely secondary to chronic pancreatitis and chronic alcoholism.  Supportive care with modified diet, intravenous fluids and antiemetics as needed    Ongoing, pt requiring IV zofran    Chronic pancreatitis (HCC)- (present on admission)  Assessment & Plan  Patient requiring IV Ativan, modified diet.  I will start pancrelipase  Multimodal pain control     Patient requiring morphine IV for pain control, continue pain regimen    Tachycardia- (present on admission)  Assessment & Plan  EKG shows sinus tachycardia with a rate of 107, there is no ST elevation, there is flattening of T waves in lead III and aVL.  QTc is 462.  Likely secondary to severe dehydration and alcohol withdrawal    Continue to monitor on telemetry    Alcohol dependence with withdrawal (HCC)- (present on admission)  Assessment & Plan  CIWA protocol   Continuous  cardiac monitoring  Monitor electrolytes and replace accordingly, particularly magnesium, potassium and phosphorus  Fall, seizure, aspiration precautions.    I ordered thiamine and folic acid  - Continued counseling with patient, she is optimistic about going to alcohol for [facility]    Hypocalcemia- (present on admission)  Assessment & Plan  Corrected calcium 8.3, will hold Ca replacements given chronic pancreatitis may worsen         VTE prophylaxis: enoxaparin ppx    I have performed a physical exam and reviewed and updated ROS and Plan today (5/24/2023). In review of yesterday's note (5/23/2023), there are no changes except as documented above.      Total time spent 51 minutes. I spent greater than 50% of the time for patient care, counseling, and coordination on this date, including unit/floor time, and face-to-face time with the patient as per interval events, my own review of patient's imaging and lab analysis and developing my assessment and plan above.

## 2023-05-25 NOTE — CARE PLAN
The patient is Stable - Low risk of patient condition declining or worsening    Shift Goals  Clinical Goals: Monitor CIWA  Patient Goals: Rehab    Progress made toward(s) clinical / shift goals:    Problem: Knowledge Deficit - Standard  Goal: Patient and family/care givers will demonstrate understanding of plan of care, disease process/condition, diagnostic tests and medications  Outcome: Progressing   POC discussed with pt and mother. Both stated understanding.   Problem: Optimal Care for Alcohol Withdrawal  Goal: Optimal Care for the alcohol withdrawal patient  Outcome: Progressing   CIWA improving and controlled.       Patient is not progressing towards the following goals:

## 2023-05-25 NOTE — ASSESSMENT & PLAN NOTE
Corrected calcium 8.4, monitoring, will hold Ca replacements given chronic pancreatitis may worsen

## 2023-05-25 NOTE — CARE PLAN
The patient is Stable - Low risk of patient condition declining or worsening    Shift Goals  Clinical Goals: Administer meds per orders  Patient Goals: DC to rehab    Progress made toward(s) clinical / shift goals:    Problem: Knowledge Deficit - Standard  Goal: Patient and family/care givers will demonstrate understanding of plan of care, disease process/condition, diagnostic tests and medications  Outcome: Progressing     Problem: Optimal Care for Alcohol Withdrawal  Goal: Optimal Care for the alcohol withdrawal patient  Outcome: Progressing     Problem: Seizure Precautions  Goal: Implementation of seizure precautions  Outcome: Progressing     Problem: Lifestyle Changes  Goal: Patient's ability to identify lifestyle changes and available resources to help reduce recurrence of condition will improve  Outcome: Progressing     Problem: Psychosocial  Goal: Patient's level of anxiety will decrease  Outcome: Progressing       Patient is not progressing towards the following goals:

## 2023-05-25 NOTE — PROGRESS NOTES
Telemetry Shift Summary     Rhythm: SR  HR:   Ectopy:     Measurements: .16/.08/.40    Normal Values  Rhythm: SR  HR:   Measurements: 0.12-0.20/0.08-0.10/0.30-0.52

## 2023-05-26 VITALS
HEART RATE: 65 BPM | RESPIRATION RATE: 18 BRPM | BODY MASS INDEX: 21.28 KG/M2 | OXYGEN SATURATION: 100 % | SYSTOLIC BLOOD PRESSURE: 138 MMHG | DIASTOLIC BLOOD PRESSURE: 97 MMHG | HEIGHT: 67 IN | TEMPERATURE: 98.4 F | WEIGHT: 135.58 LBS

## 2023-05-26 PROBLEM — R00.0 TACHYCARDIA: Status: RESOLVED | Noted: 2022-10-16 | Resolved: 2023-05-26

## 2023-05-26 PROBLEM — E87.20 METABOLIC ACIDOSIS: Status: RESOLVED | Noted: 2023-05-23 | Resolved: 2023-05-26

## 2023-05-26 PROBLEM — R11.2 INTRACTABLE NAUSEA AND VOMITING: Status: RESOLVED | Noted: 2023-04-11 | Resolved: 2023-05-26

## 2023-05-26 PROBLEM — E83.42 HYPOMAGNESEMIA: Status: RESOLVED | Noted: 2023-05-25 | Resolved: 2023-05-26

## 2023-05-26 PROBLEM — E87.6 HYPOKALEMIA: Status: RESOLVED | Noted: 2022-05-24 | Resolved: 2023-05-26

## 2023-05-26 PROCEDURE — 700111 HCHG RX REV CODE 636 W/ 250 OVERRIDE (IP): Performed by: HOSPITALIST

## 2023-05-26 PROCEDURE — 700102 HCHG RX REV CODE 250 W/ 637 OVERRIDE(OP): Performed by: INTERNAL MEDICINE

## 2023-05-26 PROCEDURE — A9270 NON-COVERED ITEM OR SERVICE: HCPCS | Performed by: HOSPITALIST

## 2023-05-26 PROCEDURE — 99239 HOSP IP/OBS DSCHRG MGMT >30: CPT | Performed by: INTERNAL MEDICINE

## 2023-05-26 PROCEDURE — A9270 NON-COVERED ITEM OR SERVICE: HCPCS | Performed by: INTERNAL MEDICINE

## 2023-05-26 PROCEDURE — 700102 HCHG RX REV CODE 250 W/ 637 OVERRIDE(OP): Performed by: HOSPITALIST

## 2023-05-26 RX ORDER — HYDROMORPHONE HYDROCHLORIDE 2 MG/1
4 TABLET ORAL ONCE
Status: COMPLETED | OUTPATIENT
Start: 2023-05-26 | End: 2023-05-26

## 2023-05-26 RX ORDER — CHLORDIAZEPOXIDE HYDROCHLORIDE 10 MG/1
10 CAPSULE, GELATIN COATED ORAL
Qty: 12 CAPSULE | Refills: 0
Start: 2023-05-26 | End: 2023-06-01

## 2023-05-26 RX ADMIN — BUSPIRONE HYDROCHLORIDE 10 MG: 5 TABLET ORAL at 12:31

## 2023-05-26 RX ADMIN — OXYCODONE HYDROCHLORIDE 10 MG: 10 TABLET ORAL at 05:48

## 2023-05-26 RX ADMIN — OXYCODONE HYDROCHLORIDE 10 MG: 10 TABLET ORAL at 12:31

## 2023-05-26 RX ADMIN — ARIPIPRAZOLE 2 MG: 2 TABLET ORAL at 05:47

## 2023-05-26 RX ADMIN — FOLIC ACID 1 MG: 1 TABLET ORAL at 05:48

## 2023-05-26 RX ADMIN — SERTRALINE 50 MG: 50 TABLET, FILM COATED ORAL at 05:47

## 2023-05-26 RX ADMIN — MORPHINE SULFATE 4 MG: 4 INJECTION INTRAVENOUS at 01:41

## 2023-05-26 RX ADMIN — THIAMINE HCL TAB 100 MG 100 MG: 100 TAB at 05:47

## 2023-05-26 RX ADMIN — PANCRELIPASE 36000 UNITS: 30000; 6000; 19000 CAPSULE, DELAYED RELEASE PELLETS ORAL at 12:31

## 2023-05-26 RX ADMIN — NICOTINE TRANSDERMAL SYSTEM 21 MG: 21 PATCH, EXTENDED RELEASE TRANSDERMAL at 05:46

## 2023-05-26 RX ADMIN — PANCRELIPASE 36000 UNITS: 30000; 6000; 19000 CAPSULE, DELAYED RELEASE PELLETS ORAL at 08:04

## 2023-05-26 RX ADMIN — HYDROMORPHONE HYDROCHLORIDE 4 MG: 2 TABLET ORAL at 09:14

## 2023-05-26 RX ADMIN — BUSPIRONE HYDROCHLORIDE 10 MG: 5 TABLET ORAL at 05:47

## 2023-05-26 ASSESSMENT — LIFESTYLE VARIABLES
AGITATION: NORMAL ACTIVITY
AUDITORY DISTURBANCES: NOT PRESENT
NAUSEA AND VOMITING: NO NAUSEA AND NO VOMITING
TOTAL SCORE: 1
PAROXYSMAL SWEATS: NO SWEAT VISIBLE
ORIENTATION AND CLOUDING OF SENSORIUM: ORIENTED AND CAN DO SERIAL ADDITIONS
TREMOR: TREMOR NOT VISIBLE BUT CAN BE FELT, FINGERTIP TO FINGERTIP
ANXIETY: NO ANXIETY (AT EASE)
HEADACHE, FULLNESS IN HEAD: NOT PRESENT
VISUAL DISTURBANCES: NOT PRESENT

## 2023-05-26 ASSESSMENT — PAIN DESCRIPTION - PAIN TYPE
TYPE: ACUTE PAIN
TYPE: ACUTE PAIN;CHRONIC PAIN
TYPE: CHRONIC PAIN;ACUTE PAIN
TYPE: ACUTE PAIN;CHRONIC PAIN

## 2023-05-26 ASSESSMENT — PAIN SCALES - WONG BAKER: WONGBAKER_NUMERICALRESPONSE: HURTS EVEN MORE

## 2023-05-26 ASSESSMENT — FIBROSIS 4 INDEX: FIB4 SCORE: 0.86

## 2023-05-26 NOTE — PROGRESS NOTES
Hospital Medicine Daily Progress Note    Date of Service  5/25/2023    Chief Complaint  Rhiannon Marrero is a 38 y.o. female admitted 5/23/2023 with   Chief Complaint   Patient presents with    Flank Pain    LUQ Pain    N/V     Hospital Course  No notes on file    Interval Problem Update  5/24:  Patient stated she had ongoing abdominal pain from her chronic pancreatitis.  She stated she does want to stop alcohol, she is going to [place] to enter into alcohol rehab program.  She did miss her train today and will have a train ride back to [place] when she is ready.  - CIWA elevated this morning, requiring Ativan  - Continuing with pain control, IV morphine due to chronic pancreatitis  - Potassium 4.1  - Magnesium 1.8 replacing via IV monitoring for hypotension    5/25:   Patient reported ongoing abdominal pain from her chronic pancreatitis.  She is requiring IV morphine.  -- I spoke to patient's mother with patient's permission, gave updates and she voiced her concerns. All questions were answered, she was appreciative of our conversation.    I have discussed this patient's plan of care and discharge plan at IDT rounds today with Case Management, Nursing, Nursing leadership, and other members of the IDT team.    Consultants/Specialty  none    Code Status  Full Code    Disposition  The patient is not medically cleared for discharge to home or a post-acute facility.  Anticipate discharge to: home with close outpatient follow-up    I have placed the appropriate orders for post-discharge needs.    Review of Systems  Review of Systems   Constitutional:  Negative for chills, fever and malaise/fatigue.   Respiratory:  Negative for cough and shortness of breath.    Cardiovascular:  Negative for chest pain and palpitations.   Gastrointestinal:  Positive for abdominal pain and nausea. Negative for constipation, diarrhea and vomiting.   Musculoskeletal:  Negative for back pain and joint pain.   Neurological:  Positive for tremors.  Negative for dizziness and headaches.   Psychiatric/Behavioral:  Positive for substance abuse. Negative for depression, hallucinations and suicidal ideas. The patient is nervous/anxious.    All other systems reviewed and are negative.       Physical Exam  Temp:  [36.5 °C (97.7 °F)-36.8 °C (98.2 °F)] 36.7 °C (98 °F)  Pulse:  [63-88] 64  Resp:  [16-18] 18  BP: (113-138)/(73-90) 130/86  SpO2:  [96 %-100 %] 99 %    Physical Exam  Vitals and nursing note reviewed.   Constitutional:       General: She is not in acute distress.     Appearance: Normal appearance. She is not ill-appearing.   HENT:      Head: Normocephalic and atraumatic.   Eyes:      General: No scleral icterus.     Extraocular Movements: Extraocular movements intact.   Cardiovascular:      Rate and Rhythm: Normal rate.      Pulses: Normal pulses.      Heart sounds: Normal heart sounds. No murmur heard.  Pulmonary:      Effort: Pulmonary effort is normal. No respiratory distress.      Breath sounds: Normal breath sounds.   Abdominal:      General: There is no distension.      Palpations: Abdomen is soft.      Tenderness: There is abdominal tenderness.   Musculoskeletal:         General: No swelling or tenderness. Normal range of motion.      Cervical back: Normal range of motion and neck supple.   Skin:     General: Skin is warm.      Capillary Refill: Capillary refill takes less than 2 seconds.      Coloration: Skin is not jaundiced.      Findings: No erythema.   Neurological:      Mental Status: She is alert and oriented to person, place, and time. Mental status is at baseline.      Motor: No weakness.      Comments: Tremors noted mild   Psychiatric:         Mood and Affect: Mood normal.         Behavior: Behavior normal.         Thought Content: Thought content normal.         Judgment: Judgment normal.         Fluids    Intake/Output Summary (Last 24 hours) at 5/25/2023 0998  Last data filed at 5/25/2023 0500  Gross per 24 hour   Intake 1440 ml   Output  --   Net 1440 ml       Laboratory  Recent Labs     05/23/23  1625 05/24/23  0948 05/25/23  0124   WBC 9.2 6.8 5.7   RBC 4.34 3.84* 3.75*   HEMOGLOBIN 12.9 11.3* 11.1*   HEMATOCRIT 38.1 34.6* 33.4*   MCV 87.8 90.1 89.1   MCH 29.7 29.4 29.6   MCHC 33.9 32.7 33.2   RDW 47.6 49.1 48.4   PLATELETCT 345 217 222   MPV 9.4 9.5 9.5     Recent Labs     05/23/23  1625 05/24/23  0948 05/25/23  0124   SODIUM 143 135 138   POTASSIUM 2.7* 4.1 4.3   CHLORIDE 109 108 108   CO2 14* 18* 19*   GLUCOSE 133* 99 97   BUN 10 6* 2*   CREATININE 0.67 0.54 0.48*   CALCIUM 8.4 7.7* 7.8*                   Imaging  IR-US GUIDED PIV   Final Result    Ultrasound-guided PERIPHERAL IV INSERTION performed by    qualified nursing staff as above.      CT-ABDOMEN-PELVIS WITH   Final Result      1.  Chronic pancreatitis without CT evidence of acute pancreatitis      2.  Cholecystectomy with no biliary dilatation      3.  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  Hysterectomy      5.  Hepatomegaly           Assessment/Plan  * Hypokalemia- (present on admission)  Assessment & Plan  Potassium of 2.7  EKG shows sinus tachycardia with a rate of 107, there is no ST elevation, there is flattening of T waves in lead III and aVL.  QTc is 462.  Continue to monitor replace as needed   Due to poor nutrition from alcohol use    4.3, monitoring    Hypomagnesemia- (present on admission)  Assessment & Plan  Was 1.8, given IV mag  1.9, monitoring    Metabolic acidosis- (present on admission)  Assessment & Plan  With a bicarb level of 14.  Likely secondary to increased GI losses secondary   Anticipate improvement with intravenous fluids, will continue to monitor    Bicarb 19, continued IVF    Intractable nausea and vomiting- (present on admission)  Assessment & Plan  Likely secondary to chronic pancreatitis and chronic alcoholism.  Supportive care with modified diet, intravenous fluids and antiemetics as needed    IV zofran administered  today    Chronic pancreatitis (HCC)- (present on admission)  Assessment & Plan  Patient requiring IV Ativan, modified diet.  I will start pancrelipase  Multimodal pain control     She is requiring IV morphine, continue pain regimen    Tachycardia- (present on admission)  Assessment & Plan  EKG shows sinus tachycardia with a rate of 107, there is no ST elevation, there is flattening of T waves in lead III and aVL.  QTc is 462.  Likely secondary to severe dehydration and alcohol withdrawal    Continue to monitor on telemetry    Alcohol dependence with withdrawal (HCC)- (present on admission)  Assessment & Plan  CIWA protocol   Continuous cardiac monitoring  Monitor electrolytes and replace accordingly, particularly magnesium, potassium and phosphorus  Fall, seizure, aspiration precautions.    I ordered thiamine and folic acid  - Continued counseling with patient, she is optimistic about going to alcohol for rehabilitation at [facility]  -- meds to beds delivered [facility] for discharge    Hypocalcemia- (present on admission)  Assessment & Plan  Corrected calcium 8.4, monitoring, will hold Ca replacements given chronic pancreatitis may worsen         VTE prophylaxis: enoxaparin ppx    I have performed a physical exam and reviewed and updated ROS and Plan today (5/25/2023). In review of yesterday's note (5/24/2023), there are no changes except as documented above.      Total time spent 37 minutes.    This included my review of patient's overnight RN notes, face to face interview, physical examination, lab analysis.  Also includes my documented assessments and interventions above.  In addition, I spoke with entire care team on patient's treatment plan and DC planning on IDT rounds.

## 2023-05-26 NOTE — PROGRESS NOTES
Charge Nurse Rounding Note    Bedside rounding completed to address quality of care and overall patient experience.    Patient Satisfaction addressed including staff responsiveness. Patient/family are aware of the POC and any questions answered. Thorough safety education completed including use of call light prior to all mobility throughout the entirety of the hospital stay.     Patient/family aware of time of next Hourly Round.    No further questions/concerns currently.     Additional Notes: Pt to be picked up by aunt for D/C and transported to train station. Pt verbalizes that she is in a good head space, and takes responsibility for relapse.  Pt hopes to be able to stay in rehab and not back and forth between rehab and hospital.

## 2023-05-26 NOTE — CARE PLAN
The patient is Stable - Low risk of patient condition declining or worsening    Shift Goals  Clinical Goals: Control pain.  Patient Goals: Rehab    Progress made toward(s) clinical / shift goals:    Problem: Knowledge Deficit - Standard  Goal: Patient and family/care givers will demonstrate understanding of plan of care, disease process/condition, diagnostic tests and medications  Outcome: Progressing   POC discussed with pt. Pt states understanding.   Problem: Optimal Care for Alcohol Withdrawal  Goal: Optimal Care for the alcohol withdrawal patient  Outcome: Progressing   Pt scoring low on CIWA.       Patient is not progressing towards the following goals:

## 2023-05-26 NOTE — PROGRESS NOTES
Telemetry Shift Summary     Rhythm: SR  HR: 66-81  Ectopy:     Measurements: .16/0.8/.42    Normal Values  Rhythm: SR  HR:   Measurements: 0.12-0.20/0.08-0.10/0.30-0.52

## 2023-05-26 NOTE — PROGRESS NOTES
Pt discharged in stable condition, vitals WNL, all IV access and telemetry removed. Discharge instructions given with no further questions. Care relinquished at this time. Meds to beds with pt. Family at ER entrance for transport to train station.

## 2023-05-27 NOTE — DISCHARGE SUMMARY
"Discharge Summary    CHIEF COMPLAINT ON ADMISSION  Chief Complaint   Patient presents with    Flank Pain    LUQ Pain    N/V       Reason for Admission  EMS     Admission Date  5/23/2023    CODE STATUS  Full Code    HPI & HOSPITAL COURSE  \"Rhiannon Marrero is a 38 y.o. female with a past medical history of alcohol dependence and chronic pancreatitis who presented 5/23/2023 with nausea vomiting abdominal pain diarrhea.  Patient reports that she has not been feeling well for the past 3 days.  She has been having pain in the left lower part of her abdomen.  Pain is constant and has been having progressing over the past 3 days.  Pain also radiates to the epigastric region.  It is associated with nausea and vomiting.  She denies noticing any changes to her stool.  She denies noticing any blood in her vomitus.\" (As per admitting physician).    5/24:  Patient stated she had ongoing abdominal pain from her chronic pancreatitis.  She stated she does want to stop alcohol, she is going to [place] to enter into alcohol rehab program.  She did miss her train today and will have a train ride back to [place] when she is ready.  - CIWA elevated this morning, requiring Ativan  - Continuing with pain control, IV morphine due to chronic pancreatitis  - Potassium 4.1  - Magnesium 1.8 replacing via IV monitoring for hypotension     5/25:   Patient reported ongoing abdominal pain from her chronic pancreatitis.  She is requiring IV morphine.  -- I spoke to patient's mother with patient's permission, gave updates and she voiced her concerns. All questions were answered, she was appreciative of our conversation.  -Patient had missed her training to go to alcohol, unable to check into rehab facility.  Patient stayed for ongoing CIWA maintenance, monitoring.  Meds to beds was delivered.    Upon discharge patient was improving, I switched Librium to twice a day tapering.  Patient was able to be picked up by her aunt and taken to the train station so " that she can ride to alcohol and check into alcohol cessation rehab program.  Patient did not experience any severe withdrawals with seizures.    Therefore, she is discharged in good and stable condition to home with close outpatient follow-up.    The patient met 2-midnight criteria for an inpatient stay at the time of discharge.    Discharge Date  5/26/2023    FOLLOW UP ITEMS POST DISCHARGE  05/26/23    DISCHARGE DIAGNOSES  Principal Problem (Resolved):    Hypokalemia (POA: Yes)  Active Problems:    Hypocalcemia (POA: Yes)    Alcohol dependence with withdrawal (HCC) (POA: Yes)    Chronic pancreatitis (HCC) (POA: Yes)  Resolved Problems:    Tachycardia (POA: Yes)    Intractable nausea and vomiting (POA: Yes)    Metabolic acidosis (POA: Yes)    Hypomagnesemia (POA: Yes)      FOLLOW UP  No future appointments.  MALOU Harrington  [address]  [address]  [place] [address]  [phone]    Go on 6/28/2023  Go to your appointment with MALOU Harrington on Wednesday June 28th 2023 at 10:00    [facility]  [address]  [place] [address]  Go on 5/25/2023  Please continue with your rehab program once you reach [facility].      MEDICATIONS ON DISCHARGE     Medication List        START taking these medications        Instructions   chlordiazepoxide 10 MG capsule  Commonly known as: LIBRIUM   Doctor's comments: No fill required, patient has medication at bedside delivered. I changed directions given symptom improvement.  Take 1 Capsule by mouth 2 times a day for 6 days. Indications: Alcohol Withdrawal Syndrome  Dose: 10 mg     folic acid 1 MG Tabs  Commonly known as: FOLVITE   Take 1 tablet by mouth every day for 30 days.  Dose: 1 mg     thiamine 100 MG tablet  Commonly known as: THIAMINE   Take 1 tablet by mouth every day for 30 days.  Dose: 100 mg            CONTINUE taking these medications        Instructions   ARIPiprazole 2 MG tablet  Commonly known as: Abilify   Take 2 mg by mouth every  day.  Dose: 2 mg     busPIRone 10 MG Tabs tablet  Commonly known as: BUSPAR   Take 10 mg by mouth 3 times a day.  Dose: 10 mg     Creon [id number] units Cpep  Generic drug: Pancrelipase (Lip-Prot-Amyl)   Take 1 Capful by mouth 3 times a day with meals.  Dose: 1 Capful     sertraline 50 MG Tabs  Commonly known as: Zoloft   Take 50 mg by mouth every day.  Dose: 50 mg              Allergies  Allergies   Allergen Reactions    Promethazine Hcl Anxiety     Anxiety and makes her feels like skin coming off        DIET  Regular    ACTIVITY  As tolerated.  Weight bearing as tolerated    CONSULTATIONS  none    PROCEDURES  none    LABORATORY  Lab Results   Component Value Date    SODIUM 138 05/25/2023    POTASSIUM 4.3 05/25/2023    CHLORIDE 108 05/25/2023    CO2 19 (L) 05/25/2023    GLUCOSE 97 05/25/2023    BUN 2 (L) 05/25/2023    CREATININE 0.48 (L) 05/25/2023    CREATININE 0.7 06/10/2008    GLOMRATE 79 05/07/2023        Lab Results   Component Value Date    WBC 5.7 05/25/2023    HEMOGLOBIN 11.1 (L) 05/25/2023    HEMATOCRIT 33.4 (L) 05/25/2023    PLATELETCT 222 05/25/2023        Total time of the discharge process exceeds 32 minutes.  More than 50% of time was spent face to face with patient.  This included but not limited to review of hospital course with patient, treatment goals upon discharge, recommendations to PCP, continued and new medications and their adverse reactions, and nursing instructions for patient.          IR-US GUIDED PIV   Final Result    Ultrasound-guided PERIPHERAL IV INSERTION performed by    qualified nursing staff as above.      CT-ABDOMEN-PELVIS WITH   Final Result      1.  Chronic pancreatitis without CT evidence of acute pancreatitis      2.  Cholecystectomy with no biliary dilatation      3.  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  Hysterectomy      5.  Hepatomegaly

## 2023-05-30 ENCOUNTER — TELEPHONE (OUTPATIENT)
Dept: HEALTH INFORMATION MANAGEMENT | Facility: OTHER | Age: 38
End: 2023-05-30

## 2023-07-11 ENCOUNTER — HOSPITAL ENCOUNTER (EMERGENCY)
Facility: MEDICAL CENTER | Age: 38
End: 2023-07-11
Attending: EMERGENCY MEDICINE
Payer: MEDICAID

## 2023-07-11 VITALS
OXYGEN SATURATION: 97 % | WEIGHT: 150.57 LBS | HEIGHT: 67 IN | BODY MASS INDEX: 23.63 KG/M2 | HEART RATE: 78 BPM | DIASTOLIC BLOOD PRESSURE: 71 MMHG | SYSTOLIC BLOOD PRESSURE: 120 MMHG | RESPIRATION RATE: 18 BRPM | TEMPERATURE: 97.4 F

## 2023-07-11 DIAGNOSIS — L03.213 PERIORBITAL CELLULITIS OF RIGHT EYE: ICD-10-CM

## 2023-07-11 PROCEDURE — 99282 EMERGENCY DEPT VISIT SF MDM: CPT

## 2023-07-11 RX ORDER — DICYCLOMINE HCL 20 MG
20 TABLET ORAL 3 TIMES DAILY
Status: ON HOLD | COMMUNITY
End: 2023-10-27

## 2023-07-11 RX ORDER — CEPHALEXIN 500 MG/1
500 TABLET ORAL 4 TIMES DAILY
Qty: 28 TABLET | Refills: 0 | Status: ACTIVE | OUTPATIENT
Start: 2023-07-11 | End: 2023-07-18

## 2023-07-11 RX ORDER — GABAPENTIN 400 MG/1
400 CAPSULE ORAL 3 TIMES DAILY
Status: SHIPPED | COMMUNITY
End: 2023-07-24

## 2023-07-11 RX ORDER — TRAZODONE HYDROCHLORIDE 100 MG/1
100 TABLET ORAL NIGHTLY
COMMUNITY

## 2023-07-11 ASSESSMENT — FIBROSIS 4 INDEX: FIB4 SCORE: 0.86

## 2023-07-11 NOTE — DISCHARGE INSTRUCTIONS
Take ibuprofen as well as Claritin to decrease inflammation.  Use cool compresses and do not rub your eye.  Return for any redness purulent drainage from the eyeball or worsening symptoms.

## 2023-07-11 NOTE — ED TRIAGE NOTES
"Chief Complaint   Patient presents with    Facial Swelling     Swelling noted to the R eye, started yesterday morning, has been getting worse.  States does not feel like pink eye, denies trauma, or hx of this occurring before.         Pt ambulated to triage. Pt A&Ox4, came in for above complaint.     Pt to lobby . Pt educated on alerting staff in changes to condition. Pt verbalized understanding.    /62   Pulse 80   Temp 36.3 °C (97.3 °F) (Temporal)   Resp 16   Ht 1.702 m (5' 7\")   Wt 68.3 kg (150 lb 9.2 oz)   LMP 10/30/2018   SpO2 100%   BMI 23.58 kg/m²     "

## 2023-07-11 NOTE — ED NOTES
"Pt discharged home. Pt in possession of belongings. Pt provided discharge education and information pertaining to medications and follow up appointments. Pt received copy of discharge instructions and verbalized understanding. /71   Pulse 78   Temp 36.3 °C (97.4 °F) (Temporal)   Resp 18   Ht 1.702 m (5' 7\")   Wt 68.3 kg (150 lb 9.2 oz)   LMP 10/30/2018   SpO2 97%   BMI 23.58 kg/m²     "

## 2023-07-11 NOTE — ED PROVIDER NOTES
ED Provider Note    CHIEF COMPLAINT  Chief Complaint   Patient presents with    Facial Swelling     Swelling noted to the R eye, started yesterday morning, has been getting worse.  States does not feel like pink eye, denies trauma, or hx of this occurring before.         EXTERNAL RECORDS REVIEWED  Inpatient Notes the patient was admitted to the hospital 5/23/2023 until 5/26/2023 for abdominal pain secondary to chronic pancreatitis and alcohol dependence with intractable nausea and vomiting    HPI/ROS  LIMITATION TO HISTORY   Select: : None  OUTSIDE HISTORIAN(S):  none    Rhiannon Marrero is a 38 y.o. female who presents complaining of pain and swelling to her right eyelid upper and lower started yesterday morning.  She states it started on her upper lid and her primary care physician told her it was a stye.  Since then the swelling has gone to her lower lid and she is having trouble opening her eye.  She denies any purulent drainage from the eye.  She does have some blurry vision.  She denies any fevers or chills.  She has no history of diabetes.  She denies any new use of soaps or detergents or facial creams.    PAST MEDICAL HISTORY   has a past medical history of Acute pancreatitis without infection or necrosis (07/20/2022), Alcohol dependence ([facility]) (04/13/2017), Bronchitis (08/2012), Cold, Current moderate episode of major depressive disorder without prior episode ([facility]) (01/31/2020), Heart burn, Hernia of unspecified site of abdominal cavity without mention of obstruction or gangrene, High anion gap metabolic acidosis (07/01/2022), Indigestion, Pancreatitis, Pneumonia (2011), and Seizure disorder ([facility]) (05/23/2022).    SURGICAL HISTORY   has a past surgical history that includes other orthopedic surgery; gyn surgery; tonsillectomy (04/11/2013); arthroscopy, knee; hysterectomy robotic xi (10/11/2018); salpingectomy (Bilateral, 10/11/2018); orif, wrist (Right, 04/24/2019); upper gi endoscopy,diagnosis (N/A,  "12/23/2022); inject nerv blck,celiac plexus (N/A, 12/23/2022); egd w/endoscopic ultrasound (N/A, 12/23/2022); and whipple procedure (02/25/2023).    FAMILY HISTORY  Family History   Problem Relation Age of Onset    Psychiatric Illness Mother     Stroke Mother     Arthritis Mother     Heart Disease Maternal Grandfather     Cancer Neg Hx     Diabetes Neg Hx     Hypertension Neg Hx        SOCIAL HISTORY  Social History     Tobacco Use    Smoking status: Former     Packs/day: 0.25     Years: 10.00     Pack years: 2.50     Types: Cigarettes    Smokeless tobacco: Never   Vaping Use    Vaping Use: Every day    Substances: Nicotine   Substance and Sexual Activity    Alcohol use: Yes     Comment: Former alcholic 5/24/2023 last drink    Drug use: Not Currently     Types: Inhaled     Comment: weed for pain    Sexual activity: Not on file       CURRENT MEDICATIONS  Home Medications       Reviewed by Alondra Young R.N. (Registered Nurse) on 07/11/23 at 0650  Med List Status: Partial     Medication Last Dose Status   ARIPiprazole (ABILIFY) 2 MG tablet  Active   busPIRone (BUSPAR) 10 MG Tab tablet  Active   dicyclomine (BENTYL) 20 MG Tab  Active   gabapentin (NEURONTIN) 400 MG Cap  Active   Pancrelipase, Lip-Prot-Amyl, (CREON) 32901-738302 units Cap DR Particles  Active   sertraline (ZOLOFT) 50 MG Tab  Active   traZODone (DESYREL) 100 MG Tab  Active                    ALLERGIES  Allergies   Allergen Reactions    Promethazine Hcl Anxiety     Anxiety and makes her feels like skin coming off        PHYSICAL EXAM  VITAL SIGNS: /62   Pulse 80   Temp 36.3 °C (97.3 °F) (Temporal)   Resp 16   Ht 1.702 m (5' 7\")   Wt 68.3 kg (150 lb 9.2 oz)   LMP 10/30/2018   SpO2 100%   BMI 23.58 kg/m²      Constitutional: Well developed, Well nourished, No acute respiratory distress, Non-toxic appearance.   HENT: Normocephalic, Atraumatic, Bilateral external ears normal, Oropharynx clear, mucous membranes are moist.  Eyes: Conjunctiva " normal without injection or purulent drainage extraocular muscles are intact, No discharge.  No proptosis no icterus.  Positive right periorbital edema without erythema.  The eye however is tender.  There are no fluctuant areas there is no warmth  Neck: Normal range of motion. Supple.  Lymphatic: No cervical lymphadenopathy noted.   Skin: Warm, Dry, No erythema, No rash.   Neurologic: Alert & oriented x 3. No focal deficits noted.   Psych: Alert normal affect       DIAGNOSTIC STUDIES / PROCEDURES  EKG  I have independently interpreted this EKG  none    LABS  none    RADIOLOGY  I have independently interpreted the diagnostic imaging associated with this visit and am waiting the final reading from the radiologist.   My preliminary interpretation is as follows: None  Radiologist interpretation: None    COURSE & MEDICAL DECISION MAKING    ED Observation Status? No; Patient does not meet criteria for ED Observation.     INITIAL ASSESSMENT, COURSE AND PLAN  Care Narrative: Rhiannon Marrero is a 38 y.o. female who presents complaining of pain and swelling to her right eyelid upper and lower started yesterday morning.  She states it started on her upper lid and her primary care physician told her it was a stye.  Since then the swelling has gone to her lower lid and she is having trouble opening her eye.  She denies any purulent drainage from the eye.  She does have some blurry vision.  She denies any fevers or chills.  She has no history of diabetes.  She denies any new use of soaps or detergents or facial creams.  Physical exam she has right periorbital edema without erythema or warmth.  The area however is tender.  The eye itself is not injected and extract muscles are intact without proptosis.  Because the area is painful I will place her on antibiotics for periorbital cellulitis.  I advised her to use cool compresses and Claritin as an allergy medicine as well.  I advised her to use only hypoallergenic soaps facial  creams and detergents.  She should avoid rubbing the area and return for any fevers redness eye drainage or worsening symptoms        ADDITIONAL PROBLEM LIST  pancreatitis  DISPOSITION AND DISCUSSIONS    I have discussed management of the patient with the following physicians and BILL's:  none    Discussion of management with other QHP or appropriate source(s): None     Escalation of care considered, and ultimately not performed:blood analysis and diagnostic imaging however the patient has no erythema is not febrile and has no proptosis or abnormal ocular movement that would make me think she has a retrobulbar abscess    Barriers to care at this time, including but not limited to:  none .     Decision tools and prescription drugs considered including, but not limited to: Antibiotics keflex .  The patient will return for new or worsening symptoms and is stable at the time of discharge.    The patient is referred to a primary physician for blood pressure management, diabetic screening, and for all other preventative health concerns.      DISPOSITION:  Patient will be discharged home in stable condition.    FOLLOW UP:  MALOU Harrington  [address] [id number] [phone]    In 2 days  for recheck    [facility], Emergency Dept  [facility] [facility] [address] [phone]    As needed, If symptoms worsen      OUTPATIENT MEDICATIONS:  New Prescriptions    CEPHALEXIN 500 MG TAB    Take 1 Tablet by mouth 4 times a day for 7 days.       FINAL DIAGNOSIS  1. Periorbital cellulitis of right eye           Electronically signed by: Anila Johnson M.D., 7/11/2023 7:31 AM

## 2023-07-18 ENCOUNTER — HOSPITAL ENCOUNTER (OUTPATIENT)
Dept: RADIOLOGY | Facility: MEDICAL CENTER | Age: 38
End: 2023-07-18
Attending: NURSE PRACTITIONER
Payer: MEDICAID

## 2023-07-18 DIAGNOSIS — R10.9 ABDOMINAL PAIN, UNSPECIFIED ABDOMINAL LOCATION: ICD-10-CM

## 2023-07-18 PROCEDURE — 74019 RADEX ABDOMEN 2 VIEWS: CPT

## 2023-07-23 ENCOUNTER — HOSPITAL ENCOUNTER (OUTPATIENT)
Facility: MEDICAL CENTER | Age: 38
End: 2023-07-25
Attending: STUDENT IN AN ORGANIZED HEALTH CARE EDUCATION/TRAINING PROGRAM | Admitting: STUDENT IN AN ORGANIZED HEALTH CARE EDUCATION/TRAINING PROGRAM
Payer: MEDICAID

## 2023-07-23 DIAGNOSIS — K86.0 CHRONIC PANCREATITIS DUE TO CHRONIC ALCOHOLISM (HCC): ICD-10-CM

## 2023-07-23 DIAGNOSIS — K86.0 ALCOHOL-INDUCED CHRONIC PANCREATITIS (HCC): ICD-10-CM

## 2023-07-23 DIAGNOSIS — R10.13 EPIGASTRIC PAIN: ICD-10-CM

## 2023-07-23 DIAGNOSIS — F10.20 CHRONIC PANCREATITIS DUE TO CHRONIC ALCOHOLISM (HCC): ICD-10-CM

## 2023-07-23 PROCEDURE — 83690 ASSAY OF LIPASE: CPT

## 2023-07-23 PROCEDURE — 84703 CHORIONIC GONADOTROPIN ASSAY: CPT

## 2023-07-23 PROCEDURE — 99285 EMERGENCY DEPT VISIT HI MDM: CPT

## 2023-07-23 PROCEDURE — 36415 COLL VENOUS BLD VENIPUNCTURE: CPT

## 2023-07-23 PROCEDURE — 80053 COMPREHEN METABOLIC PANEL: CPT

## 2023-07-23 PROCEDURE — 85025 COMPLETE CBC W/AUTO DIFF WBC: CPT

## 2023-07-23 ASSESSMENT — FIBROSIS 4 INDEX: FIB4 SCORE: 0.86

## 2023-07-24 PROBLEM — F31.9 BIPOLAR DISORDER (HCC): Status: ACTIVE | Noted: 2019-06-11

## 2023-07-24 PROBLEM — K86.0 CHRONIC PANCREATITIS DUE TO CHRONIC ALCOHOLISM (HCC): Status: ACTIVE | Noted: 2023-07-24

## 2023-07-24 PROBLEM — G62.1 ALCOHOLIC PERIPHERAL NEUROPATHY (HCC): Status: ACTIVE | Noted: 2023-07-24

## 2023-07-24 PROBLEM — F10.20 CHRONIC PANCREATITIS DUE TO CHRONIC ALCOHOLISM (HCC): Status: ACTIVE | Noted: 2023-07-24

## 2023-07-24 PROBLEM — R10.13 EPIGASTRIC PAIN: Status: ACTIVE | Noted: 2023-07-24

## 2023-07-24 LAB
ALBUMIN SERPL BCP-MCNC: 4.3 G/DL (ref 3.2–4.9)
ALBUMIN/GLOB SERPL: 1.7 G/DL
ALP SERPL-CCNC: 76 U/L (ref 30–99)
ALT SERPL-CCNC: 27 U/L (ref 2–50)
ANION GAP SERPL CALC-SCNC: 12 MMOL/L (ref 7–16)
ANION GAP SERPL CALC-SCNC: 8 MMOL/L (ref 7–16)
AST SERPL-CCNC: 28 U/L (ref 12–45)
BASOPHILS # BLD AUTO: 0.5 % (ref 0–1.8)
BASOPHILS # BLD: 0.04 K/UL (ref 0–0.12)
BILIRUB SERPL-MCNC: 0.3 MG/DL (ref 0.1–1.5)
BUN SERPL-MCNC: 13 MG/DL (ref 8–22)
BUN SERPL-MCNC: 14 MG/DL (ref 8–22)
CALCIUM ALBUM COR SERPL-MCNC: 8.9 MG/DL (ref 8.5–10.5)
CALCIUM SERPL-MCNC: 8.5 MG/DL (ref 8.5–10.5)
CALCIUM SERPL-MCNC: 9.1 MG/DL (ref 8.5–10.5)
CHLORIDE SERPL-SCNC: 105 MMOL/L (ref 96–112)
CHLORIDE SERPL-SCNC: 106 MMOL/L (ref 96–112)
CO2 SERPL-SCNC: 24 MMOL/L (ref 20–33)
CO2 SERPL-SCNC: 24 MMOL/L (ref 20–33)
CREAT SERPL-MCNC: 0.77 MG/DL (ref 0.5–1.4)
CREAT SERPL-MCNC: 0.9 MG/DL (ref 0.5–1.4)
EOSINOPHIL # BLD AUTO: 0.14 K/UL (ref 0–0.51)
EOSINOPHIL NFR BLD: 1.6 % (ref 0–6.9)
ERYTHROCYTE [DISTWIDTH] IN BLOOD BY AUTOMATED COUNT: 48.5 FL (ref 35.9–50)
GFR SERPLBLD CREATININE-BSD FMLA CKD-EPI: 102 ML/MIN/1.73 M 2
GFR SERPLBLD CREATININE-BSD FMLA CKD-EPI: 85 ML/MIN/1.73 M 2
GLOBULIN SER CALC-MCNC: 2.5 G/DL (ref 1.9–3.5)
GLUCOSE SERPL-MCNC: 132 MG/DL (ref 65–99)
GLUCOSE SERPL-MCNC: 99 MG/DL (ref 65–99)
HCG SERPL QL: NEGATIVE
HCT VFR BLD AUTO: 39 % (ref 37–47)
HGB BLD-MCNC: 13.1 G/DL (ref 12–16)
IMM GRANULOCYTES # BLD AUTO: 0.01 K/UL (ref 0–0.11)
IMM GRANULOCYTES NFR BLD AUTO: 0.1 % (ref 0–0.9)
LIPASE SERPL-CCNC: 15 U/L (ref 11–82)
LYMPHOCYTES # BLD AUTO: 3.1 K/UL (ref 1–4.8)
LYMPHOCYTES NFR BLD: 36.5 % (ref 22–41)
MCH RBC QN AUTO: 30.4 PG (ref 27–33)
MCHC RBC AUTO-ENTMCNC: 33.6 G/DL (ref 32.2–35.5)
MCV RBC AUTO: 90.5 FL (ref 81.4–97.8)
MONOCYTES # BLD AUTO: 0.54 K/UL (ref 0–0.85)
MONOCYTES NFR BLD AUTO: 6.4 % (ref 0–13.4)
NEUTROPHILS # BLD AUTO: 4.66 K/UL (ref 1.82–7.42)
NEUTROPHILS NFR BLD: 54.9 % (ref 44–72)
NRBC # BLD AUTO: 0 K/UL
NRBC BLD-RTO: 0 /100 WBC (ref 0–0.2)
PLATELET # BLD AUTO: 301 K/UL (ref 164–446)
PMV BLD AUTO: 9.4 FL (ref 9–12.9)
POTASSIUM SERPL-SCNC: 3.5 MMOL/L (ref 3.6–5.5)
POTASSIUM SERPL-SCNC: 3.9 MMOL/L (ref 3.6–5.5)
PROT SERPL-MCNC: 6.8 G/DL (ref 6–8.2)
RBC # BLD AUTO: 4.31 M/UL (ref 4.2–5.4)
SODIUM SERPL-SCNC: 138 MMOL/L (ref 135–145)
SODIUM SERPL-SCNC: 141 MMOL/L (ref 135–145)
TSH SERPL DL<=0.005 MIU/L-ACNC: 2.71 UIU/ML (ref 0.38–5.33)
WBC # BLD AUTO: 8.5 K/UL (ref 4.8–10.8)

## 2023-07-24 PROCEDURE — 96376 TX/PRO/DX INJ SAME DRUG ADON: CPT

## 2023-07-24 PROCEDURE — A9270 NON-COVERED ITEM OR SERVICE: HCPCS | Mod: UD

## 2023-07-24 PROCEDURE — 96365 THER/PROPH/DIAG IV INF INIT: CPT

## 2023-07-24 PROCEDURE — 96374 THER/PROPH/DIAG INJ IV PUSH: CPT

## 2023-07-24 PROCEDURE — 96375 TX/PRO/DX INJ NEW DRUG ADDON: CPT

## 2023-07-24 PROCEDURE — 80048 BASIC METABOLIC PNL TOTAL CA: CPT

## 2023-07-24 PROCEDURE — 700102 HCHG RX REV CODE 250 W/ 637 OVERRIDE(OP): Performed by: STUDENT IN AN ORGANIZED HEALTH CARE EDUCATION/TRAINING PROGRAM

## 2023-07-24 PROCEDURE — A9270 NON-COVERED ITEM OR SERVICE: HCPCS | Performed by: STUDENT IN AN ORGANIZED HEALTH CARE EDUCATION/TRAINING PROGRAM

## 2023-07-24 PROCEDURE — 700105 HCHG RX REV CODE 258: Mod: JZ,UD | Performed by: STUDENT IN AN ORGANIZED HEALTH CARE EDUCATION/TRAINING PROGRAM

## 2023-07-24 PROCEDURE — 700111 HCHG RX REV CODE 636 W/ 250 OVERRIDE (IP): Mod: UD | Performed by: STUDENT IN AN ORGANIZED HEALTH CARE EDUCATION/TRAINING PROGRAM

## 2023-07-24 PROCEDURE — 700102 HCHG RX REV CODE 250 W/ 637 OVERRIDE(OP): Mod: UD

## 2023-07-24 PROCEDURE — A9270 NON-COVERED ITEM OR SERVICE: HCPCS | Mod: UD | Performed by: NURSE PRACTITIONER

## 2023-07-24 PROCEDURE — 700102 HCHG RX REV CODE 250 W/ 637 OVERRIDE(OP): Mod: UD | Performed by: STUDENT IN AN ORGANIZED HEALTH CARE EDUCATION/TRAINING PROGRAM

## 2023-07-24 PROCEDURE — 84443 ASSAY THYROID STIM HORMONE: CPT

## 2023-07-24 PROCEDURE — G0378 HOSPITAL OBSERVATION PER HR: HCPCS

## 2023-07-24 PROCEDURE — 700102 HCHG RX REV CODE 250 W/ 637 OVERRIDE(OP): Mod: UD | Performed by: NURSE PRACTITIONER

## 2023-07-24 PROCEDURE — 700105 HCHG RX REV CODE 258: Mod: UD | Performed by: STUDENT IN AN ORGANIZED HEALTH CARE EDUCATION/TRAINING PROGRAM

## 2023-07-24 PROCEDURE — 99223 1ST HOSP IP/OBS HIGH 75: CPT | Mod: AI,GC | Performed by: STUDENT IN AN ORGANIZED HEALTH CARE EDUCATION/TRAINING PROGRAM

## 2023-07-24 PROCEDURE — 700111 HCHG RX REV CODE 636 W/ 250 OVERRIDE (IP): Mod: UD

## 2023-07-24 PROCEDURE — A9270 NON-COVERED ITEM OR SERVICE: HCPCS | Mod: UD | Performed by: STUDENT IN AN ORGANIZED HEALTH CARE EDUCATION/TRAINING PROGRAM

## 2023-07-24 PROCEDURE — 96366 THER/PROPH/DIAG IV INF ADDON: CPT

## 2023-07-24 PROCEDURE — 700111 HCHG RX REV CODE 636 W/ 250 OVERRIDE (IP): Mod: UD | Performed by: NURSE PRACTITIONER

## 2023-07-24 RX ORDER — PROCHLORPERAZINE EDISYLATE 5 MG/ML
10 INJECTION INTRAMUSCULAR; INTRAVENOUS EVERY 6 HOURS PRN
Status: DISCONTINUED | OUTPATIENT
Start: 2023-07-24 | End: 2023-07-25 | Stop reason: HOSPADM

## 2023-07-24 RX ORDER — OXYCODONE HYDROCHLORIDE 5 MG/1
5 TABLET ORAL
Status: DISCONTINUED | OUTPATIENT
Start: 2023-07-24 | End: 2023-07-24

## 2023-07-24 RX ORDER — MIRTAZAPINE 15 MG/1
15 TABLET, FILM COATED ORAL NIGHTLY
Status: ON HOLD | COMMUNITY
Start: 2023-07-12 | End: 2023-10-24

## 2023-07-24 RX ORDER — SODIUM CHLORIDE, SODIUM LACTATE, POTASSIUM CHLORIDE, CALCIUM CHLORIDE 600; 310; 30; 20 MG/100ML; MG/100ML; MG/100ML; MG/100ML
INJECTION, SOLUTION INTRAVENOUS CONTINUOUS
Status: DISCONTINUED | OUTPATIENT
Start: 2023-07-24 | End: 2023-07-25 | Stop reason: HOSPADM

## 2023-07-24 RX ORDER — SERTRALINE HYDROCHLORIDE 100 MG/1
100 TABLET, FILM COATED ORAL DAILY
Status: ON HOLD | COMMUNITY
Start: 2023-07-06 | End: 2023-10-24

## 2023-07-24 RX ORDER — TRAZODONE HYDROCHLORIDE 50 MG/1
100 TABLET ORAL NIGHTLY
Status: DISCONTINUED | OUTPATIENT
Start: 2023-07-24 | End: 2023-07-25 | Stop reason: HOSPADM

## 2023-07-24 RX ORDER — SODIUM CHLORIDE 9 MG/ML
1000 INJECTION, SOLUTION INTRAVENOUS ONCE
Status: COMPLETED | OUTPATIENT
Start: 2023-07-24 | End: 2023-07-24

## 2023-07-24 RX ORDER — ONDANSETRON 2 MG/ML
4 INJECTION INTRAMUSCULAR; INTRAVENOUS ONCE
Status: COMPLETED | OUTPATIENT
Start: 2023-07-24 | End: 2023-07-24

## 2023-07-24 RX ORDER — ARIPIPRAZOLE 5 MG/1
TABLET ORAL
Status: SHIPPED | COMMUNITY
Start: 2023-07-06 | End: 2023-07-24

## 2023-07-24 RX ORDER — OXYCODONE HYDROCHLORIDE 5 MG/1
5 TABLET ORAL
Status: DISCONTINUED | OUTPATIENT
Start: 2023-07-24 | End: 2023-07-25 | Stop reason: HOSPADM

## 2023-07-24 RX ORDER — OXYCODONE HYDROCHLORIDE 10 MG/1
10 TABLET ORAL
Status: DISCONTINUED | OUTPATIENT
Start: 2023-07-24 | End: 2023-07-25 | Stop reason: HOSPADM

## 2023-07-24 RX ORDER — HYDROMORPHONE HYDROCHLORIDE 1 MG/ML
0.5 INJECTION, SOLUTION INTRAMUSCULAR; INTRAVENOUS; SUBCUTANEOUS ONCE
Status: COMPLETED | OUTPATIENT
Start: 2023-07-24 | End: 2023-07-24

## 2023-07-24 RX ORDER — ARIPIPRAZOLE 15 MG/1
15 TABLET ORAL DAILY
Status: ON HOLD | COMMUNITY
Start: 2023-07-12 | End: 2023-10-24

## 2023-07-24 RX ORDER — SERTRALINE HYDROCHLORIDE 100 MG/1
100 TABLET, FILM COATED ORAL DAILY
Status: DISCONTINUED | OUTPATIENT
Start: 2023-07-24 | End: 2023-07-25 | Stop reason: HOSPADM

## 2023-07-24 RX ORDER — HYDROMORPHONE HYDROCHLORIDE 1 MG/ML
1 INJECTION, SOLUTION INTRAMUSCULAR; INTRAVENOUS; SUBCUTANEOUS
Status: DISCONTINUED | OUTPATIENT
Start: 2023-07-24 | End: 2023-07-25

## 2023-07-24 RX ORDER — DICYCLOMINE HCL 20 MG
20 TABLET ORAL 3 TIMES DAILY
Status: DISCONTINUED | OUTPATIENT
Start: 2023-07-24 | End: 2023-07-25 | Stop reason: HOSPADM

## 2023-07-24 RX ORDER — ACETAMINOPHEN 325 MG/1
650 TABLET ORAL EVERY 6 HOURS PRN
Status: DISCONTINUED | OUTPATIENT
Start: 2023-07-24 | End: 2023-07-25 | Stop reason: HOSPADM

## 2023-07-24 RX ORDER — HYDROMORPHONE HYDROCHLORIDE 1 MG/ML
1 INJECTION, SOLUTION INTRAMUSCULAR; INTRAVENOUS; SUBCUTANEOUS ONCE
Status: COMPLETED | OUTPATIENT
Start: 2023-07-24 | End: 2023-07-24

## 2023-07-24 RX ORDER — GABAPENTIN 400 MG/1
400 CAPSULE ORAL 3 TIMES DAILY
Status: DISCONTINUED | OUTPATIENT
Start: 2023-07-24 | End: 2023-07-25 | Stop reason: HOSPADM

## 2023-07-24 RX ORDER — HYDROMORPHONE HYDROCHLORIDE 1 MG/ML
0.5 INJECTION, SOLUTION INTRAMUSCULAR; INTRAVENOUS; SUBCUTANEOUS
Status: DISCONTINUED | OUTPATIENT
Start: 2023-07-24 | End: 2023-07-24

## 2023-07-24 RX ORDER — POTASSIUM CHLORIDE 7.45 MG/ML
10 INJECTION INTRAVENOUS
Status: COMPLETED | OUTPATIENT
Start: 2023-07-24 | End: 2023-07-24

## 2023-07-24 RX ORDER — GABAPENTIN 600 MG/1
600 TABLET ORAL 3 TIMES DAILY
COMMUNITY
Start: 2023-07-12

## 2023-07-24 RX ORDER — ARIPIPRAZOLE 15 MG/1
15 TABLET ORAL DAILY
Status: DISCONTINUED | OUTPATIENT
Start: 2023-07-24 | End: 2023-07-25 | Stop reason: HOSPADM

## 2023-07-24 RX ORDER — ONDANSETRON 4 MG/1
4 TABLET, ORALLY DISINTEGRATING ORAL EVERY 4 HOURS PRN
Status: DISCONTINUED | OUTPATIENT
Start: 2023-07-24 | End: 2023-07-25 | Stop reason: HOSPADM

## 2023-07-24 RX ORDER — OXYCODONE HYDROCHLORIDE 10 MG/1
10 TABLET ORAL
Status: DISCONTINUED | OUTPATIENT
Start: 2023-07-24 | End: 2023-07-24

## 2023-07-24 RX ORDER — CALCIUM CARBONATE 500 MG/1
1000 TABLET, CHEWABLE ORAL ONCE
Status: COMPLETED | OUTPATIENT
Start: 2023-07-24 | End: 2023-07-24

## 2023-07-24 RX ORDER — CEPHALEXIN 500 MG/1
500 CAPSULE ORAL 4 TIMES DAILY
Status: ON HOLD | COMMUNITY
Start: 2023-07-11 | End: 2023-10-24

## 2023-07-24 RX ORDER — ONDANSETRON 2 MG/ML
4 INJECTION INTRAMUSCULAR; INTRAVENOUS EVERY 4 HOURS PRN
Status: DISCONTINUED | OUTPATIENT
Start: 2023-07-24 | End: 2023-07-25 | Stop reason: HOSPADM

## 2023-07-24 RX ORDER — OLANZAPINE 5 MG/1
5 TABLET ORAL DAILY
COMMUNITY
Start: 2023-07-12

## 2023-07-24 RX ORDER — LABETALOL HYDROCHLORIDE 5 MG/ML
10 INJECTION, SOLUTION INTRAVENOUS EVERY 4 HOURS PRN
Status: DISCONTINUED | OUTPATIENT
Start: 2023-07-24 | End: 2023-07-25 | Stop reason: HOSPADM

## 2023-07-24 RX ADMIN — OXYCODONE HYDROCHLORIDE 10 MG: 10 TABLET ORAL at 19:28

## 2023-07-24 RX ADMIN — GABAPENTIN 400 MG: 400 CAPSULE ORAL at 19:16

## 2023-07-24 RX ADMIN — HYDROMORPHONE HYDROCHLORIDE 1 MG: 1 INJECTION, SOLUTION INTRAMUSCULAR; INTRAVENOUS; SUBCUTANEOUS at 20:30

## 2023-07-24 RX ADMIN — PANCRELIPASE 36000 UNITS: 30000; 6000; 19000 CAPSULE, DELAYED RELEASE PELLETS ORAL at 10:59

## 2023-07-24 RX ADMIN — ARIPIPRAZOLE 15 MG: 15 TABLET ORAL at 05:58

## 2023-07-24 RX ADMIN — ONDANSETRON 4 MG: 2 INJECTION INTRAMUSCULAR; INTRAVENOUS at 03:10

## 2023-07-24 RX ADMIN — OXYCODONE HYDROCHLORIDE 10 MG: 10 TABLET ORAL at 23:37

## 2023-07-24 RX ADMIN — POTASSIUM CHLORIDE 10 MEQ: 7.46 INJECTION, SOLUTION INTRAVENOUS at 03:57

## 2023-07-24 RX ADMIN — ONDANSETRON 4 MG: 2 INJECTION INTRAMUSCULAR; INTRAVENOUS at 00:42

## 2023-07-24 RX ADMIN — DICYCLOMINE HYDROCHLORIDE 20 MG: 20 TABLET ORAL at 20:30

## 2023-07-24 RX ADMIN — PANCRELIPASE 36000 UNITS: 30000; 6000; 19000 CAPSULE, DELAYED RELEASE PELLETS ORAL at 16:12

## 2023-07-24 RX ADMIN — ANTACID TABLETS 1000 MG: 500 TABLET, CHEWABLE ORAL at 01:41

## 2023-07-24 RX ADMIN — OXYCODONE HYDROCHLORIDE 10 MG: 10 TABLET ORAL at 05:58

## 2023-07-24 RX ADMIN — LIDOCAINE HYDROCHLORIDE 15 ML: 20 SOLUTION OROPHARYNGEAL at 01:41

## 2023-07-24 RX ADMIN — HYDROMORPHONE HYDROCHLORIDE 1 MG: 1 INJECTION, SOLUTION INTRAMUSCULAR; INTRAVENOUS; SUBCUTANEOUS at 17:18

## 2023-07-24 RX ADMIN — ONDANSETRON 4 MG: 2 INJECTION INTRAMUSCULAR; INTRAVENOUS at 19:28

## 2023-07-24 RX ADMIN — GABAPENTIN 400 MG: 400 CAPSULE ORAL at 10:53

## 2023-07-24 RX ADMIN — RIVAROXABAN 10 MG: 10 TABLET, FILM COATED ORAL at 19:16

## 2023-07-24 RX ADMIN — MORPHINE SULFATE 6 MG: 10 INJECTION INTRAVENOUS at 03:09

## 2023-07-24 RX ADMIN — HYDROMORPHONE HYDROCHLORIDE 1 MG: 1 INJECTION, SOLUTION INTRAMUSCULAR; INTRAVENOUS; SUBCUTANEOUS at 01:41

## 2023-07-24 RX ADMIN — SODIUM CHLORIDE 1000 ML: 9 INJECTION, SOLUTION INTRAVENOUS at 00:41

## 2023-07-24 RX ADMIN — ACETAMINOPHEN 650 MG: 325 TABLET, FILM COATED ORAL at 21:37

## 2023-07-24 RX ADMIN — SERTRALINE HYDROCHLORIDE 100 MG: 100 TABLET ORAL at 05:58

## 2023-07-24 RX ADMIN — SODIUM CHLORIDE, POTASSIUM CHLORIDE, SODIUM LACTATE AND CALCIUM CHLORIDE: 600; 310; 30; 20 INJECTION, SOLUTION INTRAVENOUS at 17:26

## 2023-07-24 RX ADMIN — HYDROMORPHONE HYDROCHLORIDE 0.5 MG: 1 INJECTION, SOLUTION INTRAMUSCULAR; INTRAVENOUS; SUBCUTANEOUS at 07:23

## 2023-07-24 RX ADMIN — HYDROMORPHONE HYDROCHLORIDE 0.5 MG: 1 INJECTION, SOLUTION INTRAMUSCULAR; INTRAVENOUS; SUBCUTANEOUS at 12:41

## 2023-07-24 RX ADMIN — DICYCLOMINE HYDROCHLORIDE 20 MG: 20 TABLET ORAL at 10:55

## 2023-07-24 RX ADMIN — PROCHLORPERAZINE EDISYLATE 10 MG: 5 INJECTION INTRAMUSCULAR; INTRAVENOUS at 21:37

## 2023-07-24 RX ADMIN — TRAZODONE HYDROCHLORIDE 100 MG: 50 TABLET ORAL at 20:31

## 2023-07-24 RX ADMIN — SODIUM CHLORIDE, POTASSIUM CHLORIDE, SODIUM LACTATE AND CALCIUM CHLORIDE: 600; 310; 30; 20 INJECTION, SOLUTION INTRAVENOUS at 03:30

## 2023-07-24 RX ADMIN — GABAPENTIN 400 MG: 400 CAPSULE ORAL at 06:04

## 2023-07-24 RX ADMIN — OXYCODONE HYDROCHLORIDE 10 MG: 10 TABLET ORAL at 16:11

## 2023-07-24 RX ADMIN — DICYCLOMINE HYDROCHLORIDE 20 MG: 20 TABLET ORAL at 16:11

## 2023-07-24 RX ADMIN — PANCRELIPASE 36000 UNITS: 30000; 6000; 19000 CAPSULE, DELAYED RELEASE PELLETS ORAL at 07:23

## 2023-07-24 RX ADMIN — POTASSIUM CHLORIDE 10 MEQ: 7.46 INJECTION, SOLUTION INTRAVENOUS at 08:46

## 2023-07-24 RX ADMIN — OXYCODONE HYDROCHLORIDE 10 MG: 10 TABLET ORAL at 10:52

## 2023-07-24 RX ADMIN — HYDROMORPHONE HYDROCHLORIDE 0.5 MG: 1 INJECTION, SOLUTION INTRAMUSCULAR; INTRAVENOUS; SUBCUTANEOUS at 00:42

## 2023-07-24 ASSESSMENT — ENCOUNTER SYMPTOMS
COUGH: 0
WEAKNESS: 1
DIARRHEA: 0
CONSTIPATION: 0
DOUBLE VISION: 0
SENSORY CHANGE: 1
BLURRED VISION: 0
CHILLS: 0
EYES NEGATIVE: 1
CHILLS: 1
MUSCULOSKELETAL NEGATIVE: 1
DIZZINESS: 1
CARDIOVASCULAR NEGATIVE: 1
SINUS PAIN: 0
ORTHOPNEA: 0
RESPIRATORY NEGATIVE: 1
PALPITATIONS: 1
FALLS: 0
NAUSEA: 1
WEAKNESS: 0
VOMITING: 0
SHORTNESS OF BREATH: 0
ABDOMINAL PAIN: 1
BACK PAIN: 0
FEVER: 0
PSYCHIATRIC NEGATIVE: 1

## 2023-07-24 ASSESSMENT — LIFESTYLE VARIABLES
HOW MANY TIMES IN THE PAST YEAR HAVE YOU HAD 5 OR MORE DRINKS IN A DAY: 0
ALCOHOL_USE: YES
TOTAL SCORE: 3
EVER HAD A DRINK FIRST THING IN THE MORNING TO STEADY YOUR NERVES TO GET RID OF A HANGOVER: NO
AVERAGE NUMBER OF DAYS PER WEEK YOU HAVE A DRINK CONTAINING ALCOHOL: 0
HAVE YOU EVER FELT YOU SHOULD CUT DOWN ON YOUR DRINKING: YES
HAVE PEOPLE ANNOYED YOU BY CRITICIZING YOUR DRINKING: YES
CONSUMPTION TOTAL: POSITIVE
TOTAL SCORE: 3
DOES PATIENT WANT TO STOP DRINKING: YES
EVER FELT BAD OR GUILTY ABOUT YOUR DRINKING: YES
TOTAL SCORE: 3
ON A TYPICAL DAY WHEN YOU DRINK ALCOHOL HOW MANY DRINKS DO YOU HAVE: 0

## 2023-07-24 ASSESSMENT — PAIN DESCRIPTION - PAIN TYPE
TYPE: ACUTE PAIN

## 2023-07-24 ASSESSMENT — FIBROSIS 4 INDEX: FIB4 SCORE: 0.64

## 2023-07-24 NOTE — ED TRIAGE NOTES
"Chief Complaint   Patient presents with    Abdominal Pain    N/V     The pt reports abd pain and vomiting that has been getting worse today. Pain is non-radiating, burning/sharp, 8/10. The pt denies bloody vomit. The pt reports chronic pancreatitis and was told to come in if pain got worse.        Pt ambulatory to triage. Pt A&Ox4, for the above complaint.     Pt to lobby . Pt educated on alerting staff in changes to condition. Pt verbalized understanding. Protocol abd pain ordered.     /76   Pulse (!) 130   Temp 36.5 °C (97.7 °F) (Temporal)   Resp (!) 22   Ht 1.702 m (5' 7\")   Wt 70.7 kg (155 lb 13.8 oz)   LMP 10/30/2018   SpO2 98%   BMI 24.41 kg/m²     "

## 2023-07-24 NOTE — PROGRESS NOTES
[facility] Medicine Daily Progress Note    Date of Service  7/24/2023    Chief Complaint  Rhiannon Marrero is a 38 y.o. female admitted 7/23/2023 with abdominal pain    Hospital Course  Ms. Rhiannon Marrero is a 38 y.o. female with past medical history of chronic pancreatitis due to alcohol use, pancreatic stones requiring modified whipple procedure with pancreatojejunostomy in 2/2023 who presented 7/23/2023 with progressively worsening abdominal pain and new onset nausea.      She reports that her symptoms were previously well controlled until a couple of months ago when she started having slowly progressive epigastric pain.  Reports that the pain radiates into her back. Pain is relieved with bringing knees closer to chest, is worsened when she eats.  She was tolerating the pain despite it's progression, until she had nausea today which tipped her over the edge of being able to handle the pain.  She has had increased bowel movements, they used to be 3-4 loose stools daily, but are not 2 large volume solid stools daily, which are hard to flush and greasy.  She reports palpitations which occur once daily with pain, sometimes has associated lightheadedness, but has not fallen or syncopized because of this.  Reports chills, occasional feet edema (which resolves on it's own, none currently), denies shortness of breath or orthopnea.  Of note she has been sober for 60 days.       In the ED VS remarkable for , decreased down to 90s, /76.  Labs remarkable for K of 3.5, otherwise unremarkable, lipase 15, beta hcg negative.  In the ED she received tums, a GI cocktail, dilaudid, morphine, a 1L bolus, zofran.  Patient admitted to hospital medicine for management of care.    Interval Problem Update  -Patient seen and examined.  Patient still endorses significant abdominal pain.  Discussed with patient pain management, hydration, and tolerance of oral intake.  At this time, we will increase Dilaudid IV for  breakthrough pain.  Patient starting to tolerate full liquid diet, will need to advance as tolerated.  Patient will need to be weaned off IV pain medication and establish a regimen that is manageable at home.  -Plan of care: Continue pain management; advance diet as tolerated  -Disposition: Anticipated to stay overnight for pain management and tolerance and advancement of food  -Lab work: reviewed; expected  -VSS at this time    I have discussed this patient's plan of care and discharge plan at IDT rounds today with Case Management, Nursing, Nursing leadership, and other members of the IDT team.    Consultants/Specialty  NONE    Code Status  Full Code    Disposition  The patient is not medically cleared for discharge to home or a post-acute facility.  Anticipate discharge to: home with close outpatient follow-up    I have placed the appropriate orders for post-discharge needs.    Review of Systems  Review of Systems   Constitutional:  Positive for malaise/fatigue. Negative for chills and fever.   HENT: Negative.     Eyes: Negative.    Respiratory: Negative.     Cardiovascular: Negative.    Gastrointestinal:  Positive for abdominal pain and nausea.   Genitourinary: Negative.    Musculoskeletal: Negative.    Skin: Negative.    Neurological:  Positive for weakness.   Endo/Heme/Allergies: Negative.    Psychiatric/Behavioral: Negative.          Physical Exam  Temp:  [36.3 °C (97.3 °F)-37 °C (98.6 °F)] 36.3 °C (97.3 °F)  Pulse:  [] 63  Resp:  [16-22] 18  BP: (111-138)/(65-84) 120/79  SpO2:  [95 %-98 %] 97 %    Physical Exam  Vitals and nursing note reviewed.   HENT:      Head: Normocephalic.      Nose: Nose normal.      Mouth/Throat:      Mouth: Mucous membranes are moist.      Pharynx: Oropharynx is clear.   Eyes:      Pupils: Pupils are equal, round, and reactive to light.   Cardiovascular:      Rate and Rhythm: Normal rate and regular rhythm.      Pulses: Normal pulses.      Heart sounds: Normal heart sounds.    Pulmonary:      Effort: Pulmonary effort is normal.      Breath sounds: Normal breath sounds.   Abdominal:      General: Bowel sounds are normal.      Tenderness: There is abdominal tenderness.   Musculoskeletal:         General: Tenderness present.      Cervical back: Normal range of motion and neck supple.   Skin:     General: Skin is dry.      Capillary Refill: Capillary refill takes 2 to 3 seconds.   Neurological:      Mental Status: She is alert. Mental status is at baseline.         Fluids    Intake/Output Summary (Last 24 hours) at 7/24/2023 1336  Last data filed at 7/24/2023 0457  Gross per 24 hour   Intake 240 ml   Output --   Net 240 ml       Laboratory  Recent Labs     07/23/23  2347   WBC 8.5   RBC 4.31   HEMOGLOBIN 13.1   HEMATOCRIT 39.0   MCV 90.5   MCH 30.4   MCHC 33.6   RDW 48.5   PLATELETCT 301   MPV 9.4     Recent Labs     07/23/23  2347 07/24/23  0741   SODIUM 141 138   POTASSIUM 3.5* 3.9   CHLORIDE 105 106   CO2 24 24   GLUCOSE 132* 99   BUN 14 13   CREATININE 0.90 0.77   CALCIUM 9.1 8.5                   Imaging  No orders to display        Assessment/Plan  * Acute on chronic pancreatitis (HCC)- (present on admission)  Assessment & Plan  -Worsening pain over months, acute nausea starting today.  Concerns for acute on chronic pancreatitis.  Lipase is not elevated, although unlikely to be in such severe chronic pancreatitis.  Pain progressively worsening and has had stool changes with more frequent greasy stools.    -pain control: oxycodone, dilaudid  -nausea control with zofran  -IVF LR at 83ml/hr  -CLD, can advance as tolerated  -continue pancrealipase with first bite of meals.  -tele monitoring for assessment of palpitations that occur with pain    Epigastric pain  Assessment & Plan  - Possibly pancreatitis, however lipase is 15  - Most recent modified Whipple procedure in February 2023 at [facility]  - Continue pain management  - IV hydration  - Advance diet as tolerated    Alcoholic peripheral  neuropathy ([facility])  Assessment & Plan  Pt reports peripheral neuropathy since she was previously heavily drinking alcohol.  Has been stable, largely only in her feet.  -continue gabapentin    Chronic pancreatitis due to chronic alcoholism ([facility])- (present on admission)  Assessment & Plan  -Chronic pancreatitis due to chronic alcohol use, is now 60 days sober.  Is s/p modified whipple performed in 02/2023 at [facility] in [place] due to recurrent pancreatic stones.  She had relief of pain following, but was short lived.  Has had progressively worsening pain over months and more concerning stool features that are likely indicative that pancreatic exocrine function is very minimal.  -assessment and plan as above  -continue outpatient GI follow-up    Hypokalemia  Assessment & Plan  Possibly due to GI losses and decreased PO intake.  -IV K repletion while patient is actively nauseous   -continue to monitor, replete as needed    Tobacco use- (present on admission)  Assessment & Plan  Previous tobacco use, has been cutting back and transitioned to vaping 58 days prior to admission.  She is actively working on cutting back.    Bipolar disorder ([facility])  Assessment & Plan  Mood stable at this time.  -continue home abilify, sertraline, trazodone  -no longer taking buspar         VTE prophylaxis: Xarelto 10 mg daily as prophylaxis    I have performed a physical exam and reviewed and updated ROS and Plan today (7/24/2023). In review of yesterday's note (7/23/2023), there are no changes except as documented above.    =============================================================================================================================================================================================================================================================================================  Please note that this dictation was created using voice recognition software. I have made every reasonable attempt to  correct obvious errors, but there may be errors of grammar and possibly content that I did not discover before finalizing the note.    Electronically signed by:  Dr. JOLLY Moseley, DNP, APRN, FNP-C  Hospitalist Services  [facility]  [phone]  [email or web address]  07/24/23                 3295

## 2023-07-24 NOTE — ED PROVIDER NOTES
ED Provider Note    CHIEF COMPLAINT  Chief Complaint   Patient presents with    Abdominal Pain    N/V     The pt reports abd pain and vomiting that has been getting worse today. Pain is non-radiating, burning/sharp, 8/10. The pt denies bloody vomit. The pt reports chronic pancreatitis and was told to come in if pain got worse.        EXTERNAL RECORDS REVIEWED  Inpatient Notes Notes from 5/26/2023 discharge summary indicate patient was admitted 5/23/2023 for 3 days in the setting of chronic abdominal pain, acute on chronic pancreatitis and alcohol withdrawal.  CT scan from 5/23/2023 demonstrated chronic pancreatitis without CT evidence of acute pancreatitis, cholecystectomy with no biliary dilation.    HPI/ROS  LIMITATION TO HISTORY   Select: : None      Rhiannon Marrero is a 38 y.o. female who presents to the emergency department today for evaluation of abdominal pain.  Patient ports history of chronic pancreatitis secondary to alcohol abuse disorder.  She states she has had pain for the last 4 days steadily worsening in severity.  Pain is located in the epigastric area and described as typical of her pancreatitis.  She reports vomiting today as well.  She denies hematemesis, black or bloody stools.  She denies diarrhea with a normal bowel movement today.  She denies urinary symptoms.  She denies any falls, injuries or trauma.  She states she feels dehydrated.    PAST MEDICAL HISTORY   has a past medical history of Acute pancreatitis without infection or necrosis (07/20/2022), Alcohol dependence ([facility]) (04/13/2017), Bronchitis (08/2012), Cold, Current moderate episode of major depressive disorder without prior episode ([facility]) (01/31/2020), Heart burn, Hernia of unspecified site of abdominal cavity without mention of obstruction or gangrene, High anion gap metabolic acidosis (07/01/2022), Indigestion, Pancreatitis, Pneumonia (2011), and Seizure disorder ([facility]) (05/23/2022).    SURGICAL HISTORY   has a past surgical  "history that includes other orthopedic surgery; gyn surgery; tonsillectomy (04/11/2013); arthroscopy, knee; hysterectomy robotic xi (10/11/2018); salpingectomy (Bilateral, 10/11/2018); orif, wrist (Right, 04/24/2019); upper gi endoscopy,diagnosis (N/A, 12/23/2022); inject nerv blck,celiac plexus (N/A, 12/23/2022); egd w/endoscopic ultrasound (N/A, 12/23/2022); and whipple procedure (02/25/2023).    FAMILY HISTORY  Family History   Problem Relation Age of Onset    Psychiatric Illness Mother     Stroke Mother     Arthritis Mother     Heart Disease Maternal Grandfather     Cancer Neg Hx     Diabetes Neg Hx     Hypertension Neg Hx        SOCIAL HISTORY  Social History     Tobacco Use    Smoking status: Former     Packs/day: 0.25     Years: 10.00     Pack years: 2.50     Types: Cigarettes    Smokeless tobacco: Never   Vaping Use    Vaping Use: Every day    Substances: Nicotine   Substance and Sexual Activity    Alcohol use: Yes     Comment: Former alcholic 5/24/2023 last drink    Drug use: Not Currently     Types: Inhaled     Comment: weed for pain    Sexual activity: Not on file       CURRENT MEDICATIONS  Home Medications       Reviewed by Addison Sawyer R.N. (Registered Nurse) on 07/23/23 at 2340  Med List Status: Partial     Medication Last Dose Status   ARIPiprazole (ABILIFY) 2 MG tablet  Active   busPIRone (BUSPAR) 10 MG Tab tablet  Active   dicyclomine (BENTYL) 20 MG Tab  Active   gabapentin (NEURONTIN) 400 MG Cap  Active   Pancrelipase, Lip-Prot-Amyl, (CREON) 32444-672752 units Cap DR Particles  Active   sertraline (ZOLOFT) 50 MG Tab  Active   traZODone (DESYREL) 100 MG Tab  Active                    ALLERGIES  Allergies   Allergen Reactions    Promethazine Hcl Anxiety     Anxiety and makes her feels like skin coming off        PHYSICAL EXAM  VITAL SIGNS: /71   Pulse 100   Temp 36.5 °C (97.7 °F) (Temporal)   Resp 17   Ht 1.702 m (5' 7\")   Wt 70.7 kg (155 lb 13.8 oz)   LMP 10/30/2018   SpO2 97%   BMI " 24.41 kg/m²    Constitutional: Uncomfortable appearing and clutching abdomen.  HENT: Normocephalic, Atraumatic, Bilateral external ears normal. Nose normal.   Eyes: Pupils are equal and reactive. Conjunctiva normal, non-icteric.   Heart: Regular rate and rythm,   Lungs: No respiratory distress  GI: Tenderness with guarding and palpation of the epigastrium.  Otherwise abdomen is soft with no rebound.  Musculoskeletal: No obvious deformity. No leg edema.  Skin: Warm, Dry, No erythema, No rash.   Neurologic: Alert and oriented x 3, Cranial nerves III through XII grossly intact no sensory deficit no cerebellar dysfunction.   Psychiatric: Appropriate affect for situation      DIAGNOSTIC STUDIES / PROCEDURES    LABS  Labs Reviewed   COMP METABOLIC PANEL - Abnormal; Notable for the following components:       Result Value    Potassium 3.5 (*)     Glucose 132 (*)     All other components within normal limits   CBC WITH DIFFERENTIAL   LIPASE   HCG QUAL SERUM   ESTIMATED GFR   URINALYSIS         COURSE & MEDICAL DECISION MAKING    ED Observation Status? Yes; I am placing the patient in to an observation status due to a diagnostic uncertainty as well as therapeutic intensity. Patient placed in observation status at 12:41 AM, 7/24/2023.     Observation plan is as follows: Labs, blood work, ultrasound, Zofran, Dilaudid, observation pending symptom improvement and p.o. tolerance.    Upon Reevaluation, the patient's condition has: not improved; and will be escalated to hospitalization.    Patient discharged from ED Observation status at 2:15 AM on 7/24/2023.    INITIAL ASSESSMENT, COURSE AND PLAN  Care Narrative:     Patient with a history of chronic abdominal pain, chronic pancreatitis secondary to alcohol abuse disorder presenting for evaluation of epigastric pain.  Patient is uncomfortable appearing and slightly tachycardic.  She has epigastric tenderness with guarding but no rebound.  No peritoneal findings otherwise to  suggest bowel perforation.  History of prior cholecystectomy and Whipple procedure performed.  Differential diagnosis includes but is not limited to pancreatitis, chronic pancreatitis, pancreatic pseudocyst, choledocholithiasis, gastritis, gastroenteritis, bowel obstruction, bowel perforation.  Per chart review patient has had multiple CT scans over the last few months in the setting of similar presentations all of which have typically demonstrated findings of chronic pancreatitis and no additional acute abnormality.  I feel that repeated CT scan is likely not indicated in the setting of this today and will attempt initial management of her symptoms first, obtain blood work and a right upper quadrant ultrasound to screen for biliary ductal dilation.  Also considered pregnancy, ectopic pregnancy, ruptured ectopic and will screen with pregnancy test.    1:11 AM laboratory interpretation.  CMP remarkable for slight hypokalemia to 3.5.  We will plan to discuss this with patient and the need to supplement with potassium enriched foods.  Slight hyperglycemia with glucose of 132.  No KWAN, no additional electrolyte abnormality.  Lipase normal at 15 but lipase could be falsely low in the setting of chronic pancreatitis.  CBC with no leukocytosis or anemia.  No hyperbilirubinemia to suggest acute cholestasis or liver injury with normal transaminases.  At this point given overall benign labs I have a higher clinical suspicion for gastritis versus gastroenteritis.  Will initiate treatment with GI cocktail and Pepcid.  Awaiting ultrasound.    1:27 AM patient not pregnant.  Rules out ectopic pregnancy.    1:37 AM on recheck patient with no significant change in symptoms.  Has not yet received GI cocktail or Pepcid.  Plan to administer a milligram of IV Dilaudid with these medications.  Discussed potential disposition with the patient.  She would like to trial an additional round of medications prior to discussing admission.  I  discussed my review of her prior imaging and thinking that additional CT scan and ultrasound is likely to be nonrevealing in the setting of her normal labs and very likely etiology of her pain being her chronic pancreatitis.    2:16 AM I discussed the patient's case with [facility] internal medicine.  Patient admitted to their service.  I appreciate their assistance in her care.    HYDRATION: Based on the patient's presentation of Acute Vomiting and Dehydration the patient was given IV fluids. IV Hydration was used because oral hydration was not adequate alone. Upon recheck following hydration, the patient was feeling slightly better..      ADDITIONAL PROBLEM LIST  Prior alcohol abuse disorder, hypokalemia    DISPOSITION AND DISCUSSIONS  I have discussed management of the patient with the following physicians and BILL's: [facility] internal medicine    Escalation of care considered, and ultimately not performed:diagnostic imaging      FINAL DIAGNOSIS  1. Epigastric pain    2. Alcohol-induced chronic pancreatitis (HCC)           Electronically signed by: Ed Verde M.D., 7/24/2023 12:16 AM

## 2023-07-24 NOTE — ED NOTES
Patient transferred off the floor accompanied by transport. Patient AAO x 4, Respirations even and unlabored of room air. Patient in possession of personal belongings.

## 2023-07-24 NOTE — H&P
[facility] Internal Medicine History & Physical Note    Date of Service  7/24/2023     Attending: Rich Underwood M.d.  Senior Resident: Dr. Medellin  Contact Number: [phone]    Primary Care Physician  MALOU Harrington    Consultants  N/a    Code Status  Full Code    Chief Complaint  Chief Complaint   Patient presents with    Abdominal Pain    N/V     The pt reports abd pain and vomiting that has been getting worse today. Pain is non-radiating, burning/sharp, 8/10. The pt denies bloody vomit. The pt reports chronic pancreatitis and was told to come in if pain got worse.        History of Presenting Illness (HPI):   Rhiannon Marrero is a 38 y.o. female with past medical history of chronic pancreatitis due to alcohol use, pancreatic stones requiring modified whipple procedure with pancreatojejunostomy in 2/2023 who presented 7/23/2023 with progressively worsening abdominal pain and new onset nausea.  She reports that her symptoms were previously well controlled until a couple of months ago when she started having slowly progressive epigastric pain.  Reports that the pain radiates into her back. Pain is relieved with bringing knees closer to chest, is worsened when she eats.  She was tolerating the pain despite it's progression, until she had nausea today which tipped her over the edge of being able to handle the pain.  She has had increased bowel movements, they used to be 3-4 loose stools daily, but are not 2 large volume solid stools daily, which are hard to flush and greasy.  She reports palpitations which occur once daily with pain, sometimes has associated lightheadedness, but has not fallen or syncopized because of this.  Reports chills, occasional feet edema (which resolves on it's own, none currently), denies shortness of breath or orthopnea.  Of note she has been sober for 60 days.      In the ED VS remarkable for , decreased down to 90s, /76.  Labs remarkable for K of 3.5, otherwise  unremarkable, lipase 15, beta hcg negative.  In the ED she received tums, a GI cocktail, dilaudid, morphine, a 1L bolus, zofran.      I discussed the plan of care with patient.    Review of Systems  Review of Systems   Constitutional:  Positive for chills. Negative for fever.   HENT:  Negative for congestion and sinus pain.    Eyes:  Negative for blurred vision and double vision.   Respiratory:  Negative for cough and shortness of breath.    Cardiovascular:  Positive for palpitations. Negative for chest pain, orthopnea and leg swelling.   Gastrointestinal:  Positive for abdominal pain and nausea. Negative for constipation, diarrhea and vomiting.   Genitourinary:  Negative for dysuria and urgency.   Musculoskeletal:  Negative for back pain and falls.   Neurological:  Positive for dizziness (occasionally with palpitations.) and sensory change (chronic decreased sensation in her feet). Negative for weakness.       Past Medical History   has a past medical history of Acute pancreatitis without infection or necrosis (07/20/2022), Alcohol dependence ([facility]) (04/13/2017), Bronchitis (08/2012), Cold, Current moderate episode of major depressive disorder without prior episode ([facility]) (01/31/2020), Heart burn, Hernia of unspecified site of abdominal cavity without mention of obstruction or gangrene, High anion gap metabolic acidosis (07/01/2022), Indigestion, Pancreatitis, Pneumonia (2011), and Seizure disorder ([facility]) (05/23/2022).    Surgical History   has a past surgical history that includes other orthopedic surgery; gyn surgery; tonsillectomy (04/11/2013); arthroscopy, knee; hysterectomy robotic xi (10/11/2018); salpingectomy (Bilateral, 10/11/2018); orif, wrist (Right, 04/24/2019); pr upper gi endoscopy,diagnosis (N/A, 12/23/2022); pr inject nerv blck,celiac plexus (N/A, 12/23/2022); egd w/endoscopic ultrasound (N/A, 12/23/2022); and whipple procedure (02/25/2023).     Family History  family history includes Arthritis in her  mother; Heart Disease in her maternal grandfather; Psychiatric Illness in her mother; Stroke in her mother. Colon cancer in uncle.  Grandma with liver disease.    Family history reviewed with patient.     Social History  Tobacco: Reports she quit smoking 58 days ago, transitioned to vaping and is working on quitting.  Smoked about 18 years off and on, max was up to 1PPD.  Alcohol: currently 60 days sober.  Recreational drugs (illegal or prescription): denies  Primary Care Provider: Reviewed    Other (stressors, spirituality, exposures): denies    Allergies  Allergies   Allergen Reactions    Promethazine Hcl Anxiety     Anxiety and makes her feels like skin coming off        Medications  Prior to Admission Medications   Prescriptions Last Dose Informant Patient Reported? Taking?   ARIPiprazole (ABILIFY) 2 MG tablet   Yes No   Sig: Take 2 mg by mouth every day.   Pancrelipase, Lip-Prot-Amyl, (CREON) 96560-807038 units Cap DR Particles   Yes No   Sig: Take 1 Capful by mouth 3 times a day with meals.   dicyclomine (BENTYL) 20 MG Tab   Yes No   Sig: Take 20 mg by mouth in the morning, at noon, and at bedtime.   gabapentin (NEURONTIN) 400 MG Cap   Yes No   Sig: Take 400 mg by mouth 3 times a day.   sertraline (ZOLOFT) 50 MG Tab   Yes No   Sig: Take 50 mg by mouth every day.   traZODone (DESYREL) 100 MG Tab   Yes No   Sig: Take 100 mg by mouth every evening. Indications: Trouble Sleeping      Facility-Administered Medications: None       Physical Exam  Temp:  [36.5 °C (97.7 °F)-36.8 °C (98.2 °F)] 36.8 °C (98.2 °F)  Pulse:  [] 75  Resp:  [17-22] 18  BP: (111-138)/(65-79) 111/65  SpO2:  [95 %-98 %] 95 %  Blood Pressure: 111/65   Temperature: 36.8 °C (98.2 °F)   Pulse: 75   Respiration: 18   Pulse Oximetry: 95 %       Physical Exam  Vitals and nursing note reviewed.   Constitutional:       General: She is not in acute distress.     Appearance: She is not ill-appearing or toxic-appearing.   HENT:      Head: Normocephalic  and atraumatic.      Mouth/Throat:      Mouth: Mucous membranes are moist.      Pharynx: Oropharynx is clear.   Eyes:      General: No scleral icterus.     Conjunctiva/sclera: Conjunctivae normal.   Cardiovascular:      Rate and Rhythm: Normal rate and regular rhythm.      Pulses: Normal pulses.      Heart sounds: No murmur heard.     No friction rub. No gallop.   Pulmonary:      Effort: No respiratory distress.      Breath sounds: No wheezing, rhonchi or rales.   Abdominal:      General: Abdomen is flat. There is no distension.      Palpations: Abdomen is soft.      Tenderness: There is abdominal tenderness (Significant epigastric tenderness with minimal pressure, voluntary guarding). There is guarding (voluntary). There is no rebound.      Comments: Well healed midline surgical incision.     Musculoskeletal:      Right lower leg: No edema.      Left lower leg: No edema.   Skin:     General: Skin is warm and dry.   Neurological:      Mental Status: She is alert.      Comments: Moving all 4 extremities spontaneously.  Decreased sensation in b/l feet, equal bilaterally.  Worse on medial aspect of foot.           Laboratory:  Recent Labs     07/23/23  2347   WBC 8.5   RBC 4.31   HEMOGLOBIN 13.1   HEMATOCRIT 39.0   MCV 90.5   MCH 30.4   MCHC 33.6   RDW 48.5   PLATELETCT 301   MPV 9.4     Recent Labs     07/23/23  2347   SODIUM 141   POTASSIUM 3.5*   CHLORIDE 105   CO2 24   GLUCOSE 132*   BUN 14   CREATININE 0.90   CALCIUM 9.1     Recent Labs     07/23/23  2347   ALTSGPT 27   ASTSGOT 28   ALKPHOSPHAT 76   TBILIRUBIN 0.3   LIPASE 15   GLUCOSE 132*         No results for input(s): NTPROBNP in the last 72 hours.      No results for input(s): TROPONINT in the last 72 hours.    Imaging:  No orders to display       no X-Ray or EKG requiring interpretation    Assessment/Plan:  Problem Representation:   I anticipate this patient is appropriate for observation status at this time because acute on chronic pancreatitis    Patient  will need a Med/Surg bed on MEDICAL service .  The need is secondary to acute on chronic pancreatitis.    * Acute on chronic pancreatitis (HCC)- (present on admission)  Assessment & Plan  Worsening pain over months, acute nausea starting today.  Concerns for acute on chronic pancreatitis.  Lipase is not elevated, although unlikely to be in such severe chronic pancreatitis.  Pain progressively worsening and has had stool changes with more frequent greasy stools.    -pain control: oxycodone, dilaudid  -nausea control with zofran  -IVF LR at 83ml/hr  -CLD, can advance as tolerated  -continue pancrealipase with first bite of meals.  -tele monitoring for assessment of palpitations that occur with pain    Chronic pancreatitis due to chronic alcoholism (HCC)- (present on admission)  Assessment & Plan  Chronic pancreatitis due to chronic alcohol use, is now 60 days sober.  Is s/p modified whipple performed in 02/2023 at [facility] in [place] due to recurrent pancreatic stones.  She had relief of pain following, but was short lived.  Has had progressively worsening pain over months and more concerning stool features that are likely indicative that pancreatic exocrine function is very minimal.  -assessment and plan as above  -continue outpatient GI follow-up    Hypokalemia  Assessment & Plan  Possibly due to GI losses and decreased PO intake.  -IV K repletion while patient is actively nauseous   -continue to monitor, replete as needed    Alcoholic peripheral neuropathy ([facility])  Assessment & Plan  Pt reports peripheral neuropathy since she was previously heavily drinking alcohol.  Has been stable, largely only in her feet.  -continue gabapentin    Tobacco use- (present on admission)  Assessment & Plan  Previous tobacco use, has been cutting back and transitioned to vaping 58 days prior to admission.  She is actively working on cutting back.    Bipolar disorder ([facility])  Assessment & Plan  Mood stable at this  time.  -continue home abilify, sertraline, trazodone  -no longer taking buspar        VTE prophylaxis: SCDs/TEDs and Xarelto 10 mg daily as prophylaxis

## 2023-07-24 NOTE — ED NOTES
Med Rec complete per Pt at bedside. Pt unsure of Doxepin strength.  Allergies reviewed.  Home Pharmacy:  [facility]

## 2023-07-24 NOTE — CARE PLAN
The patient is Stable - Low risk of patient condition declining or worsening    Shift Goals  Clinical Goals: Pain management and electrolyte replacement  Patient Goals: Comfort, pain meds, go home  Family Goals: FANTASMA    Problem: Knowledge Deficit - Standard  Goal: Patient and family/care givers will demonstrate understanding of plan of care, disease process/condition, diagnostic tests and medications  Description: Target End Date: 7/24/23    1.  Patient oriented to unit, equipment, visitation policy and means for communicating concern  2.  Complete/review Learning Assessment  3.  Assess knowledge level of disease process/condition, treatment plan, diagnostic tests and medications  4.  Explain disease process/condition, treatment plan, diagnostic tests and medications  Outcome: Progressing     Problem: Pain - Standard  Goal: Alleviation of pain or a reduction in pain to the patient’s comfort goal  Description: Target End Date: 7/24/23    1.  Document pain using the appropriate pain scale per order or unit policy  2.  Educate and implement non-pharmacologic comfort measures (i.e. relaxation, distraction, cold/heat therapy, etc.)  3.  Pain management medications as ordered  4.  Reassess pain after pain med administration per policy  5.  If opiods administered assess patient's response to pain medication is appropriate per POSS sedation scale  6.  Follow pain management plan developed in collaboration with patient and interdisciplinary team (including palliative care or pain specialists if applicable)  Outcome: Progressing

## 2023-07-24 NOTE — HOSPITAL COURSE
Ms. Rhiannon Marrero is a 38 y.o. female with past medical history of chronic pancreatitis due to alcohol use, pancreatic stones requiring modified whipple procedure with pancreatojejunostomy in 2/2023 who presented 7/23/2023 with progressively worsening abdominal pain and new onset nausea.      She reports that her symptoms were previously well controlled until a couple of months ago when she started having slowly progressive epigastric pain.  Reports that the pain radiates into her back. Pain is relieved with bringing knees closer to chest, is worsened when she eats.  She was tolerating the pain despite it's progression, until she had nausea today which tipped her over the edge of being able to handle the pain.  She has had increased bowel movements, they used to be 3-4 loose stools daily, but are not 2 large volume solid stools daily, which are hard to flush and greasy.  She reports palpitations which occur once daily with pain, sometimes has associated lightheadedness, but has not fallen or syncopized because of this.  Reports chills, occasional feet edema (which resolves on it's own, none currently), denies shortness of breath or orthopnea.  Of note she has been sober for 60 days.       In the ED VS remarkable for , decreased down to 90s, /76.  Labs remarkable for K of 3.5, otherwise unremarkable, lipase 15, beta hcg negative.  In the ED she received tums, a GI cocktail, dilaudid, morphine, a 1L bolus, zofran.  Patient admitted to hospital medicine for management of care.

## 2023-07-24 NOTE — ASSESSMENT & PLAN NOTE
Previous tobacco use, has been cutting back and transitioned to vaping 58 days prior to admission.  She is actively working on cutting back.

## 2023-07-24 NOTE — PROGRESS NOTES
4 Eyes Skin Assessment Completed by LESLIE Cook and LESLIE Conrad.    Head WDL  Ears WDL  Nose WDL  Mouth WDL  Neck WDL  Breast/Chest WDL  Shoulder Blades WDL  Spine WDL  (R) Arm/Elbow/Hand WDL  (L) Arm/Elbow/Hand WDL  Abdomen Healed incision  Groin WDL  Scrotum/Coccyx/Buttocks WDL  (R) Leg WDL  (L) Leg WDL  (R) Heel/Foot/Toe WDL  (L) Heel/Foot/Toe WDL          Devices In Places Pulse Ox      Interventions In Place Pillows    Possible Skin Injury No    Pictures Uploaded Into Epic N/A  Wound Consult Placed N/A  RN Wound Prevention Protocol Ordered No

## 2023-07-24 NOTE — ASSESSMENT & PLAN NOTE
Pt reports peripheral neuropathy since she was previously heavily drinking alcohol.  Has been stable, largely only in her feet.  -continue gabapentin

## 2023-07-24 NOTE — ASSESSMENT & PLAN NOTE
Mood stable at this time.  -continue home abilify, sertraline, trazodone  -no longer taking buspar

## 2023-07-24 NOTE — ED NOTES
Patient given medication as per MAR, education given, patient verbalize understanding.    RASH/ITCHING

## 2023-07-24 NOTE — ASSESSMENT & PLAN NOTE
Possibly due to GI losses and decreased PO intake.  -IV K repletion while patient is actively nauseous   -continue to monitor, replete as needed

## 2023-07-24 NOTE — ASSESSMENT & PLAN NOTE
-Worsening pain over months, acute nausea starting today.  Concerns for acute on chronic pancreatitis.  Lipase is not elevated, although unlikely to be in such severe chronic pancreatitis.  Pain progressively worsening and has had stool changes with more frequent greasy stools.    -pain control: oxycodone, dilaudid  -nausea control with zofran  -IVF LR at 83ml/hr  -CLD, can advance as tolerated  -continue pancrealipase with first bite of meals.  -tele monitoring for assessment of palpitations that occur with pain

## 2023-07-24 NOTE — ASSESSMENT & PLAN NOTE
- Possibly pancreatitis, however lipase is 15  - Most recent modified Whipple procedure in February 2023 at [facility]  - Continue pain management  - IV hydration  - Advance diet as tolerated

## 2023-07-24 NOTE — ASSESSMENT & PLAN NOTE
-Chronic pancreatitis due to chronic alcohol use, is now 60 days sober.  Is s/p modified whipple performed in 02/2023 at [facility] in [place] due to recurrent pancreatic stones.  She had relief of pain following, but was short lived.  Has had progressively worsening pain over months and more concerning stool features that are likely indicative that pancreatic exocrine function is very minimal.  -assessment and plan as above  -continue outpatient GI follow-up

## 2023-07-24 NOTE — ED NOTES
Pt ambulated to green 27 from lobby with steady gait.  On monitor, call light in reach. Chart up for ERP.

## 2023-07-25 ENCOUNTER — PHARMACY VISIT (OUTPATIENT)
Dept: PHARMACY | Facility: MEDICAL CENTER | Age: 38
End: 2023-07-25
Payer: COMMERCIAL

## 2023-07-25 VITALS
HEIGHT: 67 IN | TEMPERATURE: 97.6 F | WEIGHT: 163.58 LBS | HEART RATE: 83 BPM | SYSTOLIC BLOOD PRESSURE: 108 MMHG | DIASTOLIC BLOOD PRESSURE: 61 MMHG | BODY MASS INDEX: 25.67 KG/M2 | RESPIRATION RATE: 16 BRPM | OXYGEN SATURATION: 98 %

## 2023-07-25 PROBLEM — R10.13 EPIGASTRIC PAIN: Status: RESOLVED | Noted: 2023-07-24 | Resolved: 2023-07-25

## 2023-07-25 PROBLEM — K86.1 ACUTE ON CHRONIC PANCREATITIS (HCC): Status: RESOLVED | Noted: 2023-01-23 | Resolved: 2023-07-25

## 2023-07-25 PROBLEM — E87.6 HYPOKALEMIA: Status: RESOLVED | Noted: 2022-08-16 | Resolved: 2023-07-25

## 2023-07-25 PROBLEM — K85.90 ACUTE ON CHRONIC PANCREATITIS (HCC): Status: RESOLVED | Noted: 2023-01-23 | Resolved: 2023-07-25

## 2023-07-25 LAB
ANION GAP SERPL CALC-SCNC: 10 MMOL/L (ref 7–16)
BUN SERPL-MCNC: 7 MG/DL (ref 8–22)
CALCIUM SERPL-MCNC: 8.6 MG/DL (ref 8.5–10.5)
CHLORIDE SERPL-SCNC: 103 MMOL/L (ref 96–112)
CO2 SERPL-SCNC: 26 MMOL/L (ref 20–33)
CREAT SERPL-MCNC: 0.66 MG/DL (ref 0.5–1.4)
ERYTHROCYTE [DISTWIDTH] IN BLOOD BY AUTOMATED COUNT: 50.7 FL (ref 35.9–50)
GFR SERPLBLD CREATININE-BSD FMLA CKD-EPI: 116 ML/MIN/1.73 M 2
GLUCOSE SERPL-MCNC: 97 MG/DL (ref 65–99)
HCT VFR BLD AUTO: 39.4 % (ref 37–47)
HGB BLD-MCNC: 12.5 G/DL (ref 12–16)
MCH RBC QN AUTO: 30.2 PG (ref 27–33)
MCHC RBC AUTO-ENTMCNC: 31.7 G/DL (ref 32.2–35.5)
MCV RBC AUTO: 95.2 FL (ref 81.4–97.8)
PLATELET # BLD AUTO: 239 K/UL (ref 164–446)
PMV BLD AUTO: 9.2 FL (ref 9–12.9)
POTASSIUM SERPL-SCNC: 3.9 MMOL/L (ref 3.6–5.5)
RBC # BLD AUTO: 4.14 M/UL (ref 4.2–5.4)
SODIUM SERPL-SCNC: 139 MMOL/L (ref 135–145)
WBC # BLD AUTO: 5.5 K/UL (ref 4.8–10.8)

## 2023-07-25 PROCEDURE — A9270 NON-COVERED ITEM OR SERVICE: HCPCS | Mod: UD

## 2023-07-25 PROCEDURE — 700102 HCHG RX REV CODE 250 W/ 637 OVERRIDE(OP): Mod: UD | Performed by: STUDENT IN AN ORGANIZED HEALTH CARE EDUCATION/TRAINING PROGRAM

## 2023-07-25 PROCEDURE — RXMED WILLOW AMBULATORY MEDICATION CHARGE

## 2023-07-25 PROCEDURE — A9270 NON-COVERED ITEM OR SERVICE: HCPCS | Mod: UD | Performed by: NURSE PRACTITIONER

## 2023-07-25 PROCEDURE — G0378 HOSPITAL OBSERVATION PER HR: HCPCS

## 2023-07-25 PROCEDURE — 700102 HCHG RX REV CODE 250 W/ 637 OVERRIDE(OP): Mod: UD

## 2023-07-25 PROCEDURE — 80048 BASIC METABOLIC PNL TOTAL CA: CPT

## 2023-07-25 PROCEDURE — 700111 HCHG RX REV CODE 636 W/ 250 OVERRIDE (IP): Mod: UD | Performed by: NURSE PRACTITIONER

## 2023-07-25 PROCEDURE — 99239 HOSP IP/OBS DSCHRG MGMT >30: CPT

## 2023-07-25 PROCEDURE — 96376 TX/PRO/DX INJ SAME DRUG ADON: CPT

## 2023-07-25 PROCEDURE — 85027 COMPLETE CBC AUTOMATED: CPT

## 2023-07-25 PROCEDURE — 700102 HCHG RX REV CODE 250 W/ 637 OVERRIDE(OP): Mod: UD | Performed by: NURSE PRACTITIONER

## 2023-07-25 PROCEDURE — A9270 NON-COVERED ITEM OR SERVICE: HCPCS | Mod: UD | Performed by: STUDENT IN AN ORGANIZED HEALTH CARE EDUCATION/TRAINING PROGRAM

## 2023-07-25 RX ORDER — OXYCODONE HYDROCHLORIDE 5 MG/1
5-10 TABLET ORAL EVERY 6 HOURS PRN
Qty: 40 TABLET | Refills: 0 | Status: SHIPPED | OUTPATIENT
Start: 2023-07-25 | End: 2023-08-01

## 2023-07-25 RX ORDER — OXYCODONE HYDROCHLORIDE 10 MG/1
10 TABLET ORAL EVERY 6 HOURS PRN
Qty: 20 TABLET | Refills: 0 | Status: SHIPPED | OUTPATIENT
Start: 2023-07-25 | End: 2023-07-25 | Stop reason: SDUPTHER

## 2023-07-25 RX ADMIN — GABAPENTIN 400 MG: 400 CAPSULE ORAL at 05:19

## 2023-07-25 RX ADMIN — OXYCODONE HYDROCHLORIDE 10 MG: 10 TABLET ORAL at 11:38

## 2023-07-25 RX ADMIN — GABAPENTIN 400 MG: 400 CAPSULE ORAL at 11:37

## 2023-07-25 RX ADMIN — HYDROMORPHONE HYDROCHLORIDE 1 MG: 1 INJECTION, SOLUTION INTRAMUSCULAR; INTRAVENOUS; SUBCUTANEOUS at 00:58

## 2023-07-25 RX ADMIN — SERTRALINE HYDROCHLORIDE 100 MG: 100 TABLET ORAL at 05:19

## 2023-07-25 RX ADMIN — ACETAMINOPHEN 650 MG: 325 TABLET, FILM COATED ORAL at 05:19

## 2023-07-25 RX ADMIN — PANCRELIPASE 36000 UNITS: 30000; 6000; 19000 CAPSULE, DELAYED RELEASE PELLETS ORAL at 11:38

## 2023-07-25 RX ADMIN — ARIPIPRAZOLE 15 MG: 15 TABLET ORAL at 05:19

## 2023-07-25 RX ADMIN — PANCRELIPASE 36000 UNITS: 30000; 6000; 19000 CAPSULE, DELAYED RELEASE PELLETS ORAL at 07:32

## 2023-07-25 RX ADMIN — OXYCODONE HYDROCHLORIDE 10 MG: 10 TABLET ORAL at 05:19

## 2023-07-25 RX ADMIN — DICYCLOMINE HYDROCHLORIDE 20 MG: 20 TABLET ORAL at 08:29

## 2023-07-25 RX ADMIN — OXYCODONE HYDROCHLORIDE 10 MG: 10 TABLET ORAL at 08:28

## 2023-07-25 RX ADMIN — ACETAMINOPHEN 650 MG: 325 TABLET, FILM COATED ORAL at 11:37

## 2023-07-25 ASSESSMENT — PAIN DESCRIPTION - PAIN TYPE
TYPE: ACUTE PAIN

## 2023-07-25 NOTE — CARE PLAN
Problem: Pain - Standard  Goal: Alleviation of pain or a reduction in pain to the patient’s comfort goal  Outcome: Progressing     Problem: Knowledge Deficit - Standard  Goal: Patient and family/care givers will demonstrate understanding of plan of care, disease process/condition, diagnostic tests and medications  Outcome: Progressing     Problem: Discharge Barriers/Planning  Goal: Patient's continuum of care needs are met  Outcome: Progressing     Problem: Mobility  Goal: Patient's capacity to carry out activities will improve  Outcome: Progressing     Problem: Infection - Standard  Goal: Patient will remain free from infection  Outcome: Progressing   The patient is Stable - Low risk of patient condition declining or worsening    Shift Goals  Clinical Goals: pain mgt,IV fluids  Patient Goals: rest  Family Goals: FANTASMA    Progress made toward(s) clinical / shift goals:  improving    Patient is not progressing towards the following goals:

## 2023-07-26 ENCOUNTER — PATIENT OUTREACH (OUTPATIENT)
Dept: HEALTH INFORMATION MANAGEMENT | Facility: OTHER | Age: 38
End: 2023-07-26
Payer: MEDICAID

## 2023-07-26 NOTE — DISCHARGE SUMMARY
Discharge Summary    CHIEF COMPLAINT ON ADMISSION  Chief Complaint   Patient presents with    Abdominal Pain    N/V     The pt reports abd pain and vomiting that has been getting worse today. Pain is non-radiating, burning/sharp, 8/10. The pt denies bloody vomit. The pt reports chronic pancreatitis and was told to come in if pain got worse.        Reason for Admission  Abdominal Pain; N/V     Admission Date  7/23/2023    CODE STATUS  Prior    HPI & HOSPITAL COURSE  Ms. Rhiannon Marrero is a 38 y.o. female with past medical history of chronic pancreatitis due to alcohol use, pancreatic stones requiring modified whipple procedure with pancreatojejunostomy in 2/2023 who presented 7/23/2023 with progressively worsening abdominal pain and new onset nausea.      She reports that her symptoms were previously well controlled until a couple of months ago when she started having slowly progressive epigastric pain.  Reports that the pain radiates into her back. Pain is relieved with bringing knees closer to chest, is worsened when she eats.  She was tolerating the pain despite it's progression, until she had nausea today which tipped her over the edge of being able to handle the pain.  She has had increased bowel movements, they used to be 3-4 loose stools daily, but are not 2 large volume solid stools daily, which are hard to flush and greasy.  She reports palpitations which occur once daily with pain, sometimes has associated lightheadedness, but has not fallen or syncopized because of this.  Reports chills, occasional feet edema (which resolves on it's own, none currently), denies shortness of breath or orthopnea.  Of note she has been sober for 60 days.       In the ED VS remarkable for , decreased down to 90s, /76.  Labs remarkable for K of 3.5, otherwise unremarkable, lipase 15, beta hcg negative.  In the ED she received tums, a GI cocktail, dilaudid, morphine, a 1L bolus, zofran.  Patient admitted to  hospital medicine for management of chronic pancreatitis including IV fluids, pain management, hemodynamic monitoring.    Pancrelipase continued. Patient had no acute events during overnight observation and symptoms improved on IV fluids and clear liquid diet. By 7/25 she no longer required IV medications for pain control and she was able to advance to soft diet. She remained afebrile without leukocytosis or significant electrolyte disturbance. Discussed course with the patient, she feels ready to discharge home on oral medications. She will be prescribed short course oxycodone and will follow within the next week with her PCP. Encouraged bland diet until feeling completely better and continued abstinence from alcohol.     Therefore, she is discharged in good and stable condition to home with close outpatient follow-up.    The patient recovered much more quickly than anticipated on admission.    Discharge Date  7/25/2023    FOLLOW UP ITEMS POST DISCHARGE  Follow with PCP this week  Continue pancrelipase  Continue soft / bland diet and advance slowly as symptoms improve.     DISCHARGE DIAGNOSES  Principal Problem (Resolved):    Acute on chronic pancreatitis (HCC) (POA: Yes)  Active Problems:    Bipolar disorder (HCC) (POA: Unknown)    Tobacco use (POA: Yes)    Chronic pancreatitis due to chronic alcoholism (HCC) (POA: Yes)    Alcoholic peripheral neuropathy (HCC) (POA: Unknown)  Resolved Problems:    Hypokalemia (POA: Unknown)    Epigastric pain (POA: Unknown)      FOLLOW UP  MALOU Harrington  [address]  [address]  [place] [address]  [phone]    Follow up in 1 week(s)        MEDICATIONS ON DISCHARGE     Medication List        START taking these medications        Instructions   oxyCODONE immediate-release 5 MG Tabs  Commonly known as: Roxicodone   Take 1-2 Tablets by mouth every 6 hours as needed for Severe Pain for up to 5 days.  Dose: 5-10 mg            CONTINUE taking these medications         Instructions   ARIPiprazole 15 MG Tabs  Commonly known as: Abilify   Take 15 mg by mouth every day.  Dose: 15 mg     cephALEXin 500 MG Caps  Commonly known as: Keflex   Take 500 mg by mouth 4 times a day.  Dose: 500 mg     Creon [id number] units Cpep  Generic drug: Pancrelipase (Lip-Prot-Amyl)   Take 1 Capful by mouth 3 times a day with meals.  Dose: 1 Capful     dicyclomine 20 MG Tabs  Commonly known as: Bentyl   Take 20 mg by mouth in the morning, at noon, and at bedtime.  Dose: 20 mg     DOXEPIN HCL PO   Take 1 Tablet by mouth every day.  Dose: 1 Tablet     gabapentin 600 MG tablet  Commonly known as: Neurontin   Take 600 mg by mouth in the morning, at noon, and at bedtime.  Dose: 600 mg     mirtazapine 15 MG Tabs  Commonly known as: Remeron   Take 15 mg by mouth every evening.  Dose: 15 mg     OLANZapine 5 MG Tabs  Commonly known as: ZyPREXA   Take 5 mg by mouth every day.  Dose: 5 mg     sertraline 100 MG Tabs  Commonly known as: Zoloft   Take 100 mg by mouth every day.  Dose: 100 mg     traZODone 100 MG Tabs  Commonly known as: Desyrel   Take 100 mg by mouth every evening. Indications: Trouble Sleeping  Dose: 100 mg              Allergies  Allergies   Allergen Reactions    Promethazine Hcl Anxiety     Anxiety and makes her feels like skin coming off        DIET  No orders of the defined types were placed in this encounter.      ACTIVITY  As tolerated.  Weight bearing as tolerated    CONSULTATIONS  none    PROCEDURES  none    LABORATORY  Lab Results   Component Value Date    SODIUM 139 07/25/2023    POTASSIUM 3.9 07/25/2023    CHLORIDE 103 07/25/2023    CO2 26 07/25/2023    GLUCOSE 97 07/25/2023    BUN 7 (L) 07/25/2023    CREATININE 0.66 07/25/2023    CREATININE 0.7 06/10/2008    GLOMRATE 79 05/07/2023        Lab Results   Component Value Date    WBC 5.5 07/25/2023    HEMOGLOBIN 12.5 07/25/2023    HEMATOCRIT 39.4 07/25/2023    PLATELETCT 239 07/25/2023        Total time of the discharge process exceeds 28  minutes.

## 2023-07-26 NOTE — PROGRESS NOTES
SHANDRA Flower called patient via [facility] post hospital follow up and left a voicemail introducing [facility] and provided all contact information.     08/04/23:  Called pt via [facility] post hospital follow up and left a voicemail introducing [facility] and provided all information.     08/08/23:  Called pt via [facility] post hospital follow up and left a voicemail introducing [facility] and provided all information.   Third and final attempt to reach pt . Will discharge patient from [facility] and remove from case load and master list due lack of engagement.

## 2023-08-03 ENCOUNTER — HOSPITAL ENCOUNTER (EMERGENCY)
Facility: MEDICAL CENTER | Age: 38
End: 2023-08-03
Attending: EMERGENCY MEDICINE
Payer: MEDICAID

## 2023-08-03 VITALS
SYSTOLIC BLOOD PRESSURE: 119 MMHG | TEMPERATURE: 98.8 F | RESPIRATION RATE: 16 BRPM | WEIGHT: 147.93 LBS | HEART RATE: 65 BPM | OXYGEN SATURATION: 97 % | BODY MASS INDEX: 23.17 KG/M2 | DIASTOLIC BLOOD PRESSURE: 72 MMHG

## 2023-08-03 DIAGNOSIS — K86.1 CHRONIC PANCREATITIS, UNSPECIFIED PANCREATITIS TYPE (HCC): ICD-10-CM

## 2023-08-03 DIAGNOSIS — R55 VASOVAGAL REACTION: ICD-10-CM

## 2023-08-03 DIAGNOSIS — G89.29 CHRONIC ABDOMINAL PAIN: ICD-10-CM

## 2023-08-03 DIAGNOSIS — R19.7 DIARRHEA, UNSPECIFIED TYPE: ICD-10-CM

## 2023-08-03 DIAGNOSIS — R10.9 CHRONIC ABDOMINAL PAIN: ICD-10-CM

## 2023-08-03 LAB
ALBUMIN SERPL BCP-MCNC: 4.8 G/DL (ref 3.2–4.9)
ALBUMIN/GLOB SERPL: 1.7 G/DL
ALP SERPL-CCNC: 84 U/L (ref 30–99)
ALT SERPL-CCNC: 25 U/L (ref 2–50)
ANION GAP SERPL CALC-SCNC: 13 MMOL/L (ref 7–16)
APPEARANCE UR: ABNORMAL
AST SERPL-CCNC: 21 U/L (ref 12–45)
BACTERIA #/AREA URNS HPF: ABNORMAL /HPF
BASOPHILS # BLD AUTO: 0.8 % (ref 0–1.8)
BASOPHILS # BLD: 0.05 K/UL (ref 0–0.12)
BILIRUB SERPL-MCNC: 0.3 MG/DL (ref 0.1–1.5)
BILIRUB UR QL STRIP.AUTO: NEGATIVE
BUN SERPL-MCNC: 10 MG/DL (ref 8–22)
CALCIUM ALBUM COR SERPL-MCNC: 8.7 MG/DL (ref 8.5–10.5)
CALCIUM SERPL-MCNC: 9.3 MG/DL (ref 8.5–10.5)
CHLORIDE SERPL-SCNC: 105 MMOL/L (ref 96–112)
CO2 SERPL-SCNC: 19 MMOL/L (ref 20–33)
COLOR UR: YELLOW
CREAT SERPL-MCNC: 0.86 MG/DL (ref 0.5–1.4)
EKG IMPRESSION: NORMAL
EOSINOPHIL # BLD AUTO: 0.12 K/UL (ref 0–0.51)
EOSINOPHIL NFR BLD: 1.8 % (ref 0–6.9)
EPI CELLS #/AREA URNS HPF: ABNORMAL /HPF
ERYTHROCYTE [DISTWIDTH] IN BLOOD BY AUTOMATED COUNT: 44.9 FL (ref 35.9–50)
GFR SERPLBLD CREATININE-BSD FMLA CKD-EPI: 90 ML/MIN/1.73 M 2
GLOBULIN SER CALC-MCNC: 2.8 G/DL (ref 1.9–3.5)
GLUCOSE SERPL-MCNC: 113 MG/DL (ref 65–99)
GLUCOSE UR STRIP.AUTO-MCNC: NEGATIVE MG/DL
HCT VFR BLD AUTO: 42.3 % (ref 37–47)
HGB BLD-MCNC: 14.3 G/DL (ref 12–16)
HYALINE CASTS #/AREA URNS LPF: ABNORMAL /LPF
IMM GRANULOCYTES # BLD AUTO: 0.01 K/UL (ref 0–0.11)
IMM GRANULOCYTES NFR BLD AUTO: 0.2 % (ref 0–0.9)
KETONES UR STRIP.AUTO-MCNC: NEGATIVE MG/DL
LEUKOCYTE ESTERASE UR QL STRIP.AUTO: NEGATIVE
LIPASE SERPL-CCNC: 11 U/L (ref 11–82)
LYMPHOCYTES # BLD AUTO: 1.89 K/UL (ref 1–4.8)
LYMPHOCYTES NFR BLD: 28.8 % (ref 22–41)
MCH RBC QN AUTO: 30.2 PG (ref 27–33)
MCHC RBC AUTO-ENTMCNC: 33.8 G/DL (ref 32.2–35.5)
MCV RBC AUTO: 89.2 FL (ref 81.4–97.8)
MICRO URNS: ABNORMAL
MONOCYTES # BLD AUTO: 0.49 K/UL (ref 0–0.85)
MONOCYTES NFR BLD AUTO: 7.5 % (ref 0–13.4)
NEUTROPHILS # BLD AUTO: 4 K/UL (ref 1.82–7.42)
NEUTROPHILS NFR BLD: 60.9 % (ref 44–72)
NITRITE UR QL STRIP.AUTO: NEGATIVE
NRBC # BLD AUTO: 0 K/UL
NRBC BLD-RTO: 0 /100 WBC (ref 0–0.2)
PH UR STRIP.AUTO: 5.5 [PH] (ref 5–8)
PLATELET # BLD AUTO: 244 K/UL (ref 164–446)
PMV BLD AUTO: 9.8 FL (ref 9–12.9)
POTASSIUM SERPL-SCNC: 3.7 MMOL/L (ref 3.6–5.5)
PROT SERPL-MCNC: 7.6 G/DL (ref 6–8.2)
PROT UR QL STRIP: NEGATIVE MG/DL
RBC # BLD AUTO: 4.74 M/UL (ref 4.2–5.4)
RBC # URNS HPF: ABNORMAL /HPF
RBC UR QL AUTO: NEGATIVE
SODIUM SERPL-SCNC: 137 MMOL/L (ref 135–145)
SP GR UR STRIP.AUTO: 1.01
UROBILINOGEN UR STRIP.AUTO-MCNC: 0.2 MG/DL
WBC # BLD AUTO: 6.6 K/UL (ref 4.8–10.8)
WBC #/AREA URNS HPF: ABNORMAL /HPF

## 2023-08-03 PROCEDURE — 96375 TX/PRO/DX INJ NEW DRUG ADDON: CPT

## 2023-08-03 PROCEDURE — 96374 THER/PROPH/DIAG INJ IV PUSH: CPT

## 2023-08-03 PROCEDURE — 81001 URINALYSIS AUTO W/SCOPE: CPT

## 2023-08-03 PROCEDURE — 700111 HCHG RX REV CODE 636 W/ 250 OVERRIDE (IP): Mod: UD | Performed by: EMERGENCY MEDICINE

## 2023-08-03 PROCEDURE — 96372 THER/PROPH/DIAG INJ SC/IM: CPT | Mod: XU

## 2023-08-03 PROCEDURE — 93005 ELECTROCARDIOGRAM TRACING: CPT

## 2023-08-03 PROCEDURE — 36415 COLL VENOUS BLD VENIPUNCTURE: CPT

## 2023-08-03 PROCEDURE — A9270 NON-COVERED ITEM OR SERVICE: HCPCS | Performed by: EMERGENCY MEDICINE

## 2023-08-03 PROCEDURE — 700102 HCHG RX REV CODE 250 W/ 637 OVERRIDE(OP): Performed by: EMERGENCY MEDICINE

## 2023-08-03 PROCEDURE — 700105 HCHG RX REV CODE 258: Mod: JZ,UD | Performed by: EMERGENCY MEDICINE

## 2023-08-03 PROCEDURE — 99285 EMERGENCY DEPT VISIT HI MDM: CPT

## 2023-08-03 PROCEDURE — 85025 COMPLETE CBC W/AUTO DIFF WBC: CPT

## 2023-08-03 PROCEDURE — 80053 COMPREHEN METABOLIC PANEL: CPT

## 2023-08-03 PROCEDURE — 93005 ELECTROCARDIOGRAM TRACING: CPT | Performed by: EMERGENCY MEDICINE

## 2023-08-03 PROCEDURE — 83690 ASSAY OF LIPASE: CPT

## 2023-08-03 RX ORDER — SODIUM CHLORIDE, SODIUM LACTATE, POTASSIUM CHLORIDE, CALCIUM CHLORIDE 600; 310; 30; 20 MG/100ML; MG/100ML; MG/100ML; MG/100ML
2000 INJECTION, SOLUTION INTRAVENOUS ONCE
Status: COMPLETED | OUTPATIENT
Start: 2023-08-03 | End: 2023-08-03

## 2023-08-03 RX ORDER — SUCRALFATE ORAL 1 G/10ML
1 SUSPENSION ORAL ONCE
Status: COMPLETED | OUTPATIENT
Start: 2023-08-03 | End: 2023-08-03

## 2023-08-03 RX ORDER — SUCRALFATE ORAL 1 G/10ML
1 SUSPENSION ORAL EVERY 6 HOURS
Status: DISCONTINUED | OUTPATIENT
Start: 2023-08-03 | End: 2023-08-03

## 2023-08-03 RX ORDER — PROCHLORPERAZINE EDISYLATE 5 MG/ML
10 INJECTION INTRAMUSCULAR; INTRAVENOUS ONCE
Status: COMPLETED | OUTPATIENT
Start: 2023-08-03 | End: 2023-08-03

## 2023-08-03 RX ORDER — DICYCLOMINE HYDROCHLORIDE 10 MG/ML
20 INJECTION INTRAMUSCULAR ONCE
Status: COMPLETED | OUTPATIENT
Start: 2023-08-03 | End: 2023-08-03

## 2023-08-03 RX ORDER — DIPHENHYDRAMINE HYDROCHLORIDE 50 MG/ML
25 INJECTION INTRAMUSCULAR; INTRAVENOUS ONCE
Status: COMPLETED | OUTPATIENT
Start: 2023-08-03 | End: 2023-08-03

## 2023-08-03 RX ADMIN — PROCHLORPERAZINE EDISYLATE 10 MG: 5 INJECTION INTRAMUSCULAR; INTRAVENOUS at 14:42

## 2023-08-03 RX ADMIN — SODIUM CHLORIDE, POTASSIUM CHLORIDE, SODIUM LACTATE AND CALCIUM CHLORIDE 2000 ML: 600; 310; 30; 20 INJECTION, SOLUTION INTRAVENOUS at 14:43

## 2023-08-03 RX ADMIN — SUCRALFATE 1 G: 1 SUSPENSION ORAL at 16:25

## 2023-08-03 RX ADMIN — DIPHENHYDRAMINE HYDROCHLORIDE 25 MG: 50 INJECTION, SOLUTION INTRAMUSCULAR; INTRAVENOUS at 14:42

## 2023-08-03 RX ADMIN — FENTANYL CITRATE 100 MCG: 50 INJECTION, SOLUTION INTRAMUSCULAR; INTRAVENOUS at 14:46

## 2023-08-03 RX ADMIN — DICYCLOMINE HYDROCHLORIDE 20 MG: 20 INJECTION INTRAMUSCULAR at 16:09

## 2023-08-03 ASSESSMENT — PAIN DESCRIPTION - PAIN TYPE: TYPE: ACUTE PAIN

## 2023-08-03 ASSESSMENT — FIBROSIS 4 INDEX: FIB4 SCORE: 0.81

## 2023-08-03 NOTE — ED TRIAGE NOTES
Chief Complaint   Patient presents with    Dizziness     X 3 days. Palpitations. Hx pancreatitis. N/V/D.        GCS 15.     /69   Pulse (!) 107   Temp 37.4 °C (99.4 °F) (Temporal)   Resp 18   LMP 10/30/2018   SpO2 98%

## 2023-08-03 NOTE — ED PROVIDER NOTES
ED Provider Note    CHIEF COMPLAINT  Chief Complaint   Patient presents with    Dizziness     X 3 days. Palpitations. Hx pancreatitis. N/V/D.         EXTERNAL RECORDS REVIEWED  Inpatient records for the patient was discharged from this facility on July 23 for similar complaints, nausea, vomiting, acute on chronic epigastric abdominal pain.  Records are history of chronic pancreatitis due to alcohol abuse, pancreatic stone, with modified Whipple procedure, including pancreas jejunostomy.  She was treated symptomatically during this recent admission with IV pain medications and IV fluids progressing to a clear liquid diet.  Among other admissions this year, there was an admission in April for similar symptoms.  Patient left AMA after frequent doses of IV Dilaudid were discontinued.  This was 1 of several admissions this year.  I do not see any gastroenterology consultations from several 2023 admissions.      HPI/ROS  LIMITATION TO HISTORY       OUTSIDE HISTORIAN(S):  Patient's mother at bedside contributes to the history.    Rhiannon Marrero is a 38 y.o. female who presents to the emergency department for ongoing chronic epigastric abdominal pain, with several days of diarrhea, with postural lightheadedness.  She says that her heart rate increases to the 120s or 130s when she stands up.  She does not complain of fevers or chills, chest pain or cough or dysuria.  The abdominal pain is more or less constant, epigastric, burning, similar to well-documented chronic epigastric abdominal pain/chronic pancreatitis.  She has normal vital signs at rest.  She sees Dr. Kwok for GI, but has not had an appointment since April.  She has not reached out regarding these newer symptoms.    PAST MEDICAL HISTORY   has a past medical history of Acute pancreatitis without infection or necrosis (07/20/2022), Alcohol dependence (HCC) (04/13/2017), Bronchitis (08/2012), Cold, Current moderate episode of major depressive disorder  without prior episode (HCC) (01/31/2020), Heart burn, Hernia of unspecified site of abdominal cavity without mention of obstruction or gangrene, High anion gap metabolic acidosis (07/01/2022), Indigestion, Pancreatitis, Pneumonia (2011), and Seizure disorder (HCC) (05/23/2022).    SURGICAL HISTORY   has a past surgical history that includes other orthopedic surgery; gyn surgery; tonsillectomy (04/11/2013); arthroscopy, knee; hysterectomy robotic xi (10/11/2018); salpingectomy (Bilateral, 10/11/2018); orif, wrist (Right, 04/24/2019); upper gi endoscopy,diagnosis (N/A, 12/23/2022); inject nerv blck,celiac plexus (N/A, 12/23/2022); egd w/endoscopic ultrasound (N/A, 12/23/2022); and whipple procedure (02/25/2023).    FAMILY HISTORY  Family History   Problem Relation Age of Onset    Psychiatric Illness Mother     Stroke Mother     Arthritis Mother     Heart Disease Maternal Grandfather     Cancer Neg Hx     Diabetes Neg Hx     Hypertension Neg Hx        SOCIAL HISTORY  Social History     Tobacco Use    Smoking status: Former     Packs/day: 0.25     Years: 10.00     Pack years: 2.50     Types: Cigarettes    Smokeless tobacco: Never   Vaping Use    Vaping Use: Every day    Substances: Nicotine   Substance and Sexual Activity    Alcohol use: Yes     Comment: Former alcholic 5/24/2023 last drink    Drug use: Not Currently     Types: Inhaled     Comment: weed for pain    Sexual activity: Not on file       CURRENT MEDICATIONS  Current Outpatient Medications   Medication Instructions    ARIPiprazole (ABILIFY) 15 mg, Oral, DAILY    cephALEXin (KEFLEX) 500 mg, Oral, 4 TIMES DAILY    dicyclomine (BENTYL) 20 mg, Oral, 3 times daily    DOXEPIN HCL PO 1 Tablet, Oral, DAILY    gabapentin (NEURONTIN) 600 mg, Oral, 3 times daily    mirtazapine (REMERON) 15 mg, Oral, NIGHTLY    OLANZapine (ZYPREXA) 5 mg, Oral, DAILY    Pancrelipase, Lip-Prot-Amyl, (CREON) 61203-040362 units Cap DR Particles 1 Capful, Oral, 3 TIMES DAILY WITH MEALS     sertraline (ZOLOFT) 100 mg, Oral, DAILY    traZODone (DESYREL) 100 mg, Oral, NIGHTLY        ALLERGIES  Allergies   Allergen Reactions    Promethazine Hcl Anxiety     Anxiety and makes her feels like skin coming off          PHYSICAL EXAM  VITAL SIGNS: /69   Pulse 82   Temp 37.4 °C (99.4 °F) (Temporal)   Resp 18   Wt 67.1 kg (147 lb 14.9 oz)   LMP 10/30/2018   SpO2 96%   BMI 23.17 kg/m²   Pulse ox interpretation: I interpret this pulse ox as normal.  Constitutional: Alert in no apparent distress.  HENT: No signs of trauma, Bilateral external ears normal, Nose normal.   Eyes: Conjunctiva normal, Non-icteric.   Neck: Normal range of motion, Supple, No stridor.   Lymphatic: No lymphadenopathy noted.   Cardiovascular: Regular rate and rhythm, no murmurs.   Thorax & Lungs: Normal breath sounds, No respiratory distress, No wheezing  Abdomen: .Bowel sounds normal, Soft, epigastric tenderness with seemingly anticipatory response to light palpation, No masses, No pulsatile masses. No peritoneal signs.  Skin: Warm, Dry, No erythema, No rash.   Extremities: Intact distal pulses, No edema, No cyanosis.  Musculoskeletal: Good range of motion in all major joints. No or major deformities noted.   Neurologic: Alert , Normal motor function, Normal sensory function, No focal deficits noted.   Psychiatric: Affect slightly flat/depressive, judgment normal, Mood normal.          DIAGNOSTIC STUDIES / PROCEDURES    LABS  Labs Reviewed   COMP METABOLIC PANEL - Abnormal; Notable for the following components:       Result Value    Co2 19 (*)     Glucose 113 (*)     All other components within normal limits   URINALYSIS - Abnormal; Notable for the following components:    Character Cloudy (*)     All other components within normal limits   URINE MICROSCOPIC (W/UA) - Abnormal; Notable for the following components:    WBC 5-10 (*)     Bacteria Few (*)     Epithelial Cells Many (*)     All other components within normal limits   CBC  WITH DIFFERENTIAL   LIPASE   ESTIMATED GFR         RADIOLOGY      COURSE & MEDICAL DECISION MAKING    ED Observation Status? Yes; I am placing the patient in to an observation status due to a diagnostic uncertainty as well as therapeutic intensity. Patient placed in observation status at 2:25 PM, 8/3/2023.     Observation plan is as follows: Labs, imaging if indicated, IV hydration, symptomatic treatment for pain and nausea.    Upon Reevaluation, the patient's condition has: Improved; and will be discharged.    Patient discharged from ED Observation status at 3:17 PM (Time) 8/3/2023 (Date).     INITIAL ASSESSMENT, COURSE AND PLAN  Care Narrative:     2:25 PM patient evaluated the bedside.  She reports ongoing epigastric abdominal pain with diarrhea, she does not complain of vomiting, fevers, dysuria, and has normal vital signs at rest, but complains of orthostatic tachycardia, consistent with volume depletion/GI fluid losses.  She will be evaluated with screening laboratory tests, imaging if indicated.  She will be treated with 2 L of LR, and for symptomatic management, fentanyl, Compazine, Benadryl.    HYDRATION: Based on the patient's presentation of Acute Diarrhea the patient was given IV fluids. IV Hydration was used because oral hydration was not adequate alone. Upon recheck following hydration, the patient was improved.    3:14 PM - Patient was reevaluated at bedside. I informed her that her lab results are reassuring and she should follow up with GI for her symptoms.  There is no indication for imaging or hospitalization.  The patient informs me they feel improved following fluid and medication administration. Discussed plan for discharge. Patient was given the opportunity for questions. I addressed all questions or concerns and the patient is stable for discharge at this time. Patient verbalizes understanding and support with my plan for discharge.      3:25 PM fluids are finishing.  Patient was treated with  Bentyl and Carafate for continued discomfort.  Given her history of chronic pain, I do not think that continued narcotics are indicated, in the absence of any evidence of an acute, surgical, or dangerous cause of her ongoing symptoms.          ADDITIONAL PROBLEM LIST    DISPOSITION AND DISCUSSIONS  I have discussed management of the patient with the following physicians and BILL's:  None    Discussion of management with other [facility] or appropriate source(s): None     Escalation of care considered, and ultimately not performed:acute inpatient care management, however at this time, the patient is most appropriate for outpatient management.  Labs and vital signs are reassuring, and the symptoms have been chronic, and she is established with both primary care and gastroenterology.    Decision tools and prescription drugs considered including, but not limited to: Medication modification considered, but best deferred to the patient's outpatient providers. .    The patient will return for new or worsening symptoms and is stable at the time of discharge.    The patient is referred to a primary physician for blood pressure management, diabetic screening, and for all other preventative health concerns.    DISPOSITION:  Patient will be discharged home in stable condition.    FOLLOW UP:  Td Kwok M.D.  [address] [facility]  [place] [address]  [phone]    Call today      Td Kwok M.D.  [address] [facility]  [place] [address]  [phone]                  FINAL DIAGNOSIS  1. Vasovagal reaction    2. Diarrhea, unspecified type    3. Chronic pancreatitis, unspecified pancreatitis type (HCC)    4. Chronic abdominal pain        Mark RASHEED), am scribing for, and in the presence of, Anderson Hook M.D..    Electronically signed by: Mark Ruiz (Aman), 8/3/2023    Anderson RASHEED M.D. personally performed the services described in this documentation, as scribed  by Mark Ruiz in my presence, and it is both accurate and complete.     Electronically signed by: Anderson Hook M.D., 8/3/2023 2:31 PM

## 2023-08-03 NOTE — DISCHARGE INSTRUCTIONS
Your tests showed mild dehydration, but no signs of infection or electrode abnormality or any serious problem.  When you are slightly dehydrated, feeling lightheaded when you stand up is called a vasovagal reaction.  It is very common.      Be very aggressive about hydrating with clear, sugar-free, alcohol free, caffeine free fluids.  You will need to stay in close touch with your primary care provider and your gastroenterologist for these ongoing issues.  You can also take over-the-counter medications to slow diarrhea.

## 2023-08-04 ENCOUNTER — TELEPHONE (OUTPATIENT)
Dept: HEALTH INFORMATION MANAGEMENT | Facility: OTHER | Age: 38
End: 2023-08-04

## 2023-08-07 NOTE — PROGRESS NOTES
At 21:20 Admitted Pt from ER  via gurney. Pt awake, A&O x 4. Transferred to bed and assessment performed. Oriented Pt to room and discussed POC. Initiated safety measures.     I've resent all of her Rx for Adderall to the pharmacy. Please let me know if you get a notice saying that they failed to go through

## 2023-08-24 ENCOUNTER — HOSPITAL ENCOUNTER (EMERGENCY)
Facility: MEDICAL CENTER | Age: 38
End: 2023-08-24
Attending: EMERGENCY MEDICINE
Payer: MEDICAID

## 2023-08-24 VITALS
HEIGHT: 67 IN | SYSTOLIC BLOOD PRESSURE: 144 MMHG | OXYGEN SATURATION: 96 % | WEIGHT: 159.39 LBS | RESPIRATION RATE: 16 BRPM | TEMPERATURE: 97.8 F | BODY MASS INDEX: 25.02 KG/M2 | DIASTOLIC BLOOD PRESSURE: 95 MMHG | HEART RATE: 92 BPM

## 2023-08-24 DIAGNOSIS — R11.10 VOMITING AND DIARRHEA: ICD-10-CM

## 2023-08-24 DIAGNOSIS — Z87.19 HISTORY OF PANCREATITIS: ICD-10-CM

## 2023-08-24 DIAGNOSIS — R19.7 VOMITING AND DIARRHEA: ICD-10-CM

## 2023-08-24 DIAGNOSIS — R10.13 EPIGASTRIC PAIN: ICD-10-CM

## 2023-08-24 LAB
ALBUMIN SERPL BCP-MCNC: 4.7 G/DL (ref 3.2–4.9)
ALBUMIN/GLOB SERPL: 1.7 G/DL
ALP SERPL-CCNC: 83 U/L (ref 30–99)
ALT SERPL-CCNC: 16 U/L (ref 2–50)
ANION GAP SERPL CALC-SCNC: 13 MMOL/L (ref 7–16)
APPEARANCE UR: CLEAR
AST SERPL-CCNC: 21 U/L (ref 12–45)
BASOPHILS # BLD AUTO: 0.7 % (ref 0–1.8)
BASOPHILS # BLD: 0.06 K/UL (ref 0–0.12)
BILIRUB SERPL-MCNC: 0.3 MG/DL (ref 0.1–1.5)
BILIRUB UR QL STRIP.AUTO: NEGATIVE
BUN SERPL-MCNC: 7 MG/DL (ref 8–22)
CALCIUM ALBUM COR SERPL-MCNC: 9 MG/DL (ref 8.5–10.5)
CALCIUM SERPL-MCNC: 9.6 MG/DL (ref 8.5–10.5)
CHLORIDE SERPL-SCNC: 103 MMOL/L (ref 96–112)
CO2 SERPL-SCNC: 23 MMOL/L (ref 20–33)
COLOR UR: YELLOW
CREAT SERPL-MCNC: 0.78 MG/DL (ref 0.5–1.4)
EOSINOPHIL # BLD AUTO: 0.17 K/UL (ref 0–0.51)
EOSINOPHIL NFR BLD: 2 % (ref 0–6.9)
ERYTHROCYTE [DISTWIDTH] IN BLOOD BY AUTOMATED COUNT: 43.5 FL (ref 35.9–50)
GFR SERPLBLD CREATININE-BSD FMLA CKD-EPI: 101 ML/MIN/1.73 M 2
GLOBULIN SER CALC-MCNC: 2.7 G/DL (ref 1.9–3.5)
GLUCOSE SERPL-MCNC: 86 MG/DL (ref 65–99)
GLUCOSE UR STRIP.AUTO-MCNC: NEGATIVE MG/DL
HCT VFR BLD AUTO: 41.1 % (ref 37–47)
HGB BLD-MCNC: 13.6 G/DL (ref 12–16)
IMM GRANULOCYTES # BLD AUTO: 0.02 K/UL (ref 0–0.11)
IMM GRANULOCYTES NFR BLD AUTO: 0.2 % (ref 0–0.9)
KETONES UR STRIP.AUTO-MCNC: NEGATIVE MG/DL
LEUKOCYTE ESTERASE UR QL STRIP.AUTO: NEGATIVE
LIPASE SERPL-CCNC: 20 U/L (ref 11–82)
LYMPHOCYTES # BLD AUTO: 2.52 K/UL (ref 1–4.8)
LYMPHOCYTES NFR BLD: 29 % (ref 22–41)
MCH RBC QN AUTO: 30.1 PG (ref 27–33)
MCHC RBC AUTO-ENTMCNC: 33.1 G/DL (ref 32.2–35.5)
MCV RBC AUTO: 90.9 FL (ref 81.4–97.8)
MICRO URNS: NORMAL
MONOCYTES # BLD AUTO: 0.53 K/UL (ref 0–0.85)
MONOCYTES NFR BLD AUTO: 6.1 % (ref 0–13.4)
NEUTROPHILS # BLD AUTO: 5.4 K/UL (ref 1.82–7.42)
NEUTROPHILS NFR BLD: 62 % (ref 44–72)
NITRITE UR QL STRIP.AUTO: NEGATIVE
NRBC # BLD AUTO: 0 K/UL
NRBC BLD-RTO: 0 /100 WBC (ref 0–0.2)
PH UR STRIP.AUTO: 7.5 [PH] (ref 5–8)
PLATELET # BLD AUTO: 247 K/UL (ref 164–446)
PMV BLD AUTO: 9.5 FL (ref 9–12.9)
POTASSIUM SERPL-SCNC: 3.7 MMOL/L (ref 3.6–5.5)
PROT SERPL-MCNC: 7.4 G/DL (ref 6–8.2)
PROT UR QL STRIP: NEGATIVE MG/DL
RBC # BLD AUTO: 4.52 M/UL (ref 4.2–5.4)
RBC UR QL AUTO: NEGATIVE
SODIUM SERPL-SCNC: 139 MMOL/L (ref 135–145)
SP GR UR STRIP.AUTO: 1
UROBILINOGEN UR STRIP.AUTO-MCNC: 0.2 MG/DL
WBC # BLD AUTO: 8.7 K/UL (ref 4.8–10.8)

## 2023-08-24 PROCEDURE — 36415 COLL VENOUS BLD VENIPUNCTURE: CPT

## 2023-08-24 PROCEDURE — 700102 HCHG RX REV CODE 250 W/ 637 OVERRIDE(OP): Performed by: EMERGENCY MEDICINE

## 2023-08-24 PROCEDURE — 83690 ASSAY OF LIPASE: CPT

## 2023-08-24 PROCEDURE — 85025 COMPLETE CBC W/AUTO DIFF WBC: CPT

## 2023-08-24 PROCEDURE — 80053 COMPREHEN METABOLIC PANEL: CPT

## 2023-08-24 PROCEDURE — A9270 NON-COVERED ITEM OR SERVICE: HCPCS | Performed by: EMERGENCY MEDICINE

## 2023-08-24 PROCEDURE — 700111 HCHG RX REV CODE 636 W/ 250 OVERRIDE (IP): Mod: UD | Performed by: EMERGENCY MEDICINE

## 2023-08-24 PROCEDURE — 81003 URINALYSIS AUTO W/O SCOPE: CPT

## 2023-08-24 PROCEDURE — 99284 EMERGENCY DEPT VISIT MOD MDM: CPT

## 2023-08-24 RX ORDER — OMEPRAZOLE 20 MG/1
20 CAPSULE, DELAYED RELEASE ORAL DAILY
Qty: 30 CAPSULE | Refills: 0 | Status: ON HOLD | OUTPATIENT
Start: 2023-08-24 | End: 2023-10-24

## 2023-08-24 RX ORDER — ONDANSETRON 4 MG/1
4 TABLET, ORALLY DISINTEGRATING ORAL EVERY 8 HOURS PRN
Qty: 10 TABLET | Refills: 0 | Status: ON HOLD | OUTPATIENT
Start: 2023-08-24 | End: 2023-10-24

## 2023-08-24 RX ORDER — ONDANSETRON 4 MG/1
4 TABLET, ORALLY DISINTEGRATING ORAL ONCE
Status: COMPLETED | OUTPATIENT
Start: 2023-08-24 | End: 2023-08-24

## 2023-08-24 RX ADMIN — ONDANSETRON 4 MG: 4 TABLET, ORALLY DISINTEGRATING ORAL at 18:45

## 2023-08-24 RX ADMIN — LIDOCAINE HYDROCHLORIDE 30 ML: 20 SOLUTION OROPHARYNGEAL at 18:44

## 2023-08-24 ASSESSMENT — FIBROSIS 4 INDEX: FIB4 SCORE: 0.62

## 2023-08-25 NOTE — ED PROVIDER NOTES
ER Provider Note    Scribed for jC Pineda M.D. by Darcy Vegas. 8/24/2023   6:33 PM    Primary Care Provider: MALOU Harrington    CHIEF COMPLAINT  Chief Complaint   Patient presents with    N/V     Pt reports n/v x 1 day    Diarrhea     X 4 days- chronic pancreatitis     EXTERNAL RECORDS REVIEWED  Outpatient Notes: The patient has a history of alcoholic pancreatitis. She has been here numerus times this year with the most recent one 3 weeks ago for dizziness. She is 3 months sober.    HPI/ROS  LIMITATION TO HISTORY   Select: : None  OUTSIDE HISTORIAN(S):  None noted    Rhiannon Marrero is a 38 y.o. female who presents to the ED for evaluation of vomiting and diarrhea onset 4 days ago. She reports 7 episodes of vomiting today. She notes she has associated epigastric pain. She adds that she has been taking anti-nausea medication at home without relief. She notes that she is 91 days sober.     PAST MEDICAL HISTORY  Past Medical History:   Diagnosis Date    Acute pancreatitis without infection or necrosis 07/20/2022    Alcohol dependence (HCC) 04/13/2017    Bronchitis 08/2012    Cold     sept 2018    Current moderate episode of major depressive disorder without prior episode (HCC) 01/31/2020    Heart burn     Hernia of unspecified site of abdominal cavity without mention of obstruction or gangrene     High anion gap metabolic acidosis 07/01/2022    Indigestion     Pancreatitis     Pneumonia 2011    Seizure disorder ([facility]) 05/23/2022       SURGICAL HISTORY  Past Surgical History:   Procedure Laterality Date    WHIPPLE PROCEDURE  02/25/2023    Modified    MS UPPER GI ENDOSCOPY,DIAGNOSIS N/A 12/23/2022    Procedure: GASTROSCOPY;  Surgeon: Td Kwok M.D.;  Location: [facility];  Service: EUS    MS INJECT NERV BLCK,CELIAC PLEXUS N/A 12/23/2022    Procedure: BLOCK, CELIAC PLEXUS;  Surgeon: Td Kwok M.D.;  Location: SURGERY [facility];  Service: EUS    EGD W/ENDOSCOPIC  ULTRASOUND N/A 12/23/2022    Procedure: EGD, WITH ENDOSCOPIC US;  Surgeon: Td Kwok M.D.;  Location: SURGERY [facility];  Service: EUS    ORIF, WRIST Right 04/24/2019    Procedure: ORIF, WRIST;  Surgeon: Marquis Bell M.D.;  Location: SURGERY [facility];  Service: Orthopedics    HYSTERECTOMY ROBOTIC XI  10/11/2018    Procedure: HYSTERECTOMY ROBOTIC XI- RIGHT URETEROLYSIS;  Surgeon: Marquis Reeder M.D.;  Location: SURGERY [facility];  Service: Gynecology    SALPINGECTOMY Bilateral 10/11/2018    Procedure: SALPINGECTOMY;  Surgeon: Marquis Reeder M.D.;  Location: SURGERY [facility];  Service: Gynecology    TONSILLECTOMY  04/11/2013    Performed by Rock Rothman M.D. at SURGERY SAME DAY [facility]    ARTHROSCOPY, KNEE      GYN SURGERY      miscarriage May 2006    OTHER ORTHOPEDIC SURGERY      knee surgeries       FAMILY HISTORY  Family History   Problem Relation Age of Onset    Psychiatric Illness Mother     Stroke Mother     Arthritis Mother     Heart Disease Maternal Grandfather     Cancer Neg Hx     Diabetes Neg Hx     Hypertension Neg Hx        SOCIAL HISTORY   reports that she has quit smoking. Her smoking use included cigarettes. She has a 2.5 pack-year smoking history. She has never used smokeless tobacco. She reports current alcohol use. She reports that she does not currently use drugs after having used the following drugs: Inhaled.    CURRENT MEDICATIONS  Discharge Medication List as of 8/24/2023  7:39 PM        CONTINUE these medications which have NOT CHANGED    Details   cephALEXin (KEFLEX) 500 MG Cap Take 500 mg by mouth 4 times a day., Historical Med      gabapentin (NEURONTIN) 600 MG tablet Take 600 mg by mouth in the morning, at noon, and at bedtime., Historical Med      mirtazapine (REMERON) 15 MG Tab Take 15 mg by mouth every evening., Historical Med      OLANZapine (ZYPREXA) 5 MG Tab Take 5 mg by mouth every day., Historical Med      sertraline (ZOLOFT) 100 MG Tab  "Take 100 mg by mouth every day., Historical Med      ARIPiprazole (ABILIFY) 15 MG Tab Take 15 mg by mouth every day., Historical Med      DOXEPIN HCL PO Take 1 Tablet by mouth every day., Historical Med      traZODone (DESYREL) 100 MG Tab Take 100 mg by mouth every evening. Indications: Trouble Sleeping, Historical Med      dicyclomine (BENTYL) 20 MG Tab Take 20 mg by mouth in the morning, at noon, and at bedtime., Historical Med      Pancrelipase, Lip-Prot-Amyl, (CREON) 99900-740036 units Cap DR Particles Take 1 Capful by mouth 3 times a day with meals., Historical Med             ALLERGIES  Allergies   Allergen Reactions    Promethazine Hcl Anxiety     Anxiety and makes her feels like skin coming off         PHYSICAL EXAM  BP (!) 157/98   Pulse 97   Temp 36.8 °C (98.2 °F) (Temporal)   Resp 15   Ht 1.702 m (5' 7\")   Wt 72.3 kg (159 lb 6.3 oz)   LMP 10/30/2018   SpO2 99%   BMI 24.96 kg/m²    Nursing note and vitals reviewed.  Constitutional: Well-developed and well-nourished. No distress.   HENT: Head is normocephalic and atraumatic. Oropharynx is clear and moist without exudate or erythema.   Eyes: Pupils are equal, round, and reactive to light. Conjunctiva are normal.   Cardiovascular: Normal rate and regular rhythm. No murmur heard. Normal radial pulses.  Pulmonary/Chest: Breath sounds normal. No wheezes or rales.   Abdominal: Soft, mild epigastric tenderness and non-distended. Normal active bowel sounds. No guarding or peritoneal signs. No palpable abdominal aortic aneurysm. No masses. No tenderness at McBurney's point.   Musculoskeletal: Extremities exhibit normal range of motion without edema or tenderness.   Neurological: Awake, alert and oriented to person, place, and time. No focal deficits noted.  Skin: Skin is warm and dry. No rash.  Psychiatric: Normal mood and affect. Appropriate for clinical situation    DIAGNOSTIC STUDIES    Labs:   Results for orders placed or performed during the hospital " encounter of 08/24/23   CBC with Differential   Result Value Ref Range    WBC 8.7 4.8 - 10.8 K/uL    RBC 4.52 4.20 - 5.40 M/uL    Hemoglobin 13.6 12.0 - 16.0 g/dL    Hematocrit 41.1 37.0 - 47.0 %    MCV 90.9 81.4 - 97.8 fL    MCH 30.1 27.0 - 33.0 pg    MCHC 33.1 32.2 - 35.5 g/dL    RDW 43.5 35.9 - 50.0 fL    Platelet Count 247 164 - 446 K/uL    MPV 9.5 9.0 - 12.9 fL    Neutrophils-Polys 62.00 44.00 - 72.00 %    Lymphocytes 29.00 22.00 - 41.00 %    Monocytes 6.10 0.00 - 13.40 %    Eosinophils 2.00 0.00 - 6.90 %    Basophils 0.70 0.00 - 1.80 %    Immature Granulocytes 0.20 0.00 - 0.90 %    Nucleated RBC 0.00 0.00 - 0.20 /100 WBC    Neutrophils (Absolute) 5.40 1.82 - 7.42 K/uL    Lymphs (Absolute) 2.52 1.00 - 4.80 K/uL    Monos (Absolute) 0.53 0.00 - 0.85 K/uL    Eos (Absolute) 0.17 0.00 - 0.51 K/uL    Baso (Absolute) 0.06 0.00 - 0.12 K/uL    Immature Granulocytes (abs) 0.02 0.00 - 0.11 K/uL    NRBC (Absolute) 0.00 K/uL   Complete Metabolic Panel   Result Value Ref Range    Sodium 139 135 - 145 mmol/L    Potassium 3.7 3.6 - 5.5 mmol/L    Chloride 103 96 - 112 mmol/L    Co2 23 20 - 33 mmol/L    Anion Gap 13.0 7.0 - 16.0    Glucose 86 65 - 99 mg/dL    Bun 7 (L) 8 - 22 mg/dL    Creatinine 0.78 0.50 - 1.40 mg/dL    Calcium 9.6 8.5 - 10.5 mg/dL    Correct Calcium 9.0 8.5 - 10.5 mg/dL    AST(SGOT) 21 12 - 45 U/L    ALT(SGPT) 16 2 - 50 U/L    Alkaline Phosphatase 83 30 - 99 U/L    Total Bilirubin 0.3 0.1 - 1.5 mg/dL    Albumin 4.7 3.2 - 4.9 g/dL    Total Protein 7.4 6.0 - 8.2 g/dL    Globulin 2.7 1.9 - 3.5 g/dL    A-G Ratio 1.7 g/dL   Lipase   Result Value Ref Range    Lipase 20 11 - 82 U/L   Urinalysis    Specimen: Urine   Result Value Ref Range    Color Yellow     Character Clear     Specific Gravity 1.003 <1.035    Ph 7.5 5.0 - 8.0    Glucose Negative Negative mg/dL    Ketones Negative Negative mg/dL    Protein Negative Negative mg/dL    Bilirubin Negative Negative    Urobilinogen, Urine 0.2 Negative    Nitrite Negative  Negative    Leukocyte Esterase Negative Negative    Occult Blood Negative Negative    Micro Urine Req see below    ESTIMATED GFR   Result Value Ref Range    GFR (CKD-EPI) 101 >60 mL/min/1.73 m 2     INITIAL ASSESSMENT AND PLAN    6:33 PM - Patient was evaluated at bedside for nausea and vomiting. Ordered for UA, Lipase, CMP and CBC with differential to evaluate. The patient will be medicated with GI cocktail 30 mL and Zofran 4 mg for her symptoms. Patient verbalizes understanding and support with my plan of care.  Differential diagnoses include but not limited to: gastritis, pancreatitis vs viral syndrome.    ED Observation Status? Yes; I am placing the patient in to an observation status due to a diagnostic uncertainty as well as therapeutic intensity. Patient placed in observation status at 6:33 PM, 8/24/2023.     Observation plan is as follows: Will monitor while awaiting diagnostic results.    Upon Reevaluation, the patient's condition has: Improved; and will be discharged.    Patient discharged from ED Observation status at 7:53 PM (Time) 8/24/2023 (Date).      COURSE AND MEDICAL DECISION MAKING    7:52 PM - The patient was seen at bedside. Her labs entirely normal. I see no indication for diagnostic imaging at this time. She will be discharged home with Zofran and Prilosec in stable condition. Patient verbalizes understanding and agreement to this plan of care.     DISPOSITION AND DISCUSSIONS    I have discussed management of the patient with the following physicians and BILL's:  None noted    Discussion of management with other [facility] [place] or appropriate source(s): None     Escalation of care considered, and ultimately not performed: acute inpatient care management, however at this time, the patient is most appropriate for outpatient management.    Barriers to care at this time, including but not limited to:  None noted .     Decision tools and prescription drugs considered including, but not limited to: Antibiotics I  considered prescribing antibiotics, however I have not identified a bacterial source of infection.     The patient will return for new or worsening symptoms and is stable at the time of discharge.    The patient is referred to a primary physician for blood pressure management, diabetic screening, and for all other preventative health concerns.    DISPOSITION:  Patient will be discharged home in stable condition.    FOLLOW UP:  MALOU Harrington  [address]  [address]  [facility] [id number] [phone]    Schedule an appointment as soon as possible for a visit       [facility], Emergency Dept  [address] [facility] [address] [phone]    If symptoms worsen      OUTPATIENT MEDICATIONS:  Discharge Medication List as of 8/24/2023  7:39 PM        START taking these medications    Details   ondansetron (ZOFRAN ODT) 4 MG TABLET DISPERSIBLE Take 1 Tablet by mouth every 8 hours as needed for Nausea/Vomiting., Disp-10 Tablet, R-0, Normal      omeprazole (PRILOSEC) 20 MG delayed-release capsule Take 1 Capsule by mouth every day., Disp-30 Capsule, R-0, Normal             FINAL DIAGNOSIS  1. Epigastric pain    2. History of pancreatitis    3. Vomiting and diarrhea         Darcy RASHEED (Agnieszkaibkaitlynn), am scribing for, and in the presence of, Cj Pineda M.D..    Electronically signed by: Darcy Vegas (Agnieszkaibe), 8/24/2023    Cj RASHEED M.D. personally performed the services described in this documentation, as scribed by Darcy Vegas in my presence, and it is both accurate and complete.      The note accurately reflects work and decisions made by me.  Cj Pineda M.D.  8/24/2023  9:32 PM

## 2023-08-25 NOTE — ED TRIAGE NOTES
Chief Complaint   Patient presents with    N/V     Pt reports n/v x 1 day    Diarrhea     X 4 days- chronic pancreatitis       Pt to triage with steady gait for above complaint. Presents with n/v/d x 3-4 days. Pt reports hx of pancreatits d/t etoh abuse- pt has been sober since may. Mid upper abd pain. Denies blood in emesis or stool    Pt back to lobby, educated on triage process and encourage to alert staff of any changes.     Vitals:    08/24/23 1711   BP: (!) 157/98   Pulse: 97   Resp: 15   Temp: 36.8 °C (98.2 °F)   SpO2: 99%

## 2023-08-25 NOTE — ED NOTES
Patient given water for PO challenge at 1915. RN rounded on patient . PAtient reports she has not drank water at this time due to ABD pain.

## 2023-09-25 ENCOUNTER — APPOINTMENT (OUTPATIENT)
Dept: RADIOLOGY | Facility: MEDICAL CENTER | Age: 38
End: 2023-09-25
Attending: EMERGENCY MEDICINE
Payer: COMMERCIAL

## 2023-09-25 ENCOUNTER — HOSPITAL ENCOUNTER (EMERGENCY)
Facility: MEDICAL CENTER | Age: 38
End: 2023-09-26
Attending: EMERGENCY MEDICINE
Payer: COMMERCIAL

## 2023-09-25 DIAGNOSIS — R11.2 NAUSEA AND VOMITING, UNSPECIFIED VOMITING TYPE: ICD-10-CM

## 2023-09-25 LAB
ALBUMIN SERPL BCP-MCNC: 4.9 G/DL (ref 3.2–4.9)
ALBUMIN/GLOB SERPL: 1.8 G/DL
ALP SERPL-CCNC: 74 U/L (ref 30–99)
ALT SERPL-CCNC: 15 U/L (ref 2–50)
ANION GAP SERPL CALC-SCNC: 17 MMOL/L (ref 7–16)
AST SERPL-CCNC: 17 U/L (ref 12–45)
BASOPHILS # BLD AUTO: 0.7 % (ref 0–1.8)
BASOPHILS # BLD: 0.05 K/UL (ref 0–0.12)
BILIRUB SERPL-MCNC: 0.5 MG/DL (ref 0.1–1.5)
BUN SERPL-MCNC: 6 MG/DL (ref 8–22)
CALCIUM ALBUM COR SERPL-MCNC: 8.1 MG/DL (ref 8.5–10.5)
CALCIUM SERPL-MCNC: 8.8 MG/DL (ref 8.5–10.5)
CHLORIDE SERPL-SCNC: 106 MMOL/L (ref 96–112)
CO2 SERPL-SCNC: 21 MMOL/L (ref 20–33)
CREAT SERPL-MCNC: 0.7 MG/DL (ref 0.5–1.4)
EOSINOPHIL # BLD AUTO: 0.05 K/UL (ref 0–0.51)
EOSINOPHIL NFR BLD: 0.7 % (ref 0–6.9)
ERYTHROCYTE [DISTWIDTH] IN BLOOD BY AUTOMATED COUNT: 40.2 FL (ref 35.9–50)
GFR SERPLBLD CREATININE-BSD FMLA CKD-EPI: 115 ML/MIN/1.73 M 2
GLOBULIN SER CALC-MCNC: 2.7 G/DL (ref 1.9–3.5)
GLUCOSE SERPL-MCNC: 100 MG/DL (ref 65–99)
HCG SERPL QL: NEGATIVE
HCT VFR BLD AUTO: 43.8 % (ref 37–47)
HGB BLD-MCNC: 15.1 G/DL (ref 12–16)
IMM GRANULOCYTES # BLD AUTO: 0.02 K/UL (ref 0–0.11)
IMM GRANULOCYTES NFR BLD AUTO: 0.3 % (ref 0–0.9)
LIPASE SERPL-CCNC: 20 U/L (ref 11–82)
LYMPHOCYTES # BLD AUTO: 3.01 K/UL (ref 1–4.8)
LYMPHOCYTES NFR BLD: 42.9 % (ref 22–41)
MCH RBC QN AUTO: 29.7 PG (ref 27–33)
MCHC RBC AUTO-ENTMCNC: 34.5 G/DL (ref 32.2–35.5)
MCV RBC AUTO: 86.2 FL (ref 81.4–97.8)
MONOCYTES # BLD AUTO: 0.25 K/UL (ref 0–0.85)
MONOCYTES NFR BLD AUTO: 3.6 % (ref 0–13.4)
NEUTROPHILS # BLD AUTO: 3.64 K/UL (ref 1.82–7.42)
NEUTROPHILS NFR BLD: 51.8 % (ref 44–72)
NRBC # BLD AUTO: 0 K/UL
NRBC BLD-RTO: 0 /100 WBC (ref 0–0.2)
PLATELET # BLD AUTO: 301 K/UL (ref 164–446)
PMV BLD AUTO: 9.4 FL (ref 9–12.9)
POTASSIUM SERPL-SCNC: 3.6 MMOL/L (ref 3.6–5.5)
PROT SERPL-MCNC: 7.6 G/DL (ref 6–8.2)
RBC # BLD AUTO: 5.08 M/UL (ref 4.2–5.4)
SODIUM SERPL-SCNC: 144 MMOL/L (ref 135–145)
WBC # BLD AUTO: 7 K/UL (ref 4.8–10.8)

## 2023-09-25 PROCEDURE — 96375 TX/PRO/DX INJ NEW DRUG ADDON: CPT

## 2023-09-25 PROCEDURE — 80053 COMPREHEN METABOLIC PANEL: CPT

## 2023-09-25 PROCEDURE — 36415 COLL VENOUS BLD VENIPUNCTURE: CPT

## 2023-09-25 PROCEDURE — 74177 CT ABD & PELVIS W/CONTRAST: CPT

## 2023-09-25 PROCEDURE — 84703 CHORIONIC GONADOTROPIN ASSAY: CPT

## 2023-09-25 PROCEDURE — 85025 COMPLETE CBC W/AUTO DIFF WBC: CPT

## 2023-09-25 PROCEDURE — 700117 HCHG RX CONTRAST REV CODE 255: Mod: UD | Performed by: EMERGENCY MEDICINE

## 2023-09-25 PROCEDURE — 83690 ASSAY OF LIPASE: CPT

## 2023-09-25 PROCEDURE — 96376 TX/PRO/DX INJ SAME DRUG ADON: CPT

## 2023-09-25 PROCEDURE — 99284 EMERGENCY DEPT VISIT MOD MDM: CPT

## 2023-09-25 PROCEDURE — 700111 HCHG RX REV CODE 636 W/ 250 OVERRIDE (IP): Mod: JZ,UD | Performed by: EMERGENCY MEDICINE

## 2023-09-25 PROCEDURE — 700105 HCHG RX REV CODE 258: Mod: UD | Performed by: EMERGENCY MEDICINE

## 2023-09-25 PROCEDURE — 96374 THER/PROPH/DIAG INJ IV PUSH: CPT | Mod: XU

## 2023-09-25 RX ORDER — HYDROMORPHONE HYDROCHLORIDE 1 MG/ML
1 INJECTION, SOLUTION INTRAMUSCULAR; INTRAVENOUS; SUBCUTANEOUS ONCE
Status: COMPLETED | OUTPATIENT
Start: 2023-09-25 | End: 2023-09-25

## 2023-09-25 RX ORDER — MORPHINE SULFATE 4 MG/ML
4 INJECTION INTRAVENOUS ONCE
Status: COMPLETED | OUTPATIENT
Start: 2023-09-25 | End: 2023-09-25

## 2023-09-25 RX ORDER — SODIUM CHLORIDE 9 MG/ML
1000 INJECTION, SOLUTION INTRAVENOUS ONCE
Status: COMPLETED | OUTPATIENT
Start: 2023-09-25 | End: 2023-09-25

## 2023-09-25 RX ORDER — ONDANSETRON 2 MG/ML
4 INJECTION INTRAMUSCULAR; INTRAVENOUS ONCE
Status: COMPLETED | OUTPATIENT
Start: 2023-09-25 | End: 2023-09-25

## 2023-09-25 RX ORDER — METOCLOPRAMIDE HYDROCHLORIDE 5 MG/ML
10 INJECTION INTRAMUSCULAR; INTRAVENOUS ONCE
Status: COMPLETED | OUTPATIENT
Start: 2023-09-25 | End: 2023-09-25

## 2023-09-25 RX ADMIN — ONDANSETRON 4 MG: 2 INJECTION INTRAMUSCULAR; INTRAVENOUS at 20:59

## 2023-09-25 RX ADMIN — SODIUM CHLORIDE 1000 ML: 9 INJECTION, SOLUTION INTRAVENOUS at 21:02

## 2023-09-25 RX ADMIN — MORPHINE SULFATE 4 MG: 4 INJECTION, SOLUTION INTRAMUSCULAR; INTRAVENOUS at 21:00

## 2023-09-25 RX ADMIN — METOCLOPRAMIDE 10 MG: 5 INJECTION, SOLUTION INTRAMUSCULAR; INTRAVENOUS at 21:57

## 2023-09-25 RX ADMIN — MORPHINE SULFATE 4 MG: 4 INJECTION, SOLUTION INTRAMUSCULAR; INTRAVENOUS at 22:32

## 2023-09-25 RX ADMIN — HYDROMORPHONE HYDROCHLORIDE 1 MG: 1 INJECTION, SOLUTION INTRAMUSCULAR; INTRAVENOUS; SUBCUTANEOUS at 23:33

## 2023-09-25 RX ADMIN — IOHEXOL 100 ML: 350 INJECTION, SOLUTION INTRAVENOUS at 21:22

## 2023-09-25 ASSESSMENT — PAIN DESCRIPTION - PAIN TYPE: TYPE: ACUTE PAIN

## 2023-09-25 ASSESSMENT — FIBROSIS 4 INDEX: FIB4 SCORE: 0.77

## 2023-09-26 ENCOUNTER — APPOINTMENT (OUTPATIENT)
Dept: RADIOLOGY | Facility: MEDICAL CENTER | Age: 38
End: 2023-09-26
Attending: EMERGENCY MEDICINE
Payer: COMMERCIAL

## 2023-09-26 VITALS
WEIGHT: 147.93 LBS | HEIGHT: 67 IN | TEMPERATURE: 97.3 F | HEART RATE: 84 BPM | RESPIRATION RATE: 17 BRPM | SYSTOLIC BLOOD PRESSURE: 113 MMHG | BODY MASS INDEX: 23.22 KG/M2 | DIASTOLIC BLOOD PRESSURE: 69 MMHG | OXYGEN SATURATION: 98 %

## 2023-09-26 PROCEDURE — 76705 ECHO EXAM OF ABDOMEN: CPT

## 2023-09-26 PROCEDURE — 700111 HCHG RX REV CODE 636 W/ 250 OVERRIDE (IP): Mod: UD | Performed by: STUDENT IN AN ORGANIZED HEALTH CARE EDUCATION/TRAINING PROGRAM

## 2023-09-26 PROCEDURE — 96376 TX/PRO/DX INJ SAME DRUG ADON: CPT

## 2023-09-26 RX ORDER — HYDROMORPHONE HYDROCHLORIDE 1 MG/ML
1 INJECTION, SOLUTION INTRAMUSCULAR; INTRAVENOUS; SUBCUTANEOUS ONCE
Status: COMPLETED | OUTPATIENT
Start: 2023-09-26 | End: 2023-09-26

## 2023-09-26 RX ORDER — ONDANSETRON 2 MG/ML
4 INJECTION INTRAMUSCULAR; INTRAVENOUS ONCE
Status: COMPLETED | OUTPATIENT
Start: 2023-09-26 | End: 2023-09-26

## 2023-09-26 RX ADMIN — ONDANSETRON 4 MG: 2 INJECTION INTRAMUSCULAR; INTRAVENOUS at 02:05

## 2023-09-26 RX ADMIN — HYDROMORPHONE HYDROCHLORIDE 1 MG: 1 INJECTION, SOLUTION INTRAMUSCULAR; INTRAVENOUS; SUBCUTANEOUS at 02:04

## 2023-09-26 NOTE — ED NOTES
Report receive from LESLIE Macdonald. Pt AAOx4, GCS 15, breathing is even and unlabored on room air. Patient walked to the rest room with steady gait.

## 2023-09-26 NOTE — ED NOTES
Pt discharged home. GCS 15. IV discontinued and gauze placed, pt in possession of belongings. Pt provided discharge education and information pertaining to follow up appointments. Pt received copy of discharge instructions and verbalized understanding.     Vitals:    09/26/23 0148   BP: 113/69   Pulse: 84   Resp: 17   Temp: 36.3 °C (97.3 °F)   SpO2: 98%

## 2023-09-26 NOTE — DISCHARGE SUMMARY
"  ED Observation Discharge Summary    Patient:Rhiannon Marrero  Patient : 1985  Patient MRN: [id number]  Patient PCP: MALOU Harrington    Admit Date: 2023  Discharge Date and Time: 23 2:06 AM  Discharge Diagnosis: Nausea and vomiting, chronic abdominal pain  Discharge Attending: Ariel Nolan D.O.  Discharge Service: ED Observation    ED Course  Rhiannon is a 38 y.o. female who was evaluated at [facility] for abdominal pain, nausea, vomiting.  Patient has an extensive past medical history of chronic pancreatitis status post Whipple surgery.  In the emergency department, labs were unremarkable.  CT abdomen pelvis shows a possible pelvic cyst.  Ultrasound was obtained which reveals no acute pathology.  On reevaluation, patient says that her pain is very consistent with her prior diagnoses of chronic pancreatitis.  Her abdominal exam is benign.  She has been given multiple doses of pain medication and antiemetics in the emergency department.    Discharge Exam:  /66   Pulse 86   Temp 36 °C (96.8 °F) (Temporal)   Resp 16   Ht 1.702 m (5' 7\")   Wt 67.1 kg (147 lb 14.9 oz)   LMP 10/30/2018   SpO2 96%   BMI 23.17 kg/m² .    Constitutional: Awake and alert. Nontoxic  HENT:  Grossly normal  Eyes: Grossly normal  Neck: Normal range of motion  Cardiovascular: Normal heart rate   Thorax & Lungs: No respiratory distress  Abdomen: Nontender, no rigidity or guarding  Skin:  No pathologic rash.   Extremities: Well perfused  Psychiatric: Affect normal    Labs  Results for orders placed or performed during the hospital encounter of 23   CBC with Differential   Result Value Ref Range    WBC 7.0 4.8 - 10.8 K/uL    RBC 5.08 4.20 - 5.40 M/uL    Hemoglobin 15.1 12.0 - 16.0 g/dL    Hematocrit 43.8 37.0 - 47.0 %    MCV 86.2 81.4 - 97.8 fL    MCH 29.7 27.0 - 33.0 pg    MCHC 34.5 32.2 - 35.5 g/dL    RDW 40.2 35.9 - 50.0 fL    Platelet Count 301 164 - 446 K/uL    MPV 9.4 9.0 - 12.9 fL    Neutrophils-Polys " 51.80 44.00 - 72.00 %    Lymphocytes 42.90 (H) 22.00 - 41.00 %    Monocytes 3.60 0.00 - 13.40 %    Eosinophils 0.70 0.00 - 6.90 %    Basophils 0.70 0.00 - 1.80 %    Immature Granulocytes 0.30 0.00 - 0.90 %    Nucleated RBC 0.00 0.00 - 0.20 /100 WBC    Neutrophils (Absolute) 3.64 1.82 - 7.42 K/uL    Lymphs (Absolute) 3.01 1.00 - 4.80 K/uL    Monos (Absolute) 0.25 0.00 - 0.85 K/uL    Eos (Absolute) 0.05 0.00 - 0.51 K/uL    Baso (Absolute) 0.05 0.00 - 0.12 K/uL    Immature Granulocytes (abs) 0.02 0.00 - 0.11 K/uL    NRBC (Absolute) 0.00 K/uL   Complete Metabolic Panel   Result Value Ref Range    Sodium 144 135 - 145 mmol/L    Potassium 3.6 3.6 - 5.5 mmol/L    Chloride 106 96 - 112 mmol/L    Co2 21 20 - 33 mmol/L    Anion Gap 17.0 (H) 7.0 - 16.0    Glucose 100 (H) 65 - 99 mg/dL    Bun 6 (L) 8 - 22 mg/dL    Creatinine 0.70 0.50 - 1.40 mg/dL    Calcium 8.8 8.5 - 10.5 mg/dL    Correct Calcium 8.1 (L) 8.5 - 10.5 mg/dL    AST(SGOT) 17 12 - 45 U/L    ALT(SGPT) 15 2 - 50 U/L    Alkaline Phosphatase 74 30 - 99 U/L    Total Bilirubin 0.5 0.1 - 1.5 mg/dL    Albumin 4.9 3.2 - 4.9 g/dL    Total Protein 7.6 6.0 - 8.2 g/dL    Globulin 2.7 1.9 - 3.5 g/dL    A-G Ratio 1.8 g/dL   Lipase   Result Value Ref Range    Lipase 20 11 - 82 U/L   BETA-HCG QUALITATIVE SERUM   Result Value Ref Range    Beta-Hcg Qualitative Serum Negative Negative   ESTIMATED GFR   Result Value Ref Range    GFR (CKD-EPI) 115 >60 mL/min/1.73 m 2       Radiology  US-RUQ   Final Result         1.  Hepatomegaly   2.  Echogenic liver compatible with fatty change versus fibrosis.   3.  Mild common bile duct dilatation, commonly associated with postcholecystectomy physiology, consider causes of biliary obstruction with additional workup as clinically appropriate.      CT-ABDOMEN-PELVIS WITH   Final Result         1.  Large left ovarian cyst, recommend follow-up pelvic sonogram for further characterization.   2.  Mild intrahepatic and extrahepatic biliary ductal  dilatation, commonly associated with postcholecystectomy physiology, consider causes of biliary obstruction with additional follow-up as clinically appropriate.   3.  Small quantity of air within the pancreatic duct, likely related to history of Whipple procedure.          Medications:   New Prescriptions    No medications on file       My final assessment includes chronic abdominal pain  Upon Reevaluation, the patient's condition has: Improved; and will be discharged.    Patient discharged from ED Observation status at 0216 (Time) 9/26/2023   (Date).     Total time spent on this ED Observation discharge encounter is < 30 Minutes    Electronically signed by: Ariel Nolan D.O., 9/26/2023 2:06 AM

## 2023-09-26 NOTE — ED TRIAGE NOTES
Ambulatory to triage w/ c/o mid abd pain, N/V x 2 days, diarrhea x 3 weeks.  Hx of chronic pancreatitis.

## 2023-09-26 NOTE — ED NOTES
Pt ambulated to room with steady gait, agree with triage note. Blood collected in triage. Chart up for ERP.
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
## 2023-09-26 NOTE — ED NOTES
Attempted to obtain UA from pt, pt stating that she is unable to provide urine sample at the time d/t pain.   ERP notified. New orders placed.   Pt medicated per MAR, education provided, pt verbalized understanding.

## 2023-10-01 ENCOUNTER — APPOINTMENT (OUTPATIENT)
Dept: RADIOLOGY | Facility: MEDICAL CENTER | Age: 38
End: 2023-10-01
Attending: EMERGENCY MEDICINE
Payer: COMMERCIAL

## 2023-10-01 ENCOUNTER — HOSPITAL ENCOUNTER (EMERGENCY)
Facility: MEDICAL CENTER | Age: 38
End: 2023-10-01
Attending: EMERGENCY MEDICINE
Payer: COMMERCIAL

## 2023-10-01 VITALS
BODY MASS INDEX: 23.07 KG/M2 | HEIGHT: 67 IN | TEMPERATURE: 98.1 F | HEART RATE: 90 BPM | WEIGHT: 147 LBS | RESPIRATION RATE: 16 BRPM | DIASTOLIC BLOOD PRESSURE: 89 MMHG | SYSTOLIC BLOOD PRESSURE: 111 MMHG | OXYGEN SATURATION: 95 %

## 2023-10-01 DIAGNOSIS — F10.920 ALCOHOLIC INTOXICATION WITHOUT COMPLICATION (HCC): ICD-10-CM

## 2023-10-01 DIAGNOSIS — R10.84 GENERALIZED ABDOMINAL PAIN: ICD-10-CM

## 2023-10-01 DIAGNOSIS — T74.21XA SEXUAL ASSAULT OF ADULT, INITIAL ENCOUNTER: ICD-10-CM

## 2023-10-01 DIAGNOSIS — R40.4 ALTERED LEVEL OF CONSCIOUSNESS: ICD-10-CM

## 2023-10-01 LAB
ALBUMIN SERPL BCP-MCNC: 5 G/DL (ref 3.2–4.9)
ALBUMIN/GLOB SERPL: 1.5 G/DL
ALP SERPL-CCNC: 112 U/L (ref 30–99)
ALT SERPL-CCNC: 73 U/L (ref 2–50)
AMMONIA PLAS-SCNC: 37 UMOL/L (ref 11–45)
AMPHET UR QL SCN: NEGATIVE
ANION GAP SERPL CALC-SCNC: 16 MMOL/L (ref 7–16)
APAP SERPL-MCNC: 13 UG/ML (ref 10–30)
APPEARANCE UR: CLEAR
AST SERPL-CCNC: 38 U/L (ref 12–45)
BARBITURATES UR QL SCN: NEGATIVE
BASOPHILS # BLD AUTO: 0.5 % (ref 0–1.8)
BASOPHILS # BLD: 0.04 K/UL (ref 0–0.12)
BENZODIAZ UR QL SCN: NEGATIVE
BILIRUB SERPL-MCNC: 0.7 MG/DL (ref 0.1–1.5)
BILIRUB UR QL STRIP.AUTO: NEGATIVE
BUN SERPL-MCNC: 13 MG/DL (ref 8–22)
BZE UR QL SCN: NEGATIVE
CALCIUM ALBUM COR SERPL-MCNC: 8 MG/DL (ref 8.5–10.5)
CALCIUM SERPL-MCNC: 8.8 MG/DL (ref 8.5–10.5)
CANNABINOIDS UR QL SCN: NEGATIVE
CHLORIDE SERPL-SCNC: 105 MMOL/L (ref 96–112)
CO2 SERPL-SCNC: 22 MMOL/L (ref 20–33)
COLOR UR: YELLOW
CREAT SERPL-MCNC: 0.81 MG/DL (ref 0.5–1.4)
EKG IMPRESSION: NORMAL
EOSINOPHIL # BLD AUTO: 0.06 K/UL (ref 0–0.51)
EOSINOPHIL NFR BLD: 0.7 % (ref 0–6.9)
ERYTHROCYTE [DISTWIDTH] IN BLOOD BY AUTOMATED COUNT: 44.9 FL (ref 35.9–50)
ETHANOL BLD-MCNC: 389.2 MG/DL
FENTANYL UR QL: NEGATIVE
GFR SERPLBLD CREATININE-BSD FMLA CKD-EPI: 96 ML/MIN/1.73 M 2
GLOBULIN SER CALC-MCNC: 3.3 G/DL (ref 1.9–3.5)
GLUCOSE SERPL-MCNC: 94 MG/DL (ref 65–99)
GLUCOSE UR STRIP.AUTO-MCNC: NEGATIVE MG/DL
HCG SERPL QL: NEGATIVE
HCT VFR BLD AUTO: 45.9 % (ref 37–47)
HGB BLD-MCNC: 15.5 G/DL (ref 12–16)
IMM GRANULOCYTES # BLD AUTO: 0.02 K/UL (ref 0–0.11)
IMM GRANULOCYTES NFR BLD AUTO: 0.2 % (ref 0–0.9)
KETONES UR STRIP.AUTO-MCNC: NEGATIVE MG/DL
LEUKOCYTE ESTERASE UR QL STRIP.AUTO: NEGATIVE
LIPASE SERPL-CCNC: 23 U/L (ref 11–82)
LYMPHOCYTES # BLD AUTO: 3.28 K/UL (ref 1–4.8)
LYMPHOCYTES NFR BLD: 38.5 % (ref 22–41)
MCH RBC QN AUTO: 30.8 PG (ref 27–33)
MCHC RBC AUTO-ENTMCNC: 33.8 G/DL (ref 32.2–35.5)
MCV RBC AUTO: 91.1 FL (ref 81.4–97.8)
METHADONE UR QL SCN: NEGATIVE
MICRO URNS: NORMAL
MONOCYTES # BLD AUTO: 0.39 K/UL (ref 0–0.85)
MONOCYTES NFR BLD AUTO: 4.6 % (ref 0–13.4)
NEUTROPHILS # BLD AUTO: 4.72 K/UL (ref 1.82–7.42)
NEUTROPHILS NFR BLD: 55.5 % (ref 44–72)
NITRITE UR QL STRIP.AUTO: NEGATIVE
NRBC # BLD AUTO: 0 K/UL
NRBC BLD-RTO: 0 /100 WBC (ref 0–0.2)
OPIATES UR QL SCN: NEGATIVE
OXYCODONE UR QL SCN: NEGATIVE
PCP UR QL SCN: NEGATIVE
PH UR STRIP.AUTO: 7 [PH] (ref 5–8)
PLATELET # BLD AUTO: 268 K/UL (ref 164–446)
PMV BLD AUTO: 9.4 FL (ref 9–12.9)
POTASSIUM SERPL-SCNC: 3.9 MMOL/L (ref 3.6–5.5)
PROPOXYPH UR QL SCN: NEGATIVE
PROT SERPL-MCNC: 8.3 G/DL (ref 6–8.2)
PROT UR QL STRIP: NEGATIVE MG/DL
RBC # BLD AUTO: 5.04 M/UL (ref 4.2–5.4)
RBC UR QL AUTO: NEGATIVE
SALICYLATES SERPL-MCNC: <1 MG/DL (ref 15–25)
SODIUM SERPL-SCNC: 143 MMOL/L (ref 135–145)
SP GR UR STRIP.AUTO: 1.01
TROPONIN T SERPL-MCNC: <6 NG/L (ref 6–19)
UROBILINOGEN UR STRIP.AUTO-MCNC: 0.2 MG/DL
WBC # BLD AUTO: 8.5 K/UL (ref 4.8–10.8)

## 2023-10-01 PROCEDURE — A9270 NON-COVERED ITEM OR SERVICE: HCPCS | Mod: UD | Performed by: STUDENT IN AN ORGANIZED HEALTH CARE EDUCATION/TRAINING PROGRAM

## 2023-10-01 PROCEDURE — 93005 ELECTROCARDIOGRAM TRACING: CPT | Performed by: EMERGENCY MEDICINE

## 2023-10-01 PROCEDURE — 96374 THER/PROPH/DIAG INJ IV PUSH: CPT | Mod: XU

## 2023-10-01 PROCEDURE — 84703 CHORIONIC GONADOTROPIN ASSAY: CPT

## 2023-10-01 PROCEDURE — 74177 CT ABD & PELVIS W/CONTRAST: CPT

## 2023-10-01 PROCEDURE — 99285 EMERGENCY DEPT VISIT HI MDM: CPT

## 2023-10-01 PROCEDURE — 83690 ASSAY OF LIPASE: CPT

## 2023-10-01 PROCEDURE — 85025 COMPLETE CBC W/AUTO DIFF WBC: CPT

## 2023-10-01 PROCEDURE — 80143 DRUG ASSAY ACETAMINOPHEN: CPT

## 2023-10-01 PROCEDURE — 700102 HCHG RX REV CODE 250 W/ 637 OVERRIDE(OP): Mod: UD | Performed by: STUDENT IN AN ORGANIZED HEALTH CARE EDUCATION/TRAINING PROGRAM

## 2023-10-01 PROCEDURE — 81003 URINALYSIS AUTO W/O SCOPE: CPT | Mod: XU

## 2023-10-01 PROCEDURE — 700111 HCHG RX REV CODE 636 W/ 250 OVERRIDE (IP): Mod: JZ,UD | Performed by: EMERGENCY MEDICINE

## 2023-10-01 PROCEDURE — 36415 COLL VENOUS BLD VENIPUNCTURE: CPT

## 2023-10-01 PROCEDURE — 82140 ASSAY OF AMMONIA: CPT

## 2023-10-01 PROCEDURE — 82077 ASSAY SPEC XCP UR&BREATH IA: CPT

## 2023-10-01 PROCEDURE — 80307 DRUG TEST PRSMV CHEM ANLYZR: CPT

## 2023-10-01 PROCEDURE — 96372 THER/PROPH/DIAG INJ SC/IM: CPT | Mod: XU

## 2023-10-01 PROCEDURE — 700111 HCHG RX REV CODE 636 W/ 250 OVERRIDE (IP): Mod: JZ,UD | Performed by: STUDENT IN AN ORGANIZED HEALTH CARE EDUCATION/TRAINING PROGRAM

## 2023-10-01 PROCEDURE — 80053 COMPREHEN METABOLIC PANEL: CPT

## 2023-10-01 PROCEDURE — 70450 CT HEAD/BRAIN W/O DYE: CPT

## 2023-10-01 PROCEDURE — 700117 HCHG RX CONTRAST REV CODE 255: Mod: UD | Performed by: EMERGENCY MEDICINE

## 2023-10-01 PROCEDURE — 84484 ASSAY OF TROPONIN QUANT: CPT

## 2023-10-01 PROCEDURE — 80179 DRUG ASSAY SALICYLATE: CPT

## 2023-10-01 RX ORDER — MORPHINE SULFATE 4 MG/ML
4 INJECTION INTRAVENOUS ONCE
Status: COMPLETED | OUTPATIENT
Start: 2023-10-01 | End: 2023-10-01

## 2023-10-01 RX ORDER — CEFTRIAXONE 1 G/1
1000 INJECTION, POWDER, FOR SOLUTION INTRAMUSCULAR; INTRAVENOUS ONCE
Status: COMPLETED | OUTPATIENT
Start: 2023-10-01 | End: 2023-10-01

## 2023-10-01 RX ORDER — METRONIDAZOLE 500 MG/1
1000 TABLET ORAL ONCE
Status: DISCONTINUED | OUTPATIENT
Start: 2023-10-01 | End: 2023-10-01

## 2023-10-01 RX ORDER — ONDANSETRON 2 MG/ML
4 INJECTION INTRAMUSCULAR; INTRAVENOUS ONCE
Status: DISCONTINUED | OUTPATIENT
Start: 2023-10-01 | End: 2023-10-02 | Stop reason: HOSPADM

## 2023-10-01 RX ORDER — ONDANSETRON 4 MG/1
4 TABLET, ORALLY DISINTEGRATING ORAL ONCE
Status: COMPLETED | OUTPATIENT
Start: 2023-10-01 | End: 2023-10-01

## 2023-10-01 RX ORDER — AZITHROMYCIN 250 MG/1
1000 TABLET, FILM COATED ORAL ONCE
Status: COMPLETED | OUTPATIENT
Start: 2023-10-01 | End: 2023-10-01

## 2023-10-01 RX ADMIN — IOHEXOL 100 ML: 350 INJECTION, SOLUTION INTRAVENOUS at 16:15

## 2023-10-01 RX ADMIN — ONDANSETRON 4 MG: 4 TABLET, ORALLY DISINTEGRATING ORAL at 22:22

## 2023-10-01 RX ADMIN — AZITHROMYCIN DIHYDRATE 1000 MG: 250 TABLET, FILM COATED ORAL at 19:11

## 2023-10-01 RX ADMIN — CEFTRIAXONE SODIUM 1000 MG: 1 INJECTION, POWDER, FOR SOLUTION INTRAMUSCULAR; INTRAVENOUS at 19:09

## 2023-10-01 RX ADMIN — MORPHINE SULFATE 4 MG: 4 INJECTION, SOLUTION INTRAMUSCULAR; INTRAVENOUS at 22:01

## 2023-10-01 RX ADMIN — ONDANSETRON 4 MG: 4 TABLET, ORALLY DISINTEGRATING ORAL at 19:11

## 2023-10-01 ASSESSMENT — FIBROSIS 4 INDEX: FIB4 SCORE: 0.52
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
## 2023-10-02 ENCOUNTER — APPOINTMENT (OUTPATIENT)
Dept: RADIOLOGY | Facility: MEDICAL CENTER | Age: 38
End: 2023-10-02
Attending: EMERGENCY MEDICINE
Payer: COMMERCIAL

## 2023-10-02 ENCOUNTER — HOSPITAL ENCOUNTER (EMERGENCY)
Facility: MEDICAL CENTER | Age: 38
End: 2023-10-02
Attending: EMERGENCY MEDICINE
Payer: COMMERCIAL

## 2023-10-02 ENCOUNTER — HOSPITAL ENCOUNTER (EMERGENCY)
Facility: MEDICAL CENTER | Age: 38
End: 2023-10-03
Attending: EMERGENCY MEDICINE
Payer: COMMERCIAL

## 2023-10-02 VITALS
HEART RATE: 99 BPM | DIASTOLIC BLOOD PRESSURE: 60 MMHG | SYSTOLIC BLOOD PRESSURE: 115 MMHG | TEMPERATURE: 98.1 F | HEIGHT: 67 IN | BODY MASS INDEX: 23.07 KG/M2 | WEIGHT: 147 LBS | OXYGEN SATURATION: 95 % | RESPIRATION RATE: 18 BRPM

## 2023-10-02 VITALS
RESPIRATION RATE: 18 BRPM | OXYGEN SATURATION: 95 % | HEIGHT: 67 IN | SYSTOLIC BLOOD PRESSURE: 121 MMHG | HEART RATE: 108 BPM | WEIGHT: 147 LBS | DIASTOLIC BLOOD PRESSURE: 75 MMHG | BODY MASS INDEX: 23.07 KG/M2 | TEMPERATURE: 96.8 F

## 2023-10-02 DIAGNOSIS — R41.82 ALTERED MENTAL STATUS, UNSPECIFIED ALTERED MENTAL STATUS TYPE: ICD-10-CM

## 2023-10-02 DIAGNOSIS — F10.920 ALCOHOLIC INTOXICATION WITHOUT COMPLICATION (HCC): ICD-10-CM

## 2023-10-02 DIAGNOSIS — F10.10 ALCOHOL ABUSE: ICD-10-CM

## 2023-10-02 DIAGNOSIS — F10.220 ALCOHOL DEPENDENCE WITH UNCOMPLICATED INTOXICATION (HCC): Primary | ICD-10-CM

## 2023-10-02 DIAGNOSIS — R40.2420 GLASGOW COMA SCALE TOTAL SCORE 9-12, UNSPECIFIED COMA TIMING: ICD-10-CM

## 2023-10-02 LAB
ALBUMIN SERPL BCP-MCNC: 5 G/DL (ref 3.2–4.9)
ALBUMIN SERPL BCP-MCNC: 5.2 G/DL (ref 3.2–4.9)
ALBUMIN/GLOB SERPL: 1.4 G/DL
ALBUMIN/GLOB SERPL: 1.5 G/DL
ALP SERPL-CCNC: 114 U/L (ref 30–99)
ALP SERPL-CCNC: 115 U/L (ref 30–99)
ALT SERPL-CCNC: 61 U/L (ref 2–50)
ALT SERPL-CCNC: 64 U/L (ref 2–50)
AMPHET UR QL SCN: NEGATIVE
ANION GAP SERPL CALC-SCNC: 18 MMOL/L (ref 7–16)
ANION GAP SERPL CALC-SCNC: 20 MMOL/L (ref 7–16)
AST SERPL-CCNC: 46 U/L (ref 12–45)
AST SERPL-CCNC: 48 U/L (ref 12–45)
BARBITURATES UR QL SCN: NEGATIVE
BASOPHILS # BLD AUTO: 0.4 % (ref 0–1.8)
BASOPHILS # BLD AUTO: 0.5 % (ref 0–1.8)
BASOPHILS # BLD: 0.04 K/UL (ref 0–0.12)
BASOPHILS # BLD: 0.05 K/UL (ref 0–0.12)
BENZODIAZ UR QL SCN: NEGATIVE
BILIRUB SERPL-MCNC: 0.5 MG/DL (ref 0.1–1.5)
BILIRUB SERPL-MCNC: 0.6 MG/DL (ref 0.1–1.5)
BUN SERPL-MCNC: 12 MG/DL (ref 8–22)
BUN SERPL-MCNC: 14 MG/DL (ref 8–22)
BZE UR QL SCN: NEGATIVE
CALCIUM ALBUM COR SERPL-MCNC: 7.8 MG/DL (ref 8.5–10.5)
CALCIUM ALBUM COR SERPL-MCNC: 8.3 MG/DL (ref 8.5–10.5)
CALCIUM SERPL-MCNC: 8.8 MG/DL (ref 8.5–10.5)
CALCIUM SERPL-MCNC: 9.1 MG/DL (ref 8.5–10.5)
CANNABINOIDS UR QL SCN: NEGATIVE
CHLORIDE SERPL-SCNC: 100 MMOL/L (ref 96–112)
CHLORIDE SERPL-SCNC: 97 MMOL/L (ref 96–112)
CO2 SERPL-SCNC: 21 MMOL/L (ref 20–33)
CO2 SERPL-SCNC: 21 MMOL/L (ref 20–33)
CREAT SERPL-MCNC: 0.83 MG/DL (ref 0.5–1.4)
CREAT SERPL-MCNC: 0.94 MG/DL (ref 0.5–1.4)
EOSINOPHIL # BLD AUTO: 0.04 K/UL (ref 0–0.51)
EOSINOPHIL # BLD AUTO: 0.05 K/UL (ref 0–0.51)
EOSINOPHIL NFR BLD: 0.4 % (ref 0–6.9)
EOSINOPHIL NFR BLD: 0.5 % (ref 0–6.9)
ERYTHROCYTE [DISTWIDTH] IN BLOOD BY AUTOMATED COUNT: 41.9 FL (ref 35.9–50)
ERYTHROCYTE [DISTWIDTH] IN BLOOD BY AUTOMATED COUNT: 43 FL (ref 35.9–50)
ETHANOL BLD-MCNC: 332.2 MG/DL
ETHANOL BLD-MCNC: 334.5 MG/DL
ETHANOL BLD-MCNC: 359 MG/DL
FENTANYL UR QL: NEGATIVE
GFR SERPLBLD CREATININE-BSD FMLA CKD-EPI: 80 ML/MIN/1.73 M 2
GFR SERPLBLD CREATININE-BSD FMLA CKD-EPI: 93 ML/MIN/1.73 M 2
GLOBULIN SER CALC-MCNC: 3.4 G/DL (ref 1.9–3.5)
GLOBULIN SER CALC-MCNC: 3.5 G/DL (ref 1.9–3.5)
GLUCOSE BLD STRIP.AUTO-MCNC: 114 MG/DL (ref 65–99)
GLUCOSE BLD STRIP.AUTO-MCNC: 123 MG/DL (ref 65–99)
GLUCOSE SERPL-MCNC: 109 MG/DL (ref 65–99)
GLUCOSE SERPL-MCNC: 125 MG/DL (ref 65–99)
HCT VFR BLD AUTO: 46.9 % (ref 37–47)
HCT VFR BLD AUTO: 48.3 % (ref 37–47)
HGB BLD-MCNC: 16.9 G/DL (ref 12–16)
HGB BLD-MCNC: 17 G/DL (ref 12–16)
IMM GRANULOCYTES # BLD AUTO: 0.03 K/UL (ref 0–0.11)
IMM GRANULOCYTES # BLD AUTO: 0.03 K/UL (ref 0–0.11)
IMM GRANULOCYTES NFR BLD AUTO: 0.3 % (ref 0–0.9)
IMM GRANULOCYTES NFR BLD AUTO: 0.4 % (ref 0–0.9)
LIPASE SERPL-CCNC: 15 U/L (ref 11–82)
LIPASE SERPL-CCNC: 22 U/L (ref 11–82)
LYMPHOCYTES # BLD AUTO: 2.31 K/UL (ref 1–4.8)
LYMPHOCYTES # BLD AUTO: 3.47 K/UL (ref 1–4.8)
LYMPHOCYTES NFR BLD: 30 % (ref 22–41)
LYMPHOCYTES NFR BLD: 30.4 % (ref 22–41)
MCH RBC QN AUTO: 30.5 PG (ref 27–33)
MCH RBC QN AUTO: 31.5 PG (ref 27–33)
MCHC RBC AUTO-ENTMCNC: 35.2 G/DL (ref 32.2–35.5)
MCHC RBC AUTO-ENTMCNC: 36 G/DL (ref 32.2–35.5)
MCV RBC AUTO: 86.6 FL (ref 81.4–97.8)
MCV RBC AUTO: 87.5 FL (ref 81.4–97.8)
METHADONE UR QL SCN: NEGATIVE
MONOCYTES # BLD AUTO: 0.39 K/UL (ref 0–0.85)
MONOCYTES # BLD AUTO: 0.54 K/UL (ref 0–0.85)
MONOCYTES NFR BLD AUTO: 4.7 % (ref 0–13.4)
MONOCYTES NFR BLD AUTO: 5.1 % (ref 0–13.4)
NEUTROPHILS # BLD AUTO: 4.78 K/UL (ref 1.82–7.42)
NEUTROPHILS # BLD AUTO: 7.41 K/UL (ref 1.82–7.42)
NEUTROPHILS NFR BLD: 63.1 % (ref 44–72)
NEUTROPHILS NFR BLD: 64.2 % (ref 44–72)
NRBC # BLD AUTO: 0 K/UL
NRBC # BLD AUTO: 0 K/UL
NRBC BLD-RTO: 0 /100 WBC (ref 0–0.2)
NRBC BLD-RTO: 0 /100 WBC (ref 0–0.2)
OPIATES UR QL SCN: NEGATIVE
OXYCODONE UR QL SCN: NEGATIVE
PCP UR QL SCN: NEGATIVE
PLATELET # BLD AUTO: 277 K/UL (ref 164–446)
PLATELET # BLD AUTO: 351 K/UL (ref 164–446)
PMV BLD AUTO: 9.6 FL (ref 9–12.9)
PMV BLD AUTO: 9.9 FL (ref 9–12.9)
POTASSIUM SERPL-SCNC: 3.5 MMOL/L (ref 3.6–5.5)
POTASSIUM SERPL-SCNC: 3.7 MMOL/L (ref 3.6–5.5)
PROPOXYPH UR QL SCN: NEGATIVE
PROT SERPL-MCNC: 8.5 G/DL (ref 6–8.2)
PROT SERPL-MCNC: 8.6 G/DL (ref 6–8.2)
RBC # BLD AUTO: 5.36 M/UL (ref 4.2–5.4)
RBC # BLD AUTO: 5.58 M/UL (ref 4.2–5.4)
SODIUM SERPL-SCNC: 138 MMOL/L (ref 135–145)
SODIUM SERPL-SCNC: 139 MMOL/L (ref 135–145)
WBC # BLD AUTO: 11.6 K/UL (ref 4.8–10.8)
WBC # BLD AUTO: 7.6 K/UL (ref 4.8–10.8)

## 2023-10-02 PROCEDURE — 85025 COMPLETE CBC W/AUTO DIFF WBC: CPT

## 2023-10-02 PROCEDURE — 96374 THER/PROPH/DIAG INJ IV PUSH: CPT

## 2023-10-02 PROCEDURE — 80053 COMPREHEN METABOLIC PANEL: CPT | Mod: 91

## 2023-10-02 PROCEDURE — 82077 ASSAY SPEC XCP UR&BREATH IA: CPT | Mod: 91

## 2023-10-02 PROCEDURE — 99284 EMERGENCY DEPT VISIT MOD MDM: CPT | Mod: 27

## 2023-10-02 PROCEDURE — 700111 HCHG RX REV CODE 636 W/ 250 OVERRIDE (IP): Mod: JZ,UD

## 2023-10-02 PROCEDURE — 82077 ASSAY SPEC XCP UR&BREATH IA: CPT

## 2023-10-02 PROCEDURE — 99285 EMERGENCY DEPT VISIT HI MDM: CPT

## 2023-10-02 PROCEDURE — 85025 COMPLETE CBC W/AUTO DIFF WBC: CPT | Mod: 91

## 2023-10-02 PROCEDURE — 80053 COMPREHEN METABOLIC PANEL: CPT

## 2023-10-02 PROCEDURE — 71045 X-RAY EXAM CHEST 1 VIEW: CPT

## 2023-10-02 PROCEDURE — 36415 COLL VENOUS BLD VENIPUNCTURE: CPT

## 2023-10-02 PROCEDURE — 83690 ASSAY OF LIPASE: CPT

## 2023-10-02 PROCEDURE — 700102 HCHG RX REV CODE 250 W/ 637 OVERRIDE(OP): Performed by: EMERGENCY MEDICINE

## 2023-10-02 PROCEDURE — 82962 GLUCOSE BLOOD TEST: CPT | Mod: 91

## 2023-10-02 PROCEDURE — 700105 HCHG RX REV CODE 258: Mod: UD | Performed by: EMERGENCY MEDICINE

## 2023-10-02 PROCEDURE — 99285 EMERGENCY DEPT VISIT HI MDM: CPT | Mod: 27

## 2023-10-02 PROCEDURE — 82962 GLUCOSE BLOOD TEST: CPT

## 2023-10-02 PROCEDURE — 83690 ASSAY OF LIPASE: CPT | Mod: 91

## 2023-10-02 PROCEDURE — 80307 DRUG TEST PRSMV CHEM ANLYZR: CPT

## 2023-10-02 PROCEDURE — 700111 HCHG RX REV CODE 636 W/ 250 OVERRIDE (IP): Mod: JZ,UD | Performed by: EMERGENCY MEDICINE

## 2023-10-02 PROCEDURE — A9270 NON-COVERED ITEM OR SERVICE: HCPCS | Performed by: EMERGENCY MEDICINE

## 2023-10-02 RX ORDER — NALOXONE HYDROCHLORIDE 1 MG/ML
INJECTION INTRAMUSCULAR; INTRAVENOUS; SUBCUTANEOUS
Status: COMPLETED
Start: 2023-10-02 | End: 2023-10-02

## 2023-10-02 RX ORDER — NALOXONE HYDROCHLORIDE 1 MG/ML
2 INJECTION INTRAMUSCULAR; INTRAVENOUS; SUBCUTANEOUS ONCE
Status: COMPLETED | OUTPATIENT
Start: 2023-10-02 | End: 2023-10-02

## 2023-10-02 RX ORDER — ONDANSETRON 2 MG/ML
4 INJECTION INTRAMUSCULAR; INTRAVENOUS ONCE
Status: COMPLETED | OUTPATIENT
Start: 2023-10-02 | End: 2023-10-02

## 2023-10-02 RX ORDER — DIAZEPAM 5 MG/1
5 TABLET ORAL ONCE
Status: DISCONTINUED | OUTPATIENT
Start: 2023-10-02 | End: 2023-10-02 | Stop reason: HOSPADM

## 2023-10-02 RX ORDER — SODIUM CHLORIDE 9 MG/ML
1000 INJECTION, SOLUTION INTRAVENOUS ONCE
Status: COMPLETED | OUTPATIENT
Start: 2023-10-02 | End: 2023-10-03

## 2023-10-02 RX ADMIN — SODIUM CHLORIDE 1000 ML: 9 INJECTION, SOLUTION INTRAVENOUS at 20:36

## 2023-10-02 RX ADMIN — ONDANSETRON 4 MG: 2 INJECTION INTRAMUSCULAR; INTRAVENOUS at 22:22

## 2023-10-02 RX ADMIN — NALOXONE HYDROCHLORIDE: 1 INJECTION PARENTERAL at 11:59

## 2023-10-02 RX ADMIN — LIDOCAINE HYDROCHLORIDE 30 ML: 20 SOLUTION OROPHARYNGEAL at 22:41

## 2023-10-02 RX ADMIN — NALOXONE HYDROCHLORIDE: 1 INJECTION INTRAMUSCULAR; INTRAVENOUS; SUBCUTANEOUS at 11:59

## 2023-10-02 ASSESSMENT — LIFESTYLE VARIABLES
TOTAL SCORE: 0
DO YOU DRINK ALCOHOL: YES
DOES PATIENT WANT TO STOP DRINKING: NO
TOTAL SCORE: 0
EVER HAD A DRINK FIRST THING IN THE MORNING TO STEADY YOUR NERVES TO GET RID OF A HANGOVER: NO
HAVE PEOPLE ANNOYED YOU BY CRITICIZING YOUR DRINKING: NO
CONSUMPTION TOTAL: INCOMPLETE
HAVE YOU EVER FELT YOU SHOULD CUT DOWN ON YOUR DRINKING: NO
EVER FELT BAD OR GUILTY ABOUT YOUR DRINKING: NO
TOTAL SCORE: 0

## 2023-10-02 ASSESSMENT — PAIN DESCRIPTION - PAIN TYPE: TYPE: ACUTE PAIN

## 2023-10-02 ASSESSMENT — FIBROSIS 4 INDEX
FIB4 SCORE: 0.6
FIB4 SCORE: 0.6
FIB4 SCORE: 0.59
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
## 2023-10-02 NOTE — ED NOTES
Bedside report from LESLIE Mcghee.     Pt assessed, A&O x 4 - intoxicated. Patient is on room air, not a fall risk.

## 2023-10-02 NOTE — DISCHARGE INSTRUCTIONS
If you change your mind and want to follow please report please call the [place] [employer].    Return the emergency department if you have worsening abdominal pain, heavy vaginal bleeding or discharge.
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
## 2023-10-02 NOTE — ED NOTES
Bedside report received from off going RN/tech: Tomás, assumed care of patient.  POC discussed with patient. Call light within reach, all needs addressed at this time.       Fall risk interventions in place: Not Applicable (all applicable per [place] Fall risk assessment)   Continuous monitoring: Not Applicable   IVF/IV medications: Not Applicable   Oxygen: Room Air  Bedside sitter: Not Applicable   Isolation: Not Applicable

## 2023-10-02 NOTE — ED NOTES
Patient resting on stretcher in no acute distress. Equal chest rise noted. VS stable on monitor. Bed locked and in low position. Call bell within reach   Pt remains on bed alarm
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
## 2023-10-02 NOTE — DISCHARGE PLANNING
"Alert Team:    1600:Writer spoke to pt regarding ETOH detox resources. PT reports that she has tried \"everything\" ([facility], [facility], etc) and was Dced from [facility] \"a few days\" for ETOH detox. Writer gave pt a list of detox resources and pt stated that she was not sure exactly how she desired to detox. Writer stated he would give her 30-40 minutes to \"think about it\" and discuss further.    1640: Woke up patient and asked if she preferred inpatient or outpatient. She again responded that she does not know what she wants to do. Writer informed her that beds at [facility] may not be available later tonight.     Ralph Rick RN, [facility]  "

## 2023-10-02 NOTE — ED PROVIDER NOTES
ED Provider Note    CHIEF COMPLAINT  Chief Complaint   Patient presents with    Unresponsive     Pt arrived to ED unresponsive. Pt discharged this AM for ETOH intoxication. Pt protecting her airway at this point.        EXTERNAL RECORDS REVIEWED  Reviewed extensive ER visits for alcohol intoxication    HPI/ROS  LIMITATION TO HISTORY   Patient is inebriated upon arrival  OUTSIDE HISTORIAN(S):  Merly Marrero is a 38 y.o. female who presents from triage for altered mental status.  The patient is very well-known to both our facility as well as [facility].  Of note the patient was seen earlier this morning for acute alcohol intoxication.  She had extensive evaluation on 10/1 including extensive laboratory studies CT scan of the head.  She was observed for several hours and was on ED observation status.  She progressively sobered and was appropriately discharged to her own care.  She was offered alcohol rehab resources for which she refused.  She apparently left the ER earlier this morning and went out and drank nearly an entire 1 L bottle of vodka.  She was found in triage obtunded.  There is no report of any trauma.  History is obtained primarily from chart review    PAST MEDICAL HISTORY   has a past medical history of Acute pancreatitis without infection or necrosis (07/20/2022), Alcohol dependence (HCC) (04/13/2017), Bronchitis (08/2012), Cold, Current moderate episode of major depressive disorder without prior episode (HCC) (01/31/2020), Heart burn, Hernia of unspecified site of abdominal cavity without mention of obstruction or gangrene, High anion gap metabolic acidosis (07/01/2022), Indigestion, Pancreatitis, Pneumonia (2011), and Seizure disorder (HCC) (05/23/2022).    SURGICAL HISTORY   has a past surgical history that includes other orthopedic surgery; gyn surgery; tonsillectomy (04/11/2013); arthroscopy, knee; hysterectomy robotic xi (10/11/2018); salpingectomy (Bilateral,  10/11/2018); orif, wrist (Right, 04/24/2019); upper gi endoscopy,diagnosis (N/A, 12/23/2022); inject nerv blck,celiac plexus (N/A, 12/23/2022); egd w/endoscopic ultrasound (N/A, 12/23/2022); and whipple procedure (02/25/2023).    FAMILY HISTORY  Family History   Problem Relation Age of Onset    Psychiatric Illness Mother     Stroke Mother     Arthritis Mother     Heart Disease Maternal Grandfather     Cancer Neg Hx     Diabetes Neg Hx     Hypertension Neg Hx        SOCIAL HISTORY  Social History     Tobacco Use    Smoking status: Former     Current packs/day: 0.25     Average packs/day: 0.3 packs/day for 10.0 years (2.5 ttl pk-yrs)     Types: Cigarettes    Smokeless tobacco: Never   Vaping Use    Vaping Use: Every day    Substances: Nicotine   Substance and Sexual Activity    Alcohol use: Not Currently     Comment: Former alcholic 5/24/2023 last drink    Drug use: Not Currently     Types: Inhaled     Comment: weed for pain    Sexual activity: Not on file   History of alcohol abuse    CURRENT MEDICATIONS  Home Medications    **Home medications have not yet been reviewed for this encounter**       No current facility-administered medications for this encounter.    Current Outpatient Medications:     ondansetron (ZOFRAN ODT) 4 MG TABLET DISPERSIBLE, Take 1 Tablet by mouth every 8 hours as needed for Nausea/Vomiting., Disp: 10 Tablet, Rfl: 0    omeprazole (PRILOSEC) 20 MG delayed-release capsule, Take 1 Capsule by mouth every day., Disp: 30 Capsule, Rfl: 0    cephALEXin (KEFLEX) 500 MG Cap, Take 500 mg by mouth 4 times a day., Disp: , Rfl:     gabapentin (NEURONTIN) 600 MG tablet, Take 600 mg by mouth in the morning, at noon, and at bedtime., Disp: , Rfl:     mirtazapine (REMERON) 15 MG Tab, Take 15 mg by mouth every evening., Disp: , Rfl:     OLANZapine (ZYPREXA) 5 MG Tab, Take 5 mg by mouth every day., Disp: , Rfl:     sertraline (ZOLOFT) 100 MG Tab, Take 100 mg by mouth every day., Disp: , Rfl:     ARIPiprazole  "(ABILIFY) 15 MG Tab, Take 15 mg by mouth every day., Disp: , Rfl:     DOXEPIN HCL PO, Take 1 Tablet by mouth every day., Disp: , Rfl:     traZODone (DESYREL) 100 MG Tab, Take 100 mg by mouth every evening. Indications: Trouble Sleeping, Disp: , Rfl:     dicyclomine (BENTYL) 20 MG Tab, Take 20 mg by mouth in the morning, at noon, and at bedtime., Disp: , Rfl:     Pancrelipase, Lip-Prot-Amyl, (CREON) 57857-583126 units Cap DR Particles, Take 1 Capful by mouth 3 times a day with meals., Disp: , Rfl:     ALLERGIES  Allergies   Allergen Reactions    Promethazine Hcl Anxiety     Anxiety and makes her feels like skin coming off        PHYSICAL EXAM  VITAL SIGNS: /73   Pulse 94   Temp 36.5 °C (97.7 °F) (Temporal)   Resp 20   Ht 1.702 m (5' 7\")   Wt 66.7 kg (147 lb)   LMP 10/30/2018   SpO2 97%   BMI 23.02 kg/m²    Pulse ox interpretation: 85% on room air, hypoxic  Constitutional: Minimally responsive  HEENT: Atraumatic normocephalic, pupils are equal round reactive to light extraocular movements are intact. The nares is clear, external ears are normal, mouth shows moist mucous membranes normal dentition for age no craniofacial trauma gag reflex is diminished but present  Neck: Supple, no JVD no tracheal deviation  Cardiovascular: Regular rate and rhythm no murmur rub or gallop 2+ pulses peripherally x4  Thorax & Lungs: No respiratory distress, no wheezes rales or rhonchi, No chest tenderness.   GI: Soft nontender nondistended positive bowel sounds, no peritoneal signs  Skin: Warm dry no acute rash or lesion  Musculoskeletal: Moving all extremities with full range and 5 of 5 strength no acute  deformity  Neurologic: Withdraws from painful stimuli no seizure activity moving all extremities   psychiatric: Sluggish minimally responsive          DIAGNOSTIC STUDIES / PROCEDURES    LABS  Results for orders placed or performed during the hospital encounter of 10/02/23   DIAGNOSTIC ALCOHOL   Result Value Ref Range    " Diagnostic Alcohol 334.5 (H) <10.1 mg/dL   CBC WITH DIFFERENTIAL   Result Value Ref Range    WBC 11.6 (H) 4.8 - 10.8 K/uL    RBC 5.36 4.20 - 5.40 M/uL    Hemoglobin 16.9 (H) 12.0 - 16.0 g/dL    Hematocrit 46.9 37.0 - 47.0 %    MCV 87.5 81.4 - 97.8 fL    MCH 31.5 27.0 - 33.0 pg    MCHC 36.0 (H) 32.2 - 35.5 g/dL    RDW 43.0 35.9 - 50.0 fL    Platelet Count 351 164 - 446 K/uL    MPV 9.6 9.0 - 12.9 fL    Neutrophils-Polys 64.20 44.00 - 72.00 %    Lymphocytes 30.00 22.00 - 41.00 %    Monocytes 4.70 0.00 - 13.40 %    Eosinophils 0.40 0.00 - 6.90 %    Basophils 0.40 0.00 - 1.80 %    Immature Granulocytes 0.30 0.00 - 0.90 %    Nucleated RBC 0.00 0.00 - 0.20 /100 WBC    Neutrophils (Absolute) 7.41 1.82 - 7.42 K/uL    Lymphs (Absolute) 3.47 1.00 - 4.80 K/uL    Monos (Absolute) 0.54 0.00 - 0.85 K/uL    Eos (Absolute) 0.05 0.00 - 0.51 K/uL    Baso (Absolute) 0.05 0.00 - 0.12 K/uL    Immature Granulocytes (abs) 0.03 0.00 - 0.11 K/uL    NRBC (Absolute) 0.00 K/uL   Comp Metabolic Panel   Result Value Ref Range    Sodium 138 135 - 145 mmol/L    Potassium 3.7 3.6 - 5.5 mmol/L    Chloride 97 96 - 112 mmol/L    Co2 21 20 - 33 mmol/L    Anion Gap 20.0 (H) 7.0 - 16.0    Glucose 125 (H) 65 - 99 mg/dL    Bun 14 8 - 22 mg/dL    Creatinine 0.94 0.50 - 1.40 mg/dL    Calcium 8.8 8.5 - 10.5 mg/dL    Correct Calcium 7.8 (L) 8.5 - 10.5 mg/dL    AST(SGOT) 46 (H) 12 - 45 U/L    ALT(SGPT) 64 (H) 2 - 50 U/L    Alkaline Phosphatase 115 (H) 30 - 99 U/L    Total Bilirubin 0.5 0.1 - 1.5 mg/dL    Albumin 5.2 (H) 3.2 - 4.9 g/dL    Total Protein 8.6 (H) 6.0 - 8.2 g/dL    Globulin 3.4 1.9 - 3.5 g/dL    A-G Ratio 1.5 g/dL   LIPASE   Result Value Ref Range    Lipase 15 11 - 82 U/L   URINE DRUG SCREEN   Result Value Ref Range    Amphetamines Urine Negative Negative    Barbiturates Negative Negative    Benzodiazepines Negative Negative    Cocaine Metabolite Negative Negative    Fentanyl, Urine Negative Negative    Methadone Negative Negative    Opiates Negative  Negative    Oxycodone Negative Negative    Phencyclidine -Pcp Negative Negative    Propoxyphene Negative Negative    Cannabinoid Metab Negative Negative   ESTIMATED GFR   Result Value Ref Range    GFR (CKD-EPI) 80 >60 mL/min/1.73 m 2   POCT glucose device results   Result Value Ref Range    POC Glucose, Blood 123 (H) 65 - 99 mg/dL         RADIOLOGY  Considered but not performed  COURSE & MEDICAL DECISION MAKING    ED Observation Status? Yes; I am placing the patient in to an observation status due to a diagnostic uncertainty as well as therapeutic intensity. Patient placed in observation status at 12 PM, 10/2/2023.     Observation plan is as follows: Establish an IV, administer Narcan provide oral suctioning monitor on continuous pulse oximetry and cardiac monitoring administer Narcan    Upon Reevaluation, the patient's condition has: Improved; and will be discharged.    Patient discharged from ED Observation status at 1745 (Time) 10/2 (Date).     INITIAL ASSESSMENT, COURSE AND PLAN  Care Narrative:     This is a 38-year-old female who was immediately brought back to resuscitation room after a call overhead that there is an acute medical patient.  On arrival the patient was obtunded not protecting her airway and was sluggish.  She was immediately placed in the resuscitation room.  We checked her blood sugar which was normal.  I checked her gag reflex which was present but very diminished.  We placed her on the cardiac and pulse oximetry monitor.  She did require some supplemental oxygen.  I personally established an ultrasound-guided IV.  2 mg of Narcan were administered and she did somewhat arouse but did not have an abrupt return of consciousness.  I was in the room for the first 30 minutes of her care as we thought she may require intubation for airway protection.  Patient progressively became more responsive and her gag reflex dramatically improved.  Turns out the patient primarily abused alcohol.  She was seen  here last night for the same and had extensive evaluation and apparently left the hospital and drank excessively.  I considered potentially intubating her but she started to arouse and protect her airway.  She was observed for well over 6 and half hours in the emergency department and progressively sobered up.  We offered her life skills counseling for alcohol rehab.  She indicated that she is already in a sober living environment with support groups.  She is adamantly denied that this was a suicidal intent.  At the time of discharge she was alert and oriented x4 was lucid ambulatory without assistance did not appear to be suicidal homicidal or have any evidence of psychosis.  I had a dumont conversation with the patient regarding her misuse of alcohol.  She has decided to leave the hospital and will not need to sign AMA because she is lucid and has capacity.      ADDITIONAL PROBLEM LIST    DISPOSITION AND DISCUSSIONS  I have discussed management of the patient with the following physicians and BILL's: None    Discussion of management with other QHP or appropriate source(s): Discussed plan of care with life skills    Escalation of care considered, and ultimately not performed:acute inpatient care management, however at this time, the patient is most appropriate for outpatient management    Barriers to care at this time, including but not limited to: Not applicable    Decision tools and prescription drugs considered including, but not limited to: None    FINAL DIAGNOSIS:  1. Baring coma scale total score 9-12, unspecified coma timing        2. Alcoholic intoxication without complication (HCC)               CRITICAL CARE  The very real possibilty of a deterioration of this patient's condition required the highest level of my preparedness for sudden, emergent intervention.  I provided critical care services, which included medication orders, frequent reevaluations of the patient's condition and response to treatment,  ordering and reviewing test results, and discussing the case with various consultants.  The critical care time associated with the care of the patient was 35 minutes. Review chart for interventions. This time is exclusive of any other billable procedures.     Electronically signed by: Juni Rodriguez M.D., 10/2/2023 1:26 PM

## 2023-10-02 NOTE — ED NOTES
Security officers notifed ER Techs that pt was unresponsive outside of the building. This RN and ER techs went to assess pt. Pt noted to have a rapid pulse and was breathing spontaneously. Pt noted to have a bottle of alcholol in her purse. Pt's pupils were responsive. Pt noted to have an oxygen saturation of 94% on room air. Pt placed in a wheel chair and taken back to blue 20. ERP at bedside to assess pt.

## 2023-10-02 NOTE — ED NOTES
Patient room round, pt appears to be sleeping on gurney, continuous vitals monitoring done. Pt breathing is even and unlabored on room air.

## 2023-10-02 NOTE — ED NOTES
Pt requesting to go home instead of going to [facility]. DC instructions reviewed with pt. Pt verbalized understanding. Pt ambulated to [facility] with steady gait.

## 2023-10-02 NOTE — ED NOTES
Pt room round done. Pt lying on her left side on gurney, hooked up with monitor, breathing is even and unlabored on room air.  Pt denies any need at this time, Pt instructed to call nurse if need anything, call bell at bedside.
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
## 2023-10-03 ENCOUNTER — HOSPITAL ENCOUNTER (EMERGENCY)
Facility: MEDICAL CENTER | Age: 38
End: 2023-10-04
Attending: EMERGENCY MEDICINE
Payer: COMMERCIAL

## 2023-10-03 VITALS
HEIGHT: 67 IN | BODY MASS INDEX: 23.07 KG/M2 | RESPIRATION RATE: 16 BRPM | TEMPERATURE: 98.6 F | DIASTOLIC BLOOD PRESSURE: 72 MMHG | OXYGEN SATURATION: 93 % | HEART RATE: 105 BPM | SYSTOLIC BLOOD PRESSURE: 124 MMHG | WEIGHT: 147 LBS

## 2023-10-03 DIAGNOSIS — F10.929 ALCOHOLIC INTOXICATION WITH COMPLICATION (HCC): ICD-10-CM

## 2023-10-03 DIAGNOSIS — R45.851 SUICIDAL IDEATION: ICD-10-CM

## 2023-10-03 LAB
AMPHET UR QL SCN: NEGATIVE
BARBITURATES UR QL SCN: NEGATIVE
BENZODIAZ UR QL SCN: NEGATIVE
BZE UR QL SCN: NEGATIVE
CANNABINOIDS UR QL SCN: NEGATIVE
FENTANYL UR QL: NEGATIVE
METHADONE UR QL SCN: NEGATIVE
OPIATES UR QL SCN: NEGATIVE
OXYCODONE UR QL SCN: NEGATIVE
PCP UR QL SCN: NEGATIVE
POC BREATHALIZER: 0.26 PERCENT (ref 0–0.01)
PROPOXYPH UR QL SCN: NEGATIVE

## 2023-10-03 PROCEDURE — 302970 POC BREATHALIZER: Performed by: EMERGENCY MEDICINE

## 2023-10-03 PROCEDURE — 99285 EMERGENCY DEPT VISIT HI MDM: CPT

## 2023-10-03 PROCEDURE — 302970 POC BREATHALIZER

## 2023-10-03 PROCEDURE — 80307 DRUG TEST PRSMV CHEM ANLYZR: CPT

## 2023-10-03 ASSESSMENT — FIBROSIS 4 INDEX: FIB4 SCORE: 0.8

## 2023-10-03 NOTE — ED NOTES
This RN and ERP at bedside to assess pts ambulation. Pt AOx4, able to ambulate with steady gait, VSS. This RN and ERP educated pt on excessive ETOH drinking. ERP asked pt if it was ok to discard the rest of her bottle of vodka, pt refused and placed it back in her bag. Pt denying feelings of SI/ HI and is capable of making her own choices at this time. Pt to be discharged per ERP.

## 2023-10-03 NOTE — ED NOTES
Pt attempted to call her mom to come get her, states that her mother is unable to pick her up. Pt stated that she is planning on taking a taxi: self pay.

## 2023-10-03 NOTE — ED NOTES
AVS discussed with patient. Pt ambulatory with steady gait to lobby with taxi voucher. Taxi called for patient to [facility]

## 2023-10-03 NOTE — ED TRIAGE NOTES
".  Chief Complaint   Patient presents with    Suicidal Ideation    Alcohol Intoxication       39 yo female BIB [facility] [employer] after patient was found on the top of parking garage peering over the edge. Patient reports she was considering jumping. She called her parents to say goodbye and then reports she was unable to jump.     Breathalyzer 0.260. SI precautions placed on patient. Urine collected.     /55   Pulse (!) 118   Resp 16   Ht 1.702 m (5' 7\")   Wt 63.5 kg (140 lb)   LMP 10/30/2018   SpO2 94%   BMI 21.93 kg/m²     "

## 2023-10-03 NOTE — ED TRIAGE NOTES
Chief Complaint   Patient presents with    ETOH Withdrawal     Pt code assist found down in [facility] post discharge for ETOH     Pt responsive to pain only w/ reactive pupils. Pt was found w/ liquor bottle empty in purse. ERP at bedside upon pt arrival. Pt placed in gown and connected to monitoring equipment. O2 placed, PIV established US.

## 2023-10-03 NOTE — DISCHARGE PLANNING
Alert Team:    Discussed inpt alcohol detox with patient, breathalyzer 0.191; patient agreeable to go to [facility] for voluntary admission. Call facility and bed available. Patient provided with taxi voucher # [phone] upon discharge.

## 2023-10-03 NOTE — ED PROVIDER NOTES
ED Provider Note    CHIEF COMPLAINT  Chief Complaint   Patient presents with    ETOH Withdrawal     Pt code assist found down in [facility] post discharge for ETOH       EXTERNAL RECORDS REVIEWED  Other reviewed the patient's last 2 ER visits that she presented unresponsive as well and had a significant elevation in her blood alcohol    HPI/ROS  LIMITATION TO HISTORY   Select: Altered mental status / Confusion  OUTSIDE HISTORIAN(S):  Patient was found down and a code assist was called.  Nursing staff on arrival provided history stating they found the patient down with a bottle of alcohol by her side.     Rhiannon Marrero is a 38 y.o. female who presents in an altered state.  The patient was found down in the hospital and nursing staff on arrival states the patient appeared intoxicated and had a bottle of liquor by her side.  She was just discharged from the hospital after similar presentation on 1:30 PM.  The patient would not answer questions and therefore no further history could be obtained.    PAST MEDICAL HISTORY   has a past medical history of Acute pancreatitis without infection or necrosis (07/20/2022), Alcohol dependence (HCC) (04/13/2017), Bronchitis (08/2012), Cold, Current moderate episode of major depressive disorder without prior episode (HCC) (01/31/2020), Heart burn, Hernia of unspecified site of abdominal cavity without mention of obstruction or gangrene, High anion gap metabolic acidosis (07/01/2022), Indigestion, Pancreatitis, Pneumonia (2011), and Seizure disorder ([facility]) (05/23/2022).    SURGICAL HISTORY   has a past surgical history that includes other orthopedic surgery; gyn surgery; tonsillectomy (04/11/2013); arthroscopy, knee; hysterectomy robotic xi (10/11/2018); salpingectomy (Bilateral, 10/11/2018); orif, wrist (Right, 04/24/2019); upper gi endoscopy,diagnosis (N/A, 12/23/2022); inject nerv blck,celiac plexus (N/A, 12/23/2022); egd w/endoscopic ultrasound (N/A, 12/23/2022); and  "whipple procedure (02/25/2023).    FAMILY HISTORY  Family History   Problem Relation Age of Onset    Psychiatric Illness Mother     Stroke Mother     Arthritis Mother     Heart Disease Maternal Grandfather     Cancer Neg Hx     Diabetes Neg Hx     Hypertension Neg Hx        SOCIAL HISTORY  Social History     Tobacco Use    Smoking status: Former     Current packs/day: 0.25     Average packs/day: 0.3 packs/day for 10.0 years (2.5 ttl pk-yrs)     Types: Cigarettes    Smokeless tobacco: Never   Vaping Use    Vaping Use: Every day    Substances: Nicotine   Substance and Sexual Activity    Alcohol use: Not Currently     Comment: Former alcholic 5/24/2023 last drink    Drug use: Not Currently     Types: Inhaled     Comment: weed for pain    Sexual activity: Not on file       CURRENT MEDICATIONS  Home Medications    **Home medications have not yet been reviewed for this encounter**         ALLERGIES  Allergies   Allergen Reactions    Promethazine Hcl Anxiety     Anxiety and makes her feels like skin coming off        PHYSICAL EXAM  VITAL SIGNS: /79   Pulse (!) 104   Temp 37 °C (98.6 °F) (Temporal)   Resp 20   Ht 1.702 m (5' 7\")   Wt 66.7 kg (147 lb)   LMP 10/30/2018   SpO2 97%   BMI 23.02 kg/m²    General the patient is obtunded    HEENT unremarkable including pupils that are 2 and reactive, good gag reflex, good corneal reflexes    Pulmonary the patient's lungs are clear to auscultation bilaterally    Cardiovascular S1-S2 with a tachycardic rate    GI abdomen is soft    Extremities no edema    Skin no lesions    Neurologic examination the patient would not open her eyes normal she speak.  She will withdraw to pain    DIAGNOSTIC STUDIES  Results for orders placed or performed during the hospital encounter of 10/02/23   CBC WITH DIFFERENTIAL   Result Value Ref Range    WBC 7.6 4.8 - 10.8 K/uL    RBC 5.58 (H) 4.20 - 5.40 M/uL    Hemoglobin 17.0 (H) 12.0 - 16.0 g/dL    Hematocrit 48.3 (H) 37.0 - 47.0 %    MCV " 86.6 81.4 - 97.8 fL    MCH 30.5 27.0 - 33.0 pg    MCHC 35.2 32.2 - 35.5 g/dL    RDW 41.9 35.9 - 50.0 fL    Platelet Count 277 164 - 446 K/uL    MPV 9.9 9.0 - 12.9 fL    Neutrophils-Polys 63.10 44.00 - 72.00 %    Lymphocytes 30.40 22.00 - 41.00 %    Monocytes 5.10 0.00 - 13.40 %    Eosinophils 0.50 0.00 - 6.90 %    Basophils 0.50 0.00 - 1.80 %    Immature Granulocytes 0.40 0.00 - 0.90 %    Nucleated RBC 0.00 0.00 - 0.20 /100 WBC    Neutrophils (Absolute) 4.78 1.82 - 7.42 K/uL    Lymphs (Absolute) 2.31 1.00 - 4.80 K/uL    Monos (Absolute) 0.39 0.00 - 0.85 K/uL    Eos (Absolute) 0.04 0.00 - 0.51 K/uL    Baso (Absolute) 0.04 0.00 - 0.12 K/uL    Immature Granulocytes (abs) 0.03 0.00 - 0.11 K/uL    NRBC (Absolute) 0.00 K/uL   COMP METABOLIC PANEL   Result Value Ref Range    Sodium 139 135 - 145 mmol/L    Potassium 3.5 (L) 3.6 - 5.5 mmol/L    Chloride 100 96 - 112 mmol/L    Co2 21 20 - 33 mmol/L    Anion Gap 18.0 (H) 7.0 - 16.0    Glucose 109 (H) 65 - 99 mg/dL    Bun 12 8 - 22 mg/dL    Creatinine 0.83 0.50 - 1.40 mg/dL    Calcium 9.1 8.5 - 10.5 mg/dL    Correct Calcium 8.3 (L) 8.5 - 10.5 mg/dL    AST(SGOT) 48 (H) 12 - 45 U/L    ALT(SGPT) 61 (H) 2 - 50 U/L    Alkaline Phosphatase 114 (H) 30 - 99 U/L    Total Bilirubin 0.6 0.1 - 1.5 mg/dL    Albumin 5.0 (H) 3.2 - 4.9 g/dL    Total Protein 8.5 (H) 6.0 - 8.2 g/dL    Globulin 3.5 1.9 - 3.5 g/dL    A-G Ratio 1.4 g/dL   LIPASE   Result Value Ref Range    Lipase 22 11 - 82 U/L   DIAGNOSTIC ALCOHOL   Result Value Ref Range    Diagnostic Alcohol 332.2 (H) <10.1 mg/dL   ESTIMATED GFR   Result Value Ref Range    GFR (CKD-EPI) 93 >60 mL/min/1.73 m 2   POCT glucose device results   Result Value Ref Range    POC Glucose, Blood 114 (H) 65 - 99 mg/dL         COURSE & MEDICAL DECISION MAKING  This a 38-year-old female who presents the emergency department acutely ill in appearance and obtunded.  She was evaluated very closely and watched to make sure she would continue to protect her  airway.  She did not appear to be any respiratory distress.  I did review her records emergently and noted the frequent visits for alcohol abuse and intoxication.  I suspect that this was the source and IV is established and we supported the patient through the intoxication with IV fluids.  After approximately an hour and a half she did become more cognizant.  She admits to alcohol abuse and intoxication.  She has been observed for almost 4 hours and at this time is ambulatory.  I had a long discussion with the patient regarding the need for rehabilitation and she has agreed.  Therefore we will send the patient to renal behavioral health.  Laboratory analysis does show an alcohol induced hepatitis that is mild.  She does not have any evidence of pancreatitis with a normal lipase.  She does have epigastric pain but suspect this is more from gastritis and she did receive a GI cocktail.  The patient is medically cleared at this time for rehabilitation and she will receive a taxi Voucher to go to [facility].    FINAL DIAGNOSIS  1.  Altered mental status  2.  Alcohol abuse and intoxication  3.  Mild alcohol induced hepatitis  4.  Epigastric pain suspect alcohol induced gastritis  5.  Critical care time 35 minutes    CRITICAL CARE  The very real possibilty of a deterioration of this patient's condition required the highest level of my preparedness for sudden, emergent intervention.  I provided critical care services, which included medication orders, frequent reevaluations of the patient's condition and response to treatment, ordering and reviewing test results, and discussing the case with various consultants.  The critical care time associated with the care of the patient was 35 minutes. Review chart for interventions. This time is exclusive of any other billable procedures.          Electronically signed by: Jamal Park M.D., 10/2/2023 8:40 PM

## 2023-10-03 NOTE — ED NOTES
Pt able to ambulate w/ steady gait to restroom and back to bed. ERP notified of pt N/V complaint.

## 2023-10-03 NOTE — ED PROVIDER NOTES
ED Provider Note    CHIEF COMPLAINT  Chief Complaint   Patient presents with    Suicidal Ideation    Alcohol Intoxication       EXTERNAL RECORDS REVIEWED  Multiple emergency medicine notes for alcohol intoxication  HPI/ROS      Rhiannon Marrero is a 38 y.o. female who presents states she is drinking excess amount alcohol, significant on her cell.  The patient was found peering over the parking garage reeling since she thinks she went to kill her self.  The patient denies use of drugs, fever, shakes, chills, sweats, nausea, vomiting.    PAST MEDICAL HISTORY   has a past medical history of Acute pancreatitis without infection or necrosis (07/20/2022), Alcohol dependence (HCC) (04/13/2017), Bronchitis (08/2012), Cold, Current moderate episode of major depressive disorder without prior episode (HCC) (01/31/2020), Heart burn, Hernia of unspecified site of abdominal cavity without mention of obstruction or gangrene, High anion gap metabolic acidosis (07/01/2022), Indigestion, Pancreatitis, Pneumonia (2011), and Seizure disorder ([facility]) (05/23/2022).    SURGICAL HISTORY   has a past surgical history that includes other orthopedic surgery; gyn surgery; tonsillectomy (04/11/2013); arthroscopy, knee; hysterectomy robotic xi (10/11/2018); salpingectomy (Bilateral, 10/11/2018); orif, wrist (Right, 04/24/2019); upper gi endoscopy,diagnosis (N/A, 12/23/2022); inject nerv blck,celiac plexus (N/A, 12/23/2022); egd w/endoscopic ultrasound (N/A, 12/23/2022); and whipple procedure (02/25/2023).    FAMILY HISTORY  Family History   Problem Relation Age of Onset    Psychiatric Illness Mother     Stroke Mother     Arthritis Mother     Heart Disease Maternal Grandfather     Cancer Neg Hx     Diabetes Neg Hx     Hypertension Neg Hx        SOCIAL HISTORY  Social History     Tobacco Use    Smoking status: Former     Current packs/day: 0.25     Average packs/day: 0.3 packs/day for 10.0 years (2.5 ttl pk-yrs)     Types: Cigarettes    Smokeless  "tobacco: Never   Vaping Use    Vaping Use: Every day    Substances: Nicotine   Substance and Sexual Activity    Alcohol use: Yes     Comment: Heavy drinking x 4 days.    Drug use: Not Currently     Types: Inhaled     Comment: weed for pain    Sexual activity: Not on file       CURRENT MEDICATIONS  Home Medications       Reviewed by Rajendra Lynch (Pharmacy Tech) on 10/04/23 at 0631  Med List Status: Not Addressed     Medication Last Dose Status   ARIPiprazole (ABILIFY) 15 MG Tab  Active   cephALEXin (KEFLEX) 500 MG Cap  Active   dicyclomine (BENTYL) 20 MG Tab  Active   DOXEPIN HCL PO  Active   gabapentin (NEURONTIN) 600 MG tablet  Active   mirtazapine (REMERON) 15 MG Tab  Active   OLANZapine (ZYPREXA) 5 MG Tab  Active   omeprazole (PRILOSEC) 20 MG delayed-release capsule  Active   ondansetron (ZOFRAN ODT) 4 MG TABLET DISPERSIBLE  Active   Pancrelipase, Lip-Prot-Amyl, (CREON) 44314-635530 units Cap DR Particles  Active   sertraline (ZOLOFT) 100 MG Tab  Active   traZODone (DESYREL) 100 MG Tab  Active                    ALLERGIES  Allergies   Allergen Reactions    Promethazine Hcl Anxiety     Anxiety and makes her feels like skin coming off        PHYSICAL EXAM  VITAL SIGNS: /68   Pulse (!) 105   Temp 36.5 °C (97.7 °F) (Temporal)   Resp 16   Ht 1.702 m (5' 7\")   Wt 63.5 kg (140 lb)   LMP 10/30/2018   SpO2 96%   BMI 21.93 kg/m²      Nursing notes and vitals reviewed.  Constitutional: Well developed, Well nourished, No acute distress, Non-toxic appearance.   Eyes: PERRLA, EOMI, Conjunctiva normal, No discharge.   Cardiovascular: Slightly tachycardic, Normal rhythm, No murmurs, No rubs, No gallops.   Thorax & Lungs: No respiratory distress, No rales, No rhonchi, No wheezing, No chest tenderness.   Abdomen: Bowel sounds normal, Soft, No tenderness, No guarding, No rebound, No masses, No pulsatile masses.   Skin: Warm, Dry, No erythema, No rash.   Extremities: No deformity, no edema, good range of " motion range of motion upper lower extremes bilaterally  Neurologic: Slurred speech but speaking in full sentences, alert & oriented x 3, following commands, somnolent response to verbal stimulus,, no focal abnormalities noted, acting appropriately on examination  Psychiatric: Depressed affect, stating she is suicidal wants to jump off a parking structure      DIAGNOSTIC STUDIES / PROCEDURES  EKG  I have independently interpreted this EKG  Sinus tachycardia on monitor    LABS  Results for orders placed or performed during the hospital encounter of 10/03/23   Urine Drug Screen   Result Value Ref Range    Amphetamines Urine Negative Negative    Barbiturates Negative Negative    Benzodiazepines Negative Negative    Cocaine Metabolite Negative Negative    Fentanyl, Urine Negative Negative    Methadone Negative Negative    Opiates Negative Negative    Oxycodone Negative Negative    Phencyclidine -Pcp Negative Negative    Propoxyphene Negative Negative    Cannabinoid Metab Negative Negative   POC BREATHALIZER   Result Value Ref Range    POC Breathalizer 0.260 (A) 0.00 - 0.01 Percent   POC BREATHALIZER   Result Value Ref Range    POC Breathalizer 0.190 (A) 0.00 - 0.01 Percent   POC BREATHALIZER   Result Value Ref Range    POC Breathalizer 0.148 (A) 0.00 - 0.01 Percent         COURSE & MEDICAL DECISION MAKING    ED Observation Status? Yes; I am placing the patient in to an observation status due to a diagnostic uncertainty as well as therapeutic intensity. Patient placed in observation status at 3:21 PM, 10/3/2023.     Observation plan is as follows: Alcohol intoxication, suicidal ideation with intent, plan almost successful plan.        INITIAL ASSESSMENT, COURSE AND PLAN  Care Narrative: This is a 34-year-old female well-known to us for alcohol intoxication, bipolar asthma as well as depression.  Here in the emergency department, she is intoxicated with a breathalyzer of 0.260.  She is slightly tachycardic due to  secondary alcohol.  Lab values were not drawn secondary the patient's frequent presentation with alcohol intoxication.  The patient is at risk.  I do believe the patient will require to metabolize her alcohol and when sober she will be evaluated by lifeskills team.  Discussed this with Dr. Luu, partner, who will be following the patient for disposition.        ADDITIONAL PROBLEM LIST  Chronic alcohol abuse.  DISPOSITION AND DISCUSSIONS      Decision tools and prescription drugs considered including, but not limited to: I did consider blood work but here the patient had a breathalyzer that was elevated, chronic history of alcohol use, normal blood sugar therefore do not believe the patient required breathalyzer.    FINAL DIAGNOSIS  1. Alcoholic intoxication with complication (HCC)    2. Suicidal ideation           Electronically signed by: Jaime Phan D.O., 10/3/2023 3:20 PM

## 2023-10-03 NOTE — ED NOTES
Patient discharged home per ERP.  Discharge teaching and education discussed with patient. POC discussed.   Patient verbalized understanding of discharge teaching and education. No other questions at this time.     PIV removed.     VSS. Patient alert and oriented. Patient arranged ride for self. Able to ambulate off unit safely with steady gait.

## 2023-10-04 VITALS
SYSTOLIC BLOOD PRESSURE: 106 MMHG | HEART RATE: 105 BPM | WEIGHT: 140 LBS | RESPIRATION RATE: 18 BRPM | BODY MASS INDEX: 21.97 KG/M2 | HEIGHT: 67 IN | DIASTOLIC BLOOD PRESSURE: 68 MMHG | TEMPERATURE: 97.3 F | OXYGEN SATURATION: 95 %

## 2023-10-04 LAB
POC BREATHALIZER: 0.07 PERCENT (ref 0–0.01)
POC BREATHALIZER: 0.15 PERCENT (ref 0–0.01)
POC BREATHALIZER: 0.19 PERCENT (ref 0–0.01)

## 2023-10-04 PROCEDURE — 90791 PSYCH DIAGNOSTIC EVALUATION: CPT

## 2023-10-04 PROCEDURE — 700102 HCHG RX REV CODE 250 W/ 637 OVERRIDE(OP): Mod: UD

## 2023-10-04 PROCEDURE — 302970 POC BREATHALIZER: Performed by: EMERGENCY MEDICINE

## 2023-10-04 PROCEDURE — 302970 POC BREATHALIZER

## 2023-10-04 PROCEDURE — A9270 NON-COVERED ITEM OR SERVICE: HCPCS | Mod: UD

## 2023-10-04 RX ORDER — QUETIAPINE FUMARATE 100 MG/1
100 TABLET, FILM COATED ORAL NIGHTLY
Status: DISCONTINUED | OUTPATIENT
Start: 2023-10-04 | End: 2023-10-04 | Stop reason: HOSPADM

## 2023-10-04 RX ORDER — BUPROPION HYDROCHLORIDE 150 MG/1
150 TABLET, EXTENDED RELEASE ORAL 2 TIMES DAILY
Status: DISCONTINUED | OUTPATIENT
Start: 2023-10-04 | End: 2023-10-04 | Stop reason: HOSPADM

## 2023-10-04 RX ORDER — NICOTINE 21 MG/24HR
1 PATCH, TRANSDERMAL 24 HOURS TRANSDERMAL ONCE
Status: DISCONTINUED | OUTPATIENT
Start: 2023-10-04 | End: 2023-10-04 | Stop reason: HOSPADM

## 2023-10-04 RX ADMIN — NICOTINE 14 MG: 14 PATCH TRANSDERMAL at 09:46

## 2023-10-04 NOTE — ED NOTES
Pt to [facility] from [facility], Bedside report from LESLIE Aldridge. Pt resting in gurney comfortably, with even and unlabored chest rise and fall.  Pt is on RA, sitter in place, L2K in chart.

## 2023-10-04 NOTE — ED NOTES
Pt repositioning self on gurney, calm and cooperative, no needs at this time, 1:1 sitter within direct view of pt

## 2023-10-04 NOTE — ED NOTES
Pt sleeping on gurney, active chest rise noted, 1:1 sitter within direct view of pt, no needs at this time
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
## 2023-10-04 NOTE — DISCHARGE PLANNING
Alert Team Note:    Contacted [facility], spoke to Elisa. Pt referral has been received and is being reviewed. Facility is at capacity and has no bed availability at this time.

## 2023-10-04 NOTE — ED NOTES
Bedside report given to RN Bandar    Oxygen:RA  SI Precautions:1:1 sitter, pt in paper scrubs, checklist in place, stop sign on door  Fall Risk status:N/A  Patient Mentation:A+O x 4

## 2023-10-04 NOTE — ED NOTES
Bedside report received by LESLIE Barry    Oxygen:RA  SI Precautions:1:1 sitter, pt in paper scrubs, all items removed from room, stop sign on door, checklist in place  Patient Mentation:A+O x 4

## 2023-10-04 NOTE — ED NOTES
Bedside report received from off going RN/tech: Kathrine, assumed care of patient.      Fall risk interventions in place: Human-sitter if tele-sitter fails (all applicable per [facility] Fall risk assessment)   Continuous monitoring: Not Applicable   IVF/IV medications: Not Applicable   Oxygen: Room Air  Bedside sitter: Report given to sitter and checklist completed, stop sign in doorway  Isolation: Not Applicable

## 2023-10-04 NOTE — ED NOTES
Patient continues to sleep. Sitter at the bedside. SI precautions in place. Ordered diet per protocol

## 2023-10-04 NOTE — ED NOTES
Assumed care of pt, received bedside report from LESLIE Chisholm. SI precautions in place. Safety rounds completed, pt resting comfortably in the room on room air wearing a paper gown. Verified that Legal Hold appropriate and signed in patient's chart. Pt wearing paper gown for safety. Pt educated about legal hold procedures and pt verbalized understanding. 1:1 sitter continued per protocol for close monitoring. Continuous visual monitoring by Trained Personnel. Sitter has unobstructed view of patient at all times. Discussion with sitter about patient care, safety and support.

## 2023-10-04 NOTE — ED NOTES
Verbal orders obtained from ERP for nicotine patch per patient request. Patient denies further needs, refuses breakfast tray. 1 to 1 sitter in view of patient.

## 2023-10-04 NOTE — DISCHARGE SUMMARY
"  ED Observation Discharge Summary    Patient:Rhiannon Marrero  Patient : 1985  Patient MRN: [id number]  Patient PCP: MALOU Harrington    Admit Date: 10/3/2023  Discharge Date and Time: 10/04/23 11:24 AM  Discharge Diagnosis: Alcohol intoxication, situational depression  Discharge Attending: Arnaldo Maldonado M.D.  Discharge Service: ED Observation    ED Course  Rhiannon is a 38 y.o. female who was evaluated at [facility] for alcohol intoxication and suicidal ideation.  Patient was seen here for significant alcohol intoxication, patient was stating that she was suicidal at that time stating that she was going to jump off a building.  She is very apologetic regarding this behavior and reports that she is not at all suicidal.  She reports that when she gets intoxicated sometimes she gets very depressed.  She wants to get back to work, she is forward thinking, she has been contracted to safety.  I asked patient if she was discharged if she feels safe, she states she does.    Discharge Exam:  /68   Pulse (!) 105   Temp 36.5 °C (97.7 °F) (Temporal)   Resp 16   Ht 1.702 m (5' 7\")   Wt 63.5 kg (140 lb)   LMP 10/30/2018   SpO2 96%   BMI 21.93 kg/m² .    Constitutional: Awake and alert. Nontoxic  HENT:  Grossly normal  Eyes: Grossly normal  Neck: Normal range of motion  Cardiovascular: Normal heart rate   Thorax & Lungs: No respiratory distress  Abdomen: Nontender  Skin:  No pathologic rash.   Extremities: Well perfused  Psychiatric: Affect normal    Labs  Results for orders placed or performed during the hospital encounter of 10/03/23   Urine Drug Screen   Result Value Ref Range    Amphetamines Urine Negative Negative    Barbiturates Negative Negative    Benzodiazepines Negative Negative    Cocaine Metabolite Negative Negative    Fentanyl, Urine Negative Negative    Methadone Negative Negative    Opiates Negative Negative    Oxycodone Negative Negative    Phencyclidine -Pcp Negative Negative    Propoxyphene " Negative Negative    Cannabinoid Metab Negative Negative   POC BREATHALIZER   Result Value Ref Range    POC Breathalizer 0.260 (A) 0.00 - 0.01 Percent   POC BREATHALIZER   Result Value Ref Range    POC Breathalizer 0.190 (A) 0.00 - 0.01 Percent   POC BREATHALIZER   Result Value Ref Range    POC Breathalizer 0.148 (A) 0.00 - 0.01 Percent   POC BREATHALIZER   Result Value Ref Range    POC Breathalizer 0.068 (A) 0.00 - 0.01 Percent       Radiology  No orders to display       Medications:   New Prescriptions    No medications on file       My final assessment includes   Alcohol intoxication, situational depression    Upon Reevaluation, the patient's condition has: Improved; and will be discharged.    Patient discharged from ED Observation status at 11:25 AM (Time) 10/04/23 (Date).     Total time spent on this ED Observation discharge encounter is > 30 Minutes    Electronically signed by: Arnaldo Maldonado M.D., 10/4/2023 11:24 AM

## 2023-10-04 NOTE — ED NOTES
Patient provided discharge instructions. All belongings returned to patient. Patient verbalized understanding. Patient leaving ER in stable condition. Patient ambulatory with steady gait. Wristband removed.
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
## 2023-10-04 NOTE — DISCHARGE PLANNING
Alert Team:    Breathalyzer reading of 0.360, continues to endorsing suicidal ideation present for the past 4 days. Patient reports sleeping outside and not connected to mental health care. Alert team will continue to monitor.

## 2023-10-23 ENCOUNTER — HOSPITAL ENCOUNTER (OUTPATIENT)
Dept: RADIOLOGY | Facility: MEDICAL CENTER | Age: 38
End: 2023-10-23
Payer: COMMERCIAL

## 2023-10-24 ENCOUNTER — HOSPITAL ENCOUNTER (INPATIENT)
Facility: MEDICAL CENTER | Age: 38
LOS: 3 days | DRG: 392 | End: 2023-10-27
Attending: INTERNAL MEDICINE | Admitting: HOSPITALIST
Payer: COMMERCIAL

## 2023-10-24 DIAGNOSIS — K31.84 GASTROPARESIS: ICD-10-CM

## 2023-10-24 DIAGNOSIS — R10.9 INTRACTABLE ABDOMINAL PAIN: ICD-10-CM

## 2023-10-24 LAB
ALBUMIN SERPL BCP-MCNC: 4.1 G/DL (ref 3.2–4.9)
ALBUMIN/GLOB SERPL: 1.6 G/DL
ALP SERPL-CCNC: 92 U/L (ref 30–99)
ALT SERPL-CCNC: 31 U/L (ref 2–50)
ANION GAP SERPL CALC-SCNC: 14 MMOL/L (ref 7–16)
AST SERPL-CCNC: 15 U/L (ref 12–45)
BASOPHILS # BLD AUTO: 0.8 % (ref 0–1.8)
BASOPHILS # BLD: 0.04 K/UL (ref 0–0.12)
BILIRUB SERPL-MCNC: 0.7 MG/DL (ref 0.1–1.5)
BUN SERPL-MCNC: 3 MG/DL (ref 8–22)
CALCIUM ALBUM COR SERPL-MCNC: 8.7 MG/DL (ref 8.5–10.5)
CALCIUM SERPL-MCNC: 8.8 MG/DL (ref 8.5–10.5)
CHLORIDE SERPL-SCNC: 101 MMOL/L (ref 96–112)
CO2 SERPL-SCNC: 21 MMOL/L (ref 20–33)
CREAT SERPL-MCNC: 0.49 MG/DL (ref 0.5–1.4)
EOSINOPHIL # BLD AUTO: 0.11 K/UL (ref 0–0.51)
EOSINOPHIL NFR BLD: 2.1 % (ref 0–6.9)
ERYTHROCYTE [DISTWIDTH] IN BLOOD BY AUTOMATED COUNT: 43.6 FL (ref 35.9–50)
EST. AVERAGE GLUCOSE BLD GHB EST-MCNC: 85 MG/DL
GFR SERPLBLD CREATININE-BSD FMLA CKD-EPI: 125 ML/MIN/1.73 M 2
GLOBULIN SER CALC-MCNC: 2.5 G/DL (ref 1.9–3.5)
GLUCOSE SERPL-MCNC: 93 MG/DL (ref 65–99)
HBA1C MFR BLD: 4.6 % (ref 4–5.6)
HCT VFR BLD AUTO: 37.2 % (ref 37–47)
HGB BLD-MCNC: 12.4 G/DL (ref 12–16)
IMM GRANULOCYTES # BLD AUTO: 0.01 K/UL (ref 0–0.11)
IMM GRANULOCYTES NFR BLD AUTO: 0.2 % (ref 0–0.9)
LACTATE SERPL-SCNC: 0.5 MMOL/L (ref 0.5–2)
LACTATE SERPL-SCNC: 0.6 MMOL/L (ref 0.5–2)
LACTATE SERPL-SCNC: 0.6 MMOL/L (ref 0.5–2)
LACTATE SERPL-SCNC: 0.7 MMOL/L (ref 0.5–2)
LDH SERPL L TO P-CCNC: 122 U/L (ref 107–266)
LIPASE SERPL-CCNC: 11 U/L (ref 11–82)
LYMPHOCYTES # BLD AUTO: 1.77 K/UL (ref 1–4.8)
LYMPHOCYTES NFR BLD: 34.4 % (ref 22–41)
MAGNESIUM SERPL-MCNC: 1.8 MG/DL (ref 1.5–2.5)
MCH RBC QN AUTO: 29.6 PG (ref 27–33)
MCHC RBC AUTO-ENTMCNC: 33.3 G/DL (ref 32.2–35.5)
MCV RBC AUTO: 88.8 FL (ref 81.4–97.8)
MONOCYTES # BLD AUTO: 0.47 K/UL (ref 0–0.85)
MONOCYTES NFR BLD AUTO: 9.1 % (ref 0–13.4)
NEUTROPHILS # BLD AUTO: 2.74 K/UL (ref 1.82–7.42)
NEUTROPHILS NFR BLD: 53.4 % (ref 44–72)
NRBC # BLD AUTO: 0 K/UL
NRBC BLD-RTO: 0 /100 WBC (ref 0–0.2)
PHOSPHATE SERPL-MCNC: 2.9 MG/DL (ref 2.5–4.5)
PLATELET # BLD AUTO: 217 K/UL (ref 164–446)
PMV BLD AUTO: 9.9 FL (ref 9–12.9)
POTASSIUM SERPL-SCNC: 3.6 MMOL/L (ref 3.6–5.5)
PROT SERPL-MCNC: 6.6 G/DL (ref 6–8.2)
RBC # BLD AUTO: 4.19 M/UL (ref 4.2–5.4)
SODIUM SERPL-SCNC: 136 MMOL/L (ref 135–145)
WBC # BLD AUTO: 5.1 K/UL (ref 4.8–10.8)

## 2023-10-24 PROCEDURE — A9270 NON-COVERED ITEM OR SERVICE: HCPCS | Performed by: HOSPITALIST

## 2023-10-24 PROCEDURE — 700105 HCHG RX REV CODE 258: Performed by: HOSPITALIST

## 2023-10-24 PROCEDURE — 80053 COMPREHEN METABOLIC PANEL: CPT

## 2023-10-24 PROCEDURE — 83735 ASSAY OF MAGNESIUM: CPT

## 2023-10-24 PROCEDURE — 85025 COMPLETE CBC W/AUTO DIFF WBC: CPT

## 2023-10-24 PROCEDURE — 700102 HCHG RX REV CODE 250 W/ 637 OVERRIDE(OP): Performed by: HOSPITALIST

## 2023-10-24 PROCEDURE — 83615 LACTATE (LD) (LDH) ENZYME: CPT

## 2023-10-24 PROCEDURE — 99223 1ST HOSP IP/OBS HIGH 75: CPT | Mod: AI | Performed by: HOSPITALIST

## 2023-10-24 PROCEDURE — 36415 COLL VENOUS BLD VENIPUNCTURE: CPT

## 2023-10-24 PROCEDURE — 83690 ASSAY OF LIPASE: CPT

## 2023-10-24 PROCEDURE — 770006 HCHG ROOM/CARE - MED/SURG/GYN SEMI*

## 2023-10-24 PROCEDURE — 83605 ASSAY OF LACTIC ACID: CPT

## 2023-10-24 PROCEDURE — 700111 HCHG RX REV CODE 636 W/ 250 OVERRIDE (IP): Performed by: HOSPITALIST

## 2023-10-24 PROCEDURE — 83036 HEMOGLOBIN GLYCOSYLATED A1C: CPT

## 2023-10-24 PROCEDURE — 84100 ASSAY OF PHOSPHORUS: CPT

## 2023-10-24 RX ORDER — METOCLOPRAMIDE HYDROCHLORIDE 5 MG/ML
10 INJECTION INTRAMUSCULAR; INTRAVENOUS EVERY 6 HOURS
Status: DISCONTINUED | OUTPATIENT
Start: 2023-10-24 | End: 2023-10-27 | Stop reason: HOSPADM

## 2023-10-24 RX ORDER — PROCHLORPERAZINE EDISYLATE 5 MG/ML
5-10 INJECTION INTRAMUSCULAR; INTRAVENOUS EVERY 4 HOURS PRN
Status: DISCONTINUED | OUTPATIENT
Start: 2023-10-24 | End: 2023-10-27 | Stop reason: HOSPADM

## 2023-10-24 RX ORDER — OXYCODONE HYDROCHLORIDE 5 MG/1
5 TABLET ORAL
Status: DISCONTINUED | OUTPATIENT
Start: 2023-10-24 | End: 2023-10-27 | Stop reason: HOSPADM

## 2023-10-24 RX ORDER — SODIUM CHLORIDE 9 MG/ML
INJECTION, SOLUTION INTRAVENOUS CONTINUOUS
Status: DISCONTINUED | OUTPATIENT
Start: 2023-10-24 | End: 2023-10-26

## 2023-10-24 RX ORDER — MIRTAZAPINE 15 MG/1
15 TABLET, FILM COATED ORAL NIGHTLY
Status: DISCONTINUED | OUTPATIENT
Start: 2023-10-24 | End: 2023-10-27 | Stop reason: HOSPADM

## 2023-10-24 RX ORDER — QUETIAPINE FUMARATE 100 MG/1
100 TABLET, FILM COATED ORAL
COMMUNITY
Start: 2023-09-30

## 2023-10-24 RX ORDER — PROMETHAZINE HYDROCHLORIDE 25 MG/1
12.5-25 TABLET ORAL EVERY 4 HOURS PRN
Status: DISCONTINUED | OUTPATIENT
Start: 2023-10-24 | End: 2023-10-27 | Stop reason: HOSPADM

## 2023-10-24 RX ORDER — PROMETHAZINE HYDROCHLORIDE 12.5 MG/1
12.5-25 SUPPOSITORY RECTAL EVERY 4 HOURS PRN
Status: DISCONTINUED | OUTPATIENT
Start: 2023-10-24 | End: 2023-10-27 | Stop reason: HOSPADM

## 2023-10-24 RX ORDER — TRAZODONE HYDROCHLORIDE 50 MG/1
100 TABLET ORAL NIGHTLY
Status: DISCONTINUED | OUTPATIENT
Start: 2023-10-24 | End: 2023-10-27 | Stop reason: HOSPADM

## 2023-10-24 RX ORDER — AMOXICILLIN 250 MG
2 CAPSULE ORAL 2 TIMES DAILY
Status: DISCONTINUED | OUTPATIENT
Start: 2023-10-24 | End: 2023-10-27 | Stop reason: HOSPADM

## 2023-10-24 RX ORDER — DICYCLOMINE HCL 20 MG
20 TABLET ORAL 3 TIMES DAILY
Status: DISCONTINUED | OUTPATIENT
Start: 2023-10-24 | End: 2023-10-27 | Stop reason: HOSPADM

## 2023-10-24 RX ORDER — ONDANSETRON 2 MG/ML
4 INJECTION INTRAMUSCULAR; INTRAVENOUS EVERY 4 HOURS PRN
Status: DISCONTINUED | OUTPATIENT
Start: 2023-10-24 | End: 2023-10-27 | Stop reason: HOSPADM

## 2023-10-24 RX ORDER — GABAPENTIN 300 MG/1
600 CAPSULE ORAL 3 TIMES DAILY
Status: DISCONTINUED | OUTPATIENT
Start: 2023-10-24 | End: 2023-10-27 | Stop reason: HOSPADM

## 2023-10-24 RX ORDER — ACETAMINOPHEN 325 MG/1
650 TABLET ORAL EVERY 6 HOURS PRN
Status: DISCONTINUED | OUTPATIENT
Start: 2023-10-24 | End: 2023-10-27 | Stop reason: HOSPADM

## 2023-10-24 RX ORDER — OLANZAPINE 5 MG/1
5 TABLET ORAL DAILY
Status: DISCONTINUED | OUTPATIENT
Start: 2023-10-24 | End: 2023-10-27 | Stop reason: HOSPADM

## 2023-10-24 RX ORDER — LEVETIRACETAM 500 MG/1
500 TABLET ORAL 2 TIMES DAILY
COMMUNITY
Start: 2023-09-30

## 2023-10-24 RX ORDER — DOXEPIN 6 MG/1
6 TABLET, FILM COATED ORAL
COMMUNITY

## 2023-10-24 RX ORDER — POLYETHYLENE GLYCOL 3350 17 G/17G
1 POWDER, FOR SOLUTION ORAL
Status: DISCONTINUED | OUTPATIENT
Start: 2023-10-24 | End: 2023-10-27 | Stop reason: HOSPADM

## 2023-10-24 RX ORDER — HYDROMORPHONE HYDROCHLORIDE 1 MG/ML
0.5 INJECTION, SOLUTION INTRAMUSCULAR; INTRAVENOUS; SUBCUTANEOUS
Status: DISCONTINUED | OUTPATIENT
Start: 2023-10-24 | End: 2023-10-25

## 2023-10-24 RX ORDER — ONDANSETRON 4 MG/1
4 TABLET, ORALLY DISINTEGRATING ORAL EVERY 4 HOURS PRN
Status: DISCONTINUED | OUTPATIENT
Start: 2023-10-24 | End: 2023-10-27 | Stop reason: HOSPADM

## 2023-10-24 RX ORDER — ARIPIPRAZOLE 15 MG/1
15 TABLET ORAL DAILY
Status: DISCONTINUED | OUTPATIENT
Start: 2023-10-24 | End: 2023-10-27 | Stop reason: HOSPADM

## 2023-10-24 RX ORDER — SERTRALINE HYDROCHLORIDE 100 MG/1
100 TABLET, FILM COATED ORAL DAILY
Status: DISCONTINUED | OUTPATIENT
Start: 2023-10-24 | End: 2023-10-27 | Stop reason: HOSPADM

## 2023-10-24 RX ORDER — OXYCODONE HYDROCHLORIDE 10 MG/1
10 TABLET ORAL
Status: DISCONTINUED | OUTPATIENT
Start: 2023-10-24 | End: 2023-10-27 | Stop reason: HOSPADM

## 2023-10-24 RX ORDER — OMEPRAZOLE 20 MG/1
20 CAPSULE, DELAYED RELEASE ORAL DAILY
Status: DISCONTINUED | OUTPATIENT
Start: 2023-10-24 | End: 2023-10-27 | Stop reason: HOSPADM

## 2023-10-24 RX ORDER — BISACODYL 10 MG
10 SUPPOSITORY, RECTAL RECTAL
Status: DISCONTINUED | OUTPATIENT
Start: 2023-10-24 | End: 2023-10-27 | Stop reason: HOSPADM

## 2023-10-24 RX ADMIN — PANCRELIPASE 36000 UNITS: 30000; 6000; 19000 CAPSULE, DELAYED RELEASE PELLETS ORAL at 16:36

## 2023-10-24 RX ADMIN — MIRTAZAPINE 15 MG: 15 TABLET, FILM COATED ORAL at 20:56

## 2023-10-24 RX ADMIN — DOCUSATE SODIUM 50 MG AND SENNOSIDES 8.6 MG 2 TABLET: 8.6; 5 TABLET, FILM COATED ORAL at 05:43

## 2023-10-24 RX ADMIN — OXYCODONE HYDROCHLORIDE 5 MG: 5 TABLET ORAL at 20:16

## 2023-10-24 RX ADMIN — TRAZODONE HYDROCHLORIDE 100 MG: 50 TABLET ORAL at 20:56

## 2023-10-24 RX ADMIN — GABAPENTIN 600 MG: 300 CAPSULE ORAL at 20:55

## 2023-10-24 RX ADMIN — DICYCLOMINE HYDROCHLORIDE 20 MG: 20 TABLET ORAL at 11:33

## 2023-10-24 RX ADMIN — TRAZODONE HYDROCHLORIDE 100 MG: 50 TABLET ORAL at 03:52

## 2023-10-24 RX ADMIN — METOCLOPRAMIDE 10 MG: 5 INJECTION, SOLUTION INTRAMUSCULAR; INTRAVENOUS at 10:11

## 2023-10-24 RX ADMIN — OMEPRAZOLE 20 MG: 20 CAPSULE, DELAYED RELEASE ORAL at 05:43

## 2023-10-24 RX ADMIN — METOCLOPRAMIDE 10 MG: 5 INJECTION, SOLUTION INTRAMUSCULAR; INTRAVENOUS at 16:36

## 2023-10-24 RX ADMIN — ONDANSETRON 4 MG: 4 TABLET, ORALLY DISINTEGRATING ORAL at 20:16

## 2023-10-24 RX ADMIN — SODIUM CHLORIDE: 9 INJECTION, SOLUTION INTRAVENOUS at 03:52

## 2023-10-24 RX ADMIN — OXYCODONE HYDROCHLORIDE 5 MG: 5 TABLET ORAL at 14:56

## 2023-10-24 RX ADMIN — METOCLOPRAMIDE 10 MG: 5 INJECTION, SOLUTION INTRAMUSCULAR; INTRAVENOUS at 20:56

## 2023-10-24 RX ADMIN — GABAPENTIN 600 MG: 300 CAPSULE ORAL at 05:43

## 2023-10-24 RX ADMIN — ARIPIPRAZOLE 15 MG: 15 TABLET ORAL at 05:43

## 2023-10-24 RX ADMIN — SODIUM CHLORIDE: 9 INJECTION, SOLUTION INTRAVENOUS at 14:57

## 2023-10-24 RX ADMIN — GABAPENTIN 600 MG: 300 CAPSULE ORAL at 11:33

## 2023-10-24 RX ADMIN — DICYCLOMINE HYDROCHLORIDE 20 MG: 20 TABLET ORAL at 05:42

## 2023-10-24 RX ADMIN — METOCLOPRAMIDE 10 MG: 5 INJECTION, SOLUTION INTRAMUSCULAR; INTRAVENOUS at 03:53

## 2023-10-24 RX ADMIN — DICYCLOMINE HYDROCHLORIDE 20 MG: 20 TABLET ORAL at 20:55

## 2023-10-24 RX ADMIN — OXYCODONE HYDROCHLORIDE 5 MG: 5 TABLET ORAL at 11:32

## 2023-10-24 RX ADMIN — OLANZAPINE 5 MG: 5 TABLET, FILM COATED ORAL at 05:43

## 2023-10-24 RX ADMIN — SERTRALINE 100 MG: 100 TABLET, FILM COATED ORAL at 05:42

## 2023-10-24 ASSESSMENT — ENCOUNTER SYMPTOMS
SHORTNESS OF BREATH: 0
WEAKNESS: 0
CONSTIPATION: 0
HALLUCINATIONS: 0
ORTHOPNEA: 0
BLURRED VISION: 0
VOMITING: 1
STRIDOR: 0
WHEEZING: 0
SINUS PAIN: 0
SPUTUM PRODUCTION: 0
HEMOPTYSIS: 0
CHILLS: 0
EYE PAIN: 0
SORE THROAT: 0
DIZZINESS: 0
FLANK PAIN: 0
HEADACHES: 0
DIAPHORESIS: 0
DIARRHEA: 0
HEARTBURN: 0
NAUSEA: 1
PHOTOPHOBIA: 0
BACK PAIN: 0
CLAUDICATION: 0
MYALGIAS: 0
FALLS: 0
ABDOMINAL PAIN: 1
BRUISES/BLEEDS EASILY: 0
NECK PAIN: 0
BLOOD IN STOOL: 0
POLYDIPSIA: 0
COUGH: 0
FEVER: 0
DEPRESSION: 0
PND: 0
PALPITATIONS: 0
TREMORS: 0
DOUBLE VISION: 0
TINGLING: 0

## 2023-10-24 ASSESSMENT — PAIN DESCRIPTION - PAIN TYPE
TYPE: ACUTE PAIN

## 2023-10-24 ASSESSMENT — LIFESTYLE VARIABLES
ALCOHOL_USE: NO
ON A TYPICAL DAY WHEN YOU DRINK ALCOHOL HOW MANY DRINKS DO YOU HAVE: 0
TOTAL SCORE: 0
DOES PATIENT WANT TO STOP DRINKING: NO
EVER HAD A DRINK FIRST THING IN THE MORNING TO STEADY YOUR NERVES TO GET RID OF A HANGOVER: NO
HOW MANY TIMES IN THE PAST YEAR HAVE YOU HAD 5 OR MORE DRINKS IN A DAY: 0
TOTAL SCORE: 0
TOTAL SCORE: 0
CONSUMPTION TOTAL: NEGATIVE
SUBSTANCE_ABUSE: 0
HAVE PEOPLE ANNOYED YOU BY CRITICIZING YOUR DRINKING: NO
AVERAGE NUMBER OF DAYS PER WEEK YOU HAVE A DRINK CONTAINING ALCOHOL: 0
EVER FELT BAD OR GUILTY ABOUT YOUR DRINKING: NO
HAVE YOU EVER FELT YOU SHOULD CUT DOWN ON YOUR DRINKING: NO

## 2023-10-24 ASSESSMENT — FIBROSIS 4 INDEX: FIB4 SCORE: 0.8

## 2023-10-24 NOTE — PROGRESS NOTES
4 Eyes Skin Assessment Completed by LESLIE Ni and LESLIE Roque.    Head WDL  Ears WDL  Nose WDL  Mouth WDL  Neck WDL  Breast/Chest WDL  Shoulder Blades WDL  Spine WDL  (R) Arm/Elbow/Hand WDL  (L) Arm/Elbow/Hand WDL  Abdomen WDL  Groin WDL  Scrotum/Coccyx/Buttocks WDL  (R) Leg WDL  (L) Leg WDL  (R) Heel/Foot/Toe WDL  (L) Heel/Foot/Toe WDL          Devices In Places       Interventions In Place N/A    Possible Skin Injury No    Pictures Uploaded Into Epic N/A  Wound Consult Placed N/A  RN Wound Prevention Protocol Ordered No

## 2023-10-24 NOTE — PROGRESS NOTES
[facility] DIRECT ADMISSION REPORT  Transferring facility: [facility]  Transferring physician: Dr. Younger  Chief complaint: Abdominal pain, nausea  Pertinent history & patient course: 38-year-old female with past medical history of chronic alcoholic pancreatitis, chronic abdominal pain, presented to outside facility and was admitted on 10/17 with worsening of abdominal pain, nausea, decreased oral intake, weight loss.  She undergone evaluation with EGD that showed bile gastritis.  CT of the abdomen showed signs of chronic pancreatitis.  Nuclear study showed grade 2 gastroparesis.  Patient was treated with metoclopramide without improvement of symptoms.  Requested transfer for gastroenterology consult.  Pertinent imaging & lab results:   Consultants called prior to transfer and pertinent input from consultants:   Code Status: full per transferring provider, I personally verified with the transferring provider patient's code status and the transferring provider has confirmed this with the patient.  Reason for Transfer: GI consult  Further work up or recommendations requested prior to transfer:     Patient accepted for transfer: Yes  Accepting [facility] Facility: [facility] Nursing to notify the Triage Coordinator in the RTOC via Voalte or Phone ext. [phone] when patient arrives to the unit. The Triage Coordinator will assign the admitting provider.    Consultants to be called upon arrival: GI   Admission status: Inpatient.   Floor requested: med  If ICU transfer, name of intensivist case discussed with and pertinent input from critical care: n/a    The admitting provider is the point of contact for questions or concerns regarding patient's care.

## 2023-10-24 NOTE — CARE PLAN
The patient is Stable - Low risk of patient condition declining or worsening    Shift Goals  Clinical Goals: Pain and nausea mgmt  Patient Goals: Comfort    Progress made toward(s) clinical / shift goals:  ***    Patient is not progressing towards the following goals:

## 2023-10-24 NOTE — PROGRESS NOTES
Patient seen and evaluated bedside.  She appears comfortable in no acute distress.  She reports nausea but no active vomiting.  Continue IV fluid hydration, IV Reglan and started on clear liquid diet.  If patient is able to tolerate diet she will be discharged tomorrow with close outpatient follow-up with GI

## 2023-10-24 NOTE — PROGRESS NOTES
Med Rec UPDATED and COMPLETE per PATIENT  Allergies Reviewed  Antibiotcs in past 30 days:NO  Anticoagulant in past 14 days:NO  Preferred pharmacy:[facility]    Pt states she does not take ABILIFY     Pt states she should be taking MIRTAZAPINE 15 mg but has been out of the medication for month's     Pt states she is NOT out of Keppra however pt states she has not been given the medication since she's been in hospital    Pt unable to verify Doxepin strength     Verified with [place] pharmacy Doxepin strength
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
## 2023-10-25 PROCEDURE — 99233 SBSQ HOSP IP/OBS HIGH 50: CPT | Performed by: INTERNAL MEDICINE

## 2023-10-25 PROCEDURE — 700111 HCHG RX REV CODE 636 W/ 250 OVERRIDE (IP): Performed by: INTERNAL MEDICINE

## 2023-10-25 PROCEDURE — A9270 NON-COVERED ITEM OR SERVICE: HCPCS | Performed by: HOSPITALIST

## 2023-10-25 PROCEDURE — 99222 1ST HOSP IP/OBS MODERATE 55: CPT | Mod: GC | Performed by: INTERNAL MEDICINE

## 2023-10-25 PROCEDURE — 700111 HCHG RX REV CODE 636 W/ 250 OVERRIDE (IP): Mod: JZ | Performed by: HOSPITALIST

## 2023-10-25 PROCEDURE — 700102 HCHG RX REV CODE 250 W/ 637 OVERRIDE(OP): Performed by: HOSPITALIST

## 2023-10-25 PROCEDURE — 770006 HCHG ROOM/CARE - MED/SURG/GYN SEMI*

## 2023-10-25 PROCEDURE — 700105 HCHG RX REV CODE 258: Performed by: HOSPITALIST

## 2023-10-25 RX ORDER — ENOXAPARIN SODIUM 100 MG/ML
40 INJECTION SUBCUTANEOUS DAILY
Status: DISCONTINUED | OUTPATIENT
Start: 2023-10-25 | End: 2023-10-27 | Stop reason: HOSPADM

## 2023-10-25 RX ORDER — KETOROLAC TROMETHAMINE 30 MG/ML
15 INJECTION, SOLUTION INTRAMUSCULAR; INTRAVENOUS EVERY 6 HOURS
Status: DISCONTINUED | OUTPATIENT
Start: 2023-10-25 | End: 2023-10-27 | Stop reason: HOSPADM

## 2023-10-25 RX ADMIN — METOCLOPRAMIDE 10 MG: 5 INJECTION, SOLUTION INTRAMUSCULAR; INTRAVENOUS at 09:50

## 2023-10-25 RX ADMIN — SODIUM CHLORIDE: 9 INJECTION, SOLUTION INTRAVENOUS at 12:22

## 2023-10-25 RX ADMIN — OXYCODONE HYDROCHLORIDE 5 MG: 5 TABLET ORAL at 15:06

## 2023-10-25 RX ADMIN — METOCLOPRAMIDE 10 MG: 5 INJECTION, SOLUTION INTRAMUSCULAR; INTRAVENOUS at 20:36

## 2023-10-25 RX ADMIN — PANCRELIPASE 36000 UNITS: 30000; 6000; 19000 CAPSULE, DELAYED RELEASE PELLETS ORAL at 13:59

## 2023-10-25 RX ADMIN — SERTRALINE 100 MG: 100 TABLET, FILM COATED ORAL at 04:09

## 2023-10-25 RX ADMIN — DICYCLOMINE HYDROCHLORIDE 20 MG: 20 TABLET ORAL at 20:37

## 2023-10-25 RX ADMIN — METOCLOPRAMIDE 10 MG: 5 INJECTION, SOLUTION INTRAMUSCULAR; INTRAVENOUS at 04:08

## 2023-10-25 RX ADMIN — OMEPRAZOLE 20 MG: 20 CAPSULE, DELAYED RELEASE ORAL at 04:08

## 2023-10-25 RX ADMIN — PANCRELIPASE 36000 UNITS: 30000; 6000; 19000 CAPSULE, DELAYED RELEASE PELLETS ORAL at 09:50

## 2023-10-25 RX ADMIN — PANCRELIPASE 36000 UNITS: 30000; 6000; 19000 CAPSULE, DELAYED RELEASE PELLETS ORAL at 20:37

## 2023-10-25 RX ADMIN — OLANZAPINE 5 MG: 5 TABLET, FILM COATED ORAL at 04:09

## 2023-10-25 RX ADMIN — OXYCODONE HYDROCHLORIDE 5 MG: 5 TABLET ORAL at 09:50

## 2023-10-25 RX ADMIN — GABAPENTIN 600 MG: 300 CAPSULE ORAL at 04:09

## 2023-10-25 RX ADMIN — MIRTAZAPINE 15 MG: 15 TABLET, FILM COATED ORAL at 20:37

## 2023-10-25 RX ADMIN — TRAZODONE HYDROCHLORIDE 100 MG: 50 TABLET ORAL at 20:37

## 2023-10-25 RX ADMIN — DICYCLOMINE HYDROCHLORIDE 20 MG: 20 TABLET ORAL at 12:21

## 2023-10-25 RX ADMIN — GABAPENTIN 600 MG: 300 CAPSULE ORAL at 12:21

## 2023-10-25 RX ADMIN — OXYCODONE HYDROCHLORIDE 5 MG: 5 TABLET ORAL at 20:36

## 2023-10-25 RX ADMIN — KETOROLAC TROMETHAMINE 15 MG: 30 INJECTION, SOLUTION INTRAMUSCULAR; INTRAVENOUS at 16:34

## 2023-10-25 RX ADMIN — METOCLOPRAMIDE 10 MG: 5 INJECTION, SOLUTION INTRAMUSCULAR; INTRAVENOUS at 15:06

## 2023-10-25 RX ADMIN — GABAPENTIN 600 MG: 300 CAPSULE ORAL at 20:37

## 2023-10-25 RX ADMIN — DICYCLOMINE HYDROCHLORIDE 20 MG: 20 TABLET ORAL at 04:08

## 2023-10-25 ASSESSMENT — ENCOUNTER SYMPTOMS
CHILLS: 0
BLOOD IN STOOL: 0
VOMITING: 1
VOMITING: 0
CONSTIPATION: 0
DIARRHEA: 0
NAUSEA: 1
WEIGHT LOSS: 1
ABDOMINAL PAIN: 1
HEARTBURN: 0
FEVER: 0

## 2023-10-25 ASSESSMENT — PAIN DESCRIPTION - PAIN TYPE
TYPE: ACUTE PAIN

## 2023-10-25 NOTE — CARE PLAN
The patient is Stable - Low risk of patient condition declining or worsening    Shift Goals  Clinical Goals: control nausea and pain  Patient Goals: rest and comfort    Progress made toward(s) clinical / shift goals: nausea and pain control <5    Patient is not progressing towards the following goals:

## 2023-10-25 NOTE — PROGRESS NOTES
Hospital Medicine Daily Progress Note    Date of Service  10/25/2023    Chief Complaint  Rhiannon Marrero is a 38 y.o. female admitted 10/24/2023 with abdominal pain, nausea and vomiting    Hospital Course  Rhiannon Marrero is a 38 y.o. female who presented 10/24/2023 with a past medical history of alcohol abuse, chronic pancreatitis, ongoing alcohol abuse with last drink 10 days ago, history of cholecystectomy, pancreatic jejunostomy in February who presents to the hospital from [facility] for nausea, vomiting and epigastric abdominal pain.  Patient has had multiple ER visits for similar symptoms.  She was first admitted on 10/17 at [facility] and during her hospital stay she had a endoscopy that revealed bile gastritis and gastric emptying study that revealed grade 2 gastroparesis.  The patient was transferred due to persistent epigastric abdominal pain, nausea and vomiting.      Interval Problem Update  Patient seen and evaluated at bedside.  She is resting comfortably and does not appear to be in acute distress. She reports ongoing nausea.  We will attempt to advance to full liquid diet.  I have consulted and discussed the case with GI.  At this point we will make adjustments to her regimen.  We will discontinue Bentyl.  We will discontinue IV Dilaudid which may be contributing to her nausea and add IV Toradol 15 mg every 6 hours for pain control.  Continue IV Reglan and add erythromycin to help with gastric motility.  Once patient is able to tolerate an oral diet we will discharge her with close outpatient follow-up with her gastroenterologist Dr. Johnson.  I discussed the plan of care with the patient and her mother.      I have discussed this patient's plan of care and discharge plan at IDT rounds today with Case Management, Nursing, Nursing leadership, and other members of the IDT team.    Consultants/Specialty  GI    Code Status  Full Code    Disposition  The patient is not medically  cleared for discharge to home or a post-acute facility.  Anticipate discharge to: home with close outpatient follow-up    I have placed the appropriate orders for post-discharge needs.    Review of Systems  Review of Systems   Gastrointestinal:  Positive for abdominal pain, nausea and vomiting.        Physical Exam  Temp:  [36.2 °C (97.2 °F)-36.7 °C (98 °F)] 36.4 °C (97.6 °F)  Pulse:  [56-96] 56  Resp:  [16-18] 17  BP: ()/(51-64) 102/64  SpO2:  [91 %-95 %] 93 %    Physical Exam  Vitals and nursing note reviewed.   Constitutional:       General: She is not in acute distress.  HENT:      Head: Normocephalic.      Mouth/Throat:      Mouth: Mucous membranes are moist.   Eyes:      Pupils: Pupils are equal, round, and reactive to light.   Cardiovascular:      Rate and Rhythm: Normal rate and regular rhythm.      Pulses: Normal pulses.      Heart sounds: Normal heart sounds.   Pulmonary:      Effort: Pulmonary effort is normal.      Breath sounds: Normal breath sounds.   Abdominal:      Palpations: Abdomen is soft.      Tenderness: There is abdominal tenderness (mild).   Musculoskeletal:         General: No swelling.      Cervical back: Neck supple.   Skin:     General: Skin is warm.      Coloration: Skin is not jaundiced.   Neurological:      General: No focal deficit present.      Mental Status: She is alert and oriented to person, place, and time.   Psychiatric:         Mood and Affect: Mood normal.         Behavior: Behavior normal.         Fluids    Intake/Output Summary (Last 24 hours) at 10/25/2023 1505  Last data filed at 10/25/2023 1043  Gross per 24 hour   Intake 640 ml   Output --   Net 640 ml       Laboratory  Recent Labs     10/24/23  0243   WBC 5.1   RBC 4.19*   HEMOGLOBIN 12.4   HEMATOCRIT 37.2   MCV 88.8   MCH 29.6   MCHC 33.3   RDW 43.6   PLATELETCT 217   MPV 9.9     Recent Labs     10/24/23  0243   SODIUM 136   POTASSIUM 3.6   CHLORIDE 101   CO2 21   GLUCOSE 93   BUN 3*   CREATININE 0.49*   CALCIUM  8.8                   Imaging  No orders to display        Assessment/Plan  * Gastroparesis- (present on admission)  Assessment & Plan  Possibly related to pain medication use  Limit narcotics  IV Reglan  IV hydration  GI consulted      Chronic pancreatitis due to chronic alcoholism (HCC)- (present on admission)  Assessment & Plan  Lipase level normal  Multimodal pain control  Continue Creon    Drug-seeking behavior- (present on admission)  Assessment & Plan  DC IV dilaudid  Monitor pain control usage    Bipolar disorder (HCC)- (present on admission)  Assessment & Plan  Continue home medications    Alcohol dependence (HCC)- (present on admission)  Assessment & Plan  No signs of withdrawal  Last alcoholic drink was 10 days ago         VTE prophylaxis:     I have performed a physical exam and reviewed and updated ROS and Plan today (10/25/2023). In review of yesterday's note (10/24/2023), there are no changes except as documented above.    I spent 52 minutes, reviewing the chart, obtaining and/or reviewing separately obtained history. Performing a medically appropriate examination and evaluation.  Counseling and educating the patient. Ordering and reviewing medications, tests, or procedures.  Discussing the case with GI.  Documenting clinical information in EPIC. Independently interpreting results and communicating results to patient. Discussing future disposition of care with patient, family, RN and case management.

## 2023-10-25 NOTE — CARE PLAN
The patient is Stable - Low risk of patient condition declining or worsening    Shift Goals  Clinical Goals: Patient will report abdominal pain is less than 6 this shift  Patient Goals: Rest, comfort    Progress made toward(s) clinical / shift goals:  Goal not met, PRN oxycodone given for pain management    Patient is not progressing towards the following goals:      Pt AXOX4. Vitals stable  Up with steady gait independently   Denies nausea or vomiting  Tolerating clear liquids  IVF infusing  Suicide risk clarified with MD this AM, not actively having SI, no suicide precautions necessary per md  Pt resting in bed with call light in reach

## 2023-10-25 NOTE — DIETARY
"Nutrition services: Day 1 of admit.  Rhiannon Marrero is a 38 y.o. female with admitting DX of a abdominal pain.    Consult received for MST score of 2 for reported unintentional wt loss of 2-13 lb x < 1 week and decreased appetite.    Met w/ pt and pt's mother at bedside. Pt reported no recent changes in wt except since she was admitted yesterday r/t being on liquid diet. Pt expressed no further concerns regarding her wt.    Discussed food preference w/ pt to be inputted into [employer] program.    Pt w/ adequate fat and muscle stores.    Assessment:  Height: 170.2 cm (5' 7\")  Weight: 66.1 kg (145 lb 11.6 oz) taken via stand up scale on 10/24  Body mass index is 22.82 kg/m². BMI classification: normal  Diet/Intake: Full Liquids / PO <25% x 1 and 50-75% x 1 per ADLs    Evaluation:   Pt last evaluated by RD at [facility] on March 22, 2023, for which ASPEN criteria was met. Pt has gained 20 lb since this time.  Per today's MD progress note, pt still experiencing nausea. Discharge pending tolerance of oral diet.  Labs: BUN 3, creatinine 0.49  Meds: Reglan, zofran, pancrelipase, BM protocol  Last BM: PTA    Malnutrition Risk: Pt does not meet ASPEN criteria.    Recommendations/Plan:  Continue to monitor PO intake to determine necessity for supplementation.   Encourage intake of >50% of meals  Document intake of all meals as % taken in ADL's to provide interdisciplinary communication across all shifts.   Monitor weight.  Nutrition rep will continue to see patient for ongoing meal and snack preferences.       RD following  "

## 2023-10-25 NOTE — CONSULTS
Gastroenterology    I have personally seen and examined the patient and discussed the management with the resident.    I reviewed the resident's note and agree with the documented findings and plan of care.         CHIEF COMPLAINT: Upper abdominal pain, nausea without vomiting, decreased oral intake    HPI: Rhiannon Marrero is a 38 y.o. yo female with pertinent past medical history of chronic pancreatitis secondary to alcohol use disorder (1/5 of vodka every day for at least 20 years), cholecystectomy, hysterectomy due to fibroids and major depressive disorder, admitted on 10/24/2023 for a 10-day history of worsening upper abdominal pain with nausea without vomiting.    10 days prior to admission, chronic epigastralgia worsened, radiating to the back, increasing in intensity to severe, with frequent episodes of nausea without vomiting and decreased oral intake.  8 days prior to admission (October 17), patient went to the ED in [place] where she was admitted.  Pain management was partially successful.  During work-up, CT showed chronic pancreatitis (known diagnosis) and nuclear study showed grade 2 gastroparesis.  Was transferred on October 23 to [facility] for higher level of care.    Of note, patient was diagnosed with chronic pancreatitis secondary to alcohol use disorder this is past medical history of cholecystectomy).  Baseline is persistent mild to moderate upper abdominal pain (does not use anything for it) with episodes of increased intensity that prompt patient to go to the ED (almost once a month).  Also, persistent nausea without vomiting and watery diarrhea (when partially formed bowel movement, floats).  She's been an alcoholic for almost 20 years with few episodes of sobriety with no withdrawal symptoms.  Usually drinks 1/5 of vodka but has started to cut down (does not specify quantity).  Last drink was 10 days ago.    Today, states that abdominal pain persists with same characteristics, nausea has  decreased in frequency, still without vomiting.  Denies fever/chills, diarrhea (single bowel movement yesterday was partially formed).  Pain is currently managed with opioids, antispasmodics, gabapentin and tylenol with partial success.     Established GI: Gastroenterology [facility]  Last EGD: Unknown    CURRENT MEDS: allergies reported for Promethazine hcl    senna-docusate (Pericolace Or Senokot S) 8.6-50 MG per tablet 2 Tablet, 2 Tablet, Oral, BID, 2 Tablet at 10/24/23 0543 **AND** polyethylene glycol/lytes (Miralax) PACKET 1 Packet, 1 Packet, Oral, QDAY PRN **AND** magnesium hydroxide (Milk Of Magnesia) suspension 30 mL, 30 mL, Oral, QDAY PRN **AND** bisacodyl (Dulcolax) suppository 10 mg, 10 mg, Rectal, QDAY PRN, Devin Costello M.D.    NS infusion, , Intravenous, Continuous, Devin Costello M.D., Last Rate: 83 mL/hr at 10/24/23 1457, New Bag at 10/24/23 1457    acetaminophen (Tylenol) tablet 650 mg, 650 mg, Oral, Q6HRS PRN, Devin Costello M.D.    Notify provider if pain remains uncontrolled, , , CONTINUOUS **AND** Use the Numeric Rating Scale (NRS), Oleary-Baker Faces (WBF), or FLACC on regular floors and Critical-Care Pain Observation Tool (CPOT) on ICUs/Trauma to assess pain, , , CONTINUOUS **AND** Pulse Ox, , , CONTINUOUS **AND** Pharmacy Consult Request ...Pain Management Review 1 Each, 1 Each, Other, PHARMACY TO DOSE **AND** If patient difficult to arouse and/or has respiratory depression (respiratory rate of 10 or less), stop any opiates that are currently infusing and call a Rapid Response., , , CONTINUOUS, Devin Costello M.D.    oxyCODONE immediate-release (Roxicodone) tablet 5 mg, 5 mg, Oral, Q3HRS PRN, 5 mg at 10/25/23 0950 **OR** oxyCODONE immediate release (Roxicodone) tablet 10 mg, 10 mg, Oral, Q3HRS PRN **OR** HYDROmorphone (Dilaudid) injection 0.5 mg, 0.5 mg, Intravenous, Q3HRS PRN, Devin Costello M.D.    ondansetron (Zofran) syringe/vial injection 4 mg, 4 mg, Intravenous, Q4HRS PRN, Devin Costello,  M.D.    ondansetron (Zofran ODT) dispertab 4 mg, 4 mg, Oral, Q4HRS PRN, Devin Costello M.D., 4 mg at 10/24/23 2016    promethazine (Phenergan) tablet 12.5-25 mg, 12.5-25 mg, Oral, Q4HRS PRN, Devin Costello M.D.    promethazine (Phenergan) suppository 12.5-25 mg, 12.5-25 mg, Rectal, Q4HRS PRN, Devin Costello M.D.    prochlorperazine (Compazine) injection 5-10 mg, 5-10 mg, Intravenous, Q4HRS PRN, Devin Costello M.D.    metoclopramide (Reglan) injection 10 mg, 10 mg, Intravenous, Q6HRS, Devin Costello M.D., 10 mg at 10/25/23 0950    ARIPiprazole (Abilify) tablet 15 mg, 15 mg, Oral, DAILY, Devin Costello M.D., 15 mg at 10/24/23 0543    dicyclomine (Bentyl) tablet 20 mg, 20 mg, Oral, TID, Devin Costello M.D., 20 mg at 10/25/23 0408    gabapentin (Neurontin) capsule 600 mg, 600 mg, Oral, TID, Devin Costello M.D., 600 mg at 10/25/23 0409    mirtazapine (Remeron) tablet 15 mg, 15 mg, Oral, Nightly, Devin Costello M.D., 15 mg at 10/24/23 2056    OLANZapine (ZyPREXA) tablet 5 mg, 5 mg, Oral, DAILY, Devin Costello M.D., 5 mg at 10/25/23 0409    pancrelipase (Lip-Prot-Amyl) (Creon 6000) 6000-17420 units capsule 36,000 Units, 6 Capsule, Oral, TID WITH MEALS, Devin Costello M.D., 36,000 Units at 10/25/23 0950    sertraline (Zoloft) tablet 100 mg, 100 mg, Oral, DAILY, Devin Costello M.D., 100 mg at 10/25/23 0409    traZODone (Desyrel) tablet 100 mg, 100 mg, Oral, Nightly, Devin Costello M.D., 100 mg at 10/24/23 2056    omeprazole (PriLOSEC) capsule 20 mg, 20 mg, Oral, DAILY, Devin Costello M.D., 20 mg at 10/25/23 0408     VITALS:    10/24/23 1919 10/24/23 2051 10/25/23 0330 10/25/23 0739   BP: 100/58 107/60 104/60 102/64   Pulse: 96  67 (!) 56   Resp: 18  18 17   Temp: 36.2 °C (97.2 °F)  36.6 °C (97.8 °F) 36.4 °C (97.6 °F)   TempSrc: Temporal  Temporal Temporal   SpO2: 95%  92% 93%   Weight:       Height:         Body mass index is 22.82 kg/m².  Review of Systems   Constitutional:  Positive for malaise/fatigue and weight loss. Negative for  chills and fever.   Gastrointestinal:  Positive for abdominal pain and nausea. Negative for blood in stool, constipation, diarrhea, heartburn, melena and vomiting.      Physical Exam  Vitals reviewed.   Constitutional:       General: She is not in acute distress.     Appearance: Normal appearance. She is obese. She is not ill-appearing or toxic-appearing.   HENT:      Head: Normocephalic and atraumatic.      Right Ear: Tympanic membrane normal.      Left Ear: Tympanic membrane normal.      Nose: Nose normal.      Mouth/Throat:      Mouth: Mucous membranes are moist.      Pharynx: Oropharynx is clear.   Eyes:      Extraocular Movements: Extraocular movements intact.      Conjunctiva/sclera: Conjunctivae normal.      Pupils: Pupils are equal, round, and reactive to light.   Cardiovascular:      Rate and Rhythm: Normal rate and regular rhythm.      Pulses: Normal pulses.      Heart sounds: Normal heart sounds.   Pulmonary:      Effort: Pulmonary effort is normal.      Breath sounds: Normal breath sounds.   Abdominal:      General: Bowel sounds are normal. There is distension.      Palpations: Abdomen is soft. There is no mass.      Tenderness: There is abdominal tenderness.      Comments: Moderate tenderness on superficial palpation of upper abdomen.  No peritoneal signs.   Musculoskeletal:         General: Normal range of motion.      Cervical back: Normal range of motion and neck supple.   Skin:     General: Skin is warm.      Capillary Refill: Capillary refill takes less than 2 seconds.   Neurological:      General: No focal deficit present.      Mental Status: She is alert and oriented to person, place, and time. Mental status is at baseline.   Psychiatric:         Mood and Affect: Affect is flat.         Behavior: Behavior normal.         Thought Content: Thought content normal.         Judgment: Judgment normal.     LABS:     10/24/23  0243   WBC 5.1   RBC 4.19*   HEMOGLOBIN 12.4   HEMATOCRIT 37.2   MCV 88.8    MCH 29.6   MCHC 33.3   RDW 43.6   PLATELETCT 217   MPV 9.9        10/24/23  0243   SODIUM 136   POTASSIUM 3.6   CHLORIDE 101   CO2 21   GLUCOSE 93   BUN 3*   CREATININE 0.49*   CALCIUM 8.8        10/24/23  0243   ALTSGPT 31   ASTSGOT 15   ALKPHOSPHAT 92   TBILIRUBIN 0.7   LIPASE 11   GLUCOSE 93      RADIOLOGY:   No orders to display     ASSESSMENT AND PLAN: Rhiannon Marrero is a 38 y.o. yo female with pertinent past medical history of chronic pancreatitis secondary to alcohol use disorder (1/5 of vodka every day for at least 20 years), cholecystectomy, hysterectomy due to fibroids and major depressive disorder, admitted on 10/24/2023 for a 10-day history of worsening upper abdominal pain with nausea without vomiting.    Gastroparesis grade 2: Recently diagnosed in [place] per nuclear study.  Feels hungry but persistent nausea without vomiting.  Endorses weight loss (does not specify quantity).  No constipation, no diarrhea (last bowel movement was yesterday).  Currently receiving opioids, antispasmodics, gabapentin and Tylenol, for pain management of pancreatitis (partial success), and Reglan with no apparent side effects.  Patient is a heavy drinker and endorses episodes of hyperglycemia.  No A1c on chart.  For now, will discontinue antispasmodics, de-escalate pain management to NSAIDs, Tylenol (consider adding other multimodal pain meds), change Reglan to twice daily to avoid side effects and add erythromycin for 3 days.  Will need follow-up with GI outpatient to explore definitive treatment options.    Chronic pancreatitis due to chronic alcoholism: Known diagnosis, not active at the moment.  Past medical history of cholecystectomy.  At baseline, moderate upper abdominal pain that radiates to the back with nausea without vomiting and watery diarrhea.  Does not use any meds regularly.  Several episodes of worsening of symptoms that led to multiple ED visits, at least once a month.  Today, persistent abdominal  pain with nausea, no diarrhea.  On physical examination, tenderness in upper abdomen with no peritoneal signs.  Benign lipase.  CT negative.  No signs of active pancreatitis.  Consider pain meds and pancrelipase on discharge until appointment with GI outpatient.    GI will sign off for now.    Armando R. Reyes Yparraguirre, M.D.  Internal Medicine Resident PGY-1  [facility]      This note was generated using voice recognition software which has a small chance of producing errors of grammar and possibly content. I have made every reasonable attempt to find and correct any obvious errors but expect that some may not be found prior to finalization of this note.

## 2023-10-26 LAB
ANION GAP SERPL CALC-SCNC: 10 MMOL/L (ref 7–16)
BUN SERPL-MCNC: 5 MG/DL (ref 8–22)
CALCIUM SERPL-MCNC: 8.4 MG/DL (ref 8.5–10.5)
CHLORIDE SERPL-SCNC: 107 MMOL/L (ref 96–112)
CO2 SERPL-SCNC: 23 MMOL/L (ref 20–33)
CREAT SERPL-MCNC: 0.61 MG/DL (ref 0.5–1.4)
ERYTHROCYTE [DISTWIDTH] IN BLOOD BY AUTOMATED COUNT: 43.9 FL (ref 35.9–50)
GFR SERPLBLD CREATININE-BSD FMLA CKD-EPI: 118 ML/MIN/1.73 M 2
GLUCOSE SERPL-MCNC: 103 MG/DL (ref 65–99)
HCT VFR BLD AUTO: 35.9 % (ref 37–47)
HGB BLD-MCNC: 12.1 G/DL (ref 12–16)
MCH RBC QN AUTO: 30 PG (ref 27–33)
MCHC RBC AUTO-ENTMCNC: 33.7 G/DL (ref 32.2–35.5)
MCV RBC AUTO: 89.1 FL (ref 81.4–97.8)
PLATELET # BLD AUTO: 192 K/UL (ref 164–446)
PMV BLD AUTO: 10.1 FL (ref 9–12.9)
POTASSIUM SERPL-SCNC: 3.5 MMOL/L (ref 3.6–5.5)
RBC # BLD AUTO: 4.03 M/UL (ref 4.2–5.4)
SODIUM SERPL-SCNC: 140 MMOL/L (ref 135–145)
WBC # BLD AUTO: 3.9 K/UL (ref 4.8–10.8)

## 2023-10-26 PROCEDURE — 80048 BASIC METABOLIC PNL TOTAL CA: CPT

## 2023-10-26 PROCEDURE — 770006 HCHG ROOM/CARE - MED/SURG/GYN SEMI*

## 2023-10-26 PROCEDURE — A9270 NON-COVERED ITEM OR SERVICE: HCPCS | Performed by: HOSPITALIST

## 2023-10-26 PROCEDURE — 700111 HCHG RX REV CODE 636 W/ 250 OVERRIDE (IP): Mod: JZ | Performed by: HOSPITALIST

## 2023-10-26 PROCEDURE — 700102 HCHG RX REV CODE 250 W/ 637 OVERRIDE(OP): Performed by: HOSPITALIST

## 2023-10-26 PROCEDURE — A9270 NON-COVERED ITEM OR SERVICE: HCPCS | Performed by: INTERNAL MEDICINE

## 2023-10-26 PROCEDURE — 99232 SBSQ HOSP IP/OBS MODERATE 35: CPT | Performed by: INTERNAL MEDICINE

## 2023-10-26 PROCEDURE — 700102 HCHG RX REV CODE 250 W/ 637 OVERRIDE(OP): Performed by: INTERNAL MEDICINE

## 2023-10-26 PROCEDURE — 700111 HCHG RX REV CODE 636 W/ 250 OVERRIDE (IP): Performed by: INTERNAL MEDICINE

## 2023-10-26 PROCEDURE — 85027 COMPLETE CBC AUTOMATED: CPT

## 2023-10-26 RX ORDER — POTASSIUM CHLORIDE 20 MEQ/1
20 TABLET, EXTENDED RELEASE ORAL ONCE
Status: COMPLETED | OUTPATIENT
Start: 2023-10-26 | End: 2023-10-26

## 2023-10-26 RX ORDER — KETOROLAC TROMETHAMINE 30 MG/ML
30 INJECTION, SOLUTION INTRAMUSCULAR; INTRAVENOUS ONCE
Status: DISCONTINUED | OUTPATIENT
Start: 2023-10-26 | End: 2023-10-26

## 2023-10-26 RX ORDER — KETOROLAC TROMETHAMINE 30 MG/ML
15 INJECTION, SOLUTION INTRAMUSCULAR; INTRAVENOUS ONCE
Status: DISCONTINUED | OUTPATIENT
Start: 2023-10-26 | End: 2023-10-26

## 2023-10-26 RX ORDER — OXYCODONE HYDROCHLORIDE 5 MG/1
5 TABLET ORAL ONCE
Status: COMPLETED | OUTPATIENT
Start: 2023-10-26 | End: 2023-10-26

## 2023-10-26 RX ADMIN — METOCLOPRAMIDE 10 MG: 5 INJECTION, SOLUTION INTRAMUSCULAR; INTRAVENOUS at 16:34

## 2023-10-26 RX ADMIN — OXYCODONE HYDROCHLORIDE 5 MG: 5 TABLET ORAL at 11:24

## 2023-10-26 RX ADMIN — GABAPENTIN 600 MG: 300 CAPSULE ORAL at 05:15

## 2023-10-26 RX ADMIN — OLANZAPINE 5 MG: 5 TABLET, FILM COATED ORAL at 05:14

## 2023-10-26 RX ADMIN — POTASSIUM CHLORIDE 20 MEQ: 1500 TABLET, EXTENDED RELEASE ORAL at 07:46

## 2023-10-26 RX ADMIN — KETOROLAC TROMETHAMINE 15 MG: 30 INJECTION, SOLUTION INTRAMUSCULAR; INTRAVENOUS at 11:25

## 2023-10-26 RX ADMIN — GABAPENTIN 600 MG: 300 CAPSULE ORAL at 21:24

## 2023-10-26 RX ADMIN — OXYCODONE HYDROCHLORIDE 5 MG: 5 TABLET ORAL at 16:57

## 2023-10-26 RX ADMIN — METOCLOPRAMIDE 10 MG: 5 INJECTION, SOLUTION INTRAMUSCULAR; INTRAVENOUS at 21:17

## 2023-10-26 RX ADMIN — SERTRALINE 100 MG: 100 TABLET, FILM COATED ORAL at 05:15

## 2023-10-26 RX ADMIN — METOCLOPRAMIDE 10 MG: 5 INJECTION, SOLUTION INTRAMUSCULAR; INTRAVENOUS at 05:15

## 2023-10-26 RX ADMIN — PANCRELIPASE 36000 UNITS: 30000; 6000; 19000 CAPSULE, DELAYED RELEASE PELLETS ORAL at 21:24

## 2023-10-26 RX ADMIN — OXYCODONE HYDROCHLORIDE 5 MG: 5 TABLET ORAL at 14:57

## 2023-10-26 RX ADMIN — OMEPRAZOLE 20 MG: 20 CAPSULE, DELAYED RELEASE ORAL at 05:14

## 2023-10-26 RX ADMIN — KETOROLAC TROMETHAMINE 15 MG: 30 INJECTION, SOLUTION INTRAMUSCULAR; INTRAVENOUS at 16:32

## 2023-10-26 RX ADMIN — MIRTAZAPINE 15 MG: 15 TABLET, FILM COATED ORAL at 21:24

## 2023-10-26 RX ADMIN — DICYCLOMINE HYDROCHLORIDE 20 MG: 20 TABLET ORAL at 05:15

## 2023-10-26 RX ADMIN — PANCRELIPASE 36000 UNITS: 30000; 6000; 19000 CAPSULE, DELAYED RELEASE PELLETS ORAL at 11:25

## 2023-10-26 RX ADMIN — PANCRELIPASE 36000 UNITS: 30000; 6000; 19000 CAPSULE, DELAYED RELEASE PELLETS ORAL at 14:58

## 2023-10-26 RX ADMIN — OXYCODONE HYDROCHLORIDE 10 MG: 10 TABLET ORAL at 21:24

## 2023-10-26 RX ADMIN — TRAZODONE HYDROCHLORIDE 100 MG: 50 TABLET ORAL at 21:24

## 2023-10-26 RX ADMIN — OXYCODONE HYDROCHLORIDE 5 MG: 5 TABLET ORAL at 05:20

## 2023-10-26 RX ADMIN — METOCLOPRAMIDE 10 MG: 5 INJECTION, SOLUTION INTRAMUSCULAR; INTRAVENOUS at 11:26

## 2023-10-26 RX ADMIN — KETOROLAC TROMETHAMINE 15 MG: 30 INJECTION, SOLUTION INTRAMUSCULAR; INTRAVENOUS at 00:57

## 2023-10-26 RX ADMIN — GABAPENTIN 600 MG: 300 CAPSULE ORAL at 11:25

## 2023-10-26 ASSESSMENT — PAIN DESCRIPTION - PAIN TYPE
TYPE: ACUTE PAIN

## 2023-10-26 ASSESSMENT — ENCOUNTER SYMPTOMS
NAUSEA: 1
ABDOMINAL PAIN: 1
WEAKNESS: 1
SHORTNESS OF BREATH: 0

## 2023-10-26 NOTE — CARE PLAN
The patient is Watcher - Medium risk of patient condition declining or worsening    Shift Goals  Clinical Goals: Pain control  Patient Goals: manage nausea, pain  Family Goals: edtih    Progress made toward(s) clinical / shift goals:    Problem: Knowledge Deficit - Standard  Goal: Patient and family/care givers will demonstrate understanding of plan of care, disease process/condition, diagnostic tests and medications  Outcome: Progressing     Problem: Provide Safe Environment  Goal: Suicide environmental safety, protocols, policies, and practices will be implemented  Outcome: Progressing     Problem: Pain - Standard  Goal: Alleviation of pain or a reduction in pain to the patient’s comfort goal  Outcome: Progressing

## 2023-10-26 NOTE — PROGRESS NOTES
Bedside report received from night RN, pt care assumed, assessment completed. Pt is A&O 4, pt sleeping comfortably in bed prior to meeting. Updated on POC, questions answered. Bed in lowest, locked position, treaded socks on, call light and belongings within reach.

## 2023-10-26 NOTE — CARE PLAN
The patient is Stable - Low risk of patient condition declining or worsening    Shift Goals  Clinical Goals: pain control  Patient Goals: rest    Progress made toward(s) clinical / shift goals:  pain and nausea controlled    Patient is not progressing towards the following goals:

## 2023-10-26 NOTE — PROGRESS NOTES
Hospital Medicine Daily Progress Note    Date of Service  10/26/2023    Chief Complaint  Rhiannon Marrero is a 38 y.o. female admitted 10/24/2023 with abd pain    Hospital Course  37 yo woman with chronic pancreatitis, pancreatic jejunostomy, alcohol abuse who presented with nausea vomiting and abdominal pain.  She was admitted recently, had a EGD that showed bile gastritis and a gastric emptying study with grade 2 gastroparesis at that time.  GI was consulted, recommended management for gastroparesis and chronic pancreatitis.    Interval Problem Update  HypoK, replete  She is doing better but still having some abdominal pain, nausea.  She was able to take some of her full liquid diet.  Will advance to GI soft diet today.    I have discussed this patient's plan of care and discharge plan at IDT rounds today with Case Management, Nursing, Nursing leadership, and other members of the IDT team.    Consultants/Specialty  GI    Code Status  Full Code    Disposition  The patient is not medically cleared for discharge to home or a post-acute facility.  Anticipate discharge to: home with close outpatient follow-up    I have placed the appropriate orders for post-discharge needs.    Review of Systems  Review of Systems   Constitutional:  Positive for malaise/fatigue.   Respiratory:  Negative for shortness of breath.    Cardiovascular:  Negative for chest pain.   Gastrointestinal:  Positive for abdominal pain and nausea.   Neurological:  Positive for weakness.        Physical Exam  Temp:  [36 °C (96.8 °F)-36.8 °C (98.3 °F)] 36 °C (96.8 °F)  Pulse:  [65-78] 75  Resp:  [16-18] 17  BP: (109-120)/(63-72) 117/70  SpO2:  [91 %-96 %] 91 %    Physical Exam  Vitals and nursing note reviewed.   Constitutional:       Appearance: She is ill-appearing. She is not diaphoretic.   HENT:      Head: Normocephalic.      Mouth/Throat:      Mouth: Mucous membranes are moist.   Eyes:      General:         Right eye: No discharge.         Left  eye: No discharge.   Cardiovascular:      Rate and Rhythm: Normal rate and regular rhythm.   Pulmonary:      Effort: Pulmonary effort is normal. No respiratory distress.      Breath sounds: No wheezing or rales.   Abdominal:      General: There is no distension.      Palpations: Abdomen is soft.      Tenderness: There is abdominal tenderness. There is no guarding or rebound.   Musculoskeletal:         General: No swelling.      Cervical back: Neck supple.   Skin:     General: Skin is warm and dry.   Neurological:      Mental Status: She is alert and oriented to person, place, and time.         Fluids    Intake/Output Summary (Last 24 hours) at 10/26/2023 1345  Last data filed at 10/26/2023 1020  Gross per 24 hour   Intake 1160 ml   Output --   Net 1160 ml       Laboratory  Recent Labs     10/24/23  0243 10/26/23  0504   WBC 5.1 3.9*   RBC 4.19* 4.03*   HEMOGLOBIN 12.4 12.1   HEMATOCRIT 37.2 35.9*   MCV 88.8 89.1   MCH 29.6 30.0   MCHC 33.3 33.7   RDW 43.6 43.9   PLATELETCT 217 192   MPV 9.9 10.1     Recent Labs     10/24/23  0243 10/26/23  0504   SODIUM 136 140   POTASSIUM 3.6 3.5*   CHLORIDE 101 107   CO2 21 23   GLUCOSE 93 103*   BUN 3* 5*   CREATININE 0.49* 0.61   CALCIUM 8.8 8.4*                   Imaging  No orders to display        Assessment/Plan  * Gastroparesis- (present on admission)  Assessment & Plan  Possibly related to pain medication use  Advancing diet and monitoring    Chronic pancreatitis due to chronic alcoholism (HCC)- (present on admission)  Assessment & Plan  Lipase level normal  Multimodal pain control  Continue Creon    Drug-seeking behavior- (present on admission)  Assessment & Plan  DC IV dilaudid  Monitor pain control usage    Bipolar disorder (HCC)- (present on admission)  Assessment & Plan  Continue home medications    Alcohol dependence (HCC)- (present on admission)  Assessment & Plan  No signs of withdrawal  Last alcoholic drink was 10 days ago         VTE prophylaxis:    enoxaparin  ppx      I have performed a physical exam and reviewed and updated ROS and Plan today (10/26/2023). In review of yesterday's note (10/25/2023), there are no changes except as documented above.

## 2023-10-27 ENCOUNTER — PHARMACY VISIT (OUTPATIENT)
Dept: PHARMACY | Facility: MEDICAL CENTER | Age: 38
End: 2023-10-27
Payer: COMMERCIAL

## 2023-10-27 VITALS
BODY MASS INDEX: 22.87 KG/M2 | WEIGHT: 145.72 LBS | DIASTOLIC BLOOD PRESSURE: 71 MMHG | OXYGEN SATURATION: 94 % | TEMPERATURE: 97.4 F | HEIGHT: 67 IN | RESPIRATION RATE: 16 BRPM | SYSTOLIC BLOOD PRESSURE: 111 MMHG | HEART RATE: 60 BPM

## 2023-10-27 PROBLEM — E87.20 LACTIC ACIDOSIS: Status: RESOLVED | Noted: 2022-07-01 | Resolved: 2023-10-27

## 2023-10-27 LAB
ALBUMIN SERPL BCP-MCNC: 3.3 G/DL (ref 3.2–4.9)
BUN SERPL-MCNC: 10 MG/DL (ref 8–22)
CALCIUM ALBUM COR SERPL-MCNC: 9.2 MG/DL (ref 8.5–10.5)
CALCIUM SERPL-MCNC: 8.6 MG/DL (ref 8.5–10.5)
CHLORIDE SERPL-SCNC: 104 MMOL/L (ref 96–112)
CO2 SERPL-SCNC: 23 MMOL/L (ref 20–33)
CREAT SERPL-MCNC: 0.78 MG/DL (ref 0.5–1.4)
GFR SERPLBLD CREATININE-BSD FMLA CKD-EPI: 101 ML/MIN/1.73 M 2
GLUCOSE SERPL-MCNC: 142 MG/DL (ref 65–99)
MAGNESIUM SERPL-MCNC: 1.6 MG/DL (ref 1.5–2.5)
PHOSPHATE SERPL-MCNC: 5.3 MG/DL (ref 2.5–4.5)
POTASSIUM SERPL-SCNC: 3.5 MMOL/L (ref 3.6–5.5)
SODIUM SERPL-SCNC: 138 MMOL/L (ref 135–145)

## 2023-10-27 PROCEDURE — A9270 NON-COVERED ITEM OR SERVICE: HCPCS | Performed by: HOSPITALIST

## 2023-10-27 PROCEDURE — RXMED WILLOW AMBULATORY MEDICATION CHARGE: Performed by: INTERNAL MEDICINE

## 2023-10-27 PROCEDURE — 700102 HCHG RX REV CODE 250 W/ 637 OVERRIDE(OP): Performed by: HOSPITALIST

## 2023-10-27 PROCEDURE — 700111 HCHG RX REV CODE 636 W/ 250 OVERRIDE (IP): Mod: JZ | Performed by: HOSPITALIST

## 2023-10-27 PROCEDURE — 99239 HOSP IP/OBS DSCHRG MGMT >30: CPT | Performed by: INTERNAL MEDICINE

## 2023-10-27 PROCEDURE — 80069 RENAL FUNCTION PANEL: CPT

## 2023-10-27 PROCEDURE — 700111 HCHG RX REV CODE 636 W/ 250 OVERRIDE (IP): Performed by: INTERNAL MEDICINE

## 2023-10-27 PROCEDURE — 83735 ASSAY OF MAGNESIUM: CPT

## 2023-10-27 RX ORDER — ONDANSETRON 4 MG/1
4 TABLET, ORALLY DISINTEGRATING ORAL EVERY 4 HOURS PRN
Qty: 15 TABLET | Refills: 0 | Status: SHIPPED | OUTPATIENT
Start: 2023-10-27 | End: 2024-03-14

## 2023-10-27 RX ORDER — OXYCODONE HYDROCHLORIDE 5 MG/1
5 TABLET ORAL EVERY 6 HOURS PRN
Qty: 20 TABLET | Refills: 0 | Status: SHIPPED | OUTPATIENT
Start: 2023-10-27 | End: 2023-11-01

## 2023-10-27 RX ORDER — METOCLOPRAMIDE 10 MG/1
10 TABLET ORAL 2 TIMES DAILY
Qty: 14 TABLET | Refills: 0 | Status: SHIPPED | OUTPATIENT
Start: 2023-10-27 | End: 2023-11-03

## 2023-10-27 RX ADMIN — SERTRALINE 100 MG: 100 TABLET, FILM COATED ORAL at 06:31

## 2023-10-27 RX ADMIN — PANCRELIPASE 36000 UNITS: 30000; 6000; 19000 CAPSULE, DELAYED RELEASE PELLETS ORAL at 15:04

## 2023-10-27 RX ADMIN — GABAPENTIN 600 MG: 300 CAPSULE ORAL at 12:19

## 2023-10-27 RX ADMIN — OXYCODONE HYDROCHLORIDE 10 MG: 10 TABLET ORAL at 10:20

## 2023-10-27 RX ADMIN — KETOROLAC TROMETHAMINE 15 MG: 30 INJECTION, SOLUTION INTRAMUSCULAR; INTRAVENOUS at 06:32

## 2023-10-27 RX ADMIN — METOCLOPRAMIDE 10 MG: 5 INJECTION, SOLUTION INTRAMUSCULAR; INTRAVENOUS at 10:13

## 2023-10-27 RX ADMIN — PANCRELIPASE 36000 UNITS: 30000; 6000; 19000 CAPSULE, DELAYED RELEASE PELLETS ORAL at 10:13

## 2023-10-27 RX ADMIN — ONDANSETRON 4 MG: 4 TABLET, ORALLY DISINTEGRATING ORAL at 15:03

## 2023-10-27 RX ADMIN — ARIPIPRAZOLE 15 MG: 15 TABLET ORAL at 06:32

## 2023-10-27 RX ADMIN — DOCUSATE SODIUM 50 MG AND SENNOSIDES 8.6 MG 2 TABLET: 8.6; 5 TABLET, FILM COATED ORAL at 06:31

## 2023-10-27 RX ADMIN — METOCLOPRAMIDE 10 MG: 5 INJECTION, SOLUTION INTRAMUSCULAR; INTRAVENOUS at 03:46

## 2023-10-27 RX ADMIN — OLANZAPINE 5 MG: 5 TABLET, FILM COATED ORAL at 06:31

## 2023-10-27 RX ADMIN — METOCLOPRAMIDE 10 MG: 5 INJECTION, SOLUTION INTRAMUSCULAR; INTRAVENOUS at 15:19

## 2023-10-27 RX ADMIN — GABAPENTIN 600 MG: 300 CAPSULE ORAL at 06:31

## 2023-10-27 RX ADMIN — OXYCODONE HYDROCHLORIDE 10 MG: 10 TABLET ORAL at 03:46

## 2023-10-27 RX ADMIN — OXYCODONE HYDROCHLORIDE 5 MG: 5 TABLET ORAL at 15:03

## 2023-10-27 RX ADMIN — OMEPRAZOLE 20 MG: 20 CAPSULE, DELAYED RELEASE ORAL at 06:31

## 2023-10-27 RX ADMIN — KETOROLAC TROMETHAMINE 15 MG: 30 INJECTION, SOLUTION INTRAMUSCULAR; INTRAVENOUS at 00:53

## 2023-10-27 ASSESSMENT — PAIN DESCRIPTION - PAIN TYPE
TYPE: ACUTE PAIN

## 2023-10-27 NOTE — CARE PLAN
The patient is Stable - Low risk of patient condition declining or worsening    Shift Goals  Clinical Goals: discharge to home, free from injury and falls  Patient Goals: discharge to home  Family Goals: edith    Progress made toward(s) clinical / shift goals:    Pt seen laying on bed at low-fowlers position awake. Pt able to make needs known. Pt vitals stable with no respiratory distress noted and has no c/o or show signs of cough, , SOB. Pt remained free from injury or falls. Due medications given as ordered with no adverse effects noted. Placed call light and personal belongings within reach and encouraged pt to increase oral fluid intake as tolerated.      Problem: Knowledge Deficit - Standard  Goal: Patient and family/care givers will demonstrate understanding of plan of care, disease process/condition, diagnostic tests and medications  Outcome: Progressing     Problem: Provide Safe Environment  Goal: Suicide environmental safety, protocols, policies, and practices will be implemented  Outcome: Progressing     Problem: Psychosocial  Goal: Patient's ability to identify and develop effective coping behaviors will improve  Outcome: Progressing  Goal: Patient's ability to identify and utilize available support systems will improve  Outcome: Progressing     Problem: Pain - Standard  Goal: Alleviation of pain or a reduction in pain to the patient’s comfort goal  Outcome: Progressing         Patient is not progressing towards the following goals:      Pt pain scale 7/10 PRN medication given as ordered. Other non-pharmacologic interventions done.

## 2023-10-27 NOTE — CARE PLAN
The patient is Stable - Low risk of patient condition declining or worsening    Shift Goals  Clinical Goals: Patient's pain wll be at <3 throughout the shift  Patient Goals: Sleep and rest  Family Goals: edith    Progress made toward(s) clinical / shift goals:      Patient is not progressing towards the following goals:    Pt is alert and oriented x4; able to communicate needs. Pain is controlled with medication. Pt's bed alarm is on; call light within reach.

## 2023-10-27 NOTE — DIETARY
Nutrition Update:    Day 3 of admit.  Rhiannon Marrero is a 38 y.o. female with admitting DX of Abdominal pain [R10.9].  Patient being followed to optimize nutrition.    Current Diet: Low Fiber   PO intake: % x4 meals, 50-75% x1 meal, <25% x1 meal    Problem: Nutritional:  Goal: Achieve adequate nutritional intake  Description: Patient will consume >50% of meals  Outcome: goal met    RD to follow per department policy

## 2023-10-27 NOTE — PROGRESS NOTES
Pt was brought down to discharge lounge. Pt's mom will be picking pt up. Pt got meds via meds to beds. Pt states having all belongings. Pt did not have any additional questions. Pt's IV was removed.

## 2024-03-14 ENCOUNTER — HOSPITAL ENCOUNTER (EMERGENCY)
Facility: MEDICAL CENTER | Age: 39
End: 2024-03-14
Attending: STUDENT IN AN ORGANIZED HEALTH CARE EDUCATION/TRAINING PROGRAM
Payer: MEDICAID

## 2024-03-14 ENCOUNTER — APPOINTMENT (OUTPATIENT)
Dept: RADIOLOGY | Facility: MEDICAL CENTER | Age: 39
End: 2024-03-14
Attending: STUDENT IN AN ORGANIZED HEALTH CARE EDUCATION/TRAINING PROGRAM
Payer: MEDICAID

## 2024-03-14 VITALS
RESPIRATION RATE: 14 BRPM | OXYGEN SATURATION: 95 % | HEART RATE: 76 BPM | TEMPERATURE: 98.4 F | DIASTOLIC BLOOD PRESSURE: 61 MMHG | HEIGHT: 67 IN | WEIGHT: 163.14 LBS | SYSTOLIC BLOOD PRESSURE: 110 MMHG | BODY MASS INDEX: 25.61 KG/M2

## 2024-03-14 DIAGNOSIS — R10.13 EPIGASTRIC ABDOMINAL PAIN: ICD-10-CM

## 2024-03-14 DIAGNOSIS — R11.2 NAUSEA AND VOMITING, UNSPECIFIED VOMITING TYPE: ICD-10-CM

## 2024-03-14 DIAGNOSIS — K86.0 ALCOHOL-INDUCED CHRONIC PANCREATITIS (HCC): ICD-10-CM

## 2024-03-14 DIAGNOSIS — K31.84 GASTROPARESIS: ICD-10-CM

## 2024-03-14 LAB
ALBUMIN SERPL BCP-MCNC: 4.8 G/DL (ref 3.2–4.9)
ALBUMIN/GLOB SERPL: 1.6 G/DL
ALP SERPL-CCNC: 72 U/L (ref 30–99)
ALT SERPL-CCNC: 24 U/L (ref 2–50)
ANION GAP SERPL CALC-SCNC: 15 MMOL/L (ref 7–16)
APPEARANCE UR: CLEAR
AST SERPL-CCNC: 27 U/L (ref 12–45)
BASOPHILS # BLD AUTO: 0.5 % (ref 0–1.8)
BASOPHILS # BLD: 0.04 K/UL (ref 0–0.12)
BILIRUB SERPL-MCNC: 0.8 MG/DL (ref 0.1–1.5)
BILIRUB UR QL STRIP.AUTO: NEGATIVE
BUN SERPL-MCNC: 8 MG/DL (ref 8–22)
CALCIUM ALBUM COR SERPL-MCNC: 9.1 MG/DL (ref 8.5–10.5)
CALCIUM SERPL-MCNC: 9.7 MG/DL (ref 8.5–10.5)
CHLORIDE SERPL-SCNC: 104 MMOL/L (ref 96–112)
CO2 SERPL-SCNC: 20 MMOL/L (ref 20–33)
COLOR UR: YELLOW
CREAT SERPL-MCNC: 0.9 MG/DL (ref 0.5–1.4)
EOSINOPHIL # BLD AUTO: 0.15 K/UL (ref 0–0.51)
EOSINOPHIL NFR BLD: 1.9 % (ref 0–6.9)
ERYTHROCYTE [DISTWIDTH] IN BLOOD BY AUTOMATED COUNT: 44.3 FL (ref 35.9–50)
GFR SERPLBLD CREATININE-BSD FMLA CKD-EPI: 85 ML/MIN/1.73 M 2
GLOBULIN SER CALC-MCNC: 3 G/DL (ref 1.9–3.5)
GLUCOSE SERPL-MCNC: 88 MG/DL (ref 65–99)
GLUCOSE UR STRIP.AUTO-MCNC: NEGATIVE MG/DL
HCG SERPL QL: NEGATIVE
HCT VFR BLD AUTO: 48.1 % (ref 37–47)
HGB BLD-MCNC: 15.5 G/DL (ref 12–16)
IMM GRANULOCYTES # BLD AUTO: 0.03 K/UL (ref 0–0.11)
IMM GRANULOCYTES NFR BLD AUTO: 0.4 % (ref 0–0.9)
KETONES UR STRIP.AUTO-MCNC: NEGATIVE MG/DL
LEUKOCYTE ESTERASE UR QL STRIP.AUTO: NEGATIVE
LIPASE SERPL-CCNC: 10 U/L (ref 11–82)
LYMPHOCYTES # BLD AUTO: 2.09 K/UL (ref 1–4.8)
LYMPHOCYTES NFR BLD: 26.7 % (ref 22–41)
MCH RBC QN AUTO: 30.2 PG (ref 27–33)
MCHC RBC AUTO-ENTMCNC: 32.2 G/DL (ref 32.2–35.5)
MCV RBC AUTO: 93.6 FL (ref 81.4–97.8)
MICRO URNS: NORMAL
MONOCYTES # BLD AUTO: 0.47 K/UL (ref 0–0.85)
MONOCYTES NFR BLD AUTO: 6 % (ref 0–13.4)
NEUTROPHILS # BLD AUTO: 5.06 K/UL (ref 1.82–7.42)
NEUTROPHILS NFR BLD: 64.5 % (ref 44–72)
NITRITE UR QL STRIP.AUTO: NEGATIVE
NRBC # BLD AUTO: 0 K/UL
NRBC BLD-RTO: 0 /100 WBC (ref 0–0.2)
PH UR STRIP.AUTO: 7.5 [PH] (ref 5–8)
PLATELET # BLD AUTO: 231 K/UL (ref 164–446)
PMV BLD AUTO: 9.5 FL (ref 9–12.9)
POTASSIUM SERPL-SCNC: 3.8 MMOL/L (ref 3.6–5.5)
PROT SERPL-MCNC: 7.8 G/DL (ref 6–8.2)
PROT UR QL STRIP: NEGATIVE MG/DL
RBC # BLD AUTO: 5.14 M/UL (ref 4.2–5.4)
RBC UR QL AUTO: NEGATIVE
SODIUM SERPL-SCNC: 139 MMOL/L (ref 135–145)
SP GR UR STRIP.AUTO: 1.01
UROBILINOGEN UR STRIP.AUTO-MCNC: 0.2 MG/DL
WBC # BLD AUTO: 7.8 K/UL (ref 4.8–10.8)

## 2024-03-14 PROCEDURE — 99285 EMERGENCY DEPT VISIT HI MDM: CPT

## 2024-03-14 PROCEDURE — 700111 HCHG RX REV CODE 636 W/ 250 OVERRIDE (IP): Mod: UD

## 2024-03-14 PROCEDURE — 85025 COMPLETE CBC W/AUTO DIFF WBC: CPT

## 2024-03-14 PROCEDURE — 74177 CT ABD & PELVIS W/CONTRAST: CPT

## 2024-03-14 PROCEDURE — 700102 HCHG RX REV CODE 250 W/ 637 OVERRIDE(OP): Mod: UD | Performed by: STUDENT IN AN ORGANIZED HEALTH CARE EDUCATION/TRAINING PROGRAM

## 2024-03-14 PROCEDURE — 81003 URINALYSIS AUTO W/O SCOPE: CPT

## 2024-03-14 PROCEDURE — 96375 TX/PRO/DX INJ NEW DRUG ADDON: CPT

## 2024-03-14 PROCEDURE — 84703 CHORIONIC GONADOTROPIN ASSAY: CPT

## 2024-03-14 PROCEDURE — 96374 THER/PROPH/DIAG INJ IV PUSH: CPT

## 2024-03-14 PROCEDURE — 700117 HCHG RX CONTRAST REV CODE 255: Mod: UD | Performed by: STUDENT IN AN ORGANIZED HEALTH CARE EDUCATION/TRAINING PROGRAM

## 2024-03-14 PROCEDURE — 83690 ASSAY OF LIPASE: CPT

## 2024-03-14 PROCEDURE — 36415 COLL VENOUS BLD VENIPUNCTURE: CPT

## 2024-03-14 PROCEDURE — A9270 NON-COVERED ITEM OR SERVICE: HCPCS | Mod: UD | Performed by: STUDENT IN AN ORGANIZED HEALTH CARE EDUCATION/TRAINING PROGRAM

## 2024-03-14 PROCEDURE — 80053 COMPREHEN METABOLIC PANEL: CPT

## 2024-03-14 PROCEDURE — 700111 HCHG RX REV CODE 636 W/ 250 OVERRIDE (IP): Mod: JZ,UD | Performed by: STUDENT IN AN ORGANIZED HEALTH CARE EDUCATION/TRAINING PROGRAM

## 2024-03-14 PROCEDURE — 96376 TX/PRO/DX INJ SAME DRUG ADON: CPT

## 2024-03-14 PROCEDURE — 700105 HCHG RX REV CODE 258: Mod: UD | Performed by: STUDENT IN AN ORGANIZED HEALTH CARE EDUCATION/TRAINING PROGRAM

## 2024-03-14 RX ORDER — SODIUM CHLORIDE, SODIUM LACTATE, POTASSIUM CHLORIDE, CALCIUM CHLORIDE 600; 310; 30; 20 MG/100ML; MG/100ML; MG/100ML; MG/100ML
1000 INJECTION, SOLUTION INTRAVENOUS ONCE
Status: COMPLETED | OUTPATIENT
Start: 2024-03-14 | End: 2024-03-14

## 2024-03-14 RX ORDER — HYDROMORPHONE HYDROCHLORIDE 1 MG/ML
1 INJECTION, SOLUTION INTRAMUSCULAR; INTRAVENOUS; SUBCUTANEOUS ONCE
Status: COMPLETED | OUTPATIENT
Start: 2024-03-14 | End: 2024-03-14

## 2024-03-14 RX ORDER — ONDANSETRON 4 MG/1
4 TABLET, ORALLY DISINTEGRATING ORAL EVERY 4 HOURS PRN
Qty: 20 TABLET | Refills: 0 | Status: SHIPPED | OUTPATIENT
Start: 2024-03-14

## 2024-03-14 RX ORDER — MORPHINE SULFATE 15 MG/1
15 TABLET ORAL ONCE
Status: COMPLETED | OUTPATIENT
Start: 2024-03-14 | End: 2024-03-14

## 2024-03-14 RX ORDER — OXYCODONE HYDROCHLORIDE 5 MG/1
5 TABLET ORAL EVERY 4 HOURS PRN
Qty: 16 TABLET | Refills: 0 | Status: SHIPPED | OUTPATIENT
Start: 2024-03-14 | End: 2024-03-17

## 2024-03-14 RX ORDER — HALOPERIDOL 5 MG/ML
5 INJECTION INTRAMUSCULAR ONCE
Status: COMPLETED | OUTPATIENT
Start: 2024-03-14 | End: 2024-03-14

## 2024-03-14 RX ORDER — ONDANSETRON 2 MG/ML
4 INJECTION INTRAMUSCULAR; INTRAVENOUS ONCE
Status: COMPLETED | OUTPATIENT
Start: 2024-03-14 | End: 2024-03-14

## 2024-03-14 RX ADMIN — ONDANSETRON 4 MG: 2 INJECTION INTRAMUSCULAR; INTRAVENOUS at 19:11

## 2024-03-14 RX ADMIN — HYDROMORPHONE HYDROCHLORIDE 1 MG: 1 INJECTION, SOLUTION INTRAMUSCULAR; INTRAVENOUS; SUBCUTANEOUS at 22:23

## 2024-03-14 RX ADMIN — SODIUM CHLORIDE, POTASSIUM CHLORIDE, SODIUM LACTATE AND CALCIUM CHLORIDE 1000 ML: 600; 310; 30; 20 INJECTION, SOLUTION INTRAVENOUS at 19:11

## 2024-03-14 RX ADMIN — HYDROMORPHONE HYDROCHLORIDE 1 MG: 1 INJECTION, SOLUTION INTRAMUSCULAR; INTRAVENOUS; SUBCUTANEOUS at 20:17

## 2024-03-14 RX ADMIN — MORPHINE SULFATE 6 MG: 10 INJECTION INTRAVENOUS at 19:10

## 2024-03-14 RX ADMIN — IOHEXOL 100 ML: 350 INJECTION, SOLUTION INTRAVENOUS at 20:47

## 2024-03-14 RX ADMIN — HALOPERIDOL LACTATE 5 MG: 5 INJECTION, SOLUTION INTRAMUSCULAR at 20:51

## 2024-03-14 RX ADMIN — MORPHINE SULFATE 15 MG: 15 TABLET ORAL at 22:24

## 2024-03-14 ASSESSMENT — PAIN DESCRIPTION - PAIN TYPE
TYPE: ACUTE PAIN
TYPE: ACUTE PAIN

## 2024-03-14 ASSESSMENT — FIBROSIS 4 INDEX: FIB4 SCORE: 0.4

## 2024-03-15 NOTE — ED NOTES
Bedside report received from off going RN/tech: clementina, assumed care of patient.  POC discussed with patient. Call light within reach, all needs addressed at this time.       Fall risk interventions in place: Move the patient closer to the nurse's station, Patient's personal possessions are with in their safe reach, Give patient urinal if applicable, and Keep floor surfaces clean and dry (all applicable per [employer] Fall risk assessment)   Continuous monitoring: Cardiac Leads, Pulse Ox, or Blood Pressure  IVF/IV medications: Infusion per MAR (List Med(s)) LR  Oxygen: Room Air  Bedside sitter: Not Applicable   Isolation: Not Applicable

## 2024-03-15 NOTE — ED NOTES
PIV removed, catheter intact. Discharge education provided. Discharge paperwork reviewed with pt. Prescription to be picked up by pt. All questions answered. All belongings with pt. Pt ambulated to lobby unassisted with steady gait.

## 2024-03-15 NOTE — ED NOTES
Patient to assigned room. Patient dressed in gown and placed on monitor with call light in reach. Chart up for ERP to see.    Urine collected, labelled appropriately and sent to lab via tube station
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
## 2024-03-15 NOTE — ED TRIAGE NOTES
Chief Complaint   Patient presents with    Abdominal Pain    N/V     Pt reports ABD pain/vomiting last night and today. Pt reports chronic pancreatitis. Pt states pain to be more midline and LLQ. Reports mild diarrhea but states that that is somewhat normal. Pt denies any recent alcohol.    Explained to pt triage process, made pt aware to tell this RN/staff of any changes/concerns, pt verbalized understanding of process and instructions given. Pt to ER wood.

## 2024-04-11 ENCOUNTER — HOSPITAL ENCOUNTER (INPATIENT)
Facility: MEDICAL CENTER | Age: 39
LOS: 4 days | DRG: 439 | End: 2024-04-16
Attending: EMERGENCY MEDICINE | Admitting: STUDENT IN AN ORGANIZED HEALTH CARE EDUCATION/TRAINING PROGRAM
Payer: MEDICAID

## 2024-04-11 ENCOUNTER — APPOINTMENT (OUTPATIENT)
Dept: RADIOLOGY | Facility: MEDICAL CENTER | Age: 39
DRG: 439 | End: 2024-04-11
Attending: EMERGENCY MEDICINE
Payer: MEDICAID

## 2024-04-11 DIAGNOSIS — F10.20 CHRONIC PANCREATITIS DUE TO CHRONIC ALCOHOLISM (HCC): ICD-10-CM

## 2024-04-11 DIAGNOSIS — G89.4 CHRONIC PAIN SYNDROME: ICD-10-CM

## 2024-04-11 DIAGNOSIS — R10.9 ABDOMINAL PAIN, UNSPECIFIED ABDOMINAL LOCATION: Primary | ICD-10-CM

## 2024-04-11 DIAGNOSIS — R52 INTRACTABLE PAIN: ICD-10-CM

## 2024-04-11 DIAGNOSIS — K86.0 CHRONIC PANCREATITIS DUE TO CHRONIC ALCOHOLISM (HCC): ICD-10-CM

## 2024-04-11 DIAGNOSIS — K31.84 GASTROPARESIS: ICD-10-CM

## 2024-04-11 DIAGNOSIS — K86.1 CHRONIC PANCREATITIS, UNSPECIFIED PANCREATITIS TYPE (HCC): ICD-10-CM

## 2024-04-11 LAB
ALBUMIN SERPL BCP-MCNC: 4.3 G/DL (ref 3.2–4.9)
ALBUMIN/GLOB SERPL: 1.5 G/DL
ALP SERPL-CCNC: 71 U/L (ref 30–99)
ALT SERPL-CCNC: 13 U/L (ref 2–50)
ANION GAP SERPL CALC-SCNC: 11 MMOL/L (ref 7–16)
APPEARANCE UR: CLEAR
AST SERPL-CCNC: 15 U/L (ref 12–45)
BASOPHILS # BLD AUTO: 0.4 % (ref 0–1.8)
BASOPHILS # BLD: 0.03 K/UL (ref 0–0.12)
BILIRUB SERPL-MCNC: 0.4 MG/DL (ref 0.1–1.5)
BILIRUB UR QL STRIP.AUTO: NEGATIVE
BUN SERPL-MCNC: 8 MG/DL (ref 8–22)
CALCIUM ALBUM COR SERPL-MCNC: 8.7 MG/DL (ref 8.5–10.5)
CALCIUM SERPL-MCNC: 8.9 MG/DL (ref 8.5–10.5)
CHLORIDE SERPL-SCNC: 107 MMOL/L (ref 96–112)
CO2 SERPL-SCNC: 19 MMOL/L (ref 20–33)
COLOR UR: YELLOW
CREAT SERPL-MCNC: 0.85 MG/DL (ref 0.5–1.4)
EOSINOPHIL # BLD AUTO: 0.18 K/UL (ref 0–0.51)
EOSINOPHIL NFR BLD: 2.7 % (ref 0–6.9)
ERYTHROCYTE [DISTWIDTH] IN BLOOD BY AUTOMATED COUNT: 39.8 FL (ref 35.9–50)
GFR SERPLBLD CREATININE-BSD FMLA CKD-EPI: 90 ML/MIN/1.73 M 2
GLOBULIN SER CALC-MCNC: 2.8 G/DL (ref 1.9–3.5)
GLUCOSE SERPL-MCNC: 94 MG/DL (ref 65–99)
GLUCOSE UR STRIP.AUTO-MCNC: NEGATIVE MG/DL
HCT VFR BLD AUTO: 39.1 % (ref 37–47)
HGB BLD-MCNC: 13.3 G/DL (ref 12–16)
IMM GRANULOCYTES # BLD AUTO: 0.01 K/UL (ref 0–0.11)
IMM GRANULOCYTES NFR BLD AUTO: 0.1 % (ref 0–0.9)
KETONES UR STRIP.AUTO-MCNC: NEGATIVE MG/DL
LEUKOCYTE ESTERASE UR QL STRIP.AUTO: NEGATIVE
LIPASE SERPL-CCNC: 12 U/L (ref 11–82)
LYMPHOCYTES # BLD AUTO: 2.55 K/UL (ref 1–4.8)
LYMPHOCYTES NFR BLD: 38 % (ref 22–41)
MCH RBC QN AUTO: 30.5 PG (ref 27–33)
MCHC RBC AUTO-ENTMCNC: 34 G/DL (ref 32.2–35.5)
MCV RBC AUTO: 89.7 FL (ref 81.4–97.8)
MICRO URNS: NORMAL
MONOCYTES # BLD AUTO: 0.34 K/UL (ref 0–0.85)
MONOCYTES NFR BLD AUTO: 5.1 % (ref 0–13.4)
NEUTROPHILS # BLD AUTO: 3.6 K/UL (ref 1.82–7.42)
NEUTROPHILS NFR BLD: 53.7 % (ref 44–72)
NITRITE UR QL STRIP.AUTO: NEGATIVE
NRBC # BLD AUTO: 0 K/UL
NRBC BLD-RTO: 0 /100 WBC (ref 0–0.2)
PH UR STRIP.AUTO: 7 [PH] (ref 5–8)
PLATELET # BLD AUTO: 244 K/UL (ref 164–446)
PMV BLD AUTO: 9.5 FL (ref 9–12.9)
POTASSIUM SERPL-SCNC: 3.9 MMOL/L (ref 3.6–5.5)
PROT SERPL-MCNC: 7.1 G/DL (ref 6–8.2)
PROT UR QL STRIP: NEGATIVE MG/DL
RBC # BLD AUTO: 4.36 M/UL (ref 4.2–5.4)
RBC UR QL AUTO: NEGATIVE
SODIUM SERPL-SCNC: 137 MMOL/L (ref 135–145)
SP GR UR STRIP.AUTO: 1.01
UROBILINOGEN UR STRIP.AUTO-MCNC: 0.2 MG/DL
WBC # BLD AUTO: 6.7 K/UL (ref 4.8–10.8)

## 2024-04-11 PROCEDURE — 85025 COMPLETE CBC W/AUTO DIFF WBC: CPT

## 2024-04-11 PROCEDURE — 700111 HCHG RX REV CODE 636 W/ 250 OVERRIDE (IP): Performed by: EMERGENCY MEDICINE

## 2024-04-11 PROCEDURE — 700102 HCHG RX REV CODE 250 W/ 637 OVERRIDE(OP): Mod: UD | Performed by: EMERGENCY MEDICINE

## 2024-04-11 PROCEDURE — 700101 HCHG RX REV CODE 250: Mod: UD | Performed by: EMERGENCY MEDICINE

## 2024-04-11 PROCEDURE — 700105 HCHG RX REV CODE 258: Mod: UD | Performed by: EMERGENCY MEDICINE

## 2024-04-11 PROCEDURE — 74177 CT ABD & PELVIS W/CONTRAST: CPT

## 2024-04-11 PROCEDURE — 96374 THER/PROPH/DIAG INJ IV PUSH: CPT

## 2024-04-11 PROCEDURE — 700117 HCHG RX CONTRAST REV CODE 255: Mod: UD | Performed by: EMERGENCY MEDICINE

## 2024-04-11 PROCEDURE — 99285 EMERGENCY DEPT VISIT HI MDM: CPT

## 2024-04-11 PROCEDURE — 81003 URINALYSIS AUTO W/O SCOPE: CPT

## 2024-04-11 PROCEDURE — A9270 NON-COVERED ITEM OR SERVICE: HCPCS | Mod: UD | Performed by: EMERGENCY MEDICINE

## 2024-04-11 PROCEDURE — 80053 COMPREHEN METABOLIC PANEL: CPT

## 2024-04-11 PROCEDURE — 36415 COLL VENOUS BLD VENIPUNCTURE: CPT

## 2024-04-11 PROCEDURE — 96375 TX/PRO/DX INJ NEW DRUG ADDON: CPT

## 2024-04-11 PROCEDURE — 83690 ASSAY OF LIPASE: CPT

## 2024-04-11 RX ORDER — MORPHINE SULFATE 4 MG/ML
4 INJECTION INTRAVENOUS ONCE
Status: COMPLETED | OUTPATIENT
Start: 2024-04-11 | End: 2024-04-11

## 2024-04-11 RX ORDER — HYDROMORPHONE HYDROCHLORIDE 1 MG/ML
1 INJECTION, SOLUTION INTRAMUSCULAR; INTRAVENOUS; SUBCUTANEOUS ONCE
Status: COMPLETED | OUTPATIENT
Start: 2024-04-12 | End: 2024-04-12

## 2024-04-11 RX ORDER — SODIUM CHLORIDE 9 MG/ML
1000 INJECTION, SOLUTION INTRAVENOUS ONCE
Status: COMPLETED | OUTPATIENT
Start: 2024-04-12 | End: 2024-04-12

## 2024-04-11 RX ORDER — ALUMINA, MAGNESIA, AND SIMETHICONE 2400; 2400; 240 MG/30ML; MG/30ML; MG/30ML
20 SUSPENSION ORAL ONCE
Status: COMPLETED | OUTPATIENT
Start: 2024-04-11 | End: 2024-04-11

## 2024-04-11 RX ORDER — ONDANSETRON 2 MG/ML
4 INJECTION INTRAMUSCULAR; INTRAVENOUS ONCE
Status: COMPLETED | OUTPATIENT
Start: 2024-04-12 | End: 2024-04-12

## 2024-04-11 RX ORDER — SODIUM CHLORIDE, SODIUM LACTATE, POTASSIUM CHLORIDE, CALCIUM CHLORIDE 600; 310; 30; 20 MG/100ML; MG/100ML; MG/100ML; MG/100ML
1000 INJECTION, SOLUTION INTRAVENOUS ONCE
Status: COMPLETED | OUTPATIENT
Start: 2024-04-11 | End: 2024-04-12

## 2024-04-11 RX ORDER — KETOROLAC TROMETHAMINE 15 MG/ML
15 INJECTION, SOLUTION INTRAMUSCULAR; INTRAVENOUS ONCE
Status: COMPLETED | OUTPATIENT
Start: 2024-04-11 | End: 2024-04-11

## 2024-04-11 RX ORDER — ONDANSETRON 2 MG/ML
4 INJECTION INTRAMUSCULAR; INTRAVENOUS ONCE
Status: COMPLETED | OUTPATIENT
Start: 2024-04-11 | End: 2024-04-11

## 2024-04-11 RX ADMIN — ALUMINUM HYDROXIDE, MAGNESIUM HYDROXIDE, AND DIMETHICONE 20 ML: 400; 400; 40 SUSPENSION ORAL at 20:22

## 2024-04-11 RX ADMIN — KETOROLAC TROMETHAMINE 15 MG: 15 INJECTION, SOLUTION INTRAMUSCULAR; INTRAVENOUS at 19:46

## 2024-04-11 RX ADMIN — IOHEXOL 25 ML: 240 INJECTION, SOLUTION INTRATHECAL; INTRAVASCULAR; INTRAVENOUS; ORAL at 20:56

## 2024-04-11 RX ADMIN — ONDANSETRON 4 MG: 2 INJECTION INTRAMUSCULAR; INTRAVENOUS at 19:45

## 2024-04-11 RX ADMIN — IOHEXOL 150 ML: 350 INJECTION, SOLUTION INTRAVENOUS at 21:15

## 2024-04-11 RX ADMIN — KETAMINE HYDROCHLORIDE 25 MG: 10 INJECTION INTRAMUSCULAR; INTRAVENOUS at 22:44

## 2024-04-11 RX ADMIN — SODIUM CHLORIDE, POTASSIUM CHLORIDE, SODIUM LACTATE AND CALCIUM CHLORIDE 1000 ML: 600; 310; 30; 20 INJECTION, SOLUTION INTRAVENOUS at 19:46

## 2024-04-11 RX ADMIN — MORPHINE SULFATE 4 MG: 4 INJECTION INTRAVENOUS at 21:20

## 2024-04-11 ASSESSMENT — FIBROSIS 4 INDEX: FIB4 SCORE: 0.86

## 2024-04-11 ASSESSMENT — PAIN DESCRIPTION - PAIN TYPE: TYPE: ACUTE PAIN

## 2024-04-12 PROBLEM — K86.1 ACUTE ON CHRONIC PANCREATITIS (HCC): Status: ACTIVE | Noted: 2024-04-12

## 2024-04-12 PROBLEM — K85.90 ACUTE ON CHRONIC PANCREATITIS (HCC): Status: ACTIVE | Noted: 2024-04-12

## 2024-04-12 PROBLEM — F10.20 ALCOHOLISM (HCC): Status: ACTIVE | Noted: 2022-09-05

## 2024-04-12 PROCEDURE — 700111 HCHG RX REV CODE 636 W/ 250 OVERRIDE (IP): Mod: JZ,UD | Performed by: EMERGENCY MEDICINE

## 2024-04-12 PROCEDURE — A9270 NON-COVERED ITEM OR SERVICE: HCPCS | Performed by: STUDENT IN AN ORGANIZED HEALTH CARE EDUCATION/TRAINING PROGRAM

## 2024-04-12 PROCEDURE — 700105 HCHG RX REV CODE 258: Performed by: EMERGENCY MEDICINE

## 2024-04-12 PROCEDURE — 700111 HCHG RX REV CODE 636 W/ 250 OVERRIDE (IP): Performed by: STUDENT IN AN ORGANIZED HEALTH CARE EDUCATION/TRAINING PROGRAM

## 2024-04-12 PROCEDURE — 700102 HCHG RX REV CODE 250 W/ 637 OVERRIDE(OP): Performed by: STUDENT IN AN ORGANIZED HEALTH CARE EDUCATION/TRAINING PROGRAM

## 2024-04-12 PROCEDURE — 99223 1ST HOSP IP/OBS HIGH 75: CPT | Performed by: STUDENT IN AN ORGANIZED HEALTH CARE EDUCATION/TRAINING PROGRAM

## 2024-04-12 PROCEDURE — 96375 TX/PRO/DX INJ NEW DRUG ADDON: CPT

## 2024-04-12 PROCEDURE — 770006 HCHG ROOM/CARE - MED/SURG/GYN SEMI*

## 2024-04-12 PROCEDURE — 700105 HCHG RX REV CODE 258: Performed by: STUDENT IN AN ORGANIZED HEALTH CARE EDUCATION/TRAINING PROGRAM

## 2024-04-12 PROCEDURE — 96376 TX/PRO/DX INJ SAME DRUG ADON: CPT

## 2024-04-12 RX ORDER — LITHIUM CARBONATE 300 MG
300 TABLET ORAL 3 TIMES DAILY
COMMUNITY

## 2024-04-12 RX ORDER — ACETAMINOPHEN 325 MG/1
650 TABLET ORAL EVERY 6 HOURS PRN
Status: DISCONTINUED | OUTPATIENT
Start: 2024-04-12 | End: 2024-04-16 | Stop reason: HOSPADM

## 2024-04-12 RX ORDER — SODIUM CHLORIDE, SODIUM LACTATE, POTASSIUM CHLORIDE, CALCIUM CHLORIDE 600; 310; 30; 20 MG/100ML; MG/100ML; MG/100ML; MG/100ML
INJECTION, SOLUTION INTRAVENOUS CONTINUOUS
Status: DISCONTINUED | OUTPATIENT
Start: 2024-04-12 | End: 2024-04-16

## 2024-04-12 RX ORDER — NICOTINE 21 MG/24HR
14 PATCH, TRANSDERMAL 24 HOURS TRANSDERMAL
Status: DISCONTINUED | OUTPATIENT
Start: 2024-04-12 | End: 2024-04-16 | Stop reason: HOSPADM

## 2024-04-12 RX ORDER — HYDROMORPHONE HYDROCHLORIDE 1 MG/ML
1 INJECTION, SOLUTION INTRAMUSCULAR; INTRAVENOUS; SUBCUTANEOUS EVERY 4 HOURS PRN
Status: DISCONTINUED | OUTPATIENT
Start: 2024-04-12 | End: 2024-04-14

## 2024-04-12 RX ORDER — LUMATEPERONE 10.5 MG/1
10.5 CAPSULE ORAL DAILY
COMMUNITY

## 2024-04-12 RX ORDER — GABAPENTIN 300 MG/1
600 CAPSULE ORAL 3 TIMES DAILY
Status: DISCONTINUED | OUTPATIENT
Start: 2024-04-12 | End: 2024-04-16 | Stop reason: HOSPADM

## 2024-04-12 RX ORDER — LITHIUM CARBONATE 300 MG
300 TABLET ORAL 3 TIMES DAILY
Status: DISCONTINUED | OUTPATIENT
Start: 2024-04-12 | End: 2024-04-16 | Stop reason: HOSPADM

## 2024-04-12 RX ORDER — HYDROMORPHONE HYDROCHLORIDE 1 MG/ML
1 INJECTION, SOLUTION INTRAMUSCULAR; INTRAVENOUS; SUBCUTANEOUS EVERY 4 HOURS PRN
Status: DISCONTINUED | OUTPATIENT
Start: 2024-04-12 | End: 2024-04-12

## 2024-04-12 RX ORDER — ONDANSETRON 2 MG/ML
4 INJECTION INTRAMUSCULAR; INTRAVENOUS EVERY 4 HOURS PRN
Status: DISCONTINUED | OUTPATIENT
Start: 2024-04-12 | End: 2024-04-13

## 2024-04-12 RX ORDER — LORATADINE 10 MG/1
10 TABLET ORAL DAILY
COMMUNITY

## 2024-04-12 RX ORDER — OXYCODONE HYDROCHLORIDE AND ACETAMINOPHEN 5; 325 MG/1; MG/1
1 TABLET ORAL EVERY 4 HOURS PRN
Status: DISCONTINUED | OUTPATIENT
Start: 2024-04-12 | End: 2024-04-14

## 2024-04-12 RX ORDER — DIAZEPAM 5 MG/1
5 TABLET ORAL DAILY
COMMUNITY

## 2024-04-12 RX ORDER — LEVETIRACETAM 500 MG/1
500 TABLET ORAL 2 TIMES DAILY
Status: DISCONTINUED | OUTPATIENT
Start: 2024-04-12 | End: 2024-04-16 | Stop reason: HOSPADM

## 2024-04-12 RX ORDER — LABETALOL HYDROCHLORIDE 5 MG/ML
10 INJECTION, SOLUTION INTRAVENOUS EVERY 4 HOURS PRN
Status: DISCONTINUED | OUTPATIENT
Start: 2024-04-12 | End: 2024-04-16 | Stop reason: HOSPADM

## 2024-04-12 RX ORDER — OXYCODONE HYDROCHLORIDE AND ACETAMINOPHEN 5; 325 MG/1; MG/1
1 TABLET ORAL EVERY 4 HOURS PRN
Status: DISCONTINUED | OUTPATIENT
Start: 2024-04-12 | End: 2024-04-12

## 2024-04-12 RX ORDER — QUETIAPINE FUMARATE 100 MG/1
400 TABLET, FILM COATED ORAL
Status: DISCONTINUED | OUTPATIENT
Start: 2024-04-12 | End: 2024-04-16 | Stop reason: HOSPADM

## 2024-04-12 RX ORDER — QUETIAPINE FUMARATE 100 MG/1
100 TABLET, FILM COATED ORAL
Status: DISCONTINUED | OUTPATIENT
Start: 2024-04-12 | End: 2024-04-12

## 2024-04-12 RX ORDER — DICYCLOMINE HYDROCHLORIDE 10 MG/1
10 CAPSULE ORAL 3 TIMES DAILY
COMMUNITY

## 2024-04-12 RX ORDER — DIAZEPAM 5 MG/1
5 TABLET ORAL
Status: DISCONTINUED | OUTPATIENT
Start: 2024-04-12 | End: 2024-04-14

## 2024-04-12 RX ADMIN — QUETIAPINE FUMARATE 100 MG: 100 TABLET ORAL at 01:19

## 2024-04-12 RX ADMIN — GABAPENTIN 600 MG: 300 CAPSULE ORAL at 11:43

## 2024-04-12 RX ADMIN — QUETIAPINE FUMARATE 400 MG: 100 TABLET ORAL at 21:46

## 2024-04-12 RX ADMIN — PANCRELIPASE 36000 UNITS: 30000; 6000; 19000 CAPSULE, DELAYED RELEASE PELLETS ORAL at 17:30

## 2024-04-12 RX ADMIN — QUETIAPINE FUMARATE 300 MG: 200 TABLET ORAL at 02:19

## 2024-04-12 RX ADMIN — OXYCODONE AND ACETAMINOPHEN 1 TABLET: 5; 325 TABLET ORAL at 17:30

## 2024-04-12 RX ADMIN — SODIUM CHLORIDE, POTASSIUM CHLORIDE, SODIUM LACTATE AND CALCIUM CHLORIDE: 600; 310; 30; 20 INJECTION, SOLUTION INTRAVENOUS at 15:22

## 2024-04-12 RX ADMIN — HYDROMORPHONE HYDROCHLORIDE 1 MG: 1 INJECTION, SOLUTION INTRAMUSCULAR; INTRAVENOUS; SUBCUTANEOUS at 11:44

## 2024-04-12 RX ADMIN — ONDANSETRON 4 MG: 2 INJECTION INTRAMUSCULAR; INTRAVENOUS at 11:44

## 2024-04-12 RX ADMIN — LEVETIRACETAM 500 MG: 500 TABLET, FILM COATED ORAL at 17:29

## 2024-04-12 RX ADMIN — SODIUM CHLORIDE 1000 ML: 9 INJECTION, SOLUTION INTRAVENOUS at 01:21

## 2024-04-12 RX ADMIN — HYDROMORPHONE HYDROCHLORIDE 1 MG: 1 INJECTION, SOLUTION INTRAMUSCULAR; INTRAVENOUS; SUBCUTANEOUS at 15:46

## 2024-04-12 RX ADMIN — HYDROMORPHONE HYDROCHLORIDE 1 MG: 1 INJECTION, SOLUTION INTRAMUSCULAR; INTRAVENOUS; SUBCUTANEOUS at 23:50

## 2024-04-12 RX ADMIN — HYDROMORPHONE HYDROCHLORIDE 1 MG: 1 INJECTION, SOLUTION INTRAMUSCULAR; INTRAVENOUS; SUBCUTANEOUS at 00:17

## 2024-04-12 RX ADMIN — ONDANSETRON 4 MG: 2 INJECTION INTRAMUSCULAR; INTRAVENOUS at 00:17

## 2024-04-12 RX ADMIN — HYDROMORPHONE HYDROCHLORIDE 1 MG: 1 INJECTION, SOLUTION INTRAMUSCULAR; INTRAVENOUS; SUBCUTANEOUS at 19:48

## 2024-04-12 RX ADMIN — NICOTINE 14 MG: 14 PATCH TRANSDERMAL at 06:30

## 2024-04-12 RX ADMIN — ACETAMINOPHEN, ASPIRIN, CAFFEINE 1 TABLET: 250; 65; 250 TABLET, FILM COATED ORAL at 11:44

## 2024-04-12 RX ADMIN — SODIUM CHLORIDE, POTASSIUM CHLORIDE, SODIUM LACTATE AND CALCIUM CHLORIDE: 600; 310; 30; 20 INJECTION, SOLUTION INTRAVENOUS at 02:43

## 2024-04-12 RX ADMIN — ONDANSETRON 4 MG: 2 INJECTION INTRAMUSCULAR; INTRAVENOUS at 19:36

## 2024-04-12 RX ADMIN — GABAPENTIN 600 MG: 300 CAPSULE ORAL at 15:22

## 2024-04-12 RX ADMIN — LITHIUM CARBONATE 300 MG: 300 TABLET ORAL at 17:30

## 2024-04-12 RX ADMIN — OXYCODONE AND ACETAMINOPHEN 1 TABLET: 5; 325 TABLET ORAL at 21:45

## 2024-04-12 RX ADMIN — HYDROMORPHONE HYDROCHLORIDE 1 MG: 1 INJECTION, SOLUTION INTRAMUSCULAR; INTRAVENOUS; SUBCUTANEOUS at 07:23

## 2024-04-12 RX ADMIN — LITHIUM CARBONATE 300 MG: 300 TABLET ORAL at 06:26

## 2024-04-12 RX ADMIN — GABAPENTIN 600 MG: 300 CAPSULE ORAL at 21:46

## 2024-04-12 RX ADMIN — HYDROMORPHONE HYDROCHLORIDE 1 MG: 1 INJECTION, SOLUTION INTRAMUSCULAR; INTRAVENOUS; SUBCUTANEOUS at 02:55

## 2024-04-12 RX ADMIN — LEVETIRACETAM 500 MG: 500 TABLET, FILM COATED ORAL at 06:26

## 2024-04-12 RX ADMIN — LITHIUM CARBONATE 300 MG: 300 TABLET ORAL at 11:44

## 2024-04-12 ASSESSMENT — COGNITIVE AND FUNCTIONAL STATUS - GENERAL
DAILY ACTIVITIY SCORE: 24
MOBILITY SCORE: 24
SUGGESTED CMS G CODE MODIFIER DAILY ACTIVITY: CH
SUGGESTED CMS G CODE MODIFIER MOBILITY: CH

## 2024-04-12 ASSESSMENT — ENCOUNTER SYMPTOMS
SHORTNESS OF BREATH: 0
COUGH: 0
DIARRHEA: 0
VOMITING: 0
ABDOMINAL PAIN: 1
NAUSEA: 0
CHILLS: 0
FEVER: 0

## 2024-04-12 ASSESSMENT — PATIENT HEALTH QUESTIONNAIRE - PHQ9
8. MOVING OR SPEAKING SO SLOWLY THAT OTHER PEOPLE COULD HAVE NOTICED. OR THE OPPOSITE, BEING SO FIGETY OR RESTLESS THAT YOU HAVE BEEN MOVING AROUND A LOT MORE THAN USUAL: MORE THAN HALF THE DAYS
4. FEELING TIRED OR HAVING LITTLE ENERGY: MORE THAN HALF THE DAYS
7. TROUBLE CONCENTRATING ON THINGS, SUCH AS READING THE NEWSPAPER OR WATCHING TELEVISION: MORE THAN HALF THE DAYS
2. FEELING DOWN, DEPRESSED, IRRITABLE, OR HOPELESS: MORE THAN HALF THE DAYS
6. FEELING BAD ABOUT YOURSELF - OR THAT YOU ARE A FAILURE OR HAVE LET YOURSELF OR YOUR FAMILY DOWN: MORE THAN HALF THE DAYS
9. THOUGHTS THAT YOU WOULD BE BETTER OFF DEAD, OR OF HURTING YOURSELF: NOT AT ALL
3. TROUBLE FALLING OR STAYING ASLEEP OR SLEEPING TOO MUCH: NOT AT ALL
SUM OF ALL RESPONSES TO PHQ9 QUESTIONS 1 AND 2: 4
5. POOR APPETITE OR OVEREATING: MORE THAN HALF THE DAYS
2. FEELING DOWN, DEPRESSED, IRRITABLE, OR HOPELESS: MORE THAN HALF THE DAYS
SUM OF ALL RESPONSES TO PHQ9 QUESTIONS 1 AND 2: 2
1. LITTLE INTEREST OR PLEASURE IN DOING THINGS: MORE THAN HALF THE DAYS

## 2024-04-12 ASSESSMENT — LIFESTYLE VARIABLES
EVER HAD A DRINK FIRST THING IN THE MORNING TO STEADY YOUR NERVES TO GET RID OF A HANGOVER: NO
AVERAGE NUMBER OF DAYS PER WEEK YOU HAVE A DRINK CONTAINING ALCOHOL: 0
HAVE PEOPLE ANNOYED YOU BY CRITICIZING YOUR DRINKING: NO
CONSUMPTION TOTAL: NEGATIVE
TOTAL SCORE: 0
ON A TYPICAL DAY WHEN YOU DRINK ALCOHOL HOW MANY DRINKS DO YOU HAVE: 0
EVER FELT BAD OR GUILTY ABOUT YOUR DRINKING: NO
HOW MANY TIMES IN THE PAST YEAR HAVE YOU HAD 5 OR MORE DRINKS IN A DAY: 0
HAVE YOU EVER FELT YOU SHOULD CUT DOWN ON YOUR DRINKING: NO
TOTAL SCORE: 0
TOTAL SCORE: 0
ALCOHOL_USE: NO

## 2024-04-12 ASSESSMENT — PAIN DESCRIPTION - PAIN TYPE
TYPE: ACUTE PAIN

## 2024-04-12 ASSESSMENT — FIBROSIS 4 INDEX: FIB4 SCORE: 0.61
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
## 2024-04-12 NOTE — ASSESSMENT & PLAN NOTE
IV fluids.  She is in severe pain requiring narcotic pain management, needs close hemodynamic and respiratory status monitoring  Continue pancreatic enzymes

## 2024-04-12 NOTE — PROGRESS NOTES
Hospitalist progress note:  38 y F with chronic pancreatitis related pain and nausea.  Continue supportive treatment with pain medication and antiemetics.  Discussed chronic pancreatitis management and prognosis with patient, all questions answered.  Advance diet as tolerated.  Possible discharge home tomorrow if symptoms are improving.

## 2024-04-12 NOTE — ED TRIAGE NOTES
Vitals:    04/11/24 1702   BP: 114/75   Pulse: 90   Resp: 18   Temp: 36.8 °C (98.3 °F)   SpO2: 97%     Chief Complaint   Patient presents with    Abdominal Pain     Pt reports the above complaint for 3 weeks. She is living at [facility] right now, she has been sober for nearly 6 months, has hx of pancreatitis and ovarian cysts. She is BIB [facility] and was given zofran and ibuprofen 600 mg which have minimally helped.     Pt is ambulatory to and from triage and is alert and oriented x 4.
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
## 2024-04-12 NOTE — PROGRESS NOTES
"ED Observation Progress Note    Date of Service: 04/12/24    Interval History and Interventions  Patient with chronic pain syndrome, chronic pancreatitis with multiple admissions for chronic pain and flareup.  Presented here with chronic pain with a negative workup and normal labs and vital signs.  Benign physical exam.  Seen by Dr. Resendez previously.  Patient received multiple pain medications with multimodal mechanisms without improvement unfortunately.  Ultimately ended up giving her Dilaudid and Zofran which is what she requested.  When I discussed potential discharge home patient adamantly declined stating that she would not be able to tolerate this pain at home.  Dr. Resendez's recommendation was that we admit the patient for intractable pain if she is unable to be discharged safely.  Discussed with hospitalist and they are amenable.    Physical Exam  /68   Pulse 81   Temp 36.8 °C (98.3 °F) (Temporal)   Resp 15   Ht 1.702 m (5' 7\")   Wt 76.7 kg (169 lb 1.5 oz)   LMP 10/30/2018   SpO2 95%   BMI 26.48 kg/m² .    Constitutional: Awake and alert. Nontoxic  HENT:  Grossly normal  Eyes: Grossly normal  Neck: Normal range of motion  Cardiovascular: Normal heart rate   Thorax & Lungs: No respiratory distress  Abdomen: Nontender  Skin:  No pathologic rash.   Extremities: Well perfused  Psychiatric: Affect normal    Labs  Results for orders placed or performed during the hospital encounter of 04/11/24   CBC with Differential   Result Value Ref Range    WBC 6.7 4.8 - 10.8 K/uL    RBC 4.36 4.20 - 5.40 M/uL    Hemoglobin 13.3 12.0 - 16.0 g/dL    Hematocrit 39.1 37.0 - 47.0 %    MCV 89.7 81.4 - 97.8 fL    MCH 30.5 27.0 - 33.0 pg    MCHC 34.0 32.2 - 35.5 g/dL    RDW 39.8 35.9 - 50.0 fL    Platelet Count 244 164 - 446 K/uL    MPV 9.5 9.0 - 12.9 fL    Neutrophils-Polys 53.70 44.00 - 72.00 %    Lymphocytes 38.00 22.00 - 41.00 %    Monocytes 5.10 0.00 - 13.40 %    Eosinophils 2.70 0.00 - 6.90 %    Basophils 0.40 0.00 - " 1.80 %    Immature Granulocytes 0.10 0.00 - 0.90 %    Nucleated RBC 0.00 0.00 - 0.20 /100 WBC    Neutrophils (Absolute) 3.60 1.82 - 7.42 K/uL    Lymphs (Absolute) 2.55 1.00 - 4.80 K/uL    Monos (Absolute) 0.34 0.00 - 0.85 K/uL    Eos (Absolute) 0.18 0.00 - 0.51 K/uL    Baso (Absolute) 0.03 0.00 - 0.12 K/uL    Immature Granulocytes (abs) 0.01 0.00 - 0.11 K/uL    NRBC (Absolute) 0.00 K/uL   Complete Metabolic Panel   Result Value Ref Range    Sodium 137 135 - 145 mmol/L    Potassium 3.9 3.6 - 5.5 mmol/L    Chloride 107 96 - 112 mmol/L    Co2 19 (L) 20 - 33 mmol/L    Anion Gap 11.0 7.0 - 16.0    Glucose 94 65 - 99 mg/dL    Bun 8 8 - 22 mg/dL    Creatinine 0.85 0.50 - 1.40 mg/dL    Calcium 8.9 8.5 - 10.5 mg/dL    Correct Calcium 8.7 8.5 - 10.5 mg/dL    AST(SGOT) 15 12 - 45 U/L    ALT(SGPT) 13 2 - 50 U/L    Alkaline Phosphatase 71 30 - 99 U/L    Total Bilirubin 0.4 0.1 - 1.5 mg/dL    Albumin 4.3 3.2 - 4.9 g/dL    Total Protein 7.1 6.0 - 8.2 g/dL    Globulin 2.8 1.9 - 3.5 g/dL    A-G Ratio 1.5 g/dL   Lipase   Result Value Ref Range    Lipase 12 11 - 82 U/L   Urinalysis    Specimen: Urine   Result Value Ref Range    Color Yellow     Character Clear     Specific Gravity 1.007 <1.035    Ph 7.0 5.0 - 8.0    Glucose Negative Negative mg/dL    Ketones Negative Negative mg/dL    Protein Negative Negative mg/dL    Bilirubin Negative Negative    Urobilinogen, Urine 0.2 Negative    Nitrite Negative Negative    Leukocyte Esterase Negative Negative    Occult Blood Negative Negative    Micro Urine Req see below    ESTIMATED GFR   Result Value Ref Range    GFR (CKD-EPI) 90 >60 mL/min/1.73 m 2       Radiology  CT-ABDOMEN-PELVIS WITH   Final Result         1. Hepatomegaly.   2. Tiny effusions with mild dependent atelectasis.   3. Status post cholecystectomy.   4. Evidence for chronic pancreatitis.   5. There is some asymmetric soft tissue nodularity of the right breast. Clinical correlation is suggested.          Problem List  1.   Chronic pain, chronic pancreatitis    Electronically signed by: Kwasi Doss, 4/12/2024 12:08 AM

## 2024-04-12 NOTE — CARE PLAN
The patient is Stable - Low risk of patient condition declining or worsening    Shift Goals  Clinical Goals: Patient will be able to eat without pain and vomiting this shift    Progress made toward(s) clinical / shift goals:    Problem: Knowledge Deficit - Standard  Goal: Patient and family/care givers will demonstrate understanding of plan of care, disease process/condition, diagnostic tests and medications  Description: Target End Date:  1-3 days or as soon as patient condition allows    Document in Patient Education    1.  Patient and family/caregiver oriented to unit, equipment, visitation policy and means for communicating concern  2.  Complete/review Learning Assessment  3.  Assess knowledge level of disease process/condition, treatment plan, diagnostic tests and medications  4.  Explain disease process/condition, treatment plan, diagnostic tests and medications  Outcome: Progressing  Note: Pt Alert and oriented. Understands the need of the hospital stay.         Patient is not progressing towards the following goals:      Problem: Pain - Standard  Goal: Alleviation of pain or a reduction in pain to the patient’s comfort goal  Description: Target End Date:  Prior to discharge or change in level of care    Document on Vitals flowsheet    1.  Document pain using the appropriate pain scale per order or unit policy  2.  Educate and implement non-pharmacologic comfort measures (i.e. relaxation, distraction, massage, cold/heat therapy, etc.)  3.  Pain management medications as ordered  4.  Reassess pain after pain med administration per policy  5.  If opiods administered assess patient's response to pain medication is appropriate per POSS sedation scale  6.  Follow pain management plan developed in collaboration with patient and interdisciplinary team (including palliative care or pain specialists if applicable)  Outcome: Not Progressing  Note: Patient required PRN pain medications all shift every four hours

## 2024-04-12 NOTE — CARE PLAN
The patient is Watcher - Medium risk of patient condition declining or worsening    Shift Goals  Clinical Goals: pain management throughout shift    Progress made toward(s) clinical / shift goals:      Patient is not progressing towards the following goals:      Problem: Pain - Standard  Goal: Alleviation of pain or a reduction in pain to the patient’s comfort goal  Outcome: Not Progressing     Medicated with IV dilaudid for abdominal pain.

## 2024-04-12 NOTE — ED NOTES
Bedside report received from off going RN April, assumed care of patient.  POC discussed with patient. Call light within reach, all needs addressed at this time.       Fall risk interventions in place: Move the patient closer to the nurse's station, Patient's personal possessions are with in their safe reach, Place socks on patient, Place fall risk sign on patient's door, Keep floor surfaces clean and dry, Accompanied to restroom, and Bed Alarm in use (all applicable per [place] Fall risk assessment)   Continuous monitoring: Cardiac Leads, Pulse Ox, or Blood Pressure  IVF/IV medications: Not Applicable   Oxygen: Room Air  Bedside sitter: Not Applicable   Isolation: Not Applicable

## 2024-04-12 NOTE — ASSESSMENT & PLAN NOTE
She is coming up on being 6 months sober.  Needs continued encouragement to remain sober because she does understand why she still having pain despite alcohol cessation.  I explained that she may continue to have exacerbations but the duration and intensity will be decreased if she does not drink alcohol

## 2024-04-12 NOTE — PROGRESS NOTES
Pt admitted from ED YEL 60, report received from LESLIE Dickson. Pt on unit via gurney, ambulated to bed with steady gait. Pt alert/oriented x4, medicated for abdominal pain. Tolerating IVF LR @ 83mL/hr. Pt resting in bed, needs met. Call light within reach, personal belongings available, bed in lowest position, treaded socks on, and hourly rounding in place.

## 2024-04-12 NOTE — ED PROVIDER NOTES
ED Provider Note        CHIEF COMPLAINT  Chief Complaint   Patient presents with    Abdominal Pain         [facility]      Rhiannon Marrero is a 38 y.o. female who presents to the Emergency Department with worsening abdominal pain.  She reports that she has got pain in bilateral lower quadrants over the past several weeks.  She has had upper abdominal pain due to chronic chronic pancreatitis but has recently been worsening over the past 4 days.  Reports vomiting.  Denies any measured fever but reports cold sweats.  She does have a history of pancreatic jejunostomy after a large pancreatic stone that was unable to be extracted by ERCP.      REVIEW OF SYSTEMS  See HPI for further details. All other systems are negative.     PAST MEDICAL HISTORY     Past Medical History:   Diagnosis Date    Acute pancreatitis without infection or necrosis 07/20/2022    Alcohol dependence ([facility]) 04/13/2017    Bronchitis 08/2012    Cold     sept 2018    Current moderate episode of major depressive disorder without prior episode ([facility]) 01/31/2020    Heart burn     Hernia of unspecified site of abdominal cavity without mention of obstruction or gangrene     High anion gap metabolic acidosis 07/01/2022    Indigestion     Pancreatitis     Pneumonia 2011    Seizure disorder ([facility]) 05/23/2022       SURGICAL HISTORY  Past Surgical History:   Procedure Laterality Date    WHIPPLE PROCEDURE  02/25/2023    Modified    HI UPPER GI ENDOSCOPY,DIAGNOSIS N/A 12/23/2022    Procedure: GASTROSCOPY;  Surgeon: Td Kwok M.D.;  Location: [facility];  Service: EUS    HI INJECT NERV BLCK,CELIAC PLEXUS N/A 12/23/2022    Procedure: BLOCK, CELIAC PLEXUS;  Surgeon: Td Kwok M.D.;  Location: [facility];  Service: EUS    EGD W/ENDOSCOPIC ULTRASOUND N/A 12/23/2022    Procedure: EGD, WITH ENDOSCOPIC US;  Surgeon: Td Kwok M.D.;  Location: [facility];  Service: EUS    ORIF, WRIST Right 04/24/2019     Procedure: ORIF, WRIST;  Surgeon: Marquis Bell M.D.;  Location: SURGERY [facility];  Service: Orthopedics    HYSTERECTOMY ROBOTIC XI  10/11/2018    Procedure: HYSTERECTOMY ROBOTIC XI- RIGHT URETEROLYSIS;  Surgeon: Marquis Reeder M.D.;  Location: SURGERY [facility];  Service: Gynecology    SALPINGECTOMY Bilateral 10/11/2018    Procedure: SALPINGECTOMY;  Surgeon: Marquis Reeder M.D.;  Location: SURGERY [facility];  Service: Gynecology    TONSILLECTOMY  04/11/2013    Performed by Rock Rothman M.D. at SURGERY SAME DAY [facility]    ARTHROSCOPY, KNEE      GYN SURGERY      miscarriage May 2006    OTHER ORTHOPEDIC SURGERY      knee surgeries       FAMILY HISTORY  Family History   Problem Relation Age of Onset    Psychiatric Illness Mother     Stroke Mother     Arthritis Mother     Heart Disease Maternal Grandfather     Cancer Neg Hx     Diabetes Neg Hx     Hypertension Neg Hx        SOCIAL HISTORY    reports that she has quit smoking. Her smoking use included cigarettes. She has a 2.5 pack-year smoking history. She has never used smokeless tobacco. She reports current alcohol use. She reports that she does not currently use drugs after having used the following drugs: Inhaled.    CURRENT MEDICATIONS  Home Medications       Reviewed by Kacey Mcguire R.N. (Registered Nurse) on 04/11/24 at 1706  Med List Status: Partial     Medication Last Dose Status   Doxepin HCl 6 MG Tab  Active   gabapentin (NEURONTIN) 600 MG tablet  Active   levETIRAcetam (KEPPRA) 500 MG Tab  Active   OLANZapine (ZYPREXA) 5 MG Tab  Active   ondansetron (ZOFRAN ODT) 4 MG TABLET DISPERSIBLE  Active   Pancrelipase, Lip-Prot-Amyl, (CREON) 62309-098455 units Cap DR Particles  Active   QUEtiapine (SEROQUEL) 100 MG Tab  Active   sertraline (ZOLOFT) 50 MG Tab  Active   traZODone (DESYREL) 100 MG Tab  Active                    ALLERGIES  Allergies   Allergen Reactions    Promethazine Hcl Anxiety     Anxiety and makes her feels like  "skin coming off        PHYSICAL EXAM  VITAL SIGNS: /78   Pulse 80   Temp 36.8 °C (98.3 °F) (Temporal)   Resp 15   Ht 1.702 m (5' 7\")   Wt 76.7 kg (169 lb 1.5 oz)   LMP 10/30/2018   SpO2 95%   BMI 26.48 kg/m²   Gen: Alert, no acute distress  HEENT: ATNC  Eyes: PERRL, EOMI, normal conjunctiva  Neck: trachea midline  Resp: no respiratory distress  CV: No JVD, regular rate and rhythm  Abd: non-distended, soft, tenderness palpation epigastrium as well as bilateral lower quadrants  : No CVAT  Ext: No deformities  Neuro: speech fluent    DIAGNOSTIC STUDIES / PROCEDURES      LABS  Labs Reviewed   COMP METABOLIC PANEL - Abnormal; Notable for the following components:       Result Value    Co2 19 (*)     All other components within normal limits   CBC WITH DIFFERENTIAL   LIPASE   URINALYSIS   ESTIMATED GFR         RADIOLOGY  I have independently interpreted the diagnostic imaging associated with this visit.  My preliminary interpretation is as follows: CT abdomen pelvis: No bowel obstruction  Radiologist interpretation:    CT-ABDOMEN-PELVIS WITH   Final Result         1. Hepatomegaly.   2. Tiny effusions with mild dependent atelectasis.   3. Status post cholecystectomy.   4. Evidence for chronic pancreatitis.   5. There is some asymmetric soft tissue nodularity of the right breast. Clinical correlation is suggested.          COURSE & MEDICAL DECISION MAKING  Pertinent Labs & Imaging studies were reviewed. (See chart for details)      EXTERNAL RECORDS REVIEWED  Inpatient Notes last hospitalization 10/20/2023 for gastritis      INITIAL ASSESSMENT AND PLAN  Care Narrative: Patient with chronic pancreatitis presents with bilateral lower abdominal pain as well as worsening epigastric pain and report of vomiting.  Patient not noted to be vomiting in the emergency department.  The patient's laboratory studies are all reassuring.  Given her chronic pancreatitis this may be a flare of pancreatitis without elevated " lipase due to pancreatic burnout.  Patient was trialed on Maalox,, Toradol with limited effect.    Patient received morphine but is still in pain.  CAT scan returns without any acute findings.  I discussed this with the patient and the limited utility of hospitalization in this scenario given her prior flares and chronic abdominal pain.  We talked about different options, including potential tricyclic antidepressants but the patient is bipolar and is already on multiple psychiatric medications and I do not feel comfortable given her scenario as this could lead to serotonin syndrome or other complications.    Will trial pain dose ketamine.  Patient signed out to my colleague for reassessment after ketamine infusion and hopefully the patient feels sufficiently better that they can manage as an outpatient.    ADDITIONAL PROBLEM LIST AND DISPOSITION  I have discussed management of the patient with the following medical professionals:  Dr. Doss      Decision tools and prescription drugs considered including, but not limited to: Pain Medications recommend nonopioid pain medication .    Hydration: Patient received IV fluids for vomiting. Oral hydration alone was not sufficient. After IV fluids patient is improved.      FINAL IMPRESSION  1. Abdominal pain, unspecified abdominal location    2. Chronic pancreatitis, unspecified pancreatitis type (HCC)          This dictation was created using voice recognition software. The accuracy of the dictation is limited to the abilities of the software. I expect there may be some errors of grammar and possibly content. The nursing notes were reviewed and certain aspects of this information were incorporated into this note.

## 2024-04-12 NOTE — PROGRESS NOTES
4 Eyes Skin Assessment Completed by LESLIE Back and LESLIE Lees.    Head WDL  Ears WDL  Nose WDL  Mouth WDL  Neck WDL  Breast/Chest WDL  Shoulder Blades WDL  Spine - skin tag  (R) Arm/Elbow/Hand WDL  (L) Arm/Elbow/Hand Bruising  Abdomen Scar healed  Groin WDL  Scrotum/Coccyx/Buttocks WDL  (R) Leg WDL  (L) Leg WDL  (R) Heel/Foot/Toe WDL  (L) Heel/Foot/Toe WDL          Devices In Places - PIV      Interventions In Place Pillows and Pressure Redistribution Mattress    Possible Skin Injury No    Pictures Uploaded Into Epic N/A  Wound Consult Placed N/A  RN Wound Prevention Protocol Ordered No

## 2024-04-13 PROBLEM — K86.1 ACUTE ON CHRONIC PANCREATITIS (HCC): Status: RESOLVED | Noted: 2024-04-12 | Resolved: 2024-04-13

## 2024-04-13 PROBLEM — K85.90 ACUTE ON CHRONIC PANCREATITIS (HCC): Status: RESOLVED | Noted: 2024-04-12 | Resolved: 2024-04-13

## 2024-04-13 PROBLEM — R79.89 LFT ELEVATION: Status: ACTIVE | Noted: 2024-04-13

## 2024-04-13 LAB
ALBUMIN SERPL BCP-MCNC: 3.9 G/DL (ref 3.2–4.9)
ALBUMIN/GLOB SERPL: 1.7 G/DL
ALP SERPL-CCNC: 129 U/L (ref 30–99)
ALT SERPL-CCNC: 277 U/L (ref 2–50)
ANION GAP SERPL CALC-SCNC: 10 MMOL/L (ref 7–16)
AST SERPL-CCNC: 190 U/L (ref 12–45)
BILIRUB SERPL-MCNC: 0.8 MG/DL (ref 0.1–1.5)
BUN SERPL-MCNC: 5 MG/DL (ref 8–22)
CALCIUM ALBUM COR SERPL-MCNC: 8.8 MG/DL (ref 8.5–10.5)
CALCIUM SERPL-MCNC: 8.7 MG/DL (ref 8.5–10.5)
CHLORIDE SERPL-SCNC: 104 MMOL/L (ref 96–112)
CO2 SERPL-SCNC: 23 MMOL/L (ref 20–33)
CREAT SERPL-MCNC: 0.7 MG/DL (ref 0.5–1.4)
GFR SERPLBLD CREATININE-BSD FMLA CKD-EPI: 114 ML/MIN/1.73 M 2
GLOBULIN SER CALC-MCNC: 2.3 G/DL (ref 1.9–3.5)
GLUCOSE SERPL-MCNC: 96 MG/DL (ref 65–99)
POTASSIUM SERPL-SCNC: 4 MMOL/L (ref 3.6–5.5)
PROT SERPL-MCNC: 6.2 G/DL (ref 6–8.2)
SODIUM SERPL-SCNC: 137 MMOL/L (ref 135–145)

## 2024-04-13 PROCEDURE — 80053 COMPREHEN METABOLIC PANEL: CPT

## 2024-04-13 PROCEDURE — 700102 HCHG RX REV CODE 250 W/ 637 OVERRIDE(OP): Performed by: STUDENT IN AN ORGANIZED HEALTH CARE EDUCATION/TRAINING PROGRAM

## 2024-04-13 PROCEDURE — 36415 COLL VENOUS BLD VENIPUNCTURE: CPT

## 2024-04-13 PROCEDURE — 700105 HCHG RX REV CODE 258: Performed by: STUDENT IN AN ORGANIZED HEALTH CARE EDUCATION/TRAINING PROGRAM

## 2024-04-13 PROCEDURE — A9270 NON-COVERED ITEM OR SERVICE: HCPCS | Performed by: STUDENT IN AN ORGANIZED HEALTH CARE EDUCATION/TRAINING PROGRAM

## 2024-04-13 PROCEDURE — 770006 HCHG ROOM/CARE - MED/SURG/GYN SEMI*

## 2024-04-13 PROCEDURE — 99232 SBSQ HOSP IP/OBS MODERATE 35: CPT | Performed by: STUDENT IN AN ORGANIZED HEALTH CARE EDUCATION/TRAINING PROGRAM

## 2024-04-13 PROCEDURE — 700111 HCHG RX REV CODE 636 W/ 250 OVERRIDE (IP): Performed by: STUDENT IN AN ORGANIZED HEALTH CARE EDUCATION/TRAINING PROGRAM

## 2024-04-13 RX ORDER — PROCHLORPERAZINE EDISYLATE 5 MG/ML
10 INJECTION INTRAMUSCULAR; INTRAVENOUS EVERY 6 HOURS PRN
Status: DISCONTINUED | OUTPATIENT
Start: 2024-04-13 | End: 2024-04-14

## 2024-04-13 RX ADMIN — OXYCODONE AND ACETAMINOPHEN 1 TABLET: 5; 325 TABLET ORAL at 11:53

## 2024-04-13 RX ADMIN — LITHIUM CARBONATE 300 MG: 300 TABLET ORAL at 11:54

## 2024-04-13 RX ADMIN — OXYCODONE AND ACETAMINOPHEN 1 TABLET: 5; 325 TABLET ORAL at 07:43

## 2024-04-13 RX ADMIN — SODIUM CHLORIDE, POTASSIUM CHLORIDE, SODIUM LACTATE AND CALCIUM CHLORIDE: 600; 310; 30; 20 INJECTION, SOLUTION INTRAVENOUS at 05:27

## 2024-04-13 RX ADMIN — LITHIUM CARBONATE 300 MG: 300 TABLET ORAL at 05:19

## 2024-04-13 RX ADMIN — GABAPENTIN 600 MG: 300 CAPSULE ORAL at 20:21

## 2024-04-13 RX ADMIN — HYDROMORPHONE HYDROCHLORIDE 1 MG: 1 INJECTION, SOLUTION INTRAMUSCULAR; INTRAVENOUS; SUBCUTANEOUS at 20:34

## 2024-04-13 RX ADMIN — OXYCODONE AND ACETAMINOPHEN 1 TABLET: 5; 325 TABLET ORAL at 19:30

## 2024-04-13 RX ADMIN — NICOTINE 14 MG: 14 PATCH TRANSDERMAL at 05:20

## 2024-04-13 RX ADMIN — PROCHLORPERAZINE EDISYLATE 10 MG: 5 INJECTION INTRAMUSCULAR; INTRAVENOUS at 11:54

## 2024-04-13 RX ADMIN — HYDROMORPHONE HYDROCHLORIDE 1 MG: 1 INJECTION, SOLUTION INTRAMUSCULAR; INTRAVENOUS; SUBCUTANEOUS at 10:15

## 2024-04-13 RX ADMIN — LEVETIRACETAM 500 MG: 500 TABLET, FILM COATED ORAL at 05:19

## 2024-04-13 RX ADMIN — HYDROMORPHONE HYDROCHLORIDE 1 MG: 1 INJECTION, SOLUTION INTRAMUSCULAR; INTRAVENOUS; SUBCUTANEOUS at 15:48

## 2024-04-13 RX ADMIN — PROCHLORPERAZINE EDISYLATE 10 MG: 5 INJECTION INTRAMUSCULAR; INTRAVENOUS at 20:24

## 2024-04-13 RX ADMIN — QUETIAPINE FUMARATE 400 MG: 100 TABLET ORAL at 20:21

## 2024-04-13 RX ADMIN — LITHIUM CARBONATE 300 MG: 300 TABLET ORAL at 15:48

## 2024-04-13 RX ADMIN — HYDROMORPHONE HYDROCHLORIDE 1 MG: 1 INJECTION, SOLUTION INTRAMUSCULAR; INTRAVENOUS; SUBCUTANEOUS at 05:19

## 2024-04-13 RX ADMIN — GABAPENTIN 600 MG: 300 CAPSULE ORAL at 15:47

## 2024-04-13 RX ADMIN — ONDANSETRON 4 MG: 2 INJECTION INTRAMUSCULAR; INTRAVENOUS at 08:38

## 2024-04-13 RX ADMIN — LEVETIRACETAM 500 MG: 500 TABLET, FILM COATED ORAL at 15:48

## 2024-04-13 RX ADMIN — GABAPENTIN 600 MG: 300 CAPSULE ORAL at 08:38

## 2024-04-13 RX ADMIN — ACETAMINOPHEN, ASPIRIN, CAFFEINE 1 TABLET: 250; 65; 250 TABLET, FILM COATED ORAL at 05:49

## 2024-04-13 ASSESSMENT — PAIN DESCRIPTION - PAIN TYPE
TYPE: ACUTE PAIN

## 2024-04-13 ASSESSMENT — ENCOUNTER SYMPTOMS
NAUSEA: 1
HEADACHES: 0
CONSTIPATION: 0
FOCAL WEAKNESS: 0
VOMITING: 0
CHILLS: 0
BLURRED VISION: 0
SENSORY CHANGE: 0
COUGH: 0
EYE PAIN: 0
DIZZINESS: 0
PALPITATIONS: 0
SHORTNESS OF BREATH: 0
DIARRHEA: 0
INSOMNIA: 0
FEVER: 0
BACK PAIN: 0
ABDOMINAL PAIN: 1

## 2024-04-13 ASSESSMENT — FIBROSIS 4 INDEX: FIB4 SCORE: 1.68

## 2024-04-13 ASSESSMENT — LIFESTYLE VARIABLES: SUBSTANCE_ABUSE: 0

## 2024-04-13 NOTE — PROGRESS NOTES
Pt alert/oriented x4, medicated for nausea and upper abdominal pain throughout shift. Tolerating IVF LR  @ 83mL/hr. Pt resting in bed, needs met. Call light within reach, personal belongings available, bed in lowest position, treaded socks on, and hourly rounding in place.

## 2024-04-13 NOTE — ASSESSMENT & PLAN NOTE
Unclear etiology  Normal on admission, worse overnight  Sober from alcohol, not on any new medications to induce LFT elevation  No recent trauma or falls, muscle degradation  CT scan on admit with no acute findings  Receiving tylenol products including excedrin and percocet but received less than 2g yesterday  Monitor with daily CMP  improving

## 2024-04-13 NOTE — PROGRESS NOTES
Bedside report from night RN, pt care assumed. VSS on RA, pt assessment complete. Pt A&Ox4,  c/o 7/10 pain at this time. POC discussed with pt and verbalizes no questions. Pt denies any additional needs at this time. Bed locked and in lowest position, bed alarm off. Pt educated on fall risk and verbalized understanding, call light within reach, hourly rounding initiated.     Pain received PRN pain and Nausea medications this shift and 50cc green emesis

## 2024-04-13 NOTE — PROGRESS NOTES
Hospital Medicine Daily Progress Note    Date of Service  4/13/2024    Chief Complaint  Rhiannon Marrero is a 38 y.o. female admitted 4/11/2024 with abdominal pain.    Hospital Course  Rhiannon Marrero is a 38 y.o. female who presented 4/11/2024 with abdominal pain.  PMH of chronic pancreatitis, seizure disorder, psychiatric disorder.  She is coming in with abdominal pain located epigastric area and radiates to lower abdomen and to the back.  She says it feels like her chronic pancreatitis exacerbation.  No longer drinks alcohol and is coming up on 6 months sober.  Compliant with all her medications including pancreatic enzymes.     Interval Problem Update  No acute events overnight.  Patient continues to have nausea, abdominal pain consistent with chronic pancreatitis.  Continue supportive measures including pain control, anti emetics. Compazine ordered prn.  Continue IV fluids given poor PO intake.  LFTs elevated for unclear reason. LFTs normal on admit. She is receiving tylenol products including excedrin and percocet but less than 2g given yesterday.   CT scan reviewed showing no acute findings in liver or biliary system.  Monitor CMP in AM. At this time defer further imaging.  Possible discharge home in next day or two depending on patients symptom improvement and medical needs.      I have discussed this patient's plan of care and discharge plan at IDT rounds today with Case Management, Nursing, Nursing leadership, and other members of the IDT team.    Consultants/Specialty  none    Code Status  Full Code    Disposition  The patient is not medically cleared for discharge to home or a post-acute facility.  Anticipate discharge to: home with close outpatient follow-up    I have placed the appropriate orders for post-discharge needs.    Review of Systems  Review of Systems   Constitutional:  Positive for malaise/fatigue. Negative for chills and fever.   Eyes:  Negative for blurred vision and pain.    Respiratory:  Negative for cough and shortness of breath.    Cardiovascular:  Negative for chest pain, palpitations and leg swelling.   Gastrointestinal:  Positive for abdominal pain and nausea. Negative for constipation, diarrhea and vomiting.   Genitourinary:  Negative for dysuria and urgency.   Musculoskeletal:  Negative for back pain.   Skin:  Negative for itching and rash.   Neurological:  Negative for dizziness, sensory change, focal weakness and headaches.   Psychiatric/Behavioral:  Negative for substance abuse. The patient does not have insomnia.         Physical Exam  Temp:  [36.9 °C (98.4 °F)-37.7 °C (99.9 °F)] 37.7 °C (99.9 °F)  Pulse:  [69-77] 74  Resp:  [16-18] 18  BP: (101-123)/(61-72) 123/72  SpO2:  [94 %-98 %] 94 %    Physical Exam  Vitals reviewed.   Constitutional:       General: She is not in acute distress.     Appearance: She is not diaphoretic.   HENT:      Head: Normocephalic and atraumatic.      Right Ear: External ear normal.      Left Ear: External ear normal.      Nose: Nose normal. No congestion.      Mouth/Throat:      Pharynx: No oropharyngeal exudate or posterior oropharyngeal erythema.   Eyes:      Extraocular Movements: Extraocular movements intact.      Pupils: Pupils are equal, round, and reactive to light.   Cardiovascular:      Rate and Rhythm: Normal rate and regular rhythm.   Pulmonary:      Effort: Pulmonary effort is normal. No respiratory distress.      Breath sounds: Normal breath sounds. No wheezing.   Abdominal:      General: Bowel sounds are normal. There is no distension.      Palpations: Abdomen is soft.      Tenderness: There is abdominal tenderness. There is no guarding or rebound.   Musculoskeletal:         General: No swelling. Normal range of motion.      Cervical back: Normal range of motion and neck supple.      Right lower leg: No edema.      Left lower leg: No edema.   Skin:     General: Skin is warm and dry.   Neurological:      General: No focal deficit  present.      Mental Status: She is alert and oriented to person, place, and time.      Cranial Nerves: No cranial nerve deficit.      Sensory: No sensory deficit.      Motor: No weakness.   Psychiatric:         Mood and Affect: Mood normal.         Behavior: Behavior normal.         Fluids    Intake/Output Summary (Last 24 hours) at 4/13/2024 1344  Last data filed at 4/13/2024 1228  Gross per 24 hour   Intake 2289.33 ml   Output --   Net 2289.33 ml       Laboratory  Recent Labs     04/11/24  1732   WBC 6.7   RBC 4.36   HEMOGLOBIN 13.3   HEMATOCRIT 39.1   MCV 89.7   MCH 30.5   MCHC 34.0   RDW 39.8   PLATELETCT 244   MPV 9.5     Recent Labs     04/11/24  1732 04/13/24  0305   SODIUM 137 137   POTASSIUM 3.9 4.0   CHLORIDE 107 104   CO2 19* 23   GLUCOSE 94 96   BUN 8 5*   CREATININE 0.85 0.70   CALCIUM 8.9 8.7                   Imaging  CT-ABDOMEN-PELVIS WITH   Final Result         1. Hepatomegaly.   2. Tiny effusions with mild dependent atelectasis.   3. Status post cholecystectomy.   4. Evidence for chronic pancreatitis.   5. There is some asymmetric soft tissue nodularity of the right breast. Clinical correlation is suggested.           Assessment/Plan  LFT elevation  Assessment & Plan  Unclear etiology  Normal on admission, worse overnight  Sober from alcohol, not on any new medications to induce LFT elevation  No recent trauma or falls, muscle degradation  CT scan on admit with no acute findings  Receiving tylenol products including excedrin and percocet but received less than 2g yesterday  Monitor with daily CMP    Alcohol-induced chronic pancreatitis (HCC)- (present on admission)  Assessment & Plan  Hx of pancreatitis  Presents with abdominal pain radiating to back similar to previous pancreatitis episodes  Lipase normal  CT scan with signs of chronic pancreatitis, no acute inflammation or pseudocysts seen  Continue supportive care  Discussed undulating course and possibility of chronic pain related to  pancreatitis    Alcoholism (HCC)- (present on admission)  Assessment & Plan  She is coming up on being 6 months sober.  Needs continued encouragement to remain sober because she does understand why she still having pain despite alcohol cessation.  I explained that she may continue to have exacerbations but the duration and intensity will be decreased if she does not drink alcohol    Bipolar disorder (HCC)- (present on admission)  Assessment & Plan  Continue home meds including lithium         VTE prophylaxis: VTE Selection

## 2024-04-13 NOTE — ASSESSMENT & PLAN NOTE
Hx of pancreatitis  Presents with abdominal pain radiating to back similar to previous pancreatitis episodes  Lipase normal  CT scan with signs of chronic pancreatitis, no acute inflammation or pseudocysts seen  Continue supportive care  Discussed undulating course and possibility of chronic pain related to pancreatitis  Oxycodone dosage increased, trial oral dilaudid prn regimen in hopes of discharging home  Pain management referral placed

## 2024-04-13 NOTE — CARE PLAN
The patient is Stable - Low risk of patient condition declining or worsening    Shift Goals  Clinical Goals: Patient will be able to eat this shift  Patient Goals: rest and pain    Progress made toward(s) clinical / shift goals:      Patient is not progressing towards the following goals:      Problem: Pain - Standard  Goal: Alleviation of pain or a reduction in pain to the patient’s comfort goal  Description: Target End Date:  Prior to discharge or change in level of care    Document on Vitals flowsheet    1.  Document pain using the appropriate pain scale per order or unit policy  2.  Educate and implement non-pharmacologic comfort measures (i.e. relaxation, distraction, massage, cold/heat therapy, etc.)  3.  Pain management medications as ordered  4.  Reassess pain after pain med administration per policy  5.  If opiods administered assess patient's response to pain medication is appropriate per POSS sedation scale  6.  Follow pain management plan developed in collaboration with patient and interdisciplinary team (including palliative care or pain specialists if applicable)  Outcome: Not Progressing  Note: Patient needing more pain medication PRN this shift until the end of shift when patient able to not need medications for pain the longest

## 2024-04-13 NOTE — CARE PLAN
The patient is Watcher - Medium risk of patient condition declining or worsening    Shift Goals  Clinical Goals: manage nausea and pain throughout shift    Progress made toward(s) clinical / shift goals:      Patient is not progressing towards the following goals:      Problem: Pain - Standard  Goal: Alleviation of pain or a reduction in pain to the patient’s comfort goal  Outcome: Not Met     Medicated with percocet and IV dilaudid for abdominal pain. IV zofran given for nausea.

## 2024-04-14 LAB
ALBUMIN SERPL BCP-MCNC: 4.4 G/DL (ref 3.2–4.9)
ALBUMIN/GLOB SERPL: 1.8 G/DL
ALP SERPL-CCNC: 134 U/L (ref 30–99)
ALT SERPL-CCNC: 199 U/L (ref 2–50)
ANION GAP SERPL CALC-SCNC: 12 MMOL/L (ref 7–16)
AST SERPL-CCNC: 79 U/L (ref 12–45)
BILIRUB SERPL-MCNC: 0.9 MG/DL (ref 0.1–1.5)
BUN SERPL-MCNC: 6 MG/DL (ref 8–22)
CALCIUM ALBUM COR SERPL-MCNC: 9 MG/DL (ref 8.5–10.5)
CALCIUM SERPL-MCNC: 9.3 MG/DL (ref 8.5–10.5)
CHLORIDE SERPL-SCNC: 99 MMOL/L (ref 96–112)
CO2 SERPL-SCNC: 25 MMOL/L (ref 20–33)
CREAT SERPL-MCNC: 0.65 MG/DL (ref 0.5–1.4)
GFR SERPLBLD CREATININE-BSD FMLA CKD-EPI: 116 ML/MIN/1.73 M 2
GLOBULIN SER CALC-MCNC: 2.4 G/DL (ref 1.9–3.5)
GLUCOSE SERPL-MCNC: 91 MG/DL (ref 65–99)
POTASSIUM SERPL-SCNC: 3.7 MMOL/L (ref 3.6–5.5)
PROT SERPL-MCNC: 6.8 G/DL (ref 6–8.2)
SODIUM SERPL-SCNC: 136 MMOL/L (ref 135–145)

## 2024-04-14 PROCEDURE — A9270 NON-COVERED ITEM OR SERVICE: HCPCS | Performed by: STUDENT IN AN ORGANIZED HEALTH CARE EDUCATION/TRAINING PROGRAM

## 2024-04-14 PROCEDURE — 700105 HCHG RX REV CODE 258: Performed by: STUDENT IN AN ORGANIZED HEALTH CARE EDUCATION/TRAINING PROGRAM

## 2024-04-14 PROCEDURE — 700102 HCHG RX REV CODE 250 W/ 637 OVERRIDE(OP): Performed by: STUDENT IN AN ORGANIZED HEALTH CARE EDUCATION/TRAINING PROGRAM

## 2024-04-14 PROCEDURE — 770006 HCHG ROOM/CARE - MED/SURG/GYN SEMI*

## 2024-04-14 PROCEDURE — 700111 HCHG RX REV CODE 636 W/ 250 OVERRIDE (IP): Performed by: STUDENT IN AN ORGANIZED HEALTH CARE EDUCATION/TRAINING PROGRAM

## 2024-04-14 PROCEDURE — 36415 COLL VENOUS BLD VENIPUNCTURE: CPT

## 2024-04-14 PROCEDURE — 80053 COMPREHEN METABOLIC PANEL: CPT

## 2024-04-14 PROCEDURE — 99232 SBSQ HOSP IP/OBS MODERATE 35: CPT | Performed by: STUDENT IN AN ORGANIZED HEALTH CARE EDUCATION/TRAINING PROGRAM

## 2024-04-14 RX ORDER — DIAZEPAM 2 MG/1
2 TABLET ORAL EVERY 6 HOURS PRN
Status: DISCONTINUED | OUTPATIENT
Start: 2024-04-14 | End: 2024-04-16 | Stop reason: HOSPADM

## 2024-04-14 RX ORDER — PROCHLORPERAZINE EDISYLATE 5 MG/ML
10 INJECTION INTRAMUSCULAR; INTRAVENOUS EVERY 6 HOURS
Status: DISCONTINUED | OUTPATIENT
Start: 2024-04-14 | End: 2024-04-16 | Stop reason: HOSPADM

## 2024-04-14 RX ORDER — OXYCODONE HYDROCHLORIDE AND ACETAMINOPHEN 5; 325 MG/1; MG/1
2 TABLET ORAL EVERY 4 HOURS PRN
Status: DISCONTINUED | OUTPATIENT
Start: 2024-04-14 | End: 2024-04-16 | Stop reason: HOSPADM

## 2024-04-14 RX ORDER — HYDROMORPHONE HYDROCHLORIDE 2 MG/1
2 TABLET ORAL EVERY 4 HOURS PRN
Status: DISCONTINUED | OUTPATIENT
Start: 2024-04-14 | End: 2024-04-16 | Stop reason: HOSPADM

## 2024-04-14 RX ADMIN — LEVETIRACETAM 500 MG: 500 TABLET, FILM COATED ORAL at 16:27

## 2024-04-14 RX ADMIN — LITHIUM CARBONATE 300 MG: 300 TABLET ORAL at 12:01

## 2024-04-14 RX ADMIN — PANCRELIPASE 36000 UNITS: 30000; 6000; 19000 CAPSULE, DELAYED RELEASE PELLETS ORAL at 16:27

## 2024-04-14 RX ADMIN — NICOTINE 14 MG: 14 PATCH TRANSDERMAL at 04:29

## 2024-04-14 RX ADMIN — GABAPENTIN 600 MG: 300 CAPSULE ORAL at 20:10

## 2024-04-14 RX ADMIN — HYDROMORPHONE HYDROCHLORIDE 1 MG: 1 INJECTION, SOLUTION INTRAMUSCULAR; INTRAVENOUS; SUBCUTANEOUS at 04:28

## 2024-04-14 RX ADMIN — LITHIUM CARBONATE 300 MG: 300 TABLET ORAL at 16:27

## 2024-04-14 RX ADMIN — PROCHLORPERAZINE EDISYLATE 10 MG: 5 INJECTION INTRAMUSCULAR; INTRAVENOUS at 16:43

## 2024-04-14 RX ADMIN — OXYCODONE AND ACETAMINOPHEN 1 TABLET: 5; 325 TABLET ORAL at 07:53

## 2024-04-14 RX ADMIN — LITHIUM CARBONATE 300 MG: 300 TABLET ORAL at 04:27

## 2024-04-14 RX ADMIN — PANCRELIPASE 36000 UNITS: 30000; 6000; 19000 CAPSULE, DELAYED RELEASE PELLETS ORAL at 12:01

## 2024-04-14 RX ADMIN — HYDROMORPHONE HYDROCHLORIDE 2 MG: 2 TABLET ORAL at 12:01

## 2024-04-14 RX ADMIN — OXYCODONE AND ACETAMINOPHEN 1 TABLET: 5; 325 TABLET ORAL at 01:08

## 2024-04-14 RX ADMIN — HYDROMORPHONE HYDROCHLORIDE 2 MG: 2 TABLET ORAL at 16:43

## 2024-04-14 RX ADMIN — GABAPENTIN 600 MG: 300 CAPSULE ORAL at 07:53

## 2024-04-14 RX ADMIN — SODIUM CHLORIDE, POTASSIUM CHLORIDE, SODIUM LACTATE AND CALCIUM CHLORIDE: 600; 310; 30; 20 INJECTION, SOLUTION INTRAVENOUS at 16:29

## 2024-04-14 RX ADMIN — QUETIAPINE FUMARATE 400 MG: 100 TABLET ORAL at 20:11

## 2024-04-14 RX ADMIN — HYDROMORPHONE HYDROCHLORIDE 2 MG: 2 TABLET ORAL at 21:03

## 2024-04-14 RX ADMIN — LEVETIRACETAM 500 MG: 500 TABLET, FILM COATED ORAL at 04:27

## 2024-04-14 RX ADMIN — GABAPENTIN 600 MG: 300 CAPSULE ORAL at 14:18

## 2024-04-14 ASSESSMENT — ENCOUNTER SYMPTOMS
DIARRHEA: 0
PALPITATIONS: 0
HEADACHES: 0
FOCAL WEAKNESS: 0
INSOMNIA: 0
CONSTIPATION: 0
CHILLS: 0
BACK PAIN: 0
COUGH: 0
NAUSEA: 1
BLURRED VISION: 0
SHORTNESS OF BREATH: 0
VOMITING: 0
SENSORY CHANGE: 0
FEVER: 0
DIZZINESS: 0
ABDOMINAL PAIN: 1
EYE PAIN: 0

## 2024-04-14 ASSESSMENT — PAIN DESCRIPTION - PAIN TYPE: TYPE: ACUTE PAIN

## 2024-04-14 ASSESSMENT — LIFESTYLE VARIABLES: SUBSTANCE_ABUSE: 0

## 2024-04-14 NOTE — CARE PLAN
Problem: Pain - Standard  Goal: Alleviation of pain or a reduction in pain to the patient’s comfort goal  Outcome: Progressing     Problem: Knowledge Deficit - Standard  Goal: Patient and family/care givers will demonstrate understanding of plan of care, disease process/condition, diagnostic tests and medications  Outcome: Progressing   The patient is Stable - Low risk of patient condition declining or worsening    Shift Goals  Clinical Goals: Pain management  Patient Goals: rest and pain    Progress made toward(s) clinical / shift goals:  Pain medicated per MAR, educated patient on plan of care this shift     Patient is not progressing towards the following goals:

## 2024-04-14 NOTE — PROGRESS NOTES
Assumed care of patient. Bedside report received. Assessment complete.  AA&Ox4. Denies CP/SOB.  Reporting 8/10 pain. Medicated per MAR.   Educated patient regarding pharmacologic and non pharmacologic modalities for pain management.  Skin per flowsheets  Tolerating Clear Liquid diet. Denies N/V.  + void. Last BM 4/11  Pt ambulates Independently.  All needs met at this time. Call light within reach. Pt calls appropriately. Bed low and locked, non skid socks in place. Hourly rounding in place.

## 2024-04-14 NOTE — CARE PLAN
The patient is Stable - Low risk of patient condition declining or worsening    Shift Goals  Clinical Goals: pain managment  Patient Goals: pain managment    Progress made toward(s) clinical / shift goals:      Sky will be managed during shiift     Patient is not progressing towards the following goals:

## 2024-04-14 NOTE — PROGRESS NOTES
Hospital Medicine Daily Progress Note    Date of Service  4/14/2024    Chief Complaint  Rhiannon Marrero is a 38 y.o. female admitted 4/11/2024 with abdominal pain.    Hospital Course  Rhiannon Marrero is a 38 y.o. female who presented 4/11/2024 with abdominal pain.  PMH of chronic pancreatitis, seizure disorder, psychiatric disorder.  She is coming in with abdominal pain located epigastric area and radiates to lower abdomen and to the back.  She says it feels like her chronic pancreatitis exacerbation.  No longer drinks alcohol and is coming up on 6 months sober.  Compliant with all her medications including pancreatic enzymes.     Interval Problem Update  No acute events overnight.  Patient continues to have ongoing pain and nausea.  Will increase oxycodone to 10 mg q4h prn.  Change dilaudid to PO form as to get patient on oral regimen to discharge home on.  Schedule compazine q6h. Valium 2mg prn ordered for nausea prn as well as for anxiety.  Continue IV fluids.  LFTs improved on labs today, monitor with daily CMP.  Possible discharge home tomorrow if feeling improved.  Pain management referral placed.  Updated patients mother over phone, all questions answered.      I have discussed this patient's plan of care and discharge plan at IDT rounds today with Case Management, Nursing, Nursing leadership, and other members of the IDT team.    Consultants/Specialty  none    Code Status  Full Code    Disposition  Medically Cleared  I have placed the appropriate orders for post-discharge needs.    Review of Systems  Review of Systems   Constitutional:  Positive for malaise/fatigue. Negative for chills and fever.   Eyes:  Negative for blurred vision and pain.   Respiratory:  Negative for cough and shortness of breath.    Cardiovascular:  Negative for chest pain, palpitations and leg swelling.   Gastrointestinal:  Positive for abdominal pain and nausea. Negative for constipation, diarrhea and vomiting.   Genitourinary:   Negative for dysuria and urgency.   Musculoskeletal:  Negative for back pain.   Skin:  Negative for itching and rash.   Neurological:  Negative for dizziness, sensory change, focal weakness and headaches.   Psychiatric/Behavioral:  Negative for substance abuse. The patient does not have insomnia.         Physical Exam  Temp:  [36.5 °C (97.7 °F)-37.2 °C (98.9 °F)] 36.5 °C (97.7 °F)  Pulse:  [71-85] 71  Resp:  [16-18] 17  BP: ()/(60-85) 117/77  SpO2:  [90 %-100 %] 95 %    Physical Exam  Vitals reviewed.   Constitutional:       General: She is not in acute distress.     Appearance: She is not diaphoretic.   HENT:      Head: Normocephalic and atraumatic.      Right Ear: External ear normal.      Left Ear: External ear normal.      Nose: Nose normal. No congestion.      Mouth/Throat:      Pharynx: No oropharyngeal exudate or posterior oropharyngeal erythema.   Eyes:      Extraocular Movements: Extraocular movements intact.      Pupils: Pupils are equal, round, and reactive to light.   Cardiovascular:      Rate and Rhythm: Normal rate and regular rhythm.   Pulmonary:      Effort: Pulmonary effort is normal. No respiratory distress.      Breath sounds: Normal breath sounds. No wheezing.   Abdominal:      General: Bowel sounds are normal. There is no distension.      Palpations: Abdomen is soft.      Tenderness: There is abdominal tenderness. There is no guarding or rebound.   Musculoskeletal:         General: No swelling. Normal range of motion.      Cervical back: Normal range of motion and neck supple.      Right lower leg: No edema.      Left lower leg: No edema.   Skin:     General: Skin is warm and dry.   Neurological:      General: No focal deficit present.      Mental Status: She is alert and oriented to person, place, and time.      Cranial Nerves: No cranial nerve deficit.      Sensory: No sensory deficit.      Motor: No weakness.   Psychiatric:         Mood and Affect: Mood normal.         Behavior:  Behavior normal.         Fluids    Intake/Output Summary (Last 24 hours) at 4/14/2024 1426  Last data filed at 4/14/2024 1025  Gross per 24 hour   Intake 1807.63 ml   Output --   Net 1807.63 ml       Laboratory  Recent Labs     04/11/24  1732   WBC 6.7   RBC 4.36   HEMOGLOBIN 13.3   HEMATOCRIT 39.1   MCV 89.7   MCH 30.5   MCHC 34.0   RDW 39.8   PLATELETCT 244   MPV 9.5     Recent Labs     04/11/24  1732 04/13/24  0305 04/14/24  0128   SODIUM 137 137 136   POTASSIUM 3.9 4.0 3.7   CHLORIDE 107 104 99   CO2 19* 23 25   GLUCOSE 94 96 91   BUN 8 5* 6*   CREATININE 0.85 0.70 0.65   CALCIUM 8.9 8.7 9.3                   Imaging  CT-ABDOMEN-PELVIS WITH   Final Result         1. Hepatomegaly.   2. Tiny effusions with mild dependent atelectasis.   3. Status post cholecystectomy.   4. Evidence for chronic pancreatitis.   5. There is some asymmetric soft tissue nodularity of the right breast. Clinical correlation is suggested.           Assessment/Plan  LFT elevation  Assessment & Plan  Unclear etiology  Normal on admission, worse overnight  Sober from alcohol, not on any new medications to induce LFT elevation  No recent trauma or falls, muscle degradation  CT scan on admit with no acute findings  Receiving tylenol products including excedrin and percocet but received less than 2g yesterday  Monitor with daily CMP  improving    Alcohol-induced chronic pancreatitis (HCC)- (present on admission)  Assessment & Plan  Hx of pancreatitis  Presents with abdominal pain radiating to back similar to previous pancreatitis episodes  Lipase normal  CT scan with signs of chronic pancreatitis, no acute inflammation or pseudocysts seen  Continue supportive care  Discussed undulating course and possibility of chronic pain related to pancreatitis  Oxycodone dosage increased, trial oral dilaudid prn regimen  Pain management referral placed    Alcoholism (HCC)- (present on admission)  Assessment & Plan  She is coming up on being 6 months sober.   Needs continued encouragement to remain sober because she does understand why she still having pain despite alcohol cessation.  I explained that she may continue to have exacerbations but the duration and intensity will be decreased if she does not drink alcohol    Bipolar disorder (HCC)- (present on admission)  Assessment & Plan  Continue home meds including lithium         VTE prophylaxis: VTE Selection

## 2024-04-15 LAB
ALBUMIN SERPL BCP-MCNC: 4.1 G/DL (ref 3.2–4.9)
ALBUMIN/GLOB SERPL: 1.8 G/DL
ALP SERPL-CCNC: 122 U/L (ref 30–99)
ALT SERPL-CCNC: 131 U/L (ref 2–50)
ANION GAP SERPL CALC-SCNC: 11 MMOL/L (ref 7–16)
AST SERPL-CCNC: 40 U/L (ref 12–45)
BILIRUB SERPL-MCNC: 0.8 MG/DL (ref 0.1–1.5)
BUN SERPL-MCNC: 5 MG/DL (ref 8–22)
CALCIUM ALBUM COR SERPL-MCNC: 9.5 MG/DL (ref 8.5–10.5)
CALCIUM SERPL-MCNC: 9.6 MG/DL (ref 8.5–10.5)
CHLORIDE SERPL-SCNC: 102 MMOL/L (ref 96–112)
CO2 SERPL-SCNC: 23 MMOL/L (ref 20–33)
CREAT SERPL-MCNC: 0.65 MG/DL (ref 0.5–1.4)
GFR SERPLBLD CREATININE-BSD FMLA CKD-EPI: 116 ML/MIN/1.73 M 2
GLOBULIN SER CALC-MCNC: 2.3 G/DL (ref 1.9–3.5)
GLUCOSE SERPL-MCNC: 107 MG/DL (ref 65–99)
POTASSIUM SERPL-SCNC: 3.6 MMOL/L (ref 3.6–5.5)
PROT SERPL-MCNC: 6.4 G/DL (ref 6–8.2)
SODIUM SERPL-SCNC: 136 MMOL/L (ref 135–145)

## 2024-04-15 PROCEDURE — 700105 HCHG RX REV CODE 258: Performed by: STUDENT IN AN ORGANIZED HEALTH CARE EDUCATION/TRAINING PROGRAM

## 2024-04-15 PROCEDURE — RXMED WILLOW AMBULATORY MEDICATION CHARGE: Performed by: STUDENT IN AN ORGANIZED HEALTH CARE EDUCATION/TRAINING PROGRAM

## 2024-04-15 PROCEDURE — 700111 HCHG RX REV CODE 636 W/ 250 OVERRIDE (IP): Performed by: STUDENT IN AN ORGANIZED HEALTH CARE EDUCATION/TRAINING PROGRAM

## 2024-04-15 PROCEDURE — 700102 HCHG RX REV CODE 250 W/ 637 OVERRIDE(OP): Performed by: STUDENT IN AN ORGANIZED HEALTH CARE EDUCATION/TRAINING PROGRAM

## 2024-04-15 PROCEDURE — 80053 COMPREHEN METABOLIC PANEL: CPT

## 2024-04-15 PROCEDURE — 770006 HCHG ROOM/CARE - MED/SURG/GYN SEMI*

## 2024-04-15 PROCEDURE — 99232 SBSQ HOSP IP/OBS MODERATE 35: CPT | Performed by: STUDENT IN AN ORGANIZED HEALTH CARE EDUCATION/TRAINING PROGRAM

## 2024-04-15 PROCEDURE — A9270 NON-COVERED ITEM OR SERVICE: HCPCS | Performed by: STUDENT IN AN ORGANIZED HEALTH CARE EDUCATION/TRAINING PROGRAM

## 2024-04-15 RX ORDER — OXYCODONE HYDROCHLORIDE AND ACETAMINOPHEN 5; 325 MG/1; MG/1
2 TABLET ORAL EVERY 6 HOURS PRN
Qty: 28 TABLET | Refills: 0 | Status: SHIPPED | OUTPATIENT
Start: 2024-04-15 | End: 2024-04-23

## 2024-04-15 RX ORDER — HYDROMORPHONE HYDROCHLORIDE 2 MG/1
2 TABLET ORAL EVERY 6 HOURS PRN
Qty: 28 TABLET | Refills: 0 | Status: SHIPPED | OUTPATIENT
Start: 2024-04-15 | End: 2024-04-23

## 2024-04-15 RX ADMIN — PROCHLORPERAZINE EDISYLATE 10 MG: 5 INJECTION INTRAMUSCULAR; INTRAVENOUS at 14:00

## 2024-04-15 RX ADMIN — OXYCODONE AND ACETAMINOPHEN 2 TABLET: 5; 325 TABLET ORAL at 20:29

## 2024-04-15 RX ADMIN — PANCRELIPASE 36000 UNITS: 30000; 6000; 19000 CAPSULE, DELAYED RELEASE PELLETS ORAL at 10:23

## 2024-04-15 RX ADMIN — SODIUM CHLORIDE, POTASSIUM CHLORIDE, SODIUM LACTATE AND CALCIUM CHLORIDE: 600; 310; 30; 20 INJECTION, SOLUTION INTRAVENOUS at 14:01

## 2024-04-15 RX ADMIN — PROCHLORPERAZINE EDISYLATE 10 MG: 5 INJECTION INTRAMUSCULAR; INTRAVENOUS at 19:03

## 2024-04-15 RX ADMIN — LEVETIRACETAM 500 MG: 500 TABLET, FILM COATED ORAL at 04:05

## 2024-04-15 RX ADMIN — LEVETIRACETAM 500 MG: 500 TABLET, FILM COATED ORAL at 19:03

## 2024-04-15 RX ADMIN — PROCHLORPERAZINE EDISYLATE 10 MG: 5 INJECTION INTRAMUSCULAR; INTRAVENOUS at 00:00

## 2024-04-15 RX ADMIN — LITHIUM CARBONATE 300 MG: 300 TABLET ORAL at 14:00

## 2024-04-15 RX ADMIN — QUETIAPINE FUMARATE 400 MG: 100 TABLET ORAL at 20:28

## 2024-04-15 RX ADMIN — LITHIUM CARBONATE 300 MG: 300 TABLET ORAL at 04:05

## 2024-04-15 RX ADMIN — GABAPENTIN 600 MG: 300 CAPSULE ORAL at 14:00

## 2024-04-15 RX ADMIN — GABAPENTIN 600 MG: 300 CAPSULE ORAL at 20:29

## 2024-04-15 RX ADMIN — PANCRELIPASE 36000 UNITS: 30000; 6000; 19000 CAPSULE, DELAYED RELEASE PELLETS ORAL at 13:59

## 2024-04-15 RX ADMIN — HYDROMORPHONE HYDROCHLORIDE 2 MG: 2 TABLET ORAL at 15:37

## 2024-04-15 RX ADMIN — NICOTINE 14 MG: 14 PATCH TRANSDERMAL at 04:05

## 2024-04-15 RX ADMIN — PANCRELIPASE 36000 UNITS: 30000; 6000; 19000 CAPSULE, DELAYED RELEASE PELLETS ORAL at 19:02

## 2024-04-15 RX ADMIN — PROCHLORPERAZINE EDISYLATE 10 MG: 5 INJECTION INTRAMUSCULAR; INTRAVENOUS at 04:06

## 2024-04-15 RX ADMIN — HYDROMORPHONE HYDROCHLORIDE 2 MG: 2 TABLET ORAL at 04:04

## 2024-04-15 RX ADMIN — GABAPENTIN 600 MG: 300 CAPSULE ORAL at 10:23

## 2024-04-15 RX ADMIN — LITHIUM CARBONATE 300 MG: 300 TABLET ORAL at 19:03

## 2024-04-15 ASSESSMENT — ENCOUNTER SYMPTOMS
DIZZINESS: 0
FOCAL WEAKNESS: 0
CHILLS: 0
PALPITATIONS: 0
INSOMNIA: 0
DIARRHEA: 0
EYE PAIN: 0
NAUSEA: 1
VOMITING: 0
SENSORY CHANGE: 0
CONSTIPATION: 0
SHORTNESS OF BREATH: 0
BLURRED VISION: 0
ABDOMINAL PAIN: 1
HEADACHES: 0
COUGH: 0
BACK PAIN: 0
FEVER: 0

## 2024-04-15 ASSESSMENT — PAIN DESCRIPTION - PAIN TYPE
TYPE: ACUTE PAIN
TYPE: ACUTE PAIN

## 2024-04-15 ASSESSMENT — LIFESTYLE VARIABLES: SUBSTANCE_ABUSE: 0

## 2024-04-15 NOTE — CARE PLAN
The patient is Stable - Low risk of patient condition declining or worsening    Shift Goals  Clinical Goals: PAIN MANAGMENT  Patient Goals: PAIN PANAGM,ENT    Progress made toward(s) clinical / shift goals:      Patient will remain frfee of pain during shift    Patient is not progressing towards the following goals:

## 2024-04-15 NOTE — PROGRESS NOTES
Hospital Medicine Daily Progress Note    Date of Service  4/15/2024    Chief Complaint  Rhiannon Marrero is a 38 y.o. female admitted 4/11/2024 with abdominal pain.    Hospital Course  Rhiannon Marrero is a 38 y.o. female who presented 4/11/2024 with abdominal pain.  PMH of chronic pancreatitis, seizure disorder, psychiatric disorder.  She is coming in with abdominal pain located epigastric area and radiates to lower abdomen and to the back.  She says it feels like her chronic pancreatitis exacerbation.  No longer drinks alcohol and is coming up on 6 months sober.  Compliant with all her medications including pancreatic enzymes. Patient with normal lipase levels, CT scan findings of chronic pancreatitis. Patient given supportive treatment with antiemetics, pain medication, and anxiety medication, as well as IV fluids for hydration. Currently titrating medications for symptom control.    Interval Problem Update  No acute events overnight.  Patient reports ongoing pain and nausea.  Continue scheduled compazine q6h, anxiolytics prn for nausea.  Continue oxycodone 10 mg q4h prn and dilaudid 1mg q4h prn. Discussed patient is on high dose of narcotics and would not be increasing dosage at this time, but patient can discharge home on current dosages.  Continue IV fluids for hydration.  Patients mother and significant other at bedside, all questions answered.  LFTs improving, monitor with daily CMP. Unclear etiology of LFT elevation.  On CLD per patients preference and tolerance, can advance as tolerated.  Possible discharge home tomorrow if feeling improved.  Pain management referral placed.      I have discussed this patient's plan of care and discharge plan at IDT rounds today with Case Management, Nursing, Nursing leadership, and other members of the IDT team.    Consultants/Specialty  none    Code Status  Full Code    Disposition  Medically Cleared  I have placed the appropriate orders for post-discharge  needs.    Review of Systems  Review of Systems   Constitutional:  Positive for malaise/fatigue. Negative for chills and fever.   Eyes:  Negative for blurred vision and pain.   Respiratory:  Negative for cough and shortness of breath.    Cardiovascular:  Negative for chest pain, palpitations and leg swelling.   Gastrointestinal:  Positive for abdominal pain and nausea. Negative for constipation, diarrhea and vomiting.   Genitourinary:  Negative for dysuria and urgency.   Musculoskeletal:  Negative for back pain.   Skin:  Negative for itching and rash.   Neurological:  Negative for dizziness, sensory change, focal weakness and headaches.   Psychiatric/Behavioral:  Negative for substance abuse. The patient does not have insomnia.         Physical Exam  Temp:  [36.1 °C (97 °F)-36.6 °C (97.9 °F)] 36.1 °C (97 °F)  Pulse:  [61-87] 61  Resp:  [16-18] 18  BP: ()/(57-76) 114/76  SpO2:  [92 %-97 %] 95 %    Physical Exam  Vitals reviewed.   Constitutional:       General: She is not in acute distress.     Appearance: She is not diaphoretic.   HENT:      Head: Normocephalic and atraumatic.      Right Ear: External ear normal.      Left Ear: External ear normal.      Nose: Nose normal. No congestion.      Mouth/Throat:      Pharynx: No oropharyngeal exudate or posterior oropharyngeal erythema.   Eyes:      Extraocular Movements: Extraocular movements intact.      Pupils: Pupils are equal, round, and reactive to light.   Cardiovascular:      Rate and Rhythm: Normal rate and regular rhythm.   Pulmonary:      Effort: Pulmonary effort is normal. No respiratory distress.      Breath sounds: Normal breath sounds. No wheezing.   Abdominal:      General: Bowel sounds are normal. There is no distension.      Palpations: Abdomen is soft.      Tenderness: There is abdominal tenderness. There is no guarding or rebound.   Musculoskeletal:         General: No swelling. Normal range of motion.      Cervical back: Normal range of motion and  neck supple.      Right lower leg: No edema.      Left lower leg: No edema.   Skin:     General: Skin is warm and dry.   Neurological:      General: No focal deficit present.      Mental Status: She is alert and oriented to person, place, and time.      Cranial Nerves: No cranial nerve deficit.      Sensory: No sensory deficit.      Motor: No weakness.   Psychiatric:         Mood and Affect: Mood normal.         Behavior: Behavior normal.         Fluids    Intake/Output Summary (Last 24 hours) at 4/15/2024 1629  Last data filed at 4/15/2024 1145  Gross per 24 hour   Intake 0 ml   Output --   Net 0 ml       Laboratory        Recent Labs     04/13/24  0305 04/14/24  0128 04/15/24  0104   SODIUM 137 136 136   POTASSIUM 4.0 3.7 3.6   CHLORIDE 104 99 102   CO2 23 25 23   GLUCOSE 96 91 107*   BUN 5* 6* 5*   CREATININE 0.70 0.65 0.65   CALCIUM 8.7 9.3 9.6                   Imaging  CT-ABDOMEN-PELVIS WITH   Final Result         1. Hepatomegaly.   2. Tiny effusions with mild dependent atelectasis.   3. Status post cholecystectomy.   4. Evidence for chronic pancreatitis.   5. There is some asymmetric soft tissue nodularity of the right breast. Clinical correlation is suggested.           Assessment/Plan  LFT elevation  Assessment & Plan  Unclear etiology  Normal on admission, worse overnight  Sober from alcohol, not on any new medications to induce LFT elevation  No recent trauma or falls, muscle degradation  CT scan on admit with no acute findings  Receiving tylenol products including excedrin and percocet but received less than 2g yesterday  Monitor with daily CMP  improving    Alcohol-induced chronic pancreatitis (HCC)- (present on admission)  Assessment & Plan  Hx of pancreatitis  Presents with abdominal pain radiating to back similar to previous pancreatitis episodes  Lipase normal  CT scan with signs of chronic pancreatitis, no acute inflammation or pseudocysts seen  Continue supportive care  Discussed undulating course  and possibility of chronic pain related to pancreatitis  Oxycodone dosage increased, trial oral dilaudid prn regimen in hopes of discharging home  Pain management referral placed    Alcoholism ([facility])- (present on admission)  Assessment & Plan  She is coming up on being 6 months sober.  Needs continued encouragement to remain sober because she does understand why she still having pain despite alcohol cessation.  I explained that she may continue to have exacerbations but the duration and intensity will be decreased if she does not drink alcohol    Bipolar disorder (HCC)- (present on admission)  Assessment & Plan  Continue home meds including lithium         VTE prophylaxis: VTE Selection

## 2024-04-16 ENCOUNTER — APPOINTMENT (OUTPATIENT)
Dept: RADIOLOGY | Facility: MEDICAL CENTER | Age: 39
DRG: 439 | End: 2024-04-16
Attending: STUDENT IN AN ORGANIZED HEALTH CARE EDUCATION/TRAINING PROGRAM
Payer: MEDICAID

## 2024-04-16 ENCOUNTER — PHARMACY VISIT (OUTPATIENT)
Dept: PHARMACY | Facility: MEDICAL CENTER | Age: 39
End: 2024-04-16
Payer: COMMERCIAL

## 2024-04-16 ENCOUNTER — PATIENT OUTREACH (OUTPATIENT)
Dept: SCHEDULING | Facility: IMAGING CENTER | Age: 39
End: 2024-04-16
Payer: MEDICAID

## 2024-04-16 VITALS
DIASTOLIC BLOOD PRESSURE: 67 MMHG | HEART RATE: 68 BPM | TEMPERATURE: 98.1 F | BODY MASS INDEX: 26.47 KG/M2 | WEIGHT: 168.65 LBS | SYSTOLIC BLOOD PRESSURE: 107 MMHG | HEIGHT: 67 IN | OXYGEN SATURATION: 96 % | RESPIRATION RATE: 16 BRPM

## 2024-04-16 LAB
ALBUMIN SERPL BCP-MCNC: 3.9 G/DL (ref 3.2–4.9)
ALBUMIN/GLOB SERPL: 1.8 G/DL
ALP SERPL-CCNC: 105 U/L (ref 30–99)
ALT SERPL-CCNC: 85 U/L (ref 2–50)
ANION GAP SERPL CALC-SCNC: 12 MMOL/L (ref 7–16)
AST SERPL-CCNC: 27 U/L (ref 12–45)
BILIRUB SERPL-MCNC: 0.8 MG/DL (ref 0.1–1.5)
BUN SERPL-MCNC: 7 MG/DL (ref 8–22)
CALCIUM ALBUM COR SERPL-MCNC: 9.2 MG/DL (ref 8.5–10.5)
CALCIUM SERPL-MCNC: 9.1 MG/DL (ref 8.5–10.5)
CHLORIDE SERPL-SCNC: 102 MMOL/L (ref 96–112)
CO2 SERPL-SCNC: 23 MMOL/L (ref 20–33)
CREAT SERPL-MCNC: 0.65 MG/DL (ref 0.5–1.4)
GFR SERPLBLD CREATININE-BSD FMLA CKD-EPI: 116 ML/MIN/1.73 M 2
GLOBULIN SER CALC-MCNC: 2.2 G/DL (ref 1.9–3.5)
GLUCOSE SERPL-MCNC: 88 MG/DL (ref 65–99)
POTASSIUM SERPL-SCNC: 3.9 MMOL/L (ref 3.6–5.5)
PROT SERPL-MCNC: 6.1 G/DL (ref 6–8.2)
SODIUM SERPL-SCNC: 137 MMOL/L (ref 135–145)

## 2024-04-16 PROCEDURE — 700102 HCHG RX REV CODE 250 W/ 637 OVERRIDE(OP): Performed by: STUDENT IN AN ORGANIZED HEALTH CARE EDUCATION/TRAINING PROGRAM

## 2024-04-16 PROCEDURE — 99239 HOSP IP/OBS DSCHRG MGMT >30: CPT | Performed by: STUDENT IN AN ORGANIZED HEALTH CARE EDUCATION/TRAINING PROGRAM

## 2024-04-16 PROCEDURE — A9270 NON-COVERED ITEM OR SERVICE: HCPCS | Performed by: STUDENT IN AN ORGANIZED HEALTH CARE EDUCATION/TRAINING PROGRAM

## 2024-04-16 PROCEDURE — 700111 HCHG RX REV CODE 636 W/ 250 OVERRIDE (IP): Performed by: STUDENT IN AN ORGANIZED HEALTH CARE EDUCATION/TRAINING PROGRAM

## 2024-04-16 PROCEDURE — 700105 HCHG RX REV CODE 258: Performed by: STUDENT IN AN ORGANIZED HEALTH CARE EDUCATION/TRAINING PROGRAM

## 2024-04-16 PROCEDURE — RXMED WILLOW AMBULATORY MEDICATION CHARGE: Performed by: STUDENT IN AN ORGANIZED HEALTH CARE EDUCATION/TRAINING PROGRAM

## 2024-04-16 PROCEDURE — 80053 COMPREHEN METABOLIC PANEL: CPT

## 2024-04-16 PROCEDURE — 74018 RADEX ABDOMEN 1 VIEW: CPT

## 2024-04-16 RX ORDER — POLYETHYLENE GLYCOL 3350 17 G/17G
1 POWDER, FOR SOLUTION ORAL DAILY
Status: DISCONTINUED | OUTPATIENT
Start: 2024-04-16 | End: 2024-04-16 | Stop reason: HOSPADM

## 2024-04-16 RX ORDER — BISACODYL 10 MG
10 SUPPOSITORY, RECTAL RECTAL DAILY
Status: DISCONTINUED | OUTPATIENT
Start: 2024-04-16 | End: 2024-04-16 | Stop reason: HOSPADM

## 2024-04-16 RX ORDER — ONDANSETRON 4 MG/1
4 TABLET, ORALLY DISINTEGRATING ORAL EVERY 4 HOURS PRN
Qty: 30 TABLET | Refills: 0 | Status: SHIPPED | OUTPATIENT
Start: 2024-04-16

## 2024-04-16 RX ORDER — SENNOSIDES A AND B 8.6 MG/1
8.6 TABLET, FILM COATED ORAL 2 TIMES DAILY
Status: DISCONTINUED | OUTPATIENT
Start: 2024-04-16 | End: 2024-04-16 | Stop reason: HOSPADM

## 2024-04-16 RX ADMIN — NICOTINE 14 MG: 14 PATCH TRANSDERMAL at 05:25

## 2024-04-16 RX ADMIN — POLYETHYLENE GLYCOL 3350 1 PACKET: 17 POWDER, FOR SOLUTION ORAL at 10:25

## 2024-04-16 RX ADMIN — LITHIUM CARBONATE 300 MG: 300 TABLET ORAL at 12:00

## 2024-04-16 RX ADMIN — GABAPENTIN 600 MG: 300 CAPSULE ORAL at 08:48

## 2024-04-16 RX ADMIN — SENNOSIDES 8.6 MG: 8.6 TABLET, FILM COATED ORAL at 10:25

## 2024-04-16 RX ADMIN — PROCHLORPERAZINE EDISYLATE 10 MG: 5 INJECTION INTRAMUSCULAR; INTRAVENOUS at 12:00

## 2024-04-16 RX ADMIN — LITHIUM CARBONATE 300 MG: 300 TABLET ORAL at 05:25

## 2024-04-16 RX ADMIN — GABAPENTIN 600 MG: 300 CAPSULE ORAL at 14:53

## 2024-04-16 RX ADMIN — SODIUM CHLORIDE, POTASSIUM CHLORIDE, SODIUM LACTATE AND CALCIUM CHLORIDE: 600; 310; 30; 20 INJECTION, SOLUTION INTRAVENOUS at 02:05

## 2024-04-16 RX ADMIN — PANCRELIPASE 36000 UNITS: 30000; 6000; 19000 CAPSULE, DELAYED RELEASE PELLETS ORAL at 08:48

## 2024-04-16 RX ADMIN — OXYCODONE AND ACETAMINOPHEN 2 TABLET: 5; 325 TABLET ORAL at 15:44

## 2024-04-16 RX ADMIN — LEVETIRACETAM 500 MG: 500 TABLET, FILM COATED ORAL at 05:25

## 2024-04-16 ASSESSMENT — PAIN DESCRIPTION - PAIN TYPE: TYPE: ACUTE PAIN;CHRONIC PAIN

## 2024-04-16 NOTE — DISCHARGE SUMMARY
Discharge Summary    CHIEF COMPLAINT ON ADMISSION  Chief Complaint   Patient presents with    Abdominal Pain       Reason for Admission  ABD pain     Admission Date  4/11/2024    CODE STATUS  Prior    HPI & HOSPITAL COURSE  Rhiannon Marrero is a 38 y.o. female who presented 4/11/2024 with abdominal pain.  PMH of chronic pancreatitis, seizure disorder, psychiatric disorder.  No longer drinks alcohol and is coming up on 6 months sober.  Compliant with all her medications including pancreatic enzymes. Patient with normal lipase levels, CT scan findings of chronic pancreatitis. Patient's symptoms improved with supportive treatment with antiemetics, pain medication, and anxiety medication, as well as IV fluids for hydration.   Patient was found to have a constipation with moderate stool burden in colon.  Constipation resolved after bowel management.     Therefore, she is discharged in good and stable condition to home with close outpatient follow-up.    The patient met 2-midnight criteria for an inpatient stay at the time of discharge.    Discharge Date  4/16/2024    FOLLOW UP ITEMS POST DISCHARGE  - Follow up with primary care physician in 1 week.     - Please take the medications as instructed    - Go to the local Emergency Department if you have any worsening condition.       DISCHARGE DIAGNOSES  Principal Problem (Resolved):    Acute on chronic pancreatitis (HCC) (POA: Yes)  Active Problems:    Anxiety (POA: Yes)    Chronic pain syndrome (POA: Yes)    S/P hysterectomy (POA: Yes)    Bipolar disorder (HCC) (POA: Yes)    Seizure disorder (HCC) (POA: Yes)    Alcoholism (HCC) (POA: Yes)    Normocytic anemia (POA: Yes)    Mood disorder (HCC) (POA: Yes)    Portal hypertension (HCC) (POA: Yes)    Alcohol-induced chronic pancreatitis (HCC) (POA: Yes)    Intractable abdominal pain (POA: Yes)    Chronic pancreatitis due to chronic alcoholism (HCC) (POA: Yes)    LFT elevation (POA: Unknown)      FOLLOW UP  Future Appointments    Date Time Provider Department Center   4/16/2024 10:00 AM [facility]  1 RAD [facility]     MALOU Harrington  [address]  [address]  [place] [address]  [phone]    Go on 4/23/2024  Please go to your hospital follow with MALOU Harrington on Tuesday, April 23rd, 2024 at 1:20pm.    Please bring photo ID, insurance card, and discharge summary.    [facility], Emergency Dept  [address] [facility]  [place] [address]  [phone]    If symptoms worsen      MEDICATIONS ON DISCHARGE     Medication List        START taking these medications        Instructions   HYDROmorphone 2 MG Tabs  Commonly known as: Dilaudid   Take 1 Tablet by mouth every 6 hours as needed for Severe Pain (if not controlled by oxycodone) for up to 7 days.  Dose: 2 mg     oxyCODONE-acetaminophen 5-325 MG Tabs  Commonly known as: Percocet   Take 2 Tablets by mouth every 6 hours as needed for Moderate Pain or Severe Pain for up to 7 days.  Dose: 2 Tablet            CONTINUE taking these medications        Instructions   Caplyta 10.5 MG Caps  Generic drug: Lumateperone Tosylate   Take 10.5 mg by mouth every day.  Dose: 10.5 mg     Creon [id number] units Cpep  Generic drug: Pancrelipase (Lip-Prot-Amyl)   Take 1 Capful by mouth 3 times a day with meals.  Dose: 1 Capful     diazePAM 5 MG Tabs  Commonly known as: Valium   Take 5 mg by mouth every day.  Dose: 5 mg     dicyclomine 10 MG Caps  Commonly known as: Bentyl   Take 10 mg by mouth 3 times a day.  Dose: 10 mg     gabapentin 600 MG tablet  Commonly known as: Neurontin   Take 600 mg by mouth in the morning, at noon, and at bedtime.  Dose: 600 mg     levETIRAcetam 500 MG Tabs  Commonly known as: Keppra   Take 500 mg by mouth 2 times a day.  Dose: 500 mg     lithium carbonate (IR) 300 MG Tabs tablet   Take 300 mg by mouth 3 times a day.  Dose: 300 mg     loratadine 10 MG Tabs  Commonly known as: Claritin   Take 10 mg by mouth every day.  Dose: 10 mg     ondansetron 4 MG  Tbdp  Commonly known as: Zofran ODT   Dissolve 1 Tablet by mouth every four hours as needed for Nausea/Vomiting.  Dose: 4 mg     QUEtiapine 100 MG Tabs  Commonly known as: SEROquel   Take 400 mg by mouth at bedtime.  Dose: 400 mg     traZODone 100 MG Tabs  Commonly known as: Desyrel   Take 100 mg by mouth every evening. Indications: Trouble Sleeping  Dose: 100 mg              Allergies  Allergies   Allergen Reactions    Promethazine Hcl Anxiety     Anxiety and makes her feels like skin coming off        DIET  No orders of the defined types were placed in this encounter.      ACTIVITY  As tolerated.  Weight bearing as tolerated    CONSULTATIONS      PROCEDURES      LABORATORY  Lab Results   Component Value Date    SODIUM 137 04/16/2024    POTASSIUM 3.9 04/16/2024    CHLORIDE 102 04/16/2024    CO2 23 04/16/2024    GLUCOSE 88 04/16/2024    BUN 7 (L) 04/16/2024    CREATININE 0.65 04/16/2024    CREATININE 0.7 06/10/2008    GLOMRATE 79 05/07/2023        Lab Results   Component Value Date    WBC 6.7 04/11/2024    HEMOGLOBIN 13.3 04/11/2024    HEMATOCRIT 39.1 04/11/2024    PLATELETCT 244 04/11/2024        Total time of the discharge process exceeds 32 minutes.

## 2024-04-16 NOTE — PROGRESS NOTES
Per MD, Discharge order placed, patient to be discharged after a bowel movement. Patient just left for [facility]UB, will given bowel medications when she returns

## 2024-04-16 NOTE — CARE PLAN
The patient is Stable - Low risk of patient condition declining or worsening    Shift Goals  Clinical Goals: Pain management  Patient Goals: Rest    Progress made toward(s) clinical / shift goals:      Patient is not progressing towards the following goals:

## 2024-04-16 NOTE — CARE PLAN
The patient is Stable - Low risk of patient condition declining or worsening    Shift Goals  Clinical Goals: Symptom control for nausea and pain, as evidence by lack of nausea and pain rating 4 or less  Patient Goals: rest and pain control  Family Goals: FANTASMA    Progress made toward(s) clinical / shift goals:      Patient symptoms controlled. Patient had BM, therefore patient discharged.   Patient went to d/[facility] with mother by her side and all personal belongings. Received meds to beds at d/[facility]    Patient is not progressing towards the following goals:

## 2024-04-16 NOTE — DIETARY
Nutrition Services: Update   Day 4 of admit.  Rhiannon Marrero is a 38 y.o. female with admitting DX of Acute on chronic pancreatitis (HCC) [K85.90, K86.1]    Pt NPO/CLD day 5 with inadequate nutrition. If it is not medically feasible to advance to PO feedings within 48-72 hours, consider nutrition support to meet needs.      Recommendations/Plan:  Diet advancements as medically feasible per MD  Consideration of nutrition support if PO diet not feasible.  Monitor weight.    RD Following.

## 2024-10-15 ENCOUNTER — HOSPITAL ENCOUNTER (OUTPATIENT)
Dept: LAB | Facility: MEDICAL CENTER | Age: 39
End: 2024-10-15
Payer: MEDICAID

## 2024-10-15 LAB — LITHIUM SERPL-MCNC: 1 MMOL/L (ref 0.6–1.2)

## 2024-10-15 PROCEDURE — 84439 ASSAY OF FREE THYROXINE: CPT

## 2024-10-15 PROCEDURE — 80178 ASSAY OF LITHIUM: CPT

## 2024-10-15 PROCEDURE — 36415 COLL VENOUS BLD VENIPUNCTURE: CPT

## 2024-10-15 PROCEDURE — 80177 DRUG SCRN QUAN LEVETIRACETAM: CPT

## 2024-10-15 PROCEDURE — 84443 ASSAY THYROID STIM HORMONE: CPT

## 2024-10-16 LAB
T4 FREE SERPL-MCNC: 0.89 NG/DL (ref 0.93–1.7)
TSH SERPL-ACNC: 0.57 UIU/ML (ref 0.35–5.5)

## 2024-10-17 LAB — LEVETIRACETAM SERPL-MCNC: <2 UG/ML (ref 10–40)

## (undated) DEVICE — GLOVE BIOGEL PI ORTHO SZ 7 PF LF (40PR/BX)

## (undated) DEVICE — CHLORAPREP 26 ML APPLICATOR - ORANGE TINT(25/CA)

## (undated) DEVICE — SET LEADWIRE 5 LEAD BEDSIDE DISPOSABLE ECG (1SET OF 5/EA)

## (undated) DEVICE — SUCTION INSTRUMENT YANKAUER BULBOUS TIP W/O VENT (50EA/CA)

## (undated) DEVICE — SYRINGE SAFETY 10 ML 18 GA X 1 1/2 BLUNT LL (100/BX 4BX/CA)

## (undated) DEVICE — LACTATED RINGERS INJ 1000 ML - (14EA/CA 60CA/PF)

## (undated) DEVICE — GOWN SURGEONS LARGE - (32/CA)

## (undated) DEVICE — ELECTRODE DUAL RETURN W/ CORD - (50/PK)

## (undated) DEVICE — GLOVE BIOGEL PI INDICATOR SZ 7.5 SURGICAL PF LF -(50/BX 4BX/CA)

## (undated) DEVICE — SPONGE GAUZE NON-STERILE 4X4 - (2000/CA 10PK/CA)

## (undated) DEVICE — WATER IRRIGATION STERILE 1000ML (12EA/CA)

## (undated) DEVICE — HEAD HOLDER JUNIOR/ADULT

## (undated) DEVICE — ELECTRODE 850 FOAM ADHESIVE - HYDROGEL RADIOTRNSPRNT (50/PK)

## (undated) DEVICE — CATHETER IV SAFETY 20 GA X 1-1/4 (50/BX)

## (undated) DEVICE — CANISTER SUCTION 3000ML MECHANICAL FILTER AUTO SHUTOFF MEDI-VAC NONSTERILE LF DISP  (40EA/CA)

## (undated) DEVICE — SODIUM CHL IRRIGATION 0.9% 1000ML (12EA/CA)

## (undated) DEVICE — NEEDLE SPINAL NON-SAFETY 18 GA X 3 IN (25EA/BX)

## (undated) DEVICE — SYRINGE SAFETY 3 ML 18 GA X 1 1/2 BLUNT LL (100/BX 8BX/CA)

## (undated) DEVICE — SYRINGE ASEPTO - (50EA/CA

## (undated) DEVICE — MASK ANESTHESIA ADULT  - (100/CA)

## (undated) DEVICE — PROTECTOR ULNA NERVE - (36PR/CA)

## (undated) DEVICE — TUBE CONNECTING SUCTION - CLEAR PLASTIC STERILE 72 IN (50EA/CA)

## (undated) DEVICE — BANDAGE ELASTIC 2 X 5 YDS - STERILE VELCRO (25/CA) LATEX

## (undated) DEVICE — SET SUCTION/IRRIGATION WITH DISPOSABLE TIP (6/CA )PART #0250-070-520 IS A SUB

## (undated) DEVICE — KIT CUSTOM PROCEDURE SINGLE FOR ENDO  (15/CA)

## (undated) DEVICE — SYRINGE DISP. 60 CC LL - (30/BX, 12BX/CA)**WHEN THESE ARE GONE ORDER #500206**

## (undated) DEVICE — GLOVE BIOGEL INDICATOR SZ 8 SURGICAL PF LTX - (50/BX 4BX/CA)

## (undated) DEVICE — KIT ROOM DECONTAMINATION

## (undated) DEVICE — SUTURE GENERAL

## (undated) DEVICE — ARMREST CRADLE FOAM - (2PR/PK 12PR/CA)

## (undated) DEVICE — BLADE SURGICAL #10 - (50/BX)

## (undated) DEVICE — MASK O2 VNYL ADLT RBRTH HI - (50/CS)

## (undated) DEVICE — KIT ANESTHESIA W/CIRCUIT & 3/LT BAG W/FILTER (20EA/CA)

## (undated) DEVICE — TUBE E-T HI-LO CUFF 8.5MM (10EA/PK)

## (undated) DEVICE — GLOVE BIOGEL SZ 6.5 SURGICAL PF LTX (50PR/BX 4BX/CA)

## (undated) DEVICE — GLOVE, LITE (PAIR)

## (undated) DEVICE — DRILL BIT 1.8MMX80MM CALIBRATED (4TX2=8)

## (undated) DEVICE — MASK AIRWAY SIZE 3 UNIQUE SILICON (10/BX)

## (undated) DEVICE — SUTURE 2-0 VICRYL PLUS CT-1 - 8 X 18 INCH(12/BX)

## (undated) DEVICE — TUBE E-T HI-LO CUFF 7.0MM (10EA/PK)

## (undated) DEVICE — TUBE CONNECT SUCTION CLEAR 120 X 1/4" (50EA/CA)"

## (undated) DEVICE — DRAPE ARM  BOX OF 20

## (undated) DEVICE — SET EXTENSION WITH 2 PORTS (48EA/CA) ***PART #2C8610 IS A SUBSTITUTE*****

## (undated) DEVICE — NEEDLE 19GA FLEX EUS (5/BX)

## (undated) DEVICE — GLOVE BIOGEL SZ 7.5 SURGICAL PF LTX - (50PR/BX 4BX/CA)

## (undated) DEVICE — SEAL 5MM-8MM UNIVERSAL  BOX OF 10

## (undated) DEVICE — CLIP HEMOLOCK PURPLE - (14/BX)

## (undated) DEVICE — CATHETER IV SAFETY 22 GA X 1 (50EA/BX)

## (undated) DEVICE — TROCAR 5X100 BLADED Z-THREAD - KII (6/BX)

## (undated) DEVICE — TROCAR Z THREAD 12 X 100 - BLADED (6/BX)

## (undated) DEVICE — CUFF BP ADULT LARGE DISPOSABLE (20EA/CA)

## (undated) DEVICE — TUBING CLEARLINK DUO-VENT - C-FLO (48EA/CA)

## (undated) DEVICE — NEEDLE INSUFFLATION FOR STEP - (12/BX)

## (undated) DEVICE — MASK AIRWAY SIZE 2 LMA WITH LUBE & SYRINGE (10/BX)

## (undated) DEVICE — PACK LOWER EXTREMITY - (2/CA)

## (undated) DEVICE — NEEDLE DRIVER LARGE DA VINCI 10X'S REUSABLE

## (undated) DEVICE — WATER IRRIG. STER 3000 ML - (4/CA)

## (undated) DEVICE — GLOVE SZ 6 BIOGEL PI MICRO - PF LF (50PR/BX 4BX/CA)

## (undated) DEVICE — GLOVE SZ 7 BIOGEL PI MICRO - PF LF (50PR/BX 4BX/CA)

## (undated) DEVICE — GOWN WARMING STANDARD FLEX - (30/CA)

## (undated) DEVICE — FORCEPS PROGRASP DA VINCI 10X'S REUSABLE

## (undated) DEVICE — TUBE E-T HI-LO CUFF 7.5MM (10EA/PK)

## (undated) DEVICE — PACK GYN DAVINCI (2EA/CA)

## (undated) DEVICE — ROBOTIC SURGERY SERVICES

## (undated) DEVICE — MASK  AIRWAY SIZE 5 UNIQUE SILICON (10EA/BX)

## (undated) DEVICE — DRAPE 36X28IN RAD CARM BND BG - (25/CA) O

## (undated) DEVICE — BLOCK BITE ENDOSCOPIC 2809 - (100/BX) INTERMEDIATE

## (undated) DEVICE — NEEDLE DRIVER MEGA SUTURECUT DA VINCI 15X'S REUSABLE

## (undated) DEVICE — SLEEVE, VASO, THIGH, MED

## (undated) DEVICE — FORCEP RADIAL JAW 4 STANDARD CAPACITY W/NEEDLE 240CM (40EA/BX)

## (undated) DEVICE — CANISTER SUCTION RIGID RED 1500CC (40EA/CA)

## (undated) DEVICE — DRAPE COLUMN  BOX OF 20

## (undated) DEVICE — MASK AIRWAY SIZE 4 UNIQUE SILICON (10EA/BX)

## (undated) DEVICE — FORCEPS MARYLAND BIPOLAR DA VINCI 10X'S REUSABLE

## (undated) DEVICE — SUTURE QUILL 0 PDO 14X14 - (12/BX)

## (undated) DEVICE — SENSOR OXIMETER ADULT SPO2 RD SET (20EA/BX)

## (undated) DEVICE — SPECIMEN BAG ENDOCATCH II15MM 15MM SPECIMEN RETRIEVAL (3EA/CA)

## (undated) DEVICE — SPATULA PERMANENT CAUTERY DA VINCI 10X'S REUSABLE

## (undated) DEVICE — TOWEL STOP TIMEOUT SAFETY FLAG (40EA/CA)

## (undated) DEVICE — SYRINGE SAFETY 5 ML 18 GA X 1-1/2 BLUNT LL (100/BX 4BX/CA)

## (undated) DEVICE — PAD OR TABLE DA VINCI 2IN X 20IN X 72IN - (12EA/CA)

## (undated) DEVICE — BITE BLOCK ADULT 60FR (100EA/CA)

## (undated) DEVICE — GLOVE SZ 6.5 BIOGEL PI MICRO - PF LF (50PR/BX)

## (undated) DEVICE — GLOVE BIOGEL PI INDICATOR SZ 6.5 SURGICAL PF LF - (50/BX 4BX/CA)

## (undated) DEVICE — TUBE E-T HI-LO CUFF 8.0MM (10EA/PK)

## (undated) DEVICE — NEPTUNE 4 PORT MANIFOLD - (20/PK)

## (undated) DEVICE — DRILL BIT 2.8MM X 155MM CALIBRATED (8TX2=16)

## (undated) DEVICE — GOWN SURGEONS X-LARGE - DISP. (30/CA)

## (undated) DEVICE — MASK WITH FACE SHIELD (25/BX 4BX/CA)

## (undated) DEVICE — SUTURE 3-0 MONOCRYL PLUS PS-1 - 27 INCH (36/BX)

## (undated) DEVICE — SOD. CHL. INJ. 0.9% 1000 ML - (14EA/CA 60CA/PF)

## (undated) DEVICE — TUBE E-T HI-LO CUFF 6.5MM (10EA/BX)

## (undated) DEVICE — KIT  I.V. START (100EA/CA)

## (undated) DEVICE — TUBE SUCTION YANKAUER  1/4 X 6FT (20EA/CA)"

## (undated) DEVICE — GLOVE BIOGEL PI ORTHO SZ 6 SURGICAL PF LF (40PR/BX)

## (undated) DEVICE — SENSOR SPO2 NEO LNCS ADHESIVE (20/BX) SEE USER NOTES